# Patient Record
Sex: FEMALE | Race: BLACK OR AFRICAN AMERICAN | NOT HISPANIC OR LATINO | URBAN - METROPOLITAN AREA
[De-identification: names, ages, dates, MRNs, and addresses within clinical notes are randomized per-mention and may not be internally consistent; named-entity substitution may affect disease eponyms.]

---

## 2021-10-26 ENCOUNTER — INPATIENT (INPATIENT)
Facility: HOSPITAL | Age: 46
LOS: 44 days | Discharge: NURSING, REHAB OR LONG-TERM CARE | DRG: 3 | End: 2021-12-10
Attending: NEUROLOGICAL SURGERY | Admitting: NEUROLOGICAL SURGERY
Payer: MEDICARE

## 2021-10-26 VITALS
DIASTOLIC BLOOD PRESSURE: 91 MMHG | OXYGEN SATURATION: 100 % | SYSTOLIC BLOOD PRESSURE: 161 MMHG | RESPIRATION RATE: 16 BRPM | HEART RATE: 43 BPM

## 2021-10-26 DIAGNOSIS — Z98.890 OTHER SPECIFIED POSTPROCEDURAL STATES: Chronic | ICD-10-CM

## 2021-10-26 LAB
ALBUMIN SERPL ELPH-MCNC: 4 G/DL — SIGNIFICANT CHANGE UP (ref 3.3–5)
ALP SERPL-CCNC: 82 U/L — SIGNIFICANT CHANGE UP (ref 40–120)
ALT FLD-CCNC: 20 U/L — SIGNIFICANT CHANGE UP (ref 10–45)
AMPHET UR-MCNC: NEGATIVE — SIGNIFICANT CHANGE UP
ANION GAP SERPL CALC-SCNC: 12 MMOL/L — SIGNIFICANT CHANGE UP (ref 5–17)
ANION GAP SERPL CALC-SCNC: 12 MMOL/L — SIGNIFICANT CHANGE UP (ref 5–17)
ANION GAP SERPL CALC-SCNC: 13 MMOL/L — SIGNIFICANT CHANGE UP (ref 5–17)
ANISOCYTOSIS BLD QL: SLIGHT — SIGNIFICANT CHANGE UP
APTT BLD: 26.5 SEC — LOW (ref 27.5–35.5)
AST SERPL-CCNC: 25 U/L — SIGNIFICANT CHANGE UP (ref 10–40)
BARBITURATES UR SCN-MCNC: NEGATIVE — SIGNIFICANT CHANGE UP
BASE EXCESS BLDA CALC-SCNC: 2.3 MMOL/L — SIGNIFICANT CHANGE UP (ref -2–3)
BASOPHILS # BLD AUTO: 0 K/UL — SIGNIFICANT CHANGE UP (ref 0–0.2)
BASOPHILS NFR BLD AUTO: 0 % — SIGNIFICANT CHANGE UP (ref 0–2)
BENZODIAZ UR-MCNC: POSITIVE
BILIRUB SERPL-MCNC: 0.2 MG/DL — SIGNIFICANT CHANGE UP (ref 0.2–1.2)
BLD GP AB SCN SERPL QL: NEGATIVE — SIGNIFICANT CHANGE UP
BUN SERPL-MCNC: 10 MG/DL — SIGNIFICANT CHANGE UP (ref 7–23)
BUN SERPL-MCNC: 10 MG/DL — SIGNIFICANT CHANGE UP (ref 7–23)
BUN SERPL-MCNC: 8 MG/DL — SIGNIFICANT CHANGE UP (ref 7–23)
CALCIUM SERPL-MCNC: 8.7 MG/DL — SIGNIFICANT CHANGE UP (ref 8.4–10.5)
CALCIUM SERPL-MCNC: 9.4 MG/DL — SIGNIFICANT CHANGE UP (ref 8.4–10.5)
CALCIUM SERPL-MCNC: 9.6 MG/DL — SIGNIFICANT CHANGE UP (ref 8.4–10.5)
CHLORIDE SERPL-SCNC: 102 MMOL/L — SIGNIFICANT CHANGE UP (ref 96–108)
CHLORIDE SERPL-SCNC: 104 MMOL/L — SIGNIFICANT CHANGE UP (ref 96–108)
CHLORIDE SERPL-SCNC: 98 MMOL/L — SIGNIFICANT CHANGE UP (ref 96–108)
CO2 BLDA-SCNC: 28 MMOL/L — HIGH (ref 19–24)
CO2 SERPL-SCNC: 24 MMOL/L — SIGNIFICANT CHANGE UP (ref 22–31)
CO2 SERPL-SCNC: 24 MMOL/L — SIGNIFICANT CHANGE UP (ref 22–31)
CO2 SERPL-SCNC: 25 MMOL/L — SIGNIFICANT CHANGE UP (ref 22–31)
COCAINE METAB.OTHER UR-MCNC: NEGATIVE — SIGNIFICANT CHANGE UP
CREAT SERPL-MCNC: 0.59 MG/DL — SIGNIFICANT CHANGE UP (ref 0.5–1.3)
CREAT SERPL-MCNC: 0.79 MG/DL — SIGNIFICANT CHANGE UP (ref 0.5–1.3)
CREAT SERPL-MCNC: 0.84 MG/DL — SIGNIFICANT CHANGE UP (ref 0.5–1.3)
EOSINOPHIL # BLD AUTO: 0 K/UL — SIGNIFICANT CHANGE UP (ref 0–0.5)
EOSINOPHIL NFR BLD AUTO: 0 % — SIGNIFICANT CHANGE UP (ref 0–6)
GAS PNL BLDA: SIGNIFICANT CHANGE UP
GIANT PLATELETS BLD QL SMEAR: PRESENT — SIGNIFICANT CHANGE UP
GLUCOSE SERPL-MCNC: 126 MG/DL — HIGH (ref 70–99)
GLUCOSE SERPL-MCNC: 144 MG/DL — HIGH (ref 70–99)
GLUCOSE SERPL-MCNC: 241 MG/DL — HIGH (ref 70–99)
HCG SERPL-ACNC: 3 MIU/ML — SIGNIFICANT CHANGE UP
HCG UR QL: NEGATIVE — SIGNIFICANT CHANGE UP
HCO3 BLDA-SCNC: 26 MMOL/L — SIGNIFICANT CHANGE UP (ref 21–28)
HCT VFR BLD CALC: 24.6 % — LOW (ref 34.5–45)
HCT VFR BLD CALC: 25.2 % — LOW (ref 34.5–45)
HCT VFR BLD CALC: 27.2 % — LOW (ref 34.5–45)
HGB BLD-MCNC: 7.9 G/DL — LOW (ref 11.5–15.5)
HGB BLD-MCNC: 8.1 G/DL — LOW (ref 11.5–15.5)
HGB BLD-MCNC: 9 G/DL — LOW (ref 11.5–15.5)
HYPOCHROMIA BLD QL: SLIGHT — SIGNIFICANT CHANGE UP
INR BLD: 1.03 — SIGNIFICANT CHANGE UP (ref 0.88–1.16)
LYMPHOCYTES # BLD AUTO: 0.54 K/UL — LOW (ref 1–3.3)
LYMPHOCYTES # BLD AUTO: 3.5 % — LOW (ref 13–44)
MACROCYTES BLD QL: SLIGHT — SIGNIFICANT CHANGE UP
MAGNESIUM SERPL-MCNC: 1.8 MG/DL — SIGNIFICANT CHANGE UP (ref 1.6–2.6)
MANUAL SMEAR VERIFICATION: SIGNIFICANT CHANGE UP
MCHC RBC-ENTMCNC: 23 PG — LOW (ref 27–34)
MCHC RBC-ENTMCNC: 23.2 PG — LOW (ref 27–34)
MCHC RBC-ENTMCNC: 24.7 PG — LOW (ref 27–34)
MCHC RBC-ENTMCNC: 32.1 GM/DL — SIGNIFICANT CHANGE UP (ref 32–36)
MCHC RBC-ENTMCNC: 32.1 GM/DL — SIGNIFICANT CHANGE UP (ref 32–36)
MCHC RBC-ENTMCNC: 33.1 GM/DL — SIGNIFICANT CHANGE UP (ref 32–36)
MCV RBC AUTO: 71.6 FL — LOW (ref 80–100)
MCV RBC AUTO: 72.4 FL — LOW (ref 80–100)
MCV RBC AUTO: 74.7 FL — LOW (ref 80–100)
METHADONE UR-MCNC: NEGATIVE — SIGNIFICANT CHANGE UP
MONOCYTES # BLD AUTO: 0.42 K/UL — SIGNIFICANT CHANGE UP (ref 0–0.9)
MONOCYTES NFR BLD AUTO: 2.7 % — SIGNIFICANT CHANGE UP (ref 2–14)
NEUTROPHILS # BLD AUTO: 14.45 K/UL — HIGH (ref 1.8–7.4)
NEUTROPHILS NFR BLD AUTO: 93.8 % — HIGH (ref 43–77)
NRBC # BLD: 0 /100 WBCS — SIGNIFICANT CHANGE UP (ref 0–0)
NRBC # BLD: 0 /100 WBCS — SIGNIFICANT CHANGE UP (ref 0–0)
OPIATES UR-MCNC: NEGATIVE — SIGNIFICANT CHANGE UP
OSMOLALITY SERPL: 309 MOSM/KG — HIGH (ref 275–300)
OSMOLALITY SERPL: 309 MOSM/KG — HIGH (ref 275–300)
OVALOCYTES BLD QL SMEAR: SLIGHT — SIGNIFICANT CHANGE UP
PCO2 BLDA: 39 MMHG — HIGH (ref 32–35)
PCP SPEC-MCNC: SIGNIFICANT CHANGE UP
PCP SPEC-MCNC: SIGNIFICANT CHANGE UP
PCP UR-MCNC: NEGATIVE — SIGNIFICANT CHANGE UP
PH BLDA: 7.44 — SIGNIFICANT CHANGE UP (ref 7.35–7.45)
PHOSPHATE SERPL-MCNC: 5.5 MG/DL — HIGH (ref 2.5–4.5)
PLAT MORPH BLD: ABNORMAL
PLATELET # BLD AUTO: 251 K/UL — SIGNIFICANT CHANGE UP (ref 150–400)
PLATELET # BLD AUTO: 302 K/UL — SIGNIFICANT CHANGE UP (ref 150–400)
PLATELET # BLD AUTO: 318 K/UL — SIGNIFICANT CHANGE UP (ref 150–400)
PO2 BLDA: 170 MMHG — HIGH (ref 83–108)
POIKILOCYTOSIS BLD QL AUTO: SLIGHT — SIGNIFICANT CHANGE UP
POLYCHROMASIA BLD QL SMEAR: SIGNIFICANT CHANGE UP
POTASSIUM SERPL-MCNC: 3.7 MMOL/L — SIGNIFICANT CHANGE UP (ref 3.5–5.3)
POTASSIUM SERPL-MCNC: 3.7 MMOL/L — SIGNIFICANT CHANGE UP (ref 3.5–5.3)
POTASSIUM SERPL-MCNC: 3.8 MMOL/L — SIGNIFICANT CHANGE UP (ref 3.5–5.3)
POTASSIUM SERPL-SCNC: 3.7 MMOL/L — SIGNIFICANT CHANGE UP (ref 3.5–5.3)
POTASSIUM SERPL-SCNC: 3.7 MMOL/L — SIGNIFICANT CHANGE UP (ref 3.5–5.3)
POTASSIUM SERPL-SCNC: 3.8 MMOL/L — SIGNIFICANT CHANGE UP (ref 3.5–5.3)
PROT SERPL-MCNC: 7.2 G/DL — SIGNIFICANT CHANGE UP (ref 6–8.3)
PROTHROM AB SERPL-ACNC: 12.3 SEC — SIGNIFICANT CHANGE UP (ref 10.6–13.6)
RBC # BLD: 3.4 M/UL — LOW (ref 3.8–5.2)
RBC # BLD: 3.52 M/UL — LOW (ref 3.8–5.2)
RBC # BLD: 3.64 M/UL — LOW (ref 3.8–5.2)
RBC # FLD: 14.6 % — HIGH (ref 10.3–14.5)
RBC # FLD: 14.9 % — HIGH (ref 10.3–14.5)
RBC # FLD: 18.4 % — HIGH (ref 10.3–14.5)
RBC BLD AUTO: ABNORMAL
RH IG SCN BLD-IMP: POSITIVE — SIGNIFICANT CHANGE UP
SAO2 % BLDA: 99.1 % — HIGH (ref 94–98)
SARS-COV-2 RNA SPEC QL NAA+PROBE: SIGNIFICANT CHANGE UP
SODIUM SERPL-SCNC: 134 MMOL/L — LOW (ref 135–145)
SODIUM SERPL-SCNC: 139 MMOL/L — SIGNIFICANT CHANGE UP (ref 135–145)
SODIUM SERPL-SCNC: 141 MMOL/L — SIGNIFICANT CHANGE UP (ref 135–145)
TARGETS BLD QL SMEAR: SLIGHT — SIGNIFICANT CHANGE UP
THC UR QL: POSITIVE
WBC # BLD: 11.46 K/UL — HIGH (ref 3.8–10.5)
WBC # BLD: 15.41 K/UL — HIGH (ref 3.8–10.5)
WBC # BLD: 20.06 K/UL — HIGH (ref 3.8–10.5)
WBC # FLD AUTO: 11.46 K/UL — HIGH (ref 3.8–10.5)
WBC # FLD AUTO: 15.41 K/UL — HIGH (ref 3.8–10.5)
WBC # FLD AUTO: 20.06 K/UL — HIGH (ref 3.8–10.5)

## 2021-10-26 PROCEDURE — 36224 PLACE CATH CAROTD ART: CPT | Mod: LT

## 2021-10-26 PROCEDURE — 70450 CT HEAD/BRAIN W/O DYE: CPT | Mod: 26

## 2021-10-26 PROCEDURE — 99223 1ST HOSP IP/OBS HIGH 75: CPT | Mod: 25,57

## 2021-10-26 PROCEDURE — 71045 X-RAY EXAM CHEST 1 VIEW: CPT | Mod: 26

## 2021-10-26 PROCEDURE — 76377 3D RENDER W/INTRP POSTPROCES: CPT | Mod: 26

## 2021-10-26 PROCEDURE — 75894 X-RAYS TRANSCATH THERAPY: CPT | Mod: 26

## 2021-10-26 PROCEDURE — 61624 TCAT PERM OCCLS/EMBOLJ CNS: CPT

## 2021-10-26 PROCEDURE — 70450 CT HEAD/BRAIN W/O DYE: CPT | Mod: 26,77

## 2021-10-26 PROCEDURE — 99291 CRITICAL CARE FIRST HOUR: CPT

## 2021-10-26 PROCEDURE — 99292 CRITICAL CARE ADDL 30 MIN: CPT | Mod: 25

## 2021-10-26 PROCEDURE — 36556 INSERT NON-TUNNEL CV CATH: CPT

## 2021-10-26 PROCEDURE — 75898 FOLLOW-UP ANGIOGRAPHY: CPT | Mod: 26

## 2021-10-26 RX ORDER — CLEVIDIPINE BUTYRATE 50MG/100ML
1 VIAL (ML) INTRAVENOUS
Qty: 25 | Refills: 0 | Status: DISCONTINUED | OUTPATIENT
Start: 2021-10-26 | End: 2021-10-26

## 2021-10-26 RX ORDER — SODIUM CHLORIDE 9 MG/ML
1000 INJECTION, SOLUTION INTRAVENOUS
Refills: 0 | Status: DISCONTINUED | OUTPATIENT
Start: 2021-10-26 | End: 2021-10-26

## 2021-10-26 RX ORDER — ACETAMINOPHEN 500 MG
650 TABLET ORAL EVERY 6 HOURS
Refills: 0 | Status: DISCONTINUED | OUTPATIENT
Start: 2021-10-26 | End: 2021-11-09

## 2021-10-26 RX ORDER — LEVETIRACETAM 250 MG/1
1000 TABLET, FILM COATED ORAL EVERY 12 HOURS
Refills: 0 | Status: DISCONTINUED | OUTPATIENT
Start: 2021-10-26 | End: 2021-11-04

## 2021-10-26 RX ORDER — CEFAZOLIN SODIUM 1 G
2000 VIAL (EA) INJECTION ONCE
Refills: 0 | Status: COMPLETED | OUTPATIENT
Start: 2021-10-26 | End: 2021-10-26

## 2021-10-26 RX ORDER — SENNA PLUS 8.6 MG/1
2 TABLET ORAL AT BEDTIME
Refills: 0 | Status: DISCONTINUED | OUTPATIENT
Start: 2021-10-26 | End: 2021-11-03

## 2021-10-26 RX ORDER — SODIUM CHLORIDE 9 MG/ML
1000 INJECTION INTRAMUSCULAR; INTRAVENOUS; SUBCUTANEOUS
Refills: 0 | Status: DISCONTINUED | OUTPATIENT
Start: 2021-10-26 | End: 2021-10-26

## 2021-10-26 RX ORDER — PANTOPRAZOLE SODIUM 20 MG/1
40 TABLET, DELAYED RELEASE ORAL DAILY
Refills: 0 | Status: DISCONTINUED | OUTPATIENT
Start: 2021-10-26 | End: 2021-10-27

## 2021-10-26 RX ORDER — MANNITOL
85 POWDER (GRAM) MISCELLANEOUS ONCE
Refills: 0 | Status: COMPLETED | OUTPATIENT
Start: 2021-10-26 | End: 2021-10-26

## 2021-10-26 RX ORDER — PROPOFOL 10 MG/ML
10 INJECTION, EMULSION INTRAVENOUS
Qty: 1000 | Refills: 0 | Status: DISCONTINUED | OUTPATIENT
Start: 2021-10-26 | End: 2021-10-26

## 2021-10-26 RX ORDER — DEXTROSE 50 % IN WATER 50 %
25 SYRINGE (ML) INTRAVENOUS ONCE
Refills: 0 | Status: DISCONTINUED | OUTPATIENT
Start: 2021-10-26 | End: 2021-10-27

## 2021-10-26 RX ORDER — FENTANYL CITRATE 50 UG/ML
25 INJECTION INTRAVENOUS
Refills: 0 | Status: DISCONTINUED | OUTPATIENT
Start: 2021-10-26 | End: 2021-11-02

## 2021-10-26 RX ORDER — SODIUM CHLORIDE 9 MG/ML
30 INJECTION INTRAMUSCULAR; INTRAVENOUS; SUBCUTANEOUS ONCE
Refills: 0 | Status: COMPLETED | OUTPATIENT
Start: 2021-10-26 | End: 2021-10-26

## 2021-10-26 RX ORDER — FUROSEMIDE 40 MG
40 TABLET ORAL ONCE
Refills: 0 | Status: COMPLETED | OUTPATIENT
Start: 2021-10-26 | End: 2021-10-26

## 2021-10-26 RX ORDER — CLEVIDIPINE BUTYRATE 50MG/100ML
1 VIAL (ML) INTRAVENOUS
Qty: 25 | Refills: 0 | Status: DISCONTINUED | OUTPATIENT
Start: 2021-10-26 | End: 2021-10-27

## 2021-10-26 RX ORDER — INSULIN LISPRO 100/ML
VIAL (ML) SUBCUTANEOUS
Refills: 0 | Status: DISCONTINUED | OUTPATIENT
Start: 2021-10-26 | End: 2021-11-07

## 2021-10-26 RX ORDER — SODIUM CHLORIDE 9 MG/ML
1000 INJECTION, SOLUTION INTRAVENOUS
Refills: 0 | Status: DISCONTINUED | OUTPATIENT
Start: 2021-10-26 | End: 2021-10-27

## 2021-10-26 RX ORDER — PROPOFOL 10 MG/ML
10 INJECTION, EMULSION INTRAVENOUS
Qty: 1000 | Refills: 0 | Status: DISCONTINUED | OUTPATIENT
Start: 2021-10-26 | End: 2021-11-01

## 2021-10-26 RX ORDER — SODIUM CHLORIDE 5 G/100ML
500 INJECTION, SOLUTION INTRAVENOUS
Refills: 0 | Status: DISCONTINUED | OUTPATIENT
Start: 2021-10-26 | End: 2021-10-27

## 2021-10-26 RX ORDER — GLUCAGON INJECTION, SOLUTION 0.5 MG/.1ML
1 INJECTION, SOLUTION SUBCUTANEOUS ONCE
Refills: 0 | Status: DISCONTINUED | OUTPATIENT
Start: 2021-10-26 | End: 2021-10-27

## 2021-10-26 RX ORDER — SODIUM CHLORIDE 9 MG/ML
1000 INJECTION INTRAMUSCULAR; INTRAVENOUS; SUBCUTANEOUS
Refills: 0 | Status: DISCONTINUED | OUTPATIENT
Start: 2021-10-26 | End: 2021-10-27

## 2021-10-26 RX ORDER — CHLORHEXIDINE GLUCONATE 213 G/1000ML
15 SOLUTION TOPICAL EVERY 12 HOURS
Refills: 0 | Status: DISCONTINUED | OUTPATIENT
Start: 2021-10-26 | End: 2021-12-10

## 2021-10-26 RX ORDER — CEFAZOLIN SODIUM 1 G
1000 VIAL (EA) INJECTION ONCE
Refills: 0 | Status: COMPLETED | OUTPATIENT
Start: 2021-10-26 | End: 2021-10-26

## 2021-10-26 RX ORDER — SODIUM CHLORIDE 9 MG/ML
1000 INJECTION INTRAMUSCULAR; INTRAVENOUS; SUBCUTANEOUS ONCE
Refills: 0 | Status: COMPLETED | OUTPATIENT
Start: 2021-10-26 | End: 2021-10-26

## 2021-10-26 RX ORDER — CHLORHEXIDINE GLUCONATE 213 G/1000ML
1 SOLUTION TOPICAL
Refills: 0 | Status: DISCONTINUED | OUTPATIENT
Start: 2021-10-26 | End: 2021-11-29

## 2021-10-26 RX ORDER — DEXTROSE 50 % IN WATER 50 %
12.5 SYRINGE (ML) INTRAVENOUS ONCE
Refills: 0 | Status: DISCONTINUED | OUTPATIENT
Start: 2021-10-26 | End: 2021-10-27

## 2021-10-26 RX ADMIN — Medication 100 MILLIGRAM(S): at 13:41

## 2021-10-26 RX ADMIN — SODIUM CHLORIDE 1000 MILLILITER(S): 9 INJECTION INTRAMUSCULAR; INTRAVENOUS; SUBCUTANEOUS at 23:41

## 2021-10-26 RX ADMIN — Medication 40 MILLIGRAM(S): at 12:34

## 2021-10-26 RX ADMIN — SODIUM CHLORIDE 30 MILLILITER(S): 5 INJECTION, SOLUTION INTRAVENOUS at 22:24

## 2021-10-26 RX ADMIN — Medication 5100 GRAM(S): at 12:34

## 2021-10-26 RX ADMIN — Medication 100 MILLIGRAM(S): at 17:06

## 2021-10-26 RX ADMIN — SODIUM CHLORIDE 30 MILLILITER(S): 9 INJECTION INTRAMUSCULAR; INTRAVENOUS; SUBCUTANEOUS at 12:00

## 2021-10-26 RX ADMIN — LEVETIRACETAM 400 MILLIGRAM(S): 250 TABLET, FILM COATED ORAL at 22:21

## 2021-10-26 RX ADMIN — SODIUM CHLORIDE 35 MILLILITER(S): 9 INJECTION, SOLUTION INTRAVENOUS at 14:49

## 2021-10-26 NOTE — H&P ADULT - NSHPPHYSICALEXAM_GEN_ALL_CORE
Constitutional: 47 y/o female intubated and sedated on propofol  Eyes:  pupils 2mm b/l, sluggish. Sclera anicteric, conjunctiva noninjected.  ENMT: Oropharyngeal mucosa moist, pink. Tongue midline.    Neck: Neck supple, FROM.  No appreciable lymphadenopathy.  Respiratory: intubated  Cardiovascular: Regular rate and rhythm.  Gastrointestinal:  Soft, nontender, nondistended.   Genitourinary:  Deferred.  Rectal: Deferred.  Vascular: Extremities warm, no ulcers, no discoloration of skin.   Neurological:   limited neurological exam  intubated and sedated, unable to follow commands  pupils 2mm and sluggish b/l   neg corneals, no gaze preference  0/5 all extremities  unable to assess cough/gag given ICP  Skin: Warm, dry, no erythema. Constitutional: 45 y/o female intubated on full vent support and sedated on propofol  Eyes:  pupils 2mm b/l, sluggish. Sclera anicteric, conjunctiva noninjected.  ENMT: Oropharyngeal mucosa moist, pink. Tongue midline.    Neck: Neck supple, FROM.  No appreciable lymphadenopathy.  Respiratory: intubated  Cardiovascular: Regular rate and rhythm.  Gastrointestinal:  Soft, nontender, nondistended.   Genitourinary:  Deferred.  Rectal: Deferred.  Vascular: Extremities warm, no ulcers, no discoloration of skin.   Neurological:   limited neurological exam  intubated and sedated, unable to follow commands  pupils 2mm and sluggish b/l   neg corneals, no gaze preference  0/5 all extremities  unable to assess cough/gag given elevated ICP  Skin: Warm, dry, no erythema.

## 2021-10-26 NOTE — PROGRESS NOTE ADULT - ASSESSMENT
46y/Female with:  L MCA ruptured aneurysm, subarachnoid hemorrhage, s/p DHC (10/26/2021, Dr. D'Amico @ [facility]), brain compression, cerebral edema  anemia   recent spinal surgery    PLAN: Day 1 = 10-26 ; Day 4 = 10/29; Day 21 = 11/15  NEURO: neurochecks q1h, sedation with propofol  SAH:  TCD starting Day 4 for vasospasm surveillance, CTA done @ [facility], possible angio, start nimodipine 60 mg po q4h to be given for 21 days (last day 11/15); definitive aneurysm repair per Neurosurgery or Neurointerventional  Hydrocephalus:  EVD insertion after CT scan  ICP crisis: keep head position neutral, sedation with propofol, osmotherapy, s/p DHC  Seizure prophylaxis (cortical ICH, associated SDH, unclear etiology):  levetiracetam 500mg IV BID until aneurysm is secured  REHAB:  physical therapy evaluation and management    EARLY MOB:  HOB elevated    PULM:  full vent support for now  CARDIO:  SBP goal 100-140mm Hg, nicardipine 5 mg/hour, titrate by 2.5 mg/hour every 15 min to a max of 15 mg/hour; baseline Echo, EKG  ENDO:  Blood sugar goals 140-180 mg/dL, start insulin sliding scale; check A1C, lipid profile, TSH  GI:  PPI for GI prophylaxis  DIET: NPO  RENAL: maintain euvolemia, accurate Is and Os, Na goal 145-150; 23.4% bullet x1, mannitol furosemide x1, recheck BMP this afternoon  HEM/ONC: no coagulopathy (INR= 1.03), no ASA / Plavix use  VTE Prophylaxis: SCDs, no DVT chemoprophylaxis for now as patient is high risk for bleed (fresh bleed), baseline LE Doppler for DVT suspected on admission (transfer, found down)  ID: afebrile, leukocytosis  Social: son updated  MISC: f/u Utox    CORE MEASURES  1.  Bess and Griffin Score =  2.  VTE prophylaxis:  [ ] administered within 24 hours of admission OR [X] reason not done: fresh bleed, unsecured aneurysm.  3.  Dysphagia screening:   [X] performed before any oral meds / liquids / food  4.  Nimodipine treatment:  [X] administered within 24 hours of admission OR [ ] reason not done:    ATTENDING ATTESTATION:  I was physically present for the key portions of the evaluation and management (E/M) service provided.  I agree with the above history, physical and plan, which I have reviewed and edited where appropriate.    Patient at high risk for neurological deterioration or death due to:  ICU delirium, aspiration PNA, DVT / PE.  Critical care time:  I have personally provided 45 minutes of critical care time, excluding time spent on separate procedures.      Plan discussed with RN, house staff. 46y/Female with:  L MCA ruptured aneurysm, subarachnoid hemorrhage, s/p DHC (10/26/2021, Dr. D'Amico @ [facility]), brain compression, cerebral edema  anemia   recent spinal surgery    PLAN: Day 1 = 10-26 ; Day 4 = 10/29; Day 21 = 11/15  NEURO: neurochecks q1h, sedation with propofol  SAH:  TCD starting Day 4 for vasospasm surveillance, CTA done @ [facility], possible angio, start nimodipine 60 mg po q4h to be given for 21 days (last day 11/15); definitive aneurysm repair per Neurosurgery or Neurointerventional  Hydrocephalus:  EVD insertion after CT scan  ICP crisis: keep head position neutral, sedation with propofol, osmotherapy, s/p DHC  Seizure prophylaxis (cortical ICH, associated SDH, unclear etiology):  levetiracetam 500mg IV BID until aneurysm is secured  REHAB:  physical therapy evaluation and management    EARLY MOB:  HOB elevated    PULM:  full vent support for now  CARDIO:  SBP goal 100-140mm Hg, nicardipine 5 mg/hour, titrate by 2.5 mg/hour every 15 min to a max of 15 mg/hour; baseline Echo, EKG  ENDO:  Blood sugar goals 140-180 mg/dL, start insulin sliding scale; check A1C, lipid profile, TSH  GI:  PPI for GI prophylaxis  DIET: NPO  RENAL: maintain euvolemia, accurate Is and Os, Na goal 145-150; 23.4% bullet x1, mannitol furosemide x1, recheck BMP this afternoon  HEM/ONC: no coagulopathy (INR= 1.03), no ASA / Plavix use  VTE Prophylaxis: SCDs, no DVT chemoprophylaxis for now as patient is high risk for bleed (fresh bleed), baseline LE Doppler for DVT suspected on admission (transfer, found down)  ID: afebrile, leukocytosis  Social: son updated  MISC: f/u Utox    CORE MEASURES  1.  Bess and Griffin Score =  2.  VTE prophylaxis:  [ ] administered within 24 hours of admission OR [X] reason not done: fresh bleed, unsecured aneurysm.  3.  Dysphagia screening:   [X] performed before any oral meds / liquids / food  4.  Nimodipine treatment:  [X] administered within 24 hours of admission OR [ ] reason not done:    ATTENDING ATTESTATION:  I was physically present for the key portions of the evaluation and management (E/M) service provided.  I agree with the above history, physical and plan, which I have reviewed and edited where appropriate.    Patient at high risk for neurological deterioration or death due to:  ICU delirium, aspiration PNA, DVT / PE.  Critical care time:  I have personally provided 45 minutes of critical care time, excluding time spent on separate procedures.      Plan discussed with RN, house staff.    Additional critical care time:  I have personally provided 30 minutes of critical care time, excluding time spent on separate procedures.    Total critical care time: 75 minutes

## 2021-10-26 NOTE — H&P ADULT - ATTENDING COMMENTS
Patient s/p left hemicraniectomy at [facility] then transferred to [place]. Presents with left MCA ruptured aneurysm with SAH and ICH. Plan for EVD@15, central line, A-line, SBP<140 until aneurysm secure, catheter angiogram possible coiling, keppra, hypertonic saline for Na 145-155.    Terence Duncan M.D.

## 2021-10-26 NOTE — CHART NOTE - NSCHARTNOTEFT_GEN_A_CORE
Neurosurgical Indications for Screening Dopplers on Admission:       1) Known hypercoagulation disorder (h/o VTE, thrombophilia, HIT, etc.)   2) Admitted from prolonged stay from another institution (straight forward ER transfers not included)  3) Presenting with significant leg immobility   4) Presenting with signs and symptoms of VTE?    5) With significant critical illness, Including "found down" for unknown period of time in HPI  6) With significant neurotrauma (TBI, SCI / TLS spine fractures)   7) Who are comatose   8) With known malignancy (e.g. glioblastoma multiforme, meningioma, etc.). Excludes IA chemo 23hr observation stays  9) On hemodialysis   10) Who have received platelet transfusion or antithrombotic reversal agents recently   11) Who have had recent major orthopedic surgery          Screening dopplers indicated?   Y X_   N _    DVT Prophylaxis:  X_ SCD's   _ chemoprophylaxis

## 2021-10-26 NOTE — H&P ADULT - ASSESSMENT
ASSESSMENT:  47 y/o female with unknown PMHx presented to [place] ED BIBEMS after being found unconscious at home, unknown period of time. 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 HH5, MF1. Patient was intubated at [place] ED and sent straight to the OR for left hemicraniotomy. A-line placed intraop. Hemodynamically unstable upon arrival to [facility], bradycardic and hypertensive s/p Mannitol and Furosemide. Central line placed in NSICU. Currently sedated on Propofol. Pending CTH. Planned for possible aneurysm clipping and EVD placement.     PLAN:  NEURO:  - admit to neurosurgery ICU  - neuro checks/vital signs q1hr  - HOB at 30  - bedrest  - seizure precautions  - Keppra 1g IV BID    CARDIO:  - SBP<140  - Clevidipine 25mg IV at 2mL/hr  - pend echo   - EKG    PULM:  - intubated  - aspiration precautions  - CXR    GI:  - NPO    RENAL:  - Na 145-150  - LR @ 35mL/hr    HEME:  - SCDs  - Hgb: 8.1  - pend 1u PRBC  - pend repeat BMP/CBC    ID:  - afebrile    ENDO:  - pend A1c  - ISS    Case discussed with Dr. Duncan, Dr. D'Amico & Dr. Vyas      ASSESSMENT:  47 y/o female with unknown PMHx presented to [place] ED BIBEMS after being found unconscious at home, unknown period of time. 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 HH5, MF1. Patient was intubated at [place] ED and sent straight to the OR for left hemicraniotomy. A-line placed intraop. Hemodynamically unstable upon arrival to [facility], bradycardic and hypertensive s/p Mannitol and Furosemide. Central line placed in NSICU. Currently sedated on Propofol. Pending CTH. Planned for possible EVD placement, possibl angio coil/embo of aneurysm vs. aneurysm clipping.     PLAN:  NEURO:  - admit to neurosurgery ICU  - neuro checks/vital signs q1hr  - HOB > 30 (for elevated ICPs)   - strict bedrest  - seizure precautions  - Keppra 1g IV BID   - s/p mannitol 85g at [facility], s/p 23.4% on admission     CARDIO:  - SBP<140  - Clevidipine 25mg IV at 2mL/hr  - pend echo on admission   - EKG    PULM:  - intubated on full vent support, ET tube confirmed by CXR   - aspiration precautions    GI:  - NPO  - bowel regimen   - NGT (salem sump)     RENAL:  - Na 145-150, f/u BMP @4pm   - LR @ 35mL/hr, total fluids 75cc/hr (including clevidipine and propofol infusions)   - trend serum osm     HEME:  - SCDs  - Hgb: 8.1  - pend 1u PRBC  - pend repeat BMP/CBC    ID:  - afebrile, no abx at this time     ENDO:  - pend A1c  - ISS     GOC: full code  Level of care: ICU status   Dispo: pend CTH and further OR planning     Case discussed with Dr. Duncan, Dr. D'Amico & Dr. Vyas

## 2021-10-26 NOTE — PROGRESS NOTE ADULT - SUBJECTIVE AND OBJECTIVE BOX
NEUROSURGERY POST OP NOTE:    POD# 0 S/P cerebral angiogram with coil embolization of left MCA bifurcation aneurysm.     S: Patient seen in ICU, sedated on propofol, flap soft but full.       T(C): 36.5 (10-26-21 @ 20:30), Max: 38 (10-26-21 @ 17:45)  HR: 76 (10-26-21 @ 21:15) (43 - 100)  BP: 127/76 (10-26-21 @ 21:00) (104/78 - 179/80)  RR: 22 (10-26-21 @ 17:00) (14 - 22)  SpO2: 100% (10-26-21 @ 21:15) (100% - 100%)      10-26-21 @ 07:01  -  10-26-21 @ 22:23  --------------------------------------------------------  IN: 292.3 mL / OUT: 2915 mL / NET: -2622.7 mL        acetaminophen    Suspension .. 650 milliGRAM(s) Oral every 6 hours PRN  chlorhexidine 0.12% Liquid 15 milliLiter(s) Oral Mucosa every 12 hours  clevidipine Infusion 1 mG/Hr IV Continuous <Continuous>  dextrose 5%. 1000 milliLiter(s) IV Continuous <Continuous>  dextrose 50% Injectable 12.5 Gram(s) IV Push once  dextrose 50% Injectable 25 Gram(s) IV Push once  fentaNYL    Injectable 25 MICROGram(s) IV Push every 2 hours PRN  glucagon  Injectable 1 milliGRAM(s) IntraMuscular once  insulin lispro (ADMELOG) corrective regimen sliding scale   SubCutaneous Before meals and at bedtime  levETIRAcetam  IVPB 1000 milliGRAM(s) IV Intermittent every 12 hours  pantoprazole   Suspension 40 milliGRAM(s) Oral daily  propofol Infusion 10 MICROgram(s)/kG/Min IV Continuous <Continuous>  senna 2 Tablet(s) Oral at bedtime  sodium chloride 0.9%. 1000 milliLiter(s) IV Continuous <Continuous>  sodium chloride 3%. 500 milliLiter(s) IV Continuous <Continuous>      RADIOLOGY:     Exam: Sedated on propofol   General: Intubated, sedated, in no apparent distress.   HEENT: PERRL 2mm sluggish, +ETT/OGT  Cardiovascular: RRR, normal S1 and S2   Respiratory: lungs CTAB, no wheezing, rhonchi, or crackles   GI: normoactive BS to auscultation, abd soft, NTND   Neuro: No eye opening (spontaneously or to noxious), not following commands, not tracking. +Flap full but soft. UE trace WD vs. extensor posturing L>R and b/l LE brisk TF to noxious. +cough, gag, corneals   Extremities: warm and well perfused. Bilateral DP/PT pulses 2+  Wound/incision: +Left hemicraniectomy wound with staples in place, dressing saturated. +Right groin with dressing in place, no hematoma, C/D/I   Drain: +EVD open @5cm, +HMV and +ALEXIS       Assessment:   45 y/o female with PMHx of HTN and MS, hx of spinal surgery at [facility] 10/08 presented to [place] ED BIBEMS after being found unconscious at home, unknown period of time. 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 HH5, MF4; BD #1 = 10/26. Patient was intubated at [facility] ED and sent straight to the OR for left hemicraniotomy. A-line placed intraop. Hemodynamically unstable upon arrival to [facility], bradycardic and hypertensive s/p Mannitol and Furosemide. Central line placed in NSICU. Currently sedated on Propofol. Now s/p EVD placement, and coil embolization of left MCA bifurcation aneurysm 10/26/2021.       Plan:  PLAN:  NEURO:  - admit to neurosurgery ICU  - neuro checks/vital signs q1hr  - HOB > 30 (for elevated ICPs)   - strict bedrest  - seizure precautions  - Keppra 1g IV BID   - s/p mannitol 85g at [facility], s/p 23.4% on admission   - S/p coil embolization of L MCA aneurysm 10/26  - EVD open at 5cmH2O  - Monitor ICPs and drain output     CARDIO:  - SBP<160  - Clevidipine PRN  - echo pending   - EKG    PULM:  - intubated on full vent support, ET tube confirmed by CXR   - aspiration precautions    GI:  - NPO  - bowel regimen   - OGT (salem sump) to low intermittent suction     RENAL:  - Na 145-150,   - NS@50, 3%@30/hr  - trend serum osm     HEME:  - SCDs  - Hgb 9   - s/p 2 u PRBC    ID:  - afebrile, no abx at this time     ENDO:  - pend A1c  - ISS     GOC: full code  Level of care: ICU status   Dispo: pend CTH and further OR planning     Case discussed with Dr. Duncan, Dr. D'Amico & Dr. Vyas

## 2021-10-26 NOTE — H&P ADULT - NSHPLABSRESULTS_GEN_ALL_CORE
8.1    11.46 )-----------( 302      ( 26 Oct 2021 12:01 )             25.2     10-26    134<L>  |  98  |  8   ----------------------------<  144<H>  3.8   |  24  |  0.59    Ca    9.6      26 Oct 2021 12:01    TPro  7.2  /  Alb  4.0  /  TBili  0.2  /  DBili  x   /  AST  25  /  ALT  20  /  AlkPhos  82  10-26

## 2021-10-26 NOTE — PROCEDURAL SAFETY CHECKLIST WITH OR WITHOUT SEDATION - NSPOSTCOMMENTFT_GEN_ALL_CORE
Pt getting prepared to go to cath lab for angio now. Draining clear CSF. Waveform dampened, but pt has had hemicraniectomy today. MD Claudio aware.

## 2021-10-26 NOTE — H&P ADULT - HISTORY OF PRESENT ILLNESS
47 y/o female with unknown PMHx presented to [facility] after being found unconscious at home, unknown period of time. 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 HH5, MF1. Patient was intubated at [facility] and sent straight to the OR for left hemicraniotomy. A-line placed intraop. Hemodynamically unstable upon arrival to [facility], bradycardic and hypertensive s/p Mannitol, Clevidipine, and Furosemide. Central line placed in NSICU. Currently sedated on Propofol. 45 y/o female with unknown PMHx presented to [facility] after being found unconscious at home, unknown period of time. 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 HH5, MF1. Patient was intubated at [place] [facility] and sent straight to the OR for left hemicraniotomy. A-line placed intraop. Hemodynamically unstable upon arrival to [facility], bradycardic and hypertensive s/p Mannitol, Clevidipine, and Furosemide. Central line placed in NSICU. Currently sedated on Propofol, pending repeat CTH. History per patient's son, patient had recent spine surgery (in [place], unknown which hospital) and was found on outpatient imaging last week to have L M1 segment aneurysm (unruptured), pt asymptomatic at this time. Per son patient taking percocet PO at home. Ureña catheter, ET tube, and arterial line placed at [facility].     NIHSS on arrival: 32  47 y/o female with PMH HTN, Multiple sclerosis, recent spinal surgery 10/8 ([facility]) presented to [place] [facility] BIBEMS after being found unconscious at home, unknown period of time. 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 HH5, MF1. Patient was intubated at [place] ED, found to have blown L pupil, and sent straight to the OR for left hemicraniotomy. A-line placed intraop. Hemodynamically unstable upon arrival to [facility], bradycardic and hypertensive s/p Mannitol, Clevidipine, and Furosemide. Central line placed in NSICU. Currently sedated on Propofol, pending repeat CTH. History per patient's son, patient had recent spine surgery and was found on outpatient imaging last week to have L M1 segment aneurysm (unruptured), pt asymptomatic at this time. Per son patient taking percocet PO at home. Ureña catheter, ET tube, and arterial line placed at [facility].     NIHSS on arrival: 32   Bess Griffin 5, Mod Busby 4  Bleed Day 1 = 10/26

## 2021-10-26 NOTE — PROGRESS NOTE ADULT - SUBJECTIVE AND OBJECTIVE BOX
=================================  NEUROCRITICAL CARE ATTENDING NOTE  =================================    AILYN DÍAZ   MRN-[id number]  Summary:  46y/F with recent spinal surgery, found down and brought to ED.  In ED, witnessed shaking, ?seizures, intubated.  Imaging showed SAH.  Emergent decompressive hemicraniectomy for herniation syndrome, given mannitol, levetiracetam, propofol, transferred to [facility] for further management.     COURSE IN THE HOSPITAL:  10/26 admitted to [facility]    Past Medical History: No pertinent past medical history  Allergies:  Allergy Status Unknown  Home meds:     PHYSICAL EXAMINATION  T(C): -- HR: 64 (10-26 @ 12:15) (43 - 64) BP: 155/65 (10-26 @ 12:15) (123/58 - 179/80) RR: 16 (10-26 @ 12:15) (16 - 18) SpO2: 100% (10-26 @ 12:15) (100% - 100%)    NEUROLOGIC EXAMINATION:  Patient does not open eyes, does not  GENERAL: not intubated, not in cardiorespiratory distress  EENT:  anicteric  CARDIOVASCULAR: (+) S1 S2, normal rate and regular rhythm  PULMONARY: clear to auscultation bilaterally  ABDOMEN: soft, nontender with normoactive bowel sounds  EXTREMITIES: no edema  SKIN: no rash    LABS:                        8.1    11.46 )-----------( 302      ( 26 Oct 2021 12:01 )             25.2     134<L>  |  98  |  8   ----------------------------<  144<H>  3.8   |  24  |  0.59    Ca    9.6      26 Oct 2021 12:01    TPro  7.2  /  Alb  4.0  /  TBili  0.2  /  DBili  x   /  AST  25  /  ALT  20  /  AlkPhos  82  10-26    10-26 @ 07:01  -  10-26 @ 13:24  IN: 0 mL / OUT: 1010 mL / NET: -1010 mL    Bacteriology:  CSF studies:  EEG:  Neuroimaging:  Other imaging:    MEDICATIONS:  ·	ceFAZolin   IVPB 1000 milliGRAM(s) IV Intermittent once  ·	levETIRAcetam  IVPB 1000 milliGRAM(s) IV Intermittent every 12 hours  ·	insulin lispro (ADMELOG) corrective regimen sliding scale   SubCutaneous Before meals and at bedtime    IV FLUIDS:  DRIPS:  ·	clevidipine Infusion 1 mG/Hr IV Continuous <Continuous>  DIET: NPO  Lines:  Drains:      Wounds:    CODE STATUS:  Full Code                       GOALS OF CARE:  aggressive                      DISPOSITION:  ICU

## 2021-10-26 NOTE — H&P ADULT - NSHPSOCIALHISTORY_GEN_ALL_CORE
"CHIEF COMPLAINT:  Congestion and fever    HISTORY OF PRESENT ILLNESS:  Patient here with mother. History per nurse's note, reviewed and agreed with. Additional History:  Katharine Anguiano is a 11 month old female who presents today with a 2 week history of congestion. She recently developed fevers to 103. She is feeding well but not sleeping well. Her sister has similar symptoms. No vomiting, diarrhea, or rash. She is behind on immunizations. She has not received a flu vaccine. REVIEW OF SYSTEMS:  Constitutional:  . Normal intake of fluids and solids. Skin:  No rash or lesion. HEENT:  No eye drainage, ear pain,  or sore throat. Respiratory:  No cough, wheezing or breathing difficulty. Gastrointestinal:  No vomiting or diarrhea. Genitourinary:  No frequency, dysuria or hematuria. Musculoskeletal:  No decrease in ROM (range of motion), pain with movement, joint swelling or extremity guarding. EXAM:  Visit Vitals  Temp 98.3 Â°F (36.8 Â°C) (Axillary)   Ht 26"" (66 cm)   Wt 7.81 kg   BMI 17.91 kg/mÂ²      GENERAL:  Well nourished, alert, healthy-appearing child. Content and interactive  HYDRATION:  Well hydrated, moist mucous membranes, normal skin turgor, eyes not sunken. SKIN:  No significant lesions or rashes; normal texture. EYES:  Clear sclerae, EOMI (extraocular movements are intact), PERRLA (pupils equal, round, and reactive to light and accommodation), no discharge. EARS:  Normal ext (external) ears and canals. TMs (tympanic membranes) pearly and mobile, non-inflamed. NOSE:  Mucosa appears normal; nares patent. Clear rhinorrhea  THROAT:  No significant oral lesions; no significant pharyngeal or tonsillar pathology. NECK:  No significant cervical or supraclavicular lymphadenopathy. No masses. No meningismus, supple  HEART:  Regular rate and rhythm; normal S1 & S2, no murmur; perfusion normal.  LUNGS:  Clear, good breath sounds; no rales, rhonchi, wheezes, or stridor.   No tachypnea or " retracting; no respiratory distress. ABDOMEN:  Soft, normal bowel sounds. No rebound, guarding, or tenderness. No masses.   :  No rash    IMPRESSION:  Viral illness    PLAN:  Tylenol or ibuprofen 1/2 tsp every 6 hours as needed for fever or fussiness  Claritin 1/4 tsp daily as needed for congestion  Return for well visit and immunizations  Return for increased symptoms or lack of improvement known percocet use given recent spine surgery known percocet use given recent spine surgery, otherwise unkown drug/ETOH use history. known percocet use given recent spine surgery, otherwise unknown drug/ETOH use history.

## 2021-10-27 LAB
A1C WITH ESTIMATED AVERAGE GLUCOSE RESULT: 5.5 % — SIGNIFICANT CHANGE UP (ref 4–5.6)
ANION GAP SERPL CALC-SCNC: 11 MMOL/L — SIGNIFICANT CHANGE UP (ref 5–17)
ANION GAP SERPL CALC-SCNC: 11 MMOL/L — SIGNIFICANT CHANGE UP (ref 5–17)
ANION GAP SERPL CALC-SCNC: 8 MMOL/L — SIGNIFICANT CHANGE UP (ref 5–17)
BUN SERPL-MCNC: 10 MG/DL — SIGNIFICANT CHANGE UP (ref 7–23)
BUN SERPL-MCNC: 11 MG/DL — SIGNIFICANT CHANGE UP (ref 7–23)
BUN SERPL-MCNC: 9 MG/DL — SIGNIFICANT CHANGE UP (ref 7–23)
CALCIUM SERPL-MCNC: 8 MG/DL — LOW (ref 8.4–10.5)
CALCIUM SERPL-MCNC: 8.2 MG/DL — LOW (ref 8.4–10.5)
CALCIUM SERPL-MCNC: 8.3 MG/DL — LOW (ref 8.4–10.5)
CHLORIDE SERPL-SCNC: 108 MMOL/L — SIGNIFICANT CHANGE UP (ref 96–108)
CHLORIDE SERPL-SCNC: 110 MMOL/L — HIGH (ref 96–108)
CHLORIDE SERPL-SCNC: 126 MMOL/L — HIGH (ref 96–108)
CHOLEST SERPL-MCNC: 158 MG/DL — SIGNIFICANT CHANGE UP
CO2 SERPL-SCNC: 21 MMOL/L — LOW (ref 22–31)
CO2 SERPL-SCNC: 23 MMOL/L — SIGNIFICANT CHANGE UP (ref 22–31)
CO2 SERPL-SCNC: 24 MMOL/L — SIGNIFICANT CHANGE UP (ref 22–31)
CREAT SERPL-MCNC: 0.76 MG/DL — SIGNIFICANT CHANGE UP (ref 0.5–1.3)
CREAT SERPL-MCNC: 0.87 MG/DL — SIGNIFICANT CHANGE UP (ref 0.5–1.3)
CREAT SERPL-MCNC: 0.91 MG/DL — SIGNIFICANT CHANGE UP (ref 0.5–1.3)
ESTIMATED AVERAGE GLUCOSE: 111 MG/DL — SIGNIFICANT CHANGE UP (ref 68–114)
GLUCOSE SERPL-MCNC: 119 MG/DL — HIGH (ref 70–99)
GLUCOSE SERPL-MCNC: 120 MG/DL — HIGH (ref 70–99)
GLUCOSE SERPL-MCNC: 138 MG/DL — HIGH (ref 70–99)
HCT VFR BLD CALC: 23.9 % — LOW (ref 34.5–45)
HCT VFR BLD CALC: 24.1 % — LOW (ref 34.5–45)
HCT VFR BLD CALC: 27.2 % — LOW (ref 34.5–45)
HDLC SERPL-MCNC: 48 MG/DL — LOW
HGB BLD-MCNC: 7.5 G/DL — LOW (ref 11.5–15.5)
HGB BLD-MCNC: 8 G/DL — LOW (ref 11.5–15.5)
HGB BLD-MCNC: 8.7 G/DL — LOW (ref 11.5–15.5)
LIPID PNL WITH DIRECT LDL SERPL: 92 MG/DL — SIGNIFICANT CHANGE UP
MAGNESIUM SERPL-MCNC: 1.7 MG/DL — SIGNIFICANT CHANGE UP (ref 1.6–2.6)
MCHC RBC-ENTMCNC: 24.4 PG — LOW (ref 27–34)
MCHC RBC-ENTMCNC: 25.6 PG — LOW (ref 27–34)
MCHC RBC-ENTMCNC: 26 PG — LOW (ref 27–34)
MCHC RBC-ENTMCNC: 31.1 GM/DL — LOW (ref 32–36)
MCHC RBC-ENTMCNC: 32 GM/DL — SIGNIFICANT CHANGE UP (ref 32–36)
MCHC RBC-ENTMCNC: 33.5 GM/DL — SIGNIFICANT CHANGE UP (ref 32–36)
MCV RBC AUTO: 76.4 FL — LOW (ref 80–100)
MCV RBC AUTO: 78.5 FL — LOW (ref 80–100)
MCV RBC AUTO: 81.4 FL — SIGNIFICANT CHANGE UP (ref 80–100)
NON HDL CHOLESTEROL: 110 MG/DL — SIGNIFICANT CHANGE UP
NRBC # BLD: 0 /100 WBCS — SIGNIFICANT CHANGE UP (ref 0–0)
OSMOLALITY SERPL: 327 MOSM/KG — HIGH (ref 275–300)
PHOSPHATE SERPL-MCNC: 5.1 MG/DL — HIGH (ref 2.5–4.5)
PLATELET # BLD AUTO: 204 K/UL — SIGNIFICANT CHANGE UP (ref 150–400)
PLATELET # BLD AUTO: 216 K/UL — SIGNIFICANT CHANGE UP (ref 150–400)
PLATELET # BLD AUTO: 241 K/UL — SIGNIFICANT CHANGE UP (ref 150–400)
POTASSIUM SERPL-MCNC: 3.7 MMOL/L — SIGNIFICANT CHANGE UP (ref 3.5–5.3)
POTASSIUM SERPL-MCNC: 3.8 MMOL/L — SIGNIFICANT CHANGE UP (ref 3.5–5.3)
POTASSIUM SERPL-MCNC: 3.9 MMOL/L — SIGNIFICANT CHANGE UP (ref 3.5–5.3)
POTASSIUM SERPL-SCNC: 3.7 MMOL/L — SIGNIFICANT CHANGE UP (ref 3.5–5.3)
POTASSIUM SERPL-SCNC: 3.8 MMOL/L — SIGNIFICANT CHANGE UP (ref 3.5–5.3)
POTASSIUM SERPL-SCNC: 3.9 MMOL/L — SIGNIFICANT CHANGE UP (ref 3.5–5.3)
RBC # BLD: 3.07 M/UL — LOW (ref 3.8–5.2)
RBC # BLD: 3.13 M/UL — LOW (ref 3.8–5.2)
RBC # BLD: 3.34 M/UL — LOW (ref 3.8–5.2)
RBC # FLD: 17.1 % — HIGH (ref 10.3–14.5)
RBC # FLD: 17.9 % — HIGH (ref 10.3–14.5)
RBC # FLD: 20.6 % — HIGH (ref 10.3–14.5)
SODIUM SERPL-SCNC: 140 MMOL/L — SIGNIFICANT CHANGE UP (ref 135–145)
SODIUM SERPL-SCNC: 145 MMOL/L — SIGNIFICANT CHANGE UP (ref 135–145)
SODIUM SERPL-SCNC: 157 MMOL/L — HIGH (ref 135–145)
TRIGL SERPL-MCNC: 89 MG/DL — SIGNIFICANT CHANGE UP
TSH SERPL-MCNC: 0.23 UIU/ML — LOW (ref 0.27–4.2)
WBC # BLD: 16.11 K/UL — HIGH (ref 3.8–10.5)
WBC # BLD: 16.35 K/UL — HIGH (ref 3.8–10.5)
WBC # BLD: 25.35 K/UL — HIGH (ref 3.8–10.5)
WBC # FLD AUTO: 16.11 K/UL — HIGH (ref 3.8–10.5)
WBC # FLD AUTO: 16.35 K/UL — HIGH (ref 3.8–10.5)
WBC # FLD AUTO: 25.35 K/UL — HIGH (ref 3.8–10.5)

## 2021-10-27 PROCEDURE — 74018 RADEX ABDOMEN 1 VIEW: CPT | Mod: 26

## 2021-10-27 PROCEDURE — 70450 CT HEAD/BRAIN W/O DYE: CPT | Mod: 26,76

## 2021-10-27 PROCEDURE — 72131 CT LUMBAR SPINE W/O DYE: CPT | Mod: 26

## 2021-10-27 PROCEDURE — 99291 CRITICAL CARE FIRST HOUR: CPT

## 2021-10-27 PROCEDURE — 71045 X-RAY EXAM CHEST 1 VIEW: CPT | Mod: 26

## 2021-10-27 PROCEDURE — 61107 TDH PNXR IMPLT VENTR CATH: CPT

## 2021-10-27 RX ORDER — CEFAZOLIN SODIUM 1 G
1000 VIAL (EA) INJECTION ONCE
Refills: 0 | Status: COMPLETED | OUTPATIENT
Start: 2021-10-27 | End: 2021-10-27

## 2021-10-27 RX ORDER — LIDOCAINE HYDROCHLORIDE AND EPINEPHRINE 10; 10 MG/ML; UG/ML
10 INJECTION, SOLUTION INFILTRATION; PERINEURAL ONCE
Refills: 0 | Status: COMPLETED | OUTPATIENT
Start: 2021-10-27 | End: 2021-10-27

## 2021-10-27 RX ORDER — POTASSIUM CHLORIDE 20 MEQ
20 PACKET (EA) ORAL ONCE
Refills: 0 | Status: COMPLETED | OUTPATIENT
Start: 2021-10-27 | End: 2021-10-27

## 2021-10-27 RX ORDER — SODIUM CHLORIDE 5 G/100ML
500 INJECTION, SOLUTION INTRAVENOUS
Refills: 0 | Status: DISCONTINUED | OUTPATIENT
Start: 2021-10-27 | End: 2021-10-27

## 2021-10-27 RX ORDER — SODIUM CHLORIDE 9 MG/ML
3 INJECTION INTRAMUSCULAR; INTRAVENOUS; SUBCUTANEOUS EVERY 6 HOURS
Refills: 0 | Status: DISCONTINUED | OUTPATIENT
Start: 2021-10-27 | End: 2021-10-27

## 2021-10-27 RX ORDER — MAGNESIUM SULFATE 500 MG/ML
2 VIAL (ML) INJECTION ONCE
Refills: 0 | Status: COMPLETED | OUTPATIENT
Start: 2021-10-27 | End: 2021-10-27

## 2021-10-27 RX ORDER — PANTOPRAZOLE SODIUM 20 MG/1
40 TABLET, DELAYED RELEASE ORAL DAILY
Refills: 0 | Status: DISCONTINUED | OUTPATIENT
Start: 2021-10-27 | End: 2021-10-28

## 2021-10-27 RX ORDER — NIMODIPINE 60 MG/10ML
60 SOLUTION ORAL EVERY 4 HOURS
Refills: 0 | Status: DISCONTINUED | OUTPATIENT
Start: 2021-10-27 | End: 2021-11-01

## 2021-10-27 RX ORDER — MANNITOL
85 POWDER (GRAM) MISCELLANEOUS ONCE
Refills: 0 | Status: COMPLETED | OUTPATIENT
Start: 2021-10-27 | End: 2021-10-27

## 2021-10-27 RX ORDER — SODIUM CHLORIDE 9 MG/ML
1000 INJECTION INTRAMUSCULAR; INTRAVENOUS; SUBCUTANEOUS ONCE
Refills: 0 | Status: COMPLETED | OUTPATIENT
Start: 2021-10-27 | End: 2021-10-27

## 2021-10-27 RX ORDER — SODIUM CHLORIDE 9 MG/ML
30 INJECTION INTRAMUSCULAR; INTRAVENOUS; SUBCUTANEOUS ONCE
Refills: 0 | Status: COMPLETED | OUTPATIENT
Start: 2021-10-27 | End: 2021-10-27

## 2021-10-27 RX ORDER — SODIUM CHLORIDE 9 MG/ML
1000 INJECTION, SOLUTION INTRAVENOUS
Refills: 0 | Status: DISCONTINUED | OUTPATIENT
Start: 2021-10-27 | End: 2021-10-29

## 2021-10-27 RX ADMIN — Medication 5100 GRAM(S): at 03:07

## 2021-10-27 RX ADMIN — SODIUM CHLORIDE 1000 MILLILITER(S): 9 INJECTION INTRAMUSCULAR; INTRAVENOUS; SUBCUTANEOUS at 09:00

## 2021-10-27 RX ADMIN — SODIUM CHLORIDE 3 GRAM(S): 9 INJECTION INTRAMUSCULAR; INTRAVENOUS; SUBCUTANEOUS at 13:50

## 2021-10-27 RX ADMIN — Medication 100 MILLIGRAM(S): at 12:00

## 2021-10-27 RX ADMIN — CHLORHEXIDINE GLUCONATE 1 APPLICATION(S): 213 SOLUTION TOPICAL at 05:35

## 2021-10-27 RX ADMIN — NIMODIPINE 60 MILLIGRAM(S): 60 SOLUTION ORAL at 10:11

## 2021-10-27 RX ADMIN — SODIUM CHLORIDE 50 MILLILITER(S): 5 INJECTION, SOLUTION INTRAVENOUS at 13:53

## 2021-10-27 RX ADMIN — NIMODIPINE 60 MILLIGRAM(S): 60 SOLUTION ORAL at 21:46

## 2021-10-27 RX ADMIN — SODIUM CHLORIDE 30 MILLILITER(S): 9 INJECTION INTRAMUSCULAR; INTRAVENOUS; SUBCUTANEOUS at 08:46

## 2021-10-27 RX ADMIN — NIMODIPINE 60 MILLIGRAM(S): 60 SOLUTION ORAL at 14:17

## 2021-10-27 RX ADMIN — SODIUM CHLORIDE 1000 MILLILITER(S): 9 INJECTION INTRAMUSCULAR; INTRAVENOUS; SUBCUTANEOUS at 03:18

## 2021-10-27 RX ADMIN — SODIUM CHLORIDE 3 GRAM(S): 9 INJECTION INTRAMUSCULAR; INTRAVENOUS; SUBCUTANEOUS at 18:30

## 2021-10-27 RX ADMIN — Medication 50 GRAM(S): at 07:01

## 2021-10-27 RX ADMIN — LIDOCAINE HYDROCHLORIDE AND EPINEPHRINE 10 MILLILITER(S): 10; 10 INJECTION, SOLUTION INFILTRATION; PERINEURAL at 12:00

## 2021-10-27 RX ADMIN — SENNA PLUS 2 TABLET(S): 8.6 TABLET ORAL at 21:46

## 2021-10-27 RX ADMIN — NIMODIPINE 60 MILLIGRAM(S): 60 SOLUTION ORAL at 18:30

## 2021-10-27 RX ADMIN — PANTOPRAZOLE SODIUM 40 MILLIGRAM(S): 20 TABLET, DELAYED RELEASE ORAL at 13:50

## 2021-10-27 RX ADMIN — SODIUM CHLORIDE 75 MILLILITER(S): 9 INJECTION, SOLUTION INTRAVENOUS at 18:33

## 2021-10-27 RX ADMIN — CHLORHEXIDINE GLUCONATE 15 MILLILITER(S): 213 SOLUTION TOPICAL at 05:36

## 2021-10-27 RX ADMIN — Medication 100 MILLIEQUIVALENT(S): at 07:58

## 2021-10-27 RX ADMIN — CHLORHEXIDINE GLUCONATE 15 MILLILITER(S): 213 SOLUTION TOPICAL at 18:30

## 2021-10-27 RX ADMIN — LEVETIRACETAM 400 MILLIGRAM(S): 250 TABLET, FILM COATED ORAL at 10:11

## 2021-10-27 RX ADMIN — Medication 100 MILLIEQUIVALENT(S): at 23:54

## 2021-10-27 RX ADMIN — LEVETIRACETAM 400 MILLIGRAM(S): 250 TABLET, FILM COATED ORAL at 18:30

## 2021-10-27 NOTE — DIETITIAN INITIAL EVALUATION ADULT. - OTHER CALCULATIONS
Please obtain height when feasible, RD estimated height to be 65-67" which will be used for estimated needs until height is obtained. Needs adjusted for critical care requiring vent support. **Fluids per team

## 2021-10-27 NOTE — DISCHARGE NOTE NURSING/CASE MANAGEMENT/SOCIAL WORK - PATIENT PORTAL LINK FT
You can access the FollowMyHealth Patient Portal offered by [facility] by registering at the following website: [email or web address]. By joining [facility]’s FollowMyHealth portal, you will also be able to view your health information using other applications (apps) compatible with our system.

## 2021-10-27 NOTE — DIETITIAN INITIAL EVALUATION ADULT. - ENTERAL
If team wishes to initiate EN, recommend start trickle feeds of Jevity 1.2 @ 10 ml/hr x24hrs via NGT and consult RD team.

## 2021-10-27 NOTE — DIETITIAN INITIAL EVALUATION ADULT. - PHYSCIAL ASSESSMENT
No height obtained during this admission, please update when feasible. Estimated height to be around 65-67" overweight

## 2021-10-27 NOTE — DIETITIAN INITIAL EVALUATION ADULT. - OTHER INFO
46y/F with recent spinal surgery, found down and brought to ED.  In ED, witnessed shaking, ?seizures, intubated.  Imaging showed SAH.  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 for further management. Repeat CT HCP - EVD R frontal inserted, s/p angio coil embo, 2 units pRBC 10/26. Pt returned to NSICU intubated and sedated.     On assessment, pt resting in bed on full vent support. VENT; VC/ AC. Tmax 99.5F. MAP 97- ordered for propofol (15.3 ml/hr, 403kcal) and Fentanyl. Pt remains NPO, NGT to LIWS (150 ml/24hrs). No n/v/d/c. No abd distention or discomfort. Skin surgical incision, rosalind score 12. No pain noted. Unable to assess UBW/ height/ nor appetite PTA. NKFA per EMR. Edu deferred. Please see nutr recs below.

## 2021-10-27 NOTE — DIETITIAN INITIAL EVALUATION ADULT. - LAB (SPECIFY)
Self-Care for Sinusitis     Drinking plenty of water can help sinuses drain. Sinusitis can often be managed with self-care. Self-care can keep sinuses moist and make you feel more comfortable. Remember to follow your doctor's instructions closely.  This
Glu, POCT, Lytes, CO2, BUN, Cr

## 2021-10-27 NOTE — PROGRESS NOTE ADULT - SUBJECTIVE AND OBJECTIVE BOX
HPI:  45 y/o female with PMH HTN, Multiple sclerosis, recent spinal surgery 10/8 ([facility]) presented to [place] ED BIBEMS after being found unconscious at home, unknown period of time. 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 HH5, MF1. Patient was intubated at [place] ED, found to have blown L pupil, and sent straight to the OR for left hemicraniotomy. A-line placed intraop. Hemodynamically unstable upon arrival to [facility], bradycardic and hypertensive s/p Mannitol, Clevidipine, and Furosemide. Central line placed in NSICU. Currently sedated on Propofol, pending repeat CTH. History per patient's son, patient had recent spine surgery and was found on outpatient imaging last week to have L M1 segment aneurysm (unruptured), pt asymptomatic at this time. Per son patient taking percocet PO at home. Ureña catheter, ET tube, and arterial line placed at [facility].     NIHSS on arrival: 32   Bess Griffin 5, Mod Busby 4  Bleed Day 1 = 10/26 (26 Oct 2021 13:03)    OVERNIGHT EVENTS: JOAQUÍN overnight, remains sedated and intubated on propofol. On hypertonic therapy, received bolus for euvolemia goals.     HOSPITAL COURSE:  10/26: admitted to [facility] from [facility], POD#0 s/p L hemicraniectomy for decompression and evacuation of SDH. Transferred to [facility] noted to have tense flap, received mannitol, keppra, decadron. Was hypertensive to 200s and preston to 40s, recevied 85g mannitol and bullet 23.4%. CTH on arrival demonstrated increased size in vents and new IVH. EVD placed, open at 15, central line placed. Transfused 1 u PRBC. POD0 of coiling of left MCA bifurcation aneurysm,   10/27: CTH demonstrated EVD in correct position, EVD lowered to 5, remains intubated on proprofol, pending repeat CTH in AM. Started on 3% @ 30/hr. 1LNS given for euvolemia       Vital Signs Last 24 Hrs  T(C): 37.3 (27 Oct 2021 00:00), Max: 38 (26 Oct 2021 17:45)  T(F): 99.1 (27 Oct 2021 00:00), Max: 100.4 (26 Oct 2021 17:45)  HR: 61 (27 Oct 2021 00:06) (43 - 100)  BP: 146/67 (27 Oct 2021 00:00) (104/78 - 179/80)  BP(mean): 96 (27 Oct 2021 00:00) (82 - 120)  RR: 15 (27 Oct 2021 00:00) (14 - 22)  SpO2: 100% (27 Oct 2021 00:06) (100% - 100%)    I&O's Summary    26 Oct 2021 07:01  -  27 Oct 2021 00:25  --------------------------------------------------------  IN: 1620 mL / OUT: 3500 mL / NET: -1880 mL        PHYSICAL EXAM:  Exam: Sedated on propofol   General: Intubated, sedated, in no apparent distress.   HEENT: PERRL 2mm sluggish, +ETT/OGT  Cardiovascular: RRR, normal S1 and S2   Respiratory: lungs CTAB, no wheezing, rhonchi, or crackles   GI: normoactive BS to auscultation, abd soft, NTND   Neuro: No eye opening (spontaneously or to noxious), not following commands, not tracking. +Flap full but soft. UE trace WD vs. extensor posturing L>R and b/l LE brisk TF to noxious. +cough, gag, corneals   Extremities: warm and well perfused. Bilateral DP/PT pulses 2+  Wound/incision: +Left hemicraniectomy wound with staples in place, dressing saturated. +Right groin with dressing in place, no hematoma, C/D/I   Drain: +EVD open @5cm, +HMV and +ALEXIS     TUBES/LINES:  [X] Ureña  [] Lumbar Drain  [X] Wound Drains - HMV, ALEXIS   [X] Others - EVD open at 5cmH20, a-line, Right CVC      DIET:  [X] NPO  [] Mechanical  [] Tube feeds    LABS:                        9.0    15.41 )-----------( 251      ( 26 Oct 2021 20:57 )             27.2     10-26    141  |  104  |  10  ----------------------------<  126<H>  3.7   |  25  |  0.84    Ca    8.7      26 Oct 2021 21:00  Phos  5.5     10-26  Mg     1.8     10-26    TPro  7.2  /  Alb  4.0  /  TBili  0.2  /  DBili  x   /  AST  25  /  ALT  20  /  AlkPhos  82  10-26    PT/INR - ( 26 Oct 2021 12:01 )   PT: 12.3 sec;   INR: 1.03          PTT - ( 26 Oct 2021 12:01 )  PTT:26.5 sec    CARDIAC MARKERS ( 26 Oct 2021 12:01 )  x     / 0.01 ng/mL / 142 U/L / x     / 5.3 ng/mL      CAPILLARY BLOOD GLUCOSE          Drug Levels: [] N/A    CSF Analysis: [] N/A      Allergies    Allergy Status Unknown    Intolerances      MEDICATIONS:  Antibiotics:    Neuro:  acetaminophen    Suspension .. 650 milliGRAM(s) Oral every 6 hours PRN  fentaNYL    Injectable 25 MICROGram(s) IV Push every 2 hours PRN  levETIRAcetam  IVPB 1000 milliGRAM(s) IV Intermittent every 12 hours  propofol Infusion 10 MICROgram(s)/kG/Min IV Continuous <Continuous>    Anticoagulation:    OTHER:  chlorhexidine 0.12% Liquid 15 milliLiter(s) Oral Mucosa every 12 hours  chlorhexidine 2% Cloths 1 Application(s) Topical <User Schedule>  clevidipine Infusion 1 mG/Hr IV Continuous <Continuous>  dextrose 50% Injectable 12.5 Gram(s) IV Push once  dextrose 50% Injectable 25 Gram(s) IV Push once  glucagon  Injectable 1 milliGRAM(s) IntraMuscular once  insulin lispro (ADMELOG) corrective regimen sliding scale   SubCutaneous Before meals and at bedtime  pantoprazole   Suspension 40 milliGRAM(s) Oral daily  senna 2 Tablet(s) Oral at bedtime    IVF:  dextrose 5%. 1000 milliLiter(s) IV Continuous <Continuous>  sodium chloride 0.9%. 1000 milliLiter(s) IV Continuous <Continuous>  sodium chloride 3%. 500 milliLiter(s) IV Continuous <Continuous>    CULTURES:    RADIOLOGY & ADDITIONAL TESTS:  CTH 10/26/2021 21:48 : pending read      CT 10/26/2021 1600:   IMPRESSION:  Massive lobar hemorrhage in the left superior temporal lobe with intraventricular extension, and major midline shift and downward herniation despite decompressive hemicraniectomy.    ASSESSMENT:  45 y/o female with PMHx of HTN and MS, hx of spinal surgery at [facility] 10/08 presented to [place] ED BIBEMS after being found unconscious at home, unknown period of time. 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 HH5, MF4; BD #1 = 10/26. Patient was intubated at [place] ED and sent straight to the OR for left hemicraniotomy. A-line placed intraop. Hemodynamically unstable upon arrival to [facility], bradycardic and hypertensive s/p Mannitol and Furosemide. Central line placed in NSICU. Currently sedated on Propofol. Now s/p EVD placement, and coil embolization of left MCA bifurcation aneurysm 10/26/2021.     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, left    Intracranial hemorrhage, spontaneous subarachnoid, due to cerebral aneurysm, acute    Angiogram, cerebral, with coil embolization, in non-operating room setting    Personal history of spine surgery    SysAdmin_VstLnk        PLAN:  NEURO:  - admit to neurosurgery ICU  - neuro checks/vital signs q1hr  - HOB > 30 (for elevated ICPs)   - strict bedrest  - seizure precautions  - Keppra 1g IV BID   - s/p mannitol 85g at [facility], s/p 23.4% on admission   - S/p coil embolization of L MCA aneurysm 10/26  - EVD open at 5cmH2O  - Monitor ICPs and drain output   - euvolemia  - Nimodipine 60q4 once OGT off suction     CARDIO:  - SBP<160  - Clevidipine PRN  - echo pending   - EKG    PULM:  - intubated on full vent support, ET tube confirmed by CXR   - aspiration precautions    GI:  - NPO  - bowel regimen   - OGT (salem sump) to low intermittent suction     RENAL:  - Na 145-150,   - NS@50, 3%@30/hr  - trend serum osm     HEME:  - SCDs  - Hgb 9   - s/p 1 u PRBC    ID:  - afebrile, no abx at this time     ENDO:  - pend A1c  - ISS     GOC: full code  Level of care: ICU status   Dispo: pend CTH and further OR planning     Case discussed with Dr. Duncan, Dr. D'Amico & Dr. Vyas      []  GCS  E   V  M     Heart Failure: []Acute, [] acute on chronic , []chronic  Heart Failure:  [] Diastolic (HFpEF), [] Systolic (HFrEF), []Combined (HFpEF and HFrEF), [] RHF, [] Pulm HTN, [] Other    [] CHETAN, [] ATN, [] AIN, [] other  [] CKD1, [] CKD2, [] CKD 3, [] CKD 4, [] CKD 5, []ESRD    Encephalopathy: [] Metabolic, [] Hepatic, [] toxic, [] Neurological, [] Other    Abnormal Nurtitional Status: [] malnurtition (see nutrition note), [ ]underweight: BMI < 19, [] morbid obesity: BMI >40, [] Cachexia    [] Sepsis  [] hypovolemic shock,[] cardiogenic shock, [] hemorrhagic shock, [] neuogenic shock  [] Acute Respiratory Failure  []Cerebral edema, [] Brain compression/ herniation,   [] Functional quadriplegia  [] Acute blood loss anemia   HPI:  45 y/o female with PMH HTN, Multiple sclerosis, recent spinal surgery 10/8 ([facility]) presented to [place] ED BIBEMS after being found unconscious at home, unknown period of time. 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 HH5, MF1. Patient was intubated at [place] ED, found to have blown L pupil, and sent straight to the OR for left hemicraniotomy. A-line placed intraop. Hemodynamically unstable upon arrival to [facility], bradycardic and hypertensive s/p Mannitol, Clevidipine, and Furosemide. Central line placed in NSICU. Currently sedated on Propofol, pending repeat CTH. History per patient's son, patient had recent spine surgery and was found on outpatient imaging last week to have L M1 segment aneurysm (unruptured), pt asymptomatic at this time. Per son patient taking percocet PO at home. Ureña catheter, ET tube, and arterial line placed at [facility].     NIHSS on arrival: 32   Bess Griffin 5, Mod Busby 4  Bleed Day 1 = 10/26 (26 Oct 2021 13:03)    OVERNIGHT EVENTS: JOAQUÍN overnight, remains sedated and intubated on propofol. On hypertonic therapy, received bolus for euvolemia goals.     HOSPITAL COURSE:  10/26: admitted to [facility] from [facility], POD#0 s/p L hemicraniectomy for decompression and evacuation of SDH. Transferred to [facility] noted to have tense flap, received mannitol, keppra, decadron. Was hypertensive to 200s and preston to 40s, recevied 85g mannitol and bullet 23.4%. CTH on arrival demonstrated increased size in vents and new IVH. EVD placed, open at 15, central line placed. Transfused 1 u PRBC. POD0 of coiling of left MCA bifurcation aneurysm,   10/27: CTH demonstrated EVD in correct position, EVD lowered to 5, remains intubated on proprofol, pending repeat CTH in AM. Started on 3% @ 30/hr. 1LNS given for euvolemia       Vital Signs Last 24 Hrs  T(C): 37.3 (27 Oct 2021 00:00), Max: 38 (26 Oct 2021 17:45)  T(F): 99.1 (27 Oct 2021 00:00), Max: 100.4 (26 Oct 2021 17:45)  HR: 61 (27 Oct 2021 00:06) (43 - 100)  BP: 146/67 (27 Oct 2021 00:00) (104/78 - 179/80)  BP(mean): 96 (27 Oct 2021 00:00) (82 - 120)  RR: 15 (27 Oct 2021 00:00) (14 - 22)  SpO2: 100% (27 Oct 2021 00:06) (100% - 100%)    I&O's Summary    26 Oct 2021 07:01  -  27 Oct 2021 00:25  --------------------------------------------------------  IN: 1620 mL / OUT: 3500 mL / NET: -1880 mL        PHYSICAL EXAM:  Exam: Sedated on propofol   General: Intubated, sedated, in no apparent distress.   HEENT: PERRL 2mm sluggish, +ETT/OGT  Cardiovascular: RRR, normal S1 and S2   Respiratory: lungs CTAB, no wheezing, rhonchi, or crackles   GI: normoactive BS to auscultation, abd soft, NTND   Neuro: No eye opening (spontaneously or to noxious), not following commands, not tracking. +Flap full but soft. UE trace WD vs. extensor posturing L>R and b/l LE brisk TF to noxious. +cough, gag, corneals   Extremities: warm and well perfused. Bilateral DP/PT pulses 2+  Wound/incision: +Left hemicraniectomy wound with staples in place, dressing saturated. +Right groin with dressing in place, no hematoma, C/D/I   Drain: +EVD open @5cm, +HMV and +ALEXIS     TUBES/LINES:  [X] Ureña  [] Lumbar Drain  [X] Wound Drains - HMV, ALEXIS   [X] Others - EVD open at 5cmH20, a-line, Right CVC      DIET:  [X] NPO  [] Mechanical  [] Tube feeds    LABS:                        9.0    15.41 )-----------( 251      ( 26 Oct 2021 20:57 )             27.2     10-26    141  |  104  |  10  ----------------------------<  126<H>  3.7   |  25  |  0.84    Ca    8.7      26 Oct 2021 21:00  Phos  5.5     10-26  Mg     1.8     10-26    TPro  7.2  /  Alb  4.0  /  TBili  0.2  /  DBili  x   /  AST  25  /  ALT  20  /  AlkPhos  82  10-26    PT/INR - ( 26 Oct 2021 12:01 )   PT: 12.3 sec;   INR: 1.03          PTT - ( 26 Oct 2021 12:01 )  PTT:26.5 sec    CARDIAC MARKERS ( 26 Oct 2021 12:01 )  x     / 0.01 ng/mL / 142 U/L / x     / 5.3 ng/mL      CAPILLARY BLOOD GLUCOSE          Drug Levels: [] N/A    CSF Analysis: [] N/A      Allergies    Allergy Status Unknown    Intolerances      MEDICATIONS:  Antibiotics:    Neuro:  acetaminophen    Suspension .. 650 milliGRAM(s) Oral every 6 hours PRN  fentaNYL    Injectable 25 MICROGram(s) IV Push every 2 hours PRN  levETIRAcetam  IVPB 1000 milliGRAM(s) IV Intermittent every 12 hours  propofol Infusion 10 MICROgram(s)/kG/Min IV Continuous <Continuous>    Anticoagulation:    OTHER:  chlorhexidine 0.12% Liquid 15 milliLiter(s) Oral Mucosa every 12 hours  chlorhexidine 2% Cloths 1 Application(s) Topical <User Schedule>  clevidipine Infusion 1 mG/Hr IV Continuous <Continuous>  dextrose 50% Injectable 12.5 Gram(s) IV Push once  dextrose 50% Injectable 25 Gram(s) IV Push once  glucagon  Injectable 1 milliGRAM(s) IntraMuscular once  insulin lispro (ADMELOG) corrective regimen sliding scale   SubCutaneous Before meals and at bedtime  pantoprazole   Suspension 40 milliGRAM(s) Oral daily  senna 2 Tablet(s) Oral at bedtime    IVF:  dextrose 5%. 1000 milliLiter(s) IV Continuous <Continuous>  sodium chloride 0.9%. 1000 milliLiter(s) IV Continuous <Continuous>  sodium chloride 3%. 500 milliLiter(s) IV Continuous <Continuous>    CULTURES:    RADIOLOGY & ADDITIONAL TESTS:  CT 10/26/2021 21:48  IMPRESSION:  Interval left-sided cortical visualization and  shunt catheter placement.  No significant change in large left parenchymal hematoma, diffuse subarachnoid and intraventricular hemorrhage.  No significant change in large mass effect.    CT 10/26/2021 1600:   IMPRESSION:  Massive lobar hemorrhage in the left superior temporal lobe with intraventricular extension, and major midline shift and downward herniation despite decompressive hemicraniectomy.    ASSESSMENT:  45 y/o female with PMHx of HTN and MS, hx of spinal surgery at [facility] 10/08 presented to [place] ED BIBEMS after being found unconscious at home, unknown period of time. 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 HH5, MF4; BD #1 = 10/26. Patient was intubated at [place] ED and sent straight to the OR for left hemicraniotomy. A-line placed intraop. Hemodynamically unstable upon arrival to [facility], bradycardic and hypertensive s/p Mannitol and Furosemide. Central line placed in NSICU. Currently sedated on Propofol. Now s/p EVD placement, and coil embolization of left MCA bifurcation aneurysm 10/26/2021.     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, left    Intracranial hemorrhage, spontaneous subarachnoid, due to cerebral aneurysm, acute    Angiogram, cerebral, with coil embolization, in non-operating room setting    Personal history of spine surgery    SysAdmin_VstLnk        PLAN:  NEURO:  - admit to neurosurgery ICU  - neuro checks/vital signs q1hr  - HOB > 30 (for elevated ICPs)   - strict bedrest  - seizure precautions  - Keppra 1g IV BID   - s/p mannitol 85g at [facility], s/p 23.4% on admission   - S/p coil embolization of L MCA aneurysm 10/26  - EVD open at 5cmH2O  - Monitor ICPs and drain output   - euvolemia  - Nimodipine 60q4 once OGT off suction     CARDIO:  - SBP<160  - Clevidipine PRN  - echo pending   - EKG    PULM:  - intubated on full vent support, ET tube confirmed by CXR   - aspiration precautions    GI:  - NPO  - bowel regimen   - OGT (salem sump) to low intermittent suction     RENAL:  - Na 145-150,   - NS@50, 3%@30/hr  - trend serum osm     HEME:  - SCDs  - Hgb 9   - s/p 1 u PRBC    ID:  - afebrile, no abx at this time     ENDO:  - pend A1c  - ISS     GOC: full code  Level of care: ICU status   Dispo: pend CTH and further OR planning     Case discussed with Dr. Duncan, Dr. D'Amico & Dr. Vyas      []  GCS  E   V  M     Heart Failure: []Acute, [] acute on chronic , []chronic  Heart Failure:  [] Diastolic (HFpEF), [] Systolic (HFrEF), []Combined (HFpEF and HFrEF), [] RHF, [] Pulm HTN, [] Other    [] CHETAN, [] ATN, [] AIN, [] other  [] CKD1, [] CKD2, [] CKD 3, [] CKD 4, [] CKD 5, []ESRD    Encephalopathy: [] Metabolic, [] Hepatic, [] toxic, [] Neurological, [] Other    Abnormal Nurtitional Status: [] malnurtition (see nutrition note), [ ]underweight: BMI < 19, [] morbid obesity: BMI >40, [] Cachexia    [] Sepsis  [] hypovolemic shock,[] cardiogenic shock, [] hemorrhagic shock, [] neuogenic shock  [] Acute Respiratory Failure  []Cerebral edema, [] Brain compression/ herniation,   [] Functional quadriplegia  [] Acute blood loss anemia   HPI:  45 y/o female with PMH HTN, Multiple sclerosis, recent spinal surgery 10/8 ([facility]) presented to [place] [facility] BIBEMS after being found unconscious at home, unknown period of time. 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 HH5, MF1. Patient was intubated at [place] [facility], found to have blown L pupil, and sent straight to the OR for left hemicraniotomy. A-line placed intraop. Hemodynamically unstable upon arrival to [facility], bradycardic and hypertensive s/p Mannitol, Clevidipine, and Furosemide. Central line placed in NSICU. Currently sedated on Propofol, pending repeat CTH. History per patient's son, patient had recent spine surgery and was found on outpatient imaging last week to have L M1 segment aneurysm (unruptured), pt asymptomatic at this time. Per son patient taking percocet PO at home. Ureña catheter, ET tube, and arterial line placed at [facility].     NIHSS on arrival: 32   Bess Griffin 5, Mod Busby 4  Bleed Day 1 = 10/26 (26 Oct 2021 13:03)    OVERNIGHT EVENTS: JOAQUÍN overnight, remains sedated and intubated on propofol. On hypertonic therapy, received bolus for euvolemia goals.     HOSPITAL COURSE:  10/26: admitted to [facility] from [facility], POD#0 s/p L hemicraniectomy for decompression and evacuation of SDH. Transferred to [facility] noted to have tense flap, received mannitol, keppra, decadron. Was hypertensive to 200s and preston to 40s, recevied 85g mannitol and bullet 23.4%. CTH on arrival demonstrated increased size in vents and new IVH. EVD placed, open at 15, central line placed. Transfused 1 u PRBC. POD0 of coiling of left MCA bifurcation aneurysm,   10/27: CTH demonstrated EVD in correct position, EVD lowered to 5, remains intubated on proprofol, pending repeat CTH in AM. Started on 3% @ 30/hr. 1LNS given for euvolemia       Vital Signs Last 24 Hrs  T(C): 37.3 (27 Oct 2021 00:00), Max: 38 (26 Oct 2021 17:45)  T(F): 99.1 (27 Oct 2021 00:00), Max: 100.4 (26 Oct 2021 17:45)  HR: 61 (27 Oct 2021 00:06) (43 - 100)  BP: 146/67 (27 Oct 2021 00:00) (104/78 - 179/80)  BP(mean): 96 (27 Oct 2021 00:00) (82 - 120)  RR: 15 (27 Oct 2021 00:00) (14 - 22)  SpO2: 100% (27 Oct 2021 00:06) (100% - 100%)    I&O's Summary    26 Oct 2021 07:01  -  27 Oct 2021 00:25  --------------------------------------------------------  IN: 1620 mL / OUT: 3500 mL / NET: -1880 mL        PHYSICAL EXAM:  Exam: Sedated on propofol   General: Intubated, sedated, in no apparent distress.   HEENT: PERRL 2mm sluggish, +ETT/OGT  Cardiovascular: RRR, normal S1 and S2   Respiratory: lungs CTAB, no wheezing, rhonchi, or crackles   GI: normoactive BS to auscultation, abd soft, NTND   Neuro: No eye opening (spontaneously or to noxious), not following commands, not tracking. +Flap full but soft. UE trace WD vs. extensor posturing L>R and b/l LE brisk TF to noxious. +cough, gag, corneals   Extremities: warm and well perfused. Bilateral DP/PT pulses 2+  Wound/incision: +Left hemicraniectomy wound with staples in place, dressing saturated. +Right groin with dressing in place, no hematoma, C/D/I   Drain: +EVD open @5cm, +HMV and +ALEXIS     TUBES/LINES:  [X] Ureña  [] Lumbar Drain  [X] Wound Drains - HMV, ALEXIS   [X] Others - EVD open at 5cmH20, a-line, Right CVC      DIET:  [X] NPO  [] Mechanical  [] Tube feeds    LABS:                        9.0    15.41 )-----------( 251      ( 26 Oct 2021 20:57 )             27.2     10-26    141  |  104  |  10  ----------------------------<  126<H>  3.7   |  25  |  0.84    Ca    8.7      26 Oct 2021 21:00  Phos  5.5     10-26  Mg     1.8     10-26    TPro  7.2  /  Alb  4.0  /  TBili  0.2  /  DBili  x   /  AST  25  /  ALT  20  /  AlkPhos  82  10-26    PT/INR - ( 26 Oct 2021 12:01 )   PT: 12.3 sec;   INR: 1.03          PTT - ( 26 Oct 2021 12:01 )  PTT:26.5 sec    CARDIAC MARKERS ( 26 Oct 2021 12:01 )  x     / 0.01 ng/mL / 142 U/L / x     / 5.3 ng/mL      CAPILLARY BLOOD GLUCOSE          Drug Levels: [] N/A    CSF Analysis: [] N/A      Allergies    Allergy Status Unknown    Intolerances      MEDICATIONS:  Antibiotics:    Neuro:  acetaminophen    Suspension .. 650 milliGRAM(s) Oral every 6 hours PRN  fentaNYL    Injectable 25 MICROGram(s) IV Push every 2 hours PRN  levETIRAcetam  IVPB 1000 milliGRAM(s) IV Intermittent every 12 hours  propofol Infusion 10 MICROgram(s)/kG/Min IV Continuous <Continuous>    Anticoagulation:    OTHER:  chlorhexidine 0.12% Liquid 15 milliLiter(s) Oral Mucosa every 12 hours  chlorhexidine 2% Cloths 1 Application(s) Topical <User Schedule>  clevidipine Infusion 1 mG/Hr IV Continuous <Continuous>  dextrose 50% Injectable 12.5 Gram(s) IV Push once  dextrose 50% Injectable 25 Gram(s) IV Push once  glucagon  Injectable 1 milliGRAM(s) IntraMuscular once  insulin lispro (ADMELOG) corrective regimen sliding scale   SubCutaneous Before meals and at bedtime  pantoprazole   Suspension 40 milliGRAM(s) Oral daily  senna 2 Tablet(s) Oral at bedtime    IVF:  dextrose 5%. 1000 milliLiter(s) IV Continuous <Continuous>  sodium chloride 0.9%. 1000 milliLiter(s) IV Continuous <Continuous>  sodium chloride 3%. 500 milliLiter(s) IV Continuous <Continuous>    CULTURES:    RADIOLOGY & ADDITIONAL TESTS:  CTH 10/26/2021 21:48  IMPRESSION:  Interval left-sided cortical visualization and  shunt catheter placement.  No significant change in large left parenchymal hematoma, diffuse subarachnoid and intraventricular hemorrhage.  No significant change in large mass effect.    CTH 10/26/2021 1600:   IMPRESSION:  Massive lobar hemorrhage in the left superior temporal lobe with intraventricular extension, and major midline shift and downward herniation despite decompressive hemicraniectomy.    ASSESSMENT:  45 y/o female with PMHx of HTN and MS, hx of spinal surgery at [facility] 10/08 presented to [place] [facility] BIBEMS after being found unconscious at home, unknown period of time. 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 HH5, MF4; BD #1 = 10/26. Patient was intubated at [place] [facility] and sent straight to the OR for left hemicraniotomy. A-line placed intraop. Hemodynamically unstable upon arrival to [facility], bradycardic and hypertensive s/p Mannitol and Furosemide. Central line placed in NSICU. Currently sedated on Propofol. Now s/p EVD placement, and coil embolization of left MCA bifurcation aneurysm 10/26/2021.     

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
## 2021-10-27 NOTE — PROGRESS NOTE ADULT - SUBJECTIVE AND OBJECTIVE BOX
=================================  NEUROCRITICAL CARE ATTENDING NOTE  =================================    AILYN DÍAZ   MRN-[id number]  Summary:  46y/F with recent spinal surgery, found down and brought to ED.  In ED, witnessed shaking, ?seizures, intubated.  Imaging showed SAH.  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 for further management.     COURSE IN THE HOSPITAL:  10/26 admitted to [facility], [place] - mannitol, 23.4%, repeat CT HCP - EVD R frontal inserted, s/p angio coil embo, 2 units pRBC  10/27 remained intubated overnight, given mannitol overnight    Past Medical History: No pertinent past medical history  Allergies:  Allergy Status Unknown  Home meds:     PHYSICAL EXAMINATION  T(C): 37.5 (10-27 @ 05:10), Max: 38 (10-26 @ 17:45) HR: 96 (10-27 @ 06:00) (43 - 101) BP: 125/63 (10-27 @ 06:00) (104/78 - 179/80) RR: 15 (10-27 @ 06:00) (14 - 22) SpO2: 100% (10-27 @ 06:00) (100% - 100%)  NEUROLOGIC EXAMINATION:  Patient does not open eyes, does not follow commands, pupils 1-2mm reactive bilaterally and equal, (+) cough/gag, trace withdrawal R, brisk withdrawal L, TF B LE  GENERAL: not intubated, not in cardiorespiratory distress  EENT:  anicteric  CARDIOVASCULAR: (+) S1 S2, normal rate and regular rhythm  PULMONARY: clear to auscultation bilaterally  ABDOMEN: soft, nontender with normoactive bowel sounds  EXTREMITIES: no edema  SKIN: no rash    LABS: 10-27    (15, 20)  8.0  (9.0)  25.35 )-----------( 241      ( 27 Oct 2021 05:22 )             23.9     140  |  108  |  10  ----------------------------<  120<H>  3.8   |  21<L>  |  0.87    Ca    8.0<L>      27 Oct 2021 05:22  Phos  5.1     10-27  Mg     1.7     10-27    TPro  7.2  /  Alb  4.0  /  TBili  0.2  /  DBili  x   /  AST  25  /  ALT  20  /  AlkPhos  82  10-26    10-26 @ 07:01  -  10-27 @ 06:46  IN: 3077.5 mL / OUT: 4419 mL / NET: -1341.5 mL     Bacteriology:  CSF studies:  EEG:  Neuroimaging:  Other imaging:    MEDICATIONS: 10-27    ·	levETIRAcetam  IVPB 1000 IV Intermittent every 12 hours  ·	propofol Infusion 10 IV Continuous <Continuous>  ·	niMODipine 60 milliGRAM(s) Oral every 4 hours  ·	pantoprazole   Suspension 40 Oral daily  ·	senna 2 Oral at bedtime  ·	insulin lispro (ADMELOG) corrective regimen sliding scale  SubCutaneous Before meals and at bedtime  ·	acetaminophen    Suspension .. 650 Oral every 6 hours PRN  ·	fentaNYL    Injectable 25 IV Push every 2 hours PRN      IV FLUIDS:  DRIPS:  ·	clevidipine Infusion 1 mG/Hr (2 mL/Hr) IV Continuous <Continuous>  ·	propofol Infusion 10 MICROgram(s)/kG/Min (5.1 mL/Hr) IV Continuous <Continuous>  DIET: NPO  Lines:  Drains:    ·	EVD@5cm H20, ICPs up to 22  Wounds:    CODE STATUS:  Full Code                       GOALS OF CARE:  aggressive                      DISPOSITION:  ICU =================================  NEUROCRITICAL CARE ATTENDING NOTE  =================================    AILYN DÍAZ   MRN-[id number]  Summary:  46y/F with recent spinal surgery, found down and brought to ED.  In ED, witnessed shaking, ?seizures, intubated.  Imaging showed SAH.  Emergent decompressive hemicraniectomy for herniation syndrome, given mannitol, levetiracetam, propofol, transferred to [facility] for further management.     COURSE IN THE HOSPITAL:  10/26 admitted to [facility], [place] - mannitol, 23.4%, repeat CT HCP - EVD R frontal inserted, s/p angio coil embo, 2 units pRBC  10/27 remained intubated overnight, given mannitol overnight    Past Medical History: No pertinent past medical history  Allergies:  Allergy Status Unknown  Home meds:     PHYSICAL EXAMINATION  T(C): 37.5 (10-27 @ 05:10), Max: 38 (10-26 @ 17:45) HR: 96 (10-27 @ 06:00) (43 - 101) BP: 125/63 (10-27 @ 06:00) (104/78 - 179/80) RR: 15 (10-27 @ 06:00) (14 - 22) SpO2: 100% (10-27 @ 06:00) (100% - 100%)  NEUROLOGIC EXAMINATION:  Patient does not open eyes, does not follow commands, pupils 1-2mm reactive bilaterally and equal, (+) cough/gag, trace withdrawal R, brisk withdrawal L, TF B LE  GENERAL: not intubated, not in cardiorespiratory distress  EENT:  anicteric  CARDIOVASCULAR: (+) S1 S2, normal rate and regular rhythm  PULMONARY: clear to auscultation bilaterally  ABDOMEN: soft, nontender with normoactive bowel sounds  EXTREMITIES: no edema  SKIN: no rash    LABS: 10-27    (15, 20)  8.0  (9.0)  25.35 )-----------( 241      ( 27 Oct 2021 05:22 )             23.9     140  |  108  |  10  ----------------------------<  120<H>  3.8   |  21<L>  |  0.87    Ca    8.0<L>      27 Oct 2021 05:22  Phos  5.1     10-27  Mg     1.7     10-27    TPro  7.2  /  Alb  4.0  /  TBili  0.2  /  DBili  x   /  AST  25  /  ALT  20  /  AlkPhos  82  10-26    10-26 @ 07:01  -  10-27 @ 06:46  IN: 3077.5 mL / OUT: 4419 mL / NET: -1341.5 mL     Bacteriology:  CSF studies:  EEG:  Neuroimaging:  Other imaging:    MEDICATIONS: 10-27    ·	levETIRAcetam  IVPB 1000 IV Intermittent every 12 hours  ·	propofol Infusion 10 IV Continuous <Continuous>  ·	niMODipine 60 milliGRAM(s) Oral every 4 hours  ·	pantoprazole   Suspension 40 Oral daily  ·	senna 2 Oral at bedtime  ·	insulin lispro (ADMELOG) corrective regimen sliding scale  SubCutaneous Before meals and at bedtime  ·	acetaminophen    Suspension .. 650 Oral every 6 hours PRN  ·	fentaNYL    Injectable 25 IV Push every 2 hours PRN    IV FLUIDS: TFI 75cc/hr  3%@50  DRIPS:  ·	clevidipine Infusion 1 mG/Hr (2 mL/Hr) IV Continuous <Continuous> - OFF  ·	propofol Infusion 10 MICROgram(s)/kG/Min (5.1 mL/Hr) IV Continuous <Continuous>  DIET: NPO  Lines: R TLC IJ [place]  Drains:    ·	EVD@5cm H20, ICPs up to 22  ·	HMV  ·	ALEXIS  Wounds:    CODE STATUS:  Full Code                       GOALS OF CARE:  aggressive                      DISPOSITION:  ICU

## 2021-10-28 LAB
ANION GAP SERPL CALC-SCNC: 6 MMOL/L — SIGNIFICANT CHANGE UP (ref 5–17)
ANION GAP SERPL CALC-SCNC: 7 MMOL/L — SIGNIFICANT CHANGE UP (ref 5–17)
ANION GAP SERPL CALC-SCNC: 8 MMOL/L — SIGNIFICANT CHANGE UP (ref 5–17)
APPEARANCE CSF: SIGNIFICANT CHANGE UP
APPEARANCE SPUN FLD: COLORLESS — SIGNIFICANT CHANGE UP
APPEARANCE UR: CLEAR — SIGNIFICANT CHANGE UP
BASOPHILS # BLD AUTO: 0.02 K/UL — SIGNIFICANT CHANGE UP (ref 0–0.2)
BASOPHILS NFR BLD AUTO: 0.1 % — SIGNIFICANT CHANGE UP (ref 0–2)
BILIRUB UR-MCNC: NEGATIVE — SIGNIFICANT CHANGE UP
BUN SERPL-MCNC: 8 MG/DL — SIGNIFICANT CHANGE UP (ref 7–23)
BUN SERPL-MCNC: 9 MG/DL — SIGNIFICANT CHANGE UP (ref 7–23)
BUN SERPL-MCNC: 9 MG/DL — SIGNIFICANT CHANGE UP (ref 7–23)
CALCIUM SERPL-MCNC: 8.8 MG/DL — SIGNIFICANT CHANGE UP (ref 8.4–10.5)
CHLORIDE SERPL-SCNC: 120 MMOL/L — HIGH (ref 96–108)
CHLORIDE SERPL-SCNC: 122 MMOL/L — HIGH (ref 96–108)
CHLORIDE SERPL-SCNC: 124 MMOL/L — HIGH (ref 96–108)
CO2 SERPL-SCNC: 24 MMOL/L — SIGNIFICANT CHANGE UP (ref 22–31)
CO2 SERPL-SCNC: 25 MMOL/L — SIGNIFICANT CHANGE UP (ref 22–31)
CO2 SERPL-SCNC: 26 MMOL/L — SIGNIFICANT CHANGE UP (ref 22–31)
COLOR CSF: ABNORMAL
COLOR SPEC: YELLOW — SIGNIFICANT CHANGE UP
CREAT SERPL-MCNC: 0.66 MG/DL — SIGNIFICANT CHANGE UP (ref 0.5–1.3)
CREAT SERPL-MCNC: 0.69 MG/DL — SIGNIFICANT CHANGE UP (ref 0.5–1.3)
CREAT SERPL-MCNC: 0.77 MG/DL — SIGNIFICANT CHANGE UP (ref 0.5–1.3)
CSF COMMENTS: SIGNIFICANT CHANGE UP
DIFF PNL FLD: NEGATIVE — SIGNIFICANT CHANGE UP
EOSINOPHIL # BLD AUTO: 0 K/UL — SIGNIFICANT CHANGE UP (ref 0–0.5)
EOSINOPHIL NFR BLD AUTO: 0 % — SIGNIFICANT CHANGE UP (ref 0–6)
GLUCOSE CSF-MCNC: 83 MG/DL — HIGH (ref 40–70)
GLUCOSE SERPL-MCNC: 113 MG/DL — HIGH (ref 70–99)
GLUCOSE SERPL-MCNC: 124 MG/DL — HIGH (ref 70–99)
GLUCOSE SERPL-MCNC: 130 MG/DL — HIGH (ref 70–99)
GLUCOSE UR QL: NEGATIVE — SIGNIFICANT CHANGE UP
GRAM STN FLD: SIGNIFICANT CHANGE UP
HCT VFR BLD CALC: 26.9 % — LOW (ref 34.5–45)
HGB BLD-MCNC: 8.4 G/DL — LOW (ref 11.5–15.5)
IMM GRANULOCYTES NFR BLD AUTO: 0.6 % — SIGNIFICANT CHANGE UP (ref 0–1.5)
KETONES UR-MCNC: NEGATIVE — SIGNIFICANT CHANGE UP
LEUKOCYTE ESTERASE UR-ACNC: NEGATIVE — SIGNIFICANT CHANGE UP
LYMPHOCYTES # BLD AUTO: 0.76 K/UL — LOW (ref 1–3.3)
LYMPHOCYTES # BLD AUTO: 5 % — LOW (ref 13–44)
LYMPHOCYTES # CSF: 4 % — LOW (ref 40–80)
MAGNESIUM SERPL-MCNC: 2.6 MG/DL — SIGNIFICANT CHANGE UP (ref 1.6–2.6)
MCHC RBC-ENTMCNC: 25.5 PG — LOW (ref 27–34)
MCHC RBC-ENTMCNC: 31.2 GM/DL — LOW (ref 32–36)
MCV RBC AUTO: 81.5 FL — SIGNIFICANT CHANGE UP (ref 80–100)
MONOCYTES # BLD AUTO: 1.27 K/UL — HIGH (ref 0–0.9)
MONOCYTES NFR BLD AUTO: 8.3 % — SIGNIFICANT CHANGE UP (ref 2–14)
MONOS+MACROS NFR CSF: 5 % — LOW (ref 15–45)
NEUTROPHILS # BLD AUTO: 13.12 K/UL — HIGH (ref 1.8–7.4)
NEUTROPHILS # CSF: 0 % — SIGNIFICANT CHANGE UP (ref 0–6)
NEUTROPHILS NFR BLD AUTO: 86 % — HIGH (ref 43–77)
NITRITE UR-MCNC: NEGATIVE — SIGNIFICANT CHANGE UP
NRBC # BLD: 0 /100 WBCS — SIGNIFICANT CHANGE UP (ref 0–0)
NRBC NFR CSF: 0 /UL — SIGNIFICANT CHANGE UP (ref 0–5)
OSMOLALITY SERPL: 316 MOSM/KG — HIGH (ref 275–300)
OSMOLALITY SERPL: 317 MOSM/KG — HIGH (ref 275–300)
OSMOLALITY SERPL: 321 MOSM/KG — HIGH (ref 275–300)
PH UR: 6.5 — SIGNIFICANT CHANGE UP (ref 5–8)
PHOSPHATE SERPL-MCNC: 3.3 MG/DL — SIGNIFICANT CHANGE UP (ref 2.5–4.5)
PLATELET # BLD AUTO: 193 K/UL — SIGNIFICANT CHANGE UP (ref 150–400)
POTASSIUM SERPL-MCNC: 3.8 MMOL/L — SIGNIFICANT CHANGE UP (ref 3.5–5.3)
POTASSIUM SERPL-MCNC: 3.8 MMOL/L — SIGNIFICANT CHANGE UP (ref 3.5–5.3)
POTASSIUM SERPL-MCNC: 4 MMOL/L — SIGNIFICANT CHANGE UP (ref 3.5–5.3)
POTASSIUM SERPL-SCNC: 3.8 MMOL/L — SIGNIFICANT CHANGE UP (ref 3.5–5.3)
POTASSIUM SERPL-SCNC: 3.8 MMOL/L — SIGNIFICANT CHANGE UP (ref 3.5–5.3)
POTASSIUM SERPL-SCNC: 4 MMOL/L — SIGNIFICANT CHANGE UP (ref 3.5–5.3)
PROCALCITONIN SERPL-MCNC: 0.2 NG/ML — HIGH (ref 0.02–0.1)
PROT CSF-MCNC: 51 MG/DL — HIGH (ref 15–45)
PROT UR-MCNC: NEGATIVE MG/DL — SIGNIFICANT CHANGE UP
RBC # BLD: 3.3 M/UL — LOW (ref 3.8–5.2)
RBC # CSF: HIGH /UL (ref 0–0)
RBC # FLD: 20.1 % — HIGH (ref 10.3–14.5)
SODIUM SERPL-SCNC: 152 MMOL/L — HIGH (ref 135–145)
SODIUM SERPL-SCNC: 154 MMOL/L — HIGH (ref 135–145)
SODIUM SERPL-SCNC: 156 MMOL/L — HIGH (ref 135–145)
SP GR SPEC: 1.02 — SIGNIFICANT CHANGE UP (ref 1–1.03)
SPECIMEN SOURCE: SIGNIFICANT CHANGE UP
TUBE TYPE: SIGNIFICANT CHANGE UP
UROBILINOGEN FLD QL: 0.2 E.U./DL — SIGNIFICANT CHANGE UP
WBC # BLD: 15.26 K/UL — HIGH (ref 3.8–10.5)
WBC # FLD AUTO: 15.26 K/UL — HIGH (ref 3.8–10.5)

## 2021-10-28 PROCEDURE — 71045 X-RAY EXAM CHEST 1 VIEW: CPT | Mod: 26

## 2021-10-28 PROCEDURE — 93970 EXTREMITY STUDY: CPT | Mod: 26

## 2021-10-28 PROCEDURE — 93306 TTE W/DOPPLER COMPLETE: CPT | Mod: 26

## 2021-10-28 PROCEDURE — 93971 EXTREMITY STUDY: CPT | Mod: 26,LT

## 2021-10-28 PROCEDURE — 99291 CRITICAL CARE FIRST HOUR: CPT

## 2021-10-28 RX ORDER — PANTOPRAZOLE SODIUM 20 MG/1
40 TABLET, DELAYED RELEASE ORAL EVERY 12 HOURS
Refills: 0 | Status: DISCONTINUED | OUTPATIENT
Start: 2021-10-28 | End: 2021-11-03

## 2021-10-28 RX ORDER — FENTANYL CITRATE 50 UG/ML
25 INJECTION INTRAVENOUS ONCE
Refills: 0 | Status: DISCONTINUED | OUTPATIENT
Start: 2021-10-28 | End: 2021-10-28

## 2021-10-28 RX ORDER — SODIUM CHLORIDE 9 MG/ML
30 INJECTION INTRAMUSCULAR; INTRAVENOUS; SUBCUTANEOUS ONCE
Refills: 0 | Status: COMPLETED | OUTPATIENT
Start: 2021-10-28 | End: 2021-10-28

## 2021-10-28 RX ORDER — MANNITOL
42 POWDER (GRAM) MISCELLANEOUS ONCE
Refills: 0 | Status: COMPLETED | OUTPATIENT
Start: 2021-10-28 | End: 2021-10-28

## 2021-10-28 RX ORDER — POTASSIUM CHLORIDE 20 MEQ
20 PACKET (EA) ORAL ONCE
Refills: 0 | Status: COMPLETED | OUTPATIENT
Start: 2021-10-28 | End: 2021-10-28

## 2021-10-28 RX ADMIN — NIMODIPINE 60 MILLIGRAM(S): 60 SOLUTION ORAL at 14:54

## 2021-10-28 RX ADMIN — NIMODIPINE 60 MILLIGRAM(S): 60 SOLUTION ORAL at 10:35

## 2021-10-28 RX ADMIN — Medication 650 MILLIGRAM(S): at 22:06

## 2021-10-28 RX ADMIN — NIMODIPINE 60 MILLIGRAM(S): 60 SOLUTION ORAL at 17:55

## 2021-10-28 RX ADMIN — Medication 100 MILLIEQUIVALENT(S): at 20:28

## 2021-10-28 RX ADMIN — CHLORHEXIDINE GLUCONATE 15 MILLILITER(S): 213 SOLUTION TOPICAL at 05:37

## 2021-10-28 RX ADMIN — NIMODIPINE 60 MILLIGRAM(S): 60 SOLUTION ORAL at 22:12

## 2021-10-28 RX ADMIN — SENNA PLUS 2 TABLET(S): 8.6 TABLET ORAL at 22:12

## 2021-10-28 RX ADMIN — NIMODIPINE 60 MILLIGRAM(S): 60 SOLUTION ORAL at 01:47

## 2021-10-28 RX ADMIN — SODIUM CHLORIDE 75 MILLILITER(S): 9 INJECTION, SOLUTION INTRAVENOUS at 07:01

## 2021-10-28 RX ADMIN — Medication 100 MILLIEQUIVALENT(S): at 06:51

## 2021-10-28 RX ADMIN — Medication 650 MILLIGRAM(S): at 01:47

## 2021-10-28 RX ADMIN — FENTANYL CITRATE 25 MICROGRAM(S): 50 INJECTION INTRAVENOUS at 15:00

## 2021-10-28 RX ADMIN — Medication 650 MILLIGRAM(S): at 17:54

## 2021-10-28 RX ADMIN — FENTANYL CITRATE 25 MICROGRAM(S): 50 INJECTION INTRAVENOUS at 09:00

## 2021-10-28 RX ADMIN — SODIUM CHLORIDE 30 MILLILITER(S): 9 INJECTION INTRAMUSCULAR; INTRAVENOUS; SUBCUTANEOUS at 16:53

## 2021-10-28 RX ADMIN — NIMODIPINE 60 MILLIGRAM(S): 60 SOLUTION ORAL at 05:36

## 2021-10-28 RX ADMIN — FENTANYL CITRATE 25 MICROGRAM(S): 50 INJECTION INTRAVENOUS at 14:08

## 2021-10-28 RX ADMIN — LEVETIRACETAM 400 MILLIGRAM(S): 250 TABLET, FILM COATED ORAL at 05:37

## 2021-10-28 RX ADMIN — CHLORHEXIDINE GLUCONATE 15 MILLILITER(S): 213 SOLUTION TOPICAL at 19:08

## 2021-10-28 RX ADMIN — LEVETIRACETAM 400 MILLIGRAM(S): 250 TABLET, FILM COATED ORAL at 17:55

## 2021-10-28 RX ADMIN — FENTANYL CITRATE 25 MICROGRAM(S): 50 INJECTION INTRAVENOUS at 08:50

## 2021-10-28 RX ADMIN — PROPOFOL 5.1 MICROGRAM(S)/KG/MIN: 10 INJECTION, EMULSION INTRAVENOUS at 21:19

## 2021-10-28 RX ADMIN — Medication 650 MILLIGRAM(S): at 02:02

## 2021-10-28 RX ADMIN — Medication 420 GRAM(S): at 10:17

## 2021-10-28 RX ADMIN — PANTOPRAZOLE SODIUM 40 MILLIGRAM(S): 20 TABLET, DELAYED RELEASE ORAL at 17:54

## 2021-10-28 RX ADMIN — CHLORHEXIDINE GLUCONATE 1 APPLICATION(S): 213 SOLUTION TOPICAL at 06:01

## 2021-10-28 NOTE — PROGRESS NOTE ADULT - SUBJECTIVE AND OBJECTIVE BOX
=================================  NEUROCRITICAL CARE ATTENDING NOTE  =================================    AILYN DÍAZ   MRN-[id number]  Summary:  46y/F with recent spinal surgery, found down and brought to ED.  In ED, witnessed shaking, ?seizures, intubated.  Imaging showed SAH.  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     COURSE IN THE HOSPITAL:  10/26 admitted to [facility], [place] - mannitol, 23.4%, repeat CT HCP - EVD R frontal inserted, s/p angio coil embo, 2 units pRBC  10/27 remained intubated overnight, given mannitol overnight; EVD reinserted, 1 unit pRBC  10/28 Tmax 38.2    Past Medical History: No pertinent past medical history  Allergies:  Allergy Status Unknown  Home meds:     PHYSICAL EXAMINATION  T(C): 38.2 (10-28 @ 00:40), Max: 38.2 (10-28 @ 00:40) HR: 80 (10-28 @ 06:00) (65 - 93) BP: 126/59 (10-28 @ 06:00) (120/61 - 180/83) RR: 12 (10-28 @ 06:00) (12 - 17) SpO2: 100% (10-28 @ 06:00) (100% - 100%)   NEUROLOGIC EXAMINATION:  Patient does not open eyes, does not follow commands, pupils 3mm equal and brisk (-) Doll's, (+) cough/gag, L UE trace withdrawal,  B LE TF, R UE 0/5; flap full but soft  GENERAL: not intubated, not in cardiorespiratory distress  EENT:  anicteric  CARDIOVASCULAR: (+) S1 S2, normal rate and regular rhythm  PULMONARY: clear to auscultation bilaterally  ABDOMEN: soft, nontender with normoactive bowel sounds  EXTREMITIES: no edema  SKIN: no rash    LABS: 10-28  CAPILLARY BLOOD GLUCOSE 113 121      (16.35)   8.4   (8.7)  15.26 )-----------( 193      ( 28 Oct 2021 05:21 )             26.9   (157, 152)  156<H>  |  124<H>  |  9   ----------------------------<  130<H>  3.8   |  24  |  0.77    Ca    8.8      28 Oct 2021 05:21  Phos  3.3     10-28  Mg     2.6     10-28    TPro  7.2  /  Alb  4.0  /  TBili  0.2  /  DBili  x   /  AST  25  /  ALT  20  /  AlkPhos  82  10-26    10-26 @ 07:01  -  10-27 @ 07:00  IN: 3233 mL / OUT: 4548 mL / NET: -1315 mL      Bacteriology:  CSF studies:  EEG:  Neuroimaging:  Other imaging:    MEDICATIONS: 10-28    ·	levETIRAcetam  IVPB 1000 IV Intermittent every 12 hours  ·	niMODipine 60 milliGRAM(s) Oral every 4 hours  ·	pantoprazole  Injectable 40 IV Push daily  ·	senna 2 Oral at bedtime  ·	insulin lispro (ADMELOG) corrective regimen sliding scale  SubCutaneous every 6 hours  ·	acetaminophen    Suspension .. 650 Oral every 6 hours PRN  ·	fentaNYL    Injectable 25 IV Push every 2 hours PRN    IV FLUIDS: TFI 75cc/hr  3%@50  DRIPS:  ·	clevidipine Infusion 1 mG/Hr (2 mL/Hr) IV Continuous <Continuous> - OFF  ·	propofol Infusion 10 MICROgram(s)/kG/Min (5.1 mL/Hr) IV Continuous <Continuous>  DIET: NPO  Lines: R TLC IJ [place]  Drains:    ·	EVD@5cm H20, ICPs 12-17; 105/24h  ·	HMV  ·	ALEXIS  Wounds:    CODE STATUS:  Full Code                       GOALS OF CARE:  aggressive                      DISPOSITION:  ICU =================================  NEUROCRITICAL CARE ATTENDING NOTE  =================================    AILYN DÍAZ   MRN-[id number]  Summary:  46y/F with recent spinal surgery, found down and brought to ED.  In ED, witnessed shaking, ?seizures, intubated.  Imaging showed SAH.  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     COURSE IN THE HOSPITAL:  10/26 admitted to [facility], [place] - mannitol, 23.4%, repeat CT HCP - EVD R frontal inserted, s/p angio coil embo, 2 units pRBC  10/27 remained intubated overnight, given mannitol overnight; EVD reinserted, 1 unit pRBC  10/28 Tmax 38.2, ICPs to low 20s this morning    Past Medical History: No pertinent past medical history  Allergies:  Allergy Status Unknown  Home meds:     PHYSICAL EXAMINATION  T(C): 38.2 (10-28 @ 00:40), Max: 38.2 (10-28 @ 00:40) HR: 80 (10-28 @ 06:00) (65 - 93) BP: 126/59 (10-28 @ 06:00) (120/61 - 180/83) RR: 12 (10-28 @ 06:00) (12 - 17) SpO2: 100% (10-28 @ 06:00) (100% - 100%)   NEUROLOGIC EXAMINATION:  Patient does not open eyes, does not follow commands, pupils 3mm equal and brisk (-) Doll's, (+) cough and gag, B LE TF, R UE 0/5; flap full but soft; extensor posturing B UE R>L, B LE TF  GENERAL: intubated 450 12 +5 40%  EENT:  anicteric  CARDIOVASCULAR: (+) S1 S2, normal rate and regular rhythm  PULMONARY: clear to auscultation bilaterally  ABDOMEN: soft, nontender with normoactive bowel sounds  EXTREMITIES: no edema  SKIN: no rash    LABS: 10-28  CAPILLARY BLOOD GLUCOSE 113 121      (16.35)   8.4   (8.7)  15.26 )-----------( 193      ( 28 Oct 2021 05:21 )             26.9   (157, 152)  156<H>  |  124<H>  |  9   ----------------------------<  130<H>  3.8   |  24  |  0.77    Ca    8.8      28 Oct 2021 05:21  Phos  3.3     10-28  Mg     2.6     10-28    TPro  7.2  /  Alb  4.0  /  TBili  0.2  /  DBili  x   /  AST  25  /  ALT  20  /  AlkPhos  82  10-26    10-26 @ 07:01  -  10-27 @ 07:00  IN: 3233 mL / OUT: 4548 mL / NET: -1315 mL    PCT 0.2      Bacteriology:  CSF studies:  EEG:  Neuroimaging:  Other imaging:    MEDICATIONS: 10-28    ·	levETIRAcetam  IVPB 1000 IV Intermittent every 12 hours  ·	niMODipine 60 milliGRAM(s) Oral every 4 hours  ·	pantoprazole  Injectable 40 IV Push daily  ·	senna 2 Oral at bedtime  ·	insulin lispro (ADMELOG) corrective regimen sliding scale  SubCutaneous every 6 hours  ·	acetaminophen    Suspension .. 650 Oral every 6 hours PRN  ·	fentaNYL    Injectable 25 IV Push every 2 hours PRN    IV FLUIDS: TFI 75cc/hr  3%@50  DRIPS:  ·	propofol Infusion 10 MICROgram(s)/kG/Min (5.1 mL/Hr) IV Continuous <Continuous>  DIET: NPO  Lines: R TLC IJ [place]  Drains:    ·	EVD@5cm H20, ICPs 12-17; 105/24h  ·	HMV  ·	ALEXIS  Wounds:    CODE STATUS:  Full Code                       GOALS OF CARE:  aggressive                      DISPOSITION:  ICU

## 2021-10-28 NOTE — PROGRESS NOTE ADULT - SUBJECTIVE AND OBJECTIVE BOX
HPI:  45 y/o female with PMH HTN, Multiple sclerosis, recent spinal surgery 10/8 ([facility]) presented to [place] ED BIBEMS after being found unconscious at home, unknown period of time. 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 HH5, MF1. Patient was intubated at [place] ED, found to have blown L pupil, and sent straight to the OR for left hemicraniotomy. A-line placed intraop. Hemodynamically unstable upon arrival to [facility], bradycardic and hypertensive s/p Mannitol, Clevidipine, and Furosemide. Central line placed in NSICU. Currently sedated on Propofol, pending repeat CTH. History per patient's son, patient had recent spine surgery and was found on outpatient imaging last week to have L M1 segment aneurysm (unruptured), pt asymptomatic at this time. Per son patient taking percocet PO at home. Ureña catheter, ET tube, and arterial line placed at [facility].     NIHSS on arrival: 32   Bess Griffin 5, Mod Busby 4  Bleed Day 1 = 10/26 (26 Oct 2021 13:03)      Hospital Course:   10/26: admitted to [facility] from [facility], POD#0 s/p L hemicraniectomy for decompression and evacuation of SDH. Transferred to [facility] noted to have tense flap, received mannitol, keppra, decadron. Was hypertensive to 200s and preston to 40s, recevied 85g mannitol and bullet 23.4%. CTH on arrival demonstrated increased size in vents and new IVH. EVD placed, open at 15, central line placed. Transfused 2 u PRBC. POD0 of coiling of left MCA bifurcation aneurysm,   10/27: CTH demonstrated EVD in correct position, EVD lowered to 5, remains intubated on proprofol, pending repeat CTH in AM. Started on 3% @ 30/hr. 2LNS given for euvolemia, Mannitol given for uptrending ICPs. Bullet given 8:30 am. 3% increased to 50/hr. 1 L bolus. New EVD catheter placed using exisiting sreekanth hole. 1 u PRBC.  10/28: HH5, MF4, BD3; POD2 angio coil/embo of L MCA aneurysm. JOAQUÍN overnight, neuro stable. EVD@5cmH20 ICPs < 20 overnight, OP WNL. S/p 1 unit PRBC yesterday with appropriate response.      Vital Signs Last 24 Hrs  T(C): 38.2 (28 Oct 2021 00:40), Max: 38.2 (28 Oct 2021 00:40)  T(F): 100.8 (28 Oct 2021 00:40), Max: 100.8 (28 Oct 2021 00:40)  HR: 73 (28 Oct 2021 04:00) (65 - 96)  BP: 154/75 (28 Oct 2021 03:00) (120/61 - 180/83)  BP(mean): 108 (28 Oct 2021 03:00) (84 - 122)  RR: 12 (28 Oct 2021 04:00) (12 - 17)  SpO2: 100% (28 Oct 2021 04:00) (100% - 100%)    I&O's Detail    26 Oct 2021 07:01  -  27 Oct 2021 07:00  --------------------------------------------------------  IN:    Clevidipine: 62 mL    IV PiggyBack: 200 mL    Lactated Ringers: 140 mL    Propofol: 331 mL    sodium chloride 0.9%: 350 mL    Sodium Chloride 0.9% Bolus: 2000 mL    sodium chloride 3%: 120 mL    sodium chloride 3%: 30 mL  Total IN: 3233 mL    OUT:    Accordian (mL): 40 mL    Bulb (mL): 190 mL    External Ventricular Device (mL): 53 mL    Indwelling Catheter - Urethral (mL): 4265 mL  Total OUT: 4548 mL    Total NET: -1315 mL      27 Oct 2021 07:01  -  28 Oct 2021 05:05  --------------------------------------------------------  IN:    IV PiggyBack: 300 mL    Lactated Ringers: 750 mL    PRBCs (Packed Red Blood Cells): 350 mL    Propofol: 321.3 mL    sodium chloride 0.9%: 50 mL    Sodium Chloride 0.9% Bolus: 1000 mL    sodium chloride 3%: 500 mL  Total IN: 3271.3 mL    OUT:    Bulb (mL): 40 mL    External Ventricular Device (mL): 95 mL    Indwelling Catheter - Urethral (mL): 2260 mL    Nasogastric/Oral tube (mL): 300 mL  Total OUT: 2695 mL    Total NET: 576.3 mL        I&O's Summary    26 Oct 2021 07:01  -  27 Oct 2021 07:00  --------------------------------------------------------  IN: 3233 mL / OUT: 4548 mL / NET: -1315 mL    27 Oct 2021 07:01  -  28 Oct 2021 05:05  --------------------------------------------------------  IN: 3271.3 mL / OUT: 2695 mL / NET: 576.3 mL        PHYSICAL EXAM:  General: Intubated, sedated on propofol, in no apparent distress.   HEENT: PERRL 2mm sluggish, +ETT/OGT  Cardiovascular: RRR, normal S1 and S2   Respiratory: lungs CTAB, no wheezing, rhonchi, or crackles   GI: normoactive BS to auscultation, abd soft, NTND   Neuro: No eye opening (spontaneously or to noxious), not following commands, not tracking. +Flap full but soft. UE trace WD vs. extensor posturing L>R and b/l LE brisk TF to noxious. +cough, gag, corneals   Extremities: warm and well perfused. Bilateral DP/PT pulses 2+  Wound/incision: +Left hemicraniectomy wound with staples in place, dressing saturated. +Right groin with dressing in place, no hematoma, C/D/I   Drain: +EVD open @5cm, +HMV and +ALEXIS       TUBES/LINES:  [] CVC  [] A-line  [] Lumbar Drain  [] Ventriculostomy  [] ALEXIS  [] Ureña  [] NGT   [] Other    DIET:  [] NPO  [] Mechanical  [] Tube feeds    LABS:                        8.7    16.35 )-----------( 204      ( 27 Oct 2021 22:51 )             27.2     10-27    157<H>  |  126<H>  |  9   ----------------------------<  138<H>  3.7   |  23  |  0.76    Ca    8.2<L>      27 Oct 2021 22:50  Phos  5.1     10-27  Mg     1.7     10-27    TPro  7.2  /  Alb  4.0  /  TBili  0.2  /  DBili  x   /  AST  25  /  ALT  20  /  AlkPhos  82  10-26    PT/INR - ( 26 Oct 2021 12:01 )   PT: 12.3 sec;   INR: 1.03          PTT - ( 26 Oct 2021 12:01 )  PTT:26.5 sec    CARDIAC MARKERS ( 26 Oct 2021 12:01 )  x     / 0.01 ng/mL / 142 U/L / x     / 5.3 ng/mL      CAPILLARY BLOOD GLUCOSE      POCT Blood Glucose.: 113 mg/dL (27 Oct 2021 18:01)  POCT Blood Glucose.: 121 mg/dL (27 Oct 2021 11:19)      Drug Levels: [] N/A    CSF Analysis: [] N/A      Allergies    Allergy Status Unknown    Intolerances        Home Medications:  amlodipine-benazepril 10 mg-40 mg oral capsule: 1 cap(s) orally once a day (26 Oct 2021 18:40)  cyclobenzaprine 5 mg oral tablet: 1 tab(s) orally once a day (at bedtime), As Needed (26 Oct 2021 18:40)  dimethyl fumarate 240 mg oral delayed release capsule: 1 cap(s) orally 2 times a day (26 Oct 2021 18:40)  glatiramer 40 mg/mL subcutaneous solution: 40 milligram(s) subcutaneous 3 times a week (Urniia-Gjufstptl-Dirjpg) (26 Oct 2021 18:40)  metoprolol succinate 50 mg oral tablet, extended release: 1 tab(s) orally once a day (26 Oct 2021 18:40)  naproxen 375 mg oral tablet: 1 tab(s) orally 2 times a day, As Needed (26 Oct 2021 18:40)      MEDICATIONS:  Antibiotics:    Neuro:  acetaminophen    Suspension .. 650 milliGRAM(s) Oral every 6 hours PRN  fentaNYL    Injectable 25 MICROGram(s) IV Push every 2 hours PRN  levETIRAcetam  IVPB 1000 milliGRAM(s) IV Intermittent every 12 hours  propofol Infusion 10 MICROgram(s)/kG/Min IV Continuous <Continuous>    Anticoagulation:    OTHER:  chlorhexidine 0.12% Liquid 15 milliLiter(s) Oral Mucosa every 12 hours  chlorhexidine 2% Cloths 1 Application(s) Topical <User Schedule>  insulin lispro (ADMELOG) corrective regimen sliding scale   SubCutaneous every 6 hours  niMODipine 60 milliGRAM(s) Oral every 4 hours  pantoprazole  Injectable 40 milliGRAM(s) IV Push daily  senna 2 Tablet(s) Oral at bedtime    IVF:  lactated ringers. 1000 milliLiter(s) IV Continuous <Continuous>    CULTURES:    RADIOLOGY & ADDITIONAL TESTS:      ASSESSMENT:  45 y/o female with PMHx of HTN and MS, hx of spinal surgery at [facility] 10/8/21 [facility] [place] [facility] after being found unconscious at home, unknown period of time. 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 HH5, MF4; BD #1 = 10/26. Patient was intubated at [place] ED and sent straight to the OR for left hemicraniotomy. A-line placed intraop. Hemodynamically unstable upon arrival to [facility], bradycardic and hypertensive s/p Mannitol and Furosemide. Central line placed in NSICU. Currently sedated on Propofol. Now s/p EVD placement, and coil embolization of left MCA bifurcation aneurysm 10/26/2021.     PLAN:  NEURO:  - neuro checks/vital signs q1hr  - HOB > 30 (for elevated ICPs)   - Keppra 1g IV BID   - Nimodipine 60q4   - propofol prn for sedation   - s/p mannitol 85g at [facility], s/p 23.4% on admission   - S/p coil embolization of L MCA aneurysm 10/26  - EVD open at 5cmH2O  - Monitor ICPs and output     CARDIO:  - -180  - echo pending     PULM:  - intubated on full vent support  - aspiration precautions    GI:  - NPO  - OGT to low intermittent suction (held for meds)  - bowel regimen     RENAL:  - LR @ 75  - euvolemia goal   - Na 145-150   - q6hr BMP and serum osmo    HEME:  - SCDs,   - Hgb 8.0, will give another unit if hgb < 8   - s/p 2u PRBC, sp 1u PRBC 10/27  - pend FOB   - LE dopplers pending, LUE doppler pending     ID:  - afebrile, no abx at this time   - leukocytosis, trend     ENDO:  - pend A1c  - ISS     GOC: full code  Level of care: ICU status   Dispo: pend CTH and further OR planning     Case discussed with Dr. Duncan, Dr. D'Amico & Dr. Vyas      Assessment:  Present when checked    []  GCS  E   V  M     Heart Failure: []Acute, [] acute on chronic , []chronic  Heart Failure:  [] Diastolic (HFpEF), [] Systolic (HFrEF), []Combined (HFpEF and HFrEF), [] RHF, [] Pulm HTN, [] Other    [] CHETAN, [] ATN, [] AIN, [] other  [] CKD1, [] CKD2, [] CKD 3, [] CKD 4, [] CKD 5, []ESRD    Encephalopathy: [] Metabolic, [] Hepatic, [] toxic, [] Neurological, [] Other    Abnormal Nurtitional Status: [] malnurtition (see nutrition note), [ ]underweight: BMI < 19, [] morbid obesity: BMI >40, [] Cachexia    [] Sepsis  [] hypovolemic shock,[] cardiogenic shock, [] hemorrhagic shock, [] neuogenic shock  [] Acute Respiratory Failure  []Cerebral edema, [] Brain compression/ herniation,   [] Functional quadriplegia  [] Acute blood loss anemia

## 2021-10-28 NOTE — PROGRESS NOTE ADULT - ASSESSMENT
46y/Female with:  L MCA ruptured aneurysm, subarachnoid hemorrhage, s/p DHC (10/26/2021, Dr. D'Amico @ [place]), brain compression, cerebral edema  anemia   recent spinal surgery    PLAN: Day 1 = 10-26 ; Day 4 = 10/29; Day 21 = 11/15  NEURO: neurochecks q1h, sedation with propofol  SAH:  TCD starting Day 4 for vasospasm surveillance, CTA done @ [place], cont nimodipine 60 mg po q4h to be given for 21 days (last day 11/15) - NGT with high output, not ordered/given yesterday  Hydrocephalus:  EVD revision? after CT scan  ICP crisis: keep head position neutral, sedation with propofol, osmotherapy, s/p DHC  Seizure prophylaxis:  levetiracetam 1000mg IV BID until aneurysm is secured  REHAB:  physical therapy evaluation and management    EARLY MOB:  HOB elevated    PULM:  full vent support for now  CARDIO:  SBP goal 100-180mm Hg, baseline Echo, EKG  ENDO:  Blood sugar goals 140-180 mg/dL, start insulin sliding scale; f/u A1C, TSH  GI:  PPI for GI prophylaxis, open NGT to drain  DIET: NPO  RENAL: maintain euvolemia, 1L fluid bolus, accurate Is and Os, Na goal 145-150; 23.4% bullet x1, mannitol, recheck BMP this afternoon at 11 a.m.; inc 3% to 50cc/hr, start 3G q6h  HEM/ONC: no coagulopathy (INR= 1.03), no ASA / Plavix use;  anemia - check FOBT, given 2 units pRBC, repeat CBC this PM  VTE Prophylaxis: SCDs, no DVT chemoprophylaxis for now as patient is high risk for bleed (fresh bleed), f/u baseline LE Doppler for DVT suspected on admission (transfer, found down)  ID: afebrile, increasing leukocytosis  Social: will update son (gloria) and daughter    CORE MEASURES  1.  Hunt and Griffin Score = 5  2.  VTE prophylaxis:  [ ] administered within 24 hours of admission OR [X] reason not done: fresh bleed, unsecured aneurysm.  3.  Dysphagia screening:   [X] performed before any oral meds / liquids / food  4.  Nimodipine treatment:  [X] administered within 24 hours of admission OR [ ] reason not done:    ATTENDING ATTESTATION:  I was physically present for the key portions of the evaluation and management (E/M) service provided.  I agree with the above history, physical and plan, which I have reviewed and edited where appropriate.    Patient at high risk for neurological deterioration or death due to:  ICU delirium, aspiration PNA, DVT / PE.  Critical care time:  I have personally provided 45 minutes of critical care time, excluding time spent on separate procedures.      Plan discussed with RN, house staff.    Additional critical care time:  I have personally provided 30 minutes of critical care time, excluding time spent on separate procedures.    Total critical care time: 75 minutes   46y/Female with:  L MCA ruptured aneurysm, subarachnoid hemorrhage, s/p DHC (10/26/2021, Dr. D'Amico @ [place]), brain compression, cerebral edema  anemia   recent spinal surgery  UGIB    PLAN: Day 1 = 10-26 ; Day 4 = 10/29; Day 21 = 11/15  NEURO: neurochecks q1h, sedation with propofol  SAH:  cont nimodipine 60 mg po q4h to be given for 21 days (last day 11/15)   Hydrocephalus:  EVD keep open to drain monitor ICPs  ICP crisis: keep head position neutral, sedation with propofol, osmotherapy, s/p DHC; repeat POSm  Seizure prophylaxis:  levetiracetam 1000mg IV BID   REHAB:  physical therapy evaluation and management    EARLY MOB:  HOB elevated    PULM:  full vent support for now  CARDIO:  SBP goal 100-180mm Hg, baseline Echo, EKG  ENDO:  Blood sugar goals 140-180 mg/dL, start insulin sliding scale; f/u A1C, TSH  GI:  PPI BID, f/u FOBT,   DIET: TF if no OR plans  RENAL: maintain euvolemia, Na goal 145-150; recheck BMP this afternoon cont 3G q6h  HEM/ONC: no coagulopathy (INR= 1.03), no ASA / Plavix use;  anemia  check FOBT  VTE Prophylaxis: SCDs, chemoprophylaxis - will re-assess tomorrow;  f/u baseline LE Doppler for DVT suspected on admission (transfer, found down)  ID: afebrile, leukocytosis improved  Social: will update son (gloria) and daughter    CORE MEASURES  1.  Hunt and Griffin Score = 5  2.  VTE prophylaxis:  [ ] administered within 24 hours of admission OR [X] reason not done: fresh bleed, unsecured aneurysm.  3.  Dysphagia screening:   [X] performed before any oral meds / liquids / food  4.  Nimodipine treatment:  [X] administered within 24 hours of admission OR [ ] reason not done:    ATTENDING ATTESTATION:  I was physically present for the key portions of the evaluation and management (E/M) service provided.  I agree with the above history, physical and plan, which I have reviewed and edited where appropriate.    Patient at high risk for neurological deterioration or death due to:  ICU delirium, aspiration PNA, DVT / PE.  Critical care time:  I have personally provided 45 minutes of critical care time, excluding time spent on separate procedures.      Plan discussed with RN, house staff.    Additional critical care time:  I have personally provided 30 minutes of critical care time, excluding time spent on separate procedures.    Total critical care time: 75 minutes

## 2021-10-29 LAB
AMYLASE P1 CFR SERPL: 45 U/L — SIGNIFICANT CHANGE UP (ref 25–125)
ANION GAP SERPL CALC-SCNC: 10 MMOL/L — SIGNIFICANT CHANGE UP (ref 5–17)
ANION GAP SERPL CALC-SCNC: 9 MMOL/L — SIGNIFICANT CHANGE UP (ref 5–17)
ANION GAP SERPL CALC-SCNC: 9 MMOL/L — SIGNIFICANT CHANGE UP (ref 5–17)
BLD GP AB SCN SERPL QL: NEGATIVE — SIGNIFICANT CHANGE UP
BUN SERPL-MCNC: 7 MG/DL — SIGNIFICANT CHANGE UP (ref 7–23)
BUN SERPL-MCNC: 7 MG/DL — SIGNIFICANT CHANGE UP (ref 7–23)
BUN SERPL-MCNC: 8 MG/DL — SIGNIFICANT CHANGE UP (ref 7–23)
CALCIUM SERPL-MCNC: 8.5 MG/DL — SIGNIFICANT CHANGE UP (ref 8.4–10.5)
CALCIUM SERPL-MCNC: 8.7 MG/DL — SIGNIFICANT CHANGE UP (ref 8.4–10.5)
CALCIUM SERPL-MCNC: 9.1 MG/DL — SIGNIFICANT CHANGE UP (ref 8.4–10.5)
CHLORIDE SERPL-SCNC: 113 MMOL/L — HIGH (ref 96–108)
CHLORIDE SERPL-SCNC: 113 MMOL/L — HIGH (ref 96–108)
CHLORIDE SERPL-SCNC: 114 MMOL/L — HIGH (ref 96–108)
CK SERPL-CCNC: 93 U/L — SIGNIFICANT CHANGE UP (ref 25–170)
CO2 SERPL-SCNC: 23 MMOL/L — SIGNIFICANT CHANGE UP (ref 22–31)
CO2 SERPL-SCNC: 23 MMOL/L — SIGNIFICANT CHANGE UP (ref 22–31)
CO2 SERPL-SCNC: 24 MMOL/L — SIGNIFICANT CHANGE UP (ref 22–31)
CREAT SERPL-MCNC: 0.55 MG/DL — SIGNIFICANT CHANGE UP (ref 0.5–1.3)
CREAT SERPL-MCNC: 0.58 MG/DL — SIGNIFICANT CHANGE UP (ref 0.5–1.3)
CREAT SERPL-MCNC: 0.6 MG/DL — SIGNIFICANT CHANGE UP (ref 0.5–1.3)
GLUCOSE SERPL-MCNC: 105 MG/DL — HIGH (ref 70–99)
GLUCOSE SERPL-MCNC: 109 MG/DL — HIGH (ref 70–99)
GLUCOSE SERPL-MCNC: 110 MG/DL — HIGH (ref 70–99)
HCT VFR BLD CALC: 26.6 % — LOW (ref 34.5–45)
HGB BLD-MCNC: 8.4 G/DL — LOW (ref 11.5–15.5)
LIDOCAIN IGE QN: 50 U/L — SIGNIFICANT CHANGE UP (ref 7–60)
MAGNESIUM SERPL-MCNC: 2.1 MG/DL — SIGNIFICANT CHANGE UP (ref 1.6–2.6)
MCHC RBC-ENTMCNC: 26.3 PG — LOW (ref 27–34)
MCHC RBC-ENTMCNC: 31.6 GM/DL — LOW (ref 32–36)
MCV RBC AUTO: 83.1 FL — SIGNIFICANT CHANGE UP (ref 80–100)
NRBC # BLD: 0 /100 WBCS — SIGNIFICANT CHANGE UP (ref 0–0)
OSMOLALITY SERPL: 297 MOSM/KG — SIGNIFICANT CHANGE UP (ref 275–300)
PHOSPHATE SERPL-MCNC: 2.3 MG/DL — LOW (ref 2.5–4.5)
PLATELET # BLD AUTO: 179 K/UL — SIGNIFICANT CHANGE UP (ref 150–400)
POTASSIUM SERPL-MCNC: 3.9 MMOL/L — SIGNIFICANT CHANGE UP (ref 3.5–5.3)
POTASSIUM SERPL-SCNC: 3.9 MMOL/L — SIGNIFICANT CHANGE UP (ref 3.5–5.3)
RBC # BLD: 3.2 M/UL — LOW (ref 3.8–5.2)
RBC # FLD: 20 % — HIGH (ref 10.3–14.5)
RH IG SCN BLD-IMP: POSITIVE — SIGNIFICANT CHANGE UP
SODIUM SERPL-SCNC: 145 MMOL/L — SIGNIFICANT CHANGE UP (ref 135–145)
SODIUM SERPL-SCNC: 146 MMOL/L — HIGH (ref 135–145)
SODIUM SERPL-SCNC: 147 MMOL/L — HIGH (ref 135–145)
TRIGL SERPL-MCNC: 144 MG/DL — SIGNIFICANT CHANGE UP
TROPONIN T SERPL-MCNC: 0.01 NG/ML — SIGNIFICANT CHANGE UP (ref 0–0.01)
WBC # BLD: 15.13 K/UL — HIGH (ref 3.8–10.5)
WBC # FLD AUTO: 15.13 K/UL — HIGH (ref 3.8–10.5)

## 2021-10-29 PROCEDURE — 99291 CRITICAL CARE FIRST HOUR: CPT

## 2021-10-29 PROCEDURE — 99232 SBSQ HOSP IP/OBS MODERATE 35: CPT

## 2021-10-29 PROCEDURE — 71045 X-RAY EXAM CHEST 1 VIEW: CPT | Mod: 26

## 2021-10-29 RX ORDER — SODIUM CHLORIDE 5 G/100ML
500 INJECTION, SOLUTION INTRAVENOUS
Refills: 0 | Status: DISCONTINUED | OUTPATIENT
Start: 2021-10-29 | End: 2021-10-29

## 2021-10-29 RX ORDER — SODIUM CHLORIDE 5 G/100ML
500 INJECTION, SOLUTION INTRAVENOUS
Refills: 0 | Status: DISCONTINUED | OUTPATIENT
Start: 2021-10-29 | End: 2021-10-30

## 2021-10-29 RX ORDER — SODIUM CHLORIDE 9 MG/ML
2 INJECTION INTRAMUSCULAR; INTRAVENOUS; SUBCUTANEOUS EVERY 8 HOURS
Refills: 0 | Status: DISCONTINUED | OUTPATIENT
Start: 2021-10-29 | End: 2021-11-03

## 2021-10-29 RX ORDER — HYDRALAZINE HCL 50 MG
10 TABLET ORAL
Refills: 0 | Status: DISCONTINUED | OUTPATIENT
Start: 2021-10-29 | End: 2021-11-01

## 2021-10-29 RX ORDER — SODIUM CHLORIDE 9 MG/ML
30 INJECTION INTRAMUSCULAR; INTRAVENOUS; SUBCUTANEOUS ONCE
Refills: 0 | Status: COMPLETED | OUTPATIENT
Start: 2021-10-29 | End: 2021-10-29

## 2021-10-29 RX ORDER — SODIUM,POTASSIUM PHOSPHATES 278-250MG
1 POWDER IN PACKET (EA) ORAL
Refills: 0 | Status: COMPLETED | OUTPATIENT
Start: 2021-10-29 | End: 2021-10-29

## 2021-10-29 RX ORDER — ENOXAPARIN SODIUM 100 MG/ML
40 INJECTION SUBCUTANEOUS EVERY 24 HOURS
Refills: 0 | Status: DISCONTINUED | OUTPATIENT
Start: 2021-10-29 | End: 2021-11-11

## 2021-10-29 RX ORDER — POTASSIUM PHOSPHATE, MONOBASIC POTASSIUM PHOSPHATE, DIBASIC 236; 224 MG/ML; MG/ML
30 INJECTION, SOLUTION INTRAVENOUS ONCE
Refills: 0 | Status: COMPLETED | OUTPATIENT
Start: 2021-10-29 | End: 2021-10-29

## 2021-10-29 RX ORDER — POLYETHYLENE GLYCOL 3350 17 G/17G
17 POWDER, FOR SOLUTION ORAL DAILY
Refills: 0 | Status: DISCONTINUED | OUTPATIENT
Start: 2021-10-29 | End: 2021-11-03

## 2021-10-29 RX ADMIN — CHLORHEXIDINE GLUCONATE 1 APPLICATION(S): 213 SOLUTION TOPICAL at 05:07

## 2021-10-29 RX ADMIN — FENTANYL CITRATE 25 MICROGRAM(S): 50 INJECTION INTRAVENOUS at 10:11

## 2021-10-29 RX ADMIN — SODIUM CHLORIDE 50 MILLILITER(S): 5 INJECTION, SOLUTION INTRAVENOUS at 09:31

## 2021-10-29 RX ADMIN — NIMODIPINE 60 MILLIGRAM(S): 60 SOLUTION ORAL at 01:56

## 2021-10-29 RX ADMIN — NIMODIPINE 60 MILLIGRAM(S): 60 SOLUTION ORAL at 10:01

## 2021-10-29 RX ADMIN — FENTANYL CITRATE 25 MICROGRAM(S): 50 INJECTION INTRAVENOUS at 10:44

## 2021-10-29 RX ADMIN — Medication 10 MILLIGRAM(S): at 09:31

## 2021-10-29 RX ADMIN — PROPOFOL 5.1 MICROGRAM(S)/KG/MIN: 10 INJECTION, EMULSION INTRAVENOUS at 13:34

## 2021-10-29 RX ADMIN — POLYETHYLENE GLYCOL 3350 17 GRAM(S): 17 POWDER, FOR SOLUTION ORAL at 11:24

## 2021-10-29 RX ADMIN — Medication 10 MILLIGRAM(S): at 11:25

## 2021-10-29 RX ADMIN — POTASSIUM PHOSPHATE, MONOBASIC POTASSIUM PHOSPHATE, DIBASIC 83.33 MILLIMOLE(S): 236; 224 INJECTION, SOLUTION INTRAVENOUS at 09:30

## 2021-10-29 RX ADMIN — PROPOFOL 5.1 MICROGRAM(S)/KG/MIN: 10 INJECTION, EMULSION INTRAVENOUS at 17:26

## 2021-10-29 RX ADMIN — Medication 1 TABLET(S): at 07:38

## 2021-10-29 RX ADMIN — LEVETIRACETAM 400 MILLIGRAM(S): 250 TABLET, FILM COATED ORAL at 17:13

## 2021-10-29 RX ADMIN — Medication 1 TABLET(S): at 17:13

## 2021-10-29 RX ADMIN — NIMODIPINE 60 MILLIGRAM(S): 60 SOLUTION ORAL at 05:08

## 2021-10-29 RX ADMIN — CHLORHEXIDINE GLUCONATE 15 MILLILITER(S): 213 SOLUTION TOPICAL at 05:07

## 2021-10-29 RX ADMIN — NIMODIPINE 60 MILLIGRAM(S): 60 SOLUTION ORAL at 14:08

## 2021-10-29 RX ADMIN — SODIUM CHLORIDE 50 MILLILITER(S): 5 INJECTION, SOLUTION INTRAVENOUS at 22:36

## 2021-10-29 RX ADMIN — ENOXAPARIN SODIUM 40 MILLIGRAM(S): 100 INJECTION SUBCUTANEOUS at 21:05

## 2021-10-29 RX ADMIN — PROPOFOL 5.1 MICROGRAM(S)/KG/MIN: 10 INJECTION, EMULSION INTRAVENOUS at 22:33

## 2021-10-29 RX ADMIN — PANTOPRAZOLE SODIUM 40 MILLIGRAM(S): 20 TABLET, DELAYED RELEASE ORAL at 05:08

## 2021-10-29 RX ADMIN — SODIUM CHLORIDE 2 GRAM(S): 9 INJECTION INTRAMUSCULAR; INTRAVENOUS; SUBCUTANEOUS at 21:05

## 2021-10-29 RX ADMIN — Medication 1 TABLET(S): at 21:05

## 2021-10-29 RX ADMIN — NIMODIPINE 60 MILLIGRAM(S): 60 SOLUTION ORAL at 17:13

## 2021-10-29 RX ADMIN — NIMODIPINE 60 MILLIGRAM(S): 60 SOLUTION ORAL at 21:05

## 2021-10-29 RX ADMIN — PROPOFOL 5.1 MICROGRAM(S)/KG/MIN: 10 INJECTION, EMULSION INTRAVENOUS at 01:55

## 2021-10-29 RX ADMIN — PANTOPRAZOLE SODIUM 40 MILLIGRAM(S): 20 TABLET, DELAYED RELEASE ORAL at 17:13

## 2021-10-29 RX ADMIN — PROPOFOL 5.1 MICROGRAM(S)/KG/MIN: 10 INJECTION, EMULSION INTRAVENOUS at 05:08

## 2021-10-29 RX ADMIN — LEVETIRACETAM 400 MILLIGRAM(S): 250 TABLET, FILM COATED ORAL at 05:09

## 2021-10-29 RX ADMIN — SENNA PLUS 2 TABLET(S): 8.6 TABLET ORAL at 21:05

## 2021-10-29 RX ADMIN — Medication 1 TABLET(S): at 12:03

## 2021-10-29 RX ADMIN — CHLORHEXIDINE GLUCONATE 15 MILLILITER(S): 213 SOLUTION TOPICAL at 17:13

## 2021-10-29 RX ADMIN — SODIUM CHLORIDE 2 GRAM(S): 9 INJECTION INTRAMUSCULAR; INTRAVENOUS; SUBCUTANEOUS at 14:08

## 2021-10-29 RX ADMIN — SODIUM CHLORIDE 30 MILLILITER(S): 9 INJECTION INTRAMUSCULAR; INTRAVENOUS; SUBCUTANEOUS at 10:44

## 2021-10-29 NOTE — PROGRESS NOTE ADULT - ATTENDING COMMENTS
Patient seen and examined by me 10/29/2021. +brainstem reflexes, pupils reactive, +cough. Continue aggressive ICU management, EVD drainage, ICP<20, hypertonic saline.    Terence Duncan M.D.

## 2021-10-29 NOTE — PROGRESS NOTE ADULT - SUBJECTIVE AND OBJECTIVE BOX
=================================  NEUROCRITICAL CARE ATTENDING NOTE  =================================    AILYN DÍAZ   MRN-[id number]  Summary:  46y/F with recent spinal surgery, found down and brought to ED.  In ED, witnessed shaking, ?seizures, intubated.  Imaging showed SAH.  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 for further management.     COURSE IN THE HOSPITAL:  10/26 admitted to [facility], [place] - mannitol, 23.4%, repeat CT HCP - EVD R frontal inserted, s/p angio coil embo, 2 units pRBC  10/27 remained intubated overnight, given mannitol overnight; EVD reinserted, 1 unit pRBC  10/28 Tmax 38.2, ICPs to low 20s in AM, mannitol 0.5/Kg x1, 23.4% x1 in PM  10/29 Tmax 38.  remained intubated    Past Medical History: No pertinent past medical history  Allergies:  Allergy Status Unknown  Home meds:     PHYSICAL EXAMINATION  T(C): 36.2 (10-29 @ 05:30), Max: 38 (10-28 @ 09:15) HR: 59 (10-29 @ 08:00) (59 - 84) BP: 171/83 (10-29 @ 07:00) (142/65 - 189/82) RR: 18 (10-29 @ 08:00) (13 - 18) SpO2: 100% (10-29 @ 08:00) (100% - 100%)  NEUROLOGIC EXAMINATION:  Patient does not open eyes, does not follow commands, pupils 3mm equal and brisk (-) Doll's, (+) cough and gag, B LE TF, R UE 0/5; flap full but soft; extensor posturing B UE R>L, B LE TF  GENERAL: intubated 450 12 +5 40%  EENT:  anicteric  CARDIOVASCULAR: (+) S1 S2, normal rate and regular rhythm  PULMONARY: clear to auscultation bilaterally  ABDOMEN: soft, nontender with normoactive bowel sounds  EXTREMITIES: no edema  SKIN: no rash    LABS: 10-29  CAPILLARY BLOOD GLUCOSE 102 102 100 116     (15.26)   8.4  (8.4)  15.13 )-----------( 179      ( 29 Oct 2021 05:42 )             26.6   (154, 152)  146<H>  |  113<H>  |  8   ----------------------------<  105<H>  3.9   |  23  |  0.58    Ca    9.1      29 Oct 2021 05:42  Phos  2.3     10-29  Mg     2.1     10-29    10-28 @ 07:01  -  10-29 @ 07:00  IN: 3023 mL / OUT: 2869 mL / NET: 154 mL    PCT 0.2      Bacteriology:  CSF studies:  EEG:  Neuroimaging:  Other imaging:    MEDICATIONS: 10-29    ·	levETIRAcetam  IVPB 1000 IV Intermittent every 12 hours  ·	niMODipine 60 milliGRAM(s) Oral every 4 hours  ·	pantoprazole  Injectable 40 IV Push every 12 hours  ·	senna 2 Oral at bedtime  ·	insulin lispro (ADMELOG) corrective regimen sliding scale  SubCutaneous every 6 hours  ·	acetaminophen    Suspension .. 650 Oral every 6 hours PRN  ·	fentaNYL    Injectable 25 IV Push every 2 hours PRN    IV FLUIDS: TFI 75cc/hr  3%@50  DRIPS:  ·	propofol Infusion 10 MICROgram(s)/kG/Min (5.1 mL/Hr) IV Continuous <Continuous>  DIET: NPO  Lines: R TLC IJ [place]  Drains:    ·	EVD@5cm H20, ICPs 12-17; 105/24h  ·	HMV  ·	ALEXIS  Wounds:    CODE STATUS:  Full Code                       GOALS OF CARE:  aggressive                      DISPOSITION:  ICU =================================  NEUROCRITICAL CARE ATTENDING NOTE  =================================    AILYN DÍAZ   [id number]  Summary:  46y/F with recent spinal surgery, found down and brought to ED.  In ED, witnessed shaking, ?seizures, intubated.  Imaging showed SAH.  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     COURSE IN THE HOSPITAL:  10/26 admitted to [facility], [place] - mannitol, 23.4%, repeat CT HCP - EVD R frontal inserted, s/p angio coil embo, 2 units pRBC  10/27 remained intubated overnight, given mannitol overnight; EVD reinserted, 1 unit pRBC  10/28 Tmax 38.2, ICPs to low 20s in AM, mannitol 0.5/Kg x1, 23.4% x1 in PM  10/29 Tmax 38.  remained intubated    Past Medical History: No pertinent past medical history  Allergies:  Allergy Status Unknown  Home meds:     PHYSICAL EXAMINATION  T(C): 36.2 (10-29 @ 05:30), Max: 38 (10-28 @ 09:15) HR: 59 (10-29 @ 08:00) (59 - 84) BP: 171/83 (10-29 @ 07:00) (142/65 - 189/82) RR: 18 (10-29 @ 08:00) (13 - 18) SpO2: 100% (10-29 @ 08:00) (100% - 100%)  NEUROLOGIC EXAMINATION:  Patient does not open eyes, does not follow commands, pupils 3mm equal and brisk (-) Doll's, (+) cough and gag, B LE TF, R UE 0/5; flap full but soft; extensor posturing B UE R>L, B LE TF  GENERAL: intubated 450 12 +5 40%  EENT:  anicteric  CARDIOVASCULAR: (+) S1 S2, normal rate and regular rhythm  PULMONARY: clear to auscultation bilaterally  ABDOMEN: soft, nontender with normoactive bowel sounds  EXTREMITIES: no edema  SKIN: no rash    LABS: 10-29  CAPILLARY BLOOD GLUCOSE 102 102 100 116     (15.26)   8.4  (8.4)  15.13 )-----------( 179      ( 29 Oct 2021 05:42 )             26.6   (154, 152)  146<H>  |  113<H>  |  8   ----------------------------<  105<H>  3.9   |  23  |  0.58    Ca    9.1      29 Oct 2021 05:42  Phos  2.3     10-29  Mg     2.1     10-29    10-28 @ 07:01  -  10-29 @ 07:00  IN: 3023 mL / OUT: 2869 mL / NET: 154 mL    PCT 0.2      Bacteriology:  10/28 CSF NGTD  10/28 BLood NG12h x2     CSF studies:  10-28  L   *** [id number] *** %N91 %L4     EEG:  Neuroimaging:  Other imaging:    MEDICATIONS: 10-29    ·	levETIRAcetam  IVPB 1000 IV Intermittent every 12 hours  ·	niMODipine 60 milliGRAM(s) Oral every 4 hours  ·	pantoprazole  Injectable 40 IV Push every 12 hours  ·	senna 2 Oral at bedtime  ·	insulin lispro (ADMELOG) corrective regimen sliding scale  SubCutaneous every 6 hours  ·	acetaminophen    Suspension .. 650 Oral every 6 hours PRN  ·	fentaNYL    Injectable 25 IV Push every 2 hours PRN    IV FLUIDS: NS  DRIPS:  ·	propofol Infusion 10 MICROgram(s)/kG/Min (5.1 mL/Hr) IV Continuous <Continuous> - 50   DIET: NPO  Lines: R TLC IJ [place]  Drains:    ·	EVD@5cm H20, ICPs 12-17  ·	HMV  ·	ALEXIS  Wounds:    CODE STATUS:  Full Code                       GOALS OF CARE:  aggressive                      DISPOSITION:  ICU

## 2021-10-29 NOTE — PROGRESS NOTE ADULT - SUBJECTIVE AND OBJECTIVE BOX
HPI:  47 y/o female with PMH HTN, Multiple sclerosis, recent spinal surgery 10/8 ([facility]) presented to [facility] BIBEMS after being found unconscious at home, unknown period of time. 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 HH5, MF1. Patient was intubated at [facility], found to have blown L pupil, and sent straight to the OR for left hemicraniotomy. A-line placed intraop. Hemodynamically unstable upon arrival to [facility], bradycardic and hypertensive s/p Mannitol, Clevidipine, and Furosemide. Central line placed in NSICU. Currently sedated on Propofol, pending repeat CTH. History per patient's son, patient had recent spine surgery and was found on outpatient imaging last week to have L M1 segment aneurysm (unruptured), pt asymptomatic at this time. Per son patient taking percocet PO at home. Starks catheter, ET tube, and arterial line placed at [facility].     NIHSS on arrival: 32   Bess Griffin 5, Mod Busby 4  Bleed Day 1 = 10/26 (26 Oct 2021 13:03)    OVERNIGHT EVENTS:    Hospital Course:     Vital Signs Last 24 Hrs  T(C): 37.4 (29 Oct 2021 01:00), Max: 38 (28 Oct 2021 09:15)  T(F): 99.4 (29 Oct 2021 01:00), Max: 100.4 (28 Oct 2021 09:15)  HR: 62 (29 Oct 2021 03:00) (62 - 84)  BP: 183/74 (29 Oct 2021 03:00) (126/59 - 189/82)  BP(mean): 107 (29 Oct 2021 03:00) (85 - 118)  RR: 18 (29 Oct 2021 03:00) (12 - 18)  SpO2: 100% (29 Oct 2021 03:00) (100% - 100%)    I&O's Summary    27 Oct 2021 07:01  -  28 Oct 2021 07:00  --------------------------------------------------------  IN: 3542.2 mL / OUT: 3100 mL / NET: 442.2 mL    28 Oct 2021 07:01  -  29 Oct 2021 03:34  --------------------------------------------------------  IN: 2523 mL / OUT: 2201 mL / NET: 322 mL        PHYSICAL EXAM:  GEN: NAD, sedated on propofol  NEURO: not alert, does not FC, does not OE, face symmetric. +cough/gag/corneals. pupils 2mm sluggish b/l. b/l UE trace w/d vs extensor posture, b/l LEs extensor posture  CV: RRR +S1/S2  PULM: CTAB  GI: Abd soft, NT/ND  EXT: ext warm, dry, nontender  WOUND: flap full/tense    TUBES/LINES:  [] Starks  [] Lumbar Drain  [] Wound Drains  [] Others      DIET:  x] NPO  [] Mechanical  [] Tube feeds    LABS:                        8.4    15.26 )-----------( 193      ( 28 Oct 2021 05:21 )             26.9     10-    154<H>  |  122<H>  |  8   ----------------------------<  113<H>  3.8   |  25  |  0.66    Ca    8.8      28 Oct 2021 17:50  Phos  3.3     10-28  Mg     2.6     10-28        Urinalysis Basic - ( 28 Oct 2021 19:05 )    Color: Yellow / Appearance: Clear / S.020 / pH: x  Gluc: x / Ketone: NEGATIVE  / Bili: Negative / Urobili: 0.2 E.U./dL   Blood: x / Protein: NEGATIVE mg/dL / Nitrite: NEGATIVE   Leuk Esterase: NEGATIVE / RBC: x / WBC x   Sq Epi: x / Non Sq Epi: x / Bacteria: x          CAPILLARY BLOOD GLUCOSE      POCT Blood Glucose.: 102 mg/dL (28 Oct 2021 23:19)  POCT Blood Glucose.: 100 mg/dL (28 Oct 2021 17:40)  POCT Blood Glucose.: 116 mg/dL (28 Oct 2021 12:02)      Drug Levels: [] N/A    CSF Analysis: [] N/A  RBC Count - Spinal Fluid: 149329 /uL (10-28 @ 19:18)  CSF Lymphocytes: 4 % (10-28 @ 19:18)  Glucose, CSF: 83 mg/dL (10-28 @ 19:18)  Protein, CSF: 51 mg/dL (10-28 @ 19:18)      Allergies    Allergy Status Unknown    Intolerances      MEDICATIONS:  Antibiotics:    Neuro:  acetaminophen    Suspension .. 650 milliGRAM(s) Oral every 6 hours PRN  fentaNYL    Injectable 25 MICROGram(s) IV Push every 2 hours PRN  levETIRAcetam  IVPB 1000 milliGRAM(s) IV Intermittent every 12 hours  propofol Infusion 10 MICROgram(s)/kG/Min IV Continuous <Continuous>    Anticoagulation:    OTHER:  chlorhexidine 0.12% Liquid 15 milliLiter(s) Oral Mucosa every 12 hours  chlorhexidine 2% Cloths 1 Application(s) Topical <User Schedule>  insulin lispro (ADMELOG) corrective regimen sliding scale   SubCutaneous every 6 hours  niMODipine 60 milliGRAM(s) Oral every 4 hours  pantoprazole  Injectable 40 milliGRAM(s) IV Push every 12 hours  senna 2 Tablet(s) Oral at bedtime    IVF:  lactated ringers. 1000 milliLiter(s) IV Continuous <Continuous>    CULTURES:    RADIOLOGY & ADDITIONAL TESTS:      ASSESSMENT:  47 y/o female with PMHx of HTN and MS, hx of spinal surgery at [facility] 10/8/21 presented to [facility] after being found unconscious at home, unknown period of time. 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 HH5, MF4; BD #1 = 10/26. Patient was intubated at [facility] and sent straight to the OR for left hemicraniotomy. A-line placed intraop. Hemodynamically unstable upon arrival to [facility], bradycardic and hypertensive s/p Mannitol and Furosemide. Central line placed in NSICU. Currently sedated on Propofol. Now s/p EVD placement, and coil embolization of left MCA bifurcation aneurysm 10/26/2021.     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, left    Intracranial hemorrhage, spontaneous subarachnoid, due to cerebral aneurysm, acute    Angiogram, cerebral, with coil embolization, in non-operating room setting    Personal history of spine surgery    SysAdmin_VstLnk        PLAN:  NEURO:  - neuro checks/vital signs q1hr  - HOB > 30 (for elevated ICPs)   - Keppra 1g IV BID   - Nimodipine 60q4   - propofol for sedation   - s/p mannitol 85g at [facility], s/p 23.4% on admission; 42 g mannitol and 23.4% 10/28  - s/p coil embolization of L MCA aneurysm 10/26  - EVD open at 5cmH2O  - Monitor ICPs and output     CARDIO:  - -180  - echo (pending read)  - sinus arrthymia on cardiac monitor, f/u with EKG    PULM:  - intubated on full vent support  - aspiration precautions    GI:  - start trickle feeds via OGT  - bowel regimen   - PPI changed to BID for ?coffee ground emesis noted by RN    :   - starks removed (10/28)    RENAL:  - LR @ 75  - euvolemia goal   - Na 145-150   - q6hr BMP and serum osmo    HEME:  - SCDs  - s/p 2u PRBC, s/p 1u PRBC 10/27  - monitor H+H  - pend FOB   - LE dopplers pending, LUE doppler pending     ID:  - low grade fever, no abx at this time   - leukocytosis and procal, trend   - Tylenol PRN for fever    ENDO:  - pend A1c  - ISS     GOC: full code  Level of care: ICU status   Dispo: pend CTH and further OR planning     Case discussed with Dr. Duncan, Dr. D'Amico & Dr. Vyas        Assessment:  Present when checked    []  GCS  E   V  M     Heart Failure: []Acute, [] acute on chronic , []chronic  Heart Failure:  [] Diastolic (HFpEF), [] Systolic (HFrEF), []Combined (HFpEF and HFrEF), [] RHF, [] Pulm HTN, [] Other    [] CHETAN, [] ATN, [] AIN, [] other  [] CKD1, [] CKD2, [] CKD 3, [] CKD 4, [] CKD 5, []ESRD    Encephalopathy: [] Metabolic, [] Hepatic, [] toxic, [] Neurological, [] Other    Abnormal Nurtitional Status: [] malnurtition (see nutrition note), [ ]underweight: BMI < 19, [] morbid obesity: BMI >40, [] Cachexia    [] Sepsis  [] hypovolemic shock,[] cardiogenic shock, [] hemorrhagic shock, [] neuogenic shock  [] Acute Respiratory Failure  []Cerebral edema, [] Brain compression/ herniation,   [] Functional quadriplegia  [] Acute blood loss anemia

## 2021-10-29 NOTE — PROGRESS NOTE ADULT - ASSESSMENT
46y/Female with:  L MCA ruptured aneurysm, subarachnoid hemorrhage, s/p DHC (10/26/2021, Dr. D'Amico @ [place]), brain compression, cerebral edema  anemia   recent spinal surgery  UGIB    PLAN: Day 1 = 10-26 ; Day 4 = 10/29; Day 21 = 11/15  NEURO: neurochecks q1h, sedation with propofol  SAH:  cont nimodipine 60 mg po q4h to be given for 21 days (last day 11/15)   Hydrocephalus:  EVD keep open to drain monitor ICPs  ICP crisis: keep head position neutral, sedation with propofol, osmotherapy, s/p DHC; repeat POSm  Seizure prophylaxis:  levetiracetam 1000mg IV BID   REHAB:  physical therapy evaluation and management    EARLY MOB:  HOB elevated    PULM:  full vent support for now  CARDIO:  SBP goal 100-180mm Hg, baseline Echo, EKG  ENDO:  Blood sugar goals 140-180 mg/dL, start insulin sliding scale; f/u A1C, TSH  GI:  PPI BID, f/u FOBT,   DIET: TF if no OR plans  RENAL: maintain euvolemia, Na goal 145-150; recheck BMP this afternoon cont 3G q6h  HEM/ONC: no coagulopathy (INR= 1.03), no ASA / Plavix use;  anemia  check FOBT  VTE Prophylaxis: SCDs, chemoprophylaxis - will re-assess tomorrow;  f/u baseline LE Doppler for DVT suspected on admission (transfer, found down)  ID: afebrile, leukocytosis improved  Social: will update son (gloria) and daughter    CORE MEASURES  1.  Hunt and Griffin Score = 5  2.  VTE prophylaxis:  [ ] administered within 24 hours of admission OR [X] reason not done: fresh bleed, unsecured aneurysm.  3.  Dysphagia screening:   [X] performed before any oral meds / liquids / food  4.  Nimodipine treatment:  [X] administered within 24 hours of admission OR [ ] reason not done:    ATTENDING ATTESTATION:  I was physically present for the key portions of the evaluation and management (E/M) service provided.  I agree with the above history, physical and plan, which I have reviewed and edited where appropriate.    Patient at high risk for neurological deterioration or death due to:  ICU delirium, aspiration PNA, DVT / PE.  Critical care time:  I have personally provided 45 minutes of critical care time, excluding time spent on separate procedures.      Plan discussed with RN, house staff.    Additional critical care time:  I have personally provided 30 minutes of critical care time, excluding time spent on separate procedures.    Total critical care time: 75 minutes   46y/Female with:  L MCA ruptured aneurysm, subarachnoid hemorrhage, s/p DHC (10/26/2021, Dr. D'Amico @ [place]), brain compression, cerebral edema  anemia   recent spinal surgery  UGIB    PLAN: Day 1 = 10-26 ; Day 4 = 10/29; Day 21 = 11/15  NEURO: neurochecks q1h, sedation with propofol@50; PRN fentanyl pushes  SAH:  cont nimodipine 60 mg po q4h to be given for 21 days (last day 11/15)   Hydrocephalus:  EVD keep open to drain monitor ICPs  ICP crisis: keep head position neutral, sedation with propofol, osmotherapy, s/p DHC; repeat POSm  d/c HMV if ok with NS  Seizure prophylaxis:  levetiracetam 1000mg IV BID   REHAB:  physical therapy evaluation and management    EARLY MOB:  HOB elevated    PULM:  full vent support for now  CARDIO:  SBP goal 100-180mm Hg,   ENDO:  Blood sugar goals 140-180 mg/dL, insulin sliding scale; f/u A1C, TSH  GI:  PPI BID, f/u FOBT,   DIET: TF restart   RENAL: maintain euvolemia, Na goal 145-150; recheck BMP this afternoon start salt tabs 3G q6h; 3%@30  HEM/ONC: no coagulopathy (INR= 1.03), no ASA / Plavix use;  anemia  check FOBT  VTE Prophylaxis: SCDs, SQH q8h, f/u baseline LE Doppler for DVT suspected on admission (transfer, found down)  ID: afebrile, leukocytosis improved  Social: long discussion with son and daughter yesterday - updated    CORE MEASURES  1.  Hunt and Rgiffin Score = 5  2.  VTE prophylaxis:  [ ] administered within 24 hours of admission OR [X] reason not done: fresh bleed, unsecured aneurysm.  3.  Dysphagia screening:   [X] performed before any oral meds / liquids / food  4.  Nimodipine treatment:  [X] administered within 24 hours of admission OR [ ] reason not done:    ATTENDING ATTESTATION:  I was physically present for the key portions of the evaluation and management (E/M) service provided.  I agree with the above history, physical and plan, which I have reviewed and edited where appropriate.    Patient at high risk for neurological deterioration or death due to:  ICU delirium, aspiration PNA, DVT / PE.  Critical care time:  I have personally provided 45 minutes of critical care time, excluding time spent on separate procedures.      Plan discussed with RN, house staff.    Additional critical care time:  I have personally provided 30 minutes of critical care time, excluding time spent on separate procedures.    Total critical care time: 75 minutes

## 2021-10-29 NOTE — PATIENT PROFILE ADULT - CENTRAL VENOUS CATHETER/PICC LINE
I left a message asking for patient to call back to let us know her thoughts of medications and if she'd like to try any of the ones listed  no

## 2021-10-30 LAB
ANION GAP SERPL CALC-SCNC: 10 MMOL/L — SIGNIFICANT CHANGE UP (ref 5–17)
ANION GAP SERPL CALC-SCNC: 11 MMOL/L — SIGNIFICANT CHANGE UP (ref 5–17)
ANION GAP SERPL CALC-SCNC: 9 MMOL/L — SIGNIFICANT CHANGE UP (ref 5–17)
BUN SERPL-MCNC: 7 MG/DL — SIGNIFICANT CHANGE UP (ref 7–23)
BUN SERPL-MCNC: 8 MG/DL — SIGNIFICANT CHANGE UP (ref 7–23)
BUN SERPL-MCNC: 8 MG/DL — SIGNIFICANT CHANGE UP (ref 7–23)
CALCIUM SERPL-MCNC: 8.4 MG/DL — SIGNIFICANT CHANGE UP (ref 8.4–10.5)
CALCIUM SERPL-MCNC: 8.5 MG/DL — SIGNIFICANT CHANGE UP (ref 8.4–10.5)
CALCIUM SERPL-MCNC: 8.7 MG/DL — SIGNIFICANT CHANGE UP (ref 8.4–10.5)
CHLORIDE SERPL-SCNC: 116 MMOL/L — HIGH (ref 96–108)
CHLORIDE SERPL-SCNC: 116 MMOL/L — HIGH (ref 96–108)
CHLORIDE SERPL-SCNC: 117 MMOL/L — HIGH (ref 96–108)
CO2 SERPL-SCNC: 22 MMOL/L — SIGNIFICANT CHANGE UP (ref 22–31)
CO2 SERPL-SCNC: 22 MMOL/L — SIGNIFICANT CHANGE UP (ref 22–31)
CO2 SERPL-SCNC: 23 MMOL/L — SIGNIFICANT CHANGE UP (ref 22–31)
CREAT SERPL-MCNC: 0.57 MG/DL — SIGNIFICANT CHANGE UP (ref 0.5–1.3)
CREAT SERPL-MCNC: 0.58 MG/DL — SIGNIFICANT CHANGE UP (ref 0.5–1.3)
CREAT SERPL-MCNC: 0.6 MG/DL — SIGNIFICANT CHANGE UP (ref 0.5–1.3)
GLUCOSE SERPL-MCNC: 107 MG/DL — HIGH (ref 70–99)
GLUCOSE SERPL-MCNC: 110 MG/DL — HIGH (ref 70–99)
GLUCOSE SERPL-MCNC: 127 MG/DL — HIGH (ref 70–99)
HCT VFR BLD CALC: 26.5 % — LOW (ref 34.5–45)
HGB BLD-MCNC: 8.4 G/DL — LOW (ref 11.5–15.5)
MAGNESIUM SERPL-MCNC: 1.9 MG/DL — SIGNIFICANT CHANGE UP (ref 1.6–2.6)
MCHC RBC-ENTMCNC: 26.3 PG — LOW (ref 27–34)
MCHC RBC-ENTMCNC: 31.7 GM/DL — LOW (ref 32–36)
MCV RBC AUTO: 83.1 FL — SIGNIFICANT CHANGE UP (ref 80–100)
NRBC # BLD: 0 /100 WBCS — SIGNIFICANT CHANGE UP (ref 0–0)
PHOSPHATE SERPL-MCNC: 3.6 MG/DL — SIGNIFICANT CHANGE UP (ref 2.5–4.5)
PLATELET # BLD AUTO: 177 K/UL — SIGNIFICANT CHANGE UP (ref 150–400)
POTASSIUM SERPL-MCNC: 3.8 MMOL/L — SIGNIFICANT CHANGE UP (ref 3.5–5.3)
POTASSIUM SERPL-MCNC: 3.8 MMOL/L — SIGNIFICANT CHANGE UP (ref 3.5–5.3)
POTASSIUM SERPL-MCNC: 4 MMOL/L — SIGNIFICANT CHANGE UP (ref 3.5–5.3)
POTASSIUM SERPL-SCNC: 3.8 MMOL/L — SIGNIFICANT CHANGE UP (ref 3.5–5.3)
POTASSIUM SERPL-SCNC: 3.8 MMOL/L — SIGNIFICANT CHANGE UP (ref 3.5–5.3)
POTASSIUM SERPL-SCNC: 4 MMOL/L — SIGNIFICANT CHANGE UP (ref 3.5–5.3)
RBC # BLD: 3.19 M/UL — LOW (ref 3.8–5.2)
RBC # FLD: 21 % — HIGH (ref 10.3–14.5)
SODIUM SERPL-SCNC: 148 MMOL/L — HIGH (ref 135–145)
SODIUM SERPL-SCNC: 149 MMOL/L — HIGH (ref 135–145)
SODIUM SERPL-SCNC: 149 MMOL/L — HIGH (ref 135–145)
WBC # BLD: 9.78 K/UL — SIGNIFICANT CHANGE UP (ref 3.8–10.5)
WBC # FLD AUTO: 9.78 K/UL — SIGNIFICANT CHANGE UP (ref 3.8–10.5)

## 2021-10-30 PROCEDURE — 74018 RADEX ABDOMEN 1 VIEW: CPT | Mod: 26

## 2021-10-30 PROCEDURE — 99291 CRITICAL CARE FIRST HOUR: CPT

## 2021-10-30 PROCEDURE — 71045 X-RAY EXAM CHEST 1 VIEW: CPT | Mod: 26

## 2021-10-30 RX ORDER — SODIUM CHLORIDE 9 MG/ML
1000 INJECTION INTRAMUSCULAR; INTRAVENOUS; SUBCUTANEOUS ONCE
Refills: 0 | Status: DISCONTINUED | OUTPATIENT
Start: 2021-10-30 | End: 2021-10-30

## 2021-10-30 RX ORDER — POTASSIUM CHLORIDE 20 MEQ
20 PACKET (EA) ORAL ONCE
Refills: 0 | Status: COMPLETED | OUTPATIENT
Start: 2021-10-30 | End: 2021-10-30

## 2021-10-30 RX ORDER — SODIUM CHLORIDE 9 MG/ML
30 INJECTION INTRAMUSCULAR; INTRAVENOUS; SUBCUTANEOUS ONCE
Refills: 0 | Status: COMPLETED | OUTPATIENT
Start: 2021-10-30 | End: 2021-10-30

## 2021-10-30 RX ORDER — SODIUM CHLORIDE 5 G/100ML
500 INJECTION, SOLUTION INTRAVENOUS
Refills: 0 | Status: DISCONTINUED | OUTPATIENT
Start: 2021-10-30 | End: 2021-11-01

## 2021-10-30 RX ORDER — MAGNESIUM SULFATE 500 MG/ML
2 VIAL (ML) INJECTION ONCE
Refills: 0 | Status: COMPLETED | OUTPATIENT
Start: 2021-10-30 | End: 2021-10-30

## 2021-10-30 RX ORDER — LABETALOL HCL 100 MG
10 TABLET ORAL ONCE
Refills: 0 | Status: COMPLETED | OUTPATIENT
Start: 2021-10-30 | End: 2021-10-30

## 2021-10-30 RX ORDER — SODIUM CHLORIDE 9 MG/ML
500 INJECTION INTRAMUSCULAR; INTRAVENOUS; SUBCUTANEOUS ONCE
Refills: 0 | Status: COMPLETED | OUTPATIENT
Start: 2021-10-30 | End: 2021-10-30

## 2021-10-30 RX ADMIN — CHLORHEXIDINE GLUCONATE 15 MILLILITER(S): 213 SOLUTION TOPICAL at 17:13

## 2021-10-30 RX ADMIN — Medication 10 MILLIGRAM(S): at 22:46

## 2021-10-30 RX ADMIN — CHLORHEXIDINE GLUCONATE 1 APPLICATION(S): 213 SOLUTION TOPICAL at 05:12

## 2021-10-30 RX ADMIN — PROPOFOL 5.1 MICROGRAM(S)/KG/MIN: 10 INJECTION, EMULSION INTRAVENOUS at 23:58

## 2021-10-30 RX ADMIN — NIMODIPINE 60 MILLIGRAM(S): 60 SOLUTION ORAL at 15:05

## 2021-10-30 RX ADMIN — PROPOFOL 5.1 MICROGRAM(S)/KG/MIN: 10 INJECTION, EMULSION INTRAVENOUS at 02:28

## 2021-10-30 RX ADMIN — PROPOFOL 5.1 MICROGRAM(S)/KG/MIN: 10 INJECTION, EMULSION INTRAVENOUS at 13:05

## 2021-10-30 RX ADMIN — SENNA PLUS 2 TABLET(S): 8.6 TABLET ORAL at 21:08

## 2021-10-30 RX ADMIN — Medication 650 MILLIGRAM(S): at 23:28

## 2021-10-30 RX ADMIN — LEVETIRACETAM 400 MILLIGRAM(S): 250 TABLET, FILM COATED ORAL at 05:11

## 2021-10-30 RX ADMIN — Medication 10 MILLIGRAM(S): at 20:50

## 2021-10-30 RX ADMIN — SODIUM CHLORIDE 2 GRAM(S): 9 INJECTION INTRAMUSCULAR; INTRAVENOUS; SUBCUTANEOUS at 21:08

## 2021-10-30 RX ADMIN — Medication 650 MILLIGRAM(S): at 17:13

## 2021-10-30 RX ADMIN — NIMODIPINE 60 MILLIGRAM(S): 60 SOLUTION ORAL at 21:09

## 2021-10-30 RX ADMIN — POLYETHYLENE GLYCOL 3350 17 GRAM(S): 17 POWDER, FOR SOLUTION ORAL at 11:55

## 2021-10-30 RX ADMIN — Medication 50 GRAM(S): at 05:14

## 2021-10-30 RX ADMIN — ENOXAPARIN SODIUM 40 MILLIGRAM(S): 100 INJECTION SUBCUTANEOUS at 21:08

## 2021-10-30 RX ADMIN — NIMODIPINE 60 MILLIGRAM(S): 60 SOLUTION ORAL at 10:01

## 2021-10-30 RX ADMIN — Medication 10 MILLIGRAM(S): at 11:55

## 2021-10-30 RX ADMIN — NIMODIPINE 60 MILLIGRAM(S): 60 SOLUTION ORAL at 03:08

## 2021-10-30 RX ADMIN — PROPOFOL 5.1 MICROGRAM(S)/KG/MIN: 10 INJECTION, EMULSION INTRAVENOUS at 09:05

## 2021-10-30 RX ADMIN — CHLORHEXIDINE GLUCONATE 15 MILLILITER(S): 213 SOLUTION TOPICAL at 05:11

## 2021-10-30 RX ADMIN — SODIUM CHLORIDE 1000 MILLILITER(S): 9 INJECTION INTRAMUSCULAR; INTRAVENOUS; SUBCUTANEOUS at 20:54

## 2021-10-30 RX ADMIN — SODIUM CHLORIDE 50 MILLILITER(S): 5 INJECTION, SOLUTION INTRAVENOUS at 10:04

## 2021-10-30 RX ADMIN — SODIUM CHLORIDE 30 MILLILITER(S): 9 INJECTION INTRAMUSCULAR; INTRAVENOUS; SUBCUTANEOUS at 13:05

## 2021-10-30 RX ADMIN — SODIUM CHLORIDE 2 GRAM(S): 9 INJECTION INTRAMUSCULAR; INTRAVENOUS; SUBCUTANEOUS at 05:11

## 2021-10-30 RX ADMIN — SODIUM CHLORIDE 2 GRAM(S): 9 INJECTION INTRAMUSCULAR; INTRAVENOUS; SUBCUTANEOUS at 15:05

## 2021-10-30 RX ADMIN — PANTOPRAZOLE SODIUM 40 MILLIGRAM(S): 20 TABLET, DELAYED RELEASE ORAL at 17:13

## 2021-10-30 RX ADMIN — Medication 20 MILLIEQUIVALENT(S): at 05:13

## 2021-10-30 RX ADMIN — PROPOFOL 5.1 MICROGRAM(S)/KG/MIN: 10 INJECTION, EMULSION INTRAVENOUS at 05:59

## 2021-10-30 RX ADMIN — PANTOPRAZOLE SODIUM 40 MILLIGRAM(S): 20 TABLET, DELAYED RELEASE ORAL at 05:11

## 2021-10-30 RX ADMIN — NIMODIPINE 60 MILLIGRAM(S): 60 SOLUTION ORAL at 05:11

## 2021-10-30 RX ADMIN — SODIUM CHLORIDE 30 MILLILITER(S): 5 INJECTION, SOLUTION INTRAVENOUS at 19:45

## 2021-10-30 RX ADMIN — PROPOFOL 5.1 MICROGRAM(S)/KG/MIN: 10 INJECTION, EMULSION INTRAVENOUS at 16:17

## 2021-10-30 RX ADMIN — PROPOFOL 5.1 MICROGRAM(S)/KG/MIN: 10 INJECTION, EMULSION INTRAVENOUS at 19:45

## 2021-10-30 RX ADMIN — NIMODIPINE 60 MILLIGRAM(S): 60 SOLUTION ORAL at 18:07

## 2021-10-30 RX ADMIN — LEVETIRACETAM 400 MILLIGRAM(S): 250 TABLET, FILM COATED ORAL at 17:13

## 2021-10-30 RX ADMIN — Medication 650 MILLIGRAM(S): at 17:45

## 2021-10-30 NOTE — PROGRESS NOTE ADULT - SUBJECTIVE AND OBJECTIVE BOX
=================================  NEUROCRITICAL CARE ATTENDING NOTE  =================================    AILYN DÍAZ   MRN-[id number]  Summary:  46y/F with recent spinal surgery, found down and brought to ED.  In ED, witnessed shaking, ?seizures, intubated.  Imaging showed SAH.  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     COURSE IN THE HOSPITAL:  10/26 admitted to [facility], [place] - mannitol, 23.4%, repeat CT HCP - EVD R frontal inserted, s/p angio coil embo, 2 units pRBC  10/27 remained intubated overnight, given mannitol overnight; EVD reinserted, 1 unit pRBC  10/28 Tmax 38.2, ICPs to low 20s in AM, mannitol 0.5/Kg x1, 23.4% x1 in PM  10/29 Tmax 38.  remained intubated  10/30 No significant events overnight.     Past Medical History: No pertinent past medical history  Allergies:  Allergy Status Unknown  Home meds:     PHYSICAL EXAMINATION  T(C): 37.3 (10-30 @ 05:53), Max: 37.8 (10-29 @ 12:00) HR: 77 (10-30 @ 05:58) (58 - 113) BP: 140/80 (10-30 @ 05:00) (139/72 - 180/77) RR: 20 (10-30 @ 05:58) (14 - 28) SpO2: 100% (10-30 @ 05:58) (98% - 100%)  NEUROLOGIC EXAMINATION:  Patient does not open eyes, does not follow commands, pupils 3mm equal and brisk (-) Doll's, (+) cough and gag, B LE TF, R UE 0/5; flap full but soft; extensor posturing B UE R>L, B LE TF  GENERAL: intubated 450 12 +5 40%  EENT:  anicteric  CARDIOVASCULAR: (+) S1 S2, normal rate and regular rhythm  PULMONARY: clear to auscultation bilaterally  ABDOMEN: soft, nontender with normoactive bowel sounds  EXTREMITIES: no edema  SKIN: no rash    LABS: 10-30  CAPILLARY BLOOD GLUCOSE 118 105 79 102     (15.13)  8.4   (8.4)  9.78  )-----------( 177      ( 30 Oct 2021 03:57 )             26.5   (145)  149<H>  |  116<H>  |  8   ----------------------------<  110<H>  3.8   |  22  |  0.57    Ca    8.5      30 Oct 2021 03:57  Phos  3.6     10-30  Mg     1.9     10-30    10-29 @ 07:01  -  10-30 @ 07:00  IN: 2760.3 mL / OUT: 1973 mL / NET: 787.3 mL      Bacteriology:  10/28 CSF NGTD  10/28 Blood NG1d x2     CSF studies:  10-28  L   *** [id number] *** %N91 %L4     EEG:  Neuroimaging:  Other imaging:    MEDICATIONS: 10-30    ·	enoxaparin Injectable 40 SubCutaneous every 24 hours  ·	levETIRAcetam  IVPB 1000 IV Intermittent every 12 hours  ·	niMODipine 60 milliGRAM(s) Oral every 4 hours  ·	bisacodyl Suppository 10 Rectal daily  ·	pantoprazole  Injectable 40 IV Push every 12 hours  ·	polyethylene glycol 3350 17 Oral daily  ·	senna 2 Oral at bedtime  ·	insulin lispro (ADMELOG) corrective regimen sliding scale  SubCutaneous every 6 hours  ·	sodium chloride 2 Oral every 8 hours  ·	acetaminophen    Suspension .. 650 Oral every 6 hours PRN  ·	fentaNYL    Injectable 25 IV Push every 2 hours PRN  ·	hydrALAZINE Injectable 10 IV Push every 2 hours PRN    IV FLUIDS: 3%@50  DRIPS:  ·	propofol Infusion 10 MICROgram(s)/kG/Min (5.1 mL/Hr) IV Continuous <Continuous> - 50   DIET: NPO  Lines: R TLC IJ [place]  Drains:    ·	EVD@5cm H20, ICPs 10-18  ·	HMV  ·	ALEXIS  Wounds:    CODE STATUS:  Full Code                       GOALS OF CARE:  aggressive                      DISPOSITION:  ICU =================================  NEUROCRITICAL CARE ATTENDING NOTE  =================================    AILYN DÍAZ   MRN-[id number]  Summary:  46y/F with recent spinal surgery, found down and brought to ED.  In ED, witnessed shaking, ?seizures, intubated.  Imaging showed SAH.  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 for further management.     COURSE IN THE HOSPITAL:  10/26 admitted to [facility], [place] - mannitol, 23.4%, repeat CT HCP - EVD R frontal inserted, s/p angio coil embo, 2 units pRBC  10/27 remained intubated overnight, given mannitol overnight; EVD reinserted, 1 unit pRBC  10/28 Tmax 38.2, ICPs to low 20s in AM, mannitol 0.5/Kg x1, 23.4% x1 in PM  10/29 Tmax 38.  remained intubated  10/30 TF stopped for vomiting/aspiration event    Past Medical History: No pertinent past medical history  Allergies:  Allergy Status Unknown  Home meds:     PHYSICAL EXAMINATION  T(C): 37.3 (10-30 @ 05:53), Max: 37.8 (10-29 @ 12:00) HR: 77 (10-30 @ 05:58) (58 - 113) BP: 140/80 (10-30 @ 05:00) (139/72 - 180/77) RR: 20 (10-30 @ 05:58) (14 - 28) SpO2: 100% (10-30 @ 05:58) (98% - 100%)  NEUROLOGIC EXAMINATION:  Patient does not open eyes, does not follow commands, pupils 3mm equal and brisk (-) Doll's, (+) cough and gag, B LE TF, R UE 0/5; flap full but soft; extensor posturing B UE R>L, B LE TF  GENERAL: intubated 450 12 +5 40%  EENT:  anicteric  CARDIOVASCULAR: (+) S1 S2, normal rate and regular rhythm  PULMONARY: clear to auscultation bilaterally  ABDOMEN: soft, nontender with normoactive bowel sounds  EXTREMITIES: no edema  SKIN: no rash    LABS: 10-30  CAPILLARY BLOOD GLUCOSE 118 105 79 102     (15.13)  8.4   (8.4)  9.78  )-----------( 177      ( 30 Oct 2021 03:57 )             26.5   (145)  149<H>  |  116<H>  |  8   ----------------------------<  110<H>  3.8   |  22  |  0.57    Ca    8.5      30 Oct 2021 03:57  Phos  3.6     10-30  Mg     1.9     10-30    10-29 @ 07:01  -  10-30 @ 07:00  IN: 2760.3 mL / OUT: 1973 mL / NET: 787.3 mL      Bacteriology:  10/28 CSF NGTD  10/28 Blood NG1d x2     CSF studies:  10-28  L   *** [id number] *** %N91 %L4     EEG:  Neuroimaging:  Other imaging:    MEDICATIONS: 10-30    ·	enoxaparin Injectable 40 SubCutaneous every 24 hours  ·	levETIRAcetam  IVPB 1000 IV Intermittent every 12 hours  ·	niMODipine 60 milliGRAM(s) Oral every 4 hours  ·	bisacodyl Suppository 10 Rectal daily  ·	pantoprazole  Injectable 40 IV Push every 12 hours  ·	polyethylene glycol 3350 17 Oral daily  ·	senna 2 Oral at bedtime  ·	insulin lispro (ADMELOG) corrective regimen sliding scale  SubCutaneous every 6 hours  ·	sodium chloride 2 Oral every 8 hours  ·	acetaminophen    Suspension .. 650 Oral every 6 hours PRN  ·	fentaNYL    Injectable 25 IV Push every 2 hours PRN  ·	hydrALAZINE Injectable 10 IV Push every 2 hours PRN    IV FLUIDS: 3%@50  DRIPS:  ·	propofol Infusion 10 MICROgram(s)/kG/Min (5.1 mL/Hr) IV Continuous <Continuous> - 50   DIET: NPO - TF stopped at 4 am (was at goal)  Lines: R TLC IJ [place]  Drains:    ·	EVD@5cm H20, ICPs 10-18  ·	HMV - 10   ·	ALEXIS - 30  Wounds:    CODE STATUS:  Full Code                       GOALS OF CARE:  aggressive                      DISPOSITION:  ICU

## 2021-10-30 NOTE — PROGRESS NOTE ADULT - ASSESSMENT
46y/Female with:  L MCA ruptured aneurysm, subarachnoid hemorrhage, s/p DHC (10/26/2021, Dr. D'Amico @ [place]), brain compression, cerebral edema  anemia   recent spinal surgery  UGIB    PLAN: Day 1 = 10-26 ; Day 4 = 10/29; Day 21 = 11/15  NEURO: neurochecks q1h, sedation with propofol@50; PRN fentanyl pushes  SAH:  cont nimodipine 60 mg po q4h to be given for 21 days (last day 11/15)   Hydrocephalus:  EVD keep open to drain monitor ICPs  ICP crisis: keep head position neutral, sedation with propofol, osmotherapy, s/p DHC; repeat POSm  d/c HMV if ok with NS  Seizure prophylaxis:  levetiracetam 1000mg IV BID   REHAB:  physical therapy evaluation and management    EARLY MOB:  HOB elevated    PULM:  full vent support for now  CARDIO:  SBP goal 100-180mm Hg,   ENDO:  Blood sugar goals 140-180 mg/dL, insulin sliding scale; f/u A1C, TSH  GI:  PPI BID, f/u FOBT,   DIET: TF restart   RENAL: maintain euvolemia, Na goal 145-150; recheck BMP this afternoon start salt tabs 3G q6h; 3%@30  HEM/ONC: no coagulopathy (INR= 1.03), no ASA / Plavix use;  anemia  check FOBT  VTE Prophylaxis: SCDs, SQH q8h, f/u baseline LE Doppler for DVT suspected on admission (transfer, found down)  ID: afebrile, leukocytosis improved  Social: long discussion with son and daughter yesterday - updated    CORE MEASURES  1.  Hunt and Griffin Score = 5  2.  VTE prophylaxis:  [ ] administered within 24 hours of admission OR [X] reason not done: fresh bleed, unsecured aneurysm.  3.  Dysphagia screening:   [X] performed before any oral meds / liquids / food  4.  Nimodipine treatment:  [X] administered within 24 hours of admission OR [ ] reason not done:    ATTENDING ATTESTATION:  I was physically present for the key portions of the evaluation and management (E/M) service provided.  I agree with the above history, physical and plan, which I have reviewed and edited where appropriate.    Patient at high risk for neurological deterioration or death due to:  ICU delirium, aspiration PNA, DVT / PE.  Critical care time:  I have personally provided 45 minutes of critical care time, excluding time spent on separate procedures.      Plan discussed with RN, house staff.    Additional critical care time:  I have personally provided 30 minutes of critical care time, excluding time spent on separate procedures.    Total critical care time: 75 minutes   46y/Female with:  L MCA ruptured aneurysm, subarachnoid hemorrhage, s/p DHC (10/26/2021, Dr. D'Amico @ [place]), brain compression, cerebral edema  anemia   recent spinal surgery  UGIB    PLAN: Day 1 = 10-26 ; Day 4 = 10/29; Day 21 = 11/15  NEURO: neurochecks q1h, sedation with propofol@50; PRN fentanyl pushes  SAH:  cont nimodipine 60 mg po q4h to be given for 21 days (last day 11/15)   Hydrocephalus:  EVD keep open to drain monitor ICPs at 5cm H20   ICP crisis: keep head position neutral, sedation with propofol, osmotherapy, s/p DHC; repeat POSm  d/c HMV if ok with NS  Seizure prophylaxis:  levetiracetam 1000mg IV BID   REHAB:  physical therapy evaluation and management    EARLY MOB:  HOB elevated    PULM:  full vent support for now  CARDIO:  SBP goal 100-180mm Hg,   ENDO:  Blood sugar goals 140-180 mg/dL, insulin sliding scale  GI:  PPI BID, f/u FOBT,   DIET: TF restart; AXR,   RENAL: maintain euvolemia, Na goal 145-150; recheck BMP this afternoon cont salt tabs 2g q8h, 3%@30  HEM/ONC: no coagulopathy (INR= 1.03), no ASA / Plavix use;  anemia  check FOBT  VTE Prophylaxis: SCDs, SQH q8h, f/u baseline LE Doppler for DVT suspected on admission (transfer, found down)  ID: afebrile, leukocytosis improved  Social: will update son and daughter    CORE MEASURES  1.  Hunt and Griffin Score = 5  2.  VTE prophylaxis:  [ ] administered within 24 hours of admission OR [X] reason not done: fresh bleed, unsecured aneurysm.  3.  Dysphagia screening:   [X] performed before any oral meds / liquids / food  4.  Nimodipine treatment:  [X] administered within 24 hours of admission OR [ ] reason not done:    ATTENDING ATTESTATION:  I was physically present for the key portions of the evaluation and management (E/M) service provided.  I agree with the above history, physical and plan, which I have reviewed and edited where appropriate.    Patient at high risk for neurological deterioration or death due to:  ICU delirium, aspiration PNA, DVT / PE.  Critical care time:  I have personally provided 45 minutes of critical care time, excluding time spent on separate procedures.      Plan discussed with RN, house staff.    Additional critical care time:  I have personally provided 30 minutes of critical care time, excluding time spent on separate procedures.    Total critical care time: 75 minutes   46y/Female with:  L MCA ruptured aneurysm, subarachnoid hemorrhage, s/p DHC (10/26/2021, Dr. D'Amico @ [place]), brain compression, cerebral edema  anemia   recent spinal surgery  UGIB    PLAN: Day 1 = 10-26 ; Day 4 = 10/29; Day 21 = 11/15  NEURO: neurochecks q1h, sedation with propofol@50; PRN fentanyl pushes  SAH:  cont nimodipine 60 mg po q4h to be given for 21 days (last day 11/15)   Hydrocephalus:  EVD keep open to drain monitor ICPs at 5cm H20   ICP crisis: keep head position neutral, sedation with propofol, osmotherapy, s/p DHC; repeat POSm  d/c HMV if ok with NS  Seizure prophylaxis:  levetiracetam 1000mg IV BID   REHAB:  physical therapy evaluation and management    EARLY MOB:  HOB elevated    PULM:  full vent support for now  CARDIO:  SBP goal 100-180mm Hg,   ENDO:  Blood sugar goals 140-180 mg/dL, insulin sliding scale  GI:  PPI BID, f/u FOBT,   DIET: TF restart; AXR,   RENAL: maintain euvolemia, Na goal 145-150; recheck BMP this afternoon cont salt tabs 2g q8h, 3%@30  HEM/ONC: no coagulopathy (INR= 1.03), no ASA / Plavix use;  anemia  check FOBT  VTE Prophylaxis: SCDs, SQH q8h, f/u baseline LE Doppler for DVT suspected on admission (transfer, found down)  ID: afebrile, leukocytosis improved  Social: will update son and daughter    CORE MEASURES  1.  Hunt and Griffin Score = 5  2.  VTE prophylaxis:  [ ] administered within 24 hours of admission OR [X] reason not done: fresh bleed, unsecured aneurysm.  3.  Dysphagia screening:   [X] performed before any oral meds / liquids / food  4.  Nimodipine treatment:  [X] administered within 24 hours of admission OR [ ] reason not done:    ATTENDING ATTESTATION:  I was physically present for the key portions of the evaluation and management (E/M) service provided.  I agree with the above history, physical and plan, which I have reviewed and edited where appropriate.    Patient at high risk for neurological deterioration or death due to:  ICU delirium, aspiration PNA, DVT / PE.  Critical care time:  I have personally provided 45 minutes of critical care time, excluding time spent on separate procedures.      Plan discussed with RN, house staff.

## 2021-10-30 NOTE — PROGRESS NOTE ADULT - SUBJECTIVE AND OBJECTIVE BOX
HPI:  45 y/o female with PMH HTN, Multiple sclerosis, recent spinal surgery 10/8 ([facility]) presented to [place] ED BIBEMS after being found unconscious at home, unknown period of time. 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 HH5, MF1. Patient was intubated at [place] ED, found to have blown L pupil, and sent straight to the OR for left hemicraniotomy. A-line placed intraop. Hemodynamically unstable upon arrival to [facility], bradycardic and hypertensive s/p Mannitol, Clevidipine, and Furosemide. Central line placed in NSICU. Currently sedated on Propofol, pending repeat CTH. History per patient's son, patient had recent spine surgery and was found on outpatient imaging last week to have L M1 segment aneurysm (unruptured), pt asymptomatic at this time. Per son patient taking percocet PO at home. Ureña catheter, ET tube, and arterial line placed at [facility].     NIHSS on arrival: 32   Bess Griffin 5, Mod Busby 4  Bleed Day 1 = 10/26 (26 Oct 2021 13:03)    OVERNIGHT EVENTS: JOAQUÍN    Hospital Course:   10/26: admitted to [facility] from [facility], POD#0 s/p L hemicraniectomy for decompression and evacuation of SDH. Transferred to [facility] noted to have tense flap, received mannitol, keppra, decadron. Was hypertensive to 200s and preston to 40s, recevied 85g mannitol and bullet 23.4%. CTH on arrival demonstrated increased size in vents and new IVH. EVD placed, open at 15, central line placed. Transfused 2 u PRBC. POD0 of coiling of left MCA bifurcation aneurysm,   10/27: CTH demonstrated EVD in correct position, EVD lowered to 5, remains intubated on proprofol, pending repeat CTH in AM. Started on 3% @ 30/hr. 2LNS given for euvolemia, Mannitol given for uptrending ICPs. Bullet given 8:30 am. 3% increased to 50/hr. 1 L bolus. New EVD catheter placed using exisiting sreekanth hole. 1 u PRBC.  10/28: HH5, MF4, BD3; POD2 angio coil/embo of L MCA aneurysm. JOAQUÍN overnight, neuro stable. EVD@5cmH20 ICPs < 20 overnight, OP WNL. S/p 1 unit PRBC yesterday with appropriate response. Given 42g mannitol in AM for ICP 22 persistently, with improvement, then given 23% in PM for elevated ICP. febrile, pancultured. Standing tylenol and cooling blanket.   10/29: BD4, POD3 angio coil/embo. JOAQUÍN o/n, neuro stable. EVD@5, ICPs <20 o/n.   10/30: BD5, POD4 angio coil/embo. JOAQUÍN o/n neuro stable. EVD@5. 3% @50cc/hr.     Vital Signs Last 24 Hrs  T(C): 37.6 (30 Oct 2021 00:52), Max: 37.8 (29 Oct 2021 12:00)  T(F): 99.7 (30 Oct 2021 00:52), Max: 100 (29 Oct 2021 12:00)  HR: 64 (30 Oct 2021 01:24) (58 - 113)  BP: 161/90 (30 Oct 2021 01:00) (139/72 - 183/74)  BP(mean): 120 (30 Oct 2021 01:00) (98 - 129)  RR: 16 (30 Oct 2021 01:24) (14 - 28)  SpO2: 100% (30 Oct 2021 01:24) (100% - 100%)    I&O's Summary    28 Oct 2021 07:  -  29 Oct 2021 07:00  --------------------------------------------------------  IN: 3023 mL / OUT: 2869 mL / NET: 154 mL    29 Oct 2021 07:01  -  30 Oct 2021 02:15  --------------------------------------------------------  IN: 2167.8 mL / OUT: 1492 mL / NET: 675.8 mL        PHYSICAL EXAM:  GEN: NAD, sedated on propofol  NEURO: not alert, does not FC, does not OE, face symmetric. +cough/gag/corneals. pupils 2mm sluggish b/l. b/l UE trace w/d vs extensor posture, b/l LEs extensor posture  CV: RRR +S1/S2  PULM: CTAB  GI: Abd soft, NT/ND  EXT: ext warm, dry, nontender  WOUND: flap full/tense    TUBES/LINES:  [] Ureña  [] Lumbar Drain  [] Wound Drains  [] Others      DIET:  [] NPO  [] Mechanical  [x] Tube feeds    LABS:                        8.4    15.13 )-----------( 179      ( 29 Oct 2021 05:42 )             26.6     10-    145  |  113<H>  |  7   ----------------------------<  109<H>  3.9   |  23  |  0.60    Ca    8.5      29 Oct 2021 19:46  Phos  2.3     10-  Mg     2.1     10-        Urinalysis Basic - ( 28 Oct 2021 19:05 )    Color: Yellow / Appearance: Clear / S.020 / pH: x  Gluc: x / Ketone: NEGATIVE  / Bili: Negative / Urobili: 0.2 E.U./dL   Blood: x / Protein: NEGATIVE mg/dL / Nitrite: NEGATIVE   Leuk Esterase: NEGATIVE / RBC: x / WBC x   Sq Epi: x / Non Sq Epi: x / Bacteria: x      CARDIAC MARKERS ( 29 Oct 2021 05:42 )  x     / 0.01 ng/mL / 93 U/L / x     / x          CAPILLARY BLOOD GLUCOSE      POCT Blood Glucose.: 105 mg/dL (29 Oct 2021 22:10)  POCT Blood Glucose.: 79 mg/dL (29 Oct 2021 17:15)  POCT Blood Glucose.: 102 mg/dL (29 Oct 2021 11:21)  POCT Blood Glucose.: 102 mg/dL (29 Oct 2021 06:33)      Drug Levels: [] N/A    CSF Analysis: [] N/A      Allergies    Allergy Status Unknown    Intolerances      MEDICATIONS:  Antibiotics:    Neuro:  acetaminophen    Suspension .. 650 milliGRAM(s) Oral every 6 hours PRN  fentaNYL    Injectable 25 MICROGram(s) IV Push every 2 hours PRN  levETIRAcetam  IVPB 1000 milliGRAM(s) IV Intermittent every 12 hours  propofol Infusion 10 MICROgram(s)/kG/Min IV Continuous <Continuous>    Anticoagulation:  enoxaparin Injectable 40 milliGRAM(s) SubCutaneous every 24 hours    OTHER:  bisacodyl Suppository 10 milliGRAM(s) Rectal daily  chlorhexidine 0.12% Liquid 15 milliLiter(s) Oral Mucosa every 12 hours  chlorhexidine 2% Cloths 1 Application(s) Topical <User Schedule>  hydrALAZINE Injectable 10 milliGRAM(s) IV Push every 2 hours PRN  insulin lispro (ADMELOG) corrective regimen sliding scale   SubCutaneous every 6 hours  niMODipine 60 milliGRAM(s) Oral every 4 hours  pantoprazole  Injectable 40 milliGRAM(s) IV Push every 12 hours  polyethylene glycol 3350 17 Gram(s) Oral daily  senna 2 Tablet(s) Oral at bedtime    IVF:  sodium chloride 2 Gram(s) Oral every 8 hours  sodium chloride 3%. 500 milliLiter(s) IV Continuous <Continuous>    CULTURES:  Culture Results:   No growth to date (10-28 @ 19:23)  Culture Results:   No growth at 1 day. (10-28 @ 18:34)    RADIOLOGY & ADDITIONAL TESTS:      ASSESSMENT:  45 y/o female with PMHx of HTN and MS, hx of spinal surgery at [facility] 10/8/21 presented to [place] ED [facility] after being found unconscious at home, unknown period of time. 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 HH5, MF4; BD #1 = 10/26. Patient was intubated at [place] ED and sent straight to the OR for left hemicraniotomy. A-line placed intraop. Hemodynamically unstable upon arrival to [facility], bradycardic and hypertensive s/p Mannitol and Furosemide. Central line placed in NSICU. Currently sedated on Propofol. Now s/p EVD placement, and coil embolization of left MCA bifurcation aneurysm 10/26/2021.     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, left    Intracranial hemorrhage, spontaneous subarachnoid, due to cerebral aneurysm, acute    Angiogram, cerebral, with coil embolization, in non-operating room setting    Personal history of spine surgery    [email or web address]        PLAN:  NEURO:  - neuro checks/vital signs q1hr  - HOB > 30 (for elevated ICPs)   - Keppra 1g IV BID   - Nimodipine 60q4   - propofol for sedation   - s/p mannitol 85g at [facility], s/p 23.4% on admission; 42 g mannitol and 23.4% 10/28  - s/p coil embolization of L MCA aneurysm 10/26  - EVD open at 5cmH2O, monitor ICP/output    CARDIO:  - -180  - echo +mild pulm HTN  - sinus arrthymia on cardiac monitor, f/u with EKG    PULM:  - intubated on full vent support  - aspiration precautions    GI:  - TF via OGT  - bowel regimen   - PPI BID for ?coffee ground emesis noted by RN    RENAL:  - 3%@50cc/hr  - euvolemia goal   - Na 145-150   - q6hr BMP and serum osmo    HEME:  - SCDs, SQL  - s/p 2u PRBC, s/p 1u PRBC 10/27  - monitor H+H  - pend FOB     ID:  - low grade fever, no abx at this time   - leukocytosis and procal, trend   - Tylenol PRN for fever    ENDO:  - pend A1c  - ISS     GOC: full code  Level of care: ICU status   Dispo: pend CTH and further OR planning     Case discussed with Dr. Duncan, Dr. D'Amico & Dr. Vyas        Assessment:  Present when checked    []  GCS  E   V  M     Heart Failure: []Acute, [] acute on chronic , []chronic  Heart Failure:  [] Diastolic (HFpEF), [] Systolic (HFrEF), []Combined (HFpEF and HFrEF), [] RHF, [] Pulm HTN, [] Other    [] CHETAN, [] ATN, [] AIN, [] other  [] CKD1, [] CKD2, [] CKD 3, [] CKD 4, [] CKD 5, []ESRD    Encephalopathy: [] Metabolic, [] Hepatic, [] toxic, [] Neurological, [] Other    Abnormal Nurtitional Status: [] malnurtition (see nutrition note), [ ]underweight: BMI < 19, [] morbid obesity: BMI >40, [] Cachexia    [] Sepsis  [] hypovolemic shock,[] cardiogenic shock, [] hemorrhagic shock, [] neuogenic shock  [] Acute Respiratory Failure  []Cerebral edema, [] Brain compression/ herniation,   [] Functional quadriplegia  [] Acute blood loss anemia

## 2021-10-31 LAB
AMYLASE P1 CFR SERPL: 54 U/L — SIGNIFICANT CHANGE UP (ref 25–125)
ANION GAP SERPL CALC-SCNC: 10 MMOL/L — SIGNIFICANT CHANGE UP (ref 5–17)
ANION GAP SERPL CALC-SCNC: 10 MMOL/L — SIGNIFICANT CHANGE UP (ref 5–17)
BUN SERPL-MCNC: 8 MG/DL — SIGNIFICANT CHANGE UP (ref 7–23)
BUN SERPL-MCNC: 9 MG/DL — SIGNIFICANT CHANGE UP (ref 7–23)
CALCIUM SERPL-MCNC: 8.5 MG/DL — SIGNIFICANT CHANGE UP (ref 8.4–10.5)
CALCIUM SERPL-MCNC: 8.9 MG/DL — SIGNIFICANT CHANGE UP (ref 8.4–10.5)
CHLORIDE SERPL-SCNC: 115 MMOL/L — HIGH (ref 96–108)
CHLORIDE SERPL-SCNC: 116 MMOL/L — HIGH (ref 96–108)
CK SERPL-CCNC: 48 U/L — SIGNIFICANT CHANGE UP (ref 25–170)
CO2 SERPL-SCNC: 22 MMOL/L — SIGNIFICANT CHANGE UP (ref 22–31)
CO2 SERPL-SCNC: 23 MMOL/L — SIGNIFICANT CHANGE UP (ref 22–31)
CREAT SERPL-MCNC: 0.57 MG/DL — SIGNIFICANT CHANGE UP (ref 0.5–1.3)
CREAT SERPL-MCNC: 0.6 MG/DL — SIGNIFICANT CHANGE UP (ref 0.5–1.3)
GLUCOSE SERPL-MCNC: 125 MG/DL — HIGH (ref 70–99)
GLUCOSE SERPL-MCNC: 131 MG/DL — HIGH (ref 70–99)
HCT VFR BLD CALC: 27 % — LOW (ref 34.5–45)
HGB BLD-MCNC: 8.6 G/DL — LOW (ref 11.5–15.5)
LACTATE SERPL-SCNC: 1 MMOL/L — SIGNIFICANT CHANGE UP (ref 0.5–2)
LIDOCAIN IGE QN: 87 U/L — HIGH (ref 7–60)
MAGNESIUM SERPL-MCNC: 2.2 MG/DL — SIGNIFICANT CHANGE UP (ref 1.6–2.6)
MCHC RBC-ENTMCNC: 26.3 PG — LOW (ref 27–34)
MCHC RBC-ENTMCNC: 31.9 GM/DL — LOW (ref 32–36)
MCV RBC AUTO: 82.6 FL — SIGNIFICANT CHANGE UP (ref 80–100)
NRBC # BLD: 0 /100 WBCS — SIGNIFICANT CHANGE UP (ref 0–0)
PHOSPHATE SERPL-MCNC: 3.5 MG/DL — SIGNIFICANT CHANGE UP (ref 2.5–4.5)
PLATELET # BLD AUTO: 201 K/UL — SIGNIFICANT CHANGE UP (ref 150–400)
POTASSIUM SERPL-MCNC: 3.8 MMOL/L — SIGNIFICANT CHANGE UP (ref 3.5–5.3)
POTASSIUM SERPL-MCNC: 4.1 MMOL/L — SIGNIFICANT CHANGE UP (ref 3.5–5.3)
POTASSIUM SERPL-SCNC: 3.8 MMOL/L — SIGNIFICANT CHANGE UP (ref 3.5–5.3)
POTASSIUM SERPL-SCNC: 4.1 MMOL/L — SIGNIFICANT CHANGE UP (ref 3.5–5.3)
RBC # BLD: 3.27 M/UL — LOW (ref 3.8–5.2)
RBC # FLD: 21.5 % — HIGH (ref 10.3–14.5)
SODIUM SERPL-SCNC: 147 MMOL/L — HIGH (ref 135–145)
SODIUM SERPL-SCNC: 149 MMOL/L — HIGH (ref 135–145)
TRIGL SERPL-MCNC: 162 MG/DL — HIGH
WBC # BLD: 10.17 K/UL — SIGNIFICANT CHANGE UP (ref 3.8–10.5)
WBC # FLD AUTO: 10.17 K/UL — SIGNIFICANT CHANGE UP (ref 3.8–10.5)

## 2021-10-31 PROCEDURE — 71045 X-RAY EXAM CHEST 1 VIEW: CPT | Mod: 26

## 2021-10-31 PROCEDURE — 99232 SBSQ HOSP IP/OBS MODERATE 35: CPT

## 2021-10-31 PROCEDURE — 99291 CRITICAL CARE FIRST HOUR: CPT

## 2021-10-31 RX ORDER — POTASSIUM CHLORIDE 20 MEQ
20 PACKET (EA) ORAL ONCE
Refills: 0 | Status: COMPLETED | OUTPATIENT
Start: 2021-10-31 | End: 2021-10-31

## 2021-10-31 RX ADMIN — FENTANYL CITRATE 25 MICROGRAM(S): 50 INJECTION INTRAVENOUS at 17:09

## 2021-10-31 RX ADMIN — PANTOPRAZOLE SODIUM 40 MILLIGRAM(S): 20 TABLET, DELAYED RELEASE ORAL at 17:09

## 2021-10-31 RX ADMIN — CHLORHEXIDINE GLUCONATE 15 MILLILITER(S): 213 SOLUTION TOPICAL at 17:08

## 2021-10-31 RX ADMIN — CHLORHEXIDINE GLUCONATE 15 MILLILITER(S): 213 SOLUTION TOPICAL at 05:14

## 2021-10-31 RX ADMIN — PROPOFOL 5.1 MICROGRAM(S)/KG/MIN: 10 INJECTION, EMULSION INTRAVENOUS at 18:00

## 2021-10-31 RX ADMIN — SODIUM CHLORIDE 2 GRAM(S): 9 INJECTION INTRAMUSCULAR; INTRAVENOUS; SUBCUTANEOUS at 22:07

## 2021-10-31 RX ADMIN — CHLORHEXIDINE GLUCONATE 1 APPLICATION(S): 213 SOLUTION TOPICAL at 05:13

## 2021-10-31 RX ADMIN — SODIUM CHLORIDE 2 GRAM(S): 9 INJECTION INTRAMUSCULAR; INTRAVENOUS; SUBCUTANEOUS at 05:14

## 2021-10-31 RX ADMIN — FENTANYL CITRATE 25 MICROGRAM(S): 50 INJECTION INTRAVENOUS at 12:01

## 2021-10-31 RX ADMIN — PROPOFOL 5.1 MICROGRAM(S)/KG/MIN: 10 INJECTION, EMULSION INTRAVENOUS at 08:46

## 2021-10-31 RX ADMIN — NIMODIPINE 60 MILLIGRAM(S): 60 SOLUTION ORAL at 10:08

## 2021-10-31 RX ADMIN — Medication 650 MILLIGRAM(S): at 17:20

## 2021-10-31 RX ADMIN — Medication 650 MILLIGRAM(S): at 17:10

## 2021-10-31 RX ADMIN — Medication 10 MILLIGRAM(S): at 17:09

## 2021-10-31 RX ADMIN — NIMODIPINE 60 MILLIGRAM(S): 60 SOLUTION ORAL at 05:14

## 2021-10-31 RX ADMIN — NIMODIPINE 60 MILLIGRAM(S): 60 SOLUTION ORAL at 14:30

## 2021-10-31 RX ADMIN — LEVETIRACETAM 400 MILLIGRAM(S): 250 TABLET, FILM COATED ORAL at 05:13

## 2021-10-31 RX ADMIN — SODIUM CHLORIDE 30 MILLILITER(S): 5 INJECTION, SOLUTION INTRAVENOUS at 10:06

## 2021-10-31 RX ADMIN — FENTANYL CITRATE 25 MICROGRAM(S): 50 INJECTION INTRAVENOUS at 17:25

## 2021-10-31 RX ADMIN — LEVETIRACETAM 400 MILLIGRAM(S): 250 TABLET, FILM COATED ORAL at 17:08

## 2021-10-31 RX ADMIN — FENTANYL CITRATE 25 MICROGRAM(S): 50 INJECTION INTRAVENOUS at 23:32

## 2021-10-31 RX ADMIN — NIMODIPINE 60 MILLIGRAM(S): 60 SOLUTION ORAL at 01:17

## 2021-10-31 RX ADMIN — Medication 20 MILLIEQUIVALENT(S): at 10:06

## 2021-10-31 RX ADMIN — NIMODIPINE 60 MILLIGRAM(S): 60 SOLUTION ORAL at 18:00

## 2021-10-31 RX ADMIN — Medication 650 MILLIGRAM(S): at 00:00

## 2021-10-31 RX ADMIN — Medication 650 MILLIGRAM(S): at 23:16

## 2021-10-31 RX ADMIN — ENOXAPARIN SODIUM 40 MILLIGRAM(S): 100 INJECTION SUBCUTANEOUS at 22:08

## 2021-10-31 RX ADMIN — NIMODIPINE 60 MILLIGRAM(S): 60 SOLUTION ORAL at 22:08

## 2021-10-31 RX ADMIN — PANTOPRAZOLE SODIUM 40 MILLIGRAM(S): 20 TABLET, DELAYED RELEASE ORAL at 05:17

## 2021-10-31 RX ADMIN — SODIUM CHLORIDE 2 GRAM(S): 9 INJECTION INTRAMUSCULAR; INTRAVENOUS; SUBCUTANEOUS at 16:09

## 2021-10-31 RX ADMIN — FENTANYL CITRATE 25 MICROGRAM(S): 50 INJECTION INTRAVENOUS at 12:16

## 2021-10-31 RX ADMIN — PROPOFOL 5.1 MICROGRAM(S)/KG/MIN: 10 INJECTION, EMULSION INTRAVENOUS at 04:20

## 2021-10-31 NOTE — PROGRESS NOTE ADULT - ATTENDING COMMENTS
Patient seen and examined by me 10/31/2021. ICPs under control, exam stable. Likely trach/PEG candidate. CTA head tomorrow to watch for spasm.    Terence Duncan M.D.

## 2021-10-31 NOTE — PROGRESS NOTE ADULT - SUBJECTIVE AND OBJECTIVE BOX
=================================  NEUROCRITICAL CARE ATTENDING NOTE  =================================    AILYN DÍAZ   MRN-[id number]  Summary:  46y/F with recent spinal surgery, found down and brought to ED.  In ED, witnessed shaking, ?seizures, intubated.  Imaging showed SAH.  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     COURSE IN THE HOSPITAL:  10/26 admitted to [facility], [place] - mannitol, 23.4%, repeat CT HCP - EVD R frontal inserted, s/p angio coil embo, 2 units pRBC  10/27 remained intubated overnight, given mannitol overnight; EVD reinserted, 1 unit pRBC  10/28 Tmax 38.2, ICPs to low 20s in AM, mannitol 0.5/Kg x1, 23.4% x1 in PM  10/29 Tmax 38.  remained intubated  10/30 TF stopped for vomiting/aspiration event  10/31 Tmax 38.2 No significant events overnight., remained intubated     Past Medical History: No pertinent past medical history  Allergies:  Allergy Status Unknown  Home meds:     PHYSICAL EXAMINATION  T(C): 37.2 (10-31 @ 05:50), Max: 38.2 (10-30 @ 17:00) HR: 81 (10-31 @ 06:00) (71 - 118) BP: 148/78 (10-31 @ 06:00) (139/69 - 189/91) RR: 16 (10-31 @ 06:00) (14 - 25) SpO2: 100% (10-31 @ 06:00) (96% - 100%)   NEUROLOGIC EXAMINATION:  Patient does not open eyes, does not follow commands, pupils 3mm equal and brisk (-) Doll's, (+) cough and gag, B LE TF, R UE 0/5; flap full but soft; extensor posturing B UE R>L, B LE TF  GENERAL: intubated 450 12 +5 40%  EENT:  anicteric  CARDIOVASCULAR: (+) S1 S2, normal rate and regular rhythm  PULMONARY: clear to auscultation bilaterally  ABDOMEN: soft, nontender with normoactive bowel sounds  EXTREMITIES: no edema  SKIN: no rash    LABS: 10-31  CAPILLARY BLOOD GLUCOSE 142 114 87 91                         8.6    10.17 )-----------( 201      ( 31 Oct 2021 06:12 )             27.0     148<H>  |  116<H>  |  8   ----------------------------<  127<H>  3.8   |  22  |  0.60    Ca    8.7      30 Oct 2021 21:50  Phos  3.6     10-  Mg     1.9     10-30    10-29 @ 07:01  -  10-30 @ 07:00  IN: 2835.8 mL / OUT: 1978 mL / NET: 857.8 mL      Bacteriology:  10/28 CSF NGTD  10/28 Blood NG1d x2     CSF studies:  10-28  L   *** [id number] *** %N91 %L4     EEG:  Neuroimaging:  Other imaging:    MEDICATIONS: 10-31    ·	enoxaparin Injectable 40 SubCutaneous every 24 hours  ·	levETIRAcetam  IVPB 1000 IV Intermittent every 12 hours  ·	propofol Infusion 10 IV Continuous <Continuous>  ·	niMODipine 60 milliGRAM(s) Oral every 4 hours  ·	bisacodyl Suppository 10 Rectal daily  ·	pantoprazole  Injectable 40 IV Push every 12 hours  ·	polyethylene glycol 3350 17 Oral daily  ·	senna 2 Oral at bedtime  ·	insulin lispro (ADMELOG) corrective regimen sliding scale  SubCutaneous every 6 hours  ·	chlorhexidine 0.12% Liquid 15 Oral Mucosa every 12 hours  ·	chlorhexidine 2% Cloths 1 Topical <User Schedule>  ·	sodium chloride 2 Oral every 8 hours  ·	sodium chloride 3%. 500 IV Continuous <Continuous>  ·	acetaminophen    Suspension .. 650 Oral every 6 hours PRN  ·	fentaNYL    Injectable 25 IV Push every 2 hours PRN  ·	hydrALAZINE Injectable 10 IV Push every 2 hours PRN  ·	  IV FLUIDS: 3%@50  DRIPS:  ·	propofol Infusion 10 MICROgram(s)/kG/Min (5.1 mL/Hr) IV Continuous <Continuous> - 50   DIET: NPO - TF stopped at 4 am (was at goal)  Lines: R TLC IJ Cisco  Drains:    ·	EVD@5cm H20, ICPs 10-18  ·	HMV - 10   ·	ALEXIS - 30  Wounds:    CODE STATUS:  Full Code                       GOALS OF CARE:  aggressive                      DISPOSITION:  ICU =================================  NEUROCRITICAL CARE ATTENDING NOTE  =================================    AILYN DÍAZ   MRN-[id number]  Summary:  46y/F with recent spinal surgery, found down and brought to ED.  In ED, witnessed shaking, ?seizures, intubated.  Imaging showed SAH.  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     COURSE IN THE HOSPITAL:  10/26 admitted to [facility], [place] - mannitol, 23.4%, repeat CT HCP - EVD R frontal inserted, s/p angio coil embo, 2 units pRBC  10/27 remained intubated overnight, given mannitol overnight; EVD reinserted, 1 unit pRBC  10/28 Tmax 38.2, ICPs to low 20s in AM, mannitol 0.5/Kg x1, 23.4% x1 in PM  10/29 Tmax 38.  remained intubated  10/30 TF stopped for vomiting/aspiration event  10/31 Tmax 38.2 No significant events overnight., remained intubated     Past Medical History: No pertinent past medical history  Allergies:  Allergy Status Unknown  Home meds:     PHYSICAL EXAMINATION  T(C): 37.2 (10-31 @ 05:50), Max: 38.2 (10-30 @ 17:00) HR: 81 (10-31 @ 06:00) (71 - 118) BP: 148/78 (10-31 @ 06:00) (139/69 - 189/91) RR: 16 (10-31 @ 06:00) (14 - 25) SpO2: 100% (10-31 @ 06:00) (96% - 100%)   NEUROLOGIC EXAMINATION:  Patient does not open eyes, does not follow commands, pupils 3mm equal and brisk (-) Doll's, (+) cough and gag, B LE TF, R UE 0/5; flap full but soft; extensor posturing B UE R>L, B LE TF  GENERAL: intubated 450 12 +5 40%  EENT:  anicteric  CARDIOVASCULAR: (+) S1 S2, normal rate and regular rhythm  PULMONARY: clear to auscultation bilaterally  ABDOMEN: soft, nontender with normoactive bowel sounds  EXTREMITIES: no edema  SKIN: no rash    LABS: 10-31  CAPILLARY BLOOD GLUCOSE 142 114 87 91                         8.6  (8.4)  10.17 )-----------( 201      ( 31 Oct 2021 06:12 )             27.0     148<H>  |  116<H>  |  8   ----------------------------<  127<H>  3.8   |  22  |  0.60    Ca    8.7      30 Oct 2021 21:50  Phos  3.6     10-  Mg     1.9     10-30    10-29 @ 07:01  -  10-30 @ 07:00  IN: 2835.8 mL / OUT: 1978 mL / NET: 857.8 mL      Bacteriology:  10/28 CSF NGTD  10/28 Blood NG1d x2     CSF studies:  10-28  L   *** [id number] *** %N91 %L4     EEG:  Neuroimaging:  Other imaging:    MEDICATIONS: 10-31    ·	enoxaparin Injectable 40 SubCutaneous every 24 hours  ·	levETIRAcetam  IVPB 1000 IV Intermittent every 12 hours  ·	niMODipine 60 milliGRAM(s) Oral every 4 hours  ·	bisacodyl Suppository 10 Rectal daily  ·	pantoprazole  Injectable 40 IV Push every 12 hours  ·	polyethylene glycol 3350 17 Oral daily  ·	senna 2 Oral at bedtime  ·	insulin lispro (ADMELOG) corrective regimen sliding scale  SubCutaneous every 6 hours  ·	sodium chloride 2 Oral every 8 hours  ·	acetaminophen    Suspension .. 650 Oral every 6 hours PRN  ·	fentaNYL    Injectable 25 IV Push every 2 hours PRN  ·	hydrALAZINE Injectable 10 IV Push every 2 hours PRN    IV FLUIDS: 3%@30  DRIPS:  ·	propofol Infusion 10 MICROgram(s)/kG/Min (5.1 mL/Hr) IV Continuous <Continuous> - 50   DIET: NPO - TF stopped at 4 am (was at goal)  Lines: R TLC IJ [place]  Drains:    ·	EVD@5cm H20, ICPs 10-15  ·	HMV - 10   ·	ALEXIS - 10  Wounds:    CODE STATUS:  Full Code                       GOALS OF CARE:  aggressive                      DISPOSITION:  ICU

## 2021-10-31 NOTE — PROGRESS NOTE ADULT - SUBJECTIVE AND OBJECTIVE BOX
HPI:  47 y/o female with PMH HTN, Multiple sclerosis, recent spinal surgery 10/8 ([facility]) presented to [place] ED BIBEMS after being found unconscious at home, unknown period of time. 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 HH5, MF1. Patient was intubated at [place] ED, found to have blown L pupil, and sent straight to the OR for left hemicraniotomy. A-line placed intraop. Hemodynamically unstable upon arrival to [facility], bradycardic and hypertensive s/p Mannitol, Clevidipine, and Furosemide. Central line placed in NSICU. Currently sedated on Propofol, pending repeat CTH. History per patient's son, patient had recent spine surgery and was found on outpatient imaging last week to have L M1 segment aneurysm (unruptured), pt asymptomatic at this time. Per son patient taking percocet PO at home. Ureña catheter, ET tube, and arterial line placed at [facility].     NIHSS on arrival: 32   Bess Griffin 5, Mod Busby 4  Bleed Day 1 = 10/26 (26 Oct 2021 13:03)    OVERNIGHT EVENTS: 1 minute episode of upward gaze, resolved on its own. ICPs WNL    Hospital Course:   10/26: admitted to [facility] from [facility], POD#0 s/p L hemicraniectomy for decompression and evacuation of SDH. Transferred to [facility] noted to have tense flap, received mannitol, keppra, decadron. Was hypertensive to 200s and preston to 40s, recevied 85g mannitol and bullet 23.4%. CTH on arrival demonstrated increased size in vents and new IVH. EVD placed, open at 15, central line placed. Transfused 2 u PRBC. POD0 of coiling of left MCA bifurcation aneurysm,   10/27: CTH demonstrated EVD in correct position, EVD lowered to 5, remains intubated on proprofol, pending repeat CTH in AM. Started on 3% @ 30/hr. 2LNS given for euvolemia, Mannitol given for uptrending ICPs. Bullet given 8:30 am. 3% increased to 50/hr. 1 L bolus. New EVD catheter placed using exisiting sreekanth hole. 1 u PRBC.  10/28: HH5, MF4, BD3; POD2 angio coil/embo of L MCA aneurysm. JOAQUÍN overnight, neuro stable. EVD@5cmH20 ICPs < 20 overnight, OP WNL. S/p 1 unit PRBC yesterday with appropriate response. Given 42g mannitol in AM for ICP 22 persistently, with improvement, then given 23% in PM for elevated ICP. febrile, pancultured. Standing tylenol and cooling blanket.   10/29: BD4, POD3 angio coil/embo. JOAQUÍN o/n, neuro stable. EVD@5, ICPs <20 o/n.   10/30: BD5, POD4 angio coil/embo. JOAQUÍN o/n neuro stable. EVD@5. 3% @50cc/hr.  10/31: BD6, POD5 angio coil/embo. JOAQUÍN o/n, neuro stable. EVD@5cm h2o.      Vital Signs Last 24 Hrs  T(C): 37.4 (31 Oct 2021 00:00), Max: 38.2 (30 Oct 2021 17:00)  T(F): 99.4 (31 Oct 2021 00:00), Max: 100.8 (30 Oct 2021 17:00)  HR: 88 (31 Oct 2021 00:00) (64 - 118)  BP: 139/69 (31 Oct 2021 00:00) (139/69 - 189/91)  BP(mean): 97 (31 Oct 2021 00:00) (97 - 122)  RR: 16 (31 Oct 2021 00:00) (14 - 25)  SpO2: 100% (31 Oct 2021 00:00) (96% - 100%)    I&O's Summary    29 Oct 2021 07:01  -  30 Oct 2021 07:00  --------------------------------------------------------  IN: 2835.8 mL / OUT: 1978 mL / NET: 857.8 mL    30 Oct 2021 07:01  -  31 Oct 2021 00:51  --------------------------------------------------------  IN: 2354 mL / OUT: 1861 mL / NET: 493 mL        PHYSICAL EXAM:  GEN: NAD, sedated on propofol  NEURO: not alert, does not FC, does not OE, face symmetric. +cough/gag/corneals. pupils 2mm sluggish b/l. b/l UE trace w/d vs extensor posture, b/l LEs extensor posture  CV: RRR +S1/S2  PULM: CTAB  GI: Abd soft, NT/ND  EXT: ext warm, dry, nontender  WOUND: flap full/tense    TUBES/LINES:  [] Ureña  [] Lumbar Drain  [] Wound Drains  [] Others      DIET:  [] NPO  [] Mechanical  [x] Tube feeds    LABS:                        8.4    9.78  )-----------( 177      ( 30 Oct 2021 03:57 )             26.5     10-30    148<H>  |  116<H>  |  8   ----------------------------<  127<H>  3.8   |  22  |  0.60    Ca    8.7      30 Oct 2021 21:50  Phos  3.6     10-30  Mg     1.9     10-30          CARDIAC MARKERS ( 29 Oct 2021 05:42 )  x     / 0.01 ng/mL / 93 U/L / x     / x          CAPILLARY BLOOD GLUCOSE      POCT Blood Glucose.: 114 mg/dL (30 Oct 2021 22:43)  POCT Blood Glucose.: 87 mg/dL (30 Oct 2021 16:53)  POCT Blood Glucose.: 91 mg/dL (30 Oct 2021 11:45)  POCT Blood Glucose.: 118 mg/dL (30 Oct 2021 05:52)      Drug Levels: [] N/A    CSF Analysis: [] N/A      Allergies    Allergy Status Unknown    Intolerances      MEDICATIONS:  Antibiotics:    Neuro:  acetaminophen    Suspension .. 650 milliGRAM(s) Oral every 6 hours PRN  fentaNYL    Injectable 25 MICROGram(s) IV Push every 2 hours PRN  levETIRAcetam  IVPB 1000 milliGRAM(s) IV Intermittent every 12 hours  propofol Infusion 10 MICROgram(s)/kG/Min IV Continuous <Continuous>    Anticoagulation:  enoxaparin Injectable 40 milliGRAM(s) SubCutaneous every 24 hours    OTHER:  bisacodyl Suppository 10 milliGRAM(s) Rectal daily  chlorhexidine 0.12% Liquid 15 milliLiter(s) Oral Mucosa every 12 hours  chlorhexidine 2% Cloths 1 Application(s) Topical <User Schedule>  hydrALAZINE Injectable 10 milliGRAM(s) IV Push every 2 hours PRN  insulin lispro (ADMELOG) corrective regimen sliding scale   SubCutaneous every 6 hours  niMODipine 60 milliGRAM(s) Oral every 4 hours  pantoprazole  Injectable 40 milliGRAM(s) IV Push every 12 hours  polyethylene glycol 3350 17 Gram(s) Oral daily  senna 2 Tablet(s) Oral at bedtime    IVF:  sodium chloride 2 Gram(s) Oral every 8 hours  sodium chloride 3%. 500 milliLiter(s) IV Continuous <Continuous>    CULTURES:  Culture Results:   No growth to date (10-28 @ 19:23)  Culture Results:   No growth at 2 days. (10-28 @ 18:34)    RADIOLOGY & ADDITIONAL TESTS:      ASSESSMENT:  47 y/o female with PMHx of HTN and MS, hx of spinal surgery at [facility] 10/8/21 presented to [place] ED [facility] after being found unconscious at home, unknown period of time. 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 HH5, MF4; BD #1 = 10/26. Patient was intubated at [place] ED and sent straight to the OR for left hemicraniotomy. A-line placed intraop. Hemodynamically unstable upon arrival to [facility], bradycardic and hypertensive s/p Mannitol and Furosemide. Central line placed in NSICU. Currently sedated on Propofol. Now s/p EVD placement, and coil embolization of left MCA bifurcation aneurysm 10/26/2021.     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, left    Intracranial hemorrhage, spontaneous subarachnoid, due to cerebral aneurysm, acute    Angiogram, cerebral, with coil embolization, in non-operating room setting    Personal history of spine surgery    SysAdmin_VstLnk        PLAN:  NEURO:  - neuro checks/vital signs q1hr  - HOB > 30 (for elevated ICPs)   - Keppra 1g IV BID   - Nimodipine 60q4   - propofol for sedation   - s/p mannitol 85g at [facility], s/p 23.4% on admission; 42 g mannitol and 23.4% 10/28  - s/p coil embolization of L MCA aneurysm 10/26  - EVD open at 5cmH2O, monitor ICP/output    CARDIO:  - -180  - echo +mild pulm HTN  - sinus arrthymia on cardiac monitor, f/u with EKG    PULM:  - intubated on full vent support  - aspiration precautions    GI:  - TF via OGT  - bowel regimen   - PPI BID for ?coffee ground emesis noted by RN    RENAL:  - 3%@30cc/hr  - euvolemia goal   - Na 145-150   - q6hr BMP and serum osmo    HEME:  - SCDs, SQL  - s/p 2u PRBC, s/p 1u PRBC 10/27  - monitor H+H  - pend FOB     ID:  - low grade fever, no abx at this time   - leukocytosis and procal, trend   - Tylenol PRN for fever    ENDO:  - ISS     GOC: full code  Level of care: ICU status   Dispo: pend CTH and further OR planning     Case discussed with Dr. Duncan, Dr. D'Amico & Dr. Vyas        Assessment:  Present when checked    []  GCS  E   V  M     Heart Failure: []Acute, [] acute on chronic , []chronic  Heart Failure:  [] Diastolic (HFpEF), [] Systolic (HFrEF), []Combined (HFpEF and HFrEF), [] RHF, [] Pulm HTN, [] Other    [] CHETAN, [] ATN, [] AIN, [] other  [] CKD1, [] CKD2, [] CKD 3, [] CKD 4, [] CKD 5, []ESRD    Encephalopathy: [] Metabolic, [] Hepatic, [] toxic, [] Neurological, [] Other    Abnormal Nurtitional Status: [] malnurtition (see nutrition note), [ ]underweight: BMI < 19, [] morbid obesity: BMI >40, [] Cachexia    [] Sepsis  [] hypovolemic shock,[] cardiogenic shock, [] hemorrhagic shock, [] neuogenic shock  [] Acute Respiratory Failure  []Cerebral edema, [] Brain compression/ herniation,   [] Functional quadriplegia  [] Acute blood loss anemia

## 2021-10-31 NOTE — PROGRESS NOTE ADULT - ASSESSMENT
46y/Female with:  L MCA ruptured aneurysm, subarachnoid hemorrhage, s/p DHC (10/26/2021, Dr. D'Amico @ [place]), brain compression, cerebral edema  anemia   recent spinal surgery  UGIB    PLAN: Day 1 = 10-26 ; Day 4 = 10/29; Day 21 = 11/15  NEURO: neurochecks q1h, sedation with propofol@50; PRN fentanyl pushes  SAH:  cont nimodipine 60 mg po q4h to be given for 21 days (last day 11/15)   Hydrocephalus:  EVD keep open to drain monitor ICPs at 5cm H20   ICP crisis: keep head position neutral, sedation with propofol, osmotherapy, s/p DHC; repeat POSm  d/c HMV if ok with NS  Seizure prophylaxis:  levetiracetam 1000mg IV BID   REHAB:  physical therapy evaluation and management    EARLY MOB:  HOB elevated    PULM:  full vent support for now  CARDIO:  SBP goal 100-180mm Hg,   ENDO:  Blood sugar goals 140-180 mg/dL, insulin sliding scale  GI:  PPI BID, f/u FOBT,   DIET: TF restart; AXR,   RENAL: maintain euvolemia, Na goal 145-150; recheck BMP this afternoon cont salt tabs 2g q8h, 3%@30  HEM/ONC: no coagulopathy (INR= 1.03), no ASA / Plavix use;  anemia  check FOBT  VTE Prophylaxis: SCDs, SQH q8h, f/u baseline LE Doppler for DVT suspected on admission (transfer, found down)  ID: afebrile, leukocytosis improved  Social: will update son and daughter    CORE MEASURES  1.  Hunt and Griffin Score = 5  2.  VTE prophylaxis:  [ ] administered within 24 hours of admission OR [X] reason not done: fresh bleed, unsecured aneurysm.  3.  Dysphagia screening:   [X] performed before any oral meds / liquids / food  4.  Nimodipine treatment:  [X] administered within 24 hours of admission OR [ ] reason not done:    ATTENDING ATTESTATION:  I was physically present for the key portions of the evaluation and management (E/M) service provided.  I agree with the above history, physical and plan, which I have reviewed and edited where appropriate.    Patient at high risk for neurological deterioration or death due to:  ICU delirium, aspiration PNA, DVT / PE.  Critical care time:  I have personally provided 45 minutes of critical care time, excluding time spent on separate procedures.      Plan discussed with RN, house staff.     46y/Female with:  L MCA ruptured aneurysm, subarachnoid hemorrhage, s/p DHC (10/26/2021, Dr. D'Amico @ [place]), brain compression, cerebral edema  anemia   recent spinal surgery  UGIB    PLAN: Day 1 = 10-26 ; Day 4 = 10/29; Day 21 = 11/15  NEURO: neurochecks q1h, sedation with propofol@20; PRN fentanyl pushes  SAH:  cont nimodipine 60 mg po q4h to be given for 21 days (last day 11/15)   Hydrocephalus:  EVD keep open to drain monitor ICPs at 5cm H20   ICP crisis: keep head position neutral, sedation with propofol, osmotherapy, s/p DHC; repeat POSm  d/c HMV if ok with NS  Seizure prophylaxis:  levetiracetam 1000mg IV BID   REHAB:  physical therapy evaluation and management    EARLY MOB:  HOB elevated    PULM:  full vent support for now  CARDIO:  SBP goal 100-180mm Hg,   ENDO:  Blood sugar goals 140-180 mg/dL, insulin sliding scale  GI:  PPI BID, f/u FOBT   DIET: TF at goal  RENAL: maintain euvolemia, Na goal 145-150; recheck BMP this afternoon cont salt tabs 2g q8h, 3%@30; 23.4% if ICP elevated  HEM/ONC: no coagulopathy (INR= 1.03), no ASA / Plavix use;  anemia  check FOBT  VTE Prophylaxis: SCDs, SQH q8h  ID: febrile, panculture, leukocytosis improved  Social: will update son and daughter    CORE MEASURES  1.  Hunt and Griffin Score = 5  2.  VTE prophylaxis:  [ ] administered within 24 hours of admission OR [X] reason not done: fresh bleed, unsecured aneurysm.  3.  Dysphagia screening:   [X] performed before any oral meds / liquids / food  4.  Nimodipine treatment:  [X] administered within 24 hours of admission OR [ ] reason not done:    ATTENDING ATTESTATION:  I was physically present for the key portions of the evaluation and management (E/M) service provided.  I agree with the above history, physical and plan, which I have reviewed and edited where appropriate.    Patient at high risk for neurological deterioration or death due to:  ICU delirium, aspiration PNA, DVT / PE.  Critical care time:  I have personally provided 45 minutes of critical care time, excluding time spent on separate procedures.      Plan discussed with RN, house staff.

## 2021-11-01 LAB
ANION GAP SERPL CALC-SCNC: 10 MMOL/L — SIGNIFICANT CHANGE UP (ref 5–17)
ANION GAP SERPL CALC-SCNC: 8 MMOL/L — SIGNIFICANT CHANGE UP (ref 5–17)
ANION GAP SERPL CALC-SCNC: 8 MMOL/L — SIGNIFICANT CHANGE UP (ref 5–17)
ANION GAP SERPL CALC-SCNC: 9 MMOL/L — SIGNIFICANT CHANGE UP (ref 5–17)
BUN SERPL-MCNC: 10 MG/DL — SIGNIFICANT CHANGE UP (ref 7–23)
BUN SERPL-MCNC: 10 MG/DL — SIGNIFICANT CHANGE UP (ref 7–23)
BUN SERPL-MCNC: 11 MG/DL — SIGNIFICANT CHANGE UP (ref 7–23)
BUN SERPL-MCNC: 11 MG/DL — SIGNIFICANT CHANGE UP (ref 7–23)
CALCIUM SERPL-MCNC: 8.7 MG/DL — SIGNIFICANT CHANGE UP (ref 8.4–10.5)
CALCIUM SERPL-MCNC: 8.7 MG/DL — SIGNIFICANT CHANGE UP (ref 8.4–10.5)
CALCIUM SERPL-MCNC: 8.8 MG/DL — SIGNIFICANT CHANGE UP (ref 8.4–10.5)
CALCIUM SERPL-MCNC: 9.1 MG/DL — SIGNIFICANT CHANGE UP (ref 8.4–10.5)
CHLORIDE SERPL-SCNC: 116 MMOL/L — HIGH (ref 96–108)
CHLORIDE SERPL-SCNC: 117 MMOL/L — HIGH (ref 96–108)
CHLORIDE SERPL-SCNC: 118 MMOL/L — HIGH (ref 96–108)
CHLORIDE SERPL-SCNC: 120 MMOL/L — HIGH (ref 96–108)
CO2 SERPL-SCNC: 20 MMOL/L — LOW (ref 22–31)
CO2 SERPL-SCNC: 22 MMOL/L — SIGNIFICANT CHANGE UP (ref 22–31)
CO2 SERPL-SCNC: 22 MMOL/L — SIGNIFICANT CHANGE UP (ref 22–31)
CO2 SERPL-SCNC: 23 MMOL/L — SIGNIFICANT CHANGE UP (ref 22–31)
CREAT SERPL-MCNC: 0.55 MG/DL — SIGNIFICANT CHANGE UP (ref 0.5–1.3)
CREAT SERPL-MCNC: 0.57 MG/DL — SIGNIFICANT CHANGE UP (ref 0.5–1.3)
CREAT SERPL-MCNC: 0.57 MG/DL — SIGNIFICANT CHANGE UP (ref 0.5–1.3)
CREAT SERPL-MCNC: 0.58 MG/DL — SIGNIFICANT CHANGE UP (ref 0.5–1.3)
GLUCOSE SERPL-MCNC: 110 MG/DL — HIGH (ref 70–99)
GLUCOSE SERPL-MCNC: 116 MG/DL — HIGH (ref 70–99)
GLUCOSE SERPL-MCNC: 136 MG/DL — HIGH (ref 70–99)
GLUCOSE SERPL-MCNC: 160 MG/DL — HIGH (ref 70–99)
HCT VFR BLD CALC: 28.5 % — LOW (ref 34.5–45)
HGB BLD-MCNC: 8.9 G/DL — LOW (ref 11.5–15.5)
MAGNESIUM SERPL-MCNC: 2.1 MG/DL — SIGNIFICANT CHANGE UP (ref 1.6–2.6)
MCHC RBC-ENTMCNC: 25.6 PG — LOW (ref 27–34)
MCHC RBC-ENTMCNC: 31.2 GM/DL — LOW (ref 32–36)
MCV RBC AUTO: 82.1 FL — SIGNIFICANT CHANGE UP (ref 80–100)
NRBC # BLD: 0 /100 WBCS — SIGNIFICANT CHANGE UP (ref 0–0)
OB PNL STL: NEGATIVE — SIGNIFICANT CHANGE UP
PHOSPHATE SERPL-MCNC: 2.7 MG/DL — SIGNIFICANT CHANGE UP (ref 2.5–4.5)
PLATELET # BLD AUTO: 210 K/UL — SIGNIFICANT CHANGE UP (ref 150–400)
POTASSIUM SERPL-MCNC: 3.7 MMOL/L — SIGNIFICANT CHANGE UP (ref 3.5–5.3)
POTASSIUM SERPL-MCNC: 3.8 MMOL/L — SIGNIFICANT CHANGE UP (ref 3.5–5.3)
POTASSIUM SERPL-MCNC: 3.9 MMOL/L — SIGNIFICANT CHANGE UP (ref 3.5–5.3)
POTASSIUM SERPL-MCNC: 3.9 MMOL/L — SIGNIFICANT CHANGE UP (ref 3.5–5.3)
POTASSIUM SERPL-SCNC: 3.7 MMOL/L — SIGNIFICANT CHANGE UP (ref 3.5–5.3)
POTASSIUM SERPL-SCNC: 3.8 MMOL/L — SIGNIFICANT CHANGE UP (ref 3.5–5.3)
POTASSIUM SERPL-SCNC: 3.9 MMOL/L — SIGNIFICANT CHANGE UP (ref 3.5–5.3)
POTASSIUM SERPL-SCNC: 3.9 MMOL/L — SIGNIFICANT CHANGE UP (ref 3.5–5.3)
RBC # BLD: 3.47 M/UL — LOW (ref 3.8–5.2)
RBC # FLD: 22.6 % — HIGH (ref 10.3–14.5)
SODIUM SERPL-SCNC: 147 MMOL/L — HIGH (ref 135–145)
SODIUM SERPL-SCNC: 147 MMOL/L — HIGH (ref 135–145)
SODIUM SERPL-SCNC: 149 MMOL/L — HIGH (ref 135–145)
SODIUM SERPL-SCNC: 150 MMOL/L — HIGH (ref 135–145)
WBC # BLD: 12.27 K/UL — HIGH (ref 3.8–10.5)
WBC # FLD AUTO: 12.27 K/UL — HIGH (ref 3.8–10.5)

## 2021-11-01 PROCEDURE — 70496 CT ANGIOGRAPHY HEAD: CPT | Mod: 26

## 2021-11-01 PROCEDURE — 71045 X-RAY EXAM CHEST 1 VIEW: CPT | Mod: 26

## 2021-11-01 PROCEDURE — 99024 POSTOP FOLLOW-UP VISIT: CPT

## 2021-11-01 PROCEDURE — 93971 EXTREMITY STUDY: CPT | Mod: 26,RT

## 2021-11-01 PROCEDURE — 99291 CRITICAL CARE FIRST HOUR: CPT

## 2021-11-01 RX ORDER — BROMOCRIPTINE MESYLATE 5 MG/1
15 CAPSULE ORAL EVERY 8 HOURS
Refills: 0 | Status: DISCONTINUED | OUTPATIENT
Start: 2021-11-01 | End: 2021-11-07

## 2021-11-01 RX ORDER — GABAPENTIN 400 MG/1
600 CAPSULE ORAL EVERY 8 HOURS
Refills: 0 | Status: DISCONTINUED | OUTPATIENT
Start: 2021-11-01 | End: 2021-11-02

## 2021-11-01 RX ORDER — DEXMEDETOMIDINE HYDROCHLORIDE IN 0.9% SODIUM CHLORIDE 4 UG/ML
0.2 INJECTION INTRAVENOUS
Qty: 400 | Refills: 0 | Status: DISCONTINUED | OUTPATIENT
Start: 2021-11-01 | End: 2021-11-07

## 2021-11-01 RX ORDER — BACLOFEN 100 %
5 POWDER (GRAM) MISCELLANEOUS EVERY 12 HOURS
Refills: 0 | Status: DISCONTINUED | OUTPATIENT
Start: 2021-11-01 | End: 2021-11-03

## 2021-11-01 RX ORDER — SODIUM CHLORIDE 5 G/100ML
500 INJECTION, SOLUTION INTRAVENOUS
Refills: 0 | Status: DISCONTINUED | OUTPATIENT
Start: 2021-11-01 | End: 2021-11-02

## 2021-11-01 RX ORDER — DEXMEDETOMIDINE HYDROCHLORIDE IN 0.9% SODIUM CHLORIDE 4 UG/ML
0.2 INJECTION INTRAVENOUS
Qty: 200 | Refills: 0 | Status: DISCONTINUED | OUTPATIENT
Start: 2021-11-01 | End: 2021-11-01

## 2021-11-01 RX ORDER — SODIUM CHLORIDE 9 MG/ML
1000 INJECTION INTRAMUSCULAR; INTRAVENOUS; SUBCUTANEOUS ONCE
Refills: 0 | Status: COMPLETED | OUTPATIENT
Start: 2021-11-01 | End: 2021-11-01

## 2021-11-01 RX ORDER — NIMODIPINE 60 MG/10ML
30 SOLUTION ORAL
Refills: 0 | Status: DISCONTINUED | OUTPATIENT
Start: 2021-11-01 | End: 2021-11-03

## 2021-11-01 RX ORDER — HYDRALAZINE HCL 50 MG
5 TABLET ORAL EVERY 4 HOURS
Refills: 0 | Status: DISCONTINUED | OUTPATIENT
Start: 2021-11-01 | End: 2021-11-12

## 2021-11-01 RX ADMIN — FENTANYL CITRATE 25 MICROGRAM(S): 50 INJECTION INTRAVENOUS at 11:45

## 2021-11-01 RX ADMIN — BROMOCRIPTINE MESYLATE 15 MILLIGRAM(S): 5 CAPSULE ORAL at 15:12

## 2021-11-01 RX ADMIN — NIMODIPINE 30 MILLIGRAM(S): 60 SOLUTION ORAL at 21:13

## 2021-11-01 RX ADMIN — Medication 10 MILLIGRAM(S): at 04:45

## 2021-11-01 RX ADMIN — BROMOCRIPTINE MESYLATE 15 MILLIGRAM(S): 5 CAPSULE ORAL at 21:13

## 2021-11-01 RX ADMIN — NIMODIPINE 60 MILLIGRAM(S): 60 SOLUTION ORAL at 17:41

## 2021-11-01 RX ADMIN — SODIUM CHLORIDE 1000 MILLILITER(S): 9 INJECTION INTRAMUSCULAR; INTRAVENOUS; SUBCUTANEOUS at 15:29

## 2021-11-01 RX ADMIN — SODIUM CHLORIDE 30 MILLILITER(S): 5 INJECTION, SOLUTION INTRAVENOUS at 04:59

## 2021-11-01 RX ADMIN — SODIUM CHLORIDE 2 GRAM(S): 9 INJECTION INTRAMUSCULAR; INTRAVENOUS; SUBCUTANEOUS at 05:25

## 2021-11-01 RX ADMIN — SODIUM CHLORIDE 30 MILLILITER(S): 5 INJECTION, SOLUTION INTRAVENOUS at 21:09

## 2021-11-01 RX ADMIN — POLYETHYLENE GLYCOL 3350 17 GRAM(S): 17 POWDER, FOR SOLUTION ORAL at 11:08

## 2021-11-01 RX ADMIN — SODIUM CHLORIDE 20 MILLILITER(S): 5 INJECTION, SOLUTION INTRAVENOUS at 23:23

## 2021-11-01 RX ADMIN — SODIUM CHLORIDE 2 GRAM(S): 9 INJECTION INTRAMUSCULAR; INTRAVENOUS; SUBCUTANEOUS at 14:36

## 2021-11-01 RX ADMIN — CHLORHEXIDINE GLUCONATE 15 MILLILITER(S): 213 SOLUTION TOPICAL at 05:27

## 2021-11-01 RX ADMIN — FENTANYL CITRATE 25 MICROGRAM(S): 50 INJECTION INTRAVENOUS at 05:39

## 2021-11-01 RX ADMIN — GABAPENTIN 600 MILLIGRAM(S): 400 CAPSULE ORAL at 15:12

## 2021-11-01 RX ADMIN — GABAPENTIN 600 MILLIGRAM(S): 400 CAPSULE ORAL at 21:13

## 2021-11-01 RX ADMIN — Medication 10 MILLIGRAM(S): at 11:08

## 2021-11-01 RX ADMIN — CHLORHEXIDINE GLUCONATE 15 MILLILITER(S): 213 SOLUTION TOPICAL at 17:18

## 2021-11-01 RX ADMIN — NIMODIPINE 60 MILLIGRAM(S): 60 SOLUTION ORAL at 11:07

## 2021-11-01 RX ADMIN — NIMODIPINE 60 MILLIGRAM(S): 60 SOLUTION ORAL at 02:35

## 2021-11-01 RX ADMIN — FENTANYL CITRATE 25 MICROGRAM(S): 50 INJECTION INTRAVENOUS at 12:05

## 2021-11-01 RX ADMIN — Medication 650 MILLIGRAM(S): at 00:07

## 2021-11-01 RX ADMIN — FENTANYL CITRATE 25 MICROGRAM(S): 50 INJECTION INTRAVENOUS at 03:00

## 2021-11-01 RX ADMIN — SODIUM CHLORIDE 30 MILLILITER(S): 5 INJECTION, SOLUTION INTRAVENOUS at 17:10

## 2021-11-01 RX ADMIN — NIMODIPINE 60 MILLIGRAM(S): 60 SOLUTION ORAL at 14:36

## 2021-11-01 RX ADMIN — Medication 650 MILLIGRAM(S): at 05:25

## 2021-11-01 RX ADMIN — NIMODIPINE 30 MILLIGRAM(S): 60 SOLUTION ORAL at 19:57

## 2021-11-01 RX ADMIN — FENTANYL CITRATE 25 MICROGRAM(S): 50 INJECTION INTRAVENOUS at 00:00

## 2021-11-01 RX ADMIN — FENTANYL CITRATE 25 MICROGRAM(S): 50 INJECTION INTRAVENOUS at 02:40

## 2021-11-01 RX ADMIN — ENOXAPARIN SODIUM 40 MILLIGRAM(S): 100 INJECTION SUBCUTANEOUS at 21:12

## 2021-11-01 RX ADMIN — Medication 650 MILLIGRAM(S): at 06:00

## 2021-11-01 RX ADMIN — Medication 5 MILLIGRAM(S): at 17:17

## 2021-11-01 RX ADMIN — LEVETIRACETAM 400 MILLIGRAM(S): 250 TABLET, FILM COATED ORAL at 17:18

## 2021-11-01 RX ADMIN — PANTOPRAZOLE SODIUM 40 MILLIGRAM(S): 20 TABLET, DELAYED RELEASE ORAL at 05:25

## 2021-11-01 RX ADMIN — FENTANYL CITRATE 25 MICROGRAM(S): 50 INJECTION INTRAVENOUS at 06:00

## 2021-11-01 RX ADMIN — LEVETIRACETAM 400 MILLIGRAM(S): 250 TABLET, FILM COATED ORAL at 05:25

## 2021-11-01 RX ADMIN — NIMODIPINE 30 MILLIGRAM(S): 60 SOLUTION ORAL at 23:36

## 2021-11-01 RX ADMIN — Medication 10 MILLIGRAM(S): at 01:53

## 2021-11-01 RX ADMIN — PANTOPRAZOLE SODIUM 40 MILLIGRAM(S): 20 TABLET, DELAYED RELEASE ORAL at 17:17

## 2021-11-01 RX ADMIN — SODIUM CHLORIDE 2 GRAM(S): 9 INJECTION INTRAMUSCULAR; INTRAVENOUS; SUBCUTANEOUS at 21:13

## 2021-11-01 RX ADMIN — NIMODIPINE 60 MILLIGRAM(S): 60 SOLUTION ORAL at 06:00

## 2021-11-01 RX ADMIN — CHLORHEXIDINE GLUCONATE 1 APPLICATION(S): 213 SOLUTION TOPICAL at 05:25

## 2021-11-01 RX ADMIN — DEXMEDETOMIDINE HYDROCHLORIDE IN 0.9% SODIUM CHLORIDE 4.25 MICROGRAM(S)/KG/HR: 4 INJECTION INTRAVENOUS at 17:10

## 2021-11-01 NOTE — PROGRESS NOTE ADULT - SUBJECTIVE AND OBJECTIVE BOX
HPI:  45 y/o female with PMH HTN, Multiple sclerosis, recent spinal surgery 10/8 ([facility]) presented to [place] [facility] BIBEMS after being found unconscious at home, unknown period of time. 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 HH5, MF1. Patient was intubated at [place] ED, found to have blown L pupil, and sent straight to the OR for left hemicraniotomy. A-line placed intraop. Hemodynamically unstable upon arrival to [facility], bradycardic and hypertensive s/p Mannitol, Clevidipine, and Furosemide. Central line placed in NSICU. Currently sedated on Propofol, pending repeat CTH. History per patient's son, patient had recent spine surgery and was found on outpatient imaging last week to have L M1 segment aneurysm (unruptured), pt asymptomatic at this time. Per son patient taking percocet PO at home. Ureña catheter, ET tube, and arterial line placed at [facility].     NIHSS on arrival: 32   Bess Griffin 5, Mod Busby 4  Bleed Day 1 = 10/26 (26 Oct 2021 13:03)    Hospital course:  10/26: admitted to [facility] from [facility], POD#0 s/p L hemicraniectomy for decompression and evacuation of SDH. Transferred to [facility] noted to have tense flap, received mannitol, keppra, decadron. Was hypertensive to 200s and preston to 40s, recevied 85g mannitol and bullet 23.4%. CTH on arrival demonstrated increased size in vents and new IVH. EVD placed, open at 15, central line placed. Transfused 2 u PRBC. POD0 of coiling of left MCA bifurcation aneurysm,   10/27: CTH demonstrated EVD in correct position, EVD lowered to 5, remains intubated on proprofol, pending repeat CTH in AM. Started on 3% @ 30/hr. 2LNS given for euvolemia, Mannitol given for uptrending ICPs. Bullet given 8:30 am. 3% increased to 50/hr. 1 L bolus. New EVD catheter placed using exisiting sreekanth hole. 1 u PRBC.  10/28: HH5, MF4, BD3; POD2 angio coil/embo of L MCA aneurysm. JOAQUÍN overnight, neuro stable. EVD@5cmH20 ICPs < 20 overnight, OP WNL. S/p 1 unit PRBC yesterday with appropriate response. Given 42g mannitol in AM for ICP 22 persistently, with improvement, then given 23% in PM for elevated ICP. febrile, pancultured. Standing tylenol and cooling blanket.   10/29: BD4, POD3 angio coil/embo. JOAQUÍN o/n, neuro stable. EVD@5, ICPs <20 o/n.   10/30: BD5, POD4 angio coil/embo. JOAQUÍN o/n neuro stable. EVD@5. 3% @50cc/hr.  10/31: BD6, POD5 angio coil/embo. brief period maintained upward gaze, resolved on its own. EVD@5cm h2o.    11/2: BD7, POD6 L hemicrani, angio coil/embo. JOAQUÍN overnight. Neuro exam unchanged. ICPs WNL.     Vital Signs Last 24 Hrs  T(C): 37.7 (01 Nov 2021 00:54), Max: 38.3 (31 Oct 2021 17:00)  T(F): 99.9 (01 Nov 2021 00:54), Max: 100.9 (31 Oct 2021 17:00)  HR: 79 (01 Nov 2021 01:00) (73 - 94)  BP: 151/77 (01 Nov 2021 01:00) (140/63 - 191/92)  BP(mean): 107 (01 Nov 2021 01:00) (90 - 132)  RR: 16 (01 Nov 2021 01:00) (14 - 24)  SpO2: 100% (01 Nov 2021 01:00) (99% - 100%)    I&O's Summary    30 Oct 2021 07:01  -  31 Oct 2021 07:00  --------------------------------------------------------  IN: 3027 mL / OUT: 2532 mL / NET: 495 mL    31 Oct 2021 07:01 - 01 Nov 2021 01:40  --------------------------------------------------------  IN: 2017.5 mL / OUT: 1445 mL / NET: 572.5 mL      PHYSICAL EXAM:  GEN: NAD, sedated on propofol  NEURO: not alert, does not FC, does not OE  CNII-XII: +cough/gag/corneals. pupils 2mm brisk b/l. b/l UE trace w/d vs extensor posture, b/l LEs TF  CV: RRR +S1/S2  PULM: CTAB  GI: Abd soft, NT/ND  EXT: ext warm, dry, nontender  WOUND: flap full/tense    TUBES/LINES:  [] Ureña  [] Lumbar Drain  [] Wound Drains  [x] Others: R IJ    DIET:  [] NPO  [] Mechanical  [x] Tube feeds    LABS:                        8.6    10.17 )-----------( 201      ( 31 Oct 2021 06:12 )             27.0     10-31    147<H>  |  116<H>  |  10  ----------------------------<  160<H>  3.9   |  22  |  0.57    Ca    8.7      31 Oct 2021 23:53  Phos  3.5     10-31  Mg     2.2     10-31          CARDIAC MARKERS ( 31 Oct 2021 06:12 )  x     / x     / 48 U/L / x     / x          CAPILLARY BLOOD GLUCOSE      POCT Blood Glucose.: 129 mg/dL (31 Oct 2021 23:26)  POCT Blood Glucose.: 108 mg/dL (31 Oct 2021 17:04)  POCT Blood Glucose.: 125 mg/dL (31 Oct 2021 11:39)  POCT Blood Glucose.: 142 mg/dL (31 Oct 2021 06:00)      Drug Levels: [] N/A    CSF Analysis: [] N/A      Allergies    Allergy Status Unknown    Intolerances      MEDICATIONS:  Antibiotics:    Neuro:  acetaminophen    Suspension .. 650 milliGRAM(s) Oral every 6 hours PRN  fentaNYL    Injectable 25 MICROGram(s) IV Push every 2 hours PRN  levETIRAcetam  IVPB 1000 milliGRAM(s) IV Intermittent every 12 hours  propofol Infusion 10 MICROgram(s)/kG/Min IV Continuous <Continuous>    Anticoagulation:  enoxaparin Injectable 40 milliGRAM(s) SubCutaneous every 24 hours    OTHER:  bisacodyl Suppository 10 milliGRAM(s) Rectal daily  chlorhexidine 0.12% Liquid 15 milliLiter(s) Oral Mucosa every 12 hours  chlorhexidine 2% Cloths 1 Application(s) Topical <User Schedule>  hydrALAZINE Injectable 10 milliGRAM(s) IV Push every 2 hours PRN  insulin lispro (ADMELOG) corrective regimen sliding scale   SubCutaneous every 6 hours  niMODipine 60 milliGRAM(s) Oral every 4 hours  pantoprazole  Injectable 40 milliGRAM(s) IV Push every 12 hours  polyethylene glycol 3350 17 Gram(s) Oral daily  senna 2 Tablet(s) Oral at bedtime    IVF:  sodium chloride 2 Gram(s) Oral every 8 hours  sodium chloride 3%. 500 milliLiter(s) IV Continuous <Continuous>    CULTURES:  Culture Results:   No growth to date (10-28 @ 19:23)  Culture Results:   No growth at 3 days. (10-28 @ 18:34)    RADIOLOGY & ADDITIONAL TESTS:      ASSESSMENT:  45 y/o female with PMHx of HTN and MS, hx of spinal surgery at [facility] 10/8/21 presented to [facility] after being found unconscious at home, unknown period of time. 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 HH5, MF4; BD #1 = 10/26. Patient was intubated at [facility] and sent straight to the OR for left hemicraniotomy. A-line placed intraop. Hemodynamically unstable upon arrival to [facility], bradycardic and hypertensive s/p Mannitol and Furosemide. Central line placed in NSICU. Currently sedated on Propofol. Now s/p EVD placement, and coil embolization of left MCA bifurcation aneurysm 10/26/2021.     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, left    Intracranial hemorrhage, spontaneous subarachnoid, due to cerebral aneurysm, acute    Angiogram, cerebral, with coil embolization, in non-operating room setting    Personal history of spine surgery    SysAdmin_VstLnk      PLAN:  NEURO:  - neuro checks/vital signs q1hr  - HOB > 30   - Keppra 1g IV BID   - Nimodipine 60q4   - propofol for sedation   - EVD open at 5cmH2O, monitor ICP/output  - obtain CTA head 11/1 on BD7    CARDIO:  - -180  - echo +mild pulm HTN  - sinus arrthymia on cardiac monitor, f/u with EKG    PULM:  - intubated on full vent support  - aspiration precautions    GI:  - TF via OGT  - bowel regimen   - PPI BID for ?coffee ground emesis noted by RN, pending FOB test    RENAL:  - 3%@30cc/hr  - euvolemia goal   - Na 145-150   - q6hr BMP and serum osmo    HEME:  - SCDs, SQL  - s/p 2u PRBC, s/p 1u PRBC 10/27  - monitor H+H  - pend FOB   - f/u RUE doppler for swelling    ID:  - low grade fever, no abx at this time   - leukocytosis and procal, trend   - Tylenol PRN for fever  - last pancx 10/28    ENDO:  - ISS     GOC: full code  Level of care: ICU status   Dispo: pend EVD, trach, peg    Case discussed with Dr. Duncan

## 2021-11-01 NOTE — PROGRESS NOTE ADULT - ASSESSMENT
46y/Female with:  L MCA ruptured aneurysm, subarachnoid hemorrhage, s/p DHC (10/26/2021, Dr. D'Amico @ [place]), brain compression, cerebral edema  anemia   recent spinal surgery  UGIB    PLAN: Day 1 = 10-26 ; Day 4 = 10/29; Day 21 = 11/15  NEURO: neurochecks q1h, sedation with propofol@20; PRN fentanyl pushes  SAH:  cont nimodipine 60 mg po q4h to be given for 21 days (last day 11/15)   Hydrocephalus:  EVD keep open to drain monitor ICPs at 5cm H20   ICP crisis: keep head position neutral, sedation with propofol, osmotherapy, s/p DHC; repeat POSm  d/c HMV if ok with NS  Seizure prophylaxis:  levetiracetam 1000mg IV BID   REHAB:  physical therapy evaluation and management    EARLY MOB:  HOB elevated    PULM:  full vent support for now  CARDIO:  SBP goal 100-180mm Hg,   ENDO:  Blood sugar goals 140-180 mg/dL, insulin sliding scale  GI:  PPI BID, f/u FOBT   DIET: TF at goal  RENAL: maintain euvolemia, Na goal 145-150; recheck BMP this afternoon cont salt tabs 2g q8h, 3%@30; 23.4% if ICP elevated  HEM/ONC: no coagulopathy (INR= 1.03), no ASA / Plavix use;  anemia  check FOBT  VTE Prophylaxis: SCDs, SQH q8h  ID: febrile, panculture, leukocytosis improved  Social: will update son and daughter    CORE MEASURES  1.  Hunt and Griffin Score = 5  2.  VTE prophylaxis:  [ ] administered within 24 hours of admission OR [X] reason not done: fresh bleed, unsecured aneurysm.  3.  Dysphagia screening:   [X] performed before any oral meds / liquids / food  4.  Nimodipine treatment:  [X] administered within 24 hours of admission OR [ ] reason not done:    ATTENDING ATTESTATION:  I was physically present for the key portions of the evaluation and management (E/M) service provided.  I agree with the above history, physical and plan, which I have reviewed and edited where appropriate.    Patient at high risk for neurological deterioration or death due to:  ICU delirium, aspiration PNA, DVT / PE.  Critical care time:  I have personally provided 45 minutes of critical care time, excluding time spent on separate procedures.      Plan discussed with RN, house staff.     46y/Female with:  L MCA ruptured aneurysm, subarachnoid hemorrhage, s/p C (10/26/2021, Dr. D'Amico @ [place]), brain compression, cerebral edema  anemia   recent spinal surgery  UGIB    PLAN: Day 1 = 10-26 ; Day 4 = 10/29; Day 21 = 11/15  NEURO: neurochecks q1h, sedation with precedex; PRN fentanyl pushes  SAH:  cont nimodipine 60 mg po q4h to be given for 21 days (last day 11/15); CTA today to assess for vasospasm   Hydrocephalus:  EVD keep open to drain monitor ICPs at 5cm H20, ICP < 15   Neurostorming:  changed propofol to precedex; bromocriptine 15 mg q8h, gabapentin 600 mg q8h, baclofen 5 mg BID  d/c HMV if ok with NS  Seizure prophylaxis:  levetiracetam 1000mg IV BID   REHAB:  physical therapy evaluation and management    EARLY MOB:  HOB elevated    PULM:  full vent support for now (Mode: AC, RR (machine): 14, TV (machine): 450, FiO2: 40, PEEP: 5, ITime: 1, MAP: 8, PIP: 20), CXR clear today  CARDIO:  SBP goal 100-180mm Hg,   ENDO:  Blood sugar goals 140-180 mg/dL, insulin sliding scale  GI:  PPI BID, f/u FOBT   DIET: TF at goal  RENAL: maintain euvolemia, Na goal 145-150; Na 147, 3% NaCl at 30 mL/h, cont salt tabs  HEM/ONC: no coagulopathy (INR= 1.03), Hb 8.9, no ASA / Plavix use; check FOBT  VTE Prophylaxis: SCDs, SQL  ID: febrile, panculture, leukocytosis improved  Social: will update son and daughter    *****    CORE MEASURES  1.  Hunt and Griffin Score = 5  2.  VTE prophylaxis:  [ ] administered within 24 hours of admission OR [X] reason not done: fresh bleed, unsecured aneurysm.  3.  Dysphagia screening:   [X] performed before any oral meds / liquids / food  4.  Nimodipine treatment:  [X] administered within 24 hours of admission OR [ ] reason not done:    *****    ATTENDING ATTESTATION:    Patient at high risk for neurological deterioration or death due to:  ICU delirium, aspiration PNA, DVT / PE.  Critical care time:  I have personally provided 45 minutes of critical care time, excluding time spent on separate procedures.    Plan discussed with RN, house staff.     46y/Female with:  L MCA ruptured aneurysm, subarachnoid hemorrhage, s/p C (10/26/2021, Dr. D'Amico @ [place] [facility]), brain compression, cerebral edema  anemia   recent spinal surgery  UGIB    PLAN: Day 1 = 10-26 ; Day 4 = 10/29; Day 21 = 11/15  NEURO: neurochecks q1h, sedation with precedex; PRN fentanyl pushes  SAH:  cont nimodipine 60 mg po q4h to be given for 21 days (last day 11/15); CTA today to assess for vasospasm   Hydrocephalus:  EVD keep open to drain monitor ICPs at 5cm H20, ICP < 15   Neurostorming:  changed propofol to precedex; bromocriptine 15 mg q8h, gabapentin 600 mg q8h, baclofen 5 mg BID  Seizure prophylaxis:  levetiracetam 1000mg IV BID  Pending trach/PEG   REHAB:  physical therapy evaluation and management    EARLY MOB:  HOB elevated    PULM:  full vent support for now (Mode: AC, RR (machine): 14, TV (machine): 450, FiO2: 40, PEEP: 5, ITime: 1, MAP: 8, PIP: 20), CXR clear today  CARDIO:  SBP goal 100-180mm Hg,   ENDO:  Blood sugar goals 140-180 mg/dL, insulin sliding scale  GI:  PPI BID, f/u FOBT   DIET: TF at goal  RENAL: maintain euvolemia, Na goal 145-150; Na 147, 3% NaCl at 30 mL/h, cont salt tabs  HEM/ONC: no coagulopathy (INR= 1.03), Hb 8.9, no ASA / Plavix use; check FOBT  VTE Prophylaxis: SCDs, SQL  ID: febrile, panculture, leukocytosis improved  Social: will update son and daughter, re:  progress, prognosis, trach/PEG    *****    CORE MEASURES  1.  Hunt and Griffin Score = 5  2.  VTE prophylaxis:  [ ] administered within 24 hours of admission OR [X] reason not done: fresh bleed, unsecured aneurysm.  3.  Dysphagia screening:   [X] performed before any oral meds / liquids / food  4.  Nimodipine treatment:  [X] administered within 24 hours of admission OR [ ] reason not done:    *****    ATTENDING ATTESTATION:    Patient at high risk for neurological deterioration or death due to:  ICU delirium, aspiration PNA, DVT / PE.  Critical care time:  I have personally provided 45 minutes of critical care time, excluding time spent on separate procedures.    Plan discussed with RN, house staff.

## 2021-11-01 NOTE — PROGRESS NOTE ADULT - SUBJECTIVE AND OBJECTIVE BOX
=========================================   NEUROCRITICAL CARE ATTENDING NOTE (Monday.2021  =========================================    AILYN DÍAZ   MRN-[id number]    HPI:  47 y/o female with PMH HTN, Multiple sclerosis, recent spinal surgery 10/8 ([facility]) presented to [place] ED BIBEMS after being found unconscious at home, unknown period of time. 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 HH5, MF1. Patient was intubated at [place] ED, found to have blown L pupil, and sent straight to the OR for left hemicraniotomy. A-line placed intraop. Hemodynamically unstable upon arrival to [facility], bradycardic and hypertensive s/p Mannitol, Clevidipine, and Furosemide. Central line placed in NSICU. Currently sedated on Propofol, pending repeat CTH. History per patient's son, patient had recent spine surgery and was found on outpatient imaging last week to have L M1 segment aneurysm (unruptured), pt asymptomatic at this time. Per son patient taking percocet PO at home. Ureña catheter, ET tube, and arterial line placed at [facility].   NIHSS on arrival: 32. Bess Griffin 5, Mod Busby 4.  Bleed Day 1 = 10/26 (26 Oct 2021 13:03)    Hospital course:  10/26: admitted to [facility] from [facility], POD#0 s/p L hemicraniectomy for decompression and evacuation of SDH. Transferred to [facility] noted to have tense flap, received mannitol, keppra, decadron. Was hypertensive to 200s and preston to 40s, recevied 85g mannitol and bullet 23.4%. CTH on arrival demonstrated increased size in vents and new IVH. EVD placed, open at 15, central line placed. Transfused 2 u PRBC. POD0 of coiling of left MCA bifurcation aneurysm,   10/27: CTH demonstrated EVD in correct position, EVD lowered to 5, remains intubated on proprofol, pending repeat CTH in AM. Started on 3% @ 30/hr. 2LNS given for euvolemia, Mannitol given for uptrending ICPs. Bullet given 8:30 am. 3% increased to 50/hr. 1 L bolus. New EVD catheter placed using exisiting sreekanth hole. 1 u PRBC.  10/28: HH5, MF4, BD3; POD2 angio coil/embo of L MCA aneurysm. JOAQUÍN overnight, neuro stable. EVD@5cmH20 ICPs < 20 overnight, OP WNL. S/p 1 unit PRBC yesterday with appropriate response. Given 42g mannitol in AM for ICP 22 persistently, with improvement, then given 23% in PM for elevated ICP. febrile, pancultured. Standing tylenol and cooling blanket.   10/29: BD4, POD3 angio coil/embo. JOAQUÍN o/n, neuro stable. EVD@5, ICPs <20 o/n.   10/30: BD5, POD4 angio coil/embo. JOAQUÍN o/n neuro stable. EVD@5. 3% @50cc/hr.  10/31: BD6, POD5 angio coil/embo. brief period maintained upward gaze, resolved on its own. EVD@5cm h2o.    : BD7, POD6 L hemicrani, angio coil/embo. JOAQUÍN overnight. Neuro exam unchanged. ICPs WNL.  Hydralazine given, propofol increased.    Past Medical History: No pertinent past medical history  Allergies:  Allergy Status Unknown  Home meds:     Current Meds:  MEDICATIONS  (STANDING):  bisacodyl Suppository 10 milliGRAM(s) Rectal daily  enoxaparin Injectable 40 milliGRAM(s) SubCutaneous every 24 hours  insulin lispro (ADMELOG) corrective regimen sliding scale   SubCutaneous every 6 hours  levETIRAcetam  IVPB 1000 milliGRAM(s) IV Intermittent every 12 hours  niMODipine 60 milliGRAM(s) Oral every 4 hours  pantoprazole  Injectable 40 milliGRAM(s) IV Push every 12 hours  polyethylene glycol 3350 17 Gram(s) Oral daily  propofol Infusion 10 MICROgram(s)/kG/Min (5.1 mL/Hr) IV Continuous <Continuous>  senna 2 Tablet(s) Oral at bedtime  sodium chloride 2 Gram(s) Oral every 8 hours  sodium chloride 3%. 500 milliLiter(s) (30 mL/Hr) IV Continuous <Continuous>    MEDICATIONS  (PRN):  acetaminophen    Suspension .. 650 milliGRAM(s) Oral every 6 hours PRN Temp greater or equal to 38C (100.4F), Mild Pain (1 - 3)  fentaNYL    Injectable 25 MICROGram(s) IV Push every 2 hours PRN Severe Pain (7 - 10)  hydrALAZINE Injectable 10 milliGRAM(s) IV Push every 2 hours PRN SBP > 180    PHYSICAL EXAMINATION    ICU Vital Signs Last 24 Hrs  T(C): 37.8 (2021 03:00), Max: 38.3 (31 Oct 2021 17:00)  T(F): 100 (2021 03:00), Max: 100.9 (31 Oct 2021 17:00)  HR: 99 (2021 03:00) (73 - 99)  BP: 168/72 (2021 03:00) (140/63 - 191/100)  BP(mean): 104 (2021 03:00) (90 - 132)  ABP: 177/68 (2021 03:00) (123/57 - 198/87)  ABP(mean): 98 (2021 03:00) (79 - 130)  RR: 20 (2021 03:00) (14 - 24)  SpO2: 100% (2021 03:00) (99% - 100%)    NEUROLOGIC EXAMINATION:  Patient does not open eyes, does not follow commands, pupils 3mm equal and brisk (-) Doll's, (+) cough and gag, B LE TF, R UE 0/5; flap full but soft; extensor posturing B UE R>L, B LE TF  GENERAL: intubated 450 12 +5 40%  EENT:  anicteric  CARDIOVASCULAR: (+) S1 S2, normal rate and regular rhythm  PULMONARY: clear to auscultation bilaterally  ABDOMEN: soft, nontender with normoactive bowel sounds  EXTREMITIES: no edema  SKIN: no rash    LABS: 10-31  CAPILLARY BLOOD GLUCOSE 142 114 87 91                         8.6  (8.4)  10.17 )-----------( 201      ( 31 Oct 2021 06:12 )             27.0     148<H>  |  116<H>  |  8   ----------------------------<  127<H>  3.8   |  22  |  0.60    Ca    8.7      30 Oct 2021 21:50  Phos  3.6     10-30  Mg     1.9     10-30    10-29 @ 07:01  -  10-30 @ 07:00  IN: 2835.8 mL / OUT: 1978 mL / NET: 857.8 mL      Bacteriology:  10/28 CSF NGTD  10/28 Blood NG1d x2     CSF studies:  10-28  L   *** [id number] *** %N91 %L4     EEG:  Neuroimaging:  10/27 CT - There is herniation of the brain parenchyma and to the craniectomy defect which is similar prior examination. There is no significant change in the large left frontotemporal intraparenchymal hematoma with surrounding edema. Again demonstrated is mass effect with right to left midline shift measuring approximately 1 cm which is stable from prior exam. Again demonstrated is effacement of the cerebral sulci and basal cisterns.  Other imaging:  10/31 CXR - Discoid change and/or infiltrate right lung base.  10/28 Doppler - Negative  10/28 TTE -   1. Normal left and right ventricular size and systolic function.   2. No significant valvular disease.   3. Mild pulmonary hypertension present, pulmonary artery systolic pressure is 35 mmHg.   4. No pericardial effusion.   5. No prior echo is available for comparison.    IV FLUIDS: 3%@30  DRIPS:  ·	propofol Infusion 10 MICROgram(s)/kG/Min (5.1 mL/Hr) IV Continuous <Continuous> - 50   DIET: NPO - TF stopped at 4 am (was at goal)  Lines: R TLC IJ [place]  Drains:    ·	EVD (d6) @5cm H20, ICPs 6-14, CSF 7-15 mL/h  ·	HMV - 10   ·	ALEXIS - 10  Wounds:    CODE STATUS:  Full Code                       GOALS OF CARE:  aggressive                      DISPOSITION:  ICU =========================================   NEUROCRITICAL CARE ATTENDING NOTE (Monday.2021  =========================================    AILYN DÍAZ   MRN-[id number]    HPI:  45 y/o female with PMH HTN, Multiple sclerosis, recent spinal surgery 10/8 ([facility]) presented to [place] ED BIBEMS after being found unconscious at home, unknown period of time. 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 HH5, MF1. Patient was intubated at [place] ED, found to have blown L pupil, and sent straight to the OR for left hemicraniotomy. A-line placed intraop. Hemodynamically unstable upon arrival to [facility], bradycardic and hypertensive s/p Mannitol, Clevidipine, and Furosemide. Central line placed in NSICU. Currently sedated on Propofol, pending repeat CTH. History per patient's son, patient had recent spine surgery and was found on outpatient imaging last week to have L M1 segment aneurysm (unruptured), pt asymptomatic at this time. Per son patient taking percocet PO at home. Ureña catheter, ET tube, and arterial line placed at [facility].   NIHSS on arrival: 32. Bess Griffin 5, Mod Busby 4.  Bleed Day 1 = 10/26 (26 Oct 2021 13:03)    Hospital course:  10/26: admitted to [facility] from [facility], POD#0 s/p L hemicraniectomy for decompression and evacuation of SDH. Transferred to [facility] noted to have tense flap, received mannitol, keppra, decadron. Was hypertensive to 200s and preston to 40s, recevied 85g mannitol and bullet 23.4%. CTH on arrival demonstrated increased size in vents and new IVH. EVD placed, open at 15, central line placed. Transfused 2 u PRBC. POD0 of coiling of left MCA bifurcation aneurysm,   10/27: CTH demonstrated EVD in correct position, EVD lowered to 5, remains intubated on proprofol, pending repeat CTH in AM. Started on 3% @ 30/hr. 2LNS given for euvolemia, Mannitol given for uptrending ICPs. Bullet given 8:30 am. 3% increased to 50/hr. 1 L bolus. New EVD catheter placed using exisiting sreekanth hole. 1 u PRBC.  10/28: HH5, MF4, BD3; POD2 angio coil/embo of L MCA aneurysm. JOAQUÍN overnight, neuro stable. EVD@5cmH20 ICPs < 20 overnight, OP WNL. S/p 1 unit PRBC yesterday with appropriate response. Given 42g mannitol in AM for ICP 22 persistently, with improvement, then given 23% in PM for elevated ICP. febrile, pancultured. Standing tylenol and cooling blanket.   10/29: BD4, POD3 angio coil/embo. JOAQUÍN o/n, neuro stable. EVD@5, ICPs <20 o/n.   10/30: BD5, POD4 angio coil/embo. JOAQUÍN o/n neuro stable. EVD@5. 3% @50cc/hr.  10/31: BD6, POD5 angio coil/embo. brief period maintained upward gaze, resolved on its own. EVD@5cm h2o.    : BD7, POD6 L hemicrani, angio coil/embo. JOAQUÍN overnight. Neuro exam unchanged. ICPs WNL.  Hydralazine given, propofol increased.    Past Medical History: No pertinent past medical history  Allergies:  Allergy Status Unknown  Home meds:     Current Meds:  MEDICATIONS  (STANDING):  bisacodyl Suppository 10 milliGRAM(s) Rectal daily  enoxaparin Injectable 40 milliGRAM(s) SubCutaneous every 24 hours  insulin lispro (ADMELOG) corrective regimen sliding scale   SubCutaneous every 6 hours  levETIRAcetam  IVPB 1000 milliGRAM(s) IV Intermittent every 12 hours  niMODipine 60 milliGRAM(s) Oral every 4 hours  pantoprazole  Injectable 40 milliGRAM(s) IV Push every 12 hours  polyethylene glycol 3350 17 Gram(s) Oral daily  propofol Infusion 10 MICROgram(s)/kG/Min (5.1 mL/Hr) IV Continuous <Continuous>  senna 2 Tablet(s) Oral at bedtime  sodium chloride 2 Gram(s) Oral every 8 hours  sodium chloride 3%. 500 milliLiter(s) (30 mL/Hr) IV Continuous <Continuous>    MEDICATIONS  (PRN):  acetaminophen    Suspension .. 650 milliGRAM(s) Oral every 6 hours PRN Temp greater or equal to 38C (100.4F), Mild Pain (1 - 3)  fentaNYL    Injectable 25 MICROGram(s) IV Push every 2 hours PRN Severe Pain (7 - 10)  hydrALAZINE Injectable 10 milliGRAM(s) IV Push every 2 hours PRN SBP > 180    PHYSICAL EXAMINATION    ICU Vital Signs Last 24 Hrs  T(C): 37.9 (2021 06:00), Max: 38.3 (31 Oct 2021 17:00)  T(F): 100.2 (2021 06:00), Max: 100.9 (31 Oct 2021 17:00)  HR: 102 (2021 06:00) (73 - 113)  BP: 147/71 (2021 06:00) (140/63 - 191/100)  BP(mean): 102 (2021 06:00) (90 - 132)  ABP: 159/67 (2021 06:00) (123/57 - 198/87)  ABP(mean): 93 (2021 06:00) (79 - 130)  RR: 20 (2021 06:00) (14 - 24)  SpO2: 99% (2021 06:00) (99% - 100%)    NEUROLOGIC EXAMINATION:  Patient does not open eyes, does not follow commands, pupils 3mm equal and brisk (-) Doll's, (+) cough and gag, B LE TF, R UE 0/5; flap full but soft; extensor posturing B UE R>L, B LE TF  GENERAL: Mode: AC, RR (machine): 14, TV (machine): 450, FiO2: 40, PEEP: 5, ITime: 1, MAP: 8, PIP: 20  EENT:  anicteric  CARDIOVASCULAR: (+) S1 S2, normal rate and regular rhythm  PULMONARY: clear to auscultation bilaterally  ABDOMEN: soft, nontender with normoactive bowel sounds  EXTREMITIES: no edema  SKIN: no rash; back incisions checked (dehiscence noted)    I/O's    10-30-21 @ 07:01  -  10-31-21 @ 07:00  --------------------------------------------------------  IN: 3027 mL / OUT: 2532 mL / NET: 495 mL    10-31-21 @ 07:01  -  21 @ 06:57  --------------------------------------------------------  IN: 2408.5 mL / OUT: 1905 mL / NET: 503.5 mL    LABS:                 (8.6)   (12)      8.9    12.27 )-----------( 210      ( 2021 05:21 )             28.5     11    (147)  147<H>  |  117<H>  |  10  ----------------------------<  136<H>  3.9   |  20<L>  |  0.55    Ca    9.1      2021 05:21  Phos  2.7       Mg     2.1         CARDIAC MARKERS ( 31 Oct 2021 06:12 )  x     / x     / 48 U/L / x     / x        CAPILLARY BLOOD GLUCOSE    POCT Blood Glucose.: 120 mg/dL (2021 05:11)  POCT Blood Glucose.: 129 mg/dL (31 Oct 2021 23:26)  POCT Blood Glucose.: 108 mg/dL (31 Oct 2021 17:04)  POCT Blood Glucose.: 125 mg/dL (31 Oct 2021 11:39)      Bacteriology:  10/28 CSF NGTD  10/28 Blood NG1d x2     CSF studies:  10-28  L   *** [id number] *** %N91 %L4     EEG:  Neuroimaging:  10/27 CT - There is herniation of the brain parenchyma and to the craniectomy defect which is similar prior examination. There is no significant change in the large left frontotemporal intraparenchymal hematoma with surrounding edema. Again demonstrated is mass effect with right to left midline shift measuring approximately 1 cm which is stable from prior exam. Again demonstrated is effacement of the cerebral sulci and basal cisterns.  Other imaging:  10/31 CXR - Discoid change and/or infiltrate right lung base.  10/28 Doppler - Negative  10/28 TTE -   1. Normal left and right ventricular size and systolic function.   2. No significant valvular disease.   3. Mild pulmonary hypertension present, pulmonary artery systolic pressure is 35 mmHg.   4. No pericardial effusion.   5. No prior echo is available for comparison.    IV FLUIDS: 3%@30  DRIPS:  ·	propofol Infusion 10 MICROgram(s)/kG/Min (5.1 mL/Hr) IV Continuous <Continuous> - 50   DIET: NPO - TF stopped at 4 am (was at goal)  Lines: R TLC IJ [place]  Drains:    ·	EVD (d6) @5cm H20, ICPs 6-14, CSF 7-15 mL/h  ·	HMV - 10   ·	ALEXIS - 10  Wounds:    CODE STATUS:  Full Code                       GOALS OF CARE:  aggressive                      DISPOSITION:  ICU ==========================================   NEUROCRITICAL CARE ATTENDING NOTE (Monday.2021)  ==========================================    AILYN DÍAZ   MRN-[id number]    HPI:  47 y/o female with PMH HTN, Multiple sclerosis, recent spinal surgery 10/8 ([facility]) presented to [place] ED BIBEMS after being found unconscious at home, unknown period of time. 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 HH5, MF1. Patient was intubated at [place] ED, found to have blown L pupil, and sent straight to the OR for left hemicraniotomy. A-line placed intraop. Hemodynamically unstable upon arrival to [facility], bradycardic and hypertensive s/p Mannitol, Clevidipine, and Furosemide. Central line placed in NSICU. Currently sedated on Propofol, pending repeat CTH. History per patient's son, patient had recent spine surgery and was found on outpatient imaging last week to have L M1 segment aneurysm (unruptured), pt asymptomatic at this time. Per son patient taking percocet PO at home. Ureña catheter, ET tube, and arterial line placed at [facility].   NIHSS on arrival: 32. Bess Griffin 5, Mod Busby 4.  Bleed Day 1 = 10/26 (26 Oct 2021 13:03)    Hospital course:  10/26: admitted to [facility] from [facility], POD#0 s/p L hemicraniectomy for decompression and evacuation of SDH. Transferred to [facility] noted to have tense flap, received mannitol, keppra, decadron. Was hypertensive to 200s and preston to 40s, recevied 85g mannitol and bullet 23.4%. CTH on arrival demonstrated increased size in vents and new IVH. EVD placed, open at 15, central line placed. Transfused 2 u PRBC. POD0 of coiling of left MCA bifurcation aneurysm,   10/27: CTH demonstrated EVD in correct position, EVD lowered to 5, remains intubated on proprofol, pending repeat CTH in AM. Started on 3% @ 30/hr. 2LNS given for euvolemia, Mannitol given for uptrending ICPs. Bullet given 8:30 am. 3% increased to 50/hr. 1 L bolus. New EVD catheter placed using exisiting sreekanth hole. 1 u PRBC.  10/28: HH5, MF4, BD3; POD2 angio coil/embo of L MCA aneurysm. JOAQUÍN overnight, neuro stable. EVD@5cmH20 ICPs < 20 overnight, OP WNL. S/p 1 unit PRBC yesterday with appropriate response. Given 42g mannitol in AM for ICP 22 persistently, with improvement, then given 23% in PM for elevated ICP. febrile, pancultured. Standing tylenol and cooling blanket.   10/29: BD4, POD3 angio coil/embo. JOAQUÍN o/n, neuro stable. EVD@5, ICPs <20 o/n.   10/30: BD5, POD4 angio coil/embo. JOAQUÍN o/n neuro stable. EVD@5. 3% @50cc/hr.  10/31: BD6, POD5 angio coil/embo. brief period maintained upward gaze, resolved on its own. EVD@5cm h2o.    : BD7, POD6 L hemicrani, angio coil/embo. Neuro exam unchanged. ICPs WNL. Overnight given hydralazine, increased propofol for SBP > 180.  CTA pending AM.     Past Medical History: No pertinent past medical history  Allergies:  Allergy Status Unknown  Home meds:     Current Meds:  MEDICATIONS  (STANDING):  baclofen 5 milliGRAM(s) Oral every 12 hours  bisacodyl Suppository 10 milliGRAM(s) Rectal daily  bromocriptine Capsule 15 milliGRAM(s) Oral every 8 hours  dexMEDEtomidine Infusion 0.2 MICROgram(s)/kG/Hr (4.25 mL/Hr) IV Continuous <Continuous>  enoxaparin Injectable 40 milliGRAM(s) SubCutaneous every 24 hours  gabapentin 600 milliGRAM(s) Oral every 8 hours  insulin lispro (ADMELOG) corrective regimen sliding scale   SubCutaneous every 6 hours  levETIRAcetam  IVPB 1000 milliGRAM(s) IV Intermittent every 12 hours  niMODipine 60 milliGRAM(s) Oral every 4 hours  pantoprazole  Injectable 40 milliGRAM(s) IV Push every 12 hours  polyethylene glycol 3350 17 Gram(s) Oral daily  senna 2 Tablet(s) Oral at bedtime  sodium chloride 2 Gram(s) Oral every 8 hours  sodium chloride 3%. 500 milliLiter(s) (30 mL/Hr) IV Continuous <Continuous>    MEDICATIONS  (PRN):  acetaminophen    Suspension .. 650 milliGRAM(s) Oral every 6 hours PRN Temp greater or equal to 38C (100.4F), Mild Pain (1 - 3)  fentaNYL    Injectable 25 MICROGram(s) IV Push every 2 hours PRN Severe Pain (7 - 10)  hydrALAZINE Injectable 10 milliGRAM(s) IV Push every 2 hours PRN SBP > 180    PHYSICAL EXAMINATION    ICU Vital Signs Last 24 Hrs  T(C): 37.9 (2021 06:00), Max: 38.3 (31 Oct 2021 17:00)  T(F): 100.2 (2021 06:00), Max: 100.9 (31 Oct 2021 17:00)  HR: 102 (2021 06:00) (73 - 113)  BP: 147/71 (2021 06:00) (140/63 - 191/100)  BP(mean): 102 (2021 06:00) (90 - 132)  ABP: 159/67 (2021 06:00) (123/57 - 198/87)  ABP(mean): 93 (2021 06:00) (79 - 130)  RR: 20 (2021 06:00) (14 - 24)  SpO2: 99% (2021 06:00) (99% - 100%)    NEUROLOGIC EXAMINATION:  Opens eyes to stimuli, does not follow commands, pupils 3mm equal and brisk (-) Doll's, (+) cough and (+) gag, B LE TF, R UE 0/5; flap full but soft; extensor posturing B UE R>L, B LE TF  GENERAL: Mode: AC, RR (machine): 14, TV (machine): 450, FiO2: 40, PEEP: 5, ITime: 1, MAP: 8, PIP: 20  EENT:  anicteric  CARDIOVASCULAR: (+) S1 S2, normal rate and regular rhythm  PULMONARY: clear to auscultation bilaterally  ABDOMEN: soft, nontender with normoactive bowel sounds  EXTREMITIES: no edema  SKIN: no rash; back incisions checked (dehiscence noted)    I/O's    10-30-21 @ 07:01  -  10-31-21 @ 07:00  --------------------------------------------------------  IN: 3027 mL / OUT: 2532 mL / NET: 495 mL    10-31-21 @ 07:01  -  21 @ 06:57  --------------------------------------------------------  IN: 2408.5 mL / OUT: 1905 mL / NET: 503.5 mL    LABS:                 (8.6)   (12)      8.9    12.27 )-----------( 210      ( 2021 05:21 )             28.5         (147)  147<H>  |  117<H>  |  10  ----------------------------<  136<H>  3.9   |  20<L>  |  0.55    Ca    9.1      2021 05:21  Phos  2.7       Mg     2.1         CARDIAC MARKERS ( 31 Oct 2021 06:12 )  x     / x     / 48 U/L / x     / x        CAPILLARY BLOOD GLUCOSE    POCT Blood Glucose.: 120 mg/dL (2021 05:11)  POCT Blood Glucose.: 129 mg/dL (31 Oct 2021 23:26)  POCT Blood Glucose.: 108 mg/dL (31 Oct 2021 17:04)  POCT Blood Glucose.: 125 mg/dL (31 Oct 2021 11:39)      Bacteriology:  10/28 CSF NGTD  10/28 Blood NG1d x2     CSF studies:  10-28  L   *** [id number] *** %N91 %L4     EEG:  Neuroimaging:  10/27 CT - There is herniation of the brain parenchyma and to the craniectomy defect which is similar prior examination. There is no significant change in the large left frontotemporal intraparenchymal hematoma with surrounding edema. Again demonstrated is mass effect with right to left midline shift measuring approximately 1 cm which is stable from prior exam. Again demonstrated is effacement of the cerebral sulci and basal cisterns.  Other imaging:  10/31 CXR - Discoid change and/or infiltrate right lung base.  10/28 Doppler - Negative  10/28 TTE -   1. Normal left and right ventricular size and systolic function.   2. No significant valvular disease.   3. Mild pulmonary hypertension present, pulmonary artery systolic pressure is 35 mmHg.   4. No pericardial effusion.   5. No prior echo is available for comparison.    IV FLUIDS: 3%@30  DRIPS:  ·	propofol Infusion 10 MICROgram(s)/kG/Min (5.1 mL/Hr) IV Continuous <Continuous> - 50   DIET: NPO - TF stopped at 4 am (was at goal)  Lines: R TLC IJ [place]  Drains:    ·	EVD (d6) @5cm H20, ICPs 6-14, CSF 7-15 mL/h  ·	HMV - 10   ·	ALEXIS - 10  Wounds:    CODE STATUS:  Full Code                       GOALS OF CARE:  aggressive                      DISPOSITION:  ICU ==========================================   NEUROCRITICAL CARE ATTENDING NOTE (Monday.2021)  ==========================================    AILYN DÍAZ   MRN-[id number]    HPI:  47 y/o female with PMH HTN, Multiple sclerosis, recent spinal surgery 10/8 ([facility]) presented to [place] ED BIBEMS after being found unconscious at home, unknown period of time. 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 HH5, MF1. Patient was intubated at [place] ED, found to have blown L pupil, and sent straight to the OR for left hemicraniotomy. A-line placed intraop. Hemodynamically unstable upon arrival to [facility], bradycardic and hypertensive s/p Mannitol, Clevidipine, and Furosemide. Central line placed in NSICU. Currently sedated on Propofol, pending repeat CTH. History per patient's son, patient had recent spine surgery and was found on outpatient imaging last week to have L M1 segment aneurysm (unruptured), pt asymptomatic at this time. Per son patient taking percocet PO at home. Ureña catheter, ET tube, and arterial line placed at [facility].   NIHSS on arrival: 32. Bess Griffin 5, Mod Busby 4.  Bleed Day 1 = 10/26 (26 Oct 2021 13:03)    Hospital course:  10/26: admitted to [facility] from [facility], POD#0 s/p L hemicraniectomy for decompression and evacuation of SDH. Transferred to [facility] noted to have tense flap, received mannitol, keppra, decadron. Was hypertensive to 200s and preston to 40s, recevied 85g mannitol and bullet 23.4%. CTH on arrival demonstrated increased size in vents and new IVH. EVD placed, open at 15, central line placed. Transfused 2 u PRBC. POD0 of coiling of left MCA bifurcation aneurysm,   10/27: CTH demonstrated EVD in correct position, EVD lowered to 5, remains intubated on proprofol, pending repeat CTH in AM. Started on 3% @ 30/hr. 2LNS given for euvolemia, Mannitol given for uptrending ICPs. Bullet given 8:30 am. 3% increased to 50/hr. 1 L bolus. New EVD catheter placed using exisiting sreekanth hole. 1 u PRBC.  10/28: HH5, MF4, BD3; POD2 angio coil/embo of L MCA aneurysm. JOAQUÍN overnight, neuro stable. EVD@5cmH20 ICPs < 20 overnight, OP WNL. S/p 1 unit PRBC yesterday with appropriate response. Given 42g mannitol in AM for ICP 22 persistently, with improvement, then given 23% in PM for elevated ICP. febrile, pancultured. Standing tylenol and cooling blanket.   10/29: BD4, POD3 angio coil/embo. JOAQUÍN o/n, neuro stable. EVD@5, ICPs <20 o/n.   10/30: BD5, POD4 angio coil/embo. JOAQUÍN o/n neuro stable. EVD@5. 3% @50cc/hr.  10/31: BD6, POD5 angio coil/embo. brief period maintained upward gaze, resolved on its own. EVD@5cm h2o.    : BD7, POD6 L hemicrani, angio coil/embo. Neuro exam unchanged. ICPs WNL. Overnight given hydralazine, increased propofol for SBP > 180.  CTA pending AM.     Past Medical History: No pertinent past medical history  Allergies:  Allergy Status Unknown  Home meds:     Current Meds:  MEDICATIONS  (STANDING):  baclofen 5 milliGRAM(s) Oral every 12 hours  bisacodyl Suppository 10 milliGRAM(s) Rectal daily  bromocriptine Capsule 15 milliGRAM(s) Oral every 8 hours  dexMEDEtomidine Infusion 0.2 MICROgram(s)/kG/Hr (4.25 mL/Hr) IV Continuous <Continuous>  enoxaparin Injectable 40 milliGRAM(s) SubCutaneous every 24 hours  gabapentin 600 milliGRAM(s) Oral every 8 hours  insulin lispro (ADMELOG) corrective regimen sliding scale   SubCutaneous every 6 hours  levETIRAcetam  IVPB 1000 milliGRAM(s) IV Intermittent every 12 hours  niMODipine 60 milliGRAM(s) Oral every 4 hours  pantoprazole  Injectable 40 milliGRAM(s) IV Push every 12 hours  polyethylene glycol 3350 17 Gram(s) Oral daily  senna 2 Tablet(s) Oral at bedtime  sodium chloride 2 Gram(s) Oral every 8 hours  sodium chloride 3%. 500 milliLiter(s) (30 mL/Hr) IV Continuous <Continuous>    MEDICATIONS  (PRN):  acetaminophen    Suspension .. 650 milliGRAM(s) Oral every 6 hours PRN Temp greater or equal to 38C (100.4F), Mild Pain (1 - 3)  fentaNYL    Injectable 25 MICROGram(s) IV Push every 2 hours PRN Severe Pain (7 - 10)  hydrALAZINE Injectable 10 milliGRAM(s) IV Push every 2 hours PRN SBP > 180    PHYSICAL EXAMINATION    ICU Vital Signs Last 24 Hrs  T(C): 37.9 (2021 06:00), Max: 38.3 (31 Oct 2021 17:00)  T(F): 100.2 (2021 06:00), Max: 100.9 (31 Oct 2021 17:00)  HR: 102 (2021 06:00) (73 - 113)  BP: 147/71 (2021 06:00) (140/63 - 191/100)  BP(mean): 102 (2021 06:00) (90 - 132)  ABP: 159/67 (2021 06:00) (123/57 - 198/87)  ABP(mean): 93 (2021 06:00) (79 - 130)  RR: 20 (2021 06:00) (14 - 24)  SpO2: 99% (2021 06:00) (99% - 100%)    NEUROLOGIC EXAMINATION:  Opens eyes to stimuli, does not follow commands, pupils 3mm equal and brisk (-) Doll's, (+) cough and (+) gag, B LE TF, R UE 0/5; flap full but soft; extensor posturing B UE R>L, B LE TF  GENERAL: Mode: AC, RR (machine): 14, TV (machine): 450, FiO2: 40, PEEP: 5, ITime: 1, MAP: 8, PIP: 20  EENT:  anicteric  CARDIOVASCULAR: (+) S1 S2, normal rate and regular rhythm  PULMONARY: clear to auscultation bilaterally  ABDOMEN: soft, nontender with normoactive bowel sounds  EXTREMITIES: no edema  SKIN: no rash; back incisions checked (dehiscence noted)    I/O's    10-30-21 @ 07:01  -  10-31-21 @ 07:00  --------------------------------------------------------  IN: 3027 mL / OUT: 2532 mL / NET: 495 mL    10-31-21 @ 07:01  -  21 @ 06:57  --------------------------------------------------------  IN: 2408.5 mL / OUT: 1905 mL / NET: 503.5 mL    LABS:                 (8.6)   (12)      8.9    12.27 )-----------( 210      ( 2021 05:21 )             28.5         (147)  147<H>  |  117<H>  |  10  ----------------------------<  136<H>  3.9   |  20<L>  |  0.55    Ca    9.1      2021 05:21  Phos  2.7       Mg     2.1         CARDIAC MARKERS ( 31 Oct 2021 06:12 )  x     / x     / 48 U/L / x     / x        CAPILLARY BLOOD GLUCOSE    POCT Blood Glucose.: 120 mg/dL (2021 05:11)  POCT Blood Glucose.: 129 mg/dL (31 Oct 2021 23:26)  POCT Blood Glucose.: 108 mg/dL (31 Oct 2021 17:04)  POCT Blood Glucose.: 125 mg/dL (31 Oct 2021 11:39)      Bacteriology:  10/28 CSF NGTD  10/28 Blood NG1d x2     CSF studies:  10-28  L   *** [id number] *** %N91 %L4     EEG:  Neuroimaging:  10/27 CT - There is herniation of the brain parenchyma and to the craniectomy defect which is similar prior examination. There is no significant change in the large left frontotemporal intraparenchymal hematoma with surrounding edema. Again demonstrated is mass effect with right to left midline shift measuring approximately 1 cm which is stable from prior exam. Again demonstrated is effacement of the cerebral sulci and basal cisterns.  Other imaging:  10/31 CXR - Discoid change and/or infiltrate right lung base.  10/28 Doppler - Negative  10/28 TTE -   1. Normal left and right ventricular size and systolic function.   2. No significant valvular disease.   3. Mild pulmonary hypertension present, pulmonary artery systolic pressure is 35 mmHg.   4. No pericardial effusion.   5. No prior echo is available for comparison.    IV FLUIDS: 3%@30  DRIPS:  ·	precedex infusion  DIET: NPO - TF stopped at 4 am (was at goal)  Lines: R TLC IJ Smoot  Drains:    ·	EVD (d6) @5cm H20, ICPs 6-14, CSF 7-15 mL/h  ·	HMV - 10   ·	ALEXIS - 10  Wounds:    CODE STATUS:  Full Code                       GOALS OF CARE:  aggressive                      DISPOSITION:  ICU

## 2021-11-01 NOTE — CHART NOTE - NSCHARTNOTEFT_GEN_A_CORE
Admitting Diagnosis:   Patient is a 46y old  Female who presents with a chief complaint of ICH (01 Nov 2021 03:46)    PAST MEDICAL & SURGICAL HISTORY:  No pertinent past medical history  Personal history of spine surgery    Current Nutrition Order: NPO diet  EN regimen: Jevity 1.2 @ 40 ml/hr x24hrs via NGT with propofol (10.2 ml/hr (269kcal)) providing a total of 960 ml TV, 1421 kcal, 53g pro, 774 ml free water.      PO Intake: Good (%) [   ]  Fair (50-75%) [   ] Poor (<25%) [   ]- N/A     GI Issues:   WDL, last BM 10/31  +fecal incontinence  No n/v/c reported   No noted abd distention or discomfort    Pain:  No pain reported at this time    Skin Integrity:  Surgical incision, rosalind score 12  +3 pitting edema dependent  No pressure ulcers noted    Labs:   11-01    147<H>  |  117<H>  |  10  ----------------------------<  136<H>  3.9   |  20<L>  |  0.55    Ca    9.1      01 Nov 2021 05:21  Phos  2.7     11-01  Mg     2.1     11-01    CAPILLARY BLOOD GLUCOSE    POCT Blood Glucose.: 120 mg/dL (01 Nov 2021 05:11)  POCT Blood Glucose.: 129 mg/dL (31 Oct 2021 23:26)  POCT Blood Glucose.: 108 mg/dL (31 Oct 2021 17:04)  POCT Blood Glucose.: 125 mg/dL (31 Oct 2021 11:39)    Medications:  MEDICATIONS  (STANDING):  baclofen 5 milliGRAM(s) Oral every 12 hours  bisacodyl Suppository 10 milliGRAM(s) Rectal daily  bromocriptine Capsule 15 milliGRAM(s) Oral every 8 hours  chlorhexidine 0.12% Liquid 15 milliLiter(s) Oral Mucosa every 12 hours  chlorhexidine 2% Cloths 1 Application(s) Topical <User Schedule>  dexMEDEtomidine Infusion 0.2 MICROgram(s)/kG/Hr (4.25 mL/Hr) IV Continuous <Continuous>  enoxaparin Injectable 40 milliGRAM(s) SubCutaneous every 24 hours  gabapentin 600 milliGRAM(s) Oral every 8 hours  insulin lispro (ADMELOG) corrective regimen sliding scale   SubCutaneous every 6 hours  levETIRAcetam  IVPB 1000 milliGRAM(s) IV Intermittent every 12 hours  niMODipine 60 milliGRAM(s) Oral every 4 hours  pantoprazole  Injectable 40 milliGRAM(s) IV Push every 12 hours  polyethylene glycol 3350 17 Gram(s) Oral daily  senna 2 Tablet(s) Oral at bedtime  sodium chloride 2 Gram(s) Oral every 8 hours  sodium chloride 3%. 500 milliLiter(s) (30 mL/Hr) IV Continuous <Continuous>    MEDICATIONS  (PRN):  acetaminophen    Suspension .. 650 milliGRAM(s) Oral every 6 hours PRN Temp greater or equal to 38C (100.4F), Mild Pain (1 - 3)  fentaNYL    Injectable 25 MICROGram(s) IV Push every 2 hours PRN Severe Pain (7 - 10)  hydrALAZINE Injectable 10 milliGRAM(s) IV Push every 2 hours PRN SBP > 180    Admitting Anthropometrics:  Height: 61" Weight: 187lbs, IBW 105lbs+/-10%, %%, BMI 34.9 kg/m2     Weight:  10/26 187lbs     Weight Change: No new weights obtained during this admission. Please obtain new weight when feasible and cont to trend biweekly.     Nutrition Focused Physical Exam: Completed [   ]  Not Pertinent [ x  ]    Estimated energy needs:   IBW (48kg) used for calculations as pt >120% of IBW (178%). Needs adjusted for wound healing, critical care requiring intubation.   Kcal (25-30 kcal/kg): 7590-2924 kcal  Protein (1.4-1.6 g/kg pro): 67-76g pro  **Fluids per team    Subjective:   46y/F with recent spinal surgery, found down and brought to ED.  In ED, witnessed shaking, ?seizures, intubated.  Imaging showed SAH.  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 Repeat CT HCP - EVD R frontal inserted, s/p L hemicraniectomy for decompression and evacuation of SDH 10/26. EVD placed. Pt returned to NSICU intubated and sedated. EVD@5cm h2o. Neuro has been stable/ remains unchanged.     On assessment, pt resting in bed on full vent support. Vent: AC/ VC. Tmax 99.5F. MAP 92- currently ordered for fentanyl and propofol (10.2 ml/hr providing 269kcal). Remains NPO and EN was initiated via NGT- tolerating well without s/sx of aspiration. No n/v/d/c. Pertinent labs: Na 147H, Cl 117H, CO2 70L, Glu 136H. Edu deferred. Please see nutr recs below. RD to follow.     Previous Nutrition Diagnosis: Inadequate energy intake RT inability to meet est needs on current diet AEB NPO, meeting <25% of est needs.     Active [   ]  Resolved [   ]    If resolved, new PES: Inadequate oral intake RT Inability to meet est needs via PO AEB NPO, reliant on EN to meet 100% of est needs    Goal/Expected Outcome 1. Diet will be advanced within 24-48hrs 2. Consistently meet >75% est    Recommendations:  1.  To optimize current EN regimen while pt remains intubated, recommend Jevity 1.2 @ 38 ml/hr x24hrs with LPS once daily (100 kcal, 15g pro) and propofol (10.2 ml/hr- 296kcal) providing a total of 912 ml TV, 1463 kcal, 66g pro, 735 ml free water.   >> FWF per team  >> Cont to maintain aspiration precautions at all times  2. Pain and bowel regimen per team  3. Monitor lytes and replete prn  4. RD diet edu prn    Education: Deferred at this time    Risk Level: High [ x  ] Moderate [  ] Low [   ]

## 2021-11-02 LAB
ALBUMIN SERPL ELPH-MCNC: 3.3 G/DL — SIGNIFICANT CHANGE UP (ref 3.3–5)
ALP SERPL-CCNC: 95 U/L — SIGNIFICANT CHANGE UP (ref 40–120)
ALT FLD-CCNC: 17 U/L — SIGNIFICANT CHANGE UP (ref 10–45)
AMYLASE P1 CFR SERPL: 85 U/L — SIGNIFICANT CHANGE UP (ref 25–125)
ANION GAP SERPL CALC-SCNC: 10 MMOL/L — SIGNIFICANT CHANGE UP (ref 5–17)
ANION GAP SERPL CALC-SCNC: 10 MMOL/L — SIGNIFICANT CHANGE UP (ref 5–17)
ANION GAP SERPL CALC-SCNC: 11 MMOL/L — SIGNIFICANT CHANGE UP (ref 5–17)
ANION GAP SERPL CALC-SCNC: 8 MMOL/L — SIGNIFICANT CHANGE UP (ref 5–17)
AST SERPL-CCNC: 32 U/L — SIGNIFICANT CHANGE UP (ref 10–40)
BILIRUB SERPL-MCNC: 0.4 MG/DL — SIGNIFICANT CHANGE UP (ref 0.2–1.2)
BLD GP AB SCN SERPL QL: NEGATIVE — SIGNIFICANT CHANGE UP
BUN SERPL-MCNC: 10 MG/DL — SIGNIFICANT CHANGE UP (ref 7–23)
BUN SERPL-MCNC: 13 MG/DL — SIGNIFICANT CHANGE UP (ref 7–23)
BUN SERPL-MCNC: 9 MG/DL — SIGNIFICANT CHANGE UP (ref 7–23)
BUN SERPL-MCNC: 9 MG/DL — SIGNIFICANT CHANGE UP (ref 7–23)
CALCIUM SERPL-MCNC: 9.1 MG/DL — SIGNIFICANT CHANGE UP (ref 8.4–10.5)
CALCIUM SERPL-MCNC: 9.1 MG/DL — SIGNIFICANT CHANGE UP (ref 8.4–10.5)
CALCIUM SERPL-MCNC: 9.2 MG/DL — SIGNIFICANT CHANGE UP (ref 8.4–10.5)
CALCIUM SERPL-MCNC: 9.4 MG/DL — SIGNIFICANT CHANGE UP (ref 8.4–10.5)
CHLORIDE SERPL-SCNC: 110 MMOL/L — HIGH (ref 96–108)
CHLORIDE SERPL-SCNC: 115 MMOL/L — HIGH (ref 96–108)
CHLORIDE SERPL-SCNC: 115 MMOL/L — HIGH (ref 96–108)
CHLORIDE SERPL-SCNC: 120 MMOL/L — HIGH (ref 96–108)
CO2 SERPL-SCNC: 22 MMOL/L — SIGNIFICANT CHANGE UP (ref 22–31)
CO2 SERPL-SCNC: 24 MMOL/L — SIGNIFICANT CHANGE UP (ref 22–31)
CREAT SERPL-MCNC: 0.5 MG/DL — SIGNIFICANT CHANGE UP (ref 0.5–1.3)
CREAT SERPL-MCNC: 0.52 MG/DL — SIGNIFICANT CHANGE UP (ref 0.5–1.3)
CREAT SERPL-MCNC: 0.53 MG/DL — SIGNIFICANT CHANGE UP (ref 0.5–1.3)
CREAT SERPL-MCNC: 0.58 MG/DL — SIGNIFICANT CHANGE UP (ref 0.5–1.3)
CULTURE RESULTS: SIGNIFICANT CHANGE UP
CULTURE RESULTS: SIGNIFICANT CHANGE UP
GLUCOSE SERPL-MCNC: 104 MG/DL — HIGH (ref 70–99)
GLUCOSE SERPL-MCNC: 111 MG/DL — HIGH (ref 70–99)
GLUCOSE SERPL-MCNC: 113 MG/DL — HIGH (ref 70–99)
GLUCOSE SERPL-MCNC: 119 MG/DL — HIGH (ref 70–99)
HCT VFR BLD CALC: 25.1 % — LOW (ref 34.5–45)
HCT VFR BLD CALC: 31.6 % — LOW (ref 34.5–45)
HGB BLD-MCNC: 10 G/DL — LOW (ref 11.5–15.5)
HGB BLD-MCNC: 7.6 G/DL — LOW (ref 11.5–15.5)
LIDOCAIN IGE QN: 103 U/L — HIGH (ref 7–60)
MAGNESIUM SERPL-MCNC: 2.1 MG/DL — SIGNIFICANT CHANGE UP (ref 1.6–2.6)
MCHC RBC-ENTMCNC: 25.2 PG — LOW (ref 27–34)
MCHC RBC-ENTMCNC: 26.7 PG — LOW (ref 27–34)
MCHC RBC-ENTMCNC: 30.3 GM/DL — LOW (ref 32–36)
MCHC RBC-ENTMCNC: 31.6 GM/DL — LOW (ref 32–36)
MCV RBC AUTO: 83.4 FL — SIGNIFICANT CHANGE UP (ref 80–100)
MCV RBC AUTO: 84.5 FL — SIGNIFICANT CHANGE UP (ref 80–100)
NRBC # BLD: 0 /100 WBCS — SIGNIFICANT CHANGE UP (ref 0–0)
NRBC # BLD: 0 /100 WBCS — SIGNIFICANT CHANGE UP (ref 0–0)
PHOSPHATE SERPL-MCNC: 3.1 MG/DL — SIGNIFICANT CHANGE UP (ref 2.5–4.5)
PLATELET # BLD AUTO: 232 K/UL — SIGNIFICANT CHANGE UP (ref 150–400)
PLATELET # BLD AUTO: 269 K/UL — SIGNIFICANT CHANGE UP (ref 150–400)
POTASSIUM SERPL-MCNC: 3.2 MMOL/L — LOW (ref 3.5–5.3)
POTASSIUM SERPL-MCNC: 3.4 MMOL/L — LOW (ref 3.5–5.3)
POTASSIUM SERPL-MCNC: 3.6 MMOL/L — SIGNIFICANT CHANGE UP (ref 3.5–5.3)
POTASSIUM SERPL-MCNC: 3.7 MMOL/L — SIGNIFICANT CHANGE UP (ref 3.5–5.3)
POTASSIUM SERPL-SCNC: 3.2 MMOL/L — LOW (ref 3.5–5.3)
POTASSIUM SERPL-SCNC: 3.4 MMOL/L — LOW (ref 3.5–5.3)
POTASSIUM SERPL-SCNC: 3.6 MMOL/L — SIGNIFICANT CHANGE UP (ref 3.5–5.3)
POTASSIUM SERPL-SCNC: 3.7 MMOL/L — SIGNIFICANT CHANGE UP (ref 3.5–5.3)
PROT SERPL-MCNC: 7 G/DL — SIGNIFICANT CHANGE UP (ref 6–8.3)
RBC # BLD: 3.01 M/UL — LOW (ref 3.8–5.2)
RBC # BLD: 3.74 M/UL — LOW (ref 3.8–5.2)
RBC # FLD: 20.9 % — HIGH (ref 10.3–14.5)
RBC # FLD: 22.6 % — HIGH (ref 10.3–14.5)
RH IG SCN BLD-IMP: POSITIVE — SIGNIFICANT CHANGE UP
SARS-COV-2 RNA SPEC QL NAA+PROBE: SIGNIFICANT CHANGE UP
SODIUM SERPL-SCNC: 143 MMOL/L — SIGNIFICANT CHANGE UP (ref 135–145)
SODIUM SERPL-SCNC: 147 MMOL/L — HIGH (ref 135–145)
SODIUM SERPL-SCNC: 147 MMOL/L — HIGH (ref 135–145)
SODIUM SERPL-SCNC: 152 MMOL/L — HIGH (ref 135–145)
SPECIMEN SOURCE: SIGNIFICANT CHANGE UP
SPECIMEN SOURCE: SIGNIFICANT CHANGE UP
WBC # BLD: 11.2 K/UL — HIGH (ref 3.8–10.5)
WBC # BLD: 12.15 K/UL — HIGH (ref 3.8–10.5)
WBC # FLD AUTO: 11.2 K/UL — HIGH (ref 3.8–10.5)
WBC # FLD AUTO: 12.15 K/UL — HIGH (ref 3.8–10.5)

## 2021-11-02 PROCEDURE — 99291 CRITICAL CARE FIRST HOUR: CPT

## 2021-11-02 PROCEDURE — 76700 US EXAM ABDOM COMPLETE: CPT | Mod: 26

## 2021-11-02 PROCEDURE — 99024 POSTOP FOLLOW-UP VISIT: CPT

## 2021-11-02 PROCEDURE — 71045 X-RAY EXAM CHEST 1 VIEW: CPT | Mod: 26,59

## 2021-11-02 RX ORDER — METOCLOPRAMIDE HCL 10 MG
10 TABLET ORAL EVERY 8 HOURS
Refills: 0 | Status: DISCONTINUED | OUTPATIENT
Start: 2021-11-02 | End: 2021-11-05

## 2021-11-02 RX ORDER — SODIUM CHLORIDE 9 MG/ML
500 INJECTION, SOLUTION INTRAVENOUS
Refills: 0 | Status: DISCONTINUED | OUTPATIENT
Start: 2021-11-02 | End: 2021-11-03

## 2021-11-02 RX ORDER — POTASSIUM CHLORIDE 20 MEQ
40 PACKET (EA) ORAL EVERY 4 HOURS
Refills: 0 | Status: COMPLETED | OUTPATIENT
Start: 2021-11-02 | End: 2021-11-03

## 2021-11-02 RX ORDER — POTASSIUM CHLORIDE 20 MEQ
20 PACKET (EA) ORAL ONCE
Refills: 0 | Status: COMPLETED | OUTPATIENT
Start: 2021-11-02 | End: 2021-11-02

## 2021-11-02 RX ORDER — GABAPENTIN 400 MG/1
400 CAPSULE ORAL EVERY 8 HOURS
Refills: 0 | Status: DISCONTINUED | OUTPATIENT
Start: 2021-11-02 | End: 2021-11-03

## 2021-11-02 RX ADMIN — NIMODIPINE 30 MILLIGRAM(S): 60 SOLUTION ORAL at 19:46

## 2021-11-02 RX ADMIN — Medication 10 MILLIGRAM(S): at 11:15

## 2021-11-02 RX ADMIN — NIMODIPINE 30 MILLIGRAM(S): 60 SOLUTION ORAL at 08:02

## 2021-11-02 RX ADMIN — NIMODIPINE 30 MILLIGRAM(S): 60 SOLUTION ORAL at 02:36

## 2021-11-02 RX ADMIN — BROMOCRIPTINE MESYLATE 15 MILLIGRAM(S): 5 CAPSULE ORAL at 14:26

## 2021-11-02 RX ADMIN — Medication 100 MILLIEQUIVALENT(S): at 08:03

## 2021-11-02 RX ADMIN — Medication 10 MILLIGRAM(S): at 21:09

## 2021-11-02 RX ADMIN — SODIUM CHLORIDE 2 GRAM(S): 9 INJECTION INTRAMUSCULAR; INTRAVENOUS; SUBCUTANEOUS at 21:10

## 2021-11-02 RX ADMIN — LEVETIRACETAM 400 MILLIGRAM(S): 250 TABLET, FILM COATED ORAL at 18:18

## 2021-11-02 RX ADMIN — SODIUM CHLORIDE 2 GRAM(S): 9 INJECTION INTRAMUSCULAR; INTRAVENOUS; SUBCUTANEOUS at 14:24

## 2021-11-02 RX ADMIN — PANTOPRAZOLE SODIUM 40 MILLIGRAM(S): 20 TABLET, DELAYED RELEASE ORAL at 18:18

## 2021-11-02 RX ADMIN — Medication 40 MILLIEQUIVALENT(S): at 23:17

## 2021-11-02 RX ADMIN — SODIUM CHLORIDE 75 MILLILITER(S): 9 INJECTION, SOLUTION INTRAVENOUS at 08:20

## 2021-11-02 RX ADMIN — FENTANYL CITRATE 25 MICROGRAM(S): 50 INJECTION INTRAVENOUS at 22:10

## 2021-11-02 RX ADMIN — ENOXAPARIN SODIUM 40 MILLIGRAM(S): 100 INJECTION SUBCUTANEOUS at 21:10

## 2021-11-02 RX ADMIN — FENTANYL CITRATE 25 MICROGRAM(S): 50 INJECTION INTRAVENOUS at 13:30

## 2021-11-02 RX ADMIN — BROMOCRIPTINE MESYLATE 15 MILLIGRAM(S): 5 CAPSULE ORAL at 21:10

## 2021-11-02 RX ADMIN — Medication 5 MILLIGRAM(S): at 21:09

## 2021-11-02 RX ADMIN — Medication 10 MILLIGRAM(S): at 14:24

## 2021-11-02 RX ADMIN — NIMODIPINE 30 MILLIGRAM(S): 60 SOLUTION ORAL at 18:19

## 2021-11-02 RX ADMIN — DEXMEDETOMIDINE HYDROCHLORIDE IN 0.9% SODIUM CHLORIDE 4.25 MICROGRAM(S)/KG/HR: 4 INJECTION INTRAVENOUS at 22:48

## 2021-11-02 RX ADMIN — NIMODIPINE 30 MILLIGRAM(S): 60 SOLUTION ORAL at 21:09

## 2021-11-02 RX ADMIN — SODIUM CHLORIDE 2 GRAM(S): 9 INJECTION INTRAMUSCULAR; INTRAVENOUS; SUBCUTANEOUS at 05:55

## 2021-11-02 RX ADMIN — Medication 5 MILLIGRAM(S): at 05:56

## 2021-11-02 RX ADMIN — GABAPENTIN 400 MILLIGRAM(S): 400 CAPSULE ORAL at 14:24

## 2021-11-02 RX ADMIN — BROMOCRIPTINE MESYLATE 15 MILLIGRAM(S): 5 CAPSULE ORAL at 05:54

## 2021-11-02 RX ADMIN — Medication 650 MILLIGRAM(S): at 17:00

## 2021-11-02 RX ADMIN — GABAPENTIN 400 MILLIGRAM(S): 400 CAPSULE ORAL at 21:09

## 2021-11-02 RX ADMIN — FENTANYL CITRATE 25 MICROGRAM(S): 50 INJECTION INTRAVENOUS at 13:13

## 2021-11-02 RX ADMIN — NIMODIPINE 30 MILLIGRAM(S): 60 SOLUTION ORAL at 12:24

## 2021-11-02 RX ADMIN — CHLORHEXIDINE GLUCONATE 1 APPLICATION(S): 213 SOLUTION TOPICAL at 05:56

## 2021-11-02 RX ADMIN — NIMODIPINE 30 MILLIGRAM(S): 60 SOLUTION ORAL at 16:00

## 2021-11-02 RX ADMIN — GABAPENTIN 600 MILLIGRAM(S): 400 CAPSULE ORAL at 05:55

## 2021-11-02 RX ADMIN — Medication 5 MILLIGRAM(S): at 18:18

## 2021-11-02 RX ADMIN — FENTANYL CITRATE 25 MICROGRAM(S): 50 INJECTION INTRAVENOUS at 22:30

## 2021-11-02 RX ADMIN — POLYETHYLENE GLYCOL 3350 17 GRAM(S): 17 POWDER, FOR SOLUTION ORAL at 11:15

## 2021-11-02 RX ADMIN — SODIUM CHLORIDE 75 MILLILITER(S): 9 INJECTION, SOLUTION INTRAVENOUS at 21:56

## 2021-11-02 RX ADMIN — LEVETIRACETAM 400 MILLIGRAM(S): 250 TABLET, FILM COATED ORAL at 05:54

## 2021-11-02 RX ADMIN — NIMODIPINE 30 MILLIGRAM(S): 60 SOLUTION ORAL at 23:17

## 2021-11-02 RX ADMIN — CHLORHEXIDINE GLUCONATE 15 MILLILITER(S): 213 SOLUTION TOPICAL at 18:18

## 2021-11-02 RX ADMIN — NIMODIPINE 30 MILLIGRAM(S): 60 SOLUTION ORAL at 14:24

## 2021-11-02 RX ADMIN — Medication 650 MILLIGRAM(S): at 15:54

## 2021-11-02 RX ADMIN — CHLORHEXIDINE GLUCONATE 15 MILLILITER(S): 213 SOLUTION TOPICAL at 05:55

## 2021-11-02 RX ADMIN — NIMODIPINE 30 MILLIGRAM(S): 60 SOLUTION ORAL at 10:37

## 2021-11-02 RX ADMIN — NIMODIPINE 30 MILLIGRAM(S): 60 SOLUTION ORAL at 05:55

## 2021-11-02 RX ADMIN — PANTOPRAZOLE SODIUM 40 MILLIGRAM(S): 20 TABLET, DELAYED RELEASE ORAL at 05:55

## 2021-11-02 NOTE — PROGRESS NOTE ADULT - SUBJECTIVE AND OBJECTIVE BOX
==========================================   NEUROCRITICAL CARE ATTENDING NOTE ()  ==========================================    AILYN DÍAZ   MRN-[id number]    HPI:  45 y/o female with PMH HTN, Multiple sclerosis, recent spinal surgery 10/8 ([facility]) presented to [place] ED BIBEMS after being found unconscious at home, unknown period of time. 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 HH5, MF1. Patient was intubated at [place] ED, found to have blown L pupil, and sent straight to the OR for left hemicraniotomy. A-line placed intraop. Hemodynamically unstable upon arrival to [facility], bradycardic and hypertensive s/p Mannitol, Clevidipine, and Furosemide. Central line placed in NSICU. Currently sedated on Propofol, pending repeat CTH. History per patient's son, patient had recent spine surgery and was found on outpatient imaging last week to have L M1 segment aneurysm (unruptured), pt asymptomatic at this time. Per son patient taking percocet PO at home. Ureña catheter, ET tube, and arterial line placed at [facility].   NIHSS on arrival: 32. Bess Griffin 5, Mod Busby 4.  Bleed Day 1 = 10/26 (26 Oct 2021 13:03)    Hospital course:  10/26: admitted to [facility] from [facility], POD#0 s/p L hemicraniectomy for decompression and evacuation of SDH. Transferred to [facility] noted to have tense flap, received mannitol, keppra, decadron. Was hypertensive to 200s and preston to 40s, recevied 85g mannitol and bullet 23.4%. CTH on arrival demonstrated increased size in vents and new IVH. EVD placed, open at 15, central line placed. Transfused 2 u PRBC. POD0 of coiling of left MCA bifurcation aneurysm,   10/27: CTH demonstrated EVD in correct position, EVD lowered to 5, remains intubated on proprofol, pending repeat CTH in AM. Started on 3% @ 30/hr. 2LNS given for euvolemia, Mannitol given for uptrending ICPs. Bullet given 8:30 am. 3% increased to 50/hr. 1 L bolus. New EVD catheter placed using exisiting sreekanth hole. 1 u PRBC.  10/28: HH5, MF4, BD3; POD2 angio coil/embo of L MCA aneurysm. JOAQUÍN overnight, neuro stable. EVD@5cmH20 ICPs < 20 overnight, OP WNL. S/p 1 unit PRBC yesterday with appropriate response. Given 42g mannitol in AM for ICP 22 persistently, with improvement, then given 23% in PM for elevated ICP. febrile, pancultured. Standing tylenol and cooling blanket.   10/29: BD4, POD3 angio coil/embo. JOAQUÍN o/n, neuro stable. EVD@5, ICPs <20 o/n.   10/30: BD5, POD4 angio coil/embo. JOAQUÍN o/n neuro stable. EVD@5. 3% @50cc/hr.  10/31: BD6, POD5 angio coil/embo. brief period maintained upward gaze, resolved on its own. EVD@5cm h2o.    : BD7, POD6 L hemicrani, angio coil/embo. Neuro exam unchanged. ICPs WNL. Overnight given hydralazine, increased propofol for SBP > 180.  CTA showed mild-moderate anterior circulation vasospasm (permissive hypertension, euvolemia).  : BD8, POD7 L hemicrani, angio coil/embo. Neuro exam unchanged. ICPs WNL.  Pending trach/PEG.    Past Medical History: No pertinent past medical history  Allergies:  Allergy Status Unknown  Home meds:     Current Meds:  MEDICATIONS  (STANDING):  baclofen 5 milliGRAM(s) Oral every 12 hours  bisacodyl Suppository 10 milliGRAM(s) Rectal daily  bromocriptine Capsule 15 milliGRAM(s) Oral every 8 hours  dexMEDEtomidine Infusion 0.2 MICROgram(s)/kG/Hr (4.25 mL/Hr) IV Continuous <Continuous>  enoxaparin Injectable 40 milliGRAM(s) SubCutaneous every 24 hours  gabapentin 600 milliGRAM(s) Oral every 8 hours  insulin lispro (ADMELOG) corrective regimen sliding scale   SubCutaneous every 6 hours  levETIRAcetam  IVPB 1000 milliGRAM(s) IV Intermittent every 12 hours  niMODipine 30 milliGRAM(s) Oral every 2 hours  pantoprazole  Injectable 40 milliGRAM(s) IV Push every 12 hours  polyethylene glycol 3350 17 Gram(s) Oral daily  senna 2 Tablet(s) Oral at bedtime  sodium chloride 2 Gram(s) Oral every 8 hours  sodium chloride 3%. 500 milliLiter(s) (20 mL/Hr) IV Continuous <Continuous>    MEDICATIONS  (PRN):  acetaminophen    Suspension .. 650 milliGRAM(s) Oral every 6 hours PRN Temp greater or equal to 38C (100.4F), Mild Pain (1 - 3)  fentaNYL    Injectable 25 MICROGram(s) IV Push every 2 hours PRN Severe Pain (7 - 10)  hydrALAZINE Injectable 5 milliGRAM(s) IV Push every 4 hours PRN >180    PHYSICAL EXAMINATION    ICU Vital Signs Last 24 Hrs  T(C): 36.9 (2021 22:00), Max: 38.2 (2021 04:00)  T(F): 98.5 (2021 22:00), Max: 100.8 (2021 04:00)  HR: 77 (2021 03:00) (74 - 113)  BP: 126/63 (2021 02:00) (122/88 - 174/76)  BP(mean): 87 (2021 02:00) (83 - 115)  ABP: 130/66 (2021 03:00) (115/63 - 189/87)  ABP(mean): 90 (2021 03:00) (82 - 118)  RR: 20 (2021 03:00) (14 - 22)  SpO2: 100% (2021 03:00) (96% - 100%)    NEUROLOGIC EXAMINATION:  Opens eyes to stimuli, does not follow commands, pupils 3mm equal and brisk (-) Doll's, (+) cough and (+) gag, B LE TF, R UE 0/5; flap full but soft; extensor posturing B UE R>L, B LE TF  GENERAL: Mode: AC, RR (machine): 14, TV (machine): 450, FiO2: 40, PEEP: 5, ITime: 1, MAP: 8, PIP: 20  EENT:  anicteric  CARDIOVASCULAR: (+) S1 S2, normal rate and regular rhythm  PULMONARY: clear to auscultation bilaterally  ABDOMEN: soft, nontender with normoactive bowel sounds  EXTREMITIES: no edema  SKIN: no rash; back incisions checked (dehiscence noted)    I/O's    10-30-21 @ 07:01  -  10-31-21 @ 07:00  --------------------------------------------------------  IN: 3027 mL / OUT: 2532 mL / NET: 495 mL    10-31-21 @ 07:01  -  21 @ 06:57  --------------------------------------------------------  IN: 2408.5 mL / OUT: 1905 mL / NET: 503.5 mL    LABS:                 (8.6)   (12)      8.9    12.27 )-----------( 210      ( 2021 05:21 )             28.5         (147)  147<H>  |  117<H>  |  10  ----------------------------<  136<H>  3.9   |  20<L>  |  0.55    Ca    9.1      2021 05:21  Phos  2.7       Mg     2.1         CARDIAC MARKERS ( 31 Oct 2021 06:12 )  x     / x     / 48 U/L / x     / x        CAPILLARY BLOOD GLUCOSE    POCT Blood Glucose.: 120 mg/dL (2021 05:11)  POCT Blood Glucose.: 129 mg/dL (31 Oct 2021 23:26)  POCT Blood Glucose.: 108 mg/dL (31 Oct 2021 17:04)  POCT Blood Glucose.: 125 mg/dL (31 Oct 2021 11:39)      Bacteriology:  10/28 CSF NGTD  10/28 Blood NG1d x2     CSF studies:  10-28  L   *** [id number] *** %N91 %L4     EEG:  Neuroimagin/01 CTA - Diffuse, but overall MILD-MODERATE, sites of VASOSPASM are noted at theANTERIOR CIRCULATION, whilst the posterior circulation appears spared.   10/27 CT - There is herniation of the brain parenchyma and to the craniectomy defect which is similar prior examination. There is no significant change in the large left frontotemporal intraparenchymal hematoma with surrounding edema. Again demonstrated is mass effect with right to left midline shift measuring approximately 1 cm which is stable from prior exam. Again demonstrated is effacement of the cerebral sulci and basal cisterns.  Other imagin/01 UE Doppler - DVT within the right brachial vein.  Superficial thrombosis of the right cephalic vein.   CXR - stable.  Mild congestion.  10/28 Doppler - Negative  10/28 TTE -   1. Normal left and right ventricular size and systolic function.   2. No significant valvular disease.   3. Mild pulmonary hypertension present, pulmonary artery systolic pressure is 35 mmHg.   4. No pericardial effusion.   5. No prior echo is available for comparison.    IV FLUIDS: 3%@30  DRIPS:  ·	precedex infusion  DIET: NPO - TF stopped at 4 am (was at goal)  Lines: R TLC IJ Madison  Drains:    ·	EVD (d7) @5cm H20, ICPs 6-12, CSF 5-14 mL/h  Wounds:    CODE STATUS:  Full Code                       GOALS OF CARE:  aggressive                      DISPOSITION:  ICU ==========================================   NEUROCRITICAL CARE ATTENDING NOTE ()  ==========================================    AILYN DÍAZ   MRN-[id number]    HPI:  47 y/o female with PMH HTN, Multiple sclerosis, recent spinal surgery 10/8 ([facility]) presented to [place] ED BIBEMS after being found unconscious at home, unknown period of time. 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 HH5, MF1. Patient was intubated at [place] ED, found to have blown L pupil, and sent straight to the OR for left hemicraniotomy. A-line placed intraop. Hemodynamically unstable upon arrival to [facility], bradycardic and hypertensive s/p Mannitol, Clevidipine, and Furosemide. Central line placed in NSICU. Currently sedated on Propofol, pending repeat CTH. History per patient's son, patient had recent spine surgery and was found on outpatient imaging last week to have L M1 segment aneurysm (unruptured), pt asymptomatic at this time. Per son patient taking percocet PO at home. Ureña catheter, ET tube, and arterial line placed at [facility].   NIHSS on arrival: 32. Bess Griffin 5, Mod Busby 4.  Bleed Day 1 = 10/26 (26 Oct 2021 13:03)    Hospital course:  10/26: admitted to [facility] from [facility], POD#0 s/p L hemicraniectomy for decompression and evacuation of SDH. Transferred to [facility] noted to have tense flap, received mannitol, keppra, decadron. Was hypertensive to 200s and preston to 40s, recevied 85g mannitol and bullet 23.4%. CTH on arrival demonstrated increased size in vents and new IVH. EVD placed, open at 15, central line placed. Transfused 2 u PRBC. POD0 of coiling of left MCA bifurcation aneurysm,   10/27: CTH demonstrated EVD in correct position, EVD lowered to 5, remains intubated on proprofol, pending repeat CTH in AM. Started on 3% @ 30/hr. 2LNS given for euvolemia, Mannitol given for uptrending ICPs. Bullet given 8:30 am. 3% increased to 50/hr. 1 L bolus. New EVD catheter placed using exisiting sreekanth hole. 1 u PRBC.  10/28: HH5, MF4, BD3; POD2 angio coil/embo of L MCA aneurysm. JOAQUÍN overnight, neuro stable. EVD@5cmH20 ICPs < 20 overnight, OP WNL. S/p 1 unit PRBC yesterday with appropriate response. Given 42g mannitol in AM for ICP 22 persistently, with improvement, then given 23% in PM for elevated ICP. febrile, pancultured. Standing tylenol and cooling blanket.   10/29: BD4, POD3 angio coil/embo. JOAQUÍN o/n, neuro stable. EVD@5, ICPs <20 o/n.   10/30: BD5, POD4 angio coil/embo. JOAQUÍN o/n neuro stable. EVD@5. 3% @50cc/hr.  10/31: BD6, POD5 angio coil/embo. brief period maintained upward gaze, resolved on its own. EVD@5cm h2o.    : BD7, POD6 L hemicrani, angio coil/embo. Neuro exam unchanged. ICPs WNL. Overnight given hydralazine, increased propofol for SBP > 180.  CTA showed mild-moderate anterior circulation vasospasm (permissive hypertension, euvolemia).  : BD8, POD7 L hemicrani, angio coil/embo. Neuro exam unchanged. ICPs WNL.  Pending trach/PEG.    Past Medical History: No pertinent past medical history  Allergies:  Allergy Status Unknown  Home meds:     Current Meds:  MEDICATIONS  (STANDING):  baclofen 5 milliGRAM(s) Oral every 12 hours  bisacodyl Suppository 10 milliGRAM(s) Rectal daily  bromocriptine Capsule 15 milliGRAM(s) Oral every 8 hours  dexMEDEtomidine Infusion 0.2 MICROgram(s)/kG/Hr (4.25 mL/Hr) IV Continuous <Continuous>  enoxaparin Injectable 40 milliGRAM(s) SubCutaneous every 24 hours  gabapentin 600 milliGRAM(s) Oral every 8 hours  insulin lispro (ADMELOG) corrective regimen sliding scale   SubCutaneous every 6 hours  levETIRAcetam  IVPB 1000 milliGRAM(s) IV Intermittent every 12 hours  niMODipine 30 milliGRAM(s) Oral every 2 hours  pantoprazole  Injectable 40 milliGRAM(s) IV Push every 12 hours  polyethylene glycol 3350 17 Gram(s) Oral daily  senna 2 Tablet(s) Oral at bedtime  sodium chloride 2 Gram(s) Oral every 8 hours  sodium chloride 3%. 500 milliLiter(s) (20 mL/Hr) IV Continuous <Continuous>    MEDICATIONS  (PRN):  acetaminophen    Suspension .. 650 milliGRAM(s) Oral every 6 hours PRN Temp greater or equal to 38C (100.4F), Mild Pain (1 - 3)  fentaNYL    Injectable 25 MICROGram(s) IV Push every 2 hours PRN Severe Pain (7 - 10)  hydrALAZINE Injectable 5 milliGRAM(s) IV Push every 4 hours PRN >180    PHYSICAL EXAMINATION    ICU Vital Signs Last 24 Hrs  T(C): 36.9 (2021 22:00), Max: 37.7 (2021 18:17)  T(F): 98.5 (2021 22:00), Max: 99.9 (2021 18:17)  HR: 97 (2021 06:10) (74 - 98)  BP: 145/69 (2021 05:00) (122/88 - 168/77)  BP(mean): 99 (2021 05:00) (83 - 115)  ABP: 181/84 (2021 06:10) (115/63 - 189/87)  ABP(mean): 122 (2021 06:10) (82 - 122)  RR: 24 (2021 06:10) (14 - 24)  SpO2: 100% (2021 06:10) (96% - 100%)    NEUROLOGIC EXAMINATION:  Opens eyes to stimuli, does not follow commands, pupils 3mm equal and brisk (-) Doll's, (+) cough and (+) gag, B LE TF, R UE 0/5; flap full but soft; extensor posturing B UE R>L, B LE TF  Mode: AC, RR (machine): 14, TV (machine): 450, FiO2: 40, PEEP: 5, ITime: 1, MAP: 12, PIP: 24  EENT:  anicteric  CARDIOVASCULAR: (+) S1 S2, normal rate and regular rhythm  PULMONARY: clear to auscultation bilaterally  ABDOMEN: soft, nontender with normoactive bowel sounds  EXTREMITIES: no edema  SKIN: no rash; back incisions checked (dehiscence noted)    I/O's    10-31-21 @ 07:01  -  21 @ 07:00  --------------------------------------------------------  IN: 2488.7 mL / OUT: 1915 mL / NET: 573.7 mL    21 @ 07:01  -  21 @ 06:45  --------------------------------------------------------  IN: 2960.3 mL / OUT: 921 mL / NET: 2039.3 mL    LABS:              (8.9)   (12)       7.6    11.20 )-----------( 232      ( 2021 06:05 )             25.1     02     (150)  152  |  x   |  13  ----------------------------<  x   3.6   |  24  |  0.58    Ca    9.1      2021 06:05  Phos  3.1     1102  Mg     2.1         CAPILLARY BLOOD GLUCOSE    POCT Blood Glucose.: 119 mg/dL (2021 05:19)  POCT Blood Glucose.: 109 mg/dL (2021 23:30)  POCT Blood Glucose.: 108 mg/dL (2021 17:22)  POCT Blood Glucose.: 120 mg/dL (2021 11:10)    Bacteriology:  10/28 CSF NGTD  10/28 Blood NG1d x2     CSF studies:  10-28  L   *** [id number] *** %N91 %L4     EEG:  Neuroimagin/01 CTA - Diffuse, but overall MILD-MODERATE, sites of VASOSPASM are noted at theANTERIOR CIRCULATION, whilst the posterior circulation appears spared.   10/27 CT - There is herniation of the brain parenchyma and to the craniectomy defect which is similar prior examination. There is no significant change in the large left frontotemporal intraparenchymal hematoma with surrounding edema. Again demonstrated is mass effect with right to left midline shift measuring approximately 1 cm which is stable from prior exam. Again demonstrated is effacement of the cerebral sulci and basal cisterns.  Other imagin/01 UE Doppler - DVT within the right brachial vein.  Superficial thrombosis of the right cephalic vein.   CXR - stable.  Mild congestion.  10/28 Doppler - Negative  10/28 TTE -   1. Normal left and right ventricular size and systolic function.   2. No significant valvular disease.   3. Mild pulmonary hypertension present, pulmonary artery systolic pressure is 35 mmHg.   4. No pericardial effusion.   5. No prior echo is available for comparison.    IV FLUIDS: 3%@30  DRIPS:  ·	precedex infusion  DIET: NPO - TF stopped at 4 am (was at goal)  Lines: R TLC IJ Madison  Drains:    ·	EVD (d7) @5cm H20, ICPs 6-12, CSF 5-14 mL/h  Wounds:    CODE STATUS:  Full Code                       GOALS OF CARE:  aggressive                      DISPOSITION:  ICU ==========================================   NEUROCRITICAL CARE ATTENDING NOTE ()  ==========================================    AILYN DÍAZ   MRN-[id number]    HPI:  45 y/o female with PMH HTN, Multiple sclerosis, recent spinal surgery 10/8 ([facility]) presented to [place] ED BIBEMS after being found unconscious at home, unknown period of time. 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 HH5, MF1. Patient was intubated at [place] ED, found to have blown L pupil, and sent straight to the OR for left hemicraniotomy. A-line placed intraop. Hemodynamically unstable upon arrival to [facility], bradycardic and hypertensive s/p Mannitol, Clevidipine, and Furosemide. Central line placed in NSICU. Currently sedated on Propofol, pending repeat CTH. History per patient's son, patient had recent spine surgery and was found on outpatient imaging last week to have L M1 segment aneurysm (unruptured), pt asymptomatic at this time. Per son patient taking percocet PO at home. Ureña catheter, ET tube, and arterial line placed at [facility].   NIHSS on arrival: 32. Bess Griffin 5, Mod Busby 4.  Bleed Day 1 = 10/26 (26 Oct 2021 13:03)    Hospital course:  10/26: admitted to [facility] from [facility], POD#0 s/p L hemicraniectomy for decompression and evacuation of SDH. Transferred to [facility] noted to have tense flap, received mannitol, keppra, decadron. Was hypertensive to 200s and preston to 40s, recevied 85g mannitol and bullet 23.4%. CTH on arrival demonstrated increased size in vents and new IVH. EVD placed, open at 15, central line placed. Transfused 2 u PRBC. POD0 of coiling of left MCA bifurcation aneurysm,   10/27: CTH demonstrated EVD in correct position, EVD lowered to 5, remains intubated on proprofol, pending repeat CTH in AM. Started on 3% @ 30/hr. 2LNS given for euvolemia, Mannitol given for uptrending ICPs. Bullet given 8:30 am. 3% increased to 50/hr. 1 L bolus. New EVD catheter placed using exisiting sreekanth hole. 1 u PRBC.  10/28: HH5, MF4, BD3; POD2 angio coil/embo of L MCA aneurysm. JOAQUÍN overnight, neuro stable. EVD@5cmH20 ICPs < 20 overnight, OP WNL. S/p 1 unit PRBC yesterday with appropriate response. Given 42g mannitol in AM for ICP 22 persistently, with improvement, then given 23% in PM for elevated ICP. febrile, pancultured. Standing tylenol and cooling blanket.   10/29: BD4, POD3 angio coil/embo. JOAQUÍN o/n, neuro stable. EVD@5, ICPs <20 o/n.   10/30: BD5, POD4 angio coil/embo. JOAQUÍN o/n neuro stable. EVD@5. 3% @50cc/hr.  10/31: BD6, POD5 angio coil/embo. brief period maintained upward gaze, resolved on its own. EVD@5cm h2o.    : BD7, POD6 L hemicrani, angio coil/embo. Neuro exam unchanged. ICPs WNL. Overnight given hydralazine, increased propofol for SBP > 180.  CTA showed mild-moderate anterior circulation vasospasm (permissive hypertension, euvolemia).  : BD8, POD7 L hemicrani, angio coil/embo. Neuro exam unchanged. ICPs WNL.  Pending trach/PEG.    Past Medical History: No pertinent past medical history  Allergies:  Allergy Status Unknown  Home meds:     Current Meds:  MEDICATIONS  (STANDING):  baclofen 5 milliGRAM(s) Oral every 12 hours  bisacodyl Suppository 10 milliGRAM(s) Rectal daily  bromocriptine Capsule 15 milliGRAM(s) Oral every 8 hours  dexMEDEtomidine Infusion 0.2 MICROgram(s)/kG/Hr (4.25 mL/Hr) IV Continuous <Continuous>  enoxaparin Injectable 40 milliGRAM(s) SubCutaneous every 24 hours  gabapentin 400 milliGRAM(s) Oral every 8 hours  insulin lispro (ADMELOG) corrective regimen sliding scale   SubCutaneous every 6 hours  lactated ringers. 500 milliLiter(s) (75 mL/Hr) IV Continuous <Continuous>  levETIRAcetam  IVPB 1000 milliGRAM(s) IV Intermittent every 12 hours  metoclopramide Injectable 10 milliGRAM(s) IV Push every 8 hours  niMODipine 30 milliGRAM(s) Oral every 2 hours  pantoprazole  Injectable 40 milliGRAM(s) IV Push every 12 hours  polyethylene glycol 3350 17 Gram(s) Oral daily  senna 2 Tablet(s) Oral at bedtime  sodium chloride 2 Gram(s) Oral every 8 hours    MEDICATIONS  (PRN):  acetaminophen    Suspension .. 650 milliGRAM(s) Oral every 6 hours PRN Temp greater or equal to 38C (100.4F), Mild Pain (1 - 3)  fentaNYL    Injectable 25 MICROGram(s) IV Push every 2 hours PRN Severe Pain (7 - 10)  hydrALAZINE Injectable 5 milliGRAM(s) IV Push every 4 hours PRN >180    PHYSICAL EXAMINATION    ICU Vital Signs Last 24 Hrs  T(C): 36.9 (2021 22:00), Max: 37.7 (2021 18:17)  T(F): 98.5 (2021 22:00), Max: 99.9 (2021 18:17)  HR: 97 (2021 06:10) (74 - 98)  BP: 145/69 (2021 05:00) (122/88 - 168/77)  BP(mean): 99 (2021 05:00) (83 - 115)  ABP: 181/84 (2021 06:10) (115/63 - 189/87)  ABP(mean): 122 (2021 06:10) (82 - 122)  RR: 24 (2021 06:10) (14 - 24)  SpO2: 100% (2021 06:10) (96% - 100%)    NEUROLOGIC EXAMINATION:  Opens eyes to stimuli, does not follow commands, pupils 3mm equal and brisk (-) Doll's, (+) cough and (+) gag, B LE TF, R UE 0/5; flap full but soft; extensor posturing B UE R>L, B LE TF  Mode: AC, RR (machine): 14, TV (machine): 450, FiO2: 40, PEEP: 5, ITime: 1, MAP: 10, PIP: 22  EENT:  anicteric  CARDIOVASCULAR: (+) S1 S2, normal rate and regular rhythm  PULMONARY: clear to auscultation bilaterally  ABDOMEN: soft, nontender with normoactive bowel sounds  EXTREMITIES: no edema  SKIN: no rash; back incisions checked (dehiscence noted)    I/O's    10-31-21 @ 07:01  -  21 @ 07:00  --------------------------------------------------------  IN: 2488.7 mL / OUT: 1915 mL / NET: 573.7 mL    21 @ 07:01  -  21 @ 06:45  --------------------------------------------------------  IN: 2960.3 mL / OUT: 921 mL / NET: 2039.3 mL    LABS:              (8.9)   (12)       7.6    11.20 )-----------( 232      ( 2021 06:05 )             25.1     02     (150)  152  |  x   |  13  ----------------------------<  x   3.6   |  24  |  0.58    Ca    9.1      2021 06:05  Phos  3.1     1102  Mg     2.1         CAPILLARY BLOOD GLUCOSE    POCT Blood Glucose.: 119 mg/dL (2021 05:19)  POCT Blood Glucose.: 109 mg/dL (2021 23:30)  POCT Blood Glucose.: 108 mg/dL (2021 17:22)  POCT Blood Glucose.: 120 mg/dL (2021 11:10)    Bacteriology:  10/28 CSF NGTD  10/28 Blood NG1d x2     CSF studies:  10-28  L   *** [id number] *** %N91 %L4     EEG:  Neuroimagin/01 CTA - Diffuse, but overall MILD-MODERATE, sites of VASOSPASM are noted at theANTERIOR CIRCULATION, whilst the posterior circulation appears spared.   10/27 CT - There is herniation of the brain parenchyma and to the craniectomy defect which is similar prior examination. There is no significant change in the large left frontotemporal intraparenchymal hematoma with surrounding edema. Again demonstrated is mass effect with right to left midline shift measuring approximately 1 cm which is stable from prior exam. Again demonstrated is effacement of the cerebral sulci and basal cisterns.  Other imagin/01 UE Doppler - DVT within the right brachial vein.  Superficial thrombosis of the right cephalic vein.   CXR - stable.  Mild congestion.  10/28 Doppler - Negative  10/28 TTE -   1. Normal left and right ventricular size and systolic function.   2. No significant valvular disease.   3. Mild pulmonary hypertension present, pulmonary artery systolic pressure is 35 mmHg.   4. No pericardial effusion.   5. No prior echo is available for comparison.    IV FLUIDS:   DRIPS:  ·	precedex infusion  DIET: NPO - TF stopped at 4 am (was at goal)  Lines: R TLC IJ Madison  Drains:    ·	EVD (d7) @5cm H20, ICPs 6-12, CSF 5-14 mL/h  Wounds:    CODE STATUS:  Full Code                       GOALS OF CARE:  aggressive                      DISPOSITION:  ICU

## 2021-11-02 NOTE — PROGRESS NOTE ADULT - SUBJECTIVE AND OBJECTIVE BOX
HPI:  47 y/o female with PMH HTN, Multiple sclerosis, recent spinal surgery 10/8 ([facility]) presented to [facility] BIBEMS after being found unconscious at home, unknown period of time. 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 HH5, MF1. Patient was intubated at [facility], found to have blown L pupil, and sent straight to the OR for left hemicraniotomy. A-line placed intraop. Hemodynamically unstable upon arrival to [facility], bradycardic and hypertensive s/p Mannitol, Clevidipine, and Furosemide. Central line placed in NSICU. Currently sedated on Propofol, pending repeat CTH. History per patient's son, patient had recent spine surgery and was found on outpatient imaging last week to have L M1 segment aneurysm (unruptured), pt asymptomatic at this time. Per son patient taking percocet PO at home. Ureña catheter, ET tube, and arterial line placed at [facility].     NIHSS on arrival: 32   Bess Griffin 5, Mod Busby 4  Bleed Day 1 = 10/26 (26 Oct 2021 13:03)    Hospital Course:  10/26: admitted to [facility] from [facility], POD#0 s/p L hemicraniectomy for decompression and evacuation of SDH. Transferred to [facility] noted to have tense flap, received mannitol, keppra, decadron. Was hypertensive to 200s and preston to 40s, recevied 85g mannitol and bullet 23.4%. CTH on arrival demonstrated increased size in vents and new IVH. EVD placed, open at 15, central line placed. Transfused 2 u PRBC. POD0 of coiling of left MCA bifurcation aneurysm,   10/27: CTH demonstrated EVD in correct position, EVD lowered to 5, remains intubated on proprofol, pending repeat CTH in AM. Started on 3% @ 30/hr. 2LNS given for euvolemia, Mannitol given for uptrending ICPs. Bullet given 8:30 am. 3% increased to 50/hr. 1 L bolus. New EVD catheter placed using exisiting sreekanth hole. 1 u PRBC.  10/28: HH5, MF4, BD3; POD2 angio coil/embo of L MCA aneurysm. JOAQUÍN overnight, neuro stable. EVD@5cmH20 ICPs < 20 overnight, OP WNL. S/p 1 unit PRBC yesterday with appropriate response. Given 42g mannitol in AM for ICP 22 persistently, with improvement, then given 23% in PM for elevated ICP. febrile, pancultured. Standing tylenol and cooling blanket.   10/29: BD4, POD3 angio coil/embo. JOAQUÍN o/n, neuro stable. EVD@5, ICPs <20 o/n.   10/30: BD5, POD4 angio coil/embo. JOAQUÍN o/n neuro stable. EVD@5. 3% @50cc/hr.  10/31: BD6, POD5 angio coil/embo. brief period maintained upward gaze, resolved on its own. EVD@5cm h2o.    11/1: BD7, POD6 L hemicrani, angio coil/embo. JOAQUÍN overnight. Neuro exam unchanged. ICPs WNL. started bromocriptine/gabapentin/baclofen. dc'd propofol, started precedex  11/2: BD8 POD7 L hemicrani, angio coil/embo. JOAQUÍN overnight. Neuro exam stable. Remains on 3% hypertonic saline.     Vital Signs Last 24 Hrs  T(C): 36.9 (01 Nov 2021 22:00), Max: 38.2 (01 Nov 2021 05:00)  T(F): 98.5 (01 Nov 2021 22:00), Max: 100.8 (01 Nov 2021 05:00)  HR: 82 (02 Nov 2021 04:00) (74 - 113)  BP: 145/72 (02 Nov 2021 04:00) (122/88 - 174/76)  BP(mean): 102 (02 Nov 2021 04:00) (83 - 115)  RR: 20 (02 Nov 2021 04:00) (14 - 22)  SpO2: 100% (02 Nov 2021 04:00) (96% - 100%)    I&O's Summary    31 Oct 2021 07:01  -  01 Nov 2021 07:00  --------------------------------------------------------  IN: 2488.7 mL / OUT: 1915 mL / NET: 573.7 mL    01 Nov 2021 07:01  -  02 Nov 2021 04:40  --------------------------------------------------------  IN: 2960.3 mL / OUT: 909 mL / NET: 2051.3 mL      PHYSICAL EXAM:  GEN: NAD, sedated on precedex  NEURO: not alert, does not FC, does not OE  CNII-XII: +cough/gag/corneals. pupils 2mm brisk b/l. b/l UE trace w/d vs extensor posture, b/l LEs TF  CV: RRR +S1/S2  PULM: CTAB  GI: Abd soft, NT/ND  EXT: ext warm, dry, nontender  WOUND: flap full/tense    TUBES/LINES:  [] Ureña  [] Lumbar Drain  [] Wound Drains  [x] Others: TIFF CANSECO, jerri    DIET:  [] NPO  [] Mechanical  [x] Tube feeds    LABS:                        8.9    12.27 )-----------( 210      ( 01 Nov 2021 05:21 )             28.5     11-01    150<H>  |  120<H>  |  11  ----------------------------<  110<H>  3.7   |  22  |  0.57    Ca    8.7      01 Nov 2021 22:09  Phos  2.7     11-01  Mg     2.1     11-01          CARDIAC MARKERS ( 31 Oct 2021 06:12 )  x     / x     / 48 U/L / x     / x          CAPILLARY BLOOD GLUCOSE      POCT Blood Glucose.: 109 mg/dL (01 Nov 2021 23:30)  POCT Blood Glucose.: 108 mg/dL (01 Nov 2021 17:22)  POCT Blood Glucose.: 120 mg/dL (01 Nov 2021 11:10)  POCT Blood Glucose.: 120 mg/dL (01 Nov 2021 05:11)      Drug Levels: [] N/A    CSF Analysis: [] N/A      Allergies    Allergy Status Unknown    Intolerances      MEDICATIONS:  Antibiotics:    Neuro:  acetaminophen    Suspension .. 650 milliGRAM(s) Oral every 6 hours PRN  baclofen 5 milliGRAM(s) Oral every 12 hours  bromocriptine Capsule 15 milliGRAM(s) Oral every 8 hours  dexMEDEtomidine Infusion 0.2 MICROgram(s)/kG/Hr IV Continuous <Continuous>  fentaNYL    Injectable 25 MICROGram(s) IV Push every 2 hours PRN  gabapentin 600 milliGRAM(s) Oral every 8 hours  levETIRAcetam  IVPB 1000 milliGRAM(s) IV Intermittent every 12 hours    Anticoagulation:  enoxaparin Injectable 40 milliGRAM(s) SubCutaneous every 24 hours    OTHER:  bisacodyl Suppository 10 milliGRAM(s) Rectal daily  chlorhexidine 0.12% Liquid 15 milliLiter(s) Oral Mucosa every 12 hours  chlorhexidine 2% Cloths 1 Application(s) Topical <User Schedule>  hydrALAZINE Injectable 5 milliGRAM(s) IV Push every 4 hours PRN  insulin lispro (ADMELOG) corrective regimen sliding scale   SubCutaneous every 6 hours  niMODipine 30 milliGRAM(s) Oral every 2 hours  pantoprazole  Injectable 40 milliGRAM(s) IV Push every 12 hours  polyethylene glycol 3350 17 Gram(s) Oral daily  senna 2 Tablet(s) Oral at bedtime    IVF:  sodium chloride 2 Gram(s) Oral every 8 hours  sodium chloride 3%. 500 milliLiter(s) IV Continuous <Continuous>    CULTURES:  Culture Results:   No growth to date (10-28 @ 19:23)  Culture Results:   No growth at 4 days. (10-28 @ 18:34)    RADIOLOGY & ADDITIONAL TESTS:      ASSESSMENT:  47 y/o female with PMHx of HTN and MS, hx of spinal surgery at [facility] 10/8/21 presented to [facility] BIBEMS after being found unconscious at home, unknown period of time. 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 HH5, MF4; BD #1 = 10/26. Patient was intubated at [facility] and sent straight to the OR for left hemicraniotomy. A-line placed intraop. Hemodynamically unstable upon arrival to [facility], bradycardic and hypertensive s/p Mannitol and Furosemide. Central line placed in NSICU. Currently sedated on Propofol. Now s/p EVD placement, and coil embolization of left MCA bifurcation aneurysm 10/26/2021.     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, left    Intracranial hemorrhage, spontaneous subarachnoid, due to cerebral aneurysm, acute    Angiogram, cerebral, with coil embolization, in non-operating room setting    Personal history of spine surgery    [email or web address]      PLAN:  NEURO:  - neuro checks/vital signs q1hr  - HOB > 30   - Keppra 1g IV BID   - Nimodipine 60q4   - bromocriptine 15mg q8hr  - Gabapentin 600mg q8hr, Baclofen 5mg BID   - propofol dc'd, precedex for sedation   - EVD open at 5cmH2O, monitor ICP/output  - CTA 11/1 with findings of moderate anterior circulation spasm     CARDIO:  - -180  - echo +mild pulm HTN  - sinus arrhythmia on cardiac monitor, f/u with EKG    PULM:  - intubated on full vent support  - aspiration precautions    GI:  - TF via OGT  - bowel regimen   - PPI BID for ?coffee ground emesis noted by RN, pending FOB test    RENAL:  - 3%@30cc/hr  - euvolemia goal   - Na 145-150   - q6hr BMP and serum osmo    HEME:  - SCDs, SQL  - s/p 2u PRBC, s/p 1u PRBC 10/27  - monitor H+H  - pend FOB   - RUE doppler for swelling 11/1: DVT R brachial and superficial cephalic thrombus    ID:  - low grade fever, no abx at this time   - leukocytosis and procal, trend   - Tylenol PRN for fever  - last pancx 10/28    ENDO:  - ISS     GOC: full code  Level of care: ICU status   Dispo: pend EVD, trach, peg    Case discussed with Dr. Duncan and Dr. Velásquez

## 2021-11-02 NOTE — PROGRESS NOTE ADULT - ASSESSMENT
46y/Female with:  L MCA ruptured aneurysm, subarachnoid hemorrhage, s/p C (10/26/2021, Dr. D'Amico @ [place]), brain compression, cerebral edema  anemia   recent spinal surgery  UGIB    PLAN: Day 1 = 10-26 ; Day 4 = 10/29; Day 21 = 11/15  NEURO: neurochecks q1h, sedation with precedex; PRN fentanyl pushes  SAH:  cont nimodipine 30 mg po q2h (hold for sBP < 140) to be given for 21 days (last day 11/15); CTA mild-moderate anterior circulation vasospasm (euvolemia, permissive hypertension)   Hydrocephalus:  EVD keep open to drain monitor ICPs at 5cm H20, ICP < 15   Neurostorming:  precedex; bromocriptine 15 mg q8h, gabapentin 600 mg q8h, baclofen 5 mg BID  Seizure prophylaxis:  levetiracetam 1000mg IV BID  Pending trach/PEG   REHAB:  physical therapy evaluation and management    EARLY MOB:  HOB elevated    PULM:  full vent support for now (Mode: AC, RR (machine): 14, TV (machine): 450, FiO2: 40, PEEP: 5, ITime: 1, MAP: 8, PIP: 20), CXR mild congestion  CARDIO:  SBP goal 100-180mm Hg, TTE results as above  ENDO:  Blood sugar goals 140-180 mg/dL, insulin sliding scale  GI:  PPI BID, f/u FOBT   DIET: TF at goal  RENAL: maintain euvolemia, Na goal 145-150; Na 147, 3% NaCl at 30 mL/h, cont salt tabs  HEM/ONC: no coagulopathy (INR= 1.03), Hb 8.9, no ASA / Plavix use; check FOBT  VTE Prophylaxis: SCDs, SQL  ID: febrile, panculture, leukocytosis improved  Social: will update son and daughter, re:  progress, prognosis, trach/PEG    *****    CORE MEASURES  1.  Hunt and Griffin Score = 5  2.  VTE prophylaxis:  [ ] administered within 24 hours of admission OR [X] reason not done: fresh bleed, unsecured aneurysm.  3.  Dysphagia screening:   [X] performed before any oral meds / liquids / food  4.  Nimodipine treatment:  [X] administered within 24 hours of admission OR [ ] reason not done:    *****    ATTENDING ATTESTATION:    Patient at high risk for neurological deterioration or death due to:  ICU delirium, aspiration PNA, DVT / PE.  Critical care time:  I have personally provided 45 minutes of critical care time, excluding time spent on separate procedures.    Plan discussed with RN, house staff.     46y/Female with:  L MCA ruptured aneurysm, subarachnoid hemorrhage, s/p [facility] (10/26/2021, Dr. D'Amico @ [place]), brain compression, cerebral edema  anemia   recent spinal surgery  UGIB    PLAN: Day 1 = 10-26 ; Day 4 = 10/29; Day 21 = 11/15  NEURO: neurochecks q1h, sedation with precedex; PRN fentanyl pushes  SAH:  cont nimodipine 30 mg po q2h (hold for sBP < 140) to be given for 21 days (last day 11/15); CTA mild-moderate anterior circulation vasospasm (euvolemia, permissive hypertension)   Hydrocephalus:  EVD keep open to drain monitor ICPs at 5cm H20, ICP < 15   Neurostorming:  precedex; bromocriptine 15 mg q8h, gabapentin 600 mg q8h, baclofen 5 mg BID  Seizure prophylaxis:  levetiracetam 1000mg IV BID  Pending trach/PEG   REHAB:  physical therapy evaluation and management    EARLY MOB:  HOB elevated    PULM:  full vent support for now (Mode: AC, RR (machine): 14, TV (machine): 450, FiO2: 40, PEEP: 5, ITime: 1, MAP: 10, PIP: 22), CXR mild congestion  CARDIO:  SBP goal 100-180mm Hg, TTE results as above  ENDO:  Blood sugar goals 140-180 mg/dL, insulin sliding scale  GI:  PPI BID, FOBT negative  DIET: TF at goal  RENAL: maintain euvolemia, Na goal 145-150; Na 147, 3% NaCl at 30 mL/h, cont salt tabs  HEM/ONC: no coagulopathy (INR= 1.03), Hb 8.9, no ASA / Plavix use; FOBT negative  VTE Prophylaxis: SCDs, SQL  ID: febrile, panculture, leukocytosis improved  Social: will update son and daughter, re:  progress, prognosis, trach/PEG    *****    CORE MEASURES  1.  Hunt and Griffin Score = 5  2.  VTE prophylaxis:  [ ] administered within 24 hours of admission OR [X] reason not done: fresh bleed, unsecured aneurysm.  3.  Dysphagia screening:   [X] performed before any oral meds / liquids / food  4.  Nimodipine treatment:  [X] administered within 24 hours of admission OR [ ] reason not done:    *****    ATTENDING ATTESTATION:    Patient at high risk for neurological deterioration or death due to:  ICU delirium, aspiration PNA, DVT / PE.  Critical care time:  I have personally provided 45 minutes of critical care time, excluding time spent on separate procedures.    Plan discussed with RN, house staff.

## 2021-11-02 NOTE — CONSULT NOTE ADULT - SUBJECTIVE AND OBJECTIVE BOX
HPI:  47 y/o female with PMH HTN, Multiple sclerosis, recent spinal surgery 10/8 ([facility]) presented to [place] [facility] BIBEMS after being found unconscious at home, unknown period of time. 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 HH5, MF1. Patient was intubated at [place] ED, found to have blown L pupil, and sent straight to the OR for left hemicraniotomy. A-line placed intraop. Hemodynamically unstable upon arrival to [facility], bradycardic and hypertensive s/p Mannitol, Clevidipine, and Furosemide. Central line placed in NSICU. Currently sedated on Propofol, pending repeat CTH. History per patient's son, patient had recent spine surgery and was found on outpatient imaging last week to have L M1 segment aneurysm (unruptured), pt asymptomatic at this time. Per son patient taking percocet PO at home. Ureña catheter, ET tube, and arterial line placed at [facility].     NIHSS on arrival: 32   Bess Griffin 5, Mod Busby 4  Bleed Day 1 = 10/26 (26 Oct 2021 13:03)    SURGERY ADDENDUM: 46yoF with PMH HTN, MS and PSH spine surgery 10/8/21 admitted to [facility] with subdural hematoma s/p hemicraniectomy for decompression and evacuation of SDH 10/26/21 transferred to [facility] noted to have tense flap s/p EVD placement and coiling of MCA bifurcation aneurysm 10/26/21. Remains intubated/sedated with poor prognosis. General surgery consulted for trach/peg placement.     Upon seeing patient, intubated, sedated on precedex. Last BM yesterday afternoon, nonbloody. Per RN report patient off TF today 2/2 emesis, held since this AM.      PAST MEDICAL & SURGICAL HISTORY:  No pertinent past medical history    Personal history of spine surgery        REVIEW OF SYSTEMS  Negative except as per HPI.      MEDICATIONS  (STANDING):  baclofen 5 milliGRAM(s) Oral every 12 hours  bisacodyl Suppository 10 milliGRAM(s) Rectal daily  bromocriptine Capsule 15 milliGRAM(s) Oral every 8 hours  chlorhexidine 0.12% Liquid 15 milliLiter(s) Oral Mucosa every 12 hours  chlorhexidine 2% Cloths 1 Application(s) Topical <User Schedule>  dexMEDEtomidine Infusion 0.2 MICROgram(s)/kG/Hr (4.25 mL/Hr) IV Continuous <Continuous>  enoxaparin Injectable 40 milliGRAM(s) SubCutaneous every 24 hours  gabapentin 400 milliGRAM(s) Oral every 8 hours  insulin lispro (ADMELOG) corrective regimen sliding scale   SubCutaneous every 6 hours  lactated ringers. 500 milliLiter(s) (75 mL/Hr) IV Continuous <Continuous>  levETIRAcetam  IVPB 1000 milliGRAM(s) IV Intermittent every 12 hours  metoclopramide Injectable 10 milliGRAM(s) IV Push every 8 hours  niMODipine 30 milliGRAM(s) Oral every 2 hours  pantoprazole  Injectable 40 milliGRAM(s) IV Push every 12 hours  polyethylene glycol 3350 17 Gram(s) Oral daily  senna 2 Tablet(s) Oral at bedtime  sodium chloride 2 Gram(s) Oral every 8 hours    MEDICATIONS  (PRN):  acetaminophen    Suspension .. 650 milliGRAM(s) Oral every 6 hours PRN Temp greater or equal to 38C (100.4F), Mild Pain (1 - 3)  fentaNYL    Injectable 25 MICROGram(s) IV Push every 2 hours PRN Severe Pain (7 - 10)  hydrALAZINE Injectable 5 milliGRAM(s) IV Push every 4 hours PRN >180      Allergies    Allergy Status Unknown    Intolerances        SOCIAL HISTORY:    FAMILY HISTORY:      Vital Signs Last 24 Hrs  T(C): 37.5 (02 Nov 2021 14:19), Max: 37.7 (01 Nov 2021 18:17)  T(F): 99.5 (02 Nov 2021 14:19), Max: 99.9 (01 Nov 2021 18:17)  HR: 87 (02 Nov 2021 15:00) (74 - 102)  BP: 160/79 (02 Nov 2021 14:00) (122/88 - 174/81)  BP(mean): 112 (02 Nov 2021 14:00) (83 - 117)  RR: 15 (02 Nov 2021 15:00) (14 - 24)  SpO2: 100% (02 Nov 2021 15:00) (96% - 100%)    PHYSICAL EXAM:   GEN: NAD, intubated and sedated on precedex  HEENT: OG tube in place, clamped  CV: NSR cardiac monitor   Pulm: no respiratory distress, intubated on MV   Abd: soft, ND, infraumbilical incision well healed, CDI, difficult to assess TTP 2/2 sedated status   Ext: WWP, mild 4ext edema, nonpitting        LABS:                        7.6    11.20 )-----------( 232      ( 02 Nov 2021 06:05 )             25.1     11-02    152<H>  |  120<H>  |  13  ----------------------------<  119<H>  3.6   |  24  |  0.58    Ca    9.1      02 Nov 2021 06:05  Phos  3.1     11-02  Mg     2.1     11-02              CAPILLARY BLOOD GLUCOSE      POCT Blood Glucose.: 104 mg/dL (02 Nov 2021 11:37)  POCT Blood Glucose.: 119 mg/dL (02 Nov 2021 05:19)  POCT Blood Glucose.: 109 mg/dL (01 Nov 2021 23:30)  POCT Blood Glucose.: 108 mg/dL (01 Nov 2021 17:22)        Culture - CSF with Gram Stain (collected 28 Oct 2021 19:23)  Source: .CSF CSF  Gram Stain (28 Oct 2021 19:50):    No organisms seen    Few WBC's  Preliminary Report (29 Oct 2021 08:26):    No growth to date    Urinalysis with Rflx Culture (collected 28 Oct 2021 19:05)    Culture - Blood (collected 28 Oct 2021 18:34)  Source: .Blood Blood-Peripheral  Preliminary Report (01 Nov 2021 19:00):    No growth at 4 days.    Culture - Blood (collected 28 Oct 2021 18:34)  Source: .Blood Blood-Peripheral  Preliminary Report (01 Nov 2021 19:00):    No growth at 4 days.

## 2021-11-02 NOTE — CONSULT NOTE ADULT - ASSESSMENT
46yoF with PMH HTN, MS and PSH anterior/posterior spine surgery 10/8/21 admitted to [facility] with subdural hematoma s/p hemicraniectomy for decompression and evacuation of SDH 10/26/21 transferred to [facility] noted to have tense flap s/p EVD placement and coiling of MCA bifurcation aneurysm 10/26/21. Remains intubated/sedated with poor prognosis. General surgery consulted for trach/peg placement. Notable emesis today, not tolerating TFs.     No acute general surgical intervention at this time. Trach/peg on hold given recent vomiting.   Will re-evaluate once tolerating tube feeds via dobhoff.  Team 4c will sign off at this time, please reconsult once patient tolerating TF without emesis and adequate bowel function.       Plan discussed with attending and chief resident.   ____________________________________________________   Magdi - Resident   Surgery

## 2021-11-02 NOTE — PROGRESS NOTE ADULT - ASSESSMENT
46y/Female with:  L MCA ruptured aneurysm, subarachnoid hemorrhage, s/p [facility] (10/26/2021, Dr. D'Amico @ [place]), brain compression, cerebral edema  anemia   recent spinal surgery  UGIB    PLAN: Day 1 = 10-26 ; Day 4 = 10/29; Day 21 = 11/15  NEURO: neurochecks q1h, sedation with precedex; PRN fentanyl pushes  SAH:  cont nimodipine 60 mg po q4h to be given for 21 days (last day 11/15)   Hydrocephalus:  EVD at 5 cm H20 open to drain  ICP crisis: keep head position neutral, sedation with propofol, osmotherapy, s/p DHC; repeat POSm  d/c HMV if ok with NS  Seizure prophylaxis:  levetiracetam 1000mg IV BID   REHAB:  physical therapy evaluation and management    EARLY MOB:  HOB elevated    PULM:  full vent support for now  CARDIO:  SBP goal 100-180mm Hg,   ENDO:  Blood sugar goals 140-180 mg/dL, insulin sliding scale  GI:  PPI BID, f/u FOBT   DIET: TF at goal  RENAL: maintain euvolemia, Na goal 145-150; recheck BMP this afternoon cont salt tabs 2g q8h, 3%@30; 23.4% if ICP elevated  HEM/ONC: no coagulopathy (INR= 1.03), no ASA / Plavix use;  anemia  check FOBT  VTE Prophylaxis: SCDs, SQH q8h  ID: febrile, panculture, leukocytosis improved  Social: will update son and daughter  WOUND: dehiscence spinal surgery wound    CORE MEASURES  1.  Hunt and Griffin Score = 5  2.  VTE prophylaxis:  [ ] administered within 24 hours of admission OR [X] reason not done: fresh bleed, unsecured aneurysm.  3.  Dysphagia screening:   [X] performed before any oral meds / liquids / food  4.  Nimodipine treatment:  [X] administered within 24 hours of admission OR [ ] reason not done:    ATTENDING ATTESTATION:  I was physically present for the key portions of the evaluation and management (E/M) service provided.  I agree with the above history, physical and plan, which I have reviewed and edited where appropriate.    Patient at high risk for neurological deterioration or death due to:  ICU delirium, aspiration PNA, DVT / PE.  Critical care time:  I have personally provided 30 minutes of critical care time, excluding time spent on separate procedures.      Plan discussed with RN, house staff.     46y/Female with:  L MCA ruptured aneurysm, subarachnoid hemorrhage, s/p [facility] (10/26/2021, Dr. D'Amico @ [place]), brain compression, cerebral edema  anemia   recent spinal surgery  UGIB    PLAN: Day 1 = 10-26 ; Day 4 = 10/29; Day 21 = 11/15  NEURO: neurochecks q1h, sedation with precedex; PRN fentanyl pushes  SAH:  cont nimodipine 30 mg po q2h to be given for 21 days (last day 11/15)   Hydrocephalus:  EVD at 5 cm H20 open to drain  Seizure prophylaxis:  levetiracetam 1000mg IV BID   REHAB:  physical therapy evaluation and management    EARLY MOB:  HOB elevated    PULM:  full vent support for now, trach/PEG schedule - f/u with Gen Surg  CARDIO:  SBP goal 100-180mm Hg,   ENDO:  Blood sugar goals 140-180 mg/dL, insulin sliding scale  GI:  PPI BID; abdominal ultrasound; repeat lipase  DIET: NPO for now  RENAL: maintain euvolemia, Na goal 145-150; cont salt tabs 2g q8h, off 3%; recheck BMp in AM  HEM/ONC: no coagulopathy (INR= 1.03), no ASA / Plavix use;  anemia  ; given 1 unit  VTE Prophylaxis: SCDs, SQL   ID: afebrile, leukocytosis   Social: will update son and daughter  WOUND: dehiscence spinal surgery wound; wound care consult    CORE MEASURES  1.  Hunt and Griffin Score = 5  2.  VTE prophylaxis:  [ ] administered within 24 hours of admission OR [X] reason not done: fresh bleed, unsecured aneurysm.  3.  Dysphagia screening:   [X] performed before any oral meds / liquids / food  4.  Nimodipine treatment:  [X] administered within 24 hours of admission OR [ ] reason not done:    ATTENDING ATTESTATION:  I was physically present for the key portions of the evaluation and management (E/M) service provided.  I agree with the above history, physical and plan, which I have reviewed and edited where appropriate.    Patient at high risk for neurological deterioration or death due to:  ICU delirium, aspiration PNA, DVT / PE.  Critical care time:  I have personally provided 30 minutes of critical care time, excluding time spent on separate procedures.      Plan discussed with RN, house staff.

## 2021-11-02 NOTE — PROGRESS NOTE ADULT - SUBJECTIVE AND OBJECTIVE BOX
=================================  NEUROCRITICAL CARE ATTENDING NOTE  =================================    AILYN DÍAZ   [id number]  Summary:  46y/F with recent spinal surgery, found down and brought to ED.  In ED, witnessed shaking, ?seizures, intubated.  Imaging showed SAH.  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     COURSE IN THE HOSPITAL:  10/26 admitted to [facility], [place] - mannitol, 23.4%, repeat CT HCP - EVD R frontal inserted, s/p angio coil embo, 2 units pRBC  10/27 remained intubated overnight, given mannitol overnight; EVD reinserted, 1 unit pRBC  10/28 Tmax 38.2, ICPs to low 20s in AM, mannitol 0.5/Kg x1, 23.4% x1 in PM  10/29 Tmax 38.  remained intubated  10/30 TF stopped for vomiting/aspiration event  10/31 Tmax 38.2 No significant events overnight., remained intubated    Tmax 37.8 given 1 unit pRBC    Past Medical History: No pertinent past medical history  Allergies:  Allergy Status Unknown  Home meds:     PHYSICAL EXAMINATION  T(C): 37.1 ( @ 19:00), Max: 37.8 ( @ 17:00) HR: 78 ( @ 19:00) (73 - 102) BP: 158/79 ( @ 19:00) (122/88 - 177/85) RR: 14 ( @ 19:00) (14 - 24) SpO2: 98% ( @ 19:00) (96% - 100%)  NEUROLOGIC EXAMINATION:  Patient does not open eyes, does not follow commands, pupils 3mm equal and brisk (-) Doll's, (+) cough and gag, B LE TF, R UE 0/5; flap soft; extensor posturing B UE R>L, B LE TF  GENERAL: intubated 450 12 +5 40%  EENT:  anicteric  CARDIOVASCULAR: (+) S1 S2, normal rate and regular rhythm  PULMONARY: clear to auscultation bilaterally  ABDOMEN: soft, nontender with normoactive bowel sounds  EXTREMITIES: no edema  SKIN: no rash    LABS:   CAPILLARY BLOOD GLUCOSE 99 104 119 109     (11.2)  10.0 (7.6)  12.15 )-----------( 269      ( 2021 15:49 )             31.6   (147)  147<H>  |  115<H>  |  9   ----------------------------<  113<H>  3.4<L>   |  22  |  0.53    Ca    9.4      2021 15:49  Phos  3.1       Mg     2.1         TPro  7.0  /  Alb  3.3  /  TBili  0.4  /  DBili  x   /  AST  32  /  ALT  17  /  AlkPhos  95   @ 07:01  -   @ 07:00  IN: 3168.9 mL / OUT: 987 mL / NET: 2181.9 mL       Bacteriology:  10/28 CSF NGTD  10/28 Blood NG5D x2  (final)    CSF studies:  10-28  L   *** [id number] *** %N91 %L4     EEG:  Neuroimaging:  Other imaging:    MEDICATIONS:     ·	enoxaparin Injectable 40 SubCutaneous every 24 hours  ·	baclofen 5 Oral every 12 hours  ·	bromocriptine Capsule 15 Oral every 8 hours  ·	gabapentin 400 Oral every 8 hours  ·	levETIRAcetam  IVPB 1000 IV Intermittent every 12 hours  ·	metoclopramide Injectable 10 IV Push every 8 hours  ·	niMODipine 30 milliGRAM(s) Oral every 2 hours  ·	pantoprazole  Injectable 40 IV Push every 12 hours  ·	polyethylene glycol 3350 17 Oral daily  ·	senna 2 Oral at bedtime  ·	insulin lispro (ADMELOG) corrective regimen sliding scale  SubCutaneous every 6 hours  ·	sodium chloride 2 Oral every 8 hours  ·	acetaminophen    Suspension .. 650 Oral every 6 hours PRN  ·	fentaNYL    Injectable 25 IV Push every 2 hours PRN  ·	hydrALAZINE Injectable 5 IV Push every 4 hours PRN    IV FLUIDS: LR@75  DRIPS:  ·	dexMEDEtomidine Infusion 0.2 IV Continuous <Continuous>  DIET: NPO - TF stopped at 4 am (was at goal)  Lines: R TLC IJ [place]  Drains:      External Ventricular Device (mL): 5cm H20 open to drain, output 237 mL  Wounds:    CODE STATUS:  Full Code                       GOALS OF CARE:  aggressive                      DISPOSITION:  ICU =================================  NEUROCRITICAL CARE ATTENDING NOTE  =================================    AILYN DÍAZ   [id number]  Summary:  46y/F with recent spinal surgery, found down and brought to ED.  In ED, witnessed shaking, ?seizures, intubated.  Imaging showed SAH.  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     COURSE IN THE HOSPITAL:  10/26 admitted to [facility], [place] - mannitol, 23.4%, repeat CT HCP - EVD R frontal inserted, s/p angio coil embo, 2 units pRBC  10/27 remained intubated overnight, given mannitol overnight; EVD reinserted, 1 unit pRBC  10/28 Tmax 38.2, ICPs to low 20s in AM, mannitol 0.5/Kg x1, 23.4% x1 in PM  10/29 Tmax 38.  remained intubated  10/30 TF stopped for vomiting/aspiration event  10/31 Tmax 38.2 No significant events overnight., remained intubated    Tmax 37.8 given 1 unit pRBC    Past Medical History: No pertinent past medical history  Allergies:  Allergy Status Unknown  Home meds:     PHYSICAL EXAMINATION  T(C): 37.1 ( @ 19:00), Max: 37.8 ( @ 17:00) HR: 78 ( @ 19:00) (73 - 102) BP: 158/79 ( @ 19:00) (122/88 - 177/85) RR: 14 ( @ 19:00) (14 - 24) SpO2: 98% ( @ 19:00) (96% - 100%)  NEUROLOGIC EXAMINATION:  Patient does not open eyes, does not follow commands, pupils 3mm equal and brisk (-) Doll's, (+) cough and gag, B LE TF, R UE 0/5; flap soft; extensor posturing B UE R>L, B LE TF  GENERAL: intubated 450 12 +5 40%  EENT:  anicteric  CARDIOVASCULAR: (+) S1 S2, normal rate and regular rhythm  PULMONARY: clear to auscultation bilaterally  ABDOMEN: soft, nontender with normoactive bowel sounds  EXTREMITIES: no edema  SKIN: no rash    LABS:   CAPILLARY BLOOD GLUCOSE 99 104 119 109     (11.2)  10.0 (7.6)  12.15 )-----------( 269      ( 2021 15:49 )             31.6   (147)  147<H>  |  115<H>  |  9   ----------------------------<  113<H>  3.4<L>   |  22  |  0.53    Ca    9.4      2021 15:49  Phos  3.1       Mg     2.1         TPro  7.0  /  Alb  3.3  /  TBili  0.4  /  DBili  x   /  AST  32  /  ALT  17  /  AlkPhos  95   @ 07:01  -   @ 07:00  IN: 3168.9 mL / OUT: 987 mL / NET: 2181.9 mL       Bacteriology:  10/28 CSF NGTD  10/28 Blood NG5D x2  (final)    CSF studies:  10-28  L   *** [id number] *** %N91 %L4     EEG:  Neuroimaging:  Other imaging:    MEDICATIONS:     ·	enoxaparin Injectable 40 SubCutaneous every 24 hours  ·	baclofen 5 Oral every 12 hours  ·	bromocriptine Capsule 15 Oral every 8 hours  ·	gabapentin 400 Oral every 8 hours  ·	levETIRAcetam  IVPB 1000 IV Intermittent every 12 hours  ·	metoclopramide Injectable 10 IV Push every 8 hours  ·	niMODipine 30 milliGRAM(s) Oral every 2 hours  ·	pantoprazole  Injectable 40 IV Push every 12 hours  ·	polyethylene glycol 3350 17 Oral daily  ·	senna 2 Oral at bedtime  ·	insulin lispro (ADMELOG) corrective regimen sliding scale  SubCutaneous every 6 hours  ·	sodium chloride 2 Oral every 8 hours  ·	acetaminophen    Suspension .. 650 Oral every 6 hours PRN  ·	fentaNYL    Injectable 25 IV Push every 2 hours PRN  ·	hydrALAZINE Injectable 5 IV Push every 4 hours PRN    IV FLUIDS: LR@75  DRIPS:  ·	dexMEDEtomidine Infusion 0.2 IV Continuous <Continuous>  DIET: NPO   Lines: R TLC IJ [place]  Drains:      External Ventricular Device (mL): 5cm H20   Wounds:    CODE STATUS:  Full Code                       GOALS OF CARE:  aggressive                      DISPOSITION:  ICU

## 2021-11-03 LAB
ANION GAP SERPL CALC-SCNC: 9 MMOL/L — SIGNIFICANT CHANGE UP (ref 5–17)
ANION GAP SERPL CALC-SCNC: 9 MMOL/L — SIGNIFICANT CHANGE UP (ref 5–17)
BUN SERPL-MCNC: 11 MG/DL — SIGNIFICANT CHANGE UP (ref 7–23)
BUN SERPL-MCNC: 13 MG/DL — SIGNIFICANT CHANGE UP (ref 7–23)
CALCIUM SERPL-MCNC: 8.9 MG/DL — SIGNIFICANT CHANGE UP (ref 8.4–10.5)
CALCIUM SERPL-MCNC: 9.1 MG/DL — SIGNIFICANT CHANGE UP (ref 8.4–10.5)
CHLORIDE SERPL-SCNC: 112 MMOL/L — HIGH (ref 96–108)
CHLORIDE SERPL-SCNC: 115 MMOL/L — HIGH (ref 96–108)
CO2 SERPL-SCNC: 22 MMOL/L — SIGNIFICANT CHANGE UP (ref 22–31)
CO2 SERPL-SCNC: 22 MMOL/L — SIGNIFICANT CHANGE UP (ref 22–31)
CREAT SERPL-MCNC: 0.48 MG/DL — LOW (ref 0.5–1.3)
CREAT SERPL-MCNC: 0.54 MG/DL — SIGNIFICANT CHANGE UP (ref 0.5–1.3)
GLUCOSE SERPL-MCNC: 111 MG/DL — HIGH (ref 70–99)
GLUCOSE SERPL-MCNC: 97 MG/DL — SIGNIFICANT CHANGE UP (ref 70–99)
HCT VFR BLD CALC: 28.2 % — LOW (ref 34.5–45)
HGB BLD-MCNC: 8.9 G/DL — LOW (ref 11.5–15.5)
LIDOCAIN IGE QN: 105 U/L — HIGH (ref 7–60)
MAGNESIUM SERPL-MCNC: 1.8 MG/DL — SIGNIFICANT CHANGE UP (ref 1.6–2.6)
MCHC RBC-ENTMCNC: 26.1 PG — LOW (ref 27–34)
MCHC RBC-ENTMCNC: 31.6 GM/DL — LOW (ref 32–36)
MCV RBC AUTO: 82.7 FL — SIGNIFICANT CHANGE UP (ref 80–100)
NRBC # BLD: 0 /100 WBCS — SIGNIFICANT CHANGE UP (ref 0–0)
PHOSPHATE SERPL-MCNC: 2.7 MG/DL — SIGNIFICANT CHANGE UP (ref 2.5–4.5)
PLATELET # BLD AUTO: 263 K/UL — SIGNIFICANT CHANGE UP (ref 150–400)
POTASSIUM SERPL-MCNC: 3.6 MMOL/L — SIGNIFICANT CHANGE UP (ref 3.5–5.3)
POTASSIUM SERPL-MCNC: 3.8 MMOL/L — SIGNIFICANT CHANGE UP (ref 3.5–5.3)
POTASSIUM SERPL-SCNC: 3.6 MMOL/L — SIGNIFICANT CHANGE UP (ref 3.5–5.3)
POTASSIUM SERPL-SCNC: 3.8 MMOL/L — SIGNIFICANT CHANGE UP (ref 3.5–5.3)
RBC # BLD: 3.41 M/UL — LOW (ref 3.8–5.2)
RBC # FLD: 20.1 % — HIGH (ref 10.3–14.5)
SODIUM SERPL-SCNC: 143 MMOL/L — SIGNIFICANT CHANGE UP (ref 135–145)
SODIUM SERPL-SCNC: 146 MMOL/L — HIGH (ref 135–145)
WBC # BLD: 9.75 K/UL — SIGNIFICANT CHANGE UP (ref 3.8–10.5)
WBC # FLD AUTO: 9.75 K/UL — SIGNIFICANT CHANGE UP (ref 3.8–10.5)

## 2021-11-03 PROCEDURE — 71045 X-RAY EXAM CHEST 1 VIEW: CPT | Mod: 26,76

## 2021-11-03 PROCEDURE — 99291 CRITICAL CARE FIRST HOUR: CPT

## 2021-11-03 PROCEDURE — 43752 NASAL/OROGASTRIC W/TUBE PLMT: CPT

## 2021-11-03 RX ORDER — SODIUM,POTASSIUM PHOSPHATES 278-250MG
1 POWDER IN PACKET (EA) ORAL ONCE
Refills: 0 | Status: COMPLETED | OUTPATIENT
Start: 2021-11-03 | End: 2021-11-03

## 2021-11-03 RX ORDER — BACLOFEN 100 %
10 POWDER (GRAM) MISCELLANEOUS EVERY 12 HOURS
Refills: 0 | Status: DISCONTINUED | OUTPATIENT
Start: 2021-11-03 | End: 2021-11-04

## 2021-11-03 RX ORDER — SODIUM CHLORIDE 9 MG/ML
1000 INJECTION, SOLUTION INTRAVENOUS
Refills: 0 | Status: DISCONTINUED | OUTPATIENT
Start: 2021-11-03 | End: 2021-11-04

## 2021-11-03 RX ORDER — POTASSIUM CHLORIDE 20 MEQ
20 PACKET (EA) ORAL ONCE
Refills: 0 | Status: COMPLETED | OUTPATIENT
Start: 2021-11-03 | End: 2021-11-03

## 2021-11-03 RX ORDER — SODIUM CHLORIDE 5 G/100ML
500 INJECTION, SOLUTION INTRAVENOUS
Refills: 0 | Status: DISCONTINUED | OUTPATIENT
Start: 2021-11-03 | End: 2021-11-04

## 2021-11-03 RX ORDER — GABAPENTIN 400 MG/1
600 CAPSULE ORAL EVERY 8 HOURS
Refills: 0 | Status: DISCONTINUED | OUTPATIENT
Start: 2021-11-03 | End: 2021-11-03

## 2021-11-03 RX ORDER — SODIUM CHLORIDE 9 MG/ML
500 INJECTION INTRAMUSCULAR; INTRAVENOUS; SUBCUTANEOUS ONCE
Refills: 0 | Status: COMPLETED | OUTPATIENT
Start: 2021-11-03 | End: 2021-11-03

## 2021-11-03 RX ORDER — MAGNESIUM SULFATE 500 MG/ML
1 VIAL (ML) INJECTION ONCE
Refills: 0 | Status: COMPLETED | OUTPATIENT
Start: 2021-11-03 | End: 2021-11-03

## 2021-11-03 RX ORDER — SODIUM CHLORIDE 9 MG/ML
30 INJECTION INTRAMUSCULAR; INTRAVENOUS; SUBCUTANEOUS ONCE
Refills: 0 | Status: COMPLETED | OUTPATIENT
Start: 2021-11-03 | End: 2021-11-03

## 2021-11-03 RX ORDER — GABAPENTIN 400 MG/1
600 CAPSULE ORAL EVERY 8 HOURS
Refills: 0 | Status: DISCONTINUED | OUTPATIENT
Start: 2021-11-03 | End: 2021-11-04

## 2021-11-03 RX ORDER — FENTANYL CITRATE 50 UG/ML
25 INJECTION INTRAVENOUS
Refills: 0 | Status: DISCONTINUED | OUTPATIENT
Start: 2021-11-03 | End: 2021-11-09

## 2021-11-03 RX ORDER — PANTOPRAZOLE SODIUM 20 MG/1
40 TABLET, DELAYED RELEASE ORAL DAILY
Refills: 0 | Status: DISCONTINUED | OUTPATIENT
Start: 2021-11-04 | End: 2021-11-07

## 2021-11-03 RX ORDER — NIMODIPINE 60 MG/10ML
30 SOLUTION ORAL
Refills: 0 | Status: DISCONTINUED | OUTPATIENT
Start: 2021-11-03 | End: 2021-11-07

## 2021-11-03 RX ORDER — NIMODIPINE 60 MG/10ML
60 SOLUTION ORAL EVERY 4 HOURS
Refills: 0 | Status: DISCONTINUED | OUTPATIENT
Start: 2021-11-03 | End: 2021-11-03

## 2021-11-03 RX ORDER — SODIUM CHLORIDE 9 MG/ML
3 INJECTION INTRAMUSCULAR; INTRAVENOUS; SUBCUTANEOUS EVERY 6 HOURS
Refills: 0 | Status: DISCONTINUED | OUTPATIENT
Start: 2021-11-03 | End: 2021-11-06

## 2021-11-03 RX ADMIN — GABAPENTIN 600 MILLIGRAM(S): 400 CAPSULE ORAL at 17:32

## 2021-11-03 RX ADMIN — BROMOCRIPTINE MESYLATE 15 MILLIGRAM(S): 5 CAPSULE ORAL at 16:33

## 2021-11-03 RX ADMIN — NIMODIPINE 60 MILLIGRAM(S): 60 SOLUTION ORAL at 16:33

## 2021-11-03 RX ADMIN — NIMODIPINE 30 MILLIGRAM(S): 60 SOLUTION ORAL at 05:27

## 2021-11-03 RX ADMIN — Medication 10 MILLIGRAM(S): at 18:34

## 2021-11-03 RX ADMIN — CHLORHEXIDINE GLUCONATE 15 MILLILITER(S): 213 SOLUTION TOPICAL at 18:28

## 2021-11-03 RX ADMIN — Medication 10 MILLIGRAM(S): at 05:27

## 2021-11-03 RX ADMIN — SODIUM CHLORIDE 30 MILLILITER(S): 9 INJECTION INTRAMUSCULAR; INTRAVENOUS; SUBCUTANEOUS at 14:38

## 2021-11-03 RX ADMIN — BROMOCRIPTINE MESYLATE 15 MILLIGRAM(S): 5 CAPSULE ORAL at 05:28

## 2021-11-03 RX ADMIN — SODIUM CHLORIDE 2 GRAM(S): 9 INJECTION INTRAMUSCULAR; INTRAVENOUS; SUBCUTANEOUS at 05:28

## 2021-11-03 RX ADMIN — DEXMEDETOMIDINE HYDROCHLORIDE IN 0.9% SODIUM CHLORIDE 4.25 MICROGRAM(S)/KG/HR: 4 INJECTION INTRAVENOUS at 10:21

## 2021-11-03 RX ADMIN — SODIUM CHLORIDE 1000 MILLILITER(S): 9 INJECTION INTRAMUSCULAR; INTRAVENOUS; SUBCUTANEOUS at 23:00

## 2021-11-03 RX ADMIN — CHLORHEXIDINE GLUCONATE 1 APPLICATION(S): 213 SOLUTION TOPICAL at 05:27

## 2021-11-03 RX ADMIN — Medication 5 MILLIGRAM(S): at 05:28

## 2021-11-03 RX ADMIN — Medication 1 TABLET(S): at 07:55

## 2021-11-03 RX ADMIN — Medication 100 GRAM(S): at 07:55

## 2021-11-03 RX ADMIN — Medication 40 MILLIEQUIVALENT(S): at 02:53

## 2021-11-03 RX ADMIN — SODIUM CHLORIDE 50 MILLILITER(S): 5 INJECTION, SOLUTION INTRAVENOUS at 18:26

## 2021-11-03 RX ADMIN — GABAPENTIN 600 MILLIGRAM(S): 400 CAPSULE ORAL at 22:14

## 2021-11-03 RX ADMIN — PANTOPRAZOLE SODIUM 40 MILLIGRAM(S): 20 TABLET, DELAYED RELEASE ORAL at 05:27

## 2021-11-03 RX ADMIN — LEVETIRACETAM 400 MILLIGRAM(S): 250 TABLET, FILM COATED ORAL at 18:26

## 2021-11-03 RX ADMIN — NIMODIPINE 30 MILLIGRAM(S): 60 SOLUTION ORAL at 07:33

## 2021-11-03 RX ADMIN — GABAPENTIN 400 MILLIGRAM(S): 400 CAPSULE ORAL at 05:28

## 2021-11-03 RX ADMIN — ENOXAPARIN SODIUM 40 MILLIGRAM(S): 100 INJECTION SUBCUTANEOUS at 21:07

## 2021-11-03 RX ADMIN — NIMODIPINE 30 MILLIGRAM(S): 60 SOLUTION ORAL at 21:07

## 2021-11-03 RX ADMIN — Medication 10 MILLIGRAM(S): at 21:07

## 2021-11-03 RX ADMIN — FENTANYL CITRATE 25 MICROGRAM(S): 50 INJECTION INTRAVENOUS at 12:30

## 2021-11-03 RX ADMIN — NIMODIPINE 30 MILLIGRAM(S): 60 SOLUTION ORAL at 22:13

## 2021-11-03 RX ADMIN — CHLORHEXIDINE GLUCONATE 15 MILLILITER(S): 213 SOLUTION TOPICAL at 05:27

## 2021-11-03 RX ADMIN — NIMODIPINE 30 MILLIGRAM(S): 60 SOLUTION ORAL at 04:09

## 2021-11-03 RX ADMIN — Medication 20 MILLIEQUIVALENT(S): at 07:55

## 2021-11-03 RX ADMIN — NIMODIPINE 30 MILLIGRAM(S): 60 SOLUTION ORAL at 02:52

## 2021-11-03 RX ADMIN — SODIUM CHLORIDE 35 MILLILITER(S): 9 INJECTION, SOLUTION INTRAVENOUS at 19:51

## 2021-11-03 RX ADMIN — NIMODIPINE 30 MILLIGRAM(S): 60 SOLUTION ORAL at 19:51

## 2021-11-03 RX ADMIN — LEVETIRACETAM 400 MILLIGRAM(S): 250 TABLET, FILM COATED ORAL at 05:29

## 2021-11-03 RX ADMIN — BROMOCRIPTINE MESYLATE 15 MILLIGRAM(S): 5 CAPSULE ORAL at 22:12

## 2021-11-03 RX ADMIN — FENTANYL CITRATE 25 MICROGRAM(S): 50 INJECTION INTRAVENOUS at 12:48

## 2021-11-03 RX ADMIN — Medication 5 MILLIGRAM(S): at 02:32

## 2021-11-03 RX ADMIN — Medication 10 MILLIGRAM(S): at 16:34

## 2021-11-03 RX ADMIN — SODIUM CHLORIDE 3 GRAM(S): 9 INJECTION INTRAMUSCULAR; INTRAVENOUS; SUBCUTANEOUS at 18:32
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
## 2021-11-03 NOTE — PROGRESS NOTE ADULT - SUBJECTIVE AND OBJECTIVE BOX
HPI: 47 y/o female with PMH HTN, Multiple sclerosis, recent spinal surgery 10/8 ([facility]) presented to [place] ED BIBEMS after being found unconscious at home, unknown period of time. 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 HH5, MF1. Patient was intubated at [place] ED, found to have blown L pupil, and sent straight to the OR for left hemicraniotomy. A-line placed intraop. Hemodynamically unstable upon arrival to [facility], bradycardic and hypertensive s/p Mannitol, Clevidipine, and Furosemide. Central line placed in NSICU. Currently sedated on Propofol, pending repeat CTH. History per patient's son, patient had recent spine surgery and was found on outpatient imaging last week to have L M1 segment aneurysm (unruptured), pt asymptomatic at this time. Per son patient taking percocet PO at home. Ureña catheter, ET tube, and arterial line placed at [facility].     NIHSS on arrival: 32   Bess Griffin 5, Mod Busby 4  Bleed Day 1 = 10/26 (26 Oct 2021 13:03)    OVERNIGHT EVENTS:  Vital Signs Last 24 Hrs  T(C): 36.1 (03 Nov 2021 09:14), Max: 37.8 (02 Nov 2021 17:00)  T(F): 97 (03 Nov 2021 09:14), Max: 100 (02 Nov 2021 17:00)  HR: 62 (03 Nov 2021 15:00) (62 - 104)  BP: 169/89 (03 Nov 2021 15:00) (116/67 - 199/95)  BP(mean): 122 (03 Nov 2021 15:00) (86 - 136)  RR: 16 (03 Nov 2021 15:00) (14 - 39)  SpO2: 100% (03 Nov 2021 15:00) (96% - 100%)    I&O's Summary    02 Nov 2021 07:01  -  03 Nov 2021 07:00  --------------------------------------------------------  IN: 3009.2 mL / OUT: 1601 mL / NET: 1408.2 mL    03 Nov 2021 07:01  -  03 Nov 2021 16:31  --------------------------------------------------------  IN: 401.6 mL / OUT: 57 mL / NET: 344.6 mL        PHYSICAL EXAM:  General Appearance: Patient is intubated, on full vent support.  Cardio: RRR. Normal S1 and S2.   Lungs: CTA b/l. No rales, wheezes, or rhonchi.   Abdomen: Soft. Nondistended, nontender. +BS.   Neuro: Not alert. Does not follow commands. Opens eyes spontaneously. Pupils 3 mm and reactive b/l. (+) cough, (+) gag. B/l UEs with extensor posturing. B/l LEs triple flex. Sensation unable to assess due to patient's mental status.  Wound: Flap full/tense. Anterior and posterior incisions from prior back surgery checked (dehiscence noted).    TUBES/LINES:  [] Ureña  [] Lumbar Drain  [] Wound Drains  [X] Others = R IJ, a line      DIET:  [] NPO  [] Mechanical  [] Tube feeds    LABS:                        8.9    9.75  )-----------( 263      ( 03 Nov 2021 04:43 )             28.2     11-03    143  |  112<H>  |  11  ----------------------------<  111<H>  3.8   |  22  |  0.54    Ca    9.1      03 Nov 2021 04:43  Phos  2.7     11-03  Mg     1.8     11-03    TPro  7.0  /  Alb  3.3  /  TBili  0.4  /  DBili  x   /  AST  32  /  ALT  17  /  AlkPhos  95  11-02            CAPILLARY BLOOD GLUCOSE      POCT Blood Glucose.: 94 mg/dL (03 Nov 2021 12:02)  POCT Blood Glucose.: 106 mg/dL (03 Nov 2021 05:14)  POCT Blood Glucose.: 94 mg/dL (02 Nov 2021 23:10)  POCT Blood Glucose.: 99 mg/dL (02 Nov 2021 16:55)      Drug Levels: [] N/A    CSF Analysis: [] N/A      Allergies    Allergy Status Unknown    Intolerances      MEDICATIONS:  Antibiotics:    Neuro:  acetaminophen    Suspension .. 650 milliGRAM(s) Oral every 6 hours PRN  baclofen 10 milliGRAM(s) Oral every 12 hours  bromocriptine Capsule 15 milliGRAM(s) Oral every 8 hours  dexMEDEtomidine Infusion 0.2 MICROgram(s)/kG/Hr IV Continuous <Continuous>  fentaNYL    Injectable 25 MICROGram(s) IV Push every 2 hours PRN  gabapentin Solution 600 milliGRAM(s) Oral every 8 hours  levETIRAcetam  IVPB 1000 milliGRAM(s) IV Intermittent every 12 hours  metoclopramide Injectable 10 milliGRAM(s) IV Push every 8 hours    Anticoagulation:  enoxaparin Injectable 40 milliGRAM(s) SubCutaneous every 24 hours    OTHER:  chlorhexidine 0.12% Liquid 15 milliLiter(s) Oral Mucosa every 12 hours  chlorhexidine 2% Cloths 1 Application(s) Topical <User Schedule>  hydrALAZINE Injectable 5 milliGRAM(s) IV Push every 4 hours PRN  insulin lispro (ADMELOG) corrective regimen sliding scale   SubCutaneous every 6 hours  niMODipine 60 milliGRAM(s) Oral every 4 hours    IVF:  lactated ringers. 1000 milliLiter(s) IV Continuous <Continuous>  sodium chloride 3 Gram(s) Oral every 6 hours  sodium chloride 23.4% Injectable 30 milliLiter(s) IV Push once  sodium chloride 3%. 500 milliLiter(s) IV Continuous <Continuous>    CULTURES:  Culture Results:   No growth to date (10-28 @ 19:23)  Culture Results:   No growth at 5 days. (10-28 @ 18:34)    RADIOLOGY & ADDITIONAL TESTS:      ASSESSMENT:  47 y/o female with PMHx of HTN and MS, hx of spinal surgery at [facility] 10/8/21 presented to [facility] after being found unconscious at home, unknown period of time. 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 HH5, MF4; BD #1 = 10/26. Patient was intubated at [place] ED and sent straight to the OR for left hemicraniotomy. A-line placed intraop. Hemodynamically unstable upon arrival to [facility], bradycardic and hypertensive s/p Mannitol and Furosemide. Central line placed in NSICU. Currently sedated on Propofol. Now s/p EVD placement, and coil embolization of left MCA bifurcation aneurysm 10/26/2021.     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, left    Intracranial hemorrhage, spontaneous subarachnoid, due to cerebral aneurysm, acute    Angiogram, cerebral, with coil embolization, in non-operating room setting    Personal history of spine surgery    SysAdmin_VstLnk        PLAN:  NEURO:    CARDIOVASCULAR:    PULMONARY:    RENAL:    GI:    HEME:    ID:    ENDO:    DVT PROPHYLAXIS:  [] Venodynes                                [] Heparin/Lovenox    DISPOSITION: HPI: 47 y/o female with PMH HTN, Multiple sclerosis, recent spinal surgery 10/8 ([facility]) presented to [place] ED BIBEMS after being found unconscious at home, unknown period of time. 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 HH5, MF1. Patient was intubated at [place] ED, found to have blown L pupil, and sent straight to the OR for left hemicraniotomy. A-line placed intraop. Hemodynamically unstable upon arrival to [facility], bradycardic and hypertensive s/p Mannitol, Clevidipine, and Furosemide. Central line placed in NSICU. Currently sedated on Propofol, pending repeat CTH. History per patient's son, patient had recent spine surgery and was found on outpatient imaging last week to have L M1 segment aneurysm (unruptured), pt asymptomatic at this time. Per son patient taking percocet PO at home. Ureña catheter, ET tube, and arterial line placed at [facility].     NIHSS on arrival: 32   Bess Griffin 5, Mod Busby 4  Bleed Day 1 = 10/26 (26 Oct 2021 13:03)    OVERNIGHT EVENTS:  10/26: admitted to [facility] from [facility], POD#0 s/p L hemicraniectomy for decompression and evacuation of SDH. Transferred to [facility] noted to have tense flap, received mannitol, keppra, decadron. Was hypertensive to 200s and preston to 40s, recevied 85g mannitol and bullet 23.4%. CTH on arrival demonstrated increased size in vents and new IVH. EVD placed, open at 15, central line placed. Transfused 2 u PRBC. POD0 of coiling of left MCA bifurcation aneurysm,   10/27: CTH demonstrated EVD in correct position, EVD lowered to 5, remains intubated on proprofol, pending repeat CTH in AM. Started on 3% @ 30/hr. 2LNS given for euvolemia, Mannitol given for uptrending ICPs. Bullet given 8:30 am. 3% increased to 50/hr. 1 L bolus. New EVD catheter placed using exisiting sreekanth hole. 1 u PRBC.  10/28: HH5, MF4, BD3; POD2 angio coil/embo of L MCA aneurysm. JOAQUÍN overnight, neuro stable. EVD@5cmH20 ICPs < 20 overnight, OP WNL. S/p 1 unit PRBC yesterday with appropriate response. Given 42g mannitol in AM for ICP 22 persistently, with improvement, then given 23% in PM for elevated ICP. febrile, pancultured. Standing tylenol and cooling blanket.   10/29: BD4, POD3 angio coil/embo. JOAQUÍN o/n, neuro stable. EVD@5, ICPs <20 o/n.   10/30: BD5, POD4 angio coil/embo. JOAQUÍN o/n neuro stable. EVD@5. 3% @50cc/hr.  10/31: BD6, POD5 angio coil/embo. brief period maintained upward gaze, resolved on its own. EVD@5cm h2o.    11/1: BD7, POD6 L hemicrani, angio coil/embo. JOAQUÍN overnight. Neuro exam unchanged. ICPs WNL. started bromocriptine/gabapentin/baclofen. dc'd propofol, started precedex  11/2: BD8 POD7 L hemicrani, angio coil/embo. JOAQUÍN overnight. Neuro exam stable. Remains on 3% hypertonic saline. Received 1 unit of PRBCs for a Hgb of 7.6.  11/3: BD 9 POD 8 of L hemicrani. Elevated lipase, normal amylase, OG tube clogged and replaced. Abdominal US normal. Pending gen surg reccs regarding trach and peg. Gabapentin and Baclofen increased to help with neurostorming. Restarted back on 3%, LR@35.    Vital Signs Last 24 Hrs  T(C): 36.1 (03 Nov 2021 09:14), Max: 37.8 (02 Nov 2021 17:00)  T(F): 97 (03 Nov 2021 09:14), Max: 100 (02 Nov 2021 17:00)  HR: 62 (03 Nov 2021 15:00) (62 - 104)  BP: 169/89 (03 Nov 2021 15:00) (116/67 - 199/95)  BP(mean): 122 (03 Nov 2021 15:00) (86 - 136)  RR: 16 (03 Nov 2021 15:00) (14 - 39)  SpO2: 100% (03 Nov 2021 15:00) (96% - 100%)    I&O's Summary    02 Nov 2021 07:01  -  03 Nov 2021 07:00  --------------------------------------------------------  IN: 3009.2 mL / OUT: 1601 mL / NET: 1408.2 mL    03 Nov 2021 07:01  -  03 Nov 2021 16:31  --------------------------------------------------------  IN: 401.6 mL / OUT: 57 mL / NET: 344.6 mL        PHYSICAL EXAM:  General Appearance: Patient is intubated, on full vent support.  Cardio: RRR. Normal S1 and S2.   Lungs: CTA b/l. No rales, wheezes, or rhonchi.   Abdomen: Soft. Nondistended, nontender. +BS.   Neuro: Not alert. Does not follow commands. Opens eyes spontaneously. Pupils 3 mm and reactive b/l. (+) cough, (+) gag. B/l UEs with extensor posturing. B/l LEs triple flex. Sensation unable to assess due to patient's mental status.  Wound: Flap full/tense. Anterior and posterior incisions from prior spinal surgery checked (dehiscence noted).    TUBES/LINES:  [] Ureña  [] Lumbar Drain  [] Wound Drains  [X] Others = R IJ, a line      DIET:  [] NPO  [] Mechanical  [X] Tube feeds    LABS:                        8.9    9.75  )-----------( 263      ( 03 Nov 2021 04:43 )             28.2     11-03    143  |  112<H>  |  11  ----------------------------<  111<H>  3.8   |  22  |  0.54    Ca    9.1      03 Nov 2021 04:43  Phos  2.7     11-03  Mg     1.8     11-03    TPro  7.0  /  Alb  3.3  /  TBili  0.4  /  DBili  x   /  AST  32  /  ALT  17  /  AlkPhos  95  11-02            CAPILLARY BLOOD GLUCOSE      POCT Blood Glucose.: 94 mg/dL (03 Nov 2021 12:02)  POCT Blood Glucose.: 106 mg/dL (03 Nov 2021 05:14)  POCT Blood Glucose.: 94 mg/dL (02 Nov 2021 23:10)  POCT Blood Glucose.: 99 mg/dL (02 Nov 2021 16:55)      Drug Levels: [] N/A    CSF Analysis: [] N/A      Allergies    Allergy Status Unknown    Intolerances      MEDICATIONS:  Antibiotics:    Neuro:  acetaminophen    Suspension .. 650 milliGRAM(s) Oral every 6 hours PRN  baclofen 10 milliGRAM(s) Oral every 12 hours  bromocriptine Capsule 15 milliGRAM(s) Oral every 8 hours  dexMEDEtomidine Infusion 0.2 MICROgram(s)/kG/Hr IV Continuous <Continuous>  fentaNYL    Injectable 25 MICROGram(s) IV Push every 2 hours PRN  gabapentin Solution 600 milliGRAM(s) Oral every 8 hours  levETIRAcetam  IVPB 1000 milliGRAM(s) IV Intermittent every 12 hours  metoclopramide Injectable 10 milliGRAM(s) IV Push every 8 hours    Anticoagulation:  enoxaparin Injectable 40 milliGRAM(s) SubCutaneous every 24 hours    OTHER:  chlorhexidine 0.12% Liquid 15 milliLiter(s) Oral Mucosa every 12 hours  chlorhexidine 2% Cloths 1 Application(s) Topical <User Schedule>  hydrALAZINE Injectable 5 milliGRAM(s) IV Push every 4 hours PRN  insulin lispro (ADMELOG) corrective regimen sliding scale   SubCutaneous every 6 hours  niMODipine 60 milliGRAM(s) Oral every 4 hours    IVF:  lactated ringers. 1000 milliLiter(s) IV Continuous <Continuous>  sodium chloride 3 Gram(s) Oral every 6 hours  sodium chloride 23.4% Injectable 30 milliLiter(s) IV Push once  sodium chloride 3%. 500 milliLiter(s) IV Continuous <Continuous>    CULTURES:  Culture Results:   No growth to date (10-28 @ 19:23)  Culture Results:   No growth at 5 days. (10-28 @ 18:34)    RADIOLOGY & ADDITIONAL TESTS:      ASSESSMENT:  47 y/o female with PMHx of HTN and MS, hx of spinal surgery at [facility] 10/8/21 presented to [place] ED BIBEMS after being found unconscious at home, unknown period of time. 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 HH5, MF4; BD #1 = 10/26. Patient was intubated at [place] ED and sent straight to the OR for left hemicraniotomy. A-line placed intraop. Hemodynamically unstable upon arrival to [facility], bradycardic and hypertensive s/p Mannitol and Furosemide. Central line placed in NSICU. Currently sedated on Propofol. Now s/p EVD placement, and coil embolization of left MCA bifurcation aneurysm 10/26/2021.     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, left    Intracranial hemorrhage, spontaneous subarachnoid, due to cerebral aneurysm, acute    Angiogram, cerebral, with coil embolization, in non-operating room setting    Personal history of spine surgery    SysAdmin_VstLnk        PLAN:  NEURO:  - neuro checks/vital signs q1hr  - HOB > 30   - Keppra 1g IV BID   - Nimodipine increased to 60Q4  - bromocriptine 15mg q8hr  - increased Gabapentin 600mg q8hr and Baclofen 10mg BID   - propofol dc'd, precedex for sedation   - fentanyl pushes PRN   - EVD open at 5cmH2O, monitor ICP/output  - CTA 11/1 with findings of moderate anterior circulation spasm     CARDIO:  - -180  - hydralazine PRN to maintain SBP goal  - echo +mild pulm HTN  - f/u EKG for sinus arrythmia    PULM:  - intubated on full vent support  - aspiration precautions    GI:  - NPO   - bowel regimen   - PPI qd GI ppx  - FOB negative 11/1  - Abd US: neg   - rectal tube  - pending trach and peg    RENAL:  - 3%@50cc/hr  - LR @30  - salt tabs 3Q6  - euvolemia goal   - Na 145-150   - q6hr BMP and serum osmo    HEME:  - SCDs, SQL  - s/p 2u PRBC, s/p 1u PRBC 10/27, s/p 1u PRBC 11/02  - monitor H+H  - FOB negative  - RUE doppler for swelling 11/1: DVT R brachial and superficial cephalic thrombus    ID:  - low grade fever, no abx at this time   - leukocytosis and procal, trend   - Tylenol PRN for fever  - last pancx 10/28     ENDO:  - ISS   - monitor FS    DISPO:  - GOC: full code  - Level of care: ICU status   - Dispo: pend EVD, trach, peg  - Case discussed with Dr. Duncan   HPI: 47 y/o female with PMH HTN, Multiple sclerosis, recent spinal surgery 10/8 ([facility]) presented to [place] ED BIBEMS after being found unconscious at home, unknown period of time. 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 HH5, MF1. Patient was intubated at [place] ED, found to have blown L pupil, and sent straight to the OR for left hemicraniotomy. A-line placed intraop. Hemodynamically unstable upon arrival to [facility], bradycardic and hypertensive s/p Mannitol, Clevidipine, and Furosemide. Central line placed in NSICU. Currently sedated on Propofol, pending repeat CTH. History per patient's son, patient had recent spine surgery and was found on outpatient imaging last week to have L M1 segment aneurysm (unruptured), pt asymptomatic at this time. Per son patient taking percocet PO at home. Ureña catheter, ET tube, and arterial line placed at [facility].     NIHSS on arrival: 32   Bess Griffin 5, Mod Busby 4  Bleed Day 1 = 10/26 (26 Oct 2021 13:03)    OVERNIGHT EVENTS:  10/26: admitted to [facility] from [facility], POD#0 s/p L hemicraniectomy for decompression and evacuation of SDH. Transferred to [facility] noted to have tense flap, received mannitol, keppra, decadron. Was hypertensive to 200s and preston to 40s, recevied 85g mannitol and bullet 23.4%. CTH on arrival demonstrated increased size in vents and new IVH. EVD placed, open at 15, central line placed. Transfused 2 u PRBC. POD0 of coiling of left MCA bifurcation aneurysm,   10/27: CTH demonstrated EVD in correct position, EVD lowered to 5, remains intubated on proprofol, pending repeat CTH in AM. Started on 3% @ 30/hr. 2LNS given for euvolemia, Mannitol given for uptrending ICPs. Bullet given 8:30 am. 3% increased to 50/hr. 1 L bolus. New EVD catheter placed using exisiting sreekanth hole. 1 u PRBC.  10/28: HH5, MF4, BD3; POD2 angio coil/embo of L MCA aneurysm. JOAQUÍN overnight, neuro stable. EVD@5cmH20 ICPs < 20 overnight, OP WNL. S/p 1 unit PRBC yesterday with appropriate response. Given 42g mannitol in AM for ICP 22 persistently, with improvement, then given 23% in PM for elevated ICP. febrile, pancultured. Standing tylenol and cooling blanket.   10/29: BD4, POD3 angio coil/embo. JOAQUÍN o/n, neuro stable. EVD@5, ICPs <20 o/n.   10/30: BD5, POD4 angio coil/embo. JOAQUÍN o/n neuro stable. EVD@5. 3% @50cc/hr.  10/31: BD6, POD5 angio coil/embo. brief period maintained upward gaze, resolved on its own. EVD@5cm h2o.    11/1: BD7, POD6 L hemicrani, angio coil/embo. JOAQUÍN overnight. Neuro exam unchanged. ICPs WNL. started bromocriptine/gabapentin/baclofen. dc'd propofol, started precedex  11/2: BD8 POD7 L hemicrani, angio coil/embo. JOAQUÍN overnight. Neuro exam stable. Remains on 3% hypertonic saline. Received 1 unit of PRBCs for a Hgb of 7.6.  11/3: BD 9 POD 8 of L hemicrani. Elevated lipase, normal amylase, OG tube clogged and replaced. Abdominal US normal. Pending gen surg reccs regarding trach and peg. Gabapentin and Baclofen increased to help with neurostorming. Restarted back on 3%, LR@35.    Vital Signs Last 24 Hrs  T(C): 36.1 (03 Nov 2021 09:14), Max: 37.8 (02 Nov 2021 17:00)  T(F): 97 (03 Nov 2021 09:14), Max: 100 (02 Nov 2021 17:00)  HR: 62 (03 Nov 2021 15:00) (62 - 104)  BP: 169/89 (03 Nov 2021 15:00) (116/67 - 199/95)  BP(mean): 122 (03 Nov 2021 15:00) (86 - 136)  RR: 16 (03 Nov 2021 15:00) (14 - 39)  SpO2: 100% (03 Nov 2021 15:00) (96% - 100%)    I&O's Summary    02 Nov 2021 07:01  -  03 Nov 2021 07:00  --------------------------------------------------------  IN: 3009.2 mL / OUT: 1601 mL / NET: 1408.2 mL    03 Nov 2021 07:01  -  03 Nov 2021 16:31  --------------------------------------------------------  IN: 401.6 mL / OUT: 57 mL / NET: 344.6 mL        PHYSICAL EXAM:  General Appearance: Patient is intubated, on full vent support. Not alert/oriented.  HEENT: Pupils 3 mm and reactive b/l. (+) cough, (+) gag.   Cardio: RRR. Normal S1 and S2.   Lungs: CTA b/l. No rales, wheezes, or rhonchi.   Abdomen: Soft. Nondistended, nontender. +BS.   Neuro: CNs limited secondary to intubation. Does not follow commands. Opens eyes to stimulation. B/l UEs with extensor posturing. B/l LEs triple flexion. Sensation unable to assess due to patient's mental status.  Wound: Left Flap full/tense. Anterior and posterior lumbar incisions from prior spinal surgery checked (dehiscence noted).    TUBES/LINES:  [] Ureña  [] Lumbar Drain  [] Wound Drains  [X] Others = R IJ, a line      DIET:  [] NPO  [] Mechanical  [X] Tube feeds    LABS:                        8.9    9.75  )-----------( 263      ( 03 Nov 2021 04:43 )             28.2     11-03    143  |  112<H>  |  11  ----------------------------<  111<H>  3.8   |  22  |  0.54    Ca    9.1      03 Nov 2021 04:43  Phos  2.7     11-03  Mg     1.8     11-03    TPro  7.0  /  Alb  3.3  /  TBili  0.4  /  DBili  x   /  AST  32  /  ALT  17  /  AlkPhos  95  11-02            CAPILLARY BLOOD GLUCOSE      POCT Blood Glucose.: 94 mg/dL (03 Nov 2021 12:02)  POCT Blood Glucose.: 106 mg/dL (03 Nov 2021 05:14)  POCT Blood Glucose.: 94 mg/dL (02 Nov 2021 23:10)  POCT Blood Glucose.: 99 mg/dL (02 Nov 2021 16:55)      Drug Levels: [] N/A    CSF Analysis: [] N/A      Allergies    Allergy Status Unknown    Intolerances      MEDICATIONS:  Antibiotics:    Neuro:  acetaminophen    Suspension .. 650 milliGRAM(s) Oral every 6 hours PRN  baclofen 10 milliGRAM(s) Oral every 12 hours  bromocriptine Capsule 15 milliGRAM(s) Oral every 8 hours  dexMEDEtomidine Infusion 0.2 MICROgram(s)/kG/Hr IV Continuous <Continuous>  fentaNYL    Injectable 25 MICROGram(s) IV Push every 2 hours PRN  gabapentin Solution 600 milliGRAM(s) Oral every 8 hours  levETIRAcetam  IVPB 1000 milliGRAM(s) IV Intermittent every 12 hours  metoclopramide Injectable 10 milliGRAM(s) IV Push every 8 hours    Anticoagulation:  enoxaparin Injectable 40 milliGRAM(s) SubCutaneous every 24 hours    OTHER:  chlorhexidine 0.12% Liquid 15 milliLiter(s) Oral Mucosa every 12 hours  chlorhexidine 2% Cloths 1 Application(s) Topical <User Schedule>  hydrALAZINE Injectable 5 milliGRAM(s) IV Push every 4 hours PRN  insulin lispro (ADMELOG) corrective regimen sliding scale   SubCutaneous every 6 hours  niMODipine 60 milliGRAM(s) Oral every 4 hours    IVF:  lactated ringers. 1000 milliLiter(s) IV Continuous <Continuous>  sodium chloride 3 Gram(s) Oral every 6 hours  sodium chloride 23.4% Injectable 30 milliLiter(s) IV Push once  sodium chloride 3%. 500 milliLiter(s) IV Continuous <Continuous>    CULTURES:  Culture Results:   No growth to date (10-28 @ 19:23)  Culture Results:   No growth at 5 days. (10-28 @ 18:34)    RADIOLOGY & ADDITIONAL TESTS:      ASSESSMENT:  47 y/o female with PMHx of HTN and MS, hx of spinal surgery at [facility] 10/8/21 presented to [place] ED BIBEMS after being found unconscious at home, unknown period of time. 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 HH5, MF4; BD #1 = 10/26. Patient was intubated at [place] ED and sent straight to the OR for left hemicraniotomy. A-line placed intraop. Hemodynamically unstable upon arrival to [facility], bradycardic and hypertensive s/p Mannitol and Furosemide. Central line placed in NSICU. Currently sedated on Propofol. Now s/p Rt EVD placement, and coil embolization of left MCA bifurcation aneurysm 10/26/2021.     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, left    Intracranial hemorrhage, spontaneous subarachnoid, due to cerebral aneurysm, acute    Angiogram, cerebral, with coil embolization, in non-operating room setting    Personal history of spine surgery    SysAdmin_VstLnk        PLAN:  NEURO:  - neuro checks/vital signs q1hr  - HOB > 30   - Keppra 1g IV BID   - Nimodipine increased to 60Q4  - bromocriptine 15mg q8hr  - increased Gabapentin 600mg q8hr and Baclofen 10mg BID   - propofol dc'd, precedex for sedation   - fentanyl pushes PRN   - EVD open at 5cmH2O, monitor ICP/output  - CTA 11/1 with findings of moderate anterior circulation spasm     CARDIO:  - -180  - hydralazine PRN to maintain SBP goal  - echo +mild pulm HTN  - f/u EKG for sinus arrythmia    PULM:  - intubated on full vent support  - aspiration precautions    GI:  - NPO   - bowel regimen   - PPI qd GI ppx  - FOB negative 11/1  - Abd US: neg   - rectal tube  - pending trach and peg    RENAL:  - 3%@50cc/hr  - LR @30  - salt tabs 3Q6  - euvolemia goal   - Na 145-150   - q6hr BMP and serum osmo    HEME:  - SCDs, SQL  - s/p 2u PRBC, s/p 1u PRBC 10/27, s/p 1u PRBC 11/02  - monitor H+H  - FOB negative  - RUE doppler for swelling 11/1: DVT R brachial and superficial cephalic thrombus    ID:  - low grade fever, no abx at this time   - leukocytosis and procal, trend   - Tylenol PRN for fever  - last pancx 10/28     ENDO:  - ISS   - monitor FS    DISPO:  - GOC: full code  - Level of care: ICU status   - Dispo: pend EVD, trach, peg  - Case discussed with Dr. Duncan

## 2021-11-03 NOTE — PROGRESS NOTE ADULT - ASSESSMENT
46y/Female with:  L MCA ruptured aneurysm, subarachnoid hemorrhage, s/p [facility] (10/26/2021, Dr. D'Amico @ [place]), brain compression, cerebral edema  anemia   recent spinal surgery  UGIB    PLAN: Day 1 = 10-26 ; Day 4 = 10/29; Day 21 = 11/15  NEURO: neurochecks q1h, sedation with precedex; PRN fentanyl pushes  SAH:  cont nimodipine 30 mg po q2h to be given for 21 days (last day 11/15)   Hydrocephalus:  EVD at 5 cm H20 open to drain  Seizure prophylaxis:  levetiracetam 1000mg IV BID - switch to PO  REHAB:  physical therapy evaluation and management    EARLY MOB:  HOB elevated    PULM:  full vent support for now, trach/PEG schedule - f/u with Gen Surg  CARDIO:  SBP goal 100-180mm Hg,   ENDO:  Blood sugar goals 140-180 mg/dL, insulin sliding scale  GI:  PPI OD; abdominal ultrasound; repeat lipase in 2 days  DIET: NPO for now  RENAL: maintain euvolemia, Na goal 145-150; cont salt tabs, cont 3%@50cc/hr, recheck BPm tonight  HEM/ONC: no coagulopathy (INR= 1.03), no ASA / Plavix use;  anemia  ; given 1 unit  VTE Prophylaxis: SCDs, SQL   ID: afebrile, no leukocytosis   Social: will update son and daughter  WOUND: dehiscence spinal surgery wound; wound care consult    CORE MEASURES  1.  Hunt and Griffin Score = 5  2.  VTE prophylaxis:  [ ] administered within 24 hours of admission OR [X] reason not done: fresh bleed, unsecured aneurysm.  3.  Dysphagia screening:   [X] performed before any oral meds / liquids / food  4.  Nimodipine treatment:  [X] administered within 24 hours of admission OR [ ] reason not done:    ATTENDING ATTESTATION:  I was physically present for the key portions of the evaluation and management (E/M) service provided.  I agree with the above history, physical and plan, which I have reviewed and edited where appropriate.    Patient at high risk for neurological deterioration or death due to:  ICU delirium, aspiration PNA, DVT / PE.  Critical care time:  I have personally provided 30 minutes of critical care time, excluding time spent on separate procedures.      Plan discussed with RN, house staff.

## 2021-11-03 NOTE — PROGRESS NOTE ADULT - ASSESSMENT
46y/Female with:  L MCA ruptured aneurysm, subarachnoid hemorrhage, s/p [facility] (10/26/2021, Dr. D'Amico @ [place]), brain compression, cerebral edema  anemia   recent spinal surgery  UGIB    PLAN: Day 1 = 10-26 ; Day 4 = 10/29; Day 21 = 11/15  NEURO: neurochecks q1h, sedation with precedex; PRN fentanyl pushes  SAH:  cont nimodipine 30 mg po q2h (hold for sBP < 140) to be given for 21 days (last day 11/15); CTA mild-moderate anterior circulation vasospasm (euvolemia, permissive hypertension)   Hydrocephalus:  EVD keep open to drain monitor ICPs at 5cm H20, ICP < 15   Neurostorming:  precedex; bromocriptine 15 mg q8h, gabapentin 600 mg q8h, baclofen 5 mg BID  Seizure prophylaxis:  levetiracetam 1000mg IV BID  Pending trach/PEG   REHAB:  physical therapy evaluation and management    EARLY MOB:  HOB elevated    PULM:  full vent support for now (Mode: AC, RR (machine): 14, TV (machine): 450, FiO2: 40, PEEP: 5, ITime: 1, MAP: 10, PIP: 22), CXR mild congestion  CARDIO:  SBP goal 100-180mm Hg, TTE results as above  ENDO:  Blood sugar goals 140-180 mg/dL, insulin sliding scale  GI:  PPI BID, FOBT negative  DIET: TF at goal  RENAL: maintain euvolemia, Na goal 145-150; Na 147, 3% NaCl at 30 mL/h, cont salt tabs  HEM/ONC: no coagulopathy (INR= 1.03), Hb 8.9, no ASA / Plavix use; FOBT negative  VTE Prophylaxis: SCDs, SQL  ID: febrile, panculture, leukocytosis improved  Social: will update son and daughter, re:  progress, prognosis, trach/PEG    *****    CORE MEASURES  1.  Hunt and Griffin Score = 5  2.  VTE prophylaxis:  [ ] administered within 24 hours of admission OR [X] reason not done: fresh bleed, unsecured aneurysm.  3.  Dysphagia screening:   [X] performed before any oral meds / liquids / food  4.  Nimodipine treatment:  [X] administered within 24 hours of admission OR [ ] reason not done:    *****    ATTENDING ATTESTATION:    Patient at high risk for neurological deterioration or death due to:  ICU delirium, aspiration PNA, DVT / PE.  Critical care time:  I have personally provided 45 minutes of critical care time, excluding time spent on separate procedures.    Plan discussed with RN, house staff.     46y/Female with:  L MCA ruptured aneurysm, subarachnoid hemorrhage, s/p [facility] (10/26/2021, Dr. D'Amico @ [place]), brain compression, cerebral edema  anemia   recent spinal surgery  UGIB    PLAN: Day 1 = 10-26 ; Day 4 = 10/29; Day 21 = 11/15  NEURO: neurochecks q1h, sedation with precedex; PRN fentanyl pushes  SAH:  cont nimodipine 30 mg po q2h (hold for sBP < 140) to be given for 21 days (last day 11/15); CTA mild-moderate anterior circulation vasospasm (euvolemia, permissive hypertension)   Hydrocephalus:  EVD 5cm H20, ICP < 15   Neurostorming:  precedex; bromocriptine 15 mg q8h, gabapentin 600 mg q8h, baclofen 5 mg BID  Seizure prophylaxis:  levetiracetam 1000mg IV BID  Pending trach/PEG   REHAB:  physical therapy evaluation and management    EARLY MOB:  HOB elevated    PULM:  full vent support for now (Mode: AC, RR (machine): 14, TV (machine): 450, FiO2: 40, PEEP: 5, ITime: 1, MAP: 10, PIP: 22), CXR mild congestion  CARDIO:  SBP goal 100-180mm Hg, TTE results as above  ENDO:  Blood sugar goals 140-180 mg/dL, insulin sliding scale  GI:  PPI BID, FOBT negative  DIET: TF at goal  RENAL: maintain euvolemia, Na goal 145-150; Na 147, 3% NaCl at 30 mL/h, cont salt tabs  HEM/ONC: no coagulopathy (INR= 1.03), Hb 8.9, no ASA / Plavix use; FOBT negative  VTE Prophylaxis: SCDs, SQL  ID: febrile, panculture, leukocytosis improved  Social: will update son and daughter, re:  progress, prognosis, trach/PEG    *****    CORE MEASURES  1.  Hunt and Griffin Score = 5  2.  VTE prophylaxis:  [ ] administered within 24 hours of admission OR [X] reason not done: fresh bleed, unsecured aneurysm.  3.  Dysphagia screening:   [X] performed before any oral meds / liquids / food  4.  Nimodipine treatment:  [X] administered within 24 hours of admission OR [ ] reason not done:    *****    ATTENDING ATTESTATION:    Patient at high risk for neurological deterioration or death due to:  ICU delirium, aspiration PNA, DVT / PE.  Critical care time:  I have personally provided 45 minutes of critical care time, excluding time spent on separate procedures.    Plan discussed with RN, house staff.     46y/Female with:  L MCA ruptured aneurysm, subarachnoid hemorrhage, s/p [facility] (10/26/2021, Dr. D'Amico @ [place]), brain compression, cerebral edema  anemia   recent spinal surgery  UGIB    PLAN: Day 1 = 10-26 ; Day 4 = 10/29; Day 21 = 11/15  NEURO: neurochecks q1h, sedation with precedex; PRN fentanyl pushes  SAH:  cont nimodipine 30 mg po q2h (hold for sBP < 140) to be given for 21 days (last day 11/15); CTA mild-moderate anterior circulation vasospasm (euvolemia, permissive hypertension); consideration of IA verapamil (will D/W CURT Duncan)  Hydrocephalus:  EVD 5cm H20, ICP < 15   Neurostorming:  precedex; bromocriptine 15 mg q8h, gabapentin 600 mg q8h, baclofen 10 mg BID, fentanyl prn; 23.4% NaCl 30 mL   Seizure prophylaxis:  levetiracetam 1000mg IV BID  Pending trach/PEG   REHAB:  physical therapy evaluation and management    EARLY MOB:  HOB elevated    PULM:  full vent support for now (Mode: AC, RR (machine): 14, TV (machine): 450, FiO2: 40, PEEP: 5, ITime: 1, MAP: 8, PIP: 16), CXR mild congestion  CARDIO:  SBP goal 100-180mm Hg, TTE results as above  ENDO:  Blood sugar goals 140-180 mg/dL, insulin sliding scale  GI:  PPI OD, FOBT negative; lipase 105 will trend (re:  ?pancreatitis); abdo U/S results as above  DIET: TF at goal  RENAL: maintain euvolemia, Na goal 145-150; Na 147, 3% NaCl at 50 mL/h, cont salt tabs; 23.4% NaCl bolus given  HEM/ONC: no coagulopathy (INR= 1.03), Hb 8.9, no ASA / Plavix use; FOBT negative  VTE Prophylaxis: SCDs, SQL  ID: febrile, panculture, no leukocytosis  Social: updated son and daughter, re:  progress, prognosis, trach/PEG    *****    CORE MEASURES  1.  Hunt and Griffin Score = 5  2.  VTE prophylaxis:  [ ] administered within 24 hours of admission OR [X] reason not done: fresh bleed, unsecured aneurysm.  3.  Dysphagia screening:   [X] performed before any oral meds / liquids / food  4.  Nimodipine treatment:  [X] administered within 24 hours of admission OR [ ] reason not done:    *****    ATTENDING ATTESTATION:    Patient at high risk for neurological deterioration or death due to:  ICU delirium, aspiration PNA, DVT / PE.  Critical care time:  I have personally provided 45 minutes of critical care time, excluding time spent on separate procedures.    Plan discussed with RN, house staff.

## 2021-11-03 NOTE — PROGRESS NOTE ADULT - SUBJECTIVE AND OBJECTIVE BOX
========================================   NEUROCRITICAL CARE ATTENDING NOTE ()  ========================================    AILYN DÍAZ   MRN-[id number]    HPI:  47 y/o female with PMH HTN, Multiple sclerosis, recent spinal surgery 10/8 ([facility]) presented to [place] ED BIBEMS after being found unconscious at home, unknown period of time. 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 HH5, MF1. Patient was intubated at [place] ED, found to have blown L pupil, and sent straight to the OR for left hemicraniotomy. A-line placed intraop. Hemodynamically unstable upon arrival to [facility], bradycardic and hypertensive s/p Mannitol, Clevidipine, and Furosemide. Central line placed in NSICU. Currently sedated on Propofol, pending repeat CTH. History per patient's son, patient had recent spine surgery and was found on outpatient imaging last week to have L M1 segment aneurysm (unruptured), pt asymptomatic at this time. Per son patient taking percocet PO at home. Ureña catheter, ET tube, and arterial line placed at [facility].   NIHSS on arrival: 32. Bess Griffin 5, Mod Busby 4.  Bleed Day 1 = 10/26 (26 Oct 2021 13:03)    Hospital course:  10/26: admitted to [facility] from [facility], POD#0 s/p L hemicraniectomy for decompression and evacuation of SDH. Transferred to [facility] noted to have tense flap, received mannitol, keppra, decadron. Was hypertensive to 200s and preston to 40s, recevied 85g mannitol and bullet 23.4%. CTH on arrival demonstrated increased size in vents and new IVH. EVD placed, open at 15, central line placed. Transfused 2 u PRBC. POD0 of coiling of left MCA bifurcation aneurysm,   10/27: CTH demonstrated EVD in correct position, EVD lowered to 5, remains intubated on proprofol, pending repeat CTH in AM. Started on 3% @ 30/hr. 2LNS given for euvolemia, Mannitol given for uptrending ICPs. Bullet given 8:30 am. 3% increased to 50/hr. 1 L bolus. New EVD catheter placed using exisiting sreekanth hole. 1 u PRBC.  10/28: HH5, MF4, BD3; POD2 angio coil/embo of L MCA aneurysm. JOAQUÍN overnight, neuro stable. EVD@5cmH20 ICPs < 20 overnight, OP WNL. S/p 1 unit PRBC yesterday with appropriate response. Given 42g mannitol in AM for ICP 22 persistently, with improvement, then given 23% in PM for elevated ICP. febrile, pancultured. Standing tylenol and cooling blanket.   10/29: BD4, POD3 angio coil/embo. JOAQUÍN o/n, neuro stable. EVD@5, ICPs <20 o/n.   10/30: BD5, POD4 angio coil/embo. JOAQUÍN o/n neuro stable. EVD@5. 3% @50cc/hr.  10/31: BD6, POD5 angio coil/embo. brief period maintained upward gaze, resolved on its own. EVD@5cm h2o.    : BD7, POD6 L hemicrani, angio coil/embo. Neuro exam unchanged. ICPs WNL. Overnight given hydralazine, increased propofol for SBP > 180.  CTA showed mild-moderate anterior circulation vasospasm (permissive hypertension, euvolemia).  : BD8, POD7 L hemicrani, angio coil/embo. Neuro exam unchanged. ICPs WNL.  Pending trach/PEG.  11/3: BD9, POD8 L hemicrani, angio coil/embo.  _____    Past Medical History: No pertinent past medical history  Allergies:  Allergy Status Unknown  Home meds:     Current Meds:  MEDICATIONS  (STANDING):  baclofen 5 milliGRAM(s) Oral every 12 hours  bisacodyl Suppository 10 milliGRAM(s) Rectal daily  bromocriptine Capsule 15 milliGRAM(s) Oral every 8 hours  dexMEDEtomidine Infusion 0.2 MICROgram(s)/kG/Hr (4.25 mL/Hr) IV Continuous <Continuous>  enoxaparin Injectable 40 milliGRAM(s) SubCutaneous every 24 hours  gabapentin 400 milliGRAM(s) Oral every 8 hours  insulin lispro (ADMELOG) corrective regimen sliding scale   SubCutaneous every 6 hours  lactated ringers. 500 milliLiter(s) (75 mL/Hr) IV Continuous <Continuous>  levETIRAcetam  IVPB 1000 milliGRAM(s) IV Intermittent every 12 hours  metoclopramide Injectable 10 milliGRAM(s) IV Push every 8 hours  niMODipine 30 milliGRAM(s) Oral every 2 hours  pantoprazole  Injectable 40 milliGRAM(s) IV Push every 12 hours  polyethylene glycol 3350 17 Gram(s) Oral daily  senna 2 Tablet(s) Oral at bedtime  sodium chloride 2 Gram(s) Oral every 8 hours    MEDICATIONS  (PRN):  acetaminophen    Suspension .. 650 milliGRAM(s) Oral every 6 hours PRN Temp greater or equal to 38C (100.4F), Mild Pain (1 - 3)  fentaNYL    Injectable 25 MICROGram(s) IV Push every 2 hours PRN Severe Pain (7 - 10)  hydrALAZINE Injectable 5 milliGRAM(s) IV Push every 4 hours PRN >180    PHYSICAL EXAMINATION    ICU Vital Signs Last 24 Hrs  T(C): 36.9 (2021 22:00), Max: 37.7 (2021 18:17)  T(F): 98.5 (2021 22:00), Max: 99.9 (2021 18:17)  HR: 97 (2021 06:10) (74 - 98)  BP: 145/69 (2021 05:00) (122/88 - 168/77)  BP(mean): 99 (2021 05:00) (83 - 115)  ABP: 181/84 (2021 06:10) (115/63 - 189/87)  ABP(mean): 122 (2021 06:10) (82 - 122)  RR: 24 (2021 06:10) (14 - 24)  SpO2: 100% (2021 06:10) (96% - 100%)    NEUROLOGIC EXAMINATION:  Opens eyes to stimuli, does not follow commands, pupils 3mm equal and brisk (-) Doll's, (+) cough and (+) gag, B LE TF, R UE 0/5; flap full but soft; extensor posturing B UE R>L, B LE TF  Mode: AC, RR (machine): 14, TV (machine): 450, FiO2: 40, PEEP: 5, ITime: 1, MAP: 10, PIP: 22  EENT:  anicteric  CARDIOVASCULAR: (+) S1 S2, normal rate and regular rhythm  PULMONARY: clear to auscultation bilaterally  ABDOMEN: soft, nontender with normoactive bowel sounds  EXTREMITIES: no edema  SKIN: no rash; back incisions checked (dehiscence noted)    I/O's    10-31-21 @ 07:  -  21 @ 07:00  --------------------------------------------------------  IN: 2488.7 mL / OUT: 1915 mL / NET: 573.7 mL    21 @ 07:01  -  21 @ 06:45  --------------------------------------------------------  IN: 2960.3 mL / OUT: 921 mL / NET: 2039.3 mL    LABS:              (8.9)   (12)       7.6    11.20 )-----------( 232      ( 2021 06:05 )             25.1     11-02     (150)  152  |  x   |  13  ----------------------------<  x   3.6   |  24  |  0.58    Ca    9.1      2021 06:05  Phos  3.1     11-02  Mg     2.1     11-02    CAPILLARY BLOOD GLUCOSE    POCT Blood Glucose.: 119 mg/dL (2021 05:19)  POCT Blood Glucose.: 109 mg/dL (2021 23:30)  POCT Blood Glucose.: 108 mg/dL (2021 17:22)  POCT Blood Glucose.: 120 mg/dL (2021 11:10)    Bacteriology:  10/28 CSF NGTD  10/28 Blood NG1d x2     CSF studies:  10-28  L   *** [id number] *** %N91 %L4     EEG:  Neuroimagin/01 CTA - Diffuse, but overall MILD-MODERATE, sites of VASOSPASM are noted at theANTERIOR CIRCULATION, whilst the posterior circulation appears spared.   10/27 CT - There is herniation of the brain parenchyma and to the craniectomy defect which is similar prior examination. There is no significant change in the large left frontotemporal intraparenchymal hematoma with surrounding edema. Again demonstrated is mass effect with right to left midline shift measuring approximately 1 cm which is stable from prior exam. Again demonstrated is effacement of the cerebral sulci and basal cisterns.  Other imagin/01 UE Doppler - DVT within the right brachial vein.  Superficial thrombosis of the right cephalic vein.   CXR - stable.  Mild congestion.  10/28 Doppler - Negative  10/28 TTE -   1. Normal left and right ventricular size and systolic function.   2. No significant valvular disease.   3. Mild pulmonary hypertension present, pulmonary artery systolic pressure is 35 mmHg.   4. No pericardial effusion.   5. No prior echo is available for comparison.    IV FLUIDS:   DRIPS:  ·	precedex infusion  DIET: NPO - TF stopped at 4 am (was at goal)  Lines: R TLC IJ Madison  Drains:    ·	EVD (d7) @5cm H20, ICPs 5-14, CSF 5-11 mL/h  Wounds:    CODE STATUS:  Full Code                       GOALS OF CARE:  aggressive                      DISPOSITION:  ICU ========================================   NEUROCRITICAL CARE ATTENDING NOTE ()  ========================================    AILYN DÍAZ   MRN-[id number]    HPI:  45 y/o female with PMH HTN, Multiple sclerosis, recent spinal surgery 10/8 ([facility]) presented to [place] ED BIBEMS after being found unconscious at home, unknown period of time. 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 HH5, MF1. Patient was intubated at [place] ED, found to have blown L pupil, and sent straight to the OR for left hemicraniotomy. A-line placed intraop. Hemodynamically unstable upon arrival to [facility], bradycardic and hypertensive s/p Mannitol, Clevidipine, and Furosemide. Central line placed in NSICU. Currently sedated on Propofol, pending repeat CTH. History per patient's son, patient had recent spine surgery and was found on outpatient imaging last week to have L M1 segment aneurysm (unruptured), pt asymptomatic at this time. Per son patient taking percocet PO at home. Ureña catheter, ET tube, and arterial line placed at [facility].   NIHSS on arrival: 32. Bess Griffin 5, Mod Busby 4.  Bleed Day 1 = 10/26 (26 Oct 2021 13:03)    Hospital course:  10/26: admitted to [facility] from [facility], POD#0 s/p L hemicraniectomy for decompression and evacuation of SDH. Transferred to [facility] noted to have tense flap, received mannitol, keppra, decadron. Was hypertensive to 200s and preston to 40s, recevied 85g mannitol and bullet 23.4%. CTH on arrival demonstrated increased size in vents and new IVH. EVD placed, open at 15, central line placed. Transfused 2 u PRBC. POD0 of coiling of left MCA bifurcation aneurysm,   10/27: CTH demonstrated EVD in correct position, EVD lowered to 5, remains intubated on proprofol, pending repeat CTH in AM. Started on 3% @ 30/hr. 2LNS given for euvolemia, Mannitol given for uptrending ICPs. Bullet given 8:30 am. 3% increased to 50/hr. 1 L bolus. New EVD catheter placed using exisiting sreekanth hole. 1 u PRBC.  10/28: HH5, MF4, BD3; POD2 angio coil/embo of L MCA aneurysm. JOAQUÍN overnight, neuro stable. EVD@5cmH20 ICPs < 20 overnight, OP WNL. S/p 1 unit PRBC yesterday with appropriate response. Given 42g mannitol in AM for ICP 22 persistently, with improvement, then given 23% in PM for elevated ICP. febrile, pancultured. Standing tylenol and cooling blanket.   10/29: BD4, POD3 angio coil/embo. JOAQUÍN o/n, neuro stable. EVD@5, ICPs <20 o/n.   10/30: BD5, POD4 angio coil/embo. JOAQUÍN o/n neuro stable. EVD@5. 3% @50cc/hr.  10/31: BD6, POD5 angio coil/embo. brief period maintained upward gaze, resolved on its own. EVD@5cm h2o.    : BD7, POD6 L hemicrani, angio coil/embo. Neuro exam unchanged. ICPs WNL. Overnight given hydralazine, increased propofol for SBP > 180.  CTA showed mild-moderate anterior circulation vasospasm (permissive hypertension, euvolemia).  : BD8, POD7 L hemicrani, angio coil/embo. Neuro exam unchanged. ICPs WNL.  Pending trach/PEG.  11/3: BD9, POD8 L hemicrani, angio coil/embo.  Neuro exam unchanged. ICPs WNL.  Pending trach/PEG.    Past Medical History: No pertinent past medical history  Allergies:  Allergy Status Unknown  Home meds:     Current Meds:  MEDICATIONS  (STANDING):  baclofen 5 milliGRAM(s) Oral every 12 hours  bisacodyl Suppository 10 milliGRAM(s) Rectal daily  bromocriptine Capsule 15 milliGRAM(s) Oral every 8 hours  dexMEDEtomidine Infusion 0.2 MICROgram(s)/kG/Hr (4.25 mL/Hr) IV Continuous <Continuous>  enoxaparin Injectable 40 milliGRAM(s) SubCutaneous every 24 hours  gabapentin 400 milliGRAM(s) Oral every 8 hours  insulin lispro (ADMELOG) corrective regimen sliding scale   SubCutaneous every 6 hours  lactated ringers. 500 milliLiter(s) (75 mL/Hr) IV Continuous <Continuous>  levETIRAcetam  IVPB 1000 milliGRAM(s) IV Intermittent every 12 hours  metoclopramide Injectable 10 milliGRAM(s) IV Push every 8 hours  niMODipine 30 milliGRAM(s) Oral every 2 hours  pantoprazole  Injectable 40 milliGRAM(s) IV Push every 12 hours  polyethylene glycol 3350 17 Gram(s) Oral daily  senna 2 Tablet(s) Oral at bedtime  sodium chloride 2 Gram(s) Oral every 8 hours    MEDICATIONS  (PRN):  acetaminophen    Suspension .. 650 milliGRAM(s) Oral every 6 hours PRN Temp greater or equal to 38C (100.4F), Mild Pain (1 - 3)  fentaNYL    Injectable 25 MICROGram(s) IV Push every 2 hours PRN Severe Pain (7 - 10)  hydrALAZINE Injectable 5 milliGRAM(s) IV Push every 4 hours PRN >180    PHYSICAL EXAMINATION    ICU Vital Signs Last 24 Hrs  T(C): 36.9 (2021 22:00), Max: 37.7 (2021 18:17)  T(F): 98.5 (2021 22:00), Max: 99.9 (2021 18:17)  HR: 97 (2021 06:10) (74 - 98)  BP: 145/69 (2021 05:00) (122/88 - 168/77)  BP(mean): 99 (2021 05:00) (83 - 115)  ABP: 181/84 (2021 06:10) (115/63 - 189/87)  ABP(mean): 122 (2021 06:10) (82 - 122)  RR: 24 (2021 06:10) (14 - 24)  SpO2: 100% (2021 06:10) (96% - 100%)    NEUROLOGIC EXAMINATION:  Opens eyes to stimuli, does not follow commands, pupils 3mm equal and brisk (-) Doll's, (+) cough and (+) gag, B LE TF, R UE 0/5; flap full but soft; extensor posturing B UE R>L, B LE TF  Mode: AC, RR (machine): 14, TV (machine): 450, FiO2: 40, PEEP: 5, ITime: 1, MAP: 10, PIP: 22  EENT:  anicteric  CARDIOVASCULAR: (+) S1 S2, normal rate and regular rhythm  PULMONARY: clear to auscultation bilaterally  ABDOMEN: soft, nontender with normoactive bowel sounds  EXTREMITIES: no edema  SKIN: no rash; back incisions checked (dehiscence noted)    I/O's    10-31-21 @ 07:01  -  21 @ 07:00  --------------------------------------------------------  IN: 2488.7 mL / OUT: 1915 mL / NET: 573.7 mL    21 @ 07:01  -  21 @ 06:45  --------------------------------------------------------  IN: 2960.3 mL / OUT: 921 mL / NET: 2039.3 mL    LABS:              (8.9)   (12)       7.6    11.20 )-----------( 232      ( 2021 06:05 )             25.1     11-02     (150)  152  |  x   |  13  ----------------------------<  x   3.6   |  24  |  0.58    Ca    9.1      2021 06:05  Phos  3.1     11-02  Mg     2.1     11-02    CAPILLARY BLOOD GLUCOSE    POCT Blood Glucose.: 119 mg/dL (2021 05:19)  POCT Blood Glucose.: 109 mg/dL (2021 23:30)  POCT Blood Glucose.: 108 mg/dL (2021 17:22)  POCT Blood Glucose.: 120 mg/dL (2021 11:10)    Bacteriology:  10/28 CSF NGTD  10/28 Blood NG1d x2     CSF studies:  10-28  L   *** [id number] *** %N91 %L4     EEG:  Neuroimagin/01 CTA - Diffuse, but overall MILD-MODERATE, sites of VASOSPASM are noted at theANTERIOR CIRCULATION, whilst the posterior circulation appears spared.   10/27 CT - There is herniation of the brain parenchyma and to the craniectomy defect which is similar prior examination. There is no significant change in the large left frontotemporal intraparenchymal hematoma with surrounding edema. Again demonstrated is mass effect with right to left midline shift measuring approximately 1 cm which is stable from prior exam. Again demonstrated is effacement of the cerebral sulci and basal cisterns.  Other imagin/01 UE Doppler - DVT within the right brachial vein.  Superficial thrombosis of the right cephalic vein.   CXR - stable.  Mild congestion.  10/28 Doppler - Negative  10/28 TTE -   1. Normal left and right ventricular size and systolic function.   2. No significant valvular disease.   3. Mild pulmonary hypertension present, pulmonary artery systolic pressure is 35 mmHg.   4. No pericardial effusion.   5. No prior echo is available for comparison.    IV FLUIDS:   DRIPS:  ·	precedex infusion  DIET: NPO - TF stopped at 4 am (was at goal)  Lines: R TLC IJ Madison  Drains:    ·	EVD (d8) @5cm H20, ICPs 4-14, CSF 5-11 mL/h  Wounds:    CODE STATUS:  Full Code                       GOALS OF CARE:  aggressive                      DISPOSITION:  ICU ========================================   NEUROCRITICAL CARE ATTENDING NOTE ()  ========================================    AILYN DÍAZ   MRN-[id number]    HPI:  47 y/o female with PMH HTN, Multiple sclerosis, recent spinal surgery 10/8 ([facility]) presented to [place] ED BIBEMS after being found unconscious at home, unknown period of time. 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 HH5, MF1. Patient was intubated at [place] ED, found to have blown L pupil, and sent straight to the OR for left hemicraniotomy. A-line placed intraop. Hemodynamically unstable upon arrival to [facility], bradycardic and hypertensive s/p Mannitol, Clevidipine, and Furosemide. Central line placed in NSICU. Currently sedated on Propofol, pending repeat CTH. History per patient's son, patient had recent spine surgery and was found on outpatient imaging last week to have L M1 segment aneurysm (unruptured), pt asymptomatic at this time. Per son patient taking percocet PO at home. Ureña catheter, ET tube, and arterial line placed at [facility].   NIHSS on arrival: 32. Bess Griffin 5, Mod Busby 4.  Bleed Day 1 = 10/26 (26 Oct 2021 13:03)    Hospital course:  10/26: admitted to [facility] from [facility], POD#0 s/p L hemicraniectomy for decompression and evacuation of SDH. Transferred to [facility] noted to have tense flap, received mannitol, keppra, decadron. Was hypertensive to 200s and preston to 40s, recevied 85g mannitol and bullet 23.4%. CTH on arrival demonstrated increased size in vents and new IVH. EVD placed, open at 15, central line placed. Transfused 2 u PRBC. POD0 of coiling of left MCA bifurcation aneurysm,   10/27: CTH demonstrated EVD in correct position, EVD lowered to 5, remains intubated on proprofol, pending repeat CTH in AM. Started on 3% @ 30/hr. 2LNS given for euvolemia, Mannitol given for uptrending ICPs. Bullet given 8:30 am. 3% increased to 50/hr. 1 L bolus. New EVD catheter placed using exisiting sreekanth hole. 1 u PRBC.  10/28: HH5, MF4, BD3; POD2 angio coil/embo of L MCA aneurysm. JOAQUÍN overnight, neuro stable. EVD@5cmH20 ICPs < 20 overnight, OP WNL. S/p 1 unit PRBC yesterday with appropriate response. Given 42g mannitol in AM for ICP 22 persistently, with improvement, then given 23% in PM for elevated ICP. febrile, pancultured. Standing tylenol and cooling blanket.   10/29: BD4, POD3 angio coil/embo. JOAQUÍN o/n, neuro stable. EVD@5, ICPs <20 o/n.   10/30: BD5, POD4 angio coil/embo. JOAQUÍN o/n neuro stable. EVD@5. 3% @50cc/hr.  10/31: BD6, POD5 angio coil/embo. brief period maintained upward gaze, resolved on its own. EVD@5cm h2o.    : BD7, POD6 L hemicrani, angio coil/embo. Neuro exam unchanged. ICPs WNL. Overnight given hydralazine, increased propofol for SBP > 180.  CTA showed mild-moderate anterior circulation vasospasm (permissive hypertension, euvolemia).  : BD8, POD7 L hemicrani, angio coil/embo. Neuro exam unchanged. ICPs WNL.  Pending trach/PEG.  11/3: BD9, POD8 L hemicrani, angio coil/embo.  Neuro exam unchanged. ICPs WNL.  Pending trach/PEG.    Past Medical History: No pertinent past medical history  Allergies:  Allergy Status Unknown  Home meds:     Current Meds:  MEDICATIONS  (STANDING):  baclofen 5 milliGRAM(s) Oral every 12 hours  bisacodyl Suppository 10 milliGRAM(s) Rectal daily  bromocriptine Capsule 15 milliGRAM(s) Oral every 8 hours  dexMEDEtomidine Infusion 0.2 MICROgram(s)/kG/Hr (4.25 mL/Hr) IV Continuous <Continuous>  enoxaparin Injectable 40 milliGRAM(s) SubCutaneous every 24 hours  gabapentin 400 milliGRAM(s) Oral every 8 hours  insulin lispro (ADMELOG) corrective regimen sliding scale   SubCutaneous every 6 hours  lactated ringers. 500 milliLiter(s) (75 mL/Hr) IV Continuous <Continuous>  levETIRAcetam  IVPB 1000 milliGRAM(s) IV Intermittent every 12 hours  metoclopramide Injectable 10 milliGRAM(s) IV Push every 8 hours  niMODipine 30 milliGRAM(s) Oral every 2 hours  pantoprazole  Injectable 40 milliGRAM(s) IV Push every 12 hours  polyethylene glycol 3350 17 Gram(s) Oral daily  senna 2 Tablet(s) Oral at bedtime  sodium chloride 2 Gram(s) Oral every 8 hours    MEDICATIONS  (PRN):  acetaminophen    Suspension .. 650 milliGRAM(s) Oral every 6 hours PRN Temp greater or equal to 38C (100.4F), Mild Pain (1 - 3)  fentaNYL    Injectable 25 MICROGram(s) IV Push every 2 hours PRN Severe Pain (7 - 10)  hydrALAZINE Injectable 5 milliGRAM(s) IV Push every 4 hours PRN >180    PHYSICAL EXAMINATION    ICU Vital Signs Last 24 Hrs  T(C): 36.9 (2021 22:00), Max: 37.7 (2021 18:17)  T(F): 98.5 (2021 22:00), Max: 99.9 (2021 18:17)  HR: 97 (2021 06:10) (74 - 98)  BP: 145/69 (2021 05:00) (122/88 - 168/77)  BP(mean): 99 (2021 05:00) (83 - 115)  ABP: 181/84 (2021 06:10) (115/63 - 189/87)  ABP(mean): 122 (2021 06:10) (82 - 122)  RR: 24 (2021 06:10) (14 - 24)  SpO2: 100% (2021 06:10) (96% - 100%)    NEUROLOGIC EXAMINATION:  Opens eyes to stimuli, does not follow commands, pupils 3mm equal and brisk (-) Doll's, (+) cough and (+) gag, B LE TF, R UE 0/5; flap full but soft; extensor posturing B UE R>L, B LE TF  Mode: AC, RR (machine): 14, TV (machine): 450, FiO2: 40, PEEP: 5, ITime: 1, MAP: 9, PIP: 23  EENT:  anicteric  CARDIOVASCULAR: (+) S1 S2, normal rate and regular rhythm  PULMONARY: clear to auscultation bilaterally  ABDOMEN: soft, nontender with normoactive bowel sounds  EXTREMITIES: no edema  SKIN: no rash; back incisions checked (dehiscence noted)    I/O's    21 @ 07:01  -  21 @ 07:00  --------------------------------------------------------  IN: 2690.6 mL / OUT: 1581 mL / NET: 1109.6 mL    LABS:              (7.6)             8.9    9.75  )-----------( 263      ( 2021 04:43 )             28.2         143  |  112<H>  |  11  ----------------------------<  111<H>  3.8   |  22  |  0.54    Ca    9.1      2021 04:43  Phos  2.7       Mg     1.8         TPro  7.0  /  Alb  3.3  /  TBili  0.4  /  DBili  x   /  AST  32  /  ALT  17  /  AlkPhos  95      CAPILLARY BLOOD GLUCOSE    POCT Blood Glucose.: 106 mg/dL (2021 05:14)  POCT Blood Glucose.: 94 mg/dL (2021 23:10)  POCT Blood Glucose.: 99 mg/dL (2021 16:55)  POCT Blood Glucose.: 104 mg/dL (2021 11:37)    lipase 106    Bacteriology:  10/28 CSF NGTD  10/28 Blood NG1d x2     CSF studies:  10-28  L   *** [id number] *** %N91 %L4     EEG:  Neuroimagin/01 CTA - Diffuse, but overall MILD-MODERATE, sites of VASOSPASM are noted at theANTERIOR CIRCULATION, whilst the posterior circulation appears spared.   10/27 CT - There is herniation of the brain parenchyma and to the craniectomy defect which is similar prior examination. There is no significant change in the large left frontotemporal intraparenchymal hematoma with surrounding edema. Again demonstrated is mass effect with right to left midline shift measuring approximately 1 cm which is stable from prior exam. Again demonstrated is effacement of the cerebral sulci and basal cisterns.  Other imagin/01 UE Doppler - DVT within the right brachial vein.  Superficial thrombosis of the right cephalic vein.   CXR - stable.  Mild congestion.  10/28 Doppler - Negative  10/28 TTE -   1. Normal left and right ventricular size and systolic function.   2. No significant valvular disease.   3. Mild pulmonary hypertension present, pulmonary artery systolic pressure is 35 mmHg.   4. No pericardial effusion.   5. No prior echo is available for comparison.    IV FLUIDS:   DRIPS:  ·	precedex infusion  DIET: NPO - TF stopped at 4 am (was at goal)  Lines: R TLC IJ Madison  Drains:    ·	EVD (d8) @5cm H20, ICPs 4-14, CSF 5-11 mL/h  Wounds:    CODE STATUS:  Full Code                       GOALS OF CARE:  aggressive                      DISPOSITION:  ICU ========================================   NEUROCRITICAL CARE ATTENDING NOTE ()  ========================================    AILYN DÍAZ   MRN-[id number]    HPI:  45 y/o female with PMH HTN, Multiple sclerosis, recent spinal surgery 10/8 ([facility]) presented to [place] ED BIBEMS after being found unconscious at home, unknown period of time. 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 HH5, MF1. Patient was intubated at [place] ED, found to have blown L pupil, and sent straight to the OR for left hemicraniotomy. A-line placed intraop. Hemodynamically unstable upon arrival to [facility], bradycardic and hypertensive s/p Mannitol, Clevidipine, and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

## 2021-11-04 LAB
ANION GAP SERPL CALC-SCNC: 10 MMOL/L — SIGNIFICANT CHANGE UP (ref 5–17)
ANION GAP SERPL CALC-SCNC: 8 MMOL/L — SIGNIFICANT CHANGE UP (ref 5–17)
ANION GAP SERPL CALC-SCNC: 8 MMOL/L — SIGNIFICANT CHANGE UP (ref 5–17)
ANION GAP SERPL CALC-SCNC: 9 MMOL/L — SIGNIFICANT CHANGE UP (ref 5–17)
BUN SERPL-MCNC: 12 MG/DL — SIGNIFICANT CHANGE UP (ref 7–23)
BUN SERPL-MCNC: 6 MG/DL — LOW (ref 7–23)
BUN SERPL-MCNC: 7 MG/DL — SIGNIFICANT CHANGE UP (ref 7–23)
BUN SERPL-MCNC: 9 MG/DL — SIGNIFICANT CHANGE UP (ref 7–23)
CALCIUM SERPL-MCNC: 8.6 MG/DL — SIGNIFICANT CHANGE UP (ref 8.4–10.5)
CALCIUM SERPL-MCNC: 8.6 MG/DL — SIGNIFICANT CHANGE UP (ref 8.4–10.5)
CALCIUM SERPL-MCNC: 8.7 MG/DL — SIGNIFICANT CHANGE UP (ref 8.4–10.5)
CALCIUM SERPL-MCNC: 8.9 MG/DL — SIGNIFICANT CHANGE UP (ref 8.4–10.5)
CHLORIDE SERPL-SCNC: 113 MMOL/L — HIGH (ref 96–108)
CHLORIDE SERPL-SCNC: 115 MMOL/L — HIGH (ref 96–108)
CHLORIDE SERPL-SCNC: 115 MMOL/L — HIGH (ref 96–108)
CHLORIDE SERPL-SCNC: 118 MMOL/L — HIGH (ref 96–108)
CO2 SERPL-SCNC: 20 MMOL/L — LOW (ref 22–31)
CO2 SERPL-SCNC: 21 MMOL/L — LOW (ref 22–31)
CO2 SERPL-SCNC: 22 MMOL/L — SIGNIFICANT CHANGE UP (ref 22–31)
CO2 SERPL-SCNC: 23 MMOL/L — SIGNIFICANT CHANGE UP (ref 22–31)
CREAT SERPL-MCNC: 0.47 MG/DL — LOW (ref 0.5–1.3)
CREAT SERPL-MCNC: 0.52 MG/DL — SIGNIFICANT CHANGE UP (ref 0.5–1.3)
CREAT SERPL-MCNC: 0.53 MG/DL — SIGNIFICANT CHANGE UP (ref 0.5–1.3)
CREAT SERPL-MCNC: 0.54 MG/DL — SIGNIFICANT CHANGE UP (ref 0.5–1.3)
GLUCOSE SERPL-MCNC: 101 MG/DL — HIGH (ref 70–99)
GLUCOSE SERPL-MCNC: 101 MG/DL — HIGH (ref 70–99)
GLUCOSE SERPL-MCNC: 108 MG/DL — HIGH (ref 70–99)
GLUCOSE SERPL-MCNC: 96 MG/DL — SIGNIFICANT CHANGE UP (ref 70–99)
GRAM STN FLD: SIGNIFICANT CHANGE UP
HCT VFR BLD CALC: 27 % — LOW (ref 34.5–45)
HGB BLD-MCNC: 8.5 G/DL — LOW (ref 11.5–15.5)
MAGNESIUM SERPL-MCNC: 1.7 MG/DL — SIGNIFICANT CHANGE UP (ref 1.6–2.6)
MCHC RBC-ENTMCNC: 25.8 PG — LOW (ref 27–34)
MCHC RBC-ENTMCNC: 31.5 GM/DL — LOW (ref 32–36)
MCV RBC AUTO: 81.8 FL — SIGNIFICANT CHANGE UP (ref 80–100)
NRBC # BLD: 0 /100 WBCS — SIGNIFICANT CHANGE UP (ref 0–0)
PHOSPHATE SERPL-MCNC: 2.6 MG/DL — SIGNIFICANT CHANGE UP (ref 2.5–4.5)
PLATELET # BLD AUTO: 301 K/UL — SIGNIFICANT CHANGE UP (ref 150–400)
POTASSIUM SERPL-MCNC: 3.3 MMOL/L — LOW (ref 3.5–5.3)
POTASSIUM SERPL-MCNC: 3.5 MMOL/L — SIGNIFICANT CHANGE UP (ref 3.5–5.3)
POTASSIUM SERPL-MCNC: 3.6 MMOL/L — SIGNIFICANT CHANGE UP (ref 3.5–5.3)
POTASSIUM SERPL-MCNC: 3.9 MMOL/L — SIGNIFICANT CHANGE UP (ref 3.5–5.3)
POTASSIUM SERPL-SCNC: 3.3 MMOL/L — LOW (ref 3.5–5.3)
POTASSIUM SERPL-SCNC: 3.5 MMOL/L — SIGNIFICANT CHANGE UP (ref 3.5–5.3)
POTASSIUM SERPL-SCNC: 3.6 MMOL/L — SIGNIFICANT CHANGE UP (ref 3.5–5.3)
POTASSIUM SERPL-SCNC: 3.9 MMOL/L — SIGNIFICANT CHANGE UP (ref 3.5–5.3)
RBC # BLD: 3.3 M/UL — LOW (ref 3.8–5.2)
RBC # FLD: 21.2 % — HIGH (ref 10.3–14.5)
SODIUM SERPL-SCNC: 143 MMOL/L — SIGNIFICANT CHANGE UP (ref 135–145)
SODIUM SERPL-SCNC: 145 MMOL/L — SIGNIFICANT CHANGE UP (ref 135–145)
SODIUM SERPL-SCNC: 146 MMOL/L — HIGH (ref 135–145)
SODIUM SERPL-SCNC: 148 MMOL/L — HIGH (ref 135–145)
SPECIMEN SOURCE: SIGNIFICANT CHANGE UP
WBC # BLD: 8.9 K/UL — SIGNIFICANT CHANGE UP (ref 3.8–10.5)
WBC # FLD AUTO: 8.9 K/UL — SIGNIFICANT CHANGE UP (ref 3.8–10.5)

## 2021-11-04 PROCEDURE — 99291 CRITICAL CARE FIRST HOUR: CPT

## 2021-11-04 PROCEDURE — 99233 SBSQ HOSP IP/OBS HIGH 50: CPT

## 2021-11-04 RX ORDER — LEVETIRACETAM 250 MG/1
1000 TABLET, FILM COATED ORAL EVERY 12 HOURS
Refills: 0 | Status: DISCONTINUED | OUTPATIENT
Start: 2021-11-04 | End: 2021-11-09

## 2021-11-04 RX ORDER — GABAPENTIN 400 MG/1
800 CAPSULE ORAL EVERY 8 HOURS
Refills: 0 | Status: DISCONTINUED | OUTPATIENT
Start: 2021-11-04 | End: 2021-11-07

## 2021-11-04 RX ORDER — POTASSIUM CHLORIDE 20 MEQ
40 PACKET (EA) ORAL
Refills: 0 | Status: COMPLETED | OUTPATIENT
Start: 2021-11-04 | End: 2021-11-04

## 2021-11-04 RX ORDER — BACLOFEN 100 %
15 POWDER (GRAM) MISCELLANEOUS EVERY 12 HOURS
Refills: 0 | Status: DISCONTINUED | OUTPATIENT
Start: 2021-11-04 | End: 2021-11-06

## 2021-11-04 RX ORDER — SODIUM CHLORIDE 5 G/100ML
500 INJECTION, SOLUTION INTRAVENOUS
Refills: 0 | Status: DISCONTINUED | OUTPATIENT
Start: 2021-11-04 | End: 2021-11-05

## 2021-11-04 RX ORDER — MAGNESIUM SULFATE 500 MG/ML
2 VIAL (ML) INJECTION ONCE
Refills: 0 | Status: COMPLETED | OUTPATIENT
Start: 2021-11-04 | End: 2021-11-04

## 2021-11-04 RX ORDER — LEVETIRACETAM 250 MG/1
1000 TABLET, FILM COATED ORAL EVERY 12 HOURS
Refills: 0 | Status: DISCONTINUED | OUTPATIENT
Start: 2021-11-04 | End: 2021-11-04

## 2021-11-04 RX ORDER — SODIUM,POTASSIUM PHOSPHATES 278-250MG
1 POWDER IN PACKET (EA) ORAL ONCE
Refills: 0 | Status: COMPLETED | OUTPATIENT
Start: 2021-11-04 | End: 2021-11-04

## 2021-11-04 RX ADMIN — SODIUM CHLORIDE 35 MILLILITER(S): 9 INJECTION, SOLUTION INTRAVENOUS at 04:08

## 2021-11-04 RX ADMIN — GABAPENTIN 600 MILLIGRAM(S): 400 CAPSULE ORAL at 06:04

## 2021-11-04 RX ADMIN — CHLORHEXIDINE GLUCONATE 15 MILLILITER(S): 213 SOLUTION TOPICAL at 17:54

## 2021-11-04 RX ADMIN — NIMODIPINE 30 MILLIGRAM(S): 60 SOLUTION ORAL at 17:15

## 2021-11-04 RX ADMIN — ENOXAPARIN SODIUM 40 MILLIGRAM(S): 100 INJECTION SUBCUTANEOUS at 22:07

## 2021-11-04 RX ADMIN — NIMODIPINE 30 MILLIGRAM(S): 60 SOLUTION ORAL at 11:41

## 2021-11-04 RX ADMIN — Medication 10 MILLIGRAM(S): at 13:18

## 2021-11-04 RX ADMIN — Medication 1 DROP(S): at 19:33

## 2021-11-04 RX ADMIN — NIMODIPINE 30 MILLIGRAM(S): 60 SOLUTION ORAL at 00:10

## 2021-11-04 RX ADMIN — DEXMEDETOMIDINE HYDROCHLORIDE IN 0.9% SODIUM CHLORIDE 4.25 MICROGRAM(S)/KG/HR: 4 INJECTION INTRAVENOUS at 04:10

## 2021-11-04 RX ADMIN — GABAPENTIN 800 MILLIGRAM(S): 400 CAPSULE ORAL at 13:18

## 2021-11-04 RX ADMIN — BROMOCRIPTINE MESYLATE 15 MILLIGRAM(S): 5 CAPSULE ORAL at 21:40

## 2021-11-04 RX ADMIN — NIMODIPINE 30 MILLIGRAM(S): 60 SOLUTION ORAL at 02:34

## 2021-11-04 RX ADMIN — PANTOPRAZOLE SODIUM 40 MILLIGRAM(S): 20 TABLET, DELAYED RELEASE ORAL at 13:21

## 2021-11-04 RX ADMIN — BROMOCRIPTINE MESYLATE 15 MILLIGRAM(S): 5 CAPSULE ORAL at 13:17

## 2021-11-04 RX ADMIN — CHLORHEXIDINE GLUCONATE 1 APPLICATION(S): 213 SOLUTION TOPICAL at 06:01

## 2021-11-04 RX ADMIN — NIMODIPINE 30 MILLIGRAM(S): 60 SOLUTION ORAL at 19:33

## 2021-11-04 RX ADMIN — DEXMEDETOMIDINE HYDROCHLORIDE IN 0.9% SODIUM CHLORIDE 4.25 MICROGRAM(S)/KG/HR: 4 INJECTION INTRAVENOUS at 15:24

## 2021-11-04 RX ADMIN — NIMODIPINE 30 MILLIGRAM(S): 60 SOLUTION ORAL at 23:16

## 2021-11-04 RX ADMIN — BROMOCRIPTINE MESYLATE 15 MILLIGRAM(S): 5 CAPSULE ORAL at 06:04

## 2021-11-04 RX ADMIN — CHLORHEXIDINE GLUCONATE 15 MILLILITER(S): 213 SOLUTION TOPICAL at 06:03

## 2021-11-04 RX ADMIN — Medication 50 GRAM(S): at 07:49

## 2021-11-04 RX ADMIN — Medication 15 MILLIGRAM(S): at 17:55

## 2021-11-04 RX ADMIN — NIMODIPINE 30 MILLIGRAM(S): 60 SOLUTION ORAL at 15:20

## 2021-11-04 RX ADMIN — Medication 40 MILLIEQUIVALENT(S): at 13:22

## 2021-11-04 RX ADMIN — GABAPENTIN 800 MILLIGRAM(S): 400 CAPSULE ORAL at 21:39

## 2021-11-04 RX ADMIN — LEVETIRACETAM 1000 MILLIGRAM(S): 250 TABLET, FILM COATED ORAL at 06:04

## 2021-11-04 RX ADMIN — LEVETIRACETAM 1000 MILLIGRAM(S): 250 TABLET, FILM COATED ORAL at 17:55

## 2021-11-04 RX ADMIN — SODIUM CHLORIDE 3 GRAM(S): 9 INJECTION INTRAMUSCULAR; INTRAVENOUS; SUBCUTANEOUS at 17:55

## 2021-11-04 RX ADMIN — SODIUM CHLORIDE 75 MILLILITER(S): 5 INJECTION, SOLUTION INTRAVENOUS at 11:42

## 2021-11-04 RX ADMIN — NIMODIPINE 30 MILLIGRAM(S): 60 SOLUTION ORAL at 04:09

## 2021-11-04 RX ADMIN — Medication 10 MILLIGRAM(S): at 21:38

## 2021-11-04 RX ADMIN — Medication 10 MILLIGRAM(S): at 06:04

## 2021-11-04 RX ADMIN — SODIUM CHLORIDE 3 GRAM(S): 9 INJECTION INTRAMUSCULAR; INTRAVENOUS; SUBCUTANEOUS at 06:04

## 2021-11-04 RX ADMIN — NIMODIPINE 30 MILLIGRAM(S): 60 SOLUTION ORAL at 06:05

## 2021-11-04 RX ADMIN — NIMODIPINE 30 MILLIGRAM(S): 60 SOLUTION ORAL at 21:39

## 2021-11-04 RX ADMIN — SODIUM CHLORIDE 3 GRAM(S): 9 INJECTION INTRAMUSCULAR; INTRAVENOUS; SUBCUTANEOUS at 23:15

## 2021-11-04 RX ADMIN — NIMODIPINE 30 MILLIGRAM(S): 60 SOLUTION ORAL at 09:54

## 2021-11-04 RX ADMIN — SODIUM CHLORIDE 3 GRAM(S): 9 INJECTION INTRAMUSCULAR; INTRAVENOUS; SUBCUTANEOUS at 00:10

## 2021-11-04 RX ADMIN — Medication 40 MILLIEQUIVALENT(S): at 07:49

## 2021-11-04 RX ADMIN — NIMODIPINE 30 MILLIGRAM(S): 60 SOLUTION ORAL at 13:17

## 2021-11-04 RX ADMIN — Medication 1 PACKET(S): at 09:54

## 2021-11-04 RX ADMIN — SODIUM CHLORIDE 3 GRAM(S): 9 INJECTION INTRAMUSCULAR; INTRAVENOUS; SUBCUTANEOUS at 11:41

## 2021-11-04 RX ADMIN — Medication 10 MILLIGRAM(S): at 06:03
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
## 2021-11-04 NOTE — PROGRESS NOTE ADULT - ASSESSMENT
46y/Female with:  L MCA ruptured aneurysm, subarachnoid hemorrhage, s/p C (10/26/2021, Dr. D'Amico @ [facility]), brain compression, cerebral edema  anemia   recent spinal surgery  UGIB    PLAN: Day 1 = 10-26 ; Day 4 = 10/29; Day 21 = 11/15  NEURO: neurochecks q1h, sedation with precedex; PRN fentanyl pushes  SAH:  cont nimodipine 30 mg po q2h (hold for sBP < 140) to be given for 21 days (last day 11/15); CTA mild-moderate anterior circulation vasospasm (euvolemia, permissive hypertension); consideration of IA verapamil (will D/W CURT Duncan)  Hydrocephalus:  EVD 5cm H20, ICP < 15   Neurostorming:  precedex; bromocriptine 15 mg q8h, gabapentin 600 mg q8h, baclofen 10 mg BID, fentanyl prn; 23.4% NaCl 30 mL   Seizure prophylaxis:  levetiracetam 1000mg IV BID  Pending trach/PEG   REHAB:  physical therapy evaluation and management    EARLY MOB:  HOB elevated    PULM:  full vent support for now (Mode: AC, RR (machine): 14, TV (machine): 450, FiO2: 40, PEEP: 5, ITime: 1, MAP: 8, PIP: 16), CXR mild congestion  CARDIO:  SBP goal 100-180mm Hg, TTE results as above  ENDO:  Blood sugar goals 140-180 mg/dL, insulin sliding scale  GI:  PPI OD, FOBT negative; lipase 105 will trend (re:  ?pancreatitis); abdo U/S results as above  DIET: TF at goal  RENAL: maintain euvolemia, Na goal 145-150; Na 147, 3% NaCl at 50 mL/h, cont salt tabs; 23.4% NaCl bolus given  HEM/ONC: no coagulopathy (INR= 1.03), Hb 8.9, no ASA / Plavix use; FOBT negative  VTE Prophylaxis: SCDs, SQL  ID: [id number], panculture, no leukocytosis  Social: updated son and daughter, re:  progress, prognosis, trach/PEG    *****    CORE MEASURES  1.  Hunt and Griffin Score = 5  2.  VTE prophylaxis:  [ ] administered within 24 hours of admission OR [X] reason not done: fresh bleed, unsecured aneurysm.  3.  Dysphagia screening:   [X] performed before any oral meds / liquids / food  4.  Nimodipine treatment:  [X] administered within 24 hours of admission OR [ ] reason not done:    *****    ATTENDING ATTESTATION:    Patient at high risk for neurological deterioration or death due to:  ICU delirium, aspiration PNA, DVT / PE.  Critical care time:  I have personally provided 45 minutes of critical care time, excluding time spent on separate procedures.    Plan discussed with RN, house staff.     46y/Female with:  L MCA ruptured aneurysm, subarachnoid hemorrhage, s/p DHC (10/26/2021, Dr. D'Amico @ [place] [facility]), brain compression, cerebral edema  anemia   recent spinal surgery  UGIB    PLAN: Day 1 = 10-26 ; Day 4 = 10/29; Day 21 = 11/15  NEURO: neurochecks q1h, sedation with precedex; PRN fentanyl pushes  SAH:  cont nimodipine 30 mg po q2h (hold for sBP < 140) to be given for 21 days (last day 11/15); CTA mild-moderate anterior circulation vasospasm (euvolemia, permissive hypertension); consideration of IA verapamil (will D/W CURT Duncan)  Hydrocephalus:  EVD 5cm H20, ICP < 12  Neurostorming:  precedex; bromocriptine 15 mg q8h, gabapentin 600 mg q8h, baclofen 15 mg BID, fentanyl prn  Seizure prophylaxis:  levetiracetam 1000mg IV BID  Pending trach/PEG   REHAB:  physical therapy evaluation and management    EARLY MOB:  HOB elevated    PULM:  full vent support for now (Mode: AC, RR (machine): 14, TV (machine): 450, FiO2: 40, PEEP: 5, ITime: 1, MAP: 8, PIP: 16), CXR mild congestion  CARDIO:  SBP goal 100-180mm Hg, TTE results as above  ENDO:  Blood sugar goals 140-180 mg/dL, insulin sliding scale  GI:  PPI OD, FOBT negative; lipase 105 will trend; abdo U/S results as above;   DIET: TF to start, if tolerates, follow-up with general surgery, re:  PEG  RENAL: maintain euvolemia, Na goal 145-150; Na 143, 3% NaCl at 75 mL/h, cont salt tabs  HEM/ONC: no coagulopathy (INR= 1.03), Hb 8.9, no ASA / Plavix use; FOBT negative  VTE Prophylaxis: SCDs, SQL  ID: Tmax 37.8, no leukocytosis  Social: updated son and daughter, re:  progress, prognosis, trach/PEG    *****    CORE MEASURES  1.  Hunt and Griffin Score = 5  2.  VTE prophylaxis:  [ ] administered within 24 hours of admission OR [X] reason not done: fresh bleed, unsecured aneurysm.  3.  Dysphagia screening:   [X] performed before any oral meds / liquids / food  4.  Nimodipine treatment:  [X] administered within 24 hours of admission OR [ ] reason not done:    *****    ATTENDING ATTESTATION:    Patient at high risk for neurological deterioration or death due to:  ICU delirium, aspiration PNA, DVT / PE.  Critical care time:  I have personally provided 45 minutes of critical care time, excluding time spent on separate procedures.    Plan discussed with RN, house staff.

## 2021-11-04 NOTE — PROGRESS NOTE ADULT - SUBJECTIVE AND OBJECTIVE BOX
========================================   NEUROCRITICAL CARE ATTENDING NOTE ()  ========================================    AILYN DÍAZ   MRN-[id number]    HPI:  47 y/o female with PMH HTN, Multiple sclerosis, recent spinal surgery 10/8 ([facility]) presented to [place] [facility] BIBEMS after being found unconscious at home, unknown period of time. 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 HH5, MF1. Patient was intubated at [place] ED, found to have blown L pupil, and sent straight to the OR for left hemicraniotomy. A-line placed intraop. Hemodynamically unstable upon arrival to [facility], bradycardic and hypertensive s/p Mannitol, Clevidipine, and Furosemide. Central line placed in NSICU. Currently sedated on Propofol, pending repeat CTH. History per patient's son, patient had recent spine surgery and was found on outpatient imaging last week to have L M1 segment aneurysm (unruptured), pt asymptomatic at this time. Per son patient taking percocet PO at home. Ureña catheter, ET tube, and arterial line placed at [facility].   NIHSS on arrival: 32. Bess Griffin 5, Mod Busby 4.  Bleed Day 1 = 10/26 (26 Oct 2021 13:03)    Hospital course:  10/26: admitted to [facility] from [facility], POD#0 s/p L hemicraniectomy for decompression and evacuation of SDH. Transferred to [facility] noted to have tense flap, received mannitol, keppra, decadron. Was hypertensive to 200s and preston to 40s, recevied 85g mannitol and bullet 23.4%. CTH on arrival demonstrated increased size in vents and new IVH. EVD placed, open at 15, central line placed. Transfused 2 u PRBC. POD0 of coiling of left MCA bifurcation aneurysm,   10/27: CTH demonstrated EVD in correct position, EVD lowered to 5, remains intubated on proprofol, pending repeat CTH in AM. Started on 3% @ 30/hr. 2LNS given for euvolemia, Mannitol given for uptrending ICPs. Bullet given 8:30 am. 3% increased to 50/hr. 1 L bolus. New EVD catheter placed using exisiting srekeanth hole. 1 u PRBC.  10/28: HH5, MF4, BD3; POD2 angio coil/embo of L MCA aneurysm. JOAQUÍN overnight, neuro stable. EVD@5cmH20 ICPs < 20 overnight, OP WNL. S/p 1 unit PRBC yesterday with appropriate response. Given 42g mannitol in AM for ICP 22 persistently, with improvement, then given 23% in PM for elevated ICP. febrile, pancultured. Standing tylenol and cooling blanket.   10/29: BD4, POD3 angio coil/embo. JOAQUÍN o/n, neuro stable. EVD@5, ICPs <20 o/n.   10/30: BD5, POD4 angio coil/embo. JOAQUÍN o/n neuro stable. EVD@5. 3% @50cc/hr.  10/31: BD6, POD5 angio coil/embo. brief period maintained upward gaze, resolved on its own. EVD@5cm h2o.    : BD7, POD6 L hemicrani, angio coil/embo. Neuro exam unchanged. ICPs WNL. Overnight given hydralazine, increased propofol for SBP > 180.  CTA showed mild-moderate anterior circulation vasospasm (permissive hypertension, euvolemia).  : BD8, POD7 L hemicrani, angio coil/embo. Neuro exam unchanged. ICPs WNL.  Pending trach/PEG.  11/3: BD9, POD8 L hemicrani, angio coil/embo.  Neuro exam unchanged. ICPs WNL.  Pending trach/PEG.  Dayshift:  noted episodes of sympathetic storming during vasospasm period.  : BD10, POD9 L hemicrani, angio coil/embo.  _____    Past Medical History: No pertinent past medical history  Allergies:  Allergy Status Unknown  Home meds:     Current Meds:  MEDICATIONS  (STANDING):  baclofen 10 milliGRAM(s) Oral every 12 hours  bromocriptine Capsule 15 milliGRAM(s) Oral every 8 hours  dexMEDEtomidine Infusion 0.2 MICROgram(s)/kG/Hr (4.25 mL/Hr) IV Continuous <Continuous>  enoxaparin Injectable 40 milliGRAM(s) SubCutaneous every 24 hours  gabapentin Solution 600 milliGRAM(s) Oral every 8 hours  insulin lispro (ADMELOG) corrective regimen sliding scale   SubCutaneous every 6 hours  lactated ringers. 1000 milliLiter(s) (35 mL/Hr) IV Continuous <Continuous>  levETIRAcetam  IVPB 1000 milliGRAM(s) IV Intermittent every 12 hours  metoclopramide Injectable 10 milliGRAM(s) IV Push every 8 hours  niMODipine 60 milliGRAM(s) Oral every 4 hours  sodium chloride 3 Gram(s) Oral every 6 hours  sodium chloride 23.4% Injectable 30 milliLiter(s) IV Push once  sodium chloride 3%. 500 milliLiter(s) (50 mL/Hr) IV Continuous <Continuous>    MEDICATIONS  (PRN):  acetaminophen    Suspension .. 650 milliGRAM(s) Oral every 6 hours PRN Temp greater or equal to 38C (100.4F), Mild Pain (1 - 3)  fentaNYL    Injectable 25 MICROGram(s) IV Push every 2 hours PRN agitation  hydrALAZINE Injectable 5 milliGRAM(s) IV Push every 4 hours PRN >180    PHYSICAL EXAMINATION    ICU Vital Signs Last 24 Hrs  T(C): 36.1 (2021 09:14), Max: 37.8 (2021 17:00)  T(F): 97 (2021 09:14), Max: 100 (2021 17:00)  HR: 62 (2021 15:00) (62 - 104)  BP: 169/89 (2021 15:00) (116/67 - 199/95)  BP(mean): 122 (2021 15:00) (86 - 136)  ABP: 154/79 (2021 15:00) (108/55 - 215/109)  ABP(mean): 109 (2021 15:00) (76 - 155)  RR: 16 (2021 15:00) (14 - 39)  SpO2: 100% (2021 15:00) (96% - 100%)    NEUROLOGIC EXAMINATION:  Opens eyes to stimuli, does not follow commands, pupils 3mm equal and brisk (-) Doll's, (+) cough and (+) gag, B LE TF, R UE 0/5; flap full but soft; extensor posturing B UE R>L, B LE TF  Mode: AC, RR (machine): 14, TV (machine): 450, FiO2: 40, PEEP: 5, ITime: 1, MAP: 9, PIP: 23  EENT:  anicteric  CARDIOVASCULAR: (+) S1 S2, normal rate and regular rhythm  PULMONARY: clear to auscultation bilaterally  ABDOMEN: soft, nontender with normoactive bowel sounds  EXTREMITIES: no edema  SKIN: no rash; back incisions checked (dehiscence noted)    I/O's    21 @ 07:01  -  21 @ 07:00  --------------------------------------------------------  IN: 2690.6 mL / OUT: 1581 mL / NET: 1109.6 mL    LABS:              (7.6)             8.9    9.75  )-----------( 263      ( 2021 04:43 )             28.2         143  |  112<H>  |  11  ----------------------------<  111<H>  3.8   |  22  |  0.54    Ca    9.1      2021 04:43  Phos  2.7       Mg     1.8         TPro  7.0  /  Alb  3.3  /  TBili  0.4  /  DBili  x   /  AST  32  /  ALT  17  /  AlkPhos  95      CAPILLARY BLOOD GLUCOSE    POCT Blood Glucose.: 106 mg/dL (2021 05:14)  POCT Blood Glucose.: 94 mg/dL (2021 23:10)  POCT Blood Glucose.: 99 mg/dL (2021 16:55)  POCT Blood Glucose.: 104 mg/dL (2021 11:37)    lipase 106    Bacteriology:    10/28 CSF NGTD  10/28 Blood NG1d x2     CSF studies:  10-28  L   *** [id number] *** %N91 %L4     EEG:  Neuroimagin/01 CTA - Diffuse, but overall MILD-MODERATE, sites of VASOSPASM are noted at theANTERIOR CIRCULATION, whilst the posterior circulation appears spared.   10/27 CT - There is herniation of the brain parenchyma and to the craniectomy defect which is similar prior examination. There is no significant change in the large left frontotemporal intraparenchymal hematoma with surrounding edema. Again demonstrated is mass effect with right to left midline shift measuring approximately 1 cm which is stable from prior exam. Again demonstrated is effacement of the cerebral sulci and basal cisterns.  Other imagin/01 UE Doppler - DVT within the right brachial vein.  Superficial thrombosis of the right cephalic vein.   CXR - stable.  Mild congestion.  10/28 Doppler - Negative  10/28 TTE -   1. Normal left and right ventricular size and systolic function.   2. No significant valvular disease.   3. Mild pulmonary hypertension present, pulmonary artery systolic pressure is 35 mmHg.   4. No pericardial effusion.   5. No prior echo is available for comparison.   Abdo U/S  Liver: Within normal limits. The liver measures 16.5 cm.  Bile ducts: Normal caliber.  Common bile duct measures 2 mm.  Gallbladder: Within normal limits. The gallbladder wall measures 2 mm. No stones.  Pancreas: Visualized portions are within normal limits.  Right kidney: 10.6 cm. No hydronephrosis. Normal parenchymal thickness and echogenicity.  Left kidney: 9.4 cm.  No hydronephrosis. Limited evaluation secondary to patient positioning.  Ascites: None.  Aorta and IVC: Visualized portions are within normal limits.    IV FLUIDS:   DRIPS:  ·	precedex infusion  DIET: NPO - TF stopped at 4 am (was at goal)  Lines: R TLC IJ [place]  Drains:    ·	EVD (d8) @5cm H20, ICPs 4-14, CSF 5-11 mL/h  Wounds:    CODE STATUS:  Full Code                       GOALS OF CARE:  aggressive                      DISPOSITION:  ICU ========================================   NEUROCRITICAL CARE ATTENDING NOTE ()  ========================================    AILYN DÍAZ   MRN-[id number]    HPI:  45 y/o female with PMH HTN, Multiple sclerosis, recent spinal surgery 10/8 ([facility]) presented to [place] ED BIBEMS after being found unconscious at home, unknown period of time. 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 HH5, MF1. Patient was intubated at [place] ED, found to have blown L pupil, and sent straight to the OR for left hemicraniotomy. A-line placed intraop. Hemodynamically unstable upon arrival to [facility], bradycardic and hypertensive s/p Mannitol, Clevidipine, and Furosemide. Central line placed in NSICU. Currently sedated on Propofol, pending repeat CTH. History per patient's son, patient had recent spine surgery and was found on outpatient imaging last week to have L M1 segment aneurysm (unruptured), pt asymptomatic at this time. Per son patient taking percocet PO at home. Ureña catheter, ET tube, and arterial line placed at [facility].   NIHSS on arrival: 32. Bess Griffin 5, Mod Busby 4.  Bleed Day 1 = 10/26 (26 Oct 2021 13:03)    Hospital course:  10/26: admitted to [facility] from [facility], POD#0 s/p L hemicraniectomy for decompression and evacuation of SDH. Transferred to [facility] noted to have tense flap, received mannitol, keppra, decadron. Was hypertensive to 200s and preston to 40s, recevied 85g mannitol and bullet 23.4%. CTH on arrival demonstrated increased size in vents and new IVH. EVD placed, open at 15, central line placed. Transfused 2 u PRBC. POD0 of coiling of left MCA bifurcation aneurysm,   10/27: CTH demonstrated EVD in correct position, EVD lowered to 5, remains intubated on proprofol, pending repeat CTH in AM. Started on 3% @ 30/hr. 2LNS given for euvolemia, Mannitol given for uptrending ICPs. Bullet given 8:30 am. 3% increased to 50/hr. 1 L bolus. New EVD catheter placed using exisiting sreekanth hole. 1 u PRBC.  10/28: HH5, MF4, BD3; POD2 angio coil/embo of L MCA aneurysm. JOAQUÍN overnight, neuro stable. EVD@5cmH20 ICPs < 20 overnight, OP WNL. S/p 1 unit PRBC yesterday with appropriate response. Given 42g mannitol in AM for ICP 22 persistently, with improvement, then given 23% in PM for elevated ICP. febrile, pancultured. Standing tylenol and cooling blanket.   10/29: BD4, POD3 angio coil/embo. JOAQUÍN o/n, neuro stable. EVD@5, ICPs <20 o/n.   10/30: BD5, POD4 angio coil/embo. JOAQUÍN o/n neuro stable. EVD@5. 3% @50cc/hr.  10/31: BD6, POD5 angio coil/embo. brief period maintained upward gaze, resolved on its own. EVD@5cm h2o.    : BD7, POD6 L hemicrani, angio coil/embo. Neuro exam unchanged. ICPs WNL. Overnight given hydralazine, increased propofol for SBP > 180.  CTA showed mild-moderate anterior circulation vasospasm (permissive hypertension, euvolemia).  : BD8, POD7 L hemicrani, angio coil/embo. Neuro exam unchanged. ICPs WNL.  Pending trach/PEG.  11/3: BD9, POD8 L hemicrani, angio coil/embo.  Neuro exam unchanged. ICPs WNL.  Pending trach/PEG.  Dayshift:  noted episodes of sympathetic storming during vasospasm period.  : BD10 POD9, JOAQUÍN o/n, 500cc NS for euvolemia,  neuro stable, EVD at 5    Past Medical History: No pertinent past medical history  Allergies:  Allergy Status Unknown  Home meds:     Current Meds:  MEDICATIONS  (STANDING):  baclofen 10 milliGRAM(s) Oral every 12 hours  bromocriptine Capsule 15 milliGRAM(s) Oral every 8 hours  dexMEDEtomidine Infusion 0.2 MICROgram(s)/kG/Hr (4.25 mL/Hr) IV Continuous <Continuous>  enoxaparin Injectable 40 milliGRAM(s) SubCutaneous every 24 hours  gabapentin Solution 600 milliGRAM(s) Oral every 8 hours  insulin lispro (ADMELOG) corrective regimen sliding scale   SubCutaneous every 6 hours  lactated ringers. 1000 milliLiter(s) (35 mL/Hr) IV Continuous <Continuous>  levETIRAcetam  IVPB 1000 milliGRAM(s) IV Intermittent every 12 hours  metoclopramide Injectable 10 milliGRAM(s) IV Push every 8 hours  niMODipine 60 milliGRAM(s) Oral every 4 hours  sodium chloride 3 Gram(s) Oral every 6 hours  sodium chloride 23.4% Injectable 30 milliLiter(s) IV Push once  sodium chloride 3%. 500 milliLiter(s) (50 mL/Hr) IV Continuous <Continuous>    MEDICATIONS  (PRN):  acetaminophen    Suspension .. 650 milliGRAM(s) Oral every 6 hours PRN Temp greater or equal to 38C (100.4F), Mild Pain (1 - 3)  fentaNYL    Injectable 25 MICROGram(s) IV Push every 2 hours PRN agitation  hydrALAZINE Injectable 5 milliGRAM(s) IV Push every 4 hours PRN >180    PHYSICAL EXAMINATION    ICU Vital Signs Last 24 Hrs  T(C): 36.1 (2021 09:14), Max: 37.8 (2021 17:00)  T(F): 97 (2021 09:14), Max: 100 (2021 17:00)  HR: 62 (2021 15:00) (62 - 104)  BP: 169/89 (2021 15:00) (116/67 - 199/95)  BP(mean): 122 (2021 15:00) (86 - 136)  ABP: 154/79 (2021 15:00) (108/55 - 215/109)  ABP(mean): 109 (2021 15:00) (76 - 155)  RR: 16 (2021 15:00) (14 - 39)  SpO2: 100% (2021 15:00) (96% - 100%)    NEUROLOGIC EXAMINATION:  Opens eyes to stimuli, does not follow commands, pupils 3mm equal and brisk (-) Doll's, (+) cough and (+) gag, B LE TF, R UE 0/5; flap full but soft; extensor posturing B UE R>L, B LE TF  Mode: AC, RR (machine): 14, TV (machine): 450, FiO2: 40, PEEP: 5, ITime: 1, MAP: 14, PIP: 26  EENT:  anicteric  CARDIOVASCULAR: (+) S1 S2, normal rate and regular rhythm  PULMONARY: clear to auscultation bilaterally  ABDOMEN: soft, nontender with normoactive bowel sounds  EXTREMITIES: no edema  SKIN: no rash; back incisions checked (dehiscence noted)    I/O's    21 @ 07:01  -  21 @ 07:00  --------------------------------------------------------  IN: 3009.2 mL / OUT: 1601 mL / NET: 1408.2 mL    21 @ 07:01  -  21 @ 06:45  --------------------------------------------------------  IN: 1648.9 mL / OUT: 1387 mL / NET: 261.9 mL    LABS:              (8.9)             8.5    8.90  )-----------( 301      ( 2021 05:37 )             27.0     11-    143  |  113<H>  |  9   ----------------------------<  101<H>  3.3<L>   |  20<L>  |  0.47<L>    Ca    8.7      2021 05:37  Phos  2.6     11-04  Mg     1.7     11-04    TPro  7.0  /  Alb  3.3  /  TBili  0.4  /  DBili  x   /  AST  32  /  ALT  17  /  AlkPhos  95      CAPILLARY BLOOD GLUCOSE    POCT Blood Glucose.: 93 mg/dL (2021 05:29)  POCT Blood Glucose.: 96 mg/dL (2021 23:35)  POCT Blood Glucose.: 113 mg/dL (2021 18:06)  POCT Blood Glucose.: 94 mg/dL (2021 12:02)    lipase 106    Bacteriology:    10/28 CSF NGTD  10/28 Blood NG1d x2     CSF studies:  10-28  L   *** [id number] *** %N91 %L4     EEG:  Neuroimagin/01 CTA - Diffuse, but overall MILD-MODERATE, sites of VASOSPASM are noted at theANTERIOR CIRCULATION, whilst the posterior circulation appears spared.   10/27 CT - There is herniation of the brain parenchyma and to the craniectomy defect which is similar prior examination. There is no significant change in the large left frontotemporal intraparenchymal hematoma with surrounding edema. Again demonstrated is mass effect with right to left midline shift measuring approximately 1 cm which is stable from prior exam. Again demonstrated is effacement of the cerebral sulci and basal cisterns.  Other imagin/01 UE Doppler - DVT within the right brachial vein.  Superficial thrombosis of the right cephalic vein.   CXR - stable.  Mild congestion.  10/28 Doppler - Negative  10/28 TTE -   1. Normal left and right ventricular size and systolic function.   2. No significant valvular disease.   3. Mild pulmonary hypertension present, pulmonary artery systolic pressure is 35 mmHg.   4. No pericardial effusion.   5. No prior echo is available for comparison.   Abdo U/S  Liver: Within normal limits. The liver measures 16.5 cm.  Bile ducts: Normal caliber.  Common bile duct measures 2 mm.  Gallbladder: Within normal limits. The gallbladder wall measures 2 mm. No stones.  Pancreas: Visualized portions are within normal limits.  Right kidney: 10.6 cm. No hydronephrosis. Normal parenchymal thickness and echogenicity.  Left kidney: 9.4 cm.  No hydronephrosis. Limited evaluation secondary to patient positioning.  Ascites: None.  Aorta and IVC: Visualized portions are within normal limits.    IV FLUIDS:   DRIPS:  ·	precedex infusion  DIET: NPO - TF stopped at 4 am (was at goal)  Lines: R TLC IJ [place]  Drains:    ·	EVD (d8) @5cm H20, ICPs 4-14, CSF 5-11 mL/h  Wounds:    CODE STATUS:  Full Code                       GOALS OF CARE:  aggressive                      DISPOSITION:  ICU ========================================   NEUROCRITICAL CARE ATTENDING NOTE ()  ========================================    AILYN DÍAZ   MRN-[id number]    HPI:  47 y/o female with PMH HTN, Multiple sclerosis, recent spinal surgery 10/8 ([facility]) presented to [place] ED BIBEMS after being found unconscious at home, unknown period of time. 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 HH5, MF1. Patient was intubated at [place] ED, found to have blown L pupil, and sent straight to the OR for left hemicraniotomy. A-line placed intraop. Hemodynamically unstable upon arrival to [facility], bradycardic and hypertensive s/p Mannitol, Clevidipine, and Furosemide. Central line placed in NSICU. Currently sedated on Propofol, pending repeat CTH. History per patient's son, patient had recent spine surgery and was found on outpatient imaging last week to have L M1 segment aneurysm (unruptured), pt asymptomatic at this time. Per son patient taking percocet PO at home. Ureña catheter, ET tube, and arterial line placed at [facility].   NIHSS on arrival: 32. Bess Griffin 5, Mod Busby 4.  Bleed Day 1 = 10/26 (26 Oct 2021 13:03)    Hospital course:  10/26: admitted to [facility] from [facility], POD#0 s/p L hemicraniectomy for decompression and evacuation of SDH. Transferred to [facility] noted to have tense flap, received mannitol, keppra, decadron. Was hypertensive to 200s and preston to 40s, recevied 85g mannitol and bullet 23.4%. CTH on arrival demonstrated increased size in vents and new IVH. EVD placed, open at 15, central line placed. Transfused 2 u PRBC. POD0 of coiling of left MCA bifurcation aneurysm,   10/27: CTH demonstrated EVD in correct position, EVD lowered to 5, remains intubated on proprofol, pending repeat CTH in AM. Started on 3% @ 30/hr. 2LNS given for euvolemia, Mannitol given for uptrending ICPs. Bullet given 8:30 am. 3% increased to 50/hr. 1 L bolus. New EVD catheter placed using exisiting sreekanth hole. 1 u PRBC.  10/28: HH5, MF4, BD3; POD2 angio coil/embo of L MCA aneurysm. JOAQUÍN overnight, neuro stable. EVD@5cmH20 ICPs < 20 overnight, OP WNL. S/p 1 unit PRBC yesterday with appropriate response. Given 42g mannitol in AM for ICP 22 persistently, with improvement, then given 23% in PM for elevated ICP. febrile, pancultured. Standing tylenol and cooling blanket.   10/29: BD4, POD3 angio coil/embo. JOAQUÍN o/n, neuro stable. EVD@5, ICPs <20 o/n.   10/30: BD5, POD4 angio coil/embo. JOAQUÍN o/n neuro stable. EVD@5. 3% @50cc/hr.  10/31: BD6, POD5 angio coil/embo. brief period maintained upward gaze, resolved on its own. EVD@5cm h2o.    : BD7, POD6 L hemicrani, angio coil/embo. Neuro exam unchanged. ICPs WNL. Overnight given hydralazine, increased propofol for SBP > 180.  CTA showed mild-moderate anterior circulation vasospasm (permissive hypertension, euvolemia).  : BD8, POD7 L hemicrani, angio coil/embo. Neuro exam unchanged. ICPs WNL.  Pending trach/PEG.  11/3: BD9, POD8 L hemicrani, angio coil/embo.  Neuro exam unchanged. ICPs WNL.  Pending trach/PEG.  Dayshift:  noted episodes of sympathetic storming during vasospasm period.  : BD10 POD9, JOAQUÍN o/n, 500cc NS for euvolemia,  neuro stable, EVD at 5    Past Medical History: No pertinent past medical history  Allergies:  Allergy Status Unknown  Home meds:     Current Meds:  MEDICATIONS  (STANDING):  artificial  tears Solution 1 Drop(s) Both EYES two times a day  baclofen 15 milliGRAM(s) Oral every 12 hours  bromocriptine Capsule 15 milliGRAM(s) Oral every 8 hours  dexMEDEtomidine Infusion 0.2 MICROgram(s)/kG/Hr (4.25 mL/Hr) IV Continuous <Continuous>  enoxaparin Injectable 40 milliGRAM(s) SubCutaneous every 24 hours  gabapentin Solution 800 milliGRAM(s) Oral every 8 hours  insulin lispro (ADMELOG) corrective regimen sliding scale   SubCutaneous every 6 hours  levETIRAcetam 1000 milliGRAM(s) Oral every 12 hours  metoclopramide Injectable 10 milliGRAM(s) IV Push every 8 hours  niMODipine 30 milliGRAM(s) Oral every 2 hours  pantoprazole  Injectable 40 milliGRAM(s) IV Push daily  sodium chloride 3 Gram(s) Oral every 6 hours  sodium chloride 3%. 500 milliLiter(s) (75 mL/Hr) IV Continuous <Continuous>    MEDICATIONS  (PRN):  acetaminophen    Suspension .. 650 milliGRAM(s) Oral every 6 hours PRN Temp greater or equal to 38C (100.4F), Mild Pain (1 - 3)  fentaNYL    Injectable 25 MICROGram(s) IV Push every 2 hours PRN agitation  hydrALAZINE Injectable 5 milliGRAM(s) IV Push every 4 hours PRN >180    PHYSICAL EXAMINATION    ICU Vital Signs Last 24 Hrs  T(C): 36.1 (2021 09:14), Max: 37.8 (2021 17:00)  T(F): 97 (2021 09:14), Max: 100 (2021 17:00)  HR: 62 (2021 15:00) (62 - 104)  BP: 169/89 (2021 15:00) (116/67 - 199/95)  BP(mean): 122 (2021 15:00) (86 - 136)  ABP: 154/79 (2021 15:00) (108/55 - 215/109)  ABP(mean): 109 (2021 15:00) (76 - 155)  RR: 16 (2021 15:00) (14 - 39)  SpO2: 100% (2021 15:00) (96% - 100%)    NEUROLOGIC EXAMINATION:  Opens eyes to stimuli, does not follow commands, pupils 3mm equal and brisk (-) Doll's, (+) cough and (+) gag, B LE TF, R UE 0/5; flap full but soft; extensor posturing B UE R>L, B LE TF  Mode: AC, RR (machine): 14, TV (machine): 450, FiO2: 40, PEEP: 5, ITime: 1, MAP: 14, PIP: 26  EENT:  anicteric  CARDIOVASCULAR: (+) S1 S2, normal rate and regular rhythm  PULMONARY: clear to auscultation bilaterally  ABDOMEN: soft, nontender with normoactive bowel sounds  EXTREMITIES: no edema  SKIN: no rash; back incisions checked (dehiscence noted)    I/O's    21 @ 07:01  -  21 @ 07:00  --------------------------------------------------------  IN: 3009.2 mL / OUT: 1601 mL / NET: 1408.2 mL    21 @ 07:01  -  21 @ 06:45  --------------------------------------------------------  IN: 1648.9 mL / OUT: 1387 mL / NET: 261.9 mL    LABS:              (8.9)             8.5    8.90  )-----------( 301      ( 2021 05:37 )             27.0     11-04    143  |  113<H>  |  9   ----------------------------<  101<H>  3.3<L>   |  20<L>  |  0.47<L>    Ca    8.7      2021 05:37  Phos  2.6     11  Mg     1.7         TPro  7.0  /  Alb  3.3  /  TBili  0.4  /  DBili  x   /  AST  32  /  ALT  17  /  AlkPhos  95      CAPILLARY BLOOD GLUCOSE    POCT Blood Glucose.: 93 mg/dL (2021 05:29)  POCT Blood Glucose.: 96 mg/dL (2021 23:35)  POCT Blood Glucose.: 113 mg/dL (2021 18:06)  POCT Blood Glucose.: 94 mg/dL (2021 12:02)    lipase 106    Bacteriology:    10/28 CSF NGTD  10/28 Blood NG1d x2     CSF studies:  10-28  L   *** [id number] *** %N91 %L4     EEG:  Neuroimagin/01 CTA - Diffuse, but overall MILD-MODERATE, sites of VASOSPASM are noted at theANTERIOR CIRCULATION, whilst the posterior circulation appears spared.   10/27 CT - There is herniation of the brain parenchyma and to the craniectomy defect which is similar prior examination. There is no significant change in the large left frontotemporal intraparenchymal hematoma with surrounding edema. Again demonstrated is mass effect with right to left midline shift measuring approximately 1 cm which is stable from prior exam. Again demonstrated is effacement of the cerebral sulci and basal cisterns.  Other imagin/01 UE Doppler - DVT within the right brachial vein.  Superficial thrombosis of the right cephalic vein.   CXR - stable.  Mild congestion.  10/28 Doppler - Negative  10/28 TTE -   1. Normal left and right ventricular size and systolic function.   2. No significant valvular disease.   3. Mild pulmonary hypertension present, pulmonary artery systolic pressure is 35 mmHg.   4. No pericardial effusion.   5. No prior echo is available for comparison.   Abdo U/S  Liver: Within normal limits. The liver measures 16.5 cm.  Bile ducts: Normal caliber.  Common bile duct measures 2 mm.  Gallbladder: Within normal limits. The gallbladder wall measures 2 mm. No stones.  Pancreas: Visualized portions are within normal limits.  Right kidney: 10.6 cm. No hydronephrosis. Normal parenchymal thickness and echogenicity.  Left kidney: 9.4 cm.  No hydronephrosis. Limited evaluation secondary to patient positioning.  Ascites: None.  Aorta and IVC: Visualized portions are within normal limits.    IV FLUIDS:   DRIPS:  ·	precedex infusion  DIET: NPO - TF stopped at 4 am (was at goal)  Lines: R TLC IJ [place]  Drains:    ·	EVD (d8) @5cm H20, ICPs 4-12, CSF 5-15 mL/h  Wounds:    CODE STATUS:  Full Code                       GOALS OF CARE:  aggressive                      DISPOSITION:  ICU

## 2021-11-04 NOTE — PROGRESS NOTE ADULT - SUBJECTIVE AND OBJECTIVE BOX
HPI  47 y/o female with unknown PMHx presented to [facility] BIBEMS after being found unconscious at home, unknown period of time. 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 HH5, MF4. Patient was intubated at [facility] and sent straight to the OR for left hemicraniotomy. A-line placed intraop. Hemodynamically unstable upon arrival to [facility], bradycardic and hypertensive s/p Mannitol, Clevidipine, and Furosemide. Central line placed in NSICU. Currently sedated on Propofol, pending repeat CTH. History per patient's son, patient had recent spine surgery (in [place], unknown which hospital) and was found on outpatient imaging last week to have L M1 segment aneurysm (unruptured), pt asymptomatic at this time. Per son patient taking percocet PO at home. Ureña catheter, ET tube, and arterial line placed at [facility].     NIHSS on arrival: 32     Hospital Course:   10/26: admitted to [facility] from [facility], POD#0 s/p L hemicraniectomy for decompression and evacuation of SDH. Transferred to [facility] noted to have tense flap, received mannitol, keppra, decadron. Was hypertensive to 200s and preston to 40s, recevied 85g mannitol and bullet 23.4%. CTH on arrival demonstrated increased size in vents and new IVH. EVD placed, open at 15, central line placed. Transfused 2 u PRBC. POD0 of coiling of left MCA bifurcation aneurysm,   10/27: CTH demonstrated EVD in correct position, EVD lowered to 5, remains intubated on proprofol, pending repeat CTH in AM. Started on 3% @ 30/hr. 2LNS given for euvolemia, Mannitol given for uptrending ICPs. Bullet given 8:30 am. 3% increased to 50/hr. 1 L bolus. New EVD catheter placed using exisiting sreekanth hole. 1 u PRBC.  10/28: HH5, MF4, BD3; POD2 angio coil/embo of L MCA aneurysm. JOAQUÍN overnight, neuro stable. EVD@5cmH20 ICPs < 20 overnight, OP WNL. S/p 1 unit PRBC yesterday with appropriate response. Given 42g mannitol in AM for ICP 22 persistently, with improvement, then given 23% in PM for elevated ICP. febrile, pancultured. Standing tylenol and cooling blanket.   10/29: BD4, POD3 angio coil/embo. JOAQUÍN o/n, neuro stable. EVD@5, ICPs <20 o/n.   10/30: BD5, POD4 angio coil/embo. JOAQUÍN o/n neuro stable. EVD@5. 3% @50cc/hr.  10/31: BD6, POD5 angio coil/embo. brief period maintained upward gaze, resolved on its own. EVD@5cm h2o.    11/1: BD7, POD6 L hemicrani, angio coil/embo. JOAQUÍN overnight. Neuro exam unchanged. ICPs WNL. started bromocriptine/gabapentin/baclofen. dc'd propofol, started precedex  11/2: BD8 POD7 L hemicrani, angio coil/embo. JOAQUÍN overnight. Neuro exam stable. Remains on 3% hypertonic saline. Receieved 1 unit of PRBCs for a Hgb of 7.6.  11/3: BD 9 POD8 of L hemicrani. Elevated lipase, normal amylase, OG tube clogged and replaced. Abdominal US normal. Pending gen surg reccs regarding trach and peg. Gabapentin and Baclofen increased to help with neurostorming. restarted back on 3%, LR@35. became preston+hypotensive with nimodipine 60q4, decreased back to 30q2, NaCl bullet given.   11/4: BD10 POD9, JOAQUÍN o/n, 500cc NS for euvolemia,  neuro stable, EVD at 5      Vital Signs Last 24 Hrs  T(C): 36.9 (04 Nov 2021 01:11), Max: 37 (03 Nov 2021 21:01)  T(F): 98.4 (04 Nov 2021 01:11), Max: 98.6 (03 Nov 2021 21:01)  HR: 62 (04 Nov 2021 02:00) (55 - 104)  BP: 140/75 (04 Nov 2021 02:00) (96/53 - 172/91)  BP(mean): 100 (04 Nov 2021 02:00) (68 - 122)  RR: 14 (04 Nov 2021 02:00) (14 - 39)  SpO2: 99% (04 Nov 2021 02:00) (96% - 100%)    I&O's Detail    02 Nov 2021 07:01  -  03 Nov 2021 07:00  --------------------------------------------------------  IN:    Dexmedetomidine: 64.2 mL    Enteral Tube Flush: 1000 mL    IV PiggyBack: 200 mL    Lactated Ringers: 1725 mL    sodium chloride 3%: 20 mL  Total IN: 3009.2 mL    OUT:    External Ventricular Device (mL): 251 mL    Jevity 1.2: 0 mL    Voided (mL): 1350 mL  Total OUT: 1601 mL    Total NET: 1408.2 mL      03 Nov 2021 07:01  -  04 Nov 2021 05:12  --------------------------------------------------------  IN:    Dexmedetomidine: 123.9 mL    Enteral Tube Flush: 180 mL    IV PiggyBack: 100 mL    Lactated Ringers: 375 mL    Lactated Ringers: 420 mL    sodium chloride 3%: 450 mL  Total IN: 1648.9 mL    OUT:    External Ventricular Device (mL): 187 mL    Voided (mL): 1200 mL  Total OUT: 1387 mL    Total NET: 261.9 mL        I&O's Summary    02 Nov 2021 07:01  -  03 Nov 2021 07:00  --------------------------------------------------------  IN: 3009.2 mL / OUT: 1601 mL / NET: 1408.2 mL    03 Nov 2021 07:01  -  04 Nov 2021 05:12  --------------------------------------------------------  IN: 1648.9 mL / OUT: 1387 mL / NET: 261.9 mL        PHYSICAL EXAM:  General: patient seen laying supine in bed in NAD, precedex held for exam  Neuro: does not OEs, does not FC, extensor posturing to noxious b/l UEs, TF to noxious b/l LEs, +corneals, +cough/gag  HEENT: PERRL  Neck: supple  Cardiac: RRR, S1S2  Pulmonary: chest rise symmetric  Abdomen: soft, nontender, nondistended  Ext: warm, perfusing well  Wound: abdominal incision c/d/i, back 2 incisions with wound dehiscence      DEVICE/DRAIN DRESSING:    TUBES/LINES:  [x] CVC  [x] A-line  [] Lumbar Drain  [x] Ventriculostomy - EVD at 5  [] Other    DIET:  [x] NPO  [] Mechanical  [] Tube feeds    LABS:                        8.9    9.75  )-----------( 263      ( 03 Nov 2021 04:43 )             28.2     11-04    145  |  115<H>  |  12  ----------------------------<  96  3.5   |  21<L>  |  0.52    Ca    8.9      04 Nov 2021 00:32  Phos  2.7     11-03  Mg     1.8     11-03    TPro  7.0  /  Alb  3.3  /  TBili  0.4  /  DBili  x   /  AST  32  /  ALT  17  /  AlkPhos  95  11-02            CAPILLARY BLOOD GLUCOSE      POCT Blood Glucose.: 96 mg/dL (03 Nov 2021 23:35)  POCT Blood Glucose.: 113 mg/dL (03 Nov 2021 18:06)  POCT Blood Glucose.: 94 mg/dL (03 Nov 2021 12:02)  POCT Blood Glucose.: 106 mg/dL (03 Nov 2021 05:14)      Drug Levels: [] N/A    CSF Analysis: [] N/A      Allergies    No Known Allergies    Intolerances      MEDICATIONS:  Antibiotics:    Neuro:  acetaminophen    Suspension .. 650 milliGRAM(s) Oral every 6 hours PRN  baclofen 10 milliGRAM(s) Oral every 12 hours  bromocriptine Capsule 15 milliGRAM(s) Oral every 8 hours  dexMEDEtomidine Infusion 0.2 MICROgram(s)/kG/Hr IV Continuous <Continuous>  fentaNYL    Injectable 25 MICROGram(s) IV Push every 2 hours PRN  gabapentin Solution 600 milliGRAM(s) Oral every 8 hours  levETIRAcetam 1000 milliGRAM(s) Oral every 12 hours  metoclopramide Injectable 10 milliGRAM(s) IV Push every 8 hours    Anticoagulation:  enoxaparin Injectable 40 milliGRAM(s) SubCutaneous every 24 hours    OTHER:  chlorhexidine 0.12% Liquid 15 milliLiter(s) Oral Mucosa every 12 hours  chlorhexidine 2% Cloths 1 Application(s) Topical <User Schedule>  hydrALAZINE Injectable 5 milliGRAM(s) IV Push every 4 hours PRN  insulin lispro (ADMELOG) corrective regimen sliding scale   SubCutaneous every 6 hours  niMODipine 30 milliGRAM(s) Oral every 2 hours  pantoprazole  Injectable 40 milliGRAM(s) IV Push daily    IVF:  lactated ringers. 1000 milliLiter(s) IV Continuous <Continuous>  sodium chloride 3 Gram(s) Oral every 6 hours  sodium chloride 3%. 500 milliLiter(s) IV Continuous <Continuous>    CULTURES:  Culture Results:   No growth to date (10-28 @ 19:23)  Culture Results:   No growth at 5 days. (10-28 @ 18:34)    RADIOLOGY & ADDITIONAL TESTS:          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, left    Intracranial hemorrhage, spontaneous subarachnoid, due to cerebral aneurysm, acute    Angiogram, cerebral, with coil embolization, in non-operating room setting    Personal history of spine surgery    [email or web address]        Assessment:   47 y/o female with PMHx of HTN and MS, hx of spinal surgery at [facility] 10/8/21 presented to [facility] BIBEMS after being found unconscious at home, unknown period of time. 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 HH5, MF4; BD #1 = 10/26. Patient was intubated at [facility] and sent straight to the OR for left hemicraniotomy. A-line placed intraop. Hemodynamically unstable upon arrival to [facility], bradycardic and hypertensive s/p Mannitol and Furosemide. Central line placed in NSICU. Currently sedated on Propofol. Now s/p EVD placement, and coil embolization of left MCA bifurcation aneurysm 10/26/2021.     PLAN:  NEURO:  - neuro checks/vital signs q1hr  - HOB > 30   - Keppra 1g IV BID   - Nimodipine 30q2  - bromocriptine 15mg q8hr  - increased Gabapentin 600mg q8hr and Baclofen 10mg BID   - propofol dc'd, precedex for sedation   - fentanyl pushes PRN   - EVD open at 5cmH2O, monitor ICP/output  - CTA 11/1 with findings of moderate anterior circulation spasm     CARDIO:  - -180  - hydralazine PRN to maintain SBP goal  - echo +mild pulm HTN  - f/u EKG for sinus arrythmia  - trend qtc on standing reglan    PULM:  - intubated on full vent support  - aspiration precautions    GI:  - NPO   - bowel regimen   - PPI qd GI ppx  - FOB negative 11/1  - Abd US: neg   - rectal tube  - OG tube replaced   - pending trach and peg, reconsult gen surg when GI w/u complete  - standing reglan    RENAL:  - 3%@50cc/hr  - LR @35  - salt tabs 3Q6  - euvolemia goal   - Na 145-150   - q6hr BMP and serum osmo    HEME:  - SCDs, SQL  - s/p 2u PRBC, s/p 1u PRBC 10/27, s/p 1u PRBC 11/02  - monitor H+H  - FOB negative  - RUE doppler for swelling 11/1: DVT R brachial and superficial cephalic thrombus    ID:  - low grade fever, no abx at this time   - leukocytosis and procal, trend   - Tylenol PRN for fever  - last pancx 10/28     ENDO:  - ISS   - monitor FS    OTHER  - f/u wound care consult for back incisions dehiscence     GOC: full code  Level of care: ICU status   Dispo: pend EVD, trach, peg  family updated    Case discussed with Dr. Duncan and Dr. Vyas   Present when checked    []  GCS  E   V  M     Heart Failure: []Acute, [] acute on chronic , []chronic  Heart Failure:  [] Diastolic (HFpEF), [] Systolic (HFrEF), []Combined (HFpEF and HFrEF), [] RHF, [] Pulm HTN, [] Other    [] CHETAN, [] ATN, [] AIN, [] other  [] CKD1, [] CKD2, [] CKD 3, [] CKD 4, [] CKD 5, []ESRD    Encephalopathy: [] Metabolic, [] Hepatic, [] toxic, [] Neurological, [] Other    Abnormal Nurtitional Status: [] malnurtition (see nutrition note), [ ]underweight: BMI < 19, [] morbid obesity: BMI >40, [] Cachexia    [] Sepsis  [] hypovolemic shock,[] cardiogenic shock, [] hemorrhagic shock, [] neuogenic shock  [] Acute Respiratory Failure  []Cerebral edema, [] Brain compression/ herniation,   [] Functional quadriplegia  [] Acute blood loss anemia   H Plasty Text: Given the location of the defect, shape of the defect and the proximity to free margins a H-plasty was deemed most appropriate for repair.  Using a sterile surgical marker, the appropriate advancement arms of the H-plasty were drawn incorporating the defect and placing the expected incisions within the relaxed skin tension lines where possible. The area thus outlined was incised deep to adipose tissue with a #15 scalpel blade. The skin margins were undermined to an appropriate distance in all directions utilizing iris scissors.  The opposing advancement arms were then advanced into place in opposite direction and anchored with interrupted buried subcutaneous sutures.

## 2021-11-04 NOTE — PROGRESS NOTE ADULT - ASSESSMENT
46y/Female with:  L MCA ruptured aneurysm, subarachnoid hemorrhage, s/p [facility] (10/26/2021, Dr. D'Amico @ [place]), brain compression, cerebral edema  anemia   recent spinal surgery  UGIB    PLAN: Day 1 = 10-26 ; Day 4 = 10/29; Day 21 = 11/15  NEURO: neurochecks q1h, sedation with precedex; PRN fentanyl pushes  SAH:  cont nimodipine 30 mg po q2h to be given for 21 days (last day 11/15)   Hydrocephalus:  EVD at 5 cm H20 open to drain  Seizure prophylaxis:  levetiracetam 1000mg IV BID - switch to PO  REHAB:  physical therapy evaluation and management    EARLY MOB:  HOB elevated    PULM:  full vent support for now, trach/PEG schedule - f/u with Gen Surg  CARDIO:  SBP goal 100-180mm Hg,   ENDO:  Blood sugar goals 140-180 mg/dL, insulin sliding scale  GI:  PPI OD; abdominal ultrasound; repeat lipase in 2 days  DIET: NPO for now  RENAL: maintain euvolemia, Na goal 145-150; cont salt tabs, cont 3%@50cc/hr, recheck BPm tonight  HEM/ONC: no coagulopathy (INR= 1.03), no ASA / Plavix use;  anemia  ; given 1 unit  VTE Prophylaxis: SCDs, SQL   ID: afebrile, no leukocytosis   Social: will update son and daughter  WOUND: dehiscence spinal surgery wound; wound care consult    CORE MEASURES  1.  Hunt and Griffin Score = 5  2.  VTE prophylaxis:  [ ] administered within 24 hours of admission OR [X] reason not done: fresh bleed, unsecured aneurysm.  3.  Dysphagia screening:   [X] performed before any oral meds / liquids / food  4.  Nimodipine treatment:  [X] administered within 24 hours of admission OR [ ] reason not done:    ATTENDING ATTESTATION:  I was physically present for the key portions of the evaluation and management (E/M) service provided.  I agree with the above history, physical and plan, which I have reviewed and edited where appropriate.    Patient at high risk for neurological deterioration or death due to:  ICU delirium, aspiration PNA, DVT / PE.  Critical care time:  I have personally provided 30 minutes of critical care time, excluding time spent on separate procedures.      Plan discussed with RN, house staff.     46y/Female with:  L MCA ruptured aneurysm, subarachnoid hemorrhage, s/p [facility] (10/26/2021, Dr. D'Amico @ [place]), brain compression, cerebral edema  anemia   recent spinal surgery  UGIB    PLAN: Day 1 = 10-26 ; Day 4 = 10/29; Day 21 = 11/15  NEURO: neurochecks q1h, sedation with precedex; PRN fentanyl pushes  SAH:  cont nimodipine 30 mg po q2h to be given for 21 days (last day 11/15)   Hydrocephalus:  EVD at 5 cm H20 open to drain  Seizure prophylaxis:  levetiracetam 1000mg BID - switch to PO  REHAB:  physical therapy evaluation and management    EARLY MOB:  HOB elevated    PULM:  full vent support for now, trach/PEG schedule - f/u with Gen Surg  CARDIO:  SBP goal 100-180mm Hg,   ENDO:  Blood sugar goals 140-180 mg/dL, insulin sliding scale  GI:  PPI OD; abdominal ultrasound; repeat lipase on Sat  DIET: advance Jevity to goal  RENAL: maintain euvolemia, Na goal 145-150; cont salt tabs, cont 3%@75cc/hr, recheck BPm in AM  HEM/ONC: no coagulopathy (INR= 1.03), no ASA / Plavix use;  anemia  ; given 1 unit  VTE Prophylaxis: SCDs, SQL   ID: afebrile, no leukocytosis   Social: will update son and daughter  WOUND: dehiscence spinal surgery wound; wound care consult    CORE MEASURES  1.  Hunt and Griffin Score = 5  2.  VTE prophylaxis:  [ ] administered within 24 hours of admission OR [X] reason not done: fresh bleed, unsecured aneurysm.  3.  Dysphagia screening:   [X] performed before any oral meds / liquids / food  4.  Nimodipine treatment:  [X] administered within 24 hours of admission OR [ ] reason not done:    ATTENDING ATTESTATION:  I was physically present for the key portions of the evaluation and management (E/M) service provided.  I agree with the above history, physical and plan, which I have reviewed and edited where appropriate.    Patient at high risk for neurological deterioration or death due to:  ICU delirium, aspiration PNA, DVT / PE.  Critical care time:  I have personally provided 30 minutes of critical care time, excluding time spent on separate procedures.      Plan discussed with RN, house staff.

## 2021-11-05 LAB
ANION GAP SERPL CALC-SCNC: 10 MMOL/L — SIGNIFICANT CHANGE UP (ref 5–17)
ANION GAP SERPL CALC-SCNC: 8 MMOL/L — SIGNIFICANT CHANGE UP (ref 5–17)
BASE EXCESS BLDA CALC-SCNC: -0.3 MMOL/L — SIGNIFICANT CHANGE UP (ref -2–3)
BASE EXCESS BLDA CALC-SCNC: 0.1 MMOL/L — SIGNIFICANT CHANGE UP (ref -2–3)
BUN SERPL-MCNC: 10 MG/DL — SIGNIFICANT CHANGE UP (ref 7–23)
BUN SERPL-MCNC: 7 MG/DL — SIGNIFICANT CHANGE UP (ref 7–23)
CALCIUM SERPL-MCNC: 8.6 MG/DL — SIGNIFICANT CHANGE UP (ref 8.4–10.5)
CALCIUM SERPL-MCNC: 8.7 MG/DL — SIGNIFICANT CHANGE UP (ref 8.4–10.5)
CHLORIDE SERPL-SCNC: 118 MMOL/L — HIGH (ref 96–108)
CHLORIDE SERPL-SCNC: 122 MMOL/L — HIGH (ref 96–108)
CO2 BLDA-SCNC: 25 MMOL/L — HIGH (ref 19–24)
CO2 BLDA-SCNC: 25 MMOL/L — HIGH (ref 19–24)
CO2 SERPL-SCNC: 21 MMOL/L — LOW (ref 22–31)
CO2 SERPL-SCNC: 22 MMOL/L — SIGNIFICANT CHANGE UP (ref 22–31)
CREAT SERPL-MCNC: 0.51 MG/DL — SIGNIFICANT CHANGE UP (ref 0.5–1.3)
CREAT SERPL-MCNC: 0.7 MG/DL — SIGNIFICANT CHANGE UP (ref 0.5–1.3)
GLUCOSE SERPL-MCNC: 134 MG/DL — HIGH (ref 70–99)
GLUCOSE SERPL-MCNC: 141 MG/DL — HIGH (ref 70–99)
HCO3 BLDA-SCNC: 24 MMOL/L — SIGNIFICANT CHANGE UP (ref 21–28)
HCO3 BLDA-SCNC: 24 MMOL/L — SIGNIFICANT CHANGE UP (ref 21–28)
HCT VFR BLD CALC: 28 % — LOW (ref 34.5–45)
HGB BLD-MCNC: 8.8 G/DL — LOW (ref 11.5–15.5)
MAGNESIUM SERPL-MCNC: 1.9 MG/DL — SIGNIFICANT CHANGE UP (ref 1.6–2.6)
MCHC RBC-ENTMCNC: 26 PG — LOW (ref 27–34)
MCHC RBC-ENTMCNC: 31.4 GM/DL — LOW (ref 32–36)
MCV RBC AUTO: 82.6 FL — SIGNIFICANT CHANGE UP (ref 80–100)
NRBC # BLD: 0 /100 WBCS — SIGNIFICANT CHANGE UP (ref 0–0)
PCO2 BLDA: 34 MMHG — SIGNIFICANT CHANGE UP (ref 32–35)
PCO2 BLDA: 38 MMHG — HIGH (ref 32–35)
PH BLDA: 7.41 — SIGNIFICANT CHANGE UP (ref 7.35–7.45)
PH BLDA: 7.45 — SIGNIFICANT CHANGE UP (ref 7.35–7.45)
PHOSPHATE SERPL-MCNC: 2.8 MG/DL — SIGNIFICANT CHANGE UP (ref 2.5–4.5)
PLATELET # BLD AUTO: 350 K/UL — SIGNIFICANT CHANGE UP (ref 150–400)
PO2 BLDA: 165 MMHG — HIGH (ref 83–108)
PO2 BLDA: 68 MMHG — LOW (ref 83–108)
POTASSIUM SERPL-MCNC: 3.6 MMOL/L — SIGNIFICANT CHANGE UP (ref 3.5–5.3)
POTASSIUM SERPL-MCNC: 3.7 MMOL/L — SIGNIFICANT CHANGE UP (ref 3.5–5.3)
POTASSIUM SERPL-SCNC: 3.6 MMOL/L — SIGNIFICANT CHANGE UP (ref 3.5–5.3)
POTASSIUM SERPL-SCNC: 3.7 MMOL/L — SIGNIFICANT CHANGE UP (ref 3.5–5.3)
RBC # BLD: 3.39 M/UL — LOW (ref 3.8–5.2)
RBC # FLD: 21.6 % — HIGH (ref 10.3–14.5)
SAO2 % BLDA: 95.5 % — SIGNIFICANT CHANGE UP (ref 94–98)
SAO2 % BLDA: 99.6 % — HIGH (ref 94–98)
SODIUM SERPL-SCNC: 148 MMOL/L — HIGH (ref 135–145)
SODIUM SERPL-SCNC: 153 MMOL/L — HIGH (ref 135–145)
WBC # BLD: 8.73 K/UL — SIGNIFICANT CHANGE UP (ref 3.8–10.5)
WBC # FLD AUTO: 8.73 K/UL — SIGNIFICANT CHANGE UP (ref 3.8–10.5)

## 2021-11-05 PROCEDURE — 99291 CRITICAL CARE FIRST HOUR: CPT

## 2021-11-05 PROCEDURE — 71045 X-RAY EXAM CHEST 1 VIEW: CPT | Mod: 26

## 2021-11-05 PROCEDURE — 71045 X-RAY EXAM CHEST 1 VIEW: CPT | Mod: 26,77

## 2021-11-05 PROCEDURE — 99024 POSTOP FOLLOW-UP VISIT: CPT

## 2021-11-05 RX ORDER — POTASSIUM CHLORIDE 20 MEQ
40 PACKET (EA) ORAL ONCE
Refills: 0 | Status: COMPLETED | OUTPATIENT
Start: 2021-11-05 | End: 2021-11-05

## 2021-11-05 RX ORDER — VANCOMYCIN HCL 1 G
1250 VIAL (EA) INTRAVENOUS EVERY 12 HOURS
Refills: 0 | Status: DISCONTINUED | OUTPATIENT
Start: 2021-11-05 | End: 2021-11-07

## 2021-11-05 RX ORDER — PIPERACILLIN AND TAZOBACTAM 4; .5 G/20ML; G/20ML
4.5 INJECTION, POWDER, LYOPHILIZED, FOR SOLUTION INTRAVENOUS EVERY 6 HOURS
Refills: 0 | Status: DISCONTINUED | OUTPATIENT
Start: 2021-11-05 | End: 2021-11-07

## 2021-11-05 RX ORDER — SODIUM CHLORIDE 5 G/100ML
500 INJECTION, SOLUTION INTRAVENOUS
Refills: 0 | Status: DISCONTINUED | OUTPATIENT
Start: 2021-11-05 | End: 2021-11-05

## 2021-11-05 RX ORDER — METOCLOPRAMIDE HCL 10 MG
10 TABLET ORAL EVERY 6 HOURS
Refills: 0 | Status: DISCONTINUED | OUTPATIENT
Start: 2021-11-05 | End: 2021-11-07

## 2021-11-05 RX ORDER — SODIUM CHLORIDE 5 G/100ML
500 INJECTION, SOLUTION INTRAVENOUS
Refills: 0 | Status: DISCONTINUED | OUTPATIENT
Start: 2021-11-05 | End: 2021-11-06

## 2021-11-05 RX ORDER — PIPERACILLIN AND TAZOBACTAM 4; .5 G/20ML; G/20ML
4.5 INJECTION, POWDER, LYOPHILIZED, FOR SOLUTION INTRAVENOUS ONCE
Refills: 0 | Status: COMPLETED | OUTPATIENT
Start: 2021-11-05 | End: 2021-11-05

## 2021-11-05 RX ADMIN — PANTOPRAZOLE SODIUM 40 MILLIGRAM(S): 20 TABLET, DELAYED RELEASE ORAL at 11:42

## 2021-11-05 RX ADMIN — CHLORHEXIDINE GLUCONATE 15 MILLILITER(S): 213 SOLUTION TOPICAL at 05:16

## 2021-11-05 RX ADMIN — BROMOCRIPTINE MESYLATE 15 MILLIGRAM(S): 5 CAPSULE ORAL at 22:15

## 2021-11-05 RX ADMIN — ENOXAPARIN SODIUM 40 MILLIGRAM(S): 100 INJECTION SUBCUTANEOUS at 22:15

## 2021-11-05 RX ADMIN — NIMODIPINE 30 MILLIGRAM(S): 60 SOLUTION ORAL at 00:28

## 2021-11-05 RX ADMIN — GABAPENTIN 800 MILLIGRAM(S): 400 CAPSULE ORAL at 13:56

## 2021-11-05 RX ADMIN — NIMODIPINE 30 MILLIGRAM(S): 60 SOLUTION ORAL at 10:05

## 2021-11-05 RX ADMIN — NIMODIPINE 30 MILLIGRAM(S): 60 SOLUTION ORAL at 12:00

## 2021-11-05 RX ADMIN — SODIUM CHLORIDE 3 GRAM(S): 9 INJECTION INTRAMUSCULAR; INTRAVENOUS; SUBCUTANEOUS at 11:43

## 2021-11-05 RX ADMIN — Medication 15 MILLIGRAM(S): at 17:46

## 2021-11-05 RX ADMIN — NIMODIPINE 30 MILLIGRAM(S): 60 SOLUTION ORAL at 16:25

## 2021-11-05 RX ADMIN — NIMODIPINE 30 MILLIGRAM(S): 60 SOLUTION ORAL at 21:15

## 2021-11-05 RX ADMIN — NIMODIPINE 30 MILLIGRAM(S): 60 SOLUTION ORAL at 17:43

## 2021-11-05 RX ADMIN — SODIUM CHLORIDE 50 MILLILITER(S): 5 INJECTION, SOLUTION INTRAVENOUS at 09:06

## 2021-11-05 RX ADMIN — NIMODIPINE 30 MILLIGRAM(S): 60 SOLUTION ORAL at 07:01

## 2021-11-05 RX ADMIN — BROMOCRIPTINE MESYLATE 15 MILLIGRAM(S): 5 CAPSULE ORAL at 13:58

## 2021-11-05 RX ADMIN — LEVETIRACETAM 1000 MILLIGRAM(S): 250 TABLET, FILM COATED ORAL at 17:45

## 2021-11-05 RX ADMIN — NIMODIPINE 30 MILLIGRAM(S): 60 SOLUTION ORAL at 23:53

## 2021-11-05 RX ADMIN — LEVETIRACETAM 1000 MILLIGRAM(S): 250 TABLET, FILM COATED ORAL at 05:17

## 2021-11-05 RX ADMIN — NIMODIPINE 30 MILLIGRAM(S): 60 SOLUTION ORAL at 05:16

## 2021-11-05 RX ADMIN — PIPERACILLIN AND TAZOBACTAM 200 GRAM(S): 4; .5 INJECTION, POWDER, LYOPHILIZED, FOR SOLUTION INTRAVENOUS at 22:20

## 2021-11-05 RX ADMIN — BROMOCRIPTINE MESYLATE 15 MILLIGRAM(S): 5 CAPSULE ORAL at 05:17

## 2021-11-05 RX ADMIN — Medication 15 MILLIGRAM(S): at 05:17

## 2021-11-05 RX ADMIN — DEXMEDETOMIDINE HYDROCHLORIDE IN 0.9% SODIUM CHLORIDE 4.25 MICROGRAM(S)/KG/HR: 4 INJECTION INTRAVENOUS at 23:23

## 2021-11-05 RX ADMIN — Medication 10 MILLIGRAM(S): at 05:16

## 2021-11-05 RX ADMIN — Medication 166.67 MILLIGRAM(S): at 17:42

## 2021-11-05 RX ADMIN — CHLORHEXIDINE GLUCONATE 1 APPLICATION(S): 213 SOLUTION TOPICAL at 05:21

## 2021-11-05 RX ADMIN — SODIUM CHLORIDE 3 GRAM(S): 9 INJECTION INTRAMUSCULAR; INTRAVENOUS; SUBCUTANEOUS at 05:18

## 2021-11-05 RX ADMIN — Medication 40 MILLIEQUIVALENT(S): at 06:15

## 2021-11-05 RX ADMIN — GABAPENTIN 800 MILLIGRAM(S): 400 CAPSULE ORAL at 22:16

## 2021-11-05 RX ADMIN — GABAPENTIN 800 MILLIGRAM(S): 400 CAPSULE ORAL at 05:24

## 2021-11-05 RX ADMIN — PIPERACILLIN AND TAZOBACTAM 200 GRAM(S): 4; .5 INJECTION, POWDER, LYOPHILIZED, FOR SOLUTION INTRAVENOUS at 16:24

## 2021-11-05 RX ADMIN — Medication 5 MILLIGRAM(S): at 07:31

## 2021-11-05 RX ADMIN — Medication 1 DROP(S): at 05:18

## 2021-11-05 RX ADMIN — NIMODIPINE 30 MILLIGRAM(S): 60 SOLUTION ORAL at 14:00

## 2021-11-05 RX ADMIN — CHLORHEXIDINE GLUCONATE 15 MILLILITER(S): 213 SOLUTION TOPICAL at 18:07

## 2021-11-05 RX ADMIN — SODIUM CHLORIDE 3 GRAM(S): 9 INJECTION INTRAMUSCULAR; INTRAVENOUS; SUBCUTANEOUS at 17:42

## 2021-11-05 RX ADMIN — NIMODIPINE 30 MILLIGRAM(S): 60 SOLUTION ORAL at 03:00

## 2021-11-05 RX ADMIN — Medication 1 DROP(S): at 18:08

## 2021-11-05 NOTE — CONSULT NOTE ADULT - SUBJECTIVE AND OBJECTIVE BOX
HPI:  47 y/o female with PMH HTN, Multiple sclerosis, recent spinal surgery 10/8 ([facility]) presented to [facility] BIBEMS after being found unconscious at home, unknown period of time. 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 HH5, MF1. Patient was intubated at [facility], found to have blown L pupil, and sent straight to the OR for left hemicraniotomy. A-line placed intraop. Hemodynamically unstable upon arrival to [facility], bradycardic and hypertensive s/p Mannitol, Clevidipine, and Furosemide. Central line placed in NSICU. Currently sedated on Propofol, pending repeat CTH. History per patient's son, patient had recent spine surgery and was found on outpatient imaging last week to have L M1 segment aneurysm (unruptured), pt asymptomatic at this time. Per son patient taking percocet PO at home. Ureña catheter, ET tube, and arterial line placed at [facility].     NIHSS on arrival: 32   Bess Griffin 5, Mod Busby 4  Bleed Day 1 = 10/26 (26 Oct 2021 13:03)    SURGERY ADDENDUM:   46yoF with PMH HTN, MS and PSH spine surgery 10/8/21 admitted to [facility] with subdural hematoma s/p hemicraniectomy for decompression and evacuation of SDH 10/26/21 transferred to [facility] noted to have tense flap s/p EVD placement and coiling of MCA bifurcation aneurysm 10/26/21. Remains intubated/sedated with poor prognosis. General surgery consulted for trach/peg placement.     Upon seeing patient, intubated, sedated on precedex. Seens last week for same consult. However, patient found to be actively having emesis, not tolerating TFs. Underwent work-up for emesis, OGT found to be clogged. Now with new OGT in place, tolerating TF @goal >24 hours with rectal tube in place, active brown stools.     Personal history of spine surgery        REVIEW OF SYSTEMS  Negative except as per HPI, per RN reports.       MEDICATIONS  (STANDING):  artificial  tears Solution 1 Drop(s) Both EYES two times a day  baclofen 15 milliGRAM(s) Oral every 12 hours  bromocriptine Capsule 15 milliGRAM(s) Oral every 8 hours  chlorhexidine 0.12% Liquid 15 milliLiter(s) Oral Mucosa every 12 hours  chlorhexidine 2% Cloths 1 Application(s) Topical <User Schedule>  dexMEDEtomidine Infusion 0.2 MICROgram(s)/kG/Hr (4.25 mL/Hr) IV Continuous <Continuous>  enoxaparin Injectable 40 milliGRAM(s) SubCutaneous every 24 hours  gabapentin Solution 800 milliGRAM(s) Oral every 8 hours  insulin lispro (ADMELOG) corrective regimen sliding scale   SubCutaneous every 6 hours  levETIRAcetam 1000 milliGRAM(s) Oral every 12 hours  niMODipine 30 milliGRAM(s) Oral every 2 hours  pantoprazole  Injectable 40 milliGRAM(s) IV Push daily  sodium chloride 3 Gram(s) Oral every 6 hours  sodium chloride 3%. 500 milliLiter(s) (35 mL/Hr) IV Continuous <Continuous>    MEDICATIONS  (PRN):  acetaminophen    Suspension .. 650 milliGRAM(s) Oral every 6 hours PRN Temp greater or equal to 38C (100.4F), Mild Pain (1 - 3)  fentaNYL    Injectable 25 MICROGram(s) IV Push every 2 hours PRN agitation  hydrALAZINE Injectable 5 milliGRAM(s) IV Push every 4 hours PRN >180  metoclopramide Injectable 10 milliGRAM(s) IV Push every 6 hours PRN nausea/vomiting      Allergies    No Known Allergies    Intolerances        Vital Signs Last 24 Hrs  T(C): 36.9 (05 Nov 2021 09:14), Max: 37.1 (04 Nov 2021 21:00)  T(F): 98.4 (05 Nov 2021 09:14), Max: 98.8 (04 Nov 2021 21:00)  HR: 75 (05 Nov 2021 12:00) (60 - 106)  BP: 126/62 (05 Nov 2021 12:00) (112/61 - 169/82)  BP(mean): 88 (05 Nov 2021 12:00) (81 - 115)  RR: 16 (05 Nov 2021 12:00) (14 - 36)  SpO2: 99% (05 Nov 2021 12:00) (94% - 100%)    PHYSICAL EXAM:   GEN: NAD, intubated   HEENT: OG tube in place with TF running   CV: NSR cardiac monitor   Pulm: no respiratory distress, intubated on MV   Abd: soft, ND, infraumbilical incision well healed, CDI, difficult to assess TTP 2/2 sedated status, rectal tube in place brown stool   Ext: WWP, mild 4ext edema, nonpitting      LABS:                        8.8    8.73  )-----------( 350      ( 05 Nov 2021 04:46 )             28.0     11-05    148<H>  |  118<H>  |  7   ----------------------------<  141<H>  3.6   |  22  |  0.51    Ca    8.7      05 Nov 2021 04:46  Phos  2.8     11-05  Mg     1.9     11-05    TPro  6.5  /  Alb  3.1<L>  /  TBili  0.3  /  DBili  0.2  /  AST  22  /  ALT  13  /  AlkPhos  85  11-04    LIVER FUNCTIONS - ( 04 Nov 2021 13:47 )  Alb: 3.1 g/dL / Pro: 6.5 g/dL / ALK PHOS: 85 U/L / ALT: 13 U/L / AST: 22 U/L / GGT: x                 CAPILLARY BLOOD GLUCOSE      POCT Blood Glucose.: 124 mg/dL (05 Nov 2021 11:43)  POCT Blood Glucose.: 129 mg/dL (05 Nov 2021 04:10)  POCT Blood Glucose.: 117 mg/dL (04 Nov 2021 23:24)  POCT Blood Glucose.: 106 mg/dL (04 Nov 2021 16:08)        Culture - Sputum (collected 04 Nov 2021 13:59)  Source: .Sputum Sputum  Gram Stain (04 Nov 2021 18:09):    No epithelial cells seen    Few-moderate White blood cells    Few Gram positive cocci in pairs and clusters    Rare Gram Positive Rods  Preliminary Report (05 Nov 2021 12:54):    Numerous Staphylococcus aureus Presumptive Methicillin susceptible    Confirmation to follow within 24 hrs.    Numerous Enterobacter cloacae complex    Moderate Proteus mirabilis    Accompanied by normal respiratory melinda    Susceptibility to follow.         RADIOLOGY & ADDITIONAL STUDIES:  < from: US Abdomen Complete (US Abdomen Complete .) (11.02.21 @ 22:49) >    EXAM:  US ABDOMEN COMPLETE                          PROCEDURE DATE:  11/02/2021          INTERPRETATION:  CLINICAL INFORMATION: Coffee-ground emesis    COMPARISON: None available.    TECHNIQUE: Sonography of the abdomen.    FINDINGS:    Liver: Within normal limits. The liver measures 16.5 cm.  Bile ducts: Normal caliber.  Common bile duct measures 2 mm.  Gallbladder: Within normal limits. The gallbladder wall measures 2 mm. No stones.  Pancreas: Visualized portions are within normal limits.  Right kidney: 10.6 cm. No hydronephrosis. Normal parenchymal thickness and echogenicity.  Left kidney: 9.4 cm.  No hydronephrosis. Limited evaluation secondary to patient positioning.  Ascites: None.  Aorta and IVC: Visualized portions are within normallimits.    Spleen not visualized, likely secondary to patient positioning.      IMPRESSION:  No cholecystitis or cholelithiasis.    --- End of Report ---          Thank you for the opportunity to participate in the care of this patient.    COOKIE ESTRELLA MD; Resident Radiologist  This document has been electronically signed.  ALYSE GARCIA MD; Attending Radiologist  This document has been electronically signed. Nov  3 2021  3:38AM    < end of copied text >  < from: Xray Chest 1 View- PORTABLE-Urgent (Xray Chest 1 View- PORTABLE-Urgent .) (11.03.21 @ 15:40) >    EXAM:  XR CHEST PORTABLE URGENT 1V                          PROCEDURE DATE:  11/03/2021          INTERPRETATION:  TECHNIQUE: Single portable view of the chest.    COMPARISON:  11/3/2021    CLINICAL HISTORY: OG tube placement    FINDINGS:    Single frontal view of the chest demonstrates the lungs to be clear. The cardiomediastinal silhouette is enlarged. OG tube tip below the hemidiaphragm. Endotracheal tube tip at the thoracic inlet. Right-sided central venous catheter is unchanged. No acute osseous abnormalities. Overlying EKG leads and wires are noted    IMPRESSION: OG tube tip below the hemidiaphragm within the stomach.    --- End of Report ---          Thank you for the opportunity to participate in the care of this patient.      AMERICO GUADALUPE; Attending Radiologist  This document has been electronically signed. Nov  3 2021  9:41PM    < end of copied text >

## 2021-11-05 NOTE — PROGRESS NOTE ADULT - ASSESSMENT
46y/Female with:  L MCA ruptured aneurysm, subarachnoid hemorrhage, s/p DHC (10/26/2021, Dr. D'Amico @ [place]), brain compression, cerebral edema  anemia   recent spinal surgery  UGIB    PLAN: Day 1 = 10-26 ; Day 4 = 10/29; Day 21 = 11/15  NEURO: neurochecks q1h, sedation with precedex; PRN fentanyl pushes  SAH:  cont nimodipine 30 mg po q2h (hold for sBP < 140) to be given for 21 days (last day 11/15); CTA mild-moderate anterior circulation vasospasm (euvolemia, permissive hypertension); consideration of IA verapamil (will D/W CURT Duncan)  Hydrocephalus:  EVD 5cm H20, ICP < 12  Neurostorming:  precedex; bromocriptine 15 mg q8h, gabapentin 600 mg q8h, baclofen 15 mg BID, fentanyl prn  Seizure prophylaxis:  levetiracetam 1000mg IV BID  Pending trach/PEG   REHAB:  physical therapy evaluation and management    EARLY MOB:  HOB elevated    PULM:  full vent support for now (Mode: AC, RR (machine): 14, TV (machine): 450, FiO2: 40, PEEP: 5, ITime: 1, MAP: 8, PIP: 16), CXR mild congestion  CARDIO:  SBP goal 100-180mm Hg, TTE results as above  ENDO:  Blood sugar goals 140-180 mg/dL, insulin sliding scale  GI:  PPI OD, FOBT negative; lipase 105 will trend; abdo U/S results as above;   DIET: TF to start, if tolerates, follow-up with general surgery, re:  PEG  RENAL: maintain euvolemia, Na goal 145-150; Na 143, 3% NaCl at 75 mL/h, cont salt tabs  HEM/ONC: no coagulopathy (INR= 1.03), Hb 8.9, no ASA / Plavix use; FOBT negative  VTE Prophylaxis: SCDs, SQL  ID: Tmax 37.8, no leukocytosis  Social: updated son and daughter, re:  progress, prognosis, trach/PEG    *****    CORE MEASURES  1.  Hunt and Griffin Score = 5  2.  VTE prophylaxis:  [ ] administered within 24 hours of admission OR [X] reason not done: fresh bleed, unsecured aneurysm.  3.  Dysphagia screening:   [X] performed before any oral meds / liquids / food  4.  Nimodipine treatment:  [X] administered within 24 hours of admission OR [ ] reason not done:    *****    ATTENDING ATTESTATION:    Patient at high risk for neurological deterioration or death due to:  ICU delirium, aspiration PNA, DVT / PE.  Critical care time:  I have personally provided 45 minutes of critical care time, excluding time spent on separate procedures.    Plan discussed with RN, house staff.     46y/Female with:  L MCA ruptured aneurysm, subarachnoid hemorrhage, s/p DHC (10/26/2021, Dr. D'Amico @ [place]), brain compression, cerebral edema  anemia   recent spinal surgery  UGIB    PLAN: Day 1 = 10-26 ; Day 4 = 10/29; Day 21 = 11/15  NEURO: neurochecks q1h, sedation with precedex; PRN fentanyl pushes  SAH:  cont nimodipine 30 mg po q2h (hold for sBP < 140) to be given for 21 days (last day 11/15); CTA mild-moderate anterior circulation vasospasm (euvolemia, permissive hypertension); consideration of IA verapamil (will D/W CURT Duncan)  Hydrocephalus:  EVD 5cm H20, ICP < 12  Neurostorming:  precedex; bromocriptine 15 mg q8h, gabapentin 600 mg q8h, baclofen 15 mg BID, fentanyl prn  Seizure prophylaxis:  levetiracetam 1000mg IV BID  Pending trach/PEG   REHAB:  physical therapy evaluation and management    EARLY MOB:  HOB elevated    PULM:  full vent support for now (Mode: AC, RR (machine): 14, TV (machine): 450, FiO2: 40, PEEP: 5, ITime: 1, MAP: 8, PIP: 16), CXR mild congestion; CT chest (given hypoxemia on ABG)  CARDIO:  SBP goal 100-180mm Hg, TTE results as above  ENDO:  Blood sugar goals 140-180 mg/dL, insulin sliding scale  GI:  PPI OD, FOBT negative; lipase 105 will trend; abdo U/S results as above;   DIET: TF to start, if tolerates, follow-up with general surgery, re:  PEG  RENAL: maintain euvolemia, Na goal 145-150; Na 148, 3% NaCl at 35 mL/h, cont salt tabs  HEM/ONC: no coagulopathy (INR= 1.03), Hb 8.9, no ASA / Plavix use; FOBT negative  VTE Prophylaxis: SCDs, SQL  ID: Tmax 37.8, no leukocytosis; start vancomycin zosyn given sputum MSSA enterobacter proteus (pending sensitivities)  Social: updated son and daughter, re:  progress, prognosis, trach/PEG    *****    CORE MEASURES  1.  Hunt and Griffin Score = 5  2.  VTE prophylaxis:  [ ] administered within 24 hours of admission OR [X] reason not done: fresh bleed, unsecured aneurysm.  3.  Dysphagia screening:   [X] performed before any oral meds / liquids / food  4.  Nimodipine treatment:  [X] administered within 24 hours of admission OR [ ] reason not done:    *****    ATTENDING ATTESTATION:    Patient at high risk for neurological deterioration or death due to:  ICU delirium, aspiration PNA, DVT / PE.  Critical care time:  I have personally provided 45 minutes of critical care time, excluding time spent on separate procedures.    Plan discussed with RN, house staff.

## 2021-11-05 NOTE — CHART NOTE - NSCHARTNOTEFT_GEN_A_CORE
Admitting Diagnosis:   Patient is a 46y old  Female who presents with a chief complaint of ICH (05 Nov 2021 13:35)    PAST MEDICAL & SURGICAL HISTORY:  No pertinent past medical history  Personal history of spine surgery    Current Nutrition Order: NPO diet  EN regimen: Jevity 1.2 @ 40 ml/hr x24hrs via NGT with propofol (10.2 ml/hr (269kcal)) providing a total of 960 ml TV, 1421 kcal, 53g pro, 774 ml free water.      PO Intake: Good (%) [   ]  Fair (50-75%) [   ] Poor (<25%) [   ]- N/A     GI Issues:   WDL, last BM 10/31  +fecal incontinence  No n/v/c reported   No noted abd distention or discomfort    Pain:  No pain reported at this time    Skin Integrity:  Surgical incision, rosalind score 12  +3 pitting edema dependent  No pressure ulcers noted    Labs:   11-05    148<H>  |  118<H>  |  7   ----------------------------<  141<H>  3.6   |  22  |  0.51    Ca    8.7      05 Nov 2021 04:46  Phos  2.8     11-05  Mg     1.9     11-05    TPro  6.5  /  Alb  3.1<L>  /  TBili  0.3  /  DBili  0.2  /  AST  22  /  ALT  13  /  AlkPhos  85  11-04    CAPILLARY BLOOD GLUCOSE    POCT Blood Glucose.: 124 mg/dL (05 Nov 2021 11:43)  POCT Blood Glucose.: 129 mg/dL (05 Nov 2021 04:10)  POCT Blood Glucose.: 117 mg/dL (04 Nov 2021 23:24)  POCT Blood Glucose.: 106 mg/dL (04 Nov 2021 16:08)    Medications:  MEDICATIONS  (STANDING):  artificial  tears Solution 1 Drop(s) Both EYES two times a day  baclofen 15 milliGRAM(s) Oral every 12 hours  bromocriptine Capsule 15 milliGRAM(s) Oral every 8 hours  chlorhexidine 0.12% Liquid 15 milliLiter(s) Oral Mucosa every 12 hours  chlorhexidine 2% Cloths 1 Application(s) Topical <User Schedule>  dexMEDEtomidine Infusion 0.2 MICROgram(s)/kG/Hr (4.25 mL/Hr) IV Continuous <Continuous>  enoxaparin Injectable 40 milliGRAM(s) SubCutaneous every 24 hours  gabapentin Solution 800 milliGRAM(s) Oral every 8 hours  insulin lispro (ADMELOG) corrective regimen sliding scale   SubCutaneous every 6 hours  levETIRAcetam 1000 milliGRAM(s) Oral every 12 hours  niMODipine 30 milliGRAM(s) Oral every 2 hours  pantoprazole  Injectable 40 milliGRAM(s) IV Push daily  sodium chloride 3 Gram(s) Oral every 6 hours  sodium chloride 3%. 500 milliLiter(s) (35 mL/Hr) IV Continuous <Continuous>    MEDICATIONS  (PRN):  acetaminophen    Suspension .. 650 milliGRAM(s) Oral every 6 hours PRN Temp greater or equal to 38C (100.4F), Mild Pain (1 - 3)  fentaNYL    Injectable 25 MICROGram(s) IV Push every 2 hours PRN agitation  hydrALAZINE Injectable 5 milliGRAM(s) IV Push every 4 hours PRN >180  metoclopramide Injectable 10 milliGRAM(s) IV Push every 6 hours PRN nausea/vomiting    Weight:  Daily     Daily     Weight Change:     Nutrition Focused Physical Exam: Completed [   ]  Not Pertinent [   ]  Muscle Wasting- Temporal [   ]  Clavicle/Pectoral [   ]  Shoulder/Deltoid [   ]  Scapula [   ]  Interosseous [   ]  Quadriceps [   ]  Gastrocnemius [   ]  Fat Wasting- Orbital [   ]  Buccal [   ]  Triceps [   ]  Rib [   ]  Suspect [PCM] 2/2 to physical assessment, [poor intake], and [wt loss]; please see malnutrition chart note.    Estimated energy needs:     Subjective:     Previous Nutrition Diagnosis:    Active [   ]  Resolved [   ]    If resolved, new PES:     Goal:    Recommendations:    Education:     Risk Level: High [   ] Moderate [   ] Low [   ] Admitting Diagnosis:   Patient is a 46y old  Female who presents with a chief complaint of ICH (05 Nov 2021 13:35)    PAST MEDICAL & SURGICAL HISTORY:  No pertinent past medical history  Personal history of spine surgery    Current Nutrition Order: NPO diet  EN regimen: Jevity 1.2 @ 40 ml/hr x24hrs via NGT with propofol (10.2 ml/hr (269kcal)) providing a total of 960 ml TV, 1421 kcal, 53g pro, 774 ml free water.      PO Intake: Good (%) [   ]  Fair (50-75%) [   ] Poor (<25%) [   ]- N/A     GI Issues:   WDL, last BM 11/4, +fecal incontinence  OG in place for EN feeds  No n/v/c reported- Rectal tube for loose stools (200 ml/24hrs).   No noted abd distention or discomfort    Pain:  No pain reported at this time    Skin Integrity:  Surgical incision, rosalind score 12  +2 pitting edema generalized and periorbital  No pressure ulcers noted    Labs:   11-05    148<H>  |  118<H>  |  7   ----------------------------<  141<H>  3.6   |  22  |  0.51    Ca    8.7      05 Nov 2021 04:46  Phos  2.8     11-05  Mg     1.9     11-05    TPro  6.5  /  Alb  3.1<L>  /  TBili  0.3  /  DBili  0.2  /  AST  22  /  ALT  13  /  AlkPhos  85  11-04    CAPILLARY BLOOD GLUCOSE    POCT Blood Glucose.: 124 mg/dL (05 Nov 2021 11:43)  POCT Blood Glucose.: 129 mg/dL (05 Nov 2021 04:10)  POCT Blood Glucose.: 117 mg/dL (04 Nov 2021 23:24)  POCT Blood Glucose.: 106 mg/dL (04 Nov 2021 16:08)    Medications:  MEDICATIONS  (STANDING):  artificial  tears Solution 1 Drop(s) Both EYES two times a day  baclofen 15 milliGRAM(s) Oral every 12 hours  bromocriptine Capsule 15 milliGRAM(s) Oral every 8 hours  chlorhexidine 0.12% Liquid 15 milliLiter(s) Oral Mucosa every 12 hours  chlorhexidine 2% Cloths 1 Application(s) Topical <User Schedule>  dexMEDEtomidine Infusion 0.2 MICROgram(s)/kG/Hr (4.25 mL/Hr) IV Continuous <Continuous>  enoxaparin Injectable 40 milliGRAM(s) SubCutaneous every 24 hours  gabapentin Solution 800 milliGRAM(s) Oral every 8 hours  insulin lispro (ADMELOG) corrective regimen sliding scale   SubCutaneous every 6 hours  levETIRAcetam 1000 milliGRAM(s) Oral every 12 hours  niMODipine 30 milliGRAM(s) Oral every 2 hours  pantoprazole  Injectable 40 milliGRAM(s) IV Push daily  sodium chloride 3 Gram(s) Oral every 6 hours  sodium chloride 3%. 500 milliLiter(s) (35 mL/Hr) IV Continuous <Continuous>    MEDICATIONS  (PRN):  acetaminophen    Suspension .. 650 milliGRAM(s) Oral every 6 hours PRN Temp greater or equal to 38C (100.4F), Mild Pain (1 - 3)  fentaNYL    Injectable 25 MICROGram(s) IV Push every 2 hours PRN agitation  hydrALAZINE Injectable 5 milliGRAM(s) IV Push every 4 hours PRN >180  metoclopramide Injectable 10 milliGRAM(s) IV Push every 6 hours PRN nausea/vomiting    Admitting Anthropometrics:  Height: 61" Weight: 187lbs, IBW 105lbs+/-10%, %%, BMI 34.9 kg/m2     Weight:  10/26 187lbs     Weight Change: No new weights obtained during this admission. Please obtain new weight when feasible and cont to trend biweekly.     Nutrition Focused Physical Exam: Completed [   ]  Not Pertinent [ x  ]    Estimated energy needs:   IBW (48kg) used for calculations as pt >120% of IBW (178%). Needs adjusted for wound healing, critical care requiring intubation.   Kcal (25-30 kcal/kg): 0592-3801 kcal  Protein (1.4-1.6 g/kg pro): 67-76g pro  **Fluids per team    Subjective:   46y/F with recent spinal surgery, found down and brought to ED.  In ED, witnessed shaking, ?seizures, intubated.  Imaging showed SAH.  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 Repeat CT HCP - EVD R frontal inserted, s/p L hemicraniectomy for decompression and evacuation of SDH 10/26. EVD placed. Pt returned to NSICU intubated and sedated. EVD@5cm h2o. Underwent work-up for emesis, OGT found to be clogged. Now with new OGT in place, tolerating TF @goal >24 hours with rectal tube in place. Plan for possible placement of PEG and trach.     On assessment, pt resting in bed on full vent support. Vent: AC/ VC. Tmax 97.9F. MAP 107H. Currently NPO with EN running at goal rate- pt now weaned off propofol and has been underfed, please see recs below to optimize nutrition at this time. No n/v/d/c. No abd distention or discomfort. No s/sx of aspiration. Pertinent labs: 124H, Na 148H, Cl 118H, Glu 141H- cont with tight BS control. Cont to monitor lytes and replete prn. Education deferred. Please see nutr recs below. RD to follow.     Previous Nutrition Diagnosis: Inadequate oral intake RT Inability to meet est needs via PO AEB NPO, reliant on EN to meet 100% of est needs    Active [ x  ]  Resolved [   ]    If resolved, new PES:     Goal/Expected Outcome 1. Diet will be advanced within 24-48hrs 2. Consistently meet >75% est    Recommendations:  1.  To optimize current EN regimen now pt is off propofol, recommend increasing EN to Jevity 1.2 @ 50 ml/hr x24hrs via OGT providing 1200 ml TV, 1440 kcal, 67g pro, 968 ml.   >> FWF per team  >> Cont to maintain aspiration precautions at all times  2. Pain and bowel regimen per team  3. Monitor lytes and replete prn  4. RD diet edu prn    Education: Deferred at this time    Risk Level: High [ x  ] Moderate [  ] Low [   ].

## 2021-11-05 NOTE — PROGRESS NOTE ADULT - SUBJECTIVE AND OBJECTIVE BOX
=======================================   NEUROCRITICAL CARE ATTENDING NOTE ()  =======================================    AILYN DÍAZ   MRN-[id number]    HPI:  47 y/o female with PMH HTN, Multiple sclerosis, recent spinal surgery 10/8 ([facility]) presented to [facility] BIBEMS after being found unconscious at home, unknown period of time. 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 HH5, MF1. Patient was intubated at [facility] ED, found to have blown L pupil, and sent straight to the OR for left hemicraniotomy. A-line placed intraop. Hemodynamically unstable upon arrival to [facility], bradycardic and hypertensive s/p Mannitol, Clevidipine, and Furosemide. Central line placed in NSICU. Currently sedated on Propofol, pending repeat CTH. History per patient's son, patient had recent spine surgery and was found on outpatient imaging last week to have L M1 segment aneurysm (unruptured), pt asymptomatic at this time. Per son patient taking percocet PO at home. Ureña catheter, ET tube, and arterial line placed at [facility].   NIHSS on arrival: 32. Bess Griffin 5, Mod Busby 4.  Bleed Day 1 = 10/26 (26 Oct 2021 13:03)    Hospital course:  10/26: admitted to [facility] from [facility], POD#0 s/p L hemicraniectomy for decompression and evacuation of SDH. Transferred to [facility] noted to have tense flap, received mannitol, keppra, decadron. Was hypertensive to 200s and preston to 40s, recevied 85g mannitol and bullet 23.4%. CTH on arrival demonstrated increased size in vents and new IVH. EVD placed, open at 15, central line placed. Transfused 2 u PRBC. POD0 of coiling of left MCA bifurcation aneurysm,   10/27: CTH demonstrated EVD in correct position, EVD lowered to 5, remains intubated on proprofol, pending repeat CTH in AM. Started on 3% @ 30/hr. 2LNS given for euvolemia, Mannitol given for uptrending ICPs. Bullet given 8:30 am. 3% increased to 50/hr. 1 L bolus. New EVD catheter placed using exisiting sreekanth hole. 1 u PRBC.  10/28: HH5, MF4, BD3; POD2 angio coil/embo of L MCA aneurysm. JOAQUÍN overnight, neuro stable. EVD@5cmH20 ICPs < 20 overnight, OP WNL. S/p 1 unit PRBC yesterday with appropriate response. Given 42g mannitol in AM for ICP 22 persistently, with improvement, then given 23% in PM for elevated ICP. febrile, pancultured. Standing tylenol and cooling blanket.   10/29: BD4, POD3 angio coil/embo. JOAQUÍN o/n, neuro stable. EVD@5, ICPs <20 o/n.   10/30: BD5, POD4 angio coil/embo. JOAQUÍN o/n neuro stable. EVD@5. 3% @50cc/hr.  10/31: BD6, POD5 angio coil/embo. brief period maintained upward gaze, resolved on its own. EVD@5cm h2o.    : BD7, POD6 L hemicrani, angio coil/embo. Neuro exam unchanged. ICPs WNL. Overnight given hydralazine, increased propofol for SBP > 180.  CTA showed mild-moderate anterior circulation vasospasm (permissive hypertension, euvolemia).  : BD8, POD7 L hemicrani, angio coil/embo. Neuro exam unchanged. ICPs WNL.  Pending trach/PEG.  11/3: BD9, POD8 L hemicrani, angio coil/embo.  Neuro exam unchanged. ICPs WNL.  Pending trach/PEG.  Dayshift:  noted episodes of sympathetic storming during vasospasm period.  : BD10 POD9, JOAQUÍN o/n, 500cc NS for euvolemia,  neuro stable, EVD at 5  : BD11 POD10, JOAQUÍN o/n, neuro stable, EVD at 5    Past Medical History: No pertinent past medical history  Allergies:  Allergy Status Unknown  Home meds:     Current Meds:  MEDICATIONS  (STANDING):  artificial  tears Solution 1 Drop(s) Both EYES two times a day  baclofen 15 milliGRAM(s) Oral every 12 hours  bromocriptine Capsule 15 milliGRAM(s) Oral every 8 hours  dexMEDEtomidine Infusion 0.2 MICROgram(s)/kG/Hr (4.25 mL/Hr) IV Continuous <Continuous>  enoxaparin Injectable 40 milliGRAM(s) SubCutaneous every 24 hours  gabapentin Solution 800 milliGRAM(s) Oral every 8 hours  insulin lispro (ADMELOG) corrective regimen sliding scale   SubCutaneous every 6 hours  levETIRAcetam 1000 milliGRAM(s) Oral every 12 hours  metoclopramide Injectable 10 milliGRAM(s) IV Push every 8 hours  niMODipine 30 milliGRAM(s) Oral every 2 hours  pantoprazole  Injectable 40 milliGRAM(s) IV Push daily  sodium chloride 3 Gram(s) Oral every 6 hours  sodium chloride 3%. 500 milliLiter(s) (75 mL/Hr) IV Continuous <Continuous>    MEDICATIONS  (PRN):  acetaminophen    Suspension .. 650 milliGRAM(s) Oral every 6 hours PRN Temp greater or equal to 38C (100.4F), Mild Pain (1 - 3)  fentaNYL    Injectable 25 MICROGram(s) IV Push every 2 hours PRN agitation  hydrALAZINE Injectable 5 milliGRAM(s) IV Push every 4 hours PRN >180    PHYSICAL EXAMINATION    ICU Vital Signs Last 24 Hrs  T(C): 36.6 (2021 06:49), Max: 37.1 (2021 21:00)  T(F): 97.9 (2021 06:49), Max: 98.8 (2021 21:00)  HR: 62 (2021 06:00) (60 - 91)  BP: 139/71 (2021 06:00) (112/61 - 169/82)  BP(mean): 100 (2021 06:00) (81 - 115)  ABP: 138/84 (2021 06:00) (107/60 - 175/96)  ABP(mean): 108 (2021 06:00) (80 - 128)  RR: 14 (2021 06:00) (14 - 25)  SpO2: 98% (2021 06:00) (95% - 100%)    NEUROLOGIC EXAMINATION:  Opens eyes to stimuli, does not follow commands, pupils 3mm equal and brisk (-) Doll's, (+) cough and (+) gag, B LE TF, R UE 0/5; flap full but soft; extensor posturing B UE R>L, B LE TF  Mode: AC, RR (machine): 14, TV (machine): 450, FiO2: 40, PEEP: 5, ITime: 1, MAP: 8, PIP: 22  EENT:  anicteric  CARDIOVASCULAR: (+) S1 S2, normal rate and regular rhythm  PULMONARY: clear to auscultation bilaterally  ABDOMEN: soft, nontender with normoactive bowel sounds  EXTREMITIES: no edema  SKIN: no rash; back incisions checked (dehiscence noted)    I/O's    21 @ 07:01  -  21 @ 07:00  --------------------------------------------------------  IN: 2140.9 mL / OUT: 1540 mL / NET: 600.9 mL    21 @ 07:01  -  21 @ 06:55  --------------------------------------------------------  IN: 2788 mL / OUT: 2225 mL / NET: 563 mL    LABS:                        8.8    8.73  )-----------( 350      ( 2021 04:46 )             28.0         148<H>  |  118<H>  |  7   ----------------------------<  141<H>  3.6   |  22  |  0.51    Ca    8.7      2021 04:46  Phos  2.8     11-05  Mg     1.9         TPro  6.5  /  Alb  3.1<L>  /  TBili  0.3  /  DBili  0.2  /  AST  22  /  ALT  13  /  AlkPhos  85      CAPILLARY BLOOD GLUCOSE    POCT Blood Glucose.: 129 mg/dL (2021 04:10)  POCT Blood Glucose.: 117 mg/dL (2021 23:24)  POCT Blood Glucose.: 106 mg/dL (2021 16:08)  POCT Blood Glucose.: 92 mg/dL (2021 11:25)    Culture - Sputum (collected 21 @ 13:59)  Source: .Sputum Sputum  Gram Stain (21 @ 18:09):    No epithelial cells seen    Few-moderate White blood cells    Few Gram positive cocci in pairs and clusters    Rare Gram Positive Rods    lipase 106    Bacteriology:    10/28 CSF NGTD  10/28 Blood NG1d x2     CSF studies:  10-28  L   *** [id number] *** %N91 %L4     EEG:  Neuroimagin/01 CTA - Diffuse, but overall MILD-MODERATE, sites of VASOSPASM are noted at theANTERIOR CIRCULATION, whilst the posterior circulation appears spared.   10/27 CT - There is herniation of the brain parenchyma and to the craniectomy defect which is similar prior examination. There is no significant change in the large left frontotemporal intraparenchymal hematoma with surrounding edema. Again demonstrated is mass effect with right to left midline shift measuring approximately 1 cm which is stable from prior exam. Again demonstrated is effacement of the cerebral sulci and basal cisterns.  Other imagin/01 UE Doppler - DVT within the right brachial vein.  Superficial thrombosis of the right cephalic vein.   CXR - stable.  Mild congestion.  10/28 Doppler - Negative  10/28 TTE -   1. Normal left and right ventricular size and systolic function.   2. No significant valvular disease.   3. Mild pulmonary hypertension present, pulmonary artery systolic pressure is 35 mmHg.   4. No pericardial effusion.   5. No prior echo is available for comparison.   Abdo U/S  Liver: Within normal limits. The liver measures 16.5 cm.  Bile ducts: Normal caliber.  Common bile duct measures 2 mm.  Gallbladder: Within normal limits. The gallbladder wall measures 2 mm. No stones.  Pancreas: Visualized portions are within normal limits.  Right kidney: 10.6 cm. No hydronephrosis. Normal parenchymal thickness and echogenicity.  Left kidney: 9.4 cm.  No hydronephrosis. Limited evaluation secondary to patient positioning.  Ascites: None.  Aorta and IVC: Visualized portions are within normal limits.    IV FLUIDS:   DRIPS:  ·	precedex infusion  DIET: NPO - TF stopped at 4 am (was at goal)  Lines: R TLC IJ [place]  Drains:    ·	EVD (d8) @5cm H20, ICPs 4-12, CSF 5-15 mL/h  Wounds:    CODE STATUS:  Full Code                       GOALS OF CARE:  aggressive                      DISPOSITION:  ICU =======================================   NEUROCRITICAL CARE ATTENDING NOTE ()  =======================================    AILYN DÍAZ   MRN-[id number]    HPI:  47 y/o female with PMH HTN, Multiple sclerosis, recent spinal surgery 10/8 ([facility]) presented to [place] ED BIBEMS after being found unconscious at home, unknown period of time. 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 HH5, MF1. Patient was intubated at [place] ED, found to have blown L pupil, and sent straight to the OR for left hemicraniotomy. A-line placed intraop. Hemodynamically unstable upon arrival to [facility], bradycardic and hypertensive s/p Mannitol, Clevidipine, and Furosemide. Central line placed in NSICU. Currently sedated on Propofol, pending repeat CTH. History per patient's son, patient had recent spine surgery and was found on outpatient imaging last week to have L M1 segment aneurysm (unruptured), pt asymptomatic at this time. Per son patient taking percocet PO at home. Ureña catheter, ET tube, and arterial line placed at [facility].   NIHSS on arrival: 32. Bess Griffin 5, Mod Busby 4.  Bleed Day 1 = 10/26 (26 Oct 2021 13:03)    Hospital course:  10/26: admitted to [facility] from [facility], POD#0 s/p L hemicraniectomy for decompression and evacuation of SDH. Transferred to [facility] noted to have tense flap, received mannitol, keppra, decadron. Was hypertensive to 200s and preston to 40s, recevied 85g mannitol and bullet 23.4%. CTH on arrival demonstrated increased size in vents and new IVH. EVD placed, open at 15, central line placed. Transfused 2 u PRBC. POD0 of coiling of left MCA bifurcation aneurysm,   10/27: CTH demonstrated EVD in correct position, EVD lowered to 5, remains intubated on proprofol, pending repeat CTH in AM. Started on 3% @ 30/hr. 2LNS given for euvolemia, Mannitol given for uptrending ICPs. Bullet given 8:30 am. 3% increased to 50/hr. 1 L bolus. New EVD catheter placed using exisiting sreekanth hole. 1 u PRBC.  10/28: HH5, MF4, BD3; POD2 angio coil/embo of L MCA aneurysm. JOAQUÍN overnight, neuro stable. EVD@5cmH20 ICPs < 20 overnight, OP WNL. S/p 1 unit PRBC yesterday with appropriate response. Given 42g mannitol in AM for ICP 22 persistently, with improvement, then given 23% in PM for elevated ICP. febrile, pancultured. Standing tylenol and cooling blanket.   10/29: BD4, POD3 angio coil/embo. JOAQUÍN o/n, neuro stable. EVD@5, ICPs <20 o/n.   10/30: BD5, POD4 angio coil/embo. JOAQUÍN o/n neuro stable. EVD@5. 3% @50cc/hr.  10/31: BD6, POD5 angio coil/embo. brief period maintained upward gaze, resolved on its own. EVD@5cm h2o.    : BD7, POD6 L hemicrani, angio coil/embo. Neuro exam unchanged. ICPs WNL. Overnight given hydralazine, increased propofol for SBP > 180.  CTA showed mild-moderate anterior circulation vasospasm (permissive hypertension, euvolemia).  : BD8, POD7 L hemicrani, angio coil/embo. Neuro exam unchanged. ICPs WNL.  Pending trach/PEG.  11/3: BD9, POD8 L hemicrani, angio coil/embo.  Neuro exam unchanged. ICPs WNL.  Pending trach/PEG.  Dayshift:  noted episodes of sympathetic storming during vasospasm period.  : BD10 POD9, JOAQUÍN o/n, 500cc NS for euvolemia,  neuro stable, EVD at 5  : BD11 POD10, JOAQUÍN o/n, neuro stable, EVD at 5; O2 desaturations with thick secretions (MSSA, Enterobacter, Proteus) started vancomycin zosyn    Past Medical History: No pertinent past medical history  Allergies:  Allergy Status Unknown  Home meds:     Current Meds:  MEDICATIONS  (STANDING):  artificial  tears Solution 1 Drop(s) Both EYES two times a day  baclofen 15 milliGRAM(s) Oral every 12 hours  bromocriptine Capsule 15 milliGRAM(s) Oral every 8 hours  dexMEDEtomidine Infusion 0.2 MICROgram(s)/kG/Hr (4.25 mL/Hr) IV Continuous <Continuous>  enoxaparin Injectable 40 milliGRAM(s) SubCutaneous every 24 hours  gabapentin Solution 800 milliGRAM(s) Oral every 8 hours  insulin lispro (ADMELOG) corrective regimen sliding scale   SubCutaneous every 6 hours  levETIRAcetam 1000 milliGRAM(s) Oral every 12 hours  niMODipine 30 milliGRAM(s) Oral every 2 hours  pantoprazole  Injectable 40 milliGRAM(s) IV Push daily  piperacillin/tazobactam IVPB.. 4.5 Gram(s) IV Intermittent every 6 hours  sodium chloride 3 Gram(s) Oral every 6 hours  sodium chloride 3%. 500 milliLiter(s) (35 mL/Hr) IV Continuous <Continuous>  vancomycin  IVPB 1250 milliGRAM(s) IV Intermittent every 12 hours    MEDICATIONS  (PRN):  acetaminophen    Suspension .. 650 milliGRAM(s) Oral every 6 hours PRN Temp greater or equal to 38C (100.4F), Mild Pain (1 - 3)  fentaNYL    Injectable 25 MICROGram(s) IV Push every 2 hours PRN agitation  hydrALAZINE Injectable 5 milliGRAM(s) IV Push every 4 hours PRN >180  metoclopramide Injectable 10 milliGRAM(s) IV Push every 6 hours PRN nausea/vomiting    PHYSICAL EXAMINATION    ICU Vital Signs Last 24 Hrs  T(C): 36.9 (2021 09:14), Max: 37.1 (2021 21:00)  T(F): 98.4 (2021 09:14), Max: 98.8 (2021 21:00)  HR: 70 (2021 16:00) (60 - 106)  BP: 127/71 (2021 16:00) (112/61 - 177/83)  BP(mean): 93 (2021 16:00) (81 - 119)  ABP: 124/84 (2021 16:00) (107/60 - 180/97)  ABP(mean): 103 (2021 16:00) (80 - 131)  RR: 15 (2021 16:00) (14 - 36)  SpO2: 100% (2021 16:00) (94% - 100%)    NEUROLOGIC EXAMINATION:  Opens eyes to stimuli, does not follow commands, pupils 3mm equal and brisk (-) Doll's, (+) cough and (+) gag, B LE TF, R UE 0/5; flap full but soft; extensor posturing B UE R>L, B LE TF  Mode: AC, RR (machine): 14, TV (machine): 450, FiO2: 40, PEEP: 5, ITime: 1, MAP: 8, PIP: 22  EENT:  anicteric  CARDIOVASCULAR: (+) S1 S2, normal rate and regular rhythm  PULMONARY: clear to auscultation bilaterally  ABDOMEN: soft, nontender with normoactive bowel sounds  EXTREMITIES: no edema  SKIN: no rash; back incisions checked (dehiscence noted)    I/O's    21 @ 07:01  -  21 @ 07:00  --------------------------------------------------------  IN: 2140.9 mL / OUT: 1540 mL / NET: 600.9 mL    21 @ 07:01  -  21 @ 06:55  --------------------------------------------------------  IN: 2788 mL / OUT: 2225 mL / NET: 563 mL    LABS:                        8.8    8.73  )-----------( 350      ( 2021 04:46 )             28.0         148<H>  |  118<H>  |  7   ----------------------------<  141<H>  3.6   |  22  |  0.51    Ca    8.7      2021 04:46  Phos  2.8       Mg     1.9         TPro  6.5  /  Alb  3.1<L>  /  TBili  0.3  /  DBili  0.2  /  AST  22  /  ALT  13  /  AlkPhos  85    ABG - ( 2021 14:28 )  pH, Arterial: 7.45  pH, Blood: x     /  pCO2: 34    /  pO2: 68    / HCO3: 24    / Base Excess: 0.1   /  SaO2: 95.5      CAPILLARY BLOOD GLUCOSE    POCT Blood Glucose.: 129 mg/dL (2021 04:10)  POCT Blood Glucose.: 117 mg/dL (2021 23:24)  POCT Blood Glucose.: 106 mg/dL (2021 16:08)  POCT Blood Glucose.: 92 mg/dL (2021 11:25)    Culture - Sputum . (21 @ 13:59)    Gram Stain:   No epithelial cells seen  Few-moderate White blood cells  Few Gram positive cocci in pairs and clusters  Rare Gram Positive Rods    Specimen Source: .Sputum Sputum    Culture Results:   Numerous Staphylococcus aureus Presumptive Methicillin susceptible  Confirmation to follow within 24 hrs.  Numerous Enterobacter cloacae complex  Moderate Proteus mirabilis  Accompanied by normal respiratory melinda  Susceptibility to follow.    lipase 106    Bacteriology:    10/28 CSF NGTD  10/28 Blood NG1d x2     CSF studies:  10-28  L   *** [id number] *** %N91 %L4     EEG:  Neuroimagin/01 CTA - Diffuse, but overall MILD-MODERATE, sites of VASOSPASM are noted at theANTERIOR CIRCULATION, whilst the posterior circulation appears spared.   10/27 CT - There is herniation of the brain parenchyma and to the craniectomy defect which is similar prior examination. There is no significant change in the large left frontotemporal intraparenchymal hematoma with surrounding edema. Again demonstrated is mass effect with right to left midline shift measuring approximately 1 cm which is stable from prior exam. Again demonstrated is effacement of the cerebral sulci and basal cisterns.  Other imagin/01 UE Doppler - DVT within the right brachial vein.  Superficial thrombosis of the right cephalic vein.   CXR - stable.  Mild congestion.  10/28 Doppler - Negative  10/28 TTE -   1. Normal left and right ventricular size and systolic function.   2. No significant valvular disease.   3. Mild pulmonary hypertension present, pulmonary artery systolic pressure is 35 mmHg.   4. No pericardial effusion.   5. No prior echo is available for comparison.   Abdo U/S  Liver: Within normal limits. The liver measures 16.5 cm.  Bile ducts: Normal caliber.  Common bile duct measures 2 mm.  Gallbladder: Within normal limits. The gallbladder wall measures 2 mm. No stones.  Pancreas: Visualized portions are within normal limits.  Right kidney: 10.6 cm. No hydronephrosis. Normal parenchymal thickness and echogenicity.  Left kidney: 9.4 cm.  No hydronephrosis. Limited evaluation secondary to patient positioning.  Ascites: None.  Aorta and IVC: Visualized portions are within normal limits.    IV FLUIDS:   DRIPS:  ·	precedex infusion  DIET: NPO - TF stopped at 4 am (was at goal)  Lines: R TLC IJ [place]  Drains:    ·	EVD (d9) @5cm H20, ICPs 2-12, CSF 5-12 mL/h  Wounds:    CODE STATUS:  Full Code                       GOALS OF CARE:  aggressive                      DISPOSITION:  ICU

## 2021-11-05 NOTE — CONSULT NOTE ADULT - ASSESSMENT
46yoF with PMH HTN, MS and PSH anterior/posterior spine surgery 10/8/21 admitted to [facility] with subdural hematoma s/p hemicraniectomy for decompression and evacuation of SDH 10/26/21 transferred to [facility] noted to have tense flap s/p EVD placement and coiling of MCA bifurcation aneurysm 10/26/21. Remains intubated/sedated with poor prognosis. General surgery consulted for trach/peg placement. Toleratin TFs at goal.     Plan for trach/peg next week once timing discussed with Dr. Arellano, likely Tuesday    Pre-op prior to procedure: NPO@MN, BMP, CBC, Mag, Phos, Coags, active T+S  Team 4c will continue to follow  PAST MEDICAL & SURGICAL HISTORY:  No pertinent past medical history        Plan discussed with attending and chief resident.   ____________________________________________________   Magdi - Resident   Surgery

## 2021-11-05 NOTE — PROGRESS NOTE ADULT - SUBJECTIVE AND OBJECTIVE BOX
HPI:  45 y/o female with PMH HTN, Multiple sclerosis, recent spinal surgery 10/8 ([facility]) presented to [facility] BIBEMS after being found unconscious at home, unknown period of time. 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 HH5, MF1. Patient was intubated at [facility], found to have blown L pupil, and sent straight to the OR for left hemicraniotomy. A-line placed intraop. Hemodynamically unstable upon arrival to [facility], bradycardic and hypertensive s/p Mannitol, Clevidipine, and Furosemide. Central line placed in NSICU. Currently sedated on Propofol, pending repeat CTH. History per patient's son, patient had recent spine surgery and was found on outpatient imaging last week to have L M1 segment aneurysm (unruptured), pt asymptomatic at this time. Per son patient taking percocet PO at home. Ureña catheter, ET tube, and arterial line placed at [facility].     NIHSS on arrival: 32   Bess Griffin 5, Mod Busby 4  Bleed Day 1 = 10/26 (26 Oct 2021 13:03)      Hospital Course:   10/26: admitted to [facility] from [facility], POD#0 s/p L hemicraniectomy for decompression and evacuation of SDH. Transferred to [facility] noted to have tense flap, received mannitol, keppra, decadron. Was hypertensive to 200s and preston to 40s, recevied 85g mannitol and bullet 23.4%. CTH on arrival demonstrated increased size in vents and new IVH. EVD placed, open at 15, central line placed. Transfused 2 u PRBC. POD0 of coiling of left MCA bifurcation aneurysm,   10/27: CTH demonstrated EVD in correct position, EVD lowered to 5, remains intubated on proprofol, pending repeat CTH in AM. Started on 3% @ 30/hr. 2LNS given for euvolemia, Mannitol given for uptrending ICPs. Bullet given 8:30 am. 3% increased to 50/hr. 1 L bolus. New EVD catheter placed using exisiting sreekanth hole. 1 u PRBC.  10/28: HH5, MF4, BD3; POD2 angio coil/embo of L MCA aneurysm. JOAQUÍN overnight, neuro stable. EVD@5cmH20 ICPs < 20 overnight, OP WNL. S/p 1 unit PRBC yesterday with appropriate response. Given 42g mannitol in AM for ICP 22 persistently, with improvement, then given 23% in PM for elevated ICP. febrile, pancultured. Standing tylenol and cooling blanket.   10/29: BD4, POD3 angio coil/embo. JOAQUÍN o/n, neuro stable. EVD@5, ICPs <20 o/n.   10/30: BD5, POD4 angio coil/embo. JOAQUÍN o/n neuro stable. EVD@5. 3% @50cc/hr.  10/31: BD6, POD5 angio coil/embo. brief period maintained upward gaze, resolved on its own. EVD@5cm h2o.    11/1: BD7, POD6 L hemicrani, angio coil/embo. JOAQUÍN overnight. Neuro exam unchanged. ICPs WNL. started bromocriptine/gabapentin/baclofen. dc'd propofol, started precedex  11/2: BD8 POD7 L hemicrani, angio coil/embo. JOAQUÍN overnight. Neuro exam stable. Remains on 3% hypertonic saline. Receieved 1 unit of PRBCs for a Hgb of 7.6.  11/3: BD 9 POD8 of L hemicrani. Elevated lipase, normal amylase, OG tube clogged and replaced. Abdominal US normal. Pending gen surg reccs regarding trach and peg. Gabapentin and Baclofen increased to help with neurostorming. restarted back on 3%, LR@35. became preston+hypotensive with nimodipine 60q4, decreased back to 30q2, NaCl bullet given.   11/4: BD10 POD9, JOAQUÍN o/n, 500cc NS for euvolemia,  neuro stable, EVD at 5. 3% increased to 75  11/5: BD11 POD10, JOAQUÍN o/n, neuro stable, EVD at 5      Vital Signs Last 24 Hrs  T(C): 36.9 (05 Nov 2021 00:56), Max: 37.1 (04 Nov 2021 21:00)  T(F): 98.4 (05 Nov 2021 00:56), Max: 98.8 (04 Nov 2021 21:00)  HR: 63 (05 Nov 2021 03:00) (60 - 91)  BP: 142/73 (05 Nov 2021 03:00) (112/61 - 169/82)  BP(mean): 103 (05 Nov 2021 03:00) (81 - 115)  RR: 14 (05 Nov 2021 03:00) (14 - 25)  SpO2: 99% (05 Nov 2021 03:00) (95% - 100%)    I&O's Detail    03 Nov 2021 07:01  -  04 Nov 2021 07:00  --------------------------------------------------------  IN:    Dexmedetomidine: 155.9 mL    Enteral Tube Flush: 300 mL    IV PiggyBack: 100 mL    Lactated Ringers: 375 mL    Lactated Ringers: 560 mL    sodium chloride 3%: 650 mL  Total IN: 2140.9 mL    OUT:    External Ventricular Device (mL): 240 mL    Rectal Tube (mL): 50 mL    Voided (mL): 1250 mL  Total OUT: 1540 mL    Total NET: 600.9 mL      04 Nov 2021 07:01  -  05 Nov 2021 03:35  --------------------------------------------------------  IN:    Dexmedetomidine: 134.7 mL    Enteral Tube Flush: 360 mL    Jevity 1.2: 382 mL    Lactated Ringers: 105 mL    sodium chloride 3%: 150 mL    sodium chloride 3%: 1350 mL  Total IN: 2481.7 mL    OUT:    External Ventricular Device (mL): 196 mL    Rectal Tube (mL): 200 mL    Voided (mL): 1700 mL  Total OUT: 2096 mL    Total NET: 385.7 mL        I&O's Summary    03 Nov 2021 07:01  -  04 Nov 2021 07:00  --------------------------------------------------------  IN: 2140.9 mL / OUT: 1540 mL / NET: 600.9 mL    04 Nov 2021 07:01  -  05 Nov 2021 03:35  --------------------------------------------------------  IN: 2481.7 mL / OUT: 2096 mL / NET: 385.7 mL    PHYSICAL EXAM:  General: patient seen laying supine in bed in NAD, precedex held for exam  Neuro: does not OEs, does not FC, extensor posturing to noxious b/l UEs, TF to noxious b/l LEs, +corneals, +cough/gag, +dolls eyes  HEENT: PERRL  Neck: supple  Cardiac: RRR, S1S2  Pulmonary: chest rise symmetric  Abdomen: soft, nontender, nondistended  Ext: warm, perfusing well  Wound: abdominal incision c/d/i, 3 posterior incisions covered with sponge dressing      DEVICE/DRAIN DRESSING:    TUBES/LINES:  [x] CVC  [x] A-line  [] Lumbar Drain  [x] Ventriculostomy - EVD at 5  [] Other    DIET:  [x] NPO  [] Mechanical  [] Tube feeds        LABS:                        8.5    8.90  )-----------( 301      ( 04 Nov 2021 05:37 )             27.0     11-04    148<H>  |  118<H>  |  6<L>  ----------------------------<  108<H>  3.9   |  22  |  0.54    Ca    8.6      04 Nov 2021 21:54  Phos  2.6     11-04  Mg     1.7     11-04    TPro  6.5  /  Alb  3.1<L>  /  TBili  0.3  /  DBili  0.2  /  AST  22  /  ALT  13  /  AlkPhos  85  11-04            CAPILLARY BLOOD GLUCOSE      POCT Blood Glucose.: 117 mg/dL (04 Nov 2021 23:24)  POCT Blood Glucose.: 106 mg/dL (04 Nov 2021 16:08)  POCT Blood Glucose.: 92 mg/dL (04 Nov 2021 11:25)  POCT Blood Glucose.: 93 mg/dL (04 Nov 2021 05:29)      Drug Levels: [] N/A    CSF Analysis: [] N/A      Allergies    No Known Allergies    Intolerances      MEDICATIONS:  Antibiotics:    Neuro:  acetaminophen    Suspension .. 650 milliGRAM(s) Oral every 6 hours PRN  baclofen 15 milliGRAM(s) Oral every 12 hours  bromocriptine Capsule 15 milliGRAM(s) Oral every 8 hours  dexMEDEtomidine Infusion 0.2 MICROgram(s)/kG/Hr IV Continuous <Continuous>  fentaNYL    Injectable 25 MICROGram(s) IV Push every 2 hours PRN  gabapentin Solution 800 milliGRAM(s) Oral every 8 hours  levETIRAcetam 1000 milliGRAM(s) Oral every 12 hours  metoclopramide Injectable 10 milliGRAM(s) IV Push every 8 hours    Anticoagulation:  enoxaparin Injectable 40 milliGRAM(s) SubCutaneous every 24 hours    OTHER:  artificial  tears Solution 1 Drop(s) Both EYES two times a day  chlorhexidine 0.12% Liquid 15 milliLiter(s) Oral Mucosa every 12 hours  chlorhexidine 2% Cloths 1 Application(s) Topical <User Schedule>  hydrALAZINE Injectable 5 milliGRAM(s) IV Push every 4 hours PRN  insulin lispro (ADMELOG) corrective regimen sliding scale   SubCutaneous every 6 hours  niMODipine 30 milliGRAM(s) Oral every 2 hours  pantoprazole  Injectable 40 milliGRAM(s) IV Push daily    IVF:  sodium chloride 3 Gram(s) Oral every 6 hours  sodium chloride 3%. 500 milliLiter(s) IV Continuous <Continuous>    CULTURES:  Culture Results:   No growth to date (10-28 @ 19:23)  Culture Results:   No growth at 5 days. (10-28 @ 18:34)    RADIOLOGY & ADDITIONAL TESTS:    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, left    Intracranial hemorrhage, spontaneous subarachnoid, due to cerebral aneurysm, acute    Angiogram, cerebral, with coil embolization, in non-operating room setting    Personal history of spine surgery    SysAdmin_VstLnk        ASSESSMENT:  45 y/o female with PMHx of HTN and MS, hx of spinal surgery at [facility] 10/8/21 presented to [facility] BIBEMS after being found unconscious at home, unknown period of time. 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 HH5, MF4; BD #1 = 10/26. Patient was intubated at [facility] and sent straight to the OR for left hemicraniotomy. A-line placed intraop. Hemodynamically unstable upon arrival to [facility], bradycardic and hypertensive s/p Mannitol and Furosemide. Central line placed in NSICU. Currently sedated on Propofol. Now s/p EVD placement, and coil embolization of left MCA bifurcation aneurysm 10/26/2021.     PLAN:  NEURO:  - neuro checks/vital signs q1hr  - HOB > 30   - Keppra 1g IV BID   - Nimodipine 30q2  - bromocriptine 15mg q8hr  - increased Gabapentin 800mg q8hr and Baclofen 15mg BID   - propofol dc'd, precedex for sedation   - fentanyl pushes PRN   - EVD open at 5cmH2O, monitor ICP/output  - CTA 11/1 with findings of moderate anterior circulation spasm     CARDIO:  - -180  - hydralazine PRN to maintain SBP goal  - echo +mild pulm HTN  - f/u EKG for sinus arrythmia  - trend qtc on standing reglan    PULM:  - intubated on full vent support  - aspiration precautions    GI:  - OGT TFs  - bowel regimen   - PPI qd GI ppx  - FOB negative 11/1  - Abd US: neg   - rectal tube  - pending trach and peg, reconsult gen surg when GI w/u complete  - standing reglan    RENAL:  - 3%@75cc/hr  - salt tabs 3Q6  - euvolemia goal   - Na 145-150   - q6hr BMP and serum osmo    HEME:  - SCDs, SQL  - s/p 2u PRBC, s/p 1u PRBC 10/27, s/p 1u PRBC 11/02  - monitor H+H  - FOB negative  - RUE doppler for swelling 11/1: DVT R brachial and superficial cephalic thrombus    ID:  - low grade fever, no abx at this time   - leukocytosis and procal, trend   - Tylenol PRN for fever  - pancx 10/28 NGTD  - sputum cx 11/4:  gm + cocci in pairs/clusters, rare gm + rods    ENDO:  - ISS   - monitor FS    OTHER  - f/u wound care consult for back incisions dehiscence     GOC: full code  Level of care: ICU status   Dispo: pend EVD, trach, peg  family updated    Case discussed with Dr. Duncan and Dr. Vyas        Assessment:  Present when checked    []  GCS  E   V  M     Heart Failure: []Acute, [] acute on chronic , []chronic  Heart Failure:  [] Diastolic (HFpEF), [] Systolic (HFrEF), []Combined (HFpEF and HFrEF), [] RHF, [] Pulm HTN, [] Other    [] CHETAN, [] ATN, [] AIN, [] other  [] CKD1, [] CKD2, [] CKD 3, [] CKD 4, [] CKD 5, []ESRD    Encephalopathy: [] Metabolic, [] Hepatic, [] toxic, [] Neurological, [] Other    Abnormal Nurtitional Status: [] malnurtition (see nutrition note), [ ]underweight: BMI < 19, [] morbid obesity: BMI >40, [] Cachexia    [] Sepsis  [] hypovolemic shock,[] cardiogenic shock, [] hemorrhagic shock, [] neuogenic shock  [] Acute Respiratory Failure  []Cerebral edema, [] Brain compression/ herniation,   [] Functional quadriplegia  [] Acute blood loss anemia

## 2021-11-06 LAB
-  AMPICILLIN/SULBACTAM: SIGNIFICANT CHANGE UP
-  AMPICILLIN/SULBACTAM: SIGNIFICANT CHANGE UP
-  AMPICILLIN: SIGNIFICANT CHANGE UP
-  AMPICILLIN: SIGNIFICANT CHANGE UP
-  CEFAZOLIN: SIGNIFICANT CHANGE UP
-  CEFEPIME: SIGNIFICANT CHANGE UP
-  CEFEPIME: SIGNIFICANT CHANGE UP
-  CEFTRIAXONE: SIGNIFICANT CHANGE UP
-  CEFTRIAXONE: SIGNIFICANT CHANGE UP
-  CIPROFLOXACIN: SIGNIFICANT CHANGE UP
-  CIPROFLOXACIN: SIGNIFICANT CHANGE UP
-  CLINDAMYCIN: SIGNIFICANT CHANGE UP
-  ERTAPENEM: SIGNIFICANT CHANGE UP
-  ERTAPENEM: SIGNIFICANT CHANGE UP
-  ERYTHROMYCIN: SIGNIFICANT CHANGE UP
-  GENTAMICIN: SIGNIFICANT CHANGE UP
-  GENTAMICIN: SIGNIFICANT CHANGE UP
-  LINEZOLID: SIGNIFICANT CHANGE UP
-  OXACILLIN: SIGNIFICANT CHANGE UP
-  PIPERACILLIN/TAZOBACTAM: SIGNIFICANT CHANGE UP
-  PIPERACILLIN/TAZOBACTAM: SIGNIFICANT CHANGE UP
-  RIFAMPIN: SIGNIFICANT CHANGE UP
-  TOBRAMYCIN: SIGNIFICANT CHANGE UP
-  TOBRAMYCIN: SIGNIFICANT CHANGE UP
-  TRIMETHOPRIM/SULFAMETHOXAZOLE: SIGNIFICANT CHANGE UP
-  VANCOMYCIN: SIGNIFICANT CHANGE UP
ANION GAP SERPL CALC-SCNC: 8 MMOL/L — SIGNIFICANT CHANGE UP (ref 5–17)
ANION GAP SERPL CALC-SCNC: 9 MMOL/L — SIGNIFICANT CHANGE UP (ref 5–17)
ANISOCYTOSIS BLD QL: SIGNIFICANT CHANGE UP
BASOPHILS # BLD AUTO: 0 K/UL — SIGNIFICANT CHANGE UP (ref 0–0.2)
BASOPHILS NFR BLD AUTO: 0 % — SIGNIFICANT CHANGE UP (ref 0–2)
BUN SERPL-MCNC: 12 MG/DL — SIGNIFICANT CHANGE UP (ref 7–23)
BUN SERPL-MCNC: 13 MG/DL — SIGNIFICANT CHANGE UP (ref 7–23)
CALCIUM SERPL-MCNC: 8.7 MG/DL — SIGNIFICANT CHANGE UP (ref 8.4–10.5)
CALCIUM SERPL-MCNC: 8.8 MG/DL — SIGNIFICANT CHANGE UP (ref 8.4–10.5)
CHLORIDE SERPL-SCNC: 120 MMOL/L — HIGH (ref 96–108)
CHLORIDE SERPL-SCNC: 122 MMOL/L — HIGH (ref 96–108)
CO2 SERPL-SCNC: 21 MMOL/L — LOW (ref 22–31)
CO2 SERPL-SCNC: 21 MMOL/L — LOW (ref 22–31)
CREAT SERPL-MCNC: 0.7 MG/DL — SIGNIFICANT CHANGE UP (ref 0.5–1.3)
CREAT SERPL-MCNC: 0.77 MG/DL — SIGNIFICANT CHANGE UP (ref 0.5–1.3)
CULTURE RESULTS: SIGNIFICANT CHANGE UP
DACRYOCYTES BLD QL SMEAR: SLIGHT — SIGNIFICANT CHANGE UP
EOSINOPHIL # BLD AUTO: 0.16 K/UL — SIGNIFICANT CHANGE UP (ref 0–0.5)
EOSINOPHIL NFR BLD AUTO: 1.8 % — SIGNIFICANT CHANGE UP (ref 0–6)
GIANT PLATELETS BLD QL SMEAR: PRESENT — SIGNIFICANT CHANGE UP
GLUCOSE SERPL-MCNC: 134 MG/DL — HIGH (ref 70–99)
GLUCOSE SERPL-MCNC: 140 MG/DL — HIGH (ref 70–99)
HCT VFR BLD CALC: 27.5 % — LOW (ref 34.5–45)
HGB BLD-MCNC: 8.6 G/DL — LOW (ref 11.5–15.5)
HYPOCHROMIA BLD QL: SIGNIFICANT CHANGE UP
LIDOCAIN IGE QN: 92 U/L — HIGH (ref 7–60)
LYMPHOCYTES # BLD AUTO: 1.24 K/UL — SIGNIFICANT CHANGE UP (ref 1–3.3)
LYMPHOCYTES # BLD AUTO: 14.1 % — SIGNIFICANT CHANGE UP (ref 13–44)
MACROCYTES BLD QL: SIGNIFICANT CHANGE UP
MAGNESIUM SERPL-MCNC: 2 MG/DL — SIGNIFICANT CHANGE UP (ref 1.6–2.6)
MANUAL SMEAR VERIFICATION: SIGNIFICANT CHANGE UP
MCHC RBC-ENTMCNC: 26.1 PG — LOW (ref 27–34)
MCHC RBC-ENTMCNC: 31.3 GM/DL — LOW (ref 32–36)
MCV RBC AUTO: 83.6 FL — SIGNIFICANT CHANGE UP (ref 80–100)
METAMYELOCYTES # FLD: 0.9 % — HIGH (ref 0–0)
METHOD TYPE: SIGNIFICANT CHANGE UP
MONOCYTES # BLD AUTO: 0.55 K/UL — SIGNIFICANT CHANGE UP (ref 0–0.9)
MONOCYTES NFR BLD AUTO: 6.2 % — SIGNIFICANT CHANGE UP (ref 2–14)
NEUTROPHILS # BLD AUTO: 6.78 K/UL — SIGNIFICANT CHANGE UP (ref 1.8–7.4)
NEUTROPHILS NFR BLD AUTO: 72.6 % — SIGNIFICANT CHANGE UP (ref 43–77)
NEUTS BAND # BLD: 4.4 % — SIGNIFICANT CHANGE UP (ref 0–8)
NRBC # BLD: 1 /100 — HIGH (ref 0–0)
NRBC # BLD: SIGNIFICANT CHANGE UP /100 WBCS (ref 0–0)
ORGANISM # SPEC MICROSCOPIC CNT: SIGNIFICANT CHANGE UP
OVALOCYTES BLD QL SMEAR: SLIGHT — SIGNIFICANT CHANGE UP
PHOSPHATE SERPL-MCNC: 3.3 MG/DL — SIGNIFICANT CHANGE UP (ref 2.5–4.5)
PLAT MORPH BLD: ABNORMAL
PLATELET # BLD AUTO: 377 K/UL — SIGNIFICANT CHANGE UP (ref 150–400)
POIKILOCYTOSIS BLD QL AUTO: SLIGHT — SIGNIFICANT CHANGE UP
POLYCHROMASIA BLD QL SMEAR: SLIGHT — SIGNIFICANT CHANGE UP
POTASSIUM SERPL-MCNC: 3.8 MMOL/L — SIGNIFICANT CHANGE UP (ref 3.5–5.3)
POTASSIUM SERPL-MCNC: 3.9 MMOL/L — SIGNIFICANT CHANGE UP (ref 3.5–5.3)
POTASSIUM SERPL-SCNC: 3.8 MMOL/L — SIGNIFICANT CHANGE UP (ref 3.5–5.3)
POTASSIUM SERPL-SCNC: 3.9 MMOL/L — SIGNIFICANT CHANGE UP (ref 3.5–5.3)
PROCALCITONIN SERPL-MCNC: 0.11 NG/ML — HIGH (ref 0.02–0.1)
RBC # BLD: 3.29 M/UL — LOW (ref 3.8–5.2)
RBC # FLD: 22.3 % — HIGH (ref 10.3–14.5)
RBC BLD AUTO: ABNORMAL
SODIUM SERPL-SCNC: 149 MMOL/L — HIGH (ref 135–145)
SODIUM SERPL-SCNC: 152 MMOL/L — HIGH (ref 135–145)
SPECIMEN SOURCE: SIGNIFICANT CHANGE UP
TARGETS BLD QL SMEAR: SLIGHT — SIGNIFICANT CHANGE UP
WBC # BLD: 8.81 K/UL — SIGNIFICANT CHANGE UP (ref 3.8–10.5)
WBC # FLD AUTO: 8.81 K/UL — SIGNIFICANT CHANGE UP (ref 3.8–10.5)

## 2021-11-06 PROCEDURE — 99024 POSTOP FOLLOW-UP VISIT: CPT

## 2021-11-06 PROCEDURE — 71045 X-RAY EXAM CHEST 1 VIEW: CPT | Mod: 26

## 2021-11-06 PROCEDURE — 99291 CRITICAL CARE FIRST HOUR: CPT

## 2021-11-06 RX ORDER — SODIUM CHLORIDE 9 MG/ML
4 INJECTION INTRAMUSCULAR; INTRAVENOUS; SUBCUTANEOUS EVERY 6 HOURS
Refills: 0 | Status: DISCONTINUED | OUTPATIENT
Start: 2021-11-06 | End: 2021-11-09

## 2021-11-06 RX ORDER — SODIUM CHLORIDE 9 MG/ML
2 INJECTION INTRAMUSCULAR; INTRAVENOUS; SUBCUTANEOUS EVERY 6 HOURS
Refills: 0 | Status: DISCONTINUED | OUTPATIENT
Start: 2021-11-06 | End: 2021-11-07

## 2021-11-06 RX ORDER — BACLOFEN 100 %
10 POWDER (GRAM) MISCELLANEOUS EVERY 12 HOURS
Refills: 0 | Status: DISCONTINUED | OUTPATIENT
Start: 2021-11-06 | End: 2021-11-07

## 2021-11-06 RX ORDER — POTASSIUM CHLORIDE 20 MEQ
20 PACKET (EA) ORAL ONCE
Refills: 0 | Status: COMPLETED | OUTPATIENT
Start: 2021-11-06 | End: 2021-11-06

## 2021-11-06 RX ADMIN — GABAPENTIN 800 MILLIGRAM(S): 400 CAPSULE ORAL at 16:24

## 2021-11-06 RX ADMIN — DEXMEDETOMIDINE HYDROCHLORIDE IN 0.9% SODIUM CHLORIDE 4.25 MICROGRAM(S)/KG/HR: 4 INJECTION INTRAVENOUS at 13:15

## 2021-11-06 RX ADMIN — PIPERACILLIN AND TAZOBACTAM 200 GRAM(S): 4; .5 INJECTION, POWDER, LYOPHILIZED, FOR SOLUTION INTRAVENOUS at 22:27

## 2021-11-06 RX ADMIN — NIMODIPINE 30 MILLIGRAM(S): 60 SOLUTION ORAL at 18:09

## 2021-11-06 RX ADMIN — Medication 10 MILLIGRAM(S): at 18:13

## 2021-11-06 RX ADMIN — SODIUM CHLORIDE 2 GRAM(S): 9 INJECTION INTRAMUSCULAR; INTRAVENOUS; SUBCUTANEOUS at 18:09

## 2021-11-06 RX ADMIN — NIMODIPINE 30 MILLIGRAM(S): 60 SOLUTION ORAL at 02:16

## 2021-11-06 RX ADMIN — NIMODIPINE 30 MILLIGRAM(S): 60 SOLUTION ORAL at 03:39

## 2021-11-06 RX ADMIN — LEVETIRACETAM 1000 MILLIGRAM(S): 250 TABLET, FILM COATED ORAL at 18:09

## 2021-11-06 RX ADMIN — SODIUM CHLORIDE 2 GRAM(S): 9 INJECTION INTRAMUSCULAR; INTRAVENOUS; SUBCUTANEOUS at 12:45

## 2021-11-06 RX ADMIN — PIPERACILLIN AND TAZOBACTAM 200 GRAM(S): 4; .5 INJECTION, POWDER, LYOPHILIZED, FOR SOLUTION INTRAVENOUS at 03:39

## 2021-11-06 RX ADMIN — NIMODIPINE 30 MILLIGRAM(S): 60 SOLUTION ORAL at 06:03

## 2021-11-06 RX ADMIN — LEVETIRACETAM 1000 MILLIGRAM(S): 250 TABLET, FILM COATED ORAL at 06:05

## 2021-11-06 RX ADMIN — NIMODIPINE 30 MILLIGRAM(S): 60 SOLUTION ORAL at 16:23

## 2021-11-06 RX ADMIN — Medication 1 DROP(S): at 06:05

## 2021-11-06 RX ADMIN — CHLORHEXIDINE GLUCONATE 15 MILLILITER(S): 213 SOLUTION TOPICAL at 06:02

## 2021-11-06 RX ADMIN — DEXMEDETOMIDINE HYDROCHLORIDE IN 0.9% SODIUM CHLORIDE 4.25 MICROGRAM(S)/KG/HR: 4 INJECTION INTRAVENOUS at 19:36

## 2021-11-06 RX ADMIN — NIMODIPINE 30 MILLIGRAM(S): 60 SOLUTION ORAL at 12:40

## 2021-11-06 RX ADMIN — SODIUM CHLORIDE 3 GRAM(S): 9 INJECTION INTRAMUSCULAR; INTRAVENOUS; SUBCUTANEOUS at 06:03

## 2021-11-06 RX ADMIN — SODIUM CHLORIDE 3 GRAM(S): 9 INJECTION INTRAMUSCULAR; INTRAVENOUS; SUBCUTANEOUS at 00:55

## 2021-11-06 RX ADMIN — BROMOCRIPTINE MESYLATE 15 MILLIGRAM(S): 5 CAPSULE ORAL at 14:57

## 2021-11-06 RX ADMIN — NIMODIPINE 30 MILLIGRAM(S): 60 SOLUTION ORAL at 08:28

## 2021-11-06 RX ADMIN — PANTOPRAZOLE SODIUM 40 MILLIGRAM(S): 20 TABLET, DELAYED RELEASE ORAL at 12:45

## 2021-11-06 RX ADMIN — BROMOCRIPTINE MESYLATE 15 MILLIGRAM(S): 5 CAPSULE ORAL at 06:04

## 2021-11-06 RX ADMIN — Medication 166.67 MILLIGRAM(S): at 18:09

## 2021-11-06 RX ADMIN — NIMODIPINE 30 MILLIGRAM(S): 60 SOLUTION ORAL at 10:33

## 2021-11-06 RX ADMIN — SODIUM CHLORIDE 4 MILLILITER(S): 9 INJECTION INTRAMUSCULAR; INTRAVENOUS; SUBCUTANEOUS at 22:33

## 2021-11-06 RX ADMIN — GABAPENTIN 800 MILLIGRAM(S): 400 CAPSULE ORAL at 06:03

## 2021-11-06 RX ADMIN — CHLORHEXIDINE GLUCONATE 1 APPLICATION(S): 213 SOLUTION TOPICAL at 06:04

## 2021-11-06 RX ADMIN — Medication 15 MILLIGRAM(S): at 06:04

## 2021-11-06 RX ADMIN — Medication 20 MILLIEQUIVALENT(S): at 10:33

## 2021-11-06 RX ADMIN — PIPERACILLIN AND TAZOBACTAM 200 GRAM(S): 4; .5 INJECTION, POWDER, LYOPHILIZED, FOR SOLUTION INTRAVENOUS at 16:23

## 2021-11-06 RX ADMIN — SODIUM CHLORIDE 4 MILLILITER(S): 9 INJECTION INTRAMUSCULAR; INTRAVENOUS; SUBCUTANEOUS at 16:23

## 2021-11-06 RX ADMIN — ENOXAPARIN SODIUM 40 MILLIGRAM(S): 100 INJECTION SUBCUTANEOUS at 22:27

## 2021-11-06 RX ADMIN — PIPERACILLIN AND TAZOBACTAM 200 GRAM(S): 4; .5 INJECTION, POWDER, LYOPHILIZED, FOR SOLUTION INTRAVENOUS at 10:34

## 2021-11-06 RX ADMIN — NIMODIPINE 30 MILLIGRAM(S): 60 SOLUTION ORAL at 14:57

## 2021-11-06 RX ADMIN — CHLORHEXIDINE GLUCONATE 15 MILLILITER(S): 213 SOLUTION TOPICAL at 18:11

## 2021-11-06 RX ADMIN — Medication 1 DROP(S): at 18:11

## 2021-11-06 RX ADMIN — Medication 166.67 MILLIGRAM(S): at 06:05

## 2021-11-06 NOTE — PROGRESS NOTE ADULT - ASSESSMENT
46y/Female with:  L MCA ruptured aneurysm, subarachnoid hemorrhage, s/p DHC (10/26/2021, Dr. D'Amico @ [place]), brain compression, cerebral edema  anemia   recent spinal surgery  UGIB    PLAN: Day 1 = 10-26 ; Day 4 = 10/29; Day 21 = 11/15  NEURO: neurochecks q1h, sedation with precedex; PRN fentanyl pushes  SAH:  cont nimodipine 30 mg po q2h (hold for sBP < 140) to be given for 21 days (last day 11/15); CTA mild-moderate anterior circulation vasospasm (euvolemia, permissive hypertension); consideration of IA verapamil (will D/W CURT Duncan)  Hydrocephalus:  EVD 5cm H20, ICP < 12  Neurostorming:  precedex; bromocriptine 15 mg q8h, gabapentin 600 mg q8h, baclofen 15 mg BID, fentanyl prn  Seizure prophylaxis:  levetiracetam 1000mg IV BID  Pending trach/PEG   REHAB:  physical therapy evaluation and management    EARLY MOB:  HOB elevated    PULM:  full vent support for now (Mode: AC, RR (machine): 14, TV (machine): 450, FiO2: 40, PEEP: 5, ITime: 1, MAP: 8, PIP: 16), CXR mild congestion; CT chest (given hypoxemia on ABG)  CARDIO:  SBP goal 100-180mm Hg, TTE results as above  ENDO:  Blood sugar goals 140-180 mg/dL, insulin sliding scale  GI:  PPI OD, FOBT negative; lipase 105 will trend; abdo U/S results as above;   DIET: TF to start, if tolerates, follow-up with general surgery, re:  PEG  RENAL: maintain euvolemia, Na goal 145-150; Na 148, 3% NaCl at 35 mL/h, cont salt tabs  HEM/ONC: no coagulopathy (INR= 1.03), Hb 8.9, no ASA / Plavix use; FOBT negative  VTE Prophylaxis: SCDs, SQL  ID: Tmax 37.8, no leukocytosis; start vancomycin zosyn given sputum MSSA enterobacter proteus (pending sensitivities)  Social: updated son and daughter, re:  progress, prognosis, trach/PEG    *****    CORE MEASURES  1.  Hunt and Griffin Score = 5  2.  VTE prophylaxis:  [ ] administered within 24 hours of admission OR [X] reason not done: fresh bleed, unsecured aneurysm.  3.  Dysphagia screening:   [X] performed before any oral meds / liquids / food  4.  Nimodipine treatment:  [X] administered within 24 hours of admission OR [ ] reason not done:    *****    ATTENDING ATTESTATION:    Patient at high risk for neurological deterioration or death due to:  ICU delirium, aspiration PNA, DVT / PE.  Critical care time:  I have personally provided 45 minutes of critical care time, excluding time spent on separate procedures.    Plan discussed with RN, house staff.     46y/Female with:  L MCA ruptured aneurysm, subarachnoid hemorrhage, s/p C (10/26/2021, Dr. D'Amico @ [place]), brain compression, cerebral edema  anemia   recent spinal surgery  UGIB    PLAN: Day 1 = 10-26 ; Day 4 = 10/29; Day 21 = 11/15  NEURO: neurochecks q1h, sedation with precedex; PRN fentanyl pushes  SAH:  cont nimodipine 30 mg po q2h (hold for sBP < 140) to be given for 21 days (last day 11/15); CTA mild-moderate anterior circulation vasospasm (euvolemia, permissive hypertension)  Hydrocephalus:  EVD 5cm H20, ICP < 12  Neurostorming:  precedex; bromocriptine 15 mg q8h, gabapentin 600 mg q8h, baclofen 10 mg BID, fentanyl prn  Seizure prophylaxis:  levetiracetam 1000mg IV BID  Pending trach/PEG   REHAB:  physical therapy evaluation and management    EARLY MOB:  HOB elevated    PULM:  full vent support for now (Mode: AC, RR (machine): 14, TV (machine): 450, FiO2: 40, PEEP: 5, ITime: 1, MAP: 8, PIP: 16), CXR mild congestion; no further mucus plugging today  CARDIO:  SBP goal 100-180mm Hg, TTE results as above  ENDO:  Blood sugar goals 140-180 mg/dL, insulin sliding scale  GI:  PPI OD, FOBT negative; lipase 92 downtrending; abdo U/S results as above;   DIET: TF to start, if tolerates, follow-up with general surgery, re:  PEG  RENAL: maintain euvolemia, Na goal 145-150; Na 152, 3% NaCl D/Wilfredo  HEM/ONC: Hb 8.6 stable, no ASA / Plavix use; FOBT negative  VTE Prophylaxis: SCDs, SQL; Doppler pending  ID: Tmax 37.8, no leukocytosis; start vancomycin zosyn given sputum MSSA enterobacter proteus (pending sensitivities)  Social: updated son and daughter, re:  progress, prognosis, trach/PEG    *****    CORE MEASURES  1.  Hunt and Griffin Score = 5  2.  VTE prophylaxis:  [ ] administered within 24 hours of admission OR [X] reason not done: fresh bleed, unsecured aneurysm.  3.  Dysphagia screening:   [X] performed before any oral meds / liquids / food  4.  Nimodipine treatment:  [X] administered within 24 hours of admission OR [ ] reason not done:    *****    ATTENDING ATTESTATION:    Patient at high risk for neurological deterioration or death due to:  ICU delirium, aspiration PNA, DVT / PE.  Critical care time:  I have personally provided 45 minutes of critical care time, excluding time spent on separate procedures.    Plan discussed with RN, house staff.

## 2021-11-06 NOTE — PROGRESS NOTE ADULT - SUBJECTIVE AND OBJECTIVE BOX
=======================================   NEUROCRITICAL CARE ATTENDING NOTE (Sat.2021)  =======================================    AILYN DÍAZ   MRN-[id number]    HPI:  47 y/o female with PMH HTN, Multiple sclerosis, recent spinal surgery 10/8 ([facility]) presented to [place] [facility] BIBEMS after being found unconscious at home, unknown period of time. 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 HH5, MF1. Patient was intubated at [place] ED, found to have blown L pupil, and sent straight to the OR for left hemicraniotomy. A-line placed intraop. Hemodynamically unstable upon arrival to [facility], bradycardic and hypertensive s/p Mannitol, Clevidipine, and Furosemide. Central line placed in NSICU. Currently sedated on Propofol, pending repeat CTH. History per patient's son, patient had recent spine surgery and was found on outpatient imaging last week to have L M1 segment aneurysm (unruptured), pt asymptomatic at this time. Per son patient taking percocet PO at home. Ureña catheter, ET tube, and arterial line placed at [facility].   NIHSS on arrival: 32. Bess Griffin 5, Mod Busby 4.  Bleed Day 1 = 10/26 (26 Oct 2021 13:03)    Hospital course:  10/26: admitted to [facility] from [facility], POD#0 s/p L hemicraniectomy for decompression and evacuation of SDH. Transferred to [facility] noted to have tense flap, received mannitol, keppra, decadron. Was hypertensive to 200s and preston to 40s, recevied 85g mannitol and bullet 23.4%. CTH on arrival demonstrated increased size in vents and new IVH. EVD placed, open at 15, central line placed. Transfused 2 u PRBC. POD0 of coiling of left MCA bifurcation aneurysm,   10/27: CTH demonstrated EVD in correct position, EVD lowered to 5, remains intubated on proprofol, pending repeat CTH in AM. Started on 3% @ 30/hr. 2LNS given for euvolemia, Mannitol given for uptrending ICPs. Bullet given 8:30 am. 3% increased to 50/hr. 1 L bolus. New EVD catheter placed using exisiting sreekanth hole. 1 u PRBC.  10/28: HH5, MF4, BD3; POD2 angio coil/embo of L MCA aneurysm. JOAQUÍN overnight, neuro stable. EVD@5cmH20 ICPs < 20 overnight, OP WNL. S/p 1 unit PRBC yesterday with appropriate response. Given 42g mannitol in AM for ICP 22 persistently, with improvement, then given 23% in PM for elevated ICP. febrile, pancultured. Standing tylenol and cooling blanket.   10/29: BD4, POD3 angio coil/embo. JOAQUÍN o/n, neuro stable. EVD@5, ICPs <20 o/n.   10/30: BD5, POD4 angio coil/embo. JOAQUÍN o/n neuro stable. EVD@5. 3% @50cc/hr.  10/31: BD6, POD5 angio coil/embo. brief period maintained upward gaze, resolved on its own. EVD@5cm h2o.    : BD7, POD6 L hemicrani, angio coil/embo. Neuro exam unchanged. ICPs WNL. Overnight given hydralazine, increased propofol for SBP > 180.  CTA showed mild-moderate anterior circulation vasospasm (permissive hypertension, euvolemia).  : BD8, POD7 L hemicrani, angio coil/embo. Neuro exam unchanged. ICPs WNL.  Pending trach/PEG.  11/3: BD9, POD8 L hemicrani, angio coil/embo.  Neuro exam unchanged. ICPs WNL.  Pending trach/PEG.  Dayshift:  noted episodes of sympathetic storming during vasospasm period.  : BD10 POD9, JOAQUÍN o/n, 500cc NS for euvolemia,  neuro stable, EVD at 5  : BD11 POD10, JOAQUÍN o/n, neuro stable, EVD at 5; O2 desaturations with thick secretions (MSSA, Enterobacter, Proteus) started vancomycin zosyn  : BD12 POD11 JOAQUÍN overnight. Neuro exam stable. Remains intubated on precedex. 3% hypertonic saline dc'd    Past Medical History: No pertinent past medical history  Allergies:  Allergy Status Unknown  Home meds:     Current Meds:  MEDICATIONS  (STANDING):  artificial  tears Solution 1 Drop(s) Both EYES two times a day  baclofen 15 milliGRAM(s) Oral every 12 hours  bromocriptine Capsule 15 milliGRAM(s) Oral every 8 hours  dexMEDEtomidine Infusion 0.2 MICROgram(s)/kG/Hr (4.25 mL/Hr) IV Continuous <Continuous>  enoxaparin Injectable 40 milliGRAM(s) SubCutaneous every 24 hours  gabapentin Solution 800 milliGRAM(s) Oral every 8 hours  insulin lispro (ADMELOG) corrective regimen sliding scale   SubCutaneous every 6 hours  levETIRAcetam 1000 milliGRAM(s) Oral every 12 hours  niMODipine 30 milliGRAM(s) Oral every 2 hours  pantoprazole  Injectable 40 milliGRAM(s) IV Push daily  piperacillin/tazobactam IVPB.. 4.5 Gram(s) IV Intermittent every 6 hours  sodium chloride 3 Gram(s) Oral every 6 hours  sodium chloride 3%. 500 milliLiter(s) (35 mL/Hr) IV Continuous <Continuous>  vancomycin  IVPB 1250 milliGRAM(s) IV Intermittent every 12 hours    MEDICATIONS  (PRN):  acetaminophen    Suspension .. 650 milliGRAM(s) Oral every 6 hours PRN Temp greater or equal to 38C (100.4F), Mild Pain (1 - 3)  fentaNYL    Injectable 25 MICROGram(s) IV Push every 2 hours PRN agitation  hydrALAZINE Injectable 5 milliGRAM(s) IV Push every 4 hours PRN >180  metoclopramide Injectable 10 milliGRAM(s) IV Push every 6 hours PRN nausea/vomiting    PHYSICAL EXAMINATION    ICU Vital Signs Last 24 Hrs  T(C): 36.9 (2021 09:14), Max: 37.1 (2021 21:00)  T(F): 98.4 (2021 09:14), Max: 98.8 (2021 21:00)  HR: 70 (2021 16:00) (60 - 106)  BP: 127/71 (2021 16:00) (112/61 - 177/83)  BP(mean): 93 (2021 16:00) (81 - 119)  ABP: 124/84 (2021 16:00) (107/60 - 180/97)  ABP(mean): 103 (2021 16:00) (80 - 131)  RR: 15 (2021 16:00) (14 - 36)  SpO2: 100% (2021 16:00) (94% - 100%)    NEUROLOGIC EXAMINATION:  Opens eyes to stimuli, does not follow commands, pupils 3mm equal and brisk (-) Doll's, (+) cough and (+) gag, B LE TF, R UE 0/5; flap full but soft; extensor posturing B UE R>L, B LE TF  Mode: AC, RR (machine): 14, TV (machine): 450, FiO2: 40, PEEP: 5, ITime: 1, MAP: 8, PIP: 22  EENT:  anicteric  CARDIOVASCULAR: (+) S1 S2, normal rate and regular rhythm  PULMONARY: clear to auscultation bilaterally  ABDOMEN: soft, nontender with normoactive bowel sounds  EXTREMITIES: no edema  SKIN: no rash; back incisions checked (dehiscence noted)    I/O's    21 @ 07:  -  21 @ 07:00  --------------------------------------------------------  IN: 2140.9 mL / OUT: 1540 mL / NET: 600.9 mL    21 @ 07:01  -  21 @ 06:55  --------------------------------------------------------  IN: 2788 mL / OUT: 2225 mL / NET: 563 mL    LABS:                        8.8    8.73  )-----------( 350      ( 2021 04:46 )             28.0     11-    148<H>  |  118<H>  |  7   ----------------------------<  141<H>  3.6   |  22  |  0.51    Ca    8.7      2021 04:46  Phos  2.8       Mg     1.9         TPro  6.5  /  Alb  3.1<L>  /  TBili  0.3  /  DBili  0.2  /  AST  22  /  ALT  13  /  AlkPhos  85  04  ABG - ( 2021 14:28 )  pH, Arterial: 7.45  pH, Blood: x     /  pCO2: 34    /  pO2: 68    / HCO3: 24    / Base Excess: 0.1   /  SaO2: 95.5      CAPILLARY BLOOD GLUCOSE    POCT Blood Glucose.: 129 mg/dL (2021 04:10)  POCT Blood Glucose.: 117 mg/dL (2021 23:24)  POCT Blood Glucose.: 106 mg/dL (2021 16:08)  POCT Blood Glucose.: 92 mg/dL (2021 11:25)    Culture - Sputum . (21 @ 13:59)    Gram Stain:   No epithelial cells seen  Few-moderate White blood cells  Few Gram positive cocci in pairs and clusters  Rare Gram Positive Rods    Specimen Source: .Sputum Sputum    Culture Results:   Numerous Staphylococcus aureus Presumptive Methicillin susceptible  Confirmation to follow within 24 hrs.  Numerous Enterobacter cloacae complex  Moderate Proteus mirabilis  Accompanied by normal respiratory melinda  Susceptibility to follow.    lipase 106    Bacteriology:    10/28 CSF NGTD  10/28 Blood NG1d x2     CSF studies:  10-28  L   *** [id number] *** %N91 %L4     EEG:  Neuroimagin/01 CTA - Diffuse, but overall MILD-MODERATE, sites of VASOSPASM are noted at theANTERIOR CIRCULATION, whilst the posterior circulation appears spared.   10/27 CT - There is herniation of the brain parenchyma and to the craniectomy defect which is similar prior examination. There is no significant change in the large left frontotemporal intraparenchymal hematoma with surrounding edema. Again demonstrated is mass effect with right to left midline shift measuring approximately 1 cm which is stable from prior exam. Again demonstrated is effacement of the cerebral sulci and basal cisterns.  Other imagin/01 UE Doppler - DVT within the right brachial vein.  Superficial thrombosis of the right cephalic vein.   CXR - stable.  Mild congestion.  10/28 Doppler - Negative  10/28 TTE -   1. Normal left and right ventricular size and systolic function.   2. No significant valvular disease.   3. Mild pulmonary hypertension present, pulmonary artery systolic pressure is 35 mmHg.   4. No pericardial effusion.   5. No prior echo is available for comparison.   Abdo U/S  Liver: Within normal limits. The liver measures 16.5 cm.  Bile ducts: Normal caliber.  Common bile duct measures 2 mm.  Gallbladder: Within normal limits. The gallbladder wall measures 2 mm. No stones.  Pancreas: Visualized portions are within normal limits.  Right kidney: 10.6 cm. No hydronephrosis. Normal parenchymal thickness and echogenicity.  Left kidney: 9.4 cm.  No hydronephrosis. Limited evaluation secondary to patient positioning.  Ascites: None.  Aorta and IVC: Visualized portions are within normal limits.    IV FLUIDS:   DRIPS:  ·	precedex infusion  DIET: NPO - TF stopped at 4 am (was at goal)  Lines: R TLC IJ [place]  Drains:    ·	EVD (d9) @5cm H20, ICPs 2-12, CSF 5-12 mL/h  Wounds:    CODE STATUS:  Full Code                       GOALS OF CARE:  aggressive                      DISPOSITION:  ICU =======================================   NEUROCRITICAL CARE ATTENDING NOTE (Sat.2021)  =======================================    AILYN DÍAZ   MRN-[id number]    HPI:  45 y/o female with PMH HTN, Multiple sclerosis, recent spinal surgery 10/8 ([facility]) presented to [place] ED BIBEMS after being found unconscious at home, unknown period of time. 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 HH5, MF1. Patient was intubated at [place] ED, found to have blown L pupil, and sent straight to the OR for left hemicraniotomy. A-line placed intraop. Hemodynamically unstable upon arrival to [facility], bradycardic and hypertensive s/p Mannitol, Clevidipine, and Furosemide. Central line placed in NSICU. Currently sedated on Propofol, pending repeat CTH. History per patient's son, patient had recent spine surgery and was found on outpatient imaging last week to have L M1 segment aneurysm (unruptured), pt asymptomatic at this time. Per son patient taking percocet PO at home. Ureña catheter, ET tube, and arterial line placed at [facility].   NIHSS on arrival: 32. Bess Griffin 5, Mod Busby 4.  Bleed Day 1 = 10/26 (26 Oct 2021 13:03)    Hospital course:  10/26: admitted to [facility] from [facility], POD#0 s/p L hemicraniectomy for decompression and evacuation of SDH. Transferred to [facility] noted to have tense flap, received mannitol, keppra, decadron. Was hypertensive to 200s and preston to 40s, recevied 85g mannitol and bullet 23.4%. CTH on arrival demonstrated increased size in vents and new IVH. EVD placed, open at 15, central line placed. Transfused 2 u PRBC. POD0 of coiling of left MCA bifurcation aneurysm,   10/27: CTH demonstrated EVD in correct position, EVD lowered to 5, remains intubated on proprofol, pending repeat CTH in AM. Started on 3% @ 30/hr. 2LNS given for euvolemia, Mannitol given for uptrending ICPs. Bullet given 8:30 am. 3% increased to 50/hr. 1 L bolus. New EVD catheter placed using exisiting sreekanth hole. 1 u PRBC.  10/28: HH5, MF4, BD3; POD2 angio coil/embo of L MCA aneurysm. JOAQUÍN overnight, neuro stable. EVD@5cmH20 ICPs < 20 overnight, OP WNL. S/p 1 unit PRBC yesterday with appropriate response. Given 42g mannitol in AM for ICP 22 persistently, with improvement, then given 23% in PM for elevated ICP. febrile, pancultured. Standing tylenol and cooling blanket.   10/29: BD4, POD3 angio coil/embo. JOAQUÍN o/n, neuro stable. EVD@5, ICPs <20 o/n.   10/30: BD5, POD4 angio coil/embo. JOAQUÍN o/n neuro stable. EVD@5. 3% @50cc/hr.  10/31: BD6, POD5 angio coil/embo. brief period maintained upward gaze, resolved on its own. EVD@5cm h2o.    : BD7, POD6 L hemicrani, angio coil/embo. Neuro exam unchanged. ICPs WNL. Overnight given hydralazine, increased propofol for SBP > 180.  CTA showed mild-moderate anterior circulation vasospasm (permissive hypertension, euvolemia).  : BD8, POD7 L hemicrani, angio coil/embo. Neuro exam unchanged. ICPs WNL.  Pending trach/PEG.  11/3: BD9, POD8 L hemicrani, angio coil/embo.  Neuro exam unchanged. ICPs WNL.  Pending trach/PEG.  Dayshift:  noted episodes of sympathetic storming during vasospasm period.  : BD10 POD9, JOAQUÍN o/n, 500cc NS for euvolemia,  neuro stable, EVD at 5  : BD11 POD10, JOAQUÍN o/n, neuro stable, EVD at 5; O2 desaturations with thick secretions (MSSA, Enterobacter, Proteus) started vancomycin zosyn  : BD12 POD11 JOAQUÍN overnight. Neuro exam stable. Remains intubated on precedex. 3% hypertonic saline dc'd    Past Medical History: No pertinent past medical history  Allergies:  Allergy Status Unknown  Home meds:     Current Meds:  MEDICATIONS  (STANDING):  artificial  tears Solution 1 Drop(s) Both EYES two times a day  baclofen 15 milliGRAM(s) Oral every 12 hours  bromocriptine Capsule 15 milliGRAM(s) Oral every 8 hours  dexMEDEtomidine Infusion 0.2 MICROgram(s)/kG/Hr (4.25 mL/Hr) IV Continuous <Continuous>  enoxaparin Injectable 40 milliGRAM(s) SubCutaneous every 24 hours  gabapentin Solution 800 milliGRAM(s) Oral every 8 hours  insulin lispro (ADMELOG) corrective regimen sliding scale   SubCutaneous every 6 hours  levETIRAcetam 1000 milliGRAM(s) Oral every 12 hours  niMODipine 30 milliGRAM(s) Oral every 2 hours  pantoprazole  Injectable 40 milliGRAM(s) IV Push daily  piperacillin/tazobactam IVPB.. 4.5 Gram(s) IV Intermittent every 6 hours  sodium chloride 3 Gram(s) Oral every 6 hours  sodium chloride 3%. 500 milliLiter(s) (35 mL/Hr) IV Continuous <Continuous>  vancomycin  IVPB 1250 milliGRAM(s) IV Intermittent every 12 hours    MEDICATIONS  (PRN):  acetaminophen    Suspension .. 650 milliGRAM(s) Oral every 6 hours PRN Temp greater or equal to 38C (100.4F), Mild Pain (1 - 3)  fentaNYL    Injectable 25 MICROGram(s) IV Push every 2 hours PRN agitation  hydrALAZINE Injectable 5 milliGRAM(s) IV Push every 4 hours PRN >180  metoclopramide Injectable 10 milliGRAM(s) IV Push every 6 hours PRN nausea/vomiting    PHYSICAL EXAMINATION    ICU Vital Signs Last 24 Hrs  T(C): 37.1 (2021 05:59), Max: 37.4 (2021 18:58)  T(F): 98.8 (2021 05:59), Max: 99.4 (2021 18:58)  HR: 66 (2021 06:00) (58 - 106)  BP: 136/79 (2021 06:00) (106/57 - 177/83)  BP(mean): 102 (2021 06:00) (76 - 119)  ABP: 143/80 (2021 06:00) (108/62 - 180/97)  ABP(mean): 105 (2021 06:00) (80 - 131)  RR: 17 (2021 06:00) (14 - 36)  SpO2: 94% (2021 06:00) (92% - 100%)    NEUROLOGIC EXAMINATION:  Opens eyes to stimuli, does not follow commands, pupils 3mm equal and brisk (-) Doll's, (+) cough and (+) gag, B LE TF, R UE 0/5; flap full but soft; extensor posturing B UE R>L, B LE TF  Mode: AC, RR (machine): 14, TV (machine): 450, FiO2: 40, PEEP: 5, ITime: 1, MAP: 9.5, PIP: 26  EENT:  anicteric  CARDIOVASCULAR: (+) S1 S2, normal rate and regular rhythm  PULMONARY: clear to auscultation bilaterally  ABDOMEN: soft, nontender with normoactive bowel sounds  EXTREMITIES: no edema  SKIN: no rash; back incisions checked (dehiscence noted)    I/O's    21 @ 07:  -  21 @ 07:00  --------------------------------------------------------  IN: 2848 mL / OUT: 2240 mL / NET: 608 mL    21 @ 07:01  -  21 @ 06:48  --------------------------------------------------------  IN: 1994.5 mL / OUT: 976 mL / NET: 1018.5 mL    LABS:                        8.6    8.81  )-----------( 377      ( 2021 05:52 )             27.5         152<H>  |  122<H>  |  13  ----------------------------<  134<H>  3.8   |  21<L>  |  0.77    Ca    8.8      2021 05:52  Phos  3.3       Mg     2.0         TPro  6.5  /  Alb  3.1<L>  /  TBili  0.3  /  DBili  0.2  /  AST  22  /  ALT  13  /  AlkPhos  85      CAPILLARY BLOOD GLUCOSE    POCT Blood Glucose.: 124 mg/dL (2021 05:48)  POCT Blood Glucose.: 131 mg/dL (2021 21:41)  POCT Blood Glucose.: 130 mg/dL (2021 17:29)  POCT Blood Glucose.: 124 mg/dL (2021 11:43)    Culture - Sputum . (21 @ 13:59)    Gram Stain:   No epithelial cells seen  Few-moderate White blood cells  Few Gram positive cocci in pairs and clusters  Rare Gram Positive Rods    Specimen Source: .Sputum Sputum    Culture Results:   Numerous Staphylococcus aureus Presumptive Methicillin susceptible  Confirmation to follow within 24 hrs.  Numerous Enterobacter cloacae complex  Moderate Proteus mirabilis  Accompanied by normal respiratory melinda  Susceptibility to follow.    lipase 106    Bacteriology:    10/28 CSF NGTD  10/28 Blood NG1d x2     CSF studies:  10-28  L   *** [id number] *** %N91 %L4     EEG:  Neuroimagin/01 CTA - Diffuse, but overall MILD-MODERATE, sites of VASOSPASM are noted at theANTERIOR CIRCULATION, whilst the posterior circulation appears spared.   10/27 CT - There is herniation of the brain parenchyma and to the craniectomy defect which is similar prior examination. There is no significant change in the large left frontotemporal intraparenchymal hematoma with surrounding edema. Again demonstrated is mass effect with right to left midline shift measuring approximately 1 cm which is stable from prior exam. Again demonstrated is effacement of the cerebral sulci and basal cisterns.  Other imagin/01 UE Doppler - DVT within the right brachial vein.  Superficial thrombosis of the right cephalic vein.   CXR - stable.  Mild congestion.  10/28 Doppler - Negative  10/28 TTE -   1. Normal left and right ventricular size and systolic function.   2. No significant valvular disease.   3. Mild pulmonary hypertension present, pulmonary artery systolic pressure is 35 mmHg.   4. No pericardial effusion.   5. No prior echo is available for comparison.   Abdo U/S  Liver: Within normal limits. The liver measures 16.5 cm.  Bile ducts: Normal caliber.  Common bile duct measures 2 mm.  Gallbladder: Within normal limits. The gallbladder wall measures 2 mm. No stones.  Pancreas: Visualized portions are within normal limits.  Right kidney: 10.6 cm. No hydronephrosis. Normal parenchymal thickness and echogenicity.  Left kidney: 9.4 cm.  No hydronephrosis. Limited evaluation secondary to patient positioning.  Ascites: None.  Aorta and IVC: Visualized portions are within normal limits.    IV FLUIDS:   DRIPS:  ·	precedex infusion  DIET: NPO - TF stopped at 4 am (was at goal)  Lines: R TLC IJ [place]  Drains:    ·	EVD (d9) @5cm H20, ICPs 2-12, CSF 5-12 mL/h  Wounds:    CODE STATUS:  Full Code                       GOALS OF CARE:  aggressive                      DISPOSITION:  ICU

## 2021-11-06 NOTE — PROGRESS NOTE ADULT - SUBJECTIVE AND OBJECTIVE BOX
HPI:  45 y/o female with PMH HTN, Multiple sclerosis, recent spinal surgery 10/8 ([facility]) presented to [place] [facility] BIBEMS after being found unconscious at home, unknown period of time. 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 HH5, MF1. Patient was intubated at [place] ED, found to have blown L pupil, and sent straight to the OR for left hemicraniotomy. A-line placed intraop. Hemodynamically unstable upon arrival to [facility], bradycardic and hypertensive s/p Mannitol, Clevidipine, and Furosemide. Central line placed in NSICU. Currently sedated on Propofol, pending repeat CTH. History per patient's son, patient had recent spine surgery and was found on outpatient imaging last week to have L M1 segment aneurysm (unruptured), pt asymptomatic at this time. Per son patient taking percocet PO at home. Ureña catheter, ET tube, and arterial line placed at [facility].     NIHSS on arrival: 32   Bess Griffin 5, Mod Busby 4  Bleed Day 1 = 10/26 (26 Oct 2021 13:03)    Hospital Course:  10/26: admitted to [facility] from [facility], POD#0 s/p L hemicraniectomy for decompression and evacuation of SDH. Transferred to [facility] noted to have tense flap, received mannitol, keppra, decadron. Was hypertensive to 200s and preston to 40s, recevied 85g mannitol and bullet 23.4%. CTH on arrival demonstrated increased size in vents and new IVH. EVD placed, open at 15, central line placed. Transfused 2 u PRBC. POD0 of coiling of left MCA bifurcation aneurysm,   10/27: CTH demonstrated EVD in correct position, EVD lowered to 5, remains intubated on proprofol, pending repeat CTH in AM. Started on 3% @ 30/hr. 2LNS given for euvolemia, Mannitol given for uptrending ICPs. Bullet given 8:30 am. 3% increased to 50/hr. 1 L bolus. New EVD catheter placed using exisiting sreekanth hole. 1 u PRBC.  10/28: HH5, MF4, BD3; POD2 angio coil/embo of L MCA aneurysm. JOAQUÍN overnight, neuro stable. EVD@5cmH20 ICPs < 20 overnight, OP WNL. S/p 1 unit PRBC yesterday with appropriate response. Given 42g mannitol in AM for ICP 22 persistently, with improvement, then given 23% in PM for elevated ICP. febrile, pancultured. Standing tylenol and cooling blanket.   10/29: BD4, POD3 angio coil/embo. JOAQUÍN o/n, neuro stable. EVD@5, ICPs <20 o/n.   10/30: BD5, POD4 angio coil/embo. JOAQUÍN o/n neuro stable. EVD@5. 3% @50cc/hr.  10/31: BD6, POD5 angio coil/embo. brief period maintained upward gaze, resolved on its own. EVD@5cm h2o.    11/1: BD7, POD6 L hemicrani, angio coil/embo. JOAQUÍN overnight. Neuro exam unchanged. ICPs WNL. started bromocriptine/gabapentin/baclofen. dc'd propofol, started precedex  11/2: BD8 POD7 L hemicrani, angio coil/embo. JOAQUÍN overnight. Neuro exam stable. Remains on 3% hypertonic saline. Receieved 1 unit of PRBCs for a Hgb of 7.6.  11/3: BD 9 POD8 of L hemicrani. Elevated lipase, normal amylase, OG tube clogged and replaced. Abdominal US normal. Pending gen surg reccs regarding trach and peg. Gabapentin and Baclofen increased to help with neurostorming. restarted back on 3%, LR@35. became preston+hypotensive with nimodipine 60q4, decreased back to 30q2, NaCl bullet given.   11/4: BD10 POD9, JOAQUÍN o/n, 500cc NS for euvolemia,  neuro stable, EVD at 5. 3% increased to 75  11/5: BD11 POD10, JOAQUÍN o/n, neuro stable, EVD at 5  11/6: BD12 POD11 JOAQUÍN overnight. Neuro exam stable. Remains intubated on precedex. 3% hypertonic saline dc'd    Vital Signs Last 24 Hrs  T(C): 37.3 (06 Nov 2021 01:04), Max: 37.4 (05 Nov 2021 18:58)  T(F): 99.2 (06 Nov 2021 01:04), Max: 99.4 (05 Nov 2021 18:58)  HR: 58 (06 Nov 2021 03:00) (58 - 106)  BP: 108/56 (06 Nov 2021 03:00) (106/58 - 177/83)  BP(mean): 76 (06 Nov 2021 03:00) (76 - 119)  RR: 14 (06 Nov 2021 03:00) (14 - 36)  SpO2: 96% (06 Nov 2021 03:00) (94% - 100%)    I&O's Summary    04 Nov 2021 07:01  -  05 Nov 2021 07:00  --------------------------------------------------------  IN: 2848 mL / OUT: 2240 mL / NET: 608 mL    05 Nov 2021 07:01  -  06 Nov 2021 04:15  --------------------------------------------------------  IN: 1649.5 mL / OUT: 964 mL / NET: 685.5 mL      PHYSICAL EXAM:  General: patient seen laying supine in bed in NAD, precedex held for exam  Neuro: does not OEs, does not FC, extensor posturing to noxious b/l UEs, TF to noxious b/l LEs, +corneals, +cough/gag, +dolls eyes  HEENT: PERRL  Neck: supple  Cardiac: RRR, S1S2  Pulmonary: chest rise symmetric  Abdomen: soft, nontender, nondistended  Ext: warm, perfusing well  Wound: abdominal incision c/d/i, 3 posterior incisions covered with sponge dressing, R EVD incision site C/D/I, left crani site C/D/I staples in place  Left bone flap full and soft    TUBES/LINES:  [x] CVC  [x] A-line  [] Lumbar Drain  [x] Ventriculostomy - EVD at 5  [] Other    DIET:  [x] NPO  [] Mechanical  [] Tube feeds    LABS:                        8.8    8.73  )-----------( 350      ( 05 Nov 2021 04:46 )             28.0     11-05    153<H>  |  122<H>  |  10  ----------------------------<  134<H>  3.7   |  21<L>  |  0.70    Ca    8.6      05 Nov 2021 22:10  Phos  2.8     11-05  Mg     1.9     11-05    TPro  6.5  /  Alb  3.1<L>  /  TBili  0.3  /  DBili  0.2  /  AST  22  /  ALT  13  /  AlkPhos  85  11-04            CAPILLARY BLOOD GLUCOSE      POCT Blood Glucose.: 131 mg/dL (05 Nov 2021 21:41)  POCT Blood Glucose.: 130 mg/dL (05 Nov 2021 17:29)  POCT Blood Glucose.: 124 mg/dL (05 Nov 2021 11:43)      Drug Levels: [] N/A    CSF Analysis: [] N/A      Allergies    No Known Allergies    Intolerances      MEDICATIONS:  Antibiotics:  piperacillin/tazobactam IVPB.. 4.5 Gram(s) IV Intermittent every 6 hours  vancomycin  IVPB 1250 milliGRAM(s) IV Intermittent every 12 hours    Neuro:  acetaminophen    Suspension .. 650 milliGRAM(s) Oral every 6 hours PRN  baclofen 15 milliGRAM(s) Oral every 12 hours  bromocriptine Capsule 15 milliGRAM(s) Oral every 8 hours  dexMEDEtomidine Infusion 0.2 MICROgram(s)/kG/Hr IV Continuous <Continuous>  fentaNYL    Injectable 25 MICROGram(s) IV Push every 2 hours PRN  gabapentin Solution 800 milliGRAM(s) Oral every 8 hours  levETIRAcetam 1000 milliGRAM(s) Oral every 12 hours  metoclopramide Injectable 10 milliGRAM(s) IV Push every 6 hours PRN    Anticoagulation:  enoxaparin Injectable 40 milliGRAM(s) SubCutaneous every 24 hours    OTHER:  artificial  tears Solution 1 Drop(s) Both EYES two times a day  chlorhexidine 0.12% Liquid 15 milliLiter(s) Oral Mucosa every 12 hours  chlorhexidine 2% Cloths 1 Application(s) Topical <User Schedule>  hydrALAZINE Injectable 5 milliGRAM(s) IV Push every 4 hours PRN  insulin lispro (ADMELOG) corrective regimen sliding scale   SubCutaneous every 6 hours  niMODipine 30 milliGRAM(s) Oral every 2 hours  pantoprazole  Injectable 40 milliGRAM(s) IV Push daily    IVF:  sodium chloride 3 Gram(s) Oral every 6 hours    CULTURES:  Culture Results:   Numerous Staphylococcus aureus Presumptive Methicillin susceptible  Confirmation to follow within 24 hrs.  Numerous Enterobacter cloacae complex  Moderate Proteus mirabilis  Accompanied by normal respiratory melinda  Susceptibility to follow. (11-04 @ 13:59)  Culture Results:   No growth to date (10-28 @ 19:23)    RADIOLOGY & ADDITIONAL TESTS:      ASSESSMENT:  45 y/o female with PMHx of HTN and MS, hx of spinal surgery at [facility] 10/8/21 presented to [place] ED BIBEMS after being found unconscious at home, unknown period of time. 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 HH5, MF4; BD #1 = 10/26. Patient was intubated at [place] ED and sent straight to the OR for left hemicraniotomy. A-line placed intraop. Hemodynamically unstable upon arrival to [facility], bradycardic and hypertensive s/p Mannitol and Furosemide. Central line placed in NSICU. Currently sedated on Propofol. Now s/p EVD placement, and coil embolization of left MCA bifurcation aneurysm 10/26/2021.       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, left    Intracranial hemorrhage, spontaneous subarachnoid, due to cerebral aneurysm, acute    Angiogram, cerebral, with coil embolization, in non-operating room setting    Personal history of spine surgery    SysAdmin_VstLnk      PLAN:  NEURO:  - neuro checks/vital signs q1hr  - HOB > 30   - Keppra 1g IV BID   - Nimodipine 30q2  - bromocriptine 15mg q8hr  - increased Gabapentin 800mg q8hr and Baclofen 15mg BID   - propofol dc'd, precedex for sedation   - fentanyl pushes PRN   - EVD open at 5cmH2O, monitor ICP/output  - CTA 11/1 with findings of moderate anterior circulation spasm     CARDIO:  - -180  - hydralazine PRN to maintain SBP goal  - echo +mild pulm HTN  - f/u EKG for sinus arrythmia  - trend qtc on standing reglan    PULM:  - intubated on full vent support  - aspiration precautions    GI:  - OGT TFs  - bowel regimen   - PPI qd GI ppx  - FOB negative 11/1  - Abd US: neg   - rectal tube  - pending trach and peg Tues?, reconsult gen surg when GI w/u complete  - standing reglan    RENAL:  - salt tabs 3Q6  - euvolemia goal   - Na 145-150     HEME:  - SCDs, SQL  - s/p 2u PRBC, s/p 1u PRBC 10/27, s/p 1u PRBC 11/02  - monitor H+H  - FOB negative  - RUE doppler for swelling 11/1: DVT R brachial and superficial cephalic thrombus, surveillance 11/8    ID:  - low grade fever  - leukocytosis and procal, trend   - Tylenol PRN for fever  - pancx 10/28 NGTD  - sputum cx 11/4:  gm + cocci in pairs/clusters, rare gm + rods  - continue vanc and zosyn    ENDO:  - ISS   - monitor FS    OTHER  - f/u wound care consult for back incisions dehiscence     GOC: full code  Level of care: ICU status   Dispo: pend EVD, trach, peg  family updated    Case discussed with Dr. Duncan and Dr. Velásquez

## 2021-11-07 ENCOUNTER — FORM ENCOUNTER (OUTPATIENT)
Age: 46
End: 2021-11-07

## 2021-11-07 LAB
ANION GAP SERPL CALC-SCNC: 10 MMOL/L — SIGNIFICANT CHANGE UP (ref 5–17)
ANION GAP SERPL CALC-SCNC: 10 MMOL/L — SIGNIFICANT CHANGE UP (ref 5–17)
ANION GAP SERPL CALC-SCNC: 9 MMOL/L — SIGNIFICANT CHANGE UP (ref 5–17)
ANISOCYTOSIS BLD QL: SLIGHT — SIGNIFICANT CHANGE UP
BASOPHILS # BLD AUTO: 0.09 K/UL — SIGNIFICANT CHANGE UP (ref 0–0.2)
BASOPHILS NFR BLD AUTO: 0.9 % — SIGNIFICANT CHANGE UP (ref 0–2)
BUN SERPL-MCNC: 11 MG/DL — SIGNIFICANT CHANGE UP (ref 7–23)
BUN SERPL-MCNC: 13 MG/DL — SIGNIFICANT CHANGE UP (ref 7–23)
BUN SERPL-MCNC: 14 MG/DL — SIGNIFICANT CHANGE UP (ref 7–23)
CALCIUM SERPL-MCNC: 8.9 MG/DL — SIGNIFICANT CHANGE UP (ref 8.4–10.5)
CALCIUM SERPL-MCNC: 9 MG/DL — SIGNIFICANT CHANGE UP (ref 8.4–10.5)
CALCIUM SERPL-MCNC: 9 MG/DL — SIGNIFICANT CHANGE UP (ref 8.4–10.5)
CHLORIDE SERPL-SCNC: 111 MMOL/L — HIGH (ref 96–108)
CHLORIDE SERPL-SCNC: 112 MMOL/L — HIGH (ref 96–108)
CHLORIDE SERPL-SCNC: 121 MMOL/L — HIGH (ref 96–108)
CO2 SERPL-SCNC: 23 MMOL/L — SIGNIFICANT CHANGE UP (ref 22–31)
CO2 SERPL-SCNC: 23 MMOL/L — SIGNIFICANT CHANGE UP (ref 22–31)
CO2 SERPL-SCNC: 24 MMOL/L — SIGNIFICANT CHANGE UP (ref 22–31)
CREAT SERPL-MCNC: 0.64 MG/DL — SIGNIFICANT CHANGE UP (ref 0.5–1.3)
CREAT SERPL-MCNC: 0.64 MG/DL — SIGNIFICANT CHANGE UP (ref 0.5–1.3)
CREAT SERPL-MCNC: 0.73 MG/DL — SIGNIFICANT CHANGE UP (ref 0.5–1.3)
EOSINOPHIL # BLD AUTO: 0.18 K/UL — SIGNIFICANT CHANGE UP (ref 0–0.5)
EOSINOPHIL NFR BLD AUTO: 1.7 % — SIGNIFICANT CHANGE UP (ref 0–6)
GIANT PLATELETS BLD QL SMEAR: PRESENT — SIGNIFICANT CHANGE UP
GLUCOSE SERPL-MCNC: 117 MG/DL — HIGH (ref 70–99)
GLUCOSE SERPL-MCNC: 119 MG/DL — HIGH (ref 70–99)
GLUCOSE SERPL-MCNC: 137 MG/DL — HIGH (ref 70–99)
HCT VFR BLD CALC: 27.5 % — LOW (ref 34.5–45)
HGB BLD-MCNC: 8.6 G/DL — LOW (ref 11.5–15.5)
HYPOCHROMIA BLD QL: SIGNIFICANT CHANGE UP
LYMPHOCYTES # BLD AUTO: 0.72 K/UL — LOW (ref 1–3.3)
LYMPHOCYTES # BLD AUTO: 6.9 % — LOW (ref 13–44)
MACROCYTES BLD QL: SLIGHT — SIGNIFICANT CHANGE UP
MAGNESIUM SERPL-MCNC: 1.7 MG/DL — SIGNIFICANT CHANGE UP (ref 1.6–2.6)
MANUAL SMEAR VERIFICATION: SIGNIFICANT CHANGE UP
MCHC RBC-ENTMCNC: 25.8 PG — LOW (ref 27–34)
MCHC RBC-ENTMCNC: 31.3 GM/DL — LOW (ref 32–36)
MCV RBC AUTO: 82.6 FL — SIGNIFICANT CHANGE UP (ref 80–100)
METAMYELOCYTES # FLD: 0.9 % — HIGH (ref 0–0)
MONOCYTES # BLD AUTO: 0.64 K/UL — SIGNIFICANT CHANGE UP (ref 0–0.9)
MONOCYTES NFR BLD AUTO: 6.1 % — SIGNIFICANT CHANGE UP (ref 2–14)
NEUTROPHILS # BLD AUTO: 8.72 K/UL — HIGH (ref 1.8–7.4)
NEUTROPHILS NFR BLD AUTO: 76.5 % — SIGNIFICANT CHANGE UP (ref 43–77)
NEUTS BAND # BLD: 7 % — SIGNIFICANT CHANGE UP (ref 0–8)
OVALOCYTES BLD QL SMEAR: SLIGHT — SIGNIFICANT CHANGE UP
PHOSPHATE SERPL-MCNC: 3.5 MG/DL — SIGNIFICANT CHANGE UP (ref 2.5–4.5)
PLAT MORPH BLD: ABNORMAL
PLATELET # BLD AUTO: 417 K/UL — HIGH (ref 150–400)
POIKILOCYTOSIS BLD QL AUTO: SLIGHT — SIGNIFICANT CHANGE UP
POLYCHROMASIA BLD QL SMEAR: SLIGHT — SIGNIFICANT CHANGE UP
POTASSIUM SERPL-MCNC: 3.5 MMOL/L — SIGNIFICANT CHANGE UP (ref 3.5–5.3)
POTASSIUM SERPL-MCNC: 3.7 MMOL/L — SIGNIFICANT CHANGE UP (ref 3.5–5.3)
POTASSIUM SERPL-MCNC: 3.8 MMOL/L — SIGNIFICANT CHANGE UP (ref 3.5–5.3)
POTASSIUM SERPL-SCNC: 3.5 MMOL/L — SIGNIFICANT CHANGE UP (ref 3.5–5.3)
POTASSIUM SERPL-SCNC: 3.7 MMOL/L — SIGNIFICANT CHANGE UP (ref 3.5–5.3)
POTASSIUM SERPL-SCNC: 3.8 MMOL/L — SIGNIFICANT CHANGE UP (ref 3.5–5.3)
PROCALCITONIN SERPL-MCNC: 0.05 NG/ML — SIGNIFICANT CHANGE UP (ref 0.02–0.1)
RBC # BLD: 3.33 M/UL — LOW (ref 3.8–5.2)
RBC # FLD: 22.3 % — HIGH (ref 10.3–14.5)
RBC BLD AUTO: ABNORMAL
SODIUM SERPL-SCNC: 144 MMOL/L — SIGNIFICANT CHANGE UP (ref 135–145)
SODIUM SERPL-SCNC: 145 MMOL/L — SIGNIFICANT CHANGE UP (ref 135–145)
SODIUM SERPL-SCNC: 154 MMOL/L — HIGH (ref 135–145)
TARGETS BLD QL SMEAR: SLIGHT — SIGNIFICANT CHANGE UP
VANCOMYCIN TROUGH SERPL-MCNC: 11.7 UG/ML — SIGNIFICANT CHANGE UP (ref 10–20)
WBC # BLD: 10.44 K/UL — SIGNIFICANT CHANGE UP (ref 3.8–10.5)
WBC # FLD AUTO: 10.44 K/UL — SIGNIFICANT CHANGE UP (ref 3.8–10.5)

## 2021-11-07 PROCEDURE — 93970 EXTREMITY STUDY: CPT | Mod: 26

## 2021-11-07 PROCEDURE — 71045 X-RAY EXAM CHEST 1 VIEW: CPT | Mod: 26

## 2021-11-07 PROCEDURE — 99291 CRITICAL CARE FIRST HOUR: CPT | Mod: 25

## 2021-11-07 RX ORDER — ACETYLCYSTEINE 200 MG/ML
4 VIAL (ML) MISCELLANEOUS THREE TIMES A DAY
Refills: 0 | Status: DISCONTINUED | OUTPATIENT
Start: 2021-11-07 | End: 2021-11-25

## 2021-11-07 RX ORDER — DEXMEDETOMIDINE HYDROCHLORIDE IN 0.9% SODIUM CHLORIDE 4 UG/ML
0.2 INJECTION INTRAVENOUS
Qty: 400 | Refills: 0 | Status: DISCONTINUED | OUTPATIENT
Start: 2021-11-07 | End: 2021-11-08

## 2021-11-07 RX ORDER — NIMODIPINE 60 MG/10ML
30 SOLUTION ORAL
Refills: 0 | Status: DISCONTINUED | OUTPATIENT
Start: 2021-11-07 | End: 2021-11-09

## 2021-11-07 RX ORDER — VANCOMYCIN HCL 1 G
1500 VIAL (EA) INTRAVENOUS EVERY 12 HOURS
Refills: 0 | Status: DISCONTINUED | OUTPATIENT
Start: 2021-11-07 | End: 2021-11-07

## 2021-11-07 RX ORDER — CEFTRIAXONE 500 MG/1
1000 INJECTION, POWDER, FOR SOLUTION INTRAMUSCULAR; INTRAVENOUS EVERY 24 HOURS
Refills: 0 | Status: DISCONTINUED | OUTPATIENT
Start: 2021-11-07 | End: 2021-11-08

## 2021-11-07 RX ORDER — POTASSIUM CHLORIDE 20 MEQ
40 PACKET (EA) ORAL ONCE
Refills: 0 | Status: COMPLETED | OUTPATIENT
Start: 2021-11-07 | End: 2021-11-07

## 2021-11-07 RX ORDER — ONDANSETRON 8 MG/1
4 TABLET, FILM COATED ORAL EVERY 6 HOURS
Refills: 0 | Status: DISCONTINUED | OUTPATIENT
Start: 2021-11-07 | End: 2021-12-10

## 2021-11-07 RX ORDER — PANTOPRAZOLE SODIUM 20 MG/1
40 TABLET, DELAYED RELEASE ORAL DAILY
Refills: 0 | Status: DISCONTINUED | OUTPATIENT
Start: 2021-11-07 | End: 2021-11-09

## 2021-11-07 RX ORDER — BROMOCRIPTINE MESYLATE 5 MG/1
15 CAPSULE ORAL EVERY 8 HOURS
Refills: 0 | Status: DISCONTINUED | OUTPATIENT
Start: 2021-11-07 | End: 2021-11-08

## 2021-11-07 RX ORDER — AMANTADINE HCL 100 MG
200 CAPSULE ORAL
Refills: 0 | Status: DISCONTINUED | OUTPATIENT
Start: 2021-11-07 | End: 2021-11-09

## 2021-11-07 RX ORDER — GABAPENTIN 400 MG/1
400 CAPSULE ORAL EVERY 8 HOURS
Refills: 0 | Status: DISCONTINUED | OUTPATIENT
Start: 2021-11-07 | End: 2021-11-08

## 2021-11-07 RX ORDER — MODAFINIL 200 MG/1
200 TABLET ORAL DAILY
Refills: 0 | Status: DISCONTINUED | OUTPATIENT
Start: 2021-11-07 | End: 2021-11-09

## 2021-11-07 RX ORDER — GABAPENTIN 400 MG/1
400 CAPSULE ORAL EVERY 8 HOURS
Refills: 0 | Status: DISCONTINUED | OUTPATIENT
Start: 2021-11-07 | End: 2021-11-07

## 2021-11-07 RX ORDER — MAGNESIUM SULFATE 500 MG/ML
2 VIAL (ML) INJECTION ONCE
Refills: 0 | Status: COMPLETED | OUTPATIENT
Start: 2021-11-07 | End: 2021-11-07

## 2021-11-07 RX ORDER — SODIUM CHLORIDE 9 MG/ML
3 INJECTION INTRAMUSCULAR; INTRAVENOUS; SUBCUTANEOUS EVERY 6 HOURS
Refills: 0 | Status: DISCONTINUED | OUTPATIENT
Start: 2021-11-07 | End: 2021-11-09

## 2021-11-07 RX ADMIN — Medication 40 MILLIEQUIVALENT(S): at 09:22

## 2021-11-07 RX ADMIN — Medication 1 DROP(S): at 18:04

## 2021-11-07 RX ADMIN — SODIUM CHLORIDE 2 GRAM(S): 9 INJECTION INTRAMUSCULAR; INTRAVENOUS; SUBCUTANEOUS at 07:19

## 2021-11-07 RX ADMIN — DEXMEDETOMIDINE HYDROCHLORIDE IN 0.9% SODIUM CHLORIDE 4.25 MICROGRAM(S)/KG/HR: 4 INJECTION INTRAVENOUS at 15:52

## 2021-11-07 RX ADMIN — GABAPENTIN 800 MILLIGRAM(S): 400 CAPSULE ORAL at 09:25

## 2021-11-07 RX ADMIN — NIMODIPINE 30 MILLIGRAM(S): 60 SOLUTION ORAL at 09:23

## 2021-11-07 RX ADMIN — LEVETIRACETAM 1000 MILLIGRAM(S): 250 TABLET, FILM COATED ORAL at 07:19

## 2021-11-07 RX ADMIN — Medication 4 MILLILITER(S): at 13:56

## 2021-11-07 RX ADMIN — CEFTRIAXONE 100 MILLIGRAM(S): 500 INJECTION, POWDER, FOR SOLUTION INTRAMUSCULAR; INTRAVENOUS at 14:04

## 2021-11-07 RX ADMIN — CHLORHEXIDINE GLUCONATE 1 APPLICATION(S): 213 SOLUTION TOPICAL at 07:07

## 2021-11-07 RX ADMIN — Medication 5 MILLIGRAM(S): at 01:35

## 2021-11-07 RX ADMIN — BROMOCRIPTINE MESYLATE 15 MILLIGRAM(S): 5 CAPSULE ORAL at 21:33

## 2021-11-07 RX ADMIN — DEXMEDETOMIDINE HYDROCHLORIDE IN 0.9% SODIUM CHLORIDE 4.25 MICROGRAM(S)/KG/HR: 4 INJECTION INTRAVENOUS at 04:01

## 2021-11-07 RX ADMIN — NIMODIPINE 30 MILLIGRAM(S): 60 SOLUTION ORAL at 07:19

## 2021-11-07 RX ADMIN — CHLORHEXIDINE GLUCONATE 15 MILLILITER(S): 213 SOLUTION TOPICAL at 07:07

## 2021-11-07 RX ADMIN — SODIUM CHLORIDE 4 MILLILITER(S): 9 INJECTION INTRAMUSCULAR; INTRAVENOUS; SUBCUTANEOUS at 09:16

## 2021-11-07 RX ADMIN — Medication 40 MILLIEQUIVALENT(S): at 19:57

## 2021-11-07 RX ADMIN — SODIUM CHLORIDE 3 GRAM(S): 9 INJECTION INTRAMUSCULAR; INTRAVENOUS; SUBCUTANEOUS at 18:02

## 2021-11-07 RX ADMIN — NIMODIPINE 30 MILLIGRAM(S): 60 SOLUTION ORAL at 02:11

## 2021-11-07 RX ADMIN — NIMODIPINE 30 MILLIGRAM(S): 60 SOLUTION ORAL at 11:29

## 2021-11-07 RX ADMIN — Medication 50 GRAM(S): at 09:23

## 2021-11-07 RX ADMIN — NIMODIPINE 30 MILLIGRAM(S): 60 SOLUTION ORAL at 17:30

## 2021-11-07 RX ADMIN — GABAPENTIN 400 MILLIGRAM(S): 400 CAPSULE ORAL at 21:38

## 2021-11-07 RX ADMIN — SODIUM CHLORIDE 4 MILLILITER(S): 9 INJECTION INTRAMUSCULAR; INTRAVENOUS; SUBCUTANEOUS at 05:52

## 2021-11-07 RX ADMIN — GABAPENTIN 800 MILLIGRAM(S): 400 CAPSULE ORAL at 02:15

## 2021-11-07 RX ADMIN — ENOXAPARIN SODIUM 40 MILLIGRAM(S): 100 INJECTION SUBCUTANEOUS at 21:33

## 2021-11-07 RX ADMIN — MODAFINIL 200 MILLIGRAM(S): 200 TABLET ORAL at 13:56

## 2021-11-07 RX ADMIN — PANTOPRAZOLE SODIUM 40 MILLIGRAM(S): 20 TABLET, DELAYED RELEASE ORAL at 11:28

## 2021-11-07 RX ADMIN — NIMODIPINE 30 MILLIGRAM(S): 60 SOLUTION ORAL at 13:56

## 2021-11-07 RX ADMIN — BROMOCRIPTINE MESYLATE 15 MILLIGRAM(S): 5 CAPSULE ORAL at 09:24

## 2021-11-07 RX ADMIN — SODIUM CHLORIDE 4 MILLILITER(S): 9 INJECTION INTRAMUSCULAR; INTRAVENOUS; SUBCUTANEOUS at 15:51

## 2021-11-07 RX ADMIN — NIMODIPINE 30 MILLIGRAM(S): 60 SOLUTION ORAL at 15:58

## 2021-11-07 RX ADMIN — SODIUM CHLORIDE 3 GRAM(S): 9 INJECTION INTRAMUSCULAR; INTRAVENOUS; SUBCUTANEOUS at 13:59

## 2021-11-07 RX ADMIN — BROMOCRIPTINE MESYLATE 15 MILLIGRAM(S): 5 CAPSULE ORAL at 02:14

## 2021-11-07 RX ADMIN — FENTANYL CITRATE 25 MICROGRAM(S): 50 INJECTION INTRAVENOUS at 09:23

## 2021-11-07 RX ADMIN — NIMODIPINE 30 MILLIGRAM(S): 60 SOLUTION ORAL at 19:57

## 2021-11-07 RX ADMIN — Medication 10 MILLIGRAM(S): at 06:18

## 2021-11-07 RX ADMIN — Medication 200 MILLIGRAM(S): at 15:56

## 2021-11-07 RX ADMIN — LEVETIRACETAM 1000 MILLIGRAM(S): 250 TABLET, FILM COATED ORAL at 18:02

## 2021-11-07 RX ADMIN — Medication 4 MILLILITER(S): at 21:31

## 2021-11-07 RX ADMIN — NIMODIPINE 30 MILLIGRAM(S): 60 SOLUTION ORAL at 22:00

## 2021-11-07 RX ADMIN — FENTANYL CITRATE 25 MICROGRAM(S): 50 INJECTION INTRAVENOUS at 09:43

## 2021-11-07 RX ADMIN — PIPERACILLIN AND TAZOBACTAM 200 GRAM(S): 4; .5 INJECTION, POWDER, LYOPHILIZED, FOR SOLUTION INTRAVENOUS at 04:02

## 2021-11-07 RX ADMIN — PIPERACILLIN AND TAZOBACTAM 200 GRAM(S): 4; .5 INJECTION, POWDER, LYOPHILIZED, FOR SOLUTION INTRAVENOUS at 09:25

## 2021-11-07 RX ADMIN — Medication 1 DROP(S): at 07:07

## 2021-11-07 RX ADMIN — SODIUM CHLORIDE 4 MILLILITER(S): 9 INJECTION INTRAMUSCULAR; INTRAVENOUS; SUBCUTANEOUS at 21:32

## 2021-11-07 RX ADMIN — NIMODIPINE 30 MILLIGRAM(S): 60 SOLUTION ORAL at 03:54

## 2021-11-07 RX ADMIN — CHLORHEXIDINE GLUCONATE 15 MILLILITER(S): 213 SOLUTION TOPICAL at 18:01

## 2021-11-07 NOTE — PROGRESS NOTE ADULT - SUBJECTIVE AND OBJECTIVE BOX
HPI:  47 y/o female with PMH HTN, Multiple sclerosis, recent spinal surgery 10/8 ([facility]) presented to [place] ED BIBEMS after being found unconscious at home, unknown period of time. 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 HH5, MF1. Patient was intubated at [place] ED, found to have blown L pupil, and sent straight to the OR for left hemicraniotomy. A-line placed intraop. Hemodynamically unstable upon arrival to [facility], bradycardic and hypertensive s/p Mannitol, Clevidipine, and Furosemide. Central line placed in NSICU. Currently sedated on Propofol, pending repeat CTH. History per patient's son, patient had recent spine surgery and was found on outpatient imaging last week to have L M1 segment aneurysm (unruptured), pt asymptomatic at this time. Per son patient taking percocet PO at home. Starks catheter, ET tube, and arterial line placed at [facility].     NIHSS on arrival: 32   Bess Griffin 5, Mod Busby 4  Bleed Day 1 = 10/26 (26 Oct 2021 13:03)    Hospital Course:   10/26: admitted to [facility] from [facility], POD#0 s/p L hemicraniectomy for decompression and evacuation of SDH. Transferred to [facility] noted to have tense flap, received mannitol, keppra, decadron. Was hypertensive to 200s and preston to 40s, recevied 85g mannitol and bullet 23.4%. CTH on arrival demonstrated increased size in vents and new IVH. EVD placed, open at 15, central line placed. Transfused 2 u PRBC. POD0 of coiling of left MCA bifurcation aneurysm,   10/27: CTH demonstrated EVD in correct position, EVD lowered to 5, remains intubated on proprofol, pending repeat CTH in AM. Started on 3% @ 30/hr. 2LNS given for euvolemia, Mannitol given for uptrending ICPs. Bullet given 8:30 am. 3% increased to 50/hr. 1 L bolus. New EVD catheter placed using exisiting sreekanth hole. 1 u PRBC.  10/28: HH5, MF4, BD3; POD2 angio coil/embo of L MCA aneurysm. JOAQUÍN overnight, neuro stable. EVD@5cmH20 ICPs < 20 overnight, OP WNL. S/p 1 unit PRBC yesterday with appropriate response. Given 42g mannitol in AM for ICP 22 persistently, with improvement, then given 23% in PM for elevated ICP. febrile, pancultured. Standing tylenol and cooling blanket.   10/29: BD4, POD3 angio coil/embo. JOAQUÍN o/n, neuro stable. EVD@5, ICPs <20 o/n.   10/30: BD5, POD4 angio coil/embo. JOAQUÍN o/n neuro stable. EVD@5. 3% @50cc/hr.  10/31: BD6, POD5 angio coil/embo. brief period maintained upward gaze, resolved on its own. EVD@5cm h2o.    11/1: BD7, POD6 L hemicrani, angio coil/embo. JOAQUÍN overnight. Neuro exam unchanged. ICPs WNL. started bromocriptine/gabapentin/baclofen. dc'd propofol, started precedex  11/2: BD8 POD7 L hemicrani, angio coil/embo. JOAQUÍN overnight. Neuro exam stable. Remains on 3% hypertonic saline. Receieved 1 unit of PRBCs for a Hgb of 7.6.  11/3: BD 9 POD8 of L hemicrani. Elevated lipase, normal amylase, OG tube clogged and replaced. Abdominal US normal. Pending gen surg reccs regarding trach and peg. Gabapentin and Baclofen increased to help with neurostorming. restarted back on 3%, LR@35. became preston+hypotensive with nimodipine 60q4, decreased back to 30q2, NaCl bullet given.   11/4: BD10 POD9, JOAQUÍN o/n, 500cc NS for euvolemia,  neuro stable, EVD at 5. 3% increased to 75  11/5: BD11 POD10, JOAQUÍN o/n, neuro stable, EVD at 5  11/6: BD12 POD11 JOAQUÍN overnight. Neuro exam stable. Remains intubated on precedex. 3% hypertonic saline dc'd  11/7: BD13 POD 12 JOAQUÍN o/n, NGT replaced. on precex with no icp  crisis         Vital Signs Last 24 Hrs  T(C): 36.7 (07 Nov 2021 00:37), Max: 37.1 (06 Nov 2021 05:59)  T(F): 98.1 (07 Nov 2021 00:37), Max: 98.8 (06 Nov 2021 05:59)  HR: 63 (07 Nov 2021 03:00) (56 - 84)  BP: 158/88 (07 Nov 2021 03:00) (104/61 - 167/94)  BP(mean): 116 (07 Nov 2021 03:00) (76 - 125)  RR: 14 (07 Nov 2021 03:00) (13 - 28)  SpO2: 99% (07 Nov 2021 03:00) (92% - 100%)    I&O's Detail    05 Nov 2021 07:01  -  06 Nov 2021 07:00  --------------------------------------------------------  IN:    Dexmedetomidine: 306.5 mL    Enteral Tube Flush: 60 mL    IV PiggyBack: 350 mL    Jevity 1.2: 912 mL    sodium chloride 3%: 420 mL    sodium chloride 3%: 200 mL  Total IN: 2248.5 mL    OUT:    Blood Loss (mL): 18 mL    External Ventricular Device (mL): 177 mL    Rectal Tube (mL): 50 mL    Voided (mL): 850 mL  Total OUT: 1095 mL    Total NET: 1153.5 mL      06 Nov 2021 08:01  -  07 Nov 2021 03:41  --------------------------------------------------------  IN:    Dexmedetomidine: 204.4 mL    Enteral Tube Flush: 420 mL    IV PiggyBack: 450 mL    Jevity 1.2: 448 mL  Total IN: 1522.4 mL    OUT:    External Ventricular Device (mL): 166 mL    Rectal Tube (mL): 100 mL    Voided (mL): 1300 mL  Total OUT: 1566 mL    Total NET: -43.6 mL        I&O's Summary    05 Nov 2021 07:01  -  06 Nov 2021 07:00  --------------------------------------------------------  IN: 2248.5 mL / OUT: 1095 mL / NET: 1153.5 mL    06 Nov 2021 08:01  -  07 Nov 2021 03:41  --------------------------------------------------------  IN: 1522.4 mL / OUT: 1566 mL / NET: -43.6 mL        PHYSICAL EXAM:  Exam on admission: pupils 2mm sluggish, RUE trace WD, LUE WD vs. extensor, b/l LE triple flexion, +cough/gag/corneals, +flap full but soft.  L radial jerri placed 10/26 @[facility]   starks 10/26 @[place]   CVC R IJ 10/16 @[facility]  EVD  - 10/26, new EVD catheter placed 10/27     DEVICE/DRAIN DRESSING:    TUBES/LINES:  [x] CVC  [x] A-line  [] Lumbar Drain  [x] Ventriculostomy  [x] Other: tereza     DIET:  [] NPO  [] Mechanical  [x] Tube feeds    LABS:                        8.6    8.81  )-----------( 377      ( 06 Nov 2021 05:52 )             27.5     11-07    154<H>  |  121<H>  |  14  ----------------------------<  117<H>  3.8   |  23  |  0.73    Ca    8.9      07 Nov 2021 00:34  Phos  3.3     11-06  Mg     2.0     11-06              CAPILLARY BLOOD GLUCOSE      POCT Blood Glucose.: 98 mg/dL (06 Nov 2021 23:47)  POCT Blood Glucose.: 101 mg/dL (06 Nov 2021 16:38)  POCT Blood Glucose.: 135 mg/dL (06 Nov 2021 11:26)  POCT Blood Glucose.: 124 mg/dL (06 Nov 2021 05:48)      Drug Levels: [] N/A    CSF Analysis: [] N/A      Allergies    No Known Allergies    Intolerances      MEDICATIONS:  Antibiotics:  piperacillin/tazobactam IVPB.. 4.5 Gram(s) IV Intermittent every 6 hours  vancomycin  IVPB 1250 milliGRAM(s) IV Intermittent every 12 hours    Neuro:  acetaminophen    Suspension .. 650 milliGRAM(s) Oral every 6 hours PRN  baclofen 10 milliGRAM(s) Oral every 12 hours  bromocriptine Capsule 15 milliGRAM(s) Oral every 8 hours  dexMEDEtomidine Infusion 0.2 MICROgram(s)/kG/Hr IV Continuous <Continuous>  fentaNYL    Injectable 25 MICROGram(s) IV Push every 2 hours PRN  gabapentin Solution 800 milliGRAM(s) Oral every 8 hours  levETIRAcetam 1000 milliGRAM(s) Oral every 12 hours  metoclopramide Injectable 10 milliGRAM(s) IV Push every 6 hours PRN    Anticoagulation:  enoxaparin Injectable 40 milliGRAM(s) SubCutaneous every 24 hours    OTHER:  artificial  tears Solution 1 Drop(s) Both EYES two times a day  chlorhexidine 0.12% Liquid 15 milliLiter(s) Oral Mucosa every 12 hours  chlorhexidine 2% Cloths 1 Application(s) Topical <User Schedule>  hydrALAZINE Injectable 5 milliGRAM(s) IV Push every 4 hours PRN  insulin lispro (ADMELOG) corrective regimen sliding scale   SubCutaneous every 6 hours  niMODipine 30 milliGRAM(s) Oral every 2 hours  pantoprazole  Injectable 40 milliGRAM(s) IV Push daily  sodium chloride 3%  Inhalation 4 milliLiter(s) Inhalation every 6 hours    IVF:  sodium chloride 2 Gram(s) Oral every 6 hours    CULTURES:  Culture Results:   Numerous Staphylococcus aureus  Numerous Enterobacter cloacae complex  Moderate Proteus mirabilis  Accompanied by normal respiratory melinda (11-04 @ 13:59)  Culture Results:   No growth to date (10-28 @ 19:23)    RADIOLOGY & ADDITIONAL TESTS:      ASSESSMENT:  47 y/o female with PMHx of HTN and MS, hx of spinal surgery at [facility] 10/8/21 presented to [facility] after being found unconscious at home, unknown period of time. 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 HH5, MF4; BD #1 = 10/26. Patient was intubated at [place] ED and sent straight to the OR for left hemicraniotomy. A-line placed intraop. Hemodynamically unstable upon arrival to [facility], bradycardic and hypertensive s/p Mannitol and Furosemide. Central line placed in NSICU. Currently sedated on Propofol. Now s/p EVD placement, and coil embolization of left MCA bifurcation aneurysm 10/26/2021.     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, left    Intracranial hemorrhage, spontaneous subarachnoid, due to cerebral aneurysm, acute    Angiogram, cerebral, with coil embolization, in non-operating room setting    Personal history of spine surgery    SysAdmin_VstLnk        Plan:     PLAN:  NEURO:  - neuro checks/vital signs q1hr  - HOB > 30   - Keppra 1g IV BID   - Nimodipine 30q2  - bromocriptine 15mg q8hr  - increased Gabapentin 800mg q8hr and Baclofen 15mg BID   - propofol dc'd, precedex for sedation   - fentanyl pushes PRN   - EVD open at 5cmH2O, monitor ICP/output  - CTA 11/1 with findings of moderate anterior circulation spasm     CARDIO:  - -180  - hydralazine PRN to maintain SBP goal  - echo +mild pulm HTN  - f/u EKG for sinus arrythmia  - trend qtc on standing reglan    PULM:  - intubated on full vent support  - aspiration precautions    GI:  - OGT TFs  - bowel regimen   - PPI qd GI ppx  - FOB negative 11/1  - Abd US: neg   - rectal tube  - pending trach and peg Tues?, reconsult gen surg when GI w/u complete  - standing reglan    RENAL:  - salt tabs 3Q6  - euvolemia goal   - Na 145-150     HEME:  - SCDs, SQL  - s/p 2u PRBC, s/p 1u PRBC 10/27, s/p 1u PRBC 11/02  - monitor H+H  - FOB negative  - RUE doppler for swelling 11/1: DVT R brachial and superficial cephalic thrombus, surveillance 11/8    ID:  - low grade fever  - leukocytosis and procal, trend   - Tylenol PRN for fever  - pancx 10/28 NGTD  - sputum cx 11/4:  gm + cocci in pairs/clusters, rare gm + rods  - continue vanc and zosyn    ENDO:  - ISS   - monitor FS    OTHER  - f/u wound care consult for back incisions dehiscence     GOC: full code  Level of care: ICU status   Dispo: pend EVD wean, trach, peg  family updated    Case discussed with Dr. Duncan         Assessment:  Present when checked    []  GCS  E   V  M     Heart Failure: []Acute, [] acute on chronic , []chronic  Heart Failure:  [] Diastolic (HFpEF), [] Systolic (HFrEF), []Combined (HFpEF and HFrEF), [] RHF, [] Pulm HTN, [] Other    [] CHETAN, [] ATN, [] AIN, [] other  [] CKD1, [] CKD2, [] CKD 3, [] CKD 4, [] CKD 5, []ESRD    Encephalopathy: [] Metabolic, [] Hepatic, [] toxic, [] Neurological, [] Other    Abnormal Nurtitional Status: [] malnurtition (see nutrition note), [ ]underweight: BMI < 19, [] morbid obesity: BMI >40, [] Cachexia    [] Sepsis  [] hypovolemic shock,[] cardiogenic shock, [] hemorrhagic shock, [] neuogenic shock  [] Acute Respiratory Failure  []Cerebral edema, [] Brain compression/ herniation,   [] Functional quadriplegia  [] Acute blood loss anemia

## 2021-11-07 NOTE — PROGRESS NOTE ADULT - SUBJECTIVE AND OBJECTIVE BOX
=======================================   NEUROCRITICAL CARE ATTENDING NOTE (Sun)  =======================================    AILYN DÍAZ   MRN-[id number]    HPI:  47 y/o female with PMH HTN, Multiple sclerosis, recent spinal surgery 10/8 ([facility]) presented to [place] ED BIBEMS after being found unconscious at home, unknown period of time. 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 HH5, MF1. Patient was intubated at [place] ED, found to have blown L pupil, and sent straight to the OR for left hemicraniotomy. A-line placed intraop. Hemodynamically unstable upon arrival to [facility], bradycardic and hypertensive s/p Mannitol, Clevidipine, and Furosemide. Central line placed in NSICU. Currently sedated on Propofol, pending repeat CTH. History per patient's son, patient had recent spine surgery and was found on outpatient imaging last week to have L M1 segment aneurysm (unruptured), pt asymptomatic at this time. Per son patient taking percocet PO at home. Ureña catheter, ET tube, and arterial line placed at [facility].   NIHSS on arrival: 32. Bess Griffin 5, Mod Busby 4.  Bleed Day 1 = 10/26 (26 Oct 2021 13:03)    Hospital course:  10/26: admitted to [facility] from [facility], POD#0 s/p L hemicraniectomy for decompression and evacuation of SDH. Transferred to [facility] noted to have tense flap, received mannitol, keppra, decadron. Was hypertensive to 200s and preston to 40s, recevied 85g mannitol and bullet 23.4%. CTH on arrival demonstrated increased size in vents and new IVH. EVD placed, open at 15, central line placed. Transfused 2 u PRBC. POD0 of coiling of left MCA bifurcation aneurysm,   10/27: CTH demonstrated EVD in correct position, EVD lowered to 5, remains intubated on proprofol, pending repeat CTH in AM. Started on 3% @ 30/hr. 2LNS given for euvolemia, Mannitol given for uptrending ICPs. Bullet given 8:30 am. 3% increased to 50/hr. 1 L bolus. New EVD catheter placed using exisiting sreekanth hole. 1 u PRBC.  10/28: HH5, MF4, BD3; POD2 angio coil/embo of L MCA aneurysm. JOAQUÍN overnight, neuro stable. EVD@5cmH20 ICPs < 20 overnight, OP WNL. S/p 1 unit PRBC yesterday with appropriate response. Given 42g mannitol in AM for ICP 22 persistently, with improvement, then given 23% in PM for elevated ICP. febrile, pancultured. Standing tylenol and cooling blanket.   10/29: BD4, POD3 angio coil/embo. JOAQUÍN o/n, neuro stable. EVD@5, ICPs <20 o/n.   10/30: BD5, POD4 angio coil/embo. JOAQUÍN o/n neuro stable. EVD@5. 3% @50cc/hr.  10/31: BD6, POD5 angio coil/embo. brief period maintained upward gaze, resolved on its own. EVD@5cm h2o.    : BD7, POD6 L hemicrani, angio coil/embo. Neuro exam unchanged. ICPs WNL. Overnight given hydralazine, increased propofol for SBP > 180.  CTA showed mild-moderate anterior circulation vasospasm (permissive hypertension, euvolemia).  : BD8, POD7 L hemicrani, angio coil/embo. Neuro exam unchanged. ICPs WNL.  Pending trach/PEG.  11/3: BD9, POD8 L hemicrani, angio coil/embo.  Neuro exam unchanged. ICPs WNL.  Pending trach/PEG.  Dayshift:  noted episodes of sympathetic storming during vasospasm period.  : BD10 POD9, JOAQUÍN o/n, 500cc NS for euvolemia,  neuro stable, EVD at 5  : BD11 POD10, JOAQUÍN o/n, neuro stable, EVD at 5; O2 desaturations with thick secretions (MSSA, Enterobacter, Proteus) started vancomycin zosyn  : BD12 POD11 JOAQUÍN overnight. Neuro exam stable. Remains intubated on precedex. 3% hypertonic saline dc'd  : BD13 POD 12 JOAQUÍN o/n, NGT replaced. on precedex with no icp crisis     Past Medical History: No pertinent past medical history  Allergies:  Allergy Status Unknown  Home meds:     Current Meds:  MEDICATIONS  (STANDING):  artificial  tears Solution 1 Drop(s) Both EYES two times a day  baclofen 15 milliGRAM(s) Oral every 12 hours  bromocriptine Capsule 15 milliGRAM(s) Oral every 8 hours  dexMEDEtomidine Infusion 0.2 MICROgram(s)/kG/Hr (4.25 mL/Hr) IV Continuous <Continuous>  enoxaparin Injectable 40 milliGRAM(s) SubCutaneous every 24 hours  gabapentin Solution 800 milliGRAM(s) Oral every 8 hours  insulin lispro (ADMELOG) corrective regimen sliding scale   SubCutaneous every 6 hours  levETIRAcetam 1000 milliGRAM(s) Oral every 12 hours  niMODipine 30 milliGRAM(s) Oral every 2 hours  pantoprazole  Injectable 40 milliGRAM(s) IV Push daily  piperacillin/tazobactam IVPB.. 4.5 Gram(s) IV Intermittent every 6 hours  sodium chloride 3 Gram(s) Oral every 6 hours  sodium chloride 3%. 500 milliLiter(s) (35 mL/Hr) IV Continuous <Continuous>  vancomycin  IVPB 1250 milliGRAM(s) IV Intermittent every 12 hours    MEDICATIONS  (PRN):  acetaminophen    Suspension .. 650 milliGRAM(s) Oral every 6 hours PRN Temp greater or equal to 38C (100.4F), Mild Pain (1 - 3)  fentaNYL    Injectable 25 MICROGram(s) IV Push every 2 hours PRN agitation  hydrALAZINE Injectable 5 milliGRAM(s) IV Push every 4 hours PRN >180  metoclopramide Injectable 10 milliGRAM(s) IV Push every 6 hours PRN nausea/vomiting    PHYSICAL EXAMINATION    ICU Vital Signs Last 24 Hrs  T(C): 37.1 (2021 05:59), Max: 37.4 (2021 18:58)  T(F): 98.8 (2021 05:59), Max: 99.4 (2021 18:58)  HR: 66 (2021 06:00) (58 - 106)  BP: 136/79 (2021 06:00) (106/57 - 177/83)  BP(mean): 102 (2021 06:00) (76 - 119)  ABP: 143/80 (2021 06:00) (108/62 - 180/97)  ABP(mean): 105 (2021 06:00) (80 - 131)  RR: 17 (2021 06:00) (14 - 36)  SpO2: 94% (2021 06:00) (92% - 100%)    NEUROLOGIC EXAMINATION:  Opens eyes to stimuli, does not follow commands, pupils 3mm equal and brisk (-) Doll's, (+) cough and (+) gag, B LE TF, R UE 0/5; flap full but soft; extensor posturing B UE R>L, B LE TF  Mode: AC, RR (machine): 14, TV (machine): 450, FiO2: 40, PEEP: 5, ITime: 1, MAP: 9.5, PIP: 26  EENT:  anicteric  CARDIOVASCULAR: (+) S1 S2, normal rate and regular rhythm  PULMONARY: clear to auscultation bilaterally  ABDOMEN: soft, nontender with normoactive bowel sounds  EXTREMITIES: no edema  SKIN: no rash; back incisions checked (dehiscence noted)    I/O's    21 @ 07:  -  21 @ 07:00  --------------------------------------------------------  IN: 2848 mL / OUT: 2240 mL / NET: 608 mL    21 @ 07:01  -  21 @ 06:48  --------------------------------------------------------  IN: 1994.5 mL / OUT: 976 mL / NET: 1018.5 mL    LABS:                        8.6    8.81  )-----------( 377      ( 2021 05:52 )             27.5         152<H>  |  122<H>  |  13  ----------------------------<  134<H>  3.8   |  21<L>  |  0.77    Ca    8.8      2021 05:52  Phos  3.3       Mg     2.0         TPro  6.5  /  Alb  3.1<L>  /  TBili  0.3  /  DBili  0.2  /  AST  22  /  ALT  13  /  AlkPhos  85      CAPILLARY BLOOD GLUCOSE    POCT Blood Glucose.: 124 mg/dL (2021 05:48)  POCT Blood Glucose.: 131 mg/dL (2021 21:41)  POCT Blood Glucose.: 130 mg/dL (2021 17:29)  POCT Blood Glucose.: 124 mg/dL (2021 11:43)    Culture - Sputum . (21 @ 13:59)    Gram Stain:   No epithelial cells seen  Few-moderate White blood cells  Few Gram positive cocci in pairs and clusters  Rare Gram Positive Rods    Specimen Source: .Sputum Sputum    Culture Results:   Numerous Staphylococcus aureus Presumptive Methicillin susceptible  Confirmation to follow within 24 hrs.  Numerous Enterobacter cloacae complex  Moderate Proteus mirabilis  Accompanied by normal respiratory melinda  Susceptibility to follow.    lipase 106    Bacteriology:    10/28 CSF NGTD  10/28 Blood NG1d x2     CSF studies:  10-28  L   *** [id number] *** %N91 %L4     EEG:  Neuroimagin/01 CTA - Diffuse, but overall MILD-MODERATE, sites of VASOSPASM are noted at theANTERIOR CIRCULATION, whilst the posterior circulation appears spared.   10/27 CT - There is herniation of the brain parenchyma and to the craniectomy defect which is similar prior examination. There is no significant change in the large left frontotemporal intraparenchymal hematoma with surrounding edema. Again demonstrated is mass effect with right to left midline shift measuring approximately 1 cm which is stable from prior exam. Again demonstrated is effacement of the cerebral sulci and basal cisterns.  Other imagin/01 UE Doppler - DVT within the right brachial vein.  Superficial thrombosis of the right cephalic vein.   CXR - stable.  Mild congestion.  10/28 Doppler - Negative  10/28 TTE -   1. Normal left and right ventricular size and systolic function.   2. No significant valvular disease.   3. Mild pulmonary hypertension present, pulmonary artery systolic pressure is 35 mmHg.   4. No pericardial effusion.   5. No prior echo is available for comparison.   Abdo U/S  Liver: Within normal limits. The liver measures 16.5 cm.  Bile ducts: Normal caliber.  Common bile duct measures 2 mm.  Gallbladder: Within normal limits. The gallbladder wall measures 2 mm. No stones.  Pancreas: Visualized portions are within normal limits.  Right kidney: 10.6 cm. No hydronephrosis. Normal parenchymal thickness and echogenicity.  Left kidney: 9.4 cm.  No hydronephrosis. Limited evaluation secondary to patient positioning.  Ascites: None.  Aorta and IVC: Visualized portions are within normal limits.    IV FLUIDS:   DRIPS:  ·	precedex infusion  DIET: NPO - TF stopped at 4 am (was at goal)  Lines: R TLC IJ [place]  Drains:    ·	EVD (d9) @5cm H20, ICPs 2-12, CSF 5-12 mL/h  Wounds:    CODE STATUS:  Full Code                       GOALS OF CARE:  aggressive                      DISPOSITION:  ICU =======================================   NEUROCRITICAL CARE ATTENDING NOTE (Sun)  =======================================    AILYN DÍAZ   MRN-[id number]    HPI:  45 y/o female with PMH HTN, Multiple sclerosis, recent spinal surgery 10/8 ([facility]) presented to [place] ED BIBEMS after being found unconscious at home, unknown period of time. 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 HH5, MF1. Patient was intubated at [place] ED, found to have blown L pupil, and sent straight to the OR for left hemicraniotomy. A-line placed intraop. Hemodynamically unstable upon arrival to [facility], bradycardic and hypertensive s/p Mannitol, Clevidipine, and Furosemide. Central line placed in NSICU. Currently sedated on Propofol, pending repeat CTH. History per patient's son, patient had recent spine surgery and was found on outpatient imaging last week to have L M1 segment aneurysm (unruptured), pt asymptomatic at this time. Per son patient taking percocet PO at home. Ureña catheter, ET tube, and arterial line placed at [facility].   NIHSS on arrival: 32. Bess Griffin 5, Mod Busby 4.  Bleed Day 1 = 10/26 (26 Oct 2021 13:03)    Hospital course:  10/26: admitted to [facility] from [facility], POD#0 s/p L hemicraniectomy for decompression and evacuation of SDH. Transferred to [facility] noted to have tense flap, received mannitol, keppra, decadron. Was hypertensive to 200s and preston to 40s, recevied 85g mannitol and bullet 23.4%. CTH on arrival demonstrated increased size in vents and new IVH. EVD placed, open at 15, central line placed. Transfused 2 u PRBC. POD0 of coiling of left MCA bifurcation aneurysm,   10/27: CTH demonstrated EVD in correct position, EVD lowered to 5, remains intubated on proprofol, pending repeat CTH in AM. Started on 3% @ 30/hr. 2LNS given for euvolemia, Mannitol given for uptrending ICPs. Bullet given 8:30 am. 3% increased to 50/hr. 1 L bolus. New EVD catheter placed using exisiting sreekanth hole. 1 u PRBC.  10/28: HH5, MF4, BD3; POD2 angio coil/embo of L MCA aneurysm. JOAQUÍN overnight, neuro stable. EVD@5cmH20 ICPs < 20 overnight, OP WNL. S/p 1 unit PRBC yesterday with appropriate response. Given 42g mannitol in AM for ICP 22 persistently, with improvement, then given 23% in PM for elevated ICP. febrile, pancultured. Standing tylenol and cooling blanket.   10/29: BD4, POD3 angio coil/embo. JOAQUÍN o/n, neuro stable. EVD@5, ICPs <20 o/n.   10/30: BD5, POD4 angio coil/embo. JOAQUÍN o/n neuro stable. EVD@5. 3% @50cc/hr.  10/31: BD6, POD5 angio coil/embo. brief period maintained upward gaze, resolved on its own. EVD@5cm h2o.    : BD7, POD6 L hemicrani, angio coil/embo. Neuro exam unchanged. ICPs WNL. Overnight given hydralazine, increased propofol for SBP > 180.  CTA showed mild-moderate anterior circulation vasospasm (permissive hypertension, euvolemia).  : BD8, POD7 L hemicrani, angio coil/embo. Neuro exam unchanged. ICPs WNL.  Pending trach/PEG.  11/3: BD9, POD8 L hemicrani, angio coil/embo.  Neuro exam unchanged. ICPs WNL.  Pending trach/PEG.  Dayshift:  noted episodes of sympathetic storming during vasospasm period.  : BD10 POD9, JOAQUÍN o/n, 500cc NS for euvolemia,  neuro stable, EVD at 5  : BD11 POD10, JOAQUÍN o/n, neuro stable, EVD at 5; O2 desaturations with thick secretions (MSSA, Enterobacter, Proteus) started vancomycin zosyn  : BD12 POD11 JOAQUÍN overnight. Neuro exam stable. Remains intubated on precedex. 3% hypertonic saline dc'd  : BD13 POD 12 JOAQUÍN o/n, NGT replaced. on precedex with no icp crisis     Past Medical History: No pertinent past medical history  Allergies:  Allergy Status Unknown  Home meds:     Current Meds:  MEDICATIONS  (STANDING):  artificial  tears Solution 1 Drop(s) Both EYES two times a day  baclofen 15 milliGRAM(s) Oral every 12 hours  bromocriptine Capsule 15 milliGRAM(s) Oral every 8 hours  dexMEDEtomidine Infusion 0.2 MICROgram(s)/kG/Hr (4.25 mL/Hr) IV Continuous <Continuous>  enoxaparin Injectable 40 milliGRAM(s) SubCutaneous every 24 hours  gabapentin Solution 800 milliGRAM(s) Oral every 8 hours  insulin lispro (ADMELOG) corrective regimen sliding scale   SubCutaneous every 6 hours  levETIRAcetam 1000 milliGRAM(s) Oral every 12 hours  niMODipine 30 milliGRAM(s) Oral every 2 hours  pantoprazole  Injectable 40 milliGRAM(s) IV Push daily  piperacillin/tazobactam IVPB.. 4.5 Gram(s) IV Intermittent every 6 hours  sodium chloride 3 Gram(s) Oral every 6 hours  sodium chloride 3%. 500 milliLiter(s) (35 mL/Hr) IV Continuous <Continuous>  vancomycin  IVPB 1250 milliGRAM(s) IV Intermittent every 12 hours    MEDICATIONS  (PRN):  acetaminophen    Suspension .. 650 milliGRAM(s) Oral every 6 hours PRN Temp greater or equal to 38C (100.4F), Mild Pain (1 - 3)  fentaNYL    Injectable 25 MICROGram(s) IV Push every 2 hours PRN agitation  hydrALAZINE Injectable 5 milliGRAM(s) IV Push every 4 hours PRN >180  metoclopramide Injectable 10 milliGRAM(s) IV Push every 6 hours PRN nausea/vomiting    PHYSICAL EXAMINATION    ICU Vital Signs Last 24 Hrs  T(C): 36.2 (2021 06:30), Max: 37 (2021 08:46)  T(F): 97.2 (2021 06:30), Max: 98.6 (2021 08:46)  HR: 56 (2021 07:00) (56 - 84)  BP: 159/87 (2021 07:00) (104/61 - 175/94)  BP(mean): 115 (2021 07:00) (78 - 126)  ABP: 177/90 (2021 07:00) (108/60 - 179/95)  ABP(mean): 123 (2021 07:00) (80 - 126)  RR: 22 (2021 07:00) (13 - 28)  SpO2: 98% (2021 07:00) (93% - 100%)    NEUROLOGIC EXAMINATION:  Opens eyes to stimuli, does not follow commands, pupils 3mm equal and brisk (-) Doll's, (+) cough and (+) gag, B LE TF, R UE 0/5; flap full but soft; extensor posturing B UE R>L, B LE TF  Mode: AC, RR (machine): 14, TV (machine): 450, FiO2: 40, PEEP: 5, ITime: 1, MAP: 9.5, PIP: 26  EENT:  anicteric  CARDIOVASCULAR: (+) S1 S2, normal rate and regular rhythm  PULMONARY: clear to auscultation bilaterally  ABDOMEN: soft, nontender with normoactive bowel sounds  EXTREMITIES: no edema  SKIN: no rash; back incisions checked (dehiscence noted)    Allergies: No Known Allergies      EXAM:  VITALS:  T(C): 36.2 (21 @ 06:30), Max: 37 (21 @ 08:46)  HR: 56 (21 @ 07:00) (56 - 84)  BP: 159/87 (21 @ 07:00) (104/61 - 175/94)  RR: 22 (21 @ 07:00) (13 - 28)  SpO2: 98% (21 @ 07:00) (93% - 100%)    MEDICATIONS:  acetaminophen    Suspension .. 650 milliGRAM(s) Oral every 6 hours PRN  artificial  tears Solution 1 Drop(s) Both EYES two times a day  baclofen 10 milliGRAM(s) Oral every 12 hours  bromocriptine Capsule 15 milliGRAM(s) Oral every 8 hours  chlorhexidine 0.12% Liquid 15 milliLiter(s) Oral Mucosa every 12 hours  chlorhexidine 2% Cloths 1 Application(s) Topical <User Schedule>  dexMEDEtomidine Infusion 0.2 MICROgram(s)/kG/Hr IV Continuous <Continuous>  enoxaparin Injectable 40 milliGRAM(s) SubCutaneous every 24 hours  fentaNYL    Injectable 25 MICROGram(s) IV Push every 2 hours PRN  gabapentin Solution 800 milliGRAM(s) Oral every 8 hours  hydrALAZINE Injectable 5 milliGRAM(s) IV Push every 4 hours PRN  insulin lispro (ADMELOG) corrective regimen sliding scale   SubCutaneous every 6 hours  levETIRAcetam 1000 milliGRAM(s) Oral every 12 hours  metoclopramide Injectable 10 milliGRAM(s) IV Push every 6 hours PRN  niMODipine 30 milliGRAM(s) Oral every 2 hours  pantoprazole  Injectable 40 milliGRAM(s) IV Push daily  piperacillin/tazobactam IVPB.. 4.5 Gram(s) IV Intermittent every 6 hours  sodium chloride 2 Gram(s) Oral every 6 hours  sodium chloride 3%  Inhalation 4 milliLiter(s) Inhalation every 6 hours  vancomycin  IVPB 1500 milliGRAM(s) IV Intermittent every 12 hours      I/O's    21 @ 08:  -  21 @ 07:00  --------------------------------------------------------  IN: 1764.2 mL / OUT: 1879 mL / NET: -114.8 mL    21 @ 07:01  -  21 @ 07:52  --------------------------------------------------------  IN: 30.6 mL / OUT: 0 mL / NET: 30.6 mL        Ventilator data:  Mode: AC/ CMV (Assist Control/ Continuous Mandatory Ventilation), RR (machine): 14, TV (machine): 450, FiO2: 40, PEEP: 5, ITime: 1, MAP: 11, PIP: 27    LABS:                          8.6    10.44 )-----------( 417      ( 2021 06:22 )             27.5     11-07    145  |  112<H>  |  11  ----------------------------<  119<H>  3.5   |  23  |  0.64    Ca    9.0      2021 06:22  Phos  3.5     11  Mg     1.7         CAPILLARY BLOOD GLUCOSE    POCT Blood Glucose.: 98 mg/dL (2021 23:47)  POCT Blood Glucose.: 101 mg/dL (2021 16:38)  POCT Blood Glucose.: 135 mg/dL (2021 11:26)    Culture - Sputum . (21 @ 13:59)    -  Cefepime: S <=2    -  Cefepime: S <=2    -  Ceftriaxone: S <=1 Enterobacter, Klebsiella aerogenes, Citrobacter, and Serratia may develop resistance during prolonged therapy    -  Ceftriaxone: S <=1 Enterobacter, Klebsiella aerogenes, Citrobacter, and Serratia may develop resistance during prolonged therapy    -  Ciprofloxacin: S <=0.25    -  Ciprofloxacin: S <=0.25    -  Clindamycin: S <=0.25    -  Ertapenem: S <=0.5    -  Ertapenem: S <=0.5    -  Erythromycin: S <=0.25    -  Gentamicin: S <=2    -  Gentamicin: S <=2    -  Linezolid: S 2    -  Oxacillin: S <=0.25    -  Piperacillin/Tazobactam: S <=8    -  Piperacillin/Tazobactam: S <=8    -  RIF- Rifampin: S <=1 Should not be used as monotherapy    -  Tobramycin: S <=2    -  Tobramycin: S <=2    -  Trimethoprim/Sulfamethoxazole: S <=0.5/9.5    -  Trimethoprim/Sulfamethoxazole: S <=0.5/9.5    -  Trimethoprim/Sulfamethoxazole: S <=0.5/9.5    -  Vancomycin: S 2    Organism: Staphylococcus aureus    Organism: Enterobacter cloacae complex    Organism: Proteus mirabilis    lipase 92    Bacteriology:    10/28 CSF NGTD  10/28 Blood NG1d x2     CSF studies:  10-28  L   *** [id number] *** %N91 %L4     EEG:  Neuroimagin/01 CTA - Diffuse, but overall MILD-MODERATE, sites of VASOSPASM are noted at theANTERIOR CIRCULATION, whilst the posterior circulation appears spared.   10/27 CT - There is herniation of the brain parenchyma and to the craniectomy defect which is similar prior examination. There is no significant change in the large left frontotemporal intraparenchymal hematoma with surrounding edema. Again demonstrated is mass effect with right to left midline shift measuring approximately 1 cm which is stable from prior exam. Again demonstrated is effacement of the cerebral sulci and basal cisterns.  Other imagin/01 UE Doppler - DVT within the right brachial vein.  Superficial thrombosis of the right cephalic vein.   CXR - stable.  Mild congestion.  10/28 Doppler - Negative  10/28 TTE -   1. Normal left and right ventricular size and systolic function.   2. No significant valvular disease.   3. Mild pulmonary hypertension present, pulmonary artery systolic pressure is 35 mmHg.   4. No pericardial effusion.   5. No prior echo is available for comparison.   Abdo U/S  Liver: Within normal limits. The liver measures 16.5 cm.  Bile ducts: Normal caliber.  Common bile duct measures 2 mm.  Gallbladder: Within normal limits. The gallbladder wall measures 2 mm. No stones.  Pancreas: Visualized portions are within normal limits.  Right kidney: 10.6 cm. No hydronephrosis. Normal parenchymal thickness and echogenicity.  Left kidney: 9.4 cm.  No hydronephrosis. Limited evaluation secondary to patient positioning.  Ascites: None.  Aorta and IVC: Visualized portions are within normal limits.    IV FLUIDS:   DRIPS:  ·	precedex infusion  DIET: NPO - TF stopped at 4 am (was at goal)  Lines: R TLC IJ [place]  Drains:    ·	EVD (d9) @5cm H20, ICPs 2-12, CSF 5-12 mL/h  Wounds:    CODE STATUS:  Full Code                       GOALS OF CARE:  aggressive                      DISPOSITION:  ICU =======================================   NEUROCRITICAL CARE ATTENDING NOTE (Sun)  =======================================    AILYN DÍAZ   MRN-[id number]    HPI:  47 y/o female with PMH HTN, Multiple sclerosis, recent spinal surgery 10/8 ([facility]) presented to [place] ED BIBEMS after being found unconscious at home, unknown period of time. 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 HH5, MF1. Patient was intubated at [place] ED, found to have blown L pupil, and sent straight to the OR for left hemicraniotomy. A-line placed intraop. Hemodynamically unstable upon arrival to [facility], bradycardic and hypertensive s/p Mannitol, Clevidipine, and Furosemide. Central line placed in NSICU. Currently sedated on Propofol, pending repeat CTH. History per patient's son, patient had recent spine surgery and was found on outpatient imaging last week to have L M1 segment aneurysm (unruptured), pt asymptomatic at this time. Per son patient taking percocet PO at home. Ureña catheter, ET tube, and arterial line placed at [facility].   NIHSS on arrival: 32. Bess Griffin 5, Mod Busby 4.  Bleed Day 1 = 10/26 (26 Oct 2021 13:03)    Hospital course:  10/26: admitted to [facility] from [facility], POD#0 s/p L hemicraniectomy for decompression and evacuation of SDH. Transferred to [facility] noted to have tense flap, received mannitol, keppra, decadron. Was hypertensive to 200s and preston to 40s, recevied 85g mannitol and bullet 23.4%. CTH on arrival demonstrated increased size in vents and new IVH. EVD placed, open at 15, central line placed. Transfused 2 u PRBC. POD0 of coiling of left MCA bifurcation aneurysm,   10/27: CTH demonstrated EVD in correct position, EVD lowered to 5, remains intubated on proprofol, pending repeat CTH in AM. Started on 3% @ 30/hr. 2LNS given for euvolemia, Mannitol given for uptrending ICPs. Bullet given 8:30 am. 3% increased to 50/hr. 1 L bolus. New EVD catheter placed using exisiting sreekanth hole. 1 u PRBC.  10/28: HH5, MF4, BD3; POD2 angio coil/embo of L MCA aneurysm. JOAQUÍN overnight, neuro stable. EVD@5cmH20 ICPs < 20 overnight, OP WNL. S/p 1 unit PRBC yesterday with appropriate response. Given 42g mannitol in AM for ICP 22 persistently, with improvement, then given 23% in PM for elevated ICP. febrile, pancultured. Standing tylenol and cooling blanket.   10/29: BD4, POD3 angio coil/embo. JOAQUÍN o/n, neuro stable. EVD@5, ICPs <20 o/n.   10/30: BD5, POD4 angio coil/embo. JOAQUÍN o/n neuro stable. EVD@5. 3% @50cc/hr.  10/31: BD6, POD5 angio coil/embo. brief period maintained upward gaze, resolved on its own. EVD@5cm h2o.    : BD7, POD6 L hemicrani, angio coil/embo. Neuro exam unchanged. ICPs WNL. Overnight given hydralazine, increased propofol for SBP > 180.  CTA showed mild-moderate anterior circulation vasospasm (permissive hypertension, euvolemia).  : BD8, POD7 L hemicrani, angio coil/embo. Neuro exam unchanged. ICPs WNL.  Pending trach/PEG.  11/3: BD9, POD8 L hemicrani, angio coil/embo.  Neuro exam unchanged. ICPs WNL.  Pending trach/PEG.  Dayshift:  noted episodes of sympathetic storming during vasospasm period.  : BD10 POD9, JOAQUÍN o/n, 500cc NS for euvolemia,  neuro stable, EVD at 5  : BD11 POD10, JOAQUÍN o/n, neuro stable, EVD at 5; O2 desaturations with thick secretions (MSSA, Enterobacter, Proteus) started vancomycin zosyn  : BD12 POD11 JOAQUÍN overnight. Neuro exam stable. Remains intubated on precedex. 3% hypertonic saline dc'd  : BD13 POD 12 JOAQUÍN o/n, NGT replaced. weaning off precedex, no ICP crisis, decreasing neurostorming meds, pending CT tomorrow.    Past Medical History: No pertinent past medical history  Allergies:  Allergy Status Unknown  Home meds:     Current Meds:  MEDICATIONS  (STANDING):  acetylcysteine 20%  Inhalation 4 milliLiter(s) Inhalation three times a day  amantadine Syrup 200 milliGRAM(s) Oral two times a day  artificial  tears Solution 1 Drop(s) Both EYES two times a day  bromocriptine Capsule 15 milliGRAM(s) Oral every 8 hours  cefTRIAXone   IVPB 1000 milliGRAM(s) IV Intermittent every 24 hours  dexMEDEtomidine Infusion 0.2 MICROgram(s)/kG/Hr (4.25 mL/Hr) IV Continuous <Continuous>  enoxaparin Injectable 40 milliGRAM(s) SubCutaneous every 24 hours  gabapentin Solution 400 milliGRAM(s) Oral every 8 hours  levETIRAcetam 1000 milliGRAM(s) Oral every 12 hours  modafinil 200 milliGRAM(s) Oral daily  niMODipine 30 milliGRAM(s) Oral every 2 hours  pantoprazole   Suspension 40 milliGRAM(s) Oral daily  sodium chloride 3 Gram(s) Oral every 6 hours  sodium chloride 3%  Inhalation 4 milliLiter(s) Inhalation every 6 hours    MEDICATIONS  (PRN):  acetaminophen    Suspension .. 650 milliGRAM(s) Oral every 6 hours PRN Temp greater or equal to 38C (100.4F), Mild Pain (1 - 3)  fentaNYL    Injectable 25 MICROGram(s) IV Push every 2 hours PRN agitation  hydrALAZINE Injectable 5 milliGRAM(s) IV Push every 4 hours PRN >180  ondansetron Injectable 4 milliGRAM(s) IV Push every 6 hours PRN Nausea and/or Vomiting    PHYSICAL EXAMINATION    ICU Vital Signs Last 24 Hrs  T(C): 36.3 (2021 14:31), Max: 37 (2021 21:00)  T(F): 97.3 (2021 14:31), Max: 98.6 (2021 21:00)  HR: 94 (2021 13:00) (54 - 94)  BP: 146/69 (2021 13:00) (120/72 - 175/94)  BP(mean): 99 (2021 13:00) (92 - 127)  ABP: 138/65 (2021 13:00) (116/82 - 183/94)  ABP(mean): 89 (2021 13:00) (89 - 126)  RR: 16 (2021 13:00) (13 - 22)  SpO2: 97% (2021 13:00) (93% - 100%)    NEUROLOGIC EXAMINATION:  Opens eyes to stimuli, does not follow commands, pupils 3mm equal and brisk (-) Doll's, (+) cough and (+) gag, B LE TF, R UE 0/5; flap full but soft; extensor posturing B UE R>L, B LE TF  Mode: AC, RR (machine): 14, TV (machine): 450, FiO2: 40, PEEP: 5, ITime: 1, MAP: 9.5, PIP: 26  EENT:  anicteric  CARDIOVASCULAR: (+) S1 S2, normal rate and regular rhythm  PULMONARY: clear to auscultation bilaterally  ABDOMEN: soft, nontender with normoactive bowel sounds  EXTREMITIES: no edema  SKIN: no rash; back incisions checked (dehiscence noted)    I/O's    21 @ 08:01  -  21 @ 07:00  --------------------------------------------------------  IN: 1764.2 mL / OUT: 1879 mL / NET: -114.8 mL    21 @ 07:01  -  21 @ 07:52  --------------------------------------------------------  IN: 30.6 mL / OUT: 0 mL / NET: 30.6 mL    LABS:                        8.6    10.44 )-----------( 417      ( 2021 06:22 )             27.5     11-07    145  |  112<H>  |  11  ----------------------------<  119<H>  3.5   |  23  |  0.64    Ca    9.0      2021 06:22  Phos  3.5     11-  Mg     1.7     11-    CAPILLARY BLOOD GLUCOSE    POCT Blood Glucose.: 98 mg/dL (2021 23:47)  POCT Blood Glucose.: 101 mg/dL (2021 16:38)  POCT Blood Glucose.: 135 mg/dL (2021 11:26)    Culture - Sputum . (21 @ 13:59)    -  Cefepime: S <=2    -  Cefepime: S <=2    -  Ceftriaxone: S <=1 Enterobacter, Klebsiella aerogenes, Citrobacter, and Serratia may develop resistance during prolonged therapy    -  Ceftriaxone: S <=1 Enterobacter, Klebsiella aerogenes, Citrobacter, and Serratia may develop resistance during prolonged therapy    -  Ciprofloxacin: S <=0.25    -  Ciprofloxacin: S <=0.25    -  Clindamycin: S <=0.25    -  Ertapenem: S <=0.5    -  Ertapenem: S <=0.5    -  Erythromycin: S <=0.25    -  Gentamicin: S <=2    -  Gentamicin: S <=2    -  Linezolid: S 2    -  Oxacillin: S <=0.25    -  Piperacillin/Tazobactam: S <=8    -  Piperacillin/Tazobactam: S <=8    -  RIF- Rifampin: S <=1 Should not be used as monotherapy    -  Tobramycin: S <=2    -  Tobramycin: S <=2    -  Trimethoprim/Sulfamethoxazole: S <=0.5/9.5    -  Trimethoprim/Sulfamethoxazole: S <=0.5/9.5    -  Trimethoprim/Sulfamethoxazole: S <=0.5/9.5    -  Vancomycin: S 2    Organism: Staphylococcus aureus    Organism: Enterobacter cloacae complex    Organism: Proteus mirabilis    lipase 92    Bacteriology:    10/28 CSF NGTD  10/28 Blood NG1d x2     CSF studies:  10-28  L   *** [id number] *** %N91 %L4     EEG:  Neuroimagin/01 CTA - Diffuse, but overall MILD-MODERATE, sites of VASOSPASM are noted at theANTERIOR CIRCULATION, whilst the posterior circulation appears spared.   10/27 CT - There is herniation of the brain parenchyma and to the craniectomy defect which is similar prior examination. There is no significant change in the large left frontotemporal intraparenchymal hematoma with surrounding edema. Again demonstrated is mass effect with right to left midline shift measuring approximately 1 cm which is stable from prior exam. Again demonstrated is effacement of the cerebral sulci and basal cisterns.  Other imagin/01 UE Doppler - DVT within the right brachial vein.  Superficial thrombosis of the right cephalic vein.   CXR - stable.  Mild congestion.  10/28 Doppler - Negative  10/28 TTE -   1. Normal left and right ventricular size and systolic function.   2. No significant valvular disease.   3. Mild pulmonary hypertension present, pulmonary artery systolic pressure is 35 mmHg.   4. No pericardial effusion.   5. No prior echo is available for comparison.   Abdo U/S  Liver: Within normal limits. The liver measures 16.5 cm.  Bile ducts: Normal caliber.  Common bile duct measures 2 mm.  Gallbladder: Within normal limits. The gallbladder wall measures 2 mm. No stones.  Pancreas: Visualized portions are within normal limits.  Right kidney: 10.6 cm. No hydronephrosis. Normal parenchymal thickness and echogenicity.  Left kidney: 9.4 cm.  No hydronephrosis. Limited evaluation secondary to patient positioning.  Ascites: None.  Aorta and IVC: Visualized portions are within normal limits.    IV FLUIDS:   DRIPS:  ·	precedex infusion  DIET: NPO - TF stopped at 4 am (was at goal)  Lines: R TLC IJ [place]  Drains:    ·	EVD (d9) @5cm H20, ICPs 2-12, CSF 5-12 mL/h  Wounds:    CODE STATUS:  Full Code                       GOALS OF CARE:  aggressive                      DISPOSITION:  ICU

## 2021-11-07 NOTE — PROGRESS NOTE ADULT - ASSESSMENT
46y/Female with:  L MCA ruptured aneurysm, subarachnoid hemorrhage, s/p C (10/26/2021, Dr. D'Amico @ [place]), brain compression, cerebral edema  anemia   recent spinal surgery  UGIB    PLAN: Day 1 = 10-26 ; Day 4 = 10/29; Day 21 = 11/15  NEURO: neurochecks q1h, sedation with precedex; PRN fentanyl pushes  SAH:  cont nimodipine 30 mg po q2h (hold for sBP < 140) to be given for 21 days (last day 11/15); CTA mild-moderate anterior circulation vasospasm (euvolemia, permissive hypertension)  Hydrocephalus:  EVD 5cm H20, ICP < 12  Neurostorming:  precedex; bromocriptine 15 mg q8h, gabapentin 600 mg q8h, baclofen 10 mg BID, fentanyl prn  Seizure prophylaxis:  levetiracetam 1000mg IV BID  Pending trach/PEG   REHAB:  physical therapy evaluation and management    EARLY MOB:  HOB elevated    PULM:  full vent support for now (Mode: AC, RR (machine): 14, TV (machine): 450, FiO2: 40, PEEP: 5, ITime: 1, MAP: 8, PIP: 16), CXR mild congestion; no further mucus plugging today  CARDIO:  SBP goal 100-180mm Hg, TTE results as above  ENDO:  Blood sugar goals 140-180 mg/dL, insulin sliding scale  GI:  PPI OD, FOBT negative; lipase 92 downtrending; abdo U/S results as above;   DIET: TF to start, if tolerates, follow-up with general surgery, re:  PEG  RENAL: maintain euvolemia, Na goal 145-150; Na 152, 3% NaCl D/Wilfredo  HEM/ONC: Hb 8.6 stable, no ASA / Plavix use; FOBT negative  VTE Prophylaxis: SCDs, SQL; Doppler pending  ID: Tmax 37.8, no leukocytosis; start vancomycin zosyn given sputum MSSA enterobacter proteus (pending sensitivities)  Social: updated son and daughter, re:  progress, prognosis, trach/PEG    *****    CORE MEASURES  1.  Hunt and Griffin Score = 5  2.  VTE prophylaxis:  [ ] administered within 24 hours of admission OR [X] reason not done: fresh bleed, unsecured aneurysm.  3.  Dysphagia screening:   [X] performed before any oral meds / liquids / food  4.  Nimodipine treatment:  [X] administered within 24 hours of admission OR [ ] reason not done:    *****    ATTENDING ATTESTATION:    Patient at high risk for neurological deterioration or death due to:  ICU delirium, aspiration PNA, DVT / PE.  Critical care time:  I have personally provided 45 minutes of critical care time, excluding time spent on separate procedures.    Plan discussed with RN, house staff.     46y/Female with:  L MCA ruptured aneurysm, subarachnoid hemorrhage, s/p C (10/26/2021, Dr. D'Amico @ [place]), brain compression, cerebral edema  anemia   recent spinal surgery  UGIB    PLAN: Day 1 = 10-26 ; Day 4 = 10/29; Day 21 = 11/15  NEURO: neurochecks q1h, sedation with precedex; PRN fentanyl pushes  SAH:  cont nimodipine 30 mg po q2h (hold for sBP < 140) to be given for 21 days (last day 11/15); CTA mild-moderate anterior circulation vasospasm (euvolemia, permissive hypertension) - CT head tomorrow  Hydrocephalus:  EVD 5cm H20, ICP < 12  Neurostorming:  precedex; bromocriptine 15 mg q8h, gabapentin 400 mg q8h, fentanyl prn  Seizure prophylaxis:  levetiracetam 1000mg IV BID  Pending trach/PEG   REHAB:  physical therapy evaluation and management    EARLY MOB:  HOB elevated    PULM:  full vent support for now (Mode: AC, RR (machine): 14, TV (machine): 450, FiO2: 40, PEEP: 5, ITime: 1, MAP: 8, PIP: 16), CXR mild congestion; no further mucus plugging today  CARDIO:  SBP goal 100-180mm Hg, TTE results as above  ENDO:  Blood sugar goals 140-180 mg/dL, insulin sliding scale  GI:  PPI OD, FOBT negative; lipase 92 downtrending; abdo U/S results as above;   DIET: TF to start, if tolerates, follow-up with general surgery, re:  PEG  RENAL: maintain euvolemia, Na goal 145-150; Na 152, 3% NaCl D/Wilfredo  HEM/ONC: Hb 8.6 stable, no ASA / Plavix use; FOBT negative  VTE Prophylaxis: SCDs, SQL; Doppler pending  ID: Tmax 37.8, no leukocytosis; start vancomycin zosyn given sputum MSSA enterobacter proteus --> deescalated to ceftriaxone today based on sensitivities  Social: updated son and daughter, re:  progress, prognosis, trach/PEG    *****    CORE MEASURES  1.  Hunt and Griffin Score = 5  2.  VTE prophylaxis:  [ ] administered within 24 hours of admission OR [X] reason not done: fresh bleed, unsecured aneurysm.  3.  Dysphagia screening:   [X] performed before any oral meds / liquids / food  4.  Nimodipine treatment:  [X] administered within 24 hours of admission OR [ ] reason not done:    *****    ATTENDING ATTESTATION:    Patient at high risk for neurological deterioration or death due to:  ICU delirium, aspiration PNA, DVT / PE.  Critical care time:  I have personally provided 45 minutes of critical care time, excluding time spent on separate procedures.    Plan discussed with RN, house staff.

## 2021-11-08 LAB
ALBUMIN SERPL ELPH-MCNC: 2.5 G/DL — LOW (ref 3.3–5)
ALP SERPL-CCNC: 87 U/L — SIGNIFICANT CHANGE UP (ref 40–120)
ALT FLD-CCNC: 17 U/L — SIGNIFICANT CHANGE UP (ref 10–45)
ANION GAP SERPL CALC-SCNC: 8 MMOL/L — SIGNIFICANT CHANGE UP (ref 5–17)
ANION GAP SERPL CALC-SCNC: 8 MMOL/L — SIGNIFICANT CHANGE UP (ref 5–17)
AST SERPL-CCNC: 26 U/L — SIGNIFICANT CHANGE UP (ref 10–40)
BASOPHILS # BLD AUTO: 0.02 K/UL — SIGNIFICANT CHANGE UP (ref 0–0.2)
BASOPHILS NFR BLD AUTO: 0.2 % — SIGNIFICANT CHANGE UP (ref 0–2)
BILIRUB SERPL-MCNC: 0.2 MG/DL — SIGNIFICANT CHANGE UP (ref 0.2–1.2)
BUN SERPL-MCNC: 7 MG/DL — SIGNIFICANT CHANGE UP (ref 7–23)
BUN SERPL-MCNC: 9 MG/DL — SIGNIFICANT CHANGE UP (ref 7–23)
CALCIUM SERPL-MCNC: 8.9 MG/DL — SIGNIFICANT CHANGE UP (ref 8.4–10.5)
CALCIUM SERPL-MCNC: 8.9 MG/DL — SIGNIFICANT CHANGE UP (ref 8.4–10.5)
CHLORIDE SERPL-SCNC: 104 MMOL/L — SIGNIFICANT CHANGE UP (ref 96–108)
CHLORIDE SERPL-SCNC: 108 MMOL/L — SIGNIFICANT CHANGE UP (ref 96–108)
CO2 SERPL-SCNC: 24 MMOL/L — SIGNIFICANT CHANGE UP (ref 22–31)
CO2 SERPL-SCNC: 27 MMOL/L — SIGNIFICANT CHANGE UP (ref 22–31)
CREAT SERPL-MCNC: 0.59 MG/DL — SIGNIFICANT CHANGE UP (ref 0.5–1.3)
CREAT SERPL-MCNC: 0.62 MG/DL — SIGNIFICANT CHANGE UP (ref 0.5–1.3)
EOSINOPHIL # BLD AUTO: 0.19 K/UL — SIGNIFICANT CHANGE UP (ref 0–0.5)
EOSINOPHIL NFR BLD AUTO: 1.8 % — SIGNIFICANT CHANGE UP (ref 0–6)
GLUCOSE SERPL-MCNC: 110 MG/DL — HIGH (ref 70–99)
GLUCOSE SERPL-MCNC: 145 MG/DL — HIGH (ref 70–99)
HCT VFR BLD CALC: 25.6 % — LOW (ref 34.5–45)
HGB BLD-MCNC: 8.1 G/DL — LOW (ref 11.5–15.5)
IMM GRANULOCYTES NFR BLD AUTO: 2.5 % — HIGH (ref 0–1.5)
LYMPHOCYTES # BLD AUTO: 1.05 K/UL — SIGNIFICANT CHANGE UP (ref 1–3.3)
LYMPHOCYTES # BLD AUTO: 9.9 % — LOW (ref 13–44)
MAGNESIUM SERPL-MCNC: 1.8 MG/DL — SIGNIFICANT CHANGE UP (ref 1.6–2.6)
MCHC RBC-ENTMCNC: 25.9 PG — LOW (ref 27–34)
MCHC RBC-ENTMCNC: 31.6 GM/DL — LOW (ref 32–36)
MCV RBC AUTO: 81.8 FL — SIGNIFICANT CHANGE UP (ref 80–100)
MONOCYTES # BLD AUTO: 0.86 K/UL — SIGNIFICANT CHANGE UP (ref 0–0.9)
MONOCYTES NFR BLD AUTO: 8.1 % — SIGNIFICANT CHANGE UP (ref 2–14)
NEUTROPHILS # BLD AUTO: 8.25 K/UL — HIGH (ref 1.8–7.4)
NEUTROPHILS NFR BLD AUTO: 77.5 % — HIGH (ref 43–77)
NRBC # BLD: 0 /100 WBCS — SIGNIFICANT CHANGE UP (ref 0–0)
PHOSPHATE SERPL-MCNC: 3.7 MG/DL — SIGNIFICANT CHANGE UP (ref 2.5–4.5)
PLATELET # BLD AUTO: 417 K/UL — HIGH (ref 150–400)
POTASSIUM SERPL-MCNC: 3.7 MMOL/L — SIGNIFICANT CHANGE UP (ref 3.5–5.3)
POTASSIUM SERPL-MCNC: 4.1 MMOL/L — SIGNIFICANT CHANGE UP (ref 3.5–5.3)
POTASSIUM SERPL-SCNC: 3.7 MMOL/L — SIGNIFICANT CHANGE UP (ref 3.5–5.3)
POTASSIUM SERPL-SCNC: 4.1 MMOL/L — SIGNIFICANT CHANGE UP (ref 3.5–5.3)
PROCALCITONIN SERPL-MCNC: 0.05 NG/ML — SIGNIFICANT CHANGE UP (ref 0.02–0.1)
PROT SERPL-MCNC: 6.6 G/DL — SIGNIFICANT CHANGE UP (ref 6–8.3)
RBC # BLD: 3.13 M/UL — LOW (ref 3.8–5.2)
RBC # FLD: 22.5 % — HIGH (ref 10.3–14.5)
SARS-COV-2 RNA SPEC QL NAA+PROBE: SIGNIFICANT CHANGE UP
SARS-COV-2 RNA SPEC QL NAA+PROBE: SIGNIFICANT CHANGE UP
SODIUM SERPL-SCNC: 139 MMOL/L — SIGNIFICANT CHANGE UP (ref 135–145)
SODIUM SERPL-SCNC: 140 MMOL/L — SIGNIFICANT CHANGE UP (ref 135–145)
WBC # BLD: 10.64 K/UL — HIGH (ref 3.8–10.5)
WBC # FLD AUTO: 10.64 K/UL — HIGH (ref 3.8–10.5)

## 2021-11-08 PROCEDURE — 99291 CRITICAL CARE FIRST HOUR: CPT

## 2021-11-08 PROCEDURE — 71045 X-RAY EXAM CHEST 1 VIEW: CPT | Mod: 26

## 2021-11-08 PROCEDURE — 95718 EEG PHYS/QHP 2-12 HR W/VEEG: CPT

## 2021-11-08 PROCEDURE — 70450 CT HEAD/BRAIN W/O DYE: CPT | Mod: 26

## 2021-11-08 RX ORDER — MAGNESIUM SULFATE 500 MG/ML
1 VIAL (ML) INJECTION ONCE
Refills: 0 | Status: COMPLETED | OUTPATIENT
Start: 2021-11-08 | End: 2021-11-08

## 2021-11-08 RX ORDER — CEFEPIME 1 G/1
2000 INJECTION, POWDER, FOR SOLUTION INTRAMUSCULAR; INTRAVENOUS ONCE
Refills: 0 | Status: COMPLETED | OUTPATIENT
Start: 2021-11-08 | End: 2021-11-08

## 2021-11-08 RX ORDER — GABAPENTIN 400 MG/1
200 CAPSULE ORAL EVERY 8 HOURS
Refills: 0 | Status: DISCONTINUED | OUTPATIENT
Start: 2021-11-08 | End: 2021-11-09

## 2021-11-08 RX ORDER — POTASSIUM CHLORIDE 20 MEQ
40 PACKET (EA) ORAL ONCE
Refills: 0 | Status: DISCONTINUED | OUTPATIENT
Start: 2021-11-08 | End: 2021-11-09

## 2021-11-08 RX ORDER — BROMOCRIPTINE MESYLATE 5 MG/1
10 CAPSULE ORAL EVERY 8 HOURS
Refills: 0 | Status: DISCONTINUED | OUTPATIENT
Start: 2021-11-08 | End: 2021-11-09

## 2021-11-08 RX ORDER — CEFEPIME 1 G/1
2000 INJECTION, POWDER, FOR SOLUTION INTRAMUSCULAR; INTRAVENOUS EVERY 8 HOURS
Refills: 0 | Status: DISCONTINUED | OUTPATIENT
Start: 2021-11-08 | End: 2021-11-13

## 2021-11-08 RX ORDER — SODIUM CHLORIDE 5 G/100ML
500 INJECTION, SOLUTION INTRAVENOUS
Refills: 0 | Status: DISCONTINUED | OUTPATIENT
Start: 2021-11-08 | End: 2021-11-09

## 2021-11-08 RX ORDER — CEFEPIME 1 G/1
2000 INJECTION, POWDER, FOR SOLUTION INTRAMUSCULAR; INTRAVENOUS EVERY 8 HOURS
Refills: 0 | Status: DISCONTINUED | OUTPATIENT
Start: 2021-11-08 | End: 2021-11-08

## 2021-11-08 RX ORDER — POTASSIUM CHLORIDE 20 MEQ
40 PACKET (EA) ORAL ONCE
Refills: 0 | Status: COMPLETED | OUTPATIENT
Start: 2021-11-08 | End: 2021-11-08

## 2021-11-08 RX ORDER — SODIUM CHLORIDE 9 MG/ML
1000 INJECTION INTRAMUSCULAR; INTRAVENOUS; SUBCUTANEOUS
Refills: 0 | Status: DISCONTINUED | OUTPATIENT
Start: 2021-11-09 | End: 2021-11-10

## 2021-11-08 RX ADMIN — NIMODIPINE 30 MILLIGRAM(S): 60 SOLUTION ORAL at 02:12

## 2021-11-08 RX ADMIN — NIMODIPINE 30 MILLIGRAM(S): 60 SOLUTION ORAL at 22:00

## 2021-11-08 RX ADMIN — Medication 4 MILLILITER(S): at 05:36

## 2021-11-08 RX ADMIN — BROMOCRIPTINE MESYLATE 15 MILLIGRAM(S): 5 CAPSULE ORAL at 05:35

## 2021-11-08 RX ADMIN — LEVETIRACETAM 1000 MILLIGRAM(S): 250 TABLET, FILM COATED ORAL at 18:04

## 2021-11-08 RX ADMIN — SODIUM CHLORIDE 4 MILLILITER(S): 9 INJECTION INTRAMUSCULAR; INTRAVENOUS; SUBCUTANEOUS at 16:33

## 2021-11-08 RX ADMIN — NIMODIPINE 30 MILLIGRAM(S): 60 SOLUTION ORAL at 20:28

## 2021-11-08 RX ADMIN — NIMODIPINE 30 MILLIGRAM(S): 60 SOLUTION ORAL at 04:02

## 2021-11-08 RX ADMIN — SODIUM CHLORIDE 30 MILLILITER(S): 5 INJECTION, SOLUTION INTRAVENOUS at 20:30

## 2021-11-08 RX ADMIN — SODIUM CHLORIDE 3 GRAM(S): 9 INJECTION INTRAMUSCULAR; INTRAVENOUS; SUBCUTANEOUS at 05:36

## 2021-11-08 RX ADMIN — GABAPENTIN 400 MILLIGRAM(S): 400 CAPSULE ORAL at 05:37

## 2021-11-08 RX ADMIN — NIMODIPINE 30 MILLIGRAM(S): 60 SOLUTION ORAL at 06:00

## 2021-11-08 RX ADMIN — Medication 1 DROP(S): at 18:18

## 2021-11-08 RX ADMIN — ENOXAPARIN SODIUM 40 MILLIGRAM(S): 100 INJECTION SUBCUTANEOUS at 21:29

## 2021-11-08 RX ADMIN — SODIUM CHLORIDE 3 GRAM(S): 9 INJECTION INTRAMUSCULAR; INTRAVENOUS; SUBCUTANEOUS at 20:16

## 2021-11-08 RX ADMIN — GABAPENTIN 200 MILLIGRAM(S): 400 CAPSULE ORAL at 14:40

## 2021-11-08 RX ADMIN — Medication 4 MILLILITER(S): at 21:50

## 2021-11-08 RX ADMIN — CHLORHEXIDINE GLUCONATE 15 MILLILITER(S): 213 SOLUTION TOPICAL at 18:04

## 2021-11-08 RX ADMIN — CEFEPIME 100 MILLIGRAM(S): 1 INJECTION, POWDER, FOR SOLUTION INTRAMUSCULAR; INTRAVENOUS at 14:35

## 2021-11-08 RX ADMIN — PANTOPRAZOLE SODIUM 40 MILLIGRAM(S): 20 TABLET, DELAYED RELEASE ORAL at 14:32

## 2021-11-08 RX ADMIN — FENTANYL CITRATE 25 MICROGRAM(S): 50 INJECTION INTRAVENOUS at 21:05

## 2021-11-08 RX ADMIN — GABAPENTIN 200 MILLIGRAM(S): 400 CAPSULE ORAL at 21:29

## 2021-11-08 RX ADMIN — NIMODIPINE 30 MILLIGRAM(S): 60 SOLUTION ORAL at 14:32

## 2021-11-08 RX ADMIN — BROMOCRIPTINE MESYLATE 10 MILLIGRAM(S): 5 CAPSULE ORAL at 14:53

## 2021-11-08 RX ADMIN — Medication 100 GRAM(S): at 07:46

## 2021-11-08 RX ADMIN — CHLORHEXIDINE GLUCONATE 1 APPLICATION(S): 213 SOLUTION TOPICAL at 05:37

## 2021-11-08 RX ADMIN — Medication 40 MILLIEQUIVALENT(S): at 20:29

## 2021-11-08 RX ADMIN — SODIUM CHLORIDE 4 MILLILITER(S): 9 INJECTION INTRAMUSCULAR; INTRAVENOUS; SUBCUTANEOUS at 05:35

## 2021-11-08 RX ADMIN — Medication 200 MILLIGRAM(S): at 18:03

## 2021-11-08 RX ADMIN — NIMODIPINE 30 MILLIGRAM(S): 60 SOLUTION ORAL at 00:25

## 2021-11-08 RX ADMIN — NIMODIPINE 30 MILLIGRAM(S): 60 SOLUTION ORAL at 10:04

## 2021-11-08 RX ADMIN — MODAFINIL 200 MILLIGRAM(S): 200 TABLET ORAL at 14:40

## 2021-11-08 RX ADMIN — CEFEPIME 100 MILLIGRAM(S): 1 INJECTION, POWDER, FOR SOLUTION INTRAMUSCULAR; INTRAVENOUS at 21:30

## 2021-11-08 RX ADMIN — SODIUM CHLORIDE 3 GRAM(S): 9 INJECTION INTRAMUSCULAR; INTRAVENOUS; SUBCUTANEOUS at 14:32

## 2021-11-08 RX ADMIN — FENTANYL CITRATE 25 MICROGRAM(S): 50 INJECTION INTRAVENOUS at 20:50

## 2021-11-08 RX ADMIN — Medication 4 MILLILITER(S): at 14:40

## 2021-11-08 RX ADMIN — SODIUM CHLORIDE 3 GRAM(S): 9 INJECTION INTRAMUSCULAR; INTRAVENOUS; SUBCUTANEOUS at 00:25

## 2021-11-08 RX ADMIN — SODIUM CHLORIDE 4 MILLILITER(S): 9 INJECTION INTRAMUSCULAR; INTRAVENOUS; SUBCUTANEOUS at 22:57

## 2021-11-08 RX ADMIN — Medication 200 MILLIGRAM(S): at 05:36

## 2021-11-08 RX ADMIN — NIMODIPINE 30 MILLIGRAM(S): 60 SOLUTION ORAL at 08:00

## 2021-11-08 RX ADMIN — Medication 1 DROP(S): at 05:38

## 2021-11-08 RX ADMIN — CHLORHEXIDINE GLUCONATE 15 MILLILITER(S): 213 SOLUTION TOPICAL at 05:36

## 2021-11-08 RX ADMIN — LEVETIRACETAM 1000 MILLIGRAM(S): 250 TABLET, FILM COATED ORAL at 05:36

## 2021-11-08 RX ADMIN — SODIUM CHLORIDE 4 MILLILITER(S): 9 INJECTION INTRAMUSCULAR; INTRAVENOUS; SUBCUTANEOUS at 11:36

## 2021-11-08 RX ADMIN — BROMOCRIPTINE MESYLATE 10 MILLIGRAM(S): 5 CAPSULE ORAL at 21:29

## 2021-11-08 RX ADMIN — NIMODIPINE 30 MILLIGRAM(S): 60 SOLUTION ORAL at 18:04

## 2021-11-08 NOTE — AIRWAY PLACEMENT NOTE ADULT - AIRWAY COMMENTS:
ETT was advanced from 21 cm t0 24 cm as per doctor's order.
airway was 19 cm at the lip and was moved to 21 cm at the lip.

## 2021-11-08 NOTE — CHART NOTE - NSCHARTNOTEFT_GEN_A_CORE
Infectious Diseases Anti-infective Approval Note    Medication: cefepime  Dose: 2g  Route: IV  Frequency: q8h  Duration**: 7 days     *THIS IS NOT AN INFECTIOUS DISEASES CONSULTATION*    **Indicates duration of approval, not necessarily duration of treatment**

## 2021-11-08 NOTE — AIRWAY PLACEMENT NOTE ADULT - REASON PLACED:
Airway was moved from 19 cm to 21 cm at the lip.
Aiway was advanced to 24 cm from 21 cm as per doctor's order.

## 2021-11-08 NOTE — PROGRESS NOTE ADULT - SUBJECTIVE AND OBJECTIVE BOX
=======================================   NEUROCRITICAL CARE ATTENDING NOTE (Sun)  =======================================    AILYN DÍAZ   MRN-[id number]    HPI:  45 y/o female with PMH HTN, Multiple sclerosis, recent spinal surgery 10/8 ([facility]) presented to [place] ED BIBEMS after being found unconscious at home, unknown period of time. 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 HH5, MF1. Patient was intubated at [place] ED, found to have blown L pupil, and sent straight to the OR for left hemicraniotomy. A-line placed intraop. Hemodynamically unstable upon arrival to [facility], bradycardic and hypertensive s/p Mannitol, Clevidipine, and Furosemide. Central line placed in NSICU. Currently sedated on Propofol, pending repeat CTH. History per patient's son, patient had recent spine surgery and was found on outpatient imaging last week to have L M1 segment aneurysm (unruptured), pt asymptomatic at this time. Per son patient taking percocet PO at home. Ureña catheter, ET tube, and arterial line placed at [facility].   NIHSS on arrival: 32. Bess Griffin 5, Mod Busby 4.  Bleed Day 1 = 10/26 (26 Oct 2021 13:03)    Hospital course:  10/26: admitted to [facility] from [facility], POD#0 s/p L hemicraniectomy for decompression and evacuation of SDH. Transferred to [facility] noted to have tense flap, received mannitol, keppra, decadron. Was hypertensive to 200s and preston to 40s, recevied 85g mannitol and bullet 23.4%. CTH on arrival demonstrated increased size in vents and new IVH. EVD placed, open at 15, central line placed. Transfused 2 u PRBC. POD0 of coiling of left MCA bifurcation aneurysm,   10/27: CTH demonstrated EVD in correct position, EVD lowered to 5, remains intubated on proprofol, pending repeat CTH in AM. Started on 3% @ 30/hr. 2LNS given for euvolemia, Mannitol given for uptrending ICPs. Bullet given 8:30 am. 3% increased to 50/hr. 1 L bolus. New EVD catheter placed using exisiting sreekanth hole. 1 u PRBC.  10/28: HH5, MF4, BD3; POD2 angio coil/embo of L MCA aneurysm. JOAQUÍN overnight, neuro stable. EVD@5cmH20 ICPs < 20 overnight, OP WNL. S/p 1 unit PRBC yesterday with appropriate response. Given 42g mannitol in AM for ICP 22 persistently, with improvement, then given 23% in PM for elevated ICP. febrile, pancultured. Standing tylenol and cooling blanket.   10/29: BD4, POD3 angio coil/embo. JOAQUÍN o/n, neuro stable. EVD@5, ICPs <20 o/n.   10/30: BD5, POD4 angio coil/embo. JOAQUÍN o/n neuro stable. EVD@5. 3% @50cc/hr.  10/31: BD6, POD5 angio coil/embo. brief period maintained upward gaze, resolved on its own. EVD@5cm h2o.    : BD7, POD6 L hemicrani, angio coil/embo. Neuro exam unchanged. ICPs WNL. Overnight given hydralazine, increased propofol for SBP > 180.  CTA showed mild-moderate anterior circulation vasospasm (permissive hypertension, euvolemia).  : BD8, POD7 L hemicrani, angio coil/embo. Neuro exam unchanged. ICPs WNL.  Pending trach/PEG.  11/3: BD9, POD8 L hemicrani, angio coil/embo.  Neuro exam unchanged. ICPs WNL.  Pending trach/PEG.  Dayshift:  noted episodes of sympathetic storming during vasospasm period.  : BD10 POD9, JOAQUÍN o/n, 500cc NS for euvolemia,  neuro stable, EVD at 5  : BD11 POD10, JOAQUÍN o/n, neuro stable, EVD at 5; O2 desaturations with thick secretions (MSSA, Enterobacter, Proteus) started vancomycin zosyn  : BD12 POD11 JOAQUÍN overnight. Neuro exam stable. Remains intubated on precedex. 3% hypertonic saline dc'd  : BD13 POD 12 JOAQUÍN o/n, NGT replaced. weaning off precedex, no ICP crisis, decreasing neurostorming meds, pending CT tomorrow.    Past Medical History: No pertinent past medical history  Allergies:  Allergy Status Unknown  Home meds:       ICU Vital Signs Last 24 Hrs  T(C): 36.8 (2021 06:00), Max: 37 (2021 21:00)  T(F): 98.2 (2021 06:00), Max: 98.6 (2021 21:00)  HR: 87 (2021 06:22) (54 - 94)  BP: 166/91 (2021 06:00) (116/65 - 173/89)  BP(mean): 122 (2021 06:00) (87 - 127)  ABP: 172/89 (2021 06:00) (120/67 - 183/94)  ABP(mean): 124 (2021 06:00) (89 - 128)  RR: 14 (2021 06:22) (14 - 28)  SpO2: 100% (2021 06:22) (94% - 100%)      21 @ 07:01  -  21 @ 07:00  --------------------------------------------------------  IN: 1918.9 mL / OUT: 984 mL / NET: 934.9 mL      NEUROLOGIC EXAMINATION:  Opens eyes to stimuli, does not follow commands, pupils 3mm equal and brisk (-) Doll's, (+) cough and (+) gag, B LE TF, R UE 0/5; flap full but soft; extensor posturing B UE R>L, B LE TF  EENT:  anicteric  CARDIOVASCULAR: (+) S1 S2, normal rate and regular rhythm  PULMONARY: clear to auscultation bilaterally  ABDOMEN: soft, nontender with normoactive bowel sounds  EXTREMITIES: no edema  SKIN: no rash; back incisions checked (dehiscence noted)               Mode: AC/ CMV (Assist Control/ Continuous Mandatory Ventilation), RR (machine): 14, TV (machine): 450, FiO2: 40, PEEP: 5, ITime: 1, MAP: 12, PIP: 23    MEDICATIONS:  acetaminophen    Suspension .. 650 milliGRAM(s) Oral every 6 hours PRN  acetylcysteine 20%  Inhalation 4 milliLiter(s) Inhalation three times a day  amantadine Syrup 200 milliGRAM(s) Oral two times a day  artificial  tears Solution 1 Drop(s) Both EYES two times a day  bromocriptine Capsule 15 milliGRAM(s) Oral every 8 hours  cefTRIAXone   IVPB 1000 milliGRAM(s) IV Intermittent every 24 hours  chlorhexidine 0.12% Liquid 15 milliLiter(s) Oral Mucosa every 12 hours  chlorhexidine 2% Cloths 1 Application(s) Topical <User Schedule>  dexMEDEtomidine Infusion 0.2 MICROgram(s)/kG/Hr (4.25 mL/Hr) IV Continuous <Continuous>  enoxaparin Injectable 40 milliGRAM(s) SubCutaneous every 24 hours  fentaNYL    Injectable 25 MICROGram(s) IV Push every 2 hours PRN  gabapentin Solution 400 milliGRAM(s) Oral every 8 hours  hydrALAZINE Injectable 5 milliGRAM(s) IV Push every 4 hours PRN  levETIRAcetam 1000 milliGRAM(s) Oral every 12 hours  magnesium sulfate  IVPB 1 Gram(s) IV Intermittent once  modafinil 200 milliGRAM(s) Oral daily  niMODipine 30 milliGRAM(s) Oral every 2 hours  ondansetron Injectable 4 milliGRAM(s) IV Push every 6 hours PRN  pantoprazole   Suspension 40 milliGRAM(s) Oral daily  potassium chloride   Powder 40 milliEquivalent(s) Oral once  sodium chloride 3 Gram(s) Oral every 6 hours  sodium chloride 3%  Inhalation 4 milliLiter(s) Inhalation every 6 hours                          8.1    10.64 )-----------( 417      ( 2021 05:21 )             25.6     11-08    140  |  108  |  9   ----------------------------<  110<H>  4.1   |  24  |  0.62    Ca    8.9      2021 05:21  Phos  3.7       Mg     1.8         TPro  6.6  /  Alb  2.5<L>  /  TBili  0.2  /  DBili  x   /  AST  26  /  ALT  17  /  AlkPhos  87      LIVER FUNCTIONS - ( 2021 05:21 )  Alb: 2.5 g/dL / Pro: 6.6 g/dL / ALK PHOS: 87 U/L / ALT: 17 U/L / AST: 26 U/L / GGT: x             10/28 CSF NGTD  10/28 Blood NG1d x2     EEG:  Neuroimagin/01 CTA - Diffuse, but overall MILD-MODERATE, sites of VASOSPASM are noted at the ANTERIOR CIRCULATION, whilst the posterior circulation appears spared.   10/27 CT - There is herniation of the brain parenchyma and to the craniectomy defect which is similar prior examination. There is no significant change in the large left frontotemporal intraparenchymal hematoma with surrounding edema. Again demonstrated is mass effect with right to left midline shift measuring approximately 1 cm which is stable from prior exam. Again demonstrated is effacement of the cerebral sulci and basal cisterns.  Other imagin/01 UE Doppler - DVT within the right brachial vein.  Superficial thrombosis of the right cephalic vein.   CXR - stable.  Mild congestion.  10/28 Doppler - Negative    10/28 TTE -   1. Normal left and right ventricular size and systolic function.   2. No significant valvular disease.   3. Mild pulmonary hypertension present, pulmonary artery systolic pressure is 35 mmHg.   4. No pericardial effusion.   5. No prior echo is available for comparison.     Abdo U/S  Liver: Within normal limits. The liver measures 16.5 cm.  Bile ducts: Normal caliber.  Common bile duct measures 2 mm.  Gallbladder: Within normal limits. The gallbladder wall measures 2 mm. No stones.  Pancreas: Visualized portions are within normal limits.  Right kidney: 10.6 cm. No hydronephrosis. Normal parenchymal thickness and echogenicity.  Left kidney: 9.4 cm.  No hydronephrosis. Limited evaluation secondary to patient positioning.  Ascites: None.  Aorta and IVC: Visualized portions are within normal limits.    IV FLUIDS:   DRIPS:  ·	precedex infusion  DIET: NPO - TF stopped at 4 am (was at goal)  Lines: R TLC IJ Madison  Drains:    ·	EVD (d9) @5cm H20, ICPs 2-12, CSF 5-12 mL/h  Wounds:    CODE STATUS:  Full Code                       GOALS OF CARE:  aggressive                      DISPOSITION:  ICU =======================================   NEUROCRITICAL CARE ATTENDING NOTE (Sun)  =======================================    AILYN DÍAZ   MRN-[id number]    HPI:  45 y/o female with PMH HTN, Multiple sclerosis, recent spinal surgery 10/8 ([facility]) presented to [place] ED BIBEMS after being found unconscious at home, unknown period of time. 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 HH5, MF1. Patient was intubated at [place] ED, found to have blown L pupil, and sent straight to the OR for left hemicraniotomy. A-line placed intraop. Hemodynamically unstable upon arrival to [facility], bradycardic and hypertensive s/p Mannitol, Clevidipine, and Furosemide. Central line placed in NSICU. Currently sedated on Propofol, pending repeat CTH. History per patient's son, patient had recent spine surgery and was found on outpatient imaging last week to have L M1 segment aneurysm (unruptured), pt asymptomatic at this time. Per son patient taking percocet PO at home. Ureña catheter, ET tube, and arterial line placed at [facility].   NIHSS on arrival: 32. Bess Griffin 5, Mod Busby 4.  Bleed Day 1 = 10/26 (26 Oct 2021 13:03)    Hospital course:  10/26: admitted to [facility] from [facility], POD#0 s/p L hemicraniectomy for decompression and evacuation of SDH. Transferred to [facility] noted to have tense flap, received mannitol, keppra, decadron. Was hypertensive to 200s and preston to 40s, recevied 85g mannitol and bullet 23.4%. CTH on arrival demonstrated increased size in vents and new IVH. EVD placed, open at 15, central line placed. Transfused 2 u PRBC. POD0 of coiling of left MCA bifurcation aneurysm,   10/27: CTH demonstrated EVD in correct position, EVD lowered to 5, remains intubated on proprofol, pending repeat CTH in AM. Started on 3% @ 30/hr. 2LNS given for euvolemia, Mannitol given for uptrending ICPs. Bullet given 8:30 am. 3% increased to 50/hr. 1 L bolus. New EVD catheter placed using exisiting sreekanth hole. 1 u PRBC.  10/28: HH5, MF4, BD3; POD2 angio coil/embo of L MCA aneurysm. JOAQUÍN overnight, neuro stable. EVD@5cmH20 ICPs < 20 overnight, OP WNL. S/p 1 unit PRBC yesterday with appropriate response. Given 42g mannitol in AM for ICP 22 persistently, with improvement, then given 23% in PM for elevated ICP. febrile, pancultured. Standing tylenol and cooling blanket.   10/29: BD4, POD3 angio coil/embo. JOAQUÍN o/n, neuro stable. EVD@5, ICPs <20 o/n.   10/30: BD5, POD4 angio coil/embo. JOAQUÍN o/n neuro stable. EVD@5. 3% @50cc/hr.  10/31: BD6, POD5 angio coil/embo. brief period maintained upward gaze, resolved on its own. EVD@5cm h2o.    : BD7, POD6 L hemicrani, angio coil/embo. Neuro exam unchanged. ICPs WNL. Overnight given hydralazine, increased propofol for SBP > 180.  CTA showed mild-moderate anterior circulation vasospasm (permissive hypertension, euvolemia).  : BD8, POD7 L hemicrani, angio coil/embo. Neuro exam unchanged. ICPs WNL.  Pending trach/PEG.  11/3: BD9, POD8 L hemicrani, angio coil/embo.  Neuro exam unchanged. ICPs WNL.  Pending trach/PEG.  Dayshift:  noted episodes of sympathetic storming during vasospasm period.  : BD10 POD9, JOAQUÍN o/n, 500cc NS for euvolemia,  neuro stable, EVD at 5  : BD11 POD10, JOAQUÍN o/n, neuro stable, EVD at 5; O2 desaturations with thick secretions (MSSA, Enterobacter, Proteus) started vancomycin zosyn  : BD12 POD11 JOAQUÍN overnight. Neuro exam stable. Remains intubated on precedex. 3% hypertonic saline dc'd  : BD13 POD 12 JOAQUÍN o/n, NGT replaced. Weaning off precedex, no ICP crisis, decreasing neurostorming meds, pending CT head.   : BD 14. CT head pending. Some spontaneous eye opening    Past Medical History: No pertinent past medical history  Allergies:  Allergy Status Unknown  Home meds:       ICU Vital Signs Last 24 Hrs  T(C): 36.8 (2021 06:00), Max: 37 (2021 21:00)  T(F): 98.2 (2021 06:00), Max: 98.6 (2021 21:00)  HR: 87 (2021 06:22) (54 - 94)  BP: 166/91 (2021 06:00) (116/65 - 173/89)  BP(mean): 122 (2021 06:00) (87 - 127)  ABP: 172/89 (2021 06:00) (120/67 - 183/94)  ABP(mean): 124 (2021 06:00) (89 - 128)  RR: 14 (2021 06:22) (14 - 28)  SpO2: 100% (2021 06:22) (94% - 100%)      21 @ 07:01  -  21 @ 07:00  --------------------------------------------------------  IN: 1918.9 mL / OUT: 984 mL / NET: 934.9 mL      Mode: AC/ CMV (Assist Control/ Continuous Mandatory Ventilation), RR (machine): 14, TV (machine): 450, FiO2: 40, PEEP: 5, ITime: 1, MAP: 12, PIP: 23    NEUROLOGIC EXAMINATION: Opens eyes to stimuli and spontaneously intermittently, does not follow commands, pupils 3mm equal and brisk (-) Doll's, (+) cough and (+) gag, B/ L LE TF, R UE 0/5; extensor posturing B/L UE  EENT:  anicteric  CARDIOVASCULAR: (+) S1 S2, normal rate and regular rhythm  PULMONARY: clear to auscultation bilaterally  ABDOMEN: soft, nontender with normoactive bowel sounds  EXTREMITIES: no edema  SKIN: no rash; back incisions checked (dehiscence noted)             MEDICATIONS:  acetaminophen    Suspension .. 650 milliGRAM(s) Oral every 6 hours PRN  acetylcysteine 20%  Inhalation 4 milliLiter(s) Inhalation three times a day  amantadine Syrup 200 milliGRAM(s) Oral two times a day  artificial  tears Solution 1 Drop(s) Both EYES two times a day  bromocriptine Capsule 15 milliGRAM(s) Oral every 8 hours  cefTRIAXone   IVPB 1000 milliGRAM(s) IV Intermittent every 24 hours  chlorhexidine 0.12% Liquid 15 milliLiter(s) Oral Mucosa every 12 hours  chlorhexidine 2% Cloths 1 Application(s) Topical <User Schedule>  dexMEDEtomidine Infusion 0.2 MICROgram(s)/kG/Hr (4.25 mL/Hr) IV Continuous <Continuous>  enoxaparin Injectable 40 milliGRAM(s) SubCutaneous every 24 hours  fentaNYL    Injectable 25 MICROGram(s) IV Push every 2 hours PRN  gabapentin Solution 400 milliGRAM(s) Oral every 8 hours  hydrALAZINE Injectable 5 milliGRAM(s) IV Push every 4 hours PRN  levETIRAcetam 1000 milliGRAM(s) Oral every 12 hours  magnesium sulfate  IVPB 1 Gram(s) IV Intermittent once  modafinil 200 milliGRAM(s) Oral daily  niMODipine 30 milliGRAM(s) Oral every 2 hours  ondansetron Injectable 4 milliGRAM(s) IV Push every 6 hours PRN  pantoprazole   Suspension 40 milliGRAM(s) Oral daily  potassium chloride   Powder 40 milliEquivalent(s) Oral once  sodium chloride 3 Gram(s) Oral every 6 hours  sodium chloride 3%  Inhalation 4 milliLiter(s) Inhalation every 6 hours      LABS:                       8.1    10.64 )-----------( 417      ( 2021 05:21 )             25.6     11-08    140  |  108  |  9   ----------------------------<  110<H>  4.1   |  24  |  0.62    Ca    8.9      2021 05:21  Phos  3.7       Mg     1.8         TPro  6.6  /  Alb  2.5<L>  /  TBili  0.2  /  DBili  x   /  AST  26  /  ALT  17  /  AlkPhos  87      LIVER FUNCTIONS - ( 2021 05:21 )  Alb: 2.5 g/dL / Pro: 6.6 g/dL / ALK PHOS: 87 U/L / ALT: 17 U/L / AST: 26 U/L / GGT: x             10/28 CSF NGTD  10/28 Blood NGTD    EEG:  Neuroimagin/01 CTA - Diffuse, but overall mild- moderate, sites of vasospasm  are noted at the anterior circulation, whilst the posterior circulation appears spared.   10/27 CT - There is herniation of the brain parenchyma and to the craniectomy defect which is similar prior examination. There is no significant change in the large left frontotemporal intraparenchymal hematoma with surrounding edema. Again demonstrated is mass effect with right to left midline shift measuring approximately 1 cm which is stable from prior exam. Again demonstrated is effacement of the cerebral sulci and basal cisterns.  Other imagin/01 UE Doppler - DVT within the right brachial vein.  Superficial thrombosis of the right cephalic vein.   CXR - stable.  Mild congestion.  10/28 Doppler - Negative    10/28 TTE -   1. Normal left and right ventricular size and systolic function.   2. No significant valvular disease.   3. Mild pulmonary hypertension present, pulmonary artery systolic pressure is 35 mmHg.   4. No pericardial effusion.   5. No prior echo is available for comparison.     Abdo U/S  Liver: Within normal limits. The liver measures 16.5 cm.  Bile ducts: Normal caliber.  Common bile duct measures 2 mm.  Gallbladder: Within normal limits. The gallbladder wall measures 2 mm. No stones.  Pancreas: Visualized portions are within normal limits.  Right kidney: 10.6 cm. No hydronephrosis. Normal parenchymal thickness and echogenicity.  Left kidney: 9.4 cm.  No hydronephrosis. Limited evaluation secondary to patient positioning.  Ascites: None.  Aorta and IVC: Visualized portions are within normal limits.      Lines: R TLC IJ Madison  Drains/ devices:   R IJ  EVD  Primafit    CODE STATUS:  Full Code                         GOALS OF CARE:  aggressive                      DISPOSITION:  ICU =======================================   NEUROCRITICAL CARE ATTENDING NOTE   =======================================    AILYN DÍAZ   MRN-[id number]    HPI:  47 y/o female with PMH HTN, Multiple sclerosis, recent spinal surgery 10/8 ([facility]) presented to [place] ED BIBEMS after being found unconscious at home, unknown period of time. 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 HH5, MF1. Patient was intubated at [place] ED, found to have blown L pupil, and sent straight to the OR for left hemicraniotomy. A-line placed intraop. Hemodynamically unstable upon arrival to [facility], bradycardic and hypertensive s/p Mannitol, Clevidipine, and Furosemide. Central line placed in NSICU. Currently sedated on Propofol, pending repeat CTH. History per patient's son, patient had recent spine surgery and was found on outpatient imaging last week to have L M1 segment aneurysm (unruptured), pt asymptomatic at this time. Per son patient taking percocet PO at home. Ureña catheter, ET tube, and arterial line placed at [facility].   NIHSS on arrival: 32. Bess Griffin 5, Mod Busby 4.  Bleed Day 1 = 10/26 (26 Oct 2021 13:03)    Hospital course:  10/26: admitted to [facility] from [facility], POD#0 s/p L hemicraniectomy for decompression and evacuation of SDH. Transferred to [facility] noted to have tense flap, received mannitol, keppra, decadron. Was hypertensive to 200s and preston to 40s, recevied 85g mannitol and bullet 23.4%. CTH on arrival demonstrated increased size in vents and new IVH. EVD placed, open at 15, central line placed. Transfused 2 u PRBC. POD0 of coiling of left MCA bifurcation aneurysm,   10/27: CTH demonstrated EVD in correct position, EVD lowered to 5, remains intubated on proprofol, pending repeat CTH in AM. Started on 3% @ 30/hr. 2LNS given for euvolemia, Mannitol given for uptrending ICPs. Bullet given 8:30 am. 3% increased to 50/hr. 1 L bolus. New EVD catheter placed using exisiting sreekanth hole. 1 u PRBC.  10/28: HH5, MF4, BD3; POD2 angio coil/embo of L MCA aneurysm. JOAQUÍN overnight, neuro stable. EVD@5cmH20 ICPs < 20 overnight, OP WNL. S/p 1 unit PRBC yesterday with appropriate response. Given 42g mannitol in AM for ICP 22 persistently, with improvement, then given 23% in PM for elevated ICP. febrile, pancultured. Standing tylenol and cooling blanket.   10/29: BD4, POD3 angio coil/embo. JOAQUÍN o/n, neuro stable. EVD@5, ICPs <20 o/n.   10/30: BD5, POD4 angio coil/embo. JOAQUÍN o/n neuro stable. EVD@5. 3% @50cc/hr.  10/31: BD6, POD5 angio coil/embo. brief period maintained upward gaze, resolved on its own. EVD@5cm h2o.    : BD7, POD6 L hemicrani, angio coil/embo. Neuro exam unchanged. ICPs WNL. Overnight given hydralazine, increased propofol for SBP > 180.  CTA showed mild-moderate anterior circulation vasospasm (permissive hypertension, euvolemia).  : BD8, POD7 L hemicrani, angio coil/embo. Neuro exam unchanged. ICPs WNL.  Pending trach/PEG.  11/3: BD9, POD8 L hemicrani, angio coil/embo.  Neuro exam unchanged. ICPs WNL.  Pending trach/PEG.  Dayshift:  noted episodes of sympathetic storming during vasospasm period.  : BD10 POD9, JOAQUÍN o/n, 500cc NS for euvolemia,  neuro stable, EVD at 5  : BD11 POD10, JOAQUÍN o/n, neuro stable, EVD at 5; O2 desaturations with thick secretions (MSSA, Enterobacter, Proteus) started vancomycin zosyn  : BD12 POD11 JOAQUÍN overnight. Neuro exam stable. Remains intubated on precedex. 3% hypertonic saline dc'd  : BD13 POD 12 JOAQUÍN o/n, NGT replaced. Weaning off precedex, no ICP crisis, decreasing neurostorming meds, pending CT head.   : BD 14. CT head pending. Some spontaneous eye opening    Past Medical History: No pertinent past medical history  Allergies:  Allergy Status Unknown  Home meds:       ICU Vital Signs Last 24 Hrs  T(C): 36.8 (2021 06:00), Max: 37 (2021 21:00)  T(F): 98.2 (2021 06:00), Max: 98.6 (2021 21:00)  HR: 87 (2021 06:22) (54 - 94)  BP: 166/91 (2021 06:00) (116/65 - 173/89)  BP(mean): 122 (2021 06:00) (87 - 127)  ABP: 172/89 (2021 06:00) (120/67 - 183/94)  ABP(mean): 124 (2021 06:00) (89 - 128)  RR: 14 (2021 06:22) (14 - 28)  SpO2: 100% (2021 06:22) (94% - 100%)      21 @ 07:01  -  21 @ 07:00  --------------------------------------------------------  IN: 1918.9 mL / OUT: 984 mL / NET: 934.9 mL      Mode: AC/ CMV (Assist Control/ Continuous Mandatory Ventilation), RR (machine): 14, TV (machine): 450, FiO2: 40, PEEP: 5, ITime: 1, MAP: 12, PIP: 23    NEUROLOGIC EXAMINATION: Opens eyes to stimuli and spontaneously intermittently, does not follow commands, pupils 3mm equal and brisk (-) Doll's, (+) cough and (+) gag, B/ L LE TF, extensor posturing B/L UE  EENT: Anicteric  CARDIOVASCULAR: (+) S1 S2, normal rate and regular rhythm  PULMONARY: Clear to auscultation bilaterally  ABDOMEN: Soft, nontender with normoactive bowel sounds  EXTREMITIES: No edema  SKIN: No rash; back incisions (dehiscence noted)             MEDICATIONS:  acetaminophen    Suspension .. 650 milliGRAM(s) Oral every 6 hours PRN  acetylcysteine 20%  Inhalation 4 milliLiter(s) Inhalation three times a day  amantadine Syrup 200 milliGRAM(s) Oral two times a day  artificial  tears Solution 1 Drop(s) Both EYES two times a day  bromocriptine Capsule 15 milliGRAM(s) Oral every 8 hours  cefTRIAXone   IVPB 1000 milliGRAM(s) IV Intermittent every 24 hours  chlorhexidine 0.12% Liquid 15 milliLiter(s) Oral Mucosa every 12 hours  chlorhexidine 2% Cloths 1 Application(s) Topical <User Schedule>  dexMEDEtomidine Infusion 0.2 MICROgram(s)/kG/Hr (4.25 mL/Hr) IV Continuous <Continuous>  enoxaparin Injectable 40 milliGRAM(s) SubCutaneous every 24 hours  fentaNYL    Injectable 25 MICROGram(s) IV Push every 2 hours PRN  gabapentin Solution 400 milliGRAM(s) Oral every 8 hours  hydrALAZINE Injectable 5 milliGRAM(s) IV Push every 4 hours PRN  levETIRAcetam 1000 milliGRAM(s) Oral every 12 hours  magnesium sulfate  IVPB 1 Gram(s) IV Intermittent once  modafinil 200 milliGRAM(s) Oral daily  niMODipine 30 milliGRAM(s) Oral every 2 hours  ondansetron Injectable 4 milliGRAM(s) IV Push every 6 hours PRN  pantoprazole   Suspension 40 milliGRAM(s) Oral daily  potassium chloride   Powder 40 milliEquivalent(s) Oral once  sodium chloride 3 Gram(s) Oral every 6 hours  sodium chloride 3%  Inhalation 4 milliLiter(s) Inhalation every 6 hours      LABS:                       8.1    10.64 )-----------( 417      ( 2021 05:21 )             25.6     11-08    140  |  108  |  9   ----------------------------<  110<H>  4.1   |  24  |  0.62    Ca    8.9      2021 05:21  Phos  3.7       Mg     1.8         TPro  6.6  /  Alb  2.5<L>  /  TBili  0.2  /  DBili  x   /  AST  26  /  ALT  17  /  AlkPhos  87      LIVER FUNCTIONS - ( 2021 05:21 )  Alb: 2.5 g/dL / Pro: 6.6 g/dL / ALK PHOS: 87 U/L / ALT: 17 U/L / AST: 26 U/L / GGT: x             10/28 CSF NGTD  10/28 Blood NGTD    EEG:  Neuroimagin/01 CTA - Diffuse, but overall mild- moderate, sites of vasospasm  are noted at the anterior circulation, whilst the posterior circulation appears spared.   10/27 CT - There is herniation of the brain parenchyma and to the craniectomy defect which is similar prior examination. There is no significant change in the large left frontotemporal intraparenchymal hematoma with surrounding edema. Again demonstrated is mass effect with right to left midline shift measuring approximately 1 cm which is stable from prior exam. Again demonstrated is effacement of the cerebral sulci and basal cisterns.  Other imagin/01 UE Doppler - DVT within the right brachial vein.  Superficial thrombosis of the right cephalic vein.   CXR - stable.  Mild congestion.  10/28 Doppler - Negative    10/28 TTE -   1. Normal left and right ventricular size and systolic function.   2. No significant valvular disease.   3. Mild pulmonary hypertension present, pulmonary artery systolic pressure is 35 mmHg.   4. No pericardial effusion.   5. No prior echo is available for comparison.     Abdo U/S  Liver: Within normal limits. The liver measures 16.5 cm.  Bile ducts: Normal caliber.  Common bile duct measures 2 mm.  Gallbladder: Within normal limits. The gallbladder wall measures 2 mm. No stones.  Pancreas: Visualized portions are within normal limits.  Right kidney: 10.6 cm. No hydronephrosis. Normal parenchymal thickness and echogenicity.  Left kidney: 9.4 cm.  No hydronephrosis. Limited evaluation secondary to patient positioning.  Ascites: None.  Aorta and IVC: Visualized portions are within normal limits.      Lines: R TLC IJ [place]  Drains/ devices:   R IJ  EVD  Primafit    CODE STATUS:  Full Code                         GOALS OF CARE:  aggressive                      DISPOSITION:  ICU

## 2021-11-08 NOTE — PROGRESS NOTE ADULT - ASSESSMENT
46y/Female BD    L MCA ruptured aneurysm, subarachnoid hemorrhage, s/p [facility] (10/26/2021, Dr. D'Amico @ [place]), brain compression, cerebral edema  anemia   recent spinal surgery  UGIB    PLAN: Day 1 = 10-26 ; Day 4 = 10/29; Day 21 = 11/15  NEURO: neurochecks q1h, sedation with precedex; PRN fentanyl pushes  SAH: Cont nimodipine 30 mg po q2h (hold for sBP < 140) to be given for 21 days (last day 11/15); CTA mild-moderate anterior circulation vasospasm (euvolemia, permissive hypertension) - CT head tomorrow  Hydrocephalus:  EVD 5cm H20, ICP < 12  Neurostorming:  precedex; bromocriptine 15 mg q8h, gabapentin 400 mg q8h, fentanyl prn  Seizure prophylaxis:  levetiracetam 1000mg IV BID  Pending trach/PEG   REHAB:  physical therapy evaluation and management    EARLY MOB:  HOB elevated    PULM: Full vent support for now (Mode: AC, RR (machine): 14, TV (machine): 450, FiO2: 40, PEEP: 5, ITime: 1, MAP: 8, PIP: 16), CXR mild congestion; no further mucus plugging  CARDIO:  SBP goal 100-180mm Hg, TTE results as above  ENDO:  Blood sugar goals 140-180 mg/dL, insulin sliding scale  GI:  PPI OD, FOBT negative; lipase 92 downtrending; abdo U/S results as above;   DIET: TF to start, if tolerates, follow-up with general surgery, re:  PEG  RENAL: maintain euvolemia, Na goal 145-150; Na 152, 3% NaCl D/Wilfredo  HEM/ONC: Hb 8.6 stable, no ASA / Plavix use; FOBT negative  VTE Prophylaxis: SCDs, SQL; Doppler pending  ID: Tmax 37.8, no leukocytosis; start vancomycin zosyn given sputum MSSA enterobacter proteus --> deescalated to ceftriaxone today based on sensitivities  Social: updated son and daughter, re:  progress, prognosis, trach/PEG    *****    CORE MEASURES  1.  Hunt and Griffin Score = 5  2.  VTE prophylaxis:  [ ] administered within 24 hours of admission OR [X] reason not done: fresh bleed, unsecured aneurysm.  3.  Dysphagia screening:   [X] performed before any oral meds / liquids / food  4.  Nimodipine treatment:  [X] administered within 24 hours of admission OR [ ] reason not done:    *****    ATTENDING ATTESTATION:    Patient at high risk for neurological deterioration or death due to:  ICU delirium, aspiration PNA, DVT / PE.  Critical care time:  I have personally provided 45 minutes of critical care time, excluding time spent on separate procedures.    Plan discussed with RN, house staff.     47 y/o Female BD 15 with   L MCA ruptured aneurysm, subarachnoid hemorrhage, s/p [facility] (10/26/2021, Dr. D'Amico @ [place]), brain compression, cerebral edema  Anemia   Recent spinal surgery  UGIB    PLAN: Day 1 = 10-26 ; Day 4 = 10/29; Day 21 = 11/15  NEURO: Neurochecks q1h  No longer on precedex; PRN fentanyl pushes  SAH: Cont nimodipine 30 mg PO Q2h to be given for 21 days (last day 11/15); CTA mild-moderate anterior circulation vasospasm (euvolemia, permissive hypertension)  CT head 11/8.   Hydrocephalus:  EVD 5cm H20, ICP < 15  Neurostorming:  Precedex; bromocriptine 15 mg q8h, gabapentin 400 mg q8h (wean 200 Q8)  Seizure prophylaxis: Levetiracetam 1000mg IV BID. Has never been on VEEG. Will place.  Pending trach/PEG   REHAB: Physical therapy evaluation and management    EARLY MOB:  HOB elevated    PULM: Full vent support for now  ABG, CXR.   Intubation day ~12  Scheduled for trach/peg  Advance ETT  ENT evaluation re severe macroglossia, necrotic tongue lesions    CARDIO:   SBP goal 100-180mm Hg,  TTE results as above      ENDO:    Blood sugar goals 140-180 mg/dL  Insulin sliding scale    GI:    PPI OD  FOBT negative  Lipase 92 downtrending  Bowel regimen  Pending PEG    RENAL:   Maintain euvolemia  Na goal 145-150;   Off IV hypertonics    HEM/ONC: Hb 8.6 stable, no ASA / Plavix use; FOBT negative    VTE Prophylaxis: SCDs, SQL; Doppler pending [facility] 11/8    ID: Tmax 37.8, no leukocytosis  S/P vancomycin zosyn given sputum MSSA and enterobacter proteus --> Changed to CTX--> will change to cefepime for enterobacter/MSSA coverage    Social: Updated son and daughter, re:  progress, prognosis, trach/PEG    *****    CORE MEASURES  1.  Hunt and Griffin Score = 5  2.  VTE prophylaxis:  [ ] administered within 24 hours of admission OR [X] reason not done: fresh bleed, unsecured aneurysm.  3.  Dysphagia screening:   [X] performed before any oral meds / liquids / food  4.  Nimodipine treatment:  [X] administered within 24 hours of admission OR [ ] reason not done:    *****    ATTENDING ATTESTATION:    Patient at high risk for neurological deterioration or death due to:  ICU delirium, aspiration PNA, DVT / PE.  Critical care time:  I have personally provided 45 minutes of critical care time, excluding time spent on separate procedures.    Plan discussed with RN, house staff.     45 y/o female HH5 MF 4 BD 15 with:   L MCA ruptured aneurysm, subarachnoid hemorrhage, s/p [facility] (10/26/2021, Dr. D'Amico @ [place]), brain compression, cerebral edema  Anemia   Recent spinal surgery  UGIB    PLAN: Day 1 = 10-26 ; Day 4 = 10/29; Day 21 = 11/15  NEURO: Neurochecks q1h  No longer on precedex; PRN fentanyl pushes  DCI/ vasospasm: Cont nimodipine 30 mg PO Q2h to be given for 21 days (last day 11/15); CTA mild-moderate anterior circulation vasospasm (euvolemia, permissive hypertension)  CT head 11/8.   Hydrocephalus:  EVD 5cm H20, ICP < 15  Neurostorming:  Precedex; bromocriptine 15 mg q8h, gabapentin 400 mg q8h (wean to 200 Q8)  Seizure prophylaxis: Levetiracetam 1000mg IV BID. Has never been on VEEG. Will place due to high grade SAH to rule out NCSE.  Pending trach/PEG   REHAB: Physical therapy evaluation and management      EARLY MOB:  HOB elevated    PULM: Full vent support for now  ABG, CXR.   Intubation day ~12  Scheduled for trach/peg  Advance ETT, CXR.  ENT evaluation re: severe macroglossia, necrotic tongue lesions due to biting    CARDIO:   SBP goal 100-180mm Hg,  TTE results as above      ENDO:    Blood sugar goals 140-180 mg/dL  Insulin sliding scale    GI:    FOBT negative  Bowel regimen  Pending PEG  PPI  LBM: Rectal tube    RENAL:   Maintain euvolemia  Na goal 145-150  Continue salt tabs    HEM/ONC: Anemia- no overt blood loss. Not on antiplt or anticoagulation  FOBT neg  Hb stable (had been transfused)  Anti Xa level 11/9  VTE Prophylaxis: SCDs, SQL; Doppler pending uppers 11/8    ID:   Tmax 37.8, no leukocytosis  S/P vancomycin zosyn given sputum MSSA and enterobacter proteus --> will change to cefepime for enterobacter/MSSA coverage  (will be resistant to CTX in the setting of ampC with enterobacter)    Social: Family has been updated    *****    CORE MEASURES  1.  Hunt and Griffin Score = 5  2.  VTE prophylaxis:  [ ] administered within 24 hours of admission OR [X] reason not done: fresh bleed, unsecured aneurysm.  3.  Dysphagia screening:   [X] performed before any oral meds / liquids / food  4.  Nimodipine treatment:  [X] administered within 24 hours of admission OR [ ] reason not done:    *****    ATTENDING ATTESTATION:    Patient at high risk for neurological deterioration or death due to:  ICU delirium, aspiration PNA, DVT / PE.  Critical care time:  I have personally provided 45 minutes of critical care time, excluding time spent on separate procedures.    Plan discussed with RN, house staff.

## 2021-11-08 NOTE — PROGRESS NOTE ADULT - SUBJECTIVE AND OBJECTIVE BOX
HPI: 47 y/o female with PMH HTN, Multiple sclerosis, recent spinal surgery 10/8 ([facility]) presented to [place] [facility] BIBEMS after being found unconscious at home, unknown period of time. 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 HH5, MF1. Patient was intubated at [place] ED, found to have blown L pupil, and sent straight to the OR for left hemicraniotomy. A-line placed intraop. Hemodynamically unstable upon arrival to [facility], bradycardic and hypertensive s/p Mannitol, Clevidipine, and Furosemide. Central line placed in NSICU. Currently sedated on Propofol, pending repeat CTH. History per patient's son, patient had recent spine surgery and was found on outpatient imaging last week to have L M1 segment aneurysm (unruptured), pt asymptomatic at this time. Per son patient taking percocet PO at home. Ureña catheter, ET tube, and arterial line placed at [facility].     NIHSS on arrival: 32   Bess Griffin 5, Mod Busby 4  Bleed Day 1 = 10/26 (26 Oct 2021 13:03)    OVERNIGHT EVENTS:    Vital Signs Last 24 Hrs  T(C): 36.8 (08 Nov 2021 06:00), Max: 37 (07 Nov 2021 21:00)  T(F): 98.2 (08 Nov 2021 06:00), Max: 98.6 (07 Nov 2021 21:00)  HR: 65 (08 Nov 2021 10:00) (60 - 94)  BP: 147/89 (08 Nov 2021 10:00) (116/65 - 173/89)  BP(mean): 113 (08 Nov 2021 10:00) (87 - 124)  RR: 14 (08 Nov 2021 10:00) (14 - 28)  SpO2: 100% (08 Nov 2021 10:00) (94% - 100%)    I&O's Summary    07 Nov 2021 07:01  -  08 Nov 2021 07:00  --------------------------------------------------------  IN: 2055.5 mL / OUT: 1528 mL / NET: 527.5 mL    08 Nov 2021 07:01  -  08 Nov 2021 12:31  --------------------------------------------------------  IN: 169.2 mL / OUT: 32 mL / NET: 137.2 mL        PHYSICAL EXAM:  Neurological:    Motor exam:         [] Upper extremity              Bi(c5)  WE(c6)  EE(c7)   FF(c8)                                                R         5/5        5/5        5/5       5/5                                               L          5/5        5/5        5/5       5/5         [] Lower extremeity          HF(l2)   KE(l3)    TA(l4)   EHL(l5)  GS(s1)                                                 R        5/5        5/5        5/5       5/5         5/5                                               L         5/5        5/5       5/5       5/5          5/5                                                        [] warm well perfused; capillary refill <3 seconds     Sensation: [] intact to light touch  [] decreased:       Cardiovascular:  Respiratory:  Gastrointestinal:  Genitourinary:  Extremities:  Incision/Wound:    TUBES/LINES:  [] Ureña  [] Lumbar Drain  [] Wound Drains  [] Others      DIET:  [] NPO  [] Mechanical  [] Tube feeds    LABS:                        8.1    10.64 )-----------( 417      ( 08 Nov 2021 05:21 )             25.6     11-08    140  |  108  |  9   ----------------------------<  110<H>  4.1   |  24  |  0.62    Ca    8.9      08 Nov 2021 05:21  Phos  3.7     11-08  Mg     1.8     11-08    TPro  6.6  /  Alb  2.5<L>  /  TBili  0.2  /  DBili  x   /  AST  26  /  ALT  17  /  AlkPhos  87  11-08            CAPILLARY BLOOD GLUCOSE      POCT Blood Glucose.: 121 mg/dL (07 Nov 2021 16:23)      Drug Levels: [] N/A  Vancomycin Level, Trough: 11.7 ug/mL (11-07 @ 06:22)    CSF Analysis: [] N/A      Allergies    No Known Allergies    Intolerances      MEDICATIONS:  Antibiotics:  cefepime   IVPB 2000 milliGRAM(s) IV Intermittent once  cefepime   IVPB 2000 milliGRAM(s) IV Intermittent every 8 hours    Neuro:  acetaminophen    Suspension .. 650 milliGRAM(s) Oral every 6 hours PRN  amantadine Syrup 200 milliGRAM(s) Oral two times a day  bromocriptine Capsule 10 milliGRAM(s) Oral every 8 hours  fentaNYL    Injectable 25 MICROGram(s) IV Push every 2 hours PRN  gabapentin Solution 200 milliGRAM(s) Oral every 8 hours  levETIRAcetam 1000 milliGRAM(s) Oral every 12 hours  modafinil 200 milliGRAM(s) Oral daily  ondansetron Injectable 4 milliGRAM(s) IV Push every 6 hours PRN    Anticoagulation:  enoxaparin Injectable 40 milliGRAM(s) SubCutaneous every 24 hours    OTHER:  acetylcysteine 20%  Inhalation 4 milliLiter(s) Inhalation three times a day  artificial  tears Solution 1 Drop(s) Both EYES two times a day  chlorhexidine 0.12% Liquid 15 milliLiter(s) Oral Mucosa every 12 hours  chlorhexidine 2% Cloths 1 Application(s) Topical <User Schedule>  hydrALAZINE Injectable 5 milliGRAM(s) IV Push every 4 hours PRN  niMODipine 30 milliGRAM(s) Oral every 2 hours  pantoprazole   Suspension 40 milliGRAM(s) Oral daily  sodium chloride 3%  Inhalation 4 milliLiter(s) Inhalation every 6 hours    IVF:  potassium chloride   Powder 40 milliEquivalent(s) Oral once  sodium chloride 3 Gram(s) Oral every 6 hours    CULTURES:  Culture Results:   Numerous Staphylococcus aureus  Numerous Enterobacter cloacae complex  Moderate Proteus mirabilis  Accompanied by normal respiratory melinda (11-04 @ 13:59)  Culture Results:   No growth to date (10-28 @ 19:23)    RADIOLOGY & ADDITIONAL TESTS:      ASSESSMENT:  46y Female s/p    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, left    Intracranial hemorrhage, spontaneous subarachnoid, due to cerebral aneurysm, acute    Angiogram, cerebral, with coil embolization, in non-operating room setting    Personal history of spine surgery    SysAdmin_VstLnk        PLAN:  NEURO:    CARDIOVASCULAR:    PULMONARY:    RENAL:    GI:    HEME:    ID:    ENDO:    DVT PROPHYLAXIS:  [] Venodynes                                [] Heparin/Lovenox    DISPOSITION: HPI: 47 y/o female with PMH HTN, Multiple sclerosis, recent spinal surgery 10/8 ([facility]) presented to [place] ED BIBEMS after being found unconscious at home, unknown period of time. 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 HH5, MF1. Patient was intubated at [place] ED, found to have blown L pupil, and sent straight to the OR for left hemicraniotomy. A-line placed intraop. Hemodynamically unstable upon arrival to [facility], bradycardic and hypertensive s/p Mannitol, Clevidipine, and Furosemide. Central line placed in NSICU. Currently sedated on Propofol, pending repeat CTH. History per patient's son, patient had recent spine surgery and was found on outpatient imaging last week to have L M1 segment aneurysm (unruptured), pt asymptomatic at this time. Per son patient taking percocet PO at home. Ureña catheter, ET tube, and arterial line placed at [facility].     NIHSS on arrival: 32   Bess Griffin 5, Mod Busby 4  Bleed Day 1 = 10/26 (26 Oct 2021 13:03)    OVERNIGHT EVENTS:  10/26: admitted to [facility] from [facility], POD#0 s/p L hemicraniectomy for decompression and evacuation of SDH. Transferred to [facility] noted to have tense flap, received mannitol, keppra, decadron. Was hypertensive to 200s and preston to 40s, recevied 85g mannitol and bullet 23.4%. CTH on arrival demonstrated increased size in vents and new IVH. EVD placed, open at 15, central line placed. Transfused 2 u PRBC. POD0 of coiling of left MCA bifurcation aneurysm,   10/27: CTH demonstrated EVD in correct position, EVD lowered to 5, remains intubated on proprofol, pending repeat CTH in AM. Started on 3% @ 30/hr. 2LNS given for euvolemia, Mannitol given for uptrending ICPs. Bullet given 8:30 am. 3% increased to 50/hr. 1 L bolus. New EVD catheter placed using exisiting sreekanth hole. 1 u PRBC.  10/28: HH5, MF4, BD3; POD2 angio coil/embo of L MCA aneurysm. JOAQUÍN overnight, neuro stable. EVD@5cmH20 ICPs < 20 overnight, OP WNL. S/p 1 unit PRBC yesterday with appropriate response. Given 42g mannitol in AM for ICP 22 persistently, with improvement, then given 23% in PM for elevated ICP. febrile, pancultured. Standing tylenol and cooling blanket.   10/29: BD4, POD3 angio coil/embo. JOAQUÍN o/n, neuro stable. EVD@5, ICPs <20 o/n.   10/30: BD5, POD4 angio coil/embo. JOAQUÍN o/n neuro stable. EVD@5. 3% @50cc/hr.  10/31: BD6, POD5 angio coil/embo. brief period maintained upward gaze, resolved on its own. EVD@5cm h2o.    11/1: BD7, POD6 L hemicrani, angio coil/embo. JOAQUÍN overnight. Neuro exam unchanged. ICPs WNL. started bromocriptine/gabapentin/baclofen. dc'd propofol, started precedex  11/2: BD8 POD7 L hemicrani, angio coil/embo. JOAQUÍN overnight. Neuro exam stable. Remains on 3% hypertonic saline. Receieved 1 unit of PRBCs for a Hgb of 7.6.  11/3: BD 9 POD8 of L hemicrani. Elevated lipase, normal amylase, OG tube clogged and replaced. Abdominal US normal. Pending gen surg reccs regarding trach and peg. Gabapentin and Baclofen increased to help with neurostorming. restarted back on 3%, LR@35. became preston+hypotensive with nimodipine 60q4, decreased back to 30q2, NaCl bullet given.   11/4: BD10 POD9, JOAQUÍN o/n, 500cc NS for euvolemia,  neuro stable, EVD at 5. 3% increased to 75  11/5: BD11 POD10, JOAQUÍN o/n, neuro stable, EVD at 5  11/6: BD12 POD11 JOAQUÍN overnight. Neuro exam stable. Remains intubated on precedex. 3% hypertonic saline dc'd  11/7: BD13 POD 12 JOAQUÍN o/n, NGT replaced. on precex with no icp crisis   11/8: BD14 POD13 JOAQUÍN o/n, noted to have tongue maceration/macroglossia. Gauze with tongue depressor placed. Pending further reccs from ENT. Pending trach and PEG placement tomorrow with gen surg. Off Precedex, ICPs stable. EVD remains @ 5.     Vital Signs Last 24 Hrs  T(C): 36.8 (08 Nov 2021 06:00), Max: 37 (07 Nov 2021 21:00)  T(F): 98.2 (08 Nov 2021 06:00), Max: 98.6 (07 Nov 2021 21:00)  HR: 65 (08 Nov 2021 10:00) (60 - 94)  BP: 147/89 (08 Nov 2021 10:00) (116/65 - 173/89)  BP(mean): 113 (08 Nov 2021 10:00) (87 - 124)  RR: 14 (08 Nov 2021 10:00) (14 - 28)  SpO2: 100% (08 Nov 2021 10:00) (94% - 100%)    I&O's Summary    07 Nov 2021 07:01  -  08 Nov 2021 07:00  --------------------------------------------------------  IN: 2055.5 mL / OUT: 1528 mL / NET: 527.5 mL    08 Nov 2021 07:01  -  08 Nov 2021 12:31  --------------------------------------------------------  IN: 169.2 mL / OUT: 32 mL / NET: 137.2 mL        PHYSICAL EXAM:  General Appearance: Patient is on full vent support, not awake.  HEENT: B/l pupils 3 mm and reactive. Does not track.   Cardio:   Incision/Wound:    TUBES/LINES:  [] Ureña  [] Lumbar Drain  [] Wound Drains  [] Others      DIET:  [] NPO  [] Mechanical  [] Tube feeds    LABS:                        8.1    10.64 )-----------( 417      ( 08 Nov 2021 05:21 )             25.6     11-08    140  |  108  |  9   ----------------------------<  110<H>  4.1   |  24  |  0.62    Ca    8.9      08 Nov 2021 05:21  Phos  3.7     11-08  Mg     1.8     11-08    TPro  6.6  /  Alb  2.5<L>  /  TBili  0.2  /  DBili  x   /  AST  26  /  ALT  17  /  AlkPhos  87  11-08            CAPILLARY BLOOD GLUCOSE      POCT Blood Glucose.: 121 mg/dL (07 Nov 2021 16:23)      Drug Levels: [] N/A  Vancomycin Level, Trough: 11.7 ug/mL (11-07 @ 06:22)    CSF Analysis: [] N/A      Allergies    No Known Allergies    Intolerances      MEDICATIONS:  Antibiotics:  cefepime   IVPB 2000 milliGRAM(s) IV Intermittent once  cefepime   IVPB 2000 milliGRAM(s) IV Intermittent every 8 hours    Neuro:  acetaminophen    Suspension .. 650 milliGRAM(s) Oral every 6 hours PRN  amantadine Syrup 200 milliGRAM(s) Oral two times a day  bromocriptine Capsule 10 milliGRAM(s) Oral every 8 hours  fentaNYL    Injectable 25 MICROGram(s) IV Push every 2 hours PRN  gabapentin Solution 200 milliGRAM(s) Oral every 8 hours  levETIRAcetam 1000 milliGRAM(s) Oral every 12 hours  modafinil 200 milliGRAM(s) Oral daily  ondansetron Injectable 4 milliGRAM(s) IV Push every 6 hours PRN    Anticoagulation:  enoxaparin Injectable 40 milliGRAM(s) SubCutaneous every 24 hours    OTHER:  acetylcysteine 20%  Inhalation 4 milliLiter(s) Inhalation three times a day  artificial  tears Solution 1 Drop(s) Both EYES two times a day  chlorhexidine 0.12% Liquid 15 milliLiter(s) Oral Mucosa every 12 hours  chlorhexidine 2% Cloths 1 Application(s) Topical <User Schedule>  hydrALAZINE Injectable 5 milliGRAM(s) IV Push every 4 hours PRN  niMODipine 30 milliGRAM(s) Oral every 2 hours  pantoprazole   Suspension 40 milliGRAM(s) Oral daily  sodium chloride 3%  Inhalation 4 milliLiter(s) Inhalation every 6 hours    IVF:  potassium chloride   Powder 40 milliEquivalent(s) Oral once  sodium chloride 3 Gram(s) Oral every 6 hours    CULTURES:  Culture Results:   Numerous Staphylococcus aureus  Numerous Enterobacter cloacae complex  Moderate Proteus mirabilis  Accompanied by normal respiratory melinda (11-04 @ 13:59)  Culture Results:   No growth to date (10-28 @ 19:23)    RADIOLOGY & ADDITIONAL TESTS:      ASSESSMENT:  46y Female s/p    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, left    Intracranial hemorrhage, spontaneous subarachnoid, due to cerebral aneurysm, acute    Angiogram, cerebral, with coil embolization, in non-operating room setting    Personal history of spine surgery    SysAdmin_VstLnk        PLAN:  NEURO:    CARDIOVASCULAR:    PULMONARY:    RENAL:    GI:    HEME:    ID:    ENDO:    DVT PROPHYLAXIS:  [] Venodynes                                [] Heparin/Lovenox    DISPOSITION: HPI: 47 y/o female with PMH HTN, Multiple sclerosis, recent spinal surgery 10/8 ([facility]) presented to [place] [facility] BIBEMS after being found unconscious at home, unknown period of time. 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 HH5, MF1. Patient was intubated at [place] ED, found to have blown L pupil, and sent straight to the OR for left hemicraniotomy. A-line placed intraop. Hemodynamically unstable upon arrival to [facility], bradycardic and hypertensive s/p Mannitol, Clevidipine, and Furosemide. Central line placed in NSICU. Currently sedated on Propofol, pending repeat CTH. History per patient's son, patient had recent spine surgery and was found on outpatient imaging last week to have L M1 segment aneurysm (unruptured), pt asymptomatic at this time. Per son patient taking percocet PO at home. Ureña catheter, ET tube, and arterial line placed at [facility].     NIHSS on arrival: 32   Bess Griffin 5, Mod Busby 4  Bleed Day 1 = 10/26 (26 Oct 2021 13:03)    OVERNIGHT EVENTS:  10/26: admitted to [facility] from [facility], POD#0 s/p L hemicraniectomy for decompression and evacuation of SDH. Transferred to [facility] noted to have tense flap, received mannitol, keppra, decadron. Was hypertensive to 200s and preston to 40s, recevied 85g mannitol and bullet 23.4%. CTH on arrival demonstrated increased size in vents and new IVH. EVD placed, open at 15, central line placed. Transfused 2 u PRBC. POD0 of coiling of left MCA bifurcation aneurysm,   10/27: CTH demonstrated EVD in correct position, EVD lowered to 5, remains intubated on proprofol, pending repeat CTH in AM. Started on 3% @ 30/hr. 2LNS given for euvolemia, Mannitol given for uptrending ICPs. Bullet given 8:30 am. 3% increased to 50/hr. 1 L bolus. New EVD catheter placed using exisiting sreekanth hole. 1 u PRBC.  10/28: HH5, MF4, BD3; POD2 angio coil/embo of L MCA aneurysm. JOAQUÍN overnight, neuro stable. EVD@5cmH20 ICPs < 20 overnight, OP WNL. S/p 1 unit PRBC yesterday with appropriate response. Given 42g mannitol in AM for ICP 22 persistently, with improvement, then given 23% in PM for elevated ICP. febrile, pancultured. Standing tylenol and cooling blanket.   10/29: BD4, POD3 angio coil/embo. JOAQUÍN o/n, neuro stable. EVD@5, ICPs <20 o/n.   10/30: BD5, POD4 angio coil/embo. JOAQUÍN o/n neuro stable. EVD@5. 3% @50cc/hr.  10/31: BD6, POD5 angio coil/embo. brief period maintained upward gaze, resolved on its own. EVD@5cm h2o.    11/1: BD7, POD6 L hemicrani, angio coil/embo. JOAQUÍN overnight. Neuro exam unchanged. ICPs WNL. started bromocriptine/gabapentin/baclofen. dc'd propofol, started precedex  11/2: BD8 POD7 L hemicrani, angio coil/embo. JOAQUÍN overnight. Neuro exam stable. Remains on 3% hypertonic saline. Receieved 1 unit of PRBCs for a Hgb of 7.6.  11/3: BD 9 POD8 of L hemicrani. Elevated lipase, normal amylase, OG tube clogged and replaced. Abdominal US normal. Pending gen surg reccs regarding trach and peg. Gabapentin and Baclofen increased to help with neurostorming. restarted back on 3%, LR@35. became preston+hypotensive with nimodipine 60q4, decreased back to 30q2, NaCl bullet given.   11/4: BD10 POD9, JOAQUÍN o/n, 500cc NS for euvolemia,  neuro stable, EVD at 5. 3% increased to 75  11/5: BD11 POD10, JOAQUÍN o/n, neuro stable, EVD at 5  11/6: BD12 POD11 JOAQUÍN overnight. Neuro exam stable. Remains intubated on precedex. 3% hypertonic saline dc'd  11/7: BD13 POD 12 JOAQUÍN o/n, NGT replaced. on precex with no icp crisis   11/8: BD14 POD13 JOAQUÍN o/n, noted to have tongue maceration/macroglossia. Gauze with tongue depressor placed. Pending further reccs from ENT. Pending trach and PEG placement tomorrow with gen surg. Off Precedex, ICPs stable. EVD remains @ 5.     Vital Signs Last 24 Hrs  T(C): 36.8 (08 Nov 2021 06:00), Max: 37 (07 Nov 2021 21:00)  T(F): 98.2 (08 Nov 2021 06:00), Max: 98.6 (07 Nov 2021 21:00)  HR: 65 (08 Nov 2021 10:00) (60 - 94)  BP: 147/89 (08 Nov 2021 10:00) (116/65 - 173/89)  BP(mean): 113 (08 Nov 2021 10:00) (87 - 124)  RR: 14 (08 Nov 2021 10:00) (14 - 28)  SpO2: 100% (08 Nov 2021 10:00) (94% - 100%)    I&O's Summary    07 Nov 2021 07:01  -  08 Nov 2021 07:00  --------------------------------------------------------  IN: 2055.5 mL / OUT: 1528 mL / NET: 527.5 mL    08 Nov 2021 07:01  -  08 Nov 2021 12:31  --------------------------------------------------------  IN: 169.2 mL / OUT: 32 mL / NET: 137.2 mL        PHYSICAL EXAM:  General Appearance: Patient is on full vent support, not awake.   Cardio: RRR. Normal S1 and S2.   Pulm: CTA b/l. No rales, wheezes, or rhonchi.  Neuro: Opens eyes spontaneously and intermittently to noxious stimuli. B/l pupils 3 mm and reactive. No tracking. (+) Corneal reflexes. (+) Cough, (+) gag. Does not follow commands. B/l UEs 0/5 to noxious stimuli. B/l LEs TF. Unable to assess sensation due to patient's mental status.   Incision/Wound: Anterior and posterior spinal surgical incision sites with dehiscence noted.     TUBES/LINES:  [] Ureña  [] Lumbar Drain  [] Wound Drains  [X] Others - CVC, A-line, ventriculostomy - EVD @ 5      DIET:  [] NPO  [] Mechanical  [X] Tube feeds    LABS:                        8.1    10.64 )-----------( 417      ( 08 Nov 2021 05:21 )             25.6     11-08    140  |  108  |  9   ----------------------------<  110<H>  4.1   |  24  |  0.62    Ca    8.9      08 Nov 2021 05:21  Phos  3.7     11-08  Mg     1.8     11-08    TPro  6.6  /  Alb  2.5<L>  /  TBili  0.2  /  DBili  x   /  AST  26  /  ALT  17  /  AlkPhos  87  11-08            CAPILLARY BLOOD GLUCOSE      POCT Blood Glucose.: 121 mg/dL (07 Nov 2021 16:23)      Drug Levels: [] N/A  Vancomycin Level, Trough: 11.7 ug/mL (11-07 @ 06:22)    CSF Analysis: [] N/A      Allergies    No Known Allergies    Intolerances      MEDICATIONS:  Antibiotics:  cefepime   IVPB 2000 milliGRAM(s) IV Intermittent once  cefepime   IVPB 2000 milliGRAM(s) IV Intermittent every 8 hours    Neuro:  acetaminophen    Suspension .. 650 milliGRAM(s) Oral every 6 hours PRN  amantadine Syrup 200 milliGRAM(s) Oral two times a day  bromocriptine Capsule 10 milliGRAM(s) Oral every 8 hours  fentaNYL    Injectable 25 MICROGram(s) IV Push every 2 hours PRN  gabapentin Solution 200 milliGRAM(s) Oral every 8 hours  levETIRAcetam 1000 milliGRAM(s) Oral every 12 hours  modafinil 200 milliGRAM(s) Oral daily  ondansetron Injectable 4 milliGRAM(s) IV Push every 6 hours PRN    Anticoagulation:  enoxaparin Injectable 40 milliGRAM(s) SubCutaneous every 24 hours    OTHER:  acetylcysteine 20%  Inhalation 4 milliLiter(s) Inhalation three times a day  artificial  tears Solution 1 Drop(s) Both EYES two times a day  chlorhexidine 0.12% Liquid 15 milliLiter(s) Oral Mucosa every 12 hours  chlorhexidine 2% Cloths 1 Application(s) Topical <User Schedule>  hydrALAZINE Injectable 5 milliGRAM(s) IV Push every 4 hours PRN  niMODipine 30 milliGRAM(s) Oral every 2 hours  pantoprazole   Suspension 40 milliGRAM(s) Oral daily  sodium chloride 3%  Inhalation 4 milliLiter(s) Inhalation every 6 hours    IVF:  potassium chloride   Powder 40 milliEquivalent(s) Oral once  sodium chloride 3 Gram(s) Oral every 6 hours    CULTURES:  Culture Results:   Numerous Staphylococcus aureus  Numerous Enterobacter cloacae complex  Moderate Proteus mirabilis  Accompanied by normal respiratory melinda (11-04 @ 13:59)  Culture Results:   No growth to date (10-28 @ 19:23)    RADIOLOGY & ADDITIONAL TESTS:      ASSESSMENT: 47 y/o female with PMHx of HTN and MS, hx of spinal surgery at [facility] [place] [facility], unknown period of time. 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 HH5, MF4; BD #1 = 10/26. Patient was intubated at [place] ED and sent straight to the OR for left hemicraniotomy. A-line placed intraop. Hemodynamically unstable upon arrival to [facility], bradycardic and hypertensive s/p Mannitol and Furosemide. Central line placed in NSICU. Currently sedated on Propofol. Now s/p EVD placement, and coil embolization of left MCA bifurcation aneurysm 10/26/2021.     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, left    Intracranial hemorrhage, spontaneous subarachnoid, due to cerebral aneurysm, acute    Angiogram, cerebral, with coil embolization, in non-operating room setting    Personal history of spine surgery    SysAdmin_VstLnk        PLAN:  NEURO:    CARDIOVASCULAR:    PULMONARY:    RENAL:    GI:    HEME:    ID:    ENDO:    DVT PROPHYLAXIS:  [] Venodynes                                [] Heparin/Lovenox    DISPOSITION: HPI: 45 y/o female with PMH HTN, Multiple sclerosis, recent spinal surgery 10/8 ([facility]) presented to [place] ED BIBEMS after being found unconscious at home, unknown period of time. 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 HH5, MF1. Patient was intubated at [place] ED, found to have blown L pupil, and sent straight to the OR for left hemicraniotomy. A-line placed intraop. Hemodynamically unstable upon arrival to [facility], bradycardic and hypertensive s/p Mannitol, Clevidipine, and Furosemide. Central line placed in NSICU. Currently sedated on Propofol, pending repeat CTH. History per patient's son, patient had recent spine surgery and was found on outpatient imaging last week to have L M1 segment aneurysm (unruptured), pt asymptomatic at this time. Per son patient taking percocet PO at home. Ureña catheter, ET tube, and arterial line placed at [facility].     NIHSS on arrival: 32   Bess Griffin 5, Mod Busby 4  Bleed Day 1 = 10/26 (26 Oct 2021 13:03)    OVERNIGHT EVENTS:  10/26: admitted to [facility] from [facility], POD#0 s/p L hemicraniectomy for decompression and evacuation of SDH. Transferred to [facility] noted to have tense flap, received mannitol, keppra, decadron. Was hypertensive to 200s and preston to 40s, recevied 85g mannitol and bullet 23.4%. CTH on arrival demonstrated increased size in vents and new IVH. EVD placed, open at 15, central line placed. Transfused 2 u PRBC. POD0 of coiling of left MCA bifurcation aneurysm,   10/27: CTH demonstrated EVD in correct position, EVD lowered to 5, remains intubated on proprofol, pending repeat CTH in AM. Started on 3% @ 30/hr. 2LNS given for euvolemia, Mannitol given for uptrending ICPs. Bullet given 8:30 am. 3% increased to 50/hr. 1 L bolus. New EVD catheter placed using exisiting sreekanth hole. 1 u PRBC.  10/28: HH5, MF4, BD3; POD2 angio coil/embo of L MCA aneurysm. JOAQUÍN overnight, neuro stable. EVD@5cmH20 ICPs < 20 overnight, OP WNL. S/p 1 unit PRBC yesterday with appropriate response. Given 42g mannitol in AM for ICP 22 persistently, with improvement, then given 23% in PM for elevated ICP. febrile, pancultured. Standing tylenol and cooling blanket.   10/29: BD4, POD3 angio coil/embo. JOAQUÍN o/n, neuro stable. EVD@5, ICPs <20 o/n.   10/30: BD5, POD4 angio coil/embo. JOAQÍUN o/n neuro stable. EVD@5. 3% @50cc/hr.  10/31: BD6, POD5 angio coil/embo. brief period maintained upward gaze, resolved on its own. EVD@5cm h2o.    11/1: BD7, POD6 L hemicrani, angio coil/embo. JOAQUÍN overnight. Neuro exam unchanged. ICPs WNL. started bromocriptine/gabapentin/baclofen. dc'd propofol, started precedex  11/2: BD8 POD7 L hemicrani, angio coil/embo. JOAQUÍN overnight. Neuro exam stable. Remains on 3% hypertonic saline. Receieved 1 unit of PRBCs for a Hgb of 7.6.  11/3: BD 9 POD8 of L hemicrani. Elevated lipase, normal amylase, OG tube clogged and replaced. Abdominal US normal. Pending gen surg reccs regarding trach and peg. Gabapentin and Baclofen increased to help with neurostorming. restarted back on 3%, LR@35. became preston+hypotensive with nimodipine 60q4, decreased back to 30q2, NaCl bullet given.   11/4: BD10 POD9, JOAQUÍN o/n, 500cc NS for euvolemia,  neuro stable, EVD at 5. 3% increased to 75  11/5: BD11 POD10, JOAQUÍN o/n, neuro stable, EVD at 5  11/6: BD12 POD11 JOAQUÍN overnight. Neuro exam stable. Remains intubated on precedex. 3% hypertonic saline dc'd  11/7: BD13 POD 12 JOAQUÍN o/n, NGT replaced. on precex with no icp crisis   11/8: BD14 POD13 JOAQUÍN o/n, noted to have tongue maceration/macroglossia. Gauze with tongue depressor placed. Pending further reccs from ENT. Pending trach and PEG placement tomorrow with gen surg. Off Precedex, ICPs stable. EVD remains @ 5.     Vital Signs Last 24 Hrs  T(C): 36.8 (08 Nov 2021 06:00), Max: 37 (07 Nov 2021 21:00)  T(F): 98.2 (08 Nov 2021 06:00), Max: 98.6 (07 Nov 2021 21:00)  HR: 65 (08 Nov 2021 10:00) (60 - 94)  BP: 147/89 (08 Nov 2021 10:00) (116/65 - 173/89)  BP(mean): 113 (08 Nov 2021 10:00) (87 - 124)  RR: 14 (08 Nov 2021 10:00) (14 - 28)  SpO2: 100% (08 Nov 2021 10:00) (94% - 100%)    I&O's Summary    07 Nov 2021 07:01  -  08 Nov 2021 07:00  --------------------------------------------------------  IN: 2055.5 mL / OUT: 1528 mL / NET: 527.5 mL    08 Nov 2021 07:01  -  08 Nov 2021 12:31  --------------------------------------------------------  IN: 169.2 mL / OUT: 32 mL / NET: 137.2 mL        PHYSICAL EXAM:  General Appearance: Patient is on full vent support, not awake.   Cardio: RRR. Normal S1 and S2.   Pulm: CTA b/l. No rales, wheezes, or rhonchi.  Neuro: Opens eyes spontaneously and intermittently to noxious stimuli. B/l pupils 3 mm and reactive. No tracking. (+) Corneal reflexes. (+) Cough, (+) gag. Does not follow commands. B/l UEs 0/5 to noxious stimuli. B/l LEs TF. Unable to assess sensation due to patient's mental status.   Incision/Wound: Anterior and posterior spinal surgical incision sites with dehiscence noted.     TUBES/LINES:  [] Ureña  [] Lumbar Drain  [] Wound Drains  [X] Others - CVC, A-line, ventriculostomy - EVD @ 5      DIET:  [] NPO  [] Mechanical  [X] Tube feeds    LABS:                        8.1    10.64 )-----------( 417      ( 08 Nov 2021 05:21 )             25.6     11-08    140  |  108  |  9   ----------------------------<  110<H>  4.1   |  24  |  0.62    Ca    8.9      08 Nov 2021 05:21  Phos  3.7     11-08  Mg     1.8     11-08    TPro  6.6  /  Alb  2.5<L>  /  TBili  0.2  /  DBili  x   /  AST  26  /  ALT  17  /  AlkPhos  87  11-08            CAPILLARY BLOOD GLUCOSE      POCT Blood Glucose.: 121 mg/dL (07 Nov 2021 16:23)      Drug Levels: [] N/A  Vancomycin Level, Trough: 11.7 ug/mL (11-07 @ 06:22)    CSF Analysis: [] N/A      Allergies    No Known Allergies    Intolerances      MEDICATIONS:  Antibiotics:  cefepime   IVPB 2000 milliGRAM(s) IV Intermittent once  cefepime   IVPB 2000 milliGRAM(s) IV Intermittent every 8 hours    Neuro:  acetaminophen    Suspension .. 650 milliGRAM(s) Oral every 6 hours PRN  amantadine Syrup 200 milliGRAM(s) Oral two times a day  bromocriptine Capsule 10 milliGRAM(s) Oral every 8 hours  fentaNYL    Injectable 25 MICROGram(s) IV Push every 2 hours PRN  gabapentin Solution 200 milliGRAM(s) Oral every 8 hours  levETIRAcetam 1000 milliGRAM(s) Oral every 12 hours  modafinil 200 milliGRAM(s) Oral daily  ondansetron Injectable 4 milliGRAM(s) IV Push every 6 hours PRN    Anticoagulation:  enoxaparin Injectable 40 milliGRAM(s) SubCutaneous every 24 hours    OTHER:  acetylcysteine 20%  Inhalation 4 milliLiter(s) Inhalation three times a day  artificial  tears Solution 1 Drop(s) Both EYES two times a day  chlorhexidine 0.12% Liquid 15 milliLiter(s) Oral Mucosa every 12 hours  chlorhexidine 2% Cloths 1 Application(s) Topical <User Schedule>  hydrALAZINE Injectable 5 milliGRAM(s) IV Push every 4 hours PRN  niMODipine 30 milliGRAM(s) Oral every 2 hours  pantoprazole   Suspension 40 milliGRAM(s) Oral daily  sodium chloride 3%  Inhalation 4 milliLiter(s) Inhalation every 6 hours    IVF:  potassium chloride   Powder 40 milliEquivalent(s) Oral once  sodium chloride 3 Gram(s) Oral every 6 hours    CULTURES:  Culture Results:   Numerous Staphylococcus aureus  Numerous Enterobacter cloacae complex  Moderate Proteus mirabilis  Accompanied by normal respiratory melinda (11-04 @ 13:59)  Culture Results:   No growth to date (10-28 @ 19:23)    RADIOLOGY & ADDITIONAL TESTS:      ASSESSMENT: 45 y/o female with PMHx of HTN and MS, hx of spinal surgery at [facility] 10/8/21 [facility] [place] [facility] being found unconscious at home, unknown period of time. 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 HH5, MF4; BD #1 = 10/26. Patient was intubated at [place] [facility] and sent straight to the OR for left hemicraniotomy. A-line placed intraop. Hemodynamically unstable upon arrival to [facility], bradycardic and hypertensive s/p Mannitol and Furosemide. Central line placed in NSICU. Currently sedated on Propofol. Now s/p EVD placement, and coil embolization of left MCA bifurcation aneurysm 10/26/2021.     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, left    Intracranial hemorrhage, spontaneous subarachnoid, due to cerebral aneurysm, acute    Angiogram, cerebral, with coil embolization, in non-operating room setting    Personal history of spine surgery    SysAdmin_VstLnk        PLAN:  NEURO:  - neuro checks/vital signs q1hr  - Keppra 1g IV BID   - Nimodipine 30q2  - decreased bromocriptine 10mg q8hr and Gabapentin 200mg q8hr   - propofol dc'd, precedex for sedation   - fentanyl pushes PRN   - EVD open at 5cmH2O, monitor ICP/output  - CTA 11/1 with findings of moderate anterior circulation spasm   - pending VEEG and CTH 11/8    CARDIO:  - -180  - hydralazine PRN to maintain SBP goal  - echo +mild pulm HTN    PULM:  - intubated on full vent support  - aspiration precautions    GI:  - OGT TFs  - bowel regimen   - PPI QD GI ppx  - FOB negative 11/1  - Abd US: neg   - rectal tube  - pending trach and peg placement 11/9  - standing reglan    RENAL:  - salt tabs 3Q6  - euvolemia goal   - Na 145-150     HEME:  - SCDs, SQL  - s/p 2u PRBC, s/p 1u PRBC 10/27, s/p 1u PRBC 11/02  - monitor H+H  - FOB negative  - RUE doppler for swelling 11/1: DVT R brachial and superficial cephalic thrombus, surveillance 11/8    ID:  - leukocytosis and procal, trend   - Tylenol PRN for fever  - sputum cx 11/4:  gm + cocci in pairs/clusters, rare gm + rods  - abx switched to ceftriaxone end date 11/12  - Cefepime x1 dose to cover for enterobacter  - pending further recs from ID    ENDO:  - ISS   - monitor FS    OTHER:  - f/u wound care consult for back incisions dehiscence   - f/u ENT for further reccs regarding macroglossia/tongue maceration     DISPO:  - GOC: full code  - Level of care: ICU status   - pend EVD, trach, peg  - family updated  - case discussed with Dr. Duncan and Dr. Menard   HPI: 47 y/o female with PMH HTN, Multiple sclerosis, recent spinal surgery 10/8 ([facility]) presented to [place] [facility] after being found unconscious at home, unknown period of time. 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 HH5, MF1. Patient was intubated at [place] [facility], found to have blown L pupil, and sent straight to the OR for left hemicraniotomy. A-line placed intraop. Hemodynamically unstable upon arrival to [facility], bradycardic and hypertensive s/p Mannitol, Clevidipine, and Furosemide. Central line placed in NSICU. Currently sedated on Propofol, pending repeat CTH. History per patient's son, patient had recent spine surgery and was found on outpatient imaging last week to have L M1 segment aneurysm (unruptured), pt asymptomatic at this time. Per son patient taking percocet PO at home. Ureña catheter, ET tube, and arterial line placed at [facility].     NIHSS on arrival: 32   Bess Griffin 5, Mod Busby 4  Bleed Day 1 = 10/26 (26 Oct 2021 13:03)    OVERNIGHT EVENTS:  10/26: admitted to [facility] from [facility], POD#0 s/p L hemicraniectomy for decompression and evacuation of SDH. Transferred to [facility] noted to have tense flap, received mannitol, keppra, decadron. Was hypertensive to 200s and preston to 40s, recevied 85g mannitol and bullet 23.4%. CTH on arrival demonstrated increased size in vents and new IVH. EVD placed, open at 15, central line placed. Transfused 2 u PRBC. POD0 of coiling of left MCA bifurcation aneurysm,   10/27: CTH demonstrated EVD in correct position, EVD lowered to 5, remains intubated on proprofol, pending repeat CTH in AM. Started on 3% @ 30/hr. 2LNS given for euvolemia, Mannitol given for uptrending ICPs. Bullet given 8:30 am. 3% increased to 50/hr. 1 L bolus. New EVD catheter placed using exisiting sreekanth hole. 1 u PRBC.  10/28: HH5, MF4, BD3; POD2 angio coil/embo of L MCA aneurysm. JOAQUÍN overnight, neuro stable. EVD@5cmH20 ICPs < 20 overnight, OP WNL. S/p 1 unit PRBC yesterday with appropriate response. Given 42g mannitol in AM for ICP 22 persistently, with improvement, then given 23% in PM for elevated ICP. febrile, pancultured. Standing tylenol and cooling blanket.   10/29: BD4, POD3 angio coil/embo. JOAQUÍN o/n, neuro stable. EVD@5, ICPs <20 o/n.   10/30: BD5, POD4 angio coil/embo. JOAQUÍN o/n neuro stable. EVD@5. 3% @50cc/hr.  10/31: BD6, POD5 angio coil/embo. brief period maintained upward gaze, resolved on its own. EVD@5cm h2o.    11/1: BD7, POD6 L hemicrani, angio coil/embo. JOAQUÍN overnight. Neuro exam unchanged. ICPs WNL. started bromocriptine/gabapentin/baclofen. dc'd propofol, started precedex  11/2: BD8 POD7 L hemicrani, angio coil/embo. JOAQUÍN overnight. Neuro exam stable. Remains on 3% hypertonic saline. Receieved 1 unit of PRBCs for a Hgb of 7.6.  11/3: BD 9 POD8 of L hemicrani. Elevated lipase, normal amylase, OG tube clogged and replaced. Abdominal US normal. Pending gen surg reccs regarding trach and peg. Gabapentin and Baclofen increased to help with neurostorming. restarted back on 3%, LR@35. became preston+hypotensive with nimodipine 60q4, decreased back to 30q2, NaCl bullet given.   11/4: BD10 POD9, JOAQUÍN o/n, 500cc NS for euvolemia,  neuro stable, EVD at 5. 3% increased to 75  11/5: BD11 POD10, JOAQUÍN o/n, neuro stable, EVD at 5  11/6: BD12 POD11 JOAQUÍN overnight. Neuro exam stable. Remains intubated on precedex. 3% hypertonic saline dc'd  11/7: BD13 POD 12 JOAQUÍN o/n, NGT replaced. on precex with no icp crisis   11/8: BD14 POD13 JOAQUÍN o/n, noted to have tongue maceration/macroglossia. Gauze with tongue depressor placed. Pending further reccs from ENT. Pending trach and PEG placement tomorrow with gen surg. Off Precedex, ICPs stable. EVD remains @ 5.     Vital Signs Last 24 Hrs  T(C): 36.8 (08 Nov 2021 06:00), Max: 37 (07 Nov 2021 21:00)  T(F): 98.2 (08 Nov 2021 06:00), Max: 98.6 (07 Nov 2021 21:00)  HR: 65 (08 Nov 2021 10:00) (60 - 94)  BP: 147/89 (08 Nov 2021 10:00) (116/65 - 173/89)  BP(mean): 113 (08 Nov 2021 10:00) (87 - 124)  RR: 14 (08 Nov 2021 10:00) (14 - 28)  SpO2: 100% (08 Nov 2021 10:00) (94% - 100%)    I&O's Summary    07 Nov 2021 07:01  -  08 Nov 2021 07:00  --------------------------------------------------------  IN: 2055.5 mL / OUT: 1528 mL / NET: 527.5 mL    08 Nov 2021 07:01  -  08 Nov 2021 12:31  --------------------------------------------------------  IN: 169.2 mL / OUT: 32 mL / NET: 137.2 mL        PHYSICAL EXAM:  General Appearance: Patient is on full vent support, not awake.   Cardio: RRR. Normal S1 and S2.   Pulm: CTA b/l. No rales, wheezes, or rhonchi.  Neuro: Opens eyes spontaneously and intermittently to noxious stimuli. B/l pupils 3 mm and reactive. No tracking. (+) Corneal reflexes. (+) Cough, (+) gag. Does not follow commands. B/l UEs 0/5 to noxious stimuli. B/l LEs TF. Unable to assess sensation due to patient's mental status.   Incision/Wound: Anterior and posterior spinal surgical incision sites with dehiscence noted.     TUBES/LINES:  [] Ureña  [] Lumbar Drain  [] Wound Drains  [X] Others - CVC, A-line, ventriculostomy - EVD @ 5      DIET:  [] NPO  [] Mechanical  [X] Tube feeds    LABS:                        8.1    10.64 )-----------( 417      ( 08 Nov 2021 05:21 )             25.6     11-08    140  |  108  |  9   ----------------------------<  110<H>  4.1   |  24  |  0.62    Ca    8.9      08 Nov 2021 05:21  Phos  3.7     11-08  Mg     1.8     11-08    TPro  6.6  /  Alb  2.5<L>  /  TBili  0.2  /  DBili  x   /  AST  26  /  ALT  17  /  AlkPhos  87  11-08            CAPILLARY BLOOD GLUCOSE      POCT Blood Glucose.: 121 mg/dL (07 Nov 2021 16:23)      Drug Levels: [] N/A  Vancomycin Level, Trough: 11.7 ug/mL (11-07 @ 06:22)    CSF Analysis: [] N/A      Allergies    No Known Allergies    Intolerances      MEDICATIONS:  Antibiotics:  cefepime   IVPB 2000 milliGRAM(s) IV Intermittent once  cefepime   IVPB 2000 milliGRAM(s) IV Intermittent every 8 hours    Neuro:  acetaminophen    Suspension .. 650 milliGRAM(s) Oral every 6 hours PRN  amantadine Syrup 200 milliGRAM(s) Oral two times a day  bromocriptine Capsule 10 milliGRAM(s) Oral every 8 hours  fentaNYL    Injectable 25 MICROGram(s) IV Push every 2 hours PRN  gabapentin Solution 200 milliGRAM(s) Oral every 8 hours  levETIRAcetam 1000 milliGRAM(s) Oral every 12 hours  modafinil 200 milliGRAM(s) Oral daily  ondansetron Injectable 4 milliGRAM(s) IV Push every 6 hours PRN    Anticoagulation:  enoxaparin Injectable 40 milliGRAM(s) SubCutaneous every 24 hours    OTHER:  acetylcysteine 20%  Inhalation 4 milliLiter(s) Inhalation three times a day  artificial  tears Solution 1 Drop(s) Both EYES two times a day  chlorhexidine 0.12% Liquid 15 milliLiter(s) Oral Mucosa every 12 hours  chlorhexidine 2% Cloths 1 Application(s) Topical <User Schedule>  hydrALAZINE Injectable 5 milliGRAM(s) IV Push every 4 hours PRN  niMODipine 30 milliGRAM(s) Oral every 2 hours  pantoprazole   Suspension 40 milliGRAM(s) Oral daily  sodium chloride 3%  Inhalation 4 milliLiter(s) Inhalation every 6 hours    IVF:  potassium chloride   Powder 40 milliEquivalent(s) Oral once  sodium chloride 3 Gram(s) Oral every 6 hours    CULTURES:  Culture Results:   Numerous Staphylococcus aureus  Numerous Enterobacter cloacae complex  Moderate Proteus mirabilis  Accompanied by normal respiratory melinda (11-04 @ 13:59)  Culture Results:   No growth to date (10-28 @ 19:23)    RADIOLOGY & ADDITIONAL TESTS:      ASSESSMENT: 47 y/o female with PMHx of HTN and MS, hx of spinal surgery at [facility] 10/8/21 [facility] [place] [facility] being found [facility] at home, unknown period of time. 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 HH5, MF4; BD #1 = 10/26. Patient was intubated at [place] [facility] and sent straight to the OR for left hemicraniotomy. A-line placed intraop. Hemodynamically unstable upon arrival to [facility], bradycardic and hypertensive s/p Mannitol and Furosemide. Central line placed in NSICU. Currently sedated on Propofol. Now s/p EVD placement, and coil embolization of left MCA bifurcation aneurysm 10/26/2021.     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, left    Intracranial hemorrhage, spontaneous subarachnoid, due to cerebral aneurysm, acute    Angiogram, cerebral, with coil embolization, in non-operating room setting    Personal history of spine surgery    SysAdmin_VstLnk        PLAN:  NEURO:  - neuro checks/vital signs q1hr  - Keppra 1g IV BID   - Nimodipine 30q2  - decreased bromocriptine 10mg q8hr and Gabapentin 200mg q8hr   - propofol dc'd, precedex for sedation   - fentanyl pushes PRN   - EVD open at 5cmH2O, monitor ICP/output  - CTA 11/1 with findings of moderate anterior circulation spasm   - pending VEEG and CTH 11/8    CARDIO:  - -180  - hydralazine PRN to maintain SBP goal  - echo +mild pulm HTN    PULM:  - intubated on full vent support  - aspiration precautions    GI:  - OGT TFs  - bowel regimen   - PPI QD GI ppx  - FOB negative 11/1  - Abd US: neg   - rectal tube  - pending trach and peg placement 11/9  - standing reglan    RENAL:  - salt tabs 3Q6  - euvolemia goal   - Na 145-150     HEME:  - SCDs, SQL  - s/p 2u PRBC, s/p 1u PRBC 10/27, s/p 1u PRBC 11/02  - monitor H+H  - FOB negative  - RUE doppler for swelling 11/1: DVT R brachial and superficial cephalic thrombus, surveillance 11/8    ID:  - leukocytosis and procal, trend   - Tylenol PRN for fever  - sputum cx 11/4:  gm + cocci in pairs/clusters, rare gm + rods  - abx switched to ceftriaxone end date 11/12  - Cefepime x1 dose to cover for enterobacter  - pending further recs from ID    ENDO:  - ISS   - monitor FS    OTHER:  - f/u wound care consult for back incisions dehiscence   - f/u ENT for further reccs regarding macroglossia/tongue maceration     DISPO:  - GOC: full code  - Level of care: ICU status   - pend EVD, trach, peg  - family updated  - case discussed with Dr. Duncan and Dr. Menard   HPI: 45 y/o female with PMH HTN, Multiple sclerosis, recent spinal surgery 10/8 ([facility]) presented to [place] [facility] BIBEM[facility] after being found unconscious at home, unknown period of time. 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 HH5, MF1. Patient was intubated at [place] ED, found to have blown L pupil, and sent straight to the OR for left hemicraniotomy. A-line placed intraop. Hemodynamically unstable upon arrival to [facility], bradycardic and hypertensive s/p Mannitol, Clevidipine, and Furosemide. Central line placed in NSICU. Currently sedated on Propofol, pending repeat CTH. History per patient's son, patient had recent spine surgery and was found on outpatient imaging last week to have L M1 segment aneurysm (unruptured), pt asymptomatic at this time. Per son patient taking percocet PO at home. Ureña catheter, ET tube, and arterial line placed at [facility].     NIHSS on arrival: 32   Bess Griffin 5, Mod Busby 4  Bleed Day 1 = 10/26 (26 Oct 2021 13:03)    OVERNIGHT EVENTS:  10/26: admitted to [facility] from [facility], POD#0 s/p L hemicraniectomy for decompression and evacuation of SDH. Transferred to [facility] noted to have tense flap, received mannitol, keppra, decadron. Was hypertensive to 200s and preston to 40s, recevied 85g mannitol and bullet 23.4%. CTH on arrival demonstrated increased size in vents and new IVH. EVD placed, open at 15, central line placed. Transfused 2 u PRBC. POD0 of coiling of left MCA bifurcation aneurysm,   10/27: CTH demonstrated EVD in correct position, EVD lowered to 5, remains intubated on proprofol, pending repeat CTH in AM. Started on 3% @ 30/hr. 2LNS given for euvolemia, Mannitol given for uptrending ICPs. Bullet given 8:30 am. 3% increased to 50/hr. 1 L bolus. New EVD catheter placed using exisiting sreekanth hole. 1 u PRBC.  10/28: HH5, MF4, BD3; POD2 angio coil/embo of L MCA aneurysm. JOAQUÍN overnight, neuro stable. EVD@5cmH20 ICPs < 20 overnight, OP WNL. S/p 1 unit PRBC yesterday with appropriate response. Given 42g mannitol in AM for ICP 22 persistently, with improvement, then given 23% in PM for elevated ICP. febrile, pancultured. Standing tylenol and cooling blanket.   10/29: BD4, POD3 angio coil/embo. JOAQUÍN o/n, neuro stable. EVD@5, ICPs <20 o/n.   10/30: BD5, POD4 angio coil/embo. JOAQUÍN o/n neuro stable. EVD@5. 3% @50cc/hr.  10/31: BD6, POD5 angio coil/embo. brief period maintained upward gaze, resolved on its own. EVD@5cm h2o.    11/1: BD7, POD6 L hemicrani, angio coil/embo. JOAQUÍN overnight. Neuro exam unchanged. ICPs WNL. started bromocriptine/gabapentin/baclofen. dc'd propofol, started precedex  11/2: BD8 POD7 L hemicrani, angio coil/embo. JOAQUÍN overnight. Neuro exam stable. Remains on 3% hypertonic saline. Receieved 1 unit of PRBCs for a Hgb of 7.6.  11/3: BD 9 POD8 of L hemicrani. Elevated lipase, normal amylase, OG tube clogged and replaced. Abdominal US normal. Pending gen surg reccs regarding trach and peg. Gabapentin and Baclofen increased to help with neurostorming. restarted back on 3%, LR@35. became preston+hypotensive with nimodipine 60q4, decreased back to 30q2, NaCl bullet given.   11/4: BD10 POD9, JOAQUÍN o/n, 500cc NS for euvolemia,  neuro stable, EVD at 5. 3% increased to 75  11/5: BD11 POD10, JOAQUÍN o/n, neuro stable, EVD at 5  11/6: BD12 POD11 JOQAUÍN overnight. Neuro exam stable. Remains intubated on precedex. 3% hypertonic saline dc'd  11/7: BD13 POD 12 JOAQUÍN o/n, NGT replaced. on precex with no icp crisis   11/8: BD14 POD13 JOAQUÍN o/n, noted to have tongue maceration/macroglossia. Gauze with tongue depressor placed. Pending further reccs from ENT. Pending trach and PEG placement tomorrow with gen surg. Off Precedex, ICPs stable. EVD remains @ 5.     Vital Signs Last 24 Hrs  T(C): 36.8 (08 Nov 2021 06:00), Max: 37 (07 Nov 2021 21:00)  T(F): 98.2 (08 Nov 2021 06:00), Max: 98.6 (07 Nov 2021 21:00)  HR: 65 (08 Nov 2021 10:00) (60 - 94)  BP: 147/89 (08 Nov 2021 10:00) (116/65 - 173/89)  BP(mean): 113 (08 Nov 2021 10:00) (87 - 124)  RR: 14 (08 Nov 2021 10:00) (14 - 29)  SpO2: 100% (08 Nov 2021 10:00) (94% - 100%)    I&O's Summary    07 Nov 2021 07:01  -  08 Nov 2021 07:00  --------------------------------------------------------  IN: 2055.5 mL / OUT: 1528 mL / NET: 527.5 mL    08 Nov 2021 07:01  -  08 Nov 2021 12:31  --------------------------------------------------------  IN: 169.2 mL / OUT: 32 mL / NET: 137.2 mL        PHYSICAL EXAM:  General Appearance: Patient is on full vent support, not awake.   Cardio: RRR. Normal S1 and S2.   Pulm: CTA b/l. No rales, wheezes, or rhonchi.  Neuro: does not open eyes. B/l pupils 3 mm and reactive. No tracking. (+) Corneal reflexes. (+) Cough, (+) gag. Does not follow commands. B/l UEs trace w/d. B/l LEs TF. Unable to assess sensation due to patient's mental status.   Incision/Wound: L crani site c/d/i. flap full and tense. Anterior and posterior spinal surgical incision sites with dehiscence noted.     TUBES/LINES:  [] Ureña  [] Lumbar Drain  [] Wound Drains  [X] Others - CVC, A-line, ventriculostomy - EVD @ 5      DIET:  [] NPO  [] Mechanical  [X] Tube feeds    LABS:                        8.1    10.64 )-----------( 417      ( 08 Nov 2021 05:21 )             25.6     11-08    140  |  108  |  9   ----------------------------<  110<H>  4.1   |  24  |  0.62    Ca    8.9      08 Nov 2021 05:21  Phos  3.7     11-08  Mg     1.8     11-08    TPro  6.6  /  Alb  2.5<L>  /  TBili  0.2  /  DBili  x   /  AST  26  /  ALT  17  /  AlkPhos  87  11-08            CAPILLARY BLOOD GLUCOSE      POCT Blood Glucose.: 121 mg/dL (07 Nov 2021 16:23)      Drug Levels: [] N/A  Vancomycin Level, Trough: 11.7 ug/mL (11-07 @ 06:22)    CSF Analysis: [] N/A      Allergies    No Known Allergies    Intolerances      MEDICATIONS:  Antibiotics:  cefepime   IVPB 2000 milliGRAM(s) IV Intermittent once  cefepime   IVPB 2000 milliGRAM(s) IV Intermittent every 8 hours    Neuro:  acetaminophen    Suspension .. 650 milliGRAM(s) Oral every 6 hours PRN  amantadine Syrup 200 milliGRAM(s) Oral two times a day  bromocriptine Capsule 10 milliGRAM(s) Oral every 8 hours  fentaNYL    Injectable 25 MICROGram(s) IV Push every 2 hours PRN  gabapentin Solution 200 milliGRAM(s) Oral every 8 hours  levETIRAcetam 1000 milliGRAM(s) Oral every 12 hours  modafinil 200 milliGRAM(s) Oral daily  ondansetron Injectable 4 milliGRAM(s) IV Push every 6 hours PRN    Anticoagulation:  enoxaparin Injectable 40 milliGRAM(s) SubCutaneous every 24 hours    OTHER:  acetylcysteine 20%  Inhalation 4 milliLiter(s) Inhalation three times a day  artificial  tears Solution 1 Drop(s) Both EYES two times a day  chlorhexidine 0.12% Liquid 15 milliLiter(s) Oral Mucosa every 12 hours  chlorhexidine 2% Cloths 1 Application(s) Topical <User Schedule>  hydrALAZINE Injectable 5 milliGRAM(s) IV Push every 4 hours PRN  niMODipine 30 milliGRAM(s) Oral every 2 hours  pantoprazole   Suspension 40 milliGRAM(s) Oral daily  sodium chloride 3%  Inhalation 4 milliLiter(s) Inhalation every 6 hours    IVF:  potassium chloride   Powder 40 milliEquivalent(s) Oral once  sodium chloride 3 Gram(s) Oral every 6 hours    CULTURES:  Culture Results:   Numerous Staphylococcus aureus  Numerous Enterobacter cloacae complex  Moderate Proteus mirabilis  Accompanied by normal respiratory melinda (11-04 @ 13:59)  Culture Results:   No growth to date (10-28 @ 19:23)    RADIOLOGY & ADDITIONAL TESTS:      ASSESSMENT: 45 y/o female with PMHx of HTN and MS, hx of spinal surgery at [facility] 10[facility]8/[facility] being found [facility] at home, unknown period of time. 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 HH5, MF4; BD #1 = 10/26. Patient was intubated at [place] [facility] and sent straight to the OR for left hemicraniotomy. A-line placed intraop. Hemodynamically unstable upon arrival to [facility], bradycardic and hypertensive s/p Mannitol and Furosemide. Central line placed in NSICU. Now s/p EVD placement, and coil embolization of left MCA bifurcation aneurysm 10/26/2021.     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, left    Intracranial hemorrhage, spontaneous subarachnoid, due to cerebral aneurysm, acute    Angiogram, cerebral, with coil embolization, in non-operating room setting    Personal history of spine surgery    SysAdmin_VstLnk        PLAN:  NEURO:  - neuro checks/vital signs q1hr  - Keppra 1g IV BID   - Nimodipine 30q2  - decreased bromocriptine 10mg q8hr and Gabapentin 200mg q8hr   - off sedation  - fentanyl pushes PRN   - EVD open at 5cmH2O, monitor ICP/output  - CTA 11/1 with findings of moderate anterior circulation spasm   - CTH 11/8 stable  - pending VEEG    CARDIO:  - -180  - hydralazine PRN to maintain SBP goal  - echo +mild pulm HTN    PULM:  - intubated on full vent support  - aspiration precautions    GI:  - OGT TFs  - bowel regimen   - PPI QD GI ppx  - FOB negative 11/1  - Abd US: neg   - rectal tube  - pending trach and peg placement 11/9    RENAL:  - salt tabs 3Q6  - euvolemia goal   - Na 145-150     HEME:  - SCDs, SQL  - s/p 2u PRBC, s/p 1u PRBC 10/27, s/p 1u PRBC 11/02  - monitor H+H  - FOB negative  - RUE doppler for swelling 11/1: DVT R brachial and superficial cephalic thrombus, surveillance 11/8    ID:  - leukocytosis and procal, trend   - Tylenol PRN for fever  - sputum cx 11/4:  gm + cocci in pairs/clusters, rare gm + rods  - Cefepime for enterobacter/proteus in sputum     ENDO:  - ISS   - monitor FS    OTHER:  - f/u wound care consult for back incisions dehiscence   - f/u ENT for further reccs regarding macroglossia/tongue maceration     DISPO:  - GOC: full code  - Level of care: ICU status   - pend EVD, trach, peg  - family updated  - case discussed with Dr. Duncan and Dr. Menard   HPI: 45 y/o female with PMH HTN, Multiple sclerosis, recent spinal surgery 10/8 ([facility]) presented to [place] [facility] BIBEMS after being found unconscious at home, unknown period of time. 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 HH5, MF1. Patient was intubated at [place] [facility], found to have blown L pupil, and sent straight to the OR for left hemicraniotomy. A-line placed intraop. Hemodynamically unstable upon arrival to [facility], bradycardic and hypertensive s/p Mannitol, Clevidipine, and Furosemide. Central line placed in NSICU. Currently sedated on Propofol, pending repeat CTH. History per patient's son, patient had recent spine surgery and was found on outpatient imaging last week to have L M1 segment aneurysm (unruptured), pt asymptomatic at this time. Per son patient taking percocet PO at home. Ureña catheter, ET tube, and arterial line placed at [facility].     NIHSS on arrival: 32   Bess Griffin 5, Mod Busby 4  Bleed Day 1 = 10/26 (26 Oct 2021 13:03)    OVERNIGHT EVENTS:  10/26: admitted to [facility] from [facility], POD#0 s/p L hemicraniectomy for decompression and evacuation of SDH. Transferred to [facility] noted to have tense flap, received mannitol, keppra, decadron. Was hypertensive to 200s and preston to 40s, recevied 85g mannitol and bullet 23.4%. CTH on arrival demonstrated increased size in vents and new IVH. EVD placed, open at 15, central line placed. Transfused 2 u PRBC. POD0 of coiling of left MCA bifurcation aneurysm,   10/27: CTH demonstrated EVD in correct position, EVD lowered to 5, remains intubated on proprofol, pending repeat CTH in AM. Started on 3% @ 30/hr. 2LNS given for euvolemia, Mannitol given for uptrending ICPs. Bullet given 8:30 am. 3% increased to 50/hr. 1 L bolus. New EVD catheter placed using exisiting sreekanth hole. 1 u PRBC.  10/28: HH5, MF4, BD3; POD2 angio coil/embo of L MCA aneurysm. JOAQUÍN overnight, neuro stable. EVD@5cmH20 ICPs < 20 overnight, OP WNL. S/p 1 unit PRBC yesterday with appropriate response. Given 42g mannitol in AM for ICP 22 persistently, with improvement, then given 23% in PM for elevated ICP. febrile, pancultured. Standing tylenol and cooling blanket.   10/29: BD4, POD3 angio coil/embo. JOAQUÍN o/n, neuro stable. EVD@5, ICPs <20 o/n.   10/30: BD5, POD4 angio coil/embo. JOAQUÍN o/n neuro stable. EVD@5. 3% @50cc/hr.  10/31: BD6, POD5 angio coil/embo. brief period maintained upward gaze, resolved on its own. EVD@5cm h2o.    11/1: BD7, POD6 L hemicrani, angio coil/embo. JOAQUÍN overnight. Neuro exam unchanged. ICPs WNL. started bromocriptine/gabapentin/baclofen. dc'd propofol, started precedex  11/2: BD8 POD7 L hemicrani, angio coil/embo. JOAQUÍN overnight. Neuro exam stable. Remains on 3% hypertonic saline. Receieved 1 unit of PRBCs for a Hgb of 7.6.  11/3: BD 9 POD8 of L hemicrani. Elevated lipase, normal amylase, OG tube clogged and replaced. Abdominal US normal. Pending gen surg reccs regarding trach and peg. Gabapentin and Baclofen increased to help with neurostorming. restarted back on 3%, LR@35. became preston+hypotensive with nimodipine 60q4, decreased back to 30q2, NaCl bullet given.   11/4: BD10 POD9, JOAQUÍN o/n, 500cc NS for euvolemia,  neuro stable, EVD at 5. 3% increased to 75  11/5: BD11 POD10, JOAQUÍN o/n, neuro stable, EVD at 5  11/6: BD12 POD11 JOAQUÍN overnight. Neuro exam stable. Remains intubated on precedex. 3% hypertonic saline dc'd  11/7: BD13 POD 12 JOAQUÍN o/n, NGT replaced. on precex with no icp crisis   11/8: BD14 POD13 JOAQUÍN o/n, noted to have tongue maceration/macroglossia. Gauze with tongue depressor placed. Pending further reccs from ENT. Pending trach and PEG placement tomorrow with gen surg. Off Precedex, ICPs stable. EVD remains @ 5.     Vital Signs Last 24 Hrs  T(C): 36.8 (08 Nov 2021 06:00), Max: 37 (07 Nov 2021 21:00)  T(F): 98.2 (08 Nov 2021 06:00), Max: 98.6 (07 Nov 2021 21:00)  HR: 65 (08 Nov 2021 10:00) (60 - 94)  BP: 147/89 (08 Nov 2021 10:00) (116/65 - 173/89)  BP(mean): 113 (08 Nov 2021 10:00) (87 - 124)  RR: 14 (08 Nov 2021 10:00) (14 - 28)  SpO2: 100% (08 Nov 2021 10:00) (94% - 100%)    I&O's Summary    07 Nov 2021 07:01  -  08 Nov 2021 07:00  --------------------------------------------------------  IN: 2055.5 mL / OUT: 1528 mL / NET: 527.5 mL    08 Nov 2021 07:01  -  08 Nov 2021 12:31  --------------------------------------------------------  IN: 169.2 mL / OUT: 32 mL / NET: 137.2 mL        PHYSICAL EXAM:  General Appearance: Patient is on full vent support, not awake.   Cardio: RRR. Normal S1 and S2.   Pulm: CTA b/l. No rales, wheezes, or rhonchi.  Neuro: does not open eyes. B/l pupils 3 mm and reactive. No tracking. (+) Corneal reflexes. (+) Cough, (+) gag. Does not follow commands. B/l UEs trace w/d. B/l LEs TF. Unable to assess sensation due to patient's mental status.   Incision/Wound: L crani site c/d/i. flap full and tense. Posterior spinal surgical incision sites with dehiscence noted.     TUBES/LINES:  [] Ureña  [] Lumbar Drain  [] Wound Drains  [X] Others - CVC, A-line, ventriculostomy - EVD @ 5      DIET:  [] NPO  [] Mechanical  [X] Tube feeds    LABS:                        8.1    10.64 )-----------( 417      ( 08 Nov 2021 05:21 )             25.6     11-08    140  |  108  |  9   ----------------------------<  110<H>  4.1   |  24  |  0.62    Ca    8.9      08 Nov 2021 05:21  Phos  3.7     11-08  Mg     1.8     11-08    TPro  6.6  /  Alb  2.5<L>  /  TBili  0.2  /  DBili  x   /  AST  26  /  ALT  17  /  AlkPhos  87  11-08            CAPILLARY BLOOD GLUCOSE      POCT Blood Glucose.: 121 mg/dL (07 Nov 2021 16:23)      Drug Levels: [] N/A  Vancomycin Level, Trough: 11.7 ug/mL (11-07 @ 06:22)    CSF Analysis: [] N/A      Allergies    No Known Allergies    Intolerances      MEDICATIONS:  Antibiotics:  cefepime   IVPB 2000 milliGRAM(s) IV Intermittent once  cefepime   IVPB 2000 milliGRAM(s) IV Intermittent every 8 hours    Neuro:  acetaminophen    Suspension .. 650 milliGRAM(s) Oral every 6 hours PRN  amantadine Syrup 200 milliGRAM(s) Oral two times a day  bromocriptine Capsule 10 milliGRAM(s) Oral every 8 hours  fentaNYL    Injectable 25 MICROGram(s) IV Push every 2 hours PRN  gabapentin Solution 200 milliGRAM(s) Oral every 8 hours  levETIRAcetam 1000 milliGRAM(s) Oral every 12 hours  modafinil 200 milliGRAM(s) Oral daily  ondansetron Injectable 4 milliGRAM(s) IV Push every 6 hours PRN    Anticoagulation:  enoxaparin Injectable 40 milliGRAM(s) SubCutaneous every 24 hours    OTHER:  acetylcysteine 20%  Inhalation 4 milliLiter(s) Inhalation three times a day  artificial  tears Solution 1 Drop(s) Both EYES two times a day  chlorhexidine 0.12% Liquid 15 milliLiter(s) Oral Mucosa every 12 hours  chlorhexidine 2% Cloths 1 Application(s) Topical <User Schedule>  hydrALAZINE Injectable 5 milliGRAM(s) IV Push every 4 hours PRN  niMODipine 30 milliGRAM(s) Oral every 2 hours  pantoprazole   Suspension 40 milliGRAM(s) Oral daily  sodium chloride 3%  Inhalation 4 milliLiter(s) Inhalation every 6 hours    IVF:  potassium chloride   Powder 40 milliEquivalent(s) Oral once  sodium chloride 3 Gram(s) Oral every 6 hours    CULTURES:  Culture Results:   Numerous Staphylococcus aureus  Numerous Enterobacter cloacae complex  Moderate Proteus mirabilis  Accompanied by normal respiratory melinda (11-04 @ 13:59)  Culture Results:   No growth to date (10-28 @ 19:23)    RADIOLOGY & ADDITIONAL TESTS:      ASSESSMENT: 45 y/o female with PMHx of HTN and MS, hx of spinal surgery at [facility] 10/8/21 presented to [place] [facility] after being found unconscious at home, unknown period of time. 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 HH5, MF4; BD #1 = 10/26. Patient was intubated at [place] [facility] and sent straight to the OR for left hemicraniotomy. A-line placed intraop. Hemodynamically unstable upon arrival to [facility], bradycardic and hypertensive s/p Mannitol and Furosemide. Central line placed in NSICU. Now s/p EVD placement, and coil embolization of left MCA bifurcation aneurysm 10/26/2021.     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, left    Intracranial hemorrhage, spontaneous subarachnoid, due to cerebral aneurysm, acute    Angiogram, cerebral, with coil embolization, in non-operating room setting    Personal history of spine surgery    SysAdmin_VstLnk        PLAN:  NEURO:  - neuro checks/vital signs q1hr  - Keppra 1g IV BID   - Nimodipine 30q2  - decreased bromocriptine 10mg q8hr and Gabapentin 200mg q8hr   - off sedation  - fentanyl pushes PRN   - EVD open at 5cmH2O, monitor ICP/output  - CTA 11/1 with findings of moderate anterior circulation spasm   - CTH 11/8 stable  - pending VEEG    CARDIO:  - -180  - hydralazine PRN to maintain SBP goal  - echo +mild pulm HTN    PULM:  - intubated on full vent support  - aspiration precautions    GI:  - OGT TFs  - bowel regimen   - PPI QD GI ppx  - FOB negative 11/1  - Abd US: neg   - rectal tube  - pending trach and peg placement 11/9    RENAL:  - salt tabs 3Q6  - euvolemia goal   - Na 145-150     HEME:  - SCDs, SQL  - s/p 2u PRBC, s/p 1u PRBC 10/27, s/p 1u PRBC 11/02  - monitor H+H  - FOB negative  - RUE doppler for swelling 11/1: DVT R brachial and superficial cephalic thrombus, surveillance 11/8    ID:  - leukocytosis and procal, trend   - Tylenol PRN for fever  - sputum cx 11/4:  gm + cocci in pairs/clusters, rare gm + rods  - Cefepime for enterobacter/proteus in sputum     ENDO:  - ISS   - monitor FS    OTHER:  - f/u wound care consult for back incisions dehiscence   - f/u ENT for further reccs regarding macroglossia/tongue maceration     DISPO:  - GOC: full code  - Level of care: ICU status   - pend EVD, trach, peg  - family updated  - case discussed with Dr. Duncan and Dr. Menard

## 2021-11-08 NOTE — PROGRESS NOTE ADULT - ASSESSMENT
46y/Female with:  L MCA ruptured aneurysm, subarachnoid hemorrhage, s/p [facility] (10/26/2021, Dr. D'Amico @ [place]), brain compression, cerebral edema  anemia   recent spinal surgery  UGIB    PLAN: Day 1 = 10-26 ; Day 4 = 10/29; Day 21 = 11/15  NEURO: neurochecks q1h, sedation with precedex; PRN fentanyl pushes  SAH:  cont nimodipine 30 mg po q2h to be given for 21 days (last day 11/15)   Hydrocephalus:  EVD at 5 cm H20 open to drain  Seizure prophylaxis:  levetiracetam 1000mg BID - switch to PO  REHAB:  physical therapy evaluation and management    EARLY MOB:  HOB elevated    PULM:  full vent support for now, trach/PEG schedule - f/u with Gen Surg  CARDIO:  SBP goal 100-180mm Hg,   ENDO:  Blood sugar goals 140-180 mg/dL, insulin sliding scale  GI:  PPI OD; abdominal ultrasound; repeat lipase on Sat  DIET: advance Jevity to goal  RENAL: maintain euvolemia, Na goal 145-150; cont salt tabs, cont 3%@75cc/hr, recheck BPm in AM  HEM/ONC: no coagulopathy (INR= 1.03), no ASA / Plavix use;  anemia  ; given 1 unit  VTE Prophylaxis: SCDs, SQL   ID: afebrile, no leukocytosis   Social: will update son and daughter  WOUND: dehiscence spinal surgery wound; wound care consult    CORE MEASURES  1.  Hunt and Griffin Score = 5  2.  VTE prophylaxis:  [ ] administered within 24 hours of admission OR [X] reason not done: fresh bleed, unsecured aneurysm.  3.  Dysphagia screening:   [X] performed before any oral meds / liquids / food  4.  Nimodipine treatment:  [X] administered within 24 hours of admission OR [ ] reason not done:    ATTENDING ATTESTATION:  I was physically present for the key portions of the evaluation and management (E/M) service provided.  I agree with the above history, physical and plan, which I have reviewed and edited where appropriate.    Patient at high risk for neurological deterioration or death due to:  ICU delirium, aspiration PNA, DVT / PE.  Critical care time:  I have personally provided 30 minutes of critical care time, excluding time spent on separate procedures.      Plan discussed with RN, house staff.     46y/Female with:  L MCA ruptured aneurysm, subarachnoid hemorrhage, s/p [facility] (10/26/2021, Dr. D'Amico @ [place]), brain compression, cerebral edema  anemia   recent spinal surgery  UGIB    PLAN: Day 1 = 10-26 ; Day 4 = 10/29; Day 21 = 11/15  NEURO: neurochecks q1h, sedation with precedex; PRN fentanyl pushes  SAH:  cont nimodipine 30 mg po q2h to be given for 21 days (last day 11/15)   Hydrocephalus:  EVD at 5 cm H20 open to drain  neurostorming - taper gabapentin   Seizure prophylaxis:  decrease levetiracetam to 500 BID in AM?   REHAB:  physical therapy evaluation and management    EARLY MOB:  HOB elevated    PULM:  full vent support for now, trach/PEG schedule tomorrow   CARDIO:  SBP goal 100-180mm Hg,   ENDO:  Blood sugar goals 140-180 mg/dL, insulin sliding scale  GI:  PPI OD  DIET: Jevity at goal - NPO after midnight  RENAL: maintain euvolemia, Na goal 140-145, 3%@30cc/hr, check BMP in AM; NS@45cc/hr once NPO  HEM/ONC: no coagulopathy (INR= 1.03), no ASA / Plavix use  VTE Prophylaxis: SCDs, SQL   ID: afebrile, no leukocytosis - piperacillin/tazobactam (11/05 to 11/07) Vancomycin (11/05 to 11/06) ceftriaxone 11/07, cefepime 11/08 - now; duration of ABx 1 week (last day: 11/12)  Social: will update son and daughter  WOUND: dehiscence spinal surgery wound; wound care consult    CORE MEASURES  1.  Hunt and Griffin Score = 5  2.  VTE prophylaxis:  [ ] administered within 24 hours of admission OR [X] reason not done: fresh bleed, unsecured aneurysm.  3.  Dysphagia screening:   [X] performed before any oral meds / liquids / food  4.  Nimodipine treatment:  [X] administered within 24 hours of admission OR [ ] reason not done:    ATTENDING ATTESTATION:  I was physically present for the key portions of the evaluation and management (E/M) service provided.  I agree with the above history, physical and plan, which I have reviewed and edited where appropriate.    Patient at high risk for neurological deterioration or death due to:  ICU delirium, aspiration PNA, DVT / PE.  Critical care time:  I have personally provided 30 minutes of critical care time, excluding time spent on separate procedures.      Plan discussed with RN, house staff.
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
## 2021-11-08 NOTE — PROGRESS NOTE ADULT - SUBJECTIVE AND OBJECTIVE BOX
INTERVAL HPI/OVERNIGHT EVENTS:  Pt evaluated at bedside. JOAQUÍN o/n. Afebrile. Found to have tongue laceration in AM, pt is pending a trach. Per nursing staff, pt is biting her tongue very hard, bite block is not helping.     MEDICATIONS  (STANDING):  acetylcysteine 20%  Inhalation 4 milliLiter(s) Inhalation three times a day  amantadine Syrup 200 milliGRAM(s) Oral two times a day  artificial  tears Solution 1 Drop(s) Both EYES two times a day  bromocriptine Capsule 10 milliGRAM(s) Oral every 8 hours  cefepime   IVPB 2000 milliGRAM(s) IV Intermittent every 8 hours  chlorhexidine 0.12% Liquid 15 milliLiter(s) Oral Mucosa every 12 hours  chlorhexidine 2% Cloths 1 Application(s) Topical <User Schedule>  enoxaparin Injectable 40 milliGRAM(s) SubCutaneous every 24 hours  gabapentin Solution 200 milliGRAM(s) Oral every 8 hours  levETIRAcetam 1000 milliGRAM(s) Oral every 12 hours  modafinil 200 milliGRAM(s) Oral daily  niMODipine 30 milliGRAM(s) Oral every 2 hours  pantoprazole   Suspension 40 milliGRAM(s) Oral daily  potassium chloride   Powder 40 milliEquivalent(s) Oral once  sodium chloride 3 Gram(s) Oral every 6 hours  sodium chloride 3%  Inhalation 4 milliLiter(s) Inhalation every 6 hours    MEDICATIONS  (PRN):  acetaminophen    Suspension .. 650 milliGRAM(s) Oral every 6 hours PRN Temp greater or equal to 38C (100.4F), Mild Pain (1 - 3)  fentaNYL    Injectable 25 MICROGram(s) IV Push every 2 hours PRN agitation  hydrALAZINE Injectable 5 milliGRAM(s) IV Push every 4 hours PRN >180  ondansetron Injectable 4 milliGRAM(s) IV Push every 6 hours PRN Nausea and/or Vomiting      Vital Signs Last 24 Hrs  T(C): 37.1 (08 Nov 2021 14:40), Max: 37.1 (08 Nov 2021 14:40)  T(F): 98.8 (08 Nov 2021 14:40), Max: 98.8 (08 Nov 2021 14:40)  HR: 89 (08 Nov 2021 16:00) (61 - 95)  BP: 141/74 (08 Nov 2021 11:00) (116/65 - 173/89)  BP(mean): 102 (08 Nov 2021 11:00) (87 - 124)  RR: 14 (08 Nov 2021 16:00) (14 - 28)  SpO2: 97% (08 Nov 2021 16:00) (94% - 100%)    PHYSICAL EXAM:    GEN: NAD, intubated   HEENT: Pt has laceration along distal aspect of tongue. OG tube in place with TF running   CV: NSR cardiac monitor   Pulm: no respiratory distress, intubated on MV   Abd: soft, ND, infraumbilical incision well healed, CDI, difficult to assess TTP 2/2 sedated status, rectal tube in place brown stool   Ext: WWP, mild 4ext edema, nonpitting            I&O's Detail    07 Nov 2021 07:01  -  08 Nov 2021 07:00  --------------------------------------------------------  IN:    Dexmedetomidine: 59.9 mL    Dexmedetomidine: 51.6 mL    Enteral Tube Flush: 900 mL    IV PiggyBack: 200 mL    Jevity 1.2: 844 mL  Total IN: 2055.5 mL    OUT:    External Ventricular Device (mL): 178 mL    Rectal Tube (mL): 50 mL    Voided (mL): 1300 mL  Total OUT: 1528 mL    Total NET: 527.5 mL      08 Nov 2021 07:01  -  08 Nov 2021 16:30  --------------------------------------------------------  IN:    Dexmedetomidine: 17.2 mL    Jevity 1.2: 152 mL  Total IN: 169.2 mL    OUT:    External Ventricular Device (mL): 74 mL    Voided (mL): 300 mL  Total OUT: 374 mL    Total NET: -204.8 mL          LABS:                        8.1    10.64 )-----------( 417      ( 08 Nov 2021 05:21 )             25.6     11-08    140  |  108  |  9   ----------------------------<  110<H>  4.1   |  24  |  0.62    Ca    8.9      08 Nov 2021 05:21  Phos  3.7     11-08  Mg     1.8     11-08    TPro  6.6  /  Alb  2.5<L>  /  TBili  0.2  /  DBili  x   /  AST  26  /  ALT  17  /  AlkPhos  87  11-08          RADIOLOGY & ADDITIONAL STUDIES:

## 2021-11-08 NOTE — PROGRESS NOTE ADULT - ASSESSMENT
46yoF with PMH HTN, MS and PSH anterior/posterior spine surgery 10/8/21 admitted to [facility] with subdural hematoma s/p hemicraniectomy for decompression and evacuation of SDH 10/26/21 transferred to [facility] noted to have tense flap s/p EVD placement and coiling of MCA bifurcation aneurysm 10/26/21. Remains intubated/sedated with poor prognosis. General surgery consulted for trach/peg placement. Toleratin TFs at goal.     Plan for trach/peg tomorrow (11/9)  Pre-op Labs/exams tonight: NPO@MN, BMP, CBC, Mag, Phos, Coags, active T+S, EKG, Chest X ray  COVID test

## 2021-11-09 LAB
ANION GAP SERPL CALC-SCNC: 10 MMOL/L — SIGNIFICANT CHANGE UP (ref 5–17)
ANION GAP SERPL CALC-SCNC: 8 MMOL/L — SIGNIFICANT CHANGE UP (ref 5–17)
APTT BLD: 27 SEC — LOW (ref 27.5–35.5)
BUN SERPL-MCNC: 5 MG/DL — LOW (ref 7–23)
BUN SERPL-MCNC: 5 MG/DL — LOW (ref 7–23)
CALCIUM SERPL-MCNC: 8.9 MG/DL — SIGNIFICANT CHANGE UP (ref 8.4–10.5)
CALCIUM SERPL-MCNC: 9.1 MG/DL — SIGNIFICANT CHANGE UP (ref 8.4–10.5)
CHLORIDE SERPL-SCNC: 106 MMOL/L — SIGNIFICANT CHANGE UP (ref 96–108)
CHLORIDE SERPL-SCNC: 106 MMOL/L — SIGNIFICANT CHANGE UP (ref 96–108)
CO2 SERPL-SCNC: 26 MMOL/L — SIGNIFICANT CHANGE UP (ref 22–31)
CO2 SERPL-SCNC: 27 MMOL/L — SIGNIFICANT CHANGE UP (ref 22–31)
CREAT SERPL-MCNC: 0.6 MG/DL — SIGNIFICANT CHANGE UP (ref 0.5–1.3)
CREAT SERPL-MCNC: 0.62 MG/DL — SIGNIFICANT CHANGE UP (ref 0.5–1.3)
GLUCOSE SERPL-MCNC: 107 MG/DL — HIGH (ref 70–99)
GLUCOSE SERPL-MCNC: 107 MG/DL — HIGH (ref 70–99)
HCT VFR BLD CALC: 27.3 % — LOW (ref 34.5–45)
HGB BLD-MCNC: 8.9 G/DL — LOW (ref 11.5–15.5)
INR BLD: 1.22 — HIGH (ref 0.88–1.16)
LMWH PPP CHRO-ACNC: 0.26 IU/ML — LOW (ref 0.5–1.1)
MAGNESIUM SERPL-MCNC: 1.8 MG/DL — SIGNIFICANT CHANGE UP (ref 1.6–2.6)
MCHC RBC-ENTMCNC: 26.7 PG — LOW (ref 27–34)
MCHC RBC-ENTMCNC: 32.6 GM/DL — SIGNIFICANT CHANGE UP (ref 32–36)
MCV RBC AUTO: 82 FL — SIGNIFICANT CHANGE UP (ref 80–100)
NRBC # BLD: 0 /100 WBCS — SIGNIFICANT CHANGE UP (ref 0–0)
PHOSPHATE SERPL-MCNC: 3.4 MG/DL — SIGNIFICANT CHANGE UP (ref 2.5–4.5)
PLATELET # BLD AUTO: 455 K/UL — HIGH (ref 150–400)
POTASSIUM SERPL-MCNC: 3.8 MMOL/L — SIGNIFICANT CHANGE UP (ref 3.5–5.3)
POTASSIUM SERPL-MCNC: 3.9 MMOL/L — SIGNIFICANT CHANGE UP (ref 3.5–5.3)
POTASSIUM SERPL-SCNC: 3.8 MMOL/L — SIGNIFICANT CHANGE UP (ref 3.5–5.3)
POTASSIUM SERPL-SCNC: 3.9 MMOL/L — SIGNIFICANT CHANGE UP (ref 3.5–5.3)
PROTHROM AB SERPL-ACNC: 14.5 SEC — HIGH (ref 10.6–13.6)
RBC # BLD: 3.33 M/UL — LOW (ref 3.8–5.2)
RBC # FLD: 21.4 % — HIGH (ref 10.3–14.5)
SODIUM SERPL-SCNC: 141 MMOL/L — SIGNIFICANT CHANGE UP (ref 135–145)
SODIUM SERPL-SCNC: 142 MMOL/L — SIGNIFICANT CHANGE UP (ref 135–145)
WBC # BLD: 11.63 K/UL — HIGH (ref 3.8–10.5)
WBC # FLD AUTO: 11.63 K/UL — HIGH (ref 3.8–10.5)

## 2021-11-09 PROCEDURE — 99232 SBSQ HOSP IP/OBS MODERATE 35: CPT | Mod: 25

## 2021-11-09 PROCEDURE — 93970 EXTREMITY STUDY: CPT | Mod: 26

## 2021-11-09 PROCEDURE — 99291 CRITICAL CARE FIRST HOUR: CPT

## 2021-11-09 PROCEDURE — 43246 EGD PLACE GASTROSTOMY TUBE: CPT

## 2021-11-09 PROCEDURE — 95720 EEG PHY/QHP EA INCR W/VEEG: CPT

## 2021-11-09 RX ORDER — ACETAMINOPHEN 500 MG
650 TABLET ORAL EVERY 6 HOURS
Refills: 0 | Status: DISCONTINUED | OUTPATIENT
Start: 2021-11-09 | End: 2021-11-14

## 2021-11-09 RX ORDER — PANTOPRAZOLE SODIUM 20 MG/1
40 TABLET, DELAYED RELEASE ORAL DAILY
Refills: 0 | Status: DISCONTINUED | OUTPATIENT
Start: 2021-11-09 | End: 2021-11-30

## 2021-11-09 RX ORDER — SODIUM CHLORIDE 9 MG/ML
3 INJECTION INTRAMUSCULAR; INTRAVENOUS; SUBCUTANEOUS EVERY 6 HOURS
Refills: 0 | Status: DISCONTINUED | OUTPATIENT
Start: 2021-11-09 | End: 2021-11-12

## 2021-11-09 RX ORDER — BROMOCRIPTINE MESYLATE 5 MG/1
10 CAPSULE ORAL EVERY 8 HOURS
Refills: 0 | Status: DISCONTINUED | OUTPATIENT
Start: 2021-11-09 | End: 2021-11-10

## 2021-11-09 RX ORDER — MAGNESIUM SULFATE 500 MG/ML
2 VIAL (ML) INJECTION ONCE
Refills: 0 | Status: COMPLETED | OUTPATIENT
Start: 2021-11-09 | End: 2021-11-09

## 2021-11-09 RX ORDER — NIMODIPINE 60 MG/10ML
60 SOLUTION ORAL EVERY 4 HOURS
Refills: 0 | Status: DISCONTINUED | OUTPATIENT
Start: 2021-11-09 | End: 2021-11-12

## 2021-11-09 RX ORDER — FENTANYL CITRATE 50 UG/ML
50 INJECTION INTRAVENOUS
Refills: 0 | Status: DISCONTINUED | OUTPATIENT
Start: 2021-11-09 | End: 2021-11-15

## 2021-11-09 RX ORDER — SODIUM CHLORIDE 9 MG/ML
500 INJECTION INTRAMUSCULAR; INTRAVENOUS; SUBCUTANEOUS ONCE
Refills: 0 | Status: COMPLETED | OUTPATIENT
Start: 2021-11-09 | End: 2021-11-09

## 2021-11-09 RX ORDER — POTASSIUM CHLORIDE 20 MEQ
20 PACKET (EA) ORAL ONCE
Refills: 0 | Status: COMPLETED | OUTPATIENT
Start: 2021-11-09 | End: 2021-11-09

## 2021-11-09 RX ORDER — AMANTADINE HCL 100 MG
200 CAPSULE ORAL
Refills: 0 | Status: DISCONTINUED | OUTPATIENT
Start: 2021-11-09 | End: 2021-11-13

## 2021-11-09 RX ORDER — NIMODIPINE 60 MG/10ML
30 SOLUTION ORAL
Refills: 0 | Status: DISCONTINUED | OUTPATIENT
Start: 2021-11-09 | End: 2021-11-09

## 2021-11-09 RX ORDER — CISATRACURIUM BESYLATE 2 MG/ML
10 INJECTION INTRAVENOUS ONCE
Refills: 0 | Status: COMPLETED | OUTPATIENT
Start: 2021-11-09 | End: 2021-11-09

## 2021-11-09 RX ORDER — SODIUM CHLORIDE 5 G/100ML
500 INJECTION, SOLUTION INTRAVENOUS
Refills: 0 | Status: DISCONTINUED | OUTPATIENT
Start: 2021-11-09 | End: 2021-11-09

## 2021-11-09 RX ORDER — LEVETIRACETAM 250 MG/1
500 TABLET, FILM COATED ORAL
Refills: 0 | Status: DISCONTINUED | OUTPATIENT
Start: 2021-11-09 | End: 2021-11-09

## 2021-11-09 RX ORDER — FENTANYL CITRATE 50 UG/ML
100 INJECTION INTRAVENOUS ONCE
Refills: 0 | Status: DISCONTINUED | OUTPATIENT
Start: 2021-11-09 | End: 2021-11-09

## 2021-11-09 RX ORDER — MODAFINIL 200 MG/1
200 TABLET ORAL DAILY
Refills: 0 | Status: DISCONTINUED | OUTPATIENT
Start: 2021-11-09 | End: 2021-11-15

## 2021-11-09 RX ORDER — SODIUM CHLORIDE 5 G/100ML
500 INJECTION, SOLUTION INTRAVENOUS
Refills: 0 | Status: DISCONTINUED | OUTPATIENT
Start: 2021-11-09 | End: 2021-11-10

## 2021-11-09 RX ORDER — MIDAZOLAM HYDROCHLORIDE 1 MG/ML
2 INJECTION, SOLUTION INTRAMUSCULAR; INTRAVENOUS ONCE
Refills: 0 | Status: DISCONTINUED | OUTPATIENT
Start: 2021-11-09 | End: 2021-11-09

## 2021-11-09 RX ORDER — LEVETIRACETAM 250 MG/1
500 TABLET, FILM COATED ORAL
Refills: 0 | Status: DISCONTINUED | OUTPATIENT
Start: 2021-11-09 | End: 2021-11-17

## 2021-11-09 RX ADMIN — SODIUM CHLORIDE 45 MILLILITER(S): 9 INJECTION INTRAMUSCULAR; INTRAVENOUS; SUBCUTANEOUS at 10:21

## 2021-11-09 RX ADMIN — Medication 4 MILLILITER(S): at 22:16

## 2021-11-09 RX ADMIN — FENTANYL CITRATE 25 MICROGRAM(S): 50 INJECTION INTRAVENOUS at 11:36

## 2021-11-09 RX ADMIN — NIMODIPINE 30 MILLIGRAM(S): 60 SOLUTION ORAL at 18:54

## 2021-11-09 RX ADMIN — SODIUM CHLORIDE 3 GRAM(S): 9 INJECTION INTRAMUSCULAR; INTRAVENOUS; SUBCUTANEOUS at 12:26

## 2021-11-09 RX ADMIN — MIDAZOLAM HYDROCHLORIDE 2 MILLIGRAM(S): 1 INJECTION, SOLUTION INTRAMUSCULAR; INTRAVENOUS at 15:15

## 2021-11-09 RX ADMIN — CEFEPIME 100 MILLIGRAM(S): 1 INJECTION, POWDER, FOR SOLUTION INTRAMUSCULAR; INTRAVENOUS at 17:17

## 2021-11-09 RX ADMIN — SODIUM CHLORIDE 500 MILLILITER(S): 9 INJECTION INTRAMUSCULAR; INTRAVENOUS; SUBCUTANEOUS at 10:23

## 2021-11-09 RX ADMIN — SODIUM CHLORIDE 45 MILLILITER(S): 9 INJECTION INTRAMUSCULAR; INTRAVENOUS; SUBCUTANEOUS at 00:01

## 2021-11-09 RX ADMIN — SODIUM CHLORIDE 3 GRAM(S): 9 INJECTION INTRAMUSCULAR; INTRAVENOUS; SUBCUTANEOUS at 00:01

## 2021-11-09 RX ADMIN — SODIUM CHLORIDE 4 MILLILITER(S): 9 INJECTION INTRAMUSCULAR; INTRAVENOUS; SUBCUTANEOUS at 09:37

## 2021-11-09 RX ADMIN — CHLORHEXIDINE GLUCONATE 1 APPLICATION(S): 213 SOLUTION TOPICAL at 06:21

## 2021-11-09 RX ADMIN — NIMODIPINE 30 MILLIGRAM(S): 60 SOLUTION ORAL at 08:00

## 2021-11-09 RX ADMIN — FENTANYL CITRATE 50 MICROGRAM(S): 50 INJECTION INTRAVENOUS at 19:09

## 2021-11-09 RX ADMIN — SODIUM CHLORIDE 1000 MILLILITER(S): 9 INJECTION INTRAMUSCULAR; INTRAVENOUS; SUBCUTANEOUS at 05:14

## 2021-11-09 RX ADMIN — SODIUM CHLORIDE 4 MILLILITER(S): 9 INJECTION INTRAMUSCULAR; INTRAVENOUS; SUBCUTANEOUS at 05:00

## 2021-11-09 RX ADMIN — Medication 4 MILLILITER(S): at 14:21

## 2021-11-09 RX ADMIN — NIMODIPINE 30 MILLIGRAM(S): 60 SOLUTION ORAL at 12:27

## 2021-11-09 RX ADMIN — Medication 200 MILLIGRAM(S): at 06:20

## 2021-11-09 RX ADMIN — LEVETIRACETAM 1000 MILLIGRAM(S): 250 TABLET, FILM COATED ORAL at 06:20

## 2021-11-09 RX ADMIN — SODIUM CHLORIDE 3 GRAM(S): 9 INJECTION INTRAMUSCULAR; INTRAVENOUS; SUBCUTANEOUS at 06:19

## 2021-11-09 RX ADMIN — FENTANYL CITRATE 25 MICROGRAM(S): 50 INJECTION INTRAVENOUS at 06:24

## 2021-11-09 RX ADMIN — CEFEPIME 100 MILLIGRAM(S): 1 INJECTION, POWDER, FOR SOLUTION INTRAMUSCULAR; INTRAVENOUS at 06:20

## 2021-11-09 RX ADMIN — NIMODIPINE 30 MILLIGRAM(S): 60 SOLUTION ORAL at 02:01

## 2021-11-09 RX ADMIN — Medication 1 DROP(S): at 06:21

## 2021-11-09 RX ADMIN — SODIUM CHLORIDE 4 MILLILITER(S): 9 INJECTION INTRAMUSCULAR; INTRAVENOUS; SUBCUTANEOUS at 15:48

## 2021-11-09 RX ADMIN — CHLORHEXIDINE GLUCONATE 15 MILLILITER(S): 213 SOLUTION TOPICAL at 06:19

## 2021-11-09 RX ADMIN — MODAFINIL 200 MILLIGRAM(S): 200 TABLET ORAL at 17:17

## 2021-11-09 RX ADMIN — NIMODIPINE 30 MILLIGRAM(S): 60 SOLUTION ORAL at 06:19

## 2021-11-09 RX ADMIN — FENTANYL CITRATE 25 MICROGRAM(S): 50 INJECTION INTRAVENOUS at 11:00

## 2021-11-09 RX ADMIN — FENTANYL CITRATE 50 MICROGRAM(S): 50 INJECTION INTRAVENOUS at 23:50

## 2021-11-09 RX ADMIN — CISATRACURIUM BESYLATE 10 MILLIGRAM(S): 2 INJECTION INTRAVENOUS at 15:15

## 2021-11-09 RX ADMIN — Medication 1 DROP(S): at 17:19

## 2021-11-09 RX ADMIN — FENTANYL CITRATE 100 MICROGRAM(S): 50 INJECTION INTRAVENOUS at 15:36

## 2021-11-09 RX ADMIN — PANTOPRAZOLE SODIUM 40 MILLIGRAM(S): 20 TABLET, DELAYED RELEASE ORAL at 12:25

## 2021-11-09 RX ADMIN — Medication 50 GRAM(S): at 07:30

## 2021-11-09 RX ADMIN — FENTANYL CITRATE 100 MICROGRAM(S): 50 INJECTION INTRAVENOUS at 15:20

## 2021-11-09 RX ADMIN — NIMODIPINE 30 MILLIGRAM(S): 60 SOLUTION ORAL at 00:00

## 2021-11-09 RX ADMIN — ENOXAPARIN SODIUM 40 MILLIGRAM(S): 100 INJECTION SUBCUTANEOUS at 21:47

## 2021-11-09 RX ADMIN — SODIUM CHLORIDE 3 GRAM(S): 9 INJECTION INTRAMUSCULAR; INTRAVENOUS; SUBCUTANEOUS at 17:17

## 2021-11-09 RX ADMIN — FENTANYL CITRATE 50 MICROGRAM(S): 50 INJECTION INTRAVENOUS at 19:37

## 2021-11-09 RX ADMIN — NIMODIPINE 30 MILLIGRAM(S): 60 SOLUTION ORAL at 04:29

## 2021-11-09 RX ADMIN — LEVETIRACETAM 500 MILLIGRAM(S): 250 TABLET, FILM COATED ORAL at 18:55

## 2021-11-09 RX ADMIN — MODAFINIL 200 MILLIGRAM(S): 200 TABLET ORAL at 12:26

## 2021-11-09 RX ADMIN — BROMOCRIPTINE MESYLATE 10 MILLIGRAM(S): 5 CAPSULE ORAL at 06:20

## 2021-11-09 RX ADMIN — NIMODIPINE 60 MILLIGRAM(S): 60 SOLUTION ORAL at 22:00

## 2021-11-09 RX ADMIN — FENTANYL CITRATE 25 MICROGRAM(S): 50 INJECTION INTRAVENOUS at 07:00

## 2021-11-09 RX ADMIN — Medication 20 MILLIEQUIVALENT(S): at 07:30

## 2021-11-09 RX ADMIN — BROMOCRIPTINE MESYLATE 10 MILLIGRAM(S): 5 CAPSULE ORAL at 21:46

## 2021-11-09 RX ADMIN — CHLORHEXIDINE GLUCONATE 15 MILLILITER(S): 213 SOLUTION TOPICAL at 17:17

## 2021-11-09 RX ADMIN — SODIUM CHLORIDE 3 GRAM(S): 9 INJECTION INTRAMUSCULAR; INTRAVENOUS; SUBCUTANEOUS at 23:33

## 2021-11-09 RX ADMIN — NIMODIPINE 30 MILLIGRAM(S): 60 SOLUTION ORAL at 20:31

## 2021-11-09 RX ADMIN — NIMODIPINE 30 MILLIGRAM(S): 60 SOLUTION ORAL at 10:20

## 2021-11-09 RX ADMIN — Medication 200 MILLIGRAM(S): at 17:18

## 2021-11-09 RX ADMIN — CEFEPIME 100 MILLIGRAM(S): 1 INJECTION, POWDER, FOR SOLUTION INTRAMUSCULAR; INTRAVENOUS at 21:47

## 2021-11-09 RX ADMIN — GABAPENTIN 200 MILLIGRAM(S): 400 CAPSULE ORAL at 06:21

## 2021-11-09 RX ADMIN — Medication 4 MILLILITER(S): at 06:19

## 2021-11-09 NOTE — PROGRESS NOTE ADULT - SUBJECTIVE AND OBJECTIVE BOX
INTERVAL HPI/OVERNIGHT EVENTS:  Pt evaluated at bedside. Continues to have bleeding under tongue, per Nursing staff was given fentanyl for the biting.     MEDICATIONS  (STANDING):  acetylcysteine 20%  Inhalation 4 milliLiter(s) Inhalation three times a day  amantadine Syrup 200 milliGRAM(s) Oral two times a day  artificial  tears Solution 1 Drop(s) Both EYES two times a day  bromocriptine Capsule 10 milliGRAM(s) Oral every 8 hours  cefepime   IVPB 2000 milliGRAM(s) IV Intermittent every 8 hours  chlorhexidine 0.12% Liquid 15 milliLiter(s) Oral Mucosa every 12 hours  chlorhexidine 2% Cloths 1 Application(s) Topical <User Schedule>  enoxaparin Injectable 40 milliGRAM(s) SubCutaneous every 24 hours  gabapentin Solution 200 milliGRAM(s) Oral every 8 hours  levETIRAcetam 1000 milliGRAM(s) Oral every 12 hours  modafinil 200 milliGRAM(s) Oral daily  niMODipine 30 milliGRAM(s) Oral every 2 hours  pantoprazole   Suspension 40 milliGRAM(s) Oral daily  potassium chloride   Powder 40 milliEquivalent(s) Oral once  sodium chloride 3 Gram(s) Oral every 6 hours  sodium chloride 0.9%. 1000 milliLiter(s) (45 mL/Hr) IV Continuous <Continuous>  sodium chloride 3%  Inhalation 4 milliLiter(s) Inhalation every 6 hours  sodium chloride 3%. 500 milliLiter(s) (30 mL/Hr) IV Continuous <Continuous>    MEDICATIONS  (PRN):  acetaminophen    Suspension .. 650 milliGRAM(s) Oral every 6 hours PRN Temp greater or equal to 38C (100.4F), Mild Pain (1 - 3)  fentaNYL    Injectable 25 MICROGram(s) IV Push every 2 hours PRN agitation  hydrALAZINE Injectable 5 milliGRAM(s) IV Push every 4 hours PRN >180  ondansetron Injectable 4 milliGRAM(s) IV Push every 6 hours PRN Nausea and/or Vomiting      Vital Signs Last 24 Hrs  T(C): 36.7 (09 Nov 2021 07:00), Max: 37.7 (08 Nov 2021 21:00)  T(F): 98.1 (09 Nov 2021 07:00), Max: 99.9 (08 Nov 2021 21:00)  HR: 85 (09 Nov 2021 07:00) (61 - 108)  BP: 172/93 (09 Nov 2021 07:00) (139/76 - 179/94)  BP(mean): 126 (09 Nov 2021 07:00) (102 - 133)  RR: 14 (09 Nov 2021 07:00) (14 - 25)  SpO2: 100% (09 Nov 2021 07:00) (97% - 100%)    PHYSICAL EXAM:  GEN: NAD, intubated   HEENT: Pt has laceration along distal aspect of tongue. OG tube in place with TF running   CV: NSR cardiac monitor   Pulm: no respiratory distress, intubated on MV   Abd: soft, ND, infraumbilical incision well healed, CDI, difficult to assess TTP 2/2 sedated status, rectal tube in place brown stool   Ext: WWP, mild 4ext edema, nonpitting              I&O's Detail    08 Nov 2021 07:01  -  09 Nov 2021 07:00  --------------------------------------------------------  IN:    Dexmedetomidine: 17.2 mL    Enteral Tube Flush: 360 mL    IV PiggyBack: 200 mL    Jevity 1.2: 494 mL    sodium chloride 0.9%: 360 mL    Sodium Chloride 0.9% Bolus: 500 mL    sodium chloride 3%: 360 mL  Total IN: 2291.2 mL    OUT:    External Ventricular Device (mL): 223 mL    Rectal Tube (mL): 100 mL    Voided (mL): 3700 mL  Total OUT: 4023 mL    Total NET: -1731.8 mL      09 Nov 2021 07:01  -  09 Nov 2021 07:43  --------------------------------------------------------  IN:    sodium chloride 0.9%: 45 mL    sodium chloride 3%: 30 mL  Total IN: 75 mL    OUT:    Jevity 1.2: 0 mL  Total OUT: 0 mL    Total NET: 75 mL          LABS:                        8.9    11.63 )-----------( 455      ( 09 Nov 2021 04:21 )             27.3     11-09    141  |  106  |  5<L>  ----------------------------<  107<H>  3.9   |  27  |  0.60    Ca    9.1      09 Nov 2021 04:21  Phos  3.4     11-09  Mg     1.8     11-09    TPro  6.6  /  Alb  2.5<L>  /  TBili  0.2  /  DBili  x   /  AST  26  /  ALT  17  /  AlkPhos  87  11-08    PT/INR - ( 09 Nov 2021 04:21 )   PT: 14.5 sec;   INR: 1.22          PTT - ( 09 Nov 2021 04:21 )  PTT:27.0 sec      RADIOLOGY & ADDITIONAL STUDIES:

## 2021-11-09 NOTE — EEG REPORT - NS EEG TEXT BOX
[facility] Department of Neurology  Inpatient Continuous video-Electroencephalogram    Patient Name:	AILYN DÍAZ    :	1975  MRN:	[id number]    Study Start Date/Time:  2021, 2:35:29 PM  Study End Date/Time:    Referred by: Terence Duncan MD    Brief Clinical History:  AILYN DÍAZ is a 46 year old Female; study performed to investigate for seizures or markers of epilepsy.    Diagnosis Code:   R56.9 convulsions/seizure  CPT: 33923 EEG with video 12-26h  Technical CPT: 69471 set-up +  45961 vEEG unmonitored 12-26h    Pertinent Medications:  Precedex gtt    Acquisition Details:  Electroencephalography was acquired using a minimum of 21 channels on an [employer] system v 8.5.1 with electrode placement according to the standard International 10-20 system following ACNS (American Clinical Neurophysiology Society) guidelines for Long-Term Video EEG monitoring.  Anterior temporal T1 and T2 electrodes were utilized whenever possible.   The [employer] automated spike & seizure detections were all reviewed in detail, in addition to extensive portions of raw EEG.    Day 1: 2021 @ 2:35:29 PM to next morning @ 07:00 am  Background:  continuous, with predominantly theta frequencies.  Symmetry:  Continuous (90+%) attenuation over left hemisphere  Posterior Dominant Rhythm: reaches up to 9 Hz rudimentary over right    Organization: Rudimentary.  Voltage:  Low (most <20uV LBP Peak-Trough)  Variability: Yes. 		Reactivity: Yes.  N2 sleep: Rudimentary but likely N2 transients present.  Spontaneous Activity:  No epileptiform discharges.  Periodic/rhythmic activity:  None.  Events:  No electrographic seizures or significant clinical events.  Provocations:  Hyperventilation and Photic stimulation: was not performed.    Daily Summary:    1)	Moderate generalized slowing suggestive of a similar degree of diffuse or multifocal  dysfunction.  2)	Focal slowing over left hemisphere    Radha Washburn MD	  Attending Neurologist, [facility]

## 2021-11-09 NOTE — PROGRESS NOTE ADULT - ASSESSMENT
46yoF with PMH HTN, MS and PSH anterior/posterior spine surgery 10/8/21 admitted to [facility] with subdural hematoma s/p hemicraniectomy for decompression and evacuation of SDH 10/26/21 transferred to [facility] noted to have tense flap s/p EVD placement and coiling of MCA bifurcation aneurysm 10/26/21. Remains intubated/sedated with poor prognosis. General surgery consulted for trach/peg placement. Toleratin TFs at goal.     Plan for trach/peg today (11/9)  Pre-op Labs/exams: NPO@MN, BMP, CBC, Mag, Phos, Coags, active T+S, EKG, Chest X ray  COVID test

## 2021-11-09 NOTE — PROGRESS NOTE ADULT - NUTRITIONAL ASSESSMENT
L M1  sabine afib eliquis/warfarin  el alonso SBP 190s R facial R ue le  L m1 occlusion  75  L CS  [phone] vesna

## 2021-11-09 NOTE — PROGRESS NOTE ADULT - SUBJECTIVE AND OBJECTIVE BOX
Post op check @2000  Procedure: Feeding tube placement  Surgeon: Dr. Ovalle    S: Pt is intubated and does not respond to questions at baseline.     O:  T(C): 37.1 (11-09-21 @ 22:29), Max: 37.1 (11-09-21 @ 20:00)  T(F): 98.8 (11-09-21 @ 22:29), Max: 98.8 (11-09-21 @ 20:00)  HR: 95 (11-09-21 @ 23:00) (87 - 95)  BP: 174/95 (11-09-21 @ 23:00) (159/84 - 174/95)  RR: 14 (11-09-21 @ 23:00) (14 - 14)  SpO2: 97% (11-09-21 @ 23:00) (96% - 98%)  Wt(kg): --                        8.9    11.63 )-----------( 455      ( 09 Nov 2021 04:21 )             27.3     11-09    142  |  106  |  5<L>  ----------------------------<  107<H>  3.8   |  26  |  0.62    Ca    8.9      09 Nov 2021 16:38  Phos  3.4     11-09  Mg     1.8     11-09    TPro  6.6  /  Alb  2.5<L>  /  TBili  0.2  /  DBili  x   /  AST  26  /  ALT  17  /  AlkPhos  87  11-08      Gen: NAD, resting comfortably in bed  C/V: NSR  Pulm: Intubated, Nonlabored breathing, no respiratory distress  Abd: soft, NT/ND, PEG tube in place to gravity, no swelling or erythema around site of PEG tube placement        A/P: 46yFemale s/p above procedure  Rest of care per primary team

## 2021-11-09 NOTE — PROGRESS NOTE ADULT - SUBJECTIVE AND OBJECTIVE BOX
HPI:  45 y/o female with PMH HTN, Multiple sclerosis, recent spinal surgery 10/8 ([facility]) presented to [place] ED BIBEMS after being found unconscious at home, unknown period of time. 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 HH5, MF1. Patient was intubated at [place] ED, found to have blown L pupil, and sent straight to the OR for left hemicraniotomy. A-line placed intraop. Hemodynamically unstable upon arrival to [facility], bradycardic and hypertensive s/p Mannitol, Clevidipine, and Furosemide. Central line placed in NSICU. Currently sedated on Propofol, pending repeat CTH. History per patient's son, patient had recent spine surgery and was found on outpatient imaging last week to have L M1 segment aneurysm (unruptured), pt asymptomatic at this time. Per son patient taking percocet PO at home. Ureña catheter, ET tube, and arterial line placed at [facility].     NIHSS on arrival: 32   Bess Griffin 5, Mod Busby 4  Bleed Day 1 = 10/26 (26 Oct 2021 13:03)    Hospital course:  10/26: admitted to [facility] from [facility], POD#0 s/p L hemicraniectomy for decompression and evacuation of SDH. Transferred to [facility] noted to have tense flap, received mannitol, keppra, decadron. Was hypertensive to 200s and preston to 40s, recevied 85g mannitol and bullet 23.4%. CTH on arrival demonstrated increased size in vents and new IVH. EVD placed, open at 15, central line placed. Transfused 2 u PRBC. POD0 of coiling of left MCA bifurcation aneurysm,   10/27: CTH demonstrated EVD in correct position, EVD lowered to 5, remains intubated on proprofol, pending repeat CTH in AM. Started on 3% @ 30/hr. 2LNS given for euvolemia, Mannitol given for uptrending ICPs. Bullet given 8:30 am. 3% increased to 50/hr. 1 L bolus. New EVD catheter placed using exisiting sreekanth hole. 1 u PRBC.  10/28: HH5, MF4, BD3; POD2 angio coil/embo of L MCA aneurysm. JOAQUÍN overnight, neuro stable. EVD@5cmH20 ICPs < 20 overnight, OP WNL. S/p 1 unit PRBC yesterday with appropriate response. Given 42g mannitol in AM for ICP 22 persistently, with improvement, then given 23% in PM for elevated ICP. febrile, pancultured. Standing tylenol and cooling blanket.   10/29: BD4, POD3 angio coil/embo. JOAQUÍN o/n, neuro stable. EVD@5, ICPs <20 o/n.   10/30: BD5, POD4 angio coil/embo. JOAQUÍN o/n neuro stable. EVD@5. 3% @50cc/hr.  10/31: BD6, POD5 angio coil/embo. brief period maintained upward gaze, resolved on its own. EVD@5cm h2o.    11/1: BD7, POD6 L hemicrani, angio coil/embo. JOAQUÍN overnight. Neuro exam unchanged. ICPs WNL. started bromocriptine/gabapentin/baclofen. dc'd propofol, started precedex  11/2: BD8 POD7 L hemicrani, angio coil/embo. JOAQUÍN overnight. Neuro exam stable. Remains on 3% hypertonic saline. Receieved 1 unit of PRBCs for a Hgb of 7.6.  11/3: BD 9 POD8 of L hemicrani. Elevated lipase, normal amylase, OG tube clogged and replaced. Abdominal US normal. Pending gen surg reccs regarding trach and peg. Gabapentin and Baclofen increased to help with neurostorming. restarted back on 3%, LR@35. became preston+hypotensive with nimodipine 60q4, decreased back to 30q2, NaCl bullet given.   11/4: BD10 POD9, JOAQUÍN o/n, 500cc NS for euvolemia,  neuro stable, EVD at 5. 3% increased to 75  11/5: BD11 POD10, JOAQUÍN o/n, neuro stable, EVD at 5  11/6: BD12 POD11 JOAQUÍN overnight. Neuro exam stable. Remains intubated on precedex. 3% hypertonic saline dc'd  11/7: BD13 POD 12 JOAQUÍN o/n, NGT replaced. on precex with no icp crisis   11/8: BD14 POD13 JOAQUÍN o/n, noted to have tongue maceration/macroglossia. Gauze with tongue depressor placed. Pending further recs from ENT. Pending trach and PEG placement tomorrow with gen surg. Off precedex, ICPs stable. EVD remains @ 5.   11/9: BD15, POD 13, bleeding under tongue at start of shift, fentanyl pushed with no further episodes       OVERNIGHT EVENTS: bleeding under tongue, fentanyl push given for biting   Vital Signs Last 24 Hrs  T(C): 37.6 (09 Nov 2021 01:00), Max: 37.7 (08 Nov 2021 21:00)  T(F): 99.7 (09 Nov 2021 01:00), Max: 99.9 (08 Nov 2021 21:00)  HR: 88 (09 Nov 2021 02:05) (61 - 108)  BP: 168/95 (09 Nov 2021 01:00) (133/71 - 173/94)  BP(mean): 124 (09 Nov 2021 01:00) (96 - 124)  RR: 14 (09 Nov 2021 01:00) (14 - 25)  SpO2: 100% (09 Nov 2021 02:05) (97% - 100%)    I&O's Summary    07 Nov 2021 07:01  -  08 Nov 2021 07:00  --------------------------------------------------------  IN: 2055.5 mL / OUT: 1528 mL / NET: 527.5 mL    08 Nov 2021 07:01  -  09 Nov 2021 02:25  --------------------------------------------------------  IN: 1061.2 mL / OUT: 1909 mL / NET: -847.8 mL        PHYSICAL EXAM:  General Appearance: Patient is on full vent support, not awake.   Cardio: RRR. Normal S1 and S2.   Pulm: CTA b/l. No rales, wheezes, or rhonchi.  Neuro: does not open eyes. B/l pupils 3 mm and reactive. No tracking. (+) Corneal reflexes. (+) Cough, (+) gag. Does not follow commands. B/l UEs trace w/d. B/l LEs TF. Unable to assess sensation due to patient's mental status.   Incision/Wound: L crani site c/d/i. flap full and tense. Posterior spinal surgical incision sites with dehiscence noted.      DIET:  [] NPO  [] Mechanical  [x] Tube feeds    LABS:                        8.1    10.64 )-----------( 417      ( 08 Nov 2021 05:21 )             25.6     11-08    139  |  104  |  7   ----------------------------<  145<H>  3.7   |  27  |  0.59    Ca    8.9      08 Nov 2021 18:19  Phos  3.7     11-08  Mg     1.8     11-08    TPro  6.6  /  Alb  2.5<L>  /  TBili  0.2  /  DBili  x   /  AST  26  /  ALT  17  /  AlkPhos  87  11-08            CAPILLARY BLOOD GLUCOSE          Drug Levels: [] N/A  Vancomycin Level, Trough: 11.7 ug/mL (11-07 @ 06:22)    CSF Analysis: [] N/A      Allergies    No Known Allergies    Intolerances      MEDICATIONS:  Antibiotics:  cefepime   IVPB 2000 milliGRAM(s) IV Intermittent every 8 hours    Neuro:  acetaminophen    Suspension .. 650 milliGRAM(s) Oral every 6 hours PRN  amantadine Syrup 200 milliGRAM(s) Oral two times a day  bromocriptine Capsule 10 milliGRAM(s) Oral every 8 hours  fentaNYL    Injectable 25 MICROGram(s) IV Push every 2 hours PRN  gabapentin Solution 200 milliGRAM(s) Oral every 8 hours  levETIRAcetam 1000 milliGRAM(s) Oral every 12 hours  modafinil 200 milliGRAM(s) Oral daily  ondansetron Injectable 4 milliGRAM(s) IV Push every 6 hours PRN    Anticoagulation:  enoxaparin Injectable 40 milliGRAM(s) SubCutaneous every 24 hours    OTHER:  acetylcysteine 20%  Inhalation 4 milliLiter(s) Inhalation three times a day  artificial  tears Solution 1 Drop(s) Both EYES two times a day  chlorhexidine 0.12% Liquid 15 milliLiter(s) Oral Mucosa every 12 hours  chlorhexidine 2% Cloths 1 Application(s) Topical <User Schedule>  hydrALAZINE Injectable 5 milliGRAM(s) IV Push every 4 hours PRN  niMODipine 30 milliGRAM(s) Oral every 2 hours  pantoprazole   Suspension 40 milliGRAM(s) Oral daily  sodium chloride 3%  Inhalation 4 milliLiter(s) Inhalation every 6 hours    IVF:  potassium chloride   Powder 40 milliEquivalent(s) Oral once  sodium chloride 3 Gram(s) Oral every 6 hours  sodium chloride 0.9%. 1000 milliLiter(s) IV Continuous <Continuous>  sodium chloride 3%. 500 milliLiter(s) IV Continuous <Continuous>    CULTURES:  Culture Results:   Numerous Staphylococcus aureus  Numerous Enterobacter cloacae complex  Moderate Proteus mirabilis  Accompanied by normal respiratory melinda (11-04 @ 13:59)  Culture Results:   No growth to date (10-28 @ 19:23)    RADIOLOGY & ADDITIONAL TESTS:        45 y/o female with PMHx of HTN and MS, hx of spinal surgery at [facility] 10/8/21 [facility] [place] [facility] being found unconscious at home, unknown period of time. 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 HH5, MF4; BD #1 = 10/26. Patient was intubated at [place] ED and sent straight to the OR for left hemicraniotomy. A-line placed intraop. Hemodynamically unstable upon arrival to [facility], bradycardic and hypertensive s/p Mannitol and Furosemide. Central line placed in NSICU. Now s/p EVD placement, and coil embolization of left MCA bifurcation aneurysm 10/26/2021.     PLAN:  NEURO:  - neuro checks/vital signs q1hr  - Keppra 1g IV BID   - Nimodipine 30q2  - decreased bromocriptine 10mg q8hr and Gabapentin 200mg q8hr   - off sedation(most recently on precedex) fentanyl pushes PRN   - EVD open at 5cmH2O, monitor ICP/output  - CTA 11/1 with findings of moderate anterior circulation spasm   - CTH 11/8 stable   - pending VEEG     CARDIO:  - -180  - hydralazine PRN to maintain SBP goal  - echo +mild pulm HTN    PULM:  - intubated on full vent support  - aspiration precautions    GI:  - OGT TFs  - bowel regimen   - PPI QD GI ppx  - FOB negative 11/1  - Abd US: neg   - rectal tube  - pending trach and peg placement 11/9    RENAL:  - salt tabs 3Q6  - euvolemia goal   - Na 145-150     HEME:  - SCDs, SQL  - s/p 2u PRBC, s/p 1u PRBC 10/27, s/p 1u PRBC 11/02  - monitor H+H  - FOB negative  - RUE doppler for swelling 11/1: DVT R brachial and superficial cephalic thrombus, surveillance 11/8    ID:  - leukocytosis and procal, trend   - Tylenol PRN for fever  - sputum cx 11/4:  gm + cocci in pairs/clusters, rare gm + rods  - Cefepime started to cover for enterobacter/proteus in sputum     ENDO:  - ISS   - monitor FS    OTHER  - wound care recs- q2 dressing changes   - ENT consulted, reccomends trach placement and oral hygeine    GOC: full code  Level of care: ICU status   Dispo: pend EVD, trach, peg  family updated    Case discussed with Dr. Duncan , Dr Menard

## 2021-11-09 NOTE — PROCEDURAL SAFETY CHECKLIST WITH OR WITHOUT SEDATION - NSPREPROCEDSEDAT_GEN_ALL_CORE
Versed 2 mg IVP given/with sedation
propofol already running/with sedation
without sedation
Patient on propofol drip/with sedation

## 2021-11-09 NOTE — PROGRESS NOTE ADULT - ASSESSMENT
47 y/o female HH5 MF 4 BD 14 with:   L MCA ruptured aneurysm, subarachnoid hemorrhage, s/p DHC (10/26/2021, Dr. D'Amico @ [place]), brain compression, cerebral edema  Anemia   Recent spinal surgery  UGIB    PLAN: Day 1 = 10-26 ; Day 4 = 10/29; Day 21 = 11/15  NEURO: Neurochecks q1h  DCI/ vasospasm: Cont nimodipine 30 mg PO Q2h to be given for 21 days (last day 11/15); CTA mild-moderate anterior circulation vasospasm (euvolemia, permissive hypertension)  CT head 11/8: Improving edema (though still significant), slight decrease in IPH  Hydrocephalus:  EVD 5cm H20, ICP < 15  Neurostorming:  Precedex; bromocriptine 15 mg q8h, gabapentin 400 mg q8h (wean to 200 Q8)  Seizure prophylaxis: Levetiracetam 1000mg IV BID. VEEG. Taper AEDs. Follow up VEEG.   Pending trach/PEG 11/9   Sedation: No longer on precedex; PRN fentanyl pushes  REHAB: Physical therapy evaluation and management      EARLY MOB:  HOB elevated    PULM: Full vent support for now  ABG, CXR.   Intubation day ~13  Scheduled for trach/peg  ENT evaluation re: severe macroglossia, necrotic tongue lesions due to biting    CARDIO:   SBP goal 100-180mm Hg,  TTE     ENDO:    Blood sugar goals 140-180 mg/dL  Insulin sliding scale    GI:    FOBT negative  Bowel regimen  Pending PEG 11/9  PPI- DC once tolerating TFs  LBM: Rectal tube    RENAL:   Maintain euvolemia  Na goal 145-150  Continue salt tabs    HEM/ONC: Anemia- no overt blood loss. Not on antiplt or anticoagulation  FOBT neg  Hb stable (had been transfused)  Anti Xa level 11/9  VTE Prophylaxis: SCDs, SQL; Doppler pending uppers 11/8    ID:   Tmax 37.8, no leukocytosis  S/P vancomycin zosyn given sputum MSSA and enterobacter proteus --> will change to cefepime for enterobacter/MSSA coverage  (will be resistant to CTX in the setting of ampC with enterobacter)    Social: Family has been updated    *****  CORE MEASURES  1.  Hunt and Griffin Score = 5  2.  VTE prophylaxis:  [ ] administered within 24 hours of admission OR [X] reason not done: fresh bleed, unsecured aneurysm.  3.  Dysphagia screening:   [X] performed before any oral meds / liquids / food  4.  Nimodipine treatment:  [X] administered within 24 hours of admission OR [ ] reason not done:    *****    ATTENDING ATTESTATION:    Patient at high risk for neurological deterioration or death due to:  ICU delirium, aspiration PNA, DVT / PE.  Critical care time:  I have personally provided 45 minutes of critical care time, excluding time spent on separate procedures.    Plan discussed with RN, house staff.     45 y/o female HH5 MF 4 BD 15 with:   L MCA ruptured aneurysm, subarachnoid hemorrhage, s/p DHC (10/26/2021, Dr. D'Amico @ [place]), brain compression, cerebral edema  Anemia   Recent spinal surgery  UGIB    PLAN: Day 1 = 10-26 ; Day 4 = 10/29; Day 21 = 11/15  NEURO: Neurochecks Q1h  DCI/ vasospasm: Cont nimodipine 30 mg PO Q2h to be given for 21 days (last day 11/15); CTA mild-moderate anterior circulation vasospasm (euvolemia, permissive hypertension)  CT head 11/8: Improving edema (though still significant), slight decrease in IPH  Hydrocephalus:  EVD 5cm H20, ICP < 15  Neurostorming: Improving. DC gabapentin. Continue bromocriptine (wean as tolerated)  Seizure prophylaxis: Levetiracetam 1000mg IV BID. VEEG. Taper AEDs vs DC if not seizing.   Pending trach/PEG 11/9   Sedation: No longer on precedex; continue PRN fentanyl pushes  REHAB: Physical therapy evaluation and management      EARLY MOB:  HOB elevated    PULM: Full vent support   14/450/40/5  ABG, CXR.   Intubation day ~13  Scheduled for trach/peg 11/9  ENT evaluation re: severe macroglossia, necrotic tongue lesions due to biting    CARDIO:   SBP goal 100-180mm Hg,  TTE EF 75%  EKG    ENDO:    Blood sugar goals 140-180 mg/dL  Insulin sliding scale    GI:    FOBT negative  Pending PEG 11/9  PPI- DC once tolerating TFs post PEG.   LBM: Rectal tube. Hold bowel regimen    RENAL:   Maintain euvolemia  Na goal 145-150  3% at 50cc, continue salt tabs  Monitor BMPs    HEM/ONC: Anemia- no overt blood loss. Not on antiplt or anticoagulation  FOBT neg (there was concern for possible  UGIB in setting of coffee ground emesis)  Hb stable (had been transfused)  Anti Xa level 0.26. Continue dose of lovenox  VTE Prophylaxis: SCDs, SQL; Doppler of upper extremities pending 11/9    ID:   Tmax 37.8, no leukocytosis  S/P vancomycin zosyn given sputum MSSA and enterobacter proteus --> Change to Cefepime for enterobacter/MSSA coverage    Social: Family has been updated    *****  CORE MEASURES  1.  Hunt and Griffin Score = 5  2.  VTE prophylaxis:  [ ] administered within 24 hours of admission OR [X] reason not done: fresh bleed, unsecured aneurysm.  3.  Dysphagia screening:   [X] performed before any oral meds / liquids / food  4.  Nimodipine treatment:  [X] administered within 24 hours of admission OR [ ] reason not done:    *****    ATTENDING ATTESTATION:    Patient at high risk for neurological deterioration or death due to:  ICU delirium, aspiration PNA, DVT / PE.  Critical care time:  I have personally provided 45 minutes of critical care time, excluding time spent on separate procedures.    Plan discussed with RN, house staff.     47 y/o female HH5 MF 4 BD 15 with:   L MCA ruptured aneurysm, subarachnoid hemorrhage, s/p DHC (10/26/2021, Dr. D'Amico @ [place]), brain compression, cerebral edema  Anemia   Recent spinal surgery  UGIB      NEURO: Neurochecks Q1h  DCI/ vasospasm: Cont nimodipine 30 mg PO Q2h to be given for 21 days (last day 11/15); CTA mild-moderate anterior circulation vasospasm (euvolemia, permissive hypertension)  CT head 11/8: Improving edema (though still significant), slight decrease in IPH  Hydrocephalus:  EVD 5cm H20, ICP < 15  Neurostorming: Improving. DC gabapentin. Continue bromocriptine (wean as tolerated)  Seizure prophylaxis: Levetiracetam 1000mg IV BID. VEEG. Taper AEDs vs DC if not seizing.    Sedation: No longer on precedex; continue PRN fentanyl pushes  REHAB: Physical therapy evaluation and management      EARLY MOB:  HOB elevated    PULM: Full vent support   14/450/40/5  ABG, CXR.   Intubation day ~13  Scheduled for trach 11/9  ENT evaluation re: severe macroglossia, necrotic tongue lesions due to biting    CARDIO:   SBP goal 100-180mm Hg,  TTE EF 75%  EKG    ENDO:    Blood sugar goals 140-180 mg/dL  Insulin sliding scale    GI:    FOBT negative  Pending PEG 11/9  PPI- DC once tolerating TFs post PEG.   LBM: Rectal tube. Hold bowel regimen    RENAL:   Maintain euvolemia  Na goal 145-150  3% at 50cc, continue salt tabs  Monitor BMPs    HEM/ONC: Anemia- no overt blood loss. Not on antiplt or anticoagulation  FOBT neg (there was concern for possible  UGIB in setting of coffee ground emesis)  Hb stable (had been transfused)  Anti Xa level 0.26. Continue dose of lovenox  VTE Prophylaxis: SCDs, SQL; Doppler of upper extremities pending 11/9    ID:   Tmax 37.8, no leukocytosis  S/P vancomycin zosyn given sputum MSSA and enterobacter proteus --> Changed to Cefepime for enterobacter/MSSA coverage    Social: Family has been updated    *****  CORE MEASURES  1.  Hunt and Griffin Score = 5  2.  VTE prophylaxis:  [ ] administered within 24 hours of admission OR [X] reason not done: fresh bleed, unsecured aneurysm.  3.  Dysphagia screening:   [X] performed before any oral meds / liquids / food  4.  Nimodipine treatment:  [X] administered within 24 hours of admission OR [ ] reason not done:    *****    ATTENDING ATTESTATION:    Patient at high risk for neurological deterioration or death due to:  ICU delirium, aspiration PNA, DVT / PE.  Critical care time:  I have personally provided 45 minutes of critical care time, excluding time spent on separate procedures.    Plan discussed with RN, house staff.     45 y/o female HH5 MF 4 BD 15 with:   L MCA ruptured aneurysm, subarachnoid hemorrhage, s/p DHC (10/26/2021, Dr. D'Amico @ [place]), brain compression, cerebral edema  Anemia   Recent spinal surgery  UGIB      NEURO: Neurochecks Q1h  DCI/ vasospasm: Cont nimodipine 30 mg PO Q2h to be given for 21 days (last day 11/15); CTA mild-moderate anterior circulation vasospasm (euvolemia, permissive hypertension)  CT head 11/8: Improving edema (though still significant), slight decrease in IPH  Hydrocephalus:  EVD 5cm H20, ICP < 15  Neurostorming: Improving. DC gabapentin. Continue bromocriptine (wean as tolerated)  Seizure prophylaxis: Levetiracetam 1000mg IV BID. VEEG. Taper AEDs vs DC if not seizing.    Sedation: No longer on precedex; continue PRN fentanyl pushes  REHAB: Physical therapy evaluation and management      EARLY MOB:  HOB elevated    PULM: Full vent support   14/450/40/5  ABG, CXR.   Intubation day ~13  Scheduled for trach 11/9  ENT evaluation re: severe macroglossia, necrotic tongue lesions due to biting    CARDIO:   SBP goal 100-180mm Hg,  TTE EF 75%  EKG    ENDO:    Blood sugar goals 140-180 mg/dL  Insulin sliding scale    GI:    FOBT negative. Doubt UGIB  Pending PEG 11/9  PPI- DC once tolerating TFs post PEG.   LBM: Rectal tube. Hold bowel regimen    RENAL:   Maintain euvolemia  Na goal 145-150  3% at 50cc, continue salt tabs  Monitor BMPs    HEM/ONC: Anemia- no overt blood loss. Not on antiplt or anticoagulation  FOBT neg (there was concern for possible  UGIB in setting of coffee ground emesis)  Hb stable (had been transfused)  Anti Xa level 0.26. Continue dose of lovenox  VTE Prophylaxis: SCDs, SQL; Doppler of upper extremities pending 11/9    ID:   Tmax 37.8, no leukocytosis  S/P vancomycin zosyn given sputum MSSA and enterobacter proteus --> Changed to Cefepime for enterobacter/MSSA coverage    Social: Family has been updated    *****  CORE MEASURES  1.  Hunt and Griffin Score = 5  2.  VTE prophylaxis:  [ ] administered within 24 hours of admission OR [X] reason not done: fresh bleed, unsecured aneurysm.  3.  Dysphagia screening:   [X] performed before any oral meds / liquids / food  4.  Nimodipine treatment:  [X] administered within 24 hours of admission OR [ ] reason not done:    *****    ATTENDING ATTESTATION:    Patient at high risk for neurological deterioration or death due to:  ICU delirium, aspiration PNA, DVT / PE.  Critical care time:  I have personally provided 45 minutes of critical care time, excluding time spent on separate procedures.    Plan discussed with RN, house staff.

## 2021-11-09 NOTE — PROGRESS NOTE ADULT - SUBJECTIVE AND OBJECTIVE BOX
=======================================   NEUROCRITICAL CARE ATTENDING NOTE   =======================================  AILYN DÍAZ   MRN-[id number]    HPI:  45 y/o female with PMH HTN, Multiple sclerosis, recent spinal surgery 10/8 ([facility]) presented to [place] [facility] BIBEMS after being found unconscious at home, unknown period of time. 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 HH5, MF1. Patient was intubated at [place] ED, found to have blown L pupil, and sent straight to the OR for left hemicraniotomy. A-line placed intraop. Hemodynamically unstable upon arrival to [facility], bradycardic and hypertensive s/p Mannitol, Clevidipine, and Furosemide. Central line placed in NSICU. Currently sedated on Propofol, pending repeat CTH. History per patient's son, patient had recent spine surgery and was found on outpatient imaging last week to have L M1 segment aneurysm (unruptured), pt asymptomatic at this time. Per son patient taking percocet PO at home. Ureña catheter, ET tube, and arterial line placed at [facility].  NIHSS on arrival: 32. Bess Griffin 5, Mod Busby 4.  Bleed Day 1 = 10/26 (26 Oct 2021 13:03)    Hospital course:  10/26: admitted to [facility] from [facility], POD#0 s/p L hemicraniectomy for decompression and evacuation of SDH. Transferred to [facility] noted to have tense flap, received mannitol, keppra, decadron. Was hypertensive to 200s and preston to 40s, recevied 85g mannitol and bullet 23.4%. CTH on arrival demonstrated increased size in vents and new IVH. EVD placed, open at 15, central line placed. Transfused 2 u PRBC. POD0 of coiling of left MCA bifurcation aneurysm,   10/27: CTH demonstrated EVD in correct position, EVD lowered to 5, remains intubated on proprofol, pending repeat CTH in AM. Started on 3% @ 30/hr. 2LNS given for euvolemia, Mannitol given for uptrending ICPs. Bullet given 8:30 am. 3% increased to 50/hr. 1 L bolus. New EVD catheter placed using exisiting sreekanth hole. 1 u PRBC.  10/28: HH5, MF4, BD3; POD2 angio coil/embo of L MCA aneurysm. JOAQUÍN overnight, neuro stable. EVD@5cmH20 ICPs < 20 overnight, OP WNL. S/p 1 unit PRBC yesterday with appropriate response. Given 42g mannitol in AM for ICP 22 persistently, with improvement, then given 23% in PM for elevated ICP. febrile, pancultured. Standing tylenol and cooling blanket.   10/29: BD4, POD3 angio coil/embo. JOAQUÍN o/n, neuro stable. EVD@5, ICPs <20 o/n.   10/30: BD5, POD4 angio coil/embo. JOAQUÍN o/n neuro stable. EVD@5. 3% @50cc/hr.  10/31: BD6, POD5 angio coil/embo. brief period maintained upward gaze, resolved on its own. EVD@5cm h2o.    : BD7, POD6 L hemicrani, angio coil/embo. Neuro exam unchanged. ICPs WNL. Overnight given hydralazine, increased propofol for SBP > 180.  CTA showed mild-moderate anterior circulation vasospasm (permissive hypertension, euvolemia).  : BD8, POD7 L hemicrani, angio coil/embo. Neuro exam unchanged. ICPs WNL.  Pending trach/PEG.  11/3: BD9, POD8 L hemicrani, angio coil/embo.  Neuro exam unchanged. ICPs WNL.  Pending trach/PEG.  Dayshift:  noted episodes of sympathetic storming during vasospasm period.  : BD10 POD9, JOAQUÍN o/n, 500cc NS for euvolemia,  neuro stable, EVD at 5  : BD11 POD10, JOAQUÍN o/n, neuro stable, EVD at 5; O2 desaturations with thick secretions (MSSA, Enterobacter, Proteus) started vancomycin zosyn  : BD12 POD11 JOAQUÍN overnight. Neuro exam stable. Remains intubated on precedex. 3% hypertonic saline dc'd  : BD13 POD 12 JOAQUÍN o/n, NGT replaced. Weaning off precedex, no ICP crisis, decreasing neurostorming meds, pending CT head.   : BD 14. CT head. Some spontaneous eye opening. Overnight, tongue biting with oral trauma and mild bleeding.   : BD 15.     Past Medical History: No pertinent past medical history  Allergies:  Allergy Status Unknown  Home meds:       ICU Vital Signs Last 24 Hrs  T(C): 37.3 (2021 06:00), Max: 37.7 (2021 21:00)  T(F): 99.1 (2021 06:00), Max: 99.9 (2021 21:00)  HR: 82 (2021 06:00) (61 - 108)  BP: 179/94 (2021 06:00) (139/76 - 179/94)  BP(mean): 129 (2021 06:00) (102 - 133)  ABP: 150/121 (2021 06:00) (100/87 - 183/100)  ABP(mean): 135 (2021 06:00) (96 - 135)  RR: 14 (2021 06:00) (14 - 25)  SpO2: 100% (2021 06:00) (97% - 100%)      21 @ 07:01  -  21 @ 07:00  --------------------------------------------------------  IN: 2121.2 mL / OUT: 4023 mL / NET: -1901.8 mL      Mode: AC/ CMV (Assist Control/ Continuous Mandatory Ventilation), RR (machine): 14, TV (machine): 450, FiO2: 40, PEEP: 5, ITime: 1, MAP: 8.5, PIP: 19      NEUROLOGIC EXAMINATION: Opens eyes to stimuli and spontaneously intermittently, does not follow commands, pupils 3mm equal and brisk (-) Doll's, (+) cough and (+) gag, B/ L LE TF, extensor posturing B/L UE  EENT: Anicteric, severely edematous tongue with multiple lacerations.   CARDIOVASCULAR: (+) S1 S2, normal rate and regular rhythm  PULMONARY: Clear to auscultation bilaterally  ABDOMEN: Soft, nontender with normoactive bowel sounds  EXTREMITIES: No edema  SKIN: No rash; back incisions (dehiscence noted)    MEDICATIONS:   acetaminophen    Suspension .. 650 milliGRAM(s) Oral every 6 hours PRN  acetylcysteine 20%  Inhalation 4 milliLiter(s) Inhalation three times a day  amantadine Syrup 200 milliGRAM(s) Oral two times a day  artificial  tears Solution 1 Drop(s) Both EYES two times a day  bromocriptine Capsule 10 milliGRAM(s) Oral every 8 hours  cefepime   IVPB 2000 milliGRAM(s) IV Intermittent every 8 hours  chlorhexidine 0.12% Liquid 15 milliLiter(s) Oral Mucosa every 12 hours  chlorhexidine 2% Cloths 1 Application(s) Topical <User Schedule>  enoxaparin Injectable 40 milliGRAM(s) SubCutaneous every 24 hours  fentaNYL    Injectable 25 MICROGram(s) IV Push every 2 hours PRN  gabapentin Solution 200 milliGRAM(s) Oral every 8 hours  hydrALAZINE Injectable 5 milliGRAM(s) IV Push every 4 hours PRN  levETIRAcetam 1000 milliGRAM(s) Oral every 12 hours  magnesium sulfate  IVPB 2 Gram(s) IV Intermittent once  modafinil 200 milliGRAM(s) Oral daily  niMODipine 30 milliGRAM(s) Oral every 2 hours  ondansetron Injectable 4 milliGRAM(s) IV Push every 6 hours PRN  pantoprazole   Suspension 40 milliGRAM(s) Oral daily  potassium chloride   Powder 40 milliEquivalent(s) Oral once  potassium chloride   Powder 20 milliEquivalent(s) Oral once  sodium chloride 3 Gram(s) Oral every 6 hours  sodium chloride 0.9%. 1000 milliLiter(s) (45 mL/Hr) IV Continuous <Continuous>  sodium chloride 3%  Inhalation 4 milliLiter(s) Inhalation every 6 hours  sodium chloride 3%. 500 milliLiter(s) (30 mL/Hr) IV Continuous <Continuous>      LABS:                    8.9    11.63 )-----------( 455      ( 2021 04:21 )             27.3         141  |  106  |  5<L>  ----------------------------<  107<H>  3.9   |  27  |  0.60    Ca    9.1      2021 04:21  Phos  3.4       Mg     1.8         TPro  6.6  /  Alb  2.5<L>  /  TBili  0.2  /  DBili  x   /  AST  26  /  ALT  17  /  AlkPhos  87      LIVER FUNCTIONS - ( 2021 05:21 )  Alb: 2.5 g/dL / Pro: 6.6 g/dL / ALK PHOS: 87 U/L / ALT: 17 U/L / AST: 26 U/L / GGT: x             10/28 CSF NGTD  10/28 Blood NGTD    CT head :   Since prior CT head (10/27/2021), slight decrease in size of left frontotemporal intraparenchymal hematoma. Interval decrease in left-to-right midline shift. More pronounced hypodensity in the left frontal and temporal lobes surrounding hemorrhage which may be artifactual from the hemicraniectomy although evolving ischemic changes cannot be excluded and continued follow-up is recommended.      EEG:  Neuroimagin/01 CTA - Diffuse, but overall mild- moderate, sites of vasospasm  are noted at the anterior circulation, whilst the posterior circulation appears spared.   10/27 CT - There is herniation of the brain parenchyma and to the craniectomy defect which is similar prior examination. There is no significant change in the large left frontotemporal intraparenchymal hematoma with surrounding edema. Again demonstrated is mass effect with right to left midline shift measuring approximately 1 cm which is stable from prior exam. Again demonstrated is effacement of the cerebral sulci and basal cisterns.  Other imagin/01 UE Doppler - DVT within the right brachial vein.  Superficial thrombosis of the right cephalic vein.   CXR - stable.  Mild congestion.  10/28 Doppler - Negative    10/28 TTE -   1. Normal left and right ventricular size and systolic function.   2. No significant valvular disease.   3. Mild pulmonary hypertension present, pulmonary artery systolic pressure is 35 mmHg.   4. No pericardial effusion.   5. No prior echo is available for comparison.      Lines: R TLC IJ [place]  Drains/ devices:   R IJ  EVD  Primafit    CODE STATUS:  Full Code                         GOALS OF CARE:  aggressive                      DISPOSITION:  ICU =======================================   NEUROCRITICAL CARE ATTENDING NOTE   =======================================  AILYN DÍAZ   MRN-[id number]    HPI:  47 y/o female with PMH HTN, Multiple sclerosis, recent spinal surgery 10/8 ([facility]) presented to [place] ED BIBEMS after being found unconscious at home, unknown period of time. 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 HH5, MF1. Patient was intubated at [place] ED, found to have blown L pupil, and sent straight to the OR for left hemicraniotomy. A-line placed intraop. Hemodynamically unstable upon arrival to [facility], bradycardic and hypertensive s/p Mannitol, Clevidipine, and Furosemide. Central line placed in NSICU. Currently sedated on Propofol, pending repeat CTH. History per patient's son, patient had recent spine surgery and was found on outpatient imaging last week to have L M1 segment aneurysm (unruptured), pt asymptomatic at this time. Per son patient taking percocet PO at home. Ureña catheter, ET tube, and arterial line placed at [facility].  NIHSS on arrival: 32. Bess Griffin 5, Mod Busby 4.  Bleed Day 1 = 10/26 (26 Oct 2021 13:03)    Hospital course:  10/26: admitted to [facility] from [facility], POD#0 s/p L hemicraniectomy for decompression and evacuation of SDH. Transferred to [facility] noted to have tense flap, received mannitol, keppra, decadron. Was hypertensive to 200s and preston to 40s, recevied 85g mannitol and bullet 23.4%. CTH on arrival demonstrated increased size in vents and new IVH. EVD placed, open at 15, central line placed. Transfused 2 u PRBC. POD0 of coiling of left MCA bifurcation aneurysm,   10/27: CTH demonstrated EVD in correct position, EVD lowered to 5, remains intubated on proprofol, pending repeat CTH in AM. Started on 3% @ 30/hr. 2LNS given for euvolemia, Mannitol given for uptrending ICPs. Bullet given 8:30 am. 3% increased to 50/hr. 1 L bolus. New EVD catheter placed using exisiting sreekanth hole. 1 u PRBC.  10/28: HH5, MF4, BD3; POD2 angio coil/embo of L MCA aneurysm. JOAQUÍN overnight, neuro stable. EVD@5cmH20 ICPs < 20 overnight, OP WNL. S/p 1 unit PRBC yesterday with appropriate response. Given 42g mannitol in AM for ICP 22 persistently, with improvement, then given 23% in PM for elevated ICP. febrile, pancultured. Standing tylenol and cooling blanket.   10/30: BD5, POD4 angio coil/embo. JOAQUÍN o/n neuro stable. EVD@5. 3% @50cc/hr.  10/31: BD6, POD5 angio coil/embo. brief period maintained upward gaze, resolved on its own. EVD@5cm H2O.    : BD7, POD6 L hemicrani, angio coil/embo. Neuro exam unchanged. ICPs WNL. Overnight given hydralazine, increased propofol for SBP > 180.  CTA showed mild-moderate anterior circulation vasospasm (permissive hypertension, euvolemia).  11/3: BD9, POD8 L hemicrani, angio coil/embo.  Neuro exam unchanged. ICPs WNL.  Pending trach/PEG.  Dayshift:  noted episodes of sympathetic storming during vasospasm period.  : BD11 POD10, JOAQUÍN o/n, neuro stable, EVD at 5; O2 desaturations with thick secretions (MSSA, Enterobacter, Proteus) started vancomycin zosyn  : BD13 POD 12 JOAQUÍN o/n, NGT replaced. Weaning off precedex, no ICP crisis, decreasing neurostorming meds, pending CT head.   : BD 14. CT head. Some spontaneous eye opening. Overnight, tongue biting with oral trauma and mild bleeding.   : BD 15.  Awaiting trach/PEG. On VEEG. No seizures.     Past Medical History: No pertinent past medical history  Allergies:  Allergy Status Unknown  Home meds:       ICU Vital Signs Last 24 Hrs  T(C): 37.3 (2021 06:00), Max: 37.7 (2021 21:00)  T(F): 99.1 (2021 06:00), Max: 99.9 (2021 21:00)  HR: 82 (2021 06:00) (61 - 108)  BP: 179/94 (2021 06:00) (139/76 - 179/94)  BP(mean): 129 (2021 06:00) (102 - 133)  ABP: 150/121 (2021 06:00) (100/87 - 183/100)  ABP(mean): 135 (2021 06:00) (96 - 135)  RR: 14 (2021 06:00) (14 - 25)  SpO2: 100% (2021 06:00) (97% - 100%)      21 @ 07:01  -  21 @ 07:00  --------------------------------------------------------  IN: 2121.2 mL / OUT: 4023 mL / NET: -1901.8 mL      Mode: AC/ CMV (Assist Control/ Continuous Mandatory Ventilation), RR (machine): 14, TV (machine): 450, FiO2: 40, PEEP: 5, ITime: 1, MAP: 8.5, PIP: 19      NEUROLOGIC EXAMINATION: Opens eyes to stimuli and spontaneously intermittently, does not follow commands, pupils 3mm equal and brisk (+) Doll's, (+) cough and (+) gag, B/ L LE TF, RUE extends. LUE withdraws trace  EENT: Anicteric, severely edematous tongue with multiple lacerations.   CARDIOVASCULAR: (+) S1 S2, normal rate and regular rhythm  PULMONARY: Clear to auscultation bilaterally  ABDOMEN: Soft, nontender with normoactive bowel sounds  EXTREMITIES: No edema  SKIN: No rash; back incisions (dehiscence noted)    MEDICATIONS:   acetaminophen    Suspension .. 650 milliGRAM(s) Oral every 6 hours PRN  acetylcysteine 20%  Inhalation 4 milliLiter(s) Inhalation three times a day  amantadine Syrup 200 milliGRAM(s) Oral two times a day  artificial  tears Solution 1 Drop(s) Both EYES two times a day  bromocriptine Capsule 10 milliGRAM(s) Oral every 8 hours  cefepime   IVPB 2000 milliGRAM(s) IV Intermittent every 8 hours  chlorhexidine 0.12% Liquid 15 milliLiter(s) Oral Mucosa every 12 hours  chlorhexidine 2% Cloths 1 Application(s) Topical <User Schedule>  enoxaparin Injectable 40 milliGRAM(s) SubCutaneous every 24 hours  fentaNYL    Injectable 25 MICROGram(s) IV Push every 2 hours PRN  gabapentin Solution 200 milliGRAM(s) Oral every 8 hours  hydrALAZINE Injectable 5 milliGRAM(s) IV Push every 4 hours PRN  levETIRAcetam 1000 milliGRAM(s) Oral every 12 hours  magnesium sulfate  IVPB 2 Gram(s) IV Intermittent once  modafinil 200 milliGRAM(s) Oral daily  niMODipine 30 milliGRAM(s) Oral every 2 hours  ondansetron Injectable 4 milliGRAM(s) IV Push every 6 hours PRN  pantoprazole   Suspension 40 milliGRAM(s) Oral daily  potassium chloride   Powder 40 milliEquivalent(s) Oral once  potassium chloride   Powder 20 milliEquivalent(s) Oral once  sodium chloride 3 Gram(s) Oral every 6 hours  sodium chloride 0.9%. 1000 milliLiter(s) (45 mL/Hr) IV Continuous <Continuous>  sodium chloride 3%  Inhalation 4 milliLiter(s) Inhalation every 6 hours  sodium chloride 3%. 500 milliLiter(s) (30 mL/Hr) IV Continuous <Continuous>      LABS:                    8.9    11.63 )-----------( 455      ( 2021 04:21 )             27.3         141  |  106  |  5<L>  ----------------------------<  107<H>  3.9   |  27  |  0.60    Ca    9.1      2021 04:21  Phos  3.4       Mg     1.8         TPro  6.6  /  Alb  2.5<L>  /  TBili  0.2  /  DBili  x   /  AST  26  /  ALT  17  /  AlkPhos  87      LIVER FUNCTIONS - ( 2021 05:21 )  Alb: 2.5 g/dL / Pro: 6.6 g/dL / ALK PHOS: 87 U/L / ALT: 17 U/L / AST: 26 U/L / GGT: x             10/28 CSF NGTD  10/28 Blood NGTD    CT head :   Since prior CT head (10/27/2021), slight decrease in size of left frontotemporal intraparenchymal hematoma. Interval decrease in left-to-right midline shift. More pronounced hypodensity in the left frontal and temporal lobes surrounding hemorrhage which may be artifactual from the hemicraniectomy although evolving ischemic changes cannot be excluded and continued follow-up is recommended.      EEG:  Neuroimagin/01 CTA - Diffuse, but overall mild- moderate, sites of vasospasm  are noted at the anterior circulation, whilst the posterior circulation appears spared.   10/27 CT - There is herniation of the brain parenchyma and to the craniectomy defect which is similar prior examination. There is no significant change in the large left frontotemporal intraparenchymal hematoma with surrounding edema. Again demonstrated is mass effect with right to left midline shift measuring approximately 1 cm which is stable from prior exam. Again demonstrated is effacement of the cerebral sulci and basal cisterns.  Other imagin/01 UE Doppler - DVT within the right brachial vein.  Superficial thrombosis of the right cephalic vein.   CXR - stable.  Mild congestion.  10/28 Doppler - Negative    10/28 TTE -   1. Normal left and right ventricular size and systolic function.   2. No significant valvular disease.   3. Mild pulmonary hypertension present, pulmonary artery systolic pressure is 35 mmHg.   4. No pericardial effusion.   5. No prior echo is available for comparison.      Lines: R TLC IJ [place]  Drains/ devices:   R IJ  EVD  Primafit    CODE STATUS:  Full Code                         GOALS OF CARE:  aggressive                      DISPOSITION:  ICU

## 2021-11-09 NOTE — PROGRESS NOTE ADULT - ASSESSMENT
46y/Female with:  L MCA ruptured aneurysm, subarachnoid hemorrhage, s/p C (10/26/2021, Dr. D'Amico @ [place]), brain compression, cerebral edema  anemia   recent spinal surgery  UGIB    PLAN: Day 1 = 10-26 ; Day 4 = 10/29; Day 21 = 11/15  NEURO: neurochecks q1h, sedation with precedex; PRN fentanyl pushes  SAH:  cont nimodipine 30 mg po q2h to be given for 21 days (last day 11/15)   Hydrocephalus:  EVD at 5 cm H20 open to drain  neurostorming - taper gabapentin   Seizure prophylaxis:  decrease levetiracetam to 500 BID in AM?   REHAB:  physical therapy evaluation and management    EARLY MOB:  HOB elevated    PULM:  full vent support for now, trach/PEG schedule tomorrow   CARDIO:  SBP goal 100-180mm Hg,   ENDO:  Blood sugar goals 140-180 mg/dL, insulin sliding scale  GI:  PPI OD  DIET: Jevity at goal - NPO after midnight  RENAL: maintain euvolemia, Na goal 140-145, 3%@30cc/hr, check BMP in AM; NS@45cc/hr once NPO  HEM/ONC: no coagulopathy (INR= 1.03), no ASA / Plavix use  VTE Prophylaxis: SCDs, SQL   ID: afebrile, no leukocytosis - piperacillin/tazobactam (11/05 to 11/07) Vancomycin (11/05 to 11/06) ceftriaxone 11/07, cefepime 11/08 - now; duration of ABx 1 week (last day: 11/12)  Social: will update son and daughter  WOUND: dehiscence spinal surgery wound; wound care consult    CORE MEASURES  1.  Hunt and Griffin Score = 5  2.  VTE prophylaxis:  [ ] administered within 24 hours of admission OR [X] reason not done: fresh bleed, unsecured aneurysm.  3.  Dysphagia screening:   [X] performed before any oral meds / liquids / food  4.  Nimodipine treatment:  [X] administered within 24 hours of admission OR [ ] reason not done:    ATTENDING ATTESTATION:  I was physically present for the key portions of the evaluation and management (E/M) service provided.  I agree with the above history, physical and plan, which I have reviewed and edited where appropriate.    Patient at high risk for neurological deterioration or death due to:  ICU delirium, aspiration PNA, DVT / PE.  Critical care time:  I have personally provided 30 minutes of critical care time, excluding time spent on separate procedures.      Plan discussed with RN, house staff.     46y/Female with:  L MCA ruptured aneurysm, subarachnoid hemorrhage, s/p C (10/26/2021, Dr. D'Amico @ [place]), brain compression, cerebral edema  anemia   recent spinal surgery  UGIB    PLAN: Day 1 = 10-26 ; Day 4 = 10/29; Day 21 = 11/15  NEURO: neurochecks q1h, sedation with PRN fentanyl pushes  SAH:  cont nimodipine 60 mg po q4h to be given for 21 days (last day 11/15)   Hydrocephalus:  EVD at 5 cm H20 open to drain  neurostorming - taper gabapentin   Seizure prophylaxis:  decrease levetiracetam to 500 BID   REHAB:  physical therapy evaluation and management    EARLY MOB:  HOB elevated    PULM:  full vent support for now, d/c 3% inhalation, continue mucormyst and ipratropium / albuterol; pulmo toilette; for trach tomorrow in OR  CARDIO:  SBP goal 100-180mm Hg,   ENDO:  Blood sugar goals 140-180 mg/dL, insulin sliding scale  GI:  PPI OD  DIET: NPO   RENAL: maintain euvolemia, Na goal 140-145, 3%@30cc/hr, NS@45cc/hr   HEM/ONC: no coagulopathy (INR= 1.03), no ASA / Plavix use  VTE Prophylaxis: SCDs, SQL   ID: afebrile, no leukocytosis - piperacillin/tazobactam (11/05 to 11/07) Vancomycin (11/05 to 11/06) ceftriaxone 11/07, cefepime 11/08 - now; duration of ABx 1 week (last day: 11/12)  Social: will update son and daughter  WOUND: dehiscence spinal surgery wound; wound care consult    CORE MEASURES  1.  Hunt and Griffin Score = 5  2.  VTE prophylaxis:  [ ] administered within 24 hours of admission OR [X] reason not done: fresh bleed, unsecured aneurysm.  3.  Dysphagia screening:   [X] performed before any oral meds / liquids / food  4.  Nimodipine treatment:  [X] administered within 24 hours of admission OR [ ] reason not done:    ATTENDING ATTESTATION:  I was physically present for the key portions of the evaluation and management (E/M) service provided.  I agree with the above history, physical and plan, which I have reviewed and edited where appropriate.    Patient at high risk for neurological deterioration or death due to:  ICU delirium, aspiration PNA, DVT / PE.  Critical care time:  I have personally provided 30 minutes of critical care time, excluding time spent on separate procedures.      Plan discussed with RN, house staff.
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
## 2021-11-10 LAB
ANION GAP SERPL CALC-SCNC: 11 MMOL/L — SIGNIFICANT CHANGE UP (ref 5–17)
ANION GAP SERPL CALC-SCNC: 11 MMOL/L — SIGNIFICANT CHANGE UP (ref 5–17)
ANION GAP SERPL CALC-SCNC: 13 MMOL/L — SIGNIFICANT CHANGE UP (ref 5–17)
ANION GAP SERPL CALC-SCNC: 15 MMOL/L — SIGNIFICANT CHANGE UP (ref 5–17)
BLD GP AB SCN SERPL QL: NEGATIVE — SIGNIFICANT CHANGE UP
BUN SERPL-MCNC: 5 MG/DL — LOW (ref 7–23)
CALCIUM SERPL-MCNC: 9.2 MG/DL — SIGNIFICANT CHANGE UP (ref 8.4–10.5)
CALCIUM SERPL-MCNC: 9.3 MG/DL — SIGNIFICANT CHANGE UP (ref 8.4–10.5)
CALCIUM SERPL-MCNC: 9.4 MG/DL — SIGNIFICANT CHANGE UP (ref 8.4–10.5)
CALCIUM SERPL-MCNC: 9.4 MG/DL — SIGNIFICANT CHANGE UP (ref 8.4–10.5)
CHLORIDE SERPL-SCNC: 103 MMOL/L — SIGNIFICANT CHANGE UP (ref 96–108)
CHLORIDE SERPL-SCNC: 105 MMOL/L — SIGNIFICANT CHANGE UP (ref 96–108)
CHLORIDE SERPL-SCNC: 105 MMOL/L — SIGNIFICANT CHANGE UP (ref 96–108)
CHLORIDE SERPL-SCNC: 107 MMOL/L — SIGNIFICANT CHANGE UP (ref 96–108)
CO2 SERPL-SCNC: 21 MMOL/L — LOW (ref 22–31)
CO2 SERPL-SCNC: 23 MMOL/L — SIGNIFICANT CHANGE UP (ref 22–31)
CO2 SERPL-SCNC: 24 MMOL/L — SIGNIFICANT CHANGE UP (ref 22–31)
CO2 SERPL-SCNC: 25 MMOL/L — SIGNIFICANT CHANGE UP (ref 22–31)
CREAT SERPL-MCNC: 0.57 MG/DL — SIGNIFICANT CHANGE UP (ref 0.5–1.3)
CREAT SERPL-MCNC: 0.6 MG/DL — SIGNIFICANT CHANGE UP (ref 0.5–1.3)
CREAT SERPL-MCNC: 0.63 MG/DL — SIGNIFICANT CHANGE UP (ref 0.5–1.3)
CREAT SERPL-MCNC: 0.65 MG/DL — SIGNIFICANT CHANGE UP (ref 0.5–1.3)
GLUCOSE SERPL-MCNC: 103 MG/DL — HIGH (ref 70–99)
GLUCOSE SERPL-MCNC: 108 MG/DL — HIGH (ref 70–99)
GLUCOSE SERPL-MCNC: 94 MG/DL — SIGNIFICANT CHANGE UP (ref 70–99)
GLUCOSE SERPL-MCNC: 99 MG/DL — SIGNIFICANT CHANGE UP (ref 70–99)
HCT VFR BLD CALC: 28.9 % — LOW (ref 34.5–45)
HGB BLD-MCNC: 9.2 G/DL — LOW (ref 11.5–15.5)
MAGNESIUM SERPL-MCNC: 1.8 MG/DL — SIGNIFICANT CHANGE UP (ref 1.6–2.6)
MCHC RBC-ENTMCNC: 26.1 PG — LOW (ref 27–34)
MCHC RBC-ENTMCNC: 31.8 GM/DL — LOW (ref 32–36)
MCV RBC AUTO: 81.9 FL — SIGNIFICANT CHANGE UP (ref 80–100)
NRBC # BLD: 0 /100 WBCS — SIGNIFICANT CHANGE UP (ref 0–0)
PHOSPHATE SERPL-MCNC: 3.5 MG/DL — SIGNIFICANT CHANGE UP (ref 2.5–4.5)
PLATELET # BLD AUTO: 475 K/UL — HIGH (ref 150–400)
POTASSIUM SERPL-MCNC: 3.8 MMOL/L — SIGNIFICANT CHANGE UP (ref 3.5–5.3)
POTASSIUM SERPL-MCNC: 3.8 MMOL/L — SIGNIFICANT CHANGE UP (ref 3.5–5.3)
POTASSIUM SERPL-MCNC: 3.9 MMOL/L — SIGNIFICANT CHANGE UP (ref 3.5–5.3)
POTASSIUM SERPL-MCNC: 3.9 MMOL/L — SIGNIFICANT CHANGE UP (ref 3.5–5.3)
POTASSIUM SERPL-SCNC: 3.8 MMOL/L — SIGNIFICANT CHANGE UP (ref 3.5–5.3)
POTASSIUM SERPL-SCNC: 3.8 MMOL/L — SIGNIFICANT CHANGE UP (ref 3.5–5.3)
POTASSIUM SERPL-SCNC: 3.9 MMOL/L — SIGNIFICANT CHANGE UP (ref 3.5–5.3)
POTASSIUM SERPL-SCNC: 3.9 MMOL/L — SIGNIFICANT CHANGE UP (ref 3.5–5.3)
RBC # BLD: 3.53 M/UL — LOW (ref 3.8–5.2)
RBC # FLD: 21.2 % — HIGH (ref 10.3–14.5)
RH IG SCN BLD-IMP: POSITIVE — SIGNIFICANT CHANGE UP
SODIUM SERPL-SCNC: 139 MMOL/L — SIGNIFICANT CHANGE UP (ref 135–145)
SODIUM SERPL-SCNC: 140 MMOL/L — SIGNIFICANT CHANGE UP (ref 135–145)
SODIUM SERPL-SCNC: 141 MMOL/L — SIGNIFICANT CHANGE UP (ref 135–145)
SODIUM SERPL-SCNC: 143 MMOL/L — SIGNIFICANT CHANGE UP (ref 135–145)
WBC # BLD: 14.25 K/UL — HIGH (ref 3.8–10.5)
WBC # FLD AUTO: 14.25 K/UL — HIGH (ref 3.8–10.5)

## 2021-11-10 PROCEDURE — 71045 X-RAY EXAM CHEST 1 VIEW: CPT | Mod: 26

## 2021-11-10 PROCEDURE — 95720 EEG PHY/QHP EA INCR W/VEEG: CPT

## 2021-11-10 PROCEDURE — 36223 PLACE CATH CAROTID/INOM ART: CPT | Mod: 50

## 2021-11-10 PROCEDURE — 99291 CRITICAL CARE FIRST HOUR: CPT

## 2021-11-10 PROCEDURE — 36226 PLACE CATH VERTEBRAL ART: CPT | Mod: LT

## 2021-11-10 PROCEDURE — 99292 CRITICAL CARE ADDL 30 MIN: CPT

## 2021-11-10 RX ORDER — SODIUM CHLORIDE 9 MG/ML
1000 INJECTION INTRAMUSCULAR; INTRAVENOUS; SUBCUTANEOUS
Refills: 0 | Status: DISCONTINUED | OUTPATIENT
Start: 2021-11-11 | End: 2021-11-11

## 2021-11-10 RX ORDER — BROMOCRIPTINE MESYLATE 5 MG/1
5 CAPSULE ORAL EVERY 8 HOURS
Refills: 0 | Status: DISCONTINUED | OUTPATIENT
Start: 2021-11-10 | End: 2021-11-11

## 2021-11-10 RX ORDER — SODIUM CHLORIDE 5 G/100ML
500 INJECTION, SOLUTION INTRAVENOUS
Refills: 0 | Status: DISCONTINUED | OUTPATIENT
Start: 2021-11-10 | End: 2021-11-11

## 2021-11-10 RX ORDER — POTASSIUM CHLORIDE 20 MEQ
20 PACKET (EA) ORAL ONCE
Refills: 0 | Status: COMPLETED | OUTPATIENT
Start: 2021-11-10 | End: 2021-11-10

## 2021-11-10 RX ORDER — FLUDROCORTISONE ACETATE 0.1 MG/1
0.1 TABLET ORAL EVERY 12 HOURS
Refills: 0 | Status: DISCONTINUED | OUTPATIENT
Start: 2021-11-10 | End: 2021-11-13

## 2021-11-10 RX ORDER — MAGNESIUM SULFATE 500 MG/ML
2 VIAL (ML) INJECTION ONCE
Refills: 0 | Status: COMPLETED | OUTPATIENT
Start: 2021-11-10 | End: 2021-11-10

## 2021-11-10 RX ORDER — SODIUM CHLORIDE 9 MG/ML
1000 INJECTION INTRAMUSCULAR; INTRAVENOUS; SUBCUTANEOUS ONCE
Refills: 0 | Status: COMPLETED | OUTPATIENT
Start: 2021-11-10 | End: 2021-11-10

## 2021-11-10 RX ORDER — SODIUM CHLORIDE 9 MG/ML
500 INJECTION INTRAMUSCULAR; INTRAVENOUS; SUBCUTANEOUS ONCE
Refills: 0 | Status: COMPLETED | OUTPATIENT
Start: 2021-11-10 | End: 2021-11-10

## 2021-11-10 RX ADMIN — Medication 50 GRAM(S): at 06:41

## 2021-11-10 RX ADMIN — SODIUM CHLORIDE 1000 MILLILITER(S): 9 INJECTION INTRAMUSCULAR; INTRAVENOUS; SUBCUTANEOUS at 06:20

## 2021-11-10 RX ADMIN — CHLORHEXIDINE GLUCONATE 15 MILLILITER(S): 213 SOLUTION TOPICAL at 06:18

## 2021-11-10 RX ADMIN — FENTANYL CITRATE 50 MICROGRAM(S): 50 INJECTION INTRAVENOUS at 06:30

## 2021-11-10 RX ADMIN — FENTANYL CITRATE 50 MICROGRAM(S): 50 INJECTION INTRAVENOUS at 00:00

## 2021-11-10 RX ADMIN — Medication 200 MILLIGRAM(S): at 18:26

## 2021-11-10 RX ADMIN — FENTANYL CITRATE 50 MICROGRAM(S): 50 INJECTION INTRAVENOUS at 17:59

## 2021-11-10 RX ADMIN — SODIUM CHLORIDE 3 GRAM(S): 9 INJECTION INTRAMUSCULAR; INTRAVENOUS; SUBCUTANEOUS at 18:02

## 2021-11-10 RX ADMIN — Medication 20 MILLIEQUIVALENT(S): at 06:41

## 2021-11-10 RX ADMIN — BROMOCRIPTINE MESYLATE 10 MILLIGRAM(S): 5 CAPSULE ORAL at 06:19

## 2021-11-10 RX ADMIN — Medication 200 MILLIGRAM(S): at 06:19

## 2021-11-10 RX ADMIN — NIMODIPINE 60 MILLIGRAM(S): 60 SOLUTION ORAL at 06:18

## 2021-11-10 RX ADMIN — SODIUM CHLORIDE 1000 MILLILITER(S): 9 INJECTION INTRAMUSCULAR; INTRAVENOUS; SUBCUTANEOUS at 11:19

## 2021-11-10 RX ADMIN — FENTANYL CITRATE 50 MICROGRAM(S): 50 INJECTION INTRAVENOUS at 02:00

## 2021-11-10 RX ADMIN — LEVETIRACETAM 500 MILLIGRAM(S): 250 TABLET, FILM COATED ORAL at 06:41

## 2021-11-10 RX ADMIN — NIMODIPINE 60 MILLIGRAM(S): 60 SOLUTION ORAL at 18:46

## 2021-11-10 RX ADMIN — NIMODIPINE 60 MILLIGRAM(S): 60 SOLUTION ORAL at 02:00

## 2021-11-10 RX ADMIN — SODIUM CHLORIDE 3 GRAM(S): 9 INJECTION INTRAMUSCULAR; INTRAVENOUS; SUBCUTANEOUS at 06:18

## 2021-11-10 RX ADMIN — Medication 1 DROP(S): at 18:47

## 2021-11-10 RX ADMIN — FENTANYL CITRATE 50 MICROGRAM(S): 50 INJECTION INTRAVENOUS at 01:48

## 2021-11-10 RX ADMIN — NIMODIPINE 60 MILLIGRAM(S): 60 SOLUTION ORAL at 10:42

## 2021-11-10 RX ADMIN — MODAFINIL 200 MILLIGRAM(S): 200 TABLET ORAL at 11:18

## 2021-11-10 RX ADMIN — SODIUM CHLORIDE 3 GRAM(S): 9 INJECTION INTRAMUSCULAR; INTRAVENOUS; SUBCUTANEOUS at 11:18

## 2021-11-10 RX ADMIN — CEFEPIME 100 MILLIGRAM(S): 1 INJECTION, POWDER, FOR SOLUTION INTRAMUSCULAR; INTRAVENOUS at 06:20

## 2021-11-10 RX ADMIN — SODIUM CHLORIDE 3 GRAM(S): 9 INJECTION INTRAMUSCULAR; INTRAVENOUS; SUBCUTANEOUS at 23:21

## 2021-11-10 RX ADMIN — CHLORHEXIDINE GLUCONATE 15 MILLILITER(S): 213 SOLUTION TOPICAL at 18:02

## 2021-11-10 RX ADMIN — NIMODIPINE 60 MILLIGRAM(S): 60 SOLUTION ORAL at 23:22

## 2021-11-10 RX ADMIN — SODIUM CHLORIDE 50 MILLILITER(S): 5 INJECTION, SOLUTION INTRAVENOUS at 01:49

## 2021-11-10 RX ADMIN — CEFEPIME 100 MILLIGRAM(S): 1 INJECTION, POWDER, FOR SOLUTION INTRAMUSCULAR; INTRAVENOUS at 18:26

## 2021-11-10 RX ADMIN — FLUDROCORTISONE ACETATE 0.1 MILLIGRAM(S): 0.1 TABLET ORAL at 18:03

## 2021-11-10 RX ADMIN — SODIUM CHLORIDE 50 MILLILITER(S): 5 INJECTION, SOLUTION INTRAVENOUS at 11:19

## 2021-11-10 RX ADMIN — Medication 1 DROP(S): at 06:21

## 2021-11-10 RX ADMIN — FENTANYL CITRATE 50 MICROGRAM(S): 50 INJECTION INTRAVENOUS at 06:19

## 2021-11-10 RX ADMIN — BROMOCRIPTINE MESYLATE 5 MILLIGRAM(S): 5 CAPSULE ORAL at 23:21

## 2021-11-10 RX ADMIN — ENOXAPARIN SODIUM 40 MILLIGRAM(S): 100 INJECTION SUBCUTANEOUS at 23:21

## 2021-11-10 RX ADMIN — Medication 4 MILLILITER(S): at 05:34

## 2021-11-10 RX ADMIN — LEVETIRACETAM 500 MILLIGRAM(S): 250 TABLET, FILM COATED ORAL at 18:30

## 2021-11-10 RX ADMIN — FENTANYL CITRATE 50 MICROGRAM(S): 50 INJECTION INTRAVENOUS at 16:54

## 2021-11-10 RX ADMIN — Medication 4 MILLILITER(S): at 22:17

## 2021-11-10 RX ADMIN — CHLORHEXIDINE GLUCONATE 1 APPLICATION(S): 213 SOLUTION TOPICAL at 06:20

## 2021-11-10 RX ADMIN — PANTOPRAZOLE SODIUM 40 MILLIGRAM(S): 20 TABLET, DELAYED RELEASE ORAL at 11:18

## 2021-11-10 NOTE — PROGRESS NOTE ADULT - ASSESSMENT
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  - Keep abdominal binder in place to prevent PEG tube from being pulled out.  - Rest of care per primary team

## 2021-11-10 NOTE — CHART NOTE - NSCHARTNOTEFT_GEN_A_CORE
Admitting Diagnosis:   Patient is a 46y old  Female who presents with a chief complaint of ICH (10 Nov 2021 12:22)    PAST MEDICAL & SURGICAL HISTORY:  No pertinent past medical history  Personal history of spine surgery    Current Nutrition Order:   Diet, NPO:   Except Medications (11-09-21 @ 16:36)    PO Intake: Good (%) [   ]  Fair (50-75%) [   ] Poor (<25%) [   ]- N/A    GI Issues:   WDL, last BM 11/9  No n/v/d/c  No abd distention or discomfort    Pain:  No pain reported- No nonverbal indicators    Skin Integrity:  Surgical incision, rosalind score 11.   +1 generalized pitting edema  No pressure ulcers present    Labs:   11-10    141  |  105  |  5<L>  ----------------------------<  103<H>  3.9   |  23  |  0.65    Ca    9.3      10 Nov 2021 12:08  Phos  3.5     11-10  Mg     1.8     11-10    CAPILLARY BLOOD GLUCOSE    POCT Blood Glucose.: 95 mg/dL (09 Nov 2021 16:38)    Medications:  MEDICATIONS  (STANDING):  acetylcysteine 20%  Inhalation 4 milliLiter(s) Inhalation three times a day  amantadine Syrup 200 milliGRAM(s) Oral two times a day  artificial  tears Solution 1 Drop(s) Both EYES two times a day  bromocriptine Capsule 5 milliGRAM(s) Oral every 8 hours  cefepime   IVPB 2000 milliGRAM(s) IV Intermittent every 8 hours  chlorhexidine 0.12% Liquid 15 milliLiter(s) Oral Mucosa every 12 hours  chlorhexidine 2% Cloths 1 Application(s) Topical <User Schedule>  enoxaparin Injectable 40 milliGRAM(s) SubCutaneous every 24 hours  fludroCORTISONE 0.1 milliGRAM(s) Oral every 12 hours  levETIRAcetam  Solution 500 milliGRAM(s) Oral two times a day  modafinil 200 milliGRAM(s) Oral daily  niMODipine 60 milliGRAM(s) Oral every 4 hours  pantoprazole   Suspension 40 milliGRAM(s) Oral daily  sodium chloride 3 Gram(s) Oral every 6 hours  sodium chloride 0.9%. 1000 milliLiter(s) (45 mL/Hr) IV Continuous <Continuous>  sodium chloride 3%. 500 milliLiter(s) (50 mL/Hr) IV Continuous <Continuous>    MEDICATIONS  (PRN):  acetaminophen    Suspension .. 650 milliGRAM(s) Oral every 6 hours PRN Temp greater or equal to 38C (100.4F), Mild Pain (1 - 3)  fentaNYL    Injectable 50 MICROGram(s) IV Push every 2 hours PRN agitation/pain  hydrALAZINE Injectable 5 milliGRAM(s) IV Push every 4 hours PRN >180  ondansetron Injectable 4 milliGRAM(s) IV Push every 6 hours PRN Nausea and/or Vomiting    Admitting Anthropometrics:    Weight:  Daily     Daily     Weight Change:     Nutrition Focused Physical Exam: Completed [   ]  Not Pertinent [   ]  Muscle Wasting- Temporal [   ]  Clavicle/Pectoral [   ]  Shoulder/Deltoid [   ]  Scapula [   ]  Interosseous [   ]  Quadriceps [   ]  Gastrocnemius [   ]  Fat Wasting- Orbital [   ]  Buccal [   ]  Triceps [   ]  Rib [   ]  Suspect [PCM] 2/2 to physical assessment, [poor intake], and [wt loss]; please see malnutrition chart note.    Estimated energy needs:     Subjective:     Previous Nutrition Diagnosis:    Active [   ]  Resolved [   ]    If resolved, new PES:     Goal:    Recommendations:    Education:     Risk Level: High [   ] Moderate [   ] Low [   ] Admitting Diagnosis:   Patient is a 46y old  Female who presents with a chief complaint of ICH (10 Nov 2021 12:22)    PAST MEDICAL & SURGICAL HISTORY:  No pertinent past medical history  Personal history of spine surgery    Current Nutrition Order:   Diet, NPO:   Except Medications (11-09-21 @ 16:36)    PO Intake: Good (%) [   ]  Fair (50-75%) [   ] Poor (<25%) [   ]- N/A    GI Issues:   WDL, last BM 11/9  No n/v/d/c  No abd distention or discomfort  PEG in place since 11/9  Rectal tube (50 ml/24hrs output)    Pain:  No pain reported- No nonverbal indicators    Skin Integrity:  Surgical incision, rosalind score 11.   +1 generalized pitting edema  No pressure ulcers present  EVD (156 ml/24hrs output)    Labs:   11-10    141  |  105  |  5<L>  ----------------------------<  103<H>  3.9   |  23  |  0.65    Ca    9.3      10 Nov 2021 12:08  Phos  3.5     11-10  Mg     1.8     11-10    CAPILLARY BLOOD GLUCOSE    POCT Blood Glucose.: 95 mg/dL (09 Nov 2021 16:38)    Medications:  MEDICATIONS  (STANDING):  acetylcysteine 20%  Inhalation 4 milliLiter(s) Inhalation three times a day  amantadine Syrup 200 milliGRAM(s) Oral two times a day  artificial  tears Solution 1 Drop(s) Both EYES two times a day  bromocriptine Capsule 5 milliGRAM(s) Oral every 8 hours  cefepime   IVPB 2000 milliGRAM(s) IV Intermittent every 8 hours  chlorhexidine 0.12% Liquid 15 milliLiter(s) Oral Mucosa every 12 hours  chlorhexidine 2% Cloths 1 Application(s) Topical <User Schedule>  enoxaparin Injectable 40 milliGRAM(s) SubCutaneous every 24 hours  fludroCORTISONE 0.1 milliGRAM(s) Oral every 12 hours  levETIRAcetam  Solution 500 milliGRAM(s) Oral two times a day  modafinil 200 milliGRAM(s) Oral daily  niMODipine 60 milliGRAM(s) Oral every 4 hours  pantoprazole   Suspension 40 milliGRAM(s) Oral daily  sodium chloride 3 Gram(s) Oral every 6 hours  sodium chloride 0.9%. 1000 milliLiter(s) (45 mL/Hr) IV Continuous <Continuous>  sodium chloride 3%. 500 milliLiter(s) (50 mL/Hr) IV Continuous <Continuous>    MEDICATIONS  (PRN):  acetaminophen    Suspension .. 650 milliGRAM(s) Oral every 6 hours PRN Temp greater or equal to 38C (100.4F), Mild Pain (1 - 3)  fentaNYL    Injectable 50 MICROGram(s) IV Push every 2 hours PRN agitation/pain  hydrALAZINE Injectable 5 milliGRAM(s) IV Push every 4 hours PRN >180  ondansetron Injectable 4 milliGRAM(s) IV Push every 6 hours PRN Nausea and/or Vomiting    Admitting Anthropometrics:  Height: 61" Weight: 187lbs, IBW 105lbs+/-10%, %%, BMI 34.9 kg/m2     Weight:  10/26 187lbs     Weight Change: No new weights obtained during this admission. Please obtain new weight when feasible and cont to trend biweekly.     Nutrition Focused Physical Exam: Completed [   ]  Not Pertinent [ x  ]    Estimated energy needs:   IBW (48kg) used for calculations as pt >120% of IBW (178%). Needs adjusted for wound healing, critical care requiring intubation.   Kcal (25-30 kcal/kg): 0784-6157 kcal  Protein (1.4-1.6 g/kg pro): 67-76g pro  **Fluids per team    Subjective:   46y/F with recent spinal surgery, found down and brought to ED.  In ED, witnessed shaking, ?seizures, intubated.  Imaging showed SAH.  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 Repeat CT HCP - EVD R frontal inserted, s/p L hemicraniectomy for decompression and evacuation of SDH 10/26. EVD placed. Pt returned to NSICU intubated and sedated. EVD@5cm h2o. Underwent work-up for emesis, OGT found to be clogged. Now with new OGT in place, tolerating TF @goal >24 hours. Noted tongue biting with oral trauma and mild bleeding 11/8. S/p PEG tube (11/9/2021) with plan to restart EN feeds 10/10. Pending possible trach.     On assessment, pt resting in bed on full vent support. Vent: AC/ VC. Tmax 98.5F. MAP 12H- ordered for fentanyl. Currently NPO s/p PEG placement with plan to restart feeds today (10/10)- please see EN recs below, disc with team. No reported n/v/d/c. Rectal tube in place. No s/sx of aspiration noted. Pertinent Labs: BUN 5L, lytes remains WDL. Edu deferred. Please see nutr recs below.     Previous Nutrition Diagnosis: Inadequate oral intake RT Inability to meet est needs via PO AEB NPO, reliant on EN to meet 100% of est needs    Active [ x  ]  Resolved [   ]    If resolved, new PES:     Goal/Expected Outcome 1. Diet will be advanced within 24-48hrs 2. Consistently meet >75% est    Recommendations:  1.  When medically feasible, recommend restarting EN of Jevity 1.2 @ 50 ml/hr x24hrs via NGT providing 1200 ml TV, 1440 kcal, 67g pro, 968 ml.   >> FWF per team  >> Cont to maintain aspiration precautions at all times  2. Pain and bowel regimen per team  3. Monitor lytes and replete prn  4. RD diet edu prn    Education: Deferred at this time    Risk Level: High [ x  ] Moderate [  ] Low [   ].

## 2021-11-10 NOTE — EEG REPORT - NS EEG TEXT BOX
[facility] Department of Neurology  Inpatient Continuous video-Electroencephalogram    Patient Name:	AILYN DÍAZ    :	1975  MRN:	[id number]    Study Start Date/Time:  2021, 2:35:29 PM  Study End Date/Time:    Referred by: Terence Duncan MD    Brief Clinical History:  AILYN DÍAZ is a 46 year old Female; study performed to investigate for seizures or markers of epilepsy.    Diagnosis Code:   R56.9 convulsions/seizure  CPT: 30356 EEG with video 12-26h  Technical CPT: 05948 set-up +  85774 vEEG unmonitored 12-26h    Pertinent Medications:  n/a    Acquisition Details:  Electroencephalography was acquired using a minimum of 21 channels on an [employer] system v 8.5.1 with electrode placement according to the standard International 10-20 system following ACNS (American Clinical Neurophysiology Society) guidelines for Long-Term Video EEG monitoring.  Anterior temporal T1 and T2 electrodes were utilized whenever possible.   The [employer] automated spike & seizure detections were all reviewed in detail, in addition to extensive portions of raw EEG.    Day 1: 2021 @ 2:35:29 PM to next morning @ 07:00 am  Background:  continuous, with predominantly alpha/theta frequencies.  Symmetry:  Continuous (90+%) attenuation over left hemisphere  Posterior Dominant Rhythm: reaches 9 Hz asymmetric over right  Organization: Rudimentary.  Voltage:  Low (most <20uV LBP Peak-Trough)  Variability: Yes. 		Reactivity: Yes.  N2 sleep: Rudimentary but likely N2 transients present.  Spontaneous Activity:  No epileptiform discharges.  Periodic/rhythmic activity:  None.  Events:  No electrographic seizures or significant clinical events.  Provocations:  Hyperventilation and Photic stimulation: was not performed.    Daily Summary:    1)	Moderate generalized  slowing suggestive of a similar degree of diffuse or multifocal  dysfunction.  2)	Focal slowing over left hemisphere    Radha Washburn MD  Attending Neurologist, [facility]      Daily Updates (from 07:00 am until 07:00 am):  Day 2 -11/10/2021:   Background:  continuous, with predominantly alpha/theta frequencies.  Symmetry:  Continuous (90+%) attenuation over left hemisphere  Posterior Dominant Rhythm: reaches 9 Hz asymmetric over right  Organization: Rudimentary.  Voltage:  Low (most <20uV LBP Peak-Trough)  Variability: Yes. 		Reactivity: Yes.  N2 sleep: Rudimentary but likely N2 transients present.  Spontaneous Activity:  No epileptiform discharges.  Periodic/rhythmic activity:  None.  Events:  No electrographic seizures or significant clinical events.  Provocations:  Hyperventilation and Photic stimulation: was not performed.  Daily Summary:    No change from prior day  No events occurred.    Radha Washburn MD  Attending Neurologist, [facility]

## 2021-11-10 NOTE — PROGRESS NOTE ADULT - ASSESSMENT
46y/Female with:  L MCA ruptured aneurysm, subarachnoid hemorrhage, s/p C (10/26/2021, Dr. D'Amico @ [facility]), brain compression, cerebral edema  anemia   recent spinal surgery  UGIB    PLAN: Day 1 = 10-26 ; Day 4 = 10/29; Day 21 = 11/15  NEURO: neurochecks q1h, sedation with PRN fentanyl pushes  SAH:  cont nimodipine 60 mg po q4h to be given for 21 days (last day 11/15)   Hydrocephalus:  EVD at 5 cm H20 open to drain  neurostorming - off gabapentin   Seizure prophylaxis:  cont levetiracetam 500 BID   REHAB:  physical therapy evaluation and management    EARLY MOB:  HOB elevated    PULM:  full vent support for now, continue mucormyst and ipratropium / albuterol; pulmo toilette; for trach tomorrow in OR  CARDIO:  SBP goal 100-180mm Hg,   ENDO:  Blood sugar goals 140-180 mg/dL, insulin sliding scale  GI:  PPI OD  DIET: NPO after midnight  RENAL: maintain euvolemia, Na goal 140-145, 3%@30cc/hr, NS@45cc/hr   HEM/ONC: no coagulopathy (INR= 1.03), no ASA / Plavix use  VTE Prophylaxis: SCDs, SQL   ID: afebrile, no leukocytosis - piperacillin/tazobactam (11/05 to 11/07) Vancomycin (11/05 to 11/06) ceftriaxone 11/07, cefepime 11/08 - now; duration of ABx 1 week (last day: 11/12)  Social: will update son and daughter  WOUND: dehiscence spinal surgery wound; wound care consulted    CORE MEASURES  1.  Hunt and Griffin Score = 5  2.  VTE prophylaxis:  [ ] administered within 24 hours of admission OR [X] reason not done: fresh bleed, unsecured aneurysm.  3.  Dysphagia screening:   [X] performed before any oral meds / liquids / food  4.  Nimodipine treatment:  [X] administered within 24 hours of admission OR [ ] reason not done:    ATTENDING ATTESTATION:  I was physically present for the key portions of the evaluation and management (E/M) service provided.  I agree with the above history, physical and plan, which I have reviewed and edited where appropriate.    Patient at high risk for neurological deterioration or death due to:  ICU delirium, aspiration PNA, DVT / PE.  Critical care time:  I have personally provided 30 minutes of critical care time, excluding time spent on separate procedures.      Plan discussed with RN, house staff.

## 2021-11-10 NOTE — PROGRESS NOTE ADULT - SUBJECTIVE AND OBJECTIVE BOX
HPI:  45 y/o female with PMH HTN, Multiple sclerosis, recent spinal surgery 10/8 ([facility]) presented to [place] [facility] BIBEMS after being found unconscious at home, unknown period of time. 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 HH5, MF1. Patient was intubated at [place] ED, found to have blown L pupil, and sent straight to the OR for left hemicraniotomy. A-line placed intraop. Hemodynamically unstable upon arrival to [facility], bradycardic and hypertensive s/p Mannitol, Clevidipine, and Furosemide. Central line placed in NSICU. Currently sedated on Propofol, pending repeat CTH. History per patient's son, patient had recent spine surgery and was found on outpatient imaging last week to have L M1 segment aneurysm (unruptured), pt asymptomatic at this time. Per son patient taking percocet PO at home. Ureña catheter, ET tube, and arterial line placed at [facility].     NIHSS on arrival: 32   Bess Griffin 5, Mod Busby 4  Bleed Day 1 = 10/26 (26 Oct 2021 13:03)    10/26: admitted to [facility] from [facility], POD#0 s/p L hemicraniectomy for decompression and evacuation of SDH. Transferred to [facility] noted to have tense flap, received mannitol, keppra, decadron. Was hypertensive to 200s and preston to 40s, recevied 85g mannitol and bullet 23.4%. CTH on arrival demonstrated increased size in vents and new IVH. EVD placed, open at 15, central line placed. Transfused 2 u PRBC. POD0 of coiling of left MCA bifurcation aneurysm,   10/27: CTH demonstrated EVD in correct position, EVD lowered to 5, remains intubated on proprofol, pending repeat CTH in AM. Started on 3% @ 30/hr. 2LNS given for euvolemia, Mannitol given for uptrending ICPs. Bullet given 8:30 am. 3% increased to 50/hr. 1 L bolus. New EVD catheter placed using exisiting sreekanth hole. 1 u PRBC.  10/28: HH5, MF4, BD3; POD2 angio coil/embo of L MCA aneurysm. JOAQUÍN overnight, neuro stable. EVD@5cmH20 ICPs < 20 overnight, OP WNL. S/p 1 unit PRBC yesterday with appropriate response. Given 42g mannitol in AM for ICP 22 persistently, with improvement, then given 23% in PM for elevated ICP. febrile, pancultured. Standing tylenol and cooling blanket.   10/29: BD4, POD3 angio coil/embo. JOAQUÍN o/n, neuro stable. EVD@5, ICPs <20 o/n.   10/30: BD5, POD4 angio coil/embo. JOAQUÍN o/n neuro stable. EVD@5. 3% @50cc/hr.  10/31: BD6, POD5 angio coil/embo. brief period maintained upward gaze, resolved on its own. EVD@5cm h2o.    11/1: BD7, POD6 L hemicrani, angio coil/embo. JOAQUÍN overnight. Neuro exam unchanged. ICPs WNL. started bromocriptine/gabapentin/baclofen. dc'd propofol, started precedex  11/2: BD8 POD7 L hemicrani, angio coil/embo. JOAQUÍN overnight. Neuro exam stable. Remains on 3% hypertonic saline. Receieved 1 unit of PRBCs for a Hgb of 7.6.  11/3: BD 9 POD8 of L hemicrani. Elevated lipase, normal amylase, OG tube clogged and replaced. Abdominal US normal. Pending gen surg reccs regarding trach and peg. Gabapentin and Baclofen increased to help with neurostorming. restarted back on 3%, LR@35. became preston+hypotensive with nimodipine 60q4, decreased back to 30q2, NaCl bullet given.   11/4: BD10 POD9, JOAQUÍN o/n, 500cc NS for euvolemia,  neuro stable, EVD at 5. 3% increased to 75  11/5: BD11 POD10, JOAQUÍN o/n, neuro stable, EVD at 5  11/6: BD12 POD11 JOAQUÍN overnight. Neuro exam stable. Remains intubated on precedex. 3% hypertonic saline dc'd  11/7: BD13 POD 12 JOAQUÍN o/n, NGT replaced. on precex with no icp crisis   11/8: BD14 POD13 JOAQUÍN o/n, noted to have tongue maceration/macroglossia. Gauze with tongue depressor placed. Pending further recs from ENT. Pending trach and PEG placement tomorrow with gen surg. Off precedex, ICPs stable. EVD remains @ 5.   11/9: BD15, POD 13, bleeding under tongue at start of shift, fentanyl pushed with no further episodes. gabapentin stopped. PEG placed  11/10: BD 16, POD 14, neuro stable, ENT to be consulted in AM       OVERNIGHT EVENTS: clots suctioned from ventral side of tongue   Vital Signs Last 24 Hrs  T(C): 37.3 (10 Nov 2021 00:00), Max: 37.6 (09 Nov 2021 01:00)  T(F): 99.1 (10 Nov 2021 00:00), Max: 99.7 (09 Nov 2021 01:00)  HR: 92 (10 Nov 2021 00:00) (82 - 99)  BP: 162/93 (10 Nov 2021 00:00) (152/70 - 188/89)  BP(mean): 120 (10 Nov 2021 00:00) (109 - 154)  RR: 14 (10 Nov 2021 00:00) (14 - 16)  SpO2: 99% (10 Nov 2021 00:00) (93% - 100%)    I&O's Summary    08 Nov 2021 07:01  -  09 Nov 2021 07:00  --------------------------------------------------------  IN: 2291.2 mL / OUT: 4023 mL / NET: -1731.8 mL    09 Nov 2021 07:01  -  10 Nov 2021 00:37  --------------------------------------------------------  IN: 1715 mL / OUT: 3089 mL / NET: -1374 mL         PHYSICAL EXAM:  General Appearance: Patient is on full vent support, not awake.   Cardio: RRR. Normal S1 and S2.   Pulm: CTA b/l. No rales, wheezes, or rhonchi.  Neuro: does not open eyes. B/l pupils 3 mm and reactive. No tracking. (+) Corneal reflexes. (+) Cough, (+) gag. Does not follow commands. B/l UEs trace w/d. B/l LEs TF. Unable to assess sensation due to patient's mental status.   Incision/Wound: L crani site c/d/i. flap full and tense. Posterior spinal surgical incision sites with dehiscence noted.        DIET:  [] NPO  [] Mechanical  [] Tube feeds    LABS:                        8.9    11.63 )-----------( 455      ( 09 Nov 2021 04:21 )             27.3     11-09    139  |  103  |  5<L>  ----------------------------<  108<H>  3.8   |  25  |  0.57    Ca    9.4      09 Nov 2021 23:49  Phos  3.4     11-09  Mg     1.8     11-09    TPro  6.6  /  Alb  2.5<L>  /  TBili  0.2  /  DBili  x   /  AST  26  /  ALT  17  /  AlkPhos  87  11-08    PT/INR - ( 09 Nov 2021 04:21 )   PT: 14.5 sec;   INR: 1.22          PTT - ( 09 Nov 2021 04:21 )  PTT:27.0 sec        CAPILLARY BLOOD GLUCOSE      POCT Blood Glucose.: 95 mg/dL (09 Nov 2021 16:38)      Drug Levels: [] N/A  Vancomycin Level, Trough: 11.7 ug/mL (11-07 @ 06:22)    CSF Analysis: [] N/A      Allergies    No Known Allergies    Intolerances      MEDICATIONS:  Antibiotics:  cefepime   IVPB 2000 milliGRAM(s) IV Intermittent every 8 hours    Neuro:  acetaminophen    Suspension .. 650 milliGRAM(s) Oral every 6 hours PRN  amantadine Syrup 200 milliGRAM(s) Oral two times a day  bromocriptine Capsule 10 milliGRAM(s) Oral every 8 hours  fentaNYL    Injectable 50 MICROGram(s) IV Push every 2 hours PRN  levETIRAcetam  Solution 500 milliGRAM(s) Oral two times a day  modafinil 200 milliGRAM(s) Oral daily  ondansetron Injectable 4 milliGRAM(s) IV Push every 6 hours PRN    Anticoagulation:  enoxaparin Injectable 40 milliGRAM(s) SubCutaneous every 24 hours    OTHER:  acetylcysteine 20%  Inhalation 4 milliLiter(s) Inhalation three times a day  artificial  tears Solution 1 Drop(s) Both EYES two times a day  chlorhexidine 0.12% Liquid 15 milliLiter(s) Oral Mucosa every 12 hours  chlorhexidine 2% Cloths 1 Application(s) Topical <User Schedule>  hydrALAZINE Injectable 5 milliGRAM(s) IV Push every 4 hours PRN  niMODipine 60 milliGRAM(s) Oral every 4 hours  pantoprazole   Suspension 40 milliGRAM(s) Oral daily    IVF:  sodium chloride 3 Gram(s) Oral every 6 hours  sodium chloride 0.9%. 1000 milliLiter(s) IV Continuous <Continuous>  sodium chloride 3%. 500 milliLiter(s) IV Continuous <Continuous>    CULTURES:  Culture Results:   Numerous Staphylococcus aureus  Numerous Enterobacter cloacae complex  Moderate Proteus mirabilis  Accompanied by normal respiratory melinda (11-04 @ 13:59)  Culture Results:   No growth to date (10-28 @ 19:23)    RADIOLOGY & ADDITIONAL TESTS:      45 y/o female with PMHx of HTN and MS, hx of spinal surgery at [facility] 10/8/21 presented to [place] ED [facility] after being found unconscious at home, unknown period of time. 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 HH5, MF4; BD #1 = 10/26. Patient was intubated at [place] ED and sent straight to the OR for left hemicraniotomy. A-line placed intraop. Hemodynamically unstable upon arrival to [facility], bradycardic and hypertensive s/p Mannitol and Furosemide. Central line placed in NSICU. Now s/p EVD placement, and coil embolization of left MCA bifurcation aneurysm 10/26/2021.     PLAN:  NEURO:  - neuro checks/vital signs q1hr  - Keppra 500g IV BID   - on EEG  - Nimodipine 60q4 until 11/15   - bromocriptine 10mg q8hr for neurostorming  - fentanyl pushes PRN   - EVD open at 5cmH2O, monitor ICP/output  - CTA 11/1 with findings of moderate anterior circulation spasm   - CTH 11/8 stable     CARDIO:  - -180  - hydralazine PRN to maintain SBP goal  - echo +mild pulm HTN    PULM:  - intubated on full vent support  - ENT for trach in OR possible tomorrow     GI:  - NPO  - PPI QD GI ppx  - FOB negative 11/1  - Abd US: neg   - rectal tube  - PEG tube placed by gen surg 11/9, tube feeds to begin in 24 hrs   - gen surg unable to place trach d/t neck stiffness, pending ENT consult    RENAL:  - salt tabs 3Q6  - 3%@50cc/hr  - euvolemia goal   - Na 145-150     HEME:  - SCDs, SQL  - s/p 2u PRBC, s/p 1u PRBC 10/27, s/p 1u PRBC 11/02  - monitor H+H  - FOB negative  - RUE doppler for swelling 11/1: DVT R brachial and superficial cephalic thrombus, surveillance 11/8    ID:  - leukocytosis and procal, trend   - Tylenol PRN for fever  - sputum cx 11/4:  gm + cocci in pairs/clusters, rare gm + rods  - Cefepime started to cover for enterobacter/proteus in sputum     ENDO:  - ISS   - monitor FS    OTHER  - wound care recs- q2 dressing changes   - ENT consulted, reccomends trach placement and oral hygeine    GOC: full code  Level of care: ICU status   Dispo: pend EVD, trach  family updated    Case discussed with Dr. Duncan , Dr Menard

## 2021-11-10 NOTE — PROGRESS NOTE ADULT - SUBJECTIVE AND OBJECTIVE BOX
=======================================   NEUROCRITICAL CARE ATTENDING NOTE   =======================================  ARJUN, AILYN     HPI:  Patient is a 47 y/o female with PMH HTN, multiple sclerosis, recent spinal surgery 10/8 ([facility]) presented to [place] ED BIBEMS after being found unconscious at home, unknown period of time. 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 HH5, MF1. Patient was intubated at [place] ED, found to have blown L pupil, and sent straight to the OR for left hemicraniotomy. A-line placed intraop. Hemodynamically unstable upon arrival to [facility], bradycardic and hypertensive s/p Mannitol, Clevidipine, and Furosemide. Central line placed in NSICU. Currently sedated on Propofol, pending repeat CTH. History per patient's son, patient had recent spine surgery and was found on outpatient imaging last week to have L M1 segment aneurysm (unruptured), pt asymptomatic at this time. Per son patient taking percocet PO at home. Ureña catheter, ET tube, and arterial line placed at [facility].  NIHSS on arrival: 32. Bess Griffin 5, Mod Busby 4.  Bleed Day 1 = 10/26 (26 Oct 2021 13:03)    Hospital course:  10/26: admitted to [facility] from [facility], POD#0 s/p L hemicraniectomy for decompression and evacuation of SDH. Transferred to [facility] noted to have tense flap, received mannitol, keppra, decadron. Was hypertensive to 200s and preston to 40s, recevied 85g mannitol and bullet 23.4%. CTH on arrival demonstrated increased size in vents and new IVH. EVD placed, open at 15, central line placed. Transfused 2 u PRBC. POD0 of coiling of left MCA bifurcation aneurysm,   10/27: CTH demonstrated EVD in correct position, EVD lowered to 5, remains intubated on proprofol, pending repeat CTH in AM. Started on 3% @ 30/hr. 2LNS given for euvolemia, Mannitol given for uptrending ICPs. Bullet given 8:30 am. 3% increased to 50/hr. 1 L bolus. New EVD catheter placed using exisiting sreekanth hole. 1 u PRBC.  10/28: HH5, MF4, BD3; POD2 angio coil/embo of L MCA aneurysm. JOAQUÍN overnight, neuro stable. EVD@5cmH20 ICPs < 20 overnight, OP WNL. S/p 1 unit PRBC yesterday with appropriate response. Given 42g mannitol in AM for ICP 22 persistently, with improvement, then given 23% in PM for elevated ICP. febrile, pancultured. Standing tylenol and cooling blanket.   10/30: BD5, POD4 angio coil/embo. JOAQUÍN o/n neuro stable. EVD@5. 3% @50cc/hr.  10/31: BD6, POD5 angio coil/embo. brief period maintained upward gaze, resolved on its own. EVD@5cm H2O.    : BD7, POD6 L hemicrani, angio coil/embo. Neuro exam unchanged. ICPs WNL. Overnight given hydralazine, increased propofol for SBP > 180.  CTA showed mild-moderate anterior circulation vasospasm (permissive hypertension, euvolemia).  11/3: BD9, POD8 L hemicrani, angio coil/embo.  Neuro exam unchanged. ICPs WNL.  Pending trach/PEG.  Dayshift:  noted episodes of sympathetic storming during vasospasm period.  : BD11 POD10, JOAQUÍN o/n, neuro stable, EVD at 5; O2 desaturations with thick secretions (MSSA, Enterobacter, Proteus) started vancomycin zosyn  : BD13 POD 12 JOAQUÍN o/n, NGT replaced. Weaning off precedex, no ICP crisis, decreasing neurostorming meds, pending CT head.   : BD 14. CT head. Some spontaneous eye opening. Overnight, tongue biting with oral trauma and mild bleeding.   : BD 15. PEG placed. On VEEG. No seizures. Exam stably poor  11/10:      Past Medical History: No pertinent past medical history  Allergies:  Allergy Status Unknown  Home meds:       ICU Vital Signs Last 24 Hrs  T(C): 36.9 (10 Nov 2021 05:45), Max: 37.5 (2021 14:44)  T(F): 98.5 (10 Nov 2021 05:45), Max: 99.5 (2021 14:44)  HR: 90 (10 Nov 2021 07:00) (82 - 99)  BP: 155/90 (10 Nov 2021 07:00) (143/88 - 188/89)  BP(mean): 114 (10 Nov 2021 07:00) (108 - 154)  ABP: 175/102 (10 Nov 2021 07:00) (113/106 - 186/101)  ABP(mean): 130 (10 Nov 2021 07:00) (111 - 140)  RR: 25 (10 Nov 2021 07:00) (14 - 25)  SpO2: 96% (10 Nov 2021 07:00) (93% - 100%)      21 @ 07:01  -  11-10-21 @ 07:00  --------------------------------------------------------  IN: 2390 mL / OUT: 3134 mL / NET: -744 mL      Mode: AC/ CMV (Assist Control/ Continuous Mandatory Ventilation), RR (machine): 14, TV (machine): 450, FiO2: 40, PEEP: 5, ITime: 1, MAP: 8.1, PIP: 20    NEUROLOGIC EXAMINATION: Opens eyes to stimuli and spontaneously intermittently, does not follow commands, pupils 3mm equal and brisk (+) Doll's, (+) cough and (+) gag, B/ L LE TF, RUE extends. LUE withdraws trace  EENT: Anicteric, severely edematous tongue with multiple lacerations.   CARDIOVASCULAR: (+) S1 S2, normal rate and regular rhythm  PULMONARY: Clear to auscultation bilaterally  ABDOMEN: Soft, nontender with normoactive bowel sounds  EXTREMITIES: No edema  SKIN: No rash; back incisions (dehiscence noted)      MEDICATIONS:   acetaminophen    Suspension .. 650 milliGRAM(s) Oral every 6 hours PRN  acetylcysteine 20%  Inhalation 4 milliLiter(s) Inhalation three times a day  amantadine Syrup 200 milliGRAM(s) Oral two times a day  artificial  tears Solution 1 Drop(s) Both EYES two times a day  bromocriptine Capsule 10 milliGRAM(s) Oral every 8 hours  cefepime   IVPB 2000 milliGRAM(s) IV Intermittent every 8 hours  chlorhexidine 0.12% Liquid 15 milliLiter(s) Oral Mucosa every 12 hours  chlorhexidine 2% Cloths 1 Application(s) Topical <User Schedule>  enoxaparin Injectable 40 milliGRAM(s) SubCutaneous every 24 hours  fentaNYL    Injectable 50 MICROGram(s) IV Push every 2 hours PRN  hydrALAZINE Injectable 5 milliGRAM(s) IV Push every 4 hours PRN  levETIRAcetam  Solution 500 milliGRAM(s) Oral two times a day  modafinil 200 milliGRAM(s) Oral daily  niMODipine 60 milliGRAM(s) Oral every 4 hours  ondansetron Injectable 4 milliGRAM(s) IV Push every 6 hours PRN  pantoprazole   Suspension 40 milliGRAM(s) Oral daily  sodium chloride 3 Gram(s) Oral every 6 hours  sodium chloride 0.9%. 1000 milliLiter(s) (45 mL/Hr) IV Continuous <Continuous>  sodium chloride 3%. 500 milliLiter(s) (50 mL/Hr) IV Continuous <Continuous>    LABS:                9.2    14.25 )-----------( 475      ( 10 Nov 2021 05:41 )             28.9     11-10    140  |  105  |  5<L>  ----------------------------<  94  3.8   |  24  |  0.60    Ca    9.4      10 Nov 2021 05:41  Phos  3.5     11-10  Mg     1.8     11-10        10/28 CSF NGTD  10/28 Blood NGTD    CT head :   Since prior CT head (10/27/2021), slight decrease in size of left frontotemporal intraparenchymal hematoma. Interval decrease in left-to-right midline shift. More pronounced hypodensity in the left frontal and temporal lobes surrounding hemorrhage which may be artifactual from the hemicraniectomy although evolving ischemic changes cannot be excluded and continued follow-up is recommended.      EEG:  Neuroimagin/01 CTA - Diffuse, but overall mild- moderate, sites of vasospasm  are noted at the anterior circulation, whilst the posterior circulation appears spared.   10/27 CT - There is herniation of the brain parenchyma and to the craniectomy defect which is similar prior examination. There is no significant change in the large left frontotemporal intraparenchymal hematoma with surrounding edema. Again demonstrated is mass effect with right to left midline shift measuring approximately 1 cm which is stable from prior exam. Again demonstrated is effacement of the cerebral sulci and basal cisterns.  Other imagin/01 UE Doppler - DVT within the right brachial vein.  Superficial thrombosis of the right cephalic vein.   CXR - stable.  Mild congestion.  10/28 Doppler - Negative    10/28 TTE -   1. Normal left and right ventricular size and systolic function.   2. No significant valvular disease.   3. Mild pulmonary hypertension present, pulmonary artery systolic pressure is 35 mmHg.   4. No pericardial effusion.   5. No prior echo is available for comparison.      Lines: R TLC IJ [place]  Drains/ devices:   R IJ  EVD  Primafit    CODE STATUS:  Full Code                         GOALS OF CARE:  aggressive                      DISPOSITION:  ICU =======================================   NEUROCRITICAL CARE ATTENDING NOTE   =======================================  ARJUN, AILYN     HPI:  Patient is a 45 y/o female with PMH HTN, multiple sclerosis, recent spinal surgery 10/8 ([facility]) presented to [place] ED BIBEMS after being found unconscious at home, unknown period of time. 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 HH5, MF1. Patient was intubated at [place] ED, found to have blown L pupil, and sent straight to the OR for left hemicraniotomy. A-line placed intraop. Hemodynamically unstable upon arrival to [facility], bradycardic and hypertensive s/p Mannitol, Clevidipine, and Furosemide. Central line placed in NSICU. Currently sedated on Propofol, pending repeat CTH. History per patient's son, patient had recent spine surgery and was found on outpatient imaging last week to have L M1 segment aneurysm (unruptured), pt asymptomatic at this time. Per son patient taking percocet PO at home. Ureña catheter, ET tube, and arterial line placed at [facility].  NIHSS on arrival: 32. Bess Griffin 5, Mod Busby 4.  Bleed Day 1 = 10/26 (26 Oct 2021 13:03)    Hospital course:  10/26: admitted to [facility] from [facility], POD#0 s/p L hemicraniectomy for decompression and evacuation of SDH. Transferred to [facility] noted to have tense flap, received mannitol, keppra, decadron. Was hypertensive to 200s and preston to 40s, recevied 85g mannitol and bullet 23.4%. CTH on arrival demonstrated increased size in vents and new IVH. EVD placed, open at 15, central line placed. Transfused 2 u PRBC. POD0 of coiling of left MCA bifurcation aneurysm,   10/27: CTH demonstrated EVD in correct position, EVD lowered to 5, remains intubated on proprofol, pending repeat CTH in AM. Started on 3% @ 30/hr. 2LNS given for euvolemia, Mannitol given for uptrending ICPs. Bullet given 8:30 am. 3% increased to 50/hr. 1 L bolus. New EVD catheter placed using exisiting sreekanth hole. 1 u PRBC.  10/28: HH5, MF4, BD3; POD2 angio coil/embo of L MCA aneurysm. JOAQUÍN overnight, neuro stable. EVD@5cmH20 ICPs < 20 overnight, OP WNL. S/p 1 unit PRBC yesterday with appropriate response. Given 42g mannitol in AM for ICP 22 persistently, with improvement, then given 23% in PM for elevated ICP. febrile, pancultured. Standing tylenol and cooling blanket.   10/30: BD5, POD4 angio coil/embo. JOAQUÍN o/n neuro stable. EVD@5. 3% @50cc/hr.  10/31: BD6, POD5 angio coil/embo. brief period maintained upward gaze, resolved on its own. EVD@5cm H2O.    : BD7, POD6 L hemicrani, angio coil/embo. Neuro exam unchanged. ICPs WNL. Overnight given hydralazine, increased propofol for SBP > 180.  CTA showed mild-moderate anterior circulation vasospasm (permissive hypertension, euvolemia).  11/3: BD9, POD8 L hemicrani, angio coil/embo.  Neuro exam unchanged. ICPs WNL.  Pending trach/PEG.  Dayshift:  noted episodes of sympathetic storming during vasospasm period.  : BD11 POD10, JOAQUÍN o/n, neuro stable, EVD at 5; O2 desaturations with thick secretions (MSSA, Enterobacter, Proteus) started vancomycin zosyn  : BD13 POD 12 JOAQUÍN o/n, NGT replaced. Weaning off precedex, no ICP crisis, decreasing neurostorming meds, pending CT head.   : BD 14. CT head. Some spontaneous eye opening. Overnight, tongue biting with oral trauma and mild bleeding.   : BD 15. PEG placed. On VEEG. No seizures. Exam stably poor  11/10:      Past Medical History: No pertinent past medical history  Allergies:  Allergy Status Unknown  Home meds:       ICU Vital Signs Last 24 Hrs  T(C): 36.9 (10 Nov 2021 05:45), Max: 37.5 (2021 14:44)  T(F): 98.5 (10 Nov 2021 05:45), Max: 99.5 (2021 14:44)  HR: 90 (10 Nov 2021 07:00) (82 - 99)  BP: 155/90 (10 Nov 2021 07:00) (143/88 - 188/89)  BP(mean): 114 (10 Nov 2021 07:00) (108 - 154)  ABP: 175/102 (10 Nov 2021 07:00) (113/106 - 186/101)  ABP(mean): 130 (10 Nov 2021 07:00) (111 - 140)  RR: 25 (10 Nov 2021 07:00) (14 - 25)  SpO2: 96% (10 Nov 2021 07:00) (93% - 100%)      21 @ 07:01  -  11-10-21 @ 07:00  --------------------------------------------------------  IN: 2390 mL / OUT: 3134 mL / NET: -744 mL      Mode: AC/ CMV (Assist Control/ Continuous Mandatory Ventilation), RR (machine): 14, TV (machine): 450, FiO2: 40, PEEP: 5, ITime: 1, MAP: 8.1, PIP: 20    NEUROLOGIC EXAMINATION: Opens eyes to stimuli and spontaneously intermittently, does not follow commands, pupils 3mm equal and brisk (+) Doll's, (+) cough and (+) gag, B/ L LE Triple flexes, uppers trace WD,  L>R  EENT: Anicteric, severely edematous tongue with multiple lacerations.   CARDIOVASCULAR: (+) S1 S2, normal rate and regular rhythm  PULMONARY: Clear to auscultation bilaterally  ABDOMEN: Soft, nontender with normoactive bowel sounds  EXTREMITIES: No edema  SKIN: No rash; back incisions (dehiscence noted)      MEDICATIONS:   acetaminophen    Suspension .. 650 milliGRAM(s) Oral every 6 hours PRN  acetylcysteine 20%  Inhalation 4 milliLiter(s) Inhalation three times a day  amantadine Syrup 200 milliGRAM(s) Oral two times a day  artificial  tears Solution 1 Drop(s) Both EYES two times a day  bromocriptine Capsule 10 milliGRAM(s) Oral every 8 hours  cefepime   IVPB 2000 milliGRAM(s) IV Intermittent every 8 hours  chlorhexidine 0.12% Liquid 15 milliLiter(s) Oral Mucosa every 12 hours  chlorhexidine 2% Cloths 1 Application(s) Topical <User Schedule>  enoxaparin Injectable 40 milliGRAM(s) SubCutaneous every 24 hours  fentaNYL    Injectable 50 MICROGram(s) IV Push every 2 hours PRN  hydrALAZINE Injectable 5 milliGRAM(s) IV Push every 4 hours PRN  levETIRAcetam  Solution 500 milliGRAM(s) Oral two times a day  modafinil 200 milliGRAM(s) Oral daily  niMODipine 60 milliGRAM(s) Oral every 4 hours  ondansetron Injectable 4 milliGRAM(s) IV Push every 6 hours PRN  pantoprazole   Suspension 40 milliGRAM(s) Oral daily  sodium chloride 3 Gram(s) Oral every 6 hours  sodium chloride 0.9%. 1000 milliLiter(s) (45 mL/Hr) IV Continuous <Continuous>  sodium chloride 3%. 500 milliLiter(s) (50 mL/Hr) IV Continuous <Continuous>    LABS:                9.2    14.25 )-----------( 475      ( 10 Nov 2021 05:41 )             28.9     11-10    140  |  105  |  5<L>  ----------------------------<  94  3.8   |  24  |  0.60    Ca    9.4      10 Nov 2021 05:41  Phos  3.5     11-10  Mg     1.8     11-10        10/28 CSF NGTD  10/28 Blood NGTD    CT head :   Since prior CT head (10/27/2021), slight decrease in size of left frontotemporal intraparenchymal hematoma. Interval decrease in left-to-right midline shift. More pronounced hypodensity in the left frontal and temporal lobes surrounding hemorrhage which may be artifactual from the hemicraniectomy although evolving ischemic changes cannot be excluded and continued follow-up is recommended.      EEG:  Neuroimagin/01 CTA - Diffuse, but overall mild- moderate, sites of vasospasm  are noted at the anterior circulation, whilst the posterior circulation appears spared.   10/27 CT - There is herniation of the brain parenchyma and to the craniectomy defect which is similar prior examination. There is no significant change in the large left frontotemporal intraparenchymal hematoma with surrounding edema. Again demonstrated is mass effect with right to left midline shift measuring approximately 1 cm which is stable from prior exam. Again demonstrated is effacement of the cerebral sulci and basal cisterns.  Other imagin/01 UE Doppler - DVT within the right brachial vein.  Superficial thrombosis of the right cephalic vein.   CXR - stable.  Mild congestion.  10/28 Doppler - Negative    10/28 TTE -   1. Normal left and right ventricular size and systolic function.   2. No significant valvular disease.   3. Mild pulmonary hypertension present, pulmonary artery systolic pressure is 35 mmHg.   4. No pericardial effusion.   5. No prior echo is available for comparison.      Lines: R TLC IJ [place]  Drains/ devices:   R IJ  EVD  Primafit    CODE STATUS:  Full Code                         GOALS OF CARE:  aggressive                      DISPOSITION:  ICU =======================================   NEUROCRITICAL CARE ATTENDING NOTE   =======================================  ARJUN, AILYN     HPI:  Patient is a 45 y/o female with PMH HTN, multiple sclerosis, recent spinal surgery 10/8 ([facility]) presented to [place] ED BIBEMS after being found unconscious at home, unknown period of time. 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 HH5, MF1. Patient was intubated at [place] ED, found to have blown L pupil, and sent straight to the OR for left hemicraniotomy. A-line placed intraop. Hemodynamically unstable upon arrival to [facility], bradycardic and hypertensive s/p Mannitol, Clevidipine, and Furosemide. Central line placed in NSICU. Currently sedated on Propofol, pending repeat CTH. History per patient's son, patient had recent spine surgery and was found on outpatient imaging last week to have L M1 segment aneurysm (unruptured), pt asymptomatic at this time. Per son patient taking percocet PO at home. Ureña catheter, ET tube, and arterial line placed at [facility].  NIHSS on arrival: 32. Bess Griffin 5, Mod Busby 4.  Bleed Day 1 = 10/26 (26 Oct 2021 13:03)    Hospital course:  10/26: admitted to [facility] from [facility], POD#0 s/p L hemicraniectomy for decompression and evacuation of SDH. Transferred to [facility] noted to have tense flap, received mannitol, keppra, decadron. Was hypertensive to 200s and preston to 40s, recevied 85g mannitol and bullet 23.4%. CTH on arrival demonstrated increased size in vents and new IVH. EVD placed, open at 15, central line placed. Transfused 2 u PRBC. POD0 of coiling of left MCA bifurcation aneurysm,   10/27: CTH demonstrated EVD in correct position, EVD lowered to 5, remains intubated on proprofol, pending repeat CTH in AM. Started on 3% @ 30/hr. 2LNS given for euvolemia, Mannitol given for uptrending ICPs. Bullet given 8:30 am. 3% increased to 50/hr. 1 L bolus. New EVD catheter placed using exisiting sreekanth hole. 1 u PRBC.  10/28: HH5, MF4, BD3; POD2 angio coil/embo of L MCA aneurysm. JOAQUÍN overnight, neuro stable. EVD@5cmH20 ICPs < 20 overnight, OP WNL. S/p 1 unit PRBC yesterday with appropriate response. Given 42g mannitol in AM for ICP 22 persistently, with improvement, then given 23% in PM for elevated ICP. febrile, pancultured. Standing tylenol and cooling blanket.   10/30: BD5, POD4 angio coil/embo. JOAQUÍN o/n neuro stable. EVD@5. 3% @50cc/hr.  10/31: BD6, POD5 angio coil/embo. brief period maintained upward gaze, resolved on its own. EVD@5cm H2O.    : BD7, POD6 L hemicrani, angio coil/embo. Neuro exam unchanged. ICPs WNL. Overnight given hydralazine, increased propofol for SBP > 180.  CTA showed mild-moderate anterior circulation vasospasm (permissive hypertension, euvolemia).  11/3: BD9, POD8 L hemicrani, angio coil/embo.  Neuro exam unchanged. ICPs WNL.  Pending trach/PEG.  Dayshift:  noted episodes of sympathetic storming during vasospasm period.  : BD11 POD10, JOAQUÍN o/n, neuro stable, EVD at 5; O2 desaturations with thick secretions (MSSA, Enterobacter, Proteus) started vancomycin zosyn  : BD13 POD 12 JOAQUÍN o/n, NGT replaced. Weaning off precedex, no ICP crisis, decreasing neurostorming meds, pending CT head.   : BD 14. CT head. Some spontaneous eye opening. Overnight, tongue biting with oral trauma and mild bleeding.   : BD 15. PEG placed. On VEEG. No seizures. Exam stably poor  11/10: BD 16. Scheduled for trach. Angio. Exam remains stably poor      Past Medical History: No pertinent past medical history  Allergies:  Allergy Status Unknown  Home meds:       ICU Vital Signs Last 24 Hrs  T(C): 36.9 (10 Nov 2021 05:45), Max: 37.5 (2021 14:44)  T(F): 98.5 (10 Nov 2021 05:45), Max: 99.5 (2021 14:44)  HR: 90 (10 Nov 2021 07:00) (82 - 99)  BP: 155/90 (10 Nov 2021 07:00) (143/88 - 188/89)  BP(mean): 114 (10 Nov 2021 07:00) (108 - 154)  ABP: 175/102 (10 Nov 2021 07:00) (113/106 - 186/101)  ABP(mean): 130 (10 Nov 2021 07:00) (111 - 140)  RR: 25 (10 Nov 2021 07:00) (14 - 25)  SpO2: 96% (10 Nov 2021 07:00) (93% - 100%)      21 @ 07:01  -  11-10-21 @ 07:00  --------------------------------------------------------  IN: 2390 mL / OUT: 3134 mL / NET: -744 mL      Mode: AC/ CMV (Assist Control/ Continuous Mandatory Ventilation), RR (machine): 14, TV (machine): 450, FiO2: 40, PEEP: 5, ITime: 1, MAP: 8.1, PIP: 20    NEUROLOGIC EXAMINATION: Opens eyes to stimuli and spontaneously intermittently, does not follow commands, pupils 3mm equal and reactive (+) Doll's, (+) cough and (+) gag, B/ L lower extremities Triple flexes, uppers trace WD,  L>R  EENT: Anicteric, severely edematous tongue with multiple lacerations.   CARDIOVASCULAR: (+) S1 S2, normal rate and regular rhythm  PULMONARY: Clear to auscultation bilaterally  ABDOMEN: Soft, nontender with normoactive bowel sounds  EXTREMITIES: No edema  SKIN: No rash; back incisions healing.       MEDICATIONS:   acetaminophen    Suspension .. 650 milliGRAM(s) Oral every 6 hours PRN  acetylcysteine 20%  Inhalation 4 milliLiter(s) Inhalation three times a day  amantadine Syrup 200 milliGRAM(s) Oral two times a day  artificial  tears Solution 1 Drop(s) Both EYES two times a day  bromocriptine Capsule 10 milliGRAM(s) Oral every 8 hours  cefepime   IVPB 2000 milliGRAM(s) IV Intermittent every 8 hours  chlorhexidine 0.12% Liquid 15 milliLiter(s) Oral Mucosa every 12 hours  chlorhexidine 2% Cloths 1 Application(s) Topical <User Schedule>  enoxaparin Injectable 40 milliGRAM(s) SubCutaneous every 24 hours  fentaNYL    Injectable 50 MICROGram(s) IV Push every 2 hours PRN  hydrALAZINE Injectable 5 milliGRAM(s) IV Push every 4 hours PRN  levETIRAcetam  Solution 500 milliGRAM(s) Oral two times a day  modafinil 200 milliGRAM(s) Oral daily  niMODipine 60 milliGRAM(s) Oral every 4 hours  ondansetron Injectable 4 milliGRAM(s) IV Push every 6 hours PRN  pantoprazole   Suspension 40 milliGRAM(s) Oral daily  sodium chloride 3 Gram(s) Oral every 6 hours  sodium chloride 0.9%. 1000 milliLiter(s) (45 mL/Hr) IV Continuous <Continuous>  sodium chloride 3%. 500 milliLiter(s) (50 mL/Hr) IV Continuous <Continuous>      LABS:                9.2    14.25 )-----------( 475      ( 10 Nov 2021 05:41 )             28.9     11-10    140  |  105  |  5<L>  ----------------------------<  94  3.8   |  24  |  0.60    Ca    9.4      10 Nov 2021 05:41  Phos  3.5     11-10  Mg     1.8     11-10        10/28 CSF NGTD  10/28 Blood NGTD    CT head :   Since prior CT head (10/27/2021), slight decrease in size of left frontotemporal intraparenchymal hematoma. Interval decrease in left-to-right midline shift. More pronounced hypodensity in the left frontal and temporal lobes surrounding hemorrhage which may be artifactual from the hemicraniectomy although evolving ischemic changes cannot be excluded and continued follow-up is recommended.      EEG:  Neuroimagin/01 CTA - Diffuse, but overall mild- moderate, sites of vasospasm  are noted at the anterior circulation, whilst the posterior circulation appears spared.   10/27 CT - There is herniation of the brain parenchyma and to the craniectomy defect which is similar prior examination. There is no significant change in the large left frontotemporal intraparenchymal hematoma with surrounding edema. Again demonstrated is mass effect with right to left midline shift measuring approximately 1 cm which is stable from prior exam. Again demonstrated is effacement of the cerebral sulci and basal cisterns.  Other imagin/01 UE Doppler - DVT within the right brachial vein.  Superficial thrombosis of the right cephalic vein.   CXR - stable.  Mild congestion.  10/28 Doppler - Negative    10/28 TTE -   1. Normal left and right ventricular size and systolic function.   2. No significant valvular disease.   3. Mild pulmonary hypertension present, pulmonary artery systolic pressure is 35 mmHg.   4. No pericardial effusion.   5. No prior echo is available for comparison.      Lines:  [place]  R IJ  EVD  Primafit    CODE STATUS:  Full Code                         GOALS OF CARE:  aggressive                      DISPOSITION:  ICU

## 2021-11-10 NOTE — PROGRESS NOTE ADULT - SUBJECTIVE AND OBJECTIVE BOX
NEUROSURGERY POST OP NOTE:    POD# 0 S/P cerebral angiogram without findings of vasospasm    S: Pt seen and examined at bedside in NSICU. Remains intubated.       T(C): 36.8 (11-10-21 @ 15:30), Max: 37.4 (11-10-21 @ 00:51)  HR: 77 (11-10-21 @ 16:00) (62 - 107)  BP: 168/86 (11-10-21 @ 16:00) (143/71 - 184/84)  RR: 14 (11-10-21 @ 16:00) (14 - 31)  SpO2: 99% (11-10-21 @ 16:00) (93% - 100%)      11-09-21 @ 07:01  -  11-10-21 @ 07:00  --------------------------------------------------------  IN: 3205 mL / OUT: 4306 mL / NET: -1101 mL    11-10-21 @ 07:01  -  11-10-21 @ 17:43  --------------------------------------------------------  IN: 1807 mL / OUT: 1486 mL / NET: 321 mL        acetaminophen    Suspension .. 650 milliGRAM(s) Oral every 6 hours PRN  acetylcysteine 20%  Inhalation 4 milliLiter(s) Inhalation three times a day  amantadine Syrup 200 milliGRAM(s) Oral two times a day  artificial  tears Solution 1 Drop(s) Both EYES two times a day  bromocriptine Capsule 5 milliGRAM(s) Oral every 8 hours  cefepime   IVPB 2000 milliGRAM(s) IV Intermittent every 8 hours  chlorhexidine 0.12% Liquid 15 milliLiter(s) Oral Mucosa every 12 hours  chlorhexidine 2% Cloths 1 Application(s) Topical <User Schedule>  enoxaparin Injectable 40 milliGRAM(s) SubCutaneous every 24 hours  fentaNYL    Injectable 50 MICROGram(s) IV Push every 2 hours PRN  fludroCORTISONE 0.1 milliGRAM(s) Oral every 12 hours  hydrALAZINE Injectable 5 milliGRAM(s) IV Push every 4 hours PRN  levETIRAcetam  Solution 500 milliGRAM(s) Oral two times a day  modafinil 200 milliGRAM(s) Oral daily  niMODipine 60 milliGRAM(s) Oral every 4 hours  ondansetron Injectable 4 milliGRAM(s) IV Push every 6 hours PRN  pantoprazole   Suspension 40 milliGRAM(s) Oral daily  sodium chloride 3 Gram(s) Oral every 6 hours  sodium chloride 3%. 500 milliLiter(s) IV Continuous <Continuous>    PHYSICAL EXAM:  General Appearance: Patient is on full vent support, not awake.   Cardio: RRR. Normal S1 and S2.   Pulm: CTA b/l. No rales, wheezes, or rhonchi.  Neuro: does not open eyes. B/l pupils 3 mm and reactive. No tracking. (+) Corneal reflexes. (+) Cough, (+) gag. Does not follow commands. B/l UEs trace w/d. B/l LEs TF. Unable to assess sensation due to patient's mental status.   Incision/Wound: L crani site c/d/i. flap full and tense. Posterior spinal surgical incision sites with dehiscence noted.  R groin incision site C/D/I, dressing in place, no hematoma. DP/PT pulses 2+ and symmetric    DIET:  [] NPO  [] Mechanical  [x] Tube feeds    Assessment:   45 y/o female with PMHx of HTN and MS, hx of spinal surgery at [facility] 10/8/21 presented to [place] [facility] after being found unconscious at home, unknown period of time. 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 HH5, MF4; BD #1 = 10/26. Patient was intubated at [place] ED and sent straight to the OR for left hemicraniotomy. A-line placed intraop. Hemodynamically unstable upon arrival to [facility], bradycardic and hypertensive s/p Mannitol and Furosemide. Central line placed in NSICU. S/p EVD placement, and coil embolization of left MCA bifurcation aneurysm 10/26/2021. S/p cerebral angio without findings of vasospasm 11/10    PLAN:  NEURO:  - neuro checks/groin/vascular vital signs q1hr  - HOB flat until 8:30pm  - Keppra 500g IV BID   - VEEG negative for seizures  - Nimodipine 60q4 until 11/15   - decreased bromocriptine 5mg q8hr   - fentanyl pushes PRN   - EVD open at 5cmH2O, monitor ICP/output  - CTA 11/1 with findings of moderate anterior circulation spasm   - CTH 11/8 stable     CARDIO:  - -180  - hydralazine PRN to maintain SBP goal  - echo +mild pulm HTN    PULM:  - intubated on full vent support  - Plan for trach with ENT in OR 11/11    GI:  - NPO after midnight  - PPI QD GI ppx  - FOB negative 11/1  - Abd US: neg   - rectal tube  - PEG tube placed by gen surg 11/9, tube feeds until midnight  - gen surg unable to place trach d/t neck stiffness    RENAL:  - salt tabs 3Q6  - 3%@50cc/hr, NS@50cc/hr  - euvolemia goal   - Na 145-150     HEME:  - SCDs, SQL  - s/p 2u PRBC, s/p 1u PRBC 10/27, s/p 1u PRBC 11/02  - monitor H+H  - FOB negative  - 11/9 unchanged DVT L brachial and unchanged R superficial cephalic thrombus, f/u repeat doppler 11/17    ID:  - leukocytosis and procal, trend   - Tylenol PRN for fever  - sputum cx 11/4:  enterobacter, proteus  - Cefepime started to cover for enterobacter/proteus in sputum, end 11/17    ENDO:  - ISS   - monitor FS    OTHER  - wound care recs- q2 dressing changes   - ENT consulted, reccomends trach placement and oral hygeine    GOC: full code  Level of care: ICU status   Dispo: pend EVD, trach  family updated    Case discussed with Dr. Duncan , Dr Menard

## 2021-11-10 NOTE — PROGRESS NOTE ADULT - ASSESSMENT
47 y/o female HH5 MF 4 BD 15 with:   L MCA ruptured aneurysm, subarachnoid hemorrhage, s/p DHC (10/26/2021, Dr. D'Amico @ [place]), brain compression, cerebral edema  Anemia   Recent spinal surgery  UGIB      NEURO: Neurochecks Q1h  DCI/ vasospasm: Cont nimodipine 30 mg PO Q2h to be given for 21 days (last day 11/15); CTA mild-moderate anterior circulation vasospasm (euvolemia, permissive hypertension)  CT head 11/8: Improving edema (though still significant), slight decrease in IPH  Hydrocephalus:  EVD 5cm H20, ICP < 15  Neurostorming: Improving. DC gabapentin. Continue bromocriptine (wean as tolerated)  Seizure prophylaxis: Levetiracetam 500mg. No longer indicated. Recommend DC. VEEG moderate slowing. No seizures  Sedation: No longer on precedex; continue PRN fentanyl pushes  REHAB: Physical therapy evaluation and management      EARLY MOB:  HOB elevated    PULM: Full vent support   14/450/40/5  ABG, CXR.   Intubation day ~14  Scheduled for trach 11/10  ENT evaluation re: severe macroglossia, necrotic tongue lesions due to biting    CARDIO:   SBP goal 100-180mm Hg,  TTE EF 75%  EKG wnl    ENDO:    Blood sugar goals 140-180 mg/dL  Insulin sliding scale    GI:    FOBT negative. Doubt UGIB  Pending PEG 11/9  PPI- DC once tolerating TFs post PEG.   LBM: Rectal tube. Hold bowel regimen    RENAL:   Maintain euvolemia  Na goal 140-150  3% at 50cc, continue salt tabs  Monitor BMPs    HEM/ONC: Anemia- no overt blood loss. Not on antiplt or anticoagulation  FOBT neg (there was concern for possible  UGIB in setting of coffee ground emesis)  Hb stable (had been transfused)  Anti Xa level 0.26. Continue dose of lovenox  VTE Prophylaxis: SCDs, SQL; Doppler of upper extremities    ID:   Tmax 37.8, no leukocytosis  S/P vancomycin zosyn given sputum MSSA and enterobacter proteus --> Changed to Cefepime for enterobacter/MSSA coverage    Social: Family has been updated    *****  CORE MEASURES  1.  Hunt and Griffin Score = 5  2.  VTE prophylaxis:  [ ] administered within 24 hours of admission OR [X] reason not done: fresh bleed, unsecured aneurysm.  3.  Dysphagia screening:   [X] performed before any oral meds / liquids / food  4.  Nimodipine treatment:  [X] administered within 24 hours of admission OR [ ] reason not done:    *****    ATTENDING ATTESTATION:    Patient at high risk for neurological deterioration or death due to:  ICU delirium, aspiration PNA, DVT / PE.  Critical care time:  I have personally provided 45 minutes of critical care time, excluding time spent on separate procedures.    Plan discussed with RN, house staff.     47 y/o female HH5 MF 4 BD 16 with:   L MCA ruptured aneurysm, subarachnoid hemorrhage, s/p DHC (10/26/2021, Dr. D'Amico @ [place]), brain compression, cerebral edema  Anemia   Recent spinal surgery  UGIB      NEURO: Neurochecks Q1h  DCI/ vasospasm: Cont nimodipine 30 mg PO Q2h to be given for 21 days (last day 11/15); CTA mild-moderate anterior circulation vasospasm (euvolemia, permissive hypertension). Repeat angio pending 11/10 or 11/11 to look for residual aneurysm  CT head 11/8: Improving edema (though still significant), slight decrease in IPH  Hydrocephalus:  EVD 5cm H20, ICP < 15. Will need VPS  Neurostorming: Improving. DC gabapentin. Continue bromocriptine to 5mg Q8 (wean as tolerated)  Seizure prophylaxis: Levetiracetam 500mg bid. VEEG moderate slowing. No seizures seen for 48 hours. DC VEEG  Sedation: PRN fentanyl pushes  REHAB: Physical therapy evaluation and management      EARLY MOB:  HOB elevated    PULM: Full vent support   14/450/40/5  ABG, CXR.   Intubation day ~16  Scheduled for trach 11/10  ENT evaluation re: severe macroglossia, necrotic tongue lesions due to biting  VAP precautions    CARDIO:   SBP goal 100-180mm Hg,  TTE EF 75%  EKG wnl  Mildine--> then DC TLC      ENDO:    Blood sugar goals 140-180 mg/dL  Insulin sliding scale    GI:    FOBT negative. Doubt UGIB  S/P PEG 11/9  PPI- DC once tolerating TFs post PEG.   LBM: Rectal tube. Hold bowel regimen    RENAL:   Maintain euvolemia  Na goal 140-150  3% at 50cc, continue salt tabs  Monitor BMPs    HEM/ONC: Anemia- no overt blood loss. Not on antiplt or anticoagulation  FOBT neg (there was concern for possible  UGIB in setting of coffee ground emesis)  Hb stable (had been transfused)  Anti Xa level 0.26. Continue dose of lovenox  VTE Prophylaxis: SCDs, SQL; Doppler of upper extremities stable DVT R brachial and superficial thrombus.   Monitor surveillance dopplers    ID:   Tmax 37.8, no leukocytosis  S/P vancomycin zosyn given sputum MSSA and enterobacter proteus --> Changed to cefepime for enterobacter/MSSA (coverage until 11/17)    MISC:   Wound site C/D/I.    Social: Family has been updated    *****  CORE MEASURES  1.  Hunt and Griffin Score = 5  2.  VTE prophylaxis:  [ ] administered within 24 hours of admission OR [X] reason not done: fresh bleed, unsecured aneurysm.  3.  Dysphagia screening:   [X] performed before any oral meds / liquids / food  4.  Nimodipine treatment:  [X] administered within 24 hours of admission OR [ ] reason not done:    *****    ATTENDING ATTESTATION:    Patient at high risk for neurological deterioration or death due to:  ICU delirium, aspiration PNA, DVT / PE.  Critical care time:  I have personally provided 45 minutes of critical care time, excluding time spent on separate procedures.    Plan discussed with RN, house staff.     45 y/o female HH5 MF 4 BD 16 with:   L MCA ruptured aneurysm, subarachnoid hemorrhage, s/p DHC (10/26/2021, Dr. D'Amico @ [place]), brain compression, cerebral edema  Anemia   Recent spinal surgery  UGIB      NEURO: Neurochecks Q1h  DCI/ vasospasm: Cont nimodipine 30 mg PO Q2h to be given for 21 days (last day 11/15); CTA mild-moderate anterior circulation vasospasm (euvolemia, permissive hypertension). Repeat angio 11/10  CT head 11/8: Improving edema (though still significant), slight decrease in IPH  Hydrocephalus:  EVD 5cm H20, ICP < 15. Will likely need VPS  Neurostorming: Improving. DC gabapentin. Continue bromocriptine to 5mg Q8 (wean as tolerated)  Seizure prophylaxis: Levetiracetam 500mg bid. VEEG moderate slowing. No seizures seen for 48 hours. DC VEEG  Sedation: PRN fentanyl pushes  REHAB: Physical therapy evaluation and management      EARLY MOB:  HOB elevated    PULM: Full vent support   14/450/40/5  ABG, CXR.   Intubation day ~16  Scheduled for trach 11/10  ENT evaluation re: severe macroglossia, necrotic tongue lesions due to biting  VAP precautions    CARDIO:   SBP goal 100-180mm Hg,  TTE EF 75%  EKG wnl  Mildine--> then DC TLC    ENDO:    Blood sugar goals 140-180 mg/dL  Insulin sliding scale    GI:    FOBT negative. Doubt UGIB  S/P PEG 11/9  PPI- DC once tolerating TFs post PEG.   LBM: Rectal tube. Hold bowel regimen    RENAL:   Maintain euvolemia  Na goal 140-150  3% at 50cc, continue salt tabs  Monitor BMPs    HEM/ONC: Anemia- no overt blood loss. Not on antiplt or anticoagulation  FOBT neg (there was concern for possible  UGIB in setting of coffee ground emesis)  Hb stable (had been transfused)  Anti Xa level 0.26. Continue dose of lovenox  VTE Prophylaxis: SCDs, SQL; Doppler of upper extremities stable DVT R brachial and superficial thrombus.   Monitor surveillance dopplers    ID:   WBC 14  S/P vancomycin zosyn re: sputum MSSA and enterobacter proteus --> Changed to cefepime for enterobacter/MSSA (coverage until 11/17)  Will need ID clearance for tentative  shunt     MISC:   Wound site C/D/I.    Social: Family has been updated    *****  CORE MEASURES  1.  Hunt and Griffin Score = 5  2.  VTE prophylaxis:  [ ] administered within 24 hours of admission OR [X] reason not done: fresh bleed, unsecured aneurysm.  3.  Dysphagia screening:   [X] performed before any oral meds / liquids / food  4.  Nimodipine treatment:  [X] administered within 24 hours of admission OR [ ] reason not done:    *****    ATTENDING ATTESTATION:    Patient at high risk for neurological deterioration or death due to:  ICU delirium, aspiration PNA, DVT / PE.  Critical care time:  I have personally provided 45 minutes of critical care time, excluding time spent on separate procedures.    Plan discussed with RN, house staff.

## 2021-11-10 NOTE — BRIEF OPERATIVE NOTE - NSICDXBRIEFPREOP_GEN_ALL_CORE_FT
PRE-OP DIAGNOSIS:  Subarachnoid hemorrhage from middle cerebral artery aneurysm, left 26-Oct-2021 19:47:41  Mario Merrill  
PRE-OP DIAGNOSIS:  Intracranial hemorrhage, spontaneous subarachnoid, due to cerebral aneurysm, acute 26-Oct-2021 19:49:43  Mario Merrill

## 2021-11-10 NOTE — PROGRESS NOTE ADULT - SUBJECTIVE AND OBJECTIVE BOX
STATUS POST:  Angiogram, cerebral, with coil embolization, in non-operating room setting    Endoscopic percutaneous gastrostomy        POST OPERATIVE DAY #: 1    SUBJECTIVE:   Patient examined at bedside this morning. Patient is s/p left craniotomy and is minimally responsive to verbal cues at baseline.    ---------------------------------------------------------------    Vital Signs Last 24 Hrs  T(C): 36.9 (10 Nov 2021 05:45), Max: 37.5 (09 Nov 2021 14:44)  T(F): 98.5 (10 Nov 2021 05:45), Max: 99.5 (09 Nov 2021 14:44)  HR: 80 (10 Nov 2021 12:00) (62 - 107)  BP: 143/71 (10 Nov 2021 11:00) (143/71 - 181/100)  BP(mean): 98 (10 Nov 2021 11:00) (98 - 154)  RR: 14 (10 Nov 2021 12:00) (14 - 31)  SpO2: 99% (10 Nov 2021 12:00) (93% - 100%)    I&O's Detail    09 Nov 2021 07:01  -  10 Nov 2021 07:00  --------------------------------------------------------  IN:    Enteral Tube Flush: 360 mL    IV PiggyBack: 200 mL    sodium chloride 0.9%: 1080 mL    Sodium Chloride 0.9% Bolus: 500 mL    sodium chloride 3%: 300 mL    sodium chloride 3%: 60 mL    sodium chloride 3%: 550 mL    sodium chloride 3%: 155 mL  Total IN: 3205 mL    OUT:    External Ventricular Device (mL): 156 mL    Jevity 1.2: 0 mL    Rectal Tube (mL): 50 mL    Voided (mL): 4100 mL  Total OUT: 4306 mL    Total NET: -1101 mL      10 Nov 2021 07:01  -  10 Nov 2021 12:22  --------------------------------------------------------  IN:    sodium chloride 0.9%: 135 mL    Sodium Chloride 0.9% Bolus: 1000 mL    sodium chloride 3%: 250 mL  Total IN: 1385 mL    OUT:    External Ventricular Device (mL): 57 mL    Voided (mL): 800 mL  Total OUT: 857 mL    Total NET: 528 mL          ----------------------------------------------------------------    Physical Exam:  Gen: NAD, intubated and sedated  C/V: NSR  Pulm: Intubated, Nonlabored breathing, no respiratory distress  Abd: soft, NT/ND, PEG tube in place to gravity, no swelling or erythema around site of PEG tube placement    -------------------------------------------------------------------    LABS:                        9.2    14.25 )-----------( 475      ( 10 Nov 2021 05:41 )             28.9     11-10    140  |  105  |  5<L>  ----------------------------<  94  3.8   |  24  |  0.60    Ca    9.4      10 Nov 2021 05:41  Phos  3.5     11-10  Mg     1.8     11-10      PT/INR - ( 09 Nov 2021 04:21 )   PT: 14.5 sec;   INR: 1.22          PTT - ( 09 Nov 2021 04:21 )  PTT:27.0 sec        RADIOLOGY & ADDITIONAL STUDIES:

## 2021-11-10 NOTE — BRIEF OPERATIVE NOTE - NSICDXBRIEFPOSTOP_GEN_ALL_CORE_FT
POST-OP DIAGNOSIS:  Intracranial hemorrhage, spontaneous intraparenchymal, due to cerebral aneurysm, acute 26-Oct-2021 19:48:52  Mario Merrill  
POST-OP DIAGNOSIS:  Intracranial hemorrhage, spontaneous intraparenchymal, due to cerebral aneurysm, acute 26-Oct-2021 19:48:52  Mario Merrill
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
## 2021-11-10 NOTE — CHART NOTE - NSCHARTNOTEFT_GEN_A_CORE
Called patient's son Jose Stubbs ([phone]), who has previously consented for patient to discuss cerebral angiogram. He voiced that he does not feel comfortable consenting for the patient, and stated that he would like to defer all future consents to his sister and patient's daughter Jayshree Stubbs: [phone]. Discussed with Jayshree, who agreed to be the primary point of contact and to consent for the patient going forward. Conversation regarding consent with son and daughter via phone was witnessed by STANISLAV Solis.

## 2021-11-10 NOTE — PROGRESS NOTE ADULT - SUBJECTIVE AND OBJECTIVE BOX
ENT CONSULT NOTE / PRE-OP NOTE (Surgical Tracheostomy)    HPI: 45 y/o female with PMH HTN, Multiple sclerosis, recent spinal surgery 10/8 ([facility]) presented to [facility] after being found unconscious at home, unknown period of time. 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 ENT consulted due to tongue swelling. pt has been off sedated due to neuro checks. per nursing, pt has been biting down on tongue. nursing has not been able to place bite block. Denies any hypotensive episodes.     INTERVAL:    11/10: ENT reconsulted for trach placement - per primary team, patient was spastic with stiff neck while trying to perform bedside tracheostomy yesterday. Pt has been intubated since 10/26. Otherwise, NAEON - on EEG.    Pt is A&Ox0 and unable to respond to questions.  Per primary team, there are no contraindications to extension of the neck.  Pt currently on ventilator with: FiO2 40%, PEEP 5. ET tube size: 7.  No history of intrinsic lung disease.  Pt is on lovenox for DVT prophylaxis - no other anticoagulants or antiplatelet agents.      ICU Vital Signs Last 24 Hrs  T(C): 36.9 (10 Nov 2021 05:45), Max: 37.5 (09 Nov 2021 14:44)  T(F): 98.5 (10 Nov 2021 05:45), Max: 99.5 (09 Nov 2021 14:44)  HR: 84 (10 Nov 2021 13:00) (62 - 107)  BP: 175/94 (10 Nov 2021 13:00) (143/71 - 181/100)  BP(mean): 128 (10 Nov 2021 13:00) (98 - 154)  ABP: 165/94 (10 Nov 2021 13:00) (111/76 - 186/101)  ABP(mean): 125 (10 Nov 2021 13:00) (94 - 140)  RR: 14 (10 Nov 2021 13:00) (14 - 31)  SpO2: 100% (10 Nov 2021 13:00) (93% - 100%)      PHYSICAL EXAM:    CONSTITUTIONAL: A&Ox0  HEAD: EVD in place  ORAL CAVITY/OROPHARYNX: Significant tongue swelling, most significant on ventral surface. mouth unable to be opened due to tonicity of jaw. tongue indurated, necrosis of ventral surface. OP limited due to ETT  NECK: Limited exam as patient in prone position.  Wide neck, landmarks palpable, unable to visualize any anterior neck scars, unable to palpate high riding innominate  RESPIRATORY: Respirations unlabored, no increased work of breathing with use of accessory muscles and retractions. No stridor.  CARDIAC: Warm extremities, no cyanosis.       A/P: 46 F w/ w/ significant tongue swelling, likely secondary to biting down on tongue. bite block unable to be placed due to tone of jaw muscles. ENT reconsulted for trach placement.    #TONGUE BITING  - Pt will need trach for definitive management  - Consider sedation for bite block placement  - Recommend aggressive oral care    #TRACHEOSTOMY  - Plan for surgical trach in OR 11/11 with Dr. Gibbons - adding on  - Needs active Coags, T&S and COVID PCR on sputum prior to procedure; last COVID 10/8 6pm  - NPO/IVF at midnight  - Hold lovenox night before procedure, can resume 24h after procedure  - Will obtain consent from family

## 2021-11-11 LAB
ANION GAP SERPL CALC-SCNC: 11 MMOL/L — SIGNIFICANT CHANGE UP (ref 5–17)
ANION GAP SERPL CALC-SCNC: 11 MMOL/L — SIGNIFICANT CHANGE UP (ref 5–17)
ANION GAP SERPL CALC-SCNC: 13 MMOL/L — SIGNIFICANT CHANGE UP (ref 5–17)
APPEARANCE CSF: CLEAR — SIGNIFICANT CHANGE UP
APPEARANCE SPUN FLD: ABNORMAL
APTT BLD: 28.9 SEC — SIGNIFICANT CHANGE UP (ref 27.5–35.5)
BUN SERPL-MCNC: 6 MG/DL — LOW (ref 7–23)
BUN SERPL-MCNC: 7 MG/DL — SIGNIFICANT CHANGE UP (ref 7–23)
BUN SERPL-MCNC: 8 MG/DL — SIGNIFICANT CHANGE UP (ref 7–23)
CALCIUM SERPL-MCNC: 9 MG/DL — SIGNIFICANT CHANGE UP (ref 8.4–10.5)
CALCIUM SERPL-MCNC: 9 MG/DL — SIGNIFICANT CHANGE UP (ref 8.4–10.5)
CALCIUM SERPL-MCNC: 9.3 MG/DL — SIGNIFICANT CHANGE UP (ref 8.4–10.5)
CHLORIDE SERPL-SCNC: 110 MMOL/L — HIGH (ref 96–108)
CHLORIDE SERPL-SCNC: 110 MMOL/L — HIGH (ref 96–108)
CHLORIDE SERPL-SCNC: 114 MMOL/L — HIGH (ref 96–108)
CO2 SERPL-SCNC: 20 MMOL/L — LOW (ref 22–31)
CO2 SERPL-SCNC: 21 MMOL/L — LOW (ref 22–31)
CO2 SERPL-SCNC: 21 MMOL/L — LOW (ref 22–31)
COLOR CSF: YELLOW
CREAT SERPL-MCNC: 0.58 MG/DL — SIGNIFICANT CHANGE UP (ref 0.5–1.3)
CREAT SERPL-MCNC: 0.62 MG/DL — SIGNIFICANT CHANGE UP (ref 0.5–1.3)
CREAT SERPL-MCNC: 0.62 MG/DL — SIGNIFICANT CHANGE UP (ref 0.5–1.3)
CSF COMMENTS: SIGNIFICANT CHANGE UP
CULTURE RESULTS: NO GROWTH — SIGNIFICANT CHANGE UP
GLUCOSE CSF-MCNC: 71 MG/DL — HIGH (ref 40–70)
GLUCOSE SERPL-MCNC: 103 MG/DL — HIGH (ref 70–99)
GLUCOSE SERPL-MCNC: 98 MG/DL — SIGNIFICANT CHANGE UP (ref 70–99)
GLUCOSE SERPL-MCNC: 98 MG/DL — SIGNIFICANT CHANGE UP (ref 70–99)
GRAM STN FLD: SIGNIFICANT CHANGE UP
HCT VFR BLD CALC: 27.5 % — LOW (ref 34.5–45)
HCT VFR BLD CALC: 27.7 % — LOW (ref 34.5–45)
HGB BLD-MCNC: 8.6 G/DL — LOW (ref 11.5–15.5)
HGB BLD-MCNC: 8.9 G/DL — LOW (ref 11.5–15.5)
INR BLD: 1.17 — HIGH (ref 0.88–1.16)
LYMPHOCYTES # CSF: 1 % — LOW (ref 40–80)
MCHC RBC-ENTMCNC: 25.7 PG — LOW (ref 27–34)
MCHC RBC-ENTMCNC: 26.8 PG — LOW (ref 27–34)
MCHC RBC-ENTMCNC: 31 GM/DL — LOW (ref 32–36)
MCHC RBC-ENTMCNC: 32.4 GM/DL — SIGNIFICANT CHANGE UP (ref 32–36)
MCV RBC AUTO: 82.8 FL — SIGNIFICANT CHANGE UP (ref 80–100)
MCV RBC AUTO: 82.9 FL — SIGNIFICANT CHANGE UP (ref 80–100)
MONOS+MACROS NFR CSF: 1 % — LOW (ref 15–45)
NEUTROPHILS # CSF: 0 % — SIGNIFICANT CHANGE UP (ref 0–6)
NRBC # BLD: 0 /100 WBCS — SIGNIFICANT CHANGE UP (ref 0–0)
NRBC # BLD: 0 /100 WBCS — SIGNIFICANT CHANGE UP (ref 0–0)
NRBC NFR CSF: 2 /UL — SIGNIFICANT CHANGE UP (ref 0–5)
PLATELET # BLD AUTO: 415 K/UL — HIGH (ref 150–400)
PLATELET # BLD AUTO: 434 K/UL — HIGH (ref 150–400)
POTASSIUM SERPL-MCNC: 3.6 MMOL/L — SIGNIFICANT CHANGE UP (ref 3.5–5.3)
POTASSIUM SERPL-MCNC: 3.7 MMOL/L — SIGNIFICANT CHANGE UP (ref 3.5–5.3)
POTASSIUM SERPL-MCNC: 3.8 MMOL/L — SIGNIFICANT CHANGE UP (ref 3.5–5.3)
POTASSIUM SERPL-SCNC: 3.6 MMOL/L — SIGNIFICANT CHANGE UP (ref 3.5–5.3)
POTASSIUM SERPL-SCNC: 3.7 MMOL/L — SIGNIFICANT CHANGE UP (ref 3.5–5.3)
POTASSIUM SERPL-SCNC: 3.8 MMOL/L — SIGNIFICANT CHANGE UP (ref 3.5–5.3)
PROT CSF-MCNC: 20 MG/DL — SIGNIFICANT CHANGE UP (ref 15–45)
PROTHROM AB SERPL-ACNC: 13.9 SEC — HIGH (ref 10.6–13.6)
RBC # BLD: 3.32 M/UL — LOW (ref 3.8–5.2)
RBC # BLD: 3.34 M/UL — LOW (ref 3.8–5.2)
RBC # CSF: 134 /UL — HIGH (ref 0–0)
RBC # FLD: 21.4 % — HIGH (ref 10.3–14.5)
RBC # FLD: 22.4 % — HIGH (ref 10.3–14.5)
SARS-COV-2 RNA SPEC QL NAA+PROBE: SIGNIFICANT CHANGE UP
SODIUM SERPL-SCNC: 142 MMOL/L — SIGNIFICANT CHANGE UP (ref 135–145)
SODIUM SERPL-SCNC: 144 MMOL/L — SIGNIFICANT CHANGE UP (ref 135–145)
SODIUM SERPL-SCNC: 145 MMOL/L — SIGNIFICANT CHANGE UP (ref 135–145)
SPECIMEN SOURCE: SIGNIFICANT CHANGE UP
SPECIMEN SOURCE: SIGNIFICANT CHANGE UP
TUBE TYPE: SIGNIFICANT CHANGE UP
WBC # BLD: 11.02 K/UL — HIGH (ref 3.8–10.5)
WBC # BLD: 12.11 K/UL — HIGH (ref 3.8–10.5)
WBC # FLD AUTO: 11.02 K/UL — HIGH (ref 3.8–10.5)
WBC # FLD AUTO: 12.11 K/UL — HIGH (ref 3.8–10.5)

## 2021-11-11 PROCEDURE — 99024 POSTOP FOLLOW-UP VISIT: CPT

## 2021-11-11 PROCEDURE — 99222 1ST HOSP IP/OBS MODERATE 55: CPT

## 2021-11-11 PROCEDURE — 99291 CRITICAL CARE FIRST HOUR: CPT

## 2021-11-11 PROCEDURE — 71045 X-RAY EXAM CHEST 1 VIEW: CPT | Mod: 26,59

## 2021-11-11 PROCEDURE — 70450 CT HEAD/BRAIN W/O DYE: CPT | Mod: 26

## 2021-11-11 RX ORDER — SODIUM CHLORIDE 5 G/100ML
500 INJECTION, SOLUTION INTRAVENOUS
Refills: 0 | Status: DISCONTINUED | OUTPATIENT
Start: 2021-11-11 | End: 2021-11-11

## 2021-11-11 RX ORDER — ENOXAPARIN SODIUM 100 MG/ML
40 INJECTION SUBCUTANEOUS AT BEDTIME
Refills: 0 | Status: DISCONTINUED | OUTPATIENT
Start: 2021-11-11 | End: 2021-11-12

## 2021-11-11 RX ORDER — POTASSIUM CHLORIDE 20 MEQ
40 PACKET (EA) ORAL ONCE
Refills: 0 | Status: COMPLETED | OUTPATIENT
Start: 2021-11-11 | End: 2021-11-11

## 2021-11-11 RX ORDER — SODIUM CHLORIDE 9 MG/ML
1000 INJECTION INTRAMUSCULAR; INTRAVENOUS; SUBCUTANEOUS
Refills: 0 | Status: DISCONTINUED | OUTPATIENT
Start: 2021-11-11 | End: 2021-11-12

## 2021-11-11 RX ADMIN — FENTANYL CITRATE 50 MICROGRAM(S): 50 INJECTION INTRAVENOUS at 19:12

## 2021-11-11 RX ADMIN — Medication 4 MILLILITER(S): at 21:10

## 2021-11-11 RX ADMIN — FENTANYL CITRATE 50 MICROGRAM(S): 50 INJECTION INTRAVENOUS at 08:08

## 2021-11-11 RX ADMIN — NIMODIPINE 60 MILLIGRAM(S): 60 SOLUTION ORAL at 03:00

## 2021-11-11 RX ADMIN — NIMODIPINE 60 MILLIGRAM(S): 60 SOLUTION ORAL at 06:40

## 2021-11-11 RX ADMIN — PANTOPRAZOLE SODIUM 40 MILLIGRAM(S): 20 TABLET, DELAYED RELEASE ORAL at 11:02

## 2021-11-11 RX ADMIN — ENOXAPARIN SODIUM 40 MILLIGRAM(S): 100 INJECTION SUBCUTANEOUS at 21:54

## 2021-11-11 RX ADMIN — FENTANYL CITRATE 50 MICROGRAM(S): 50 INJECTION INTRAVENOUS at 23:49

## 2021-11-11 RX ADMIN — FENTANYL CITRATE 50 MICROGRAM(S): 50 INJECTION INTRAVENOUS at 01:18

## 2021-11-11 RX ADMIN — FENTANYL CITRATE 50 MICROGRAM(S): 50 INJECTION INTRAVENOUS at 08:20

## 2021-11-11 RX ADMIN — MODAFINIL 200 MILLIGRAM(S): 200 TABLET ORAL at 11:02

## 2021-11-11 RX ADMIN — Medication 1 DROP(S): at 17:27

## 2021-11-11 RX ADMIN — LEVETIRACETAM 500 MILLIGRAM(S): 250 TABLET, FILM COATED ORAL at 06:48

## 2021-11-11 RX ADMIN — FENTANYL CITRATE 50 MICROGRAM(S): 50 INJECTION INTRAVENOUS at 06:41

## 2021-11-11 RX ADMIN — CEFEPIME 100 MILLIGRAM(S): 1 INJECTION, POWDER, FOR SOLUTION INTRAMUSCULAR; INTRAVENOUS at 00:00

## 2021-11-11 RX ADMIN — FLUDROCORTISONE ACETATE 0.1 MILLIGRAM(S): 0.1 TABLET ORAL at 17:11

## 2021-11-11 RX ADMIN — Medication 40 MILLIEQUIVALENT(S): at 06:40

## 2021-11-11 RX ADMIN — FENTANYL CITRATE 50 MICROGRAM(S): 50 INJECTION INTRAVENOUS at 02:22

## 2021-11-11 RX ADMIN — Medication 4 MILLILITER(S): at 05:34

## 2021-11-11 RX ADMIN — CHLORHEXIDINE GLUCONATE 15 MILLILITER(S): 213 SOLUTION TOPICAL at 06:40

## 2021-11-11 RX ADMIN — Medication 200 MILLIGRAM(S): at 17:11

## 2021-11-11 RX ADMIN — Medication 1 DROP(S): at 07:00

## 2021-11-11 RX ADMIN — Medication 200 MILLIGRAM(S): at 06:42

## 2021-11-11 RX ADMIN — CHLORHEXIDINE GLUCONATE 1 APPLICATION(S): 213 SOLUTION TOPICAL at 06:43

## 2021-11-11 RX ADMIN — Medication 5 MILLIGRAM(S): at 02:21

## 2021-11-11 RX ADMIN — LEVETIRACETAM 500 MILLIGRAM(S): 250 TABLET, FILM COATED ORAL at 17:12

## 2021-11-11 RX ADMIN — FLUDROCORTISONE ACETATE 0.1 MILLIGRAM(S): 0.1 TABLET ORAL at 06:48

## 2021-11-11 RX ADMIN — SODIUM CHLORIDE 3 GRAM(S): 9 INJECTION INTRAMUSCULAR; INTRAVENOUS; SUBCUTANEOUS at 06:41

## 2021-11-11 RX ADMIN — FENTANYL CITRATE 50 MICROGRAM(S): 50 INJECTION INTRAVENOUS at 23:13

## 2021-11-11 RX ADMIN — Medication 4 MILLILITER(S): at 14:04

## 2021-11-11 RX ADMIN — SODIUM CHLORIDE 3 GRAM(S): 9 INJECTION INTRAMUSCULAR; INTRAVENOUS; SUBCUTANEOUS at 17:12

## 2021-11-11 RX ADMIN — FENTANYL CITRATE 50 MICROGRAM(S): 50 INJECTION INTRAVENOUS at 13:57

## 2021-11-11 RX ADMIN — FENTANYL CITRATE 50 MICROGRAM(S): 50 INJECTION INTRAVENOUS at 11:42

## 2021-11-11 RX ADMIN — CEFEPIME 100 MILLIGRAM(S): 1 INJECTION, POWDER, FOR SOLUTION INTRAMUSCULAR; INTRAVENOUS at 17:12

## 2021-11-11 RX ADMIN — NIMODIPINE 60 MILLIGRAM(S): 60 SOLUTION ORAL at 19:23

## 2021-11-11 RX ADMIN — SODIUM CHLORIDE 3 GRAM(S): 9 INJECTION INTRAMUSCULAR; INTRAVENOUS; SUBCUTANEOUS at 11:01

## 2021-11-11 RX ADMIN — CEFEPIME 100 MILLIGRAM(S): 1 INJECTION, POWDER, FOR SOLUTION INTRAMUSCULAR; INTRAVENOUS at 08:05

## 2021-11-11 RX ADMIN — SODIUM CHLORIDE 3 GRAM(S): 9 INJECTION INTRAMUSCULAR; INTRAVENOUS; SUBCUTANEOUS at 23:12

## 2021-11-11 RX ADMIN — Medication 40 MILLIEQUIVALENT(S): at 07:56

## 2021-11-11 RX ADMIN — FENTANYL CITRATE 50 MICROGRAM(S): 50 INJECTION INTRAVENOUS at 17:45

## 2021-11-11 RX ADMIN — Medication 5 MILLIGRAM(S): at 18:28

## 2021-11-11 RX ADMIN — FENTANYL CITRATE 50 MICROGRAM(S): 50 INJECTION INTRAVENOUS at 12:35

## 2021-11-11 RX ADMIN — FENTANYL CITRATE 50 MICROGRAM(S): 50 INJECTION INTRAVENOUS at 13:59

## 2021-11-11 RX ADMIN — NIMODIPINE 60 MILLIGRAM(S): 60 SOLUTION ORAL at 11:01

## 2021-11-11 RX ADMIN — FENTANYL CITRATE 50 MICROGRAM(S): 50 INJECTION INTRAVENOUS at 19:57

## 2021-11-11 RX ADMIN — Medication 5 MILLIGRAM(S): at 23:50

## 2021-11-11 RX ADMIN — Medication 5 MILLIGRAM(S): at 06:41

## 2021-11-11 RX ADMIN — FENTANYL CITRATE 50 MICROGRAM(S): 50 INJECTION INTRAVENOUS at 21:54

## 2021-11-11 RX ADMIN — CHLORHEXIDINE GLUCONATE 15 MILLILITER(S): 213 SOLUTION TOPICAL at 17:12

## 2021-11-11 RX ADMIN — FENTANYL CITRATE 50 MICROGRAM(S): 50 INJECTION INTRAVENOUS at 19:59

## 2021-11-11 RX ADMIN — NIMODIPINE 60 MILLIGRAM(S): 60 SOLUTION ORAL at 23:12

## 2021-11-11 RX ADMIN — NIMODIPINE 60 MILLIGRAM(S): 60 SOLUTION ORAL at 16:33

## 2021-11-11 RX ADMIN — BROMOCRIPTINE MESYLATE 5 MILLIGRAM(S): 5 CAPSULE ORAL at 06:43

## 2021-11-11 RX ADMIN — FENTANYL CITRATE 50 MICROGRAM(S): 50 INJECTION INTRAVENOUS at 08:50

## 2021-11-11 NOTE — CONSULT NOTE ADULT - SUBJECTIVE AND OBJECTIVE BOX
Consultation Requested by:    Patient is a 46y old  Female who presents with a chief complaint of ICH (11 Nov 2021 09:18)    HPI:  45 y/o female with PMH HTN, Multiple sclerosis, recent spinal surgery 10/8 ([facility]) presented to [facility] after being found unconscious at home, unknown period of time. 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 HH5, MF1. Patient was intubated at [facility], found to have blown L pupil, and sent straight to the OR for left hemicraniotomy. A-line placed intraop. Hemodynamically unstable upon arrival to [facility], bradycardic and hypertensive s/p Mannitol, Clevidipine, and Furosemide. Central line placed in NSICU. Currently sedated on Propofol, pending repeat CTH. History per patient's son, patient had recent spine surgery and was found on outpatient imaging last week to have L M1 segment aneurysm (unruptured), pt asymptomatic at this time. Per son patient taking percocet PO at home. Ureña catheter, ET tube, and arterial line placed at [facility].     NIHSS on arrival: 32   Bess Griffin 5, Mod Busby 4  Bleed Day 1 = 10/26 (26 Oct 2021 13:03)      OVERNIGHT EVENTS/INTERVAL HPI: Intubated and sedated. No apparent distress.    ROS:  negative except as above    Allergies    No Known Allergies    Intolerances        ANTIBIOTICS/RELEVANT:  antimicrobials  cefepime   IVPB 2000 milliGRAM(s) IV Intermittent every 8 hours    immunologic:    OTHER:  acetaminophen    Suspension .. 650 milliGRAM(s) Oral every 6 hours PRN  acetylcysteine 20%  Inhalation 4 milliLiter(s) Inhalation three times a day  amantadine Syrup 200 milliGRAM(s) Oral two times a day  artificial  tears Solution 1 Drop(s) Both EYES two times a day  chlorhexidine 0.12% Liquid 15 milliLiter(s) Oral Mucosa every 12 hours  chlorhexidine 2% Cloths 1 Application(s) Topical <User Schedule>  fentaNYL    Injectable 50 MICROGram(s) IV Push every 2 hours PRN  fludroCORTISONE 0.1 milliGRAM(s) Oral every 12 hours  hydrALAZINE Injectable 5 milliGRAM(s) IV Push every 4 hours PRN  levETIRAcetam  Solution 500 milliGRAM(s) Oral two times a day  modafinil 200 milliGRAM(s) Oral daily  niMODipine 60 milliGRAM(s) Oral every 4 hours  ondansetron Injectable 4 milliGRAM(s) IV Push every 6 hours PRN  pantoprazole   Suspension 40 milliGRAM(s) Oral daily  sodium chloride 3 Gram(s) Oral every 6 hours  sodium chloride 0.9%. 1000 milliLiter(s) IV Continuous <Continuous>  sodium chloride 3%. 500 milliLiter(s) IV Continuous <Continuous>      Objective:  Vital Signs Last 24 Hrs  T(C): 37.2 (11 Nov 2021 13:00), Max: 37.2 (10 Nov 2021 21:59)  T(F): 99 (11 Nov 2021 13:00), Max: 99 (10 Nov 2021 21:59)  HR: 90 (11 Nov 2021 16:15) (65 - 106)  BP: 170/81 (11 Nov 2021 11:00) (134/67 - 196/101)  BP(mean): 117 (11 Nov 2021 11:00) (98 - 141)  RR: 16 (11 Nov 2021 16:15) (14 - 19)  SpO2: 100% (11 Nov 2021 16:15) (97% - 100%)    PHYSICAL EXAM:  General: NAD, pt is comfortably  laying in hospital bed, +intubated on full vent support   HEENT: PERRL 3mm, OE spont, b/l corneals, blinks to threat b/l   Cardiovascular: RRR, normal S1 and S2   Respiratory: lungs CTAB, no wheezing, rhonchi, or crackles   GI: normoactive BS to auscultation, abd soft, NTND, +PEG   Neuro: nonverbal, not answering questions, OE spont but not to command  b/l UE extensor posture, b/l LE triple flex to noxious    Extremities: distal pulses 2+ x4   Wound/incision: L hemicrani incision site w/ staples C/D/I, flap soft and full. B/l posterior spinal surgical incision sites with improving areas of wound dehiscence   Drains: EVD    LABS:                        8.6    12.11 )-----------( 415      ( 11 Nov 2021 15:09 )             27.7     11-11    145  |  114<H>  |  8   ----------------------------<  98  3.8   |  20<L>  |  0.62    Ca    9.0      11 Nov 2021 15:09  Phos  3.5     11-11  Mg     1.9     11-11      PT/INR - ( 11 Nov 2021 14:03 )   PT: 13.9 sec;   INR: 1.17          PTT - ( 11 Nov 2021 14:03 )  PTT:28.9 sec      MICROBIOLOGY:            RECENT CULTURES:      RADIOLOGY & ADDITIONAL STUDIES:

## 2021-11-11 NOTE — PROGRESS NOTE ADULT - ASSESSMENT
45 y/o female HH5 MF 4 BD 17 with:   L MCA ruptured aneurysm, subarachnoid hemorrhage, s/p DHC (10/26/2021, Dr. D'Amico @ [place]), brain compression, cerebral edema  Anemia   Recent spinal surgery  UGIB      NEURO: Neurochecks Q1h  DCI/ vasospasm: Cont nimodipine 30 mg PO Q2h to be given for 21 days (last day 11/15); CTA mild-moderate anterior circulation vasospasm (euvolemia, permissive hypertension). Repeat angio 11/10: remnant MCA neck. No spasm  CT head 11/8: Improving edema (though still significant), slight decrease in IPH  Hydrocephalus:  EVD 5cm H20, ICP < 15. Will discuss raising Will likely need VPS  Neurostorming: Improving. DC gabapentin. Continue bromocriptine to 5mg Q8 (wean as tolerated)  Seizure prophylaxis: Levetiracetam 500mg bid. VEEG moderate slowing. No seizures seen for 48 hours. DC VEEG  Sedation: PRN fentanyl pushes  REHAB: Physical therapy evaluation and management      EARLY MOB:  HOB elevated    PULM: Full vent support   14/450/40/5  ABG, CXR.   Intubation day ~17  Scheduled for trach 11/11  ENT evaluation re: severe macroglossia, necrotic tongue lesions due to biting  VAP precautions    CARDIO:   SBP goal 100-180mm Hg,  TTE EF 75%  EKG wnl  DC TLC 11/11 after OR    ENDO:    Blood sugar goals 140-180 mg/dL  Insulin sliding scale    GI:    FOBT negative. Doubt UGIB  S/P PEG 11/9  PPI- DC once tolerating TFs post PEG.   LBM: Rectal tube. Hold bowel regimen    RENAL:   Maintain euvolemia  Na goal 140-150  3% at 50cc, continue salt tabs  Monitor BMPs    HEM/ONC: Anemia- no overt blood loss. Not on antiplt or anticoagulation  FOBT neg (there was concern for possible  UGIB in setting of coffee ground emesis)  Hb stable (had been transfused)  Anti Xa level 0.26. Continue dose of lovenox  VTE Prophylaxis: SCDs, SQL; Doppler of upper extremities stable DVT R brachial and superficial thrombus.   Monitor surveillance dopplers    ID:   WBC 14  S/P vancomycin zosyn re: sputum MSSA and enterobacter proteus --> Changed to cefepime for enterobacter/MSSA (coverage until 11/17)  Will need ID clearance for tentative  shunt     MISC:   Wound site C/D/I.  Wound care    Social: Family has been updated    *****  CORE MEASURES  1.  Hunt and Griffin Score = 5  2.  VTE prophylaxis:  [ ] administered within 24 hours of admission OR [X] reason not done: fresh bleed, unsecured aneurysm.  3.  Dysphagia screening:   [X] performed before any oral meds / liquids / food  4.  Nimodipine treatment:  [X] administered within 24 hours of admission OR [ ] reason not done:    *****    ATTENDING ATTESTATION:    Patient at high risk for neurological deterioration or death due to:  ICU delirium, aspiration PNA, DVT / PE.  Critical care time:  I have personally provided 45 minutes of critical care time, excluding time spent on separate procedures.    Plan discussed with RN, house staff.     47 y/o female HH5 MF 4 BD 17 with:   L MCA ruptured aneurysm, subarachnoid hemorrhage, s/p DHC (10/26/2021, Dr. D'Amico @ [place]), brain compression, cerebral edema  Anemia   Recent spinal surgery  UGIB      NEURO: Neurochecks Q1h  DCI/ vasospasm: Cont nimodipine 30 mg PO Q2h to be given for 21 days (last day 11/15); CTA mild-moderate anterior circulation vasospasm (euvolemia, permissive hypertension). Repeat angio 11/10: remnant MCA neck. No spasm  CT head 11/8: Improving edema (though still significant), slight decrease in IPH  Hydrocephalus:  EVD 5cm H20, ICP < 15. Will discuss raising Will likely need VPS  Neurostorming: Improving. DC gabapentin. Continue bromocriptine to 5mg Q8 (wean as tolerated)  Seizure prophylaxis: Levetiracetam 500mg bid. VEEG moderate slowing. No seizures seen for 48 hours. DC VEEG  Sedation: PRN fentanyl pushes  REHAB: Physical therapy evaluation and management      EARLY MOB:  HOB elevated    PULM: Full vent support   14/450/40/5  ABG, CXR.   Intubation day ~17  Scheduled for trach 11/11  ENT evaluation re: severe macroglossia, necrotic tongue lesions due to biting  VAP precautions    CARDIO:   SBP goal 100-180mm Hg,  TTE EF 75%  EKG wnl  DC TLC 11/11 after OR    ENDO:    Blood sugar goals 140-180 mg/dL  Insulin sliding scale    GI:    FOBT negative.  S/P PEG 11/9  PPI- DC once tolerating TFs post PEG.   LBM: Rectal tube. Hold bowel regimen    RENAL:   Maintain euvolemia  Na goal 140-150  3% at 50cc, continue salt tabs  Monitor BMPs    HEM/ONC: Anemia- no overt blood loss. Not on antiplt or anticoagulation  FOBT neg (there was concern for possible  UGIB in setting of coffee ground emesis)  Hb stable (had been transfused)  Anti Xa level 0.26. Continue dose of lovenox  VTE Prophylaxis: SCDs, SQL; Doppler of upper extremities stable DVT R brachial and superficial thrombus.   Monitor surveillance dopplers    ID:   WBC 14  S/P vancomycin zosyn re: sputum MSSA and enterobacter proteus --> Changed to cefepime for enterobacter/MSSA (coverage until 11/17)  Will need ID clearance for  shunt     MISC:   Wound site C/D/I.  Wound care    Social: Family has been updated    *****  CORE MEASURES  1.  Hunt and Griffin Score = 5  2.  VTE prophylaxis:  [ ] administered within 24 hours of admission OR [X] reason not done: fresh bleed, unsecured aneurysm.  3.  Dysphagia screening:   [X] performed before any oral meds / liquids / food  4.  Nimodipine treatment:  [X] administered within 24 hours of admission OR [ ] reason not done:    *****    ATTENDING ATTESTATION:    Patient at high risk for neurological deterioration or death due to:  ICU delirium, aspiration PNA, DVT / PE.  Critical care time:  I have personally provided 45 minutes of critical care time, excluding time spent on separate procedures.    Plan discussed with RN, house staff.

## 2021-11-11 NOTE — CONSULT NOTE ADULT - ASSESSMENT
46F PMHx HTN and MS, presented to [facility] after being found unconscious at home, found to have SAH and SDH with midline shift, now s/p left hemicraniotomy and s/p EVD placement and coil embolization of left MCA bifurcation aneurysm. ID has been consulted for recommendations for treatment of sputum cultures from 11/4/21 growing MSSA, Enterobacter, and Proteus, as well as clearance for cranioplasty tomorrow. Sputum was sent due to increased secretions. BCx and CSF cultures have NGTD. Patient has had no fevers, no change in leukocytosis, and no changes on CXR. Most recent CXR is clear. Planned surgery does not involve instrumentation into the lung. Patient has been treated with a 7-day course of cefepime. Not concerned for active VAP.  - No need for additional antibiotics  - No contraindications for neurosurgery tomorrow from ID perspective  - ID Team 1 will sign off

## 2021-11-11 NOTE — PROGRESS NOTE ADULT - ASSESSMENT
45 y/o female HH5 MF 4 BD 17 with:   L MCA ruptured aneurysm, subarachnoid hemorrhage, s/p DHC (10/26/2021, Dr. D'Amico @ [place]), brain compression, cerebral edema  Anemia   Recent spinal surgery  UGIB      NEURO: Neurochecks Q1h  DCI/ vasospasm: Cont nimodipine 30 mg PO Q2h to be given for 21 days (last day 11/15); CTA mild-moderate anterior circulation vasospasm (euvolemia, permissive hypertension). Repeat angio 11/10: remnant MCA neck. No spasm  CT head 11/8: Improving edema (though still significant), slight decrease in IPH  Hydrocephalus:  EVD 5cm H20, ICP < 15. Will discuss raising Will likely need VPS  Neurostorming: Improving. DC gabapentin. Continue bromocriptine to 5mg Q8 (wean as tolerated)  Seizure prophylaxis: Levetiracetam 500mg bid. VEEG moderate slowing. No seizures seen for 48 hours. DC VEEG  Sedation: PRN fentanyl pushes  REHAB: Physical therapy evaluation and management      EARLY MOB:  HOB elevated    PULM: Full vent support   14/450/40/5  ABG, CXR.   Intubation day ~17  Scheduled for trach 11/11  ENT evaluation re: severe macroglossia, necrotic tongue lesions due to biting  VAP precautions    CARDIO:   SBP goal 100-180mm Hg,  TTE EF 75%  EKG wnl  DC TLC 11/11 after OR    ENDO:    Blood sugar goals 140-180 mg/dL  Insulin sliding scale    GI:    FOBT negative.  S/P PEG 11/9  PPI- DC once tolerating TFs post PEG.   LBM: Rectal tube. Hold bowel regimen    RENAL:   Maintain euvolemia  Na goal 140-150  3% at 50cc, continue salt tabs  Monitor BMPs    HEM/ONC: Anemia- no overt blood loss. Not on antiplt or anticoagulation  FOBT neg (there was concern for possible  UGIB in setting of coffee ground emesis)  Hb stable (had been transfused)  Anti Xa level 0.26. Continue dose of lovenox  VTE Prophylaxis: SCDs, SQL; Doppler of upper extremities stable DVT R brachial and superficial thrombus.   Monitor surveillance dopplers    ID:   WBC 14  S/P vancomycin zosyn re: sputum MSSA and enterobacter proteus --> Changed to cefepime for enterobacter/MSSA (coverage until 11/17)  Will need ID clearance for  shunt     MISC:   Wound site C/D/I.  Wound care    Social: Family has been updated    *****  CORE MEASURES  1.  Hunt and Griffin Score = 5  2.  VTE prophylaxis:  [ ] administered within 24 hours of admission OR [X] reason not done: fresh bleed, unsecured aneurysm.  3.  Dysphagia screening:   [X] performed before any oral meds / liquids / food  4.  Nimodipine treatment:  [X] administered within 24 hours of admission OR [ ] reason not done:    *****    ATTENDING ATTESTATION:    Patient at high risk for neurological deterioration or death due to:  ICU delirium, aspiration PNA, DVT / PE.  Critical care time:  I have personally provided 30 minutes of critical care time, excluding time spent on separate procedures.    Plan discussed with RN, house staff.

## 2021-11-11 NOTE — PROGRESS NOTE ADULT - SUBJECTIVE AND OBJECTIVE BOX
===========================================   NEUROCRITICAL CARE ATTENDING NOTE ( PM)  ===========================================    AILYN DÍAZ [id number]    HPI:  Patient is a 47 y/o female with PMH HTN, multiple sclerosis, recent spinal surgery 10/8 ([facility]) presented to [place] [facility] BIBEMS after being found unconscious at home, unknown period of time. 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 HH5, MF1. Patient was intubated at [place] [facility], found to have blown L pupil, and sent straight to the OR for left hemicraniotomy. A-line placed intraop. Hemodynamically unstable upon arrival to [facility], bradycardic and hypertensive s/p Mannitol, Clevidipine, and Furosemide. Central line placed in NSICU. Currently sedated on Propofol, pending repeat CTH. History per patient's son, patient had recent spine surgery and was found on outpatient imaging last week to have L M1 segment aneurysm (unruptured), pt asymptomatic at this time. Per son patient taking percocet PO at home. Ureña catheter, ET tube, and arterial line placed at [facility].  NIHSS on arrival: 32. Bess Griffin 5, Mod Busby 4.  Bleed Day 1 = 10/26 (26 Oct 2021 13:03)    Hospital Course:   10/26: admitted to [facility] from [facility], POD#0 s/p L hemicraniectomy for decompression and evacuation of SDH. Transferred to [facility] noted to have tense flap, received mannitol, keppra, decadron. Was hypertensive to 200s and preston to 40s, recevied 85g mannitol and bullet 23.4%. CTH on arrival demonstrated increased size in vents and new IVH. EVD placed, open at 15, central line placed. Transfused 2 u PRBC. POD0 of coiling of left MCA bifurcation aneurysm,   10/27: CTH demonstrated EVD in correct position, EVD lowered to 5, remains intubated on proprofol, pending repeat CTH in AM. Started on 3% @ 30/hr. 2LNS given for euvolemia, Mannitol given for uptrending ICPs. Bullet given 8:30 am. 3% increased to 50/hr. 1 L bolus. New EVD catheter placed using exisiting sreekanth hole. 1 u PRBC.  10/28: HH5, MF4, BD3; POD2 angio coil/embo of L MCA aneurysm. JOAQUÍN overnight, neuro stable. EVD@5cmH20 ICPs < 20 overnight, OP WNL. S/p 1 unit PRBC yesterday with appropriate response. Given 42g mannitol in AM for ICP 22 persistently, with improvement, then given 23% in PM for elevated ICP. febrile, pancultured. Standing tylenol and cooling blanket.   10/29: BD4, POD3 angio coil/embo. JOAQUÍN o/n, neuro stable. EVD@5, ICPs <20 o/n.   10/30: BD5, POD4 angio coil/embo. JOAQUÍN o/n neuro stable. EVD@5. 3% @50cc/hr.  10/31: BD6, POD5 angio coil/embo. brief period maintained upward gaze, resolved on its own. EVD@5cm h2o.    : BD7, POD6 L hemicrani, angio coil/embo. JOAQUÍN overnight. Neuro exam unchanged. ICPs WNL. started bromocriptine/gabapentin/baclofen. dc'd propofol, started precedex  : BD8 POD7 L hemicrani, angio coil/embo. JOAQUÍN overnight. Neuro exam stable. Remains on 3% hypertonic saline. Receieved 1 unit of PRBCs for a Hgb of 7.6.  11/3: BD 9 POD8 of L hemicrani. Elevated lipase, normal amylase, OG tube clogged and replaced. Abdominal US normal. Pending gen surg reccs regarding trach and peg. Gabapentin and Baclofen increased to help with neurostorming. restarted back on 3%, LR@35. became preston+hypotensive with nimodipine 60q4, decreased back to 30q2, NaCl bullet given.   : BD10 POD9, JOAQUÍN o/n, 500cc NS for euvolemia,  neuro stable, EVD at 5. 3% increased to 75  11: BD11 POD10, JOAQUÍN o/n, neuro stable, EVD at 5  11: BD12 POD11 JOAQUÍN overnight. Neuro exam stable. Remains intubated on precedex. 3% hypertonic saline dc'd  : BD13 POD 12 JOAQUÍN o/n, NGT replaced. on precex with no icp crisis   : BD14 POD13 JOAQUÍN o/n, noted to have tongue maceration/macroglossia. Gauze with tongue depressor placed. Pending further recs from ENT. Pending trach and PEG placement tomorrow with gen surg. Off precedex, ICPs stable. EVD remains @ 5.   : BD15, POD 13, bleeding under tongue at start of shift, fentanyl pushed with no further episodes. gabapentin stopped. PEG placed  11/10: BD 16, POD 14, neuro stable, ENT to be consulted in AM, 3% increased to 50/hr.  : BD 17, POD 15, neuro exam stable. Pend CT saba in am for cranioplasty planning. Pend trach in OR with ENT. F.u BMP in am, 3%@50cc/hr for Na goal 140-145.     Past Medical History: No pertinent past medical history  Allergies:  Allergy Status Unknown  Home meds:     Current Meds:  MEDICATIONS  (STANDING):  acetylcysteine 20%  Inhalation 4 milliLiter(s) Inhalation three times a day  amantadine Syrup 200 milliGRAM(s) Oral two times a day  artificial  tears Solution 1 Drop(s) Both EYES two times a day  cefepime   IVPB 2000 milliGRAM(s) IV Intermittent every 8 hours  enoxaparin Injectable 40 milliGRAM(s) SubCutaneous at bedtime  fludroCORTISONE 0.1 milliGRAM(s) Oral every 12 hours  levETIRAcetam  Solution 500 milliGRAM(s) Oral two times a day  modafinil 200 milliGRAM(s) Oral daily  niMODipine 60 milliGRAM(s) Oral every 4 hours  pantoprazole   Suspension 40 milliGRAM(s) Oral daily  sodium chloride 3 Gram(s) Oral every 6 hours  sodium chloride 0.9%. 1000 milliLiter(s) (45 mL/Hr) IV Continuous <Continuous>  sodium chloride 3%. 500 milliLiter(s) (25 mL/Hr) IV Continuous <Continuous>    MEDICATIONS  (PRN):  acetaminophen    Suspension .. 650 milliGRAM(s) Oral every 6 hours PRN Temp greater or equal to 38C (100.4F), Mild Pain (1 - 3)  fentaNYL    Injectable 50 MICROGram(s) IV Push every 2 hours PRN agitation/pain  hydrALAZINE Injectable 5 milliGRAM(s) IV Push every 4 hours PRN >180  ondansetron Injectable 4 milliGRAM(s) IV Push every 6 hours PRN Nausea and/or Vomiting    PHYSICAL EXAMINATION    ICU Vital Signs Last 24 Hrs  T(C): 37.2 (2021 17:18), Max: 37.2 (10 Nov 2021 21:59)  T(F): 99 (:18), Max: 99 (10 Nov 2021 21:59)  HR: 85 (2021 17:00) (67 - 106)  BP: 170/81 (2021 11:00) (134/67 - 196/101)  BP(mean): 117 (2021 11:00) (98 - 141)  ABP: 149/89 (2021 17:00) (136/70 - 201/92)  ABP(mean): 115 (2021 17:00) (88 - 133)  RR: 14 (2021 17:00) (14 - 19)  SpO2: 100% (2021 17:00) (97% - 100%)    NEUROLOGIC EXAMINATION: Opens eyes to stimuli and spontaneously intermittently, does not follow commands, pupils 3mm equal and reactive (+) Doll's, (+) cough and (+) gag, B/ L lower extremities Triple flexes, uppers extending. At times trace withdrawing upper  Mode: AC/ CMV (Assist Control/ Continuous Mandatory Ventilation) FiO2 40  PEEP 5 MAP 8.3 PIP 20 RR 14  EENT: Anicteric, severely edematous tongue with multiple lacerations.   CARDIOVASCULAR: (+) S1 S2, normal rate and regular rhythm  PULMONARY: Clear to auscultation bilaterally  ABDOMEN: Soft, nontender with normoactive bowel sounds  EXTREMITIES: No edema  SKIN: No rash; back incisions healing.     I/O's    11-10-21 @ 07:01  -  21 @ 07:00  --------------------------------------------------------  IN: 2967 mL / OUT: 3037 mL / NET: -70 mL    21 @ 07:01  -  21 @ 17:56  --------------------------------------------------------  IN: 1050 mL / OUT: 510 mL / NET: 540 mL    LABS:                        8.6    12.11 )-----------( 415      ( 2021 15:09 )             27.7         145  |  114<H>  |  8   ----------------------------<  98  3.8   |  20<L>  |  0.62    Ca    9.0      2021 15:09  Phos  3.5       Mg     1.9         PT/INR - ( 2021 14:03 )   PT: 13.9 sec;   INR: 1.17     PTT - ( 2021 14:03 )  PTT:28.9 sec    CAPILLARY BLOOD GLUCOSE    EEG:  Neuroimagin/11 CT - Status post left hemicraniectomy with continued evolution of left frontotemporal intraparenchymal hemorrhage and mass effect. Right frontal EVD in stable position with stable ventricular caliber.   CT head :   Since prior CT head (10/27/2021), slight decrease in size of left frontotemporal intraparenchymal hematoma. Interval decrease in left-to-right midline shift. More pronounced hypodensity in the left frontal and temporal lobes surrounding hemorrhage which may be artifactual from the hemicraniectomy although evolving ischemic changes cannot be excluded and continued follow-up is recommended.   CTA - Diffuse, but overall mild- moderate, sites of vasospasm  are noted at the anterior circulation, whilst the posterior circulation appears spared.   10/27 CT - There is herniation of the brain parenchyma and to the craniectomy defect which is similar prior examination. There is no significant change in the large left frontotemporal intraparenchymal hematoma with surrounding edema. Again demonstrated is mass effect with right to left midline shift measuring approximately 1 cm which is stable from prior exam. Again demonstrated is effacement of the cerebral sulci and basal cisterns.  Other imagin/07 LE Doppler - negative   UE Doppler - DVT within the right brachial vein.  Superficial thrombosis of the right cephalic vein.   CXR - stable.  Mild congestion.  10/28 Doppler - Negative    10/28 TTE -   1. Normal left and right ventricular size and systolic function.   2. No significant valvular disease.   3. Mild pulmonary hypertension present, pulmonary artery systolic pressure is 35 mmHg.   4. No pericardial effusion.   5. No prior echo is available for comparison.    Lines:  [place]  R IJ  EVD  Primafit    CODE STATUS:  Full Code                         GOALS OF CARE:  aggressive                      DISPOSITION:  ICU

## 2021-11-11 NOTE — PROGRESS NOTE ADULT - SUBJECTIVE AND OBJECTIVE BOX
=======================================   NEUROCRITICAL CARE ATTENDING NOTE   =======================================  ARJUN, AILYN     HPI:  Patient is a 45 y/o female with PMH HTN, multiple sclerosis, recent spinal surgery 10/8 ([facility]) presented to [place] [facility] BIBEMS after being found unconscious at home, unknown period of time. 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 HH5, MF1. Patient was intubated at [place] ED, found to have blown L pupil, and sent straight to the OR for left hemicraniotomy. A-line placed intraop. Hemodynamically unstable upon arrival to [facility], bradycardic and hypertensive s/p Mannitol, Clevidipine, and Furosemide. Central line placed in NSICU. Currently sedated on Propofol, pending repeat CTH. History per patient's son, patient had recent spine surgery and was found on outpatient imaging last week to have L M1 segment aneurysm (unruptured), pt asymptomatic at this time. Per son patient taking percocet PO at home. Ureña catheter, ET tube, and arterial line placed at [facility].  NIHSS on arrival: 32. Bess Griffin 5, Mod Busby 4.  Bleed Day 1 = 10/26 (26 Oct 2021 13:03)    Hospital course:  10/26: admitted to [facility] from [facility], POD#0 s/p L hemicraniectomy for decompression and evacuation of SDH. Transferred to [facility] noted to have tense flap, received mannitol, keppra, decadron. Was hypertensive to 200s and preston to 40s, recevied 85g mannitol and bullet 23.4%. CTH on arrival demonstrated increased size in vents and new IVH. EVD placed, open at 15, central line placed. Transfused 2 u PRBC. POD0 of coiling of left MCA bifurcation aneurysm,   10/27: CTH demonstrated EVD in correct position, EVD lowered to 5, remains intubated on proprofol, pending repeat CTH in AM. Started on 3% @ 30/hr. 2LNS given for euvolemia, Mannitol given for uptrending ICPs. Bullet given 8:30 am. 3% increased to 50/hr. 1 L bolus. New EVD catheter placed using exisiting sreekanth hole. 1 u PRBC.  10/28: HH5, MF4, BD3; POD2 angio coil/embo of L MCA aneurysm. JOAQUÍN overnight, neuro stable. EVD@5cmH20 ICPs < 20 overnight, OP WNL. S/p 1 unit PRBC yesterday with appropriate response. Given 42g mannitol in AM for ICP 22 persistently, with improvement, then given 23% in PM for elevated ICP. febrile, pancultured. Standing tylenol and cooling blanket.   10/30: BD5, POD4 angio coil/embo. JOAQUÍN o/n neuro stable. EVD@5. 3% @50cc/hr.  10/31: BD6, POD5 angio coil/embo. brief period maintained upward gaze, resolved on its own. EVD@5cm H2O.    : BD7, POD6 L hemicrani, angio coil/embo. Neuro exam unchanged. ICPs WNL. Overnight given hydralazine, increased propofol for SBP > 180.  CTA showed mild-moderate anterior circulation vasospasm (permissive hypertension, euvolemia).  11/3: BD9, POD8 L hemicrani, angio coil/embo.  Neuro exam unchanged. ICPs WNL.  Pending trach/PEG.  Dayshift:  noted episodes of sympathetic storming during vasospasm period.  : BD11 POD10, JOAQUÍN o/n, neuro stable, EVD at 5; O2 desaturations with thick secretions (MSSA, Enterobacter, Proteus) started vancomycin zosyn  : BD13 POD 12 JOAQUÍN o/n, NGT replaced. Weaning off precedex, no ICP crisis, decreasing neurostorming meds, pending CT head.   : BD 14. CT head. Some spontaneous eye opening. Overnight, tongue biting with oral trauma and mild bleeding.   : BD 15. PEG placed. On VEEG. No seizures. Exam stably poor  11/10: BD 16. Scheduled for trach. Angio. Exam remains stably poor  : BD 17. Scheduled for trach. POD 1 s/p angio.       Past Medical History: No pertinent past medical history  Allergies:  Allergy Status Unknown  Home meds:       ICU Vital Signs Last 24 Hrs  T(C): 37.1 (2021 05:16), Max: 37.2 (10 Nov 2021 21:59)  T(F): 98.7 (2021 05:16), Max: 99 (10 Nov 2021 21:59)  HR: 83 (2021 05:29) (62 - 107)  BP: 178/92 (2021 05:00) (143/71 - 196/101)  BP(mean): 128 (2021 05:00) (98 - 141)  ABP: 179/92 (2021 05:00) (111/76 - 202/100)  ABP(mean): 127 (2021 05:00) (94 - 143)  RR: 16 (2021 05:00) (14 - 31)  SpO2: 99% (2021 05:29) (97% - 100%)      11-10-21 @ 07:01  -  21 @ 07:00  --------------------------------------------------------  IN: 2777 mL / OUT: 1617 mL / NET: 1160 mL      Mode: AC/ CMV (Assist Control/ Continuous Mandatory Ventilation), RR (machine): 14, TV (machine): 450, FiO2: 40, PEEP: 5, ITime: 1, MAP: 8.7, PIP: 21      NEUROLOGIC EXAMINATION: Opens eyes to stimuli and spontaneously intermittently, does not follow commands, pupils 3mm equal and reactive (+) Doll's, (+) cough and (+) gag, B/ L lower extremities Triple flexes, uppers trace WD,  L>R  EENT: Anicteric, severely edematous tongue with multiple lacerations.   CARDIOVASCULAR: (+) S1 S2, normal rate and regular rhythm  PULMONARY: Clear to auscultation bilaterally  ABDOMEN: Soft, nontender with normoactive bowel sounds  EXTREMITIES: No edema  SKIN: No rash; back incisions healing.       MEDICATIONS:   acetaminophen    Suspension .. 650 milliGRAM(s) Oral every 6 hours PRN  acetylcysteine 20%  Inhalation 4 milliLiter(s) Inhalation three times a day  amantadine Syrup 200 milliGRAM(s) Oral two times a day  artificial  tears Solution 1 Drop(s) Both EYES two times a day  bromocriptine Capsule 5 milliGRAM(s) Oral every 8 hours  cefepime   IVPB 2000 milliGRAM(s) IV Intermittent every 8 hours  chlorhexidine 0.12% Liquid 15 milliLiter(s) Oral Mucosa every 12 hours  chlorhexidine 2% Cloths 1 Application(s) Topical <User Schedule>  fentaNYL    Injectable 50 MICROGram(s) IV Push every 2 hours PRN  fludroCORTISONE 0.1 milliGRAM(s) Oral every 12 hours  hydrALAZINE Injectable 5 milliGRAM(s) IV Push every 4 hours PRN  levETIRAcetam  Solution 500 milliGRAM(s) Oral two times a day  modafinil 200 milliGRAM(s) Oral daily  niMODipine 60 milliGRAM(s) Oral every 4 hours  ondansetron Injectable 4 milliGRAM(s) IV Push every 6 hours PRN  pantoprazole   Suspension 40 milliGRAM(s) Oral daily  sodium chloride 3 Gram(s) Oral every 6 hours  sodium chloride 0.9%. 1000 milliLiter(s) (45 mL/Hr) IV Continuous <Continuous>  sodium chloride 3%. 500 milliLiter(s) (50 mL/Hr) IV Continuous <Continuous>      LABS:                   8.9    11. )-----------( 434      ( 2021 02:09 )             27.5         142  |  110<H>  |  6<L>  ----------------------------<  98  3.7   |  21<L>  |  0.62    Ca    9.0      2021 02:09  Phos  3.3     11  Mg     2.0     11-11      10/28 CSF NGTD  10/28 Blood NGTD    CT head :   Since prior CT head (10/27/2021), slight decrease in size of left frontotemporal intraparenchymal hematoma. Interval decrease in left-to-right midline shift. More pronounced hypodensity in the left frontal and temporal lobes surrounding hemorrhage which may be artifactual from the hemicraniectomy although evolving ischemic changes cannot be excluded and continued follow-up is recommended.      EEG:  Neuroimagin/01 CTA - Diffuse, but overall mild- moderate, sites of vasospasm  are noted at the anterior circulation, whilst the posterior circulation appears spared.   10/27 CT - There is herniation of the brain parenchyma and to the craniectomy defect which is similar prior examination. There is no significant change in the large left frontotemporal intraparenchymal hematoma with surrounding edema. Again demonstrated is mass effect with right to left midline shift measuring approximately 1 cm which is stable from prior exam. Again demonstrated is effacement of the cerebral sulci and basal cisterns.  Other imagin/01 UE Doppler - DVT within the right brachial vein.  Superficial thrombosis of the right cephalic vein.   CXR - stable.  Mild congestion.  10/28 Doppler - Negative    10/28 TTE -   1. Normal left and right ventricular size and systolic function.   2. No significant valvular disease.   3. Mild pulmonary hypertension present, pulmonary artery systolic pressure is 35 mmHg.   4. No pericardial effusion.   5. No prior echo is available for comparison.      Lines:  Madison CANSECO  EVD  Primafit    CODE STATUS:  Full Code                         GOALS OF CARE:  aggressive                      DISPOSITION:  ICU =======================================   NEUROCRITICAL CARE ATTENDING NOTE   =======================================  ARJUN, AILYN     HPI:  Patient is a 47 y/o female with PMH HTN, multiple sclerosis, recent spinal surgery 10/8 ([facility]) presented to [place] ED BIBEMS after being found unconscious at home, unknown period of time. 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 HH5, MF1. Patient was intubated at [place] ED, found to have blown L pupil, and sent straight to the OR for left hemicraniotomy. A-line placed intraop. Hemodynamically unstable upon arrival to [facility], bradycardic and hypertensive s/p Mannitol, Clevidipine, and Furosemide. Central line placed in NSICU. Currently sedated on Propofol, pending repeat CTH. History per patient's son, patient had recent spine surgery and was found on outpatient imaging last week to have L M1 segment aneurysm (unruptured), pt asymptomatic at this time. Per son patient taking percocet PO at home. Ureña catheter, ET tube, and arterial line placed at [facility].  NIHSS on arrival: 32. Bess Griffin 5, Mod Busby 4.  Bleed Day 1 = 10/26 (26 Oct 2021 13:03)    Hospital course:  10/26: admitted to [facility] from [facility], POD#0 s/p L hemicraniectomy for decompression and evacuation of SDH. Transferred to [facility] noted to have tense flap, received mannitol, keppra, decadron. Was hypertensive to 200s and preston to 40s, recevied 85g mannitol and bullet 23.4%. CTH on arrival demonstrated increased size in vents and new IVH. EVD placed, open at 15, central line placed. Transfused 2 u PRBC. POD0 of coiling of left MCA bifurcation aneurysm,   10/27: CTH demonstrated EVD in correct position, EVD lowered to 5, remains intubated on proprofol, pending repeat CTH in AM. Started on 3% @ 30/hr. 2LNS given for euvolemia, Mannitol given for uptrending ICPs. Bullet given 8:30 am. 3% increased to 50/hr. 1 L bolus. New EVD catheter placed using exisiting sreekanth hole. 1 u PRBC.  10/28: HH5, MF4, BD3; POD2 angio coil/embo of L MCA aneurysm. OJAQUÍN overnight, neuro stable. EVD@5cmH20 ICPs < 20 overnight, OP WNL. S/p 1 unit PRBC yesterday with appropriate response. Given 42g mannitol in AM for ICP 22 persistently, with improvement, then given 23% in PM for elevated ICP. febrile, pancultured. Standing tylenol and cooling blanket.   10/30: BD5, POD4 angio coil/embo. JOAQUÍN o/n neuro stable. EVD@5. 3% @50cc/hr.  10/31: BD6, POD5 angio coil/embo. brief period maintained upward gaze, resolved on its own. EVD@5cm H2O.    : BD7, POD6 L hemicrani, angio coil/embo. Neuro exam unchanged. ICPs WNL. Overnight given hydralazine, increased propofol for SBP > 180.  CTA showed mild-moderate anterior circulation vasospasm (permissive hypertension, euvolemia).  11/3: BD9, POD8 L hemicrani, angio coil/embo.  Neuro exam unchanged. ICPs WNL.  Pending trach/PEG.  Dayshift:  noted episodes of sympathetic storming during vasospasm period.  : BD11 POD10, JOAQUÍN o/n, neuro stable, EVD at 5; O2 desaturations with thick secretions (MSSA, Enterobacter, Proteus) started vancomycin zosyn  : BD13 POD 12 JOAQUÍN o/n, NGT replaced. Weaning off precedex, no ICP crisis, decreasing neurostorming meds, pending CT head.   : BD 14. CT head. Some spontaneous eye opening. Overnight, tongue biting with oral trauma and mild bleeding.   : BD 15. PEG placed. On VEEG. No seizures. Exam stably poor  11/10: BD 16. Scheduled for trach. Angio. Exam remains stably poor  : BD 17. Scheduled for trach. POD 1 s/p angio.       Past Medical History: No pertinent past medical history  Allergies:  Allergy Status Unknown  Home meds:       ICU Vital Signs Last 24 Hrs  T(C): 37.1 (2021 05:16), Max: 37.2 (10 Nov 2021 21:59)  T(F): 98.7 (2021 05:16), Max: 99 (10 Nov 2021 21:59)  HR: 83 (2021 05:29) (62 - 107)  BP: 178/92 (2021 05:00) (143/71 - 196/101)  BP(mean): 128 (2021 05:00) (98 - 141)  ABP: 179/92 (2021 05:00) (111/76 - 202/100)  ABP(mean): 127 (2021 05:00) (94 - 143)  RR: 16 (2021 05:00) (14 - 31)  SpO2: 99% (2021 05:29) (97% - 100%)      11-10-21 @ 07:01  -  21 @ 07:00  --------------------------------------------------------  IN: 2777 mL / OUT: 1617 mL / NET: 1160 mL      Mode: AC/ CMV (Assist Control/ Continuous Mandatory Ventilation), RR (machine): 14, TV (machine): 450, FiO2: 40, PEEP: 5, ITime: 1, MAP: 8.7, PIP: 21      NEUROLOGIC EXAMINATION: Opens eyes to stimuli and spontaneously intermittently, does not follow commands, pupils 3mm equal and reactive (+) Doll's, (+) cough and (+) gag, B/ L lower extremities Triple flexes, uppers extending. At times trace withdrawing upper  EENT: Anicteric, severely edematous tongue with multiple lacerations.   CARDIOVASCULAR: (+) S1 S2, normal rate and regular rhythm  PULMONARY: Clear to auscultation bilaterally  ABDOMEN: Soft, nontender with normoactive bowel sounds  EXTREMITIES: No edema  SKIN: No rash; back incisions healing.       MEDICATIONS:   acetaminophen    Suspension .. 650 milliGRAM(s) Oral every 6 hours PRN  acetylcysteine 20%  Inhalation 4 milliLiter(s) Inhalation three times a day  amantadine Syrup 200 milliGRAM(s) Oral two times a day  artificial  tears Solution 1 Drop(s) Both EYES two times a day  bromocriptine Capsule 5 milliGRAM(s) Oral every 8 hours  cefepime   IVPB 2000 milliGRAM(s) IV Intermittent every 8 hours  chlorhexidine 0.12% Liquid 15 milliLiter(s) Oral Mucosa every 12 hours  chlorhexidine 2% Cloths 1 Application(s) Topical <User Schedule>  fentaNYL    Injectable 50 MICROGram(s) IV Push every 2 hours PRN  fludroCORTISONE 0.1 milliGRAM(s) Oral every 12 hours  hydrALAZINE Injectable 5 milliGRAM(s) IV Push every 4 hours PRN  levETIRAcetam  Solution 500 milliGRAM(s) Oral two times a day  modafinil 200 milliGRAM(s) Oral daily  niMODipine 60 milliGRAM(s) Oral every 4 hours  ondansetron Injectable 4 milliGRAM(s) IV Push every 6 hours PRN  pantoprazole   Suspension 40 milliGRAM(s) Oral daily  sodium chloride 3 Gram(s) Oral every 6 hours  sodium chloride 0.9%. 1000 milliLiter(s) (45 mL/Hr) IV Continuous <Continuous>  sodium chloride 3%. 500 milliLiter(s) (50 mL/Hr) IV Continuous <Continuous>      LABS:                   8.9    11. )-----------( 434      ( 2021 02:09 )             27.5     11-    142  |  110<H>  |  6<L>  ----------------------------<  98  3.7   |  21<L>  |  0.62    Ca    9.0      2021 02:09  Phos  3.3     11-11  Mg     2.0     11-11      10/28 CSF NGTD  10/28 Blood NGTD    CT head :   Since prior CT head (10/27/2021), slight decrease in size of left frontotemporal intraparenchymal hematoma. Interval decrease in left-to-right midline shift. More pronounced hypodensity in the left frontal and temporal lobes surrounding hemorrhage which may be artifactual from the hemicraniectomy although evolving ischemic changes cannot be excluded and continued follow-up is recommended.      EEG:  Neuroimagin/01 CTA - Diffuse, but overall mild- moderate, sites of vasospasm  are noted at the anterior circulation, whilst the posterior circulation appears spared.   10/27 CT - There is herniation of the brain parenchyma and to the craniectomy defect which is similar prior examination. There is no significant change in the large left frontotemporal intraparenchymal hematoma with surrounding edema. Again demonstrated is mass effect with right to left midline shift measuring approximately 1 cm which is stable from prior exam. Again demonstrated is effacement of the cerebral sulci and basal cisterns.  Other imagin/01 UE Doppler - DVT within the right brachial vein.  Superficial thrombosis of the right cephalic vein.   CXR - stable.  Mild congestion.  10/28 Doppler - Negative    10/28 TTE -   1. Normal left and right ventricular size and systolic function.   2. No significant valvular disease.   3. Mild pulmonary hypertension present, pulmonary artery systolic pressure is 35 mmHg.   4. No pericardial effusion.   5. No prior echo is available for comparison.      Lines:  Madison CANSECO  EVD  Primafit    CODE STATUS:  Full Code                         GOALS OF CARE:  aggressive                      DISPOSITION:  ICU

## 2021-11-11 NOTE — PROGRESS NOTE ADULT - ASSESSMENT
47 y/o female with PMHx of HTN and MS, hx of spinal surgery, found down, and revealed to have ruptured left middle cerebral artery aneurysm with intraparenchymal hemorrhage and left subdural hematoma with midline shift and herniation, s/p left hemicraniotomy, EVD placement, and coil embolization of left MCA bifurcation aneurysm, now s/p PEG tube (11/10/2021)    Continue advancing tube feeds through PEG until at goal.  Monitor PEG site for erythema, swelling, drainage.  Keep binder in place to protect tube from being pulled out.  Surgery team will sign out, contact us with any questions. 45 y/o female with PMHx of HTN and MS, hx of spinal surgery, found down, and revealed to have ruptured left middle cerebral artery aneurysm with intraparenchymal hemorrhage and left subdural hematoma with midline shift and herniation, s/p left hemicraniotomy, EVD placement, and coil embolization of left MCA bifurcation aneurysm, now s/p PEG tube (11/10/2021)    Advance tube feeds through PEG when permissible, start at 20cc/hr and advance increments of 10cc until at goal.  Monitor PEG site for erythema, swelling, drainage.  Keep binder in place to protect tube from being pulled out.

## 2021-11-11 NOTE — PROGRESS NOTE ADULT - SUBJECTIVE AND OBJECTIVE BOX
HPI:  45 y/o female with unknown PMHx presented to [place] ED BIBEMS after being found unconscious at home, unknown period of time. 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 HH5, MF4. Patient was intubated at [place] ED and sent straight to the OR for left hemicraniotomy. A-line placed intraop. Hemodynamically unstable upon arrival to [facility], bradycardic and hypertensive s/p Mannitol, Clevidipine, and Furosemide. Central line placed in NSICU. Currently sedated on Propofol, pending repeat CTH. History per patient's son, patient had recent spine surgery (in [place], unknown which hospital) and was found on outpatient imaging last week to have L M1 segment aneurysm (unruptured), pt asymptomatic at this time. Per son patient taking percocet PO at home. Ureña catheter, ET tube, and arterial line placed at [facility].     NIHSS on arrival: 32       S/Overnight events:  JOAQUÍN overnight, BMP rechecked for Na goal       Hospital Course:   POD#       Vital Signs Last 24 Hrs  T(C): 36.9 (11 Nov 2021 01:19), Max: 37.2 (10 Nov 2021 21:59)  T(F): 98.4 (11 Nov 2021 01:19), Max: 99 (10 Nov 2021 21:59)  HR: 91 (11 Nov 2021 03:00) (62 - 107)  BP: 175/98 (11 Nov 2021 02:00) (143/71 - 196/101)  BP(mean): 130 (11 Nov 2021 02:00) (98 - 141)  RR: 14 (11 Nov 2021 03:00) (14 - 31)  SpO2: 100% (11 Nov 2021 02:00) (96% - 100%)    I&O's Detail    09 Nov 2021 07:01  -  10 Nov 2021 07:00  --------------------------------------------------------  IN:    Enteral Tube Flush: 360 mL    IV PiggyBack: 200 mL    sodium chloride 0.9%: 1080 mL    Sodium Chloride 0.9% Bolus: 500 mL    sodium chloride 3%: 300 mL    sodium chloride 3%: 60 mL    sodium chloride 3%: 550 mL    sodium chloride 3%: 155 mL  Total IN: 3205 mL    OUT:    External Ventricular Device (mL): 156 mL    Jevity 1.2: 0 mL    Rectal Tube (mL): 50 mL    Voided (mL): 4100 mL  Total OUT: 4306 mL    Total NET: -1101 mL      10 Nov 2021 07:01  -  11 Nov 2021 02:56  --------------------------------------------------------  IN:    IV PiggyBack: 100 mL    sodium chloride 0.9%: 382 mL    sodium chloride 0.9%: 180 mL    Sodium Chloride 0.9% Bolus: 1000 mL    sodium chloride 3%: 925 mL  Total IN: 2587 mL    OUT:    External Ventricular Device (mL): 191 mL    Voided (mL): 1400 mL  Total OUT: 1591 mL    Total NET: 996 mL        I&O's Summary    09 Nov 2021 07:01  -  10 Nov 2021 07:00  --------------------------------------------------------  IN: 3205 mL / OUT: 4306 mL / NET: -1101 mL    10 Nov 2021 07:01  -  11 Nov 2021 02:56  --------------------------------------------------------  IN: 2587 mL / OUT: 1591 mL / NET: 996 mL        PHYSICAL EXAM:  Neurological:    Motor exam:         [] Upper extremity              Bi(c5)  WE(c6)  EE(c7)   FF(c8)                                                R         5/5        5/5        5/5       5/5                                               L          5/5        5/5        5/5       5/5         [] Lower extremeity          HF(l2)   KE(l3)    TA(l4)   EHL(l5)  GS(s1)                                                 R        5/5        5/5        5/5       5/5         5/5                                               L         5/5        5/5       5/5       5/5          5/5                                                        [] warm well perfused; capillary refill <3 seconds     Sensation: [] intact to light touch  [] decreased:       Cardiovascular:  Respiratory:  Gastrointestinal:  Genitourinary:  Extremities:    Incision/Wound:    DEVICE/DRAIN DRESSING:    TUBES/LINES:  [] CVC  [] A-line  [] Lumbar Drain  [] Ventriculostomy  [] Other    DIET:  [] NPO  [] Mechanical  [] Tube feeds    LABS:                        8.9    11.02 )-----------( 434      ( 11 Nov 2021 02:09 )             27.5     11-11    142  |  110<H>  |  6<L>  ----------------------------<  98  3.7   |  21<L>  |  0.62    Ca    9.0      11 Nov 2021 02:09  Phos  3.3     11-11  Mg     2.0     11-11      PT/INR - ( 09 Nov 2021 04:21 )   PT: 14.5 sec;   INR: 1.22          PTT - ( 09 Nov 2021 04:21 )  PTT:27.0 sec        CAPILLARY BLOOD GLUCOSE          Drug Levels: [] N/A  Vancomycin Level, Trough: 11.7 ug/mL (11-07 @ 06:22)    CSF Analysis: [] N/A      Allergies    No Known Allergies    Intolerances      MEDICATIONS:  Antibiotics:  cefepime   IVPB 2000 milliGRAM(s) IV Intermittent every 8 hours    Neuro:  acetaminophen    Suspension .. 650 milliGRAM(s) Oral every 6 hours PRN  amantadine Syrup 200 milliGRAM(s) Oral two times a day  bromocriptine Capsule 5 milliGRAM(s) Oral every 8 hours  fentaNYL    Injectable 50 MICROGram(s) IV Push every 2 hours PRN  levETIRAcetam  Solution 500 milliGRAM(s) Oral two times a day  modafinil 200 milliGRAM(s) Oral daily  ondansetron Injectable 4 milliGRAM(s) IV Push every 6 hours PRN    Anticoagulation:  enoxaparin Injectable 40 milliGRAM(s) SubCutaneous every 24 hours    OTHER:  acetylcysteine 20%  Inhalation 4 milliLiter(s) Inhalation three times a day  artificial  tears Solution 1 Drop(s) Both EYES two times a day  chlorhexidine 0.12% Liquid 15 milliLiter(s) Oral Mucosa every 12 hours  chlorhexidine 2% Cloths 1 Application(s) Topical <User Schedule>  fludroCORTISONE 0.1 milliGRAM(s) Oral every 12 hours  hydrALAZINE Injectable 5 milliGRAM(s) IV Push every 4 hours PRN  niMODipine 60 milliGRAM(s) Oral every 4 hours  pantoprazole   Suspension 40 milliGRAM(s) Oral daily    IVF:  potassium chloride   Powder 40 milliEquivalent(s) Enteral Tube once  sodium chloride 3 Gram(s) Oral every 6 hours  sodium chloride 0.9%. 1000 milliLiter(s) IV Continuous <Continuous>  sodium chloride 3%. 500 milliLiter(s) IV Continuous <Continuous>    CULTURES:  Culture Results:   Numerous Staphylococcus aureus  Numerous Enterobacter cloacae complex  Moderate Proteus mirabilis  Accompanied by normal respiratory melinda (11-04 @ 13:59)  Culture Results:   No growth to date (10-28 @ 19:23)    RADIOLOGY & ADDITIONAL TESTS:      ASSESSMENT:  46y Female s/p    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, left    Intracranial hemorrhage, spontaneous subarachnoid, due to cerebral aneurysm, acute    Angiogram, cerebral, with coil embolization, in non-operating room setting    Endoscopic percutaneous gastrostomy    Angiogram, carotid and cerebral, bilateral    Personal history of spine surgery    SysAdmin_VstLnk        PLAN:  NEURO:    CARDIOVASCULAR:    PULMONARY:    RENAL:    GI:    HEME:    ID:    ENDO:    DVT PROPHYLAXIS:  [] Venodynes                                [] Heparin/Lovenox    FALL RISK:  [] Low Risk                                    [] Impulsive    DISPOSITION:  HPI:  45 y/o female with unknown PMHx presented to [place] ED BIBEMS after being found unconscious at home, unknown period of time. 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 HH5, MF4. Patient was intubated at [place] ED and sent straight to the OR for left hemicraniotomy. A-line placed intraop. Hemodynamically unstable upon arrival to [facility], bradycardic and hypertensive s/p Mannitol, Clevidipine, and Furosemide. Central line placed in NSICU. Currently sedated on Propofol, pending repeat CTH. History per patient's son, patient had recent spine surgery (in [place], unknown which hospital) and was found on outpatient imaging last week to have L M1 segment aneurysm (unruptured), pt asymptomatic at this time. Per son patient taking percocet PO at home. Ureña catheter, ET tube, and arterial line placed at [facility].     NIHSS on arrival: 32       S/Overnight events:  JOAQUÍN overnight, BMP rechecked for Na goal of 140-145, 3% @50cc/hr and NS @45cc/hr.       Hospital Course:   10/26: admitted to [facility] from [facility], POD#0 s/p L hemicraniectomy for decompression and evacuation of SDH. Transferred to [facility] noted to have tense flap, received mannitol, keppra, decadron. Was hypertensive to 200s and preston to 40s, recevied 85g mannitol and bullet 23.4%. CTH on arrival demonstrated increased size in vents and new IVH. EVD placed, open at 15, central line placed. Transfused 2 u PRBC. POD0 of coiling of left MCA bifurcation aneurysm,   10/27: CTH demonstrated EVD in correct position, EVD lowered to 5, remains intubated on proprofol, pending repeat CTH in AM. Started on 3% @ 30/hr. 2LNS given for euvolemia, Mannitol given for uptrending ICPs. Bullet given 8:30 am. 3% increased to 50/hr. 1 L bolus. New EVD catheter placed using exisiting sreekanth hole. 1 u PRBC.  10/28: HH5, MF4, BD3; POD2 angio coil/embo of L MCA aneurysm. JOAQUÍN overnight, neuro stable. EVD@5cmH20 ICPs < 20 overnight, OP WNL. S/p 1 unit PRBC yesterday with appropriate response. Given 42g mannitol in AM for ICP 22 persistently, with improvement, then given 23% in PM for elevated ICP. febrile, pancultured. Standing tylenol and cooling blanket.   10/29: BD4, POD3 angio coil/embo. JOAQUÍN o/n, neuro stable. EVD@5, ICPs <20 o/n.   10/30: BD5, POD4 angio coil/embo. JOAQUÍN o/n neuro stable. EVD@5. 3% @50cc/hr.  10/31: BD6, POD5 angio coil/embo. brief period maintained upward gaze, resolved on its own. EVD@5cm h2o.    11/1: BD7, POD6 L hemicrani, angio coil/embo. JOAQUÍN overnight. Neuro exam unchanged. ICPs WNL. started bromocriptine/gabapentin/baclofen. dc'd propofol, started precedex  11/2: BD8 POD7 L hemicrani, angio coil/embo. JOAQUÍN overnight. Neuro exam stable. Remains on 3% hypertonic saline. Receieved 1 unit of PRBCs for a Hgb of 7.6.  11/3: BD 9 POD8 of L hemicrani. Elevated lipase, normal amylase, OG tube clogged and replaced. Abdominal US normal. Pending gen surg reccs regarding trach and peg. Gabapentin and Baclofen increased to help with neurostorming. restarted back on 3%, LR@35. became preston+hypotensive with nimodipine 60q4, decreased back to 30q2, NaCl bullet given.   11/4: BD10 POD9, JOAQUÍN o/n, 500cc NS for euvolemia,  neuro stable, EVD at 5. 3% increased to 75  11/5: BD11 POD10, JOAQUÍN o/n, neuro stable, EVD at 5  11/6: BD12 POD11 JOAQUÍN overnight. Neuro exam stable. Remains intubated on precedex. 3% hypertonic saline dc'd  11/7: BD13 POD 12 JOAQUÍN o/n, NGT replaced. on precex with no icp crisis   11/8: BD14 POD13 JOAQUÍN o/n, noted to have tongue maceration/macroglossia. Gauze with tongue depressor placed. Pending further recs from ENT. Pending trach and PEG placement tomorrow with gen surg. Off precedex, ICPs stable. EVD remains @ 5.   11/9: BD15, POD 13, bleeding under tongue at start of shift, fentanyl pushed with no further episodes. gabapentin stopped. PEG placed  11/10: BD 16, POD 14, neuro stable, ENT to be consulted in AM, 3% increased to 50/hr.  11/11: BD 17, POD 15, neuro exam stable. Pend [facility] saba in am for cranioplasty planning. Pend trach in OR with ENT. F.u BMP in am, 3%@50cc/hr for Na goal 140-145.         Vital Signs Last 24 Hrs  T(C): 36.9 (11 Nov 2021 01:19), Max: 37.2 (10 Nov 2021 21:59)  T(F): 98.4 (11 Nov 2021 01:19), Max: 99 (10 Nov 2021 21:59)  HR: 91 (11 Nov 2021 03:00) (62 - 107)  BP: 175/98 (11 Nov 2021 02:00) (143/71 - 196/101)  BP(mean): 130 (11 Nov 2021 02:00) (98 - 141)  RR: 14 (11 Nov 2021 03:00) (14 - 31)  SpO2: 100% (11 Nov 2021 02:00) (96% - 100%)    I&O's Detail    09 Nov 2021 07:01  -  10 Nov 2021 07:00  --------------------------------------------------------  IN:    Enteral Tube Flush: 360 mL    IV PiggyBack: 200 mL    sodium chloride 0.9%: 1080 mL    Sodium Chloride 0.9% Bolus: 500 mL    sodium chloride 3%: 300 mL    sodium chloride 3%: 60 mL    sodium chloride 3%: 550 mL    sodium chloride 3%: 155 mL  Total IN: 3205 mL    OUT:    External Ventricular Device (mL): 156 mL    Jevity 1.2: 0 mL    Rectal Tube (mL): 50 mL    Voided (mL): 4100 mL  Total OUT: 4306 mL    Total NET: -1101 mL      10 Nov 2021 07:01  -  11 Nov 2021 02:56  --------------------------------------------------------  IN:    IV PiggyBack: 100 mL    sodium chloride 0.9%: 382 mL    sodium chloride 0.9%: 180 mL    Sodium Chloride 0.9% Bolus: 1000 mL    sodium chloride 3%: 925 mL  Total IN: 2587 mL    OUT:    External Ventricular Device (mL): 191 mL    Voided (mL): 1400 mL  Total OUT: 1591 mL    Total NET: 996 mL        I&O's Summary    09 Nov 2021 07:01  -  10 Nov 2021 07:00  --------------------------------------------------------  IN: 3205 mL / OUT: 4306 mL / NET: -1101 mL    10 Nov 2021 07:01  -  11 Nov 2021 02:56  --------------------------------------------------------  IN: 2587 mL / OUT: 1591 mL / NET: 996 mL        PHYSICAL EXAM:  General: NAD, pt is comfortably  laying in hospital bed, +intubated on full vent support   HEENT: PERRL 3mm, OE spont, b/l corneals, blinks to threat b/l   Cardiovascular: RRR, normal S1 and S2   Respiratory: lungs CTAB, no wheezing, rhonchi, or crackles   GI: normoactive BS to auscultation, abd soft, NTND, +PEG   Neuro: nonverbal, not answering questions, OE spont but not to command  b/l UE extensor posture, b/l LE triple flex to noxious    Extremities: distal pulses 2+ x4   Wound/incision: L hemicrani incision site w/ staples C/D/I, flap soft and full. B/l posterior spinal surgical incision sites with improving areas of wound dehiscence   Drains: EVD @5cmH2O     TUBES/LINES:  [] CVC  [X] A-line  [] Lumbar Drain  [] Ventriculostomy  [] Other    DIET:  [] NPO  [] Mechanical  [X] Tube feeds    LABS:                        8.9    11.02 )-----------( 434      ( 11 Nov 2021 02:09 )             27.5     11-11    142  |  110<H>  |  6<L>  ----------------------------<  98  3.7   |  21<L>  |  0.62    Ca    9.0      11 Nov 2021 02:09  Phos  3.3     11-11  Mg     2.0     11-11      PT/INR - ( 09 Nov 2021 04:21 )   PT: 14.5 sec;   INR: 1.22          PTT - ( 09 Nov 2021 04:21 )  PTT:27.0 sec        CAPILLARY BLOOD GLUCOSE          Drug Levels: [] N/A  Vancomycin Level, Trough: 11.7 ug/mL (11-07 @ 06:22)    CSF Analysis: [] N/A      Allergies    No Known Allergies    Intolerances      MEDICATIONS:  Antibiotics:  cefepime   IVPB 2000 milliGRAM(s) IV Intermittent every 8 hours    Neuro:  acetaminophen    Suspension .. 650 milliGRAM(s) Oral every 6 hours PRN  amantadine Syrup 200 milliGRAM(s) Oral two times a day  bromocriptine Capsule 5 milliGRAM(s) Oral every 8 hours  fentaNYL    Injectable 50 MICROGram(s) IV Push every 2 hours PRN  levETIRAcetam  Solution 500 milliGRAM(s) Oral two times a day  modafinil 200 milliGRAM(s) Oral daily  ondansetron Injectable 4 milliGRAM(s) IV Push every 6 hours PRN    Anticoagulation:  enoxaparin Injectable 40 milliGRAM(s) SubCutaneous every 24 hours    OTHER:  acetylcysteine 20%  Inhalation 4 milliLiter(s) Inhalation three times a day  artificial  tears Solution 1 Drop(s) Both EYES two times a day  chlorhexidine 0.12% Liquid 15 milliLiter(s) Oral Mucosa every 12 hours  chlorhexidine 2% Cloths 1 Application(s) Topical <User Schedule>  fludroCORTISONE 0.1 milliGRAM(s) Oral every 12 hours  hydrALAZINE Injectable 5 milliGRAM(s) IV Push every 4 hours PRN  niMODipine 60 milliGRAM(s) Oral every 4 hours  pantoprazole   Suspension 40 milliGRAM(s) Oral daily    IVF:  potassium chloride   Powder 40 milliEquivalent(s) Enteral Tube once  sodium chloride 3 Gram(s) Oral every 6 hours  sodium chloride 0.9%. 1000 milliLiter(s) IV Continuous <Continuous>  sodium chloride 3%. 500 milliLiter(s) IV Continuous <Continuous>    CULTURES:  Culture Results:   Numerous Staphylococcus aureus  Numerous Enterobacter cloacae complex  Moderate Proteus mirabilis  Accompanied by normal respiratory melinda (11-04 @ 13:59)  Culture Results:   No growth to date (10-28 @ 19:23)    RADIOLOGY & ADDITIONAL TESTS:      ASSESSMENT:  45 y/o female with PMHx of HTN and MS, hx of spinal surgery at [facility] 10/8/21 [facility] [place] [facility] being found [facility] at home, unknown period of time. 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 HH5, MF4; BD #1 = 10/26. Patient was intubated at [place] ED and sent straight to the OR for left hemicraniotomy. A-line placed intraop. Hemodynamically unstable upon arrival to [facility], bradycardic and hypertensive s/p Mannitol and Furosemide. Central line placed in NSICU. S/p EVD placement, and coil embolization of left MCA bifurcation aneurysm 10/26/2021. S/p cerebral angio without findings of vasospasm 11/10      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, left    Intracranial hemorrhage, spontaneous subarachnoid, due to cerebral aneurysm, acute    Angiogram, cerebral, with coil embolization, in non-operating room setting    Endoscopic percutaneous gastrostomy    Angiogram, carotid and cerebral, bilateral    Personal history of spine surgery    SysAdmin_VstLnk        PLAN:  NEURO:  - neuro checks/vital signs q1hr  - Keppra 500g IV BID   - VEEG negative for seizures  - Nimodipine 60q4 until 11/15   - decreased bromocriptine 5mg q8hr   - fentanyl pushes PRN   - EVD open at 5cmH2O, monitor ICP/output  - CTA 11/1 with findings of moderate anterior circulation spasm   - CTH 11/8 stable     CARDIO:  - -180  - hydralazine PRN to maintain SBP goal  - echo +mild pulm HTN    PULM:  - intubated on full vent support  - Plan for trach with ENT in OR 11/11    GI:  - NPO after midnight  - PPI QD GI ppx  - FOB negative 11/1  - Abd US: neg   - rectal tube  - PEG tube placed by gen surg 11/9, tube feeds until midnight  - gen surg unable to place trach d/t neck stiffness    RENAL:  - salt tabs 3Q6  - 3%@50cc/hr, NS@50cc/hr  - euvolemia goal   - Na 145-150     HEME:  - SCDs, SQL  - s/p 2u PRBC, s/p 1u PRBC 10/27, s/p 1u PRBC 11/02  - monitor H+H  - FOB negative  - 11/9 unchanged DVT L brachial and unchanged R superficial cephalic thrombus, f/u repeat doppler 11/17    ID:  - leukocytosis and procal, trend   - Tylenol PRN for fever  - sputum cx 11/4:  enterobacter, proteus  - Cefepime started to cover for enterobacter/proteus in sputum, end 11/17    ENDO:  - ISS   - monitor FS    OTHER  - wound care recs- q2 dressing changes   - ENT consulted, reccomends trach placement and oral hygeine    GOC: full code  Level of care: ICU status   Dispo: pend EVD, trach  family updated    Case discussed with Dr. Duncan , Dr Menard

## 2021-11-11 NOTE — PROGRESS NOTE ADULT - SUBJECTIVE AND OBJECTIVE BOX
SUBJECTIVE:   Tube feeds advanced through PEG per notes but not clear if at goal. Patient at baseline neuro status. .    ---------------------------------------------------------------    Vital Signs Last 24 Hrs  T(C): 37.1 (11 Nov 2021 05:16), Max: 37.2 (10 Nov 2021 21:59)  T(F): 98.7 (11 Nov 2021 05:16), Max: 99 (10 Nov 2021 21:59)  HR: 77 (11 Nov 2021 07:00) (62 - 103)  BP: 149/71 (11 Nov 2021 07:00) (134/67 - 196/101)  BP(mean): 102 (11 Nov 2021 07:00) (98 - 141)  RR: 14 (11 Nov 2021 07:00) (14 - 24)  SpO2: 98% (11 Nov 2021 07:00) (97% - 100%)    I&O's Detail    10 Nov 2021 07:01  -  11 Nov 2021 07:00  --------------------------------------------------------  IN:    IV PiggyBack: 100 mL    sodium chloride 0.9%: 382 mL    sodium chloride 0.9%: 360 mL    Sodium Chloride 0.9% Bolus: 1000 mL    sodium chloride 3%: 1125 mL  Total IN: 2967 mL    OUT:    External Ventricular Device (mL): 237 mL    Voided (mL): 2800 mL  Total OUT: 3037 mL    Total NET: -70 mL          ----------------------------------------------------------------    Physical Exam:  Gen: NAD, intubated and sedated  C/V: NSR  Pulm: Intubated, Nonlabored breathing, no respiratory distress  Abd: soft, NT/ND, PEG tube in place to gravity, no swelling or erythema around site of PEG tube placement    -------------------------------------------------------------------    LABS:                        8.9    11.02 )-----------( 434      ( 11 Nov 2021 02:09 )             27.5     11-11    144  |  110<H>  |  7   ----------------------------<  103<H>  3.6   |  21<L>  |  0.58    Ca    9.3      11 Nov 2021 08:06  Phos  3.3     11-11  Mg     2.0     11-11              RADIOLOGY & ADDITIONAL STUDIES:     SUBJECTIVE:   Tube feeds being held for possible trach by ENT. Patient at baseline neuro status.    ---------------------------------------------------------------    Vital Signs Last 24 Hrs  T(C): 37.1 (11 Nov 2021 05:16), Max: 37.2 (10 Nov 2021 21:59)  T(F): 98.7 (11 Nov 2021 05:16), Max: 99 (10 Nov 2021 21:59)  HR: 77 (11 Nov 2021 07:00) (62 - 103)  BP: 149/71 (11 Nov 2021 07:00) (134/67 - 196/101)  BP(mean): 102 (11 Nov 2021 07:00) (98 - 141)  RR: 14 (11 Nov 2021 07:00) (14 - 24)  SpO2: 98% (11 Nov 2021 07:00) (97% - 100%)    I&O's Detail    10 Nov 2021 07:01  -  11 Nov 2021 07:00  --------------------------------------------------------  IN:    IV PiggyBack: 100 mL    sodium chloride 0.9%: 382 mL    sodium chloride 0.9%: 360 mL    Sodium Chloride 0.9% Bolus: 1000 mL    sodium chloride 3%: 1125 mL  Total IN: 2967 mL    OUT:    External Ventricular Device (mL): 237 mL    Voided (mL): 2800 mL  Total OUT: 3037 mL    Total NET: -70 mL          ----------------------------------------------------------------    Physical Exam:  Gen: NAD, intubated and sedated  C/V: NSR  Pulm: Intubated, Nonlabored breathing, no respiratory distress  Abd: soft, NT/ND, PEG tube in place to gravity, no swelling or erythema around site of PEG tube placement    -------------------------------------------------------------------    LABS:                        8.9    11.02 )-----------( 434      ( 11 Nov 2021 02:09 )             27.5     11-11    144  |  110<H>  |  7   ----------------------------<  103<H>  3.6   |  21<L>  |  0.58    Ca    9.3      11 Nov 2021 08:06  Phos  3.3     11-11  Mg     2.0     11-11              RADIOLOGY & ADDITIONAL STUDIES:

## 2021-11-12 LAB
ALBUMIN SERPL ELPH-MCNC: 3.4 G/DL — SIGNIFICANT CHANGE UP (ref 3.3–5)
ALP SERPL-CCNC: 104 U/L — SIGNIFICANT CHANGE UP (ref 40–120)
ALT FLD-CCNC: 67 U/L — HIGH (ref 10–45)
ANION GAP SERPL CALC-SCNC: 11 MMOL/L — SIGNIFICANT CHANGE UP (ref 5–17)
ANION GAP SERPL CALC-SCNC: 13 MMOL/L — SIGNIFICANT CHANGE UP (ref 5–17)
ANION GAP SERPL CALC-SCNC: 13 MMOL/L — SIGNIFICANT CHANGE UP (ref 5–17)
ANION GAP SERPL CALC-SCNC: 22 MMOL/L — HIGH (ref 5–17)
ANION GAP SERPL CALC-SCNC: 23 MMOL/L — HIGH (ref 5–17)
AST SERPL-CCNC: 123 U/L — HIGH (ref 10–40)
BASE EXCESS BLDA CALC-SCNC: -1.9 MMOL/L — SIGNIFICANT CHANGE UP (ref -2–3)
BASE EXCESS BLDA CALC-SCNC: -9.2 MMOL/L — LOW (ref -2–3)
BASE EXCESS BLDA CALC-SCNC: 3.5 MMOL/L — HIGH (ref -2–3)
BASE EXCESS BLDA CALC-SCNC: 4.7 MMOL/L — HIGH (ref -2–3)
BASE EXCESS BLDV CALC-SCNC: 2.8 MMOL/L — SIGNIFICANT CHANGE UP (ref -2–3)
BILIRUB SERPL-MCNC: 0.3 MG/DL — SIGNIFICANT CHANGE UP (ref 0.2–1.2)
BUN SERPL-MCNC: 12 MG/DL — SIGNIFICANT CHANGE UP (ref 7–23)
BUN SERPL-MCNC: 12 MG/DL — SIGNIFICANT CHANGE UP (ref 7–23)
BUN SERPL-MCNC: 19 MG/DL — SIGNIFICANT CHANGE UP (ref 7–23)
BUN SERPL-MCNC: 23 MG/DL — SIGNIFICANT CHANGE UP (ref 7–23)
BUN SERPL-MCNC: 8 MG/DL — SIGNIFICANT CHANGE UP (ref 7–23)
CALCIUM SERPL-MCNC: 8.5 MG/DL — SIGNIFICANT CHANGE UP (ref 8.4–10.5)
CALCIUM SERPL-MCNC: 8.8 MG/DL — SIGNIFICANT CHANGE UP (ref 8.4–10.5)
CALCIUM SERPL-MCNC: 9.2 MG/DL — SIGNIFICANT CHANGE UP (ref 8.4–10.5)
CALCIUM SERPL-MCNC: 9.2 MG/DL — SIGNIFICANT CHANGE UP (ref 8.4–10.5)
CALCIUM SERPL-MCNC: 9.3 MG/DL — SIGNIFICANT CHANGE UP (ref 8.4–10.5)
CHLORIDE SERPL-SCNC: 106 MMOL/L — SIGNIFICANT CHANGE UP (ref 96–108)
CHLORIDE SERPL-SCNC: 107 MMOL/L — SIGNIFICANT CHANGE UP (ref 96–108)
CHLORIDE SERPL-SCNC: 112 MMOL/L — HIGH (ref 96–108)
CHLORIDE SERPL-SCNC: 112 MMOL/L — HIGH (ref 96–108)
CHLORIDE SERPL-SCNC: 115 MMOL/L — HIGH (ref 96–108)
CK MB CFR SERPL CALC: 7.5 NG/ML — HIGH (ref 0–6.7)
CK MB CFR SERPL CALC: 8.4 NG/ML — HIGH (ref 0–6.7)
CK SERPL-CCNC: 159 U/L — SIGNIFICANT CHANGE UP (ref 25–170)
CK SERPL-CCNC: 165 U/L — SIGNIFICANT CHANGE UP (ref 25–170)
CO2 BLDA-SCNC: 26 MMOL/L — HIGH (ref 19–24)
CO2 BLDA-SCNC: 27 MMOL/L — HIGH (ref 19–24)
CO2 BLDA-SCNC: 29 MMOL/L — HIGH (ref 19–24)
CO2 BLDA-SCNC: 30 MMOL/L — HIGH (ref 19–24)
CO2 BLDV-SCNC: 29.9 MMOL/L — HIGH (ref 22–26)
CO2 SERPL-SCNC: 21 MMOL/L — LOW (ref 22–31)
CO2 SERPL-SCNC: 25 MMOL/L — SIGNIFICANT CHANGE UP (ref 22–31)
CO2 SERPL-SCNC: 25 MMOL/L — SIGNIFICANT CHANGE UP (ref 22–31)
CO2 SERPL-SCNC: 26 MMOL/L — SIGNIFICANT CHANGE UP (ref 22–31)
CO2 SERPL-SCNC: 27 MMOL/L — SIGNIFICANT CHANGE UP (ref 22–31)
CREAT SERPL-MCNC: 0.63 MG/DL — SIGNIFICANT CHANGE UP (ref 0.5–1.3)
CREAT SERPL-MCNC: 0.91 MG/DL — SIGNIFICANT CHANGE UP (ref 0.5–1.3)
CREAT SERPL-MCNC: 0.92 MG/DL — SIGNIFICANT CHANGE UP (ref 0.5–1.3)
CREAT SERPL-MCNC: 1.51 MG/DL — HIGH (ref 0.5–1.3)
CREAT SERPL-MCNC: 2.09 MG/DL — HIGH (ref 0.5–1.3)
GLUCOSE SERPL-MCNC: 102 MG/DL — HIGH (ref 70–99)
GLUCOSE SERPL-MCNC: 194 MG/DL — HIGH (ref 70–99)
GLUCOSE SERPL-MCNC: 97 MG/DL — SIGNIFICANT CHANGE UP (ref 70–99)
HCO3 BLDA-SCNC: 24 MMOL/L — SIGNIFICANT CHANGE UP (ref 21–28)
HCO3 BLDA-SCNC: 24 MMOL/L — SIGNIFICANT CHANGE UP (ref 21–28)
HCO3 BLDA-SCNC: 28 MMOL/L — SIGNIFICANT CHANGE UP (ref 21–28)
HCO3 BLDA-SCNC: 29 MMOL/L — HIGH (ref 21–28)
HCO3 BLDV-SCNC: 28 MMOL/L — SIGNIFICANT CHANGE UP (ref 22–29)
HCT VFR BLD CALC: 25.9 % — LOW (ref 34.5–45)
HCT VFR BLD CALC: 28.9 % — LOW (ref 34.5–45)
HCT VFR BLD CALC: 29 % — LOW (ref 34.5–45)
HCT VFR BLD CALC: 29.3 % — LOW (ref 34.5–45)
HGB BLD-MCNC: 8.1 G/DL — LOW (ref 11.5–15.5)
HGB BLD-MCNC: 8.7 G/DL — LOW (ref 11.5–15.5)
HGB BLD-MCNC: 8.8 G/DL — LOW (ref 11.5–15.5)
HGB BLD-MCNC: 9.1 G/DL — LOW (ref 11.5–15.5)
LACTATE SERPL-SCNC: 0.8 MMOL/L — SIGNIFICANT CHANGE UP (ref 0.5–2)
LACTATE SERPL-SCNC: 1.8 MMOL/L — SIGNIFICANT CHANGE UP (ref 0.5–2)
LACTATE SERPL-SCNC: 13.5 MMOL/L — CRITICAL HIGH (ref 0.5–2)
MAGNESIUM SERPL-MCNC: 1.8 MG/DL — SIGNIFICANT CHANGE UP (ref 1.6–2.6)
MAGNESIUM SERPL-MCNC: 1.9 MG/DL — SIGNIFICANT CHANGE UP (ref 1.6–2.6)
MAGNESIUM SERPL-MCNC: 1.9 MG/DL — SIGNIFICANT CHANGE UP (ref 1.6–2.6)
MAGNESIUM SERPL-MCNC: 2.4 MG/DL — SIGNIFICANT CHANGE UP (ref 1.6–2.6)
MAGNESIUM SERPL-MCNC: 2.4 MG/DL — SIGNIFICANT CHANGE UP (ref 1.6–2.6)
MCHC RBC-ENTMCNC: 25.3 PG — LOW (ref 27–34)
MCHC RBC-ENTMCNC: 25.5 PG — LOW (ref 27–34)
MCHC RBC-ENTMCNC: 25.9 PG — LOW (ref 27–34)
MCHC RBC-ENTMCNC: 26.6 PG — LOW (ref 27–34)
MCHC RBC-ENTMCNC: 30 GM/DL — LOW (ref 32–36)
MCHC RBC-ENTMCNC: 30.4 GM/DL — LOW (ref 32–36)
MCHC RBC-ENTMCNC: 31.1 GM/DL — LOW (ref 32–36)
MCHC RBC-ENTMCNC: 31.3 GM/DL — LOW (ref 32–36)
MCV RBC AUTO: 82.1 FL — SIGNIFICANT CHANGE UP (ref 80–100)
MCV RBC AUTO: 82.7 FL — SIGNIFICANT CHANGE UP (ref 80–100)
MCV RBC AUTO: 83 FL — SIGNIFICANT CHANGE UP (ref 80–100)
MCV RBC AUTO: 88.7 FL — SIGNIFICANT CHANGE UP (ref 80–100)
NRBC # BLD: 0 /100 WBCS — SIGNIFICANT CHANGE UP (ref 0–0)
PCO2 BLDA: 40 MMHG — HIGH (ref 32–35)
PCO2 BLDA: 42 MMHG — HIGH (ref 32–35)
PCO2 BLDA: 46 MMHG — HIGH (ref 32–35)
PCO2 BLDA: 95 MMHG — CRITICAL HIGH (ref 32–35)
PCO2 BLDV: 47 MMHG — HIGH (ref 39–42)
PH BLDA: 7.01 — CRITICAL LOW (ref 7.35–7.45)
PH BLDA: 7.33 — LOW (ref 7.35–7.45)
PH BLDA: 7.45 — SIGNIFICANT CHANGE UP (ref 7.35–7.45)
PH BLDA: 7.45 — SIGNIFICANT CHANGE UP (ref 7.35–7.45)
PH BLDV: 7.39 — SIGNIFICANT CHANGE UP (ref 7.32–7.43)
PHOSPHATE SERPL-MCNC: 10.7 MG/DL — HIGH (ref 2.5–4.5)
PHOSPHATE SERPL-MCNC: 10.9 MG/DL — HIGH (ref 2.5–4.5)
PHOSPHATE SERPL-MCNC: 2.7 MG/DL — SIGNIFICANT CHANGE UP (ref 2.5–4.5)
PHOSPHATE SERPL-MCNC: 4.3 MG/DL — SIGNIFICANT CHANGE UP (ref 2.5–4.5)
PLATELET # BLD AUTO: 324 K/UL — SIGNIFICANT CHANGE UP (ref 150–400)
PLATELET # BLD AUTO: 388 K/UL — SIGNIFICANT CHANGE UP (ref 150–400)
PLATELET # BLD AUTO: 405 K/UL — HIGH (ref 150–400)
PLATELET # BLD AUTO: 420 K/UL — HIGH (ref 150–400)
PO2 BLDA: 128 MMHG — HIGH (ref 83–108)
PO2 BLDA: 128 MMHG — HIGH (ref 83–108)
PO2 BLDA: 133 MMHG — HIGH (ref 83–108)
PO2 BLDA: 348 MMHG — HIGH (ref 83–108)
PO2 BLDV: 52 MMHG — HIGH (ref 25–45)
POTASSIUM SERPL-MCNC: 3 MMOL/L — LOW (ref 3.5–5.3)
POTASSIUM SERPL-MCNC: 3 MMOL/L — LOW (ref 3.5–5.3)
POTASSIUM SERPL-MCNC: 3.8 MMOL/L — SIGNIFICANT CHANGE UP (ref 3.5–5.3)
POTASSIUM SERPL-MCNC: 4.2 MMOL/L — SIGNIFICANT CHANGE UP (ref 3.5–5.3)
POTASSIUM SERPL-MCNC: 4.2 MMOL/L — SIGNIFICANT CHANGE UP (ref 3.5–5.3)
POTASSIUM SERPL-SCNC: 3 MMOL/L — LOW (ref 3.5–5.3)
POTASSIUM SERPL-SCNC: 3 MMOL/L — LOW (ref 3.5–5.3)
POTASSIUM SERPL-SCNC: 3.8 MMOL/L — SIGNIFICANT CHANGE UP (ref 3.5–5.3)
POTASSIUM SERPL-SCNC: 4.2 MMOL/L — SIGNIFICANT CHANGE UP (ref 3.5–5.3)
POTASSIUM SERPL-SCNC: 4.2 MMOL/L — SIGNIFICANT CHANGE UP (ref 3.5–5.3)
PROCALCITONIN SERPL-MCNC: 39.14 NG/ML — HIGH (ref 0.02–0.1)
PROT SERPL-MCNC: 7.6 G/DL — SIGNIFICANT CHANGE UP (ref 6–8.3)
RBC # BLD: 3.13 M/UL — LOW (ref 3.8–5.2)
RBC # BLD: 3.27 M/UL — LOW (ref 3.8–5.2)
RBC # BLD: 3.48 M/UL — LOW (ref 3.8–5.2)
RBC # BLD: 3.57 M/UL — LOW (ref 3.8–5.2)
RBC # FLD: 22.3 % — HIGH (ref 10.3–14.5)
RBC # FLD: 22.5 % — HIGH (ref 10.3–14.5)
RBC # FLD: 22.5 % — HIGH (ref 10.3–14.5)
RBC # FLD: 22.9 % — HIGH (ref 10.3–14.5)
SAO2 % BLDA: 97.5 % — SIGNIFICANT CHANGE UP (ref 94–98)
SAO2 % BLDA: 98.9 % — HIGH (ref 94–98)
SAO2 % BLDA: 99 % — HIGH (ref 94–98)
SAO2 % BLDA: 99.5 % — HIGH (ref 94–98)
SAO2 % BLDV: 86.2 % — SIGNIFICANT CHANGE UP (ref 67–88)
SODIUM SERPL-SCNC: 146 MMOL/L — HIGH (ref 135–145)
SODIUM SERPL-SCNC: 152 MMOL/L — HIGH (ref 135–145)
SODIUM SERPL-SCNC: 152 MMOL/L — HIGH (ref 135–145)
SODIUM SERPL-SCNC: 154 MMOL/L — HIGH (ref 135–145)
SODIUM SERPL-SCNC: 154 MMOL/L — HIGH (ref 135–145)
TROPONIN T SERPL-MCNC: 0.02 NG/ML — HIGH (ref 0–0.01)
TROPONIN T SERPL-MCNC: 0.36 NG/ML — CRITICAL HIGH (ref 0–0.01)
WBC # BLD: 10.79 K/UL — HIGH (ref 3.8–10.5)
WBC # BLD: 14.59 K/UL — HIGH (ref 3.8–10.5)
WBC # BLD: 17.75 K/UL — HIGH (ref 3.8–10.5)
WBC # BLD: 25.62 K/UL — HIGH (ref 3.8–10.5)
WBC # FLD AUTO: 10.79 K/UL — HIGH (ref 3.8–10.5)
WBC # FLD AUTO: 14.59 K/UL — HIGH (ref 3.8–10.5)
WBC # FLD AUTO: 17.75 K/UL — HIGH (ref 3.8–10.5)
WBC # FLD AUTO: 25.62 K/UL — HIGH (ref 3.8–10.5)

## 2021-11-12 PROCEDURE — 99291 CRITICAL CARE FIRST HOUR: CPT

## 2021-11-12 PROCEDURE — 31600 PLANNED TRACHEOSTOMY: CPT

## 2021-11-12 PROCEDURE — 71045 X-RAY EXAM CHEST 1 VIEW: CPT | Mod: 26,77,76

## 2021-11-12 PROCEDURE — 93306 TTE W/DOPPLER COMPLETE: CPT | Mod: 26

## 2021-11-12 PROCEDURE — 71045 X-RAY EXAM CHEST 1 VIEW: CPT | Mod: 26,76

## 2021-11-12 PROCEDURE — 99024 POSTOP FOLLOW-UP VISIT: CPT

## 2021-11-12 PROCEDURE — 32551 INSERTION OF CHEST TUBE: CPT | Mod: GC

## 2021-11-12 RX ORDER — POTASSIUM CHLORIDE 20 MEQ
20 PACKET (EA) ORAL
Refills: 0 | Status: DISCONTINUED | OUTPATIENT
Start: 2021-11-12 | End: 2021-11-12

## 2021-11-12 RX ORDER — EPINEPHRINE 0.3 MG/.3ML
0.5 INJECTION INTRAMUSCULAR; SUBCUTANEOUS ONCE
Refills: 0 | Status: DISCONTINUED | OUTPATIENT
Start: 2021-11-12 | End: 2021-11-12

## 2021-11-12 RX ORDER — SODIUM CHLORIDE 9 MG/ML
1000 INJECTION INTRAMUSCULAR; INTRAVENOUS; SUBCUTANEOUS
Refills: 0 | Status: DISCONTINUED | OUTPATIENT
Start: 2021-11-12 | End: 2021-11-13

## 2021-11-12 RX ORDER — FENTANYL CITRATE 50 UG/ML
25 INJECTION INTRAVENOUS ONCE
Refills: 0 | Status: DISCONTINUED | OUTPATIENT
Start: 2021-11-12 | End: 2021-11-12

## 2021-11-12 RX ORDER — NOREPINEPHRINE BITARTRATE/D5W 8 MG/250ML
0.05 PLASTIC BAG, INJECTION (ML) INTRAVENOUS
Qty: 8 | Refills: 0 | Status: DISCONTINUED | OUTPATIENT
Start: 2021-11-12 | End: 2021-11-13

## 2021-11-12 RX ORDER — SODIUM CHLORIDE 9 MG/ML
1000 INJECTION, SOLUTION INTRAVENOUS ONCE
Refills: 0 | Status: COMPLETED | OUTPATIENT
Start: 2021-11-12 | End: 2021-11-12

## 2021-11-12 RX ORDER — PROPOFOL 10 MG/ML
40 INJECTION, EMULSION INTRAVENOUS
Qty: 1000 | Refills: 0 | Status: DISCONTINUED | OUTPATIENT
Start: 2021-11-12 | End: 2021-11-12

## 2021-11-12 RX ORDER — POTASSIUM CHLORIDE 20 MEQ
20 PACKET (EA) ORAL ONCE
Refills: 0 | Status: COMPLETED | OUTPATIENT
Start: 2021-11-12 | End: 2021-11-12

## 2021-11-12 RX ADMIN — CEFEPIME 100 MILLIGRAM(S): 1 INJECTION, POWDER, FOR SOLUTION INTRAMUSCULAR; INTRAVENOUS at 12:39

## 2021-11-12 RX ADMIN — Medication 4 MILLILITER(S): at 21:41

## 2021-11-12 RX ADMIN — FLUDROCORTISONE ACETATE 0.1 MILLIGRAM(S): 0.1 TABLET ORAL at 17:39

## 2021-11-12 RX ADMIN — LEVETIRACETAM 500 MILLIGRAM(S): 250 TABLET, FILM COATED ORAL at 17:39

## 2021-11-12 RX ADMIN — PROPOFOL 20.4 MICROGRAM(S)/KG/MIN: 10 INJECTION, EMULSION INTRAVENOUS at 07:48

## 2021-11-12 RX ADMIN — SODIUM CHLORIDE 3 GRAM(S): 9 INJECTION INTRAMUSCULAR; INTRAVENOUS; SUBCUTANEOUS at 12:40

## 2021-11-12 RX ADMIN — SODIUM CHLORIDE 3 GRAM(S): 9 INJECTION INTRAMUSCULAR; INTRAVENOUS; SUBCUTANEOUS at 06:09

## 2021-11-12 RX ADMIN — PANTOPRAZOLE SODIUM 40 MILLIGRAM(S): 20 TABLET, DELAYED RELEASE ORAL at 12:40

## 2021-11-12 RX ADMIN — FENTANYL CITRATE 25 MICROGRAM(S): 50 INJECTION INTRAVENOUS at 07:17

## 2021-11-12 RX ADMIN — FENTANYL CITRATE 25 MICROGRAM(S): 50 INJECTION INTRAVENOUS at 06:47

## 2021-11-12 RX ADMIN — Medication 4 MILLILITER(S): at 15:00

## 2021-11-12 RX ADMIN — MODAFINIL 200 MILLIGRAM(S): 200 TABLET ORAL at 12:56

## 2021-11-12 RX ADMIN — CHLORHEXIDINE GLUCONATE 15 MILLILITER(S): 213 SOLUTION TOPICAL at 17:38

## 2021-11-12 RX ADMIN — LEVETIRACETAM 500 MILLIGRAM(S): 250 TABLET, FILM COATED ORAL at 06:09

## 2021-11-12 RX ADMIN — FENTANYL CITRATE 50 MICROGRAM(S): 50 INJECTION INTRAVENOUS at 04:01

## 2021-11-12 RX ADMIN — Medication 1 DROP(S): at 06:35

## 2021-11-12 RX ADMIN — FENTANYL CITRATE 50 MICROGRAM(S): 50 INJECTION INTRAVENOUS at 06:46

## 2021-11-12 RX ADMIN — CEFEPIME 100 MILLIGRAM(S): 1 INJECTION, POWDER, FOR SOLUTION INTRAMUSCULAR; INTRAVENOUS at 19:25

## 2021-11-12 RX ADMIN — Medication 200 MILLIGRAM(S): at 17:38

## 2021-11-12 RX ADMIN — CHLORHEXIDINE GLUCONATE 15 MILLILITER(S): 213 SOLUTION TOPICAL at 06:35

## 2021-11-12 RX ADMIN — Medication 1 DROP(S): at 18:02

## 2021-11-12 RX ADMIN — CHLORHEXIDINE GLUCONATE 1 APPLICATION(S): 213 SOLUTION TOPICAL at 06:35

## 2021-11-12 RX ADMIN — FENTANYL CITRATE 50 MICROGRAM(S): 50 INJECTION INTRAVENOUS at 01:25

## 2021-11-12 RX ADMIN — NIMODIPINE 60 MILLIGRAM(S): 60 SOLUTION ORAL at 03:16

## 2021-11-12 RX ADMIN — CEFEPIME 100 MILLIGRAM(S): 1 INJECTION, POWDER, FOR SOLUTION INTRAMUSCULAR; INTRAVENOUS at 01:00

## 2021-11-12 RX ADMIN — FENTANYL CITRATE 50 MICROGRAM(S): 50 INJECTION INTRAVENOUS at 22:30

## 2021-11-12 RX ADMIN — Medication 5 MILLIGRAM(S): at 05:15

## 2021-11-12 RX ADMIN — NIMODIPINE 60 MILLIGRAM(S): 60 SOLUTION ORAL at 06:09

## 2021-11-12 RX ADMIN — FENTANYL CITRATE 50 MICROGRAM(S): 50 INJECTION INTRAVENOUS at 21:24

## 2021-11-12 RX ADMIN — SODIUM CHLORIDE 1000 MILLILITER(S): 9 INJECTION, SOLUTION INTRAVENOUS at 09:00

## 2021-11-12 RX ADMIN — Medication 100 MILLIEQUIVALENT(S): at 11:45

## 2021-11-12 RX ADMIN — FENTANYL CITRATE 50 MICROGRAM(S): 50 INJECTION INTRAVENOUS at 04:05

## 2021-11-12 RX ADMIN — FLUDROCORTISONE ACETATE 0.1 MILLIGRAM(S): 0.1 TABLET ORAL at 06:09

## 2021-11-12 RX ADMIN — FENTANYL CITRATE 50 MICROGRAM(S): 50 INJECTION INTRAVENOUS at 06:08

## 2021-11-12 RX ADMIN — Medication 4 MILLILITER(S): at 06:24

## 2021-11-12 RX ADMIN — Medication 200 MILLIGRAM(S): at 06:09

## 2021-11-12 NOTE — PROGRESS NOTE ADULT - SUBJECTIVE AND OBJECTIVE BOX
=======================================   NEUROCRITICAL CARE ATTENDING NOTE   =======================================  ARJUN, AILYN     HPI:  Patient is a 45 y/o female with PMH HTN, multiple sclerosis, recent spinal surgery 10/8 ([facility]) presented to [place] [facility] BIBEMS after being found unconscious at home, unknown period of time. 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 HH5, MF1. Patient was intubated at [place] ED, found to have blown L pupil, and sent straight to the OR for left hemicraniotomy. A-line placed intraop. Hemodynamically unstable upon arrival to [facility], bradycardic and hypertensive s/p Mannitol, Clevidipine, and Furosemide. Central line placed in NSICU. Currently sedated on Propofol, pending repeat CTH. History per patient's son, patient had recent spine surgery and was found on outpatient imaging last week to have L M1 segment aneurysm (unruptured), pt asymptomatic at this time. Per son patient taking percocet PO at home. Ureña catheter, ET tube, and arterial line placed at [facility].  NIHSS on arrival: 32. Bess Griffin 5, Mod Busby 4.  Bleed Day 1 = 10/26 (26 Oct 2021 13:03)    Hospital course:  10/26: admitted to [facility] from [facility], POD#0 s/p L hemicraniectomy for decompression and evacuation of SDH. Transferred to [facility] noted to have tense flap, received mannitol, keppra, decadron. Was hypertensive to 200s and preston to 40s, recevied 85g mannitol and bullet 23.4%. CTH on arrival demonstrated increased size in vents and new IVH. EVD placed, open at 15, central line placed. Transfused 2 u PRBC. POD0 of coiling of left MCA bifurcation aneurysm,   10/27: CTH demonstrated EVD in correct position, EVD lowered to 5, remains intubated on proprofol, pending repeat CTH in AM. Started on 3% @ 30/hr. 2LNS given for euvolemia, Mannitol given for uptrending ICPs. Bullet given 8:30 am. 3% increased to 50/hr. 1 L bolus. New EVD catheter placed using exisiting sreekanth hole. 1 u PRBC.  10/28: HH5, MF4, BD3; POD2 angio coil/embo of L MCA aneurysm. JOAQUÍN overnight, neuro stable. EVD@5cmH20 ICPs < 20 overnight, OP WNL. S/p 1 unit PRBC yesterday with appropriate response. Given 42g mannitol in AM for ICP 22 persistently, with improvement, then given 23% in PM for elevated ICP. febrile, pancultured. Standing tylenol and cooling blanket.   10/30: BD5, POD4 angio coil/embo. JOAQUÍN o/n neuro stable. EVD@5. 3% @50cc/hr.  10/31: BD6, POD5 angio coil/embo. brief period maintained upward gaze, resolved on its own. EVD@5cm H2O.    : BD7, POD6 L hemicrani, angio coil/embo. Neuro exam unchanged. ICPs WNL. Overnight given hydralazine, increased propofol for SBP > 180.  CTA showed mild-moderate anterior circulation vasospasm (permissive hypertension, euvolemia).  11/3: BD9, POD8 L hemicrani, angio coil/embo.  Neuro exam unchanged. ICPs WNL.  Pending trach/PEG.  Dayshift:  noted episodes of sympathetic storming during vasospasm period.  : BD11 POD10, JOAQUÍN o/n, neuro stable, EVD at 5; O2 desaturations with thick secretions (MSSA, Enterobacter, Proteus) started vancomycin zosyn  : BD13 POD 12 JOAQUÍN o/n, NGT replaced. Weaning off precedex, no ICP crisis, decreasing neurostorming meds, pending CT head.   : BD 14. CT head. Some spontaneous eye opening. Overnight, tongue biting with oral trauma and mild bleeding.   : BD 15. PEG placed. On VEEG. No seizures. Exam stably poor  11/10: BD 16. Scheduled for trach. Angio. Exam remains stably poor  : BD 18. Awaiting trach    Past Medical History: No pertinent past medical history  Allergies:  Allergy Status Unknown  Home meds:       ICU Vital Signs Last 24 Hrs  T(C): 37.9 (2021 06:50), Max: 37.9 (2021 06:50)  T(F): 100.2 (2021 06:50), Max: 100.2 (2021 06:50)  HR: 102 (2021 06:10) (67 - 106)  BP: 170/81 (2021 11:00) (163/82 - 174/94)  BP(mean): 117 (2021 11:00) (115 - 125)  ABP: 176/90 (2021 05:00) (137/107 - 201/92)  ABP(mean): 127 (2021 05:00) (110 - 136)  RR: 19 (2021 06:10) (14 - 19)  SpO2: 100% (2021 06:10) (99% - 100%)      21 @ 07:01  -  21 @ 07:00  --------------------------------------------------------  IN: 2109 mL / OUT: 1305 mL / NET: 804 mL      Mode: AC/ CMV (Assist Control/ Continuous Mandatory Ventilation), RR (machine): 14, TV (machine): 450, FiO2: 40, PEEP: 5, ITime: 1, MAP: 10, PIP: 20    MEDICATIONS:   acetaminophen    Suspension .. 650 milliGRAM(s) Oral every 6 hours PRN  acetylcysteine 20%  Inhalation 4 milliLiter(s) Inhalation three times a day  amantadine Syrup 200 milliGRAM(s) Oral two times a day  artificial  tears Solution 1 Drop(s) Both EYES two times a day  cefepime   IVPB 2000 milliGRAM(s) IV Intermittent every 8 hours  chlorhexidine 0.12% Liquid 15 milliLiter(s) Oral Mucosa every 12 hours  chlorhexidine 2% Cloths 1 Application(s) Topical <User Schedule>  enoxaparin Injectable 40 milliGRAM(s) SubCutaneous at bedtime  fentaNYL    Injectable 50 MICROGram(s) IV Push every 2 hours PRN  fludroCORTISONE 0.1 milliGRAM(s) Oral every 12 hours  hydrALAZINE Injectable 5 milliGRAM(s) IV Push every 4 hours PRN  levETIRAcetam  Solution 500 milliGRAM(s) Oral two times a day  modafinil 200 milliGRAM(s) Oral daily  niMODipine 60 milliGRAM(s) Oral every 4 hours  ondansetron Injectable 4 milliGRAM(s) IV Push every 6 hours PRN  pantoprazole   Suspension 40 milliGRAM(s) Oral daily  sodium chloride 3 Gram(s) Oral every 6 hours  sodium chloride 0.9%. 1000 milliLiter(s) (60 mL/Hr) IV Continuous <Continuous>        NEUROLOGIC EXAMINATION: Opens eyes to stimuli and spontaneously intermittently, does not follow commands, pupils 3mm equal and reactive (+) Doll's, (+) cough and (+) gag, B/ L lower extremities Triple flexes, uppers extending. At times trace withdrawing upper  EENT: Anicteric, severely edematous tongue with multiple lacerations.   CARDIOVASCULAR: (+) S1 S2, normal rate and regular rhythm  PULMONARY: Clear to auscultation bilaterally  ABDOMEN: Soft, nontender with normoactive bowel sounds  EXTREMITIES: No edema  SKIN: No rash; back incisions healing.       LABS:                  8.8    10.79 )-----------( 420      ( 2021 02:52 )             28.9     -12    146<H>  |  112<H>  |  8   ----------------------------<  102<H>  3.8   |  21<L>  |  0.63    Ca    9.3      2021 02:52  Phos  2.7     -  Mg     1.9           Culture - CSF with Gram Stain (collected 21 @ 17:53)  Source: .CSF CSF  Gram Stain (21 @ 18:24):    No organisms seen    No WBC's seen.      10/28 CSF NGTD  10/28 Blood NGTD    CT head :   Since prior CT head (10/27/2021), slight decrease in size of left frontotemporal intraparenchymal hematoma. Interval decrease in left-to-right midline shift. More pronounced hypodensity in the left frontal and temporal lobes surrounding hemorrhage which may be artifactual from the hemicraniectomy although evolving ischemic changes cannot be excluded and continued follow-up is recommended.      EEG:  Neuroimagin/01 CTA - Diffuse, but overall mild- moderate, sites of vasospasm  are noted at the anterior circulation, whilst the posterior circulation appears spared.   10/27 CT - There is herniation of the brain parenchyma and to the craniectomy defect which is similar prior examination. There is no significant change in the large left frontotemporal intraparenchymal hematoma with surrounding edema. Again demonstrated is mass effect with right to left midline shift measuring approximately 1 cm which is stable from prior exam. Again demonstrated is effacement of the cerebral sulci and basal cisterns.  Other imagin/01 UE Doppler - DVT within the right brachial vein.  Superficial thrombosis of the right cephalic vein.   CXR - stable.  Mild congestion.  10/28 Doppler - Negative    10/28 TTE -   1. Normal left and right ventricular size and systolic function.   2. No significant valvular disease.   3. Mild pulmonary hypertension present, pulmonary artery systolic pressure is 35 mmHg.   4. No pericardial effusion.   5. No prior echo is available for comparison.      Lines:  Madison MATTHEW IJ  EVD  Primafit    CODE STATUS:  Full Code                         GOALS OF CARE:  aggressive                      DISPOSITION:  ICU =======================================   NEUROCRITICAL CARE ATTENDING NOTE   =======================================  ARJUN, AILYN     HPI:  Patient is a 45 y/o female with PMH HTN, multiple sclerosis, recent spinal surgery 10/8 ([facility]) presented to [place] ED BIBEMS after being found unconscious at home, unknown period of time. 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 HH5, MF1. Patient was intubated at [place] ED, found to have blown L pupil, and sent straight to the OR for left hemicraniotomy. A-line placed intraop. Hemodynamically unstable upon arrival to [facility], bradycardic and hypertensive s/p Mannitol, Clevidipine, and Furosemide. Central line placed in NSICU. Currently sedated on Propofol, pending repeat CTH. History per patient's son, patient had recent spine surgery and was found on outpatient imaging last week to have L M1 segment aneurysm (unruptured), pt asymptomatic at this time. Per son patient taking percocet PO at home. Ureña catheter, ET tube, and arterial line placed at [facility].  NIHSS on arrival: 32. Bess Griffin 5, Mod Busby 4.  Bleed Day 1 = 10/26 (26 Oct 2021 13:03)    Hospital course:  10/26: admitted to [facility] from [facility], POD#0 s/p L hemicraniectomy for decompression and evacuation of SDH. Transferred to [facility] noted to have tense flap, received mannitol, keppra, decadron. Was hypertensive to 200s and preston to 40s, recevied 85g mannitol and bullet 23.4%. CTH on arrival demonstrated increased size in vents and new IVH. EVD placed, open at 15, central line placed. Transfused 2 u PRBC. POD0 of coiling of left MCA bifurcation aneurysm,   10/27: CTH demonstrated EVD in correct position, EVD lowered to 5, remains intubated on proprofol, pending repeat CTH in AM. Started on 3% @ 30/hr. 2LNS given for euvolemia, Mannitol given for uptrending ICPs. Bullet given 8:30 am. 3% increased to 50/hr. 1 L bolus. New EVD catheter placed using exisiting sreekanth hole. 1 u PRBC.  10/28: HH5, MF4, BD3; POD2 angio coil/embo of L MCA aneurysm. JOAQUÍN overnight, neuro stable. EVD@5cmH20 ICPs < 20 overnight, OP WNL. S/p 1 unit PRBC yesterday with appropriate response. Given 42g mannitol in AM for ICP 22 persistently, with improvement, then given 23% in PM for elevated ICP. febrile, pancultured. Standing tylenol and cooling blanket.   10/30: BD5, POD4 angio coil/embo. JOAQUÍN o/n neuro stable. EVD@5. 3% @50cc/hr.  10/31: BD6, POD5 angio coil/embo. brief period maintained upward gaze, resolved on its own. EVD@5cm H2O.    11/1: BD7, POD6 L hemicrani, angio coil/embo. Neuro exam unchanged. ICPs WNL. Overnight given hydralazine, increased propofol for SBP > 180.  CTA showed mild-moderate anterior circulation vasospasm (permissive hypertension, euvolemia).  11/3: BD9, POD8 L hemicrani, angio coil/embo.  Neuro exam unchanged. ICPs WNL.  Pending trach/PEG.  Dayshift:  noted episodes of sympathetic storming during vasospasm period.  11/5: BD11 POD10, JOAQUÍN o/n, neuro stable, EVD at 5; O2 desaturations with thick secretions (MSSA, Enterobacter, Proteus) started vancomycin zosyn  11/7: BD13 POD 12 JOAQUÍN o/n, NGT replaced. Weaning off precedex, no ICP crisis, decreasing neurostorming meds, pending CT head.   11/8: BD 14. CT head. Some spontaneous eye opening. Overnight, tongue biting with oral trauma and mild bleeding.   11/9: BD 15. PEG placed. On VEEG. No seizures. Exam stably poor  11/10: BD 16. Scheduled for trach. Angio. Exam remains stably poor  11/12: BD 18.  TRACHEOSTOMY ATTEMPT. HYPOXIA- PEA ARREST. SEE CODE BLUE NOTE PER MD.       ICU Vital Signs Last 24 Hrs  T(C): 37.9 (12 Nov 2021 06:50), Max: 37.9 (12 Nov 2021 06:50)  T(F): 100.2 (12 Nov 2021 06:50), Max: 100.2 (12 Nov 2021 06:50)  HR: 123 (12 Nov 2021 13:00) (67 - 170)  BP: 172/81 (12 Nov 2021 13:00) (128/70 - 172/81)  BP(mean): 117 (12 Nov 2021 13:00) (91 - 139)  ABP: 120/100 (12 Nov 2021 13:00) (101/90 - 189/108)  ABP(mean): 111 (12 Nov 2021 13:00) (88 - 159)  RR: 18 (12 Nov 2021 13:00) (14 - 22)  SpO2: 100% (12 Nov 2021 13:00) (76% - 100%)      11-11-21 @ 07:01  -  11-12-21 @ 07:00  --------------------------------------------------------  IN: 2229 mL / OUT: 1838 mL / NET: 391 mL      MEDICATIONS:   Mode: AC/ CMV (Assist Control/ Continuous Mandatory Ventilation), RR (machine): 14, TV (machine): 400, FiO2: 40, PEEP: 5, ITime: 1, MAP: 16, PIP: 28      NEUROLOGIC EXAMINATION:   Pupils 3mm and reactive B/L, + cough/gag, corneal reflexes present.  Flaccid in all 4 extremities.   EENT: Orally intubated. Severely edematous tongue with multiple lacerations. Neck incision packed with clean gauze.   CARDIOVASCULAR: (+) S1 S2, tachycardic  PULMONARY: Diminished breath sounds B/L R > L. R chest tube to suction. L chest wall SQ emphysema.   ABDOMEN. Distended  EXTREMITIES: No edema  SKIN: Intact.       MEDICATIONS;   acetaminophen    Suspension .. 650 milliGRAM(s) Oral every 6 hours PRN  acetylcysteine 20%  Inhalation 4 milliLiter(s) Inhalation three times a day  amantadine Syrup 200 milliGRAM(s) Oral two times a day  artificial  tears Solution 1 Drop(s) Both EYES two times a day  cefepime   IVPB 2000 milliGRAM(s) IV Intermittent every 8 hours  chlorhexidine 0.12% Liquid 15 milliLiter(s) Oral Mucosa every 12 hours  chlorhexidine 2% Cloths 1 Application(s) Topical <User Schedule>  enoxaparin Injectable 40 milliGRAM(s) SubCutaneous at bedtime  fentaNYL    Injectable 50 MICROGram(s) IV Push every 2 hours PRN  fludroCORTISONE 0.1 milliGRAM(s) Oral every 12 hours  levETIRAcetam  Solution 500 milliGRAM(s) Oral two times a day  modafinil 200 milliGRAM(s) Oral daily  norepinephrine Infusion 0.05 MICROgram(s)/kG/Min (7.97 mL/Hr) IV Continuous <Continuous>  ondansetron Injectable 4 milliGRAM(s) IV Push every 6 hours PRN  pantoprazole   Suspension 40 milliGRAM(s) Oral daily  sodium chloride 3 Gram(s) Oral every 6 hours  sodium chloride 0.9%. 1000 milliLiter(s) (75 mL/Hr) IV Continuous <Continuous>      LABS:                      8.7    14.59 )-----------( 388      ( 12 Nov 2021 09:58 )             29.0     11-12    154<H>  |  107  |  12  ----------------------------<  194<H>  3.0<L>   |  25  |  0.92    Ca    9.2      12 Nov 2021 09:59  Phos  10.9     11-12  Mg     2.4     11-12        ABG - ( 12 Nov 2021 10:33 )  pH, Arterial: 7.33  pH, Blood: x     /  pCO2: 46    /  pO2: 128   / HCO3: 24    / Base Excess: -1.9  /  SaO2: 98.9        CODE STATUS:  Full Code                         GOALS OF CARE: Aggressive                      DISPOSITION:  ICU =======================================   NEUROCRITICAL CARE ATTENDING NOTE   =======================================  ARJUN, AILYN     HPI:  Patient is a 45 y/o female with PMH HTN, multiple sclerosis, recent spinal surgery 10/8 ([facility]) presented to [place] ED BIBEMS after being found unconscious at home, unknown period of time. 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 HH5, MF1. Patient was intubated at [place] ED, found to have blown L pupil, and sent straight to the OR for left hemicraniotomy. A-line placed intraop. Hemodynamically unstable upon arrival to [facility], bradycardic and hypertensive s/p Mannitol, Clevidipine, and Furosemide. Central line placed in NSICU. Currently sedated on Propofol, pending repeat CTH. History per patient's son, patient had recent spine surgery and was found on outpatient imaging last week to have L M1 segment aneurysm (unruptured), pt asymptomatic at this time. Per son patient taking percocet PO at home. Ureña catheter, ET tube, and arterial line placed at [facility].  NIHSS on arrival: 32. Bess Griffin 5, Mod Busby 4.  Bleed Day 1 = 10/26 (26 Oct 2021 13:03)    Hospital course:  10/26: admitted to [facility] from [facility], POD#0 s/p L hemicraniectomy for decompression and evacuation of SDH. Transferred to [facility] noted to have tense flap, received mannitol, keppra, decadron. Was hypertensive to 200s and preston to 40s, recevied 85g mannitol and bullet 23.4%. CTH on arrival demonstrated increased size in vents and new IVH. EVD placed, open at 15, central line placed. Transfused 2 u PRBC. POD0 of coiling of left MCA bifurcation aneurysm,   10/27: CTH demonstrated EVD in correct position, EVD lowered to 5, remains intubated on proprofol, pending repeat CTH in AM. Started on 3% @ 30/hr. 2LNS given for euvolemia, Mannitol given for uptrending ICPs. Bullet given 8:30 am. 3% increased to 50/hr. 1 L bolus. New EVD catheter placed using exisiting sreekanth hole. 1 u PRBC.  10/28: HH5, MF4, BD3; POD2 angio coil/embo of L MCA aneurysm. JOAQUÍN overnight, neuro stable. EVD@5cmH20 ICPs < 20 overnight, OP WNL. S/p 1 unit PRBC yesterday with appropriate response. Given 42g mannitol in AM for ICP 22 persistently, with improvement, then given 23% in PM for elevated ICP. febrile, pancultured. Standing tylenol and cooling blanket.   10/30: BD5, POD4 angio coil/embo. JOAQUÍN o/n neuro stable. EVD@5. 3% @50cc/hr.  10/31: BD6, POD5 angio coil/embo. brief period maintained upward gaze, resolved on its own. EVD@5cm H2O.    11/1: BD7, POD6 L hemicrani, angio coil/embo. Neuro exam unchanged. ICPs WNL. Overnight given hydralazine, increased propofol for SBP > 180.  CTA showed mild-moderate anterior circulation vasospasm (permissive hypertension, euvolemia).  11/3: BD9, POD8 L hemicrani, angio coil/embo.  Neuro exam unchanged. ICPs WNL.  Pending trach/PEG.  Dayshift:  noted episodes of sympathetic storming during vasospasm period.  11/5: BD11 POD10, JOAQUÍN o/n, neuro stable, EVD at 5; O2 desaturations with thick secretions (MSSA, Enterobacter, Proteus) started vancomycin zosyn  11/7: BD13 POD 12 JOAQUÍN o/n, NGT replaced. Weaning off precedex, no ICP crisis, decreasing neurostorming meds, pending CT head.   11/8: BD 14. CT head. Some spontaneous eye opening. Overnight, tongue biting with oral trauma and mild bleeding.   11/9: BD 15. PEG placed. On VEEG. No seizures. Exam stably poor  11/10: BD 16. Scheduled for trach. Angio. Exam remains stably poor  11/12: BD 18.  TRACHEOSTOMY ATTEMPT. HYPOXIA- PEA ARREST. SEE CODE BLUE NOTE PER MD.       ICU Vital Signs Last 24 Hrs  T(C): 37.9 (12 Nov 2021 06:50), Max: 37.9 (12 Nov 2021 06:50)  T(F): 100.2 (12 Nov 2021 06:50), Max: 100.2 (12 Nov 2021 06:50)  HR: 123 (12 Nov 2021 13:00) (67 - 170)  BP: 172/81 (12 Nov 2021 13:00) (128/70 - 172/81)  BP(mean): 117 (12 Nov 2021 13:00) (91 - 139)  ABP: 120/100 (12 Nov 2021 13:00) (101/90 - 189/108)  ABP(mean): 111 (12 Nov 2021 13:00) (88 - 159)  RR: 18 (12 Nov 2021 13:00) (14 - 22)  SpO2: 100% (12 Nov 2021 13:00) (76% - 100%)      11-11-21 @ 07:01  -  11-12-21 @ 07:00  --------------------------------------------------------  IN: 2229 mL / OUT: 1838 mL / NET: 391 mL      MEDICATIONS:   Mode: AC/ CMV (Assist Control/ Continuous Mandatory Ventilation), RR (machine): 14, TV (machine): 400, FiO2: 40, PEEP: 5, ITime: 1, MAP: 16, PIP: 28      NEUROLOGIC EXAMINATION:   Pupils 3mm and reactive B/L, + cough/gag, corneal reflexes present.  Flaccid in all 4 extremities.   EENT: Orally intubated. Severely edematous tongue with multiple lacerations. Neck incision packed with clean gauze.   CARDIOVASCULAR: (+) S1 S2, tachycardic  PULMONARY: Diminished breath sounds B/L R > L. R chest tube to suction. L chest wall SQ emphysema.   ABDOMEN. Distended  EXTREMITIES: No edema  SKIN: Intact. Diffuse subcutaneous emphysema. Prominent over L chest wall      MEDICATIONS;   acetaminophen    Suspension .. 650 milliGRAM(s) Oral every 6 hours PRN  acetylcysteine 20%  Inhalation 4 milliLiter(s) Inhalation three times a day  amantadine Syrup 200 milliGRAM(s) Oral two times a day  artificial  tears Solution 1 Drop(s) Both EYES two times a day  cefepime   IVPB 2000 milliGRAM(s) IV Intermittent every 8 hours  chlorhexidine 0.12% Liquid 15 milliLiter(s) Oral Mucosa every 12 hours  chlorhexidine 2% Cloths 1 Application(s) Topical <User Schedule>  enoxaparin Injectable 40 milliGRAM(s) SubCutaneous at bedtime  fentaNYL    Injectable 50 MICROGram(s) IV Push every 2 hours PRN  fludroCORTISONE 0.1 milliGRAM(s) Oral every 12 hours  levETIRAcetam  Solution 500 milliGRAM(s) Oral two times a day  modafinil 200 milliGRAM(s) Oral daily  norepinephrine Infusion 0.05 MICROgram(s)/kG/Min (7.97 mL/Hr) IV Continuous <Continuous>  ondansetron Injectable 4 milliGRAM(s) IV Push every 6 hours PRN  pantoprazole   Suspension 40 milliGRAM(s) Oral daily  sodium chloride 3 Gram(s) Oral every 6 hours  sodium chloride 0.9%. 1000 milliLiter(s) (75 mL/Hr) IV Continuous <Continuous>      LABS:                      8.7    14.59 )-----------( 388      ( 12 Nov 2021 09:58 )             29.0     11-12    154<H>  |  107  |  12  ----------------------------<  194<H>  3.0<L>   |  25  |  0.92    Ca    9.2      12 Nov 2021 09:59  Phos  10.9     11-12  Mg     2.4     11-12        ABG - ( 12 Nov 2021 10:33 )  pH, Arterial: 7.33  pH, Blood: x     /  pCO2: 46    /  pO2: 128   / HCO3: 24    / Base Excess: -1.9  /  SaO2: 98.9        CODE STATUS:  Full Code                         GOALS OF CARE: Aggressive                      DISPOSITION:  ICU =======================================   NEUROCRITICAL CARE ATTENDING NOTE   =======================================  ARJUN, AILYN     HPI:  Patient is a 45 y/o female with PMH HTN, multiple sclerosis, recent spinal surgery 10/8 ([facility]) presented to [place] ED BIBEMS after being found unconscious at home, unknown period of time. 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 HH5, MF1. Patient was intubated at [place] ED, found to have blown L pupil, and sent straight to the OR for left hemicraniotomy. A-line placed intraop. Hemodynamically unstable upon arrival to [facility], bradycardic and hypertensive s/p Mannitol, Clevidipine, and Furosemide. Central line placed in NSICU. Currently sedated on Propofol, pending repeat CTH. History per patient's son, patient had recent spine surgery and was found on outpatient imaging last week to have L M1 segment aneurysm (unruptured), pt asymptomatic at this time. Per son patient taking percocet PO at home. Ureña catheter, ET tube, and arterial line placed at [facility].  NIHSS on arrival: 32. Bess Griffin 5, Mod Busby 4.  Bleed Day 1 = 10/26 (26 Oct 2021 13:03)    Hospital course:  10/26: admitted to [facility] from [facility], POD#0 s/p L hemicraniectomy for decompression and evacuation of SDH. Transferred to [facility] noted to have tense flap, received mannitol, keppra, decadron. Was hypertensive to 200s and preston to 40s, recevied 85g mannitol and bullet 23.4%. CTH on arrival demonstrated increased size in vents and new IVH. EVD placed, open at 15, central line placed. Transfused 2 u PRBC. POD0 of coiling of left MCA bifurcation aneurysm,   10/27: CTH demonstrated EVD in correct position, EVD lowered to 5, remains intubated on proprofol, pending repeat CTH in AM. Started on 3% @ 30/hr. 2LNS given for euvolemia, Mannitol given for uptrending ICPs. Bullet given 8:30 am. 3% increased to 50/hr. 1 L bolus. New EVD catheter placed using exisiting sreekanth hole. 1 u PRBC.  10/28: HH5, MF4, BD3; POD2 angio coil/embo of L MCA aneurysm. JOAQUÍN overnight, neuro stable. EVD@5cmH20 ICPs < 20 overnight, OP WNL. S/p 1 unit PRBC yesterday with appropriate response. Given 42g mannitol in AM for ICP 22 persistently, with improvement, then given 23% in PM for elevated ICP. febrile, pancultured. Standing tylenol and cooling blanket.   10/30: BD5, POD4 angio coil/embo. JOAQUÍN o/n neuro stable. EVD@5. 3% @50cc/hr.  10/31: BD6, POD5 angio coil/embo. brief period maintained upward gaze, resolved on its own. EVD@5cm H2O.    11/1: BD7, POD6 L hemicrani, angio coil/embo. Neuro exam unchanged. ICPs WNL. Overnight given hydralazine, increased propofol for SBP > 180.  CTA showed mild-moderate anterior circulation vasospasm (permissive hypertension, euvolemia).  11/3: BD9, POD8 L hemicrani, angio coil/embo.  Neuro exam unchanged. ICPs WNL.  Pending trach/PEG.  Dayshift:  noted episodes of sympathetic storming during vasospasm period.  11/5: BD11 POD10, JOAQUÍN o/n, neuro stable, EVD at 5; O2 desaturations with thick secretions (MSSA, Enterobacter, Proteus) started vancomycin zosyn  11/7: BD13 POD 12 JOAQUÍN o/n, NGT replaced. Weaning off precedex, no ICP crisis, decreasing neurostorming meds, pending CT head.   11/8: BD 14. CT head. Some spontaneous eye opening. Overnight, tongue biting with oral trauma and mild bleeding.   11/9: BD 15. PEG placed. On VEEG. No seizures. Exam stably poor  11/10: BD 16. Scheduled for trach. Angio. Exam remains stably poor  11/12: BD 18.  TRACHEOSTOMY ATTEMPT. HYPOXIC-> PEA ARREST x 3. SEE CODE BLUE NOTE.       ICU Vital Signs Last 24 Hrs  T(C): 37.9 (12 Nov 2021 06:50), Max: 37.9 (12 Nov 2021 06:50)  T(F): 100.2 (12 Nov 2021 06:50), Max: 100.2 (12 Nov 2021 06:50)  HR: 123 (12 Nov 2021 13:00) (67 - 170)  BP: 172/81 (12 Nov 2021 13:00) (128/70 - 172/81)  BP(mean): 117 (12 Nov 2021 13:00) (91 - 139)  ABP: 120/100 (12 Nov 2021 13:00) (101/90 - 189/108)  ABP(mean): 111 (12 Nov 2021 13:00) (88 - 159)  RR: 18 (12 Nov 2021 13:00) (14 - 22)  SpO2: 100% (12 Nov 2021 13:00) (76% - 100%)      11-11-21 @ 07:01  -  11-12-21 @ 07:00  --------------------------------------------------------  IN: 2229 mL / OUT: 1838 mL / NET: 391 mL      MEDICATIONS:   Mode: AC/ CMV (Assist Control/ Continuous Mandatory Ventilation), RR (machine): 14, TV (machine): 400, FiO2: 40, PEEP: 5, ITime: 1, MAP: 16, PIP: 28      NEUROLOGIC EXAMINATION: Post cardiac arrest  Pupils 3mm and reactive B/L, + cough/gag, corneal reflexes present.  Flaccid in all 4 extremities.   EENT: Orally intubated. Severely edematous tongue with multiple lacerations. Neck incision packed with clean sterile gauze.   CARDIOVASCULAR: (+) S1 S2, tachycardic  PULMONARY: Diminished breath sounds B/L R > L. R chest tube to suction. L chest wall SQ emphysema.   ABDOMEN. Distended  EXTREMITIES: No edema  SKIN: Intact. Diffuse subcutaneous emphysema. Prominent over L chest wall      MEDICATIONS;   acetaminophen    Suspension .. 650 milliGRAM(s) Oral every 6 hours PRN  acetylcysteine 20%  Inhalation 4 milliLiter(s) Inhalation three times a day  amantadine Syrup 200 milliGRAM(s) Oral two times a day  artificial  tears Solution 1 Drop(s) Both EYES two times a day  cefepime   IVPB 2000 milliGRAM(s) IV Intermittent every 8 hours  chlorhexidine 0.12% Liquid 15 milliLiter(s) Oral Mucosa every 12 hours  chlorhexidine 2% Cloths 1 Application(s) Topical <User Schedule>  enoxaparin Injectable 40 milliGRAM(s) SubCutaneous at bedtime  fentaNYL    Injectable 50 MICROGram(s) IV Push every 2 hours PRN  fludroCORTISONE 0.1 milliGRAM(s) Oral every 12 hours  levETIRAcetam  Solution 500 milliGRAM(s) Oral two times a day  modafinil 200 milliGRAM(s) Oral daily  norepinephrine Infusion 0.05 MICROgram(s)/kG/Min (7.97 mL/Hr) IV Continuous <Continuous>  ondansetron Injectable 4 milliGRAM(s) IV Push every 6 hours PRN  pantoprazole   Suspension 40 milliGRAM(s) Oral daily  sodium chloride 3 Gram(s) Oral every 6 hours  sodium chloride 0.9%. 1000 milliLiter(s) (75 mL/Hr) IV Continuous <Continuous>      LABS:                      8.7    14.59 )-----------( 388      ( 12 Nov 2021 09:58 )             29.0     11-12    154<H>  |  107  |  12  ----------------------------<  194<H>  3.0<L>   |  25  |  0.92    Ca    9.2      12 Nov 2021 09:59  Phos  10.9     11-12  Mg     2.4     11-12        ABG - ( 12 Nov 2021 10:33 )  pH, Arterial: 7.33  pH, Blood: x     /  pCO2: 46    /  pO2: 128   / HCO3: 24    / Base Excess: -1.9  /  SaO2: 98.9        CODE STATUS:  Full Code                         GOALS OF CARE: Aggressive                      DISPOSITION:  ICU

## 2021-11-12 NOTE — PROVIDER CONTACT NOTE (EICU) - SITUATION
Pt in cardiac arrest and surgical team at bedside. Eicu available for chart review and order placement. Recording provided. No orders placed.

## 2021-11-12 NOTE — PROGRESS NOTE ADULT - SUBJECTIVE AND OBJECTIVE BOX
=========================================   NEUROCRITICAL CARE ATTENDING NOTE ( PM)  =========================================    AILYN DÍAZ MRN-[id number]    HPI:  Patient is a 47 y/o female with PMH HTN, multiple sclerosis, recent spinal surgery 10/8 ([facility]) presented to [facility] BIBEMS after being found unconscious at home, unknown period of time. 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 HH5, MF1. Patient was intubated at [facility], found to have blown L pupil, and sent straight to the OR for left hemicraniotomy. A-line placed intraop. Hemodynamically unstable upon arrival to [facility], bradycardic and hypertensive s/p Mannitol, Clevidipine, and Furosemide. Central line placed in NSICU. Currently sedated on Propofol, pending repeat CTH. History per patient's son, patient had recent spine surgery and was found on outpatient imaging last week to have L M1 segment aneurysm (unruptured), pt asymptomatic at this time. Per son patient taking percocet PO at home. Ureña catheter, ET tube, and arterial line placed at [facility].  NIHSS on arrival: 32. Bess Griffin 5, Mod Busby 4.  Bleed Day 1 = 10/26 (26 Oct 2021 13:03)    Hospital Course:   10/26: admitted to [facility] from [facility], POD#0 s/p L hemicraniectomy for decompression and evacuation of SDH. Transferred to [facility] noted to have tense flap, received mannitol, keppra, decadron. Was hypertensive to 200s and preston to 40s, recevied 85g mannitol and bullet 23.4%. CTH on arrival demonstrated increased size in vents and new IVH. EVD placed, open at 15, central line placed. Transfused 2 u PRBC. POD0 of coiling of left MCA bifurcation aneurysm,   10/27: CTH demonstrated EVD in correct position, EVD lowered to 5, remains intubated on proprofol, pending repeat CTH in AM. Started on 3% @ 30/hr. 2LNS given for euvolemia, Mannitol given for uptrending ICPs. Bullet given 8:30 am. 3% increased to 50/hr. 1 L bolus. New EVD catheter placed using exisiting sreekanth hole. 1 u PRBC.  10/28: HH5, MF4, BD3; POD2 angio coil/embo of L MCA aneurysm. JOAQUÍN overnight, neuro stable. EVD@5cmH20 ICPs < 20 overnight, OP WNL. S/p 1 unit PRBC yesterday with appropriate response. Given 42g mannitol in AM for ICP 22 persistently, with improvement, then given 23% in PM for elevated ICP. febrile, pancultured. Standing tylenol and cooling blanket.   10/29: BD4, POD3 angio coil/embo. JOAQUÍN o/n, neuro stable. EVD@5, ICPs <20 o/n.   10/30: BD5, POD4 angio coil/embo. JOAQUÍN o/n neuro stable. EVD@5. 3% @50cc/hr.  10/31: BD6, POD5 angio coil/embo. brief period maintained upward gaze, resolved on its own. EVD@5cm h2o.    : BD7, POD6 L hemicrani, angio coil/embo. JOAQUÍN overnight. Neuro exam unchanged. ICPs WNL. started bromocriptine/gabapentin/baclofen. dc'd propofol, started precedex  : BD8 POD7 L hemicrani, angio coil/embo. JOAQUÍN overnight. Neuro exam stable. Remains on 3% hypertonic saline. Receieved 1 unit of PRBCs for a Hgb of 7.6.  11/3: BD 9 POD8 of L hemicrani. Elevated lipase, normal amylase, OG tube clogged and replaced. Abdominal US normal. Pending gen surg reccs regarding trach and peg. Gabapentin and Baclofen increased to help with neurostorming. restarted back on 3%, LR@35. became preston+hypotensive with nimodipine 60q4, decreased back to 30q2, NaCl bullet given.   : BD10 POD9, JOAQUÍN o/n, 500cc NS for euvolemia,  neuro stable, EVD at 5. 3% increased to 75  11: BD11 POD10, JOAQUÍN o/n, neuro stable, EVD at 5  11: BD12 POD11 JOAQUÍN overnight. Neuro exam stable. Remains intubated on precedex. 3% hypertonic saline dc'd  : BD13 POD 12 JOAQUÍN o/n, NGT replaced. on precex with no icp crisis   : BD14 POD13 JOAQUÍN o/n, noted to have tongue maceration/macroglossia. Gauze with tongue depressor placed. Pending further recs from ENT. Pending trach and PEG placement tomorrow with gen surg. Off precedex, ICPs stable. EVD remains @ 5.   : BD15, POD 13, bleeding under tongue at start of shift, fentanyl pushed with no further episodes. gabapentin stopped. PEG placed  11/10: BD 16, POD 14, neuro stable, ENT to be consulted in AM, 3% increased to 50/hr.  : BD 17, POD 15, neuro exam stable. Pend CTH saba in am for cranioplasty planning. Pend trach in OR with ENT. F.u BMP in am, 3%@50cc/hr for Na goal 140-145.   : BD 18.  TRACHEOSTOMY ATTEMPT. HYPOXIC-> PEA ARREST x 3. SEE CODE BLUE NOTES per day team.  ENT & Dr. Adan franklin.    Past Medical History: No pertinent past medical history  Allergies:  Allergy Status Unknown  Home meds:     Current Meds:  MEDICATIONS  (STANDING):  acetylcysteine 20%  Inhalation 4 milliLiter(s) Inhalation three times a day  amantadine Syrup 200 milliGRAM(s) Oral two times a day  artificial  tears Solution 1 Drop(s) Both EYES two times a day  cefepime   IVPB 2000 milliGRAM(s) IV Intermittent every 8 hours  fludroCORTISONE 0.1 milliGRAM(s) Oral every 12 hours  levETIRAcetam  Solution 500 milliGRAM(s) Oral two times a day  modafinil 200 milliGRAM(s) Oral daily  norepinephrine Infusion 0.05 MICROgram(s)/kG/Min (7.97 mL/Hr) IV Continuous <Continuous>  pantoprazole   Suspension 40 milliGRAM(s) Oral daily  sodium chloride 0.9%. 1000 milliLiter(s) (75 mL/Hr) IV Continuous <Continuous>    MEDICATIONS  (PRN):  acetaminophen    Suspension .. 650 milliGRAM(s) Oral every 6 hours PRN Temp greater or equal to 38C (100.4F), Mild Pain (1 - 3)  fentaNYL    Injectable 50 MICROGram(s) IV Push every 2 hours PRN agitation/pain  ondansetron Injectable 4 milliGRAM(s) IV Push every 6 hours PRN Nausea and/or Vomiting    PHYSICAL EXAMINATION    ICU Vital Signs Last 24 Hrs  T(C): 36.9 (2021 17:00), Max: 37.9 (2021 06:50)  T(F): 98.4 (2021 17:00), Max: 100.2 (2021 06:50)  HR: 106 (2021 18:00) (75 - 170)  BP: 158/81 (2021 18:00) (128/70 - 197/112)  BP(mean): 115 (2021 18:00) (91 - 147)  ABP: 164/87 (2021 18:00) (101/90 - 189/108)  ABP(mean): 115 (2021 18:00) (88 - 159)  RR: 14 (2021 18:00) (14 - 22)  SpO2: 99% (2021 18:00) (76% - 100%)    NEUROLOGIC EXAMINATION: Opens eyes to stimuli and spontaneously intermittently, does not follow commands, pupils 3mm equal and reactive (+) Doll's, (+) cough and (+) gag, noted vertical skew, B/ L lower extremities Triple flexes, uppers extending. At times trace withdrawing upper  Mode: AC/ CMV (Assist Control/ Continuous Mandatory Ventilation) FiO2 50  RR 18 PEEP 5 MAP 10 PIP 26  EENT: Anicteric, severely edematous tongue with multiple lacerations.   CARDIOVASCULAR: (+) S1 S2, normal rate and regular rhythm  PULMONARY: Clear to auscultation bilaterally  ABDOMEN: Soft, nontender with normoactive bowel sounds  EXTREMITIES: No edema  SKIN: No rash; back incisions healing.     I/O's    21 @ 07:01  -  21 @ 07:00  --------------------------------------------------------  IN: 2229 mL / OUT: 1838 mL / NET: 391 mL    21 @ 07:01  -  21 @ 19:16  --------------------------------------------------------  IN: 1838 mL / OUT: 257 mL / NET: 1581 mL    LABS:                        9.1    25.62 )-----------( 405      ( 2021 15:17 )             29.3         152<H>  |  112<H>  |  19  ----------------------------<  194<H>  4.2   |  27  |  1.51<H>    Ca    8.8      2021 15:17  Phos  4.3       Mg     1.8         TPro  7.6  /  Alb  3.4  /  TBili  0.3  /  DBili  x   /  AST  123<H>  /  ALT  67<H>  /  AlkPhos  104  -12    PT/INR - ( 2021 14:03 )   PT: 13.9 sec;   INR: 1.17     PTT - ( 2021 14:03 )  PTT:28.9 sec    CARDIAC MARKERS ( 2021 15:17 )  x     / 0.36 ng/mL / 159 U/L / x     / 8.4 ng/mL  CARDIAC MARKERS ( 2021 09:59 )  x     / 0.02 ng/mL / x     / x     / x        CAPILLARY BLOOD GLUCOSE    POCT Blood Glucose.: 104 mg/dL (2021 17:19)    EEG:  Neuroimagin/11 CT - Status post left hemicraniectomy with continued evolution of left frontotemporal intraparenchymal hemorrhage and mass effect. Right frontal EVD in stable position with stable ventricular caliber.   CT head :   Since prior CT head (10/27/2021), slight decrease in size of left frontotemporal intraparenchymal hematoma. Interval decrease in left-to-right midline shift. More pronounced hypodensity in the left frontal and temporal lobes surrounding hemorrhage which may be artifactual from the hemicraniectomy although evolving ischemic changes cannot be excluded and continued follow-up is recommended.   CTA - Diffuse, but overall mild- moderate, sites of vasospasm  are noted at the anterior circulation, whilst the posterior circulation appears spared.   10/27 CT - There is herniation of the brain parenchyma and to the craniectomy defect which is similar prior examination. There is no significant change in the large left frontotemporal intraparenchymal hematoma with surrounding edema. Again demonstrated is mass effect with right to left midline shift measuring approximately 1 cm which is stable from prior exam. Again demonstrated is effacement of the cerebral sulci and basal cisterns.  Other imagin/12 CXR 15:27:  Single frontal view of the chest demonstrates right-sided central venous catheter tip right atrium. Endotracheal tube and right-sided chest tube are redemonstrated. Bilateral pneumoperitoneum. Bilateral thoracic wall subcutaneous emphysema. Pneumomediastinum. Bilateral infiltrates. New left lower lobe pigtail catheter. Tiny left apical pneumothorax. The cardiomediastinal silhouette is enlarged. No acute osseous abnormalities. Overlying EKG leads and wires are noted.  New left-sided pigtail catheter.   LE Doppler - negative   UE Doppler - DVT within the right brachial vein.  Superficial thrombosis of the right cephalic vein.   CXR - stable.  Mild congestion.  10/28 Doppler - Negative    10/28 TTE -   1. Normal left and right ventricular size and systolic function.   2. No significant valvular disease.   3. Mild pulmonary hypertension present, pulmonary artery systolic pressure is 35 mmHg.   4. No pericardial effusion.   5. No prior echo is available for comparison.    Lines:  [place]  R IJ  EVD  Primafit    CODE STATUS:  Full Code                         GOALS OF CARE:  aggressive                      DISPOSITION:  ICU

## 2021-11-12 NOTE — PROCEDURE NOTE - NSPOSTCAREGUIDE_GEN_A_CORE
Verbal/written post procedure instructions were given to patient/caregiver/Instructed patient/caregiver to follow-up with primary care physician/Instructed patient/caregiver regarding signs and symptoms of infection/Keep the cast/splint/dressing clean and dry/Care for catheter as per unit/ICU protocols Verbal/written post procedure instructions were given to patient/caregiver/Instructed patient/caregiver to follow-up with primary care physician/Instructed patient/caregiver regarding signs and symptoms of infection/Keep the cast/splint/dressing clean and dry

## 2021-11-12 NOTE — CHART NOTE - NSCHARTNOTEFT_GEN_A_CORE
Clinical Impact Nurse Practitioner Code Blue Note    Patient is a 47 y/o female with PMH HTN, multiple sclerosis, recent spinal surgery 10/8 ([facility]) presented to [place] ED BIBEMS after being found unconscious at home, unknown period of time. 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 HH5, MF1. Patient was intubated at [place] ED, found to have blown L pupil, and sent straight to the OR for left hemicraniotomy. Now status post PEG and was getting tracheostomy today at bedside.     On arrival ENT performing tracheostomy. Per primary team at bedside patient in PEA arrest. See ENT airway note. CPR was started. Code blue called at 0832 AM. Resuscitation guided by left radial arterial line. See code sheets for details. Achieved ROSC at 0851. POCUS done for LS and absent LS noted on left side. At 0858 patient began to arrest again, so code called and concern for pneumothorax in differential and patient noted to have subcutaneous emphysema on left side so left side was decompressed with angiocath with hiss of air. Patient achieved ROSC at 0907 AM. An immediate Xray was performed and patient found to have a right PTx. While setting up for a Chest tube patient arrested again at 0910, so a right sided needle decompression performed with + air during decompression. A right chest tube was placed during the resuscitation and patient achieved ROSC at 0918.     INCOMPLETE NOTE. Clinical Impact Nurse Practitioner Code Blue Note    Patient is a 45 y/o female with PMH HTN, multiple sclerosis, recent spinal surgery 10/8 ([facility]) presented to [place] ED BIBEMS after being found unconscious at home, unknown period of time. 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 HH5, MF1. Patient was intubated at [place] ED, found to have blown L pupil, and sent straight to the OR for left hemicraniotomy. Now status post PEG and was getting tracheostomy today at bedside.     On arrival ENT performing tracheostomy. Per primary team at bedside patient in PEA arrest. See ENT airway note. CPR was started. Code blue called at 0832 AM. Resuscitation guided by left radial arterial line. See code sheets for details. Achieved ROSC at 0851. Patient with labial blood pressure so started on Norepinephrine and titrated up to keep SBP>130 per Neurosurgery. POCUS done for LS and absent LS noted on left side with worsening hypotension. . At 0858 patient began to arrest again, so code called and concern for pneumothorax in differential and patient noted to have subcutaneous emphysema on left side so left side was decompressed with angiocath with hiss of air. Patient achieved ROSC at 0907 AM. An immediate Xray was performed and patient found to have a right PTx. While setting up for a Chest tube patient arrested again at 0910, so a right sided needle decompression performed with + air during decompression. A right chest tube was placed during the resuscitation and patient achieved ROSC at 0918.     Review of systems: Unable to obtain due to extremis.     Physical Exam:   General: Adult female lying supine in bed.   HEENT: Cranial staples present. EVD in place. Large tongue protruding from airway with dry secretions on tongue. ETT in place. Secretions suctions. Trachea midline with open tracheostomy wound with gauze in place. Right IJ TLC in place.   Cardiac: S1, S2, Sinus Tachycardia on telemetry.  Pulm: Good bilateral chest rise. Subcutaneous emphysema throughout thoracic cavity. Right chest tube in place.   Abdomen: Deferred, abdominal binder on.  MSK: Extremities swollen, +Pulses. Left Radial A-Line.  Skin: Cool and dry.     Assessment: Patient is a 45 y/o female with PMH HTN, multiple sclerosis, recent spinal surgery 10/8 ([facility]) presented to [facility] after being found unconscious at home, Found to have a CTA revealed ruptured left middle cerebral artery aneurysm with intraparenchymal hemorrhage, SAH, and left subdural hematoma with midline shift and herniation. Status post left hemicraniotomy. Now status post PEG and was getting tracheostomy today at bedside with PEA arrest in setting of hypoxia and tension pneumothorax now with ROSC.    Plan:  -Status post bilateral needle chest decompression and right sided chest tube placement.  -Mechanical ventilation: AC/VC  -Follow up STAT CBC, CMP, Lactate, Troponin, ABG.  -Follow up CXR.   -Norepinephrine gtt to keep SBP>130.   -Strict I and O's.     Rest of plan per primary NSICU Team.  Above discussed with primary NSICU Team, Nursing, PCCM Fellow. Clinical Impact Nurse Practitioner Code Blue Note    Patient is a 47 y/o female with PMH HTN, multiple sclerosis, recent spinal surgery 10/8 ([facility]) presented to [place] ED BIBEMS after being found unconscious at home, unknown period of time. 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 HH5, MF1. Patient was intubated at [place] ED, found to have blown L pupil, and sent straight to the OR for left hemicraniotomy. Now status post PEG and was getting tracheostomy today at bedside.     On arrival ENT performing tracheostomy. Per primary team at bedside patient in PEA arrest. See ENT airway note. CPR was started. Code blue called at 0832 AM. Resuscitation guided by left radial arterial line. See code sheets for details. Achieved ROSC at 0851. Patient with labial blood pressure so started on Norepinephrine and titrated up to keep SBP>130 per Neurosurgery. POCUS done for LS and absent LS noted on left side with worsening hypotension. . At 0858 patient began to arrest again, so code called and concern for pneumothorax in differential and patient noted to have subcutaneous emphysema on left side so left side was decompressed with angiocath with hiss of air. Patient achieved ROSC at 0907 AM. An immediate Xray was performed and patient found to have a right PTx. While setting up for a Chest tube patient arrested again at 0910, so a right sided needle decompression performed with + air during decompression. A right chest tube was placed during the resuscitation and patient achieved ROSC at 0918.     Review of systems: Unable to obtain due to extremis.     Physical Exam:   General: Adult female lying supine in bed.   HEENT: Cranial staples present. EVD in place. Large tongue protruding from airway with dry secretions on tongue. ETT in place. Secretions suctions. Trachea midline with open tracheostomy wound with gauze in place. Right IJ TLC in place.   Cardiac: S1, S2, Sinus Tachycardia on telemetry.  Pulm: Good bilateral chest rise. Subcutaneous emphysema throughout thoracic cavity. Right chest tube in place.   Abdomen: Deferred, abdominal binder on.  MSK: Extremities swollen, +Pulses. Left Radial A-Line.  Skin: Cool and dry.     Assessment: Patient is a 47 y/o female with PMH HTN, multiple sclerosis, recent spinal surgery 10/8 ([facility]) presented to [facility] after being found unconscious at home, Found to have a CTA revealed ruptured left middle cerebral artery aneurysm with intraparenchymal hemorrhage, SAH, and left subdural hematoma with midline shift and herniation. Status post left hemicraniotomy. Now status post PEG and was getting tracheostomy today at bedside with PEA arrest in setting of hypoxia and tension pneumothorax now with ROSC.    Plan:  -Status post bilateral needle chest decompression and right sided chest tube placement.  -Mechanical ventilation: AC/VC  -Follow up STAT CBC, CMP, Lactate, Troponin, ABG.\  -Chest tube on atrium.  -Follow up CXR.   -Norepinephrine gtt to keep SBP>130.   -Strict I and O's.     Rest of plan per primary NSICU Team.  Above discussed with primary NSICU Team, Nursing, PCCM Fellow. Clinical Impact Nurse Practitioner Code Blue Note    Patient is a 45 y/o female with PMH HTN, multiple sclerosis, recent spinal surgery 10/8 ([facility]) presented to [place] ED BIBEMS after being found unconscious at home, unknown period of time. 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 HH5, MF1. Patient was intubated at [place] ED, found to have blown L pupil, and sent straight to the OR for left hemicraniotomy. Now status post PEG and was getting tracheostomy today at bedside.     On arrival ENT performing tracheostomy. Per primary team at bedside patient in PEA arrest. See ENT airway note. CPR was started. Code blue called at 0832 AM. Resuscitation guided by left radial arterial line. See code sheets for details. Achieved ROSC at 0851. Patient with labial blood pressure so started on Norepinephrine and titrated up to keep SBP>130 per Neurosurgery. POCUS done for LS and absent LS noted on left side with worsening hypotension. . At 0858 patient began to arrest again, so code called and concern for pneumothorax in differential and patient noted to have subcutaneous emphysema on left side so left side was decompressed with angiocath with hiss of air. Patient achieved ROSC at 0907 AM. An immediate Xray was performed and patient found to have a right PTx. While setting up for a Chest tube patient arrested again at 0910, so a right sided needle decompression performed with + air during decompression. A right chest tube was placed during the resuscitation and patient achieved ROSC at 0918.     Review of systems: Unable to obtain due to extremis.     Physical Exam:   General: Adult female lying supine in bed.   HEENT: Cranial staples present. EVD in place. Large tongue protruding from airway with dry secretions on tongue. ETT in place. Secretions suctions. Trachea midline with open tracheostomy wound with gauze in place. Right IJ TLC in place.   Cardiac: S1, S2, Sinus Tachycardia on telemetry.  Pulm: Good bilateral chest rise. Subcutaneous emphysema throughout thoracic cavity. Right chest tube in place.   Abdomen: Deferred, abdominal binder on.  MSK: Extremities swollen, +Pulses. Left Radial A-Line.  Skin: Cool and dry.     Assessment: Patient is a 45 y/o female with PMH HTN, multiple sclerosis, recent spinal surgery 10/8 ([facility]) presented to [facility] after being found unconscious at home, Found to have a CTA revealed ruptured left middle cerebral artery aneurysm with intraparenchymal hemorrhage, SAH, and left subdural hematoma with midline shift and herniation. Status post left hemicraniotomy. Now status post PEG and was getting tracheostomy today at bedside with PEA arrest in setting of hypoxia and tension pneumothorax now with ROSC.    Plan:  -Status post bilateral needle chest decompression and right sided chest tube placement.  -Mechanical ventilation: AC/VC  -Follow up STAT CBC, CMP, Lactate, Troponin, ABG.  -Chest tube on atrium.  -Follow up CXR.   -Norepinephrine gtt to keep SBP>130.   -Strict I and O's.     Rest of plan per primary NSICU Team.  Above discussed with primary NSICU Attending and Team, ENT Attending, Nursing, PCCM Fellow. Clinical Impact Nurse Practitioner Code Blue Note    Patient is a 47 y/o female with PMH HTN, multiple sclerosis, recent spinal surgery 10/8 ([facility]) presented to [place] ED BIBEMS after being found unconscious at home, unknown period of time. 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 HH5, MF1. Patient was intubated at [place] ED, found to have blown L pupil, and sent straight to the OR for left hemicraniotomy. Now status post PEG and was getting tracheostomy today at bedside.     On arrival ENT performing tracheostomy. Per primary team at bedside patient in PEA arrest. See ENT airway note. CPR was started. Code blue called at 0832 AM. Resuscitation guided by left radial arterial line. See code sheets for details. Achieved ROSC at 0851. Patient with labile blood pressure so started on Norepinephrine and titrated up to keep SBP>130 per Neurosurgery. POCUS done for LS and absent LS noted on left side with worsening hypotension. . At 0858 patient began to arrest again, so code called and concern for pneumothorax in differential and patient noted to have subcutaneous emphysema on left side so left side was decompressed with angiocath with hiss of air. Patient achieved ROSC at 0907 AM. An immediate Xray was performed and patient found to have a right PTx. While setting up for a Chest tube patient arrested again at 0910, so a right sided needle decompression performed with + air during decompression. A right chest tube was placed during the resuscitation and patient achieved ROSC at 0918.     Review of systems: Unable to obtain due to extremis.     Physical Exam:   General: Adult female lying supine in bed.   HEENT: Cranial staples present. EVD in place. Large tongue protruding from airway with dry secretions on tongue. ETT in place. Secretions suctions. Trachea midline with open tracheostomy wound with gauze in place. Right IJ TLC in place.   Cardiac: S1, S2, Sinus Tachycardia on telemetry.  Pulm: Good bilateral chest rise. Subcutaneous emphysema throughout thoracic cavity. Right chest tube in place.   Abdomen: Deferred, abdominal binder on.  MSK: Extremities swollen, +Pulses. Left Radial A-Line.  Skin: Cool and dry.     Assessment: Patient is a 47 y/o female with PMH HTN, multiple sclerosis, recent spinal surgery 10/8 ([facility]) presented to [facility] after being found unconscious at home, Found to have a CTA revealed ruptured left middle cerebral artery aneurysm with intraparenchymal hemorrhage, SAH, and left subdural hematoma with midline shift and herniation. Status post left hemicraniotomy. Now status post PEG and was getting tracheostomy today at bedside with PEA arrest in setting of hypoxia and tension pneumothorax now with ROSC.    Plan:  -Status post bilateral needle chest decompression and right sided chest tube placement.  -Mechanical ventilation: AC/VC  -Follow up STAT CBC, CMP, Lactate, Troponin, ABG.  -Chest tube on atrium.  -Follow up CXR.   -Norepinephrine gtt to keep SBP>130.   -Strict I and O's.     Rest of plan per primary NSICU Team.  Above discussed with primary NSICU Attending and Team, ENT Attending, Nursing, PCCM Fellow.

## 2021-11-12 NOTE — PROGRESS NOTE ADULT - ASSESSMENT
47 y/o female HH5 MF 4 BD 18 with:   L MCA ruptured aneurysm, subarachnoid hemorrhage, s/p [facility] (10/26/2021, Dr. D'Amico @ [place]), brain compression, cerebral edema  Anemia   Recent spinal surgery  UGIB      NEURO: Neurochecks Q1h  DCI/ vasospasm: Cont nimodipine 30 mg PO Q2h to be given for 21 days (last day 11/15); CTA mild-moderate anterior circulation vasospasm (euvolemia, permissive hypertension). Repeat angio 11/10: remnant MCA neck. No spasm  CT head 11/8: Improving edema (though still significant), slight decrease in IPH  Hydrocephalus:  EVD 5cm H20, ICP < 15. Will discuss raising Will likely need VPS. CT saba 11/11 done.   Neurostorming: Improving. DC gabapentin. Continue bromocriptine to 5mg Q8 (wean as tolerated)  Seizure prophylaxis: Levetiracetam 500mg bid. VEEG moderate slowing. No seizures seen for 48 hours. DC VEEG  Sedation: PRN fentanyl pushes  REHAB: Physical therapy evaluation and management      EARLY MOB:  HOB elevated    PULM: Full vent support   14/450/40/5  ABG, CXR.   Intubation day ~17  Scheduled for trach 11/12  ENT evaluation re: severe macroglossia, necrotic tongue lesions due to biting  VAP precautions    CARDIO:   SBP goal 100-180mm Hg,  TTE EF 75%  EKG wnl  DC TLC 11/11 after OR    ENDO:    Blood sugar goals 140-180 mg/dL  Insulin sliding scale    GI:    FOBT negative.  S/P PEG 11/9  PPI- DC once tolerating TFs post PEG.   LBM: Rectal tube. Hold bowel regimen    RENAL:   Maintain euvolemia  Na goal 140-150  3% at 50cc, continue salt tabs  Monitor BMPs    HEM/ONC: Anemia- no overt blood loss. Not on antiplt or anticoagulation  FOBT neg (there was concern for possible  UGIB in setting of coffee ground emesis)  Hb stable (had been transfused)  Anti Xa level 0.26. Continue dose of lovenox  VTE Prophylaxis: SCDs, SQL; Doppler of upper extremities stable DVT R brachial and superficial thrombus.   Monitor surveillance dopplers    ID:   WBC 14  S/P vancomycin zosyn re: sputum MSSA and enterobacter proteus --> Changed to cefepime for enterobacter/MSSA (coverage until 11/17)  ID cleared for  shunt     MISC:   Wound site C/D/I.  Wound care    Social: Family has been updated    *****  CORE MEASURES  1.  Hunt and Griffin Score = 5  2.  VTE prophylaxis:  [ ] administered within 24 hours of admission OR [X] reason not done: fresh bleed, unsecured aneurysm.  3.  Dysphagia screening:   [X] performed before any oral meds / liquids / food  4.  Nimodipine treatment:  [X] administered within 24 hours of admission OR [ ] reason not done:    *****    ATTENDING ATTESTATION:    Patient at high risk for neurological deterioration or death due to:  ICU delirium, aspiration PNA, DVT / PE.  Critical care time:  I have personally provided 45 minutes of critical care time, excluding time spent on separate procedures.    Plan discussed with RN, house staff.     47 y/o female HH5 MF 4 BD 18 with:   L MCA ruptured aneurysm, subarachnoid hemorrhage, s/p [facility] (10/26/2021, Dr. D'Amico @ [place]), brain compression, cerebral edema  Cardiac arrest secondary to acute hypoxic arrest  Anemia   Recent spinal surgery  Upper GI bleed    NEURO: Neurochecks Q1h  DCI/ vasospasm: DC nimodipine for now as pt on pressors. Reassess. Angio 11/10 negative for spasm.   Hydrocephalus:  EVD 5cm H20, ICP < 15.   Seizure prophylaxis: Levetiracetam 500mg bid. VEEG moderate slowing. No seizures seen for 48 hours.  Sedation: PRN fentanyl pushes  REHAB: Physical therapy evaluation and management. EARLY MOB:  HOB elevated  Neurologic outcome dismal in the setting of HH5 SAH with IPH and superimposed prolonged anoxic injury. CT head when stable ~11/13    PULM: Acute hypoxic respiratory failure. B/L pneumothoraces R>L. Tension on R.   In setting of difficult tracheostomy (subsequently aborted). New R pneumothorax (tension)- possibly in setting of positive pressure ventilation during code/compressions  Repeat CXR, concerning for L PTX as well. D/W pulm.   Bilateral needle decompressions followed by R pigtail catheter. Keep to suction per pulm [id number]).  Daily CXR for PTX.   ABG reviewed. Vent settings adjusted.   Pulmonary consulted. Appreciate recommendations  ENT following.    CARDIO: PEA cardiac arrest in the setting of acute hypoxic arrest  See CODE BLUE NOTE   SBP goal 120-180mm Hg  On norepinephrine. Wean for above goals.   TTE EF 75%  EKG sinus tachycardia  Repeat ECHO    ENDO:    Blood sugar goals 140-180 mg/dL  Insulin sliding scale    GI:    NPO 11/12  Continue PPI  Check LFTs  Has rectal tube    RENAL: Hyperlactatemia, hyperphophatemia,  In setting of hypoperfusion. Hypokalemia 2/2 bicarb doses in setting of cardiac arrest  Avoid overcorrection of potassium  Monitor lactate Q6, CPK, phos, Mag, BMP.   Ureña. Strict Is/Os    HEM/ONC: Anemia  Monitor CBC. Check coags 11/12  Anti Xa level 0.26. Continue dose of lovenox  VTE Prophylaxis: SCDs, SQL; Doppler of upper extremities stable DVT R brachial and superficial thrombus.  Lowers neg  Monitor surveillance dopplers 11/17    ID: Leukocytosis  On cefepime for enterobacter/MSSA (coverage until 11/17)  Will need abx until neck dressing removed per ENT  ID following  Monitor for fevers    MISC:   Wound site C/D/I.  Wound care    Social: Family has been updated (son Jame and daughter Ariane)    *****  CORE MEASURES  1.  Hunt and Griffin Score = 5  2.  VTE prophylaxis:  [ ] administered within 24 hours of admission OR [X] reason not done: fresh bleed, unsecured aneurysm.  3.  Dysphagia screening:   [X] performed before any oral meds / liquids / food  4.  Nimodipine treatment:  [X] administered within 24 hours of admission OR [ ] reason not done:    *****    ATTENDING ATTESTATION:    Patient at high risk for neurological deterioration or death due to:  ICU delirium, aspiration PNA, DVT / PE.  Critical care time:  I have personally provided 65 minutes of critical care time, excluding time spent on separate procedures.    Plan discussed with RN, house staff.     47 y/o female HH5 MF 4 BD 18 with:   L MCA ruptured aneurysm, subarachnoid hemorrhage, s/p [facility] (10/26/2021, Dr. D'Amico @ [place]), brain compression, cerebral edema  Cardiac arrest secondary to acute hypoxic arrest  Anemia   Recent spinal surgery  Upper GI bleed    NEURO: Neurochecks Q1h  DCI/ vasospasm: DC nimodipine for now as pt on pressors. Reassess. Angio 11/10 negative for spasm.   Hydrocephalus:  EVD 5cm H20, ICP < 15.   Seizure prophylaxis: Levetiracetam 500mg bid. VEEG moderate slowing. No seizures seen for 48 hours.  Sedation: PRN fentanyl pushes  REHAB: Physical therapy evaluation and management. EARLY MOB:  HOB elevated  Neurologic outcome dismal in the setting of HH5 SAH with IPH and superimposed prolonged anoxic injury. CT head when stable ~11/13    PULM: Acute hypoxic respiratory failure. B/L pneumothoraces R>L. Tension on R.   In setting of difficult tracheostomy (subsequently aborted). New R pneumothorax (tension)- possibly in setting of positive pressure ventilation during code/compressions  Repeat CXR, concerning for L PTX as well. D/W pulm.   Bilateral needle decompressions followed by R pigtail catheter. Keep to suction per pulm (-[id number]).  Daily CXR for PTX.   ABG reviewed. Vent settings adjusted.   Pulmonary consulted. Appreciate recommendations  ENT following.    CARDIO: PEA cardiac arrest in the setting of acute hypoxic arrest  See CODE BLUE NOTE   SBP goal 120-180mm Hg  On norepinephrine. Wean for above goals.   TTE EF 75%  EKG sinus tachycardia. Monitor EKG. Stat trops. Trend  Repeat ECHO    ENDO:    Blood sugar goals 140-180 mg/dL  Insulin sliding scale    GI:    NPO 11/12  Continue PPI  Check LFTs  Has rectal tube    RENAL: Hyperlactatemia, hyperphosphatemia  In setting of hypoperfusion. Hypokalemia 2/2 bicarb doses in setting of cardiac arrest  Avoid overcorrection of potassium  Monitor lactate Q6, CPK, phos, Mag, BMP.   Ureña. Strict Is/Os    HEM/ONC: Anemia  Monitor CBC. Check coags 11/12  Anti Xa level 0.26. Continue dose of lovenox  VTE Prophylaxis: SCDs, SQL; Doppler of upper extremities stable DVT R brachial and superficial thrombus.  Lowers neg  Monitor surveillance dopplers 11/17    ID: Leukocytosis  On cefepime for enterobacter/MSSA (coverage until 11/17)  Will need abx until neck dressing removed per ENT  ID following  Monitor for fevers    MISC:   Wound site C/D/I.  Wound care    Social: Family has been updated (son Jame and daughter Ariane)    *****  CORE MEASURES  1.  Hunt and Griffin Score = 5  2.  VTE prophylaxis:  [ ] administered within 24 hours of admission OR [X] reason not done: fresh bleed, unsecured aneurysm.  3.  Dysphagia screening:   [X] performed before any oral meds / liquids / food  4.  Nimodipine treatment:  [X] administered within 24 hours of admission OR [ ] reason not done:    *****    ATTENDING ATTESTATION:    Patient at high risk for neurological deterioration or death due to:  ICU delirium, aspiration PNA, DVT / PE.  Critical care time:  I have personally provided 65 minutes of critical care time, excluding time spent on separate procedures.    Plan discussed with RN, house staff.     45 y/o female HH5 MF 4 BD 18 with:   L MCA ruptured aneurysm, subarachnoid hemorrhage, s/p [facility] (10/26/2021, Dr. D'Amico @ [place]), brain compression, cerebral edema  Cardiac arrest secondary to acute hypoxic arrest  Anemia   Recent spinal surgery  Upper GI bleed    NEURO: Neurochecks Q1h  DCI/ vasospasm: DC nimodipine for now as pt on pressors. Reassess. Angio 11/10 negative for spasm.   Hydrocephalus:  EVD 5cm H20, ICP < 15.   Seizure prophylaxis: Levetiracetam 500mg bid. VEEG moderate slowing. No seizures seen for 48 hours.  Sedation: PRN fentanyl pushes  REHAB: Physical therapy evaluation and management. EARLY MOB:  HOB elevated  Neurologic outcome dismal in the setting of HH5 SAH with IPH and superimposed prolonged anoxic injury. CT head when stable ~11/13    PULM: Acute hypoxic respiratory failure. B/L pneumothoraces R>L. Tension on R.   In setting of difficult tracheostomy (subsequently aborted). New R pneumothorax (tension)- possibly in setting of positive pressure ventilation during code/compressions  Repeat CXR, concerning for L PTX as well. D/W pulm.   Bilateral needle decompressions followed by R pigtail catheter. Keep to suction per pulm (-[id number]). Plan for L pigtail  Daily CXR for PTX.   ABG reviewed. Vent settings adjusted.   Pulmonary consulted. Appreciate recommendations  ENT following.    CARDIO: PEA cardiac arrest in the setting of acute hypoxic arrest  See CODE BLUE NOTE   SBP goal 120-180mm Hg  On norepinephrine. Wean for above goals.   TTE EF 75%  EKG sinus tachycardia. Monitor EKG. Stat trops. Trend  Repeat ECHO  RIJ TLC, left femoral TLC    ENDO:    Blood sugar goals 140-180 mg/dL  Insulin sliding scale    GI:    NPO 11/12  Continue PPI  Check LFTs  Has rectal tube    RENAL: Hyperlactatemia, hyperphosphatemia  In setting of hypoperfusion. Hypokalemia 2/2 bicarb doses in setting of cardiac arrest  Avoid overcorrection of potassium  Monitor lactate Q6, CPK, phos, Mag, BMP.   Ureña. Strict Is/Os    HEM/ONC: Anemia  Monitor CBC. Check coags 11/12  Anti Xa level 0.26. Continue dose of lovenox  VTE Prophylaxis: SCDs, SQL; Doppler of upper extremities stable DVT R brachial and superficial thrombus.  Lowers neg  Monitor surveillance dopplers 11/17    ID: Leukocytosis  On cefepime for enterobacter/MSSA (coverage until 11/17)  Will need abx until neck dressing removed per ENT  ID following  Monitor for fevers    MISC:   Wound site C/D/I.  Wound care    Social: Family has been updated (son Jame and daughter Ariane)    *****  CORE MEASURES  1.  Hunt and Griffin Score = 5  2.  VTE prophylaxis:  [ ] administered within 24 hours of admission OR [X] reason not done: fresh bleed, unsecured aneurysm.  3.  Dysphagia screening:   [X] performed before any oral meds / liquids / food  4.  Nimodipine treatment:  [X] administered within 24 hours of admission OR [ ] reason not done:    *****    ATTENDING ATTESTATION:    Patient at high risk for neurological deterioration or death due to:  ICU delirium, aspiration PNA, DVT / PE.  Critical care time:  I have personally provided 65 minutes of critical care time, excluding time spent on separate procedures.    Plan discussed with RN, house staff.

## 2021-11-12 NOTE — CHART NOTE - NSCHARTNOTEFT_GEN_A_CORE
Please refer to code blue note per Alfonso Trujillo for events of cardiac arrest.     Following ROSC and stabilization, discussed with patient's daughter (Chasity) and Son (Jose) regarding events that occurred.   They are aware of critical state that patient is in.   Continue aggressive care.

## 2021-11-12 NOTE — PROGRESS NOTE ADULT - SUBJECTIVE AND OBJECTIVE BOX
HPI:  47 y/o female with unknown PMHx presented to [place] [facility] BIBEMS after being found unconscious at home, unknown period of time. 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 HH5, MF4. Patient was intubated at [place] ED and sent straight to the OR for left hemicraniotomy. A-line placed intraop. Hemodynamically unstable upon arrival to [facility], bradycardic and hypertensive s/p Mannitol, Clevidipine, and Furosemide. Central line placed in NSICU. Currently sedated on Propofol, pending repeat CTH. History per patient's son, patient had recent spine surgery (in [place], unknown which hospital) and was found on outpatient imaging last week to have L M1 segment aneurysm (unruptured), pt asymptomatic at this time. Per son patient taking percocet PO at home. Ureña catheter, ET tube, and arterial line placed at [facility].     NIHSS on arrival: 32     S/Overnight events: JOAQUÍN overnight, NPO in preparation for trach with ENT today.     Hospital Course:   10/26: admitted to [facility] from [facility], POD#0 s/p L hemicraniectomy for decompression and evacuation of SDH. Transferred to [facility] noted to have tense flap, received mannitol, keppra, decadron. Was hypertensive to 200s and preston to 40s, recevied 85g mannitol and bullet 23.4%. CTH on arrival demonstrated increased size in vents and new IVH. EVD placed, open at 15, central line placed. Transfused 2 u PRBC. POD0 of coiling of left MCA bifurcation aneurysm,   10/27: CTH demonstrated EVD in correct position, EVD lowered to 5, remains intubated on proprofol, pending repeat CTH in AM. Started on 3% @ 30/hr. 2LNS given for euvolemia, Mannitol given for uptrending ICPs. Bullet given 8:30 am. 3% increased to 50/hr. 1 L bolus. New EVD catheter placed using exisiting sreekanth hole. 1 u PRBC.  10/28: HH5, MF4, BD3; POD2 angio coil/embo of L MCA aneurysm. JOAQUÍN overnight, neuro stable. EVD@5cmH20 ICPs < 20 overnight, OP WNL. S/p 1 unit PRBC yesterday with appropriate response. Given 42g mannitol in AM for ICP 22 persistently, with improvement, then given 23% in PM for elevated ICP. febrile, pancultured. Standing tylenol and cooling blanket.   10/29: BD4, POD3 angio coil/embo. JOAQUÍN o/n, neuro stable. EVD@5, ICPs <20 o/n.   10/30: BD5, POD4 angio coil/embo. JOAQUÍN o/n neuro stable. EVD@5. 3% @50cc/hr.  10/31: BD6, POD5 angio coil/embo. brief period maintained upward gaze, resolved on its own. EVD@5cm h2o.    11/1: BD7, POD6 L hemicrani, angio coil/embo. JOAQUÍN overnight. Neuro exam unchanged. ICPs WNL. started bromocriptine/gabapentin/baclofen. dc'd propofol, started precedex  11/2: BD8 POD7 L hemicrani, angio coil/embo. JOAQUÍN overnight. Neuro exam stable. Remains on 3% hypertonic saline. Receieved 1 unit of PRBCs for a Hgb of 7.6.  11/3: BD 9 POD8 of L hemicrani. Elevated lipase, normal amylase, OG tube clogged and replaced. Abdominal US normal. Pending gen surg reccs regarding trach and peg. Gabapentin and Baclofen increased to help with neurostorming. restarted back on 3%, LR@35. became preston+hypotensive with nimodipine 60q4, decreased back to 30q2, NaCl bullet given.   11/4: BD10 POD9, JOAQUÍN o/n, 500cc NS for euvolemia,  neuro stable, EVD at 5. 3% increased to 75  11/5: BD11 POD10, JOAQUÍN o/n, neuro stable, EVD at 5  11/6: BD12 POD11 JOAQUÍN overnight. Neuro exam stable. Remains intubated on precedex. 3% hypertonic saline dc'd  11/7: BD13 POD 12 JOAQUÍN o/n, NGT replaced. on precex with no icp crisis   11/8: BD14 POD13 JOAQUÍN o/n, noted to have tongue maceration/macroglossia. Gauze with tongue depressor placed. Pending further recs from ENT. Pending trach and PEG placement tomorrow with gen surg. Off precedex, ICPs stable. EVD remains @ 5.   11/9: BD15, POD 13, bleeding under tongue at start of shift, fentanyl pushed with no further episodes. gabapentin stopped. PEG placed  11/10: BD 16, POD 14, neuro stable, ENT to be consulted in AM, 3% increased to 50/hr.  11/11: BD 17, POD 15, neuro exam stable. Pend CT saba in am for cranioplasty planning. Pend trach in OR with ENT. F.u BMP in am, 3%@50cc/hr for Na goal 140-145.   11/12: BD 18, POD 16. JOAQUÍN overnight, neuro stable. Pend trach placement today. CTH saba completed 11/11. Off of 3%, f/u am BMP for Na goal 140-150. CSF neg. D/c central after trach.       Vital Signs Last 24 Hrs  T(C): 37.4 (12 Nov 2021 00:38), Max: 37.4 (11 Nov 2021 21:30)  T(F): 99.3 (12 Nov 2021 00:38), Max: 99.3 (11 Nov 2021 21:30)  HR: 92 (12 Nov 2021 02:00) (67 - 106)  BP: 170/81 (11 Nov 2021 11:00) (149/71 - 183/99)  BP(mean): 117 (11 Nov 2021 11:00) (102 - 134)  RR: 14 (12 Nov 2021 02:00) (14 - 19)  SpO2: 100% (12 Nov 2021 02:00) (97% - 100%)    I&O's Detail    10 Nov 2021 07:01  -  11 Nov 2021 07:00  --------------------------------------------------------  IN:    IV PiggyBack: 100 mL    sodium chloride 0.9%: 382 mL    sodium chloride 0.9%: 360 mL    Sodium Chloride 0.9% Bolus: 1000 mL    sodium chloride 3%: 1125 mL  Total IN: 2967 mL    OUT:    External Ventricular Device (mL): 237 mL    Voided (mL): 2800 mL  Total OUT: 3037 mL    Total NET: -70 mL      11 Nov 2021 07:01  -  12 Nov 2021 02:43  --------------------------------------------------------  IN:    Enteral Tube Flush: 240 mL    IV PiggyBack: 100 mL    Jevity 1.2: 114 mL    sodium chloride 0.9%: 300 mL    sodium chloride 0.9%: 585 mL    sodium chloride 3%: 500 mL    sodium chloride 3%: 150 mL  Total IN: 1989 mL    OUT:    External Ventricular Device (mL): 191 mL    Rectal Tube (mL): 0 mL    Voided (mL): 700 mL  Total OUT: 891 mL    Total NET: 1098 mL        I&O's Summary    10 Nov 2021 07:01  -  11 Nov 2021 07:00  --------------------------------------------------------  IN: 2967 mL / OUT: 3037 mL / NET: -70 mL    11 Nov 2021 07:01  -  12 Nov 2021 02:43  --------------------------------------------------------  IN: 1989 mL / OUT: 891 mL / NET: 1098 mL        PHYSICAL EXAM:  General: NAD, pt is comfortably  laying in hospital bed, +intubated on full vent support   HEENT: PERRL 3mm, OE spont, b/l corneals, blinks to threat b/l   Cardiovascular: RRR, normal S1 and S2   Respiratory: lungs CTAB, no wheezing, rhonchi, or crackles   GI: normoactive BS to auscultation, abd soft, NTND, +PEG   Neuro: nonverbal, not answering questions, OE spont but not to command  b/l UE extensor posture, b/l LE triple flex to noxious    Extremities: distal pulses 2+ x4   Wound/incision: L hemicrani incision site w/ staples C/D/I, flap soft and full. B/l posterior spinal surgical incision sites with improving areas of wound dehiscence   Drains: EVD @5cmH2O     TUBES/LINES:  [] CVC  [X] A-line  [] Lumbar Drain  [] Ventriculostomy  [] Other    DIET:  [] NPO  [] Mechanical  [X] Tube feeds      LABS:                        8.6    12.11 )-----------( 415      ( 11 Nov 2021 15:09 )             27.7     11-11    145  |  114<H>  |  8   ----------------------------<  98  3.8   |  20<L>  |  0.62    Ca    9.0      11 Nov 2021 15:09  Phos  3.5     11-11  Mg     1.9     11-11      PT/INR - ( 11 Nov 2021 14:03 )   PT: 13.9 sec;   INR: 1.17          PTT - ( 11 Nov 2021 14:03 )  PTT:28.9 sec        CAPILLARY BLOOD GLUCOSE          Drug Levels: [] N/A  Vancomycin Level, Trough: 11.7 ug/mL (11-07 @ 06:22)    CSF Analysis: [] N/A  RBC Count - Spinal Fluid: 134 /uL (11-11 @ 17:49)  CSF Lymphocytes: 1 % (11-11 @ 17:49)  Glucose, CSF: 71 mg/dL (11-11 @ 17:49)  Protein, CSF: 20 mg/dL (11-11 @ 17:49)      Allergies    No Known Allergies    Intolerances      MEDICATIONS:  Antibiotics:  cefepime   IVPB 2000 milliGRAM(s) IV Intermittent every 8 hours    Neuro:  acetaminophen    Suspension .. 650 milliGRAM(s) Oral every 6 hours PRN  amantadine Syrup 200 milliGRAM(s) Oral two times a day  fentaNYL    Injectable 50 MICROGram(s) IV Push every 2 hours PRN  levETIRAcetam  Solution 500 milliGRAM(s) Oral two times a day  modafinil 200 milliGRAM(s) Oral daily  ondansetron Injectable 4 milliGRAM(s) IV Push every 6 hours PRN    Anticoagulation:  enoxaparin Injectable 40 milliGRAM(s) SubCutaneous at bedtime    OTHER:  acetylcysteine 20%  Inhalation 4 milliLiter(s) Inhalation three times a day  artificial  tears Solution 1 Drop(s) Both EYES two times a day  chlorhexidine 0.12% Liquid 15 milliLiter(s) Oral Mucosa every 12 hours  chlorhexidine 2% Cloths 1 Application(s) Topical <User Schedule>  fludroCORTISONE 0.1 milliGRAM(s) Oral every 12 hours  hydrALAZINE Injectable 5 milliGRAM(s) IV Push every 4 hours PRN  niMODipine 60 milliGRAM(s) Oral every 4 hours  pantoprazole   Suspension 40 milliGRAM(s) Oral daily    IVF:  sodium chloride 3 Gram(s) Oral every 6 hours  sodium chloride 0.9%. 1000 milliLiter(s) IV Continuous <Continuous>    CULTURES:  Culture Results:   Numerous Staphylococcus aureus  Numerous Enterobacter cloacae complex  Moderate Proteus mirabilis  Accompanied by normal respiratory melinda (11-04 @ 13:59)    RADIOLOGY & ADDITIONAL TESTS:      ASSESSMENT:  47 y/o female with PMHx of HTN and MS, hx of spinal surgery at [facility] 10/8/21 presented to [place] ED BIBEMS after being found unconscious at home, unknown period of time. 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 HH5, MF4; BD #1 = 10/26. Patient was intubated at [place] ED and sent straight to the OR for left hemicraniotomy. A-line placed intraop. Hemodynamically unstable upon arrival to [facility], bradycardic and hypertensive s/p Mannitol and Furosemide. Central line placed in NSICU. S/p EVD placement, and coil embolization of left MCA bifurcation aneurysm 10/26/2021. S/p cerebral angio without findings of vasospasm 11/10.     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, left    Intracranial hemorrhage, spontaneous subarachnoid, due to cerebral aneurysm, acute    Angiogram, cerebral, with coil embolization, in non-operating room setting    Endoscopic percutaneous gastrostomy    Angiogram, carotid and cerebral, bilateral    Personal history of spine surgery    [email or web address]        PLAN:  NEURO:  - neuro checks/vital signs q1hr  - Keppra 500mg IV BID   - VEEG negative for seizures, now dc  - Nimodipine 60q4 until 11/15   - fentanyl pushes PRN   - EVD open at 5cmH2O, monitor ICP/output  - CTA 11/1 with findings of moderate anterior circulation spasm   - CTH 11/8 stable     CARDIO:  - -180  - echo +mild pulm HTN    PULM:  - intubated on full vent support  - Plan for trach with ENT in OR 11/11    GI:  - NPO after midnight  - PPI QD GI ppx  - Abd US: neg   - rectal tube    RENAL:  - salt tabs 3Q6  - florinef .1 q12  - 3%@50cc/hr, NS@50cc/hr  - euvolemia goal   - Na 140-150    HEME:  - SCDs, SQL  - s/p 2u PRBC, s/p 1u PRBC 10/27, s/p 1u PRBC 11/02  - monitor H+H  - 11/9 unchanged DVT L brachial and unchanged R superficial cephalic thrombus, f/u repeat doppler 11/17    ID:  - leukocytosis and procal, trend   - Tylenol PRN for fever  - sputum cx 11/4:  enterobacter, proteus  - Cefepime started to cover for enterobacter/proteus in sputum, end 11/17    ENDO:  - ISS   - monitor FS    OTHER  - wound care recs- q2 dressing changes   - ENT consulted, reccomends trach placement and oral hygeine    GOC: full code  Level of care: ICU status   Dispo: pend EVD, trach  family updated    Case discussed with Dr. Duncan , Dr Menard

## 2021-11-12 NOTE — PROGRESS NOTE ADULT - THIS PATIENT HAS THE FOLLOWING CONDITION(S)/DIAGNOSES ON THIS ADMISSION:
Cerebral Edema/Brain Compression / Herniation Cerebral Edema/Brain Compression / Herniation/Acute Respiratory Failure

## 2021-11-12 NOTE — PROGRESS NOTE ADULT - SUBJECTIVE AND OBJECTIVE BOX
ENT Event Note-    ENT called for bedside tracheotomy.  Pt prepped and draped in usual sterile fashion, anesthesia monitoring sedation, size 7.0 ETT in place. Timeout performed  Incision made down to expose anterior tracheal wall, cric hook placed, good exposure. Tracheotomy made between first and second tracheal C cartilages without issue.  Size 6 cuffed trach tube used to intubate trachea; after two attempts at passage, ET CO2 was not confirmed. Size 6 ETT used to intubate through tracheotomy, but again no end tidal CO2.  Anesthesia attempted to pass ETT beyond tracheotomy, but this was also unsuccessful - perhaps posterior displacement of trachea from prior intubation attempts? large clot?  At this point, pt went into PEA arrest and CPR commenced by ICU team. Preexisting oral ETT was able to be advanced into trachea beyond tracheotomy under direct palpation whereupon airway secured as CPR commenced. Ultimately, ROSC obtained. Airway temporized and packed w gauze.  I was present throughout the entirety of this.

## 2021-11-12 NOTE — PROGRESS NOTE ADULT - ASSESSMENT
47 y/o female HH5 MF 4 BD 17 with:   L MCA ruptured aneurysm, subarachnoid hemorrhage, s/p [facility] (10/26/2021, Dr. D'Amico @ [place]), brain compression, cerebral edema  Anemia   Recent spinal surgery  UGIB    NEURO: Neurochecks Q1h  DCI/ vasospasm: DC nimodipine for now as pt on pressors. Reassess. Angio 11/10 negative for spasm.   Hydrocephalus:  EVD 5cm H20, ICP < 15.   Seizure prophylaxis: Levetiracetam 500mg bid. VEEG moderate slowing. No seizures seen for 48 hours.  Sedation: PRN fentanyl pushes  REHAB: Physical therapy evaluation and management. EARLY MOB:  HOB elevated  Neurologic outcome dismal in the setting of HH5 SAH with IPH and superimposed prolonged anoxic injury. CT head when stable ~11/13    PULM: Acute hypoxic respiratory failure. B/L pneumothoraces R>L. Tension on R.   In setting of difficult tracheostomy (subsequently aborted). New R pneumothorax (tension)- possibly in setting of positive pressure ventilation during code/compressions  Repeat CXR, concerning for L PTX as well. D/W pulm.   Bilateral needle decompressions followed by R pigtail catheter. Keep to suction per pulm [id number]). Plan for L pigtail  Daily CXR for PTX.   ABG reviewed. Vent settings adjusted.   Pulmonary consulted. Appreciate recommendations  ENT following.    CARDIO: PEA cardiac arrest in the setting of acute hypoxic arrest  SBP goal 120-180mm Hg  On norepinephrine. Wean for above goals.   TTE EF 75%  EKG sinus tachycardia. Monitor EKG. Stat trops. Trend  Repeat ECHO  RIJ TLC, left femoral TLC    ENDO:    Blood sugar goals 140-180 mg/dL  Insulin sliding scale    GI:    NPO 11/12  Continue PPI  Check LFTs  Has rectal tube    RENAL: Hyperlactatemia, hyperphosphatemia  In setting of hypoperfusion. Hypokalemia 2/2 bicarb doses in setting of cardiac arrest  Avoid overcorrection of potassium  Monitor lactate Q6, CPK, phos, Mag, BMP.   Ureña. Strict Is/Os    HEM/ONC: Anemia  Monitor CBC. Check coags 11/12  Anti Xa level 0.26. Continue dose of lovenox  VTE Prophylaxis: SCDs, SQL; Doppler of upper extremities stable DVT R brachial and superficial thrombus.  Lowers neg  Monitor surveillance dopplers 11/17    ID: Leukocytosis  On cefepime for enterobacter/MSSA (coverage until 11/17)  Will need abx until neck dressing removed per ENT  ID following  Monitor for fevers    MISC:   Wound site C/D/I.  Wound care    Social: Family has been updated (son Jame and daughter Ariane)    *****    CORE MEASURES  1.  Hunt and Griffin Score = 5  2.  VTE prophylaxis:  [ ] administered within 24 hours of admission OR [X] reason not done: fresh bleed, unsecured aneurysm.  3.  Dysphagia screening:   [X] performed before any oral meds / liquids / food  4.  Nimodipine treatment:  [X] administered within 24 hours of admission OR [ ] reason not done:    *****    ATTENDING ATTESTATION:    Patient at high risk for neurological deterioration or death due to:  ICU delirium, aspiration PNA, DVT / PE.  Critical care time:  I have personally provided 30 minutes of critical care time, excluding time spent on separate procedures.    Plan discussed with RN, house staff.

## 2021-11-13 LAB
ALBUMIN SERPL ELPH-MCNC: 2.7 G/DL — LOW (ref 3.3–5)
ALP SERPL-CCNC: 80 U/L — SIGNIFICANT CHANGE UP (ref 40–120)
ALT FLD-CCNC: 127 U/L — HIGH (ref 10–45)
ANION GAP SERPL CALC-SCNC: 11 MMOL/L — SIGNIFICANT CHANGE UP (ref 5–17)
ANION GAP SERPL CALC-SCNC: 12 MMOL/L — SIGNIFICANT CHANGE UP (ref 5–17)
ANION GAP SERPL CALC-SCNC: 12 MMOL/L — SIGNIFICANT CHANGE UP (ref 5–17)
APTT BLD: 30 SEC — SIGNIFICANT CHANGE UP (ref 27.5–35.5)
AST SERPL-CCNC: 199 U/L — HIGH (ref 10–40)
BASE EXCESS BLDA CALC-SCNC: -0.5 MMOL/L — SIGNIFICANT CHANGE UP (ref -2–3)
BASE EXCESS BLDA CALC-SCNC: 0.8 MMOL/L — SIGNIFICANT CHANGE UP (ref -2–3)
BILIRUB SERPL-MCNC: 0.2 MG/DL — SIGNIFICANT CHANGE UP (ref 0.2–1.2)
BUN SERPL-MCNC: 25 MG/DL — HIGH (ref 7–23)
BUN SERPL-MCNC: 28 MG/DL — HIGH (ref 7–23)
BUN SERPL-MCNC: 33 MG/DL — HIGH (ref 7–23)
CALCIUM SERPL-MCNC: 8.2 MG/DL — LOW (ref 8.4–10.5)
CALCIUM SERPL-MCNC: 8.6 MG/DL — SIGNIFICANT CHANGE UP (ref 8.4–10.5)
CALCIUM SERPL-MCNC: 9 MG/DL — SIGNIFICANT CHANGE UP (ref 8.4–10.5)
CHLORIDE SERPL-SCNC: 114 MMOL/L — HIGH (ref 96–108)
CHLORIDE SERPL-SCNC: 116 MMOL/L — HIGH (ref 96–108)
CHLORIDE SERPL-SCNC: 116 MMOL/L — HIGH (ref 96–108)
CK MB CFR SERPL CALC: 6.3 NG/ML — SIGNIFICANT CHANGE UP (ref 0–6.7)
CK SERPL-CCNC: 170 U/L — SIGNIFICANT CHANGE UP (ref 25–170)
CK SERPL-CCNC: 207 U/L — HIGH (ref 25–170)
CO2 BLDA-SCNC: 25 MMOL/L — HIGH (ref 19–24)
CO2 BLDA-SCNC: 26 MMOL/L — HIGH (ref 19–24)
CO2 SERPL-SCNC: 22 MMOL/L — SIGNIFICANT CHANGE UP (ref 22–31)
CO2 SERPL-SCNC: 23 MMOL/L — SIGNIFICANT CHANGE UP (ref 22–31)
CO2 SERPL-SCNC: 25 MMOL/L — SIGNIFICANT CHANGE UP (ref 22–31)
CREAT ?TM UR-MCNC: 31 MG/DL — SIGNIFICANT CHANGE UP
CREAT SERPL-MCNC: 2.49 MG/DL — HIGH (ref 0.5–1.3)
CREAT SERPL-MCNC: 3.08 MG/DL — HIGH (ref 0.5–1.3)
CREAT SERPL-MCNC: 3.83 MG/DL — HIGH (ref 0.5–1.3)
FERRITIN SERPL-MCNC: 778 NG/ML — HIGH (ref 15–150)
GLUCOSE SERPL-MCNC: 100 MG/DL — HIGH (ref 70–99)
GLUCOSE SERPL-MCNC: 114 MG/DL — HIGH (ref 70–99)
GLUCOSE SERPL-MCNC: 98 MG/DL — SIGNIFICANT CHANGE UP (ref 70–99)
HCO3 BLDA-SCNC: 24 MMOL/L — SIGNIFICANT CHANGE UP (ref 21–28)
HCO3 BLDA-SCNC: 25 MMOL/L — SIGNIFICANT CHANGE UP (ref 21–28)
HCT VFR BLD CALC: 25.3 % — LOW (ref 34.5–45)
HCT VFR BLD CALC: 27.3 % — LOW (ref 34.5–45)
HCT VFR BLD CALC: 28.3 % — LOW (ref 34.5–45)
HGB BLD-MCNC: 7.9 G/DL — LOW (ref 11.5–15.5)
HGB BLD-MCNC: 8.4 G/DL — LOW (ref 11.5–15.5)
HGB BLD-MCNC: 8.8 G/DL — LOW (ref 11.5–15.5)
INR BLD: 1.31 — HIGH (ref 0.88–1.16)
IRON SATN MFR SERPL: 29 % — SIGNIFICANT CHANGE UP (ref 14–50)
IRON SATN MFR SERPL: 46 UG/DL — SIGNIFICANT CHANGE UP (ref 30–160)
LACTATE SERPL-SCNC: 0.7 MMOL/L — SIGNIFICANT CHANGE UP (ref 0.5–2)
MAGNESIUM SERPL-MCNC: 1.8 MG/DL — SIGNIFICANT CHANGE UP (ref 1.6–2.6)
MAGNESIUM SERPL-MCNC: 2.3 MG/DL — SIGNIFICANT CHANGE UP (ref 1.6–2.6)
MAGNESIUM SERPL-MCNC: 2.4 MG/DL — SIGNIFICANT CHANGE UP (ref 1.6–2.6)
MCHC RBC-ENTMCNC: 26 PG — LOW (ref 27–34)
MCHC RBC-ENTMCNC: 26.3 PG — LOW (ref 27–34)
MCHC RBC-ENTMCNC: 26.4 PG — LOW (ref 27–34)
MCHC RBC-ENTMCNC: 30.8 GM/DL — LOW (ref 32–36)
MCHC RBC-ENTMCNC: 31.1 GM/DL — LOW (ref 32–36)
MCHC RBC-ENTMCNC: 31.2 GM/DL — LOW (ref 32–36)
MCV RBC AUTO: 84.3 FL — SIGNIFICANT CHANGE UP (ref 80–100)
MCV RBC AUTO: 84.5 FL — SIGNIFICANT CHANGE UP (ref 80–100)
MCV RBC AUTO: 85 FL — SIGNIFICANT CHANGE UP (ref 80–100)
NRBC # BLD: 0 /100 WBCS — SIGNIFICANT CHANGE UP (ref 0–0)
PCO2 BLDA: 39 MMHG — HIGH (ref 32–35)
PCO2 BLDA: 39 MMHG — HIGH (ref 32–35)
PH BLDA: 7.4 — SIGNIFICANT CHANGE UP (ref 7.35–7.45)
PH BLDA: 7.42 — SIGNIFICANT CHANGE UP (ref 7.35–7.45)
PHOSPHATE SERPL-MCNC: 4.7 MG/DL — HIGH (ref 2.5–4.5)
PHOSPHATE SERPL-MCNC: 4.8 MG/DL — HIGH (ref 2.5–4.5)
PHOSPHATE SERPL-MCNC: 5.5 MG/DL — HIGH (ref 2.5–4.5)
PLATELET # BLD AUTO: 259 K/UL — SIGNIFICANT CHANGE UP (ref 150–400)
PLATELET # BLD AUTO: 270 K/UL — SIGNIFICANT CHANGE UP (ref 150–400)
PLATELET # BLD AUTO: 295 K/UL — SIGNIFICANT CHANGE UP (ref 150–400)
PO2 BLDA: 198 MMHG — HIGH (ref 83–108)
PO2 BLDA: 88 MMHG — SIGNIFICANT CHANGE UP (ref 83–108)
POTASSIUM SERPL-MCNC: 3.8 MMOL/L — SIGNIFICANT CHANGE UP (ref 3.5–5.3)
POTASSIUM SERPL-MCNC: 3.9 MMOL/L — SIGNIFICANT CHANGE UP (ref 3.5–5.3)
POTASSIUM SERPL-MCNC: 4 MMOL/L — SIGNIFICANT CHANGE UP (ref 3.5–5.3)
POTASSIUM SERPL-SCNC: 3.8 MMOL/L — SIGNIFICANT CHANGE UP (ref 3.5–5.3)
POTASSIUM SERPL-SCNC: 3.9 MMOL/L — SIGNIFICANT CHANGE UP (ref 3.5–5.3)
POTASSIUM SERPL-SCNC: 4 MMOL/L — SIGNIFICANT CHANGE UP (ref 3.5–5.3)
PROCALCITONIN SERPL-MCNC: 38.36 NG/ML — HIGH (ref 0.02–0.1)
PROCALCITONIN SERPL-MCNC: 40.68 NG/ML — HIGH (ref 0.02–0.1)
PROCALCITONIN SERPL-MCNC: 43.48 NG/ML — HIGH (ref 0.02–0.1)
PROT SERPL-MCNC: 6.7 G/DL — SIGNIFICANT CHANGE UP (ref 6–8.3)
PROTHROM AB SERPL-ACNC: 15.5 SEC — HIGH (ref 10.6–13.6)
RBC # BLD: 3 M/UL — LOW (ref 3.8–5.2)
RBC # BLD: 3.23 M/UL — LOW (ref 3.8–5.2)
RBC # BLD: 3.33 M/UL — LOW (ref 3.8–5.2)
RBC # BLD: 3.33 M/UL — LOW (ref 3.8–5.2)
RBC # FLD: 21.7 % — HIGH (ref 10.3–14.5)
RBC # FLD: 22 % — HIGH (ref 10.3–14.5)
RBC # FLD: 22 % — HIGH (ref 10.3–14.5)
RETICS #: 40.8 K/UL — SIGNIFICANT CHANGE UP (ref 25–125)
RETICS/RBC NFR: 1.2 % — SIGNIFICANT CHANGE UP (ref 0.5–2.5)
SAO2 % BLDA: 98.4 % — HIGH (ref 94–98)
SAO2 % BLDA: 99.2 % — HIGH (ref 94–98)
SODIUM SERPL-SCNC: 150 MMOL/L — HIGH (ref 135–145)
SODIUM SERPL-SCNC: 150 MMOL/L — HIGH (ref 135–145)
SODIUM SERPL-SCNC: 151 MMOL/L — HIGH (ref 135–145)
SODIUM UR-SCNC: 115 MMOL/L — SIGNIFICANT CHANGE UP
TIBC SERPL-MCNC: 159 UG/DL — LOW (ref 220–430)
TROPONIN T SERPL-MCNC: 0.1 NG/ML — CRITICAL HIGH (ref 0–0.01)
TROPONIN T SERPL-MCNC: 0.19 NG/ML — CRITICAL HIGH (ref 0–0.01)
UIBC SERPL-MCNC: 113 UG/DL — SIGNIFICANT CHANGE UP (ref 110–370)
UUN UR-MCNC: 65 MG/DL — SIGNIFICANT CHANGE UP
WBC # BLD: 12.06 K/UL — HIGH (ref 3.8–10.5)
WBC # BLD: 13.7 K/UL — HIGH (ref 3.8–10.5)
WBC # BLD: 14.04 K/UL — HIGH (ref 3.8–10.5)
WBC # FLD AUTO: 12.06 K/UL — HIGH (ref 3.8–10.5)
WBC # FLD AUTO: 13.7 K/UL — HIGH (ref 3.8–10.5)
WBC # FLD AUTO: 14.04 K/UL — HIGH (ref 3.8–10.5)

## 2021-11-13 PROCEDURE — 99024 POSTOP FOLLOW-UP VISIT: CPT

## 2021-11-13 PROCEDURE — 99222 1ST HOSP IP/OBS MODERATE 55: CPT

## 2021-11-13 PROCEDURE — 71045 X-RAY EXAM CHEST 1 VIEW: CPT | Mod: 26

## 2021-11-13 PROCEDURE — 99291 CRITICAL CARE FIRST HOUR: CPT

## 2021-11-13 RX ORDER — NIMODIPINE 60 MG/10ML
60 SOLUTION ORAL EVERY 4 HOURS
Refills: 0 | Status: DISCONTINUED | OUTPATIENT
Start: 2021-11-13 | End: 2021-11-15

## 2021-11-13 RX ORDER — SODIUM CHLORIDE 9 MG/ML
1000 INJECTION INTRAMUSCULAR; INTRAVENOUS; SUBCUTANEOUS
Refills: 0 | Status: DISCONTINUED | OUTPATIENT
Start: 2021-11-13 | End: 2021-11-13

## 2021-11-13 RX ORDER — CEFEPIME 1 G/1
2000 INJECTION, POWDER, FOR SOLUTION INTRAMUSCULAR; INTRAVENOUS EVERY 12 HOURS
Refills: 0 | Status: DISCONTINUED | OUTPATIENT
Start: 2021-11-13 | End: 2021-11-13

## 2021-11-13 RX ORDER — AMLODIPINE BESYLATE 2.5 MG/1
5 TABLET ORAL DAILY
Refills: 0 | Status: DISCONTINUED | OUTPATIENT
Start: 2021-11-13 | End: 2021-11-18

## 2021-11-13 RX ORDER — POTASSIUM CHLORIDE 20 MEQ
20 PACKET (EA) ORAL ONCE
Refills: 0 | Status: COMPLETED | OUTPATIENT
Start: 2021-11-13 | End: 2021-11-13

## 2021-11-13 RX ORDER — SODIUM CHLORIDE 9 MG/ML
500 INJECTION, SOLUTION INTRAVENOUS ONCE
Refills: 0 | Status: COMPLETED | OUTPATIENT
Start: 2021-11-13 | End: 2021-11-13

## 2021-11-13 RX ORDER — HYDRALAZINE HCL 50 MG
25 TABLET ORAL EVERY 12 HOURS
Refills: 0 | Status: DISCONTINUED | OUTPATIENT
Start: 2021-11-13 | End: 2021-11-20

## 2021-11-13 RX ORDER — ALBUMIN HUMAN 25 %
250 VIAL (ML) INTRAVENOUS ONCE
Refills: 0 | Status: COMPLETED | OUTPATIENT
Start: 2021-11-13 | End: 2021-11-13

## 2021-11-13 RX ORDER — SODIUM CHLORIDE 9 MG/ML
500 INJECTION INTRAMUSCULAR; INTRAVENOUS; SUBCUTANEOUS ONCE
Refills: 0 | Status: COMPLETED | OUTPATIENT
Start: 2021-11-13 | End: 2021-11-13

## 2021-11-13 RX ORDER — CEFEPIME 1 G/1
2000 INJECTION, POWDER, FOR SOLUTION INTRAMUSCULAR; INTRAVENOUS EVERY 24 HOURS
Refills: 0 | Status: DISCONTINUED | OUTPATIENT
Start: 2021-11-14 | End: 2021-11-15

## 2021-11-13 RX ORDER — HYDRALAZINE HCL 50 MG
10 TABLET ORAL ONCE
Refills: 0 | Status: COMPLETED | OUTPATIENT
Start: 2021-11-13 | End: 2021-11-13

## 2021-11-13 RX ORDER — MAGNESIUM SULFATE 500 MG/ML
2 VIAL (ML) INJECTION ONCE
Refills: 0 | Status: COMPLETED | OUTPATIENT
Start: 2021-11-13 | End: 2021-11-13

## 2021-11-13 RX ORDER — LABETALOL HCL 100 MG
10 TABLET ORAL ONCE
Refills: 0 | Status: COMPLETED | OUTPATIENT
Start: 2021-11-13 | End: 2021-11-13

## 2021-11-13 RX ORDER — SODIUM CHLORIDE 9 MG/ML
1000 INJECTION, SOLUTION INTRAVENOUS
Refills: 0 | Status: DISCONTINUED | OUTPATIENT
Start: 2021-11-13 | End: 2021-11-14

## 2021-11-13 RX ORDER — AMANTADINE HCL 100 MG
100 CAPSULE ORAL DAILY
Refills: 0 | Status: DISCONTINUED | OUTPATIENT
Start: 2021-11-14 | End: 2021-11-14

## 2021-11-13 RX ADMIN — Medication 10 MILLIGRAM(S): at 19:26

## 2021-11-13 RX ADMIN — FENTANYL CITRATE 50 MICROGRAM(S): 50 INJECTION INTRAVENOUS at 21:26

## 2021-11-13 RX ADMIN — PANTOPRAZOLE SODIUM 40 MILLIGRAM(S): 20 TABLET, DELAYED RELEASE ORAL at 12:53

## 2021-11-13 RX ADMIN — CEFEPIME 100 MILLIGRAM(S): 1 INJECTION, POWDER, FOR SOLUTION INTRAMUSCULAR; INTRAVENOUS at 04:51

## 2021-11-13 RX ADMIN — NIMODIPINE 60 MILLIGRAM(S): 60 SOLUTION ORAL at 14:42

## 2021-11-13 RX ADMIN — FENTANYL CITRATE 50 MICROGRAM(S): 50 INJECTION INTRAVENOUS at 19:15

## 2021-11-13 RX ADMIN — LEVETIRACETAM 500 MILLIGRAM(S): 250 TABLET, FILM COATED ORAL at 18:33

## 2021-11-13 RX ADMIN — FENTANYL CITRATE 50 MICROGRAM(S): 50 INJECTION INTRAVENOUS at 19:00

## 2021-11-13 RX ADMIN — SODIUM CHLORIDE 1000 MILLILITER(S): 9 INJECTION INTRAMUSCULAR; INTRAVENOUS; SUBCUTANEOUS at 03:45

## 2021-11-13 RX ADMIN — Medication 1 DROP(S): at 18:34

## 2021-11-13 RX ADMIN — CHLORHEXIDINE GLUCONATE 15 MILLILITER(S): 213 SOLUTION TOPICAL at 18:33

## 2021-11-13 RX ADMIN — Medication 4 MILLILITER(S): at 06:41

## 2021-11-13 RX ADMIN — FENTANYL CITRATE 50 MICROGRAM(S): 50 INJECTION INTRAVENOUS at 08:47

## 2021-11-13 RX ADMIN — FENTANYL CITRATE 50 MICROGRAM(S): 50 INJECTION INTRAVENOUS at 22:15

## 2021-11-13 RX ADMIN — FLUDROCORTISONE ACETATE 0.1 MILLIGRAM(S): 0.1 TABLET ORAL at 05:07

## 2021-11-13 RX ADMIN — Medication 50 GRAM(S): at 04:03

## 2021-11-13 RX ADMIN — NIMODIPINE 60 MILLIGRAM(S): 60 SOLUTION ORAL at 22:00

## 2021-11-13 RX ADMIN — Medication 25 MILLIGRAM(S): at 23:38

## 2021-11-13 RX ADMIN — AMLODIPINE BESYLATE 5 MILLIGRAM(S): 2.5 TABLET ORAL at 20:45

## 2021-11-13 RX ADMIN — Medication 10 MILLIGRAM(S): at 19:22

## 2021-11-13 RX ADMIN — Medication 125 MILLILITER(S): at 23:26

## 2021-11-13 RX ADMIN — CEFEPIME 100 MILLIGRAM(S): 1 INJECTION, POWDER, FOR SOLUTION INTRAMUSCULAR; INTRAVENOUS at 12:53

## 2021-11-13 RX ADMIN — FENTANYL CITRATE 50 MICROGRAM(S): 50 INJECTION INTRAVENOUS at 16:18

## 2021-11-13 RX ADMIN — Medication 200 MILLIGRAM(S): at 18:33

## 2021-11-13 RX ADMIN — MODAFINIL 200 MILLIGRAM(S): 200 TABLET ORAL at 12:53

## 2021-11-13 RX ADMIN — Medication 1 DROP(S): at 06:47

## 2021-11-13 RX ADMIN — FENTANYL CITRATE 50 MICROGRAM(S): 50 INJECTION INTRAVENOUS at 06:56

## 2021-11-13 RX ADMIN — Medication 200 MILLIGRAM(S): at 05:07

## 2021-11-13 RX ADMIN — CHLORHEXIDINE GLUCONATE 1 APPLICATION(S): 213 SOLUTION TOPICAL at 05:29

## 2021-11-13 RX ADMIN — CHLORHEXIDINE GLUCONATE 15 MILLILITER(S): 213 SOLUTION TOPICAL at 05:07

## 2021-11-13 RX ADMIN — NIMODIPINE 60 MILLIGRAM(S): 60 SOLUTION ORAL at 18:33

## 2021-11-13 RX ADMIN — LEVETIRACETAM 500 MILLIGRAM(S): 250 TABLET, FILM COATED ORAL at 05:07

## 2021-11-13 RX ADMIN — FENTANYL CITRATE 50 MICROGRAM(S): 50 INJECTION INTRAVENOUS at 06:45

## 2021-11-13 RX ADMIN — FENTANYL CITRATE 50 MICROGRAM(S): 50 INJECTION INTRAVENOUS at 09:00

## 2021-11-13 RX ADMIN — Medication 4 MILLILITER(S): at 15:15

## 2021-11-13 RX ADMIN — SODIUM CHLORIDE 1000 MILLILITER(S): 9 INJECTION INTRAMUSCULAR; INTRAVENOUS; SUBCUTANEOUS at 01:15

## 2021-11-13 RX ADMIN — FENTANYL CITRATE 50 MICROGRAM(S): 50 INJECTION INTRAVENOUS at 16:35

## 2021-11-13 RX ADMIN — Medication 20 MILLIEQUIVALENT(S): at 03:55

## 2021-11-13 RX ADMIN — Medication 4 MILLILITER(S): at 22:10

## 2021-11-13 NOTE — PROGRESS NOTE ADULT - SUBJECTIVE AND OBJECTIVE BOX
=======================================   NEUROCRITICAL CARE ATTENDING NOTE   =======================================  ARJUN, AILYN     HPI:  Patient is a 45 y/o female with PMH HTN, multiple sclerosis, recent spinal surgery 10/8 ([facility]) presented to [facility] BIBEMS after being found unconscious at home, unknown period of time. 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 HH5, MF1. Patient was intubated at [facility] ED, found to have blown L pupil, and sent straight to the OR for left hemicraniotomy. A-line placed intraop. Hemodynamically unstable upon arrival to [facility], bradycardic and hypertensive s/p Mannitol, Clevidipine, and Furosemide. Central line placed in NSICU. Currently sedated on Propofol, pending repeat CTH. History per patient's son, patient had recent spine surgery and was found on outpatient imaging last week to have L M1 segment aneurysm (unruptured), pt asymptomatic at this time. Per son patient taking percocet PO at home. Ureña catheter, ET tube, and arterial line placed at [facility].  NIHSS on arrival: 32. Bess Griffin 5, Mod Busby 4.  Bleed Day 1 = 10/26 (26 Oct 2021 13:03)    Hospital course:  10/26: Admitted to [facility] from [facility], POD#0 s/p L hemicraniectomy for decompression and evacuation of SDH. Transferred to [facility] noted to have tense flap, received mannitol, keppra, decadron. Was hypertensive to 200s and preston to 40s, recevied 85g mannitol and bullet 23.4%. CTH on arrival demonstrated increased size in vents and new IVH. EVD placed, open at 15, central line placed. Transfused 2 u PRBC. POD0 of coiling of left MCA bifurcation aneurysm,   10/27: CTH demonstrated EVD in correct position, EVD lowered to 5, remains intubated on propofol. Mannitol given for uptrending ICPs. 23.4% given 8:30 am. 3% increased to 50/hr. 1L bolus. New EVD catheter placed using existing sreekanth hole. 1 u PRBC.  10/28: HH5, MF4, BD3; POD2 angio coil/embo of L MCA aneurysm. JOAQUÍN overnight, neuro stable. EVD@5cmH20 ICPs < 20 overnight, OP WNL. S/p 1 unit PRBCs yesterday with appropriate response. Given 42g mannitol in AM for ICP 22 persistently, with improvement, then given 23% in PM for elevated ICP. febrile, pancultured. Standing tylenol and cooling blanket.   11/1: BD7, POD6 L hemicrani, angio coil/embo. Neuro exam unchanged. ICPs WNL. Overnight given hydralazine, increased propofol for SBP > 180.  CTA showed mild-moderate anterior circulation vasospasm (permissive hypertension, euvolemia).  11/3: BD9, POD8 L hemicrani, angio coil/embo.  Neuro exam unchanged. ICPs WNL.  Pending trach/PEG.  Dayshift:  noted episodes of sympathetic storming during vasospasm period.  11/5: BD11 POD10, JOAQUÍN o/n, neuro stable, EVD at 5; O2 desaturations with thick secretions (MSSA, Enterobacter, Proteus) started vancomycin zosyn  11/7: BD13 POD 12 JOAQUÍN o/n, NGT replaced. Weaning off precedex, no ICP crisis, decreasing neurostorming meds, pending CT head.   11/8: BD 14. CT head. Some spontaneous eye opening. Overnight, tongue biting with oral trauma and mild bleeding.   11/9: BD 15. PEG placed. On VEEG. No seizures. Exam stably poor  11/12: BD 18.  TRACHEOSTOMY ATTEMPT. HYPOXIC-> PEA ARREST x 3. SEE CODE BLUE NOTE.   11/13: BD 19      ICU Vital Signs Last 24 Hrs  T(C): 37.2 (13 Nov 2021 06:00), Max: 37.6 (12 Nov 2021 22:00)  T(F): 99 (13 Nov 2021 06:00), Max: 99.7 (12 Nov 2021 22:00)  HR: 87 (13 Nov 2021 06:01) (82 - 170)  BP: 162/86 (13 Nov 2021 06:01) (128/70 - 197/112)  BP(mean): 118 (13 Nov 2021 06:01) (91 - 147)  ABP: 180/95 (13 Nov 2021 06:01) (101/90 - 191/95)  ABP(mean): 131 (13 Nov 2021 06:01) (88 - 159)  RR: 14 (13 Nov 2021 06:01) (14 - 27)  SpO2: 100% (13 Nov 2021 06:01) (76% - 100%)      11-12-21 @ 07:01  -  11-13-21 @ 07:00  --------------------------------------------------------  IN: 4238 mL / OUT: 910 mL / NET: 3328 mL      Mode: AC/ CMV (Assist Control/ Continuous Mandatory Ventilation), RR (machine): 14, TV (machine): 400, FiO2: 40, PEEP: 5, ITime: 1, MAP: 8.6, PIP: 19      NEUROLOGIC EXAMINATION: Post cardiac arrest  Pupils 3mm and reactive B/L, + cough/gag, corneal reflexes present.  Flaccid in all 4 extremities.   EENT: Orally intubated. Severely edematous tongue with multiple lacerations. Neck incision packed with clean sterile gauze.   CARDIOVASCULAR: (+) S1 S2  PULMONARY: Diminished breath sounds B/L R > L. R chest tube to suction. L chest wall SQ emphysema.   ABDOMEN. Distended  EXTREMITIES: No edema  SKIN: Intact. Diffuse subcutaneous emphysema. Prominent over L chest wall.  EVD site C/D/I. Crani site soft      MEDICATIONS:  acetaminophen    Suspension .. 650 milliGRAM(s) Oral every 6 hours PRN  acetylcysteine 20%  Inhalation 4 milliLiter(s) Inhalation three times a day  amantadine Syrup 200 milliGRAM(s) Oral two times a day  artificial  tears Solution 1 Drop(s) Both EYES two times a day  cefepime   IVPB 2000 milliGRAM(s) IV Intermittent every 8 hours  chlorhexidine 0.12% Liquid 15 milliLiter(s) Oral Mucosa every 12 hours  chlorhexidine 2% Cloths 1 Application(s) Topical <User Schedule>  fentaNYL    Injectable 50 MICROGram(s) IV Push every 2 hours PRN  fludroCORTISONE 0.1 milliGRAM(s) Oral every 12 hours  levETIRAcetam  Solution 500 milliGRAM(s) Oral two times a day  modafinil 200 milliGRAM(s) Oral daily  norepinephrine Infusion 0.05 MICROgram(s)/kG/Min (7.97 mL/Hr) IV Continuous <Continuous>  ondansetron Injectable 4 milliGRAM(s) IV Push every 6 hours PRN  pantoprazole   Suspension 40 milliGRAM(s) Oral daily  sodium chloride 0.9%. 1000 milliLiter(s) (100 mL/Hr) IV Continuous <Continuous>      LABS:                         7.9    13.70 )-----------( 295      ( 13 Nov 2021 02:24 )             25.3     11-13    151<H>  |  114<H>  |  25<H>  ----------------------------<  98  3.8   |  25  |  2.49<H>    Ca    8.2<L>      13 Nov 2021 02:24  Phos  4.7     11-13  Mg     1.8     11-13    TPro  7.6  /  Alb  3.4  /  TBili  0.3  /  DBili  x   /  AST  123<H>  /  ALT  67<H>  /  AlkPhos  104  11-12    LIVER FUNCTIONS - ( 12 Nov 2021 15:17 )  Alb: 3.4 g/dL / Pro: 7.6 g/dL / ALK PHOS: 104 U/L / ALT: 67 U/L / AST: 123 U/L / GGT: x           ABG - ( 12 Nov 2021 21:19 )  pH, Arterial: 7.45  pH, Blood: x     /  pCO2: 40    /  pO2: 133   / HCO3: 28    / Base Excess: 3.5   /  SaO2: 99.5        CODE STATUS:  Full Code                         GOALS OF CARE: Aggressive                      DISPOSITION:  ICU =======================================   NEUROCRITICAL CARE ATTENDING NOTE   =======================================  ARJUN, AILYN     HPI:  Patient is a 45 y/o female with PMH HTN, multiple sclerosis, recent spinal surgery 10/8 ([facility]) presented to [facility] BIBEMS after being found unconscious at home, unknown period of time. 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 HH5, MF1. Patient was intubated at [facility] ED, found to have blown L pupil, and sent straight to the OR for left hemicraniotomy. A-line placed intraop. Hemodynamically unstable upon arrival to [facility], bradycardic and hypertensive s/p Mannitol, Clevidipine, and Furosemide. Central line placed in NSICU. Currently sedated on Propofol, pending repeat CTH. History per patient's son, patient had recent spine surgery and was found on outpatient imaging last week to have L M1 segment aneurysm (unruptured), pt asymptomatic at this time. Per son patient taking percocet PO at home. Ureña catheter, ET tube, and arterial line placed at [facility].  NIHSS on arrival: 32. Bess Griffin 5, Mod Busby 4.  Bleed Day 1 = 10/26 (26 Oct 2021 13:03)    Hospital course:  10/26: Admitted to [facility] from [facility], POD#0 s/p L hemicraniectomy for decompression and evacuation of SDH. Transferred to [facility] noted to have tense flap, received mannitol, keppra, decadron. Was hypertensive to 200s and preston to 40s, recevied 85g mannitol and bullet 23.4%. CTH on arrival demonstrated increased size in vents and new IVH. EVD placed, open at 15, central line placed. Transfused 2 u PRBC. POD0 of coiling of left MCA bifurcation aneurysm,   10/27: CTH demonstrated EVD in correct position, EVD lowered to 5, remains intubated on propofol. Mannitol given for uptrending ICPs. 23.4% given 8:30 am. 3% increased to 50/hr. 1L bolus. New EVD catheter placed using existing sreekanth hole. 1 u PRBC.  10/28: HH5, MF4, BD3; POD2 angio coil/embo of L MCA aneurysm. JOAQUÍN overnight, neuro stable. EVD@5cmH20 ICPs < 20 overnight, OP WNL. S/p 1 unit PRBCs yesterday with appropriate response. Given 42g mannitol in AM for ICP 22 persistently, with improvement, then given 23% in PM for elevated ICP. febrile, pancultured. Standing tylenol and cooling blanket.   11/1: BD7, POD6 L hemicrani, angio coil/embo. Neuro exam unchanged. ICPs WNL. Overnight given hydralazine, increased propofol for SBP > 180.  CTA showed mild-moderate anterior circulation vasospasm (permissive hypertension, euvolemia).  11/3: BD9, POD8 L hemicrani, angio coil/embo.  Neuro exam unchanged. ICPs WNL.  Pending trach/PEG.  Dayshift:  noted episodes of sympathetic storming during vasospasm period.  11/5: BD11 POD10, JOAQUÍN o/n, neuro stable, EVD at 5; O2 desaturations with thick secretions (MSSA, Enterobacter, Proteus) started vancomycin zosyn  11/7: BD13 POD 12 JOAQUÍN o/n, NGT replaced. Weaning off precedex, no ICP crisis, decreasing neurostorming meds, pending CT head.   11/8: BD 14. CT head. Some spontaneous eye opening. Overnight, tongue biting with oral trauma and mild bleeding.   11/9: BD 15. PEG placed. On VEEG. No seizures. Exam stably poor  11/12: BD 18.  TRACHEOSTOMY ATTEMPT. HYPOXIC-> PEA ARREST x 3. SEE CODE BLUE NOTE.   11/13: BD 19. ATN. Decreased urine output.  Neurologic exam poor.  Brainstem reflexes present. Extending uppers, TF lowers       ICU Vital Signs Last 24 Hrs  T(C): 37.2 (13 Nov 2021 06:00), Max: 37.6 (12 Nov 2021 22:00)  T(F): 99 (13 Nov 2021 06:00), Max: 99.7 (12 Nov 2021 22:00)  HR: 87 (13 Nov 2021 06:01) (82 - 170)  BP: 162/86 (13 Nov 2021 06:01) (128/70 - 197/112)  BP(mean): 118 (13 Nov 2021 06:01) (91 - 147)  ABP: 180/95 (13 Nov 2021 06:01) (101/90 - 191/95)  ABP(mean): 131 (13 Nov 2021 06:01) (88 - 159)  RR: 14 (13 Nov 2021 06:01) (14 - 27)  SpO2: 100% (13 Nov 2021 06:01) (76% - 100%)      11-12-21 @ 07:01  -  11-13-21 @ 07:00  --------------------------------------------------------  IN: 4238 mL / OUT: 910 mL / NET: 3328 mL      Mode: AC/ CMV (Assist Control/ Continuous Mandatory Ventilation), RR (machine): 14, TV (machine): 400, FiO2: 40, PEEP: 5, ITime: 1, MAP: 8.6, PIP: 19      NEUROLOGIC EXAMINATION:   Pupils 3mm and reactive B/L,+ cough/gag, corneal reflexes, overbreathes vent set rate..  Trace extension in uppers. TFs in lower  EENT: Orally intubated. Severely edematous tongue with multiple lacerations. Neck incision packed with clean sterile gauze.   CARDIOVASCULAR: (+) S1 S2  PULMONARY: Diminished breath sounds B/L R > L. R and L chest tubes to suction. L chest wall SQ emphysema.   ABDOMEN. Distended.  EXTREMITIES: No edema  SKIN: Intact. Diffuse subcutaneous emphysema though improving.   EVD site C/D/I. Crani site soft      MEDICATIONS:  acetaminophen    Suspension .. 650 milliGRAM(s) Oral every 6 hours PRN  acetylcysteine 20%  Inhalation 4 milliLiter(s) Inhalation three times a day  amantadine Syrup 200 milliGRAM(s) Oral two times a day  artificial  tears Solution 1 Drop(s) Both EYES two times a day  cefepime   IVPB 2000 milliGRAM(s) IV Intermittent every 8 hours  chlorhexidine 0.12% Liquid 15 milliLiter(s) Oral Mucosa every 12 hours  chlorhexidine 2% Cloths 1 Application(s) Topical <User Schedule>  fentaNYL    Injectable 50 MICROGram(s) IV Push every 2 hours PRN  fludroCORTISONE 0.1 milliGRAM(s) Oral every 12 hours  levETIRAcetam  Solution 500 milliGRAM(s) Oral two times a day  modafinil 200 milliGRAM(s) Oral daily  norepinephrine Infusion 0.05 MICROgram(s)/kG/Min (7.97 mL/Hr) IV Continuous <Continuous>  ondansetron Injectable 4 milliGRAM(s) IV Push every 6 hours PRN  pantoprazole   Suspension 40 milliGRAM(s) Oral daily  sodium chloride 0.9%. 1000 milliLiter(s) (100 mL/Hr) IV Continuous <Continuous>      LABS:                         7.9    13.70 )-----------( 295      ( 13 Nov 2021 02:24 )             25.3     11-13    151<H>  |  114<H>  |  25<H>  ----------------------------<  98  3.8   |  25  |  2.49<H>    Ca    8.2<L>      13 Nov 2021 02:24  Phos  4.7     11-13  Mg     1.8     11-13    TPro  7.6  /  Alb  3.4  /  TBili  0.3  /  DBili  x   /  AST  123<H>  /  ALT  67<H>  /  AlkPhos  104  11-12    LIVER FUNCTIONS - ( 12 Nov 2021 15:17 )  Alb: 3.4 g/dL / Pro: 7.6 g/dL / ALK PHOS: 104 U/L / ALT: 67 U/L / AST: 123 U/L / GGT: x           ABG - ( 12 Nov 2021 21:19 )  pH, Arterial: 7.45  pH, Blood: x     /  pCO2: 40    /  pO2: 133   / HCO3: 28    / Base Excess: 3.5   /  SaO2: 99.5        CODE STATUS:  Full Code                         GOALS OF CARE: Aggressive                      DISPOSITION:  ICU

## 2021-11-13 NOTE — PROGRESS NOTE ADULT - SUBJECTIVE AND OBJECTIVE BOX
HPI:  47 y/o female with unknown PMHx presented to [place] [facility] BIBEMS after being found unconscious at home, unknown period of time. 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 HH5, MF4. Patient was intubated at [place] ED and sent straight to the OR for left hemicraniotomy. A-line placed intraop. Hemodynamically unstable upon arrival to [facility], bradycardic and hypertensive s/p Mannitol, Clevidipine, and Furosemide. Central line placed in NSICU. Currently sedated on Propofol, pending repeat CTH. History per patient's son, patient had recent spine surgery (in [place], unknown which hospital) and was found on outpatient imaging last week to have L M1 segment aneurysm (unruptured), pt asymptomatic at this time. Per son patient taking percocet PO at home. Ureña catheter, ET tube, and arterial line placed at [facility].     NIHSS on arrival: 32     S/Overnight events:  JOAQUÍN overnight, patient tachycardic, SBP stable off of levophed. B/l chest tube. EVD @5cmH2O, no elevated ICPs.       Hospital Course:         Vital Signs Last 24 Hrs  T(C): 36.5 (13 Nov 2021 01:24), Max: 37.9 (12 Nov 2021 06:50)  T(F): 97.7 (13 Nov 2021 01:24), Max: 100.2 (12 Nov 2021 06:50)  HR: 93 (13 Nov 2021 01:27) (75 - 170)  BP: 168/86 (13 Nov 2021 01:00) (128/70 - 197/112)  BP(mean): 120 (13 Nov 2021 01:00) (91 - 147)  RR: 16 (13 Nov 2021 01:27) (14 - 27)  SpO2: 100% (13 Nov 2021 01:27) (76% - 100%)    I&O's Detail    11 Nov 2021 07:01  -  12 Nov 2021 07:00  --------------------------------------------------------  IN:    Enteral Tube Flush: 240 mL    IV PiggyBack: 100 mL    Jevity 1.2: 114 mL    sodium chloride 0.9%: 585 mL    sodium chloride 0.9%: 540 mL    sodium chloride 3%: 500 mL    sodium chloride 3%: 150 mL  Total IN: 2229 mL    OUT:    External Ventricular Device (mL): 238 mL    Rectal Tube (mL): 0 mL    Voided (mL): 1600 mL  Total OUT: 1838 mL    Total NET: 391 mL      12 Nov 2021 07:01  -  13 Nov 2021 02:11  --------------------------------------------------------  IN:    Norepinephrine: 238 mL    sodium chloride 0.9%: 1000 mL    sodium chloride 0.9%: 1050 mL    Sodium Chloride 0.9% Bolus: 500 mL  Total IN: 2788 mL    OUT:    Chest Tube (mL): 10 mL    Chest Tube (mL): 10 mL    External Ventricular Device (mL): 167 mL    Indwelling Catheter - Urethral (mL): 330 mL    Jevity 1.2: 0 mL    Rectal Tube (mL): 200 mL  Total OUT: 717 mL    Total NET: 2071 mL        I&O's Summary    11 Nov 2021 07:01  -  12 Nov 2021 07:00  --------------------------------------------------------  IN: 2229 mL / OUT: 1838 mL / NET: 391 mL    12 Nov 2021 07:01  -  13 Nov 2021 02:11  --------------------------------------------------------  IN: 2788 mL / OUT: 717 mL / NET: 2071 mL        PHYSICAL EXAM:  Neurological:    Motor exam:         [] Upper extremity              Bi(c5)  WE(c6)  EE(c7)   FF(c8)                                                R         5/5        5/5        5/5       5/5                                               L          5/5        5/5        5/5       5/5         [] Lower extremeity          HF(l2)   KE(l3)    TA(l4)   EHL(l5)  GS(s1)                                                 R        5/5        5/5        5/5       5/5         5/5                                               L         5/5        5/5       5/5       5/5          5/5                                                        [] warm well perfused; capillary refill <3 seconds     Sensation: [] intact to light touch  [] decreased:       Cardiovascular:  Respiratory:  Gastrointestinal:  Genitourinary:  Extremities:    Incision/Wound:    DEVICE/DRAIN DRESSING:    TUBES/LINES:  [] CVC  [] A-line  [] Lumbar Drain  [] Ventriculostomy  [] Other    DIET:  [] NPO  [] Mechanical  [] Tube feeds    LABS:                        8.1    17.75 )-----------( 324      ( 12 Nov 2021 21:08 )             25.9     11-12    152<H>  |  115<H>  |  23  ----------------------------<  97  4.2   |  26  |  2.09<H>    Ca    8.5      12 Nov 2021 21:08  Phos  4.3     11-12  Mg     1.9     11-12    TPro  7.6  /  Alb  3.4  /  TBili  0.3  /  DBili  x   /  AST  123<H>  /  ALT  67<H>  /  AlkPhos  104  11-12    PT/INR - ( 11 Nov 2021 14:03 )   PT: 13.9 sec;   INR: 1.17          PTT - ( 11 Nov 2021 14:03 )  PTT:28.9 sec    CARDIAC MARKERS ( 12 Nov 2021 21:08 )  x     / 0.26 ng/mL / 165 U/L / x     / 7.5 ng/mL  CARDIAC MARKERS ( 12 Nov 2021 15:17 )  x     / 0.36 ng/mL / 159 U/L / x     / 8.4 ng/mL  CARDIAC MARKERS ( 12 Nov 2021 09:59 )  x     / 0.02 ng/mL / x     / x     / x          CAPILLARY BLOOD GLUCOSE      POCT Blood Glucose.: 104 mg/dL (12 Nov 2021 17:19)      Drug Levels: [] N/A  Vancomycin Level, Trough: 11.7 ug/mL (11-07 @ 06:22)    CSF Analysis: [] N/A      Allergies    No Known Allergies    Intolerances      MEDICATIONS:  Antibiotics:  cefepime   IVPB 2000 milliGRAM(s) IV Intermittent every 8 hours    Neuro:  acetaminophen    Suspension .. 650 milliGRAM(s) Oral every 6 hours PRN  amantadine Syrup 200 milliGRAM(s) Oral two times a day  fentaNYL    Injectable 50 MICROGram(s) IV Push every 2 hours PRN  levETIRAcetam  Solution 500 milliGRAM(s) Oral two times a day  modafinil 200 milliGRAM(s) Oral daily  ondansetron Injectable 4 milliGRAM(s) IV Push every 6 hours PRN    Anticoagulation:    OTHER:  acetylcysteine 20%  Inhalation 4 milliLiter(s) Inhalation three times a day  artificial  tears Solution 1 Drop(s) Both EYES two times a day  chlorhexidine 0.12% Liquid 15 milliLiter(s) Oral Mucosa every 12 hours  chlorhexidine 2% Cloths 1 Application(s) Topical <User Schedule>  fludroCORTISONE 0.1 milliGRAM(s) Oral every 12 hours  norepinephrine Infusion 0.05 MICROgram(s)/kG/Min IV Continuous <Continuous>  pantoprazole   Suspension 40 milliGRAM(s) Oral daily    IVF:  sodium chloride 0.9%. 1000 milliLiter(s) IV Continuous <Continuous>    CULTURES:  Culture Results:   No growth to date (11-11 @ 17:53)  Culture Results:   Numerous Staphylococcus aureus  Numerous Enterobacter cloacae complex  Moderate Proteus mirabilis  Accompanied by normal respiratory melinda (11-04 @ 13:59)    RADIOLOGY & ADDITIONAL TESTS:      ASSESSMENT:  46y Female s/p    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, left    Intracranial hemorrhage, spontaneous subarachnoid, due to cerebral aneurysm, acute    Angiogram, cerebral, with coil embolization, in non-operating room setting    Endoscopic percutaneous gastrostomy    Angiogram, carotid and cerebral, bilateral    Chest tube placement    Insertion of central venous catheter with ultrasound guidance    Personal history of spine surgery    SysAdmin_VstLnk        PLAN:  NEURO:    CARDIOVASCULAR:    PULMONARY:    RENAL:    GI:    HEME:    ID:    ENDO:    DVT PROPHYLAXIS:  [] Venodynes                                [] Heparin/Lovenox    FALL RISK:  [] Low Risk                                    [] Impulsive    DISPOSITION:  HPI:  45 y/o female with unknown PMHx presented to [place] ED BIBEMS after being found unconscious at home, unknown period of time. 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 HH5, MF4. Patient was intubated at [place] ED and sent straight to the OR for left hemicraniotomy. A-line placed intraop. Hemodynamically unstable upon arrival to [facility], bradycardic and hypertensive s/p Mannitol, Clevidipine, and Furosemide. Central line placed in NSICU. Currently sedated on Propofol, pending repeat CTH. History per patient's son, patient had recent spine surgery (in [place], unknown which hospital) and was found on outpatient imaging last week to have L M1 segment aneurysm (unruptured), pt asymptomatic at this time. Per son patient taking percocet PO at home. Ureña catheter, ET tube, and arterial line placed at [facility].     NIHSS on arrival: 32     S/Overnight events:  JOAQUÍN overnight, patient tachycardic, SBP stable off of levophed. B/l chest tube. EVD @5cmH2O, no elevated ICPs. UOP downtrending, trend BUN/Cr, given 500cc bolus NS x1 pend repeat labs.     Hospital Course:   10/26: admitted to [facility] from [facility], POD#0 s/p L hemicraniectomy for decompression and evacuation of SDH. Transferred to [facility] noted to have tense flap, received mannitol, keppra, decadron. Was hypertensive to 200s and preston to 40s, recevied 85g mannitol and bullet 23.4%. CTH on arrival demonstrated increased size in vents and new IVH. EVD placed, open at 15, central line placed. Transfused 2 u PRBC. POD0 of coiling of left MCA bifurcation aneurysm,   10/27: CTH demonstrated EVD in correct position, EVD lowered to 5, remains intubated on proprofol, pending repeat CTH in AM. Started on 3% @ 30/hr. 2LNS given for euvolemia, Mannitol given for uptrending ICPs. Bullet given 8:30 am. 3% increased to 50/hr. 1 L bolus. New EVD catheter placed using exisiting sreekanth hole. 1 u PRBC.  10/28: HH5, MF4, BD3; POD2 angio coil/embo of L MCA aneurysm. JOAQUÍN overnight, neuro stable. EVD@5cmH20 ICPs < 20 overnight, OP WNL. S/p 1 unit PRBC yesterday with appropriate response. Given 42g mannitol in AM for ICP 22 persistently, with improvement, then given 23% in PM for elevated ICP. febrile, pancultured. Standing tylenol and cooling blanket.   10/29: BD4, POD3 angio coil/embo. JOAQUÍN o/n, neuro stable. EVD@5, ICPs <20 o/n.   10/30: BD5, POD4 angio coil/embo. JOAQUÍN o/n neuro stable. EVD@5. 3% @50cc/hr.  10/31: BD6, POD5 angio coil/embo. brief period maintained upward gaze, resolved on its own. EVD@5cm h2o.    11/1: BD7, POD6 L hemicrani, angio coil/embo. JOAQUÍN overnight. Neuro exam unchanged. ICPs WNL. started bromocriptine/gabapentin/baclofen. dc'd propofol, started precedex  11/2: BD8 POD7 L hemicrani, angio coil/embo. JOAQUÍN overnight. Neuro exam stable. Remains on 3% hypertonic saline. Receieved 1 unit of PRBCs for a Hgb of 7.6.  11/3: BD 9 POD8 of L hemicrani. Elevated lipase, normal amylase, OG tube clogged and replaced. Abdominal US normal. Pending gen surg reccs regarding trach and peg. Gabapentin and Baclofen increased to help with neurostorming. restarted back on 3%, LR@35. became preston+hypotensive with nimodipine 60q4, decreased back to 30q2, NaCl bullet given.   11/4: BD10 POD9, JOAQUÍN o/n, 500cc NS for euvolemia,  neuro stable, EVD at 5. 3% increased to 75  11/5: BD11 POD10, JOAQUÍN o/n, neuro stable, EVD at 5  11/6: BD12 POD11 JOAQUÍN overnight. Neuro exam stable. Remains intubated on precedex. 3% hypertonic saline dc'd  11/7: BD13 POD 12 JOAQUÍN o/n, NGT replaced. on precex with no icp crisis   11/8: BD14 POD13 JOAQUÍN o/n, noted to have tongue maceration/macroglossia. Gauze with tongue depressor placed. Pending further recs from ENT. Pending trach and PEG placement tomorrow with gen surg. Off precedex, ICPs stable. EVD remains @ 5.   11/9: BD15, POD 13, bleeding under tongue at start of shift, fentanyl pushed with no further episodes. gabapentin stopped. PEG placed  11/10: BD 16, POD 14, neuro stable, ENT to be consulted in AM, 3% increased to 50/hr.  11/11: BD 17, POD 15, neuro exam stable. Pend CTH saba in am for cranioplasty planning. Pend trach in OR with ENT. F.u BMP in am, 3%@50cc/hr for Na goal 140-145.   11/12: BD 18, POD 16. JOAQUÍN overnight, neuro stable. Pend trach placement today. CTH saba completed 11/11. Off of 3%, f/u am BMP for Na goal 140-150. CSF neg. Hypoxic cardiac arrest with ROSC x 3 during tracheostomy placement. B/l chest tubes placed for resulting pneumothorax s/p CPR.   11/13: BD 19, POD 17. JOAQUÍN overnight, patient tachycardic, SBP stable off of levophed. B/l chest tube. EVD @5cmH2O, no elevated ICPs. UOP downtrending, trend BUN/Cr, given 500cc bolus NS x1. F/u am CXR. Cont Cefepime until today, then change to Ancef while trach packing in place per ENT. Pend CTH when stable. Trops downtrending s/p cardiac arrest.       Vital Signs Last 24 Hrs  T(C): 36.5 (13 Nov 2021 01:24), Max: 37.9 (12 Nov 2021 06:50)  T(F): 97.7 (13 Nov 2021 01:24), Max: 100.2 (12 Nov 2021 06:50)  HR: 93 (13 Nov 2021 01:27) (75 - 170)  BP: 168/86 (13 Nov 2021 01:00) (128/70 - 197/112)  BP(mean): 120 (13 Nov 2021 01:00) (91 - 147)  RR: 16 (13 Nov 2021 01:27) (14 - 27)  SpO2: 100% (13 Nov 2021 01:27) (76% - 100%)    I&O's Detail    11 Nov 2021 07:01  -  12 Nov 2021 07:00  --------------------------------------------------------  IN:    Enteral Tube Flush: 240 mL    IV PiggyBack: 100 mL    Jevity 1.2: 114 mL    sodium chloride 0.9%: 585 mL    sodium chloride 0.9%: 540 mL    sodium chloride 3%: 500 mL    sodium chloride 3%: 150 mL  Total IN: 2229 mL    OUT:    External Ventricular Device (mL): 238 mL    Rectal Tube (mL): 0 mL    Voided (mL): 1600 mL  Total OUT: 1838 mL    Total NET: 391 mL      12 Nov 2021 07:01  -  13 Nov 2021 02:11  --------------------------------------------------------  IN:    Norepinephrine: 238 mL    sodium chloride 0.9%: 1000 mL    sodium chloride 0.9%: 1050 mL    Sodium Chloride 0.9% Bolus: 500 mL  Total IN: 2788 mL    OUT:    Chest Tube (mL): 10 mL    Chest Tube (mL): 10 mL    External Ventricular Device (mL): 167 mL    Indwelling Catheter - Urethral (mL): 330 mL    Jevity 1.2: 0 mL    Rectal Tube (mL): 200 mL  Total OUT: 717 mL    Total NET: 2071 mL        I&O's Summary    11 Nov 2021 07:01  -  12 Nov 2021 07:00  --------------------------------------------------------  IN: 2229 mL / OUT: 1838 mL / NET: 391 mL    12 Nov 2021 07:01  -  13 Nov 2021 02:11  --------------------------------------------------------  IN: 2788 mL / OUT: 717 mL / NET: 2071 mL        PHYSICAL EXAM:  General: NAD, pt is comfortably  laying in hospital bed, +intubated on full vent support   HEENT: PERRL 3mm, OE spont, b/l corneals, blinks to threat b/l   Cardiovascular: RRR, normal S1 and S2   Respiratory: lungs CTAB, no wheezing, rhonchi, or crackles   GI: normoactive BS to auscultation, abd soft, NTND, +PEG   Neuro: nonverbal, not answering questions, OE spont but not to command  b/l UE extensor posture, b/l LE triple flex to noxious    Extremities: distal pulses 2+ x4   Wound/incision: L hemicrani incision site w/ staples C/D/I, flap soft and full. B/l posterior spinal surgical incision sites with improving areas of wound dehiscence   Drains: EVD @5cmH2O     TUBES/LINES:  [X] CVC - R IJ, L femoral vein central line placed for emergency access on 11/12   [X] A-line  [] Lumbar Drain  [] Ventriculostomy  [] Other    DIET:  [] NPO  [] Mechanical  [X] Tube feeds      LABS:                        8.1    17.75 )-----------( 324      ( 12 Nov 2021 21:08 )             25.9     11-12    152<H>  |  115<H>  |  23  ----------------------------<  97  4.2   |  26  |  2.09<H>    Ca    8.5      12 Nov 2021 21:08  Phos  4.3     11-12  Mg     1.9     11-12    TPro  7.6  /  Alb  3.4  /  TBili  0.3  /  DBili  x   /  AST  123<H>  /  ALT  67<H>  /  AlkPhos  104  11-12    PT/INR - ( 11 Nov 2021 14:03 )   PT: 13.9 sec;   INR: 1.17          PTT - ( 11 Nov 2021 14:03 )  PTT:28.9 sec    CARDIAC MARKERS ( 12 Nov 2021 21:08 )  x     / 0.26 ng/mL / 165 U/L / x     / 7.5 ng/mL  CARDIAC MARKERS ( 12 Nov 2021 15:17 )  x     / 0.36 ng/mL / 159 U/L / x     / 8.4 ng/mL  CARDIAC MARKERS ( 12 Nov 2021 09:59 )  x     / 0.02 ng/mL / x     / x     / x          CAPILLARY BLOOD GLUCOSE      POCT Blood Glucose.: 104 mg/dL (12 Nov 2021 17:19)      Drug Levels: [] N/A  Vancomycin Level, Trough: 11.7 ug/mL (11-07 @ 06:22)    CSF Analysis: [] N/A      Allergies    No Known Allergies    Intolerances      MEDICATIONS:  Antibiotics:  cefepime   IVPB 2000 milliGRAM(s) IV Intermittent every 8 hours    Neuro:  acetaminophen    Suspension .. 650 milliGRAM(s) Oral every 6 hours PRN  amantadine Syrup 200 milliGRAM(s) Oral two times a day  fentaNYL    Injectable 50 MICROGram(s) IV Push every 2 hours PRN  levETIRAcetam  Solution 500 milliGRAM(s) Oral two times a day  modafinil 200 milliGRAM(s) Oral daily  ondansetron Injectable 4 milliGRAM(s) IV Push every 6 hours PRN    Anticoagulation:    OTHER:  acetylcysteine 20%  Inhalation 4 milliLiter(s) Inhalation three times a day  artificial  tears Solution 1 Drop(s) Both EYES two times a day  chlorhexidine 0.12% Liquid 15 milliLiter(s) Oral Mucosa every 12 hours  chlorhexidine 2% Cloths 1 Application(s) Topical <User Schedule>  fludroCORTISONE 0.1 milliGRAM(s) Oral every 12 hours  norepinephrine Infusion 0.05 MICROgram(s)/kG/Min IV Continuous <Continuous>  pantoprazole   Suspension 40 milliGRAM(s) Oral daily    IVF:  sodium chloride 0.9%. 1000 milliLiter(s) IV Continuous <Continuous>    CULTURES:  Culture Results:   No growth to date (11-11 @ 17:53)  Culture Results:   Numerous Staphylococcus aureus  Numerous Enterobacter cloacae complex  Moderate Proteus mirabilis  Accompanied by normal respiratory melinda (11-04 @ 13:59)    RADIOLOGY & ADDITIONAL TESTS:      ASSESSMENT:  45 y/o female with PMHx of HTN and MS, hx of spinal surgery at [facility] 10/8/21 presented to [place] ED BIBEMS after being found unconscious at home, unknown period of time. 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 HH5, MF4; BD #1 = 10/26. Patient was intubated at [place] ED and sent straight to the OR for left hemicraniotomy. A-line placed intraop. Hemodynamically unstable upon arrival to [facility], bradycardic and hypertensive s/p Mannitol and Furosemide. Central line placed in NSICU. S/p EVD placement, and coil embolization of left MCA bifurcation aneurysm 10/26/2021. S/p cerebral angio without findings of vasospasm 11/10. S/p cardiac arrest with ROSC x3 on 11/12 during tracheostomy at bedside.        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, left    Intracranial hemorrhage, spontaneous subarachnoid, due to cerebral aneurysm, acute    Angiogram, cerebral, with coil embolization, in non-operating room setting    Endoscopic percutaneous gastrostomy    Angiogram, carotid and cerebral, bilateral    Chest tube placement    Insertion of central venous catheter with ultrasound guidance    Personal history of spine surgery    SysAdmin_VstLnk        PLAN:  NEURO:  - neuro checks/vital signs q1hr  - Keppra 500mg IV BID   - VEEG negative for seizures, now dc  - Nimodipine dc'd 11/12  - fentanyl pushes PRN   - EVD open at 5cmH2O, monitor ICP/output  - CTA 11/1 with findings of moderate anterior circulation spasm   - CTH 11/8 stable   - continue amantadine and provigil  - CTH when stable    CARDIO:  - -180  - levophed gtt  - s/p cardiac arrest, pending TTE  - Repeat echo 11/13  - troponin elevated, trend    PULM:  - intubated on full vent support  - b/l chest tubes for pneumothorax to -[id number]   - s/p acute hypoxic cardiac arrest 11/12 during tracheostomy placment with ENT c/b b/l pneumothorax  - daily CXR      GI:  - NPO at this time, IVF while NPO   - PPI QD GI ppx  - Abd US: neg   - rectal tube    RENAL:  - salt tabs 3Q6 d/c'd  - florinef .1 q12  - NS@75cc/hr  - euvolemia goal   - Na 140-150  - Ureña in place  - BUN/Cr uptrending, UOP decreasing, given fluid bolus overnight     HEME:  - SCDs, SQL  - s/p 2u PRBC, s/p 1u PRBC 10/27, s/p 1u PRBC 11/02  - monitor H+H  - 11/9 unchanged DVT L brachial and unchanged R superficial cephalic thrombus,  repeat doppler 11/14  - 11/7 LE dopplers neg, repeat 11/14    ID:  - leukocytosis and procal, trend   - Tylenol PRN for fever  - sputum cx 11/4:  enterobacter, proteus  - Cefepime started to cover for enterobacter/proteus in sputum, end 11/17    ENDO:  - ISS   - monitor FS    OTHER  - wound care recs- q2 dressing changes   - ENT consulted, reccomends trach placement and oral hygeine    GOC: full code  Level of care: ICU status   Dispo: pend EVD, trach  family updated    Case discussed with Dr. Duncan , Dr Menard

## 2021-11-13 NOTE — CONSULT NOTE ADULT - SUBJECTIVE AND OBJECTIVE BOX
Patient is a 46y old  Female who presents with a chief complaint of ICH (13 Nov 2021 08:08)      HPI:  46F with pmhx of HTN who presented to the hospital after being found down at home for unknown period of time. 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 Has had prolonged hospital stay and now is now s/p cardiac arrest on 11/13. Pt noted to have creatinine that has been rising; team concerned for possible need for hemodialysis. Patient seen and examined does not appear to be grossly volume overloaded on examination nor she does have electrolyte abnormalities. Spoke to daughter over phone and discussed that Mom's overall prognosis remains poor and that her kidney function is declining; she is still urinating but my concern is with time that she could potentially have reduced urinary output and may require dialysis. Daughter Jayshree who is the one making medical decisions stated that if needed she would like mom to have a trial of HD if she were to require it and understands that although we remain hopeful her kidney function could recover there is also a possibility that it may not.     NIHSS on arrival: 32   Bess Griffin 5, Mod Busby 4  Bleed Day 1 = 10/26 (26 Oct 2021 13:03)      PAST MEDICAL & SURGICAL HISTORY:  No pertinent past medical history    Personal history of spine surgery    Allergies:  No Known Allergies      Home Medications:   acetaminophen    Suspension .. 650 milliGRAM(s) Oral every 6 hours PRN  acetylcysteine 20%  Inhalation 4 milliLiter(s) Inhalation three times a day  amantadine Syrup 200 milliGRAM(s) Oral two times a day  artificial  tears Solution 1 Drop(s) Both EYES two times a day  cefepime   IVPB 2000 milliGRAM(s) IV Intermittent every 8 hours  chlorhexidine 0.12% Liquid 15 milliLiter(s) Oral Mucosa every 12 hours  chlorhexidine 2% Cloths 1 Application(s) Topical <User Schedule>  fentaNYL    Injectable 50 MICROGram(s) IV Push every 2 hours PRN  lactated ringers. 1000 milliLiter(s) IV Continuous <Continuous>  levETIRAcetam  Solution 500 milliGRAM(s) Oral two times a day  modafinil 200 milliGRAM(s) Oral daily  niMODipine 60 milliGRAM(s) Oral every 4 hours  ondansetron Injectable 4 milliGRAM(s) IV Push every 6 hours PRN  pantoprazole   Suspension 40 milliGRAM(s) Oral daily      Hospital Medications:   MEDICATIONS  (STANDING):  acetylcysteine 20%  Inhalation 4 milliLiter(s) Inhalation three times a day  amantadine Syrup 200 milliGRAM(s) Oral two times a day  artificial  tears Solution 1 Drop(s) Both EYES two times a day  cefepime   IVPB 2000 milliGRAM(s) IV Intermittent every 8 hours  chlorhexidine 0.12% Liquid 15 milliLiter(s) Oral Mucosa every 12 hours  chlorhexidine 2% Cloths 1 Application(s) Topical <User Schedule>  lactated ringers. 1000 milliLiter(s) (100 mL/Hr) IV Continuous <Continuous>  levETIRAcetam  Solution 500 milliGRAM(s) Oral two times a day  modafinil 200 milliGRAM(s) Oral daily  niMODipine 60 milliGRAM(s) Oral every 4 hours  pantoprazole   Suspension 40 milliGRAM(s) Oral daily      SOCIAL HISTORY:  Denies ETOh, Smoking    Family History:  FAMILY HISTORY:    VITALS:  T(F): 97.9 (11-13-21 @ 12:00), Max: 99.7 (11-12-21 @ 22:00)  HR: 77 (11-13-21 @ 13:00)  BP: 179/101 (11-13-21 @ 11:00)  RR: 14 (11-13-21 @ 13:00)  SpO2: 100% (11-13-21 @ 13:00)  Wt(kg): --    11-12 @ 07:01  -  11-13 @ 07:00  --------------------------------------------------------  IN: 4238 mL / OUT: 930 mL / NET: 3308 mL    11-13 @ 07:01  -  11-13 @ 12:57  --------------------------------------------------------  IN: 465 mL / OUT: 515 mL / NET: -50 mL    CAPILLARY BLOOD GLUCOSE      POCT Blood Glucose.: 104 mg/dL (12 Nov 2021 17:19)    Review of Systems:  ROS negative except as per HPI    PHYSICAL EXAM:  GENERAL: Alert, awake, NAD  HEENT: NCAT, MMM  CHEST/LUNG: decreased breath sounds b/l   HEART: Regular rate and rhythm, no murmur, no gallops, no rub   ABDOMEN: Soft, nontender, non distended  EXTREMITIES: no pedal edema, WWP  Neurology: CN 2-12 grossly intact    LABS:  11-13    150<H>  |  116<H>  |  28<H>  ----------------------------<  100<H>  4.0   |  23  |  3.08<H>    Ca    8.6      13 Nov 2021 10:25  Phos  4.8     11-13  Mg     2.3     11-13    TPro  6.7  /  Alb  2.7<L>  /  TBili  0.2  /  DBili      /  AST  199<H>  /  ALT  127<H>  /  AlkPhos  80  11-13    Creatinine Trend: 3.08 <--, 2.49 <--, 2.09 <--, 1.51 <--, 0.92 <--, 0.63 <--, 0.62 <--, 0.58 <--, 0.62 <--, 0.63 <--, 0.65 <--, 0.60 <--, 0.57 <--, 0.62 <--, 0.60 <--, 0.59 <--, 0.62 <--, 0.64 <--, 0.64 <--, 0.73 <--, 0.70 <--                        8.4    12.06 )-----------( 270      ( 13 Nov 2021 10:25 )             27.3     Urine Studies:    Creatinine, Random Urine: 31 mg/dL (11-13 @ 10:24)  Sodium, Random Urine: 115 mmol/L (11-13 @ 10:24)       Patient is a 46y old  Female who presents with a chief complaint of ICH (13 Nov 2021 08:08)      HPI:  46F with pmhx of HTN who presented to the hospital after being found down at home for unknown period of time. 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 Has had prolonged hospital stay and now is now s/p cardiac arrest on 11/13. Pt noted to have creatinine that has been rising; team concerned for possible need for hemodialysis. Patient seen and examined does not appear to be grossly volume overloaded on examination nor she does have electrolyte abnormalities. Spoke to daughter over phone and discussed that Mom's overall prognosis remains poor and that her kidney function is declining; she is still urinating but my concern is with time that she could potentially have reduced urinary output and may require dialysis. Daughter Jayshree who is the one making medical decisions stated that if needed she would like mom to have a trial of HD if she were to require it and understands that although we remain hopeful her kidney function could recover there is also a possibility that it may not.  Nephrology consulted for HD concern and CHETAN management    NIHSS on arrival: 32   Bess Griffin 5, Mod Busby 4  Bleed Day 1 = 10/26 (26 Oct 2021 13:03)      PAST MEDICAL & SURGICAL HISTORY:  No pertinent past medical history    Personal history of spine surgery    Allergies:  No Known Allergies      Home Medications:   acetaminophen    Suspension .. 650 milliGRAM(s) Oral every 6 hours PRN  acetylcysteine 20%  Inhalation 4 milliLiter(s) Inhalation three times a day  amantadine Syrup 200 milliGRAM(s) Oral two times a day  artificial  tears Solution 1 Drop(s) Both EYES two times a day  cefepime   IVPB 2000 milliGRAM(s) IV Intermittent every 8 hours  chlorhexidine 0.12% Liquid 15 milliLiter(s) Oral Mucosa every 12 hours  chlorhexidine 2% Cloths 1 Application(s) Topical <User Schedule>  fentaNYL    Injectable 50 MICROGram(s) IV Push every 2 hours PRN  lactated ringers. 1000 milliLiter(s) IV Continuous <Continuous>  levETIRAcetam  Solution 500 milliGRAM(s) Oral two times a day  modafinil 200 milliGRAM(s) Oral daily  niMODipine 60 milliGRAM(s) Oral every 4 hours  ondansetron Injectable 4 milliGRAM(s) IV Push every 6 hours PRN  pantoprazole   Suspension 40 milliGRAM(s) Oral daily      Hospital Medications:   MEDICATIONS  (STANDING):  acetylcysteine 20%  Inhalation 4 milliLiter(s) Inhalation three times a day  amantadine Syrup 200 milliGRAM(s) Oral two times a day  artificial  tears Solution 1 Drop(s) Both EYES two times a day  cefepime   IVPB 2000 milliGRAM(s) IV Intermittent every 8 hours  chlorhexidine 0.12% Liquid 15 milliLiter(s) Oral Mucosa every 12 hours  chlorhexidine 2% Cloths 1 Application(s) Topical <User Schedule>  lactated ringers. 1000 milliLiter(s) (100 mL/Hr) IV Continuous <Continuous>  levETIRAcetam  Solution 500 milliGRAM(s) Oral two times a day  modafinil 200 milliGRAM(s) Oral daily  niMODipine 60 milliGRAM(s) Oral every 4 hours  pantoprazole   Suspension 40 milliGRAM(s) Oral daily      SOCIAL HISTORY:  Denies ETOh, Smoking    Family History:  FAMILY HISTORY:    VITALS:  T(F): 97.9 (11-13-21 @ 12:00), Max: 99.7 (11-12-21 @ 22:00)  HR: 77 (11-13-21 @ 13:00)  BP: 179/101 (11-13-21 @ 11:00)  RR: 14 (11-13-21 @ 13:00)  SpO2: 100% (11-13-21 @ 13:00)  Wt(kg): --    11-12 @ 07:01 - 11-13 @ 07:00  --------------------------------------------------------  IN: 4238 mL / OUT: 930 mL / NET: 3308 mL    11-13 @ 07:01  -  11-13 @ 12:57  --------------------------------------------------------  IN: 465 mL / OUT: 515 mL / NET: -50 mL    CAPILLARY BLOOD GLUCOSE      POCT Blood Glucose.: 104 mg/dL (12 Nov 2021 17:19)    Review of Systems:  ROS negative except as per HPI    PHYSICAL EXAM:  GENERAL: Alert, awake, NAD  HEENT: NCAT, MMM  CHEST/LUNG: decreased breath sounds b/l   HEART: Regular rate and rhythm, no murmur, no gallops, no rub   ABDOMEN: Soft, nontender, non distended  EXTREMITIES: no pedal edema, WWP  Neurology: intubated and sedated    LABS:  11-13    150<H>  |  116<H>  |  28<H>  ----------------------------<  100<H>  4.0   |  23  |  3.08<H>    Ca    8.6      13 Nov 2021 10:25  Phos  4.8     11-13  Mg     2.3     11-13    TPro  6.7  /  Alb  2.7<L>  /  TBili  0.2  /  DBili      /  AST  199<H>  /  ALT  127<H>  /  AlkPhos  80  11-13    Creatinine Trend: 3.08 <--, 2.49 <--, 2.09 <--, 1.51 <--, 0.92 <--, 0.63 <--, 0.62 <--, 0.58 <--, 0.62 <--, 0.63 <--, 0.65 <--, 0.60 <--, 0.57 <--, 0.62 <--, 0.60 <--, 0.59 <--, 0.62 <--, 0.64 <--, 0.64 <--, 0.73 <--, 0.70 <--                        8.4    12.06 )-----------( 270      ( 13 Nov 2021 10:25 )             27.3     Urine Studies:    Creatinine, Random Urine: 31 mg/dL (11-13 @ 10:24)  Sodium, Random Urine: 115 mmol/L (11-13 @ 10:24)

## 2021-11-13 NOTE — PROVIDER CONTACT NOTE (CHANGE IN STATUS NOTIFICATION) - ASSESSMENT
725Pm b/p 175/89. HR 90's. JULIA Blackburn made aware & orders 10mg Labetalol IVP for a new SBP goal 100-160. b/p dropped to 142/70's after Labetalol.

## 2021-11-13 NOTE — CONSULT NOTE ADULT - ASSESSMENT
Assessment/Plan:     #CHETAN   normal baseline creatinine ~1, likely peaking ~2.3   likely etiology toxic ATN vs AIN secondary to combination vanco/zosyn or contrast, less likely ischemic ATN given no documented hypotensive episodes, less likely pre-renal given FeNa suggestive of intrinsic injury     Recommend:   repeat CXR   commence 40cc/h NS IVF   BID BMP + urine lytes   renal sono   strict I/Os   renal diet   avoid nephrotoxic meds     Thank you for the opportunity to participate in the care of your patient. The nephrology service remains available to assist with any questions or concerns. Please feel free to reach us by paging the on-call nephrology fellow for urgent issues or as below.     Aj Cardoso D.O.  PGY 4 - Nephrology Fellow  [phone]   46F with pmhx of HTN who presented to the hospital after being found down at home for unknown period of time. 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 Nephrology consulted for CHETAN management and possible HD needs    Assessment/Plan:   #iATN  Baseline creatinine 0.6  Pt has prolonged hospital stay for a L MCA aneurysm rupture; she suffered from cardiac arrest x 3 on 11/12 and now is intubated in the ICU. Her kidney function has continued to rise over the course of the last 24 hours and her urine studies are suggestive of possible ATN given Sodium of 115 which correlates to her having hypoperfusion from the cardiac arrest. She is currently receiving therapy with antibiotics; specifically Cefepime. Her blood pressure has remained stable and thus makes me think less of a pre-renal etiology. She is still making urine at this time; but her urine output has been dwindling and therefore I had a conversation with the daughter who is the medical decision maker if she would want to pursue dialysis if her mother requires and she consented. Informed daughter we  are not there just yet; but I wanted to start conversations now instead of having to make an immediate decision which she appreciated.   -Trend BMP BID  -Please reduce the cefepime dosing from 2g q8h to 2g q24h (Verify with pharmacy); give her reduced kidney function. High doses of cefepime will pre-dispose her to seizures.   -Consider palliative care consult  -Active GOC discussion  -Strict I/O's  -Renal diet  -Avoid nephrotoxic meds    #Hyponatremia  Pt with sodium of 150; has been steadily improving  FWD calculcated to be 1.5-3L   -Can consider starting FWF 300cc Q4h to help correct hypernatremia    #Anemia  -Check iron profile and ferritin      Thank you for the opportunity to participate in the care of your patient. The nephrology service remains available to assist with any questions or concerns. Please feel free to reach us by paging the on-call nephrology fellow for urgent issues or as below.     Aj Cardoso D.O.  PGY 4 - Nephrology Fellow  [phone]

## 2021-11-13 NOTE — PROGRESS NOTE ADULT - SUBJECTIVE AND OBJECTIVE BOX
ENT PROGRESS NOTE    HPI: 45 y/o female with PMH HTN, Multiple sclerosis, recent spinal surgery 10/8 ([facility]) presented to [facility] after being found unconscious at home, unknown period of time. 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 ENT consulted due to tongue swelling. pt has been off sedated due to neuro checks. per nursing, pt has been biting down on tongue. nursing has not been able to place bite block. Denies any hypotensive episodes.     INTERVAL:    11/10: ENT reconsulted for trach placement - per primary team, patient was spastic with stiff neck while trying to perform bedside tracheostomy yesterday. Pt has been intubated since 10/26. Otherwise, NAEON - on EEG.    11/11: S/p tracheostomy attempt 11/12 am. Pt was seen and examined bedside - ETT in place, good ventilation. Stoma packing in place. Trops downtrending. Given 1x PRBC overnight, b/l chest tubes in place. Plan to change from cefepime to ancef today.      ICU Vital Signs Last 24 Hrs  T(C): 37.2 (13 Nov 2021 06:00), Max: 37.6 (12 Nov 2021 22:00)  T(F): 99 (13 Nov 2021 06:00), Max: 99.7 (12 Nov 2021 22:00)  HR: 94 (13 Nov 2021 07:00) (82 - 170)  BP: 156/86 (13 Nov 2021 07:00) (128/70 - 197/112)  BP(mean): 116 (13 Nov 2021 07:00) (91 - 147)  ABP: 169/89 (13 Nov 2021 07:00) (101/90 - 191/95)  ABP(mean): 122 (13 Nov 2021 07:00) (88 - 159)  RR: 14 (13 Nov 2021 07:00) (14 - 27)  SpO2: 100% (13 Nov 2021 07:00) (76% - 100%)      PHYSICAL EXAM:    CONSTITUTIONAL: A&Ox0  HEAD: EVD in place  ORAL CAVITY/OROPHARYNX: Significant tongue swelling, most significant on ventral surface. mouth unable to be opened due to tonicity of jaw. tongue indurated, necrosis of ventral surface. OP limited due to ETT  NECK: Tracheal packing in place, no bleeding  RESPIRATORY: Respirations unlabored, no increased work of breathing with use of accessory muscles and retractions. No stridor.  CARDIAC: Warm extremities, no cyanosis.       A/P: 46 F w/ w/ significant tongue swelling, likely secondary to biting down on tongue. bite block unable to be placed due to tone of jaw muscles. ENT reconsulted for trach placement - attempted 11/12 am, but complicated by respiratory failure, coded w/ chest compressions 3x with ROSC. ETT was reinserted into tracheal orally.    - Antibiotics while tracheal stoma packing in place  - ENT will formalize trach in OR later this week depending on patient stability  - Rest of management per NSGY

## 2021-11-13 NOTE — PROVIDER CONTACT NOTE (CHANGE IN STATUS NOTIFICATION) - ASSESSMENT
Previous trop values had been higher, value downtrending; urine output episodically low through day.

## 2021-11-13 NOTE — PROGRESS NOTE ADULT - ASSESSMENT
47 y/o female HH5 MF 4 BD 19 with:   L MCA ruptured aneurysm, subarachnoid hemorrhage, s/p [facility] (10/26/2021, Dr. D'Amico @ [place]), brain compression, cerebral edema  Cardiac arrest secondary to acute hypoxic arrest  Bilateral pneumothoraces  Acute kidney injury  Anemia   Recent spinal surgery  Upper GI bleed    NEURO: Neurochecks Q1h  DCI/ vasospasm: DC nimodipine for now as pt on pressors. Reassess. Angio 11/10 negative for spasm.   Hydrocephalus:  EVD 5cm H20, ICP < 15.   Seizure prophylaxis: Levetiracetam 500mg bid. VEEG moderate slowing. No seizures seen for 48 hours.  Sedation: PRN fentanyl pushes  REHAB: Physical therapy evaluation and management. EARLY MOB:  HOB elevated  Neurologic outcome dismal in the setting of HH5 SAH with IPH and superimposed prolonged anoxic injury. CT head when stable ~11/13    PULM: Acute hypoxic respiratory failure. B/L pneumothoraces R>L. Tension on R.   In setting of difficult tracheostomy (subsequently aborted). New R pneumothorax (tension)- possibly in setting of positive pressure ventilation during code/compressions  Repeat CXR, concerning for L PTX as well. D/W pulm.   Bilateral needle decompressions followed by R pigtail catheter. Keep to suction per pulm [id number]). Plan for L pigtail  Daily CXR for PTX.   ABG reviewed. Vent settings adjusted.   Pulmonary consulted. Appreciate recommendations  ENT following.    CARDIO: PEA cardiac arrest in the setting of acute hypoxic arrest  See CODE BLUE NOTE   SBP goal 120-180mm Hg  On norepinephrine. Wean for above goals.   TTE EF 75%  EKG sinus tachycardia. Monitor EKG. Stat trops. Trend  Repeat ECHO  RIJ TLC, left femoral TLC    ENDO:    Blood sugar goals 140-180 mg/dL  Insulin sliding scale    GI:    NPO 11/12  Continue PPI  Check LFTs  Has rectal tube    RENAL: Hyperlactatemia, hyperphosphatemia  In setting of hypoperfusion. Hypokalemia 2/2 bicarb doses in setting of cardiac arrest  Avoid overcorrection of potassium  Monitor lactate Q6, CPK, phos, Mag, BMP.   Ureña. Strict Is/Os    HEM/ONC: Anemia  Monitor CBC. Check coags 11/12  Anti Xa level 0.26. Continue dose of lovenox  VTE Prophylaxis: SCDs, SQL; Doppler of upper extremities stable DVT R brachial and superficial thrombus.  Lowers neg  Monitor surveillance dopplers 11/17    ID: Leukocytosis  On cefepime for enterobacter/MSSA (coverage until 11/17)  Will need abx until neck dressing removed per ENT  ID following  Monitor for fevers    MISC:   Wound site C/D/I.  Wound care    Social: Family has been updated (son Jame and daughter Ariane)  Consider palliative care consult    *****  CORE MEASURES  1.  Hunt and Griffin Score = 5  2.  VTE prophylaxis:  [ ] administered within 24 hours of admission OR [X] reason not done: fresh bleed, unsecured aneurysm.  3.  Dysphagia screening:   [X] performed before any oral meds / liquids / food  4.  Nimodipine treatment:  [X] administered within 24 hours of admission OR [ ] reason not done:    *****    ATTENDING ATTESTATION:    Patient at high risk for neurological deterioration or death due to:  ICU delirium, aspiration PNA, DVT / PE.  Critical care time:  I have personally provided 55 minutes of critical care time, excluding time spent on separate procedures.    Plan discussed with RN, house staff.     45 y/o female HH5 MF 4 BD 19 with:   L MCA ruptured aneurysm, subarachnoid hemorrhage, s/p [facility] (10/26/2021, Dr. D'Amico @ [place]), brain compression, cerebral edema  Cardiac arrest secondary to acute hypoxic arrest  Bilateral pneumothoraces  Acute kidney injury  Anemia   Recent spinal surgery  Upper GI bleed    NEURO: Neurochecks Q1h  CT head once stable 11/14  DCI/ vasospasm: Resume nimodipine. Angio 11/10 negative for spasm.   Hydrocephalus:  EVD 5cm H20, ICP < 15. CT head 11/14.  Seizure prophylaxis: Levetiracetam 500mg bid. VEEG moderate slowing. No seizures seen for 48 hours.  Sedation: PRN fentanyl pushes  REHAB: Physical therapy evaluation and management. EARLY MOB:  HOB elevated  Neurologic outcome dismal in the setting of HH5 SAH with IPH and superimposed prolonged anoxic injury. CT head pending.     PULM: Acute hypoxic respiratory failure. B/L pneumothoraces R>L.  Bilateral needle decompressions followed by B/L pigtail catheter. Keep to suction per pulm [id number]).   Daily CXR for PTX- improving  ABG reviewed. Vent settings adjusted.   Pulmonary consulted. Appreciate recommendations  ENT following. OR planning when stable by ENT.     CARDIO: PEA cardiac arrest in the setting of acute hypoxic arrest  SBP goal 120-180mm Hg  Off norepinephrine  TTE EF 75% prior to arrest. Repeat ECHO pending  EKG sinus tachycardia. Monitor EKG.  Trend trops  RIJ TLC, left femoral TLC.     ENDO:    Blood sugar goals 140-180 mg/dL  Insulin sliding scale    GI:    TFs- Nepro at 10cc/hr  Continue PPI  Monitor LFTs  Has rectal tube    RENAL: Hyperlactatemia, hyperphosphatemia. Improving.   Monitor lactate, CPK, phos, Mag, BMP, LFTs.   Na 151. Monitor. Goal 140-150. DC florinef  Keep starks due to ATN.  Check urine Na, urine Cr, urine urea.   Renal consult for ATN, oliguria.   LR @100cc/hr.      HEM/ONC: Anemia  Hb 7.9. Transfused 1unit PRBC.  Check coags 11/13  Anti Xa level 0.26. Continue dose of lovenox  VTE Prophylaxis: SCDs, SQL; Doppler of upper extremities stable DVT R brachial and superficial thrombus.  Lowers neg  Monitor surveillance dopplers 11/17    ID: Leukocytosis  On cefepime for enterobacter/MSSA (coverage until 11/14- which will be a total of 7 days of cefepime)  Will need abx (ancef) until neck dressing removed per ENT  ID following  Monitor for fevers  Monitor procal    MISC:   Wound site C/D/I.  Wound care    Social: Family has been updated (son Jame and daughter Ariane)  Consider palliative care consult for support if no improvement ~24-48hrs    *****  CORE MEASURES  1.  Hunt and Griffin Score = 5  2.  VTE prophylaxis:  [ ] administered within 24 hours of admission OR [X] reason not done: fresh bleed, unsecured aneurysm.  3.  Dysphagia screening:   [X] performed before any oral meds / liquids / food  4.  Nimodipine treatment:  [X] administered within 24 hours of admission OR [ ] reason not done:    *****    ATTENDING ATTESTATION:    Patient at high risk for neurological deterioration or death due to:  ICU delirium, aspiration PNA, DVT / PE.  Critical care time:  I have personally provided 55 minutes of critical care time, excluding time spent on separate procedures.    Plan discussed with RN, house staff.     47 y/o female HH5 MF 4 BD 19 with:   L MCA ruptured aneurysm, subarachnoid hemorrhage, s/p [facility] (10/26/2021, Dr. D'Amico @ [place]), brain compression, cerebral edema  Cardiac arrest secondary to acute hypoxic arrest  Bilateral pneumothoraces  Acute kidney injury  Anemia   Recent spinal surgery  Upper GI bleed    NEURO: Neurochecks Q1h  CT head once stable 11/14  DCI/ vasospasm: Resume nimodipine. Angio 11/10 negative for spasm.   Hydrocephalus:  EVD 5cm H20, ICP < 15. CT head 11/14.  Seizure prophylaxis: Levetiracetam 500mg bid. VEEG moderate slowing. No seizures seen for 48 hours.  Sedation: PRN fentanyl pushes  REHAB: Physical therapy evaluation and management. EARLY MOB:  HOB elevated  Neurologic outcome dismal in the setting of HH5 SAH with IPH and superimposed prolonged anoxic injury. CT head pending.     PULM: Acute hypoxic respiratory failure. B/L pneumothoraces R>L.  Bilateral needle decompressions followed by B/L pigtail catheter. Keep to suction per pulm (-[id number]).   Daily CXR for PTX- improving  ABG reviewed. Vent settings adjusted.   Pulmonary consulted. Appreciate recommendations  ENT following. OR planning when stable by ENT.     CARDIO: PEA cardiac arrest in the setting of acute hypoxic arrest  SBP goal 120-160mm Hg  Off norepinephrine  TTE EF 75% prior to arrest. Repeat ECHO pending  EKG sinus tachycardia. Monitor EKG.  Trend trops  RIJ TLC, left femoral TLC.     ENDO:    Blood sugar goals 140-180 mg/dL  Insulin sliding scale    GI:    TFs- Nepro at 10cc/hr  Continue PPI  Monitor LFTs  Has rectal tube    RENAL: Hyperlactatemia, hyperphosphatemia. Improving.   Monitor lactate, CPK, phos, Mag, BMP, LFTs.   Na 151. Monitor. Goal 140-150. DC florinef  Keep starks due to ATN.  Check urine Na, urine Cr, urine urea.   Renal consult for ATN, oliguria.   LR @100cc/hr.      HEM/ONC: Anemia  Hb 7.9. Transfused 1unit PRBC.  Check coags 11/13  Anti Xa level 0.26. Continue dose of lovenox  VTE Prophylaxis: SCDs, SQL; Doppler of upper extremities stable DVT R brachial and superficial thrombus.  Lowers neg  Monitor surveillance dopplers 11/17    ID: Leukocytosis  On cefepime for enterobacter/MSSA (coverage until 11/14- which will be a total of 7 days of cefepime)  Will need abx (ancef) until neck dressing removed per ENT  ID following  Monitor for fevers  Monitor procal    MISC:   Wound site C/D/I.  Wound care    Social: Family has been updated (son Jame and daughter Ariane)  Consider palliative care consult for support if no improvement ~24-48hrs    *****  CORE MEASURES  1.  Hunt and Griffin Score = 5  2.  VTE prophylaxis:  [ ] administered within 24 hours of admission OR [X] reason not done: fresh bleed, unsecured aneurysm.  3.  Dysphagia screening:   [X] performed before any oral meds / liquids / food  4.  Nimodipine treatment:  [X] administered within 24 hours of admission OR [ ] reason not done:    *****    ATTENDING ATTESTATION:    Patient at high risk for neurological deterioration or death due to:  ICU delirium, aspiration PNA, DVT / PE.  Critical care time:  I have personally provided 55 minutes of critical care time, excluding time spent on separate procedures.    Plan discussed with RN, house staff.     45 y/o female HH5 MF 4 BD 19 with:   L MCA ruptured aneurysm, subarachnoid hemorrhage, s/p [facility] (10/26/2021, Dr. D'Amico @ [place]), brain compression, cerebral edema  Cardiac arrest secondary to acute hypoxic arrest  Bilateral pneumothoraces  Acute kidney injury  Anemia   Recent spinal surgery  Upper GI bleed    NEURO: Neurochecks Q1h  CT head once stable 11/14  DCI/ vasospasm: Resume nimodipine. Angio 11/10 negative for spasm.   Hydrocephalus:  EVD 5cm H20, ICP < 15. CT head 11/14.  Seizure prophylaxis: Levetiracetam 500mg bid. VEEG moderate slowing. No seizures seen for 48 hours.  Sedation: PRN fentanyl pushes  REHAB: Physical therapy evaluation and management. EARLY MOB:  HOB elevated  Neurologic outcome dismal in the setting of HH5 SAH with IPH and superimposed prolonged anoxic injury. CT head pending.     PULM: Acute hypoxic respiratory failure. B/L pneumothoraces R>L.  Bilateral needle decompressions followed by B/L pigtail catheter. Keep to suction per pulm [id number]).   Daily CXR for PTX- improving  ABG reviewed. Vent settings adjusted.   Pulmonary consulted. Appreciate recommendations  ENT following. OR planning when stable by ENT.     CARDIO: PEA cardiac arrest in the setting of acute hypoxic arrest  SBP goal 120-160mm Hg  Off norepinephrine  TTE EF 75% prior to arrest. Repeat ECHO pending  EKG sinus tachycardia. Monitor EKG.  Trend trops  RIJ TLC, left femoral TLC.     ENDO:    Blood sugar goals 140-180 mg/dL  Insulin sliding scale    GI:    TFs- Nepro at 10cc/hr  Continue PPI  Monitor LFTs. Shock liver  Has rectal tube    RENAL: Hyperlactatemia, hyperphosphatemia. Improving.   Monitor lactate, CPK, phos, Mag, BMP, LFTs.   Na 151. Monitor. Goal 140-150. DC florinef  Keep starks due to ATN.  Check urine Na, urine Cr, urine urea.   Renal consult for ATN, oliguria.   LR @100cc/hr.      HEM/ONC: Anemia  Hb 7.9. Transfused 1unit PRBC.  Check coags 11/13  Anti Xa level 0.26. Continue dose of lovenox  VTE Prophylaxis: SCDs, SQL; Doppler of upper extremities stable DVT R brachial and superficial thrombus.  Lowers neg  Monitor surveillance dopplers 11/17    ID: Leukocytosis  On cefepime for enterobacter/MSSA (coverage until 11/14- which will be a total of 7 days of cefepime)  Will need abx (ancef) until neck dressing removed per ENT  ID following  Monitor for fevers  Monitor procal    MISC:   Wound site C/D/I.  Wound care    Social: Family has been updated (son Jame and daughter Ariane)  Consider palliative care consult for support if no improvement ~24-48hrs    *****  CORE MEASURES  1.  Hunt and Griffin Score = 5  2.  VTE prophylaxis:  [ ] administered within 24 hours of admission OR [X] reason not done: fresh bleed, unsecured aneurysm.  3.  Dysphagia screening:   [X] performed before any oral meds / liquids / food  4.  Nimodipine treatment:  [X] administered within 24 hours of admission OR [ ] reason not done:    *****    ATTENDING ATTESTATION:    Patient at high risk for neurological deterioration or death due to:  ICU delirium, aspiration PNA, DVT / PE.  Critical care time:  I have personally provided 55 minutes of critical care time, excluding time spent on separate procedures.    Plan discussed with RN, house staff.

## 2021-11-14 DIAGNOSIS — J93.9 PNEUMOTHORAX, UNSPECIFIED: ICD-10-CM

## 2021-11-14 LAB
ALBUMIN SERPL ELPH-MCNC: 2.9 G/DL — LOW (ref 3.3–5)
ALBUMIN SERPL ELPH-MCNC: 3 G/DL — LOW (ref 3.3–5)
ALP SERPL-CCNC: 90 U/L — SIGNIFICANT CHANGE UP (ref 40–120)
ALP SERPL-CCNC: 96 U/L — SIGNIFICANT CHANGE UP (ref 40–120)
ALT FLD-CCNC: 318 U/L — HIGH (ref 10–45)
ALT FLD-CCNC: 341 U/L — HIGH (ref 10–45)
ANION GAP SERPL CALC-SCNC: 11 MMOL/L — SIGNIFICANT CHANGE UP (ref 5–17)
ANION GAP SERPL CALC-SCNC: 15 MMOL/L — SIGNIFICANT CHANGE UP (ref 5–17)
APTT BLD: 29.2 SEC — SIGNIFICANT CHANGE UP (ref 27.5–35.5)
AST SERPL-CCNC: 268 U/L — HIGH (ref 10–40)
AST SERPL-CCNC: 433 U/L — HIGH (ref 10–40)
BASE EXCESS BLDA CALC-SCNC: 0.1 MMOL/L — SIGNIFICANT CHANGE UP (ref -2–3)
BILIRUB SERPL-MCNC: 0.2 MG/DL — SIGNIFICANT CHANGE UP (ref 0.2–1.2)
BILIRUB SERPL-MCNC: 0.2 MG/DL — SIGNIFICANT CHANGE UP (ref 0.2–1.2)
BLD GP AB SCN SERPL QL: NEGATIVE — SIGNIFICANT CHANGE UP
BUN SERPL-MCNC: 30 MG/DL — HIGH (ref 7–23)
BUN SERPL-MCNC: 38 MG/DL — HIGH (ref 7–23)
CALCIUM SERPL-MCNC: 8.8 MG/DL — SIGNIFICANT CHANGE UP (ref 8.4–10.5)
CALCIUM SERPL-MCNC: 8.9 MG/DL — SIGNIFICANT CHANGE UP (ref 8.4–10.5)
CHLORIDE SERPL-SCNC: 111 MMOL/L — HIGH (ref 96–108)
CHLORIDE SERPL-SCNC: 113 MMOL/L — HIGH (ref 96–108)
CK SERPL-CCNC: 170 U/L — SIGNIFICANT CHANGE UP (ref 25–170)
CO2 BLDA-SCNC: 26 MMOL/L — HIGH (ref 19–24)
CO2 SERPL-SCNC: 21 MMOL/L — LOW (ref 22–31)
CO2 SERPL-SCNC: 24 MMOL/L — SIGNIFICANT CHANGE UP (ref 22–31)
CREAT SERPL-MCNC: 3.99 MG/DL — HIGH (ref 0.5–1.3)
CREAT SERPL-MCNC: 4.71 MG/DL — HIGH (ref 0.5–1.3)
GLUCOSE SERPL-MCNC: 111 MG/DL — HIGH (ref 70–99)
GLUCOSE SERPL-MCNC: 125 MG/DL — HIGH (ref 70–99)
HCO3 BLDA-SCNC: 25 MMOL/L — SIGNIFICANT CHANGE UP (ref 21–28)
HCT VFR BLD CALC: 26.8 % — LOW (ref 34.5–45)
HCT VFR BLD CALC: 27.2 % — LOW (ref 34.5–45)
HGB BLD-MCNC: 8.2 G/DL — LOW (ref 11.5–15.5)
HGB BLD-MCNC: 8.5 G/DL — LOW (ref 11.5–15.5)
INR BLD: 1.24 — HIGH (ref 0.88–1.16)
MAGNESIUM SERPL-MCNC: 2.3 MG/DL — SIGNIFICANT CHANGE UP (ref 1.6–2.6)
MCHC RBC-ENTMCNC: 25.4 PG — LOW (ref 27–34)
MCHC RBC-ENTMCNC: 26.4 PG — LOW (ref 27–34)
MCHC RBC-ENTMCNC: 30.6 GM/DL — LOW (ref 32–36)
MCHC RBC-ENTMCNC: 31.3 GM/DL — LOW (ref 32–36)
MCV RBC AUTO: 83 FL — SIGNIFICANT CHANGE UP (ref 80–100)
MCV RBC AUTO: 84.5 FL — SIGNIFICANT CHANGE UP (ref 80–100)
NRBC # BLD: 0 /100 WBCS — SIGNIFICANT CHANGE UP (ref 0–0)
NRBC # BLD: 0 /100 WBCS — SIGNIFICANT CHANGE UP (ref 0–0)
PCO2 BLDA: 39 MMHG — HIGH (ref 32–35)
PH BLDA: 7.41 — SIGNIFICANT CHANGE UP (ref 7.35–7.45)
PHOSPHATE SERPL-MCNC: 4.9 MG/DL — HIGH (ref 2.5–4.5)
PLATELET # BLD AUTO: 245 K/UL — SIGNIFICANT CHANGE UP (ref 150–400)
PLATELET # BLD AUTO: 253 K/UL — SIGNIFICANT CHANGE UP (ref 150–400)
PO2 BLDA: 121 MMHG — HIGH (ref 83–108)
POTASSIUM SERPL-MCNC: 3.9 MMOL/L — SIGNIFICANT CHANGE UP (ref 3.5–5.3)
POTASSIUM SERPL-MCNC: 4 MMOL/L — SIGNIFICANT CHANGE UP (ref 3.5–5.3)
POTASSIUM SERPL-SCNC: 3.9 MMOL/L — SIGNIFICANT CHANGE UP (ref 3.5–5.3)
POTASSIUM SERPL-SCNC: 4 MMOL/L — SIGNIFICANT CHANGE UP (ref 3.5–5.3)
PROCALCITONIN SERPL-MCNC: 34.84 NG/ML — HIGH (ref 0.02–0.1)
PROT SERPL-MCNC: 6.5 G/DL — SIGNIFICANT CHANGE UP (ref 6–8.3)
PROT SERPL-MCNC: 6.8 G/DL — SIGNIFICANT CHANGE UP (ref 6–8.3)
PROTHROM AB SERPL-ACNC: 14.7 SEC — HIGH (ref 10.6–13.6)
RBC # BLD: 3.22 M/UL — LOW (ref 3.8–5.2)
RBC # BLD: 3.23 M/UL — LOW (ref 3.8–5.2)
RBC # FLD: 21.8 % — HIGH (ref 10.3–14.5)
RBC # FLD: 22 % — HIGH (ref 10.3–14.5)
RH IG SCN BLD-IMP: POSITIVE — SIGNIFICANT CHANGE UP
SAO2 % BLDA: 99.1 % — HIGH (ref 94–98)
SODIUM SERPL-SCNC: 147 MMOL/L — HIGH (ref 135–145)
SODIUM SERPL-SCNC: 148 MMOL/L — HIGH (ref 135–145)
TROPONIN T SERPL-MCNC: 0.1 NG/ML — CRITICAL HIGH (ref 0–0.01)
WBC # BLD: 14.2 K/UL — HIGH (ref 3.8–10.5)
WBC # BLD: 14.26 K/UL — HIGH (ref 3.8–10.5)
WBC # FLD AUTO: 14.2 K/UL — HIGH (ref 3.8–10.5)
WBC # FLD AUTO: 14.26 K/UL — HIGH (ref 3.8–10.5)

## 2021-11-14 PROCEDURE — 99233 SBSQ HOSP IP/OBS HIGH 50: CPT

## 2021-11-14 PROCEDURE — 71045 X-RAY EXAM CHEST 1 VIEW: CPT | Mod: 26,77

## 2021-11-14 PROCEDURE — 93970 EXTREMITY STUDY: CPT | Mod: 26

## 2021-11-14 PROCEDURE — 99291 CRITICAL CARE FIRST HOUR: CPT

## 2021-11-14 PROCEDURE — 71045 X-RAY EXAM CHEST 1 VIEW: CPT | Mod: 26

## 2021-11-14 RX ORDER — AMANTADINE HCL 100 MG
68.5 CAPSULE ORAL DAILY
Refills: 0 | Status: DISCONTINUED | OUTPATIENT
Start: 2021-11-14 | End: 2021-11-15

## 2021-11-14 RX ADMIN — CHLORHEXIDINE GLUCONATE 15 MILLILITER(S): 213 SOLUTION TOPICAL at 05:05

## 2021-11-14 RX ADMIN — AMLODIPINE BESYLATE 5 MILLIGRAM(S): 2.5 TABLET ORAL at 05:05

## 2021-11-14 RX ADMIN — CHLORHEXIDINE GLUCONATE 1 APPLICATION(S): 213 SOLUTION TOPICAL at 06:00

## 2021-11-14 RX ADMIN — Medication 1 DROP(S): at 05:06

## 2021-11-14 RX ADMIN — Medication 25 MILLIGRAM(S): at 05:05

## 2021-11-14 RX ADMIN — FENTANYL CITRATE 50 MICROGRAM(S): 50 INJECTION INTRAVENOUS at 22:19

## 2021-11-14 RX ADMIN — MODAFINIL 200 MILLIGRAM(S): 200 TABLET ORAL at 11:08

## 2021-11-14 RX ADMIN — SODIUM CHLORIDE 500 MILLILITER(S): 9 INJECTION, SOLUTION INTRAVENOUS at 01:34

## 2021-11-14 RX ADMIN — NIMODIPINE 60 MILLIGRAM(S): 60 SOLUTION ORAL at 11:08

## 2021-11-14 RX ADMIN — Medication 1 DROP(S): at 17:16

## 2021-11-14 RX ADMIN — NIMODIPINE 60 MILLIGRAM(S): 60 SOLUTION ORAL at 17:16

## 2021-11-14 RX ADMIN — Medication 4 MILLILITER(S): at 05:24

## 2021-11-14 RX ADMIN — PANTOPRAZOLE SODIUM 40 MILLIGRAM(S): 20 TABLET, DELAYED RELEASE ORAL at 11:08

## 2021-11-14 RX ADMIN — Medication 4 MILLILITER(S): at 22:09

## 2021-11-14 RX ADMIN — Medication 68.5 MILLIGRAM(S): at 11:08

## 2021-11-14 RX ADMIN — Medication 25 MILLIGRAM(S): at 17:16

## 2021-11-14 RX ADMIN — LEVETIRACETAM 500 MILLIGRAM(S): 250 TABLET, FILM COATED ORAL at 05:05

## 2021-11-14 RX ADMIN — FENTANYL CITRATE 50 MICROGRAM(S): 50 INJECTION INTRAVENOUS at 19:19

## 2021-11-14 RX ADMIN — NIMODIPINE 60 MILLIGRAM(S): 60 SOLUTION ORAL at 01:51

## 2021-11-14 RX ADMIN — NIMODIPINE 60 MILLIGRAM(S): 60 SOLUTION ORAL at 14:27

## 2021-11-14 RX ADMIN — LEVETIRACETAM 500 MILLIGRAM(S): 250 TABLET, FILM COATED ORAL at 17:16

## 2021-11-14 RX ADMIN — NIMODIPINE 60 MILLIGRAM(S): 60 SOLUTION ORAL at 22:09

## 2021-11-14 RX ADMIN — FENTANYL CITRATE 50 MICROGRAM(S): 50 INJECTION INTRAVENOUS at 11:18

## 2021-11-14 RX ADMIN — Medication 4 MILLILITER(S): at 16:41

## 2021-11-14 RX ADMIN — CEFEPIME 100 MILLIGRAM(S): 1 INJECTION, POWDER, FOR SOLUTION INTRAMUSCULAR; INTRAVENOUS at 11:08

## 2021-11-14 RX ADMIN — CHLORHEXIDINE GLUCONATE 15 MILLILITER(S): 213 SOLUTION TOPICAL at 17:16

## 2021-11-14 RX ADMIN — FENTANYL CITRATE 50 MICROGRAM(S): 50 INJECTION INTRAVENOUS at 11:40

## 2021-11-14 RX ADMIN — FENTANYL CITRATE 50 MICROGRAM(S): 50 INJECTION INTRAVENOUS at 23:00

## 2021-11-14 RX ADMIN — NIMODIPINE 60 MILLIGRAM(S): 60 SOLUTION ORAL at 05:59

## 2021-11-14 NOTE — PROGRESS NOTE ADULT - SUBJECTIVE AND OBJECTIVE BOX
HPI:  47 y/o female with PMH HTN, Multiple sclerosis, recent spinal surgery 10/8 ([facility]) presented to [facility] BIBEMS after being found unconscious at home, unknown period of time. 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 HH5, MF1. Patient was intubated at [facility], found to have blown L pupil, and sent straight to the OR for left hemicraniotomy. A-line placed intraop. Hemodynamically unstable upon arrival to [facility], bradycardic and hypertensive s/p Mannitol, Clevidipine, and Furosemide. Central line placed in NSICU. Currently sedated on Propofol, pending repeat CTH. History per patient's son, patient had recent spine surgery and was found on outpatient imaging last week to have L M1 segment aneurysm (unruptured), pt asymptomatic at this time. Per son patient taking percocet PO at home. Ureña catheter, ET tube, and arterial line placed at [facility].     NIHSS on arrival: 32   Bess Griffin 5, Mod Busby 4  Bleed Day 1 = 10/26 (26 Oct 2021 13:03)    OVERNIGHT EVENTS: Neurologically and hemodynamically stable, worsening kidney function with possible need for CVVHD today.    HOSPITAL COURSE:   10/26: admitted to [facility] from [facility], POD#0 s/p L hemicraniectomy for decompression and evacuation of SDH. Transferred to [facility] noted to have tense flap, received mannitol, keppra, decadron. Was hypertensive to 200s and preston to 40s, recevied 85g mannitol and bullet 23.4%. CTH on arrival demonstrated increased size in vents and new IVH. EVD placed, open at 15, central line placed. Transfused 2 u PRBC. POD0 of coiling of left MCA bifurcation aneurysm,   10/27: CTH demonstrated EVD in correct position, EVD lowered to 5, remains intubated on proprofol, pending repeat CTH in AM. Started on 3% @ 30/hr. 2LNS given for euvolemia, Mannitol given for uptrending ICPs. Bullet given 8:30 am. 3% increased to 50/hr. 1 L bolus. New EVD catheter placed using exisiting sreekanth hole. 1 u PRBC.  10/28: HH5, MF4, BD3; POD2 angio coil/embo of L MCA aneurysm. JOAQUÍN overnight, neuro stable. EVD@5cmH20 ICPs < 20 overnight, OP WNL. S/p 1 unit PRBC yesterday with appropriate response. Given 42g mannitol in AM for ICP 22 persistently, with improvement, then given 23% in PM for elevated ICP. febrile, pancultured. Standing tylenol and cooling blanket.   10/29: BD4, POD3 angio coil/embo. JOAQUÍN o/n, neuro stable. EVD@5, ICPs <20 o/n.   10/30: BD5, POD4 angio coil/embo. JOAQUÍN o/n neuro stable. EVD@5. 3% @50cc/hr.  10/31: BD6, POD5 angio coil/embo. brief period maintained upward gaze, resolved on its own. EVD@5cm h2o.    11/1: BD7, POD6 L hemicrani, angio coil/embo. JOAQUÍN overnight. Neuro exam unchanged. ICPs WNL. started bromocriptine/gabapentin/baclofen. dc'd propofol, started precedex  11/2: BD8 POD7 L hemicrani, angio coil/embo. JOAQUÍN overnight. Neuro exam stable. Remains on 3% hypertonic saline. Receieved 1 unit of PRBCs for a Hgb of 7.6.  11/3: BD 9 POD8 of L hemicrani. Elevated lipase, normal amylase, OG tube clogged and replaced. Abdominal US normal. Pending gen surg reccs regarding trach and peg. Gabapentin and Baclofen increased to help with neurostorming. restarted back on 3%, LR@35. became preston+hypotensive with nimodipine 60q4, decreased back to 30q2, NaCl bullet given.   11/4: BD10 POD9, JOAQUÍN o/n, 500cc NS for euvolemia,  neuro stable, EVD at 5. 3% increased to 75  11/5: BD11 POD10, JOAQUÍN o/n, neuro stable, EVD at 5  11/6: BD12 POD11 JOAQUÍN overnight. Neuro exam stable. Remains intubated on precedex. 3% hypertonic saline dc'd  11/7: BD13 POD 12 JOAQUÍN o/n, NGT replaced. on precex with no icp crisis   11/8: BD14 POD13 JOAQUÍN o/n, noted to have tongue maceration/macroglossia. Gauze with tongue depressor placed. Pending further recs from ENT. Pending trach and PEG placement tomorrow with gen surg. Off precedex, ICPs stable. EVD remains @ 5.   11/9: BD15, POD 13, bleeding under tongue at start of shift, fentanyl pushed with no further episodes. gabapentin stopped. PEG placed  11/10: BD 16, POD 14, neuro stable, ENT to be consulted in AM, 3% increased to 50/hr.  11/11: BD 17, POD 15, neuro exam stable. Pend CT saba in am for cranioplasty planning. Pend trach in OR with ENT. F.u BMP in am, 3%@50cc/hr for Na goal 140-145.   11/12: BD 18, POD 16. JOAQUÍN overnight, neuro stable. Pend trach placement today. CT saba completed 11/11. Off of 3%, f/u am BMP for Na goal 140-150. CSF neg. Hypoxic cardiac arrest with ROSC x 3 during tracheostomy placement. B/l chest tubes placed for resulting pneumothorax s/p CPR. salt tabs dc'd.  11/13: BD 19, POD 17. Patient tachycardic with some improvement, SBP stable off of levophed, hgb downtrending o/n, transfused 1 unit PRBCs. B/l chest tube. EVD @5cmH2O, no elevated ICPs. UOP downtrending, trend BUN/Cr, given 1LNS x1 for low urine output. F/u am CXR. Cont Cefepime until today, then change to Ancef while trach packing in place per ENT. Pend CTH when stable. Trops downtrending s/p cardiac arrest. 1L. florinef dc'd. BP goal changed to 100-160, started on amlodipine   11/14: BD20, POD18, worsening creatinine with decreased urine output. Given 250 of albumin and 500cc LR. started on hydralazine PO, decreased amantadine 100 daily given CrCl of 20.        Vital Signs Last 24 Hrs  T(C): 36.9 (14 Nov 2021 00:33), Max: 37.2 (13 Nov 2021 06:00)  T(F): 98.4 (14 Nov 2021 00:33), Max: 99 (13 Nov 2021 06:00)  HR: 90 (14 Nov 2021 01:00) (76 - 98)  BP: 143/77 (14 Nov 2021 01:00) (143/77 - 192/100)  BP(mean): 104 (14 Nov 2021 01:00) (103 - 138)  RR: 15 (14 Nov 2021 01:00) (14 - 22)  SpO2: 100% (14 Nov 2021 01:00) (95% - 100%)    I&O's Summary    12 Nov 2021 07:01  -  13 Nov 2021 07:00  --------------------------------------------------------  IN: 4238 mL / OUT: 930 mL / NET: 3308 mL    13 Nov 2021 07:01  -  14 Nov 2021 01:07  --------------------------------------------------------  IN: 960 mL / OUT: 859 mL / NET: 101 mL        PHYSICAL EXAM:  General: NAD, pt is comfortably  laying in hospital bed, +intubated on full vent support   HEENT: PERRL 2-3mm, OE spont, b/l corneals, blinks to threat b/l, +dysconjugate gaze    Cardiovascular: RRR, normal S1 and S2   Respiratory: lungs CTAB, no wheezing, rhonchi, or crackles, b/l chest tubes to suction   GI: normoactive BS to auscultation, abd soft, NTND, +PEG   Neuro: nonverbal, not answering questions, OE spont but not to command  b/l UE extensor posture, b/l LE triple flex to noxious  R>L   Extremities: distal pulses 2+ x4   Wound/incision: L hemicrani incision site w/ staples C/D/I, flap soft and full. B/l posterior spinal surgical incision sites with improving areas of wound dehiscence   Drains: EVD @5cmH2O     TUBES/LINES:  [] Ureña  [] Lumbar Drain  [] Wound Drains  [X] Others - R IJ central line, L femoral central line, A-line, EVD, b/l chest tubes.      DIET:  [] NPO  [] Mechanical  [X] Tube feeds    LABS:                        8.8    14.04 )-----------( 259      ( 13 Nov 2021 22:16 )             28.3     11-13    150<H>  |  116<H>  |  33<H>  ----------------------------<  114<H>  3.9   |  22  |  3.83<H>    Ca    9.0      13 Nov 2021 22:16  Phos  5.5     11-13  Mg     2.4     11-13    TPro  6.7  /  Alb  2.7<L>  /  TBili  0.2  /  DBili  x   /  AST  199<H>  /  ALT  127<H>  /  AlkPhos  80  11-13    PT/INR - ( 13 Nov 2021 10:25 )   PT: 15.5 sec;   INR: 1.31          PTT - ( 13 Nov 2021 10:25 )  PTT:30.0 sec    CARDIAC MARKERS ( 13 Nov 2021 22:16 )  x     / 0.10 ng/mL / 207 U/L / x     / x      CARDIAC MARKERS ( 13 Nov 2021 10:25 )  x     / 0.13 ng/mL / 200 U/L / x     / x      CARDIAC MARKERS ( 13 Nov 2021 02:24 )  x     / 0.19 ng/mL / 170 U/L / x     / 6.3 ng/mL  CARDIAC MARKERS ( 12 Nov 2021 21:08 )  x     / 0.26 ng/mL / 165 U/L / x     / 7.5 ng/mL  CARDIAC MARKERS ( 12 Nov 2021 15:17 )  x     / 0.36 ng/mL / 159 U/L / x     / 8.4 ng/mL  CARDIAC MARKERS ( 12 Nov 2021 09:59 )  x     / 0.02 ng/mL / x     / x     / x          CAPILLARY BLOOD GLUCOSE          Drug Levels: [] N/A  Vancomycin Level, Trough: 11.7 ug/mL (11-07 @ 06:22)    CSF Analysis: [] N/A      Allergies    No Known Allergies    Intolerances      MEDICATIONS:  Antibiotics:  cefepime   IVPB 2000 milliGRAM(s) IV Intermittent every 24 hours    Neuro:  acetaminophen    Suspension .. 650 milliGRAM(s) Oral every 6 hours PRN  amantadine Syrup 100 milliGRAM(s) Oral daily  fentaNYL    Injectable 50 MICROGram(s) IV Push every 2 hours PRN  levETIRAcetam  Solution 500 milliGRAM(s) Oral two times a day  modafinil 200 milliGRAM(s) Oral daily  ondansetron Injectable 4 milliGRAM(s) IV Push every 6 hours PRN    Anticoagulation:    OTHER:  acetylcysteine 20%  Inhalation 4 milliLiter(s) Inhalation three times a day  amLODIPine   Tablet 5 milliGRAM(s) Oral daily  artificial  tears Solution 1 Drop(s) Both EYES two times a day  chlorhexidine 0.12% Liquid 15 milliLiter(s) Oral Mucosa every 12 hours  chlorhexidine 2% Cloths 1 Application(s) Topical <User Schedule>  hydrALAZINE 25 milliGRAM(s) Oral every 12 hours  niMODipine 60 milliGRAM(s) Oral every 4 hours  pantoprazole   Suspension 40 milliGRAM(s) Oral daily    IVF:  lactated ringers Bolus 500 milliLiter(s) IV Bolus once  lactated ringers. 1000 milliLiter(s) IV Continuous <Continuous>    CULTURES:  Culture Results:   No growth to date (11-11 @ 17:53)  Culture Results:   Numerous Staphylococcus aureus  Numerous Enterobacter cloacae complex  Moderate Proteus mirabilis  Accompanied by normal respiratory melinda (11-04 @ 13:59)    RADIOLOGY & ADDITIONAL TESTS:  CXR 11/13/2021: IMPRESSION: Improvement subcutaneous emphysema. Improvement right lung infiltrates. Pneumoperitoneum noted unchanged. Status post placement left chest tube. No evidence of pneumothorax      ASSESSMENT:  47 y/o female with PMHx of HTN and MS, hx of spinal surgery at [facility] 10/8/21 presented to [facility] after being found unconscious at home, unknown period of time. 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 HH5, MF4; BD #1 = 10/26. Patient was intubated at [facility] and sent straight to the OR for left hemicraniotomy. A-line placed intraop. Hemodynamically unstable upon arrival to [facility], bradycardic and hypertensive s/p Mannitol and Furosemide. Central line placed in NSICU. S/p EVD placement, and coil embolization of left MCA bifurcation aneurysm 10/26/2021. S/p cerebral angio without findings of vasospasm 11/10. S/p cardiac arrest with ROSC x3 on 11/12 during tracheostomy at bedside now with b/l pneumothoracies and worsening kidney function.      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, left    Intracranial hemorrhage, spontaneous subarachnoid, due to cerebral aneurysm, acute    Angiogram, cerebral, with coil embolization, in non-operating room setting    Endoscopic percutaneous gastrostomy    Angiogram, carotid and cerebral, bilateral    Chest tube placement    Insertion of central venous catheter with ultrasound guidance    Personal history of spine surgery    [email or web address]        PLAN:  NEURO:  - neuro checks/vital signs q1hr  - Keppra 500mg IV BID   - VEEG negative for seizures, now dc  - Nimodipine 60q4  - fentanyl pushes PRN   - EVD open at 5cmH2O, monitor ICP/output  - CTA 11/1 with findings of moderate anterior circulation spasm   - CTH 11/8 stable   - Modafinil and amantadine decreased to 100 daily.  - CTH  11/14 if stable    CARDIO:  - -160  - amlodipine 5 daily and hydral 25 PO BID   - s/p cardiac arrest, pending TTE  - Repeat echo 11/13  - troponins downtrending     PULM:  - intubated on full vent support  - b/l chest tubes for pneumothorax to -[id number]   - s/p acute hypoxic cardiac arrest 11/12 during tracheostomy placment with ENT c/b b/l pneumothorax now with b/l chest tubes   - daily CXR  - will need eventual trach revision with ENT      GI:  - PEG trickle feeds, Nepro  - PPI QD GI ppx  - Abd US: neg   - rectal tube  - trend elevated LFTs    RENAL:  - florinef and salt tabs dc'd  - LR@100cc/hr while NPO  - euvolemia goal   - Na 140-150  - Ureña in place  - Likely ATN, nephro consulted   - Likely CVVHD tomorrow     HEME:  - SCDs   - s/p 2u PRBC, s/p 1u PRBC 10/27, s/p 1u PRBC 11/02, s/p 1u PRBC 11/14   - monitor H+H  - 11/9 unchanged DVT L brachial and unchanged R superficial cephalic thrombus,  repeat doppler 11/14  - 11/7 LE dopplers neg, repeat 11/14  - TIBC, ferritin, retic count, iron saturation ordered 11/13, f/u labs     ID:  - leukocytosis and procal, trend   - Tylenol PRN for fever  - sputum cx 11/4:  enterobacter, proteus  - Cefepime started to cover for enterobacter/proteus in sputum, end 11/14 (total 7 days), then transition to Ancef until trach packing removed  - trend procal     ENDO:  - ISS   - monitor FS    OTHER  - wound care recs- q2 dressing changes   - ENT consulted, reccomends trach placement and oral hygeine    GOC: full code  Level of care: ICU status   Dispo: pend EVD, trach  family updated    Case discussed with Dr. Duncan , Dr Menard    Assessment:  Present when checked    []  GCS  E   V  M     Heart Failure: []Acute, [] acute on chronic , []chronic  Heart Failure:  [] Diastolic (HFpEF), [] Systolic (HFrEF), []Combined (HFpEF and HFrEF), [] RHF, [] Pulm HTN, [] Other    [] CHETAN, [] ATN, [] AIN, [] other  [] CKD1, [] CKD2, [] CKD 3, [] CKD 4, [] CKD 5, []ESRD    Encephalopathy: [] Metabolic, [] Hepatic, [] toxic, [] Neurological, [] Other    Abnormal Nurtitional Status: [] malnurtition (see nutrition note), [ ]underweight: BMI < 19, [] morbid obesity: BMI >40, [] Cachexia    [] Sepsis  [] hypovolemic shock,[] cardiogenic shock, [] hemorrhagic shock, [] neuogenic shock  [] Acute Respiratory Failure  []Cerebral edema, [] Brain compression/ herniation,   [] Functional quadriplegia  [] Acute blood loss anemia

## 2021-11-14 NOTE — PROGRESS NOTE ADULT - SUBJECTIVE AND OBJECTIVE BOX
=======================================   NEUROCRITICAL CARE ATTENDING NOTE   =======================================  ARJUN, AILYN     HPI:  Patient is a 45 y/o female with PMH HTN, multiple sclerosis, recent spinal surgery 10/8 ([facility]) presented to [place] ED BIBEMS after being found unconscious at home, unknown period of time. 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 HH5, MF1. Patient was intubated at [place] ED, found to have blown L pupil, and sent straight to the OR for left hemicraniotomy. A-line placed intraop. Hemodynamically unstable upon arrival to [facility], bradycardic and hypertensive s/p Mannitol, Clevidipine, and Furosemide. Central line placed in NSICU. Currently sedated on Propofol, pending repeat CTH. History per patient's son, patient had recent spine surgery and was found on outpatient imaging last week to have L M1 segment aneurysm (unruptured), pt asymptomatic at this time. Per son patient taking percocet PO at home. Ureña catheter, ET tube, and arterial line placed at [facility].  NIHSS on arrival: 32. Bess Griffin 5, Mod Busby 4.  Bleed Day 1 = 10/26 (26 Oct 2021 13:03)    Hospital course:  10/26: Admitted to [facility] from [facility], POD#0 s/p L hemicraniectomy for decompression and evacuation of SDH. Transferred to [facility] noted to have tense flap, received mannitol, keppra, decadron. Was hypertensive to 200s and preston to 40s, recevied 85g mannitol and bullet 23.4%. CTH on arrival demonstrated increased size in vents and new IVH. EVD placed, open at 15, central line placed. Transfused 2 u PRBC. POD0 of coiling of left MCA bifurcation aneurysm,   10/27: CTH demonstrated EVD in correct position, EVD lowered to 5, remains intubated on propofol. Mannitol given for uptrending ICPs. 23.4% given 8:30 am. 3% increased to 50/hr. 1L bolus. New EVD catheter placed using existing sreekanth hole. 1 u PRBC.  10/28: HH5, MF4, BD3; POD2 angio coil/embo of L MCA aneurysm. JOAQUÍN overnight, neuro stable. EVD@5cmH20 ICPs < 20 overnight, OP WNL. S/p 1 unit PRBCs yesterday with appropriate response. Given 42g mannitol in AM for ICP 22 persistently, with improvement, then given 23% in PM for elevated ICP. febrile, pancultured. Standing tylenol and cooling blanket.   11/1: BD7, POD6 L hemicrani, angio coil/embo. Neuro exam unchanged. ICPs WNL. Overnight given hydralazine, increased propofol for SBP > 180.  CTA showed mild-moderate anterior circulation vasospasm (permissive hypertension, euvolemia).  11/3: BD9, POD8 L hemicrani, angio coil/embo.  Neuro exam unchanged. ICPs WNL.  Pending trach/PEG.  Dayshift:  noted episodes of sympathetic storming during vasospasm period.  11/5: BD11 POD10, JOAQUÍN o/n, neuro stable, EVD at 5; O2 desaturations with thick secretions (MSSA, Enterobacter, Proteus) started vancomycin zosyn  11/7: BD13 POD 12 JOAQUÍN o/n, NGT replaced. Weaning off precedex, no ICP crisis, decreasing neurostorming meds, pending CT head.   11/8: BD 14. CT head. Some spontaneous eye opening. Overnight, tongue biting with oral trauma and mild bleeding.   11/9: BD 15. PEG placed. On VEEG. No seizures. Exam stably poor  11/12: BD 18.  TRACHEOSTOMY ATTEMPT. HYPOXIC-> PEA ARREST x 3. SEE CODE BLUE NOTE.   11/13: BD 19. ATN. Decreased urine output.  Neurologic exam poor.  Brainstem reflexes present. Extending uppers, TF lowers       ICU Vital Signs Last 24 Hrs  T(C): 37.2 (13 Nov 2021 06:00), Max: 37.6 (12 Nov 2021 22:00)  T(F): 99 (13 Nov 2021 06:00), Max: 99.7 (12 Nov 2021 22:00)  HR: 87 (13 Nov 2021 06:01) (82 - 170)  BP: 162/86 (13 Nov 2021 06:01) (128/70 - 197/112)  BP(mean): 118 (13 Nov 2021 06:01) (91 - 147)  ABP: 180/95 (13 Nov 2021 06:01) (101/90 - 191/95)  ABP(mean): 131 (13 Nov 2021 06:01) (88 - 159)  RR: 14 (13 Nov 2021 06:01) (14 - 27)  SpO2: 100% (13 Nov 2021 06:01) (76% - 100%)      11-12-21 @ 07:01  -  11-13-21 @ 07:00  --------------------------------------------------------  IN: 4238 mL / OUT: 910 mL / NET: 3328 mL      Mode: AC/ CMV (Assist Control/ Continuous Mandatory Ventilation), RR (machine): 14, TV (machine): 400, FiO2: 40, PEEP: 5, ITime: 1, MAP: 8.6, PIP: 19      NEUROLOGIC EXAMINATION:   Pupils 3mm and reactive B/L,+ cough/gag, corneal reflexes, overbreathes vent set rate..  Trace extension in uppers. TFs in lower  EENT: Orally intubated. Severely edematous tongue with multiple lacerations. Neck incision packed with clean sterile gauze.   CARDIOVASCULAR: (+) S1 S2  PULMONARY: Diminished breath sounds B/L R > L. R and L chest tubes to suction. L chest wall SQ emphysema.   ABDOMEN. Distended.  EXTREMITIES: No edema  SKIN: Intact. Diffuse subcutaneous emphysema though improving.   EVD site C/D/I. Crani site soft      MEDICATIONS:  acetaminophen    Suspension .. 650 milliGRAM(s) Oral every 6 hours PRN  acetylcysteine 20%  Inhalation 4 milliLiter(s) Inhalation three times a day  amantadine Syrup 200 milliGRAM(s) Oral two times a day  artificial  tears Solution 1 Drop(s) Both EYES two times a day  cefepime   IVPB 2000 milliGRAM(s) IV Intermittent every 8 hours  chlorhexidine 0.12% Liquid 15 milliLiter(s) Oral Mucosa every 12 hours  chlorhexidine 2% Cloths 1 Application(s) Topical <User Schedule>  fentaNYL    Injectable 50 MICROGram(s) IV Push every 2 hours PRN  fludroCORTISONE 0.1 milliGRAM(s) Oral every 12 hours  levETIRAcetam  Solution 500 milliGRAM(s) Oral two times a day  modafinil 200 milliGRAM(s) Oral daily  norepinephrine Infusion 0.05 MICROgram(s)/kG/Min (7.97 mL/Hr) IV Continuous <Continuous>  ondansetron Injectable 4 milliGRAM(s) IV Push every 6 hours PRN  pantoprazole   Suspension 40 milliGRAM(s) Oral daily  sodium chloride 0.9%. 1000 milliLiter(s) (100 mL/Hr) IV Continuous <Continuous>      LABS:                         7.9    13.70 )-----------( 295      ( 13 Nov 2021 02:24 )             25.3     11-13    151<H>  |  114<H>  |  25<H>  ----------------------------<  98  3.8   |  25  |  2.49<H>    Ca    8.2<L>      13 Nov 2021 02:24  Phos  4.7     11-13  Mg     1.8     11-13    TPro  7.6  /  Alb  3.4  /  TBili  0.3  /  DBili  x   /  AST  123<H>  /  ALT  67<H>  /  AlkPhos  104  11-12    LIVER FUNCTIONS - ( 12 Nov 2021 15:17 )  Alb: 3.4 g/dL / Pro: 7.6 g/dL / ALK PHOS: 104 U/L / ALT: 67 U/L / AST: 123 U/L / GGT: x           ABG - ( 12 Nov 2021 21:19 )  pH, Arterial: 7.45  pH, Blood: x     /  pCO2: 40    /  pO2: 133   / HCO3: 28    / Base Excess: 3.5   /  SaO2: 99.5        CODE STATUS:  Full Code                         GOALS OF CARE: Aggressive                      DISPOSITION:  ICU =======================================   NEUROCRITICAL CARE ATTENDING NOTE   =======================================  ARJUN, AILYN     HPI:  Patient is a 47 y/o female with PMH HTN, multiple sclerosis, recent spinal surgery 10/8 ([facility]) presented to [place] ED BIBEMS after being found unconscious at home, unknown period of time. 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 HH5, MF1. Patient was intubated at [place] ED, found to have blown L pupil, and sent straight to the OR for left hemicraniotomy. A-line placed intraop. Hemodynamically unstable upon arrival to [facility], bradycardic and hypertensive s/p Mannitol, Clevidipine, and Furosemide. Central line placed in NSICU. Currently sedated on Propofol, pending repeat CTH. History per patient's son, patient had recent spine surgery and was found on outpatient imaging last week to have L M1 segment aneurysm (unruptured), pt asymptomatic at this time. Per son patient taking percocet PO at home. Ureña catheter, ET tube, and arterial line placed at [facility].  NIHSS on arrival: 32. Bess Griffin 5, Mod Busby 4.  Bleed Day 1 = 10/26 (26 Oct 2021 13:03)    Hospital course:  10/26: Admitted to [facility] from [facility], POD#0 s/p L hemicraniectomy for decompression and evacuation of SDH. Transferred to [facility] noted to have tense flap, received mannitol, keppra, decadron. Was hypertensive to 200s and preston to 40s, recevied 85g mannitol and bullet 23.4%. CTH on arrival demonstrated increased size in vents and new IVH. EVD placed, open at 15, central line placed. Transfused 2 u PRBC. POD0 of coiling of left MCA bifurcation aneurysm,   10/27: CTH demonstrated EVD in correct position, EVD lowered to 5, remains intubated on propofol. Mannitol given for uptrending ICPs. 23.4% given 8:30 am. 3% increased to 50/hr. 1L bolus. New EVD catheter placed using existing sreekanth hole. 1 u PRBC.  10/28: HH5, MF4, BD3; POD2 angio coil/embo of L MCA aneurysm. JOAQUÍN overnight, neuro stable. EVD@5cmH20 ICPs < 20 overnight, OP WNL. S/p 1 unit PRBCs yesterday with appropriate response. Given 42g mannitol in AM for ICP 22 persistently, with improvement, then given 23% in PM for elevated ICP. febrile, pancultured. Standing tylenol and cooling blanket.   11/1: BD7, POD6 L hemicrani, angio coil/embo. Neuro exam unchanged. ICPs WNL. Overnight given hydralazine, increased propofol for SBP > 180.  CTA showed mild-moderate anterior circulation vasospasm (permissive hypertension, euvolemia).  11/3: BD9, POD8 L hemicrani, angio coil/embo.  Neuro exam unchanged. ICPs WNL.  Pending trach/PEG.  Dayshift:  noted episodes of sympathetic storming during vasospasm period.  11/5: BD11 POD10, JOAQUÍN o/n, neuro stable, EVD at 5; O2 desaturations with thick secretions (MSSA, Enterobacter, Proteus) started vancomycin zosyn  11/7: BD13 POD 12 JOAQUÍN o/n, NGT replaced. Weaning off precedex, no ICP crisis, decreasing neurostorming meds, pending CT head.   11/8: BD 14. CT head. Some spontaneous eye opening. Overnight, tongue biting with oral trauma and mild bleeding.   11/9: BD 15. PEG placed. On VEEG. No seizures. Exam stably poor  11/12: BD 18.  TRACHEOSTOMY ATTEMPT. HYPOXIC-> PEA ARREST x 3. SEE CODE BLUE NOTE.   11/13: BD 19. ATN. Decreased urine output.  Neurologic exam poor.  Brainstem reflexes present. Extending uppers, TF lowers   11/114: BD 20. Multiorgan failure. Remains with stably poor exam. B/L chest tubes in place      ICU Vital Signs Last 24 Hrs  T(C): 36.3 (14 Nov 2021 09:00), Max: 36.9 (14 Nov 2021 00:33)  T(F): 97.4 (14 Nov 2021 09:00), Max: 98.4 (14 Nov 2021 00:33)  HR: 78 (14 Nov 2021 09:00) (73 - 98)  BP: 146/76 (14 Nov 2021 09:00) (136/75 - 192/100)  BP(mean): 105 (14 Nov 2021 09:00) (101 - 138)  ABP: 152/75 (14 Nov 2021 09:00) (138/123 - 199/105)  ABP(mean): 103 (14 Nov 2021 09:00) (102 - 144)  RR: 14 (14 Nov 2021 09:00) (14 - 22)  SpO2: 100% (14 Nov 2021 09:00) (95% - 100%)      11-13-21 @ 07:01  -  11-14-21 @ 07:00  --------------------------------------------------------  IN: 1550 mL / OUT: 1086 mL / NET: 464 mL    11-14-21 @ 07:01  -  11-14-21 @ 10:10  --------------------------------------------------------  IN: 20 mL / OUT: 64 mL / NET: -44 mL      Mode: AC/ CMV (Assist Control/ Continuous Mandatory Ventilation), RR (machine): 14, TV (machine): 400, FiO2: 40, PEEP: 5, ITime: 1, MAP: 8.7, PIP: 20      NEUROLOGIC EXAMINATION:   Comatose, Pupils 3mm and reactive B/L, dysconjugate gaze,+ cough/gag, corneal reflexes, overbreathes vent set rate..  Trace extension in RUE, not moving in TFs in lower  EENT: Orally intubated. Severely edematous tongue with multiple lacerations. Neck incision packed with clean sterile gauze.   CARDIOVASCULAR: (+) S1 S2  PULMONARY: Diminished breath sounds B/L chest tubes to suction. L chest wall SQ emphysema.   ABDOMEN. Soft, mildly distended.   EXTREMITIES: No edema  SKIN: Intact. Diffuse subcutaneous emphysema though improving.   EVD site C/D/I. Crani site soft       MEDICATIONS:   acetaminophen    Suspension .. 650 milliGRAM(s) Oral every 6 hours PRN  acetylcysteine 20%  Inhalation 4 milliLiter(s) Inhalation three times a day  amantadine Syrup 100 milliGRAM(s) Oral daily  amLODIPine   Tablet 5 milliGRAM(s) Oral daily  artificial  tears Solution 1 Drop(s) Both EYES two times a day  cefepime   IVPB 2000 milliGRAM(s) IV Intermittent every 24 hours  chlorhexidine 0.12% Liquid 15 milliLiter(s) Oral Mucosa every 12 hours  chlorhexidine 2% Cloths 1 Application(s) Topical <User Schedule>  fentaNYL    Injectable 50 MICROGram(s) IV Push every 2 hours PRN  hydrALAZINE 25 milliGRAM(s) Oral every 12 hours  lactated ringers. 1000 milliLiter(s) (100 mL/Hr) IV Continuous <Continuous>  levETIRAcetam  Solution 500 milliGRAM(s) Oral two times a day  modafinil 200 milliGRAM(s) Oral daily  niMODipine 60 milliGRAM(s) Oral every 4 hours  ondansetron Injectable 4 milliGRAM(s) IV Push every 6 hours PRN  pantoprazole   Suspension 40 milliGRAM(s) Oral daily                          8.2    14.26 )-----------( 253      ( 14 Nov 2021 04:24 )             26.8     11-14    147<H>  |  111<H>  |  30<H>  ----------------------------<  111<H>  4.0   |  21<L>  |  3.99<H>    Ca    8.8      14 Nov 2021 04:24  Phos  4.9     11-14  Mg     2.3     11-14    TPro  6.5  /  Alb  2.9<L>  /  TBili  0.2  /  DBili  x   /  AST  433<H>  /  ALT  341<H>  /  AlkPhos  90  11-14    LIVER FUNCTIONS - ( 14 Nov 2021 04:24 )  Alb: 2.9 g/dL / Pro: 6.5 g/dL / ALK PHOS: 90 U/L / ALT: 341 U/L / AST: 433 U/L / GGT: x           ABG - ( 14 Nov 2021 04:09 )  pH, Arterial: 7.41  pH, Blood: x     /  pCO2: 39    /  pO2: 121   / HCO3: 25    / Base Excess: 0.1   /  SaO2: 99.1          CODE STATUS:  Full Code                         GOALS OF CARE: Aggressive                      DISPOSITION:  ICU =======================================   NEUROCRITICAL CARE ATTENDING NOTE   =======================================  ARJUN, AILYN     HPI:  Patient is a 45 y/o female with PMH HTN, multiple sclerosis, recent spinal surgery 10/8 ([facility]) presented to [place] ED BIBEMS after being found unconscious at home, unknown period of time. 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 HH5, MF1. Patient was intubated at [place] ED, found to have blown L pupil, and sent straight to the OR for left hemicraniotomy. A-line placed intraop. Hemodynamically unstable upon arrival to [facility], bradycardic and hypertensive s/p Mannitol, Clevidipine, and Furosemide. Central line placed in NSICU. Currently sedated on Propofol, pending repeat CTH. History per patient's son, patient had recent spine surgery and was found on outpatient imaging last week to have L M1 segment aneurysm (unruptured), pt asymptomatic at this time. Per son patient taking percocet PO at home. Ureña catheter, ET tube, and arterial line placed at [facility].  NIHSS on arrival: 32. Bess Griffin 5, Mod Busby 4.  Bleed Day 1 = 10/26 (26 Oct 2021 13:03)    Hospital course:  10/26: Admitted to [facility] from [facility], POD#0 s/p L hemicraniectomy for decompression and evacuation of SDH. Transferred to [facility] noted to have tense flap, received mannitol, keppra, decadron. Was hypertensive to 200s and preston to 40s, recevied 85g mannitol and bullet 23.4%. CTH on arrival demonstrated increased size in vents and new IVH. EVD placed, open at 15, central line placed. Transfused 2 u PRBC. POD0 of coiling of left MCA bifurcation aneurysm,   10/27: CTH demonstrated EVD in correct position, EVD lowered to 5, remains intubated on propofol. Mannitol given for uptrending ICPs. 23.4% given 8:30 am. 3% increased to 50/hr. 1L bolus. New EVD catheter placed using existing sreekanth hole. 1 u PRBC.  10/28: HH5, MF4, BD3; POD2 angio coil/embo of L MCA aneurysm. JOAQUÍN overnight, neuro stable. EVD@5cmH20 ICPs < 20 overnight, OP WNL. S/p 1 unit PRBCs yesterday with appropriate response. Given 42g mannitol in AM for ICP 22 persistently, with improvement, then given 23% in PM for elevated ICP. febrile, pancultured. Standing tylenol and cooling blanket.   11/1: BD7, POD6 L hemicrani, angio coil/embo. Neuro exam unchanged. ICPs WNL. Overnight given hydralazine, increased propofol for SBP > 180.  CTA showed mild-moderate anterior circulation vasospasm (permissive hypertension, euvolemia).  11/3: BD9, POD8 L hemicrani, angio coil/embo.  Neuro exam unchanged. ICPs WNL.  Pending trach/PEG.  Dayshift:  noted episodes of sympathetic storming during vasospasm period.  11/5: BD11 POD10, JOAQUÍN o/n, neuro stable, EVD at 5; O2 desaturations with thick secretions (MSSA, Enterobacter, Proteus) started vancomycin zosyn  11/7: BD13 POD 12 JOAQUÍN o/n, NGT replaced. Weaning off precedex, no ICP crisis, decreasing neurostorming meds, pending CT head.   11/8: BD 14. CT head. Some spontaneous eye opening. Overnight, tongue biting with oral trauma and mild bleeding.   11/9: BD 15. PEG placed. On VEEG. No seizures. Exam stably poor  11/12: BD 18.  TRACHEOSTOMY ATTEMPT. HYPOXIC-> PEA ARREST x 3. SEE CODE BLUE NOTE.   11/13: BD 19. ATN. Decreased urine output.  Neurologic exam poor.  Brainstem reflexes present. Extending uppers, TF lowers   11/114: BD 20. Multiorgan failure. Remains with stably poor exam. B/L chest tubes in place. Multiorgan failure.       ICU Vital Signs Last 24 Hrs  T(C): 36.3 (14 Nov 2021 09:00), Max: 36.9 (14 Nov 2021 00:33)  T(F): 97.4 (14 Nov 2021 09:00), Max: 98.4 (14 Nov 2021 00:33)  HR: 78 (14 Nov 2021 09:00) (73 - 98)  BP: 146/76 (14 Nov 2021 09:00) (136/75 - 192/100)  BP(mean): 105 (14 Nov 2021 09:00) (101 - 138)  ABP: 152/75 (14 Nov 2021 09:00) (138/123 - 199/105)  ABP(mean): 103 (14 Nov 2021 09:00) (102 - 144)  RR: 14 (14 Nov 2021 09:00) (14 - 22)  SpO2: 100% (14 Nov 2021 09:00) (95% - 100%)      11-13-21 @ 07:01  -  11-14-21 @ 07:00  --------------------------------------------------------  IN: 1550 mL / OUT: 1086 mL / NET: 464 mL    11-14-21 @ 07:01  -  11-14-21 @ 10:10  --------------------------------------------------------  IN: 20 mL / OUT: 64 mL / NET: -44 mL      Mode: AC/ CMV (Assist Control/ Continuous Mandatory Ventilation), RR (machine): 14, TV (machine): 400, FiO2: 40, PEEP: 5, ITime: 1, MAP: 8.7, PIP: 20      NEUROLOGIC EXAMINATION:   Comatose, Pupils 3mm and reactive B/L, dysconjugate gaze,+ cough/gag, corneal reflexes, overbreathes vent set rate..  Trace extension in RUE, not moving in TFs in lower  EENT: Orally intubated. Severely edematous tongue with multiple lacerations. Neck incision packed with clean sterile gauze.   CARDIOVASCULAR: (+) S1 S2  PULMONARY: Diminished breath sounds B/L chest tubes to suction. L chest wall SQ emphysema.   ABDOMEN. Soft, mildly distended.   EXTREMITIES: No edema  SKIN: Intact. Diffuse subcutaneous emphysema -improving.   EVD site C/D/I. Crani site soft       MEDICATIONS:   acetaminophen    Suspension .. 650 milliGRAM(s) Oral every 6 hours PRN  acetylcysteine 20%  Inhalation 4 milliLiter(s) Inhalation three times a day  amantadine Syrup 100 milliGRAM(s) Oral daily  amLODIPine   Tablet 5 milliGRAM(s) Oral daily  artificial  tears Solution 1 Drop(s) Both EYES two times a day  cefepime   IVPB 2000 milliGRAM(s) IV Intermittent every 24 hours  chlorhexidine 0.12% Liquid 15 milliLiter(s) Oral Mucosa every 12 hours  chlorhexidine 2% Cloths 1 Application(s) Topical <User Schedule>  fentaNYL    Injectable 50 MICROGram(s) IV Push every 2 hours PRN  hydrALAZINE 25 milliGRAM(s) Oral every 12 hours  lactated ringers. 1000 milliLiter(s) (100 mL/Hr) IV Continuous <Continuous>  levETIRAcetam  Solution 500 milliGRAM(s) Oral two times a day  modafinil 200 milliGRAM(s) Oral daily  niMODipine 60 milliGRAM(s) Oral every 4 hours  ondansetron Injectable 4 milliGRAM(s) IV Push every 6 hours PRN  pantoprazole   Suspension 40 milliGRAM(s) Oral daily                          8.2    14.26 )-----------( 253      ( 14 Nov 2021 04:24 )             26.8     11-14    147<H>  |  111<H>  |  30<H>  ----------------------------<  111<H>  4.0   |  21<L>  |  3.99<H>    Ca    8.8      14 Nov 2021 04:24  Phos  4.9     11-14  Mg     2.3     11-14    TPro  6.5  /  Alb  2.9<L>  /  TBili  0.2  /  DBili  x   /  AST  433<H>  /  ALT  341<H>  /  AlkPhos  90  11-14    LIVER FUNCTIONS - ( 14 Nov 2021 04:24 )  Alb: 2.9 g/dL / Pro: 6.5 g/dL / ALK PHOS: 90 U/L / ALT: 341 U/L / AST: 433 U/L / GGT: x           ABG - ( 14 Nov 2021 04:09 )  pH, Arterial: 7.41  pH, Blood: x     /  pCO2: 39    /  pO2: 121   / HCO3: 25    / Base Excess: 0.1   /  SaO2: 99.1          CODE STATUS:  Full Code                         GOALS OF CARE: Aggressive                      DISPOSITION:  ICU

## 2021-11-14 NOTE — PROGRESS NOTE ADULT - SUBJECTIVE AND OBJECTIVE BOX
ENT PROGRESS NOTE    HPI: 47 y/o female with PMH HTN, Multiple sclerosis, recent spinal surgery 10/8 ([facility]) presented to [facility] after being found unconscious at home, unknown period of time. 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 ENT consulted due to tongue swelling. pt has been off sedated due to neuro checks. per nursing, pt has been biting down on tongue. nursing has not been able to place bite block. Denies any hypotensive episodes.     INTERVAL:    11/10: ENT reconsulted for trach placement - per primary team, patient was spastic with stiff neck while trying to perform bedside tracheostomy yesterday. Pt has been intubated since 10/26. Otherwise, NAEON - on EEG.    11/11: S/p tracheostomy attempt 11/12 am. Pt was seen and examined bedside - ETT in place, good ventilation. Stoma packing in place. Trops downtrending. Given 1x PRBC overnight, b/l chest tubes in place. Plan to change from cefepime to ancef today.    11/12: Pt in multisystem organ failure per primary team. Poor neuro exam      ICU Vital Signs Last 24 Hrs  T(C): 36.8 (14 Nov 2021 05:17), Max: 36.9 (14 Nov 2021 00:33)  T(F): 98.2 (14 Nov 2021 05:17), Max: 98.4 (14 Nov 2021 00:33)  HR: 80 (14 Nov 2021 08:00) (73 - 98)  BP: 136/75 (14 Nov 2021 08:00) (136/75 - 192/100)  BP(mean): 101 (14 Nov 2021 08:00) (101 - 138)  ABP: 154/73 (14 Nov 2021 08:00) (138/123 - 199/105)  ABP(mean): 102 (14 Nov 2021 08:00) (102 - 144)  RR: 14 (14 Nov 2021 08:00) (14 - 22)  SpO2: 100% (14 Nov 2021 08:00) (95% - 100%)        PHYSICAL EXAM:    CONSTITUTIONAL: A&Ox0  HEAD: EVD in place  ORAL CAVITY/OROPHARYNX: Significant tongue swelling, most significant on ventral surface. mouth unable to be opened due to tonicity of jaw. tongue indurated, necrosis of ventral surface. OP limited due to ETT  NECK: Tracheal packing in place, no bleeding  RESPIRATORY: Respirations unlabored, no increased work of breathing with use of accessory muscles and retractions. No stridor.  CARDIAC: Warm extremities, no cyanosis.       A/P: 46 F w/ w/ significant tongue swelling, likely secondary to biting down on tongue. bite block unable to be placed due to tone of jaw muscles. ENT reconsulted for trach placement - attempted 11/12 am, but complicated by respiratory failure, coded w/ chest compressions 3x with ROSC. ETT was reinserted into tracheal orally.    - Antibiotics while tracheal stoma packing in place  - ENT will formalize trach in OR later this week depending on patient stability  - ENT can aesthetically close neck stoma as well  - Rest of management per NSGY

## 2021-11-14 NOTE — CHART NOTE - NSCHARTNOTEFT_GEN_A_CORE
Admitting Diagnosis:   Patient is a 46y old  Female who presents with a chief complaint of ICH (14 Nov 2021 11:59)    PAST MEDICAL & SURGICAL HISTORY:  No pertinent past medical history  Personal history of spine surgery    Current Nutrition Order:   Diet, NPO with Tube Feed:   EN regimen: Nepro 1.8 with carb steady @ 38 ml/hr x24hrs via PEG providing     PO Intake: Good (%) [   ]  Fair (50-75%) [   ] Poor (<25%) [   ]- N/A    GI Issues:   WDL, last BM 11/13  No n/v/d/c reported  No abd distention or discomfort  Rectal tube in place (300 ml/24hrs)    Pain:  No pain noted at this time- intubated and nonverbal    Skin Integrity:  Surgical incision, rosalind score 13  No edema present  No pressure ulcers noted  BL Chest tubes in place     Labs:   11-14    147<H>  |  111<H>  |  30<H>  ----------------------------<  111<H>  4.0   |  21<L>  |  3.99<H>    Ca    8.8      14 Nov 2021 04:24  Phos  4.9     11-14  Mg     2.3     11-14    TPro  6.5  /  Alb  2.9<L>  /  TBili  0.2  /  DBili  x   /  AST  433<H>  /  ALT  341<H>  /  AlkPhos  90  11-14    Medications:  MEDICATIONS  (STANDING):  acetylcysteine 20%  Inhalation 4 milliLiter(s) Inhalation three times a day  amantadine Syrup 68.5 milliGRAM(s) Oral daily  amLODIPine   Tablet 5 milliGRAM(s) Oral daily  artificial  tears Solution 1 Drop(s) Both EYES two times a day  cefepime   IVPB 2000 milliGRAM(s) IV Intermittent every 24 hours  chlorhexidine 0.12% Liquid 15 milliLiter(s) Oral Mucosa every 12 hours  chlorhexidine 2% Cloths 1 Application(s) Topical <User Schedule>  hydrALAZINE 25 milliGRAM(s) Oral every 12 hours  levETIRAcetam  Solution 500 milliGRAM(s) Oral two times a day  modafinil 200 milliGRAM(s) Oral daily  niMODipine 60 milliGRAM(s) Oral every 4 hours  pantoprazole   Suspension 40 milliGRAM(s) Oral daily    MEDICATIONS  (PRN):  fentaNYL    Injectable 50 MICROGram(s) IV Push every 2 hours PRN agitation/pain  ondansetron Injectable 4 milliGRAM(s) IV Push every 6 hours PRN Nausea and/or Vomiting    Admitting Anthropometrics:  Height: 61" Weight: 187lbs, IBW 105lbs+/-10%, %%, BMI 34.9 kg/m2     Weight:  10/26 187lbs   11/3 183.6lbs  11/4 187.1lbs    Weight Change: Weights flucuating this admission, please cont to trend weights weekly to assess for further weight changes.     Nutrition Focused Physical Exam: Completed [   ]  Not Pertinent [ x  ]    Estimated energy needs:   IBW (48kg) used for calculations as pt >120% of IBW (178%). Needs adjusted for wound healing, critical care requiring intubation. Worsening CHETAN with no plans for HD.    Kcal (25-30 kcal/kg): 9466-7636 kcal  Protein (1.0-1.2 g/kg pro): 48-58g pro  **Fluids per team    Subjective:   46y/F with recent spinal surgery, found down and brought to ED.  In ED, witnessed shaking, ?seizures, intubated.  Imaging showed SAH.  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 Repeat CT HCP - EVD R frontal inserted, s/p L hemicraniectomy for decompression and evacuation of SDH 10/26. EVD placed. Pt returned to NSICU intubated and sedated. EVD@5cm h2o. Underwent work-up for emesis, OGT found to be clogged. Now with new OGT in place, tolerating TF @goal >24 hours. Noted tongue biting with oral trauma and mild bleeding 11/8. S/p PEG tube (11/9/2021) with plan to restart EN feeds 10/10. Hypoxic cardiac arrest with ROSC x 3 during tracheostomy placement 11/12. S/p placement of B/l chest tubes placed for resulting pneumothorax s/p CPR. Pt now with Multiorgan failure. Remains with stably poor exam. Nephrology consulted for worsening CHETAN management and possible HD needs- no plan for HD at this time given overall prognosis. Plan for GOC discussion with family.     On assessment, pt resting in be on full vent support. Vent: AC/ VC. Tmax 98.2FF. MAP 106H- ordered for fentanyl. Currently NPO with EN feeds via PEG. Due to multiorgan failure and worsening CHETAN, team transitioned feeds to Nepro 11/13 now slowly advancing to goal. Please see EN recs below to optimize nutrition. No reported n/v/d/c. Rectal tube in place (300 ml/24hrs). No s/sx of aspiration noted. Pertinent Labs: Phos 4.9H, Na 147H, Cl 111H, GFR 15L, Glu 111H, BUN 30H, Cre 3.99H, AST 433H, ALT 341H. Edu deferred. Please see nutr recs below.     Previous Nutrition Diagnosis: Inadequate oral intake RT Inability to meet est needs via PO AEB NPO, reliant on EN to meet 100% of est needs    Active [ x  ]  Resolved [   ]    If resolved, new PES:     Goal/Expected Outcome 1. Diet will be advanced within 24-48hrs 2. Consistently meet >75% est    Recommendations:  1.  To optimize nutrition at this time, recommend   >> FWF per team  >> Cont to maintain aspiration precautions at all times  2. Pain and bowel regimen per team  3. Monitor lytes and replete prn  4. RD diet edu prn    Education: Deferred at this time    Risk Level: High [ x  ] Moderate [  ] Low [   ]. Admitting Diagnosis:   Patient is a 46y old  Female who presents with a chief complaint of ICH (14 Nov 2021 11:59)    PAST MEDICAL & SURGICAL HISTORY:  No pertinent past medical history  Personal history of spine surgery    Current Nutrition Order:   Diet, NPO with Tube Feed:   EN regimen: Nepro 1.8 with carb steady @ 38 ml/hr x24hrs via PEG providing     PO Intake: Good (%) [   ]  Fair (50-75%) [   ] Poor (<25%) [   ]- N/A    GI Issues:   WDL, last BM 11/13  No n/v/d/c reported  No abd distention or discomfort  Rectal tube in place (300 ml/24hrs)    Pain:  No pain noted at this time- intubated and nonverbal    Skin Integrity:  Surgical incision, rosalind score 13  No edema present  No pressure ulcers noted  BL Chest tubes in place     Labs:   11-14    147<H>  |  111<H>  |  30<H>  ----------------------------<  111<H>  4.0   |  21<L>  |  3.99<H>    Ca    8.8      14 Nov 2021 04:24  Phos  4.9     11-14  Mg     2.3     11-14    TPro  6.5  /  Alb  2.9<L>  /  TBili  0.2  /  DBili  x   /  AST  433<H>  /  ALT  341<H>  /  AlkPhos  90  11-14    Medications:  MEDICATIONS  (STANDING):  acetylcysteine 20%  Inhalation 4 milliLiter(s) Inhalation three times a day  amantadine Syrup 68.5 milliGRAM(s) Oral daily  amLODIPine   Tablet 5 milliGRAM(s) Oral daily  artificial  tears Solution 1 Drop(s) Both EYES two times a day  cefepime   IVPB 2000 milliGRAM(s) IV Intermittent every 24 hours  chlorhexidine 0.12% Liquid 15 milliLiter(s) Oral Mucosa every 12 hours  chlorhexidine 2% Cloths 1 Application(s) Topical <User Schedule>  hydrALAZINE 25 milliGRAM(s) Oral every 12 hours  levETIRAcetam  Solution 500 milliGRAM(s) Oral two times a day  modafinil 200 milliGRAM(s) Oral daily  niMODipine 60 milliGRAM(s) Oral every 4 hours  pantoprazole   Suspension 40 milliGRAM(s) Oral daily    MEDICATIONS  (PRN):  fentaNYL    Injectable 50 MICROGram(s) IV Push every 2 hours PRN agitation/pain  ondansetron Injectable 4 milliGRAM(s) IV Push every 6 hours PRN Nausea and/or Vomiting    Admitting Anthropometrics:  Height: 61" Weight: 187lbs, IBW 105lbs+/-10%, %%, BMI 34.9 kg/m2     Weight:  10/26 187lbs   11/3 183.6lbs  11/4 187.1lbs    Weight Change: Weights fluctuating this admission, please cont to trend weights weekly to assess for further weight changes.     Nutrition Focused Physical Exam: Completed [   ]  Not Pertinent [ x  ]    Estimated energy needs:   IBW (48kg) used for calculations as pt >120% of IBW (178%). Needs adjusted for wound healing, critical care requiring intubation. Worsening CHETAN with no plans for HD.    Kcal (25-30 kcal/kg): 4129-7323 kcal  Protein (1.1-1.3 g/kg pro): 52-62g pro  **Fluids per team    Subjective:   46y/F with recent spinal surgery, found down and brought to ED.  In ED, witnessed shaking, ?seizures, intubated.  Imaging showed SAH.  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 Repeat CT HCP - EVD R frontal inserted, s/p L hemicraniectomy for decompression and evacuation of SDH 10/26. EVD placed. Pt returned to NSICU intubated and sedated. EVD@5cm h2o. Underwent work-up for emesis, OGT found to be clogged. Now with new OGT in place, tolerating TF @goal >24 hours. Noted tongue biting with oral trauma and mild bleeding 11/8. S/p PEG tube (11/9/2021) with plan to restart EN feeds 10/10. Hypoxic cardiac arrest with ROSC x 3 during tracheostomy placement 11/12. S/p placement of B/l chest tubes placed for resulting pneumothorax s/p CPR. Pt now with Multiorgan failure. Remains with stably poor exam. Nephrology consulted for worsening CHETAN management and possible HD needs- no plan for HD at this time given overall prognosis. Plan for GOC discussion with family.     On assessment, pt resting in be on full vent support. Vent: AC/ VC. Tmax 98.2FF. MAP 106H- ordered for fentanyl. Currently NPO with EN feeds via PEG. Due to multiorgan failure and worsening CHETAN, team transitioned feeds to Nepro 11/13 now slowly advancing to goal. Please see EN recs below to optimize nutrition. No reported n/v/d/c. Rectal tube in place (300 ml/24hrs). No s/sx of aspiration noted. Pertinent Labs: Phos 4.9H, Na 147H, Cl 111H, GFR 15L, Glu 111H, BUN 30H, Cre 3.99H, AST 433H, ALT 341H. Edu deferred. Please see nutr recs below.     Previous Nutrition Diagnosis: Inadequate oral intake RT Inability to meet est needs via PO AEB NPO, reliant on EN to meet 100% of est needs    Active [ x  ]  Resolved [   ]    If resolved, new PES:     Goal/Expected Outcome 1. Diet will be advanced within 24-48hrs 2. Consistently meet >75% est    Recommendations:  1.  To optimize nutrition at this time, recommend Suplena @ 28 ml/hr x24hrs via PEG w/ x2 LPS daily (200 kcal, 30g pro) providing a total of 672 ml TV, 1410 kcal, 60g pro, 496 ml free water.   >> FWF per team  >> Cont to maintain aspiration precautions at all times  2. Pain and bowel regimen per team  3. Monitor lytes and replete prn  4. RD diet edu prn  **disc with team    Education: Deferred at this time    Risk Level: High [ x  ] Moderate [  ] Low [   ]. Admitting Diagnosis:   Patient is a 46y old  Female who presents with a chief complaint of ICH (14 Nov 2021 11:59)    PAST MEDICAL & SURGICAL HISTORY:  No pertinent past medical history  Personal history of spine surgery    Current Nutrition Order:   Diet, NPO with Tube Feed:   EN regimen: Nepro 1.8 with carb steady @ 38 ml/hr x24hrs via PEG providing 912 ml TV, 1642 kcal, 74g pro, 663 ml free water.     PO Intake: Good (%) [   ]  Fair (50-75%) [   ] Poor (<25%) [   ]- N/A    GI Issues:   WDL, last BM 11/13  No n/v/d/c reported  No abd distention or discomfort  Rectal tube in place (300 ml/24hrs)    Pain:  No pain noted at this time- intubated and nonverbal    Skin Integrity:  Surgical incision, rosalind score 13  No edema present  No pressure ulcers noted  BL Chest tubes in place     Labs:   11-14    147<H>  |  111<H>  |  30<H>  ----------------------------<  111<H>  4.0   |  21<L>  |  3.99<H>    Ca    8.8      14 Nov 2021 04:24  Phos  4.9     11-14  Mg     2.3     11-14    TPro  6.5  /  Alb  2.9<L>  /  TBili  0.2  /  DBili  x   /  AST  433<H>  /  ALT  341<H>  /  AlkPhos  90  11-14    Medications:  MEDICATIONS  (STANDING):  acetylcysteine 20%  Inhalation 4 milliLiter(s) Inhalation three times a day  amantadine Syrup 68.5 milliGRAM(s) Oral daily  amLODIPine   Tablet 5 milliGRAM(s) Oral daily  artificial  tears Solution 1 Drop(s) Both EYES two times a day  cefepime   IVPB 2000 milliGRAM(s) IV Intermittent every 24 hours  chlorhexidine 0.12% Liquid 15 milliLiter(s) Oral Mucosa every 12 hours  chlorhexidine 2% Cloths 1 Application(s) Topical <User Schedule>  hydrALAZINE 25 milliGRAM(s) Oral every 12 hours  levETIRAcetam  Solution 500 milliGRAM(s) Oral two times a day  modafinil 200 milliGRAM(s) Oral daily  niMODipine 60 milliGRAM(s) Oral every 4 hours  pantoprazole   Suspension 40 milliGRAM(s) Oral daily    MEDICATIONS  (PRN):  fentaNYL    Injectable 50 MICROGram(s) IV Push every 2 hours PRN agitation/pain  ondansetron Injectable 4 milliGRAM(s) IV Push every 6 hours PRN Nausea and/or Vomiting    Admitting Anthropometrics:  Height: 61" Weight: 187lbs, IBW 105lbs+/-10%, %%, BMI 34.9 kg/m2     Weight:  10/26 187lbs   11/3 183.6lbs  11/4 187.1lbs    Weight Change: Weights fluctuating this admission, please cont to trend weights weekly to assess for further weight changes.     Nutrition Focused Physical Exam: Completed [   ]  Not Pertinent [ x  ]    Estimated energy needs:   IBW (48kg) used for calculations as pt >120% of IBW (178%). Needs adjusted for wound healing, critical care requiring intubation. Worsening CHETAN with no plans for HD.    Kcal (25-30 kcal/kg): 9776-5968 kcal  Protein (1.1-1.3 g/kg pro): 52-62g pro  **Fluids per team    Subjective:   46y/F with recent spinal surgery, found down and brought to ED.  In ED, witnessed shaking, ?seizures, intubated.  Imaging showed SAH.  Emergent decompressive hemicraniectomy for herniation syndrome, given mannitol, levetiracetam, propofol, transferred to [facility] for further management. Repeat CT HCP - EVD R frontal inserted, s/p L hemicraniectomy for decompression and evacuation of SDH 10/26. EVD placed. Pt returned to NSICU intubated and sedated. EVD@5cm h2o. Underwent work-up for emesis, OGT found to be clogged. Now with new OGT in place, tolerating TF @goal >24 hours. Noted tongue biting with oral trauma and mild bleeding 11/8. S/p PEG tube (11/9/2021) with plan to restart EN feeds 10/10. Hypoxic cardiac arrest with ROSC x 3 during tracheostomy placement 11/12. S/p placement of B/l chest tubes placed for resulting pneumothorax s/p CPR. Pt now with Multiorgan failure. Remains with stably poor exam. Nephrology consulted for worsening CHETAN management and possible HD needs- no plan for HD at this time given overall prognosis. Plan for GOC discussion with family.     On assessment, pt resting in be on full vent support. Vent: AC/ VC. Tmax 98.2FF. MAP 106H- ordered for fentanyl. Currently NPO with EN feeds via PEG. Due to multiorgan failure and worsening CHETAN, team transitioned feeds to Nepro 11/13 now slowly advancing to goal. Please see EN recs below to optimize nutrition. No reported n/v/d/c. Rectal tube in place (300 ml/24hrs). No s/sx of aspiration noted. Pertinent Labs: Phos 4.9H, Na 147H, Cl 111H, GFR 15L, Glu 111H, BUN 30H, Cre 3.99H, AST 433H, ALT 341H. Edu deferred. Please see nutr recs below.     Previous Nutrition Diagnosis: Inadequate oral intake RT Inability to meet est needs via PO AEB NPO, reliant on EN to meet 100% of est needs    Active [ x  ]  Resolved [   ]    If resolved, new PES:     Goal/Expected Outcome 1. Diet will be advanced within 24-48hrs 2. Consistently meet >75% est    Recommendations:  1.  To optimize nutrition at this time, recommend Suplena @ 28 ml/hr x24hrs via PEG w/ x2 LPS daily (200 kcal, 30g pro) providing a total of 672 ml TV, 1410 kcal, 60g pro, 496 ml free water.   >> FWF per team  >> Cont to maintain aspiration precautions at all times  2. Pain and bowel regimen per team  3. Monitor lytes and replete prn  4. RD diet edu prn  **attempted to call team.     Education: Deferred at this time    Risk Level: High [ x  ] Moderate [  ] Low [   ].

## 2021-11-14 NOTE — PROGRESS NOTE ADULT - SUBJECTIVE AND OBJECTIVE BOX
Patient is a 46y Female seen and evaluated at bedside seen this morning; remains on ventilator; comfortable appearing; does not seem more volume overloaded. Still remains oliguric at this time; Creatinine continues to worsen. Discussion had with NSx team; patient in  multiorgan failure with severe brain edema and unlikely to have meaningful recovery and will have a GOC discussion later today.       Meds:    acetylcysteine 20%  Inhalation 4 three times a day  amantadine Syrup 68.5 daily  amLODIPine   Tablet 5 daily  artificial  tears Solution 1 two times a day  cefepime   IVPB 2000 every 24 hours  chlorhexidine 0.12% Liquid 15 every 12 hours  chlorhexidine 2% Cloths 1 <User Schedule>  fentaNYL    Injectable 50 every 2 hours PRN  hydrALAZINE 25 every 12 hours  levETIRAcetam  Solution 500 two times a day  modafinil 200 daily  niMODipine 60 every 4 hours  ondansetron Injectable 4 every 6 hours PRN  pantoprazole   Suspension 40 daily      T(C): , Max: 36.9 (11-14-21 @ 00:33)  T(F): , Max: 98.4 (11-14-21 @ 00:33)  HR: 81 (11-14-21 @ 11:00)  BP: 139/73 (11-14-21 @ 10:00)  BP(mean): 101 (11-14-21 @ 10:00)  RR: 24 (11-14-21 @ 11:00)  SpO2: 100% (11-14-21 @ 11:00)  Wt(kg): --    11-13 @ 07:01  -  11-14 @ 07:00  --------------------------------------------------------  IN: 1550 mL / OUT: 1086 mL / NET: 464 mL    11-14 @ 07:01  -  11-14 @ 11:59  --------------------------------------------------------  IN: 20 mL / OUT: 129 mL / NET: -109 mL          Review of Systems:  ROS negative except as per HPI      PHYSICAL EXAM:  GENERAL: Alert, awake, NAD  HEENT: NCAT, MMM  CHEST/LUNG: decreased breath sounds b/l   HEART: Regular rate and rhythm, no murmur, no gallops, no rub   ABDOMEN: Soft, nontender, non distended  EXTREMITIES: no pedal edema, WWP  Neurology: intubated and sedated    LABS:                        8.2    14.26 )-----------( 253      ( 14 Nov 2021 04:24 )             26.8     11-14    147<H>  |  111<H>  |  30<H>  ----------------------------<  111<H>  4.0   |  21<L>  |  3.99<H>    Ca    8.8      14 Nov 2021 04:24  Phos  4.9     11-14  Mg     2.3     11-14    TPro  6.5  /  Alb  2.9<L>  /  TBili  0.2  /  DBili  x   /  AST  433<H>  /  ALT  341<H>  /  AlkPhos  90  11-14      PT/INR - ( 13 Nov 2021 10:25 )   PT: 15.5 sec;   INR: 1.31          PTT - ( 13 Nov 2021 10:25 )  PTT:30.0 sec    Creatinine, Random Urine: 31 mg/dL (11-13 @ 10:24)  Sodium, Random Urine: 115 mmol/L (11-13 @ 10:24)        RADIOLOGY & ADDITIONAL STUDIES:

## 2021-11-14 NOTE — PROGRESS NOTE ADULT - ATTENDING COMMENTS
worsening oliguric ATN, given multiorgan failure, hemorrhagic stroke, little chance for meaningful recovery, not a candidate for hemodialysis initiation after speaking with neurosurgery team. Cont supportive management of ATN

## 2021-11-14 NOTE — PROGRESS NOTE ADULT - ASSESSMENT
45 y/o female HH5 MF 4 BD 20 with:   L MCA ruptured aneurysm, subarachnoid hemorrhage, s/p C (10/26/2021, Dr. D'Amico @ [place]), brain compression, cerebral edema  Cardiac arrest secondary to acute hypoxic arrest  Bilateral pneumothoraces  Acute renal failure  Shock liver  Multiorgan failure  Anemia.    NEURO: Neurochecks Q1h  CT head once stable 11/14  DCI/ vasospasm: Nimodipine. Angio 11/10 negative for spasm.   Hydrocephalus:  EVD 5cm H20, ICP < 15.   Seizure prophylaxis: Levetiracetam 500mg bid. VEEG moderate slowing. No seizures seen for 48 hours.  Sedation: PRN fentanyl pushes  REHAB: Physical therapy evaluation and management. EARLY MOB:  HOB elevated  Neurologic outcome dismal in the setting of HH5 SAH with IPH and superimposed prolonged anoxic injury. CT head pending.     PULM: Acute hypoxic respiratory failure. B/L pneumothoraces R>L.  Bilateral needle decompressions followed by B/L pigtail catheter. Keep to suction per pulm [id number]).   Daily CXR for PTX  ABG reviewed. Vent settings adjusted.   Pulmonary consulted. Appreciate recommendations  ENT following. OR planning when stable by ENT.     CARDIO: PEA cardiac arrest in the setting of acute hypoxic arrest  SBP goal 120-160mm Hg  Off norepinephrine  TTE EF 75% prior to arrest. Repeat ECHO.   EKG sinus tachycardia. Monitor EKG.  Trend trops  RIJ TLC, left femoral TLC.     ENDO:    Blood sugar goals 140-180 mg/dL  Insulin sliding scale    GI:    TFs- Nepro at 10cc/hr  Continue PPI  Monitor LFTs. Shock liver  Has rectal tube    RENAL: Hyperlactatemia, hyperphosphatemia. Improving.   Monitor lactate, CPK, phos, Mag, BMP, LFTs.   Noal 140-150.   Keep starks due to ATN.  Check urine Na, urine Cr, urine urea.   Renal consult for ATN, oliguria.   LR @100cc/hr.      HEM/ONC: Anemia  Hb 7.9. Transfused 1unit PRBC.  Check coags 11/13  Anti Xa level 0.26. Continue dose of lovenox  VTE Prophylaxis: SCDs, SQL; Doppler of upper extremities stable DVT R brachial and superficial thrombus.  Lowers neg  Monitor surveillance dopplers 11/17    ID: Leukocytosis  On cefepime for enterobacter/MSSA (coverage until 11/14- which will be a total of 7 days of cefepime)  Will need abx (ancef) until neck dressing removed per ENT  ID following  Monitor for fevers  Monitor procal    MISC:   Wound site C/D/I.  Wound care    Social: Family has been updated (son aJme and daughter Ariane)  Consider palliative care consult for support if no improvement ~24-48hrs    *****  CORE MEASURES  1.  Hunt and Griffin Score = 5  2.  VTE prophylaxis:  [ ] administered within 24 hours of admission OR [X] reason not done: fresh bleed, unsecured aneurysm.  3.  Dysphagia screening:   [X] performed before any oral meds / liquids / food  4.  Nimodipine treatment:  [X] administered within 24 hours of admission OR [ ] reason not done:    *****    ATTENDING ATTESTATION:    Patient at high risk for neurological deterioration or death due to:  ICU delirium, aspiration PNA, DVT / PE.  Critical care time:  I have personally provided 55 minutes of critical care time, excluding time spent on separate procedures.    Plan discussed with RN, house staff.     45 y/o female HH5 MF 4 BD 20 with:   L MCA ruptured aneurysm, subarachnoid hemorrhage, s/p [facility] (10/26/2021, Dr. D'Amico @ [place]), brain compression, cerebral edema  Cardiac arrest secondary to acute hypoxic arrest  Bilateral pneumothoraces  Acute renal failure  Shock liver  Multiorgan failure  Anemia.    NEURO: Neurochecks Q4  DCI/ vasospasm: Nimodipine. Angio 11/10 negative for spasm.   Hydrocephalus:  EVD 5cm H20, ICP < 15.   Seizure prophylaxis: Levetiracetam 500mg bid. VEEG moderate slowing. No seizures. (Renally dose Keppra)  Sedation: PRN fentanyl pushes  REHAB: Physical therapy evaluation and management. EARLY MOB:  HOB elevated    PULM: Acute hypoxic respiratory failure. B/L pneumothoraces R>L.  Bilateral needle decompressions followed by B/L pigtail catheters. Keep to suction per pulm (-[id number]).   Daily CXR for PTX  ABG.  Pulmonary consulted. Appreciate recommendations  ENT following. Tentative cosmetic revision of stoma per ENT.     CARDIO: PEA cardiac arrest in the setting of acute hypoxic arrest  Off all pressors.   SBP goal 110-160mm Hg. On amlodipine and hydralazine  TTE EF 75% prior to arrest. Repeat ECHO pending.   EKG sinus tachycardia. Monitor EKG.  Trops trending down  RIJ TLC, left femoral TLC.     ENDO:    Blood sugar goals 140-180 mg/dL  Insulin sliding scale    GI:  Shock liver secondary to cardiac arrest  Avoid hepatotoxins  TFs- Nepro at 10cc/hr. Advance to goal  Continue PPI  Monitor LFTs  Has rectal tube    RENAL: CHETAN secondary to ATN (due to hypotension) with oliguria  Renal following. Monitor urine output, Is/Os electrolytes  Will discuss overall poor prognosis. Dialysis will not change overall dismal prognosis in setting of multiorgan failure.   Goal Na 140-150.   Urine Na, urine Cr, urine urea reviewed.   LR @100cc/hr.    HEM/ONC: Anemia.   Hb 7.9. Transfused 1unit PRBC with suboptimal response to PRBCs.  Monitor CBC, coags  Anti Xa level 0.26.  Chemoppx (heparin) on hold for ?bleeding- unclear source  Doppler of upper extremities stable DVT R brachial and superficial thrombus.  Lowers neg  Monitor surveillance dopplers 11/17      ID: Leukocytosis  On cefepime for enterobacter/MSSA (coverage until 11/14- which will be a total of 7 days of cefepime)  Will need abx (ancef) until neck dressing removed per ENT  ID following  Monitor for fevers, procal    MISC:   Wound site C/D/I.  Wound care    Social: Family has been updated (son Jame and daughter Ariane)  Goals of care discussion on 11/14 given multiorgan failure and grave prognosis.   Palliative care consult appreciated    *****  CORE MEASURES  1.  Hunt and Griffin Score = 5  2.  VTE prophylaxis:  [ ] administered within 24 hours of admission OR [X] reason not done: fresh bleed, unsecured aneurysm.  3.  Dysphagia screening:   [X] performed before any oral meds / liquids / food  4.  Nimodipine treatment:  [X] administered within 24 hours of admission OR [ ] reason not done:    *****    ATTENDING ATTESTATION:    Patient at high risk for neurological deterioration or death due to:  ICU delirium, aspiration PNA, DVT / PE.  Critical care time:  I have personally provided 55 minutes of critical care time, excluding time spent on separate procedures.    Plan discussed with RN, house staff.     47 y/o female HH5 MF 4 BD 20 with:   L MCA ruptured aneurysm, subarachnoid hemorrhage, s/p [facility] (10/26/2021, Dr. D'Amico @ [place]), brain compression, cerebral edema  Cardiac arrest secondary to acute hypoxic arrest  Bilateral pneumothoraces  Acute renal failure  Shock liver  Multiorgan failure  Anemia.    NEURO: Neurochecks Q4  DCI/ vasospasm: Nimodipine. Angio 11/10 negative for spasm.   Hydrocephalus:  EVD 5cm H20, ICP < 15.   Seizure prophylaxis: Levetiracetam 500mg bid. VEEG moderate slowing. No seizures. (Renally dose Keppra)  Sedation: PRN fentanyl pushes  REHAB: Physical therapy evaluation and management. EARLY MOB:  HOB elevated    PULM: Acute hypoxic respiratory failure. B/L pneumothoraces R>L.  Bilateral needle decompressions followed by B/L pigtail catheters. Keep to suction per pulm (-[id number]).   Daily CXR for PTX  Monitor ABG  Pulmonary consulted. Appreciate recommendations  ENT following. Tentative cosmetic revision of stoma per ENT.     CARDIO: PEA cardiac arrest in the setting of acute hypoxic arrest  Off all pressors.   SBP goal 110-160mm Hg. On amlodipine and hydralazine  TTE EF 75% prior to arrest. Repeat ECHO pending.   EKG sinus tachycardia. Monitor EKG.  Trops trending down    ENDO:    Blood sugar goals 140-180 mg/dL  Insulin sliding scale    GI:  Shock liver secondary to cardiac arrest  Avoid hepatotoxins  TFs- Nepro at 10cc/hr. Advance to goal  Continue PPI  Monitor LFTs  Has rectal tube    RENAL: CHETAN secondary to ATN (due to hypotension) with oliguria  Renal following. Monitor urine output, Is/Os electrolytes  Will discuss overall poor prognosis. Dialysis will not change overall dismal prognosis in setting of multiorgan failure.   Goal Na 140-150.   Urine Na, urine Cr, urine urea reviewed.   LR @100cc/hr.    HEM/ONC: Anemia.   Hb 7.9. Transfused 1unit PRBC with suboptimal response to PRBCs.  Monitor CBC, coags  Anti Xa level 0.26.  Chemoppx (heparin) on hold for possible bleeding. Necessitating transfusion. Start on 11/15 once stable.   Doppler of upper extremities stable DVT R brachial and superficial thrombus.  Lowers neg  Monitor surveillance dopplers 11/17    ID: Leukocytosis  On cefepime for enterobacter/MSSA (coverage until 11/14- which will be a total of 7 days of cefepime).  Will need abx (ancef- starting 11/15) until neck dressing removed per ENT  ID following      MISC:   Wound site C/D/I.  Wound care    Social: Family has been updated (son Jame and daughter Ariane)  Goals of care discussion on 11/14 given multiorgan failure and grave prognosis.   Palliative care consult appreciated    *****  CORE MEASURES  1.  Hunt and Griffin Score = 5  2.  VTE prophylaxis:  [ ] administered within 24 hours of admission OR [X] reason not done: fresh bleed, unsecured aneurysm.  3.  Dysphagia screening:   [X] performed before any oral meds / liquids / food  4.  Nimodipine treatment:  [X] administered within 24 hours of admission OR [ ] reason not done:    *****    ATTENDING ATTESTATION:    Patient at high risk for neurological deterioration or death due to:  ICU delirium, aspiration PNA, DVT / PE.  Critical care time:  I have personally provided 55 minutes of critical care time, excluding time spent on separate procedures.    Plan discussed with RN, house staff.

## 2021-11-14 NOTE — PROGRESS NOTE ADULT - ASSESSMENT
46F with pmhx of HTN who presented to the hospital after being found down at home for unknown period of time. 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 Nephrology consulted for CHETAN management and possible HD needs    Assessment/Plan:   #oliguric iATN  Baseline creatinine 0.6  Pt has prolonged hospital stay for a L MCA aneurysm rupture; she suffered from cardiac arrest x 3 on 11/12 and now is intubated in the ICU. Her kidney function has continued to rise over the course of the last 24 hours and her urine studies are suggestive of possible ATN given Sodium of 115 which correlates to her having hypoperfusion from the cardiac arrest. She is currently receiving therapy with antibiotics; specifically Cefepime. Her urine output remains oliguric at this time. After discussion with Nsx team it is unlikely patient to make a meaningful recovery; therefore would not offer HD at this time and continue with supportive care and have GOC.   -Trend BMP daily  -Agree with Cefepime 2mg Q24h  -Would not offer HD at this time given overall prognosis.  -Active GOC discussion  -Strict I/O's  -Renal diet  -Avoid nephrotoxic meds    #Hyponatremia  Pt with sodium of 150; has been steadily improving  FWD calculcated to be 1.5L   -Can consider starting FWF 300cc Q4h to help correct hypernatremia    #Anemia of Chronic disease  -Ferritin and iron profile noted.      Thank you for the opportunity to participate in the care of your patient. The nephrology service remains available to assist with any questions or concerns. Please feel free to reach us by paging the on-call nephrology fellow for urgent issues or as below.     Aj Cardoso D.O.  PGY 4 - Nephrology Fellow  [phone]

## 2021-11-14 NOTE — CHART NOTE - NSCHARTNOTEFT_GEN_A_CORE
Discussed with patient's daughter (Juan Cparish- via phone) and patient's brother (at bedside) regarding her current condition.   Given initial poor prognosis and new multiorgan failure in the setting of cardiac arrest/shock, discussed code status and overall goals of care.   They are aware that hemodialysis is no longer being offered and that she will likely not survive hospitalization.     At this time, daughter is unable to make a decision on DNR/resuscitation status since the severity of her current condition/prognosis had not been previously discussed.   They are amenable to palliative care consult for further discussions and support.     Palliative care consulted.

## 2021-11-15 LAB
ANION GAP SERPL CALC-SCNC: 12 MMOL/L — SIGNIFICANT CHANGE UP (ref 5–17)
BASE EXCESS BLDA CALC-SCNC: -1.1 MMOL/L — SIGNIFICANT CHANGE UP (ref -2–3)
BUN SERPL-MCNC: 43 MG/DL — HIGH (ref 7–23)
CALCIUM SERPL-MCNC: 9.2 MG/DL — SIGNIFICANT CHANGE UP (ref 8.4–10.5)
CHLORIDE SERPL-SCNC: 111 MMOL/L — HIGH (ref 96–108)
CO2 BLDA-SCNC: 26 MMOL/L — HIGH (ref 19–24)
CO2 SERPL-SCNC: 22 MMOL/L — SIGNIFICANT CHANGE UP (ref 22–31)
CREAT SERPL-MCNC: 5.23 MG/DL — HIGH (ref 0.5–1.3)
GLUCOSE SERPL-MCNC: 129 MG/DL — HIGH (ref 70–99)
HCO3 BLDA-SCNC: 24 MMOL/L — SIGNIFICANT CHANGE UP (ref 21–28)
HCT VFR BLD CALC: 27.7 % — LOW (ref 34.5–45)
HGB BLD-MCNC: 8.5 G/DL — LOW (ref 11.5–15.5)
MAGNESIUM SERPL-MCNC: 2.4 MG/DL — SIGNIFICANT CHANGE UP (ref 1.6–2.6)
MCHC RBC-ENTMCNC: 25.6 PG — LOW (ref 27–34)
MCHC RBC-ENTMCNC: 30.7 GM/DL — LOW (ref 32–36)
MCV RBC AUTO: 83.4 FL — SIGNIFICANT CHANGE UP (ref 80–100)
NRBC # BLD: 0 /100 WBCS — SIGNIFICANT CHANGE UP (ref 0–0)
PCO2 BLDA: 42 MMHG — HIGH (ref 32–35)
PH BLDA: 7.37 — SIGNIFICANT CHANGE UP (ref 7.35–7.45)
PHOSPHATE SERPL-MCNC: 4.8 MG/DL — HIGH (ref 2.5–4.5)
PLATELET # BLD AUTO: 239 K/UL — SIGNIFICANT CHANGE UP (ref 150–400)
PO2 BLDA: 166 MMHG — HIGH (ref 83–108)
POTASSIUM SERPL-MCNC: 3.9 MMOL/L — SIGNIFICANT CHANGE UP (ref 3.5–5.3)
POTASSIUM SERPL-SCNC: 3.9 MMOL/L — SIGNIFICANT CHANGE UP (ref 3.5–5.3)
RBC # BLD: 3.32 M/UL — LOW (ref 3.8–5.2)
RBC # FLD: 22.4 % — HIGH (ref 10.3–14.5)
SAO2 % BLDA: 99.8 % — HIGH (ref 94–98)
SODIUM SERPL-SCNC: 145 MMOL/L — SIGNIFICANT CHANGE UP (ref 135–145)
WBC # BLD: 12.91 K/UL — HIGH (ref 3.8–10.5)
WBC # FLD AUTO: 12.91 K/UL — HIGH (ref 3.8–10.5)

## 2021-11-15 PROCEDURE — 99223 1ST HOSP IP/OBS HIGH 75: CPT

## 2021-11-15 PROCEDURE — 99232 SBSQ HOSP IP/OBS MODERATE 35: CPT | Mod: GC

## 2021-11-15 PROCEDURE — 93306 TTE W/DOPPLER COMPLETE: CPT | Mod: 26,76

## 2021-11-15 PROCEDURE — 99358 PROLONG SERVICE W/O CONTACT: CPT

## 2021-11-15 PROCEDURE — 99291 CRITICAL CARE FIRST HOUR: CPT

## 2021-11-15 PROCEDURE — 71045 X-RAY EXAM CHEST 1 VIEW: CPT | Mod: 26,59

## 2021-11-15 RX ORDER — HYDRALAZINE HCL 50 MG
5 TABLET ORAL ONCE
Refills: 0 | Status: COMPLETED | OUTPATIENT
Start: 2021-11-15 | End: 2021-11-15

## 2021-11-15 RX ORDER — CEFAZOLIN SODIUM 1 G
500 VIAL (EA) INJECTION EVERY 12 HOURS
Refills: 0 | Status: DISCONTINUED | OUTPATIENT
Start: 2021-11-15 | End: 2021-11-15

## 2021-11-15 RX ORDER — POTASSIUM CHLORIDE 20 MEQ
10 PACKET (EA) ORAL ONCE
Refills: 0 | Status: COMPLETED | OUTPATIENT
Start: 2021-11-15 | End: 2021-11-15

## 2021-11-15 RX ORDER — SODIUM CHLORIDE 9 MG/ML
1000 INJECTION, SOLUTION INTRAVENOUS
Refills: 0 | Status: DISCONTINUED | OUTPATIENT
Start: 2021-11-15 | End: 2021-11-15

## 2021-11-15 RX ORDER — SODIUM CHLORIDE 9 MG/ML
1000 INJECTION INTRAMUSCULAR; INTRAVENOUS; SUBCUTANEOUS
Refills: 0 | Status: DISCONTINUED | OUTPATIENT
Start: 2021-11-15 | End: 2021-11-16

## 2021-11-15 RX ORDER — FENTANYL CITRATE 50 UG/ML
0.5 INJECTION INTRAVENOUS
Qty: 2500 | Refills: 0 | Status: DISCONTINUED | OUTPATIENT
Start: 2021-11-15 | End: 2021-11-16

## 2021-11-15 RX ORDER — FENTANYL CITRATE 50 UG/ML
50 INJECTION INTRAVENOUS EVERY 4 HOURS
Refills: 0 | Status: DISCONTINUED | OUTPATIENT
Start: 2021-11-15 | End: 2021-11-15

## 2021-11-15 RX ORDER — PROPOFOL 10 MG/ML
10 INJECTION, EMULSION INTRAVENOUS
Qty: 1000 | Refills: 0 | Status: DISCONTINUED | OUTPATIENT
Start: 2021-11-15 | End: 2021-11-17

## 2021-11-15 RX ORDER — CEFAZOLIN SODIUM 1 G
500 VIAL (EA) INJECTION EVERY 12 HOURS
Refills: 0 | Status: DISCONTINUED | OUTPATIENT
Start: 2021-11-15 | End: 2021-11-17

## 2021-11-15 RX ORDER — CISATRACURIUM BESYLATE 2 MG/ML
3 INJECTION INTRAVENOUS
Qty: 200 | Refills: 0 | Status: DISCONTINUED | OUTPATIENT
Start: 2021-11-15 | End: 2021-11-16

## 2021-11-15 RX ORDER — HEPARIN SODIUM 5000 [USP'U]/ML
5000 INJECTION INTRAVENOUS; SUBCUTANEOUS EVERY 8 HOURS
Refills: 0 | Status: DISCONTINUED | OUTPATIENT
Start: 2021-11-15 | End: 2021-11-19

## 2021-11-15 RX ADMIN — Medication 1 DROP(S): at 18:19

## 2021-11-15 RX ADMIN — FENTANYL CITRATE 50 MICROGRAM(S): 50 INJECTION INTRAVENOUS at 10:48

## 2021-11-15 RX ADMIN — Medication 25 MILLIGRAM(S): at 06:38

## 2021-11-15 RX ADMIN — LEVETIRACETAM 500 MILLIGRAM(S): 250 TABLET, FILM COATED ORAL at 06:39

## 2021-11-15 RX ADMIN — Medication 1 DROP(S): at 06:43

## 2021-11-15 RX ADMIN — FENTANYL CITRATE 50 MICROGRAM(S): 50 INJECTION INTRAVENOUS at 01:04

## 2021-11-15 RX ADMIN — Medication 4 MILLILITER(S): at 22:02

## 2021-11-15 RX ADMIN — CHLORHEXIDINE GLUCONATE 15 MILLILITER(S): 213 SOLUTION TOPICAL at 06:39

## 2021-11-15 RX ADMIN — Medication 4 MILLILITER(S): at 06:38

## 2021-11-15 RX ADMIN — HEPARIN SODIUM 5000 UNIT(S): 5000 INJECTION INTRAVENOUS; SUBCUTANEOUS at 21:06

## 2021-11-15 RX ADMIN — Medication 4 MILLILITER(S): at 15:57

## 2021-11-15 RX ADMIN — Medication 100 MILLIEQUIVALENT(S): at 07:10

## 2021-11-15 RX ADMIN — AMLODIPINE BESYLATE 5 MILLIGRAM(S): 2.5 TABLET ORAL at 06:38

## 2021-11-15 RX ADMIN — CHLORHEXIDINE GLUCONATE 15 MILLILITER(S): 213 SOLUTION TOPICAL at 17:47

## 2021-11-15 RX ADMIN — FENTANYL CITRATE 50 MICROGRAM(S): 50 INJECTION INTRAVENOUS at 17:00

## 2021-11-15 RX ADMIN — LEVETIRACETAM 500 MILLIGRAM(S): 250 TABLET, FILM COATED ORAL at 17:47

## 2021-11-15 RX ADMIN — CHLORHEXIDINE GLUCONATE 1 APPLICATION(S): 213 SOLUTION TOPICAL at 06:43

## 2021-11-15 RX ADMIN — FENTANYL CITRATE 4.25 MICROGRAM(S)/KG/HR: 50 INJECTION INTRAVENOUS at 21:07

## 2021-11-15 RX ADMIN — NIMODIPINE 60 MILLIGRAM(S): 60 SOLUTION ORAL at 06:39

## 2021-11-15 RX ADMIN — FENTANYL CITRATE 50 MICROGRAM(S): 50 INJECTION INTRAVENOUS at 07:26

## 2021-11-15 RX ADMIN — FENTANYL CITRATE 50 MICROGRAM(S): 50 INJECTION INTRAVENOUS at 01:25

## 2021-11-15 RX ADMIN — ONDANSETRON 4 MILLIGRAM(S): 8 TABLET, FILM COATED ORAL at 10:48

## 2021-11-15 RX ADMIN — PROPOFOL 5.1 MICROGRAM(S)/KG/MIN: 10 INJECTION, EMULSION INTRAVENOUS at 21:07

## 2021-11-15 RX ADMIN — Medication 5 MILLIGRAM(S): at 02:45

## 2021-11-15 RX ADMIN — FENTANYL CITRATE 4.25 MICROGRAM(S)/KG/HR: 50 INJECTION INTRAVENOUS at 17:38

## 2021-11-15 RX ADMIN — SODIUM CHLORIDE 75 MILLILITER(S): 9 INJECTION INTRAMUSCULAR; INTRAVENOUS; SUBCUTANEOUS at 21:08

## 2021-11-15 RX ADMIN — NIMODIPINE 60 MILLIGRAM(S): 60 SOLUTION ORAL at 09:33

## 2021-11-15 RX ADMIN — Medication 100 MILLIGRAM(S): at 12:33

## 2021-11-15 RX ADMIN — PROPOFOL 5.1 MICROGRAM(S)/KG/MIN: 10 INJECTION, EMULSION INTRAVENOUS at 19:36

## 2021-11-15 RX ADMIN — FENTANYL CITRATE 50 MICROGRAM(S): 50 INJECTION INTRAVENOUS at 16:38

## 2021-11-15 RX ADMIN — SODIUM CHLORIDE 75 MILLILITER(S): 9 INJECTION INTRAMUSCULAR; INTRAVENOUS; SUBCUTANEOUS at 17:38

## 2021-11-15 RX ADMIN — HEPARIN SODIUM 5000 UNIT(S): 5000 INJECTION INTRAVENOUS; SUBCUTANEOUS at 14:57

## 2021-11-15 RX ADMIN — NIMODIPINE 60 MILLIGRAM(S): 60 SOLUTION ORAL at 02:45

## 2021-11-15 RX ADMIN — CISATRACURIUM BESYLATE 15.3 MICROGRAM(S)/KG/MIN: 2 INJECTION INTRAVENOUS at 17:38

## 2021-11-15 RX ADMIN — CISATRACURIUM BESYLATE 15.3 MICROGRAM(S)/KG/MIN: 2 INJECTION INTRAVENOUS at 21:07

## 2021-11-15 RX ADMIN — FENTANYL CITRATE 50 MICROGRAM(S): 50 INJECTION INTRAVENOUS at 11:15

## 2021-11-15 RX ADMIN — FENTANYL CITRATE 50 MICROGRAM(S): 50 INJECTION INTRAVENOUS at 08:00
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
## 2021-11-15 NOTE — PROGRESS NOTE ADULT - SUBJECTIVE AND OBJECTIVE BOX
Patient is a 46y Female seen and evaluated at bedside remains intubated. Creatinine continues to worsen. Sodium improved, K stable.       Meds:    acetylcysteine 20%  Inhalation 4 three times a day  amLODIPine   Tablet 5 daily  artificial  tears Solution 1 two times a day  ceFAZolin   IVPB 500 every 12 hours  chlorhexidine 0.12% Liquid 15 every 12 hours  chlorhexidine 2% Cloths 1 <User Schedule>  fentaNYL    Injectable 50 every 2 hours PRN  heparin   Injectable 5000 every 8 hours  hydrALAZINE 25 every 12 hours  lactated ringers. 1000 <Continuous>  levETIRAcetam  Solution 500 two times a day  ondansetron Injectable 4 every 6 hours PRN  pantoprazole   Suspension 40 daily  propofol Infusion 10 <Continuous>      T(C): , Max: 36.6 (11-14-21 @ 14:15)  T(F): , Max: 97.9 (11-14-21 @ 14:15)  HR: 83 (11-15-21 @ 13:00)  BP: 136/66 (11-15-21 @ 12:00)  BP(mean): 94 (11-15-21 @ 12:00)  RR: 15 (11-15-21 @ 13:00)  SpO2: 100% (11-15-21 @ 13:00)  Wt(kg): --    11-14 @ 07:01  -  11-15 @ 07:00  --------------------------------------------------------  IN: 1072 mL / OUT: 379 mL / NET: 693 mL    11-15 @ 07:01  -  11-15 @ 13:14  --------------------------------------------------------  IN: 440.4 mL / OUT: 265 mL / NET: 175.4 mL          Review of Systems:  ROS negative except as per HPI      PHYSICAL EXAM:  GENERAL: Alert, awake, NAD  HEENT: NCAT, MMM  CHEST/LUNG: decreased breath sounds b/l   HEART: Regular rate and rhythm, no murmur, no gallops, no rub   ABDOMEN: Soft, nontender, non distended  EXTREMITIES: no pedal edema, WWP  Neurology: intubated and sedated      LABS:                        8.5    12.91 )-----------( 239      ( 15 Nov 2021 03:49 )             27.7     11-15    145  |  111<H>  |  43<H>  ----------------------------<  129<H>  3.9   |  22  |  5.23<H>    Ca    9.2      15 Nov 2021 03:49  Phos  4.8     11-15  Mg     2.4     11-15    TPro  6.8  /  Alb  3.0<L>  /  TBili  0.2  /  DBili  x   /  AST  268<H>  /  ALT  318<H>  /  AlkPhos  96  11-14      PT/INR - ( 14 Nov 2021 17:52 )   PT: 14.7 sec;   INR: 1.24          PTT - ( 14 Nov 2021 17:52 )  PTT:29.2 sec          RADIOLOGY & ADDITIONAL STUDIES:

## 2021-11-15 NOTE — CONSULT NOTE ADULT - ASSESSMENT
·	no decisions made; introduced the role of Palliative Care for symptom management and advanced care planning  ·	family is surprised/blindsided by the "sudden change" in expected prognosis, will continue to bridge communication  ·	explained that continued renal failure will lead to death/cardiac arrest 45yo F with PMH of HTN, Multiple Sclerosis, and recent spinal surgery p/w AMS and found to have ruptured cerebral aneurysm c/b cardiac arrest. Palliative consulted for complex medical decision making in the setting of multi-organ failure.    ·	no decisions made; introduced the role of Palliative Care for symptom management and advanced care planning  ·	family is surprised/blindsided by the "sudden change" in expected prognosis, will continue to bridge communication  ·	explained that continued renal failure will lead to death/cardiac arrest

## 2021-11-15 NOTE — CONSULT NOTE ADULT - PROBLEM SELECTOR RECOMMENDATION 6
.  Patient is Full Code  -patient's children would be acting collectively as surrogate decision makers in the absence of any formal HCP documentation  -the family is still processing and they are unable to make a decision re: code status at this time

## 2021-11-15 NOTE — PROGRESS NOTE ADULT - ASSESSMENT
46F with pmhx of HTN who presented to the hospital after being found down at home for unknown period of time. 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 Nephrology consulted for CHETAN management and possible HD needs    Assessment/Plan:   #oliguric iATN  Baseline creatinine 0.6  Pt has prolonged hospital stay for a L MCA aneurysm rupture; she suffered from cardiac arrest x 3 on 11/12 and now is intubated in the ICU. Her kidney function has continued to rise over the course of the last 24 hours and her urine studies are suggestive of possible ATN given Sodium of 115 which correlates to her having hypoperfusion from the cardiac arrest. She is currently receiving therapy with antibiotics; specifically Cefepime. Her urine output remains oliguric at this time. After discussion with Nsx team it is unlikely patient to make a meaningful recovery; therefore would not offer HD at this time and continue with supportive care and have GOC.   -Trend BMP daily  -c/w Cefepime 2mg Q24h  -Would not offer HD at this time given overall prognosis.  -Active GOC discussion  -Strict I/O's  -Renal diet  -Avoid nephrotoxic meds    #Hyponatremia (resolving)  -Continue to monitor sodium levels    #Anemia of Chronic disease  -Ferritin and iron profile noted.      Thank you for the opportunity to participate in the care of your patient. The nephrology service remains available to assist with any questions or concerns. Please feel free to reach us by paging the on-call nephrology fellow for urgent issues or as below.     Aj Cardoso D.O.  PGY 4 - Nephrology Fellow  [phone]

## 2021-11-15 NOTE — CONSULT NOTE ADULT - PROBLEM SELECTOR RECOMMENDATION 3
.  Prolonged mechanical ventilation  -failed trach attempts, would hold off for now while clinical course unfolds  -if patient will ultimately die from renal failure, is there utility of additional procedure  -will continue to clarify GOC

## 2021-11-15 NOTE — CONSULT NOTE ADULT - PROBLEM SELECTOR RECOMMENDATION 4
.  s/p hemicraniotomy  -unclear what level of recovery the family was told to expect from initial event  -now c/b cardiac arrest and possible anoxic injury compounded an already compromised brain

## 2021-11-15 NOTE — CONSULT NOTE ADULT - SUBJECTIVE AND OBJECTIVE BOX
Spoke with daughter, will meet in person this afternoon when she arrives to the hospital.    Silas Cavanaugh, Palliative Care Attending  Questions/Concerns: Call [phone] (including Nights/Weekend) or message on Microsoft Teams (Silas Cavanaugh) [facility]  Silas Cavanaugh, Palliative Care Attending  Contact Info: Call [phone] (HEAL Line) or message on Microsoft Teams (Silas Cavanaugh)    HPI:  47 y/o female with PMH HTN, Multiple sclerosis, recent spinal surgery 10/8 ([facility]) presented to [facility] BIBEMS after being found unconscious at home, unknown period of time. 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 HH5, MF1. Patient was intubated at [facility], found to have blown L pupil, and sent straight to the OR for left hemicraniotomy. A-line placed intraop. Hemodynamically unstable upon arrival to [facility], bradycardic and hypertensive s/p Mannitol, Clevidipine, and Furosemide. Central line placed in NSICU. Currently sedated on Propofol, pending repeat CTH. History per patient's son, patient had recent spine surgery and was found on outpatient imaging last week to have L M1 segment aneurysm (unruptured), pt asymptomatic at this time. Per son patient taking percocet PO at home. Ureña catheter, ET tube, and arterial line placed at [facility].  (26 Oct 2021 13:03)    Patient seen and examined at bedside, daughter present. Intubated/sedated, patient unable to participate in interview. Collateral obtained from daughter, son, and brother. They are surprised by the "sudden" change in expected prognosis. Daughter does not understand why the patient had cardiac arrest if the trach procedure was deemed appropriate at the time, reinforced that there is always risk of significant complication in the critically ill.    PERTINENT PM/SXH:   HTN  Personal history of spine surgery    FAMILY HISTORY:  Non-contributory in first degree relatives    ITEMS NOT CHECKED ARE NOT PRESENT    SOCIAL HISTORY:   Significant other/partner:  []  Children:  [x]   Substance hx:  []   Tobacco hx:  []   Alcohol hx: []   Home Opioid hx:  [] I-Stop Reference No: [id number]  - 02/09/2021	02/12/2021	clonazepam 0.5 mg tablet	60	30	KorolAbimael NP	[id number]	[facility]    11/27/2020	12/01/2020	clonazepam 0.5 mg tablet	60	30	Korol, Abimael Y NP	[id number]	[facility]  Living Situation: [x]Home  []Long term care  []Rehab []Other  Buddhist/Spiritual practice: ; Role of organized Taoist [ ] important [ ] some [x] unable to assess  Coping: [ ] well [ ] with difficulty [ ] poor coping [x] unable to assess  Support system: [ ] strong [x] adequate [ ] inadequate    ADVANCE DIRECTIVES:    []MOLST  []Living Will  DECISION MAKER(s):  [] Health Care Proxy(s)  [x] Surrogate(s)  [] Guardian           Name(s)/Phone Number(s): Jayshree Stubbs    BASELINE (I)ADLs (prior to admission):  [place]: [x]Total  [] Moderate []Dependent    ALLERGIES:  No Known Allergies    MEDICATIONS  (STANDING):  acetylcysteine 20%  Inhalation 4 milliLiter(s) Inhalation three times a day  amLODIPine   Tablet 5 milliGRAM(s) Oral daily  artificial  tears Solution 1 Drop(s) Both EYES two times a day  ceFAZolin   IVPB 500 milliGRAM(s) IV Intermittent every 12 hours  chlorhexidine 0.12% Liquid 15 milliLiter(s) Oral Mucosa every 12 hours  chlorhexidine 2% Cloths 1 Application(s) Topical <User Schedule>  cisatracurium Infusion 3 MICROgram(s)/kG/Min (15.3 mL/Hr) IV Continuous <Continuous>  fentaNYL    Injectable 50 MICROGram(s) IV Push every 4 hours  heparin   Injectable 5000 Unit(s) SubCutaneous every 8 hours  hydrALAZINE 25 milliGRAM(s) Oral every 12 hours  lactated ringers. 1000 milliLiter(s) (75 mL/Hr) IV Continuous <Continuous>  levETIRAcetam  Solution 500 milliGRAM(s) Oral two times a day  pantoprazole   Suspension 40 milliGRAM(s) Oral daily  propofol Infusion 10 MICROgram(s)/kG/Min (5.1 mL/Hr) IV Continuous <Continuous>    MEDICATIONS  (PRN):  fentaNYL    Injectable 50 MICROGram(s) IV Push every 2 hours PRN agitation/pain  ondansetron Injectable 4 milliGRAM(s) IV Push every 6 hours PRN Nausea and/or Vomiting    PRESENT SYMPTOMS: [x]Unable to obtain due to poor mentation/encephalopathy  Source if other than patient:  []Family   []Team     Pain: [ ] yes [ ] no  QOL Impact -   Location -                    Aggravating Factors -  Quality -  Radiation -  Timing -  Severity (0-10 scale) -   Minimal Acceptable Level (0-10 scale) -    PAIN AD Score: 0  [email or web address].[email or web address]/painad.pdf (press ctrl +  left click to view)    Dyspnea:                           []Mild  []Moderate []Severe  Anxiety:                             []Mild []Moderate []Severe  Fatigue:                             []Mild []Moderate []Severe  Nausea:                             []Mild []Moderate []Severe  Loss of Appetite:              []Mild []Moderate []Severe  Constipation:                    []Mild []Moderate []Severe    Other Symptoms:  []All Other Review Of Systems Negative     Palliative Performance Status Version 2:  10%    http://npc.org/files/news/palliative_performance_scale_ppsv2.pdf    PHYSICAL EXAM:  GENERAL:  []Alert  []Oriented x   []Lethargic  []Cachexia  [x]Unarousable  []Verbal  [x]Non-Verbal  Behavioral:   []Anxiety  [x]Delirium []Agitation []Cooperative  HEENT:  []Normal   []Dry mouth   [x]ET Tube/Trach  []Oral lesions  PULMONARY:   []Clear []Tachypnea  []Audible excessive secretions   [x]Rhonchi        []Right []Left [x]Bilateral  []Crackles        []Right []Left []Bilateral  []Wheezing     []Right []Left []Bilateral  CARDIOVASCULAR:    [x]Regular []Irregular []Tachy  []Nirav []Murmur []Other  GASTROINTESTINAL:  [x]Soft  [x]Distended   [x]+BS  [x]Non tender []Tender  []PEG []OGT/ NGT  Last BM: +rectal tube  GENITOURINARY:  []Normal [] Incontinent   [x]Oliguria/Anuria   [x]Ureña  MUSCULOSKELETAL:   []Normal   []Weakness  [x]Bed/Wheelchair bound []Edema  NEUROLOGIC:   []No focal deficits  []Cognitive impairment  [x]Dysphagia []Dysarthria [x]Paresis [x]Encephalopathic   SKIN:   [x]Normal   []Pressure ulcer(s)  []Rash    CRITICAL CARE:  [ ]Shock Present  [ ]Septic [ ]Cardiogenic [ ]Neurologic [ ]Hypovolemic  [ ]Vasopressors [ ]Inotropes   [x]Respiratory failure present [x]Mechanical Ventilation [ ]Non-invasive ventilatory support [ ]High-Flow  [x]Acute  [ ]Chronic [x]Hypoxic  [ ]Hypercarbic  [x]Other organ failure: renal    Vital Signs Last 24 Hrs  T(C): 36.5 (15 Nov 2021 09:12), Max: 36.6 (15 Nov 2021 01:00)  T(F): 97.7 (15 Nov 2021 09:12), Max: 97.9 (15 Nov 2021 01:00)  HR: 81 (15 Nov 2021 14:00) (71 - 91)  BP: 146/76 (15 Nov 2021 14:00) (134/69 - 158/87)  BP(mean): 105 (15 Nov 2021 14:00) (91 - 119)  RR: 14 (15 Nov 2021 14:00) (14 - 22)  SpO2: 100% (15 Nov 2021 14:00) (98% - 100%) I&O's Summary    LABS:                        8.5    12.91 )-----------( 239      ( 15 Nov 2021 03:49 )             27.7     145  |  111<H>  |  43<H>  ----------------------------<  129<H>  3.9   |  22  |  5.23<H>    Ca    9.2      15 Nov 2021 03:49  Phos  4.8     11-15  Mg     2.4     11-15  TPro  6.8  /  Alb  3.0<L>  /  TBili  0.2  /  DBili  x   /  AST  268<H>  /  ALT  318<H>  /  AlkPhos  96  11-14    RADIOLOGY & ADDITIONAL STUDIES:  < from: Xray Chest 1 View- PORTABLE-Routine (Xray Chest 1 View- PORTABLE-Routine in AM.) (11.14.21 @ 08:52) >  Frontal examination of the chest demonstrates the heart to be within normal limits in transverse diameter. No interval change position remaining support devices in comparison to prior examination of the chest 11/13/2021. Left effusion. Subcutaneous emphysema overlying right and left lateral chest wall. Right perihilar infiltrate cannot be excluded.No evidence of pneumothorax IMPRESSION: Left effusion. Right perihilar infiltrate cannot be excluded.    < from: CT Head No Cont (10.26.21 @ 16:18) >  There is large recent parenchymal hematoma in the left superior and mid temporal lobe, spanning a maximum of 8 cm in oblique AP dimension and 5.5 cm in transverse dimension. Subarachnoid hemorrhage is less conspicuous, though likely seen in the sylvian fissure. Correlate with prior imaging done at outside institution. There is dissection of blood into the ventricular system, and the right lateral ventricle is enlarged consistent with entrapment. Midline shift to the right at the level of the 3rd ventricle measures 1.2 cm. The third ventricle is also enlarged anteriorly and is compressed inferiorly within the suprasellarcistern secondary to downward mass effect. The suprasellar cistern is completely effaced as are other basilar cisterns. Transtentorial herniation may be imminent, if not already present. Also, a large volume of blood fills and expands the fourth ventricle, and the cerebellar tonsils are herniated below foramen magnum portending a poor prognosis. Patient is status post decompressive left hemicraniectomy. A subgaleal drain is in place at the scalp, and there is expected small volume extra-axial gasand hemorrhage deep to the craniotomy site and a small amount of extra-axial gas over the left frontal convexity. Mastoid air cells and paranasal sinuses appear clear.  view shows the patient with nasoenteric and endotracheal tubes. IMPRESSION: Massive lobar hemorrhage in the left superior temporal lobe with intraventricular extension, and major midline shift and downward herniation despite decompressive hemicraniectomy.     REFERRALS:  [x]Social Work  []Case management []PT/OT []Chaplaincy  []Hospice  []Patient/Family Support    DISCUSSION OF CASE: Jayshree (daughter) and Jose (son) - to obtain additional history and to provide emotional support; NSICU (Dr. Vyas) - to discuss GOC so far as delineated by family    Care Coordination/Goals of Care Document:   PROGRESS NOTE  Date & Time of Note   2021-11-12 12:24  Notes: Case discussed during IDR. As per neurosurgical team patient had a  respiratory arrest during tracheostomy placement today 11/12, straight chest  thoracostomy tube has been placed, after that patients O2 saturation has been  improved. Patient is currently intubated on the full vent support.   Dispo: plan L tach.  CM remains available.  Electronically signed by:  Yamilet Penny  Electronically signed on:  2021-11-12  12:24      PALLIATIVE MEDICINE COORDINATION OF CARE DOCUMENTATION: [x] Inpatient Consult  Non-Face-to-Face prolonged service provided that relates to (face-to-face) care that has or will occur and ongoing patient management, including one or more of the following: - Reviewed documentation from other physicians and other health care professional services - Reviewed medical records and diagnostic / radiology study results - Coordination with patient's support system  ************************************************************************  MEDICATION REVIEW:  - See Medication List Above    ISTOP REFERENCE: [id number]  - 02/09/2021	02/12/2021	clonazepam 0.5 mg tablet	60	30	KorAbimael infante NP	[id number]	Insurance	[facility]    11/27/2020	12/01/2020	clonazepam 0.5 mg tablet	60	30	Korol, Abimael RAMIREZ NP	[id number]	[facility]  - PRN usage: Fentanyl x5  ------------------------------------------------------------------------  COORDINATION OF CARE:  - Palliative Care consulted for: [facility]  - Patient (to be) assessed: 11/15/21  - Patient previously seen by Palliative Care service: NO    ADVANCE CARE PLANNING  - Code status: Full Code  - MOLST reviewed in chart: NONE; None found on Alpha  - HCP/Surrogate: Jayshree Stubbs  - [facility] documents: NONE found on [place]  - HCP/Living will/Other Advanced Directives in Alpha: NONE found on Alpha  ------------------------------------------------------------------------  CARE PROVIDER DOCUMENTATION:  - SW/CM notes: Remains medically active  - Nephro notes: will not offer HD in the setting of poor neuro prognosis  - ENT notes: will formalize trach in OR later this week depending on patient stability  - NSICU notes: poor porgnosis    PLAN OF CARE  - Known Admissions in Past Year: 0  - Current Admit Date: 10/26/21  - LOS: 20  - LACE score: 8  - Current Dispo Plan: TO BE DETERMINED    10-26-21 (20d)  ------------------------------------------------------------------------  - Time Spent/Chart reviewed: 32 Minutes [including time used to gather, review and transfer data]  - Start: 9:00AM  - End: 9:32AM    Prolonged services rendered, as part of this patient's care provided by Palliative Medicine, include: i. chart review for provider and ancillary service documentation, ii. pertinent diagnostics including laboratory and imaging studies, iii. medication review including PRN use, iv. admission history including previous palliative care encounters and GOC notes, v. advance care planning documents including HCP and MOLST forms in Alpha. Part of Palliative Medicine extended evaluation and management also involves coordination of care with our IDT, the primary and consulting teams, and unit CM/SW and Hospice if eligible. Recommendations based on the information gathered and discussed are outlined in the A/P of Palliative notes.

## 2021-11-15 NOTE — CONSULT NOTE ADULT - PROBLEM SELECTOR RECOMMENDATION 7
.  Complex medical decision making in the setting of neurological encephalopathy and cardiac arrest.    Will continue to follow for ongoing GOC discussion.   Emotional support provided, questions answered.  Active Psychosocial Referrals: SW    Coping: [ ] well [ ] with difficulty [ ] poor coping [x] unable to assess  Support system: [ ] strong [x] adequate [ ] inadequate    For new or uncontrolled symptoms, please call Palliative Care at [phone]. The service is available 24/7 (including nights & weekends) to provide symptom management recommendations over the phone as appropriate

## 2021-11-15 NOTE — PROVIDER CONTACT NOTE (CHANGE IN STATUS NOTIFICATION) - NAME OF MD/NP/PA/DO NOTIFIED:
pt ET leak is continuously and now pt TV is going down pt ET leak is continuously and now pt TV is going down x2

## 2021-11-15 NOTE — PROVIDER CONTACT NOTE (CHANGE IN STATUS NOTIFICATION) - ASSESSMENT
pt sedated on prop, fent and nimbex. pt on full vent 60% fio2. received pt with air leak in the cuff. RT came by earlier and overinflated the cuff with pressure 60. pt TV went down to 200 from 400 x2 since then at 10p. inline/oral suction done with no improvement. RT at bedside, check the pressure. It went down from 60 to 40. reinflated the cuff again, still gurgling leak sounds present. pt has been maintaining the saturation at % with RR 12-14. RN tried to occlude the stoma at the neck that was originally open for pt sedated on prop, fent and nimbex. pt on full vent 60% fio2. received pt with air leak in the cuff. RT came by earlier and overinflated the cuff with pressure 60. pt TV went down to 200 from 400 x2 since then at 10p. inline/oral suction done with no improvement. RT at bedside, check the pressure, it went down from 60 to 40. reinflated the cuff again, still gurgling leak sounds present. pt has been maintaining the saturation at % with RR 12-14. RN tried to occlude the stoma at the neck that was originally open for trach, gurgling stop. Neuro paged to come assess the pt. recommended the team to call ENT to redress the stoma site as RN believes the air is leaking from there.

## 2021-11-15 NOTE — CONSULT NOTE ADULT - PROBLEM SELECTOR RECOMMENDATION 2
.  Worsening renal function after cardiac arrest  -RRT is not being offered as per Nephro  -explained to family that renal failure can very likely lead to patient's death imminently

## 2021-11-15 NOTE — PROGRESS NOTE ADULT - ASSESSMENT
46y/Female with:  L MCA ruptured aneurysm, subarachnoid hemorrhage, s/p C (10/26/2021, Dr. D'Amico @ [facility]), brain compression, cerebral edema  anemia   recent spinal surgery  UGIB    PLAN: Day 1 = 10-26 ; Day 4 = 10/29; Day 21 = 11/15  NEURO: neurochecks q1h, sedation with PRN fentanyl pushes  SAH:  cont nimodipine 60 mg po q4h to be given for 21 days (last day 11/15)   Hydrocephalus:  EVD at 5 cm H20 open to drain  neurostorming - off gabapentin   Seizure prophylaxis:  cont levetiracetam 500 BID   REHAB:  physical therapy evaluation and management    EARLY MOB:  HOB elevated    PULM:  full vent support for now, continue mucormyst and ipratropium / albuterol; pulmo toilette; for trach tomorrow in OR  CARDIO:  SBP goal 100-180mm Hg,   ENDO:  Blood sugar goals 140-180 mg/dL, insulin sliding scale  GI:  PPI OD  DIET: NPO after midnight  RENAL: maintain euvolemia, Na goal 140-145, 3%@30cc/hr, NS@45cc/hr   HEM/ONC: no coagulopathy (INR= 1.03), no ASA / Plavix use  VTE Prophylaxis: SCDs, SQL   ID: afebrile, no leukocytosis - piperacillin/tazobactam (11/05 to 11/07) Vancomycin (11/05 to 11/06) ceftriaxone 11/07, cefepime 11/08 - now; duration of ABx 1 week (last day: 11/12)  Social: will update son and daughter  WOUND: dehiscence spinal surgery wound; wound care consulted    CORE MEASURES  1.  Hunt and Griffin Score = 5  2.  VTE prophylaxis:  [ ] administered within 24 hours of admission OR [X] reason not done: fresh bleed, unsecured aneurysm.  3.  Dysphagia screening:   [X] performed before any oral meds / liquids / food  4.  Nimodipine treatment:  [X] administered within 24 hours of admission OR [ ] reason not done:    ATTENDING ATTESTATION:  I was physically present for the key portions of the evaluation and management (E/M) service provided.  I agree with the above history, physical and plan, which I have reviewed and edited where appropriate.    Patient at high risk for neurological deterioration or death due to:  ICU delirium, aspiration PNA, DVT / PE.  Critical care time:  I have personally provided 30 minutes of critical care time, excluding time spent on separate procedures.      Plan discussed with RN, house staff.     46y/Female with:  L MCA ruptured aneurysm, subarachnoid hemorrhage, s/p C (10/26/2021, Dr. D'Amico @ [facility]), brain compression, cerebral edema  anemia   recent spinal surgery  UGIB    PLAN: Day 1 = 10-26 ; Day 4 = 10/29; Day 21 = 11/15  NEURO: neurochecks q1h, sedation with PRN fentanyl pushes  SAH:  cont nimodipine 60 mg po q4h to be given for 21 days (last day 11/15)   Hydrocephalus:  EVD at 5 cm H20 open to drain  neurostorming - off gabapentin   Seizure prophylaxis:  cont levetiracetam 500 BID   REHAB:  physical therapy evaluation and management    EARLY MOB:  HOB elevated    PULM:  full vent support for now, continue mucormyst and ipratropium / albuterol; pulmo toilette; for trach tomorrow in OR  CARDIO:  SBP goal 100-180mm Hg,   ENDO:  Blood sugar goals 140-180 mg/dL, insulin sliding scale  GI:  PPI OD  DIET: NPO after midnight  RENAL: maintain euvolemia, Na goal 140-145, 3%@30cc/hr, NS@45cc/hr   HEM/ONC: no coagulopathy (INR= 1.03), no ASA / Plavix use  VTE Prophylaxis: SCDs, SQL   ID: afebrile, no leukocytosis - piperacillin/tazobactam (11/05 to 11/07) Vancomycin (11/05 to 11/06) ceftriaxone 11/07, cefepime 11/08 - now; duration of ABx 1 week (last day: 11/12)  Social: will update son and daughter  WOUND: dehiscence spinal surgery wound; wound care consulted    CORE MEASURES  1.  Hunt and Griffin Score = 5  2.  VTE prophylaxis:  [ ] administered within 24 hours of admission OR [X] reason not done: fresh bleed, unsecured aneurysm.  3.  Dysphagia screening:   [X] performed before any oral meds / liquids / food  4.  Nimodipine treatment:  [X] administered within 24 hours of admission OR [ ] reason not done:    ATTENDING ATTESTATION:  I was physically present for the key portions of the evaluation and management (E/M) service provided.  I agree with the above history, physical and plan, which I have reviewed and edited where appropriate.    Patient at high risk for neurological deterioration or death due to:  ICU delirium, aspiration PNA, DVT / PE.  Critical care time:  I have personally provided 45 minutes of critical care time, excluding time spent on separate procedures.      Plan discussed with RN, house staff.     46y/Female with:  L MCA ruptured aneurysm, subarachnoid hemorrhage, s/p [facility] (10/26/2021, Dr. D'Amico @ [place]), brain compression, cerebral edema  anemia   recent spinal surgery  UGIB    PLAN: Day 1 = 10-26 ; Day 4 = 10/29; Day 21 = 11/15  NEURO: neurochecks q1h, sedation with PRN fentanyl pushes, propofol   SAH:  d/c nimodipine  Hydrocephalus:  EVD at 5 cm H20 open to drain  Seizure prophylaxis:  cont levetiracetam 500 BID   REHAB:  physical therapy evaluation and management    EARLY MOB:  HOB elevated    PULM:  full vent support for now, continue mucormyst and ipratropium / albuterol; pulmo toilette  CARDIO:  SBP goal 100-160mm Hg, TTE  ENDO:  Blood sugar goals 140-180 mg/dL  GI:  PPI OD  DIET: NPO  RENAL: LR@75cc/hr, maintain euvolemia, Na goal 1435-145  HEM/ONC: no coagulopathy (INR= 1.03), no ASA / Plavix use  VTE Prophylaxis: SCDs, SQH tonight  ID: afebrile, leukocytosis, cefepim from 11/08; now ancef as per ENT  Social: will update son and daughter  WOUND: dehiscence spinal surgery wound; wound care consulted    CORE MEASURES  1.  Hunt and Griffin Score = 5  2.  VTE prophylaxis:  [ ] administered within 24 hours of admission OR [X] reason not done: fresh bleed, unsecured aneurysm.  3.  Dysphagia screening:   [X] performed before any oral meds / liquids / food  4.  Nimodipine treatment:  [X] administered within 24 hours of admission OR [ ] reason not done:    ATTENDING ATTESTATION:  I was physically present for the key portions of the evaluation and management (E/M) service provided.  I agree with the above history, physical and plan, which I have reviewed and edited where appropriate.    Patient at high risk for neurological deterioration or death due to:  ICU delirium, aspiration PNA, DVT / PE.  Critical care time:  I have personally provided 45 minutes of critical care time, excluding time spent on separate procedures.      Plan discussed with RN, house staff.

## 2021-11-15 NOTE — PROGRESS NOTE ADULT - SUBJECTIVE AND OBJECTIVE BOX
ENT PROGRESS NOTE    HPI: 47 y/o female with PMH HTN, Multiple sclerosis, recent spinal surgery 10/8 ([facility]) presented to [facility] after being found unconscious at home, unknown period of time. 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 ENT consulted due to tongue swelling. pt has been off sedated due to neuro checks. per nursing, pt has been biting down on tongue. nursing has not been able to place bite block. Denies any hypotensive episodes.     INTERVAL:    11/10: ENT reconsulted for trach placement - per primary team, patient was spastic with stiff neck while trying to perform bedside tracheostomy yesterday. Pt has been intubated since 10/26. Otherwise, NAEON - on EEG.    11/11: S/p tracheostomy attempt 11/12 am. Pt was seen and examined bedside - ETT in place, good ventilation. Stoma packing in place. Trops downtrending. Given 1x PRBC overnight, b/l chest tubes in place. Plan to change from cefepime to ancef today.    11/12: Pt in multisystem organ failure per primary team. Poor neuro exam    11/15: informed by primary team pt had feeds coming from nose and mouth. was also found in ETT. Pt DNR status still pending      ICU Vital Signs Last 24 Hrs  T(C): 36.5 (15 Nov 2021 09:12), Max: 36.6 (14 Nov 2021 14:15)  T(F): 97.7 (15 Nov 2021 09:12), Max: 97.9 (14 Nov 2021 14:15)  HR: 83 (15 Nov 2021 13:00) (71 - 91)  BP: 136/66 (15 Nov 2021 12:00) (134/69 - 158/87)  BP(mean): 94 (15 Nov 2021 12:00) (91 - 119)  ABP: 129/120 (15 Nov 2021 13:00) (114/92 - 177/89)  ABP(mean): 126 (15 Nov 2021 13:00) (92 - 126)  RR: 15 (15 Nov 2021 13:00) (14 - 22)  SpO2: 100% (15 Nov 2021 13:00) (98% - 100%)          PHYSICAL EXAM:    CONSTITUTIONAL: A&Ox0  HEAD: EVD in place  ORAL CAVITY/OROPHARYNX: Significant tongue swelling, most significant on ventral surface. mouth unable to be opened due to tonicity of jaw. tongue indurated, necrosis of ventral surface. OP limited due to ETT  NECK: Tracheal packing in place, no bleeding  RESPIRATORY: Respirations unlabored, no increased work of breathing with use of accessory muscles and retractions. No stridor.  CARDIAC: Warm extremities, no cyanosis.       A/P: 46 F w/ w/ significant tongue swelling, likely secondary to biting down on tongue. bite block unable to be placed due to tone of jaw muscles. ENT reconsulted for trach placement - attempted 11/12 am, but complicated by respiratory failure, coded w/ chest compressions 3x with ROSC. ETT was reinserted into tracheal orally. Feeds seen from mouth and nose, likely secondary to tube feeds refluxing    - Antibiotics while tracheal stoma packing in place  - ENT will formalize trach in OR later this week depending on patient stability  - ENT can aesthetically close neck stoma as well  - Rest of management per NSGY

## 2021-11-15 NOTE — PROGRESS NOTE ADULT - ATTENDING COMMENTS
I:  Creatinine 5.23. Remains intubated.   A: CHETAN, s/p cardiac arrest.  P: Supportive care. Not a candidate for dialysis.

## 2021-11-16 DIAGNOSIS — J96.01 ACUTE RESPIRATORY FAILURE WITH HYPOXIA: ICD-10-CM

## 2021-11-16 DIAGNOSIS — Z71.89 OTHER SPECIFIED COUNSELING: ICD-10-CM

## 2021-11-16 DIAGNOSIS — N17.9 ACUTE KIDNEY FAILURE, UNSPECIFIED: ICD-10-CM

## 2021-11-16 DIAGNOSIS — I46.9 CARDIAC ARREST, CAUSE UNSPECIFIED: ICD-10-CM

## 2021-11-16 DIAGNOSIS — I61.9 NONTRAUMATIC INTRACEREBRAL HEMORRHAGE, UNSPECIFIED: ICD-10-CM

## 2021-11-16 DIAGNOSIS — Z51.5 ENCOUNTER FOR PALLIATIVE CARE: ICD-10-CM

## 2021-11-16 DIAGNOSIS — R53.2 FUNCTIONAL QUADRIPLEGIA: ICD-10-CM

## 2021-11-16 DIAGNOSIS — J84.03 IDIOPATHIC PULMONARY HEMOSIDEROSIS: ICD-10-CM

## 2021-11-16 LAB
ALBUMIN SERPL ELPH-MCNC: 2.4 G/DL — LOW (ref 3.3–5)
ALP SERPL-CCNC: 108 U/L — SIGNIFICANT CHANGE UP (ref 40–120)
ALT FLD-CCNC: 166 U/L — HIGH (ref 10–45)
ANION GAP SERPL CALC-SCNC: 16 MMOL/L — SIGNIFICANT CHANGE UP (ref 5–17)
AST SERPL-CCNC: 88 U/L — HIGH (ref 10–40)
BASE EXCESS BLDA CALC-SCNC: -8.3 MMOL/L — LOW (ref -2–3)
BILIRUB SERPL-MCNC: <0.2 MG/DL — SIGNIFICANT CHANGE UP (ref 0.2–1.2)
BUN SERPL-MCNC: 46 MG/DL — HIGH (ref 7–23)
CALCIUM SERPL-MCNC: 8.9 MG/DL — SIGNIFICANT CHANGE UP (ref 8.4–10.5)
CHLORIDE SERPL-SCNC: 111 MMOL/L — HIGH (ref 96–108)
CO2 BLDA-SCNC: 20 MMOL/L — SIGNIFICANT CHANGE UP (ref 19–24)
CO2 SERPL-SCNC: 19 MMOL/L — LOW (ref 22–31)
CREAT SERPL-MCNC: 6.03 MG/DL — HIGH (ref 0.5–1.3)
FOLATE SERPL-MCNC: 6.2 NG/ML — SIGNIFICANT CHANGE UP
GLUCOSE SERPL-MCNC: 81 MG/DL — SIGNIFICANT CHANGE UP (ref 70–99)
HCO3 BLDA-SCNC: 18 MMOL/L — LOW (ref 21–28)
HCT VFR BLD CALC: 24.6 % — LOW (ref 34.5–45)
HCT VFR BLD CALC: 25.2 % — LOW (ref 34.5–45)
HGB BLD-MCNC: 7.6 G/DL — LOW (ref 11.5–15.5)
HGB BLD-MCNC: 7.6 G/DL — LOW (ref 11.5–15.5)
MAGNESIUM SERPL-MCNC: 2.3 MG/DL — SIGNIFICANT CHANGE UP (ref 1.6–2.6)
MCHC RBC-ENTMCNC: 25.7 PG — LOW (ref 27–34)
MCHC RBC-ENTMCNC: 26.8 PG — LOW (ref 27–34)
MCHC RBC-ENTMCNC: 30.2 GM/DL — LOW (ref 32–36)
MCHC RBC-ENTMCNC: 30.9 GM/DL — LOW (ref 32–36)
MCV RBC AUTO: 85.1 FL — SIGNIFICANT CHANGE UP (ref 80–100)
MCV RBC AUTO: 86.6 FL — SIGNIFICANT CHANGE UP (ref 80–100)
NRBC # BLD: 0 /100 WBCS — SIGNIFICANT CHANGE UP (ref 0–0)
NRBC # BLD: 0 /100 WBCS — SIGNIFICANT CHANGE UP (ref 0–0)
PCO2 BLDA: 41 MMHG — HIGH (ref 32–35)
PH BLDA: 7.26 — LOW (ref 7.35–7.45)
PHOSPHATE SERPL-MCNC: 5.2 MG/DL — HIGH (ref 2.5–4.5)
PLATELET # BLD AUTO: 215 K/UL — SIGNIFICANT CHANGE UP (ref 150–400)
PLATELET # BLD AUTO: 216 K/UL — SIGNIFICANT CHANGE UP (ref 150–400)
PO2 BLDA: 122 MMHG — HIGH (ref 83–108)
POTASSIUM SERPL-MCNC: 4.1 MMOL/L — SIGNIFICANT CHANGE UP (ref 3.5–5.3)
POTASSIUM SERPL-SCNC: 4.1 MMOL/L — SIGNIFICANT CHANGE UP (ref 3.5–5.3)
PROT SERPL-MCNC: 6.2 G/DL — SIGNIFICANT CHANGE UP (ref 6–8.3)
RBC # BLD: 2.84 M/UL — LOW (ref 3.8–5.2)
RBC # BLD: 2.96 M/UL — LOW (ref 3.8–5.2)
RBC # FLD: 22.3 % — HIGH (ref 10.3–14.5)
RBC # FLD: 23.1 % — HIGH (ref 10.3–14.5)
SAO2 % BLDA: 99 % — HIGH (ref 94–98)
SODIUM SERPL-SCNC: 146 MMOL/L — HIGH (ref 135–145)
WBC # BLD: 10.46 K/UL — SIGNIFICANT CHANGE UP (ref 3.8–10.5)
WBC # BLD: 11.3 K/UL — HIGH (ref 3.8–10.5)
WBC # FLD AUTO: 10.46 K/UL — SIGNIFICANT CHANGE UP (ref 3.8–10.5)
WBC # FLD AUTO: 11.3 K/UL — HIGH (ref 3.8–10.5)

## 2021-11-16 PROCEDURE — 70450 CT HEAD/BRAIN W/O DYE: CPT | Mod: 26

## 2021-11-16 PROCEDURE — 99292 CRITICAL CARE ADDL 30 MIN: CPT

## 2021-11-16 PROCEDURE — 99358 PROLONG SERVICE W/O CONTACT: CPT

## 2021-11-16 PROCEDURE — 99232 SBSQ HOSP IP/OBS MODERATE 35: CPT

## 2021-11-16 PROCEDURE — 99291 CRITICAL CARE FIRST HOUR: CPT

## 2021-11-16 PROCEDURE — 36600 WITHDRAWAL OF ARTERIAL BLOOD: CPT | Mod: 25

## 2021-11-16 PROCEDURE — 71045 X-RAY EXAM CHEST 1 VIEW: CPT | Mod: 26

## 2021-11-16 PROCEDURE — 99024 POSTOP FOLLOW-UP VISIT: CPT

## 2021-11-16 RX ORDER — FENTANYL CITRATE 50 UG/ML
0.5 INJECTION INTRAVENOUS
Qty: 2500 | Refills: 0 | Status: DISCONTINUED | OUTPATIENT
Start: 2021-11-16 | End: 2021-11-16

## 2021-11-16 RX ORDER — SODIUM CHLORIDE 9 MG/ML
1000 INJECTION INTRAMUSCULAR; INTRAVENOUS; SUBCUTANEOUS
Refills: 0 | Status: DISCONTINUED | OUTPATIENT
Start: 2021-11-16 | End: 2021-11-17

## 2021-11-16 RX ORDER — FENTANYL CITRATE 50 UG/ML
1 INJECTION INTRAVENOUS
Qty: 2500 | Refills: 0 | Status: DISCONTINUED | OUTPATIENT
Start: 2021-11-16 | End: 2021-11-18

## 2021-11-16 RX ADMIN — Medication 1 DROP(S): at 05:33

## 2021-11-16 RX ADMIN — LEVETIRACETAM 500 MILLIGRAM(S): 250 TABLET, FILM COATED ORAL at 18:34

## 2021-11-16 RX ADMIN — Medication 100 MILLIGRAM(S): at 11:36

## 2021-11-16 RX ADMIN — LEVETIRACETAM 500 MILLIGRAM(S): 250 TABLET, FILM COATED ORAL at 05:33

## 2021-11-16 RX ADMIN — Medication 4 MILLILITER(S): at 21:04

## 2021-11-16 RX ADMIN — AMLODIPINE BESYLATE 5 MILLIGRAM(S): 2.5 TABLET ORAL at 05:32

## 2021-11-16 RX ADMIN — Medication 100 MILLIGRAM(S): at 00:09

## 2021-11-16 RX ADMIN — FENTANYL CITRATE 8.5 MICROGRAM(S)/KG/HR: 50 INJECTION INTRAVENOUS at 21:05

## 2021-11-16 RX ADMIN — HEPARIN SODIUM 5000 UNIT(S): 5000 INJECTION INTRAVENOUS; SUBCUTANEOUS at 05:32

## 2021-11-16 RX ADMIN — CHLORHEXIDINE GLUCONATE 15 MILLILITER(S): 213 SOLUTION TOPICAL at 18:34

## 2021-11-16 RX ADMIN — Medication 4 MILLILITER(S): at 14:35

## 2021-11-16 RX ADMIN — Medication 4 MILLILITER(S): at 06:15

## 2021-11-16 RX ADMIN — Medication 25 MILLIGRAM(S): at 18:34

## 2021-11-16 RX ADMIN — Medication 25 MILLIGRAM(S): at 06:15

## 2021-11-16 RX ADMIN — HEPARIN SODIUM 5000 UNIT(S): 5000 INJECTION INTRAVENOUS; SUBCUTANEOUS at 21:04

## 2021-11-16 RX ADMIN — HEPARIN SODIUM 5000 UNIT(S): 5000 INJECTION INTRAVENOUS; SUBCUTANEOUS at 15:18

## 2021-11-16 RX ADMIN — PANTOPRAZOLE SODIUM 40 MILLIGRAM(S): 20 TABLET, DELAYED RELEASE ORAL at 11:37

## 2021-11-16 RX ADMIN — CHLORHEXIDINE GLUCONATE 15 MILLILITER(S): 213 SOLUTION TOPICAL at 05:33

## 2021-11-16 RX ADMIN — CHLORHEXIDINE GLUCONATE 1 APPLICATION(S): 213 SOLUTION TOPICAL at 05:32

## 2021-11-16 RX ADMIN — PROPOFOL 5.1 MICROGRAM(S)/KG/MIN: 10 INJECTION, EMULSION INTRAVENOUS at 13:58

## 2021-11-16 RX ADMIN — PROPOFOL 5.1 MICROGRAM(S)/KG/MIN: 10 INJECTION, EMULSION INTRAVENOUS at 21:05

## 2021-11-16 RX ADMIN — Medication 1 DROP(S): at 18:36

## 2021-11-16 NOTE — PROGRESS NOTE ADULT - SUBJECTIVE AND OBJECTIVE BOX
Patient is a 46y Female seen and evaluated at bedside remains intubated. Labs noted; creatinine continues to rise. Overall patient's prognosis remains poor. Palliative care speaking with family to come to a consensus of [facility] for patient.       Meds:    acetylcysteine 20%  Inhalation 4 three times a day  amLODIPine   Tablet 5 daily  artificial  tears Solution 1 two times a day  ceFAZolin   IVPB 500 every 12 hours  chlorhexidine 0.12% Liquid 15 every 12 hours  chlorhexidine 2% Cloths 1 <User Schedule>  cisatracurium Infusion 3 <Continuous>  fentaNYL   Infusion. 0.5 <Continuous>  heparin   Injectable 5000 every 8 hours  hydrALAZINE 25 every 12 hours  levETIRAcetam  Solution 500 two times a day  ondansetron Injectable 4 every 6 hours PRN  pantoprazole   Suspension 40 daily  propofol Infusion 10 <Continuous>  sodium chloride 0.9%. 1000 <Continuous>      T(C): , Max: 36.4 (11-15-21 @ 17:00)  T(F): , Max: 97.5 (11-15-21 @ 17:00)  HR: 115 (11-16-21 @ 09:00)  BP: 161/88 (11-16-21 @ 09:00)  BP(mean): 117 (11-16-21 @ 09:00)  RR: 12 (11-16-21 @ 09:00)  SpO2: 100% (11-16-21 @ 09:00)  Wt(kg): --    11-15 @ 07:01  -  11-16 @ 07:00  --------------------------------------------------------  IN: 2566.5 mL / OUT: 623 mL / NET: 1943.5 mL    11-16 @ 07:01  -  11-16 @ 10:09  --------------------------------------------------------  IN: 238 mL / OUT: 7 mL / NET: 231 mL          Review of Systems:  ROS negative except as per HPI      PHYSICAL EXAM:  GENERAL: intubated; unresponsive  HEENT: NCAT, MMM  CHEST/LUNG: decreased breath sounds b/l   HEART: Regular rate and rhythm, no murmur, no gallops, no rub   ABDOMEN: Soft, nontender, non distended  EXTREMITIES: no pedal edema, WWP  Neurology: intubated and sedated      LABS:                        7.6    11.30 )-----------( 215      ( 16 Nov 2021 04:23 )             25.2     11-16    146<H>  |  111<H>  |  46<H>  ----------------------------<  81  4.1   |  19<L>  |  6.03<H>    Ca    8.9      16 Nov 2021 05:47  Phos  5.2     11-16  Mg     2.3     11-16    TPro  6.2  /  Alb  2.4<L>  /  TBili  <0.2  /  DBili  x   /  AST  88<H>  /  ALT  166<H>  /  AlkPhos  108  11-16      PT/INR - ( 14 Nov 2021 17:52 )   PT: 14.7 sec;   INR: 1.24          PTT - ( 14 Nov 2021 17:52 )  PTT:29.2 sec          RADIOLOGY & ADDITIONAL STUDIES:

## 2021-11-16 NOTE — PROGRESS NOTE ADULT - SUBJECTIVE AND OBJECTIVE BOX
HPI:  45 y/o female with PMH HTN, Multiple sclerosis, recent spinal surgery 10/8 ([facility]) presented to [place] [facility] BIBEMS after being found unconscious at home, unknown period of time. 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 HH5, MF1. Patient was intubated at [place] ED, found to have blown L pupil, and sent straight to the OR for left hemicraniotomy. A-line placed intraop. Hemodynamically unstable upon arrival to [facility], bradycardic and hypertensive s/p Mannitol, Clevidipine, and Furosemide. Central line placed in NSICU. Currently sedated on Propofol, pending repeat CTH. History per patient's son, patient had recent spine surgery and was found on outpatient imaging last week to have L M1 segment aneurysm (unruptured), pt asymptomatic at this time. Per son patient taking percocet PO at home. Ureña catheter, ET tube, and arterial line placed at [facility].     NIHSS on arrival: 32   Bess Griffin 5, Mod Busby 4  Bleed Day 1 = 10/26 (26 Oct 2021 13:03)      Hospital Course:   10/26: admitted to [facility] from [facility], POD#0 s/p L hemicraniectomy for decompression and evacuation of SDH. Transferred to [facility] noted to have tense flap, received mannitol, keppra, decadron. Was hypertensive to 200s and preston to 40s, recevied 85g mannitol and bullet 23.4%. CTH on arrival demonstrated increased size in vents and new IVH. EVD placed, open at 15, central line placed. Transfused 2 u PRBC. POD0 of coiling of left MCA bifurcation aneurysm,   10/27: CTH demonstrated EVD in correct position, EVD lowered to 5, remains intubated on proprofol, pending repeat CTH in AM. Started on 3% @ 30/hr. 2LNS given for euvolemia, Mannitol given for uptrending ICPs. Bullet given 8:30 am. 3% increased to 50/hr. 1 L bolus. New EVD catheter placed using exisiting sreekanth hole. 1 u PRBC.  10/28: HH5, MF4, BD3; POD2 angio coil/embo of L MCA aneurysm. JOAQUÍN overnight, neuro stable. EVD@5cmH20 ICPs < 20 overnight, OP WNL. S/p 1 unit PRBC yesterday with appropriate response. Given 42g mannitol in AM for ICP 22 persistently, with improvement, then given 23% in PM for elevated ICP. febrile, pancultured. Standing tylenol and cooling blanket.   10/29: BD4, POD3 angio coil/embo. JOAQUÍN o/n, neuro stable. EVD@5, ICPs <20 o/n.   10/30: BD5, POD4 angio coil/embo. JOAQUÍN o/n neuro stable. EVD@5. 3% @50cc/hr.  10/31: BD6, POD5 angio coil/embo. brief period maintained upward gaze, resolved on its own. EVD@5cm h2o.    11/1: BD7, POD6 L hemicrani, angio coil/embo. JOAQUÍN overnight. Neuro exam unchanged. ICPs WNL. started bromocriptine/gabapentin/baclofen. dc'd propofol, started precedex  11/2: BD8 POD7 L hemicrani, angio coil/embo. JOAQUÍN overnight. Neuro exam stable. Remains on 3% hypertonic saline. Receieved 1 unit of PRBCs for a Hgb of 7.6.  11/3: BD 9 POD8 of L hemicrani. Elevated lipase, normal amylase, OG tube clogged and replaced. Abdominal US normal. Pending gen surg reccs regarding trach and peg. Gabapentin and Baclofen increased to help with neurostorming. restarted back on 3%, LR@35. became preston+hypotensive with nimodipine 60q4, decreased back to 30q2, NaCl bullet given.   11/4: BD10 POD9, JOAQUÍN o/n, 500cc NS for euvolemia,  neuro stable, EVD at 5. 3% increased to 75  11/5: BD11 POD10, JOAQUÍN o/n, neuro stable, EVD at 5  11/6: BD12 POD11 JOAQUÍN overnight. Neuro exam stable. Remains intubated on precedex. 3% hypertonic saline dc'd  11/7: BD13 POD 12 JOAQUÍN o/n, NGT replaced. on precex with no icp crisis   11/8: BD14 POD13 JOAQUÍN o/n, noted to have tongue maceration/macroglossia. Gauze with tongue depressor placed. Pending further recs from ENT. Pending trach and PEG placement tomorrow with gen surg. Off precedex, ICPs stable. EVD remains @ 5.   11/9: BD15, POD 13, bleeding under tongue at start of shift, fentanyl pushed with no further episodes. gabapentin stopped. PEG placed  11/10: BD 16, POD 14, neuro stable, ENT to be consulted in AM, 3% increased to 50/hr.  11/11: BD 17, POD 15, neuro exam stable. Pend CT saba in am for cranioplasty planning. Pend trach in OR with ENT. F.u BMP in am, 3%@50cc/hr for Na goal 140-145.   11/12: BD 18, POD 16. JOAQUÍN overnight, neuro stable. Pend trach placement today. CT saba completed 11/11. Off of 3%, f/u am BMP for Na goal 140-150. CSF neg. Hypoxic cardiac arrest with ROSC x 3 during tracheostomy placement. B/l chest tubes placed for resulting pneumothorax s/p CPR. salt tabs dc'd.  11/13: BD 19, POD 17. Patient tachycardic with some improvement, SBP stable off of levophed, hgb downtrending o/n, transfused 1 unit PRBCs. B/l chest tube. EVD @5cmH2O, no elevated ICPs. UOP downtrending, trend BUN/Cr, given 1LNS x1 for low urine output. F/u am CXR. Cont Cefepime until today, then change to Ancef while trach packing in place per ENT. Pend CTH when stable. Trops downtrending s/p cardiac arrest. 1L. florinef dc'd. BP goal changed to 100-160, started on amlodipine   11/14: BD20, POD18, worsening creatinine with decreased urine output. Given 250 of albumin and 500cc LR. started on hydralazine PO, decreased amantadine 100 daily given CrCl of 20.  11/15: BD21, POD18, remains oliguric, fem line dc'd, tongue swollen and unable to fit bite block in mouth, started on nimbex gtt to relax jaw. Propofol and fentanyl gtt started. Vomiting TFs through nose and mouth, ENT called. TFs held. Cr uptrending. Palliative consulted.  11/16: BD22, POD19, o/n external trach dressing replaced due to cuff leak and decreasing TV, sedated and paralyzed on nimbex, propofol, and fentanyl gtt.       Vital Signs Last 24 Hrs  T(C): 36 (16 Nov 2021 00:31), Max: 36.5 (15 Nov 2021 09:12)  T(F): 96.8 (16 Nov 2021 00:31), Max: 97.7 (15 Nov 2021 09:12)  HR: 85 (16 Nov 2021 02:00) (75 - 100)  BP: 124/73 (16 Nov 2021 02:00) (104/57 - 162/78)  BP(mean): 94 (16 Nov 2021 02:00) (75 - 117)  RR: 12 (16 Nov 2021 02:00) (12 - 20)  SpO2: 100% (16 Nov 2021 02:00) (97% - 100%)    I&O's Detail    14 Nov 2021 07:01  -  15 Nov 2021 07:00  --------------------------------------------------------  IN:    Enteral Tube Flush: 250 mL    IV PiggyBack: 100 mL    Nepro: 722 mL  Total IN: 1072 mL    OUT:    Chest Tube (mL): 18 mL    Chest Tube (mL): 50 mL    External Ventricular Device (mL): 236 mL    Indwelling Catheter - Urethral (mL): 75 mL    Rectal Tube (mL): 0 mL    Voided (mL): 0 mL  Total OUT: 379 mL    Total NET: 693 mL      15 Nov 2021 07:01  -  16 Nov 2021 02:18  --------------------------------------------------------  IN:    Cisatracurium: 235.3 mL    Enteral Tube Flush: 105 mL    FentaNYL: 36 mL    IV PiggyBack: 150 mL    Lactated Ringers: 450 mL    Nepro: 76 mL    Propofol: 179.2 mL    sodium chloride 0.9%: 825 mL  Total IN: 2056.5 mL    OUT:    Chest Tube (mL): 5 mL    Chest Tube (mL): 10 mL    External Ventricular Device (mL): 171 mL    Jevity 1.2: 0 mL    Voided (mL): 200 mL  Total OUT: 386 mL    Total NET: 1670.5 mL        I&O's Summary    14 Nov 2021 07:01  -  15 Nov 2021 07:00  --------------------------------------------------------  IN: 1072 mL / OUT: 379 mL / NET: 693 mL    15 Nov 2021 07:01  -  16 Nov 2021 02:18  --------------------------------------------------------  IN: 2056.5 mL / OUT: 386 mL / NET: 1670.5 mL        PHYSICAL EXAM:  General: NAD, pt is comfortably  laying in hospital bed, +intubated on full vent support, +sedated and paralyzed on nimbex/propofol/fentanyl gtts  HEENT: PERRL 2 mm sluggish, does not OEs, no corneal reflexes   Cardiovascular: RRR, normal S1 and S2   Respiratory: chest rise symmetric,  b/l chest tubes to suction   GI: abd soft, ND, +PEG   Neuro: nonverbal, not answering questions, WOLF 0/5  Extremities: extremities warm and dry  Wound/incision: L hemicrani incision site s C/D/I, flap soft and full. B/l posterior spinal surgical incision sites with wound dehiscence   Drains: EVD @5cmH2O       DIET:  [x] NPO  [] Mechanical  [] Tube feeds    LABS:                        8.5    12.91 )-----------( 239      ( 15 Nov 2021 03:49 )             27.7     11-15    145  |  111<H>  |  43<H>  ----------------------------<  129<H>  3.9   |  22  |  5.23<H>    Ca    9.2      15 Nov 2021 03:49  Phos  4.8     11-15  Mg     2.4     11-15    TPro  6.8  /  Alb  3.0<L>  /  TBili  0.2  /  DBili  x   /  AST  268<H>  /  ALT  318<H>  /  AlkPhos  96  11-14    PT/INR - ( 14 Nov 2021 17:52 )   PT: 14.7 sec;   INR: 1.24          PTT - ( 14 Nov 2021 17:52 )  PTT:29.2 sec    CARDIAC MARKERS ( 14 Nov 2021 04:24 )  x     / 0.10 ng/mL / 170 U/L / x     / x          CAPILLARY BLOOD GLUCOSE          Drug Levels: [] N/A    CSF Analysis: [] N/A      Allergies    No Known Allergies    Intolerances      MEDICATIONS:  Antibiotics:  ceFAZolin   IVPB 500 milliGRAM(s) IV Intermittent every 12 hours    Neuro:  cisatracurium Infusion 3 MICROgram(s)/kG/Min IV Continuous <Continuous>  fentaNYL   Infusion 0.5 MICROgram(s)/kG/Hr IV Continuous <Continuous>  levETIRAcetam  Solution 500 milliGRAM(s) Oral two times a day  ondansetron Injectable 4 milliGRAM(s) IV Push every 6 hours PRN  propofol Infusion 10 MICROgram(s)/kG/Min IV Continuous <Continuous>    Anticoagulation:  heparin   Injectable 5000 Unit(s) SubCutaneous every 8 hours    OTHER:  acetylcysteine 20%  Inhalation 4 milliLiter(s) Inhalation three times a day  amLODIPine   Tablet 5 milliGRAM(s) Oral daily  artificial  tears Solution 1 Drop(s) Both EYES two times a day  chlorhexidine 0.12% Liquid 15 milliLiter(s) Oral Mucosa every 12 hours  chlorhexidine 2% Cloths 1 Application(s) Topical <User Schedule>  hydrALAZINE 25 milliGRAM(s) Oral every 12 hours  pantoprazole   Suspension 40 milliGRAM(s) Oral daily    IVF:  sodium chloride 0.9%. 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, left    Intracranial hemorrhage, spontaneous subarachnoid, due to cerebral aneurysm, acute    Pneumothorax, left    Angiogram, cerebral, with coil embolization, in non-operating room setting    Endoscopic percutaneous gastrostomy    Angiogram, carotid and cerebral, bilateral    Chest tube placement    Insertion of central venous catheter with ultrasound guidance    Personal history of spine surgery    [email or web address]        ASSESSMENT:  45 y/o female with PMHx of HTN and MS, hx of spinal surgery at [facility] 10/8/21 presented to [place] ED BIBEMS after being found unconscious at home, unknown period of time. 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 HH5, MF4; BD #1 = 10/26. Patient was intubated at [place] ED and sent straight to the OR for left hemicraniotomy. A-line placed intraop. Hemodynamically unstable upon arrival to [facility], bradycardic and hypertensive s/p Mannitol and Furosemide. Central line placed in NSICU. S/p EVD placement, and coil embolization of left MCA bifurcation aneurysm 10/26/2021. S/p cerebral angio without findings of vasospasm 11/10. S/p cardiac arrest with ROSC x3 on 11/12 during tracheostomy at bedside now with b/l pneumothoracies and worsening kidney function.      PLAN:  NEURO:  - neuro checks q4hrs/vital signs q1hr  - Keppra 500mg IV BID renal dosing   - VEEG dc'd, neg for seizures  - Nimodipine dc'd  - fentanyl pushes PRN   - EVD open at 5cmH2O, monitor ICP/output  - CTA 11/1 with findings of moderate anterior circulation spasm   - CTH 11/8 stable   - CTH when stable  - nimbex gtt, propofol gtt, and fentanyl gtt    CARDIO:  - -160  - amlodipine 5 daily and hydral 25 PO BID   - s/p cardiac arrest  - TTE 11/15: mild LVH, EF 70%    PULM:  - intubated on full vent support  - b/l chest tubes for pneumothorax to -[id number]   - s/p acute hypoxic cardiac arrest 11/12 during tracheostomy placment with ENT c/b b/l pneumothorax now with b/l chest tubes   - daily CXR  - will need eventual trach revision with ENT  - TF noticed in ETT And through nose, concern for fistula? ENT aware, potential trach in OR, pending family decisoin     GI:  - NPO, concern for aspiration   - IVF started   - PPI QD GI ppx  - Abd US: neg   - rectal tube  - trend elevated LFTs    RENAL:  - florinef and salt tabs dc'd  - LR@75cc/hr while NPO  - euvolemia goal   - Na 140-150  - primafit in place   - Likely ATN, nephro consulted   - no dialyisis at this time     HEME:  - SCDs, SQH ppx  - s/p 2u PRBC, s/p 1u PRBC 10/27, s/p 1u PRBC 11/02, s/p 1u PRBC 11/14   - monitor H+H  - 11/9 unchanged DVT L brachial, repeat doppler 11/14 with resolution of DVT  - 11/14 LE dopplers neg  - TIBC, ferritin, retic count, iron saturation ordered 11/13, f/u labs     ID:  - leukocytosis and procal, trend   - Tylenol PRN for fever  - sputum cx 11/4:  enterobacter, proteus  - Cefepime started to cover for enterobacter/proteus in sputum, end 11/14 (total 7 days), then transition to Ancef until trach packing removed 500 BID  - trend procal     ENDO:  - ISS   - monitor FS    OTHER  - wound care recs- q2 dressing changes   - ENT consulted, reccomends trach placement and oral hygeine for tongue laceration     GOC: full code  Level of care: ICU status   Dispo: pend EVD, trach  family updated    Case discussed with Dr. Duncan         Assessment:  Present when checked    []  GCS  E   V  M     Heart Failure: []Acute, [] acute on chronic , []chronic  Heart Failure:  [] Diastolic (HFpEF), [] Systolic (HFrEF), []Combined (HFpEF and HFrEF), [] RHF, [] Pulm HTN, [] Other    [] CHETAN, [] ATN, [] AIN, [] other  [] CKD1, [] CKD2, [] CKD 3, [] CKD 4, [] CKD 5, []ESRD    Encephalopathy: [] Metabolic, [] Hepatic, [] toxic, [] Neurological, [] Other    Abnormal Nurtitional Status: [] malnurtition (see nutrition note), [ ]underweight: BMI < 19, [] morbid obesity: BMI >40, [] Cachexia    [] Sepsis  [] hypovolemic shock,[] cardiogenic shock, [] hemorrhagic shock, [] neuogenic shock  [] Acute Respiratory Failure  []Cerebral edema, [] Brain compression/ herniation,   [] Functional quadriplegia  [] Acute blood loss anemia   HPI:  47 y/o female with PMH HTN, Multiple sclerosis, recent spinal surgery 10/8 ([facility]) presented to [place] ED BIBEMS after being found unconscious at home, unknown period of time. 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 HH5, MF1. Patient was intubated at [place] ED, found to have blown L pupil, and sent straight to the OR for left hemicraniotomy. A-line placed intraop. Hemodynamically unstable upon arrival to [facility], bradycardic and hypertensive s/p Mannitol, Clevidipine, and Furosemide. Central line placed in NSICU. Currently sedated on Propofol, pending repeat CTH. History per patient's son, patient had recent spine surgery and was found on outpatient imaging last week to have L M1 segment aneurysm (unruptured), pt asymptomatic at this time. Per son patient taking percocet PO at home. Ureña catheter, ET tube, and arterial line placed at [facility].     NIHSS on arrival: 32   Bess Griffin 5, Mod Busby 4  Bleed Day 1 = 10/26 (26 Oct 2021 13:03)      Hospital Course:   10/26: admitted to [facility] from [facility], POD#0 s/p L hemicraniectomy for decompression and evacuation of SDH. Transferred to [facility] noted to have tense flap, received mannitol, keppra, decadron. Was hypertensive to 200s and preston to 40s, recevied 85g mannitol and bullet 23.4%. CTH on arrival demonstrated increased size in vents and new IVH. EVD placed, open at 15, central line placed. Transfused 2 u PRBC. POD0 of coiling of left MCA bifurcation aneurysm,   10/27: CTH demonstrated EVD in correct position, EVD lowered to 5, remains intubated on proprofol, pending repeat CTH in AM. Started on 3% @ 30/hr. 2LNS given for euvolemia, Mannitol given for uptrending ICPs. Bullet given 8:30 am. 3% increased to 50/hr. 1 L bolus. New EVD catheter placed using exisiting sreekanth hole. 1 u PRBC.  10/28: HH5, MF4, BD3; POD2 angio coil/embo of L MCA aneurysm. JOAQUÍN overnight, neuro stable. EVD@5cmH20 ICPs < 20 overnight, OP WNL. S/p 1 unit PRBC yesterday with appropriate response. Given 42g mannitol in AM for ICP 22 persistently, with improvement, then given 23% in PM for elevated ICP. febrile, pancultured. Standing tylenol and cooling blanket.   10/29: BD4, POD3 angio coil/embo. JOAQUÍN o/n, neuro stable. EVD@5, ICPs <20 o/n.   10/30: BD5, POD4 angio coil/embo. JOAQUÍN o/n neuro stable. EVD@5. 3% @50cc/hr.  10/31: BD6, POD5 angio coil/embo. brief period maintained upward gaze, resolved on its own. EVD@5cm h2o.    11/1: BD7, POD6 L hemicrani, angio coil/embo. JOAQUÍN overnight. Neuro exam unchanged. ICPs WNL. started bromocriptine/gabapentin/baclofen. dc'd propofol, started precedex  11/2: BD8 POD7 L hemicrani, angio coil/embo. JOAQUÍN overnight. Neuro exam stable. Remains on 3% hypertonic saline. Receieved 1 unit of PRBCs for a Hgb of 7.6.  11/3: BD 9 POD8 of L hemicrani. Elevated lipase, normal amylase, OG tube clogged and replaced. Abdominal US normal. Pending gen surg reccs regarding trach and peg. Gabapentin and Baclofen increased to help with neurostorming. restarted back on 3%, LR@35. became preston+hypotensive with nimodipine 60q4, decreased back to 30q2, NaCl bullet given.   11/4: BD10 POD9, JOAQUÍN o/n, 500cc NS for euvolemia,  neuro stable, EVD at 5. 3% increased to 75  11/5: BD11 POD10, JOAQUÍN o/n, neuro stable, EVD at 5  11/6: BD12 POD11 JOAQUÍN overnight. Neuro exam stable. Remains intubated on precedex. 3% hypertonic saline dc'd  11/7: BD13 POD 12 JOAQUÍN o/n, NGT replaced. on precex with no icp crisis   11/8: BD14 POD13 JOAQUÍN o/n, noted to have tongue maceration/macroglossia. Gauze with tongue depressor placed. Pending further recs from ENT. Pending trach and PEG placement tomorrow with gen surg. Off precedex, ICPs stable. EVD remains @ 5.   11/9: BD15, POD 13, bleeding under tongue at start of shift, fentanyl pushed with no further episodes. gabapentin stopped. PEG placed  11/10: BD 16, POD 14, neuro stable, ENT to be consulted in AM, 3% increased to 50/hr.  11/11: BD 17, POD 15, neuro exam stable. Pend CT saba in am for cranioplasty planning. Pend trach in OR with ENT. F.u BMP in am, 3%@50cc/hr for Na goal 140-145.   11/12: BD 18, POD 16. JOAQUÍN overnight, neuro stable. Pend trach placement today. CT saba completed 11/11. Off of 3%, f/u am BMP for Na goal 140-150. CSF neg. Hypoxic cardiac arrest with ROSC x 3 during tracheostomy placement. B/l chest tubes placed for resulting pneumothorax s/p CPR. salt tabs dc'd.  11/13: BD 19, POD 17. Patient tachycardic with some improvement, SBP stable off of levophed, hgb downtrending o/n, transfused 1 unit PRBCs. B/l chest tube. EVD @5cmH2O, no elevated ICPs. UOP downtrending, trend BUN/Cr, given 1LNS x1 for low urine output. F/u am CXR. Cont Cefepime until today, then change to Ancef while trach packing in place per ENT. Pend CTH when stable. Trops downtrending s/p cardiac arrest. 1L. florinef dc'd. BP goal changed to 100-160, started on amlodipine   11/14: BD20, POD18, worsening creatinine with decreased urine output. Given 250 of albumin and 500cc LR. started on hydralazine PO, decreased amantadine 100 daily given CrCl of 20.  11/15: BD21, POD18, remains oliguric, fem line dc'd, tongue swollen and unable to fit bite block in mouth, started on nimbex gtt to relax jaw. Propofol and fentanyl gtt started. Vomiting TFs through nose and mouth, ENT called. TFs held. Cr uptrending. Palliative consulted.  11/16: BD22, POD19, o/n external trach dressing replaced due to cuff leak and decreasing TV, sedated and paralyzed on nimbex, propofol, and fentanyl gtt.       Vital Signs Last 24 Hrs  T(C): 36 (16 Nov 2021 00:31), Max: 36.5 (15 Nov 2021 09:12)  T(F): 96.8 (16 Nov 2021 00:31), Max: 97.7 (15 Nov 2021 09:12)  HR: 85 (16 Nov 2021 02:00) (75 - 100)  BP: 124/73 (16 Nov 2021 02:00) (104/57 - 162/78)  BP(mean): 94 (16 Nov 2021 02:00) (75 - 117)  RR: 12 (16 Nov 2021 02:00) (12 - 20)  SpO2: 100% (16 Nov 2021 02:00) (97% - 100%)    I&O's Detail    14 Nov 2021 07:01  -  15 Nov 2021 07:00  --------------------------------------------------------  IN:    Enteral Tube Flush: 250 mL    IV PiggyBack: 100 mL    Nepro: 722 mL  Total IN: 1072 mL    OUT:    Chest Tube (mL): 18 mL    Chest Tube (mL): 50 mL    External Ventricular Device (mL): 236 mL    Indwelling Catheter - Urethral (mL): 75 mL    Rectal Tube (mL): 0 mL    Voided (mL): 0 mL  Total OUT: 379 mL    Total NET: 693 mL      15 Nov 2021 07:01  -  16 Nov 2021 02:18  --------------------------------------------------------  IN:    Cisatracurium: 235.3 mL    Enteral Tube Flush: 105 mL    FentaNYL: 36 mL    IV PiggyBack: 150 mL    Lactated Ringers: 450 mL    Nepro: 76 mL    Propofol: 179.2 mL    sodium chloride 0.9%: 825 mL  Total IN: 2056.5 mL    OUT:    Chest Tube (mL): 5 mL    Chest Tube (mL): 10 mL    External Ventricular Device (mL): 171 mL    Jevity 1.2: 0 mL    Voided (mL): 200 mL  Total OUT: 386 mL    Total NET: 1670.5 mL        I&O's Summary    14 Nov 2021 07:01  -  15 Nov 2021 07:00  --------------------------------------------------------  IN: 1072 mL / OUT: 379 mL / NET: 693 mL    15 Nov 2021 07:01  -  16 Nov 2021 02:18  --------------------------------------------------------  IN: 2056.5 mL / OUT: 386 mL / NET: 1670.5 mL        PHYSICAL EXAM:  General: NAD, pt is comfortably  laying in hospital bed, +intubated on full vent support, +sedated and paralyzed on nimbex/propofol/fentanyl gtts  HEENT: PERRL 2 mm sluggish, does not OEs, no corneal reflexes   Cardiovascular: RRR, normal S1 and S2   Respiratory: chest rise symmetric,  b/l chest tubes to suction   GI: abd soft, ND, +PEG   Neuro: nonverbal, not answering questions, WOLF 0/5  Extremities: extremities warm and dry  Wound/incision: L hemicrani incision site s C/D/I, flap soft and full. B/l posterior spinal surgical incision sites with wound dehiscence   Drains: EVD @5cmH2O       DIET:  [x] NPO  [] Mechanical  [] Tube feeds    LABS:                        8.5    12.91 )-----------( 239      ( 15 Nov 2021 03:49 )             27.7     11-15    145  |  111<H>  |  43<H>  ----------------------------<  129<H>  3.9   |  22  |  5.23<H>    Ca    9.2      15 Nov 2021 03:49  Phos  4.8     11-15  Mg     2.4     11-15    TPro  6.8  /  Alb  3.0<L>  /  TBili  0.2  /  DBili  x   /  AST  268<H>  /  ALT  318<H>  /  AlkPhos  96  11-14    PT/INR - ( 14 Nov 2021 17:52 )   PT: 14.7 sec;   INR: 1.24          PTT - ( 14 Nov 2021 17:52 )  PTT:29.2 sec    CARDIAC MARKERS ( 14 Nov 2021 04:24 )  x     / 0.10 ng/mL / 170 U/L / x     / x          CAPILLARY BLOOD GLUCOSE          Drug Levels: [] N/A    CSF Analysis: [] N/A      Allergies    No Known Allergies    Intolerances      MEDICATIONS:  Antibiotics:  ceFAZolin   IVPB 500 milliGRAM(s) IV Intermittent every 12 hours    Neuro:  cisatracurium Infusion 3 MICROgram(s)/kG/Min IV Continuous <Continuous>  fentaNYL   Infusion 0.5 MICROgram(s)/kG/Hr IV Continuous <Continuous>  levETIRAcetam  Solution 500 milliGRAM(s) Oral two times a day  ondansetron Injectable 4 milliGRAM(s) IV Push every 6 hours PRN  propofol Infusion 10 MICROgram(s)/kG/Min IV Continuous <Continuous>    Anticoagulation:  heparin   Injectable 5000 Unit(s) SubCutaneous every 8 hours    OTHER:  acetylcysteine 20%  Inhalation 4 milliLiter(s) Inhalation three times a day  amLODIPine   Tablet 5 milliGRAM(s) Oral daily  artificial  tears Solution 1 Drop(s) Both EYES two times a day  chlorhexidine 0.12% Liquid 15 milliLiter(s) Oral Mucosa every 12 hours  chlorhexidine 2% Cloths 1 Application(s) Topical <User Schedule>  hydrALAZINE 25 milliGRAM(s) Oral every 12 hours  pantoprazole   Suspension 40 milliGRAM(s) Oral daily    IVF:  sodium chloride 0.9%. 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, left    Intracranial hemorrhage, spontaneous subarachnoid, due to cerebral aneurysm, acute    Pneumothorax, left    Angiogram, cerebral, with coil embolization, in non-operating room setting    Endoscopic percutaneous gastrostomy    Angiogram, carotid and cerebral, bilateral    Chest tube placement    Insertion of central venous catheter with ultrasound guidance    Personal history of spine surgery    [email or web address]        ASSESSMENT:  47 y/o female with PMHx of HTN and MS, hx of spinal surgery at [facility] 10/8/21 presented to [facility] BIBEMS after being found unconscious at home, unknown period of time. 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 HH5, MF4; BD #1 = 10/26. Patient was intubated at [facility] and sent straight to the OR for left hemicraniotomy. A-line placed intraop. Hemodynamically unstable upon arrival to [facility], bradycardic and hypertensive s/p Mannitol and Furosemide. Central line placed in NSICU. S/p EVD placement, and coil embolization of left MCA bifurcation aneurysm 10/26/2021. S/p cerebral angio without findings of vasospasm 11/10. S/p cardiac arrest with ROSC x3 on 11/12 during tracheostomy at bedside now with b/l pneumothoracies and worsening kidney function.      PLAN:  NEURO:  - neuro checks q4hrs/vital signs q1hr  - Keppra 500mg IV BID renal dosing   - VEEG dc'd, neg for seizures  - Nimodipine dc'd  - fentanyl pushes PRN   - EVD open at 5cmH2O, monitor ICP/output  - CTA 11/1 with findings of moderate anterior circulation spasm   - CTH 11/8 stable   - CTH when stable  - nimbex gtt, propofol gtt, and fentanyl gtt    CARDIO:  - -160  - amlodipine 5 daily and hydral 25 PO BID   - s/p cardiac arrest  - TTE 11/15: mild LVH, EF 70%    PULM:  - intubated on full vent support  - b/l chest tubes for pneumothorax to [id number]   - s/p acute hypoxic cardiac arrest 11/12 during tracheostomy placment with ENT c/b b/l pneumothorax now with b/l chest tubes   - daily CXR  - will need eventual trach revision with ENT  - TF noticed in ETT And through nose, concern for fistula? ENT aware, potential trach in OR, pending family decisoin     GI:  - NPO, concern for aspiration   - IVF started   - PPI QD GI ppx  - Abd US: neg   - rectal tube  - trend elevated LFTs    RENAL:  - florinef and salt tabs dc'd  - LR@75cc/hr while NPO  - euvolemia goal   - Na 140-150  - primafit in place   - Likely ATN, nephro consulted   - no dialyisis at this time     HEME:  - SCDs, SQH ppx  - s/p 2u PRBC, s/p 1u PRBC 10/27, s/p 1u PRBC 11/02, s/p 1u PRBC 11/14   - monitor H+H  - 11/9 unchanged DVT L brachial, repeat doppler 11/14 with resolution of DVT  - 11/14 LE dopplers neg  - TIBC, ferritin, retic count, iron saturation ordered 11/13, f/u labs     ID:  - leukocytosis and procal, trend   - Tylenol PRN for fever  - sputum cx 11/4:  enterobacter, proteus  - Cefepime started to cover for enterobacter/proteus in sputum, end 11/14 (total 7 days), then transition to Ancef until trach packing removed 500 BID  - trend procal     ENDO:  - ISS   - monitor FS    OTHER  - wound care recs- q2 dressing changes   - ENT consulted, reccomends trach placement and oral hygeine for tongue laceration     GOC: full code  Level of care: ICU status   Dispo: pend EVD, trach  family updated    Case discussed with Dr. Duncan         Assessment:  Present when checked    []  GCS  E   V  M     Heart Failure: []Acute, [] acute on chronic , []chronic  Heart Failure:  [] Diastolic (HFpEF), [] Systolic (HFrEF), []Combined (HFpEF and HFrEF), [] RHF, [] Pulm HTN, [] Other    [x] CHETAN, [x] ATN, [] AIN, [] other  [] CKD1, [] CKD2, [] CKD 3, [] CKD 4, [] CKD 5, []ESRD    Encephalopathy: [] Metabolic, [] Hepatic, [] toxic, [] Neurological, [] Other    Abnormal Nurtitional Status: [] malnurtition (see nutrition note), [ ]underweight: BMI < 19, [] morbid obesity: BMI >40, [] Cachexia    [] Sepsis  [] hypovolemic shock,[] cardiogenic shock, [] hemorrhagic shock, [] neuogenic shock  [] Acute Respiratory Failure  []Cerebral edema, [] Brain compression/ herniation,   [] Functional quadriplegia  [] Acute blood loss anemia

## 2021-11-16 NOTE — PROGRESS NOTE ADULT - SUBJECTIVE AND OBJECTIVE BOX
O:  T(C): 36.1 (11-16-21 @ 17:00), Max: 36.5 (11-16-21 @ 09:16)  HR: 91 (11-16-21 @ 19:00) (74 - 127)  BP: 112/61 (11-16-21 @ 12:00) (112/61 - 161/88)  RR: 12 (11-16-21 @ 19:00) (12 - 20)  SpO2: 98% (11-16-21 @ 19:00) (96% - 100%)  11-15-21 @ 07:01  -  11-16-21 @ 07:00  --------------------------------------------------------  IN: 2566.5 mL / OUT: 623 mL / NET: 1943.5 mL    11-16-21 @ 07:01  -  11-16-21 @ 20:44  --------------------------------------------------------  IN: 756.8 mL / OUT: 88 mL / NET: 668.8 mL    acetylcysteine 20%  Inhalation 4 milliLiter(s) Inhalation three times a day  amLODIPine   Tablet 5 milliGRAM(s) Oral daily  artificial  tears Solution 1 Drop(s) Both EYES two times a day  ceFAZolin   IVPB 500 milliGRAM(s) IV Intermittent every 12 hours  chlorhexidine 0.12% Liquid 15 milliLiter(s) Oral Mucosa every 12 hours  chlorhexidine 2% Cloths 1 Application(s) Topical <User Schedule>  fentaNYL   Infusion. 1 MICROgram(s)/kG/Hr IV Continuous <Continuous>  heparin   Injectable 5000 Unit(s) SubCutaneous every 8 hours  hydrALAZINE 25 milliGRAM(s) Oral every 12 hours  levETIRAcetam  Solution 500 milliGRAM(s) Oral two times a day  ondansetron Injectable 4 milliGRAM(s) IV Push every 6 hours PRN  pantoprazole   Suspension 40 milliGRAM(s) Oral daily  propofol Infusion 10 MICROgram(s)/kG/Min IV Continuous <Continuous>  sodium chloride 0.9%. 1000 milliLiter(s) IV Continuous <Continuous>  Mode: AC/ CMV (Assist Control/ Continuous Mandatory Ventilation), RR (machine): 12, TV (machine): 400, FiO2: 60, PEEP: 5, ITime: 1, MAP: 7.6, PIP: 18  NEURO EXAM     LABS:  Na: 146 (11-16 @ 05:47), 145 (11-15 @ 03:49), 148 (11-14 @ 17:52), 147 (11-14 @ 04:24), 150 (11-13 @ 22:16)  K: 4.1 (11-16 @ 05:47), 3.9 (11-15 @ 03:49), 3.9 (11-14 @ 17:52), 4.0 (11-14 @ 04:24), 3.9 (11-13 @ 22:16)  Cl: 111 (11-16 @ 05:47), 111 (11-15 @ 03:49), 113 (11-14 @ 17:52), 111 (11-14 @ 04:24), 116 (11-13 @ 22:16)  CO2: 19 (11-16 @ 05:47), 22 (11-15 @ 03:49), 24 (11-14 @ 17:52), 21 (11-14 @ 04:24), 22 (11-13 @ 22:16)  BUN: 46 (11-16 @ 05:47), 43 (11-15 @ 03:49), 38 (11-14 @ 17:52), 30 (11-14 @ 04:24), 33 (11-13 @ 22:16)  Cr: 6.03 (11-16 @ 05:47), 5.23 (11-15 @ 03:49), 4.71 (11-14 @ 17:52), 3.99 (11-14 @ 04:24), 3.83 (11-13 @ 22:16)  Glu: 81(11-16 @ 05:47), 129(11-15 @ 03:49), 125(11-14 @ 17:52), 111(11-14 @ 04:24), 114(11-13 @ 22:16)    Hgb: 7.6 (11-16 @ 17:06), 7.6 (11-16 @ 04:23), 8.5 (11-15 @ 03:49), 8.5 (11-14 @ 17:52), 8.2 (11-14 @ 04:24), 8.8 (11-13 @ 22:16)  Hct: 24.6 (11-16 @ 17:06), 25.2 (11-16 @ 04:23), 27.7 (11-15 @ 03:49), 27.2 (11-14 @ 17:52), 26.8 (11-14 @ 04:24), 28.3 (11-13 @ 22:16)  WBC: 10.46 (11-16 @ 17:06), 11.30 (11-16 @ 04:23), 12.91 (11-15 @ 03:49), 14.20 (11-14 @ 17:52), 14.26 (11-14 @ 04:24), 14.04 (11-13 @ 22:16)  Plt: 216 (11-16 @ 17:06), 215 (11-16 @ 04:23), 239 (11-15 @ 03:49), 245 (11-14 @ 17:52), 253 (11-14 @ 04:24), 259 (11-13 @ 22:16)    INR: 1.24 11-14-21 @ 17:52  PTT: 29.2 11-14-21 @ 17:52            a/p  aSAH PBD  s/p     DCI prophylaxis: euvolemia (                      ) I&O's Summary    15 Nov 2021 07:01  -  16 Nov 2021 07:00  --------------------------------------------------------  IN: 2566.5 mL / OUT: 623 mL / NET: 1943.5 mL    16 Nov 2021 07:01  -  16 Nov 2021 20:44  --------------------------------------------------------  IN: 756.8 mL / OUT: 88 mL / NET: 668.8 mL      Last TCD velocities   nimodipine    Hydrocephalus: EVD at     , keep ICP< 22 mmhg. CPP> 65-70   Brain edema, hypertonic saline                 , keep sodium in                  , BMP q 6 hr   CV : SBP goal   100-160 mmhg   Pulm: acute respiratory failure, intubated, ABG and adjust vent settings accordingly   Mode: AC/ CMV (Assist Control/ Continuous Mandatory Ventilation)  RR (machine): 12  TV (machine): 400  FiO2: 60  PEEP: 5  ITime: 1  MAP: 7.6  PIP: 18    GI: on TF, at goal   Renal: see neuro   ID afebrile  Endo: DM, target sugar 120-180   Hem: SCD,   hold chemoprophylaxis as POD     35 critical care time as patient is at risk for brain henrniation, ICP crisis, seizures, ICH  O:  T(C): 36.1 (11-16-21 @ 17:00), Max: 36.5 (11-16-21 @ 09:16)  HR: 91 (11-16-21 @ 19:00) (74 - 127)  BP: 112/61 (11-16-21 @ 12:00) (112/61 - 161/88)  RR: 12 (11-16-21 @ 19:00) (12 - 20)  SpO2: 98% (11-16-21 @ 19:00) (96% - 100%)  11-15-21 @ 07:01  -  11-16-21 @ 07:00  --------------------------------------------------------  IN: 2566.5 mL / OUT: 623 mL / NET: 1943.5 mL    11-16-21 @ 07:01  -  11-16-21 @ 20:44  --------------------------------------------------------  IN: 756.8 mL / OUT: 88 mL / NET: 668.8 mL    acetylcysteine 20%  Inhalation 4 milliLiter(s) Inhalation three times a day  amLODIPine   Tablet 5 milliGRAM(s) Oral daily  artificial  tears Solution 1 Drop(s) Both EYES two times a day  ceFAZolin   IVPB 500 milliGRAM(s) IV Intermittent every 12 hours  chlorhexidine 0.12% Liquid 15 milliLiter(s) Oral Mucosa every 12 hours  chlorhexidine 2% Cloths 1 Application(s) Topical <User Schedule>  fentaNYL   Infusion. 1 MICROgram(s)/kG/Hr IV Continuous <Continuous>  heparin   Injectable 5000 Unit(s) SubCutaneous every 8 hours  hydrALAZINE 25 milliGRAM(s) Oral every 12 hours  levETIRAcetam  Solution 500 milliGRAM(s) Oral two times a day  ondansetron Injectable 4 milliGRAM(s) IV Push every 6 hours PRN  pantoprazole   Suspension 40 milliGRAM(s) Oral daily  propofol Infusion 10 MICROgram(s)/kG/Min IV Continuous <Continuous>  sodium chloride 0.9%. 1000 milliLiter(s) IV Continuous <Continuous>  Mode: AC/ CMV (Assist Control/ Continuous Mandatory Ventilation), RR (machine): 12, TV (machine): 400, FiO2: 60, PEEP: 5, ITime: 1, MAP: 7.6, PIP: 18  NEURO EXAM     LABS:  Na: 146 (11-16 @ 05:47), 145 (11-15 @ 03:49), 148 (11-14 @ 17:52), 147 (11-14 @ 04:24), 150 (11-13 @ 22:16)  K: 4.1 (11-16 @ 05:47), 3.9 (11-15 @ 03:49), 3.9 (11-14 @ 17:52), 4.0 (11-14 @ 04:24), 3.9 (11-13 @ 22:16)  Cl: 111 (11-16 @ 05:47), 111 (11-15 @ 03:49), 113 (11-14 @ 17:52), 111 (11-14 @ 04:24), 116 (11-13 @ 22:16)  CO2: 19 (11-16 @ 05:47), 22 (11-15 @ 03:49), 24 (11-14 @ 17:52), 21 (11-14 @ 04:24), 22 (11-13 @ 22:16)  BUN: 46 (11-16 @ 05:47), 43 (11-15 @ 03:49), 38 (11-14 @ 17:52), 30 (11-14 @ 04:24), 33 (11-13 @ 22:16)  Cr: 6.03 (11-16 @ 05:47), 5.23 (11-15 @ 03:49), 4.71 (11-14 @ 17:52), 3.99 (11-14 @ 04:24), 3.83 (11-13 @ 22:16)  Glu: 81(11-16 @ 05:47), 129(11-15 @ 03:49), 125(11-14 @ 17:52), 111(11-14 @ 04:24), 114(11-13 @ 22:16)    Hgb: 7.6 (11-16 @ 17:06), 7.6 (11-16 @ 04:23), 8.5 (11-15 @ 03:49), 8.5 (11-14 @ 17:52), 8.2 (11-14 @ 04:24), 8.8 (11-13 @ 22:16)  Hct: 24.6 (11-16 @ 17:06), 25.2 (11-16 @ 04:23), 27.7 (11-15 @ 03:49), 27.2 (11-14 @ 17:52), 26.8 (11-14 @ 04:24), 28.3 (11-13 @ 22:16)  WBC: 10.46 (11-16 @ 17:06), 11.30 (11-16 @ 04:23), 12.91 (11-15 @ 03:49), 14.20 (11-14 @ 17:52), 14.26 (11-14 @ 04:24), 14.04 (11-13 @ 22:16)  Plt: 216 (11-16 @ 17:06), 215 (11-16 @ 04:23), 239 (11-15 @ 03:49), 245 (11-14 @ 17:52), 253 (11-14 @ 04:24), 259 (11-13 @ 22:16)    INR: 1.24 11-14-21 @ 17:52  PTT: 29.2 11-14-21 @ 17:52            a/p  aSAH , ICH, SAH s/p DHC brain edema and compression  s/p cardiac arrest   neuro checks q 2 hr   keppra for seizure prophylaxis   Hydrocephalus: EVD at 5 cm will raise to 10 cm water      , keep ICP< 22 mmhg. CPP> 65-70   brain edema, keep sodium 145-150   on propofol and fentanyl for sedation  nimbex was discontinued   CV : SBP goal   100-160 mmhg   Pulm: acute respiratory failure, intubated,   Mode: AC/ CMV (Assist Control/ Continuous Mandatory Ventilation)  RR (machine): 12  TV (machine): 400  FiO2: 60  PEEP: 5  ITime: 1  MAP: 7.6  PIP: 18  has cuff leak, will get ABG, if abnormal will need to ET exchange it with anesthesia and ENT at bedside   GI: on TF, at 10 ml/hr given that she vomited yesterday  will increase slowly   add senna and miralax   Renal: NS 25 ml/hr   CHETAN, renal consult, renal does not recommend HD given poor prognosis   ID afebrile   on cefazolin for trach packing  Endo: DM, target sugar 120-180   Hem: SCD, heparin sc   anemia, will monitor     35 critical care time as patient is at risk for brain henrniation, ICP crisis, seizures, ICH  O: cuff leak   T(C): 36.1 (11-16-21 @ 17:00), Max: 36.5 (11-16-21 @ 09:16)  HR: 91 (11-16-21 @ 19:00) (74 - 127)  BP: 112/61 (11-16-21 @ 12:00) (112/61 - 161/88)  RR: 12 (11-16-21 @ 19:00) (12 - 20)  SpO2: 98% (11-16-21 @ 19:00) (96% - 100%)  11-15-21 @ 07:01  -  11-16-21 @ 07:00  --------------------------------------------------------  IN: 2566.5 mL / OUT: 623 mL / NET: 1943.5 mL    11-16-21 @ 07:01  -  11-16-21 @ 20:44  --------------------------------------------------------  IN: 756.8 mL / OUT: 88 mL / NET: 668.8 mL    acetylcysteine 20%  Inhalation 4 milliLiter(s) Inhalation three times a day  amLODIPine   Tablet 5 milliGRAM(s) Oral daily  artificial  tears Solution 1 Drop(s) Both EYES two times a day  ceFAZolin   IVPB 500 milliGRAM(s) IV Intermittent every 12 hours  chlorhexidine 0.12% Liquid 15 milliLiter(s) Oral Mucosa every 12 hours  chlorhexidine 2% Cloths 1 Application(s) Topical <User Schedule>  fentaNYL   Infusion. 1 MICROgram(s)/kG/Hr IV Continuous <Continuous>  heparin   Injectable 5000 Unit(s) SubCutaneous every 8 hours  hydrALAZINE 25 milliGRAM(s) Oral every 12 hours  levETIRAcetam  Solution 500 milliGRAM(s) Oral two times a day  ondansetron Injectable 4 milliGRAM(s) IV Push every 6 hours PRN  pantoprazole   Suspension 40 milliGRAM(s) Oral daily  propofol Infusion 10 MICROgram(s)/kG/Min IV Continuous <Continuous>  sodium chloride 0.9%. 1000 milliLiter(s) IV Continuous <Continuous>  Mode: AC/ CMV (Assist Control/ Continuous Mandatory Ventilation), RR (machine): 12, TV (machine): 400, FiO2: 60, PEEP: 5, ITime: 1, MAP: 7.6, PIP: 18  NEUROLOGIC EXAMINATION:  Patient opens eyes to noxious, does not follow commands, pupils 3mm equal and brisk (+) Doll's, (+) cough and gag, flap full; 0/5 all over   GENERAL: intubated   CARDIOVASCULAR: (+) S1 S2, normal rate and regular rhythm  PULMONARY: rhoncherous all lung fields, no wheezing  ABDOMEN: soft, distended, normoactive bowel sounds  EXTREMITIES: no edema        LABS:  Na: 146 (11-16 @ 05:47), 145 (11-15 @ 03:49), 148 (11-14 @ 17:52), 147 (11-14 @ 04:24), 150 (11-13 @ 22:16)  K: 4.1 (11-16 @ 05:47), 3.9 (11-15 @ 03:49), 3.9 (11-14 @ 17:52), 4.0 (11-14 @ 04:24), 3.9 (11-13 @ 22:16)  Cl: 111 (11-16 @ 05:47), 111 (11-15 @ 03:49), 113 (11-14 @ 17:52), 111 (11-14 @ 04:24), 116 (11-13 @ 22:16)  CO2: 19 (11-16 @ 05:47), 22 (11-15 @ 03:49), 24 (11-14 @ 17:52), 21 (11-14 @ 04:24), 22 (11-13 @ 22:16)  BUN: 46 (11-16 @ 05:47), 43 (11-15 @ 03:49), 38 (11-14 @ 17:52), 30 (11-14 @ 04:24), 33 (11-13 @ 22:16)  Cr: 6.03 (11-16 @ 05:47), 5.23 (11-15 @ 03:49), 4.71 (11-14 @ 17:52), 3.99 (11-14 @ 04:24), 3.83 (11-13 @ 22:16)  Glu: 81(11-16 @ 05:47), 129(11-15 @ 03:49), 125(11-14 @ 17:52), 111(11-14 @ 04:24), 114(11-13 @ 22:16)    Hgb: 7.6 (11-16 @ 17:06), 7.6 (11-16 @ 04:23), 8.5 (11-15 @ 03:49), 8.5 (11-14 @ 17:52), 8.2 (11-14 @ 04:24), 8.8 (11-13 @ 22:16)  Hct: 24.6 (11-16 @ 17:06), 25.2 (11-16 @ 04:23), 27.7 (11-15 @ 03:49), 27.2 (11-14 @ 17:52), 26.8 (11-14 @ 04:24), 28.3 (11-13 @ 22:16)  WBC: 10.46 (11-16 @ 17:06), 11.30 (11-16 @ 04:23), 12.91 (11-15 @ 03:49), 14.20 (11-14 @ 17:52), 14.26 (11-14 @ 04:24), 14.04 (11-13 @ 22:16)  Plt: 216 (11-16 @ 17:06), 215 (11-16 @ 04:23), 239 (11-15 @ 03:49), 245 (11-14 @ 17:52), 253 (11-14 @ 04:24), 259 (11-13 @ 22:16)    INR: 1.24 11-14-21 @ 17:52  PTT: 29.2 11-14-21 @ 17:52            a/p  aSAH , ICH, SAH s/p DHC brain edema and compression  s/p cardiac arrest   neuro checks q 2 hr   keppra for seizure prophylaxis   Hydrocephalus: EVD at 5 cm will raise to 10 cm water      , keep ICP< 22 mmhg. CPP> 65-70   brain edema, keep sodium 145-150   on propofol and fentanyl for sedation  nimbex was discontinued   CV : SBP goal   100-160 mmhg   Pulm: acute respiratory failure, intubated,   Mode: AC/ CMV (Assist Control/ Continuous Mandatory Ventilation)  RR (machine): 12  TV (machine): 400  FiO2: 60  PEEP: 5  ITime: 1  MAP: 7.6  PIP: 18  pneumothorax with chest tubes   has cuff leak, will get ABG, if abnormal will need to ET exchange it with anesthesia and ENT at bedside   GI: on TF, at 10 ml/hr given that she vomited yesterday  will increase slowly   add senna and miralax   Renal: NS 25 ml/hr   CHETAN, renal consult, renal does not recommend HD given poor prognosis   ID afebrile   on cefazolin for trach packing  Endo: DM, target sugar 120-180   Hem: SCD, heparin sc   anemia, will monitor     35 critical care time as patient is at risk for brain henrniation, ICP crisis, seizures, ICH

## 2021-11-16 NOTE — PROGRESS NOTE ADULT - SUBJECTIVE AND OBJECTIVE BOX
ENT PROGRESS NOTE    HPI: 47 y/o female with PMH HTN, Multiple sclerosis, recent spinal surgery 10/8 ([facility]) presented to [facility] after being found unconscious at home, unknown period of time. 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 ENT consulted due to tongue swelling. pt has been off sedated due to neuro checks. per nursing, pt has been biting down on tongue. nursing has not been able to place bite block. Denies any hypotensive episodes.     INTERVAL:    11/10: ENT reconsulted for trach placement - per primary team, patient was spastic with stiff neck while trying to perform bedside tracheostomy yesterday. Pt has been intubated since 10/26. Otherwise, NAEON - on EEG.    11/11: S/p tracheostomy attempt 11/12 am. Pt was seen and examined bedside - ETT in place, good ventilation. Stoma packing in place. Trops downtrending. Given 1x PRBC overnight, b/l chest tubes in place. Plan to change from cefepime to ancef today.    11/12: Pt in multisystem organ failure per primary team. Poor neuro exam    11/15: informed by primary team pt had feeds coming from nose and mouth. was also found in ETT. Pt DNR status still pending    11/16: informed overnight night pt is having a vent leak, concern for arising from stoma. superficial dressing changed. no longer experiencing vent leak    11/16: PM. INCOMPLETE Spoke with daughter Jayshree Stubbs at bedside this afternoon. Ms. Stubbs conferenced in her uncle and aunt into the conversation. Discussed with Ms. Stubbs current status of tracheostomy site and proposal for re-evaluating tracheostomy site tomorrow in OR followed by bronchoscopy. Explained to Ms. Stubbs procedure tomorrow, tracheostomy and bronchoscopy,  would allow for removal of ETT and evaluation of the distal airways. explained there is a risk of inability to secure airway. Pt  procedure tomorrow proposed in order to possible remove ETT and secure her airway. Also discussed the role bronchoscopy will play in evaluation of distal airway.       ICU Vital Signs Last 24 Hrs  T(C): 36 (16 Nov 2021 07:00), Max: 36.5 (15 Nov 2021 09:12)  T(F): 96.8 (16 Nov 2021 07:00), Max: 97.7 (15 Nov 2021 09:12)  HR: 83 (16 Nov 2021 07:00) (74 - 100)  BP: 115/60 (16 Nov 2021 07:00) (104/57 - 162/78)  BP(mean): 82 (16 Nov 2021 07:00) (75 - 117)  ABP: 117/77 (16 Nov 2021 07:00) (113/104 - 157/97)  ABP(mean): 95 (16 Nov 2021 07:00) (92 - 126)  RR: 12 (16 Nov 2021 07:00) (12 - 20)  SpO2: 100% (16 Nov 2021 07:00) (97% - 100%)            PHYSICAL EXAM:    CONSTITUTIONAL: A&Ox0  HEAD: EVD in place  ORAL CAVITY/OROPHARYNX: Significant tongue swelling, most significant on ventral surface. mouth unable to be opened due to tonicity of jaw. tongue indurated, necrosis of ventral surface. OP limited due to ETT  NECK: Tracheal packing removed. replaced with single 4x4 soaked gauze (previously 2 4x4 gauzes)  RESPIRATORY: Respirations unlabored, no increased work of breathing with use of accessory muscles and retractions. No stridor.  CARDIAC: Warm extremities, no cyanosis.       A/P: 46 F w/ w/ significant tongue swelling, likely secondary to biting down on tongue. bite block unable to be placed due to tone of jaw muscles. ENT reconsulted for trach placement - attempted 11/12 am, but complicated by respiratory failure, coded w/ chest compressions 3x with ROSC. ETT was reinserted into tracheal orally. Feeds seen from mouth and nose, likely secondary to tube feeds refluxing    - Antibiotics while tracheal stoma packing in place  - ENT will formalize trach in OR later this week depending on patient stability  - ENT can aesthetically close neck stoma as well  - Rest of management per NSGY ENT PROGRESS NOTE    HPI: 47 y/o female with PMH HTN, Multiple sclerosis, recent spinal surgery 10/8 ([facility]) presented to [facility] after being found unconscious at home, unknown period of time. 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 ENT consulted due to tongue swelling. pt has been off sedated due to neuro checks. per nursing, pt has been biting down on tongue. nursing has not been able to place bite block. Denies any hypotensive episodes.     INTERVAL:    11/10: ENT reconsulted for trach placement - per primary team, patient was spastic with stiff neck while trying to perform bedside tracheostomy yesterday. Pt has been intubated since 10/26. Otherwise, NAEON - on EEG.    11/11: S/p tracheostomy attempt 11/12 am. Pt was seen and examined bedside - ETT in place, good ventilation. Stoma packing in place. Trops downtrending. Given 1x PRBC overnight, b/l chest tubes in place. Plan to change from cefepime to ancef today.    11/12: Pt in multisystem organ failure per primary team. Poor neuro exam    11/15: informed by primary team pt had feeds coming from nose and mouth. was also found in ETT. Pt DNR status still pending    11/16: informed overnight night pt is having a vent leak, concern for arising from stoma. superficial dressing changed. no longer experiencing vent leak    11/16: PM. I Spoke with daughter Jayshree Stubbs at bedside this afternoon. Ms. Stubbs conferenced in her uncle and aunt into the conversation. Discussed with Ms. Stubbs current status of tracheostomy site and proposal for re-evaluating tracheostomy site tomorrow in OR followed by bronchoscopy. Explained to Ms. Stubbs procedure tomorrow, tracheostomy and bronchoscopy,  would allow for removal of ETT and evaluation of the distal airways. explained there is a risk of inability to secure airway tomorrow. Ms. Stubbs and family stated they understand and would like to defer the procedure for now given renal status.     ICU Vital Signs Last 24 Hrs  T(C): 36.5 (16 Nov 2021 09:16), Max: 36.5 (16 Nov 2021 09:16)  T(F): 97.7 (16 Nov 2021 09:16), Max: 97.7 (16 Nov 2021 09:16)  HR: 96 (16 Nov 2021 14:32) (74 - 127)  BP: 112/61 (16 Nov 2021 12:00) (104/57 - 161/88)  BP(mean): 81 (16 Nov 2021 12:00) (75 - 117)  ABP: 118/70 (16 Nov 2021 14:00) (107/71 - 157/97)  ABP(mean): 88 (16 Nov 2021 14:00) (85 - 123)  RR: 12 (16 Nov 2021 14:00) (12 - 15)  SpO2: 99% (16 Nov 2021 14:32) (96% - 100%)              PHYSICAL EXAM:    CONSTITUTIONAL: A&Ox0  HEAD: EVD in place  ORAL CAVITY/OROPHARYNX: Significant tongue swelling, most significant on ventral surface. mouth unable to be opened due to tonicity of jaw. tongue indurated, necrosis of ventral surface. OP limited due to ETT  NECK: Tracheal packing removed. replaced with single 4x4 soaked gauze (previously 2 4x4 gauzes)  RESPIRATORY: Respirations unlabored, no increased work of breathing with use of accessory muscles and retractions. No stridor.  CARDIAC: Warm extremities, no cyanosis.       A/P: 46 F w/ w/ significant tongue swelling, likely secondary to biting down on tongue. bite block unable to be placed due to tone of jaw muscles. ENT reconsulted for trach placement - attempted 11/12 am, but complicated by respiratory failure, coded w/ chest compressions 3x with ROSC. ETT was reinserted into tracheal orally. Feeds seen from mouth and nose, likely secondary to tube feeds refluxing. Pt's family would like to defer tracheostomy and bronchoscopy at this time.     - Antibiotics while tracheal stoma packing in place  -recommend formalizing trach in OR; per daughter, would like to defer procedure at this time  - Rest of management per NSGY

## 2021-11-16 NOTE — PROGRESS NOTE ADULT - SUBJECTIVE AND OBJECTIVE BOX
ENT PROGRESS NOTE    HPI: 47 y/o female with PMH HTN, Multiple sclerosis, recent spinal surgery 10/8 ([facility]) presented to [facility] after being found unconscious at home, unknown period of time. 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 ENT consulted due to tongue swelling. pt has been off sedated due to neuro checks. per nursing, pt has been biting down on tongue. nursing has not been able to place bite block. Denies any hypotensive episodes.     INTERVAL:    11/10: ENT reconsulted for trach placement - per primary team, patient was spastic with stiff neck while trying to perform bedside tracheostomy yesterday. Pt has been intubated since 10/26. Otherwise, NAEON - on EEG.    11/11: S/p tracheostomy attempt 11/12 am. Pt was seen and examined bedside - ETT in place, good ventilation. Stoma packing in place. Trops downtrending. Given 1x PRBC overnight, b/l chest tubes in place. Plan to change from cefepime to ancef today.    11/12: Pt in multisystem organ failure per primary team. Poor neuro exam    11/15: informed by primary team pt had feeds coming from nose and mouth. was also found in ETT. Pt DNR status still pending    11/16: informed overnight night pt is having a vent leak, concern for arising from stoma. superficial dressing changed. no longer experiencing vent leak      ICU Vital Signs Last 24 Hrs  T(C): 36 (16 Nov 2021 07:00), Max: 36.5 (15 Nov 2021 09:12)  T(F): 96.8 (16 Nov 2021 07:00), Max: 97.7 (15 Nov 2021 09:12)  HR: 83 (16 Nov 2021 07:00) (74 - 100)  BP: 115/60 (16 Nov 2021 07:00) (104/57 - 162/78)  BP(mean): 82 (16 Nov 2021 07:00) (75 - 117)  ABP: 117/77 (16 Nov 2021 07:00) (113/104 - 157/97)  ABP(mean): 95 (16 Nov 2021 07:00) (92 - 126)  RR: 12 (16 Nov 2021 07:00) (12 - 20)  SpO2: 100% (16 Nov 2021 07:00) (97% - 100%)            PHYSICAL EXAM:    CONSTITUTIONAL: A&Ox0  HEAD: EVD in place  ORAL CAVITY/OROPHARYNX: Significant tongue swelling, most significant on ventral surface. mouth unable to be opened due to tonicity of jaw. tongue indurated, necrosis of ventral surface. OP limited due to ETT  NECK: Tracheal packing in place, no bleeding  RESPIRATORY: Respirations unlabored, no increased work of breathing with use of accessory muscles and retractions. No stridor.  CARDIAC: Warm extremities, no cyanosis.       A/P: 46 F w/ w/ significant tongue swelling, likely secondary to biting down on tongue. bite block unable to be placed due to tone of jaw muscles. ENT reconsulted for trach placement - attempted 11/12 am, but complicated by respiratory failure, coded w/ chest compressions 3x with ROSC. ETT was reinserted into tracheal orally. Feeds seen from mouth and nose, likely secondary to tube feeds refluxing    - Antibiotics while tracheal stoma packing in place  - ENT will formalize trach in OR later this week depending on patient stability  - ENT can aesthetically close neck stoma as well  - Rest of management per NSGY ENT PROGRESS NOTE    HPI: 47 y/o female with PMH HTN, Multiple sclerosis, recent spinal surgery 10/8 ([facility]) presented to [facility] after being found unconscious at home, unknown period of time. 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 ENT consulted due to tongue swelling. pt has been off sedated due to neuro checks. per nursing, pt has been biting down on tongue. nursing has not been able to place bite block. Denies any hypotensive episodes.     INTERVAL:    11/10: ENT reconsulted for trach placement - per primary team, patient was spastic with stiff neck while trying to perform bedside tracheostomy yesterday. Pt has been intubated since 10/26. Otherwise, NAEON - on EEG.    11/11: S/p tracheostomy attempt 11/12 am. Pt was seen and examined bedside - ETT in place, good ventilation. Stoma packing in place. Trops downtrending. Given 1x PRBC overnight, b/l chest tubes in place. Plan to change from cefepime to ancef today.    11/12: Pt in multisystem organ failure per primary team. Poor neuro exam    11/15: informed by primary team pt had feeds coming from nose and mouth. was also found in ETT. Pt DNR status still pending    11/16: informed overnight night pt is having a vent leak, concern for arising from stoma. superficial dressing changed. no longer experiencing vent leak      ICU Vital Signs Last 24 Hrs  T(C): 36 (16 Nov 2021 07:00), Max: 36.5 (15 Nov 2021 09:12)  T(F): 96.8 (16 Nov 2021 07:00), Max: 97.7 (15 Nov 2021 09:12)  HR: 83 (16 Nov 2021 07:00) (74 - 100)  BP: 115/60 (16 Nov 2021 07:00) (104/57 - 162/78)  BP(mean): 82 (16 Nov 2021 07:00) (75 - 117)  ABP: 117/77 (16 Nov 2021 07:00) (113/104 - 157/97)  ABP(mean): 95 (16 Nov 2021 07:00) (92 - 126)  RR: 12 (16 Nov 2021 07:00) (12 - 20)  SpO2: 100% (16 Nov 2021 07:00) (97% - 100%)            PHYSICAL EXAM:    CONSTITUTIONAL: A&Ox0  HEAD: EVD in place  ORAL CAVITY/OROPHARYNX: Significant tongue swelling, most significant on ventral surface. mouth unable to be opened due to tonicity of jaw. tongue indurated, necrosis of ventral surface. OP limited due to ETT  NECK: Tracheal packing removed. replaced with single 4x4 soaked gauze (previously 2 4x4 gauzes)  RESPIRATORY: Respirations unlabored, no increased work of breathing with use of accessory muscles and retractions. No stridor.  CARDIAC: Warm extremities, no cyanosis.       A/P: 46 F w/ w/ significant tongue swelling, likely secondary to biting down on tongue. bite block unable to be placed due to tone of jaw muscles. ENT reconsulted for trach placement - attempted 11/12 am, but complicated by respiratory failure, coded w/ chest compressions 3x with ROSC. ETT was reinserted into tracheal orally. Feeds seen from mouth and nose, likely secondary to tube feeds refluxing    - Antibiotics while tracheal stoma packing in place  - ENT will formalize trach in OR later this week depending on patient stability  - ENT can aesthetically close neck stoma as well  - Rest of management per NSGY
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
## 2021-11-16 NOTE — CHART NOTE - NSCHARTNOTEFT_GEN_A_CORE
Further discussion with family to formalize GOC. They are not ready to finalize code status but remain optimistic about patient's prognosis. They understand that renal failure is the imminent concern to patient's well-being. Daughter is concerned about proceeding with trach given previous complications. Will continue to follow and explore GOC.    Silas Cavanaugh, Palliative Care Attending  Questions/Concerns: Call [phone] (including Nights/Weekend) or message on Microsoft Teams (Silas Cavanaugh) Further discussion with family to formalize GOC. They are not ready to finalize code status but remain optimistic about patient's prognosis. They understand that renal failure is the imminent concern to patient's well-being. Daughter is concerned about proceeding with trach given previous complications. Will continue to follow and explore GOC.    Silas Cavanaugh, Palliative Care Attending  Questions/Concerns: Call [phone] (including Nights/Weekend) or message on Microsoft Teams (Silas Cavanaugh)            PALLIATIVE MEDICINE COORDINATION OF CARE DOCUMENTATION: [x] Inpatient Consult  Non-Face-to-Face prolonged service provided that relates to (face-to-face) care that has or will occur and ongoing patient management, including one or more of the following: -Documentation review from other physicians and health care professional services -Review of medical records and diagnostic/radiology study results -Coordination with patient's support system  ************************************************************************  MEDICATION REVIEW:  MEDICATIONS  (STANDING):  acetylcysteine 20%  Inhalation 4 milliLiter(s) Inhalation three times a day  artificial  tears Solution 1 Drop(s) Both EYES two times a day  ceFAZolin   IVPB 500 milliGRAM(s) IV Intermittent every 12 hours  fentaNYL   Infusion. 1 MICROgram(s)/kG/Hr (8.5 mL/Hr) IV Continuous <Continuous>  heparin   Injectable 5000 Unit(s) SubCutaneous every 8 hours  hydrALAZINE 25 milliGRAM(s) Oral every 12 hours  lactated ringers. 1000 milliLiter(s) (75 mL/Hr) IV Continuous <Continuous>  levETIRAcetam  Solution 500 milliGRAM(s) Oral two times a day  midazolam Infusion 0.02 mG/kG/Hr (1.7 mL/Hr) IV Continuous <Continuous>  pantoprazole   Suspension 40 milliGRAM(s) Oral daily  polyethylene glycol 3350 17 Gram(s) Oral daily  senna 2 Tablet(s) Oral at bedtime    MEDICATIONS  (PRN):  labetalol Injectable 10 milliGRAM(s) IV Push every 2 hours PRN SBP >150  ondansetron Injectable 4 milliGRAM(s) IV Push every 6 hours PRN Nausea and/or Vomiting  sodium chloride 0.9% lock flush 10 milliLiter(s) IV Push every 1 hour PRN Pre/post blood products, medications, blood draw, and to maintain line patency    ISTOP REFERENCE: [id number]  ------------------------------------------------------------------------  COORDINATION OF CARE:  - Palliative Care consulted for: [facility]  - Patient (to be) assessed: 11/15/21  - Patient previously seen by Palliative Care service: NO    ADVANCE CARE PLANNING  - Code status: Full Code  - MOLST reviewed in chart: NONE; None found on [facility]  - HCP/Surrogate: Jayshree Stubbs  - [facility] documents: NONE found on [place]  - HCP/Living will/Other Advanced Directives in Alpha: NONE found on [facility]  ------------------------------------------------------------------------  CARE PROVIDER DOCUMENTATION:  - KARSON/JORDANA notes: Remains medically active  - Nephro notes: do not recommned HD in the setting of poor neuro prognosis  - ENT notes: will formalize trach in OR later this week depending on patient stability  - NSICU notes: poor prognosis    PLAN OF CARE  - Known Admissions in Past Year: 0  - Current Admit Date: 10/26/21  - LOS: 21  - LACE score: 8  - Current Dispo Plan: TO BE DETERMINED  ------------------------------------------------------------------------  - Time Spent/Chart reviewed: 40 Minutes [including time used to gather, review and transfer data]  - Start: 1:00PM  - End: 1:40PM    Prolonged services rendered, as part of this patient's care provided by Palliative Medicine, include: i. chart review for provider and ancillary service documentation, ii. pertinent diagnostics including laboratory and imaging studies, iii. medication review including PRN use, iv. admission history including previous palliative care encounters and GOC notes, v. advance care planning documents including HCP and MOLST forms in [facility]. Part of Palliative Medicine extended evaluation and management also involves coordination of care with our IDT, the primary and consulting teams, and unit CM/SW and Hospice if eligible. Recommendations based on the information gathered and discussed are outlined in the A/P of Palliative notes.

## 2021-11-16 NOTE — PROGRESS NOTE ADULT - ASSESSMENT
46F with pmhx of HTN who presented to the hospital after being found down at home for unknown period of time. 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 Nephrology consulted for CHETAN management and possible HD needs    Assessment/Plan:   #oliguric iATN  Baseline creatinine 0.6  Pt has prolonged hospital stay for a L MCA aneurysm rupture; she suffered from cardiac arrest x 3 on 11/12 and now is intubated in the ICU. Her kidney function has continued to rise over the course of the last 24 hours and her urine studies are suggestive of possible ATN given Sodium of 115 which correlates to her having hypoperfusion from the cardiac arrest. Her urine output is slowly starting to decrease as well. After discussion with Nsx team it is unlikely patient to make a meaningful recovery; therefore would not offer HD at this time and continue with supportive care and have GOC.   -Trend BMP daily  -Would not offer HD at this time given overall prognosis.  -Active GOC discussion  -Strict I/O's  -Renal diet  -Avoid nephrotoxic meds    #Hypernatremia  Likely from insensible fluid loss; her urine output is starting to decrease. Likely has a FWD of 0.5-1L  Given overall prognosis; would give fluids in small aliquots and assess response as given her kidney function  it will be very easy for the patient to become fluid overloaded.    #Anemia of Chronic disease  -Ferritin and iron profile noted.      Thank you for the opportunity to participate in the care of your patient. The nephrology service remains available to assist with any questions or concerns. Please feel free to reach us by paging the on-call nephrology fellow for urgent issues or as below.     Aj Cardoso D.O.  PGY 4 - Nephrology Fellow  [phone]

## 2021-11-16 NOTE — PROGRESS NOTE ADULT - ASSESSMENT
46y/Female with:  L MCA ruptured aneurysm, subarachnoid hemorrhage, s/p [facility] (10/26/2021, Dr. D'Amico @ [place]), brain compression, cerebral edema  anemia   recent spinal surgery  UGIB  bilateral pneumothorax  acute kidney injury, transaminitis    PLAN: Day 1 = 10-26 ; Day 4 = 10/29; Day 21 = 11/15  NEURO: neurochecks q1h, sedation with PRN fentanyl pushes, propofol   SAH:  d/c nimodipine  Hydrocephalus:  EVD at 5 cm H20 open to drain  Seizure prophylaxis:  cont levetiracetam 500 BID   REHAB:  physical therapy evaluation and management    EARLY MOB:  HOB elevated    PULM:  full vent support for now, continue mucormyst and ipratropium / albuterol; pulmo toilette  CARDIO:  SBP goal 100-160mm Hg, TTE  ENDO:  Blood sugar goals 140-180 mg/dL  GI:  PPI OD  DIET: NPO  RENAL: LR@75cc/hr, maintain euvolemia, Na goal 1435-145  HEM/ONC: no coagulopathy (INR= 1.03), no ASA / Plavix use  VTE Prophylaxis: SCDs, SQH tonight  ID: afebrile, leukocytosis, cefepim from 11/08; now ancef as per ENT  Social: will update son and daughter  WOUND: dehiscence spinal surgery wound; wound care consulted    CORE MEASURES  1.  Hunt and Griffin Score = 5  2.  VTE prophylaxis:  [ ] administered within 24 hours of admission OR [X] reason not done: fresh bleed, unsecured aneurysm.  3.  Dysphagia screening:   [X] performed before any oral meds / liquids / food  4.  Nimodipine treatment:  [X] administered within 24 hours of admission OR [ ] reason not done:    ATTENDING ATTESTATION:  I was physically present for the key portions of the evaluation and management (E/M) service provided.  I agree with the above history, physical and plan, which I have reviewed and edited where appropriate.    Patient at high risk for neurological deterioration or death due to:  ICU delirium, aspiration PNA, DVT / PE.  Critical care time:  I have personally provided 45 minutes of critical care time, excluding time spent on separate procedures.      Plan discussed with RN, house staff.     46y/Female with:  L MCA ruptured aneurysm, subarachnoid hemorrhage, s/p [facility] (10/26/2021, Dr. D'Amico @ [place]), brain compression, cerebral edema  anemia   recent spinal surgery  UGIB  bilateral pneumothorax  acute kidney injury, transaminitis    PLAN: Day 1 = 10-26 ; Day 4 = 10/29; Day 21 = 11/15  NEURO: neurochecks q1h, sedation with propofol and fentanyl; taper cisatracurium  SAH:  d/c nimodipine  Hydrocephalus:  EVD at 5 cm H20 open to drain - challenge if ok with NS  Seizure prophylaxis:  cont levetiracetam 500 BID   REHAB:  physical therapy evaluation and management    EARLY MOB:  HOB elevated    PULM:  full vent support for now, FiO2 to 40%, continue mucormyst and ipratropium / albuterol; pulmo toilette; CXR  CARDIO:  SBP goal 100-160mm Hg, TTE  ENDO:  Blood sugar goals 140-180 mg/dL  GI:  PPI OD  DIET: restart TF 10cc/hr  RENAL: NS@25cc/hr, Na goal 135-145; worsening creatinine and low urine output   HEM/ONC: no coagulopathy (INR= 1.03), no ASA / Plavix use; Hb drop - send FOBT  VTE Prophylaxis: Kathy, [facility] tonight  ID: afebrile, leukocytosis, cefepim from 11/08; now ancef while packing is in  Social: will update son and daughter, palliative care on board    CORE MEASURES  1.  Hunt and Griffin Score = 5  2.  VTE prophylaxis:  [ ] administered within 24 hours of admission OR [X] reason not done: fresh bleed, unsecured aneurysm.  3.  Dysphagia screening:   [X] performed before any oral meds / liquids / food  4.  Nimodipine treatment:  [X] administered within 24 hours of admission OR [ ] reason not done:    ATTENDING ATTESTATION:  I was physically present for the key portions of the evaluation and management (E/M) service provided.  I agree with the above history, physical and plan, which I have reviewed and edited where appropriate.    Patient at high risk for neurological deterioration or death due to:  ICU delirium, aspiration PNA, DVT / PE.  Critical care time:  I have personally provided 45 minutes of critical care time, excluding time spent on separate procedures.      Plan discussed with RN, house staff.

## 2021-11-17 LAB
ALBUMIN SERPL ELPH-MCNC: 2.5 G/DL — LOW (ref 3.3–5)
ALP SERPL-CCNC: 127 U/L — HIGH (ref 40–120)
ALT FLD-CCNC: 99 U/L — HIGH (ref 10–45)
ANION GAP SERPL CALC-SCNC: 14 MMOL/L — SIGNIFICANT CHANGE UP (ref 5–17)
ANION GAP SERPL CALC-SCNC: 15 MMOL/L — SIGNIFICANT CHANGE UP (ref 5–17)
AST SERPL-CCNC: 65 U/L — HIGH (ref 10–40)
BASE EXCESS BLDA CALC-SCNC: -6.2 MMOL/L — LOW (ref -2–3)
BASE EXCESS BLDCOV CALC-SCNC: -7 MMOL/L — SIGNIFICANT CHANGE UP (ref -9.3–0.3)
BILIRUB SERPL-MCNC: <0.2 MG/DL — SIGNIFICANT CHANGE UP (ref 0.2–1.2)
BUN SERPL-MCNC: 60 MG/DL — HIGH (ref 7–23)
BUN SERPL-MCNC: 63 MG/DL — HIGH (ref 7–23)
CALCIUM SERPL-MCNC: 9.1 MG/DL — SIGNIFICANT CHANGE UP (ref 8.4–10.5)
CALCIUM SERPL-MCNC: 9.1 MG/DL — SIGNIFICANT CHANGE UP (ref 8.4–10.5)
CHLORIDE SERPL-SCNC: 108 MMOL/L — SIGNIFICANT CHANGE UP (ref 96–108)
CHLORIDE SERPL-SCNC: 109 MMOL/L — HIGH (ref 96–108)
CO2 BLDA-SCNC: 19 MMOL/L — SIGNIFICANT CHANGE UP (ref 19–24)
CO2 BLDCOV-SCNC: 20 MMOL/L — SIGNIFICANT CHANGE UP
CO2 SERPL-SCNC: 18 MMOL/L — LOW (ref 22–31)
CO2 SERPL-SCNC: 18 MMOL/L — LOW (ref 22–31)
CREAT SERPL-MCNC: 7.15 MG/DL — HIGH (ref 0.5–1.3)
CREAT SERPL-MCNC: 7.56 MG/DL — HIGH (ref 0.5–1.3)
GAS PNL BLDCOV: 7.29 — SIGNIFICANT CHANGE UP (ref 7.25–7.45)
GLUCOSE SERPL-MCNC: 85 MG/DL — SIGNIFICANT CHANGE UP (ref 70–99)
GLUCOSE SERPL-MCNC: 97 MG/DL — SIGNIFICANT CHANGE UP (ref 70–99)
HCO3 BLDA-SCNC: 18 MMOL/L — LOW (ref 21–28)
HCO3 BLDCOV-SCNC: 19 MMOL/L — SIGNIFICANT CHANGE UP
HCT VFR BLD CALC: 23.8 % — LOW (ref 34.5–45)
HGB BLD-MCNC: 7.3 G/DL — LOW (ref 11.5–15.5)
MAGNESIUM SERPL-MCNC: 2.3 MG/DL — SIGNIFICANT CHANGE UP (ref 1.6–2.6)
MCHC RBC-ENTMCNC: 26.1 PG — LOW (ref 27–34)
MCHC RBC-ENTMCNC: 30.7 GM/DL — LOW (ref 32–36)
MCV RBC AUTO: 85 FL — SIGNIFICANT CHANGE UP (ref 80–100)
NRBC # BLD: 0 /100 WBCS — SIGNIFICANT CHANGE UP (ref 0–0)
PCO2 BLDA: 30 MMHG — LOW (ref 32–35)
PCO2 BLDCOV: 40 MMHG — SIGNIFICANT CHANGE UP (ref 27–49)
PH BLDA: 7.38 — SIGNIFICANT CHANGE UP (ref 7.35–7.45)
PHOSPHATE SERPL-MCNC: 5.7 MG/DL — HIGH (ref 2.5–4.5)
PLATELET # BLD AUTO: 230 K/UL — SIGNIFICANT CHANGE UP (ref 150–400)
PO2 BLDA: 122 MMHG — HIGH (ref 83–108)
PO2 BLDCOA: 69 MMHG — HIGH (ref 17–41)
POTASSIUM SERPL-MCNC: 4 MMOL/L — SIGNIFICANT CHANGE UP (ref 3.5–5.3)
POTASSIUM SERPL-MCNC: 4.2 MMOL/L — SIGNIFICANT CHANGE UP (ref 3.5–5.3)
POTASSIUM SERPL-SCNC: 4 MMOL/L — SIGNIFICANT CHANGE UP (ref 3.5–5.3)
POTASSIUM SERPL-SCNC: 4.2 MMOL/L — SIGNIFICANT CHANGE UP (ref 3.5–5.3)
PROT SERPL-MCNC: 6.2 G/DL — SIGNIFICANT CHANGE UP (ref 6–8.3)
RBC # BLD: 2.8 M/UL — LOW (ref 3.8–5.2)
RBC # FLD: 23.2 % — HIGH (ref 10.3–14.5)
SAO2 % BLDCOV: 95.2 % — SIGNIFICANT CHANGE UP
SODIUM SERPL-SCNC: 141 MMOL/L — SIGNIFICANT CHANGE UP (ref 135–145)
SODIUM SERPL-SCNC: 141 MMOL/L — SIGNIFICANT CHANGE UP (ref 135–145)
WBC # BLD: 9.6 K/UL — SIGNIFICANT CHANGE UP (ref 3.8–10.5)
WBC # FLD AUTO: 9.6 K/UL — SIGNIFICANT CHANGE UP (ref 3.8–10.5)

## 2021-11-17 PROCEDURE — 99291 CRITICAL CARE FIRST HOUR: CPT

## 2021-11-17 PROCEDURE — 71045 X-RAY EXAM CHEST 1 VIEW: CPT | Mod: 26

## 2021-11-17 PROCEDURE — 99024 POSTOP FOLLOW-UP VISIT: CPT

## 2021-11-17 PROCEDURE — 99233 SBSQ HOSP IP/OBS HIGH 50: CPT

## 2021-11-17 RX ORDER — SODIUM CHLORIDE 5 G/100ML
1000 INJECTION, SOLUTION INTRAVENOUS
Refills: 0 | Status: DISCONTINUED | OUTPATIENT
Start: 2021-11-17 | End: 2021-11-18

## 2021-11-17 RX ORDER — LEVETIRACETAM 250 MG/1
500 TABLET, FILM COATED ORAL
Refills: 0 | Status: DISCONTINUED | OUTPATIENT
Start: 2021-11-17 | End: 2021-11-19

## 2021-11-17 RX ORDER — CEFAZOLIN SODIUM 1 G
500 VIAL (EA) INJECTION EVERY 12 HOURS
Refills: 0 | Status: DISCONTINUED | OUTPATIENT
Start: 2021-11-17 | End: 2021-11-23

## 2021-11-17 RX ORDER — SODIUM CHLORIDE 9 MG/ML
1000 INJECTION, SOLUTION INTRAVENOUS
Refills: 0 | Status: DISCONTINUED | OUTPATIENT
Start: 2021-11-17 | End: 2021-11-18

## 2021-11-17 RX ORDER — CEFAZOLIN SODIUM 1 G
1000 VIAL (EA) INJECTION EVERY 24 HOURS
Refills: 0 | Status: DISCONTINUED | OUTPATIENT
Start: 2021-11-17 | End: 2021-11-17

## 2021-11-17 RX ORDER — METOCLOPRAMIDE HCL 10 MG
5 TABLET ORAL EVERY 8 HOURS
Refills: 0 | Status: COMPLETED | OUTPATIENT
Start: 2021-11-17 | End: 2021-11-18

## 2021-11-17 RX ADMIN — PROPOFOL 5.1 MICROGRAM(S)/KG/MIN: 10 INJECTION, EMULSION INTRAVENOUS at 14:58

## 2021-11-17 RX ADMIN — AMLODIPINE BESYLATE 5 MILLIGRAM(S): 2.5 TABLET ORAL at 06:10

## 2021-11-17 RX ADMIN — Medication 25 MILLIGRAM(S): at 06:10

## 2021-11-17 RX ADMIN — CHLORHEXIDINE GLUCONATE 1 APPLICATION(S): 213 SOLUTION TOPICAL at 05:15

## 2021-11-17 RX ADMIN — Medication 1 DROP(S): at 06:12

## 2021-11-17 RX ADMIN — Medication 4 MILLILITER(S): at 06:10

## 2021-11-17 RX ADMIN — Medication 4 MILLILITER(S): at 22:49

## 2021-11-17 RX ADMIN — Medication 4 MILLILITER(S): at 13:22

## 2021-11-17 RX ADMIN — SODIUM CHLORIDE 50 MILLILITER(S): 9 INJECTION, SOLUTION INTRAVENOUS at 10:26

## 2021-11-17 RX ADMIN — Medication 5 MILLIGRAM(S): at 21:24

## 2021-11-17 RX ADMIN — HEPARIN SODIUM 5000 UNIT(S): 5000 INJECTION INTRAVENOUS; SUBCUTANEOUS at 14:56

## 2021-11-17 RX ADMIN — LEVETIRACETAM 500 MILLIGRAM(S): 250 TABLET, FILM COATED ORAL at 17:02

## 2021-11-17 RX ADMIN — Medication 100 MILLIGRAM(S): at 18:00

## 2021-11-17 RX ADMIN — HEPARIN SODIUM 5000 UNIT(S): 5000 INJECTION INTRAVENOUS; SUBCUTANEOUS at 06:11

## 2021-11-17 RX ADMIN — Medication 1 DROP(S): at 17:04

## 2021-11-17 RX ADMIN — SODIUM CHLORIDE 30 MILLILITER(S): 5 INJECTION, SOLUTION INTRAVENOUS at 23:15

## 2021-11-17 RX ADMIN — Medication 25 MILLIGRAM(S): at 17:02

## 2021-11-17 RX ADMIN — CHLORHEXIDINE GLUCONATE 15 MILLILITER(S): 213 SOLUTION TOPICAL at 06:12

## 2021-11-17 RX ADMIN — LEVETIRACETAM 500 MILLIGRAM(S): 250 TABLET, FILM COATED ORAL at 06:12

## 2021-11-17 RX ADMIN — HEPARIN SODIUM 5000 UNIT(S): 5000 INJECTION INTRAVENOUS; SUBCUTANEOUS at 21:25

## 2021-11-17 RX ADMIN — CHLORHEXIDINE GLUCONATE 15 MILLILITER(S): 213 SOLUTION TOPICAL at 17:02

## 2021-11-17 RX ADMIN — Medication 100 MILLIGRAM(S): at 00:15

## 2021-11-17 RX ADMIN — PANTOPRAZOLE SODIUM 40 MILLIGRAM(S): 20 TABLET, DELAYED RELEASE ORAL at 11:28

## 2021-11-17 NOTE — PROGRESS NOTE ADULT - SUBJECTIVE AND OBJECTIVE BOX
HPI:  45 y/o female with PMH HTN, Multiple sclerosis, recent spinal surgery 10/8 ([facility]) presented to [facility] BIBEMS after being found unconscious at home, unknown period of time. 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 HH5, MF1. Patient was intubated at [facility], found to have blown L pupil, and sent straight to the OR for left hemicraniotomy. A-line placed intraop. Hemodynamically unstable upon arrival to [facility], bradycardic and hypertensive s/p Mannitol, Clevidipine, and Furosemide. Central line placed in NSICU. Currently sedated on Propofol, pending repeat CTH. History per patient's son, patient had recent spine surgery and was found on outpatient imaging last week to have L M1 segment aneurysm (unruptured), pt asymptomatic at this time. Per son patient taking percocet PO at home. Ureña catheter, ET tube, and arterial line placed at [facility].     NIHSS on arrival: 32   Bess Griffin 5, Mod Busby 4  Bleed Day 1 = 10/26 (26 Oct 2021 13:03)      Hospital Course:   10/26: admitted to [facility] from [facility], POD#0 s/p L hemicraniectomy for decompression and evacuation of SDH. Transferred to [facility] noted to have tense flap, received mannitol, keppra, decadron. Was hypertensive to 200s and preston to 40s, recevied 85g mannitol and bullet 23.4%. CTH on arrival demonstrated increased size in vents and new IVH. EVD placed, open at 15, central line placed. Transfused 2 u PRBC. POD0 of coiling of left MCA bifurcation aneurysm,   10/27: CTH demonstrated EVD in correct position, EVD lowered to 5, remains intubated on proprofol, pending repeat CTH in AM. Started on 3% @ 30/hr. 2LNS given for euvolemia, Mannitol given for uptrending ICPs. Bullet given 8:30 am. 3% increased to 50/hr. 1 L bolus. New EVD catheter placed using exisiting sreekanth hole. 1 u PRBC.  10/28: HH5, MF4, BD3; POD2 angio coil/embo of L MCA aneurysm. JOAQUÍN overnight, neuro stable. EVD@5cmH20 ICPs < 20 overnight, OP WNL. S/p 1 unit PRBC yesterday with appropriate response. Given 42g mannitol in AM for ICP 22 persistently, with improvement, then given 23% in PM for elevated ICP. febrile, pancultured. Standing tylenol and cooling blanket.   10/29: BD4, POD3 angio coil/embo. JOAQUÍN o/n, neuro stable. EVD@5, ICPs <20 o/n.   10/30: BD5, POD4 angio coil/embo. JOAQUÍN o/n neuro stable. EVD@5. 3% @50cc/hr.  10/31: BD6, POD5 angio coil/embo. brief period maintained upward gaze, resolved on its own. EVD@5cm h2o.    11/1: BD7, POD6 L hemicrani, angio coil/embo. JOAQUÍN overnight. Neuro exam unchanged. ICPs WNL. started bromocriptine/gabapentin/baclofen. dc'd propofol, started precedex  11/2: BD8 POD7 L hemicrani, angio coil/embo. JOAQUÍN overnight. Neuro exam stable. Remains on 3% hypertonic saline. Receieved 1 unit of PRBCs for a Hgb of 7.6.  11/3: BD 9 POD8 of L hemicrani. Elevated lipase, normal amylase, OG tube clogged and replaced. Abdominal US normal. Pending gen surg reccs regarding trach and peg. Gabapentin and Baclofen increased to help with neurostorming. restarted back on 3%, LR@35. became preston+hypotensive with nimodipine 60q4, decreased back to 30q2, NaCl bullet given.   11/4: BD10 POD9, JOAQUÍN o/n, 500cc NS for euvolemia,  neuro stable, EVD at 5. 3% increased to 75  11/5: BD11 POD10, JOAQUÍN o/n, neuro stable, EVD at 5  11/6: BD12 POD11 JOAQUÍN overnight. Neuro exam stable. Remains intubated on precedex. 3% hypertonic saline dc'd  11/7: BD13 POD 12 JOAQUÍN o/n, NGT replaced. on precex with no icp crisis   11/8: BD14 POD13 JOAQUÍN o/n, noted to have tongue maceration/macroglossia. Gauze with tongue depressor placed. Pending further recs from ENT. Pending trach and PEG placement tomorrow with gen surg. Off precedex, ICPs stable. EVD remains @ 5.   11/9: BD15, POD 13, bleeding under tongue at start of shift, fentanyl pushed with no further episodes. gabapentin stopped. PEG placed  11/10: BD 16, POD 14, neuro stable, ENT to be consulted in AM, 3% increased to 50/hr.  11/11: BD 17, POD 15, neuro exam stable. Pend CT saba in am for cranioplasty planning. Pend trach in OR with ENT. F.u BMP in am, 3%@50cc/hr for Na goal 140-145.   11/12: BD 18, POD 16. JOAQUÍN overnight, neuro stable. Pend trach placement today. CT saba completed 11/11. Off of 3%, f/u am BMP for Na goal 140-150. CSF neg. Hypoxic cardiac arrest with ROSC x 3 during tracheostomy placement. B/l chest tubes placed for resulting pneumothorax s/p CPR. salt tabs dc'd.  11/13: BD 19, POD 17. Patient tachycardic with some improvement, SBP stable off of levophed, hgb downtrending o/n, transfused 1 unit PRBCs. B/l chest tube. EVD @5cmH2O, no elevated ICPs. UOP downtrending, trend BUN/Cr, given 1LNS x1 for low urine output. F/u am CXR. Cont Cefepime until today, then change to Ancef while trach packing in place per ENT. Pend CTH when stable. Trops downtrending s/p cardiac arrest. 1L. florinef dc'd. BP goal changed to 100-160, started on amlodipine   11/14: BD20, POD18, worsening creatinine with decreased urine output. Given 250 of albumin and 500cc LR. started on hydralazine PO, decreased amantadine 100 daily given CrCl of 20.  11/15: BD21, POD18, remains oliguric, fem line dc'd, tongue swollen and unable to fit bite block in mouth, started on nimbex gtt to relax jaw. Propofol and fentanyl gtt started. Vomiting TFs through nose and mouth, ENT called. TFs held. Cr uptrending. Palliative consulted.  11/16: BD22, POD19, o/n external trach dressing replaced due to cuff leak and decreasing TV, sedated and paralyzed on nimbex, propofol, and fentanyl gtt. CTH stable.  EVD raised from 5 to 10. Nimbex weaned off. Cr uptrending, Trickle feeds started. FOB negative.   11/17: BD 23, POD20, JOAQUÍN o/n      Vital Signs Last 24 Hrs  T(C): 36.4 (17 Nov 2021 00:31), Max: 36.5 (16 Nov 2021 09:16)  T(F): 97.5 (17 Nov 2021 00:31), Max: 97.7 (16 Nov 2021 09:16)  HR: 81 (17 Nov 2021 00:00) (74 - 127)  BP: 131/65 (17 Nov 2021 00:00) (112/61 - 161/88)  BP(mean): 90 (17 Nov 2021 00:00) (81 - 117)  RR: 14 (17 Nov 2021 00:00) (12 - 20)  SpO2: 100% (17 Nov 2021 00:00) (96% - 100%)    I&O's Detail    15 Nov 2021 07:01  -  16 Nov 2021 07:00  --------------------------------------------------------  IN:    Cisatracurium: 345.3 mL    Enteral Tube Flush: 125 mL    FentaNYL: 56 mL    IV PiggyBack: 150 mL    Lactated Ringers: 450 mL    Nepro: 76 mL    Propofol: 239.2 mL    sodium chloride 0.9%: 1125 mL  Total IN: 2566.5 mL    OUT:    Chest Tube (mL): 25 mL    Chest Tube (mL): 35 mL    External Ventricular Device (mL): 213 mL    Intermittent Catheterization - Urethral (mL): 150 mL    Jevity 1.2: 0 mL    Rectal Tube (mL): 0 mL    Voided (mL): 200 mL  Total OUT: 623 mL    Total NET: 1943.5 mL      16 Nov 2021 07:01  -  17 Nov 2021 00:50  --------------------------------------------------------  IN:    Cisatracurium: 84.8 mL    Enteral Tube Flush: 20 mL    FentaNYL: 8 mL    FentaNYL: 40 mL    FentaNYL: 85 mL    IV PiggyBack: 50 mL    Nepro: 90 mL    Propofol: 210 mL    sodium chloride 0.9%: 150 mL    sodium chloride 0.9%: 375 mL  Total IN: 1112.8 mL    OUT:    Chest Tube (mL): 20 mL    Chest Tube (mL): 5 mL    External Ventricular Device (mL): 69 mL    Rectal Tube (mL): 0 mL  Total OUT: 94 mL    Total NET: 1018.8 mL        I&O's Summary    15 Nov 2021 07:01  -  16 Nov 2021 07:00  --------------------------------------------------------  IN: 2566.5 mL / OUT: 623 mL / NET: 1943.5 mL    16 Nov 2021 07:01  -  17 Nov 2021 00:50  --------------------------------------------------------  IN: 1112.8 mL / OUT: 94 mL / NET: 1018.8 mL        PHYSICAL EXAM:  General: NAD, pt is comfortably  laying in hospital bed, +intubated on full vent support, +sedated on propofol and fentanyl gtt  HEENT: PERRL 2 mm b/l, dysconjugate gaze - R eye midline, left eye temporal deviation  Cardiovascular: RRR, normal S1 and S2   Respiratory: chest rise symmetric,  b/l chest tubes to suction  GI: abd soft, ND, +PEG   Neuro: OEs noxious, nonverbal, not answering questions, WOLF 0/5, +cough/gag  Extremities: extremities warm and dry  Wound/incision: L hemicrani incision site s C/D/I, flap soft and full. B/l posterior spinal surgical incision sites with wound dehiscence   Drains: EVD @[id number]       DIET:  [ ] NPO  [] Mechanical  [x] Tube feeds    LABS:                        7.6    10.46 )-----------( 216      ( 16 Nov 2021 17:06 )             24.6     11-16    146<H>  |  111<H>  |  46<H>  ----------------------------<  81  4.1   |  19<L>  |  6.03<H>    Ca    8.9      16 Nov 2021 05:47  Phos  5.2     11-16  Mg     2.3     11-16    TPro  6.2  /  Alb  2.4<L>  /  TBili  <0.2  /  DBili  x   /  AST  88<H>  /  ALT  166<H>  /  AlkPhos  108  11-16            CAPILLARY BLOOD GLUCOSE          Drug Levels: [] N/A    CSF Analysis: [] N/A      Allergies    No Known Allergies    Intolerances      MEDICATIONS:  Antibiotics:  ceFAZolin   IVPB 500 milliGRAM(s) IV Intermittent every 12 hours    Neuro:  fentaNYL   Infusion. 1 MICROgram(s)/kG/Hr IV Continuous <Continuous>  levETIRAcetam  Solution 500 milliGRAM(s) Oral two times a day  ondansetron Injectable 4 milliGRAM(s) IV Push every 6 hours PRN  propofol Infusion 10 MICROgram(s)/kG/Min IV Continuous <Continuous>    Anticoagulation:  heparin   Injectable 5000 Unit(s) SubCutaneous every 8 hours    OTHER:  acetylcysteine 20%  Inhalation 4 milliLiter(s) Inhalation three times a day  amLODIPine   Tablet 5 milliGRAM(s) Oral daily  artificial  tears Solution 1 Drop(s) Both EYES two times a day  chlorhexidine 0.12% Liquid 15 milliLiter(s) Oral Mucosa every 12 hours  chlorhexidine 2% Cloths 1 Application(s) Topical <User Schedule>  hydrALAZINE 25 milliGRAM(s) Oral every 12 hours  pantoprazole   Suspension 40 milliGRAM(s) Oral daily    IVF:  sodium chloride 0.9%. 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, left    Intracranial hemorrhage, spontaneous subarachnoid, due to cerebral aneurysm, acute    Pneumothorax, left    Functional quadriplegia    Acute respiratory failure with hypoxia    Cardiac arrest    Encounter for palliative care    Advanced care planning/counseling discussion    IPH (idiopathic pulmonary hemosiderosis)    Acute spontaneous intraparenchymal intracranial hemorrhage due to cerebral aneurysm    CHETAN (acute kidney injury)    Angiogram, cerebral, with coil embolization, in non-operating room setting    Endoscopic percutaneous gastrostomy    Angiogram, carotid and cerebral, bilateral    Chest tube placement    Insertion of central venous catheter with ultrasound guidance    Personal history of spine surgery    [email or web address]            Assessment:   45 y/o female with PMHx of HTN and MS, hx of spinal surgery at [facility] 10/8/21 presented to [facility] BIBEMS after being found unconscious at home, unknown period of time. 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 HH5, MF4; BD #1 = 10/26. Patient was intubated at [facility] and sent straight to the OR for left hemicraniotomy. A-line placed intraop. Hemodynamically unstable upon arrival to [facility], bradycardic and hypertensive s/p Mannitol and Furosemide. Central line placed in NSICU. S/p EVD placement, and coil embolization of left MCA bifurcation aneurysm 10/26/2021. S/p cerebral angio without findings of vasospasm 11/10. S/p cardiac arrest with ROSC x3 on 11/12 during tracheostomy at bedside now with b/l pneumothoracies and worsening kidney function.      PLAN:  NEURO:  - neuro checks q4hrs/vital signs q1hr  - Keppra 500mg IV BID renal dosing   - VEEG dc'd, neg for seizures  - Nimodipine dc'd  - fentanyl standing for comfort  - EVD open at [id number], monitor ICP/output  - CTH 11/16: stable  - propofol gtt, and fentanyl gtt,     CARDIO:  - -160  - amlodipine 5 daily and hydral 25 PO BID   - s/p cardiac arrest  - TTE 11/15: mild LVH, EF 70%    PULM:  - intubated on full vent support  - b/l chest tubes for pneumothorax to -[id number]   - s/p acute hypoxic cardiac arrest 11/12 during tracheostomy placment with ENT c/b b/l pneumothorax now with b/l chest tubes   - daily CXR  - will need eventual trach revision with ENT  - TF noticed in ETT And through nose, concern for fistula? ENT aware, potential trach in OR, pending family decisoin     GI:  - PEG trickle feeds  - IVF started   - PPI QD GI ppx  - Abd US: neg   - rectal tube  - trend elevated LFTs    RENAL:  - florinef and salt tabs dc'd  - NS at 25  - euvolemia goal   - Na 140-150  - primafit in place   - Likely ATN, nephro consulted   - no dialyisis at this time     HEME:  - SCDs, SQH ppx  - s/p 2u PRBC, s/p 1u PRBC 10/27, s/p 1u PRBC 11/02, s/p 1u PRBC 11/14   - monitor H+H  - 11/9 unchanged DVT L brachial, repeat doppler 11/14 with resolution of DVT  - 11/14 LE dopplers neg  - FOB neg 11/16    ID:  - leukocytosis and procal, trend   - Tylenol PRN for fever  - sputum cx 11/4:  enterobacter, proteus  - Cefepime started to cover for enterobacter/proteus in sputum dc'd 11/15  - Ancef until trach packing removed 500 BID  - trend procal     ENDO:  - ISS   - monitor FS    OTHER  - wound care recs- q2 dressing changes   - ENT consulted, reccomends trach placement and oral hygeine for tongue laceration   - Palliative following: as of 11/16 family does not want to consent to more procedures at this time, pt remains full code    GOC: full code  Level of care: ICU status   Dispo: pend EVD, trach  family updated    Case discussed with Dr. Duncan      Present when checked    []  GCS  E   V  M     Heart Failure: []Acute, [] acute on chronic , []chronic  Heart Failure:  [] Diastolic (HFpEF), [] Systolic (HFrEF), []Combined (HFpEF and HFrEF), [] RHF, [] Pulm HTN, [] Other    [x] CHETAN, [] ATN, [] AIN, [] other  [] CKD1, [] CKD2, [] CKD 3, [] CKD 4, [] CKD 5, []ESRD    Encephalopathy: [] Metabolic, [] Hepatic, [] toxic, [] Neurological, [] Other    Abnormal Nurtitional Status: [] malnurtition (see nutrition note), [ ]underweight: BMI < 19, [] morbid obesity: BMI >40, [] Cachexia    [] Sepsis  [] hypovolemic shock,[] cardiogenic shock, [] hemorrhagic shock, [] neuogenic shock  [] Acute Respiratory Failure  []Cerebral edema, [] Brain compression/ herniation,   [] Functional quadriplegia  [] Acute blood loss anemia

## 2021-11-17 NOTE — PROGRESS NOTE ADULT - SUBJECTIVE AND OBJECTIVE BOX
Patient is a 46y Female seen and evaluated at bedside remains intubated. Discussion had with team; at this time there is no urgent or emergent indication for hemodialysis. The team is actively talking to the family about what measures they would like done and GOC. If the family insists to pursue HD we will comply and provide as required.       Meds:    acetylcysteine 20%  Inhalation 4 three times a day  amLODIPine   Tablet 5 daily  artificial  tears Solution 1 two times a day  ceFAZolin   IVPB 500 every 12 hours  chlorhexidine 0.12% Liquid 15 every 12 hours  chlorhexidine 2% Cloths 1 <User Schedule>  fentaNYL   Infusion. 1 <Continuous>  heparin   Injectable 5000 every 8 hours  hydrALAZINE 25 every 12 hours  lactated ringers. 1000 <Continuous>  levETIRAcetam  Solution 500 two times a day  ondansetron Injectable 4 every 6 hours PRN  pantoprazole   Suspension 40 daily  propofol Infusion 10 <Continuous>      T(C): , Max: 36.7 (11-17-21 @ 09:07)  T(F): , Max: 98 (11-17-21 @ 09:07)  HR: 86 (11-17-21 @ 13:00)  BP: 129/61 (11-17-21 @ 13:00)  BP(mean): 88 (11-17-21 @ 13:00)  RR: 18 (11-17-21 @ 13:00)  SpO2: 100% (11-17-21 @ 13:00)  Wt(kg): --    11-16 @ 07:01  -  11-17 @ 07:00  --------------------------------------------------------  IN: 1470.8 mL / OUT: 176 mL / NET: 1294.8 mL    11-17 @ 07:01  -  11-17 @ 13:28  --------------------------------------------------------  IN: 470 mL / OUT: 0 mL / NET: 470 mL    Review of Systems:  ROS negative except as per HPI      PHYSICAL EXAM:  GENERAL: intubated; unresponsive  HEENT: NCAT, MMM  CHEST/LUNG: decreased breath sounds b/l   HEART: Regular rate and rhythm, no murmur, no gallops, no rub   ABDOMEN: Soft, nontender, non distended  EXTREMITIES: no pedal edema, WWP  Neurology: intubated and sedated    LABS:                        7.3    9.60  )-----------( 230      ( 17 Nov 2021 05:10 )             23.8     11-17    141  |  109<H>  |  60<H>  ----------------------------<  85  4.0   |  18<L>  |  7.15<H>    Ca    9.1      17 Nov 2021 05:10  Phos  5.7     11-17  Mg     2.3     11-17    TPro  6.2  /  Alb  2.5<L>  /  TBili  <0.2  /  DBili  x   /  AST  65<H>  /  ALT  99<H>  /  AlkPhos  127<H>  11-17                RADIOLOGY & ADDITIONAL STUDIES:

## 2021-11-17 NOTE — PROGRESS NOTE ADULT - SUBJECTIVE AND OBJECTIVE BOX
ENT PROGRESS NOTE    HPI: 45 y/o female with PMH HTN, Multiple sclerosis, recent spinal surgery 10/8 ([facility]) presented to [facility] after being found unconscious at home, unknown period of time. 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 ENT consulted due to tongue swelling. pt has been off sedated due to neuro checks. per nursing, pt has been biting down on tongue. nursing has not been able to place bite block. Denies any hypotensive episodes.     INTERVAL:    11/10: ENT reconsulted for trach placement - per primary team, patient was spastic with stiff neck while trying to perform bedside tracheostomy yesterday. Pt has been intubated since 10/26. Otherwise, NAEON - on EEG.    11/11: S/p tracheostomy attempt 11/12 am. Pt was seen and examined bedside - ETT in place, good ventilation. Stoma packing in place. Trops downtrending. Given 1x PRBC overnight, b/l chest tubes in place. Plan to change from cefepime to ancef today.    11/12: Pt in multisystem organ failure per primary team. Poor neuro exam    11/15: informed by primary team pt had feeds coming from nose and mouth. was also found in ETT. Pt DNR status still pending    11/16: informed overnight night pt is having a vent leak, concern for arising from stoma. superficial dressing changed. no longer experiencing vent leak    11/16: PM. I Spoke with daughter Jayshree Stubbs at bedside this afternoon. Ms. Stubbs conferenced in her uncle and aunt into the conversation. Discussed with Ms. Stubbs current status of tracheostomy site and proposal for re-evaluating tracheostomy site tomorrow in OR followed by bronchoscopy. Explained to Ms. Stubbs procedure tomorrow, tracheostomy and bronchoscopy,  would allow for removal of ETT and evaluation of the distal airways. explained there is a risk of inability to secure airway tomorrow. Ms. Stubbs and family stated they understand and would like to defer the procedure for now given renal status.     11/17: NAEON. pt remains intubated and sedated. no plans for OR today, per family conversation      ICU Vital Signs Last 24 Hrs  T(C): 36.2 (17 Nov 2021 06:31), Max: 36.5 (16 Nov 2021 09:16)  T(F): 97.2 (17 Nov 2021 06:31), Max: 97.7 (16 Nov 2021 09:16)  HR: 97 (17 Nov 2021 06:00) (79 - 127)  BP: 124/60 (17 Nov 2021 05:00) (112/61 - 161/88)  BP(mean): 83 (17 Nov 2021 05:00) (81 - 117)  ABP: 177/95 (17 Nov 2021 06:00) (103/76 - 177/95)  ABP(mean): 129 (17 Nov 2021 06:00) (85 - 129)  RR: 16 (17 Nov 2021 06:00) (12 - 20)  SpO2: 93% (17 Nov 2021 06:00) (93% - 100%)          PHYSICAL EXAM:    CONSTITUTIONAL: A&Ox0  HEAD: EVD in place  ORAL CAVITY/OROPHARYNX: Significant tongue swelling, most significant on ventral surface. mouth unable to be opened due to tonicity of jaw. tongue indurated, necrosis of ventral surface. OP limited due to ETT. Bite block in place  NECK: Tracheal packing removed. replaced with single 4x4 soaked gauze (previously 2 4x4 gauzes)  RESPIRATORY: Respirations unlabored, no increased work of breathing with use of accessory muscles and retractions. No stridor.  CARDIAC: Warm extremities, no cyanosis.       A/P: 46 F w/ w/ significant tongue swelling, likely secondary to biting down on tongue. bite block unable to be placed due to tone of jaw muscles. ENT reconsulted for trach placement - attempted 11/12 am, but complicated by respiratory failure, coded w/ chest compressions 3x with ROSC. ETT was reinserted into tracheal orally. Feeds seen from mouth and nose, likely secondary to tube feeds refluxing. Pt's family would like to defer tracheostomy and bronchoscopy at this time.     - Antibiotics while tracheal stoma packing in place  -recommend formalizing trach in OR; per daughter, would like to defer procedure at this time  - Rest of management per NSGY

## 2021-11-17 NOTE — PROGRESS NOTE ADULT - ASSESSMENT
46y/Female with:  L MCA ruptured aneurysm, subarachnoid hemorrhage, s/p [facility] (10/26/2021, Dr. D'Amico @ [place]), brain compression, cerebral edema  anemia   recent spinal surgery  UGIB  bilateral pneumothorax  acute kidney injury, transaminitis    PLAN: Day 1 = 10-26 ; Day 4 = 10/29; Day 21 = 11/15  NEURO: neurochecks q1h, sedation with propofol and fentanyl; taper cisatracurium  SAH:  d/c nimodipine  Hydrocephalus:  EVD at 5 cm H20 open to drain - challenge if ok with NS  Seizure prophylaxis:  cont levetiracetam 500 BID   REHAB:  physical therapy evaluation and management    EARLY MOB:  HOB elevated    PULM:  full vent support for now, FiO2 to 40%, continue mucormyst and ipratropium / albuterol; pulmo toilette; CXR  CARDIO:  SBP goal 100-160mm Hg, TTE  ENDO:  Blood sugar goals 140-180 mg/dL  GI:  PPI OD  DIET: restart TF 10cc/hr  RENAL: NS@25cc/hr, Na goal 135-145; worsening creatinine and low urine output   HEM/ONC: no coagulopathy (INR= 1.03), no ASA / Plavix use; Hb drop - send FOBT  VTE Prophylaxis: Kathy, [facility] tonight  ID: afebrile, leukocytosis, cefepim from 11/08; now ancef while packing is in  Social: will update son and daughter, palliative care on board    CORE MEASURES  1.  Hunt and Griffin Score = 5  2.  VTE prophylaxis:  [ ] administered within 24 hours of admission OR [X] reason not done: fresh bleed, unsecured aneurysm.  3.  Dysphagia screening:   [X] performed before any oral meds / liquids / food  4.  Nimodipine treatment:  [X] administered within 24 hours of admission OR [ ] reason not done:    ATTENDING ATTESTATION:  I was physically present for the key portions of the evaluation and management (E/M) service provided.  I agree with the above history, physical and plan, which I have reviewed and edited where appropriate.    Patient at high risk for neurological deterioration or death due to:  ICU delirium, aspiration PNA, DVT / PE.  Critical care time:  I have personally provided 45 minutes of critical care time, excluding time spent on separate procedures.      Plan discussed with RN, house staff.     46y/Female with:  L MCA ruptured aneurysm, subarachnoid hemorrhage, s/p [facility] (10/26/2021, Dr. D'Amico @ [place]), brain compression, cerebral edema  anemia   recent spinal surgery  UGIB  bilateral pneumothorax  acute kidney injury, transaminitis    PLAN: Day 1 = 10-26 ; Day 4 = 10/29; Day 21 = 11/15  NEURO: neurochecks q1h, sedation with propofol and fentanyl; off cisatracurium  SAH:  d/c nimodipine  Hydrocephalus:  EVD clamp CT tomorrow  Seizure prophylaxis:  cont levetiracetam 500 BID   REHAB:  physical therapy evaluation and management    EARLY MOB:  HOB elevated    PULM:  full vent support for now, RR incrased to 18, check ABG in 30 mins; , continue mucormyst and ipratropium / albuterol; pulmo toilette; CXR; trach if family wants to continue aggressive care  CARDIO:  SBP goal 100-160mm Hg, TTE  ENDO:  Blood sugar goals 140-180 mg/dL  GI:  PPI OD  DIET: TF increase to goal  RENAL: LR@50; Na goal 135-145; worsening creatinine and low urine output; revisit HD goals with nephrology in light of acidosis - still not offering HD in light of poor prognosis  HEM/ONC: no coagulopathy (INR= 1.03), no ASA / Plavix use; Hb drop - transfuse if <7  VTE Prophylaxis: SCDs, [facility] tonight  ID: afebrile, no leukocytosis, cefepime from 11/08; now ancef while packing is in  Social: will update son and daughter, palliative care on board    CORE MEASURES  1.  Hunt and Griffin Score = 5  2.  VTE prophylaxis:  [ ] administered within 24 hours of admission OR [X] reason not done: fresh bleed, unsecured aneurysm.  3.  Dysphagia screening:   [X] performed before any oral meds / liquids / food  4.  Nimodipine treatment:  [X] administered within 24 hours of admission OR [ ] reason not done:    ATTENDING ATTESTATION:  I was physically present for the key portions of the evaluation and management (E/M) service provided.  I agree with the above history, physical and plan, which I have reviewed and edited where appropriate.    Patient at high risk for neurological deterioration or death due to:  ICU delirium, aspiration PNA, DVT / PE.  Critical care time:  I have personally provided 45 minutes of critical care time, excluding time spent on separate procedures.      Plan discussed with RN, house staff.

## 2021-11-17 NOTE — PROGRESS NOTE ADULT - ASSESSMENT
46F with pmhx of HTN who presented to the hospital after being found down at home for unknown period of time. 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 Nephrology consulted for CHETAN management and possible HD needs    Assessment/Plan:   #oliguric iATN  Baseline creatinine 0.6  Pt has prolonged hospital stay for a L MCA aneurysm rupture; she suffered from cardiac arrest x 3 on 11/12 and now is intubated in the ICU. Her kidney function has continued to rise over the course of the last 24 hours and her urine studies are suggestive of possible ATN given Sodium of 115 which correlates to her having hypoperfusion from the cardiac arrest. Her urine output is slowly starting to decrease as well.  -Trend BMP daily  -Active GOC discussion  -Strict I/O's  -Renal diet  -Avoid nephrotoxic meds    #Hypernatremia (resolved)  Sodium this   -Continue to monitor    #Anemia of Chronic disease  -Ferritin and iron profile noted.      Thank you for the opportunity to participate in the care of your patient. The nephrology service remains available to assist with any questions or concerns. Please feel free to reach us by paging the on-call nephrology fellow for urgent issues or as below.     Aj Cardoso D.O.  PGY 4 - Nephrology Fellow  [phone]

## 2021-11-17 NOTE — ADVANCED PRACTICE NURSE CONSULT - RECOMMEDATIONS
Recommend Triad ointment to sites, cover with foam dressing, change every other day. Spoke with STANISLAV Martinez and BOO Fuentes.
Please reconsult if new need arises. Thank you

## 2021-11-17 NOTE — CHART NOTE - NSCHARTNOTEFT_GEN_A_CORE
Interval events noted and provider documentation reviewed. Ongoing discussion with family regarding GOC. Will continue to explore and support. No updates to share today.    Silas Cavanaugh, Palliative Care Attending  Questions/Concerns: Call [phone] (including Nights/Weekend) or message on [employer] Teams (Silas Cavanaugh)

## 2021-11-17 NOTE — PROGRESS NOTE ADULT - ATTENDING COMMENTS
have reviewed status and progress, and will be avail to initiate HD should family so desire.  have discussed at length with neuro surgery team.   discussions with family continue.   vol status and chems do not require urgent HD response.   will await request of family re their decisions.   discussed with all other care givers.
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
## 2021-11-17 NOTE — PROGRESS NOTE ADULT - SUBJECTIVE AND OBJECTIVE BOX
· Subjective and Objective:   O: cuff leak   T(C): 36.1 (11-16-21 @ 17:00), Max: 36.5 (11-16-21 @ 09:16)  HR: 91 (11-16-21 @ 19:00) (74 - 127)  BP: 112/61 (11-16-21 @ 12:00) (112/61 - 161/88)  RR: 12 (11-16-21 @ 19:00) (12 - 20)  SpO2: 98% (11-16-21 @ 19:00) (96% - 100%)  11-15-21 @ 07:01  -  11-16-21 @ 07:00  --------------------------------------------------------  IN: 2566.5 mL / OUT: 623 mL / NET: 1943.5 mL    11-16-21 @ 07:01  -  11-16-21 @ 20:44  --------------------------------------------------------  IN: 756.8 mL / OUT: 88 mL / NET: 668.8 mL    acetylcysteine 20%  Inhalation 4 milliLiter(s) Inhalation three times a day  amLODIPine   Tablet 5 milliGRAM(s) Oral daily  artificial  tears Solution 1 Drop(s) Both EYES two times a day  ceFAZolin   IVPB 500 milliGRAM(s) IV Intermittent every 12 hours  chlorhexidine 0.12% Liquid 15 milliLiter(s) Oral Mucosa every 12 hours  chlorhexidine 2% Cloths 1 Application(s) Topical <User Schedule>  fentaNYL   Infusion. 1 MICROgram(s)/kG/Hr IV Continuous <Continuous>  heparin   Injectable 5000 Unit(s) SubCutaneous every 8 hours  hydrALAZINE 25 milliGRAM(s) Oral every 12 hours  levETIRAcetam  Solution 500 milliGRAM(s) Oral two times a day  ondansetron Injectable 4 milliGRAM(s) IV Push every 6 hours PRN  pantoprazole   Suspension 40 milliGRAM(s) Oral daily  propofol Infusion 10 MICROgram(s)/kG/Min IV Continuous <Continuous>  sodium chloride 0.9%. 1000 milliLiter(s) IV Continuous <Continuous>  Mode: AC/ CMV (Assist Control/ Continuous Mandatory Ventilation), RR (machine): 12, TV (machine): 400, FiO2: 60, PEEP: 5, ITime: 1, MAP: 7.6, PIP: 18  NEUROLOGIC EXAMINATION:  Patient opens eyes to noxious, does not follow commands, pupils 3mm equal and brisk (+) Doll's, (+) cough and gag, flap full; 0/5 all over   GENERAL: intubated   CARDIOVASCULAR: (+) S1 S2, normal rate and regular rhythm  PULMONARY: rhoncherous all lung fields, no wheezing  ABDOMEN: soft, distended, normoactive bowel sounds  EXTREMITIES: no edema        LABS:  Na: 146 (11-16 @ 05:47), 145 (11-15 @ 03:49), 148 (11-14 @ 17:52), 147 (11-14 @ 04:24), 150 (11-13 @ 22:16)  K: 4.1 (11-16 @ 05:47), 3.9 (11-15 @ 03:49), 3.9 (11-14 @ 17:52), 4.0 (11-14 @ 04:24), 3.9 (11-13 @ 22:16)  Cl: 111 (11-16 @ 05:47), 111 (11-15 @ 03:49), 113 (11-14 @ 17:52), 111 (11-14 @ 04:24), 116 (11-13 @ 22:16)  CO2: 19 (11-16 @ 05:47), 22 (11-15 @ 03:49), 24 (11-14 @ 17:52), 21 (11-14 @ 04:24), 22 (11-13 @ 22:16)  BUN: 46 (11-16 @ 05:47), 43 (11-15 @ 03:49), 38 (11-14 @ 17:52), 30 (11-14 @ 04:24), 33 (11-13 @ 22:16)  Cr: 6.03 (11-16 @ 05:47), 5.23 (11-15 @ 03:49), 4.71 (11-14 @ 17:52), 3.99 (11-14 @ 04:24), 3.83 (11-13 @ 22:16)  Glu: 81(11-16 @ 05:47), 129(11-15 @ 03:49), 125(11-14 @ 17:52), 111(11-14 @ 04:24), 114(11-13 @ 22:16)    Hgb: 7.6 (11-16 @ 17:06), 7.6 (11-16 @ 04:23), 8.5 (11-15 @ 03:49), 8.5 (11-14 @ 17:52), 8.2 (11-14 @ 04:24), 8.8 (11-13 @ 22:16)  Hct: 24.6 (11-16 @ 17:06), 25.2 (11-16 @ 04:23), 27.7 (11-15 @ 03:49), 27.2 (11-14 @ 17:52), 26.8 (11-14 @ 04:24), 28.3 (11-13 @ 22:16)  WBC: 10.46 (11-16 @ 17:06), 11.30 (11-16 @ 04:23), 12.91 (11-15 @ 03:49), 14.20 (11-14 @ 17:52), 14.26 (11-14 @ 04:24), 14.04 (11-13 @ 22:16)  Plt: 216 (11-16 @ 17:06), 215 (11-16 @ 04:23), 239 (11-15 @ 03:49), 245 (11-14 @ 17:52), 253 (11-14 @ 04:24), 259 (11-13 @ 22:16)    INR: 1.24 11-14-21 @ 17:52  PTT: 29.2 11-14-21 @ 17:52            a/p  aSAH , ICH, SAH s/p DHC brain edema and compression  s/p cardiac arrest   neuro checks q 2 hr   keppra for seizure prophylaxis   Hydrocephalus: EVD at 5 cm will raise to 10 cm water      , keep ICP< 22 mmhg. CPP> 65-70   brain edema, keep sodium 145-150   on propofol and fentanyl for sedation  nimbex was discontinued   CV : SBP goal   100-160 mmhg   Pulm: acute respiratory failure, intubated,   Mode: AC/ CMV (Assist Control/ Continuous Mandatory Ventilation)  RR (machine): 12  TV (machine): 400  FiO2: 60  PEEP: 5  ITime: 1  MAP: 7.6  PIP: 18  pneumothorax with chest tubes   has cuff leak, will get ABG, if abnormal will need to ET exchange it with anesthesia and ENT at bedside   GI: on TF, at 10 ml/hr given that she vomited yesterday  will increase slowly   add senna and miralax   Renal: NS 25 ml/hr   CHETAN, renal consult, renal does not recommend HD given poor prognosis   ID afebrile   on cefazolin for trach packing  Endo: DM, target sugar 120-180   Hem: SCD, heparin sc   anemia, will monitor     35 critical care time as patient is at risk for brain henrniation, ICP crisis, seizures, ICH        · Subjective and Objective:   O: EVD clamped     T(C): 36.8 (11-17-21 @ 17:05), Max: 36.8 (11-17-21 @ 17:05)  HR: 85 (11-17-21 @ 17:00) (73 - 102)  BP: 132/63 (11-17-21 @ 17:00) (115/59 - 150/89)  RR: 14 (11-17-21 @ 17:00) (12 - 18)  SpO2: 99% (11-17-21 @ 17:00) (93% - 100%)  11-16-21 @ 07:01  -  11-17-21 @ 07:00  --------------------------------------------------------  IN: 1470.8 mL / OUT: 214 mL / NET: 1256.8 mL    11-17-21 @ 07:01  -  11-17-21 @ 17:52  --------------------------------------------------------  IN: 760 mL / OUT: 150 mL / NET: 610 mL    acetylcysteine 20%  Inhalation 4 milliLiter(s) Inhalation three times a day  amLODIPine   Tablet 5 milliGRAM(s) Oral daily  artificial  tears Solution 1 Drop(s) Both EYES two times a day  ceFAZolin   IVPB 500 milliGRAM(s) IV Intermittent every 12 hours  chlorhexidine 0.12% Liquid 15 milliLiter(s) Oral Mucosa every 12 hours  chlorhexidine 2% Cloths 1 Application(s) Topical <User Schedule>  fentaNYL   Infusion. 1 MICROgram(s)/kG/Hr IV Continuous <Continuous>  heparin   Injectable 5000 Unit(s) SubCutaneous every 8 hours  hydrALAZINE 25 milliGRAM(s) Oral every 12 hours  lactated ringers. 1000 milliLiter(s) IV Continuous <Continuous>  levETIRAcetam  Solution 500 milliGRAM(s) Oral two times a day  ondansetron Injectable 4 milliGRAM(s) IV Push every 6 hours PRN  pantoprazole   Suspension 40 milliGRAM(s) Oral daily  propofol Infusion 10 MICROgram(s)/kG/Min IV Continuous <Continuous>  Mode: AC/ CMV (Assist Control/ Continuous Mandatory Ventilation), RR (machine): 14, TV (machine): 400, FiO2: 40, PEEP: 5, ITime: 1, MAP: 9,   LABS:  Na: 141 (11-17 @ 05:10), 146 (11-16 @ 05:47), 145 (11-15 @ 03:49)  K: 4.0 (11-17 @ 05:10), 4.1 (11-16 @ 05:47), 3.9 (11-15 @ 03:49)  Cl: 109 (11-17 @ 05:10), 111 (11-16 @ 05:47), 111 (11-15 @ 03:49)  CO2: 18 (11-17 @ 05:10), 19 (11-16 @ 05:47), 22 (11-15 @ 03:49)  BUN: 60 (11-17 @ 05:10), 46 (11-16 @ 05:47), 43 (11-15 @ 03:49)  Cr: 7.15 (11-17 @ 05:10), 6.03 (11-16 @ 05:47), 5.23 (11-15 @ 03:49)  Glu: 85(11-17 @ 05:10), 81(11-16 @ 05:47), 129(11-15 @ 03:49)    Hgb: 7.3 (11-17 @ 05:10), 7.6 (11-16 @ 17:06), 7.6 (11-16 @ 04:23), 8.5 (11-15 @ 03:49)  Hct: 23.8 (11-17 @ 05:10), 24.6 (11-16 @ 17:06), 25.2 (11-16 @ 04:23), 27.7 (11-15 @ 03:49)  WBC: 9.60 (11-17 @ 05:10), 10.46 (11-16 @ 17:06), 11.30 (11-16 @ 04:23), 12.91 (11-15 @ 03:49)  Plt: 230 (11-17 @ 05:10), 216 (11-16 @ 17:06), 215 (11-16 @ 04:23), 239 (11-15 @ 03:49)    INR:   PTT:         PIP: 20        NEUROLOGIC EXAMINATION:  Patient opens eyes to noxious, does not follow commands, pupils 3mm equal and brisk (+) Doll's, (+) cough and gag, flap full; 0/5 all over   GENERAL: intubated   CARDIOVASCULAR: (+) S1 S2, normal rate and regular rhythm  PULMONARY: rhoncherous all lung fields, no wheezing  ABDOMEN: soft, distended, normoactive bowel sounds  EXTREMITIES: no edema                a/p  aSAH , ICH, SAH s/p DHC brain edema and compression  s/p cardiac arrest   pneumothorax   neuro checks q 2 hr   keppra for seizure prophylaxis   Hydrocephalus: EVD clamped today   if  ICP> 25 mmhg sustained, or change in neuro exam, will unclamp    brain edema, keep sodium 145-150   wean  propofol and fentanyl for sedation  CV : SBP goal   100-160 mmhg   Pulm: acute respiratory failure, intubated, trach was attempted but failed, pending family discussion regarding new trach placement    Mode: AC/ CMV (Assist Control/ Continuous Mandatory Ventilation)  RR (machine): 12  TV (machine): 400  FiO2: 60  PEEP: 5  ITime: 1  MAP: 7.6  PIP: 18  pneumothorax with chest tubes   has cuff leak, will get ABG, if abnormal will need to ET exchange it with anesthesia and ENT at bedside   GI: on TF, at 10 ml/hr given that she vomited yesterday  will increase slowly   add senna and miralax   Renal: NS 25 ml/hr   CHETAN, renal consult, renal does not recommend HD given poor prognosis   ID afebrile   on cefazolin for trach packing  Endo: DM, target sugar 120-180   Hem: SCD, heparin sc   anemia, will monitor     35 critical care time as patient is at risk for brain henrniation, ICP crisis, seizures, ICH        · Subjective and Objective:   O: EVD clamped     T(C): 36.8 (11-17-21 @ 17:05), Max: 36.8 (11-17-21 @ 17:05)  HR: 85 (11-17-21 @ 17:00) (73 - 102)  BP: 132/63 (11-17-21 @ 17:00) (115/59 - 150/89)  RR: 14 (11-17-21 @ 17:00) (12 - 18)  SpO2: 99% (11-17-21 @ 17:00) (93% - 100%)  11-16-21 @ 07:01  -  11-17-21 @ 07:00  --------------------------------------------------------  IN: 1470.8 mL / OUT: 214 mL / NET: 1256.8 mL    11-17-21 @ 07:01  -  11-17-21 @ 17:52  --------------------------------------------------------  IN: 760 mL / OUT: 150 mL / NET: 610 mL    acetylcysteine 20%  Inhalation 4 milliLiter(s) Inhalation three times a day  amLODIPine   Tablet 5 milliGRAM(s) Oral daily  artificial  tears Solution 1 Drop(s) Both EYES two times a day  ceFAZolin   IVPB 500 milliGRAM(s) IV Intermittent every 12 hours  chlorhexidine 0.12% Liquid 15 milliLiter(s) Oral Mucosa every 12 hours  chlorhexidine 2% Cloths 1 Application(s) Topical <User Schedule>  fentaNYL   Infusion. 1 MICROgram(s)/kG/Hr IV Continuous <Continuous>  heparin   Injectable 5000 Unit(s) SubCutaneous every 8 hours  hydrALAZINE 25 milliGRAM(s) Oral every 12 hours  lactated ringers. 1000 milliLiter(s) IV Continuous <Continuous>  levETIRAcetam  Solution 500 milliGRAM(s) Oral two times a day  ondansetron Injectable 4 milliGRAM(s) IV Push every 6 hours PRN  pantoprazole   Suspension 40 milliGRAM(s) Oral daily  propofol Infusion 10 MICROgram(s)/kG/Min IV Continuous <Continuous>  Mode: AC/ CMV (Assist Control/ Continuous Mandatory Ventilation), RR (machine): 14, TV (machine): 400, FiO2: 40, PEEP: 5, ITime: 1, MAP: 9,   LABS:  Na: 141 (11-17 @ 05:10), 146 (11-16 @ 05:47), 145 (11-15 @ 03:49)  K: 4.0 (11-17 @ 05:10), 4.1 (11-16 @ 05:47), 3.9 (11-15 @ 03:49)  Cl: 109 (11-17 @ 05:10), 111 (11-16 @ 05:47), 111 (11-15 @ 03:49)  CO2: 18 (11-17 @ 05:10), 19 (11-16 @ 05:47), 22 (11-15 @ 03:49)  BUN: 60 (11-17 @ 05:10), 46 (11-16 @ 05:47), 43 (11-15 @ 03:49)  Cr: 7.15 (11-17 @ 05:10), 6.03 (11-16 @ 05:47), 5.23 (11-15 @ 03:49)  Glu: 85(11-17 @ 05:10), 81(11-16 @ 05:47), 129(11-15 @ 03:49)    Hgb: 7.3 (11-17 @ 05:10), 7.6 (11-16 @ 17:06), 7.6 (11-16 @ 04:23), 8.5 (11-15 @ 03:49)  Hct: 23.8 (11-17 @ 05:10), 24.6 (11-16 @ 17:06), 25.2 (11-16 @ 04:23), 27.7 (11-15 @ 03:49)  WBC: 9.60 (11-17 @ 05:10), 10.46 (11-16 @ 17:06), 11.30 (11-16 @ 04:23), 12.91 (11-15 @ 03:49)  Plt: 230 (11-17 @ 05:10), 216 (11-16 @ 17:06), 215 (11-16 @ 04:23), 239 (11-15 @ 03:49)    INR:   PTT:         PIP: 20        NEUROLOGIC EXAMINATION:  Patient opens eyes to noxious, does not follow commands, pupils 3mm equal and brisk (+) Doll's, (+) cough and gag, flap full; 0/5 all over   GENERAL: intubated   CARDIOVASCULAR: (+) S1 S2, normal rate and regular rhythm  PULMONARY: rhoncherous all lung fields, no wheezing  ABDOMEN: soft, distended, normoactive bowel sounds  EXTREMITIES: no edema      A/P:   aSAH , ICH, SAH s/p DHC brain edema and compression  s/p cardiac arrest   pneumothorax   neuro checks q 2 hr   kera for seizure prophylaxis   monitor crani site  Hydrocephalus: EVD clamped today   if  ICP> 25 mmhg sustained, or change in neuro exam, will unclamp    brain edema, keep sodium 145-150   wean fentanyl   keep propofol for sedation, will get TG   CV : SBP goal   100-160 mmhg   Pulm: acute respiratory failure, intubated, trach was attempted but failed, family wants aggressive measures, ENT to plan another trach placement   Mode: AC/ CMV (Assist Control/ Continuous Mandatory Ventilation)  RR (machine): 12  TV (machine): 400  FiO2: 60  PEEP: 5  ITime: 1  MAP: 7.6  PIP: 18  pneumothorax with chest tubes   GI: on TF, at 10 ml/hr   diarrhea and rectal tube    Renal: NS 25 ml/hr   CHETAN, renal consult, might consider HD    ID afebrile   on cefazolin for trach packing  Endo:  target sugar 120-180   Hem: SCD, heparin sc   anemia, will monitor     35 critical care time as patient is at risk for brain herniation ICP crisis, seizures, ICH        · Subjective and Objective:   O: EVD clamped     T(C): 36.8 (11-17-21 @ 17:05), Max: 36.8 (11-17-21 @ 17:05)  HR: 85 (11-17-21 @ 17:00) (73 - 102)  BP: 132/63 (11-17-21 @ 17:00) (115/59 - 150/89)  RR: 14 (11-17-21 @ 17:00) (12 - 18)  SpO2: 99% (11-17-21 @ 17:00) (93% - 100%)  11-16-21 @ 07:01  -  11-17-21 @ 07:00  --------------------------------------------------------  IN: 1470.8 mL / OUT: 214 mL / NET: 1256.8 mL    11-17-21 @ 07:01  -  11-17-21 @ 17:52  --------------------------------------------------------  IN: 760 mL / OUT: 150 mL / NET: 610 mL    acetylcysteine 20%  Inhalation 4 milliLiter(s) Inhalation three times a day  amLODIPine   Tablet 5 milliGRAM(s) Oral daily  artificial  tears Solution 1 Drop(s) Both EYES two times a day  ceFAZolin   IVPB 500 milliGRAM(s) IV Intermittent every 12 hours  chlorhexidine 0.12% Liquid 15 milliLiter(s) Oral Mucosa every 12 hours  chlorhexidine 2% Cloths 1 Application(s) Topical <User Schedule>  fentaNYL   Infusion. 1 MICROgram(s)/kG/Hr IV Continuous <Continuous>  heparin   Injectable 5000 Unit(s) SubCutaneous every 8 hours  hydrALAZINE 25 milliGRAM(s) Oral every 12 hours  lactated ringers. 1000 milliLiter(s) IV Continuous <Continuous>  levETIRAcetam  Solution 500 milliGRAM(s) Oral two times a day  ondansetron Injectable 4 milliGRAM(s) IV Push every 6 hours PRN  pantoprazole   Suspension 40 milliGRAM(s) Oral daily  propofol Infusion 10 MICROgram(s)/kG/Min IV Continuous <Continuous>  Mode: AC/ CMV (Assist Control/ Continuous Mandatory Ventilation), RR (machine): 14, TV (machine): 400, FiO2: 40, PEEP: 5, ITime: 1, MAP: 9    LABS:  Na: 141 (11-17 @ 05:10), 146 (11-16 @ 05:47), 145 (11-15 @ 03:49)  K: 4.0 (11-17 @ 05:10), 4.1 (11-16 @ 05:47), 3.9 (11-15 @ 03:49)  Cl: 109 (11-17 @ 05:10), 111 (11-16 @ 05:47), 111 (11-15 @ 03:49)  CO2: 18 (11-17 @ 05:10), 19 (11-16 @ 05:47), 22 (11-15 @ 03:49)  BUN: 60 (11-17 @ 05:10), 46 (11-16 @ 05:47), 43 (11-15 @ 03:49)  Cr: 7.15 (11-17 @ 05:10), 6.03 (11-16 @ 05:47), 5.23 (11-15 @ 03:49)  Glu: 85(11-17 @ 05:10), 81(11-16 @ 05:47), 129(11-15 @ 03:49)    Hgb: 7.3 (11-17 @ 05:10), 7.6 (11-16 @ 17:06), 7.6 (11-16 @ 04:23), 8.5 (11-15 @ 03:49)  Hct: 23.8 (11-17 @ 05:10), 24.6 (11-16 @ 17:06), 25.2 (11-16 @ 04:23), 27.7 (11-15 @ 03:49)  WBC: 9.60 (11-17 @ 05:10), 10.46 (11-16 @ 17:06), 11.30 (11-16 @ 04:23), 12.91 (11-15 @ 03:49)  Plt: 230 (11-17 @ 05:10), 216 (11-16 @ 17:06), 215 (11-16 @ 04:23), 239 (11-15 @ 03:49)    INR:   PTT:         PIP: 20        NEUROLOGIC EXAMINATION:  Patient opens eyes to noxious, does not follow commands, pupils 3mm equal and brisk (+) Doll's, (+) cough and gag, flap full; 0/5 all over   GENERAL: intubated   CARDIOVASCULAR: (+) S1 S2, normal rate and regular rhythm  PULMONARY: rhoncherous all lung fields, no wheezing  ABDOMEN: soft, distended, normoactive bowel sounds  EXTREMITIES: no edema      A/P:   aSAH , ICH, SAH s/p DHC brain edema and compression  s/p cardiac arrest   pneumothorax   neuro checks q 2 hr   kera for seizure prophylaxis   monitor crani site, flap tense , will repeat BMP now, if sodium continues to drop will start 2 %   Hydrocephalus: EVD clamped today  if  ICP> 25 mmhg sustained, or change in neuro exam, will unclamp    brain edema, keep sodium 145-150   keep propofol for sedation, will get TG   CV : SBP goal   100-160 mmhg   Pulm: acute respiratory failure, intubated, trach was attempted but failed, family wants aggressive measures, ENT to plan another trach placement   Mode: AC/ CMV (Assist Control/ Continuous Mandatory Ventilation)  RR (machine): 12  TV (machine): 400  FiO2: 60  PEEP: 5  ITime: 1  MAP: 7.6  PIP: 18  pneumothorax with chest tubes   GI: on TF, held due to vomiting   diarrhea has  rectal tube    reglan 5 mg q 8 hr for 24 hr   Renal: LR 50 ml/hr ml/hr , add 3 % with sodium acetate if sodium continues to drop fast   CHETAN, renal consult, might consider HD    oliguric, bladder scan q 6 hr   BMP now   ID afebrile   on cefazolin for trach packing  Endo:  target sugar 120-180   Hem: SCD, heparin sc   anemia, will monitor     40 critical care time as patient is at risk for brain herniation ICP crisis, seizures, ICH     Addendum:  While she was being turned to check her back by the nurses, patient started vomiting and her ICP spiked to the high 20s, but went back to < 20 mmhg in few seconds   stop tube feeds

## 2021-11-17 NOTE — PROCEDURE NOTE - ADDITIONAL PROCEDURE DETAILS
Patient positioned in supine position, prepped and draped in sterile fashion at bedside in ICU. R frontal EVD site staples removed, incision retracted, and existing EVD catheter visualized. Existing EVD clamped w/ hemostat, existing catheter cut w/ scissor distal to clamped end. No CSF flow from clamped drain. New EVD catheter w/ stylet inserted in-place of pre-existing EVD using existing sreekanth hole, egress of bloody CSF under high pressure from new catheter at marking of 6 at the skull. EVD tunneled posterior and medial to EVD insertion site through scalp, and affixed to scalp using 2-0 silk suture and a drain suture. Cap placed on EVD after confirming egress of bloody CSF from EVD catheter. EVD insertion site incision closed w/ staples, and EVD tubing stapled to scalp. Insertion site cleaned w/ chlorhexidine, biopatch affixed to scalp, and EVD collection system connected to EVD catheter. Patient tolerated procedure well, CSF flow seen throughout EVD collection system tubing and into buretrol. EVD set to 5cmH2O and unclamped. 1g Ancef given during procedure, patient remained on propofol for sedation, lidocaine administered to EVD incision.
During resuscitation found to have a large right pneumothorax on right sided. Right chest decompressed. Chest tube placement to follow.
Recalled by neurosurgical team after surveillance CXRs raised suspicion for pneumothorax on the left side and after they obtained consensus opinion after speaking with several radiologists. Procedure was done urgently given the concern of dissecting pneumothorax into the peritoneal space, per radiology.    14Fr pigtail chest tube placed in the mid-axillary line based on location appearance of free air in pleural space on imaging, and thoracotomy tube connected to suction drainage with positive air return.
EGD: mild gastritis noted. no abnormalities in pylorus    patient received nimbex and fentanyl    PEG at 3 cm at the skin    okay for medications now; start trickle 3: 45 pm on 11/10/21
Post decompression, ROSC obtained. Post CXR with large PTx on right sided with cardiac arrest. Right chest tube placed. Follow up Xray to evaluate left side.
Site prepped with chlorhexidine. Bounding pulse was felt and blood was obtained. Hemostasis achieved with pressure.
Part of [facility] team responding earlier in morning for hypoxic respiratory arrest during tracheostomy placement. Large right PTX detected on post-ROSC and post-intubation CXR. Patient went into cardiac arrest again concurrently. 16GA needle decompression performed in right 2nd ICS by KARLY Trujillo, followed by 20Fr Turkmen straight chest thoracostomy tube placement, with immediate air return into Pleuro-Vac. ROSC was obtained on next pulse check with improvement in O2 saturation and relative hemodynamic stabilization.
Site was prepped with chlorhexidine. Bounding pulse was felt and blood was obtained. Hemostasis achieved with pressure.
The patient's hair was shaved and the site prepped and draped in sterile fashion. 5 cc of 2% lidocaine was used for local anesthesia. An incision was made 11 cm posterior to the nasion and 3 cm lateral to the midline. A hand twist drill was used for the creation of a sreekanth hole. The dura was pierced with the sharp end of the trocar. The ventriculostomy catheter with the stylet was passed towards the ipsilateral medial canthus and tragus until it reached 7 cm at the skull. Brisk flow of pink CSF under high pressure was observed. 15 cc of CSF was drained. The catheter was tunneled to emerge from the scalp posterio-medial to the incision. The catheter was then connected to an external drainage system and placed 15 cm above the tragus. The incision was closed with staples and the catheter was secured with a 2-0 nylon suture to the scalp.

## 2021-11-17 NOTE — ADVANCED PRACTICE NURSE CONSULT - ASSESSMENT
Discussed case with RN Jania and MD Jaimes who both confirm orders from ENT for bedside RN to keep portion of tongue that is protruding moist at all times. No need for wound care assessment at this time.
46yoF with PMH HTN, MS and PSH anterior/posterior spine surgery 10/8/21 admitted to [facility] with subdural hematoma s/p hemicraniectomy for decompression and evacuation of SDH 10/26/21 transferred to [facility] noted to have tense flap s/p EVD placement and coiling of MCA bifurcation aneurysm 10/26/21. Pt with 2 horizonal, slightly dehised surgical incisions to lower back. Right incision measures approx 6x0.2 cm and left incision measures approx 8x0.2 cm, both with small amount of serosanguinous drainage on old dressing.

## 2021-11-18 LAB
ALBUMIN SERPL ELPH-MCNC: 2.7 G/DL — LOW (ref 3.3–5)
ALP SERPL-CCNC: 165 U/L — HIGH (ref 40–120)
ALT FLD-CCNC: 23 U/L — SIGNIFICANT CHANGE UP (ref 10–45)
ANION GAP SERPL CALC-SCNC: 15 MMOL/L — SIGNIFICANT CHANGE UP (ref 5–17)
ANION GAP SERPL CALC-SCNC: 15 MMOL/L — SIGNIFICANT CHANGE UP (ref 5–17)
AST SERPL-CCNC: 47 U/L — HIGH (ref 10–40)
BASE EXCESS BLDA CALC-SCNC: -6.3 MMOL/L — LOW (ref -2–3)
BASE EXCESS BLDA CALC-SCNC: -7.3 MMOL/L — LOW (ref -2–3)
BILIRUB SERPL-MCNC: <0.2 MG/DL — SIGNIFICANT CHANGE UP (ref 0.2–1.2)
BUN SERPL-MCNC: 64 MG/DL — HIGH (ref 7–23)
BUN SERPL-MCNC: 65 MG/DL — HIGH (ref 7–23)
CALCIUM SERPL-MCNC: 8.7 MG/DL — SIGNIFICANT CHANGE UP (ref 8.4–10.5)
CALCIUM SERPL-MCNC: 9.1 MG/DL — SIGNIFICANT CHANGE UP (ref 8.4–10.5)
CHLORIDE SERPL-SCNC: 109 MMOL/L — HIGH (ref 96–108)
CHLORIDE SERPL-SCNC: 111 MMOL/L — HIGH (ref 96–108)
CO2 BLDA-SCNC: 19 MMOL/L — SIGNIFICANT CHANGE UP (ref 19–24)
CO2 BLDA-SCNC: 20 MMOL/L — SIGNIFICANT CHANGE UP (ref 19–24)
CO2 SERPL-SCNC: 18 MMOL/L — LOW (ref 22–31)
CO2 SERPL-SCNC: 19 MMOL/L — LOW (ref 22–31)
CREAT SERPL-MCNC: 7.74 MG/DL — HIGH (ref 0.5–1.3)
CREAT SERPL-MCNC: 8.14 MG/DL — HIGH (ref 0.5–1.3)
GAS PNL BLDA: SIGNIFICANT CHANGE UP
GLUCOSE SERPL-MCNC: 80 MG/DL — SIGNIFICANT CHANGE UP (ref 70–99)
GLUCOSE SERPL-MCNC: 93 MG/DL — SIGNIFICANT CHANGE UP (ref 70–99)
HCO3 BLDA-SCNC: 18 MMOL/L — LOW (ref 21–28)
HCO3 BLDA-SCNC: 19 MMOL/L — LOW (ref 21–28)
HCT VFR BLD CALC: 24.5 % — LOW (ref 34.5–45)
HGB BLD-MCNC: 7.4 G/DL — LOW (ref 11.5–15.5)
MAGNESIUM SERPL-MCNC: 2.4 MG/DL — SIGNIFICANT CHANGE UP (ref 1.6–2.6)
MCHC RBC-ENTMCNC: 25.3 PG — LOW (ref 27–34)
MCHC RBC-ENTMCNC: 30.2 GM/DL — LOW (ref 32–36)
MCV RBC AUTO: 83.6 FL — SIGNIFICANT CHANGE UP (ref 80–100)
NRBC # BLD: 0 /100 WBCS — SIGNIFICANT CHANGE UP (ref 0–0)
PCO2 BLDA: 32 MMHG — SIGNIFICANT CHANGE UP (ref 32–35)
PCO2 BLDA: 39 MMHG — HIGH (ref 32–35)
PH BLDA: 7.29 — LOW (ref 7.35–7.45)
PH BLDA: 7.36 — SIGNIFICANT CHANGE UP (ref 7.35–7.45)
PHOSPHATE SERPL-MCNC: 6.4 MG/DL — HIGH (ref 2.5–4.5)
PLATELET # BLD AUTO: 243 K/UL — SIGNIFICANT CHANGE UP (ref 150–400)
PO2 BLDA: 138 MMHG — HIGH (ref 83–108)
PO2 BLDA: 151 MMHG — HIGH (ref 83–108)
POTASSIUM SERPL-MCNC: 4.5 MMOL/L — SIGNIFICANT CHANGE UP (ref 3.5–5.3)
POTASSIUM SERPL-MCNC: 4.5 MMOL/L — SIGNIFICANT CHANGE UP (ref 3.5–5.3)
POTASSIUM SERPL-SCNC: 4.5 MMOL/L — SIGNIFICANT CHANGE UP (ref 3.5–5.3)
POTASSIUM SERPL-SCNC: 4.5 MMOL/L — SIGNIFICANT CHANGE UP (ref 3.5–5.3)
PROT SERPL-MCNC: 6.5 G/DL — SIGNIFICANT CHANGE UP (ref 6–8.3)
RBC # BLD: 2.93 M/UL — LOW (ref 3.8–5.2)
RBC # FLD: 22.9 % — HIGH (ref 10.3–14.5)
SAO2 % BLDA: 99.5 % — HIGH (ref 94–98)
SAO2 % BLDA: 99.7 % — HIGH (ref 94–98)
SODIUM SERPL-SCNC: 143 MMOL/L — SIGNIFICANT CHANGE UP (ref 135–145)
SODIUM SERPL-SCNC: 144 MMOL/L — SIGNIFICANT CHANGE UP (ref 135–145)
WBC # BLD: 10.49 K/UL — SIGNIFICANT CHANGE UP (ref 3.8–10.5)
WBC # FLD AUTO: 10.49 K/UL — SIGNIFICANT CHANGE UP (ref 3.8–10.5)

## 2021-11-18 PROCEDURE — 99024 POSTOP FOLLOW-UP VISIT: CPT

## 2021-11-18 PROCEDURE — 71045 X-RAY EXAM CHEST 1 VIEW: CPT | Mod: 26

## 2021-11-18 PROCEDURE — 99291 CRITICAL CARE FIRST HOUR: CPT

## 2021-11-18 PROCEDURE — 70450 CT HEAD/BRAIN W/O DYE: CPT | Mod: 26

## 2021-11-18 PROCEDURE — 99291 CRITICAL CARE FIRST HOUR: CPT | Mod: 25

## 2021-11-18 PROCEDURE — 99292 CRITICAL CARE ADDL 30 MIN: CPT | Mod: 25

## 2021-11-18 PROCEDURE — 74018 RADEX ABDOMEN 1 VIEW: CPT | Mod: 26

## 2021-11-18 PROCEDURE — 99233 SBSQ HOSP IP/OBS HIGH 50: CPT

## 2021-11-18 PROCEDURE — 99232 SBSQ HOSP IP/OBS MODERATE 35: CPT

## 2021-11-18 PROCEDURE — 76937 US GUIDE VASCULAR ACCESS: CPT | Mod: 26,59

## 2021-11-18 PROCEDURE — 36569 INSJ PICC 5 YR+ W/O IMAGING: CPT

## 2021-11-18 RX ORDER — LABETALOL HCL 100 MG
10 TABLET ORAL
Refills: 0 | Status: DISCONTINUED | OUTPATIENT
Start: 2021-11-18 | End: 2021-11-23

## 2021-11-18 RX ORDER — AMLODIPINE BESYLATE 2.5 MG/1
10 TABLET ORAL DAILY
Refills: 0 | Status: DISCONTINUED | OUTPATIENT
Start: 2021-11-18 | End: 2021-11-19

## 2021-11-18 RX ORDER — CHLORHEXIDINE GLUCONATE 213 G/1000ML
1 SOLUTION TOPICAL
Refills: 0 | Status: DISCONTINUED | OUTPATIENT
Start: 2021-11-18 | End: 2021-12-10

## 2021-11-18 RX ORDER — MIDAZOLAM HYDROCHLORIDE 1 MG/ML
0.02 INJECTION, SOLUTION INTRAMUSCULAR; INTRAVENOUS
Qty: 100 | Refills: 0 | Status: DISCONTINUED | OUTPATIENT
Start: 2021-11-18 | End: 2021-11-20

## 2021-11-18 RX ORDER — POLYETHYLENE GLYCOL 3350 17 G/17G
17 POWDER, FOR SOLUTION ORAL DAILY
Refills: 0 | Status: DISCONTINUED | OUTPATIENT
Start: 2021-11-18 | End: 2021-11-27

## 2021-11-18 RX ORDER — SENNA PLUS 8.6 MG/1
2 TABLET ORAL AT BEDTIME
Refills: 0 | Status: DISCONTINUED | OUTPATIENT
Start: 2021-11-18 | End: 2021-12-10

## 2021-11-18 RX ORDER — POLYETHYLENE GLYCOL 3350 17 G/17G
17 POWDER, FOR SOLUTION ORAL DAILY
Refills: 0 | Status: DISCONTINUED | OUTPATIENT
Start: 2021-11-18 | End: 2021-11-18

## 2021-11-18 RX ORDER — FENTANYL CITRATE 50 UG/ML
1.6 INJECTION INTRAVENOUS
Qty: 2500 | Refills: 0 | Status: DISCONTINUED | OUTPATIENT
Start: 2021-11-18 | End: 2021-11-19

## 2021-11-18 RX ORDER — SODIUM CHLORIDE 9 MG/ML
10 INJECTION INTRAMUSCULAR; INTRAVENOUS; SUBCUTANEOUS
Refills: 0 | Status: DISCONTINUED | OUTPATIENT
Start: 2021-11-18 | End: 2021-12-10

## 2021-11-18 RX ORDER — AMLODIPINE BESYLATE 2.5 MG/1
10 TABLET ORAL DAILY
Refills: 0 | Status: DISCONTINUED | OUTPATIENT
Start: 2021-11-19 | End: 2021-11-18

## 2021-11-18 RX ORDER — SODIUM CHLORIDE 9 MG/ML
1000 INJECTION, SOLUTION INTRAVENOUS
Refills: 0 | Status: DISCONTINUED | OUTPATIENT
Start: 2021-11-18 | End: 2021-11-20

## 2021-11-18 RX ORDER — AMLODIPINE BESYLATE 2.5 MG/1
5 TABLET ORAL ONCE
Refills: 0 | Status: COMPLETED | OUTPATIENT
Start: 2021-11-18 | End: 2021-11-18

## 2021-11-18 RX ORDER — SENNA PLUS 8.6 MG/1
2 TABLET ORAL AT BEDTIME
Refills: 0 | Status: DISCONTINUED | OUTPATIENT
Start: 2021-11-18 | End: 2021-11-18

## 2021-11-18 RX ADMIN — Medication 25 MILLIGRAM(S): at 17:43

## 2021-11-18 RX ADMIN — CHLORHEXIDINE GLUCONATE 15 MILLILITER(S): 213 SOLUTION TOPICAL at 06:35

## 2021-11-18 RX ADMIN — HEPARIN SODIUM 5000 UNIT(S): 5000 INJECTION INTRAVENOUS; SUBCUTANEOUS at 06:35

## 2021-11-18 RX ADMIN — CHLORHEXIDINE GLUCONATE 15 MILLILITER(S): 213 SOLUTION TOPICAL at 17:43

## 2021-11-18 RX ADMIN — Medication 100 MILLIGRAM(S): at 18:21

## 2021-11-18 RX ADMIN — Medication 1 DROP(S): at 06:39

## 2021-11-18 RX ADMIN — AMLODIPINE BESYLATE 5 MILLIGRAM(S): 2.5 TABLET ORAL at 11:54

## 2021-11-18 RX ADMIN — LEVETIRACETAM 500 MILLIGRAM(S): 250 TABLET, FILM COATED ORAL at 06:34

## 2021-11-18 RX ADMIN — HEPARIN SODIUM 5000 UNIT(S): 5000 INJECTION INTRAVENOUS; SUBCUTANEOUS at 14:12

## 2021-11-18 RX ADMIN — PANTOPRAZOLE SODIUM 40 MILLIGRAM(S): 20 TABLET, DELAYED RELEASE ORAL at 11:54

## 2021-11-18 RX ADMIN — Medication 5 MILLIGRAM(S): at 06:33

## 2021-11-18 RX ADMIN — Medication 5 MILLIGRAM(S): at 14:10

## 2021-11-18 RX ADMIN — Medication 4 MILLILITER(S): at 22:29

## 2021-11-18 RX ADMIN — CHLORHEXIDINE GLUCONATE 1 APPLICATION(S): 213 SOLUTION TOPICAL at 06:40

## 2021-11-18 RX ADMIN — Medication 100 MILLIGRAM(S): at 06:43

## 2021-11-18 RX ADMIN — Medication 25 MILLIGRAM(S): at 06:35

## 2021-11-18 RX ADMIN — Medication 1 DROP(S): at 18:15

## 2021-11-18 RX ADMIN — AMLODIPINE BESYLATE 5 MILLIGRAM(S): 2.5 TABLET ORAL at 06:35

## 2021-11-18 RX ADMIN — MIDAZOLAM HYDROCHLORIDE 1.7 MG/KG/HR: 1 INJECTION, SOLUTION INTRAMUSCULAR; INTRAVENOUS at 12:48

## 2021-11-18 RX ADMIN — Medication 10 MILLIGRAM(S): at 12:20

## 2021-11-18 RX ADMIN — HEPARIN SODIUM 5000 UNIT(S): 5000 INJECTION INTRAVENOUS; SUBCUTANEOUS at 22:14

## 2021-11-18 RX ADMIN — SENNA PLUS 2 TABLET(S): 8.6 TABLET ORAL at 22:13

## 2021-11-18 RX ADMIN — Medication 4 MILLILITER(S): at 05:19

## 2021-11-18 RX ADMIN — LEVETIRACETAM 500 MILLIGRAM(S): 250 TABLET, FILM COATED ORAL at 17:43

## 2021-11-18 NOTE — PROGRESS NOTE ADULT - SUBJECTIVE AND OBJECTIVE BOX
HPI:  45 y/o female with PMH HTN, Multiple sclerosis, recent spinal surgery 10/8 ([facility]) presented to [facility] BIBEMS after being found unconscious at home, unknown period of time. 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 HH5, MF1. Patient was intubated at [facility], found to have blown L pupil, and sent straight to the OR for left hemicraniotomy. A-line placed intraop. Hemodynamically unstable upon arrival to [facility], bradycardic and hypertensive s/p Mannitol, Clevidipine, and Furosemide. Central line placed in NSICU. Currently sedated on Propofol, pending repeat CTH. History per patient's son, patient had recent spine surgery and was found on outpatient imaging last week to have L M1 segment aneurysm (unruptured), pt asymptomatic at this time. Per son patient taking percocet PO at home. Ureña catheter, ET tube, and arterial line placed at [facility].     NIHSS on arrival: 32   Bess Griffin 5, Mod Busby 4  Bleed Day 1 = 10/26 (26 Oct 2021 13:03)      Hospital Course:   10/26: admitted to [facility] from [facility], POD#0 s/p L hemicraniectomy for decompression and evacuation of SDH. Transferred to [facility] noted to have tense flap, received mannitol, keppra, decadron. Was hypertensive to 200s and preston to 40s, recevied 85g mannitol and bullet 23.4%. CTH on arrival demonstrated increased size in vents and new IVH. EVD placed, open at 15, central line placed. Transfused 2 u PRBC. POD0 of coiling of left MCA bifurcation aneurysm,   10/27: CTH demonstrated EVD in correct position, EVD lowered to 5, remains intubated on proprofol, pending repeat CTH in AM. Started on 3% @ 30/hr. 2LNS given for euvolemia, Mannitol given for uptrending ICPs. Bullet given 8:30 am. 3% increased to 50/hr. 1 L bolus. New EVD catheter placed using exisiting sreekanth hole. 1 u PRBC.  10/28: HH5, MF4, BD3; POD2 angio coil/embo of L MCA aneurysm. JOAQUÍN overnight, neuro stable. EVD@5cmH20 ICPs < 20 overnight, OP WNL. S/p 1 unit PRBC yesterday with appropriate response. Given 42g mannitol in AM for ICP 22 persistently, with improvement, then given 23% in PM for elevated ICP. febrile, pancultured. Standing tylenol and cooling blanket.   10/29: BD4, POD3 angio coil/embo. JOAQUÍN o/n, neuro stable. EVD@5, ICPs <20 o/n.   10/30: BD5, POD4 angio coil/embo. JOAQUÍN o/n neuro stable. EVD@5. 3% @50cc/hr.  10/31: BD6, POD5 angio coil/embo. brief period maintained upward gaze, resolved on its own. EVD@5cm h2o.    11/1: BD7, POD6 L hemicrani, angio coil/embo. JOAQUÍN overnight. Neuro exam unchanged. ICPs WNL. started bromocriptine/gabapentin/baclofen. dc'd propofol, started precedex  11/2: BD8 POD7 L hemicrani, angio coil/embo. JOAQUÍN overnight. Neuro exam stable. Remains on 3% hypertonic saline. Receieved 1 unit of PRBCs for a Hgb of 7.6.  11/3: BD 9 POD8 of L hemicrani. Elevated lipase, normal amylase, OG tube clogged and replaced. Abdominal US normal. Pending gen surg reccs regarding trach and peg. Gabapentin and Baclofen increased to help with neurostorming. restarted back on 3%, LR@35. became preston+hypotensive with nimodipine 60q4, decreased back to 30q2, NaCl bullet given.   11/4: BD10 POD9, JOAQUÍN o/n, 500cc NS for euvolemia,  neuro stable, EVD at 5. 3% increased to 75  11/5: BD11 POD10, JOAQUÍN o/n, neuro stable, EVD at 5  11/6: BD12 POD11 JOAQUÍN overnight. Neuro exam stable. Remains intubated on precedex. 3% hypertonic saline dc'd  11/7: BD13 POD 12 JOAQUÍN o/n, NGT replaced. on precex with no icp crisis   11/8: BD14 POD13 JOAQUÍN o/n, noted to have tongue maceration/macroglossia. Gauze with tongue depressor placed. Pending further recs from ENT. Pending trach and PEG placement tomorrow with gen surg. Off precedex, ICPs stable. EVD remains @ 5.   11/9: BD15, POD 13, bleeding under tongue at start of shift, fentanyl pushed with no further episodes. gabapentin stopped. PEG placed  11/10: BD 16, POD 14, neuro stable, ENT to be consulted in AM, 3% increased to 50/hr.  11/11: BD 17, POD 15, neuro exam stable. Pend CT saba in am for cranioplasty planning. Pend trach in OR with ENT. F.u BMP in am, 3%@50cc/hr for Na goal 140-145.   11/12: BD 18, POD 16. JOAQUÍN overnight, neuro stable. Pend trach placement today. CT saba completed 11/11. Off of 3%, f/u am BMP for Na goal 140-150. CSF neg. Hypoxic cardiac arrest with ROSC x 3 during tracheostomy placement. B/l chest tubes placed for resulting pneumothorax s/p CPR. salt tabs dc'd.  11/13: BD 19, POD 17. Patient tachycardic with some improvement, SBP stable off of levophed, hgb downtrending o/n, transfused 1 unit PRBCs. B/l chest tube. EVD @5cmH2O, no elevated ICPs. UOP downtrending, trend BUN/Cr, given 1LNS x1 for low urine output. F/u am CXR. Cont Cefepime until today, then change to Ancef while trach packing in place per ENT. Pend CTH when stable. Trops downtrending s/p cardiac arrest. 1L. florinef dc'd. BP goal changed to 100-160, started on amlodipine   11/14: BD20, POD18, worsening creatinine with decreased urine output. Given 250 of albumin and 500cc LR. started on hydralazine PO, decreased amantadine 100 daily given CrCl of 20.  11/15: BD21, POD18, remains oliguric, fem line dc'd, tongue swollen and unable to fit bite block in mouth, started on nimbex gtt to relax jaw. Propofol and fentanyl gtt started. Vomiting TFs through nose and mouth, ENT called. TFs held. Cr uptrending. Palliative consulted.  11/16: BD22, POD19, o/n external trach dressing replaced due to cuff leak and decreasing TV, sedated and paralyzed on nimbex, propofol, and fentanyl gtt. CTH stable.  EVD raised from 5 to 10. Nimbex weaned off. Cr uptrending, Trickle feeds started. FOB negative.   11/17: BD 23, POD20, JOAQUÍN o/n, EVD clamped, NS d/c'ed, LR@50, advancing tube feeds.   11/18: BD24, POD21 o/n 3% at 30 with Na acetate started, propofol dc'd due to elevated TG level, episode of vomiting TFs from nose and mouth into ETT with elevated ICP 30s, ICP normalized with resolution of vomiting, reglan 5mg IV given, TFs held      Vital Signs Last 24 Hrs  T(C): 37.1 (18 Nov 2021 00:33), Max: 37.1 (18 Nov 2021 00:33)  T(F): 98.8 (18 Nov 2021 00:33), Max: 98.8 (18 Nov 2021 00:33)  HR: 98 (18 Nov 2021 00:00) (73 - 102)  BP: 166/82 (18 Nov 2021 00:00) (119/59 - 166/82)  BP(mean): 117 (18 Nov 2021 00:00) (83 - 117)  RR: 12 (18 Nov 2021 00:00) (12 - 18)  SpO2: 98% (18 Nov 2021 00:00) (93% - 100%)    I&O's Detail    16 Nov 2021 07:01  -  17 Nov 2021 07:00  --------------------------------------------------------  IN:    Cisatracurium: 84.8 mL    Enteral Tube Flush: 40 mL    FentaNYL: 135 mL    FentaNYL: 8 mL    FentaNYL: 40 mL    IV PiggyBack: 50 mL    Nepro: 150 mL    Propofol: 288 mL    sodium chloride 0.9%: 150 mL    sodium chloride 0.9%: 525 mL  Total IN: 1470.8 mL    OUT:    Chest Tube (mL): 70 mL    Chest Tube (mL): 5 mL    External Ventricular Device (mL): 101 mL    Rectal Tube (mL): 0 mL  Total OUT: 176 mL    Total NET: 1294.8 mL      17 Nov 2021 07:01  -  18 Nov 2021 01:21  --------------------------------------------------------  IN:    FentaNYL: 164 mL    Lactated Ringers: 760 mL    Nepro: 330 mL    Propofol: 192 mL    sodium chloride 0.9%: 50 mL    sodium chloride 3% + sodium acetate 50:50: 60 mL  Total IN: 1556 mL    OUT:    Chest Tube (mL): 18 mL    Chest Tube (mL): 20 mL    External Ventricular Device (mL): 0 mL    Intermittent Catheterization - Urethral (mL): 350 mL  Total OUT: 388 mL    Total NET: 1168 mL        I&O's Summary    16 Nov 2021 07:01  -  17 Nov 2021 07:00  --------------------------------------------------------  IN: 1470.8 mL / OUT: 176 mL / NET: 1294.8 mL    17 Nov 2021 07:01  -  18 Nov 2021 01:21  --------------------------------------------------------  IN: 1556 mL / OUT: 388 mL / NET: 1168 mL        PHYSICAL EXAM:  General: NAD, pt is comfortably  laying in hospital bed, +intubated on full vent support, +sedated on fentanyl gtt  HEENT: PERRL 2 mm b/l, dysconjugate gaze - R eye midline, left eye temporal deviation  Cardiovascular: RRR, normal S1 and S2   Respiratory: chest rise symmetric,  b/l chest tubes to suction  GI: abd soft, ND, +PEG   Neuro: OEs noxious, nonverbal, not answering questions, WOLF 0/5, +cough/gag, +corneals  Extremities: extremities warm and dry  Wound/incision: L hemicrani incision site s C/D/I, flap soft and full. B/l posterior spinal surgical incision sites with wound dehiscence   Drains: EVD clamped      DIET:  [x ] NPO  [] Mechanical  [] Tube feeds    LABS:                        7.3    9.60  )-----------( 230      ( 17 Nov 2021 05:10 )             23.8     11-17    141  |  108  |  63<H>  ----------------------------<  97  4.2   |  18<L>  |  7.56<H>    Ca    9.1      17 Nov 2021 21:35  Phos  5.7     11-17  Mg     2.3     11-17    TPro  6.2  /  Alb  2.5<L>  /  TBili  <0.2  /  DBili  x   /  AST  65<H>  /  ALT  99<H>  /  AlkPhos  127<H>  11-17            CAPILLARY BLOOD GLUCOSE          Drug Levels: [] N/A    CSF Analysis: [] N/A      Allergies    No Known Allergies    Intolerances      MEDICATIONS:  Antibiotics:  ceFAZolin   IVPB 500 milliGRAM(s) IV Intermittent every 12 hours    Neuro:  fentaNYL   Infusion. 1 MICROgram(s)/kG/Hr IV Continuous <Continuous>  levETIRAcetam  Solution 500 milliGRAM(s) Oral two times a day  metoclopramide Injectable 5 milliGRAM(s) IV Push every 8 hours  ondansetron Injectable 4 milliGRAM(s) IV Push every 6 hours PRN    Anticoagulation:  heparin   Injectable 5000 Unit(s) SubCutaneous every 8 hours    OTHER:  acetylcysteine 20%  Inhalation 4 milliLiter(s) Inhalation three times a day  amLODIPine   Tablet 5 milliGRAM(s) Oral daily  artificial  tears Solution 1 Drop(s) Both EYES two times a day  chlorhexidine 0.12% Liquid 15 milliLiter(s) Oral Mucosa every 12 hours  chlorhexidine 2% Cloths 1 Application(s) Topical <User Schedule>  hydrALAZINE 25 milliGRAM(s) Oral every 12 hours  pantoprazole   Suspension 40 milliGRAM(s) Oral daily    IVF:  lactated ringers. 1000 milliLiter(s) IV Continuous <Continuous>  sodium chloride 3% + sodium acetate 50:50 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, left    Intracranial hemorrhage, spontaneous subarachnoid, due to cerebral aneurysm, acute    Pneumothorax, left    Functional quadriplegia    Acute respiratory failure with hypoxia    Cardiac arrest    Encounter for palliative care    Advanced care planning/counseling discussion    IPH (idiopathic pulmonary hemosiderosis)    Acute spontaneous intraparenchymal intracranial hemorrhage due to cerebral aneurysm    CHETAN (acute kidney injury)    Angiogram, cerebral, with coil embolization, in non-operating room setting    Endoscopic percutaneous gastrostomy    Angiogram, carotid and cerebral, bilateral    Chest tube placement    Insertion of central venous catheter with ultrasound guidance    Personal history of spine surgery    SysAdmin_VstLnk        ASSESSMENT:  45 y/o female with PMHx of HTN and MS, hx of spinal surgery at [facility] 10/8/21 presented to [facility] after being found unconscious at home, unknown period of time. 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 HH5, MF4; BD #1 = 10/26. Patient was intubated at [facility] and sent straight to the OR for left hemicraniotomy. A-line placed intraop. Hemodynamically unstable upon arrival to [facility], bradycardic and hypertensive s/p Mannitol and Furosemide. Central line placed in NSICU. S/p EVD placement, and coil embolization of left MCA bifurcation aneurysm 10/26/2021. S/p cerebral angio without findings of vasospasm 11/10. S/p cardiac arrest with ROSC x3 on 11/12 during tracheostomy at bedside now with b/l pneumothoracies and worsening kidney function.      PLAN:  NEURO:  - neuro checks q4hrs/vital signs q1hr  - Keppra 500mg IV BID renal dosing   - VEEG dc'd, neg for seizures  - Nimodipine dc'd  - EVD clamped, monitor ICP  - CTH this AM  - CTH 11/16: stable  - fentanyl gtt  - propofol gtt dc'd for triglycerides 292 11/17    CARDIO:  - -160  - amlodipine 5 daily and hydral 25 PO BID   - s/p cardiac arrest  - TTE 11/15: mild LVH, EF 70%  - QTc 11/17 456    PULM:  - intubated on full vent support  - b/l chest tubes for pneumothorax to -[id number]   - s/p acute hypoxic cardiac arrest 11/12 during tracheostomy placment with ENT c/b b/l pneumothorax now with b/l chest tubes   - daily CXR  - will need trach revision with ENT    GI:  - PEG TFs held for vomiting o/n  - standing reglan x 24hrs 11/17  - PPI QD GI ppx  - rectal tube  - trend elevated LFTs    RENAL:  - LR at 50, 3% w/ Na acetate at 30  - euvolemia goal   - Na 145-150  - primafit in place   - Likely ATN, nephro consulted   - no dialysis at this time     HEME:  - SCDs, SQH ppx  - s/p 2u PRBC, s/p 1u PRBC 10/27, s/p 1u PRBC 11/02, s/p 1u PRBC 11/14   - monitor H+H  - 11/9 unchanged DVT L brachial, repeat doppler 11/14 with resolution of DVT  - 11/14 LE dopplers neg  - FOB neg 11/16    ID:  - leukocytosis and procal, trend   - Tylenol PRN for fever  - sputum cx 11/4:  enterobacter, proteus  - Cefepime started to cover for enterobacter/proteus in sputum dc'd 11/15  - Ancef until trach packing removed 500 BID  - trend procal     ENDO:  - ISS   - monitor FS    OTHER  - wound care recs- q2 dressing changes   - ENT consulted, reccomends trach placement and oral hygeine for tongue laceration   - Palliative following: as of 11/16 family does not want to consent to more procedures at this time, pt remains full code  - pending PICC placement    GOC: full code  Level of care: ICU status   Dispo: pend EVD, trach  family updated    Case discussed with Dr. Duncan         Assessment:  Present when checked    []  GCS  E   V  M     Heart Failure: []Acute, [] acute on chronic , []chronic  Heart Failure:  [] Diastolic (HFpEF), [] Systolic (HFrEF), []Combined (HFpEF and HFrEF), [] RHF, [] Pulm HTN, [] Other    [] CHETAN, [x] ATN, [] AIN, [] other  [] CKD1, [] CKD2, [] CKD 3, [] CKD 4, [] CKD 5, []ESRD    Encephalopathy: [] Metabolic, [] Hepatic, [] toxic, [] Neurological, [] Other    Abnormal Nurtitional Status: [] malnurtition (see nutrition note), [ ]underweight: BMI < 19, [] morbid obesity: BMI >40, [] Cachexia    [] Sepsis  [] hypovolemic shock,[] cardiogenic shock, [] hemorrhagic shock, [] neuogenic shock  [] Acute Respiratory Failure  []Cerebral edema, [] Brain compression/ herniation,   [] Functional quadriplegia  [] Acute blood loss anemia

## 2021-11-18 NOTE — PROGRESS NOTE ADULT - ASSESSMENT
46F with pmhx of HTN who presented to the hospital after being found down at home for unknown period of time. 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 Nephrology consulted for CHETAN management and possible HD needs    Assessment/Plan:   #oliguric iATN  Baseline creatinine 0.6  Pt has prolonged hospital stay for a L MCA aneurysm rupture; she suffered from cardiac arrest x 3 on 11/12 and now is intubated in the ICU. Her kidney function has continued to rise over the course of the last 24 hours and her urine studies are suggestive of possible ATN given Sodium of 115 which correlates to her having hypoperfusion from the cardiac arrest. Her urine output is slowly starting to decrease as well.  -Trend BMP daily  -Active GOC discussion  -Strict I/O's  -Renal diet  -Avoid nephrotoxic meds    #Hypernatremia (resolved)  Sodium this   -Continue to monitor    #Anemia of Chronic disease  -Ferritin and iron profile noted.      Thank you for the opportunity to participate in the care of your patient. The nephrology service remains available to assist with any questions or concerns. Please feel free to reach us by paging the on-call nephrology fellow for urgent issues or as below.     Aj Cardoso D.O.  PGY 4 - Nephrology Fellow  [phone]   46F with pmhx of HTN who presented to the hospital after being found down at home for unknown period of time. 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 Nephrology consulted for CHETAN management and possible HD needs    Assessment/Plan:   #oliguric iATN  Baseline creatinine 0.6  Pt has prolonged hospital stay for a L MCA aneurysm rupture; she suffered from cardiac arrest x 3 on 11/12 and now is intubated in the ICU. Her kidney function has continued to rise over the course of the last 24 hours and her urine studies are suggestive of possible ATN given Sodium of 115 which correlates to her having hypoperfusion from the cardiac arrest. Her urine output is slowly starting to decrease as well.  -Trend BMP daily  -Will place HD line later this afternoon  -Strict I/O's  -Renal diet  -Avoid nephrotoxic meds    #Hypernatremia (resolved)  Sodium this   -Continue to monitor    #Anemia of Chronic disease  -Ferritin and iron profile noted.      Thank you for the opportunity to participate in the care of your patient. The nephrology service remains available to assist with any questions or concerns. Please feel free to reach us by paging the on-call nephrology fellow for urgent issues or as below.     Aj Cardoso D.O.  PGY 4 - Nephrology Fellow  [phone]

## 2021-11-18 NOTE — CHART NOTE - NSCHARTNOTEFT_GEN_A_CORE
Admitting Diagnosis:   Patient is a 46y old  Female who presents with a chief complaint of ICH (18 Nov 2021 10:33)      PAST MEDICAL & SURGICAL HISTORY:  No pertinent past medical history    Personal history of spine surgery        Current Nutrition Order:   Diet, NPO:   Except Medications (11-18-21 @ 01:35)PEG(placed 11/9) feeds of Nepro held due to reported vomiting and residuals of 125cc        PO Intake: Good (%) [   ]  Fair (50-75%) [   ] Poor (<25%) [   ]Intubated MAP:109    GI Issues: BM:11/15?fecal incontinence. Rectal tube in place    Pain: not able to access. Intubated    Skin Integrity: Surgical incision, Valerio score 11.   +1 generalized pitting edema  No pressure ulcers present  Right TLC IJ  A line    Labs:   11-18    143  |  109<H>  |  64<H>  ----------------------------<  93  4.5   |  19<L>  |  7.74<H>    Ca    9.1      18 Nov 2021 04:54  Phos  6.4     11-18  Mg     2.4     11-18    TPro  6.5  /  Alb  2.7<L>  /  TBili  <0.2  /  DBili  x   /  AST  47<H>  /  ALT  23  /  AlkPhos  165<H>  11-18    CAPILLARY BLOOD GLUCOSE          Medications:  MEDICATIONS  (STANDING):  acetylcysteine 20%  Inhalation 4 milliLiter(s) Inhalation three times a day  amLODIPine   Tablet 10 milliGRAM(s) Oral daily  amLODIPine   Tablet 5 milliGRAM(s) Oral once  artificial  tears Solution 1 Drop(s) Both EYES two times a day  ceFAZolin   IVPB 500 milliGRAM(s) IV Intermittent every 12 hours  chlorhexidine 0.12% Liquid 15 milliLiter(s) Oral Mucosa every 12 hours  chlorhexidine 2% Cloths 1 Application(s) Topical <User Schedule>  fentaNYL   Infusion. 1 MICROgram(s)/kG/Hr (8.5 mL/Hr) IV Continuous <Continuous>  heparin   Injectable 5000 Unit(s) SubCutaneous every 8 hours  hydrALAZINE 25 milliGRAM(s) Oral every 12 hours  lactated ringers. 1000 milliLiter(s) (50 mL/Hr) IV Continuous <Continuous>  levETIRAcetam  Solution 500 milliGRAM(s) Oral two times a day  metoclopramide Injectable 5 milliGRAM(s) IV Push every 8 hours  midazolam Infusion 0.02 mG/kG/Hr (1.7 mL/Hr) IV Continuous <Continuous>  pantoprazole   Suspension 40 milliGRAM(s) Oral daily  sodium chloride 3% + sodium acetate 50:50 1000 milliLiter(s) (30 mL/Hr) IV Continuous <Continuous>    MEDICATIONS  (PRN):  labetalol Injectable 10 milliGRAM(s) IV Push every 2 hours PRN SBP >150  ondansetron Injectable 4 milliGRAM(s) IV Push every 6 hours PRN Nausea and/or Vomiting      Weight: Admitted weight: October 26th :85kg  Daily     Daily Height: 61" Weight: 187lbs, IBW 105lbs+/-10%, %%, BMI 34.9 kg/m2     Weight Change: 10/26:187,11/4:187.1lbs    Estimated energy needs: IBW (48kg) used for calculations as pt >120% of IBW (178%). Needs adjusted for wound healing, critical care requiring intubation.   Kcal (25-30 kcal/kg): 0392-5947 kcal  Protein (1.4-1.6 g/kg pro): 67-76g pro  **Fluids per team    Subjective: 46y/F with recent spinal surgery, found down and brought to ED.  In ED, witnessed shaking, ?seizures, intubated.  Imaging showed SAH.  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 Repeat CT HCP - EVD R frontal inserted, s/p L hemicraniectomy for decompression and evacuation of SDH 10/26. EVD placed. Pt returned to NSICU intubated and sedated. EVD@5cm h2o. Underwent work-up for emesis, Noted tongue biting with oral trauma and mild bleeding 11/8. S/p PEG tube (11/9/2021)  Pending possible trach/Bronch. Nepro TF stopped This am. TF stopped due to vomiting episodes(Reglan ordered)Pt refluxing feeds through mouth and nose overnight. tube feeds paused. .Propofol held. .Noted plans for possible initiation of HD. Noted pert labs:11/18: Bun:64,Creatinine:7.74,Phos:6.4,MAP:109.RD spoke with team regarding TF advancement. Noted ENT follow up regarding tongue swelling. IF no HD start with Suplena(goal rate of 28ml/hr with 2LPS and with HD will warrant Nepro feeds.      Previous Nutrition Diagnosis: Inadequate oral intake RT Inability to meet est needs via PO AEB NPO, reliant on EN to meet 100% of est needs    Active [ x  ]  Resolved [   ]    If resolved, new PES:     Goal :Meet 80% of EER consistently     Recommendations:1.Until HD initiated would start on Suplena @10cc q4hrs to goal rate of 28cc/hr with 2 LPS(provides:672ccTV/1410kcal/60gmprotein/496cc free water(meets 98% of kcal needs at 30kcal/per kg IBW and 89% of protein(at 1.4gm/kg IBW) 2.Updated weights    Education: not indicated at this time    Risk Level: High [  x ] Moderate [   ] Low [   ]

## 2021-11-18 NOTE — PROGRESS NOTE ADULT - ASSESSMENT
46y/Female with:  L MCA ruptured aneurysm, subarachnoid hemorrhage, s/p [facility] (10/26/2021, Dr. D'Amico @ [place]), brain compression, cerebral edema  anemia   recent spinal surgery  UGIB  bilateral pneumothorax  acute kidney injury, transaminitis    PLAN: Day 1 = 10-26 ; Day 4 = 10/29; Day 21 = 11/15  NEURO: neurochecks q1h, sedation with propofol and fentanyl; off cisatracurium  SAH:  d/c nimodipine  Hydrocephalus:  EVD clamp CT tomorrow  Seizure prophylaxis:  cont levetiracetam 500 BID   REHAB:  physical therapy evaluation and management    EARLY MOB:  HOB elevated    PULM:  full vent support for now, RR incrased to 18, check ABG in 30 mins; , continue mucormyst and ipratropium / albuterol; pulmo toilette; CXR; trach if family wants to continue aggressive care  CARDIO:  SBP goal 100-160mm Hg, TTE  ENDO:  Blood sugar goals 140-180 mg/dL  GI:  PPI OD  DIET: TF increase to goal  RENAL: LR@50; Na goal 135-145; worsening creatinine and low urine output; revisit HD goals with nephrology in light of acidosis - still not offering HD in light of poor prognosis  HEM/ONC: no coagulopathy (INR= 1.03), no ASA / Plavix use; Hb drop - transfuse if <7  VTE Prophylaxis: SCDs, SQH tonight  ID: afebrile, no leukocytosis, cefepime from 11/08; now ancef while packing is in  Social: will update son and daughter, palliative care on board    CORE MEASURES  1.  Hunt and Griffin Score = 5  2.  VTE prophylaxis:  [ ] administered within 24 hours of admission OR [X] reason not done: fresh bleed, unsecured aneurysm.  3.  Dysphagia screening:   [X] performed before any oral meds / liquids / food  4.  Nimodipine treatment:  [X] administered within 24 hours of admission OR [ ] reason not done:    ATTENDING ATTESTATION:  I was physically present for the key portions of the evaluation and management (E/M) service provided.  I agree with the above history, physical and plan, which I have reviewed and edited where appropriate.    Patient at high risk for neurological deterioration or death due to:  ICU delirium, aspiration PNA, DVT / PE.  Critical care time:  I have personally provided 45 minutes of critical care time, excluding time spent on separate procedures.      Plan discussed with RN, house staff.     46y/Female with:  L MCA ruptured aneurysm, subarachnoid hemorrhage, s/p [facility] (10/26/2021, Dr. D'Amico @ [place]), brain compression, cerebral edema  anemia   recent spinal surgery  UGIB  bilateral pneumothorax  acute kidney injury, transaminitis    PLAN: Day 1 = 10-26 ; Day 4 = 10/29; Day 21 = 11/15  NEURO: neurochecks q1h, sedation with midazolam and fentanyl; off cisatracurium  SAH:  d/c nimodipine  Hydrocephalus:  EVD CT today and possibly discontinue  Seizure prophylaxis:  cont levetiracetam 500 BID   REHAB:  physical therapy evaluation and management    EARLY MOB:  HOB elevated    PULM:  full vent support for now, RR incrased to 20,  check ABG in 30 mins; , continue mucormyst and ipratropium / albuterol; pulmo toilette; CXR; trach schedule if family wants to continue aggressive care  CARDIO:  SBP goal 100-160mm Hg, TTE, increase amlodipine to 10mg PO daily   ENDO:  Blood sugar goals 140-180 mg/dL  GI:  PPI OD  DIET: hold tube feeds, standing reglan x 24h  RENAL: LR@50; cont 3%@30, recheck BMP this afternoon; Na goal 135-145; worsening creatinine and low urine output; revisit HD goals with nephrology in light of acidosis   HEM/ONC: no coagulopathy (INR= 1.03), no ASA / Plavix use; Hb stable - transfuse if <7  VTE Prophylaxis: SCDs, SQH tonight  ID: afebrile, no leukocytosis, ancef while packing is in  Social: spoke at length with daughter yesterday and this morning with Dr. Duncan - updated, awaiting decision regarding trach    CORE MEASURES  1.  Hunt and Griffin Score = 5  2.  VTE prophylaxis:  [ ] administered within 24 hours of admission OR [X] reason not done: fresh bleed, unsecured aneurysm.  3.  Dysphagia screening:   [X] performed before any oral meds / liquids / food  4.  Nimodipine treatment:  [X] administered within 24 hours of admission OR [ ] reason not done:    ATTENDING ATTESTATION:  I was physically present for the key portions of the evaluation and management (E/M) service provided.  I agree with the above history, physical and plan, which I have reviewed and edited where appropriate.    Patient at high risk for neurological deterioration or death due to:  ICU delirium, aspiration PNA, DVT / PE.  Critical care time:  I have personally provided 45 minutes of critical care time, excluding time spent on separate procedures.      Plan discussed with RN, house staff.     46y/Female with:  L MCA ruptured aneurysm, subarachnoid hemorrhage, s/p [facility] (10/26/2021, Dr. D'Amico @ [place]), brain compression, cerebral edema  anemia   recent spinal surgery  UGIB  bilateral pneumothorax  acute kidney injury, transaminitis    PLAN: Day 1 = 10-26 ; Day 4 = 10/29; Day 21 = 11/15  NEURO: neurochecks q1h, sedation with midazolam and fentanyl; off cisatracurium  SAH:  d/c nimodipine  Hydrocephalus:  EVD CT today and possibly discontinue  Seizure prophylaxis:  cont levetiracetam 500 BID   REHAB:  physical therapy evaluation and management    EARLY MOB:  HOB elevated    PULM:  full vent support for now, RR incrased to 20,  check ABG in 30 mins; , continue mucormyst and ipratropium / albuterol; pulmo toilette; CXR; trach schedule if family wants to continue aggressive care  CARDIO:  SBP goal 100-160mm Hg, TTE, increase amlodipine to 10mg PO daily   ENDO:  Blood sugar goals 140-180 mg/dL  GI:  PPI OD  DIET: hold tube feeds, standing reglan x 24h  RENAL: LR@50; cont 3%@30, recheck BMP this afternoon; Na goal 135-145; worsening creatinine and low urine output; revisit HD goals with nephrology in light of acidosis   HEM/ONC: no coagulopathy (INR= 1.03), no ASA / Plavix use; Hb stable - transfuse if <7  VTE Prophylaxis: SCDs, SQH tonight  ID: afebrile, no leukocytosis, ancef while packing is in  Social: spoke at length with daughter yesterday and this morning with Dr. Duncan - updated, awaiting decision regarding trach    CORE MEASURES  1.  Hunt and Griffin Score = 5  2.  VTE prophylaxis:  [ ] administered within 24 hours of admission OR [X] reason not done: fresh bleed, unsecured aneurysm.  3.  Dysphagia screening:   [X] performed before any oral meds / liquids / food  4.  Nimodipine treatment:  [X] administered within 24 hours of admission OR [ ] reason not done:    ATTENDING ATTESTATION:  I was physically present for the key portions of the evaluation and management (E/M) service provided.  I agree with the above history, physical and plan, which I have reviewed and edited where appropriate.    Patient at high risk for neurological deterioration or death due to:  ICU delirium, aspiration PNA, DVT / PE.  Critical care time:  I have personally provided 45 minutes of critical care time, excluding time spent on separate procedures.      Plan discussed with RN, house staff.    Long discussion with daughter (in person) and son (over phone) re: current status, updates, prognosis, plans.  Coordinated with ENT / primary attending neurosurgeon - family meeting tomorrow.  Plans for HD, insertion of dialysis catheter, d/c central line, PICC line insertion.    Additional critical care time:  I have personally provided 30 minutes of critical care time, excluding time spent on separate procedures.    Total critical care time: 75 minutes

## 2021-11-18 NOTE — PROGRESS NOTE ADULT - SUBJECTIVE AND OBJECTIVE BOX
ENT PROGRESS NOTE    HPI: 47 y/o female with PMH HTN, Multiple sclerosis, recent spinal surgery 10/8 ([facility]) presented to [facility] after being found unconscious at home, unknown period of time. 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 ENT consulted due to tongue swelling. pt has been off sedated due to neuro checks. per nursing, pt has been biting down on tongue. nursing has not been able to place bite block. Denies any hypotensive episodes.     INTERVAL:    11/10: ENT reconsulted for trach placement - per primary team, patient was spastic with stiff neck while trying to perform bedside tracheostomy yesterday. Pt has been intubated since 10/26. Otherwise, NAEON - on EEG.    11/11: S/p tracheostomy attempt 11/12 am. Pt was seen and examined bedside - ETT in place, good ventilation. Stoma packing in place. Trops downtrending. Given 1x PRBC overnight, b/l chest tubes in place. Plan to change from cefepime to ancef today.    11/12: Pt in multisystem organ failure per primary team. Poor neuro exam    11/15: informed by primary team pt had feeds coming from nose and mouth. was also found in ETT. Pt DNR status still pending    11/16: informed overnight night pt is having a vent leak, concern for arising from stoma. superficial dressing changed. no longer experiencing vent leak    11/16: PM. I Spoke with daughter Jayshree Stubbs at bedside this afternoon. Ms. Stubsb conferenced in her uncle and aunt into the conversation. Discussed with Ms. Stubbs current status of tracheostomy site and proposal for re-evaluating tracheostomy site tomorrow in OR followed by bronchoscopy. Explained to Ms. Stubbs procedure tomorrow, tracheostomy and bronchoscopy,  would allow for removal of ETT and evaluation of the distal airways. explained there is a risk of inability to secure airway tomorrow. Ms. Stubbs and family stated they understand and would like to defer the procedure for now given renal status.     11/17: NAEON. pt remains intubated and sedated. no plans for OR today, per family conversation    11/18: Pt refluxing feeds through mouth and nose overnight. tube feeds paused.       ICU Vital Signs Last 24 Hrs  T(C): 37.1 (18 Nov 2021 09:05), Max: 37.4 (18 Nov 2021 06:46)  T(F): 98.8 (18 Nov 2021 09:05), Max: 99.3 (18 Nov 2021 06:46)  HR: 98 (18 Nov 2021 09:00) (73 - 103)  BP: 166/87 (18 Nov 2021 09:00) (124/62 - 175/88)  BP(mean): 119 (18 Nov 2021 09:00) (87 - 124)  ABP: 162/79 (18 Nov 2021 09:00) (104/90 - 172/80)  ABP(mean): 111 (18 Nov 2021 09:00) (97 - 123)  RR: 14 (18 Nov 2021 09:00) (8 - 24)  SpO2: 99% (18 Nov 2021 09:00) (97% - 100%)        PHYSICAL EXAM:    CONSTITUTIONAL: A&Ox0  HEAD: EVD in place  ORAL CAVITY/OROPHARYNX: Significant tongue swelling, most significant on ventral surface. mouth unable to be opened due to tonicity of jaw. tongue indurated, necrosis of ventral surface. OP limited due to ETT. Bite block in place  NECK: Tracheal packing removed. replaced with single 4x4 soaked gauze.   RESPIRATORY: Respirations unlabored, no increased work of breathing with use of accessory muscles and retractions. No stridor.  CARDIAC: Warm extremities, no cyanosis.       A/P: 46 F w/ w/ significant tongue swelling, likely secondary to biting down on tongue. bite block unable to be placed due to tone of jaw muscles. ENT reconsulted for trach placement - attempted 11/12 am, but complicated by respiratory failure, coded w/ chest compressions 3x with ROSC. ETT was reinserted into tracheal orally. Feeds seen from mouth and nose, likely secondary to tube feeds refluxing. Pt's family would like to defer tracheostomy and bronchoscopy at this time.     - Antibiotics while tracheal stoma packing in place  -recommend formalizing trach in OR; per daughter, would like to defer procedure at this time  - Rest of management per NSGY

## 2021-11-18 NOTE — PROGRESS NOTE ADULT - ATTENDING COMMENTS
have reviewed status and discussed with neurosurg staff.  in view of family's desire to rx with hd, will plan for access placement today and initiation of hd tomorrow.   discussed with all   agree with findings and recommendations.

## 2021-11-18 NOTE — PROVIDER CONTACT NOTE (CHANGE IN STATUS NOTIFICATION) - ACTION/TREATMENT ORDERED:
Tube feeding held and fentanyl dose increased Tube feeding held and fentanyl dose increased and reglan ordered

## 2021-11-18 NOTE — PROGRESS NOTE ADULT - SUBJECTIVE AND OBJECTIVE BOX
NSCU ATTENDING -- ADDITIONAL PROGRESS NOTE    Nighttime rounds were performed -- please refer to earlier Progress Note for HPI details.    ICU Vital Signs Last 24 Hrs  T(C): 37.1 (18 Nov 2021 21:00), Max: 37.4 (18 Nov 2021 06:46)  T(F): 98.8 (18 Nov 2021 21:00), Max: 99.3 (18 Nov 2021 06:46)  HR: 103 (18 Nov 2021 23:00) (85 - 103)  BP: 151/70 (18 Nov 2021 23:00) (135/69 - 175/88)  BP(mean): 101 (18 Nov 2021 23:00) (76 - 124)  ABP: 152/80 (18 Nov 2021 23:00) (141/73 - 173/85)  ABP(mean): 110 (18 Nov 2021 23:00) (100 - 122)  RR: 20 (18 Nov 2021 23:00) (8 - 24)  SpO2: 100% (18 Nov 2021 23:00) (97% - 100%)      11-17-21 @ 07:01  -  11-18-21 @ 07:00  --------------------------------------------------------  IN: 2248 mL / OUT: 473 mL / NET: 1775 mL    11-18-21 @ 07:01  -  11-18-21 @ 23:21  --------------------------------------------------------  IN: 1445.9 mL / OUT: 485 mL / NET: 960.9 mL      Mode: AC/ CMV (Assist Control/ Continuous Mandatory Ventilation), RR (machine): 20, TV (machine): 400, FiO2: 40, PEEP: 5, ITime: 1, MAP: 11, PIP: 22    MEDICATIONS:   acetylcysteine 20%  Inhalation 4 milliLiter(s) Inhalation three times a day  amLODIPine   Tablet 10 milliGRAM(s) Oral daily  artificial  tears Solution 1 Drop(s) Both EYES two times a day  ceFAZolin   IVPB 500 milliGRAM(s) IV Intermittent every 12 hours  chlorhexidine 0.12% Liquid 15 milliLiter(s) Oral Mucosa every 12 hours  chlorhexidine 2% Cloths 1 Application(s) Topical <User Schedule>  chlorhexidine 2% Cloths 1 Application(s) Topical <User Schedule>  fentaNYL   Infusion. 1.6 MICROgram(s)/kG/Hr (13.6 mL/Hr) IV Continuous <Continuous>  heparin   Injectable 5000 Unit(s) SubCutaneous every 8 hours  hydrALAZINE 25 milliGRAM(s) Oral every 12 hours  labetalol Injectable 10 milliGRAM(s) IV Push every 2 hours PRN  lactated ringers. 1000 milliLiter(s) (75 mL/Hr) IV Continuous <Continuous>  levETIRAcetam  Solution 500 milliGRAM(s) Oral two times a day  midazolam Infusion 0.02 mG/kG/Hr (1.7 mL/Hr) IV Continuous <Continuous>  ondansetron Injectable 4 milliGRAM(s) IV Push every 6 hours PRN  pantoprazole   Suspension 40 milliGRAM(s) Oral daily  polyethylene glycol 3350 17 Gram(s) Oral daily  senna 2 Tablet(s) Oral at bedtime  sodium chloride 0.9% lock flush 10 milliLiter(s) IV Push every 1 hour PRN      LABS:                     7.4    10.49 )-----------( 243      ( 18 Nov 2021 04:54 )             24.5     11-18    144  |  111<H>  |  65<H>  ----------------------------<  80  4.5   |  18<L>  |  8.14<H>    Ca    8.7      18 Nov 2021 14:34  Phos  6.4     11-18  Mg     2.4     11-18    TPro  6.5  /  Alb  2.7<L>  /  TBili  <0.2  /  DBili  x   /  AST  47<H>  /  ALT  23  /  AlkPhos  165<H>  11-18    LIVER FUNCTIONS - ( 18 Nov 2021 04:54 )  Alb: 2.7 g/dL / Pro: 6.5 g/dL / ALK PHOS: 165 U/L / ALT: 23 U/L / AST: 47 U/L / GGT: x           ABG - ( 18 Nov 2021 11:09 )  pH, Arterial: 7.36  pH, Blood: x     /  pCO2: 32    /  pO2: 138   / HCO3: 18    / Base Excess: -6.3  /  SaO2: 99.5        Relevant labwork and imaging reviewed.    Patient remains critically ill.    A/P:   aSAH, ICH, SAH s/p DHC brain edema and decompression  s/p cardiac arrest   B/L pneumothorax     Neuro: Neurochecks Q4hr. Keppra for seizure prophylaxis.  Hydrocephalus: EVD removed.  No longer on propofol. On versed and fent gtt.   CV: SBP goal 100-160 mmhg. DC central line    Mode: AC/ CMV (Assist Control/ Continuous Mandatory Ventilation)  RR (machine): 20  TV (machine): 400  FiO2: 40  PEEP: 5    B/L pneumothoraces with chest tubes. To water seal. CXR.   GI: TFs held due to vomiting/ ?aspiration. S/P Reglan 5 mg Q8.   Renal: Oliguric ATN.  LR @ 75ml/hr, no longer on hypertonics. Renal following. Likely to start HD. Monitor Is/Os. Needs access.  ID: Afebrile. On cefazolin for trach packing.  Endo: Target sugar 120-180   Heme: SCD, Heparin SQ. Anemia, will monitor                NSCU ATTENDING -- ADDITIONAL PROGRESS NOTE    Nighttime rounds were performed -- please refer to earlier Progress Note for HPI details.    ICU Vital Signs Last 24 Hrs  T(C): 37.1 (18 Nov 2021 21:00), Max: 37.4 (18 Nov 2021 06:46)  T(F): 98.8 (18 Nov 2021 21:00), Max: 99.3 (18 Nov 2021 06:46)  HR: 103 (18 Nov 2021 23:00) (85 - 103)  BP: 151/70 (18 Nov 2021 23:00) (135/69 - 175/88)  BP(mean): 101 (18 Nov 2021 23:00) (76 - 124)  ABP: 152/80 (18 Nov 2021 23:00) (141/73 - 173/85)  ABP(mean): 110 (18 Nov 2021 23:00) (100 - 122)  RR: 20 (18 Nov 2021 23:00) (8 - 24)  SpO2: 100% (18 Nov 2021 23:00) (97% - 100%)      11-17-21 @ 07:01  -  11-18-21 @ 07:00  --------------------------------------------------------  IN: 2248 mL / OUT: 473 mL / NET: 1775 mL    11-18-21 @ 07:01  -  11-18-21 @ 23:21  --------------------------------------------------------  IN: 1445.9 mL / OUT: 485 mL / NET: 960.9 mL      Mode: AC/ CMV (Assist Control/ Continuous Mandatory Ventilation), RR (machine): 20, TV (machine): 400, FiO2: 40, PEEP: 5, ITime: 1, MAP: 11, PIP: 22      Neuroexam:   Patient opens eyes to noxious stim, does not follow commands, pupils 3mm equal and brisk (+) Doll's, (+) cough, gag and corneals, flaccid in uppers, TFs in lowers      MEDICATIONS:   acetylcysteine 20%  Inhalation 4 milliLiter(s) Inhalation three times a day  amLODIPine   Tablet 10 milliGRAM(s) Oral daily  artificial  tears Solution 1 Drop(s) Both EYES two times a day  ceFAZolin   IVPB 500 milliGRAM(s) IV Intermittent every 12 hours  chlorhexidine 0.12% Liquid 15 milliLiter(s) Oral Mucosa every 12 hours  chlorhexidine 2% Cloths 1 Application(s) Topical <User Schedule>  chlorhexidine 2% Cloths 1 Application(s) Topical <User Schedule>  fentaNYL   Infusion. 1.6 MICROgram(s)/kG/Hr (13.6 mL/Hr) IV Continuous <Continuous>  heparin   Injectable 5000 Unit(s) SubCutaneous every 8 hours  hydrALAZINE 25 milliGRAM(s) Oral every 12 hours  labetalol Injectable 10 milliGRAM(s) IV Push every 2 hours PRN  lactated ringers. 1000 milliLiter(s) (75 mL/Hr) IV Continuous <Continuous>  levETIRAcetam  Solution 500 milliGRAM(s) Oral two times a day  midazolam Infusion 0.02 mG/kG/Hr (1.7 mL/Hr) IV Continuous <Continuous>  ondansetron Injectable 4 milliGRAM(s) IV Push every 6 hours PRN  pantoprazole   Suspension 40 milliGRAM(s) Oral daily  polyethylene glycol 3350 17 Gram(s) Oral daily  senna 2 Tablet(s) Oral at bedtime  sodium chloride 0.9% lock flush 10 milliLiter(s) IV Push every 1 hour PRN      LABS:                     7.4    10.49 )-----------( 243      ( 18 Nov 2021 04:54 )             24.5     11-18    144  |  111<H>  |  65<H>  ----------------------------<  80  4.5   |  18<L>  |  8.14<H>    Ca    8.7      18 Nov 2021 14:34  Phos  6.4     11-18  Mg     2.4     11-18    TPro  6.5  /  Alb  2.7<L>  /  TBili  <0.2  /  DBili  x   /  AST  47<H>  /  ALT  23  /  AlkPhos  165<H>  11-18    LIVER FUNCTIONS - ( 18 Nov 2021 04:54 )  Alb: 2.7 g/dL / Pro: 6.5 g/dL / ALK PHOS: 165 U/L / ALT: 23 U/L / AST: 47 U/L / GGT: x           ABG - ( 18 Nov 2021 11:09 )  pH, Arterial: 7.36  pH, Blood: x     /  pCO2: 32    /  pO2: 138   / HCO3: 18    / Base Excess: -6.3  /  SaO2: 99.5        Relevant labwork and imaging reviewed.    A/P:   aSAH, ICH, SAH s/p DHC brain edema and decompression  s/p cardiac arrest 2/2 hypoxemia  B/L pneumothoraces  Oliguric ATN    Neuro: Neurochecks Q4hr. Keppra for seizure prophylaxis.  Hydrocephalus: Stable vent size. EVD removed.  Sedation: No longer on propofol (?MAUREEN/ hypertriglyceridemia) On versed and fent gtt.   CV: SBP goal 100-160 mmhg. DC central line   Pulm: B/L pneumothoraces with chest tubes. To water seal. Daily CXR. Appreciate pulm input.   GI: TFs held due to vomiting/ ?aspiration. S/P Reglan 5 mg Q8. Shock liver- overall some improvement noted  Renal: Oliguric ATN.  LR @ 75ml/hr, no longer on hypertonics. Renal following. Likely to start HD. Monitor Is/Os/bladder scan. Needs access for HD if being pursued  ID: Afebrile. On cefazolin for trach packing.  Endo: Target sugar 120-180   Heme: SCD, Heparin SQ. Anemia, will monitor   MISC: GOC meeting 11/19.       Patient remains critically ill.

## 2021-11-18 NOTE — CONSULT NOTE ADULT - SUBJECTIVE AND OBJECTIVE BOX
Vascular Access Service Consult Note    46yFemaleHEALTH ISSUES - PROBLEM Dx:  Pneumothorax, left    Functional quadriplegia    Acute respiratory failure with hypoxia    Cardiac arrest    Encounter for palliative care    Advanced care planning/counseling discussion    Acute spontaneous intraparenchymal intracranial hemorrhage due to cerebral aneurysm    CHETAN (acute kidney injury)               Diagnosis: SAH    Indications for Vascular Access (Check all that apply)  [  ]  Antibiotic Therapy       Antibiotic Prescribed:                                                                             Expected Duration of Therapy:               [  ]  IV Hydration  [  ]  Total Parenteral Nutrition  [  ]  Chemotherapy  [X  ]  Difficult Venous Access  [  ]  CVP monitoring  [  ]  Medications with high potential for tissue necrosis on extravasation  [  ]  Other    Screening (Check all that apply)  Previous Radiation to chest  [  ] Yes      [X  ]  No  Breast Cancer                          [  ] Left     [  ]  Right    [ X ]  No  Lymph Node Dissection         [  ] Left     [  ]  Right    [X  ]  No  Pacemaker or ICD                   [  ] Left     [  ]  Right    [X  ]  No  Upper Extremity DVT             [  ] Left     [  ]  Right    [ X ]  No  Chronic Kidney Disease         [  ]  Yes     [  X]  No  Hemodialysis                           [  ]  Yes     [X  ]  No  AV Fistula/ Graft                     [  ]  Left    [  ]  Right    [ X ]  No  Temp>101F in past 24 H       [  ]  Yes     [X  ]  No  H/O PICC/Midline                   [  ]  Yes     [ X ]  No    Lab data:                        7.4    10.49 )-----------( 243      ( 18 Nov 2021 04:54 )             24.5     11-18    144  |  x   |  x   ----------------------------<  80  4.5   |  x   |  x     Ca    8.7      18 Nov 2021 14:34  Phos  6.4     11-18  Mg     2.4     11-18    TPro  6.5  /  Alb  2.7<L>  /  TBili  <0.2  /  DBili  x   /  AST  47<H>  /  ALT  23  /  AlkPhos  165<H>  11-18              I have reviewed the chart, interviewed and examined the patient and determined that this patient:  [ ] Is a candidate for a PICC line  [ X ] Is a candidate for a Midline  [  ] Is not a candidate for vascular access device (reason)    Lumens:    [  ] Single  [ X ] Double

## 2021-11-18 NOTE — PROGRESS NOTE ADULT - SUBJECTIVE AND OBJECTIVE BOX
Patient is a 46y Female seen and evaluated at bedside remains intubated this AM. Per primary team family would like to pursue HD.       Meds:    acetylcysteine 20%  Inhalation 4 three times a day  amLODIPine   Tablet 5 daily  artificial  tears Solution 1 two times a day  ceFAZolin   IVPB 500 every 12 hours  chlorhexidine 0.12% Liquid 15 every 12 hours  chlorhexidine 2% Cloths 1 <User Schedule>  fentaNYL   Infusion. 1 <Continuous>  heparin   Injectable 5000 every 8 hours  hydrALAZINE 25 every 12 hours  lactated ringers. 1000 <Continuous>  levETIRAcetam  Solution 500 two times a day  metoclopramide Injectable 5 every 8 hours  ondansetron Injectable 4 every 6 hours PRN  pantoprazole   Suspension 40 daily  sodium chloride 3% + sodium acetate 50:50 1000 <Continuous>      T(C): , Max: 37.4 (11-18-21 @ 06:46)  T(F): , Max: 99.3 (11-18-21 @ 06:46)  HR: 98 (11-18-21 @ 09:00)  BP: 166/87 (11-18-21 @ 09:00)  BP(mean): 119 (11-18-21 @ 09:00)  RR: 14 (11-18-21 @ 09:00)  SpO2: 99% (11-18-21 @ 09:00)  Wt(kg): --    11-17 @ 07:01  -  11-18 @ 07:00  --------------------------------------------------------  IN: 2248 mL / OUT: 473 mL / NET: 1775 mL    11-18 @ 07:01  -  11-18 @ 09:52  --------------------------------------------------------  IN: 184 mL / OUT: 0 mL / NET: 184 mL      Review of Systems:  ROS negative except as per HPI    PHYSICAL EXAM:  GENERAL: intubated; unresponsive  HEENT: NCAT, MMM  CHEST/LUNG: decreased breath sounds b/l   HEART: Regular rate and rhythm, no murmur, no gallops, no rub   ABDOMEN: Soft, nontender, non distended  EXTREMITIES: no pedal edema, WWP  Neurology: intubated and sedated      LABS:                        7.4    10.49 )-----------( 243      ( 18 Nov 2021 04:54 )             24.5     11-18    143  |  109<H>  |  64<H>  ----------------------------<  93  4.5   |  19<L>  |  7.74<H>    Ca    9.1      18 Nov 2021 04:54  Phos  6.4     11-18  Mg     2.4     11-18    TPro  6.5  /  Alb  2.7<L>  /  TBili  <0.2  /  DBili  x   /  AST  47<H>  /  ALT  23  /  AlkPhos  165<H>  11-18                RADIOLOGY & ADDITIONAL STUDIES:

## 2021-11-19 LAB
ANION GAP SERPL CALC-SCNC: 16 MMOL/L — SIGNIFICANT CHANGE UP (ref 5–17)
BASE EXCESS BLDA CALC-SCNC: -6.8 MMOL/L — LOW (ref -2–3)
BLD GP AB SCN SERPL QL: NEGATIVE — SIGNIFICANT CHANGE UP
BUN SERPL-MCNC: 69 MG/DL — HIGH (ref 7–23)
CALCIUM SERPL-MCNC: 8.9 MG/DL — SIGNIFICANT CHANGE UP (ref 8.4–10.5)
CHLORIDE SERPL-SCNC: 112 MMOL/L — HIGH (ref 96–108)
CO2 BLDA-SCNC: 18 MMOL/L — LOW (ref 19–24)
CO2 SERPL-SCNC: 16 MMOL/L — LOW (ref 22–31)
CREAT SERPL-MCNC: 8.41 MG/DL — HIGH (ref 0.5–1.3)
GAS PNL BLDA: SIGNIFICANT CHANGE UP
GLUCOSE SERPL-MCNC: 80 MG/DL — SIGNIFICANT CHANGE UP (ref 70–99)
HCO3 BLDA-SCNC: 18 MMOL/L — LOW (ref 21–28)
HCT VFR BLD CALC: 22.5 % — LOW (ref 34.5–45)
HCT VFR BLD CALC: 24.6 % — LOW (ref 34.5–45)
HCT VFR BLD CALC: 24.7 % — LOW (ref 34.5–45)
HGB BLD-MCNC: 7 G/DL — CRITICAL LOW (ref 11.5–15.5)
HGB BLD-MCNC: 7.8 G/DL — LOW (ref 11.5–15.5)
HGB BLD-MCNC: 8 G/DL — LOW (ref 11.5–15.5)
MAGNESIUM SERPL-MCNC: 2.3 MG/DL — SIGNIFICANT CHANGE UP (ref 1.6–2.6)
MCHC RBC-ENTMCNC: 25.4 PG — LOW (ref 27–34)
MCHC RBC-ENTMCNC: 25.8 PG — LOW (ref 27–34)
MCHC RBC-ENTMCNC: 26.7 PG — LOW (ref 27–34)
MCHC RBC-ENTMCNC: 31.1 GM/DL — LOW (ref 32–36)
MCHC RBC-ENTMCNC: 31.6 GM/DL — LOW (ref 32–36)
MCHC RBC-ENTMCNC: 32.5 GM/DL — SIGNIFICANT CHANGE UP (ref 32–36)
MCV RBC AUTO: 81.5 FL — SIGNIFICANT CHANGE UP (ref 80–100)
MCV RBC AUTO: 81.8 FL — SIGNIFICANT CHANGE UP (ref 80–100)
MCV RBC AUTO: 82 FL — SIGNIFICANT CHANGE UP (ref 80–100)
NRBC # BLD: 0 /100 WBCS — SIGNIFICANT CHANGE UP (ref 0–0)
PCO2 BLDA: 31 MMHG — LOW (ref 32–35)
PH BLDA: 7.36 — SIGNIFICANT CHANGE UP (ref 7.35–7.45)
PHOSPHATE SERPL-MCNC: 6.3 MG/DL — HIGH (ref 2.5–4.5)
PLATELET # BLD AUTO: 251 K/UL — SIGNIFICANT CHANGE UP (ref 150–400)
PLATELET # BLD AUTO: 264 K/UL — SIGNIFICANT CHANGE UP (ref 150–400)
PLATELET # BLD AUTO: 268 K/UL — SIGNIFICANT CHANGE UP (ref 150–400)
PO2 BLDA: 167 MMHG — HIGH (ref 83–108)
POTASSIUM SERPL-MCNC: 4.1 MMOL/L — SIGNIFICANT CHANGE UP (ref 3.5–5.3)
POTASSIUM SERPL-SCNC: 4.1 MMOL/L — SIGNIFICANT CHANGE UP (ref 3.5–5.3)
RBC # BLD: 2.76 M/UL — LOW (ref 3.8–5.2)
RBC # BLD: 3 M/UL — LOW (ref 3.8–5.2)
RBC # BLD: 3.02 M/UL — LOW (ref 3.8–5.2)
RBC # FLD: 21.4 % — HIGH (ref 10.3–14.5)
RBC # FLD: 22 % — HIGH (ref 10.3–14.5)
RBC # FLD: 23 % — HIGH (ref 10.3–14.5)
RH IG SCN BLD-IMP: POSITIVE — SIGNIFICANT CHANGE UP
SAO2 % BLDA: 98.5 % — HIGH (ref 94–98)
SODIUM SERPL-SCNC: 144 MMOL/L — SIGNIFICANT CHANGE UP (ref 135–145)
TRIGL SERPL-MCNC: 205 MG/DL — HIGH
WBC # BLD: 8 K/UL — SIGNIFICANT CHANGE UP (ref 3.8–10.5)
WBC # BLD: 8.35 K/UL — SIGNIFICANT CHANGE UP (ref 3.8–10.5)
WBC # BLD: 8.37 K/UL — SIGNIFICANT CHANGE UP (ref 3.8–10.5)
WBC # FLD AUTO: 8 K/UL — SIGNIFICANT CHANGE UP (ref 3.8–10.5)
WBC # FLD AUTO: 8.35 K/UL — SIGNIFICANT CHANGE UP (ref 3.8–10.5)
WBC # FLD AUTO: 8.37 K/UL — SIGNIFICANT CHANGE UP (ref 3.8–10.5)

## 2021-11-19 PROCEDURE — 99024 POSTOP FOLLOW-UP VISIT: CPT

## 2021-11-19 PROCEDURE — 99233 SBSQ HOSP IP/OBS HIGH 50: CPT

## 2021-11-19 PROCEDURE — 99291 CRITICAL CARE FIRST HOUR: CPT

## 2021-11-19 PROCEDURE — 71045 X-RAY EXAM CHEST 1 VIEW: CPT | Mod: 26,59

## 2021-11-19 PROCEDURE — 71045 X-RAY EXAM CHEST 1 VIEW: CPT | Mod: 26,77

## 2021-11-19 PROCEDURE — 99292 CRITICAL CARE ADDL 30 MIN: CPT

## 2021-11-19 PROCEDURE — 99221 1ST HOSP IP/OBS SF/LOW 40: CPT

## 2021-11-19 RX ORDER — LEVETIRACETAM 250 MG/1
500 TABLET, FILM COATED ORAL EVERY 24 HOURS
Refills: 0 | Status: DISCONTINUED | OUTPATIENT
Start: 2021-11-20 | End: 2021-12-10

## 2021-11-19 RX ORDER — AMLODIPINE BESYLATE 2.5 MG/1
10 TABLET ORAL DAILY
Refills: 0 | Status: DISCONTINUED | OUTPATIENT
Start: 2021-11-20 | End: 2021-12-10

## 2021-11-19 RX ORDER — FENTANYL CITRATE 50 UG/ML
0.8 INJECTION INTRAVENOUS
Qty: 2500 | Refills: 0 | Status: DISCONTINUED | OUTPATIENT
Start: 2021-11-19 | End: 2021-11-21

## 2021-11-19 RX ORDER — FENTANYL CITRATE 50 UG/ML
1 INJECTION INTRAVENOUS
Qty: 2500 | Refills: 0 | Status: DISCONTINUED | OUTPATIENT
Start: 2021-11-19 | End: 2021-11-19

## 2021-11-19 RX ADMIN — CHLORHEXIDINE GLUCONATE 1 APPLICATION(S): 213 SOLUTION TOPICAL at 05:09

## 2021-11-19 RX ADMIN — Medication 25 MILLIGRAM(S): at 05:26

## 2021-11-19 RX ADMIN — Medication 25 MILLIGRAM(S): at 17:11

## 2021-11-19 RX ADMIN — SENNA PLUS 2 TABLET(S): 8.6 TABLET ORAL at 21:16

## 2021-11-19 RX ADMIN — Medication 4 MILLILITER(S): at 05:20

## 2021-11-19 RX ADMIN — Medication 10 MILLIGRAM(S): at 12:30

## 2021-11-19 RX ADMIN — Medication 100 MILLIGRAM(S): at 17:19

## 2021-11-19 RX ADMIN — LEVETIRACETAM 500 MILLIGRAM(S): 250 TABLET, FILM COATED ORAL at 05:08

## 2021-11-19 RX ADMIN — CHLORHEXIDINE GLUCONATE 15 MILLILITER(S): 213 SOLUTION TOPICAL at 05:08

## 2021-11-19 RX ADMIN — MIDAZOLAM HYDROCHLORIDE 1.7 MG/KG/HR: 1 INJECTION, SOLUTION INTRAMUSCULAR; INTRAVENOUS at 20:54

## 2021-11-19 RX ADMIN — CHLORHEXIDINE GLUCONATE 15 MILLILITER(S): 213 SOLUTION TOPICAL at 17:11

## 2021-11-19 RX ADMIN — LEVETIRACETAM 500 MILLIGRAM(S): 250 TABLET, FILM COATED ORAL at 17:11

## 2021-11-19 RX ADMIN — HEPARIN SODIUM 5000 UNIT(S): 5000 INJECTION INTRAVENOUS; SUBCUTANEOUS at 05:08

## 2021-11-19 RX ADMIN — PANTOPRAZOLE SODIUM 40 MILLIGRAM(S): 20 TABLET, DELAYED RELEASE ORAL at 11:34

## 2021-11-19 RX ADMIN — Medication 100 MILLIGRAM(S): at 05:08

## 2021-11-19 RX ADMIN — HEPARIN SODIUM 5000 UNIT(S): 5000 INJECTION INTRAVENOUS; SUBCUTANEOUS at 14:35

## 2021-11-19 RX ADMIN — AMLODIPINE BESYLATE 10 MILLIGRAM(S): 2.5 TABLET ORAL at 05:08

## 2021-11-19 RX ADMIN — POLYETHYLENE GLYCOL 3350 17 GRAM(S): 17 POWDER, FOR SOLUTION ORAL at 11:34

## 2021-11-19 RX ADMIN — Medication 1 DROP(S): at 05:26

## 2021-11-19 RX ADMIN — Medication 1 DROP(S): at 17:11

## 2021-11-19 RX ADMIN — Medication 4 MILLILITER(S): at 21:16

## 2021-11-19 RX ADMIN — Medication 10 MILLIGRAM(S): at 20:21

## 2021-11-19 RX ADMIN — FENTANYL CITRATE 50 MICROGRAM(S): 50 INJECTION INTRAVENOUS at 20:42

## 2021-11-19 NOTE — PROGRESS NOTE ADULT - SUBJECTIVE AND OBJECTIVE BOX
Patient is a 46y Female seen and evaluated at bedside remains intubated and sedated. Per discussion with team yesterday; will get a RIJ line placed today to start HD.     Meds:    acetylcysteine 20%  Inhalation 4 three times a day  amLODIPine   Tablet 10 daily  artificial  tears Solution 1 two times a day  ceFAZolin   IVPB 500 every 12 hours  chlorhexidine 0.12% Liquid 15 every 12 hours  chlorhexidine 2% Cloths 1 <User Schedule>  chlorhexidine 2% Cloths 1 <User Schedule>  fentaNYL   Infusion. 1.6 <Continuous>  heparin   Injectable 5000 every 8 hours  hydrALAZINE 25 every 12 hours  labetalol Injectable 10 every 2 hours PRN  lactated ringers. 1000 <Continuous>  levETIRAcetam  Solution 500 two times a day  midazolam Infusion 0.02 <Continuous>  ondansetron Injectable 4 every 6 hours PRN  pantoprazole   Suspension 40 daily  polyethylene glycol 3350 17 daily  senna 2 at bedtime  sodium chloride 0.9% lock flush 10 every 1 hour PRN      T(C): , Max: 37.2 (11-18-21 @ 14:00)  T(F): , Max: 99 (11-18-21 @ 14:00)  HR: 95 (11-19-21 @ 09:17)  BP: 159/78 (11-19-21 @ 09:00)  BP(mean): 111 (11-19-21 @ 09:00)  RR: 20 (11-19-21 @ 09:17)  SpO2: 100% (11-19-21 @ 09:17)  Wt(kg): --    11-18 @ 07:01  -  11-19 @ 07:00  --------------------------------------------------------  IN: 2093.3 mL / OUT: 485 mL / NET: 1608.3 mL    11-19 @ 07:01  -  11-19 @ 09:24  --------------------------------------------------------  IN: 175.5 mL / OUT: 0 mL / NET: 175.5 mL          Review of Systems:  ROS negative except as per HPI      PHYSICAL EXAM:  GENERAL: intubated; unresponsive  HEENT: NCAT, MMM  CHEST/LUNG: decreased breath sounds b/l   HEART: Regular rate and rhythm, no murmur, no gallops, no rub   ABDOMEN: Soft, nontender, non distended  EXTREMITIES: no pedal edema, WWP  Neurology: intubated and sedated      LABS:                        7.0    8.00  )-----------( 268      ( 19 Nov 2021 06:00 )             22.5     11-19    144  |  112<H>  |  69<H>  ----------------------------<  80  4.1   |  16<L>  |  8.41<H>    Ca    8.9      19 Nov 2021 06:00  Phos  6.3     11-19  Mg     2.3     11-19    TPro  6.5  /  Alb  2.7<L>  /  TBili  <0.2  /  DBili  x   /  AST  47<H>  /  ALT  23  /  AlkPhos  165<H>  11-18                RADIOLOGY & ADDITIONAL STUDIES:

## 2021-11-19 NOTE — PROCEDURE NOTE - NSPOSTPRCRAD_GEN_A_CORE
central line located in the superior vena cava/no pneumothorax/post-procedure radiography performed placement confirmed with radiology resident/central line located in the superior vena cava/no pneumothorax/post-procedure radiography performed

## 2021-11-19 NOTE — PROCEDURE NOTE - NSINFORMCONSENT_GEN_A_CORE
Benefits, risks, and possible complications of procedure explained to patient/caregiver who verbalized understanding and gave written consent.
This was an emergent procedure.
Benefits, risks, and possible complications of procedure explained to patient/caregiver who verbalized understanding and gave verbal consent.
Benefits, risks, and possible complications of procedure explained to patient/caregiver who verbalized understanding and gave written consent.
Benefits, risks, and possible complications of procedure explained to patient/caregiver who verbalized understanding and gave verbal consent.
Benefits, risks, and possible complications of procedure explained to patient/caregiver who verbalized understanding and gave verbal consent.
This was an emergent procedure.
Benefits, risks, and possible complications of procedure explained to patient/caregiver who verbalized understanding and gave written consent.
This was an emergent procedure.
Benefits, risks, and possible complications of procedure explained to patient/caregiver who verbalized understanding and gave verbal consent.
Benefits, risks, and possible complications of procedure explained to patient/caregiver who verbalized understanding and gave written consent.
Benefits, risks, and possible complications of procedure explained to patient/caregiver who verbalized understanding and gave written consent.
This was an emergent procedure.

## 2021-11-19 NOTE — CONSULT NOTE ADULT - ATTENDING COMMENTS
Agree with above.  Patient has received > 7 days of appropriate antibiotics for pneumonia.  Her CXR is remarkably clear and she is afebrile.  7 days is appropriate for Pneumonia.  No ID contraindication for  shunt placement tomorrow provided she remains afebrile
Posterior membrane perforation of trachea during bedside tracheostomy leading to bilateral pneumothoraces now s/p 2 chest tubes. Lung reinflated. Switch to water seal and monitor. Will not remove chest tubes until after tracheostomy in place and is stable.
Patient seen and examined at bedside in SICU.    Unable to extend neck adequately for percutaneous tracheostomy tube placement.  Recommend ENT consultation for open tracheostomy tube placement.    For EGD, PEG placement 11/9/2021.
Likely with ATN, Oliguric UO 15-30cc/hr, acceptable electrolytes, volume, pH, no urgent req. for RRT however iATN may continue to worsen, advise repeat labs in evening, will watch closely for need for RRT

## 2021-11-19 NOTE — PROCEDURE NOTE - NSPROCDETAILS_GEN_ALL_CORE
guidewire recovered
Seldinger technique/secured in place/sterile dressing applied/thoracostomy tube placed percutaneously
guidewire recovered/lumen(s) aspirated and flushed/sterile dressing applied/sterile technique, catheter placed/ultrasound guidance with use of sterile gel and probe cove
location identified, draped/prepped, sterile technique used/sterile dressing applied/sterile technique, catheter placed/ultrasound guidance
Seldinger technique/secured in place/sterile dressing applied/supine position/thoracostomy tube placed percutaneously
guidewire recovered/lumen(s) aspirated and flushed/sterile dressing applied/sterile technique, catheter placed/ultrasound guidance with use of sterile gel and probe cove

## 2021-11-19 NOTE — PROGRESS NOTE ADULT - ATTENDING COMMENTS
have reviewed status and examined pt, and discussed with neuro staff.   pt scheduled for placement of IJ cath early this aft, and will then proceed with hd either today or tomorrow.    agree with findings and recommendations.

## 2021-11-19 NOTE — CONSULT NOTE ADULT - ASSESSMENT
47 y/o female with PMH HTN, multiple sclerosis, recent spinal surgery 10/8 ([facility]) presented to [facility] after being found unconscious at home, unknown period of time. Found to have ruptured L MCA aneurysm with intraparenchymal hemorrhage, SAH, and left subdural hematoma w/ midline shift and herniation. Now s/p L hemicraniotomy. Hospital course c/b CODE BLUE on 11/12 after attempted bedside tracheostomy, which resulted in bilateral pneumothoraces. She is now s/p bilateral chest tubes to continuous suction. Pulmonary team consulted for management input on chest tubes.    #Acute hypoxic respiratory failure  #Bilateral pneumothorax s/p bilateral chest tubes     Recommendations:  - Per primary team, patient tentatively scheduled OR Monday for tracheostomy   - Given that the patient is currently on the ventilator, would recommend stepwise de-escalation of chest tubes due to positive pressure effects from ventilator. Please place bilateral chest tubes to water seal and monitor for any hemodynamic compromise with serial CXR. If concern for HD instability, place back on continuous suction.   - will assess after procedure about further de-escalation attempts of chest tube    Case discussed with primary team and pulmonary attending.

## 2021-11-19 NOTE — PROCEDURE NOTE - PRACTITIONER PERFORMING THE TIME OUT
Janine Dietz PA-C
Cely Long PA-C
Dr. Ovalle
Lucille DUMONT
Janine Dietz PA-C
Ronnie
Ronnie, NP
Ronnie, NP
layla Srinivasan
Myself

## 2021-11-19 NOTE — PROGRESS NOTE ADULT - ASSESSMENT
46y/Female with:  L MCA ruptured aneurysm, subarachnoid hemorrhage, s/p C (10/26/2021, Dr. D'Amico @ [place]), brain compression, cerebral edema  anemia   recent spinal surgery  UGIB  bilateral pneumothorax  acute kidney injury, transaminitis    PLAN: Day 1 = 10-26 ; Day 4 = 10/29; Day 21 = 11/15  NEURO: neurochecks q1h, sedation with midazolam and fentanyl; off cisatracurium  SAH:  d/c nimodipine  Hydrocephalus:  EVD CT today and possibly discontinue  Seizure prophylaxis:  cont levetiracetam 500 BID   REHAB:  physical therapy evaluation and management    EARLY MOB:  HOB elevated    PULM:  full vent support for now, RR increased to 20,  check ABG in 30 mins; , continue mucormyst and ipratropium / albuterol; pulmo toilette; CXR; trach schedule if family wants to continue aggressive care; f/u chest tube plans with pulmo  CARDIO:  SBP goal 100-160mm Hg, TTE, increase amlodipine to 10mg PO daily   ENDO:  Blood sugar goals 140-180 mg/dL  GI:  PPI OD  DIET: hold tube feeds, standing reglan x 24h  RENAL: LR@50; cont 3%@30, recheck BMP this afternoon; Na goal 135-145; worsening creatinine and low urine output; revisit HD goals with nephrology in light of acidosis   HEM/ONC: no coagulopathy (INR= 1.03), no ASA / Plavix use; Hb stable - transfuse if <7; 1 unit pRBC transfusion  VTE Prophylaxis: SCDs, SQH tonight  ID: afebrile, no leukocytosis, ancef while packing is in  Social: spoke at length with daughter yesterday and this morning with Dr. Duncan - updated, awaiting decision regarding trach    CORE MEASURES  1.  Hunt and Griffin Score = 5  2.  VTE prophylaxis:  [ ] administered within 24 hours of admission OR [X] reason not done: fresh bleed, unsecured aneurysm.  3.  Dysphagia screening:   [X] performed before any oral meds / liquids / food  4.  Nimodipine treatment:  [X] administered within 24 hours of admission OR [ ] reason not done:    ATTENDING ATTESTATION:  I was physically present for the key portions of the evaluation and management (E/M) service provided.  I agree with the above history, physical and plan, which I have reviewed and edited where appropriate.    Patient at high risk for neurological deterioration or death due to:  ICU delirium, aspiration PNA, DVT / PE.  Critical care time:  I have personally provided 45 minutes of critical care time, excluding time spent on separate procedures.      Plan discussed with RN, house staff.    Long discussion with daughter (in person) and son (over phone) re: current status, updates, prognosis, plans.  Coordinated with ENT / primary attending neurosurgeon - family meeting tomorrow.  Plans for HD, insertion of dialysis catheter, d/c central line, PICC line insertion.    Additional critical care time:  I have personally provided 30 minutes of critical care time, excluding time spent on separate procedures.    Total critical care time: 75 minutes       46y/Female with:  L MCA ruptured aneurysm, subarachnoid hemorrhage, s/p [facility] (10/26/2021, Dr. D'Amico @ [place]), brain compression, cerebral edema  anemia   recent spinal surgery  UGIB  bilateral pneumothorax  acute kidney injury, transaminitis    PLAN: Day 1 = 10-26 ; Day 4 = 10/29; Day 21 = 11/15  NEURO: neurochecks q1h, sedation with midazolam and fentanyl - taper down to lowest   SAH:  d/c nimodipine  Hydrocephalus:  EVD discontinued  Seizure prophylaxis:  cont levetiracetam 500 BID   REHAB:  physical therapy evaluation and management    EARLY MOB:  HOB elevated    PULM:  full vent support for now, RR 20,  check ABG in AM,, continue mucormyst and ipratropium / albuterol; pulmo toilette; CXR; trach schedule if family wants to continue aggressive care; f/u chest tube plans with pulmo  CARDIO:  SBP goal 100-160mm Hg, TTE, amlodipine 10mg PO daily   ENDO:  Blood sugar goals 140-180 mg/dL  GI:  PPI OD  DIET: TF - 10cc/hr suplena with protein supplementation; aspiration precautions  RENAL: LR@75; Na goal 135-145; dialysis possibly today; for temp dialysis cath insertion  HEM/ONC: no coagulopathy (INR= 1.03), no ASA / Plavix use; Hb stable - transfuse if <7; 1 unit pRBC transfusion; recheck CBC after   VTE Prophylaxis: SCDs, SQH tonight  ID: afebrile, no leukocytosis, ancef while trach packing is in  Social: spoke at length with daughter yesterday and this morning with Dr. Duncan - updated, awaiting decision regarding trach    CORE MEASURES  1.  Hunt and Griffin Score = 5  2.  VTE prophylaxis:  [ ] administered within 24 hours of admission OR [X] reason not done: fresh bleed, unsecured aneurysm.  3.  Dysphagia screening:   [X] performed before any oral meds / liquids / food  4.  Nimodipine treatment:  [X] administered within 24 hours of admission OR [ ] reason not done:    ATTENDING ATTESTATION:  I was physically present for the key portions of the evaluation and management (E/M) service provided.  I agree with the above history, physical and plan, which I have reviewed and edited where appropriate.    Patient at high risk for neurological deterioration or death due to:  ICU delirium, aspiration PNA, DVT / PE.  Critical care time:  I have personally provided 45 minutes of critical care time, excluding time spent on separate procedures.      Plan discussed with RN, house staff.    Long discussion with daughter (in person) and son (over phone) re: current status, updates, prognosis, plans.  Coordinated with ENT / primary attending neurosurgeon - family meeting tomorrow.  Plans for HD, insertion of dialysis catheter, d/c central line, PICC line insertion.    Additional critical care time:  I have personally provided 30 minutes of critical care time, excluding time spent on separate procedures.    Total critical care time: 75 minutes       46y/Female with:  L MCA ruptured aneurysm, subarachnoid hemorrhage, s/p [facility] (10/26/2021, Dr. D'Amico @ [place]), brain compression, cerebral edema  anemia   recent spinal surgery  UGIB  bilateral pneumothorax  acute kidney injury, transaminitis    PLAN: Day 1 = 10-26 ; Day 4 = 10/29; Day 21 = 11/15  NEURO: neurochecks q1h, sedation with midazolam and fentanyl - taper down to lowest   SAH:  d/c nimodipine  Hydrocephalus:  EVD discontinued  Seizure prophylaxis:  cont levetiracetam 500 BID   REHAB:  physical therapy evaluation and management    EARLY MOB:  HOB elevated    PULM:  full vent support for now, RR 20,  check ABG in AM,, continue mucormyst and ipratropium / albuterol; pulmo toilette; CXR; trach schedule if family wants to continue aggressive care; f/u chest tube plans with pulmo  CARDIO:  SBP goal 100-160mm Hg, TTE, amlodipine 10mg PO daily   ENDO:  Blood sugar goals 140-180 mg/dL  GI:  PPI OD  DIET: TF - 10cc/hr suplena with protein supplementation; aspiration precautions  RENAL: LR@75; Na goal 135-145; dialysis possibly today; for temp dialysis cath insertion  HEM/ONC: no coagulopathy (INR= 1.03), no ASA / Plavix use; Hb stable - transfuse if <7; 1 unit pRBC transfusion; recheck CBC after   VTE Prophylaxis: SCDs, SQH tonight  ID: afebrile, no leukocytosis, ancef while trach packing is in  Social: awaiting family meeting    CORE MEASURES  1.  Hunt and Griffin Score = 5  2.  VTE prophylaxis:  [ ] administered within 24 hours of admission OR [X] reason not done: fresh bleed, unsecured aneurysm.  3.  Dysphagia screening:   [X] performed before any oral meds / liquids / food  4.  Nimodipine treatment:  [X] administered within 24 hours of admission OR [ ] reason not done:    ATTENDING ATTESTATION:  I was physically present for the key portions of the evaluation and management (E/M) service provided.  I agree with the above history, physical and plan, which I have reviewed and edited where appropriate.    Patient at high risk for neurological deterioration or death due to:  ICU delirium, aspiration PNA, DVT / PE.  Critical care time:  I have personally provided 45 minutes of critical care time, excluding time spent on separate procedures.      Plan discussed with RN, house staff.    Family meeting with Dr. Whitfield, myself, daughter Jayshree, son Jose and brother Jose (both over the phone) to discuss updates and plans.  Family agrees to tracheostomy, which will be scheduled on Monday afternoon.    Additional critical care time:  I have personally provided 30 minutes of critical care time, excluding time spent on separate procedures.    Total critical care time: 75 minutes

## 2021-11-19 NOTE — CONSULT NOTE ADULT - SUBJECTIVE AND OBJECTIVE BOX
PULMONARY SERVICE INITIAL CONSULT NOTE    HPI:  47 y/o female with PMH HTN, Multiple sclerosis, recent spinal surgery 10/8 ([facility]) presented to [facility]IB[facility] after being found unconscious at home, unknown period of time. 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 HH5, MF1. Patient was intubated at [facility], found to have blown L pupil, and sent straight to the OR for left hemicraniotomy. A-line placed intraop. Hemodynamically unstable upon arrival to [facility], bradycardic and hypertensive s/p Mannitol, Clevidipine, and Furosemide. Central line placed in NSICU. Currently sedated on Propofol, pending repeat CTH. History per patient's son, patient had recent spine surgery and was found on outpatient imaging last week to have L M1 segment aneurysm (unruptured), pt asymptomatic at this time. Per son patient taking percocet PO at home. Ureña catheter, ET tube, and arterial line placed at [facility].     NIHSS on arrival: 32   Bess Griffin 5, Mod Busby 4  Bleed Day 1 = 10/26 (26 Oct 2021 13:03)    PULMONARY HPI: Team consulted for management of bilateral chest tubes that are currently on continuous suction after CODE Blue and pneumothoraces from 11/12. Per primary team, a family discussion was held today and the patient is tentatively scheduled for tracheostomy in the OR on Monday.     REVIEW OF SYSTEMS:  Unable to be obtained due to neurological status    PAST MEDICAL & SURGICAL HISTORY:  No pertinent past medical history    Personal history of spine surgery        FAMILY HISTORY:      SOCIAL HISTORY:  Unable to be obtained due to clinical status    MEDICATIONS:  Pulmonary:  acetylcysteine 20%  Inhalation 4 milliLiter(s) Inhalation three times a day    Antimicrobials:  ceFAZolin   IVPB 500 milliGRAM(s) IV Intermittent every 12 hours    Anticoagulants:  heparin   Injectable 5000 Unit(s) SubCutaneous every 8 hours    Onc:    GI/:  pantoprazole   Suspension 40 milliGRAM(s) Oral daily  polyethylene glycol 3350 17 Gram(s) Oral daily  senna 2 Tablet(s) Oral at bedtime    Endocrine:    Cardiac:  hydrALAZINE 25 milliGRAM(s) Oral every 12 hours  labetalol Injectable 10 milliGRAM(s) IV Push every 2 hours PRN    Other Medications:  artificial  tears Solution 1 Drop(s) Both EYES two times a day  chlorhexidine 0.12% Liquid 15 milliLiter(s) Oral Mucosa every 12 hours  chlorhexidine 2% Cloths 1 Application(s) Topical <User Schedule>  chlorhexidine 2% Cloths 1 Application(s) Topical <User Schedule>  fentaNYL   Infusion. 0.8 MICROgram(s)/kG/Hr IV Continuous <Continuous>  lactated ringers. 1000 milliLiter(s) IV Continuous <Continuous>  levETIRAcetam  Solution 500 milliGRAM(s) Oral two times a day  midazolam Infusion 0.02 mG/kG/Hr IV Continuous <Continuous>  ondansetron Injectable 4 milliGRAM(s) IV Push every 6 hours PRN  sodium chloride 0.9% lock flush 10 milliLiter(s) IV Push every 1 hour PRN      Allergies    No Known Allergies    Intolerances        Vital Signs Last 24 Hrs  T(C): 36.2 (19 Nov 2021 14:23), Max: 37.2 (18 Nov 2021 17:30)  T(F): 97.2 (19 Nov 2021 14:23), Max: 99 (18 Nov 2021 17:30)  HR: 97 (19 Nov 2021 16:21) (87 - 103)  BP: 161/77 (19 Nov 2021 16:00) (140/74 - 174/82)  BP(mean): 108 (19 Nov 2021 16:00) (100 - 120)  RR: 20 (19 Nov 2021 16:21) (0 - 20)  SpO2: 98% (19 Nov 2021 16:21) (97% - 100%)    11-18 @ 07:01  -  11-19 @ 07:00  --------------------------------------------------------  IN: 2093.3 mL / OUT: 485 mL / NET: 1608.3 mL    11-19 @ 07:01  -  11-19 @ 17:03  --------------------------------------------------------  IN: 1110.4 mL / OUT: 0 mL / NET: 1110.4 mL      Mode: AC/ CMV (Assist Control/ Continuous Mandatory Ventilation)  RR (machine): 20  TV (machine): 400  FiO2: 40  PEEP: 5  ITime: 1  MAP: 10  PIP: 22      PHYSICAL EXAM  Constitutional: intubated and sedated on ventilator  Head: NC/AT  EENT: ET tube in place, enlarged tongue  Neck: supple  Respiratory: bilateral bronchial breath sounds, R anterior chest tube to continuous suction, L posterior chest tube to continuous suction  Cardiovascular: +S1/S2, RRR  Gastrointestinal: soft, obese abdomen, PEG c/d/i   Extremities: WWP; 1+ edema on all 4 extremities, no clubbing or cyanosis  Vascular: 2+ radial pulses B/L  Neurological: intubated and sedated    LABS:  ABG - ( 19 Nov 2021 06:05 )  pH, Arterial: 7.36  pH, Blood: x     /  pCO2: 31    /  pO2: 167   / HCO3: 18    / Base Excess: -6.8  /  SaO2: 98.5                CBC Full  -  ( 19 Nov 2021 14:43 )  WBC Count : 8.35 K/uL  RBC Count : 3.02 M/uL  Hemoglobin : 7.8 g/dL  Hematocrit : 24.7 %  Platelet Count - Automated : 251 K/uL  Mean Cell Volume : 81.8 fl  Mean Cell Hemoglobin : 25.8 pg  Mean Cell Hemoglobin Concentration : 31.6 gm/dL  Auto Neutrophil # : x  Auto Lymphocyte # : x  Auto Monocyte # : x  Auto Eosinophil # : x  Auto Basophil # : x  Auto Neutrophil % : x  Auto Lymphocyte % : x  Auto Monocyte % : x  Auto Eosinophil % : x  Auto Basophil % : x    11-19    144  |  112<H>  |  69<H>  ----------------------------<  80  4.1   |  16<L>  |  8.41<H>    Ca    8.9      19 Nov 2021 06:00  Phos  6.3     11-19  Mg     2.3     11-19    TPro  6.5  /  Alb  2.7<L>  /  TBili  <0.2  /  DBili  x   /  AST  47<H>  /  ALT  23  /  AlkPhos  165<H>  11-18                      RADIOLOGY & ADDITIONAL STUDIES:
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
## 2021-11-19 NOTE — PROCEDURE NOTE - NSANESTHESIA_GEN_A_CORE
no anesthesia administered
1% lidocaine
1% lidocaine
1% lidocaine/with EPI
1% lidocaine
2% lidocaine/with EPI
1% lidocaine
no anesthesia administered

## 2021-11-19 NOTE — PROCEDURE NOTE - NSPATIENTPOSTION_GEN_A_CORE
sitting
supine
supine
seated
supine
(semi) fowlers
Trendelenburg
(semi) fowlers

## 2021-11-19 NOTE — PROGRESS NOTE ADULT - ASSESSMENT
46F with pmhx of HTN who presented to the hospital after being found down at home for unknown period of time. 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 Nephrology consulted for CHETAN management and possible HD needs    Assessment/Plan:   #oliguric iATN  Baseline creatinine 0.6  Pt has prolonged hospital stay for a L MCA aneurysm rupture; she suffered from cardiac arrest x 3 on 11/12 and now is intubated in the ICU. Her kidney function has continued to rise over the course of the last 24 hours and her urine studies are suggestive of possible ATN given Sodium of 115 which correlates to her having hypoperfusion from the cardiac arrest. Her urine output is slowly starting to decrease as well.  -Trend BMP daily  -Will get HD line later this afternoon  -Strict I/O's  -Renal diet  -Avoid nephrotoxic meds    #Hypernatremia (resolved)  Sodium this   -Continue to monitor    #Anemia of Chronic disease  -Ferritin and iron profile noted.    Thank you for the opportunity to participate in the care of your patient. The nephrology service remains available to assist with any questions or concerns. Please feel free to reach us by paging the on-call nephrology fellow for urgent issues or as below.     Aj Cardoso D.O.  PGY 4 - Nephrology Fellow  [phone]

## 2021-11-19 NOTE — PROCEDURE NOTE - NSICDXPROCEDURE_GEN_ALL_CORE_FT
PROCEDURES:  Chest tube placement 12-Nov-2021 11:31:39  Diego Newsome  
PROCEDURES:  Endoscopic percutaneous gastrostomy 09-Nov-2021 15:48:59  Jocelyn Rashid  
PROCEDURES:  Chest tube placement 12-Nov-2021 11:31:39  Diego Newsome  
PROCEDURES:  Insertion of temporary dialysis catheter 19-Nov-2021 18:49:28  Omari Manuel

## 2021-11-19 NOTE — PROGRESS NOTE ADULT - SUBJECTIVE AND OBJECTIVE BOX
NSCU ATTENDING -- ADDITIONAL PROGRESS NOTE    Nighttime rounds were performed -- please refer to earlier Progress Note for HPI details.      ICU Vital Signs Last 24 Hrs  T(C): 37 (19 Nov 2021 19:00), Max: 37.1 (18 Nov 2021 21:00)  T(F): 98.6 (19 Nov 2021 19:00), Max: 98.8 (18 Nov 2021 21:00)  HR: 96 (19 Nov 2021 20:00) (87 - 104)  BP: 167/82 (19 Nov 2021 20:00) (140/74 - 174/82)  BP(mean): 118 (19 Nov 2021 20:00) (100 - 120)  ABP: 168/92 (19 Nov 2021 20:00) (141/73 - 200/106)  ABP(mean): 123 (19 Nov 2021 20:00) (100 - 146)  RR: 20 (19 Nov 2021 20:00) (0 - 20)  SpO2: 100% (19 Nov 2021 20:00) (97% - 100%)      11-18-21 @ 07:01  -  11-19-21 @ 07:00  --------------------------------------------------------  IN: 2093.3 mL / OUT: 485 mL / NET: 1608.3 mL    11-19-21 @ 07:01  -  11-19-21 @ 20:48  --------------------------------------------------------  IN: 1532 mL / OUT: 0 mL / NET: 1532 mL      Mode: AC/ CMV (Assist Control/ Continuous Mandatory Ventilation), RR (machine): 20, TV (machine): 400, FiO2: 40, PEEP: 5, ITime: 1, MAP: 10, PIP: 22    Neuroexam:   Patient opens eyes to noxious stim, does not follow commands, pupils 3mm equal and brisk (+) Doll's, (+) cough, gag and corneals, flaccid in uppers, TFs in lowers      MEDICATIONS:   acetylcysteine 20%  Inhalation 4 milliLiter(s) Inhalation three times a day  artificial  tears Solution 1 Drop(s) Both EYES two times a day  ceFAZolin   IVPB 500 milliGRAM(s) IV Intermittent every 12 hours  chlorhexidine 0.12% Liquid 15 milliLiter(s) Oral Mucosa every 12 hours  chlorhexidine 2% Cloths 1 Application(s) Topical <User Schedule>  chlorhexidine 2% Cloths 1 Application(s) Topical <User Schedule>  fentaNYL   Infusion. 0.8 MICROgram(s)/kG/Hr (6.8 mL/Hr) IV Continuous <Continuous>  hydrALAZINE 25 milliGRAM(s) Oral every 12 hours  labetalol Injectable 10 milliGRAM(s) IV Push every 2 hours PRN  lactated ringers. 1000 milliLiter(s) (75 mL/Hr) IV Continuous <Continuous>  levETIRAcetam  Solution 500 milliGRAM(s) Oral two times a day  midazolam Infusion 0.02 mG/kG/Hr (1.7 mL/Hr) IV Continuous <Continuous>  ondansetron Injectable 4 milliGRAM(s) IV Push every 6 hours PRN  pantoprazole   Suspension 40 milliGRAM(s) Oral daily  polyethylene glycol 3350 17 Gram(s) Oral daily  senna 2 Tablet(s) Oral at bedtime  sodium chloride 0.9% lock flush 10 milliLiter(s) IV Push every 1 hour PRN      LABS:                     8.0    8.37  )-----------( 264      ( 19 Nov 2021 19:34 )             24.6     11-19    144  |  112<H>  |  69<H>  ----------------------------<  80  4.1   |  16<L>  |  8.41<H>    Ca    8.9      19 Nov 2021 06:00  Phos  6.3     11-19  Mg     2.3     11-19    TPro  6.5  /  Alb  2.7<L>  /  TBili  <0.2  /  DBili  x   /  AST  47<H>  /  ALT  23  /  AlkPhos  165<H>  11-18    LIVER FUNCTIONS - ( 18 Nov 2021 04:54 )  Alb: 2.7 g/dL / Pro: 6.5 g/dL / ALK PHOS: 165 U/L / ALT: 23 U/L / AST: 47 U/L / GGT: x           ABG - ( 19 Nov 2021 06:05 )  pH, Arterial: 7.36  pH, Blood: x     /  pCO2: 31    /  pO2: 167   / HCO3: 18    / Base Excess: -6.8  /  SaO2: 98.5        Relevant labwork and imaging reviewed.    A/P:   aSAH, ICH, SAH s/p DHC brain edema and decompression  s/p cardiac arrest 2/2 hypoxemia  B/L pneumothoraces  Oliguric ATN    Neuro: Neurochecks Q4hr. Keppra- renal dose for clearance <15  Hydrocephalus: Stable vent size.   Sedation: On versed and fent gtt for RASS neg 2. No longer on propofol (?MAUREEN/ hypertriglyceridemia). Monitor CPK, trigs   CV: SBP goal 100-160 mmhg.  Pulm: B/L pneumothoraces with chest tubes. To water seal 11/19. Daily CXR. Appreciate pulm input.   GI: On trickle feeds- Suplena. Shock liver- overall improvement noted. Monitor LFTs.   Renal:  ATN- now anuric. Metabolic acidosis. LR @ 75ml/hr. Renal following. Monitor Is/Os/bladder scan. Has Angeline in place for HD 11/20.   ID: Afebrile. On cefazolin for trach packing.  Endo: Target sugar 120-180   Heme: SCD, Heparin SQ- held. Anemia- likely bleeding from trach site. S/P 1 unit PRBCs. Hb 7.0-> 8.0.  MISC: S/P GOC meeting 11/19. Full code. Trach revision on 11/22 and HD on 11/20    Patient remains critically ill.           NSCU ATTENDING -- ADDITIONAL PROGRESS NOTE    Nighttime rounds were performed -- please refer to earlier Progress Note for HPI details.      ICU Vital Signs Last 24 Hrs  T(C): 37 (19 Nov 2021 19:00), Max: 37.1 (18 Nov 2021 21:00)  T(F): 98.6 (19 Nov 2021 19:00), Max: 98.8 (18 Nov 2021 21:00)  HR: 96 (19 Nov 2021 20:00) (87 - 104)  BP: 167/82 (19 Nov 2021 20:00) (140/74 - 174/82)  BP(mean): 118 (19 Nov 2021 20:00) (100 - 120)  ABP: 168/92 (19 Nov 2021 20:00) (141/73 - 200/106)  ABP(mean): 123 (19 Nov 2021 20:00) (100 - 146)  RR: 20 (19 Nov 2021 20:00) (0 - 20)  SpO2: 100% (19 Nov 2021 20:00) (97% - 100%)      11-18-21 @ 07:01  -  11-19-21 @ 07:00  --------------------------------------------------------  IN: 2093.3 mL / OUT: 485 mL / NET: 1608.3 mL    11-19-21 @ 07:01  -  11-19-21 @ 20:48  --------------------------------------------------------  IN: 1532 mL / OUT: 0 mL / NET: 1532 mL      Mode: AC/ CMV (Assist Control/ Continuous Mandatory Ventilation), RR (machine): 20, TV (machine): 400, FiO2: 40, PEEP: 5, ITime: 1, MAP: 10, PIP: 22    Neuroexam:   Patient opens eyes to noxious stim, does not follow commands, pupils 3mm equal and brisk (+) Doll's, (+) cough, gag and corneals, flaccid in uppers, TFs in lowers      MEDICATIONS:   acetylcysteine 20%  Inhalation 4 milliLiter(s) Inhalation three times a day  artificial  tears Solution 1 Drop(s) Both EYES two times a day  ceFAZolin   IVPB 500 milliGRAM(s) IV Intermittent every 12 hours  chlorhexidine 0.12% Liquid 15 milliLiter(s) Oral Mucosa every 12 hours  chlorhexidine 2% Cloths 1 Application(s) Topical <User Schedule>  chlorhexidine 2% Cloths 1 Application(s) Topical <User Schedule>  fentaNYL   Infusion. 0.8 MICROgram(s)/kG/Hr (6.8 mL/Hr) IV Continuous <Continuous>  hydrALAZINE 25 milliGRAM(s) Oral every 12 hours  labetalol Injectable 10 milliGRAM(s) IV Push every 2 hours PRN  lactated ringers. 1000 milliLiter(s) (75 mL/Hr) IV Continuous <Continuous>  levETIRAcetam  Solution 500 milliGRAM(s) Oral two times a day  midazolam Infusion 0.02 mG/kG/Hr (1.7 mL/Hr) IV Continuous <Continuous>  ondansetron Injectable 4 milliGRAM(s) IV Push every 6 hours PRN  pantoprazole   Suspension 40 milliGRAM(s) Oral daily  polyethylene glycol 3350 17 Gram(s) Oral daily  senna 2 Tablet(s) Oral at bedtime  sodium chloride 0.9% lock flush 10 milliLiter(s) IV Push every 1 hour PRN      LABS:                     8.0    8.37  )-----------( 264      ( 19 Nov 2021 19:34 )             24.6     11-19    144  |  112<H>  |  69<H>  ----------------------------<  80  4.1   |  16<L>  |  8.41<H>    Ca    8.9      19 Nov 2021 06:00  Phos  6.3     11-19  Mg     2.3     11-19    TPro  6.5  /  Alb  2.7<L>  /  TBili  <0.2  /  DBili  x   /  AST  47<H>  /  ALT  23  /  AlkPhos  165<H>  11-18    LIVER FUNCTIONS - ( 18 Nov 2021 04:54 )  Alb: 2.7 g/dL / Pro: 6.5 g/dL / ALK PHOS: 165 U/L / ALT: 23 U/L / AST: 47 U/L / GGT: x           ABG - ( 19 Nov 2021 06:05 )  pH, Arterial: 7.36  pH, Blood: x     /  pCO2: 31    /  pO2: 167   / HCO3: 18    / Base Excess: -6.8  /  SaO2: 98.5        Relevant labwork and imaging reviewed.    A/P:   aSAH, ICH, SAH s/p DHC brain edema and decompression  s/p cardiac arrest 2/2 hypoxia  B/L pneumothoraces  Anuric ATN  Metabolic acidosis  Multiorgan failure    Neuro: Neurochecks Q4hr. Keppra- renal dose for clearance <15  Hydrocephalus: Stable vent size.   Sedation: On versed and fent gtt for RASS neg 2. No longer on propofol (?MAUREEN/ hypertriglyceridemia). Monitor CPK, trigs   CV: SBP goal 100-160 mmhg.  Pulm: B/L pneumothoraces with chest tubes. To water seal 11/19. Daily CXR. Appreciate pulm input.   GI: On trickle feeds- Suplena. Shock liver- overall improvement noted. Monitor LFTs.   Renal:  ATN- now anuric. Metabolic acidosis. LR @ 75ml/hr. Renal following. Monitor Is/Os/bladder scan. Has Angeline in place for HD 11/20.   ID: Afebrile. On cefazolin for trach packing.  Endo: Target sugar 120-180   Heme: SCD, Heparin SQ- held. Anemia- likely bleeding from trach site. S/P 1 unit PRBCs. Hb 7.0-> 8.0.  MISC: S/P GO meeting 11/19. Full code. Trach revision on 11/22 and HD on 11/20    Patient remains critically ill.

## 2021-11-20 LAB
ALBUMIN SERPL ELPH-MCNC: 2.5 G/DL — LOW (ref 3.3–5)
ALP SERPL-CCNC: 179 U/L — HIGH (ref 40–120)
ALT FLD-CCNC: <5 U/L — LOW (ref 10–45)
ANION GAP SERPL CALC-SCNC: 17 MMOL/L — SIGNIFICANT CHANGE UP (ref 5–17)
AST SERPL-CCNC: 35 U/L — SIGNIFICANT CHANGE UP (ref 10–40)
BASE EXCESS BLDA CALC-SCNC: -1.4 MMOL/L — SIGNIFICANT CHANGE UP (ref -2–3)
BASE EXCESS BLDA CALC-SCNC: -6.8 MMOL/L — LOW (ref -2–3)
BASE EXCESS BLDA CALC-SCNC: -9.7 MMOL/L — LOW (ref -2–3)
BILIRUB DIRECT SERPL-MCNC: 0.2 MG/DL — SIGNIFICANT CHANGE UP (ref 0–0.2)
BILIRUB INDIRECT FLD-MCNC: SIGNIFICANT CHANGE UP (ref 0.2–1)
BILIRUB SERPL-MCNC: <0.2 MG/DL — SIGNIFICANT CHANGE UP (ref 0.2–1.2)
BUN SERPL-MCNC: 69 MG/DL — HIGH (ref 7–23)
CALCIUM SERPL-MCNC: 8.8 MG/DL — SIGNIFICANT CHANGE UP (ref 8.4–10.5)
CHLORIDE SERPL-SCNC: 109 MMOL/L — HIGH (ref 96–108)
CK SERPL-CCNC: 29 U/L — SIGNIFICANT CHANGE UP (ref 25–170)
CO2 BLDA-SCNC: 18 MMOL/L — LOW (ref 19–24)
CO2 BLDA-SCNC: 18 MMOL/L — LOW (ref 19–24)
CO2 BLDA-SCNC: 24 MMOL/L — SIGNIFICANT CHANGE UP (ref 19–24)
CO2 SERPL-SCNC: 17 MMOL/L — LOW (ref 22–31)
CREAT SERPL-MCNC: 8.66 MG/DL — HIGH (ref 0.5–1.3)
GAS PNL BLDA: SIGNIFICANT CHANGE UP
GLUCOSE SERPL-MCNC: 109 MG/DL — HIGH (ref 70–99)
HBV SURFACE AB SER-ACNC: REACTIVE
HBV SURFACE AG SER-ACNC: SIGNIFICANT CHANGE UP
HCO3 BLDA-SCNC: 17 MMOL/L — LOW (ref 21–28)
HCO3 BLDA-SCNC: 18 MMOL/L — LOW (ref 21–28)
HCO3 BLDA-SCNC: 23 MMOL/L — SIGNIFICANT CHANGE UP (ref 21–28)
HCT VFR BLD CALC: 26.7 % — LOW (ref 34.5–45)
HCV AB S/CO SERPL IA: 0.04 S/CO — SIGNIFICANT CHANGE UP
HCV AB SERPL-IMP: SIGNIFICANT CHANGE UP
HGB BLD-MCNC: 8.5 G/DL — LOW (ref 11.5–15.5)
MAGNESIUM SERPL-MCNC: 2.4 MG/DL — SIGNIFICANT CHANGE UP (ref 1.6–2.6)
MCHC RBC-ENTMCNC: 26.4 PG — LOW (ref 27–34)
MCHC RBC-ENTMCNC: 31.8 GM/DL — LOW (ref 32–36)
MCV RBC AUTO: 82.9 FL — SIGNIFICANT CHANGE UP (ref 80–100)
NRBC # BLD: 0 /100 WBCS — SIGNIFICANT CHANGE UP (ref 0–0)
PCO2 BLDA: 31 MMHG — LOW (ref 32–35)
PCO2 BLDA: 36 MMHG — HIGH (ref 32–35)
PCO2 BLDA: 37 MMHG — HIGH (ref 32–35)
PH BLDA: 7.26 — LOW (ref 7.35–7.45)
PH BLDA: 7.36 — SIGNIFICANT CHANGE UP (ref 7.35–7.45)
PH BLDA: 7.41 — SIGNIFICANT CHANGE UP (ref 7.35–7.45)
PHOSPHATE SERPL-MCNC: 7.7 MG/DL — HIGH (ref 2.5–4.5)
PLATELET # BLD AUTO: 272 K/UL — SIGNIFICANT CHANGE UP (ref 150–400)
PO2 BLDA: 191 MMHG — HIGH (ref 83–108)
PO2 BLDA: 212 MMHG — HIGH (ref 83–108)
PO2 BLDA: 70 MMHG — LOW (ref 83–108)
POTASSIUM SERPL-MCNC: 4.6 MMOL/L — SIGNIFICANT CHANGE UP (ref 3.5–5.3)
POTASSIUM SERPL-SCNC: 4.6 MMOL/L — SIGNIFICANT CHANGE UP (ref 3.5–5.3)
PROT SERPL-MCNC: 6.4 G/DL — SIGNIFICANT CHANGE UP (ref 6–8.3)
RBC # BLD: 3.22 M/UL — LOW (ref 3.8–5.2)
RBC # FLD: 21.5 % — HIGH (ref 10.3–14.5)
SAO2 % BLDA: 93.9 % — LOW (ref 94–98)
SAO2 % BLDA: 99 % — HIGH (ref 94–98)
SAO2 % BLDA: 99.3 % — HIGH (ref 94–98)
SARS-COV-2 RNA SPEC QL NAA+PROBE: SIGNIFICANT CHANGE UP
SODIUM SERPL-SCNC: 143 MMOL/L — SIGNIFICANT CHANGE UP (ref 135–145)
TRIGL SERPL-MCNC: 229 MG/DL — HIGH
WBC # BLD: 11.01 K/UL — HIGH (ref 3.8–10.5)
WBC # FLD AUTO: 11.01 K/UL — HIGH (ref 3.8–10.5)

## 2021-11-20 PROCEDURE — 99291 CRITICAL CARE FIRST HOUR: CPT

## 2021-11-20 PROCEDURE — 71045 X-RAY EXAM CHEST 1 VIEW: CPT | Mod: 26

## 2021-11-20 PROCEDURE — 99024 POSTOP FOLLOW-UP VISIT: CPT

## 2021-11-20 PROCEDURE — 90937 HEMODIALYSIS REPEATED EVAL: CPT

## 2021-11-20 PROCEDURE — 99292 CRITICAL CARE ADDL 30 MIN: CPT

## 2021-11-20 RX ORDER — HYDRALAZINE HCL 50 MG
25 TABLET ORAL THREE TIMES A DAY
Refills: 0 | Status: DISCONTINUED | OUTPATIENT
Start: 2021-11-20 | End: 2021-11-20

## 2021-11-20 RX ORDER — HYDRALAZINE HCL 50 MG
25 TABLET ORAL ONCE
Refills: 0 | Status: COMPLETED | OUTPATIENT
Start: 2021-11-20 | End: 2021-11-20

## 2021-11-20 RX ORDER — HEPARIN SODIUM 5000 [USP'U]/ML
5000 INJECTION INTRAVENOUS; SUBCUTANEOUS EVERY 8 HOURS
Refills: 0 | Status: DISCONTINUED | OUTPATIENT
Start: 2021-11-20 | End: 2021-11-21

## 2021-11-20 RX ORDER — HYDRALAZINE HCL 50 MG
10 TABLET ORAL ONCE
Refills: 0 | Status: COMPLETED | OUTPATIENT
Start: 2021-11-20 | End: 2021-11-20

## 2021-11-20 RX ORDER — SODIUM CHLORIDE 9 MG/ML
1000 INJECTION, SOLUTION INTRAVENOUS
Refills: 0 | Status: DISCONTINUED | OUTPATIENT
Start: 2021-11-20 | End: 2021-11-23

## 2021-11-20 RX ORDER — HYDRALAZINE HCL 50 MG
25 TABLET ORAL THREE TIMES A DAY
Refills: 0 | Status: DISCONTINUED | OUTPATIENT
Start: 2021-11-21 | End: 2021-11-20

## 2021-11-20 RX ORDER — HYDRALAZINE HCL 50 MG
50 TABLET ORAL EVERY 8 HOURS
Refills: 0 | Status: DISCONTINUED | OUTPATIENT
Start: 2021-11-20 | End: 2021-11-21

## 2021-11-20 RX ADMIN — SENNA PLUS 2 TABLET(S): 8.6 TABLET ORAL at 21:08

## 2021-11-20 RX ADMIN — Medication 10 MILLIGRAM(S): at 14:26

## 2021-11-20 RX ADMIN — CHLORHEXIDINE GLUCONATE 1 APPLICATION(S): 213 SOLUTION TOPICAL at 05:07

## 2021-11-20 RX ADMIN — PANTOPRAZOLE SODIUM 40 MILLIGRAM(S): 20 TABLET, DELAYED RELEASE ORAL at 11:29

## 2021-11-20 RX ADMIN — Medication 4 MILLILITER(S): at 05:06

## 2021-11-20 RX ADMIN — Medication 25 MILLIGRAM(S): at 21:56

## 2021-11-20 RX ADMIN — LEVETIRACETAM 500 MILLIGRAM(S): 250 TABLET, FILM COATED ORAL at 17:49

## 2021-11-20 RX ADMIN — Medication 1 DROP(S): at 17:52

## 2021-11-20 RX ADMIN — Medication 10 MILLIGRAM(S): at 22:34

## 2021-11-20 RX ADMIN — Medication 10 MILLIGRAM(S): at 20:34

## 2021-11-20 RX ADMIN — Medication 25 MILLIGRAM(S): at 17:49

## 2021-11-20 RX ADMIN — CHLORHEXIDINE GLUCONATE 15 MILLILITER(S): 213 SOLUTION TOPICAL at 17:49

## 2021-11-20 RX ADMIN — Medication 100 MILLIGRAM(S): at 17:48

## 2021-11-20 RX ADMIN — Medication 25 MILLIGRAM(S): at 23:58

## 2021-11-20 RX ADMIN — Medication 4 MILLILITER(S): at 21:07

## 2021-11-20 RX ADMIN — Medication 1 DROP(S): at 05:07

## 2021-11-20 RX ADMIN — Medication 25 MILLIGRAM(S): at 05:06

## 2021-11-20 RX ADMIN — Medication 100 MILLIGRAM(S): at 05:20

## 2021-11-20 RX ADMIN — CHLORHEXIDINE GLUCONATE 15 MILLILITER(S): 213 SOLUTION TOPICAL at 05:06

## 2021-11-20 RX ADMIN — HEPARIN SODIUM 5000 UNIT(S): 5000 INJECTION INTRAVENOUS; SUBCUTANEOUS at 21:09

## 2021-11-20 RX ADMIN — AMLODIPINE BESYLATE 10 MILLIGRAM(S): 2.5 TABLET ORAL at 05:02

## 2021-11-20 RX ADMIN — Medication 10 MILLIGRAM(S): at 14:58

## 2021-11-20 RX ADMIN — Medication 4 MILLILITER(S): at 16:39

## 2021-11-20 RX ADMIN — SODIUM CHLORIDE 75 MILLILITER(S): 9 INJECTION, SOLUTION INTRAVENOUS at 17:53

## 2021-11-20 RX ADMIN — Medication 10 MILLIGRAM(S): at 03:00

## 2021-11-20 NOTE — PROGRESS NOTE ADULT - SUBJECTIVE AND OBJECTIVE BOX
Patient was seen and evaluated on dialysis.     Dialyzer: Optiflux [id number]  QB: 250 mL/min  QD: 500 mL/min  K bath: 2  Goal UF: 1L  Duration: 120 min    Patient is tolerating the procedure well.   Continue full hemodialysis treatment as prescribed.

## 2021-11-20 NOTE — PROGRESS NOTE ADULT - ASSESSMENT
46F with pmhx of HTN who presented to the hospital after being found down at home for unknown period of time. 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 Nephrology consulted for CHETAN management and possible HD needs    Assessment/Plan:   #anuric iATN  Baseline creatinine 0.6  Pt has prolonged hospital stay for a L MCA aneurysm rupture; she suffered from cardiac arrest x 3 on 11/12 and now is intubated in the ICU. Her kidney function has continued to rise over the course of the last 24 hours and her urine studies are suggestive of possible ATN given uSodium of 115 which correlates to her having hypoperfusion from the cardiac arrest.   -Trend BMP daily  -for hemodialysis #1 starting today 11/20   -Strict I/O's  -Renal diet  -Avoid nephrotoxic meds    #Anemia of Chronic disease  -Ferritin and iron profile noted.    Thank you for the opportunity to participate in the care of your patient. The nephrology service remains available to assist with any questions or concerns. Please feel free to reach us by paging the on-call nephrology fellow for urgent issues or as below.     Linwood Hooper M.D.   PGY-5, Nephrology Fellow   C: [phone]   P: [phone]

## 2021-11-20 NOTE — PROGRESS NOTE ADULT - SUBJECTIVE AND OBJECTIVE BOX
Patient was seen and evaluated on dialysis again.     sBP > 160.     Dialyzer: Optiflux [id number]  QB: 250 mL/min  QD: 500 mL/min  K bath: 2  Goal UF: increased to 1.5L as tolerated by BP   Duration: 120 min    Patient is tolerating the procedure well.   Continue full hemodialysis treatment as above.

## 2021-11-20 NOTE — PROGRESS NOTE ADULT - SUBJECTIVE AND OBJECTIVE BOX
HPI:  45 y/o female with PMH HTN, Multiple sclerosis, recent spinal surgery 10/8 ([facility]) presented to [place] ED BIBEMS after being found unconscious at home, unknown period of time. 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 HH5, MF1. Patient was intubated at [place] ED, found to have blown L pupil, and sent straight to the OR for left hemicraniotomy. A-line placed intraop. Hemodynamically unstable upon arrival to [facility], bradycardic and hypertensive s/p Mannitol, Clevidipine, and Furosemide. Central line placed in NSICU. Currently sedated on Propofol, pending repeat CTH. History per patient's son, patient had recent spine surgery and was found on outpatient imaging last week to have L M1 segment aneurysm (unruptured), pt asymptomatic at this time. Per son patient taking percocet PO at home. Ureña catheter, ET tube, and arterial line placed at [facility].     NIHSS on arrival: 32   Bess Griffin 5, Mod Busby 4  Bleed Day 1 = 10/26 (26 Oct 2021 13:03)    Hospital Course:  10/26: admitted to [facility] from [facility], POD#0 s/p L hemicraniectomy for decompression and evacuation of SDH. Transferred to [facility] noted to have tense flap, received mannitol, keppra, decadron. Was hypertensive to 200s and preston to 40s, recevied 85g mannitol and bullet 23.4%. CTH on arrival demonstrated increased size in vents and new IVH. EVD placed, open at 15, central line placed. Transfused 2 u PRBC. POD0 of coiling of left MCA bifurcation aneurysm,   10/27: CTH demonstrated EVD in correct position, EVD lowered to 5, remains intubated on proprofol, pending repeat CTH in AM. Started on 3% @ 30/hr. 2LNS given for euvolemia, Mannitol given for uptrending ICPs. Bullet given 8:30 am. 3% increased to 50/hr. 1 L bolus. New EVD catheter placed using exisiting sreekanth hole. 1 u PRBC.  10/28: HH5, MF4, BD3; POD2 angio coil/embo of L MCA aneurysm. JOAQUÍN overnight, neuro stable. EVD@5cmH20 ICPs < 20 overnight, OP WNL. S/p 1 unit PRBC yesterday with appropriate response. Given 42g mannitol in AM for ICP 22 persistently, with improvement, then given 23% in PM for elevated ICP. febrile, pancultured. Standing tylenol and cooling blanket.   10/29: BD4, POD3 angio coil/embo. JOAQUÍN o/n, neuro stable. EVD@5, ICPs <20 o/n.   10/30: BD5, POD4 angio coil/embo. JOAQUÍN o/n neuro stable. EVD@5. 3% @50cc/hr.  10/31: BD6, POD5 angio coil/embo. brief period maintained upward gaze, resolved on its own. EVD@5cm h2o.    11/1: BD7, POD6 L hemicrani, angio coil/embo. JOAQUÍN overnight. Neuro exam unchanged. ICPs WNL. started bromocriptine/gabapentin/baclofen. dc'd propofol, started precedex  11/2: BD8 POD7 L hemicrani, angio coil/embo. JOAQUÍN overnight. Neuro exam stable. Remains on 3% hypertonic saline. Receieved 1 unit of PRBCs for a Hgb of 7.6.  11/3: BD 9 POD8 of L hemicrani. Elevated lipase, normal amylase, OG tube clogged and replaced. Abdominal US normal. Pending gen surg reccs regarding trach and peg. Gabapentin and Baclofen increased to help with neurostorming. restarted back on 3%, LR@35. became preston+hypotensive with nimodipine 60q4, decreased back to 30q2, NaCl bullet given.   11/4: BD10 POD9, JOAQUÍN o/n, 500cc NS for euvolemia,  neuro stable, EVD at 5. 3% increased to 75  11/5: BD11 POD10, JOAQUÍN o/n, neuro stable, EVD at 5  11/6: BD12 POD11 JOAQUÍN overnight. Neuro exam stable. Remains intubated on precedex. 3% hypertonic saline dc'd  11/7: BD13 POD 12 JOAQUÍN o/n, NGT replaced. on precex with no icp crisis   11/8: BD14 POD13 JOAQUÍN o/n, noted to have tongue maceration/macroglossia. Gauze with tongue depressor placed. Pending further recs from ENT. Pending trach and PEG placement tomorrow with gen surg. Off precedex, ICPs stable. EVD remains @ 5.   11/9: BD15, POD 14, bleeding under tongue at start of shift, fentanyl pushed with no further episodes. gabapentin stopped. PEG placed  11/10: BD 16, POD 15, neuro stable, ENT to be consulted in AM, 3% increased to 50/hr.  11/11: BD 17, POD 16, neuro exam stable. Pend CT saba in am for cranioplasty planning. Pend trach in OR with ENT. F.u BMP in am, 3%@50cc/hr for Na goal 140-145.   11/12: BD 18, POD 17. JOAQUÍN overnight, neuro stable. Pend trach placement today. CT saba completed 11/11. Off of 3%, f/u am BMP for Na goal 140-150. CSF neg. Hypoxic cardiac arrest with ROSC x 3 during tracheostomy placement. B/l chest tubes placed for resulting pneumothorax s/p CPR. salt tabs dc'd.  11/13: BD 19, POD 18. Patient tachycardic with some improvement, SBP stable off of levophed, hgb downtrending o/n, transfused 1 unit PRBCs. B/l chest tube. EVD @5cmH2O, no elevated ICPs. UOP downtrending, trend BUN/Cr, given 1LNS x1 for low urine output. F/u am CXR. Cont Cefepime until today, then change to Ancef while trach packing in place per ENT. Pend CTH when stable. Trops downtrending s/p cardiac arrest. 1L. florinef dc'd. BP goal changed to 100-160, started on amlodipine   11/14: BD20, POD19, worsening creatinine with decreased urine output. Given 250 of albumin and 500cc LR. started on hydralazine PO, decreased amantadine 100 daily given CrCl of 20.  11/15: BD21, POD20, remains oliguric, fem line dc'd, tongue swollen and unable to fit bite block in mouth, started on nimbex gtt to relax jaw. Propofol and fentanyl gtt started. Vomiting TFs through nose and mouth, ENT called. TFs held. Cr uptrending. Palliative consulted.  11/16: BD22, POD21, o/n external trach dressing replaced due to cuff leak and decreasing TV, sedated and paralyzed on nimbex, propofol, and fentanyl gtt. CTH stable.  EVD raised from 5 to 10. Nimbex weaned off. Cr uptrending, Trickle feeds started. FOB negative.   11/17: BD 23, POD22, JOAQUÍN o/n, EVD clamped, NS d/c'ed, LR@50, advancing tube feeds.   11/18: BD24, POD23 o/n 3% at 30 with Na acetate started, propofol dc'd due to elevated TG level, episode of vomiting TFs from nose and mouth into ETT with elevated ICP 30s, ICP normalized with resolution of vomiting, reglan 5mg IV given, TFs held, 3% stopped. midline placed   11/19; BD25, POD24 JOAQUÍN overnight. Remains on versed and fentanyl drips. Neuro exam unchanged. R IJ dialysis cath placed.   11/20: BD26 POD25 JOAQUÍN overnight. Neuro exam unchanged. Plan for HD today    Vital Signs Last 24 Hrs  T(C): 37.7 (19 Nov 2021 22:27), Max: 37.7 (19 Nov 2021 22:27)  T(F): 99.9 (19 Nov 2021 22:27), Max: 99.9 (19 Nov 2021 22:27)  HR: 88 (20 Nov 2021 00:00) (86 - 104)  BP: 151/77 (20 Nov 2021 00:00) (140/74 - 174/82)  BP(mean): 109 (20 Nov 2021 00:00) (100 - 121)  RR: 20 (20 Nov 2021 00:00) (0 - 20)  SpO2: 100% (20 Nov 2021 00:00) (97% - 100%)    I&O's Summary    18 Nov 2021 07:01  -  19 Nov 2021 07:00  --------------------------------------------------------  IN: 2093.3 mL / OUT: 485 mL / NET: 1608.3 mL    19 Nov 2021 07:01  -  20 Nov 2021 00:09  --------------------------------------------------------  IN: 2057.5 mL / OUT: 0 mL / NET: 2057.5 mL      PHYSICAL EXAM:  General: NAD, pt is comfortably laying in hospital bed, +intubated on full vent support, +sedated on fentanyl gtt and versed gtt  HEENT: PERRL 2 mm b/l, dysconjugate gaze - R eye midline, left eye temporal deviation. Tracheostomy site with packing in place  Cardiovascular: RRR, normal S1 and S2   Respiratory: chest rise symmetric,  b/l chest tubes to suction  GI: abd soft, ND, +PEG   Neuro: OEs noxious, nonverbal, not answering questions, RUE extensor posturing, LUE trace WDL, LE TF b/l, +cough/gag, +corneals  Extremities: extremities warm and dry  Wound/incision: L hemicrani incision site C/D/I, flap soft and full. B/l posterior spinal surgical incision sites with wound dehiscence     DIET:  [] NPO  [] Mechanical  [x] Tube feeds: trickle    TUBES/LINES:  [] Ureña  [] Lumbar Drain  [] Wound Drains  [x] Others:   R IJ CVC for HD  Left radial a-line  R brachial Midline    B/L chest tubes - to water seal     LABS:                        8.0    8.37  )-----------( 264      ( 19 Nov 2021 19:34 )             24.6     11-19    144  |  112<H>  |  69<H>  ----------------------------<  80  4.1   |  16<L>  |  8.41<H>    Ca    8.9      19 Nov 2021 06:00  Phos  6.3     11-19  Mg     2.3     11-19    TPro  6.5  /  Alb  2.7<L>  /  TBili  <0.2  /  DBili  x   /  AST  47<H>  /  ALT  23  /  AlkPhos  165<H>  11-18            CAPILLARY BLOOD GLUCOSE          Drug Levels: [] N/A    CSF Analysis: [] N/A      Allergies    No Known Allergies    Intolerances      MEDICATIONS:  Antibiotics:  ceFAZolin   IVPB 500 milliGRAM(s) IV Intermittent every 12 hours    Neuro:  fentaNYL   Infusion. 0.8 MICROgram(s)/kG/Hr IV Continuous <Continuous>  levETIRAcetam  Solution 500 milliGRAM(s) Oral every 24 hours  midazolam Infusion 0.02 mG/kG/Hr IV Continuous <Continuous>  ondansetron Injectable 4 milliGRAM(s) IV Push every 6 hours PRN    Anticoagulation:    OTHER:  acetylcysteine 20%  Inhalation 4 milliLiter(s) Inhalation three times a day  amLODIPine   Tablet 10 milliGRAM(s) Oral daily  artificial  tears Solution 1 Drop(s) Both EYES two times a day  chlorhexidine 0.12% Liquid 15 milliLiter(s) Oral Mucosa every 12 hours  chlorhexidine 2% Cloths 1 Application(s) Topical <User Schedule>  chlorhexidine 2% Cloths 1 Application(s) Topical <User Schedule>  hydrALAZINE 25 milliGRAM(s) Oral every 12 hours  labetalol Injectable 10 milliGRAM(s) IV Push every 2 hours PRN  pantoprazole   Suspension 40 milliGRAM(s) Oral daily  polyethylene glycol 3350 17 Gram(s) Oral daily  senna 2 Tablet(s) Oral at bedtime    IVF:  lactated ringers. 1000 milliLiter(s) IV Continuous <Continuous>    CULTURES:  Culture Results:   No growth to date (11-11 @ 17:53)    RADIOLOGY & ADDITIONAL TESTS:      ASSESSMENT:  45 y/o female with PMHx of HTN and MS, hx of spinal surgery at [facility] 10/8/21 presented to [place] [facility] after being found unconscious at home, unknown period of time. 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 HH5, MF4; BD #1 = 10/26. Patient was intubated at [place] ED and sent straight to the OR for left hemicraniotomy. A-line placed intraop. Hemodynamically unstable upon arrival to [facility], bradycardic and hypertensive s/p Mannitol and Furosemide. Central line placed in NSICU. S/p EVD placement, and coil embolization of left MCA bifurcation aneurysm 10/26/2021. S/p cerebral angio without findings of vasospasm 11/10. S/p cardiac arrest with ROSC x3 on 11/12 during tracheostomy at bedside now with b/l pneumothoracies and worsening kidney function. EVD dc'd 11/18    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, left    Intracranial hemorrhage, spontaneous subarachnoid, due to cerebral aneurysm, acute    Pneumothorax, left    Functional quadriplegia    Acute respiratory failure with hypoxia    Cardiac arrest    Encounter for palliative care    Advanced care planning/counseling discussion    IPH (idiopathic pulmonary hemosiderosis)    Acute spontaneous intraparenchymal intracranial hemorrhage due to cerebral aneurysm    CHETAN (acute kidney injury)    Angiogram, cerebral, with coil embolization, in non-operating room setting    Endoscopic percutaneous gastrostomy    Angiogram, carotid and cerebral, bilateral    Chest tube placement    Insertion of central venous catheter with ultrasound guidance    Insertion of temporary dialysis catheter    Personal history of spine surgery    [email or web address]      PLAN:  NEURO:  - neuro checks q4hrs/vital signs q1hr  - Keppra 500mg q24h renal dosing   - VEEG dc'd, neg for seizures  - Nimodipine dc'd  - EVD dced 11/18   - CTH 11/16, CTH 11/18 stable   - fentanyl gtt, versed gtt   - propofol gtt dc'd for triglycerides 292 11/17, trend     CARDIO:  - -160  - amlodipine 5 daily and hydral 25 PO BID   - s/p cardiac arrest  - TTE 11/15: mild LVH, EF 70%  - QTc 11/17 456    PULM:  - intubated on full vent support  - s/p acute hypoxic cardiac arrest 11/12 during tracheostomy placment with ENT c/b b/l pneumothorax now with b/l chest tubes   - Per pulm - b/l chest tubes to water seal 11/19 and reasess following Trach placement   - will need trach revision with ENT - plan for OR Monday 11/22  - Daily CXR     GI:  - PEG TFs restarted suplena @ 10 cc 11/19  - PPI QD GI ppx  - Bowel regimen     RENAL:   - LR at 75   - Na 140-145   - Nephro following, plan for HD 11/20      HEME:  - SCDs, SQH ppx (on hold currently 2/2 bleeding at RIJ site and anemia)  - s/p multiple transfusions this admission, most recently 1 u PRBC 11/19   - 11/14 UE/LE dopplers neg   - FOB neg 11/16     ID:  - Tylenol PRN for fever   - sputum cx 11/4: enterobacter, proteus  - Cefepime started to cover for enterobacter/proteus in sputum dc'd 11/15   - Ancef until trach packing removed 500 BID    ENDO:  - ISS   - monitor FS    OTHER  - wound care recs- q2 dressing changes   - ENT consulted, reccomends trach placement and oral hygeine for tongue laceration   - has R midline (11/18)    GOC: full code  Level of care: ICU status   Dispo: pending trach  family updated    Case discussed with Dr. Duncan and Dr. Asael meza

## 2021-11-20 NOTE — PROGRESS NOTE ADULT - SUBJECTIVE AND OBJECTIVE BOX
NSCU ATTENDING -- ADDITIONAL PROGRESS NOTE    Nighttime rounds were performed -- please refer to earlier Progress Note for HPI details.      ICU Vital Signs Last 24 Hrs  T(C): 37 (20 Nov 2021 17:00), Max: 37.7 (19 Nov 2021 22:27)  T(F): 98.6 (20 Nov 2021 17:00), Max: 99.9 (19 Nov 2021 22:27)  HR: 78 (20 Nov 2021 21:02) (78 - 98)  BP: 157/90 (20 Nov 2021 21:00) (134/71 - 187/95)  BP(mean): 118 (20 Nov 2021 21:00) (96 - 134)  ABP: 175/116 (20 Nov 2021 21:00) (127/108 - 206/110)  ABP(mean): 149 (20 Nov 2021 21:00) (109 - 151)  RR: 39 (20 Nov 2021 21:00) (12 - 39)  SpO2: 100% (20 Nov 2021 21:02) (100% - 100%)      11-19-21 @ 07:01  -  11-20-21 @ 07:00  --------------------------------------------------------  IN: 2726.1 mL / OUT: 100 mL / NET: 2626.1 mL    11-20-21 @ 07:01  -  11-20-21 @ 21:55  --------------------------------------------------------  IN: 1821.6 mL / OUT: 2000 mL / NET: -178.4 mL      Mode: AC/ CMV (Assist Control/ Continuous Mandatory Ventilation), RR (machine): 18, TV (machine): 400, FiO2: 50, PEEP: 5, ITime: 1, MAP: 13, PIP: 24      Exam:   Calm. Sedated and intubated  Patient opens eyes to noxious stim, does not follow commands, pupils 3mm equal and brisk (+) Doll's, (+) cough, gag and corneals, RUE extends, LUE 0/5, TFs in lowers  Tongue edematous with multiple lacerations.   S1/S2  Rhonchi noted B/L. Chest tubes to water seal  Abd soft non tender      MEDICATIONS:   acetylcysteine 20%  Inhalation 4 milliLiter(s) Inhalation three times a day  amLODIPine   Tablet 10 milliGRAM(s) Oral daily  artificial  tears Solution 1 Drop(s) Both EYES two times a day  ceFAZolin   IVPB 500 milliGRAM(s) IV Intermittent every 12 hours  chlorhexidine 0.12% Liquid 15 milliLiter(s) Oral Mucosa every 12 hours  chlorhexidine 2% Cloths 1 Application(s) Topical <User Schedule>  chlorhexidine 2% Cloths 1 Application(s) Topical <User Schedule>  fentaNYL   Infusion. 0.8 MICROgram(s)/kG/Hr (6.8 mL/Hr) IV Continuous <Continuous>  heparin   Injectable 5000 Unit(s) SubCutaneous every 8 hours  hydrALAZINE 25 milliGRAM(s) Oral three times a day  labetalol Injectable 10 milliGRAM(s) IV Push every 2 hours PRN  lactated ringers. 1000 milliLiter(s) (50 mL/Hr) IV Continuous <Continuous>  levETIRAcetam  Solution 500 milliGRAM(s) Oral every 24 hours  ondansetron Injectable 4 milliGRAM(s) IV Push every 6 hours PRN  pantoprazole   Suspension 40 milliGRAM(s) Oral daily  polyethylene glycol 3350 17 Gram(s) Oral daily  senna 2 Tablet(s) Oral at bedtime  sodium chloride 0.9% lock flush 10 milliLiter(s) IV Push every 1 hour PRN       LABS:                   8.5    11.01 )-----------( 272      ( 20 Nov 2021 05:34 )             26.7     11-20    143  |  109<H>  |  69<H>  ----------------------------<  109<H>  4.6   |  17<L>  |  8.66<H>    Ca    8.8      20 Nov 2021 05:34  Phos  7.7     11-20  Mg     2.4     11-20    TPro  6.4  /  Alb  2.5<L>  /  TBili  <0.2  /  DBili  0.2  /  AST  35  /  ALT  <5<L>  /  AlkPhos  179<H>  11-20    LIVER FUNCTIONS - ( 20 Nov 2021 05:34 )  Alb: 2.5 g/dL / Pro: 6.4 g/dL / ALK PHOS: 179 U/L / ALT: <5 U/L / AST: 35 U/L / GGT: x           ABG - ( 20 Nov 2021 17:38 )  pH, Arterial: 7.41  pH, Blood: x     /  pCO2: 36    /  pO2: 191   / HCO3: 23    / Base Excess: -1.4  /  SaO2: 99.3      Relevant labwork and imaging reviewed.    A/P:   aSAH, ICH, SAH s/p DHC brain edema and decompression  s/p cardiac arrest 2/2 hypoxia  B/L pneumothoraces  Anuric ATN  Metabolic acidosis  Multiorgan failure      Neuro: Neurochecks Q4hr. Keppra- renal dose for clearance <15  Hydrocephalus: Stable vent size. No longer has EVD.   Sedation: On fent gtt for RASS neg 2. No longer on propofol (?MAUREEN/ hypertriglyceridemia). Monitor trigs. CPK wnl.  CV: SBP goal 100-160 mmhg.  Pulm: B/L pneumothoraces with chest tubes. To water seal 11/19. Daily CXRs Appreciate pulm input. ABG and adjust vent as appropriate re: metabolic acidosis.  GI: On trickle feeds at 20cc/hr. Suplena. Shock liver- overall improvement noted. Monitor LFTs (alk phos uptrending)  Renal:  ATN- oliguric. Metabolic acidosis improving. LR @ 50l/hr. Renal following. S/P HD on 11/20. Monitor Is/Os and need for further HD.   ID: Afebrile. On cefazolin for trach packing.  Heme: SCD, Heparin SQ. Anemia- likely bleeding from trach site. S/P 1 unit PRBCs. Hb 7.0-> 8.5 stable. Monitor CBCs.   MISC: S/P GOC meeting 11/19. Full code. Trach revision scheduled for 11/22.    Patient remains critically ill.

## 2021-11-20 NOTE — PROGRESS NOTE ADULT - SUBJECTIVE AND OBJECTIVE BOX
Patient is a 46y Female seen and evaluated at bedside.   s/p RIJ TDC placed yesterday with oozing resolved  for hemodialysis #1 starting today   BP remains elevated     Meds:    acetylcysteine 20%  Inhalation 4 three times a day  amLODIPine   Tablet 10 daily  artificial  tears Solution 1 two times a day  ceFAZolin   IVPB 500 every 12 hours  chlorhexidine 0.12% Liquid 15 every 12 hours  chlorhexidine 2% Cloths 1 <User Schedule>  chlorhexidine 2% Cloths 1 <User Schedule>  fentaNYL   Infusion. 0.8 <Continuous>  heparin   Injectable 5000 every 8 hours  hydrALAZINE 25 every 12 hours  labetalol Injectable 10 every 2 hours PRN  lactated ringers. 1000 <Continuous>  levETIRAcetam  Solution 500 every 24 hours  midazolam Infusion 0.02 <Continuous>  ondansetron Injectable 4 every 6 hours PRN  pantoprazole   Suspension 40 daily  polyethylene glycol 3350 17 daily  senna 2 at bedtime  sodium chloride 0.9% lock flush 10 every 1 hour PRN      T(C): , Max: 37.7 (11-19-21 @ 22:27)  T(F): , Max: 99.9 (11-19-21 @ 22:27)  HR: 87 (11-20-21 @ 12:50)  BP: 158/89 (11-20-21 @ 12:50)  BP(mean): 117 (11-20-21 @ 12:50)  RR: 0 (11-20-21 @ 12:50)  SpO2: 100% (11-20-21 @ 12:50)  Wt(kg): --    11-19 @ 07:01  -  11-20 @ 07:00  --------------------------------------------------------  IN: 2726.1 mL / OUT: 100 mL / NET: 2626.1 mL    11-20 @ 07:01  -  11-20 @ 12:59  --------------------------------------------------------  IN: 483.8 mL / OUT: 0 mL / NET: 483.8 mL          Review of Systems:  ROS negative except as per HPI      PHYSICAL EXAM:  GENERAL: intubated; unresponsive  HEENT: NCAT, MMM  CHEST/LUNG: decreased breath sounds b/l   HEART: Regular rate and rhythm, no murmur, no gallops, no rub   ABDOMEN: Soft, nontender, non distended  EXTREMITIES: no pedal edema, WWP  Neurology: intubated and sedated  Access: +RIJ TDC non-tender c/d/i       LABS:                        8.5    11.01 )-----------( 272      ( 20 Nov 2021 05:34 )             26.7     11-20    143  |  109<H>  |  69<H>  ----------------------------<  109<H>  4.6   |  17<L>  |  8.66<H>    Ca    8.8      20 Nov 2021 05:34  Phos  7.7     11-20  Mg     2.4     11-20    TPro  6.4  /  Alb  2.5<L>  /  TBili  <0.2  /  DBili  0.2  /  AST  35  /  ALT  <5<L>  /  AlkPhos  179<H>  11-20    Hepatitis B Surface Antibody: Reactive *!* (11-20 @ 11:40)  Hepatitis C Virus S/CO Ratio: 0.04 S/CO (11-20 @ 11:40)              RADIOLOGY & ADDITIONAL STUDIES:

## 2021-11-20 NOTE — PROGRESS NOTE ADULT - SUBJECTIVE AND OBJECTIVE BOX
=================================  NEUROCRITICAL CARE ATTENDING NOTE  =================================    AILYN DÍAZ   MRN-[id number]  Summary:  46y/F with recent spinal surgery, found down and brought to ED.  In ED, witnessed shaking, ?seizures, intubated.  Imaging showed SAH.  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     COURSE IN THE HOSPITAL:  10/26 admitted to [facility], [place] - mannitol, 23.4%, repeat CT HCP - EVD R frontal inserted, s/p angio coil embo, 2 units pRBC  10/27 remained intubated overnight, given mannitol overnight; EVD reinserted, 1 unit pRBC  10/28 Tmax 38.2, ICPs to low 20s in AM, mannitol 0.5/Kg x1, 23.4% x1 in PM  10/29 Tmax 38.  remained intubated  10/30 TF stopped for vomiting/aspiration event  10/31 Tmax 38.2 No significant events overnight., remained intubated    Tmax 37.8 given 1 unit pRBC   ICPs teens, given bullet   no ICP issues; storming with stimulation / suctioning     remains intubated   remains intubated, s/p PEG, trach deferred due to macroglossia  11/10 remains intubated, s/p angio     failed trach - CP arrest x1 hour, PTX, 2 chest tubes    11/15 remains intubated on ventilator; aspiration event this AM; paralyzed for tongue biting   remains intubated on ventilator, cuff leak; hypothermic 95F overnight, placed on Josefa hugger   remains intubated on ventilator Clamped EVD this morning   remains intubated, vomiting overnight, tube feeds stopped, started on 3%@30, LR @50; propofol stopped (high TG); R IJ removed; R midline inserted; EVD removed   remains intubated, bleeding inline   remains intubated    Past Medical History: No pertinent past medical history  Allergies:  Allergy Status Unknown  Home meds:     PHYSICAL EXAMINATION   T(C): 36.8 ( @ 08:47), Max: 37.7 ( @ 22:27) HR: 97 ( @ 09:00) (84 - 104) BP: 148/83 ( @ 09:00) (143/77 - 173/78) RR: 20 ( @ 09:00) (0 - 20) SpO2: 100% ( @ 09:00) (97% - 100%)  NEUROLOGIC EXAMINATION:  Patient opens eyes to noxious, does not follow commands, pupils 3mm equal and brisk (+) Doll's, (+) corneals (+) cough and gag, flap full but soft; R UE extensor L UE 0/5 TF BLE  GENERAL: intubated 400 20 +5 40%  EENT:  anicteric  CARDIOVASCULAR: (+) S1 S2, normal rate and regular rhythm  PULMONARY: rhoncherous all lung fields, no wheezing  ABDOMEN: soft, distended, normoactive bowel sounds  EXTREMITIES: no edema  SKIN: no rash    LABS:              (8.37)  8.5  (8.0)  11.01 )-----------( 272      ( 2021 05:34 )             26.7     143  |  109<H>  |  69<H>  ----------------------------<  109<H>  4.6   |  17<L>  |  8.66<H> (8.41)    Ca    8.8      2021 05:34  Phos  7.7       Mg     2.4         TPro  6.4  /  Alb  2.5<L>  /  TBili  <0.2  /  DBili  0.2  /  AST  35  /  ALT  <5<L>  /  AlkPhos  179<H>   @ 07:01  -   @ 07:00  IN: 2726.1 mL / OUT: 100 mL / NET: 2626.1 mL     205 229  ABG 7.26 / 37 / 70 / 17    FOBT NEG    7.36/31/160/18    ABG - ( 2021 21:50 ) pH, Arterial: 7.26  pH, Blood: x     /  pCO2: 41    /  pO2: 122   / HCO3: 18    / Base Excess: -8.3  /  SaO2: 99.0      Bacteriology:   CSF NGTD   sputum GS:  numerous staph aureus, numerous enterobacter cloacae, moderate proteus mirabilis  10/28 CSF NGTD  10/28 Blood NG5D x2  (final)    CSF studies:  10-28  L   *** [id number] *** %N91 %L4     EEG:  Neuroimaging:  Other imaging:    MEDICATIONS:     ·	ceFAZolin   IVPB 500 IV Intermittent every 12 hours  ·	fentaNYL   Infusion. 0.8 IV Continuous <Continuous>  ·	levETIRAcetam  Solution 500 Oral every 24 hours  ·	midazolam Infusion 0.02 IV Continuous <Continuous>  ·	amLODIPine   Tablet 10 milliGRAM(s) Oral daily  ·	hydrALAZINE 25 milliGRAM(s) Oral every 12 hours  ·	acetylcysteine 20%  Inhalation 4 Inhalation three times a day  ·	pantoprazole   Suspension 40 Oral daily  ·	polyethylene glycol 3350 17 Oral daily  ·	senna 2 Oral at bedtime  ·	artificial  tears Solution 1 Both EYES two times a day  ·	lactated ringers. 1000 IV Continuous <Continuous>  ·	labetalol Injectable 10 IV Push every 2 hours PRN  ·	ondansetron Injectable 4 IV Push every 6 hours PRN  ·	midazolam Infusion 0.02 mG/kG/Hr (1.7 mL/Hr) IV Continuous <Continuous>    IV FLUIDS: LR@75cc/hr  DRIPS:   ·	midazolam Infusion 0.02 IV Continuous <Continuous> 0.02  ·	fentaNYL   Infusion. IV Continuous <Continuous> - 0.6  DIET: Suplena 10  Lines: [place]; R midline  Drains:    Wounds:    CODE STATUS:  Full Code                       GOALS OF CARE:  aggressive                      DISPOSITION:  ICU

## 2021-11-20 NOTE — PROGRESS NOTE ADULT - ATTENDING COMMENTS
See the Fellow's note written above, for details. I reviewed the fellow documentation.  I have personally seen and examined this patient today. I have reviewed vitals, labs, medications, and imaging. I agree with the fellow's findings and plans as written above with the following additions/amendments:    Patient was seen and evaluated on dialysis second time,  without issue, monitoring closely. No hemodynamic changes.   Patient is tolerating the procedure well.     HR: 85 (11-20-21 @ 17:00)  BP: 160/80 (11-20-21 @ 17:00)  PHYSICAL EXAM:  Constitutional: Intubated, sedated; NAD  HEENT: ETT tube in place, clear secretions  Respiratory: Mechanical breath sounds bilaterally, not overbreathing vent  Cardiac: +S1/S2; RRR; no M/R/G  Gastrointestinal: soft, NT/ND; no rebound or guarding; +BS  Extremities: WWP, no clubbing or cyanosis; No edema  Dermatologic: skin warm, dry and intact; no rashes, wounds, or scars  Access: [place]      Continue dialysis

## 2021-11-20 NOTE — PROGRESS NOTE ADULT - ASSESSMENT
46y/Female with:  L MCA ruptured aneurysm, subarachnoid hemorrhage, s/p [facility] (10/26/2021, Dr. D'Amico @ [place]), brain compression, cerebral edema  anemia   recent spinal surgery  UGIB  bilateral pneumothorax  acute kidney injury, transaminitis    PLAN: Day 1 = 10-26 ; Day 4 = 10/29; Day 21 = 11/15  NEURO: comachecks q2h, sedation with midazolam and fentanyl - taper midazolam to OFF, continue fentanyl drip at lowest dose  SAH:  off nimodipine  Hydrocephalus:  EVD discontinued  Seizure prophylaxis:  cont levetiracetam 500 BID   REHAB:  physical therapy evaluation and management    EARLY MOB:  HOB elevated    PULM:  full vent support for now, 24RR FiO2 60% PEEP to 8 -repeat CBC; bedside ultrasound; repeat BMP in 30 mins, continue mucormyst and ipratropium / albuterol; pulmo toilette; CXR; trach schedule if family wants to continue aggressive care; chest tube now to water seal, reassess after tracheostomy  CARDIO:  SBP goal 100-160mm Hg, TTE, amlodipine 10mg PO daily   ENDO:  Blood sugar goals 140-180 mg/dL  GI:  PPI OD  DIET: TF - to 20cc/hr suplena with protein supplementation; aspiration precautions  RENAL: LR@75; Na goal 135-145; dialysis today  HEM/ONC: no coagulopathy (INR= 1.03), no ASA / Plavix use; Hb stable   VTE Prophylaxis: SCDs, resume SQH tonight  ID: afebrile, no leukocytosis, ancef while trach packing is in  Social: family meeting yesterday - agrees to tracheostomy schedule on Monday afternoon    CORE MEASURES  1.  Hunt and Griffin Score = 5  2.  VTE prophylaxis:  [ ] administered within 24 hours of admission OR [X] reason not done: fresh bleed, unsecured aneurysm.  3.  Dysphagia screening:   [X] performed before any oral meds / liquids / food  4.  Nimodipine treatment:  [X] administered within 24 hours of admission OR [ ] reason not done:    ATTENDING ATTESTATION:  I was physically present for the key portions of the evaluation and management (E/M) service provided.  I agree with the above history, physical and plan, which I have reviewed and edited where appropriate.    Patient at high risk for neurological deterioration or death due to:  ICU delirium, aspiration PNA, DVT / PE.  Critical care time:  I have personally provided 45 minutes of critical care time, excluding time spent on separate procedures.      Plan discussed with RN, house staff.

## 2021-11-21 LAB
ALBUMIN SERPL ELPH-MCNC: 2.3 G/DL — LOW (ref 3.3–5)
ALP SERPL-CCNC: 216 U/L — HIGH (ref 40–120)
ALT FLD-CCNC: <5 U/L — LOW (ref 10–45)
ANION GAP SERPL CALC-SCNC: 17 MMOL/L — SIGNIFICANT CHANGE UP (ref 5–17)
AST SERPL-CCNC: 31 U/L — SIGNIFICANT CHANGE UP (ref 10–40)
BASE EXCESS BLDA CALC-SCNC: 1.1 MMOL/L — SIGNIFICANT CHANGE UP (ref -2–3)
BILIRUB DIRECT SERPL-MCNC: 0.2 MG/DL — SIGNIFICANT CHANGE UP (ref 0–0.2)
BILIRUB INDIRECT FLD-MCNC: SIGNIFICANT CHANGE UP (ref 0.2–1)
BILIRUB SERPL-MCNC: <0.2 MG/DL — SIGNIFICANT CHANGE UP (ref 0.2–1.2)
BUN SERPL-MCNC: 49 MG/DL — HIGH (ref 7–23)
CALCIUM SERPL-MCNC: 8.8 MG/DL — SIGNIFICANT CHANGE UP (ref 8.4–10.5)
CHLORIDE SERPL-SCNC: 99 MMOL/L — SIGNIFICANT CHANGE UP (ref 96–108)
CO2 BLDA-SCNC: 26 MMOL/L — HIGH (ref 19–24)
CO2 SERPL-SCNC: 21 MMOL/L — LOW (ref 22–31)
CREAT SERPL-MCNC: 7.11 MG/DL — HIGH (ref 0.5–1.3)
GAS PNL BLDA: SIGNIFICANT CHANGE UP
GLUCOSE SERPL-MCNC: 112 MG/DL — HIGH (ref 70–99)
HCO3 BLDA-SCNC: 25 MMOL/L — SIGNIFICANT CHANGE UP (ref 21–28)
HCT VFR BLD CALC: 25.8 % — LOW (ref 34.5–45)
HGB BLD-MCNC: 8.3 G/DL — LOW (ref 11.5–15.5)
MAGNESIUM SERPL-MCNC: 2.1 MG/DL — SIGNIFICANT CHANGE UP (ref 1.6–2.6)
MCHC RBC-ENTMCNC: 25.6 PG — LOW (ref 27–34)
MCHC RBC-ENTMCNC: 32.2 GM/DL — SIGNIFICANT CHANGE UP (ref 32–36)
MCV RBC AUTO: 79.6 FL — LOW (ref 80–100)
NRBC # BLD: 0 /100 WBCS — SIGNIFICANT CHANGE UP (ref 0–0)
PCO2 BLDA: 37 MMHG — HIGH (ref 32–35)
PH BLDA: 7.44 — SIGNIFICANT CHANGE UP (ref 7.35–7.45)
PHOSPHATE SERPL-MCNC: 6 MG/DL — HIGH (ref 2.5–4.5)
PLATELET # BLD AUTO: 235 K/UL — SIGNIFICANT CHANGE UP (ref 150–400)
PO2 BLDA: 168 MMHG — HIGH (ref 83–108)
POTASSIUM SERPL-MCNC: 3.9 MMOL/L — SIGNIFICANT CHANGE UP (ref 3.5–5.3)
POTASSIUM SERPL-SCNC: 3.9 MMOL/L — SIGNIFICANT CHANGE UP (ref 3.5–5.3)
PROT SERPL-MCNC: 6.6 G/DL — SIGNIFICANT CHANGE UP (ref 6–8.3)
RBC # BLD: 3.24 M/UL — LOW (ref 3.8–5.2)
RBC # FLD: 21.7 % — HIGH (ref 10.3–14.5)
SAO2 % BLDA: 99.2 % — HIGH (ref 94–98)
SODIUM SERPL-SCNC: 137 MMOL/L — SIGNIFICANT CHANGE UP (ref 135–145)
TRIGL SERPL-MCNC: 225 MG/DL — HIGH
WBC # BLD: 9.39 K/UL — SIGNIFICANT CHANGE UP (ref 3.8–10.5)
WBC # FLD AUTO: 9.39 K/UL — SIGNIFICANT CHANGE UP (ref 3.8–10.5)

## 2021-11-21 PROCEDURE — 99291 CRITICAL CARE FIRST HOUR: CPT

## 2021-11-21 PROCEDURE — 71045 X-RAY EXAM CHEST 1 VIEW: CPT | Mod: 26

## 2021-11-21 PROCEDURE — 90935 HEMODIALYSIS ONE EVALUATION: CPT

## 2021-11-21 PROCEDURE — 99292 CRITICAL CARE ADDL 30 MIN: CPT

## 2021-11-21 RX ORDER — HYDRALAZINE HCL 50 MG
50 TABLET ORAL EVERY 8 HOURS
Refills: 0 | Status: DISCONTINUED | OUTPATIENT
Start: 2021-11-21 | End: 2021-11-26

## 2021-11-21 RX ORDER — HEPARIN SODIUM 5000 [USP'U]/ML
5000 INJECTION INTRAVENOUS; SUBCUTANEOUS EVERY 8 HOURS
Refills: 0 | Status: COMPLETED | OUTPATIENT
Start: 2021-11-21 | End: 2021-11-21

## 2021-11-21 RX ADMIN — CHLORHEXIDINE GLUCONATE 15 MILLILITER(S): 213 SOLUTION TOPICAL at 17:46

## 2021-11-21 RX ADMIN — Medication 1 DROP(S): at 17:47

## 2021-11-21 RX ADMIN — Medication 100 MILLIGRAM(S): at 18:36

## 2021-11-21 RX ADMIN — HEPARIN SODIUM 5000 UNIT(S): 5000 INJECTION INTRAVENOUS; SUBCUTANEOUS at 15:07

## 2021-11-21 RX ADMIN — Medication 50 MILLIGRAM(S): at 21:35

## 2021-11-21 RX ADMIN — Medication 10 MILLIGRAM(S): at 06:34

## 2021-11-21 RX ADMIN — Medication 4 MILLILITER(S): at 14:51

## 2021-11-21 RX ADMIN — Medication 50 MILLIGRAM(S): at 05:01

## 2021-11-21 RX ADMIN — CHLORHEXIDINE GLUCONATE 15 MILLILITER(S): 213 SOLUTION TOPICAL at 05:01

## 2021-11-21 RX ADMIN — CHLORHEXIDINE GLUCONATE 1 APPLICATION(S): 213 SOLUTION TOPICAL at 05:02

## 2021-11-21 RX ADMIN — Medication 0.2 MILLIGRAM(S): at 12:35

## 2021-11-21 RX ADMIN — Medication 10 MILLIGRAM(S): at 09:37

## 2021-11-21 RX ADMIN — SODIUM CHLORIDE 50 MILLILITER(S): 9 INJECTION, SOLUTION INTRAVENOUS at 10:16

## 2021-11-21 RX ADMIN — ONDANSETRON 4 MILLIGRAM(S): 8 TABLET, FILM COATED ORAL at 09:36

## 2021-11-21 RX ADMIN — SENNA PLUS 2 TABLET(S): 8.6 TABLET ORAL at 21:35

## 2021-11-21 RX ADMIN — HEPARIN SODIUM 5000 UNIT(S): 5000 INJECTION INTRAVENOUS; SUBCUTANEOUS at 21:35

## 2021-11-21 RX ADMIN — Medication 1 DROP(S): at 05:02

## 2021-11-21 RX ADMIN — Medication 100 MILLIGRAM(S): at 05:01

## 2021-11-21 RX ADMIN — AMLODIPINE BESYLATE 10 MILLIGRAM(S): 2.5 TABLET ORAL at 05:01

## 2021-11-21 RX ADMIN — HEPARIN SODIUM 5000 UNIT(S): 5000 INJECTION INTRAVENOUS; SUBCUTANEOUS at 06:28

## 2021-11-21 RX ADMIN — PANTOPRAZOLE SODIUM 40 MILLIGRAM(S): 20 TABLET, DELAYED RELEASE ORAL at 12:33

## 2021-11-21 RX ADMIN — Medication 50 MILLIGRAM(S): at 15:07

## 2021-11-21 RX ADMIN — Medication 4 MILLILITER(S): at 22:00

## 2021-11-21 RX ADMIN — LEVETIRACETAM 500 MILLIGRAM(S): 250 TABLET, FILM COATED ORAL at 17:46

## 2021-11-21 RX ADMIN — Medication 10 MILLIGRAM(S): at 12:33

## 2021-11-21 RX ADMIN — Medication 4 MILLILITER(S): at 05:25

## 2021-11-21 NOTE — PROVIDER CONTACT NOTE (CHANGE IN STATUS NOTIFICATION) - RECOMMENDATIONS
Tube feeding held and will be ICP monitored if increase to 20 and maintained
Tube feeding stopped.
ENT paged and suggested to only change the outer gauze that is saturated and to not touch the inner gauze and place the tegaderm lightly d/t concerned for subcutaneous emphysema.
MD to assess pt.

## 2021-11-21 NOTE — PROGRESS NOTE ADULT - SUBJECTIVE AND OBJECTIVE BOX
HPI:  47 y/o female with PMH HTN, Multiple sclerosis, recent spinal surgery 10/8 ([facility]) presented to [place] ED BIBEMS after being found unconscious at home, unknown period of time. 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 HH5, MF1. Patient was intubated at [place] ED, found to have blown L pupil, and sent straight to the OR for left hemicraniotomy. A-line placed intraop. Hemodynamically unstable upon arrival to [facility], bradycardic and hypertensive s/p Mannitol, Clevidipine, and Furosemide. Central line placed in NSICU. Currently sedated on Propofol, pending repeat CTH. History per patient's son, patient had recent spine surgery and was found on outpatient imaging last week to have L M1 segment aneurysm (unruptured), pt asymptomatic at this time. Per son patient taking percocet PO at home. Ureña catheter, ET tube, and arterial line placed at [facility].     NIHSS on arrival: 32   Bess Griffin 5, Mod Busby 4  Bleed Day 1 = 10/26 (26 Oct 2021 13:03)      Hospital Course:   10/26: admitted to [facility] from [facility], POD#0 s/p L hemicraniectomy for decompression and evacuation of SDH. Transferred to [facility] noted to have tense flap, received mannitol, keppra, decadron. Was hypertensive to 200s and preston to 40s, recevied 85g mannitol and bullet 23.4%. CTH on arrival demonstrated increased size in vents and new IVH. EVD placed, open at 15, central line placed. Transfused 2 u PRBC. POD0 of coiling of left MCA bifurcation aneurysm,   10/27: CTH demonstrated EVD in correct position, EVD lowered to 5, remains intubated on proprofol, pending repeat CTH in AM. Started on 3% @ 30/hr. 2LNS given for euvolemia, Mannitol given for uptrending ICPs. Bullet given 8:30 am. 3% increased to 50/hr. 1 L bolus. New EVD catheter placed using exisiting sreekanth hole. 1 u PRBC.  10/28: HH5, MF4, BD3; POD2 angio coil/embo of L MCA aneurysm. JOAQUÍN overnight, neuro stable. EVD@5cmH20 ICPs < 20 overnight, OP WNL. S/p 1 unit PRBC yesterday with appropriate response. Given 42g mannitol in AM for ICP 22 persistently, with improvement, then given 23% in PM for elevated ICP. febrile, pancultured. Standing tylenol and cooling blanket.   10/29: BD4, POD3 angio coil/embo. JOAQUÍN o/n, neuro stable. EVD@5, ICPs <20 o/n.   10/30: BD5, POD4 angio coil/embo. JOAQUÍN o/n neuro stable. EVD@5. 3% @50cc/hr.  10/31: BD6, POD5 angio coil/embo. brief period maintained upward gaze, resolved on its own. EVD@5cm h2o.    11/1: BD7, POD6 L hemicrani, angio coil/embo. JOAQUÍN overnight. Neuro exam unchanged. ICPs WNL. started bromocriptine/gabapentin/baclofen. dc'd propofol, started precedex  11/2: BD8 POD7 L hemicrani, angio coil/embo. JOAQUÍN overnight. Neuro exam stable. Remains on 3% hypertonic saline. Receieved 1 unit of PRBCs for a Hgb of 7.6.  11/3: BD 9 POD8 of L hemicrani. Elevated lipase, normal amylase, OG tube clogged and replaced. Abdominal US normal. Pending gen surg reccs regarding trach and peg. Gabapentin and Baclofen increased to help with neurostorming. restarted back on 3%, LR@35. became preston+hypotensive with nimodipine 60q4, decreased back to 30q2, NaCl bullet given.   11/4: BD10 POD9, JOAQUÍN o/n, 500cc NS for euvolemia,  neuro stable, EVD at 5. 3% increased to 75  11/5: BD11 POD10, JOAQUÍN o/n, neuro stable, EVD at 5  11/6: BD12 POD11 JOAQUÍN overnight. Neuro exam stable. Remains intubated on precedex. 3% hypertonic saline dc'd  11/7: BD13 POD 12 JOAQUÍN o/n, NGT replaced. on precex with no icp crisis   11/8: BD14 POD13 JOAQUÍN o/n, noted to have tongue maceration/macroglossia. Gauze with tongue depressor placed. Pending further recs from ENT. Pending trach and PEG placement tomorrow with gen surg. Off precedex, ICPs stable. EVD remains @ 5.   11/9: BD15, POD 14, bleeding under tongue at start of shift, fentanyl pushed with no further episodes. gabapentin stopped. PEG placed  11/10: BD 16, POD 15, neuro stable, ENT to be consulted in AM, 3% increased to 50/hr.  11/11: BD 17, POD 16, neuro exam stable. Pend CT saba in am for cranioplasty planning. Pend trach in OR with ENT. F.u BMP in am, 3%@50cc/hr for Na goal 140-145.   11/12: BD 18, POD 17. JOAQUÍN overnight, neuro stable. Pend trach placement today. CT saba completed 11/11. Off of 3%, f/u am BMP for Na goal 140-150. CSF neg. Hypoxic cardiac arrest with ROSC x 3 during tracheostomy placement. B/l chest tubes placed for resulting pneumothorax s/p CPR. salt tabs dc'd.  11/13: BD 19, POD 18. Patient tachycardic with some improvement, SBP stable off of levophed, hgb downtrending o/n, transfused 1 unit PRBCs. B/l chest tube. EVD @5cmH2O, no elevated ICPs. UOP downtrending, trend BUN/Cr, given 1LNS x1 for low urine output. F/u am CXR. Cont Cefepime until today, then change to Ancef while trach packing in place per ENT. Pend CTH when stable. Trops downtrending s/p cardiac arrest. 1L. florinef dc'd. BP goal changed to 100-160, started on amlodipine   11/14: BD20, POD19, worsening creatinine with decreased urine output. Given 250 of albumin and 500cc LR. started on hydralazine PO, decreased amantadine 100 daily given CrCl of 20.  11/15: BD21, POD20, remains oliguric, fem line dc'd, tongue swollen and unable to fit bite block in mouth, started on nimbex gtt to relax jaw. Propofol and fentanyl gtt started. Vomiting TFs through nose and mouth, ENT called. TFs held. Cr uptrending. Palliative consulted.  11/16: BD22, POD21, o/n external trach dressing replaced due to cuff leak and decreasing TV, sedated and paralyzed on nimbex, propofol, and fentanyl gtt. CTH stable.  EVD raised from 5 to 10. Nimbex weaned off. Cr uptrending, Trickle feeds started. FOB negative.   11/17: BD 23, POD22, JOAQUÍN o/n, EVD clamped, NS d/c'ed, LR@50, advancing tube feeds.   11/18: BD24, POD23 o/n 3% at 30 with Na acetate started, propofol dc'd due to elevated TG level, episode of vomiting TFs from nose and mouth into ETT with elevated ICP 30s, ICP normalized with resolution of vomiting, reglan 5mg IV given, TFs held, 3% stopped. midline placed   11/19; BD25, POD24 JOAQUÍN overnight. Remains on versed and fentanyl drips. Neuro exam unchanged. R IJ dialysis cath placed.   11/20: BD26 POD25 JOAQUÍN overnight. Neuro exam unchanged. Plan for HD today  11/21: BD 27 POD 26 weaned off versed, fentanyl gtt continued.         Vital Signs Last 24 Hrs  T(C): 37.3 (21 Nov 2021 00:43), Max: 37.3 (20 Nov 2021 23:00)  T(F): 99.1 (21 Nov 2021 00:43), Max: 99.1 (20 Nov 2021 23:00)  HR: 84 (21 Nov 2021 01:10) (78 - 98)  BP: 156/82 (21 Nov 2021 00:00) (134/71 - 187/95)  BP(mean): 112 (21 Nov 2021 00:00) (96 - 134)  RR: 18 (21 Nov 2021 00:00) (12 - 39)  SpO2: 100% (21 Nov 2021 01:10) (98% - 100%)    I&O's Detail    19 Nov 2021 07:01  -  20 Nov 2021 07:00  --------------------------------------------------------  IN:    Enteral Tube Flush: 180 mL    Enteral Tube Flush: 140 mL    FentaNYL: 30.6 mL    FentaNYL: 39.5 mL    FentaNYL: 95.2 mL    IV PiggyBack: 50 mL    Lactated Ringers: 1800 mL    Midazolam: 40.8 mL    PRBCs (Packed Red Blood Cells): 350 mL  Total IN: 2726.1 mL    OUT:    Chest Tube (mL): 0 mL    Chest Tube (mL): 0 mL    Voided (mL): 100 mL  Total OUT: 100 mL    Total NET: 2626.1 mL      20 Nov 2021 07:01  -  21 Nov 2021 01:29  --------------------------------------------------------  IN:    Enteral Tube Flush: 360 mL    FentaNYL: 117.2 mL    Lactated Ringers: 900 mL    Lactated Ringers: 300 mL    Midazolam: 6.8 mL    Other (mL): 400 mL  Total IN: 2084 mL    OUT:    Chest Tube (mL): 0 mL    Chest Tube (mL): 0 mL    Intermittent Catheterization - Urethral (mL): 300 mL    Other (mL): 1900 mL    Voided (mL): 100 mL  Total OUT: 2300 mL    Total NET: -216 mL        I&O's Summary    19 Nov 2021 07:01  -  20 Nov 2021 07:00  --------------------------------------------------------  IN: 2726.1 mL / OUT: 100 mL / NET: 2626.1 mL    20 Nov 2021 07:01  -  21 Nov 2021 01:29  --------------------------------------------------------  IN: 2084 mL / OUT: 2300 mL / NET: -216 mL        PHYSICAL EXAM:  Exam on admission: pupils 2mm sluggish, RUE trace WD, LUE WD vs. extensor, b/l LE triple flexion, +cough/gag/corneals, +flap full but soft.     Lines/Drains  L radial jerri placed 10/26 -   HD catheter R IJ (11/19 - )  + R brachial Midline (11/18 - )  B/L chest tubes - to water seal   + Primafit       DEVICE/DRAIN DRESSING:    TUBES/LINES:  [] CVC  [] A-line  [] Lumbar Drain  [] Ventriculostomy  [] Other    DIET:  [] NPO  [] Mechanical  [] Tube feeds    LABS:                        8.5    11.01 )-----------( 272      ( 20 Nov 2021 05:34 )             26.7     11-20    143  |  109<H>  |  69<H>  ----------------------------<  109<H>  4.6   |  17<L>  |  8.66<H>    Ca    8.8      20 Nov 2021 05:34  Phos  7.7     11-20  Mg     2.4     11-20    TPro  6.4  /  Alb  2.5<L>  /  TBili  <0.2  /  DBili  0.2  /  AST  35  /  ALT  <5<L>  /  AlkPhos  179<H>  11-20        CARDIAC MARKERS ( 20 Nov 2021 05:34 )  x     / x     / 29 U/L / x     / x          CAPILLARY BLOOD GLUCOSE          Drug Levels: [] N/A    CSF Analysis: [] N/A      Allergies    No Known Allergies    Intolerances      MEDICATIONS:  Antibiotics:  ceFAZolin   IVPB 500 milliGRAM(s) IV Intermittent every 12 hours    Neuro:  fentaNYL   Infusion. 0.8 MICROgram(s)/kG/Hr IV Continuous <Continuous>  levETIRAcetam  Solution 500 milliGRAM(s) Oral every 24 hours  ondansetron Injectable 4 milliGRAM(s) IV Push every 6 hours PRN    Anticoagulation:  heparin   Injectable 5000 Unit(s) SubCutaneous every 8 hours    OTHER:  acetylcysteine 20%  Inhalation 4 milliLiter(s) Inhalation three times a day  amLODIPine   Tablet 10 milliGRAM(s) Oral daily  artificial  tears Solution 1 Drop(s) Both EYES two times a day  chlorhexidine 0.12% Liquid 15 milliLiter(s) Oral Mucosa every 12 hours  chlorhexidine 2% Cloths 1 Application(s) Topical <User Schedule>  chlorhexidine 2% Cloths 1 Application(s) Topical <User Schedule>  hydrALAZINE 50 milliGRAM(s) Oral every 8 hours  labetalol Injectable 10 milliGRAM(s) IV Push every 2 hours PRN  pantoprazole   Suspension 40 milliGRAM(s) Oral daily  polyethylene glycol 3350 17 Gram(s) Oral daily  senna 2 Tablet(s) Oral at bedtime    IVF:  lactated ringers. 1000 milliLiter(s) IV Continuous <Continuous>    CULTURES:  Culture Results:   No growth to date (11-11 @ 17:53)    RADIOLOGY & ADDITIONAL TESTS:      ASSESSMENT:  47 y/o female with PMHx of HTN and MS, hx of spinal surgery at [facility] 10/8/21 presented to [place] ED BIBEMS after being found unconscious at home, unknown period of time. 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 HH5, MF4; BD #1 = 10/26. Patient was intubated at [place] ED and sent straight to the OR for left hemicraniotomy. A-line placed intraop. Hemodynamically unstable upon arrival to [facility], bradycardic and hypertensive s/p Mannitol and Furosemide. Central line placed in NSICU. S/p EVD placement, and coil embolization of left MCA bifurcation aneurysm 10/26/2021. S/p cerebral angio without findings of vasospasm 11/10. S/p cardiac arrest with ROSC x3 on 11/12 during tracheostomy at bedside now with b/l pneumothoracies and worsening kidney function. EVD dc'd 11/18        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, left    Intracranial hemorrhage, spontaneous subarachnoid, due to cerebral aneurysm, acute    Pneumothorax, left    Functional quadriplegia    Acute respiratory failure with hypoxia    Cardiac arrest    Encounter for palliative care    Advanced care planning/counseling discussion    IPH (idiopathic pulmonary hemosiderosis)    Acute spontaneous intraparenchymal intracranial hemorrhage due to cerebral aneurysm    CHETAN (acute kidney injury)    Angiogram, cerebral, with coil embolization, in non-operating room setting    Endoscopic percutaneous gastrostomy    Angiogram, carotid and cerebral, bilateral    Chest tube placement    Insertion of central venous catheter with ultrasound guidance    Insertion of temporary dialysis catheter    Personal history of spine surgery    [email or web address]        Plan:   NEURO:  - neuro checks q4hrs/vital signs q1hr   - Keppra 500mg q24h renal dosing   - VEEG dc'd, neg for seizures   - Nimodipine dc'd   - EVD dced 11/18   - CTH 11/16, CTH 11/18 stable   - fentanyl gtt, versed gtt for tongue biting (edema), now off versed   - no longer on propofol   - propofol gtt dc'd for increased triglycerides (CK wnl)     CARDIO:  - -160   - amlodipine 5 daily and hydral increased 25 PO TID   - s/p cardiac arrest   - TTE 11/15: mild LVH, EF 70%   - QTc 11/17 456     PULM:  - intubated on full vent support  - s/p acute hypoxic cardiac arrest 11/12 during tracheostomy placment with ENT c/b b/l pneumothorax now with b/l chest tubes   - Per pulm - b/l chest tubes to water seal 11/19 and reasess following Trach placement   - will need trach revision with ENT - plan for OR Monday 11/22 (Nahid)   - Daily CXR     GI:  - PEG TFs suplena @ 20 cc 11/19  - PPI QD GI ppx   - Bowel regimen   - Alk phos uptrending, shocked liver, improving.     RENAL:   - LR lowered to 50, total volume of 75, oliguric   - Na 140-145   - renal failure with Cr. 8.6 post cardiac arrest ;  Nephro following, s/p HD 11/20      HEME:  - SCDs, SQH restarted 11/20   - s/p multiple transfusions this admission, most recently 1 u PRBC 11/19   - 11/14 UE/LE dopplers neg   - FOB neg 11/16     ID:  - Tylenol PRN for fever   - sputum cx 11/4: enterobacter, proteus  - Cefepime started to cover for enterobacter/proteus in sputum dc'd 11/15   - Ancef until trach packing removed 500 BID (possible removal monday post trach)     ENDO:  - ISS   - monitor FS    OTHER  - wound care recs- q2 dressing changes   - ENT consulted, reccomends trach placement and oral hygeine for tongue laceration   - has R midline (11/18)    GOC: full code  Level of care: ICU status   Dispo: pending trach  family updated    Case discussed with Dr. Duncan

## 2021-11-21 NOTE — PROGRESS NOTE ADULT - SUBJECTIVE AND OBJECTIVE BOX
O: got HD   T(C): 37.1 (11-21-21 @ 19:00), Max: 37.3 (11-20-21 @ 23:00)  HR: 81 (11-21-21 @ 19:00) (65 - 98)  BP: 145/89 (11-21-21 @ 15:15) (133/79 - 178/95)  RR: 14 (11-21-21 @ 19:00) (0 - 39)  SpO2: 98% (11-21-21 @ 19:00) (98% - 100%)  11-20-21 @ 07:01  -  11-21-21 @ 07:00  --------------------------------------------------------  IN: 2652.4 mL / OUT: 2350 mL / NET: 302.4 mL    11-21-21 @ 07:01  -  11-21-21 @ 19:53  --------------------------------------------------------  IN: 1432.8 mL / OUT: 2880 mL / NET: -1447.2 mL    acetylcysteine 20%  Inhalation 4 milliLiter(s) Inhalation three times a day  amLODIPine   Tablet 10 milliGRAM(s) Oral daily  artificial  tears Solution 1 Drop(s) Both EYES two times a day  ceFAZolin   IVPB 500 milliGRAM(s) IV Intermittent every 12 hours  chlorhexidine 0.12% Liquid 15 milliLiter(s) Oral Mucosa every 12 hours  chlorhexidine 2% Cloths 1 Application(s) Topical <User Schedule>  chlorhexidine 2% Cloths 1 Application(s) Topical <User Schedule>  cloNIDine 0.2 milliGRAM(s) Oral every 12 hours  heparin   Injectable 5000 Unit(s) SubCutaneous every 8 hours  hydrALAZINE 50 milliGRAM(s) Oral every 8 hours  labetalol Injectable 10 milliGRAM(s) IV Push every 2 hours PRN  lactated ringers. 1000 milliLiter(s) IV Continuous <Continuous>  levETIRAcetam  Solution 500 milliGRAM(s) Oral every 24 hours  ondansetron Injectable 4 milliGRAM(s) IV Push every 6 hours PRN  pantoprazole   Suspension 40 milliGRAM(s) Oral daily  polyethylene glycol 3350 17 Gram(s) Oral daily  senna 2 Tablet(s) Oral at bedtime  sodium chloride 0.9% lock flush 10 milliLiter(s) IV Push every 1 hour PRN  Mode: AC/ CMV (Assist Control/ Continuous Mandatory Ventilation), RR (machine): 18, TV (machine): 400, FiO2: 50, PEEP: 8, ITime: 1, MAP: 11, PIP: 19    NEUROLOGIC EXAMINATION:  Patient does not open eyes, does not follow commands, pupils 3mm equal and brisk (+) Doll's, (+) corneals (+) cough and gag, flap full but soft; R UE extensor L UE 0/5 TF BLE  GENERAL: intubated 400 18 +8 50%  EENT:  anicteric  CARDIOVASCULAR: (+) S1 S2, normal rate and regular rhythm  PULMONARY: rhonchi all lung fields, no wheezing  ABDOMEN: soft, distended, normoactive bowel sounds  EXTREMITIES: no edema  SKIN: no rash     LABS:  Na: 137 (11-21 @ 04:47), 143 (11-20 @ 05:34), 144 (11-19 @ 06:00)  K: 3.9 (11-21 @ 04:47), 4.6 (11-20 @ 05:34), 4.1 (11-19 @ 06:00)  Cl: 99 (11-21 @ 04:47), 109 (11-20 @ 05:34), 112 (11-19 @ 06:00)  CO2: 21 (11-21 @ 04:47), 17 (11-20 @ 05:34), 16 (11-19 @ 06:00)  BUN: 49 (11-21 @ 04:47), 69 (11-20 @ 05:34), 69 (11-19 @ 06:00)  Cr: 7.11 (11-21 @ 04:47), 8.66 (11-20 @ 05:34), 8.41 (11-19 @ 06:00)  Glu: 112(11-21 @ 04:47), 109(11-20 @ 05:34), 80(11-19 @ 06:00)    Hgb: 8.3 (11-21 @ 04:47), 8.5 (11-20 @ 05:34), 8.0 (11-19 @ 19:34), 7.8 (11-19 @ 14:43), 7.0 (11-19 @ 06:00)  Hct: 25.8 (11-21 @ 04:47), 26.7 (11-20 @ 05:34), 24.6 (11-19 @ 19:34), 24.7 (11-19 @ 14:43), 22.5 (11-19 @ 06:00)  WBC: 9.39 (11-21 @ 04:47), 11.01 (11-20 @ 05:34), 8.37 (11-19 @ 19:34), 8.35 (11-19 @ 14:43), 8.00 (11-19 @ 06:00)  Plt: 235 (11-21 @ 04:47), 272 (11-20 @ 05:34), 264 (11-19 @ 19:34), 251 (11-19 @ 14:43), 268 (11-19 @ 06:00)    INR:   PTT:             a/p aSAH , ICH, SAH s/p DHC brain edema and compression  s/p cardiac arrest  pneumothorax   neuro checks q 2 hr   Hydrocephalus EVD removed   keppra for seizure prophylaxis   CV : SBP goal   100-160 mmhg   HTN on amlodipine, clonidine, hydralazine   Pulm: acute respiratory failure, intubated, ABG and adjust vent settings accordingly   Mode: AC/ CMV (Assist Control/ Continuous Mandatory Ventilation)  RR (machine): 18  TV (machine): 400  FiO2: 50  PEEP: 8  ITime: 1  MAP: 11  PIP: 19  plan for trach tomorrow   chest tube to water seal   GI: NG, NPO, for trach tomorrow   Renal: ATN on HD today session 2   ID afebrile  cefazolin for trach packing   Endo: target sugar 120-180   Hem: SCD,   heparin on hold for trach tomorrow     30 critical care time as patient is at risk for brain henrniation, seizures, ICH  O: got HD     T(C): 37.1 (11-21-21 @ 19:00), Max: 37.3 (11-20-21 @ 23:00)  HR: 81 (11-21-21 @ 19:00) (65 - 98)  BP: 145/89 (11-21-21 @ 15:15) (133/79 - 178/95)  RR: 14 (11-21-21 @ 19:00) (0 - 39)  SpO2: 98% (11-21-21 @ 19:00) (98% - 100%)  11-20-21 @ 07:01  -  11-21-21 @ 07:00  --------------------------------------------------------  IN: 2652.4 mL / OUT: 2350 mL / NET: 302.4 mL    11-21-21 @ 07:01  -  11-21-21 @ 19:53  --------------------------------------------------------  IN: 1432.8 mL / OUT: 2880 mL / NET: -1447.2 mL    acetylcysteine 20%  Inhalation 4 milliLiter(s) Inhalation three times a day  amLODIPine   Tablet 10 milliGRAM(s) Oral daily  artificial  tears Solution 1 Drop(s) Both EYES two times a day  ceFAZolin   IVPB 500 milliGRAM(s) IV Intermittent every 12 hours  chlorhexidine 0.12% Liquid 15 milliLiter(s) Oral Mucosa every 12 hours  chlorhexidine 2% Cloths 1 Application(s) Topical <User Schedule>  chlorhexidine 2% Cloths 1 Application(s) Topical <User Schedule>  cloNIDine 0.2 milliGRAM(s) Oral every 12 hours  heparin   Injectable 5000 Unit(s) SubCutaneous every 8 hours  hydrALAZINE 50 milliGRAM(s) Oral every 8 hours  labetalol Injectable 10 milliGRAM(s) IV Push every 2 hours PRN  lactated ringers. 1000 milliLiter(s) IV Continuous <Continuous>  levETIRAcetam  Solution 500 milliGRAM(s) Oral every 24 hours  ondansetron Injectable 4 milliGRAM(s) IV Push every 6 hours PRN  pantoprazole   Suspension 40 milliGRAM(s) Oral daily  polyethylene glycol 3350 17 Gram(s) Oral daily  senna 2 Tablet(s) Oral at bedtime  sodium chloride 0.9% lock flush 10 milliLiter(s) IV Push every 1 hour PRN  Mode: AC/ CMV (Assist Control/ Continuous Mandatory Ventilation), RR (machine): 18, TV (machine): 400, FiO2: 50, PEEP: 8, ITime: 1, MAP: 11, PIP: 19    NEUROLOGIC EXAMINATION:  Patient does not open eyes, does not follow commands, pupils 3mm equal and brisk (+) Doll's, (+) corneals (+) cough and gag, flap full but soft; R UE extensor L UE 0/5 TF BLE  GENERAL: intubated  EENT:  trach packed   CARDIOVASCULAR: (+) S1 S2, normal rate and regular rhythm  PULMONARY: rhonchi all lung fields, no wheezing  ABDOMEN: soft, distended, normoactive bowel sounds  EXTREMITIES: no edema  SKIN: no rash     LABS:  Na: 137 (11-21 @ 04:47), 143 (11-20 @ 05:34), 144 (11-19 @ 06:00)  K: 3.9 (11-21 @ 04:47), 4.6 (11-20 @ 05:34), 4.1 (11-19 @ 06:00)  Cl: 99 (11-21 @ 04:47), 109 (11-20 @ 05:34), 112 (11-19 @ 06:00)  CO2: 21 (11-21 @ 04:47), 17 (11-20 @ 05:34), 16 (11-19 @ 06:00)  BUN: 49 (11-21 @ 04:47), 69 (11-20 @ 05:34), 69 (11-19 @ 06:00)  Cr: 7.11 (11-21 @ 04:47), 8.66 (11-20 @ 05:34), 8.41 (11-19 @ 06:00)  Glu: 112(11-21 @ 04:47), 109(11-20 @ 05:34), 80(11-19 @ 06:00)    Hgb: 8.3 (11-21 @ 04:47), 8.5 (11-20 @ 05:34), 8.0 (11-19 @ 19:34), 7.8 (11-19 @ 14:43), 7.0 (11-19 @ 06:00)  Hct: 25.8 (11-21 @ 04:47), 26.7 (11-20 @ 05:34), 24.6 (11-19 @ 19:34), 24.7 (11-19 @ 14:43), 22.5 (11-19 @ 06:00)  WBC: 9.39 (11-21 @ 04:47), 11.01 (11-20 @ 05:34), 8.37 (11-19 @ 19:34), 8.35 (11-19 @ 14:43), 8.00 (11-19 @ 06:00)  Plt: 235 (11-21 @ 04:47), 272 (11-20 @ 05:34), 264 (11-19 @ 19:34), 251 (11-19 @ 14:43), 268 (11-19 @ 06:00)    INR:   PTT:             a/p aSAH , ICH, SAH s/p DHC brain edema and compression  s/p cardiac arrest  pneumothorax   neuro checks q 2 hr   Hydrocephalus EVD removed   keppra for seizure prophylaxis   CV : SBP goal   100-160 mmhg   HTN on amlodipine, clonidine, hydralazine   Pulm: acute respiratory failure, intubated, ABG and adjust vent settings accordingly   Mode: AC/ CMV (Assist Control/ Continuous Mandatory Ventilation)  RR (machine): 18  TV (machine): 400  FiO2: 50  PEEP: 8  ITime: 1  MAP: 11  PIP: 19  plan for trach tomorrow   chest tube to water seal   GI: NG, NPO, for trach tomorrow   Renal: ATN on HD today session 2   LR 50 ml/hr   ID afebrile  cefazolin for trach packing   Endo: target sugar 120-180   Hem: SCD,   heparin on hold for trach tomorrow     30 critical care time as patient is at risk for brain herniation seizures, ICH
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
## 2021-11-21 NOTE — PROGRESS NOTE ADULT - SUBJECTIVE AND OBJECTIVE BOX
PRE-OP NOTE    Pre-op dx: respiratory failure, requiring tracheostomy  Planned procedure: tracheostomy  Indication: respiratory failure  Surgeon:  tita    Vital Signs Last 24 Hrs  T(C): 36.7 (21 Nov 2021 09:45), Max: 37.3 (20 Nov 2021 23:00)  T(F): 98.1 (21 Nov 2021 09:45), Max: 99.1 (20 Nov 2021 23:00)  HR: 79 (21 Nov 2021 10:00) (74 - 98)  BP: 174/95 (21 Nov 2021 09:45) (133/79 - 187/95)  BP(mean): 127 (21 Nov 2021 09:00) (96 - 134)  RR: 18 (21 Nov 2021 10:00) (12 - 39)  SpO2: 100% (21 Nov 2021 10:00) (98% - 100%)                                              8.3                   Neurophils% (auto):   x      (11-21 @ 04:47):    9.39 )-----------(235          Lymphocytes% (auto):  x                                             25.8                   Eosinphils% (auto):   x        Manual%: Neutrophils x    ; Lymphocytes x    ; Eosinophils x    ; Bands%: x    ; Blasts x          11-21    137  |  99  |  49<H>  ----------------------------<  112<H>  3.9   |  21<L>  |  7.11<H>    Ca    8.8      21 Nov 2021 04:47  Phos  6.0     11-21  Mg     2.1     11-21    TPro  6.6  /  Alb  2.3<L>  /  TBili  <0.2  /  DBili  0.2  /  AST  31  /  ALT  <5<L>  /  AlkPhos  216<H>  11-21      ABG - ( 20 Nov 2021 17:38 )  pH: 7.41  /  pCO2: 36    /  pO2: 191   / HCO3: 23    / Base Excess: -1.4  /  SaO2: 99.3  / Lactate: x          RECENT CULTURES:        A/P: 45 y/o Female w/ PMHx MS, now POD 14 with L MCA ruptured aneurysm, subarachnoid hemorrhage, s/p DHC (10/26/2021, Dr. D'Amico @ [facility]), brain compression, cerebral edema. ENT consult for tongue swelling and necrotic lesions s/p PEG 11/9 s/p hypoxemic respiratory failure during trach 11/12 -> code/chest compressions 3x -> ROSC. ETT was pushed back into trachea orally while compressions were done to achieve airway. B/l needle decompressions and chest tubes placed for pneumo. MS organ failure. for revision tracheostomy tomorrow 11/22  -To OR tomorrow  -Operative consent will be obtained  -NPO and hold all tube feeds at midnight for OR, IVF  -Pain/nausea control  - ***COVID test needed within 72 hours***  - Please page with questions  (Jethro Hill/MEET), [phone] ([facility])  --------  Javy Norton MD  Department of Otolaryngology - Head and Neck Surgery

## 2021-11-21 NOTE — PROGRESS NOTE ADULT - ASSESSMENT
46F with pmhx of HTN who presented to the hospital after being found down at home for unknown period of time. 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 Nephrology consulted for CHETAN management and possible HD needs    Assessment/Plan:   #anuric iATN  Baseline creatinine 0.6  Pt has prolonged hospital stay for a L MCA aneurysm rupture; she suffered from cardiac arrest x 3 on 11/12 and now is intubated in the ICU. Her kidney function has continued to rise over the course of the last 24 hours and her urine studies are suggestive of possible ATN given uSodium of 115 which correlates to her having hypoperfusion from the cardiac arrest.   -Trend BMP daily  -hemodialysis #1 11/20, hemodialysis #2 today 11/21   -Strict I/O's  -Renal diet  -Avoid nephrotoxic meds    #Anemia of Chronic disease  -Ferritin and iron profile noted     Patient was seen and evaluated on dialysis.     Dialyzer: Optiflux [id number]  QB: 300 mL/min  QD: 500 mL/min  K bath: 3  Goal UF: 2L  Duration: 150 min    Patient is tolerating the procedure well.   Continue full hemodialysis treatment as prescribed.    Thank you for the opportunity to participate in the care of your patient. The nephrology service remains available to assist with any questions or concerns. Please feel free to reach us by paging the on-call nephrology fellow for urgent issues or as below.     Linwood Hooper M.D.   PGY-5, Nephrology Fellow   C: [phone]   P: [phone]

## 2021-11-21 NOTE — PROGRESS NOTE ADULT - SUBJECTIVE AND OBJECTIVE BOX
Patient is a 46y Female seen and evaluated at bedside.   s/p hemodialysis #1 well-tolerated yesterday   for hemodialysis #2 this AM   BP remains elevated     Meds:    acetylcysteine 20%  Inhalation 4 three times a day  amLODIPine   Tablet 10 daily  artificial  tears Solution 1 two times a day  ceFAZolin   IVPB 500 every 12 hours  chlorhexidine 0.12% Liquid 15 every 12 hours  chlorhexidine 2% Cloths 1 <User Schedule>  chlorhexidine 2% Cloths 1 <User Schedule>  cloNIDine 0.2 every 12 hours  heparin   Injectable 5000 every 8 hours  hydrALAZINE 50 every 8 hours  labetalol Injectable 10 every 2 hours PRN  lactated ringers. 1000 <Continuous>  levETIRAcetam  Solution 500 every 24 hours  ondansetron Injectable 4 every 6 hours PRN  pantoprazole   Suspension 40 daily  polyethylene glycol 3350 17 daily  senna 2 at bedtime  sodium chloride 0.9% lock flush 10 every 1 hour PRN      T(C): , Max: 37.3 (11-20-21 @ 23:00)  T(F): , Max: 99.1 (11-20-21 @ 23:00)  HR: 75 (11-21-21 @ 15:00)  BP: 175/98 (11-21-21 @ 12:15)  BP(mean): 127 (11-21-21 @ 09:00)  RR: 18 (11-21-21 @ 15:00)  SpO2: 100% (11-21-21 @ 15:00)  Wt(kg): --    11-20 @ 07:01  -  11-21 @ 07:00  --------------------------------------------------------  IN: 2652.4 mL / OUT: 2350 mL / NET: 302.4 mL    11-21 @ 07:01  -  11-21 @ 15:11  --------------------------------------------------------  IN: 1032.8 mL / OUT: 2670 mL / NET: -1637.2 mL          Review of Systems:  ROS negative except as per HPI      PHYSICAL EXAM:  GENERAL: intubated; unresponsive  CHEST/LUNG: decreased breath sounds b/l   HEART: Regular rate and rhythm, no murmur, no gallops, no rub   ABDOMEN: Soft, nontender, non distended  EXTREMITIES: no pedal edema, WWP  Neurology: intubated and sedated  Access: +RIJ TDC non-tender c/d/i       LABS:                        8.3    9.39  )-----------( 235      ( 21 Nov 2021 04:47 )             25.8     11-21    137  |  99  |  49<H>  ----------------------------<  112<H>  3.9   |  21<L>  |  7.11<H>    Ca    8.8      21 Nov 2021 04:47  Phos  6.0     11-21  Mg     2.1     11-21    TPro  6.6  /  Alb  2.3<L>  /  TBili  <0.2  /  DBili  0.2  /  AST  31  /  ALT  <5<L>  /  AlkPhos  216<H>  11-21                RADIOLOGY & ADDITIONAL STUDIES:

## 2021-11-21 NOTE — PROGRESS NOTE ADULT - ASSESSMENT
46y/Female with:  L MCA ruptured aneurysm, subarachnoid hemorrhage, s/p [facility] (10/26/2021, Dr. D'Amico @ [place]), brain compression, cerebral edema  anemia   recent spinal surgery  UGIB  bilateral pneumothorax  acute kidney injury, transaminitis    PLAN: Day 1 = 10-26 ; Day 4 = 10/29; Day 21 = 11/15  NEURO: comachecks q2h, sedation with midazolam and fentanyl - taper midazolam to OFF, continue fentanyl drip at lowest dose  SAH:  off nimodipine  Hydrocephalus:  EVD discontinued  Seizure prophylaxis:  cont levetiracetam 500 BID   REHAB:  physical therapy evaluation and management    EARLY MOB:  HOB elevated    PULM:  full vent support for now, 24RR FiO2 60% PEEP to 8 -repeat CBC; bedside ultrasound; repeat BMP in 30 mins, continue mucormyst and ipratropium / albuterol; pulmo toilette; CXR; trach schedule if family wants to continue aggressive care; chest tube now to water seal, reassess after tracheostomy  CARDIO:  SBP goal 100-160mm Hg, TTE, amlodipine 10mg PO daily   ENDO:  Blood sugar goals 140-180 mg/dL  GI:  PPI OD  DIET: TF - to 20cc/hr suplena with protein supplementation; aspiration precautions  RENAL: LR@75; Na goal 135-145; dialysis today  HEM/ONC: no coagulopathy (INR= 1.03), no ASA / Plavix use; Hb stable   VTE Prophylaxis: SCDs, resume SQH tonight  ID: afebrile, no leukocytosis, ancef while trach packing is in  Social: family meeting yesterday - agrees to tracheostomy schedule on Monday afternoon    CORE MEASURES  1.  Hunt and Griffin Score = 5  2.  VTE prophylaxis:  [ ] administered within 24 hours of admission OR [X] reason not done: fresh bleed, unsecured aneurysm.  3.  Dysphagia screening:   [X] performed before any oral meds / liquids / food  4.  Nimodipine treatment:  [X] administered within 24 hours of admission OR [ ] reason not done:    ATTENDING ATTESTATION:  I was physically present for the key portions of the evaluation and management (E/M) service provided.  I agree with the above history, physical and plan, which I have reviewed and edited where appropriate.    Patient at high risk for neurological deterioration or death due to:  ICU delirium, aspiration PNA, DVT / PE.  Critical care time:  I have personally provided 45 minutes of critical care time, excluding time spent on separate procedures.      Plan discussed with RN, house staff. 46y/Female with:  L MCA ruptured aneurysm, subarachnoid hemorrhage, s/p [facility] (10/26/2021, Dr. D'Amico @ [place]), brain compression, cerebral edema  anemia   recent spinal surgery  UGIB  bilateral pneumothorax  acute kidney injury, transaminitis    PLAN: Day 1 = 10-26 ; Day 4 = 10/29; Day 21 = 11/15  NEURO: comachecks q2h, sedation off midazolam, d/c fentanyl drip in light of hypoactive bowel sounds and aspiration events; PRN fentanyl for analgosedation  SAH:  off nimodipine  Hydrocephalus:  EVD discontinued  Seizure prophylaxis:  cont levetiracetam 500 OD   REHAB:  physical therapy evaluation and management    EARLY MOB:  HOB elevated    PULM:  check ABG, CXR; full vent support for now, RR 18 FiO2 50%, PEEP 8, continue mucormyst and ipratropium / albuterol; pulmo toilette; trach tomorrow, chest tube now to water seal, reassess after tracheostomy  CARDIO:  SBP goal 100-160mm Hg, TTE, amlodipine 10mg PO daily; start clonidine 0.2 q12h; continue hydralazine (renally maxed to 50 q8h)  ENDO:  Blood sugar goals 140-180 mg/dL  GI:  PPI OD  DIET: TF on hold  RENAL: LR@50; Na goal 135-145; dialysis today  HEM/ONC: no coagulopathy (INR= 1.03), no ASA / Plavix use; Hb stable   VTE Prophylaxis: SCDs, SQH -p ossibly hold for OR tomorrow - inquire with ENT  ID: afebrile, no leukocytosis, ancef while trach packing is in; change tracheal dressing today  Social: family meeting yesterday - agrees to tracheostomy schedule on Monday afternoon    CORE MEASURES  1.  Hunt and Griffin Score = 5  2.  VTE prophylaxis:  [ ] administered within 24 hours of admission OR [X] reason not done: fresh bleed, unsecured aneurysm.  3.  Dysphagia screening:   [X] performed before any oral meds / liquids / food  4.  Nimodipine treatment:  [X] administered within 24 hours of admission OR [ ] reason not done:    ATTENDING ATTESTATION:  I was physically present for the key portions of the evaluation and management (E/M) service provided.  I agree with the above history, physical and plan, which I have reviewed and edited where appropriate.    Patient at high risk for neurological deterioration or death due to:  ICU delirium, aspiration PNA, DVT / PE.  Critical care time:  I have personally provided 45 minutes of critical care time, excluding time spent on separate procedures.      Plan discussed with RN, house staff.

## 2021-11-22 LAB
ANION GAP SERPL CALC-SCNC: 11 MMOL/L — SIGNIFICANT CHANGE UP (ref 5–17)
BASE EXCESS BLDA CALC-SCNC: 1.5 MMOL/L — SIGNIFICANT CHANGE UP (ref -2–3)
BLD GP AB SCN SERPL QL: NEGATIVE — SIGNIFICANT CHANGE UP
BUN SERPL-MCNC: 39 MG/DL — HIGH (ref 7–23)
CALCIUM SERPL-MCNC: 8.8 MG/DL — SIGNIFICANT CHANGE UP (ref 8.4–10.5)
CHLORIDE SERPL-SCNC: 100 MMOL/L — SIGNIFICANT CHANGE UP (ref 96–108)
CO2 BLDA-SCNC: 27 MMOL/L — HIGH (ref 19–24)
CO2 SERPL-SCNC: 27 MMOL/L — SIGNIFICANT CHANGE UP (ref 22–31)
CREAT SERPL-MCNC: 5.62 MG/DL — HIGH (ref 0.5–1.3)
GAS PNL BLDA: SIGNIFICANT CHANGE UP
GLUCOSE SERPL-MCNC: 94 MG/DL — SIGNIFICANT CHANGE UP (ref 70–99)
HCO3 BLDA-SCNC: 26 MMOL/L — SIGNIFICANT CHANGE UP (ref 21–28)
HCT VFR BLD CALC: 23.7 % — LOW (ref 34.5–45)
HGB BLD-MCNC: 7.5 G/DL — LOW (ref 11.5–15.5)
MAGNESIUM SERPL-MCNC: 2.2 MG/DL — SIGNIFICANT CHANGE UP (ref 1.6–2.6)
MCHC RBC-ENTMCNC: 25.5 PG — LOW (ref 27–34)
MCHC RBC-ENTMCNC: 31.6 GM/DL — LOW (ref 32–36)
MCV RBC AUTO: 80.6 FL — SIGNIFICANT CHANGE UP (ref 80–100)
NRBC # BLD: 0 /100 WBCS — SIGNIFICANT CHANGE UP (ref 0–0)
PCO2 BLDA: 39 MMHG — HIGH (ref 32–35)
PH BLDA: 7.43 — SIGNIFICANT CHANGE UP (ref 7.35–7.45)
PHOSPHATE SERPL-MCNC: 5.2 MG/DL — HIGH (ref 2.5–4.5)
PLATELET # BLD AUTO: 210 K/UL — SIGNIFICANT CHANGE UP (ref 150–400)
PO2 BLDA: 184 MMHG — HIGH (ref 83–108)
POTASSIUM SERPL-MCNC: 3.9 MMOL/L — SIGNIFICANT CHANGE UP (ref 3.5–5.3)
POTASSIUM SERPL-SCNC: 3.9 MMOL/L — SIGNIFICANT CHANGE UP (ref 3.5–5.3)
RBC # BLD: 2.94 M/UL — LOW (ref 3.8–5.2)
RBC # FLD: 21.5 % — HIGH (ref 10.3–14.5)
RH IG SCN BLD-IMP: POSITIVE — SIGNIFICANT CHANGE UP
SAO2 % BLDA: 99.3 % — HIGH (ref 94–98)
SODIUM SERPL-SCNC: 138 MMOL/L — SIGNIFICANT CHANGE UP (ref 135–145)
WBC # BLD: 8.78 K/UL — SIGNIFICANT CHANGE UP (ref 3.8–10.5)
WBC # FLD AUTO: 8.78 K/UL — SIGNIFICANT CHANGE UP (ref 3.8–10.5)

## 2021-11-22 PROCEDURE — 71045 X-RAY EXAM CHEST 1 VIEW: CPT | Mod: 26

## 2021-11-22 PROCEDURE — 31600 PLANNED TRACHEOSTOMY: CPT

## 2021-11-22 PROCEDURE — 99291 CRITICAL CARE FIRST HOUR: CPT | Mod: 25

## 2021-11-22 PROCEDURE — 99233 SBSQ HOSP IP/OBS HIGH 50: CPT | Mod: GC

## 2021-11-22 PROCEDURE — 90935 HEMODIALYSIS ONE EVALUATION: CPT

## 2021-11-22 RX ORDER — FENTANYL CITRATE 50 UG/ML
25 INJECTION INTRAVENOUS ONCE
Refills: 0 | Status: DISCONTINUED | OUTPATIENT
Start: 2021-11-22 | End: 2021-11-22

## 2021-11-22 RX ORDER — HYDRALAZINE HCL 50 MG
10 TABLET ORAL
Refills: 0 | Status: DISCONTINUED | OUTPATIENT
Start: 2021-11-22 | End: 2021-11-23

## 2021-11-22 RX ORDER — METOCLOPRAMIDE HCL 10 MG
5 TABLET ORAL EVERY 6 HOURS
Refills: 0 | Status: DISCONTINUED | OUTPATIENT
Start: 2021-11-22 | End: 2021-11-25

## 2021-11-22 RX ORDER — MIDAZOLAM HYDROCHLORIDE 1 MG/ML
0.02 INJECTION, SOLUTION INTRAMUSCULAR; INTRAVENOUS
Qty: 100 | Refills: 0 | Status: DISCONTINUED | OUTPATIENT
Start: 2021-11-22 | End: 2021-11-24

## 2021-11-22 RX ORDER — DEXTROSE 50 % IN WATER 50 %
25 SYRINGE (ML) INTRAVENOUS ONCE
Refills: 0 | Status: COMPLETED | OUTPATIENT
Start: 2021-11-22 | End: 2021-11-22

## 2021-11-22 RX ORDER — ERYTHROPOIETIN 10000 [IU]/ML
8000 INJECTION, SOLUTION INTRAVENOUS; SUBCUTANEOUS ONCE
Refills: 0 | Status: COMPLETED | OUTPATIENT
Start: 2021-11-22 | End: 2021-11-22

## 2021-11-22 RX ADMIN — SODIUM CHLORIDE 50 MILLILITER(S): 9 INJECTION, SOLUTION INTRAVENOUS at 23:46

## 2021-11-22 RX ADMIN — Medication 4 MILLILITER(S): at 14:35

## 2021-11-22 RX ADMIN — Medication 10 MILLIGRAM(S): at 23:38

## 2021-11-22 RX ADMIN — ONDANSETRON 4 MILLIGRAM(S): 8 TABLET, FILM COATED ORAL at 17:52

## 2021-11-22 RX ADMIN — Medication 50 MILLIGRAM(S): at 14:36

## 2021-11-22 RX ADMIN — FENTANYL CITRATE 25 MICROGRAM(S): 50 INJECTION INTRAVENOUS at 21:39

## 2021-11-22 RX ADMIN — Medication 4 MILLILITER(S): at 22:12

## 2021-11-22 RX ADMIN — ERYTHROPOIETIN 8000 UNIT(S): 10000 INJECTION, SOLUTION INTRAVENOUS; SUBCUTANEOUS at 11:14

## 2021-11-22 RX ADMIN — FENTANYL CITRATE 25 MICROGRAM(S): 50 INJECTION INTRAVENOUS at 10:38

## 2021-11-22 RX ADMIN — Medication 0.2 MILLIGRAM(S): at 23:00

## 2021-11-22 RX ADMIN — Medication 10 MILLIGRAM(S): at 22:49

## 2021-11-22 RX ADMIN — Medication 0.2 MILLIGRAM(S): at 00:05

## 2021-11-22 RX ADMIN — CHLORHEXIDINE GLUCONATE 15 MILLILITER(S): 213 SOLUTION TOPICAL at 17:31

## 2021-11-22 RX ADMIN — Medication 5 MILLIGRAM(S): at 23:24

## 2021-11-22 RX ADMIN — Medication 0.2 MILLIGRAM(S): at 12:21

## 2021-11-22 RX ADMIN — AMLODIPINE BESYLATE 10 MILLIGRAM(S): 2.5 TABLET ORAL at 05:45

## 2021-11-22 RX ADMIN — Medication 1 DROP(S): at 17:33

## 2021-11-22 RX ADMIN — Medication 100 MILLIGRAM(S): at 06:35

## 2021-11-22 RX ADMIN — FENTANYL CITRATE 25 MICROGRAM(S): 50 INJECTION INTRAVENOUS at 12:06

## 2021-11-22 RX ADMIN — Medication 50 MILLIGRAM(S): at 05:46

## 2021-11-22 RX ADMIN — Medication 100 MILLIGRAM(S): at 17:31

## 2021-11-22 RX ADMIN — Medication 1 DROP(S): at 05:48

## 2021-11-22 RX ADMIN — FENTANYL CITRATE 25 MICROGRAM(S): 50 INJECTION INTRAVENOUS at 20:54

## 2021-11-22 RX ADMIN — Medication 10 MILLIGRAM(S): at 06:20

## 2021-11-22 RX ADMIN — Medication 50 MILLIGRAM(S): at 21:08

## 2021-11-22 RX ADMIN — CHLORHEXIDINE GLUCONATE 15 MILLILITER(S): 213 SOLUTION TOPICAL at 05:45

## 2021-11-22 RX ADMIN — CHLORHEXIDINE GLUCONATE 1 APPLICATION(S): 213 SOLUTION TOPICAL at 05:47

## 2021-11-22 RX ADMIN — MIDAZOLAM HYDROCHLORIDE 1.7 MG/KG/HR: 1 INJECTION, SOLUTION INTRAMUSCULAR; INTRAVENOUS at 11:11

## 2021-11-22 RX ADMIN — Medication 0.2 MILLIGRAM(S): at 23:23

## 2021-11-22 RX ADMIN — LEVETIRACETAM 500 MILLIGRAM(S): 250 TABLET, FILM COATED ORAL at 17:33

## 2021-11-22 RX ADMIN — PANTOPRAZOLE SODIUM 40 MILLIGRAM(S): 20 TABLET, DELAYED RELEASE ORAL at 12:22

## 2021-11-22 NOTE — PROGRESS NOTE ADULT - ATTENDING COMMENTS
Posterior membrane perforation of trachea during bedside tracheostomy leading to bilateral pneumothoraces now s/p 2 chest tubes. Lung reinflated. C/w water seal. Pending trach today and will consider chest tube removal subsequently.
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
## 2021-11-22 NOTE — PROGRESS NOTE ADULT - ASSESSMENT
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 Hospital course c/b cardiac arrest x3 and anuric ATN requiring dialyisis.     Anuric iATN requiring long term dialysis  Baseline creatinine 0.6  First HD 11/20  IHD today prior to tracheostomy placement  Consult IR for tunneled cath placement  Check quantiferon and hepatitis panel for dialysis dispo    EDW tbd  BP: hypertensive, SBP goal 100-160 per neuro ICU; UF with HD  Anaemia- no need for IV iron, epo with HD  BMD: phos acceptable, no need for binders  Access: Consult IR for tunneled cath placement; using RIJ temp    Daily weights, strict I&Os, renally dose meds, renal diet

## 2021-11-22 NOTE — PROGRESS NOTE ADULT - SUBJECTIVE AND OBJECTIVE BOX
Patient was seen and evaluated on dialysis.   HR: 84 (11-22-21 @ 12:20)  BP: 189/87 (11-22-21 @ 12:00)  Wt(kg): --  11-22    138  |  100  |  39<H>  ----------------------------<  94  3.9   |  27  |  5.62<H>    Ca    8.8      22 Nov 2021 05:14  Phos  5.2     11-22  Mg     2.2     11-22    TPro  6.6  /  Alb  2.3<L>  /  TBili  <0.2  /  DBili  0.2  /  AST  31  /  ALT  <5<L>  /  AlkPhos  216<H>  11-21      Dialyzer: Optiflux [id number]  QB: 350 mL/min  QD: 500 mL/min  K bath: 3  Goal UF: 2.5L  Duration: 180 min    Patient is tolerating the procedure well. Continue full hemodialysis treatment as prescribed.

## 2021-11-22 NOTE — PROGRESS NOTE ADULT - SUBJECTIVE AND OBJECTIVE BOX
Patient is a 46y Female seen and evaluated at bedside. Non responsive, mild oedema. HD today. Plan trach this afternoon. Would consult IR for tunneled cath placement as pt will need long term HD.     Meds:    acetylcysteine 20%  Inhalation 4 three times a day  amLODIPine   Tablet 10 daily  artificial  tears Solution 1 two times a day  ceFAZolin   IVPB 500 every 12 hours  chlorhexidine 0.12% Liquid 15 every 12 hours  chlorhexidine 2% Cloths 1 <User Schedule>  chlorhexidine 2% Cloths 1 <User Schedule>  cloNIDine 0.2 every 12 hours  epoetin nannette-epbx (RETACRIT) Injectable 8000 once  hydrALAZINE 50 every 8 hours  labetalol Injectable 10 every 2 hours PRN  lactated ringers. 1000 <Continuous>  levETIRAcetam  Solution 500 every 24 hours  midazolam Infusion 0.02 <Continuous>  ondansetron Injectable 4 every 6 hours PRN  pantoprazole   Suspension 40 daily  polyethylene glycol 3350 17 daily  senna 2 at bedtime  sodium chloride 0.9% lock flush 10 every 1 hour PRN      T(C): , Max: 37.6 (11-22-21 @ 10:10)  T(F): , Max: 99.7 (11-22-21 @ 10:10)  HR: 89 (11-22-21 @ 10:10)  BP: 161/86 (11-22-21 @ 10:00)  BP(mean): 116 (11-22-21 @ 10:00)  RR: 14 (11-22-21 @ 10:10)  SpO2: 100% (11-22-21 @ 10:10)  Wt(kg): --    11-21 @ 07:01  -  11-22 @ 07:00  --------------------------------------------------------  IN: 2092.8 mL / OUT: 3140 mL / NET: -1047.2 mL    11-22 @ 07:01  -  11-22 @ 11:05  --------------------------------------------------------  IN: 100 mL / OUT: 0 mL / NET: 100 mL      Review of Systems: Unable to participate    PHYSICAL EXAM:  GENERAL: intubated, s/p cranial incision  CHEST/LUNG: Coarse bilaterally  HEART: normal S1S2, RRR  EXTREMITIES: +1 b/l UE oedema   ACCESS: RI temp cath    LABS:                        7.5    8.78  )-----------( 210      ( 22 Nov 2021 05:14 )             23.7     11-22    138  |  100  |  39<H>  ----------------------------<  94  3.9   |  27  |  5.62<H>    Ca    8.8      22 Nov 2021 05:14  Phos  5.2     11-22  Mg     2.2     11-22    TPro  6.6  /  Alb  2.3<L>  /  TBili  <0.2  /  DBili  0.2  /  AST  31  /  ALT  <5<L>  /  AlkPhos  216<H>  11-21                RADIOLOGY & ADDITIONAL STUDIES:           Patient is a 46y Female seen and evaluated at bedside. Non responsive, mild oedema. HD today. Plan trach this afternoon. Would consult IR for tunneled cath placement as pt will need long term HD.     Meds:    acetylcysteine 20%  Inhalation 4 three times a day  amLODIPine   Tablet 10 daily  artificial  tears Solution 1 two times a day  ceFAZolin   IVPB 500 every 12 hours  chlorhexidine 0.12% Liquid 15 every 12 hours  chlorhexidine 2% Cloths 1 <User Schedule>  chlorhexidine 2% Cloths 1 <User Schedule>  cloNIDine 0.2 every 12 hours  epoetin nannette-epbx (RETACRIT) Injectable 8000 once  hydrALAZINE 50 every 8 hours  labetalol Injectable 10 every 2 hours PRN  lactated ringers. 1000 <Continuous>  levETIRAcetam  Solution 500 every 24 hours  midazolam Infusion 0.02 <Continuous>  ondansetron Injectable 4 every 6 hours PRN  pantoprazole   Suspension 40 daily  polyethylene glycol 3350 17 daily  senna 2 at bedtime  sodium chloride 0.9% lock flush 10 every 1 hour PRN      T(C): , Max: 37.6 (11-22-21 @ 10:10)  T(F): , Max: 99.7 (11-22-21 @ 10:10)  HR: 89 (11-22-21 @ 10:10)  BP: 161/86 (11-22-21 @ 10:00)  BP(mean): 116 (11-22-21 @ 10:00)  RR: 14 (11-22-21 @ 10:10)  SpO2: 100% (11-22-21 @ 10:10)  Wt(kg): --    11-21 @ 07:01  -  11-22 @ 07:00  --------------------------------------------------------  IN: 2092.8 mL / OUT: 3140 mL / NET: -1047.2 mL    11-22 @ 07:01  -  11-22 @ 11:05  --------------------------------------------------------  IN: 100 mL / OUT: 0 mL / NET: 100 mL      Review of Systems: Unable to participate    PHYSICAL EXAM:  GENERAL: intubated, s/p cranial incision  CHEST/LUNG: Coarse bilaterally  HEART: normal S1S2, RRR  EXTREMITIES: +1 b/l UE oedema   ACCESS: RIJ temp cath    LABS:                        7.5    8.78  )-----------( 210      ( 22 Nov 2021 05:14 )             23.7     11-22    138  |  100  |  39<H>  ----------------------------<  94  3.9   |  27  |  5.62<H>    Ca    8.8      22 Nov 2021 05:14  Phos  5.2     11-22  Mg     2.2     11-22    TPro  6.6  /  Alb  2.3<L>  /  TBili  <0.2  /  DBili  0.2  /  AST  31  /  ALT  <5<L>  /  AlkPhos  216<H>  11-21                RADIOLOGY & ADDITIONAL STUDIES:      Reviewed

## 2021-11-22 NOTE — BRIEF OPERATIVE NOTE - OPERATION/FINDINGS
Inferior tracheal ring from site of previous trach incision    7.5 Shiley cuffed trach placed via Seldinger technique and soft suction - Good end tidal CO2 and volumes
Patient was brought to the angio suite, placed on x-ray table, placed on standard monitor by anesthesiologist. B/l groins were prepped and draped in usual sterile fashion.   6 [place] sheath was used in the right common femoral artery for access. A diagnostic angiogram was performed under monitored anesthesia care. B/l ICAs were injected and studied.     Findings:   There is a 9x6mm dysplastic left MCA aneurysm. Upward displacement of the left MCA by mass effect. Findings were discussed with Dr. Duncan. Decision was made to coil embolize aneurysm. Endovascular coil embolization of aneurysm was performed.      Full report to follow  Patient tolerated the procedure well and at baseline neurological condition.   Right groin vascular access site was closed with per close.  There is no hematoma.   Patient was transferred back to the NSICU.    Above discussed with Dr. Correa.
Under GA, using a 4 fr sheath right CFA, cerebral angiogram was performed.  Findings: Unchanged appearance of previously coil embolized left MCA aneurysm, patent parent artery. There is persistent upward displacement of left MCA, no angiographic vasospasm.  No immediate complications.  Right groin/vasc access site: Direct manual compression applied, hemostasis achieved, no hematoma.  Patient was transferred back to NSICU  Above d/w: Dr. Correa

## 2021-11-22 NOTE — PROGRESS NOTE ADULT - ASSESSMENT
47 y/o female with PMH HTN, multiple sclerosis, recent spinal surgery 10/8 ([facility]) presented to [facility] after being found unconscious at home, unknown period of time. Found to have ruptured L MCA aneurysm with intraparenchymal hemorrhage, SAH, and left subdural hematoma w/ midline shift and herniation. Now s/p L hemicraniotomy. Hospital course c/b CODE BLUE on 11/12 after attempted bedside tracheostomy, which resulted in bilateral pneumothoraces. She is now s/p bilateral chest tubes to water seal. Pulmonary team consulted for management input on chest tubes.    #Acute hypoxic respiratory failure  #Bilateral pneumothorax s/p bilateral chest tubes     Recommendations:  - Patient scheduled for tracheostomy today, per primary team   - Given that the patient is currently on the ventilator, still recommend keeping chest tubes to water seal due to positive pressure effects from ventilator. Please place bilateral chest tubes to water seal and monitor for any hemodynamic compromise with serial CXRs. If concern for HD instability, place back on continuous suction.   - will assess after procedure about further de-escalation attempts of chest tube    Case discussed with primary team and pulmonary attending.

## 2021-11-22 NOTE — PROGRESS NOTE ADULT - ATTENDING COMMENTS
See the Fellow's note written above, for details. I reviewed the fellow documentation.  I have personally seen and examined this patient today. I have reviewed vitals, labs, medications, and imaging. I agree with the fellow's findings and plans as written above with the following additions/amendments:    Patient was seen and evaluated on dialysis.   Patient is tolerating the procedure well.     HR: 79 (11-22-21 @ 17:30)  BP: 160/78 (11-22-21 @ 17:30)  Constitutional: Intubated, sedated; NAD  HEENT: ETT tube in place, clear secretions  Respiratory: Mechanical breath sounds bilaterally, not overbreathing vent  Cardiac: +S1/S2; RRR; no M/R/G  Gastrointestinal: soft, NT/ND; no rebound or guarding; +BS  Extremities: WWP, no clubbing or cyanosis; No edema  Dermatologic: skin warm, dry and intact; no rashes, wounds, or scars  Access: RI Raheemcat      Continue dialysis

## 2021-11-22 NOTE — PROGRESS NOTE ADULT - ASSESSMENT
46y/Female with:  L MCA ruptured aneurysm, subarachnoid hemorrhage, s/p [facility] (10/26/2021, Dr. D'Amico @ [place]), brain compression, cerebral edema  anemia   recent spinal surgery  UGIB  bilateral pneumothorax  acute kidney injury, transaminitis    PLAN: Day 1 = 10-26 ; Day 4 = 10/29; Day 21 = 11/15  NEURO: comachecks q2h, sedation with midazolam, PRN fentanyl for analgosedation  SAH:  off nimodipine  Hydrocephalus:  EVD discontinued  Seizure prophylaxis:  cont levetiracetam 500 OD   REHAB:  physical therapy evaluation and management    EARLY MOB:  HOB elevated    PULM:  CXR; full vent support for now, drop PEEP to 5, continue mucormyst and ipratropium / albuterol; pulmo toilette; trach tomorrow, chest tube to water seal, reassess after tracheostomy  CARDIO:  SBP goal 100-160mm Hg, TTE, amlodipine 10mg PO daily; cont clonidine 0.2 q12h; continue hydralazine (renally maxed to 50 q8h)  ENDO:  Blood sugar goals 140-180 mg/dL  GI:  PPI OD  DIET: TF on hold  RENAL: LR@50; Na goal 135-145; dialysis today  HEM/ONC: no coagulopathy (INR= 1.03), no ASA / Plavix use; Hb stable   VTE Prophylaxis: SCDs, SQH on hold for OR  ID: afebrile, no leukocytosis, ancef while trach packing is in; change tracheal dressing today  Social: will update family     CORE MEASURES  1.  Hunt and Griffin Score = 5  2.  VTE prophylaxis:  [ ] administered within 24 hours of admission OR [X] reason not done: fresh bleed, unsecured aneurysm.  3.  Dysphagia screening:   [X] performed before any oral meds / liquids / food  4.  Nimodipine treatment:  [X] administered within 24 hours of admission OR [ ] reason not done:    ATTENDING ATTESTATION:  I was physically present for the key portions of the evaluation and management (E/M) service provided.  I agree with the above history, physical and plan, which I have reviewed and edited where appropriate.    Patient at high risk for neurological deterioration or death due to:  ICU delirium, aspiration PNA, DVT / PE.  Critical care time:  I have personally provided 45 minutes of critical care time, excluding time spent on separate procedures.      Plan discussed with RN, house staff.

## 2021-11-22 NOTE — BRIEF OPERATIVE NOTE - NSICDXBRIEFPROCEDURE_GEN_ALL_CORE_FT
PROCEDURES:  Tracheostomy, planned 22-Nov-2021 19:11:45  Han Cavanaugh  
PROCEDURES:  Angiogram, carotid and cerebral, bilateral 10-Nov-2021 15:16:53  Cheng Caldera  
PROCEDURES:  Angiogram, cerebral, with coil embolization, in non-operating room setting 26-Oct-2021 19:45:34  Mario Merrill

## 2021-11-22 NOTE — PROGRESS NOTE ADULT - SUBJECTIVE AND OBJECTIVE BOX
PULMONARY CONSULT SERVICE FOLLOW-UP NOTE    INTERVAL HPI:  Reviewed chart and overnight events. Tentatively scheduled for tracheostomy today per primary team. Patient seen and examined at bedside.    MEDICATIONS:  Pulmonary:  acetylcysteine 20%  Inhalation 4 milliLiter(s) Inhalation three times a day    Antimicrobials:  ceFAZolin   IVPB 500 milliGRAM(s) IV Intermittent every 12 hours    Anticoagulants:    Cardiac:  amLODIPine   Tablet 10 milliGRAM(s) Oral daily  cloNIDine 0.2 milliGRAM(s) Oral every 12 hours  hydrALAZINE 50 milliGRAM(s) Oral every 8 hours  labetalol Injectable 10 milliGRAM(s) IV Push every 2 hours PRN      Allergies    No Known Allergies    Intolerances        Vital Signs Last 24 Hrs  T(C): 37.1 (22 Nov 2021 17:10), Max: 37.6 (22 Nov 2021 10:10)  T(F): 98.8 (22 Nov 2021 17:10), Max: 99.7 (22 Nov 2021 10:10)  HR: 88 (22 Nov 2021 16:53) (55 - 92)  BP: 144/75 (22 Nov 2021 16:05) (131/75 - 189/87)  BP(mean): 104 (22 Nov 2021 16:00) (94 - 130)  RR: 14 (22 Nov 2021 16:53) (7 - 25)  SpO2: 100% (22 Nov 2021 16:53) (88% - 100%)    11-21 @ 07:01 - 11-22 @ 07:00  --------------------------------------------------------  IN: 2092.8 mL / OUT: 3140 mL / NET: -1047.2 mL    11-22 @ 07:01  -  11-22 @ 18:24  --------------------------------------------------------  IN: 408.5 mL / OUT: 2600 mL / NET: -2191.5 mL      Mode: AC/ CMV (Assist Control/ Continuous Mandatory Ventilation)  RR (machine): 14  TV (machine): 400  FiO2: 40  PEEP: 8  ITime: 1  MAP: 8.3  PIP: 20      PHYSICAL EXAM:  Constitutional: intubated on ventilator  Head: s/p cranial incision  EENT: ET tube in place, enlarged tongue that is mildly bloody  Neck: supple  Respiratory: bronchial breath sounds, R anterior chest tube and L posterior chest tube to water seal  Cardiovascular: +S1/S2, RRR, RIJ temporary catheter  Gastrointestinal: soft, obese abdomen, PEG c/d/i   Extremities: WWP; 1+ edema on all 4 extremities, no clubbing or cyanosis  Vascular: 2+ radial pulses B/L  Neurological: unable to assess     LABS:  ABG - ( 22 Nov 2021 05:05 )  pH, Arterial: 7.43  pH, Blood: x     /  pCO2: 39    /  pO2: 184   / HCO3: 26    / Base Excess: 1.5   /  SaO2: 99.3                CBC Full  -  ( 22 Nov 2021 05:14 )  WBC Count : 8.78 K/uL  RBC Count : 2.94 M/uL  Hemoglobin : 7.5 g/dL  Hematocrit : 23.7 %  Platelet Count - Automated : 210 K/uL  Mean Cell Volume : 80.6 fl  Mean Cell Hemoglobin : 25.5 pg  Mean Cell Hemoglobin Concentration : 31.6 gm/dL  Auto Neutrophil # : x  Auto Lymphocyte # : x  Auto Monocyte # : x  Auto Eosinophil # : x  Auto Basophil # : x  Auto Neutrophil % : x  Auto Lymphocyte % : x  Auto Monocyte % : x  Auto Eosinophil % : x  Auto Basophil % : x    11-22    138  |  100  |  39<H>  ----------------------------<  94  3.9   |  27  |  5.62<H>    Ca    8.8      22 Nov 2021 05:14  Phos  5.2     11-22  Mg     2.2     11-22    TPro  6.6  /  Alb  2.3<L>  /  TBili  <0.2  /  DBili  0.2  /  AST  31  /  ALT  <5<L>  /  AlkPhos  216<H>  11-21                      RADIOLOGY & ADDITIONAL STUDIES:

## 2021-11-22 NOTE — PROGRESS NOTE ADULT - SUBJECTIVE AND OBJECTIVE BOX
ENT Attending    After meeting with pts daughter Jayshree on 11/19/21, we are planning for tracheotomy in OR today, 11/22/21. Will plan to inspect neck wound and trachea, washout and formalize the tracheotomy.  During this meeting, this procedure was discussed at length with the pts daughter, in addition to pts son and brother . All questions were answered and they wish to proceed. Consent obtained.

## 2021-11-23 LAB
ANION GAP SERPL CALC-SCNC: 10 MMOL/L — SIGNIFICANT CHANGE UP (ref 5–17)
BUN SERPL-MCNC: 23 MG/DL — SIGNIFICANT CHANGE UP (ref 7–23)
CALCIUM SERPL-MCNC: 8.9 MG/DL — SIGNIFICANT CHANGE UP (ref 8.4–10.5)
CHLORIDE SERPL-SCNC: 96 MMOL/L — SIGNIFICANT CHANGE UP (ref 96–108)
CO2 SERPL-SCNC: 30 MMOL/L — SIGNIFICANT CHANGE UP (ref 22–31)
CREAT SERPL-MCNC: 4.13 MG/DL — HIGH (ref 0.5–1.3)
GLUCOSE SERPL-MCNC: 106 MG/DL — HIGH (ref 70–99)
HAV IGM SER-ACNC: SIGNIFICANT CHANGE UP
HBV CORE IGM SER-ACNC: SIGNIFICANT CHANGE UP
HBV SURFACE AG SER-ACNC: SIGNIFICANT CHANGE UP
HCT VFR BLD CALC: 26.3 % — LOW (ref 34.5–45)
HCV AB S/CO SERPL IA: 0.05 S/CO — SIGNIFICANT CHANGE UP
HCV AB SERPL-IMP: SIGNIFICANT CHANGE UP
HGB BLD-MCNC: 8.3 G/DL — LOW (ref 11.5–15.5)
MAGNESIUM SERPL-MCNC: 1.9 MG/DL — SIGNIFICANT CHANGE UP (ref 1.6–2.6)
MCHC RBC-ENTMCNC: 25.5 PG — LOW (ref 27–34)
MCHC RBC-ENTMCNC: 31.6 GM/DL — LOW (ref 32–36)
MCV RBC AUTO: 80.9 FL — SIGNIFICANT CHANGE UP (ref 80–100)
NRBC # BLD: 0 /100 WBCS — SIGNIFICANT CHANGE UP (ref 0–0)
PHOSPHATE SERPL-MCNC: 4.6 MG/DL — HIGH (ref 2.5–4.5)
PLATELET # BLD AUTO: 213 K/UL — SIGNIFICANT CHANGE UP (ref 150–400)
POTASSIUM SERPL-MCNC: 3.4 MMOL/L — LOW (ref 3.5–5.3)
POTASSIUM SERPL-SCNC: 3.4 MMOL/L — LOW (ref 3.5–5.3)
RBC # BLD: 3.25 M/UL — LOW (ref 3.8–5.2)
RBC # FLD: 21 % — HIGH (ref 10.3–14.5)
SODIUM SERPL-SCNC: 136 MMOL/L — SIGNIFICANT CHANGE UP (ref 135–145)
WBC # BLD: 10.42 K/UL — SIGNIFICANT CHANGE UP (ref 3.8–10.5)
WBC # FLD AUTO: 10.42 K/UL — SIGNIFICANT CHANGE UP (ref 3.8–10.5)

## 2021-11-23 PROCEDURE — 99233 SBSQ HOSP IP/OBS HIGH 50: CPT | Mod: GC

## 2021-11-23 PROCEDURE — 71045 X-RAY EXAM CHEST 1 VIEW: CPT | Mod: 26,76

## 2021-11-23 PROCEDURE — 70450 CT HEAD/BRAIN W/O DYE: CPT | Mod: 26

## 2021-11-23 PROCEDURE — 99291 CRITICAL CARE FIRST HOUR: CPT

## 2021-11-23 PROCEDURE — 99232 SBSQ HOSP IP/OBS MODERATE 35: CPT

## 2021-11-23 RX ORDER — HYDRALAZINE HCL 50 MG
10 TABLET ORAL
Refills: 0 | Status: DISCONTINUED | OUTPATIENT
Start: 2021-11-23 | End: 2021-12-02

## 2021-11-23 RX ORDER — SODIUM CHLORIDE 9 MG/ML
1000 INJECTION, SOLUTION INTRAVENOUS
Refills: 0 | Status: DISCONTINUED | OUTPATIENT
Start: 2021-11-23 | End: 2021-11-24

## 2021-11-23 RX ORDER — POTASSIUM CHLORIDE 20 MEQ
20 PACKET (EA) ORAL ONCE
Refills: 0 | Status: DISCONTINUED | OUTPATIENT
Start: 2021-11-23 | End: 2021-11-23

## 2021-11-23 RX ORDER — LABETALOL HCL 100 MG
10 TABLET ORAL
Refills: 0 | Status: DISCONTINUED | OUTPATIENT
Start: 2021-11-23 | End: 2021-12-02

## 2021-11-23 RX ORDER — POTASSIUM CHLORIDE 20 MEQ
20 PACKET (EA) ORAL ONCE
Refills: 0 | Status: COMPLETED | OUTPATIENT
Start: 2021-11-23 | End: 2021-11-23

## 2021-11-23 RX ORDER — HYDRALAZINE HCL 50 MG
10 TABLET ORAL
Refills: 0 | Status: DISCONTINUED | OUTPATIENT
Start: 2021-11-23 | End: 2021-11-23

## 2021-11-23 RX ADMIN — Medication 1 DROP(S): at 05:12

## 2021-11-23 RX ADMIN — Medication 50 MILLIGRAM(S): at 22:39

## 2021-11-23 RX ADMIN — Medication 0.2 MILLIGRAM(S): at 12:22

## 2021-11-23 RX ADMIN — Medication 5 MILLIGRAM(S): at 17:04

## 2021-11-23 RX ADMIN — Medication 100 MILLIGRAM(S): at 05:10

## 2021-11-23 RX ADMIN — LEVETIRACETAM 500 MILLIGRAM(S): 250 TABLET, FILM COATED ORAL at 17:04

## 2021-11-23 RX ADMIN — Medication 20 MILLIEQUIVALENT(S): at 09:34

## 2021-11-23 RX ADMIN — PANTOPRAZOLE SODIUM 40 MILLIGRAM(S): 20 TABLET, DELAYED RELEASE ORAL at 12:22

## 2021-11-23 RX ADMIN — SENNA PLUS 2 TABLET(S): 8.6 TABLET ORAL at 22:44

## 2021-11-23 RX ADMIN — CHLORHEXIDINE GLUCONATE 15 MILLILITER(S): 213 SOLUTION TOPICAL at 17:04

## 2021-11-23 RX ADMIN — Medication 5 MILLIGRAM(S): at 05:11

## 2021-11-23 RX ADMIN — Medication 4 MILLILITER(S): at 21:21

## 2021-11-23 RX ADMIN — Medication 5 MILLIGRAM(S): at 12:21

## 2021-11-23 RX ADMIN — Medication 4 MILLILITER(S): at 16:22

## 2021-11-23 RX ADMIN — CHLORHEXIDINE GLUCONATE 1 APPLICATION(S): 213 SOLUTION TOPICAL at 05:12

## 2021-11-23 RX ADMIN — Medication 10 MILLIGRAM(S): at 16:14

## 2021-11-23 RX ADMIN — CHLORHEXIDINE GLUCONATE 1 APPLICATION(S): 213 SOLUTION TOPICAL at 05:11

## 2021-11-23 RX ADMIN — Medication 0.3 MILLIGRAM(S): at 18:40

## 2021-11-23 RX ADMIN — Medication 50 MILLIGRAM(S): at 05:10

## 2021-11-23 RX ADMIN — Medication 10 MILLIGRAM(S): at 03:19

## 2021-11-23 RX ADMIN — Medication 4 MILLILITER(S): at 05:03

## 2021-11-23 RX ADMIN — Medication 10 MILLIGRAM(S): at 09:33

## 2021-11-23 RX ADMIN — Medication 50 MILLIGRAM(S): at 14:21

## 2021-11-23 RX ADMIN — CHLORHEXIDINE GLUCONATE 15 MILLILITER(S): 213 SOLUTION TOPICAL at 05:10

## 2021-11-23 RX ADMIN — AMLODIPINE BESYLATE 10 MILLIGRAM(S): 2.5 TABLET ORAL at 05:09

## 2021-11-23 NOTE — PROGRESS NOTE ADULT - ASSESSMENT
46y/Female with:  L MCA ruptured aneurysm, subarachnoid hemorrhage, s/p [facility] (10/26/2021, Dr. D'Amico @ [place]), brain compression, cerebral edema  anemia   recent spinal surgery  UGIB  bilateral pneumothorax  acute kidney injury, transaminitis    PLAN: Day 1 = 10-26 ; Day 4 = 10/29; Day 21 = 11/15  NEURO: comachecks q2h, sedation with midazolam, PRN fentanyl for analgosedation  SAH:  off nimodipine  Hydrocephalus:  EVD discontinued  Seizure prophylaxis:  cont levetiracetam 500 OD   REHAB:  physical therapy evaluation and management    EARLY MOB:  HOB elevated    PULM:  CXR; full vent support for now, drop PEEP to 5, continue mucormyst and ipratropium / albuterol; pulmo toilette; trach tomorrow, chest tube to water seal, reassess after tracheostomy  CARDIO:  SBP goal 100-160mm Hg, TTE, amlodipine 10mg PO daily; cont clonidine 0.2 q12h; continue hydralazine (renally maxed to 50 q8h)  ENDO:  Blood sugar goals 140-180 mg/dL  GI:  PPI OD  DIET: TF on hold  RENAL: LR@50; Na goal 135-145; dialysis today  HEM/ONC: no coagulopathy (INR= 1.03), no ASA / Plavix use; Hb stable   VTE Prophylaxis: SCDs, SQH on hold for OR  ID: afebrile, no leukocytosis, ancef while trach packing is in; change tracheal dressing today  Social: will update family     CORE MEASURES  1.  Hunt and Griffin Score = 5  2.  VTE prophylaxis:  [ ] administered within 24 hours of admission OR [X] reason not done: fresh bleed, unsecured aneurysm.  3.  Dysphagia screening:   [X] performed before any oral meds / liquids / food  4.  Nimodipine treatment:  [X] administered within 24 hours of admission OR [ ] reason not done:    ATTENDING ATTESTATION:  I was physically present for the key portions of the evaluation and management (E/M) service provided.  I agree with the above history, physical and plan, which I have reviewed and edited where appropriate.    Patient at high risk for neurological deterioration or death due to:  ICU delirium, aspiration PNA, DVT / PE.  Critical care time:  I have personally provided 45 minutes of critical care time, excluding time spent on separate procedures.      Plan discussed with RN, house staff. 46y/Female with:  L MCA ruptured aneurysm, subarachnoid hemorrhage, s/p [facility] (10/26/2021, Dr. D'Amico @ [place]), brain compression, cerebral edema  anemia   recent spinal surgery  UGIB  bilateral pneumothorax  acute kidney injury, transaminitis    PLAN: Day 1 = 10-26 ; Day 4 = 10/29; Day 21 = 11/15  NEURO: comachecks q2h, VS q1h, sedation with midazolam, PRN fentanyl for analgosedation  SAH:  off nimodipine  Hydrocephalus:  EVD discontinued  Seizure prophylaxis:  cont levetiracetam 500 OD   REHAB:  physical therapy evaluation and management    EARLY MOB:  HOB elevated    PULM:  full vent support for now, continue mucormyst and ipratropium / albuterol; pulmo toilette; s/p trach, chest tube to water seal, d/c if ok with pulmo  CARDIO:  SBP goal 100-160mm Hg, TTE, amlodipine 10mg PO daily; increase clonidine 0.2 q6h; continue hydralazine (renally maxed to 50 q8h)  ENDO:  Blood sugar goals 140-180 mg/dL  GI:  PPI OD  DIET: TF restart at 10cc/hr  RENAL: LR@50; Na goal 135-145; f/u dialysis schedule  HEM/ONC: no coagulopathy (INR= 1.03), no ASA / Plavix use; Hb stable   VTE Prophylaxis: SCDs, SQH - f/u ENT  ID: afebrile, no leukocytosis, ancef d/c  Social: will update family     CORE MEASURES  1.  Hunt and Griffin Score = 5  2.  VTE prophylaxis:  [ ] administered within 24 hours of admission OR [X] reason not done: fresh bleed, unsecured aneurysm.  3.  Dysphagia screening:   [X] performed before any oral meds / liquids / food  4.  Nimodipine treatment:  [X] administered within 24 hours of admission OR [ ] reason not done:    ATTENDING ATTESTATION:  I was physically present for the key portions of the evaluation and management (E/M) service provided.  I agree with the above history, physical and plan, which I have reviewed and edited where appropriate.    Patient at high risk for neurological deterioration or death due to:  ICU delirium, aspiration PNA, DVT / PE.  Critical care time:  I have personally provided 45 minutes of critical care time, excluding time spent on separate procedures.      Plan discussed with RN, house staff.

## 2021-11-23 NOTE — CHART NOTE - NSCHARTNOTEFT_GEN_A_CORE
Admitting Diagnosis:   Patient is a 46y old  Female who presents with a chief complaint of ICH (23 Nov 2021 09:01)    PAST MEDICAL & SURGICAL HISTORY:  No pertinent past medical history  Personal history of spine surgery    Current Nutrition Order:   Diet, NPO with Tube Feed:   Tube Feeding Modality: Gastrostomy  Suplena  Total Volume for 24 Hours (mL): 240  Total Number of Cans: 1  Continuous  Starting Tube Feed Rate {mL per Hour}: 10  Until Goal Tube Feed Rate (mL per Hour): 10  Tube Feed Duration (in Hours): 24  Tube Feed Start Time: 10:00  Liquid Protein Supplement     Qty per Day:  2 (11-23-21 @ 10:10)    PO Intake: Good (%) [   ]  Fair (50-75%) [   ] Poor (<25%) [   ]- N/A    GI Issues:   WDL, last BM 11/22  No n/v/d/c  No abd distention or discomfort noted    Pain:  No pain noted at this time    Skin Integrity:  Surgical incision, rosalind score 9, ecchymotic  +2 generalized pitting edema  No pressure ulcers noted    Labs:   11-23    136  |  96  |  23  ----------------------------<  106<H>  3.4<L>   |  30  |  4.13<H>    Ca    8.9      23 Nov 2021 04:49  Phos  4.6     11-23  Mg     1.9     11-23    CAPILLARY BLOOD GLUCOSE    POCT Blood Glucose.: 92 mg/dL (22 Nov 2021 17:45)  POCT Blood Glucose.: 87 mg/dL (22 Nov 2021 11:33)    Medications:  MEDICATIONS  (STANDING):  acetylcysteine 20%  Inhalation 4 milliLiter(s) Inhalation three times a day  amLODIPine   Tablet 10 milliGRAM(s) Oral daily  artificial  tears Solution 1 Drop(s) Both EYES two times a day  ceFAZolin   IVPB 500 milliGRAM(s) IV Intermittent every 12 hours  chlorhexidine 0.12% Liquid 15 milliLiter(s) Oral Mucosa every 12 hours  chlorhexidine 2% Cloths 1 Application(s) Topical <User Schedule>  chlorhexidine 2% Cloths 1 Application(s) Topical <User Schedule>  cloNIDine 0.2 milliGRAM(s) Oral every 6 hours  dextrose 50% Injectable 25 milliLiter(s) IV Push once  hydrALAZINE 50 milliGRAM(s) Oral every 8 hours  lactated ringers. 1000 milliLiter(s) (50 mL/Hr) IV Continuous <Continuous>  levETIRAcetam  Solution 500 milliGRAM(s) Oral every 24 hours  metoclopramide Injectable 5 milliGRAM(s) IV Push every 6 hours  midazolam Infusion 0.02 mG/kG/Hr (1.7 mL/Hr) IV Continuous <Continuous>  pantoprazole   Suspension 40 milliGRAM(s) Oral daily  polyethylene glycol 3350 17 Gram(s) Oral daily  senna 2 Tablet(s) Oral at bedtime    MEDICATIONS  (PRN):  hydrALAZINE Injectable 10 milliGRAM(s) IV Push every 2 hours PRN SBP > 160  labetalol Injectable 10 milliGRAM(s) IV Push every 2 hours PRN SBP >160  ondansetron Injectable 4 milliGRAM(s) IV Push every 6 hours PRN Nausea and/or Vomiting  sodium chloride 0.9% lock flush 10 milliLiter(s) IV Push every 1 hour PRN Pre/post blood products, medications, blood draw, and to maintain line patency    Admitting Anthropometrics:  Height: 61" Weight: 187lbs, IBW 105lbs+/-10%, %%, BMI 34.9 kg/m2     Weight:  10/26 187lbs   11/3 183.6lbs  11/4 187.1lbs    Weight Change: Weights fluctuating this admission, please cont to trend weights weekly to assess for further weight changes.     Nutrition Focused Physical Exam: Completed [   ]  Not Pertinent [ x  ]    Estimated energy needs:   IBW (48kg) used for calculations as pt >120% of IBW (178%). Needs adjusted for wound healing, critical care requiring intubation. Worsening CHETAN with HD.    Kcal (25-30 kcal/kg): 9707-5143 kcal  Protein (1.4-1.6 g/kg pro): 67-76g pro  **Fluids per team    Subjective: 46y/F with recent spinal surgery, found down and brought to ED.  In ED, witnessed shaking, ?seizures, intubated.  Imaging showed SAH.  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 Repeat CT HCP - EVD R frontal inserted, s/p L hemicraniectomy for decompression and evacuation of SDH 10/26. EVD placed. Pt returned to NSICU intubated and sedated. EVD@5cm h2o. Underwent work-up for emesis, Noted tongue biting with oral trauma and mild bleeding 11/8. S/p PEG tube (11/9/2021). TF stopped due to vomiting episodes 11/18 (Reglan ordered)- Pt refluxing feeds through mouth and nose overnight- now improved. Propofol held 11/18 for elevated TG levels. S/p placement of R IJ dialysis cath 11/19 with HD sessions 11/20 and 11/22. S/p trach placement 11/22. EN now restarted at trickle feeds and tolerating well.     On assessment, pt resting in bed. Remains on full vent support. Vent AC/ VC. Tmax 96.4F. MAP 124H- ordered for versed. Currently NPO with EN of Suplena 1.8 running at trickle feeds with x2 LPS daily. To optimize nutrition at this time given pt remains on vent support and initiated on HD, please see recs below, disc with team. No n/v/d/c. Last BM 11/22. No abd distention reported. Pertinent labs: K 3.4L, Cre 4.13H, Glu 106H, Phos 4.6H. Please see nutr recs below. RD to follow.     Previous Nutrition Diagnosis: Inadequate oral intake RT Inability to meet est needs via PO AEB NPO, reliant on EN to meet 100% of est needs    Active [ x  ]  Resolved [   ]    If resolved, new PES:     Goal: Meet >80% of EER consistently     Recommendations:  1.  To optimize nutrition at this time, recommend Nepro @ 30ml/hr x24hrs with x1 LPS daily (100kcal, 15g pro) providing 720 ml TV, 1396 kcal, 73g pro, 523 ml free water.,   >> FWF per team  >> Cont to maintain aspiration precautions at all times  2. Pain and bowel regimen per team  3. Monitor lytes and replete prn  4. RD diet edu prn  **disc with team    Education: Deferred at this time    Risk Level: High [ x  ] Moderate [  ] Low [   ]. Admitting Diagnosis:   Patient is a 46y old  Female who presents with a chief complaint of ICH (23 Nov 2021 09:01)    PAST MEDICAL & SURGICAL HISTORY:  No pertinent past medical history  Personal history of spine surgery    Current Nutrition Order:   Diet, NPO with Tube Feed:   Tube Feeding Modality: Gastrostomy  Suplena  Total Volume for 24 Hours (mL): 240  Total Number of Cans: 1  Continuous  Starting Tube Feed Rate {mL per Hour}: 10  Until Goal Tube Feed Rate (mL per Hour): 10  Tube Feed Duration (in Hours): 24  Tube Feed Start Time: 10:00  Liquid Protein Supplement     Qty per Day:  2 (11-23-21 @ 10:10)    PO Intake: Good (%) [   ]  Fair (50-75%) [   ] Poor (<25%) [   ]- N/A    GI Issues:   WDL, last BM 11/22  No n/v/d/c  No abd distention or discomfort noted    Pain:  No pain noted at this time    Skin Integrity:  Surgical incision, rosalind score 9, ecchymotic  +2 generalized pitting edema  No pressure ulcers noted    Labs:   11-23    136  |  96  |  23  ----------------------------<  106<H>  3.4<L>   |  30  |  4.13<H>    Ca    8.9      23 Nov 2021 04:49  Phos  4.6     11-23  Mg     1.9     11-23    CAPILLARY BLOOD GLUCOSE    POCT Blood Glucose.: 92 mg/dL (22 Nov 2021 17:45)  POCT Blood Glucose.: 87 mg/dL (22 Nov 2021 11:33)    Medications:  MEDICATIONS  (STANDING):  acetylcysteine 20%  Inhalation 4 milliLiter(s) Inhalation three times a day  amLODIPine   Tablet 10 milliGRAM(s) Oral daily  artificial  tears Solution 1 Drop(s) Both EYES two times a day  ceFAZolin   IVPB 500 milliGRAM(s) IV Intermittent every 12 hours  chlorhexidine 0.12% Liquid 15 milliLiter(s) Oral Mucosa every 12 hours  chlorhexidine 2% Cloths 1 Application(s) Topical <User Schedule>  chlorhexidine 2% Cloths 1 Application(s) Topical <User Schedule>  cloNIDine 0.2 milliGRAM(s) Oral every 6 hours  dextrose 50% Injectable 25 milliLiter(s) IV Push once  hydrALAZINE 50 milliGRAM(s) Oral every 8 hours  lactated ringers. 1000 milliLiter(s) (50 mL/Hr) IV Continuous <Continuous>  levETIRAcetam  Solution 500 milliGRAM(s) Oral every 24 hours  metoclopramide Injectable 5 milliGRAM(s) IV Push every 6 hours  midazolam Infusion 0.02 mG/kG/Hr (1.7 mL/Hr) IV Continuous <Continuous>  pantoprazole   Suspension 40 milliGRAM(s) Oral daily  polyethylene glycol 3350 17 Gram(s) Oral daily  senna 2 Tablet(s) Oral at bedtime    MEDICATIONS  (PRN):  hydrALAZINE Injectable 10 milliGRAM(s) IV Push every 2 hours PRN SBP > 160  labetalol Injectable 10 milliGRAM(s) IV Push every 2 hours PRN SBP >160  ondansetron Injectable 4 milliGRAM(s) IV Push every 6 hours PRN Nausea and/or Vomiting  sodium chloride 0.9% lock flush 10 milliLiter(s) IV Push every 1 hour PRN Pre/post blood products, medications, blood draw, and to maintain line patency    Admitting Anthropometrics:  Height: 61" Weight: 187lbs, IBW 105lbs+/-10%, %%, BMI 34.9 kg/m2     Weight:  10/26 187lbs   11/3 183.6lbs  11/4 187.1lbs  11/20 204.3lbs/ 201lbs  11/21 208.9lbs/ 204.5lbs  11/22 202.1lbs/ 196.4lbs    Weight Change: Weights fluctuating this admission likely 2/2 CHETAN and HD, please cont to trend weights before and after each HD session.     Nutrition Focused Physical Exam: Completed [   ]  Not Pertinent [ x  ]    Estimated energy needs:   IBW (48kg) used for calculations as pt >120% of IBW (178%). Needs adjusted for wound healing, critical care requiring intubation. Worsening CHETAN with HD.    Kcal (25-30 kcal/kg): 9028-7540 kcal  Protein (1.4-1.6 g/kg pro): 67-76g pro  **Fluids per team    Subjective: 46y/F with recent spinal surgery, found down and brought to ED.  In ED, witnessed shaking, ?seizures, intubated.  Imaging showed SAH.  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 for further management. Repeat CT HCP - EVD R frontal inserted, s/p L hemicraniectomy for decompression and evacuation of SDH 10/26. EVD placed. Pt returned to NSICU intubated and sedated. EVD@5cm h2o. Underwent work-up for emesis, Noted tongue biting with oral trauma and mild bleeding 11/8. S/p PEG tube (11/9/2021). TF stopped due to vomiting episodes 11/18 (Reglan ordered)- Pt refluxing feeds through mouth and nose overnight- now improved. Propofol held 11/18 for elevated TG levels. S/p placement of R IJ dialysis cath 11/19 with HD sessions 11/20 and 11/22. S/p trach placement 11/22. EN now restarted at trickle feeds and tolerating well.     On assessment, pt resting in bed. Remains on full vent support. Vent AC/ VC. Tmax 96.4F. MAP 124H- ordered for versed. Currently NPO with EN of Suplena 1.8 running at trickle feeds with x2 LPS daily. To optimize nutrition at this time given pt remains on vent support and initiated on HD, please see recs below, disc with team. No n/v/d/c. Last BM 11/22. No abd distention reported. Pertinent labs: K 3.4L, Cre 4.13H, Glu 106H, Phos 4.6H. Please see nutr recs below. RD to follow.     Previous Nutrition Diagnosis: Inadequate oral intake RT Inability to meet est needs via PO AEB NPO, reliant on EN to meet 100% of est needs    Active [ x  ]  Resolved [   ]    If resolved, new PES:     Goal: Meet >80% of EER consistently     Recommendations:  1.  To optimize nutrition at this time, recommend Nepro @ 30ml/hr x24hrs with x1 LPS daily (100kcal, 15g pro) providing 720 ml TV, 1396 kcal, 73g pro, 523 ml free water.,   >> FWF per team  >> Cont to maintain aspiration precautions at all times  2. Pain and bowel regimen per team  3. Monitor lytes and replete prn  4. RD diet edu prn  **disc with team    Education: Deferred at this time    Risk Level: High [ x  ] Moderate [  ] Low [   ].

## 2021-11-23 NOTE — PROGRESS NOTE ADULT - SUBJECTIVE AND OBJECTIVE BOX
PULMONARY CONSULT SERVICE FOLLOW-UP NOTE    INTERVAL HPI:  Reviewed chart and overnight events. Underwent 7.5 Shiley cuff trach with ENT inferior to prior previous trach attempt. Also with bite block placement due to enlarged tongue and concern for trauma. Patient seen and examined at bedside.    MEDICATIONS:  Pulmonary:  acetylcysteine 20%  Inhalation 4 milliLiter(s) Inhalation three times a day    Antimicrobials:  ceFAZolin   IVPB 500 milliGRAM(s) IV Intermittent every 12 hours    Anticoagulants:    Cardiac:  amLODIPine   Tablet 10 milliGRAM(s) Oral daily  cloNIDine 0.4 milliGRAM(s) Oral every 12 hours  hydrALAZINE 50 milliGRAM(s) Oral every 8 hours  hydrALAZINE Injectable 10 milliGRAM(s) IV Push every 2 hours PRN  labetalol Injectable 10 milliGRAM(s) IV Push every 2 hours PRN      Allergies    No Known Allergies    Intolerances        Vital Signs Last 24 Hrs  T(C): 35.8 (23 Nov 2021 09:06), Max: 37.6 (22 Nov 2021 10:10)  T(F): 96.4 (23 Nov 2021 09:06), Max: 99.7 (22 Nov 2021 10:10)  HR: 76 (23 Nov 2021 09:00) (55 - 89)  BP: 158/80 (23 Nov 2021 09:00) (140/76 - 193/98)  BP(mean): 112 (23 Nov 2021 09:00) (102 - 138)  RR: 14 (23 Nov 2021 09:00) (7 - 25)  SpO2: 100% (23 Nov 2021 09:00) (99% - 100%)    11-22 @ 07:01  -  11-23 @ 07:00  --------------------------------------------------------  IN: 1028.9 mL / OUT: 2925 mL / NET: -1896.1 mL    11-23 @ 07:01  -  11-23 @ 09:58  --------------------------------------------------------  IN: 103.4 mL / OUT: 0 mL / NET: 103.4 mL      Mode: AC/ CMV (Assist Control/ Continuous Mandatory Ventilation)  RR (machine): 14  TV (machine): 400  FiO2: 40  PEEP: 5  ITime: 1  MAP: 8.8  PIP: 21      PHYSICAL EXAM  Constitutional: difficult to arouse, trached on ventilator   HEENT: s/p cranial sutures; bite block in place  Neck: supple  Cardiovascular: +S1/S2, RRR  Respiratory: CTA B/L; no W/R/R; on ventilator  Gastrointestinal: soft, +bowel sounds; PEG c/d/i   Extremities: WWP; trace edema on all 4 extremities; no clubbing or cyanosis  Vascular: 2+ radial pulses B/L  Neurological: A&Ox0, off sedation, non-specific movements to noxious stimuli    LABS:  ABG - ( 22 Nov 2021 05:05 )  pH, Arterial: 7.43  pH, Blood: x     /  pCO2: 39    /  pO2: 184   / HCO3: 26    / Base Excess: 1.5   /  SaO2: 99.3                CBC Full  -  ( 23 Nov 2021 04:49 )  WBC Count : 10.42 K/uL  RBC Count : 3.25 M/uL  Hemoglobin : 8.3 g/dL  Hematocrit : 26.3 %  Platelet Count - Automated : 213 K/uL  Mean Cell Volume : 80.9 fl  Mean Cell Hemoglobin : 25.5 pg  Mean Cell Hemoglobin Concentration : 31.6 gm/dL  Auto Neutrophil # : x  Auto Lymphocyte # : x  Auto Monocyte # : x  Auto Eosinophil # : x  Auto Basophil # : x  Auto Neutrophil % : x  Auto Lymphocyte % : x  Auto Monocyte % : x  Auto Eosinophil % : x  Auto Basophil % : x    11-23    136  |  96  |  23  ----------------------------<  106<H>  3.4<L>   |  30  |  4.13<H>    Ca    8.9      23 Nov 2021 04:49  Phos  4.6     11-23  Mg     1.9     11-23                        RADIOLOGY & ADDITIONAL STUDIES:
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
## 2021-11-23 NOTE — PROGRESS NOTE ADULT - SUBJECTIVE AND OBJECTIVE BOX
HPI:  47 y/o female with PMH HTN, Multiple sclerosis, recent spinal surgery 10/8 ([facility]) presented to [facility] BIBEMS after being found unconscious at home, unknown period of time. 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 HH5, MF1. Patient was intubated at [facility], found to have blown L pupil, and sent straight to the OR for left hemicraniotomy. A-line placed intraop. Hemodynamically unstable upon arrival to [facility], bradycardic and hypertensive s/p Mannitol, Clevidipine, and Furosemide. Central line placed in NSICU. Currently sedated on Propofol, pending repeat CTH. History per patient's son, patient had recent spine surgery and was found on outpatient imaging last week to have L M1 segment aneurysm (unruptured), pt asymptomatic at this time. Per son patient taking percocet PO at home. Ureña catheter, ET tube, and arterial line placed at [facility].     NIHSS on arrival: 32   Bess Griffin 5, Mod Busby 4  Bleed Day 1 = 10/26 (26 Oct 2021 13:03)    S/Overnight events:  Patient seen and evaluated at bedside in the ICU. No overnight events.     Hospital Course: 10/26: admitted to [facility] from [facility], POD#0 s/p L hemicraniectomy for decompression and evacuation of SDH. Transferred to [facility] noted to have tense flap, received mannitol, keppra, decadron. Was hypertensive to 200s and preston to 40s, recevied 85g mannitol and bullet 23.4%. CTH on arrival demonstrated increased size in vents and new IVH. EVD placed, open at 15, central line placed. Transfused 2 u PRBC. POD0 of coiling of left MCA bifurcation aneurysm,   10/27: CTH demonstrated EVD in correct position, EVD lowered to 5, remains intubated on proprofol, pending repeat CTH in AM. Started on 3% @ 30/hr. 2LNS given for euvolemia, Mannitol given for uptrending ICPs. Bullet given 8:30 am. 3% increased to 50/hr. 1 L bolus. New EVD catheter placed using exisiting sreekanth hole. 1 u PRBC.  10/28: HH5, MF4, BD3; POD2 angio coil/embo of L MCA aneurysm. JOAQUÍN overnight, neuro stable. EVD@5cmH20 ICPs < 20 overnight, OP WNL. S/p 1 unit PRBC yesterday with appropriate response. Given 42g mannitol in AM for ICP 22 persistently, with improvement, then given 23% in PM for elevated ICP. febrile, pancultured. Standing tylenol and cooling blanket.   10/29: BD4, POD3 angio coil/embo. JOAQUÍN o/n, neuro stable. EVD@5, ICPs <20 o/n.   10/30: BD5, POD4 angio coil/embo. JOAQUÍN o/n neuro stable. EVD@5. 3% @50cc/hr.  10/31: BD6, POD5 angio coil/embo. brief period maintained upward gaze, resolved on its own. EVD@5cm h2o.    11/1: BD7, POD6 L hemicrani, angio coil/embo. JOAQUÍN overnight. Neuro exam unchanged. ICPs WNL. started bromocriptine/gabapentin/baclofen. dc'd propofol, started precedex  11/2: BD8 POD7 L hemicrani, angio coil/embo. JOAQUÍN overnight. Neuro exam stable. Remains on 3% hypertonic saline. Receieved 1 unit of PRBCs for a Hgb of 7.6.  11/3: BD 9 POD8 of L hemicrani. Elevated lipase, normal amylase, OG tube clogged and replaced. Abdominal US normal. Pending gen surg reccs regarding trach and peg. Gabapentin and Baclofen increased to help with neurostorming. restarted back on 3%, LR@35. became preston+hypotensive with nimodipine 60q4, decreased back to 30q2, NaCl bullet given.   11/4: BD10 POD9, JOAQUÍN o/n, 500cc NS for euvolemia,  neuro stable, EVD at 5. 3% increased to 75  11/5: BD11 POD10, JOAQUÍN o/n, neuro stable, EVD at 5  11/6: BD12 POD11 JOAQUÍN overnight. Neuro exam stable. Remains intubated on precedex. 3% hypertonic saline dc'd  11/7: BD13 POD 12 JOAQUÍN o/n, NGT replaced. on precex with no icp crisis   11/8: BD14 POD13 JOAQUÍN o/n, noted to have tongue maceration/macroglossia. Gauze with tongue depressor placed. Pending further recs from ENT. Pending trach and PEG placement tomorrow with gen surg. Off precedex, ICPs stable. EVD remains @ 5.   11/9: BD15, POD 14, bleeding under tongue at start of shift, fentanyl pushed with no further episodes. gabapentin stopped. PEG placed  11/10: BD 16, POD 15, neuro stable, ENT to be consulted in AM, 3% increased to 50/hr.  11/11: BD 17, POD 16, neuro exam stable. Pend CT saba in am for cranioplasty planning. Pend trach in OR with ENT. F.u BMP in am, 3%@50cc/hr for Na goal 140-145.   11/12: BD 18, POD 17. JOAQUÍN overnight, neuro stable. Pend trach placement today. CT saba completed 11/11. Off of 3%, f/u am BMP for Na goal 140-150. CSF neg. Hypoxic cardiac arrest with ROSC x 3 during tracheostomy placement. B/l chest tubes placed for resulting pneumothorax s/p CPR. salt tabs dc'd.  11/13: BD 19, POD 18. Patient tachycardic with some improvement, SBP stable off of levophed, hgb downtrending o/n, transfused 1 unit PRBCs. B/l chest tube. EVD @5cmH2O, no elevated ICPs. UOP downtrending, trend BUN/Cr, given 1LNS x1 for low urine output. F/u am CXR. Cont Cefepime until today, then change to Ancef while trach packing in place per ENT. Pend CTH when stable. Trops downtrending s/p cardiac arrest. 1L. florinef dc'd. BP goal changed to 100-160, started on amlodipine   11/14: BD20, POD19, worsening creatinine with decreased urine output. Given 250 of albumin and 500cc LR. started on hydralazine PO, decreased amantadine 100 daily given CrCl of 20.  11/15: BD21, POD20, remains oliguric, fem line dc'd, tongue swollen and unable to fit bite block in mouth, started on nimbex gtt to relax jaw. Propofol and fentanyl gtt started. Vomiting TFs through nose and mouth, ENT called. TFs held. Cr uptrending. Palliative consulted.  11/16: BD22, POD21, o/n external trach dressing replaced due to cuff leak and decreasing TV, sedated and paralyzed on nimbex, propofol, and fentanyl gtt. CTH stable.  EVD raised from 5 to 10. Nimbex weaned off. Cr uptrending, Trickle feeds started. FOB negative.   11/17: BD 23, POD22, JOAQUÍN o/n, EVD clamped, NS d/c'ed, LR@50, advancing tube feeds.   11/18: BD24, POD23 o/n 3% at 30 with Na acetate started, propofol dc'd due to elevated TG level, episode of vomiting TFs from nose and mouth into ETT with elevated ICP 30s, ICP normalized with resolution of vomiting, reglan 5mg IV given, TFs held, 3% stopped. midline placed   11/19; BD25, POD24 JOAQUÍN overnight. Remains on versed and fentanyl drips. Neuro exam unchanged. R IJ dialysis cath placed.   11/20: BD26 POD25 JOAQUÍN overnight. Neuro exam unchanged. Plan for HD today  11/21: BD 27 POD 26 weaned off versed, fentanyl gtt continued.   11/22: BD 28 POD 27 JOAQUÍN o/n , off fentanyl gtt, pt is calm. s/p HD earlier today.   11/23: BD 29 POD 28,  increased clonidine to 0.4 BID, s/p trach, stable hemodynamically. neuro at baseline       Vital Signs Last 24 Hrs  T(C): 35.2 (23 Nov 2021 06:00), Max: 37.6 (22 Nov 2021 10:10)  T(F): 95.4 (23 Nov 2021 06:00), Max: 99.7 (22 Nov 2021 10:10)  HR: 80 (23 Nov 2021 07:00) (55 - 89)  BP: 141/78 (23 Nov 2021 07:00) (140/76 - 193/98)  BP(mean): 104 (23 Nov 2021 07:00) (102 - 138)  RR: 14 (23 Nov 2021 07:00) (7 - 25)  SpO2: 100% (23 Nov 2021 07:00) (99% - 100%)    I&O's Detail    22 Nov 2021 07:01  -  23 Nov 2021 07:00  --------------------------------------------------------  IN:    Lactated Ringers: 1000 mL    Midazolam: 28.9 mL  Total IN: 1028.9 mL    OUT:    Chest Tube (mL): 10 mL    Chest Tube (mL): 0 mL    Other (mL): 2600 mL    Voided (mL): 250 mL  Total OUT: 2860 mL    Total NET: -1831.1 mL        I&O's Summary    22 Nov 2021 07:01  -  23 Nov 2021 07:00  --------------------------------------------------------  IN: 1028.9 mL / OUT: 2860 mL / NET: -1831.1 mL      PHYSICAL EXAMINATION   T(C): 35.2 (11-23 @ 06:00), Max: 37.6 (11-22 @ 10:10) HR: 73 (11-23 @ 06:00) (55 - 89) BP: 146/82 (11-23 @ 06:00) (140/76 - 193/98) RR: 14 (11-23 @ 06:00) (7 - 25) SpO2: 100% (11-23 @ 06:00) (99% - 100%)  NEUROLOGIC EXAMINATION:  Patient does not open eyes, does not follow commands, pupils 3mm equal and brisk (+) Doll's, (+) corneals (+) cough and gag, flap full but soft; R UE extensor L UE 0/5 TF BLE  GENERAL: intubated 400 18 +8 50%  EENT:  anicteric  CARDIOVASCULAR: (+) S1 S2, normal rate and regular rhythm  PULMONARY: rhonchi all lung fields, no wheezing  ABDOMEN: soft, distended, normoactive bowel sounds  EXTREMITIES: no edema  SKIN: no rash    Lines/Drains  L radial jerri placed 10/26 -   HD catheter R IJ (11/19 - )  + R brachial Midline (11/18 - )  B/L chest tubes - to water seal   + Primafit     Wound/Drains:    TUBES/LINES:  [] CVC  [] A-line  [] Lumbar Drain  [] Ventriculostomy  [] Ureña  [] Other    DIET:  [] NPO  [] Mechanical  [] Tube feeds    LABS:                        8.3    10.42 )-----------( 213      ( 23 Nov 2021 04:49 )             26.3     11-23    136  |  96  |  23  ----------------------------<  106<H>  3.4<L>   |  30  |  4.13<H>    Ca    8.9      23 Nov 2021 04:49  Phos  4.6     11-23  Mg     1.9     11-23              CAPILLARY BLOOD GLUCOSE      POCT Blood Glucose.: 92 mg/dL (22 Nov 2021 17:45)  POCT Blood Glucose.: 87 mg/dL (22 Nov 2021 11:33)      Drug Levels: [] N/A    CSF Analysis: [] N/A      Allergies    No Known Allergies    Intolerances      MEDICATIONS:  Antibiotics:  ceFAZolin   IVPB 500 milliGRAM(s) IV Intermittent every 12 hours    Neuro:  levETIRAcetam  Solution 500 milliGRAM(s) Oral every 24 hours  metoclopramide Injectable 5 milliGRAM(s) IV Push every 6 hours  midazolam Infusion 0.02 mG/kG/Hr IV Continuous <Continuous>  ondansetron Injectable 4 milliGRAM(s) IV Push every 6 hours PRN    Anticoagulation:    OTHER:  acetylcysteine 20%  Inhalation 4 milliLiter(s) Inhalation three times a day  amLODIPine   Tablet 10 milliGRAM(s) Oral daily  artificial  tears Solution 1 Drop(s) Both EYES two times a day  chlorhexidine 0.12% Liquid 15 milliLiter(s) Oral Mucosa every 12 hours  chlorhexidine 2% Cloths 1 Application(s) Topical <User Schedule>  chlorhexidine 2% Cloths 1 Application(s) Topical <User Schedule>  cloNIDine 0.4 milliGRAM(s) Oral every 12 hours  dextrose 50% Injectable 25 milliLiter(s) IV Push once  hydrALAZINE 50 milliGRAM(s) Oral every 8 hours  hydrALAZINE Injectable 10 milliGRAM(s) IV Push every 2 hours PRN  labetalol Injectable 10 milliGRAM(s) IV Push every 2 hours PRN  pantoprazole   Suspension 40 milliGRAM(s) Oral daily  polyethylene glycol 3350 17 Gram(s) Oral daily  senna 2 Tablet(s) Oral at bedtime    IVF:  lactated ringers. 1000 milliLiter(s) IV Continuous <Continuous>  potassium chloride    Tablet ER 20 milliEquivalent(s) Oral once    CULTURES:  Culture Results:   No growth to date (11-11 @ 17:53)    RADIOLOGY & ADDITIONAL TESTS:      ASSESSMENT:  47 y/o female with PMHx of HTN and MS, hx of spinal surgery at [facility] 10/8/21 presented to [facility] BIBEMS after being found unconscious at home, unknown period of time. 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 HH5, MF4; BD #1 = 10/26. Patient was intubated at [facility] and sent straight to the OR for left hemicraniotomy. A-line placed intraop. Hemodynamically unstable upon arrival to [facility], bradycardic and hypertensive s/p Mannitol and Furosemide. Central line placed in NSICU. S/p EVD placement, and coil embolization of left MCA bifurcation aneurysm 10/26/2021. S/p cerebral angio without findings of vasospasm 11/10. S/p cardiac arrest with ROSC x3 on 11/12 during tracheostomy at bedside now with b/l pneumothoracies and worsening kidney function. EVD dc'd 11/18    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, left    Intracranial hemorrhage, spontaneous subarachnoid, due to cerebral aneurysm, acute    Pneumothorax, left    Functional quadriplegia    Acute respiratory failure with hypoxia    Cardiac arrest    Encounter for palliative care    Advanced care planning/counseling discussion    IPH (idiopathic pulmonary hemosiderosis)    Acute spontaneous intraparenchymal intracranial hemorrhage due to cerebral aneurysm    CHETAN (acute kidney injury)    Angiogram, cerebral, with coil embolization, in non-operating room setting    Endoscopic percutaneous gastrostomy    Angiogram, carotid and cerebral, bilateral    Chest tube placement    Insertion of central venous catheter with ultrasound guidance    Insertion of temporary dialysis catheter    Tracheostomy, planned    Personal history of spine surgery    [email or web address]        PLAN:     NEURO:  - neuro checks q2hrs/vital signs q1hr   - Keppra 500mg q24h renal dosing   - VEEG dc'd, neg for seizures   - Nimodipine dc'd   - EVD dced 11/18   - CTH 11/16, CTH 11/18 stable   - fentanyl gtt, versed gtt    CARDIO:  - -160   - amlodipine 10 daily and hydral 50 q8h, clonidine 0.2 q12h  - s/p cardiac arrest   - TTE 11/15: mild LVH, EF 70%   - QTc 11/17 456     PULM:  - intubated on full vent support  - s/p acute hypoxic cardiac arrest 11/12 during tracheostomy placement with ENT c/b b/l pneumothorax now with b/l chest tubes   - Per pulm - b/l chest tubes to water seal 11/19 and reasess following Trach placement   - s/p trach revision with ENT on 11/22 (Nah[facility])   - Daily CXR       GI:  - PEG TFs suplena dc'd 2/2 emesis  - PPI QD GI ppx   - Bowel regimen   - Alk phos uptrending, shocked liver, improving.     RENAL:   - LR lowered to 50, oliguric   - Na 140-145   - renal failure with high Cr. post cardiac arrest ;  Nephro following, s/p HD 11/20, 11/21, 11/22  - daily weights      HEME:  - SCDs, SQH restarted 11/20   - s/p multiple transfusions this admission, most recently 1 u PRBC 11/19   - 11/14 UE/LE dopplers neg   - FOB neg 11/16     ID:  - Tylenol PRN for fever   - sputum cx 11/4: enterobacter, proteus  - Cefepime started to cover for enterobacter/proteus in sputum dc'd 11/15   - Ancef until trach packing removed 500 BID (possible removal monday post trach)     ENDO:  - ISS   - monitor FS    OTHER  - wound care recs- q2 dressing changes   - ENT consulted, reccomends trach placement and oral hygeine for tongue laceration   - has R midline (11/18)    GOC: full code  Level of care: ICU status   Dispo: pending trach  family updated    Case discussed with Dr. Duncan    HPI:  47 y/o female with PMH HTN, Multiple sclerosis, recent spinal surgery 10/8 ([facility]) presented to [place] ED BIBEMS after being found unconscious at home, unknown period of time. 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 HH5, MF1. Patient was intubated at [place] ED, found to have blown L pupil, and sent straight to the OR for left hemicraniotomy. A-line placed intraop. Hemodynamically unstable upon arrival to [facility], bradycardic and hypertensive s/p Mannitol, Clevidipine, and Furosemide. Central line placed in NSICU. Currently sedated on Propofol, pending repeat CTH. History per patient's son, patient had recent spine surgery and was found on outpatient imaging last week to have L M1 segment aneurysm (unruptured), pt asymptomatic at this time. Per son patient taking percocet PO at home. Ureña catheter, ET tube, and arterial line placed at [facility].     NIHSS on arrival: 32   Bess Griffin 5, Mod Busby 4  Bleed Day 1 = 10/26 (26 Oct 2021 13:03)    S/Overnight events:  Patient seen and evaluated at bedside in the ICU. No overnight events.     Hospital Course: 10/26: admitted to [facility] from [facility], POD#0 s/p L hemicraniectomy for decompression and evacuation of SDH. Transferred to [facility] noted to have tense flap, received mannitol, keppra, decadron. Was hypertensive to 200s and preston to 40s, recevied 85g mannitol and bullet 23.4%. CTH on arrival demonstrated increased size in vents and new IVH. EVD placed, open at 15, central line placed. Transfused 2 u PRBC. POD0 of coiling of left MCA bifurcation aneurysm,   10/27: CTH demonstrated EVD in correct position, EVD lowered to 5, remains intubated on proprofol, pending repeat CTH in AM. Started on 3% @ 30/hr. 2LNS given for euvolemia, Mannitol given for uptrending ICPs. Bullet given 8:30 am. 3% increased to 50/hr. 1 L bolus. New EVD catheter placed using exisiting sreekanth hole. 1 u PRBC.  10/28: HH5, MF4, BD3; POD2 angio coil/embo of L MCA aneurysm. JOAQUÍN overnight, neuro stable. EVD@5cmH20 ICPs < 20 overnight, OP WNL. S/p 1 unit PRBC yesterday with appropriate response. Given 42g mannitol in AM for ICP 22 persistently, with improvement, then given 23% in PM for elevated ICP. febrile, pancultured. Standing tylenol and cooling blanket.   10/29: BD4, POD3 angio coil/embo. JOAQUÍN o/n, neuro stable. EVD@5, ICPs <20 o/n.   10/30: BD5, POD4 angio coil/embo. JOAQUÍN o/n neuro stable. EVD@5. 3% @50cc/hr.  10/31: BD6, POD5 angio coil/embo. brief period maintained upward gaze, resolved on its own. EVD@5cm h2o.    11/1: BD7, POD6 L hemicrani, angio coil/embo. JOAQUÍN overnight. Neuro exam unchanged. ICPs WNL. started bromocriptine/gabapentin/baclofen. dc'd propofol, started precedex  11/2: BD8 POD7 L hemicrani, angio coil/embo. JOAQUÍN overnight. Neuro exam stable. Remains on 3% hypertonic saline. Receieved 1 unit of PRBCs for a Hgb of 7.6.  11/3: BD 9 POD8 of L hemicrani. Elevated lipase, normal amylase, OG tube clogged and replaced. Abdominal US normal. Pending gen surg reccs regarding trach and peg. Gabapentin and Baclofen increased to help with neurostorming. restarted back on 3%, LR@35. became preston+hypotensive with nimodipine 60q4, decreased back to 30q2, NaCl bullet given.   11/4: BD10 POD9, JOAQUÍN o/n, 500cc NS for euvolemia,  neuro stable, EVD at 5. 3% increased to 75  11/5: BD11 POD10, JOAQUÍN o/n, neuro stable, EVD at 5  11/6: BD12 POD11 JOAQUÍN overnight. Neuro exam stable. Remains intubated on precedex. 3% hypertonic saline dc'd  11/7: BD13 POD 12 JOAQUÍN o/n, NGT replaced. on precex with no icp crisis   11/8: BD14 POD13 JOAQUÍN o/n, noted to have tongue maceration/macroglossia. Gauze with tongue depressor placed. Pending further recs from ENT. Pending trach and PEG placement tomorrow with gen surg. Off precedex, ICPs stable. EVD remains @ 5.   11/9: BD15, POD 14, bleeding under tongue at start of shift, fentanyl pushed with no further episodes. gabapentin stopped. PEG placed  11/10: BD 16, POD 15, neuro stable, ENT to be consulted in AM, 3% increased to 50/hr.  11/11: BD 17, POD 16, neuro exam stable. Pend CT saba in am for cranioplasty planning. Pend trach in OR with ENT. F.u BMP in am, 3%@50cc/hr for Na goal 140-145.   11/12: BD 18, POD 17. JOAQUÍN overnight, neuro stable. Pend trach placement today. CT saba completed 11/11. Off of 3%, f/u am BMP for Na goal 140-150. CSF neg. Hypoxic cardiac arrest with ROSC x 3 during tracheostomy placement. B/l chest tubes placed for resulting pneumothorax s/p CPR. salt tabs dc'd.  11/13: BD 19, POD 18. Patient tachycardic with some improvement, SBP stable off of levophed, hgb downtrending o/n, transfused 1 unit PRBCs. B/l chest tube. EVD @5cmH2O, no elevated ICPs. UOP downtrending, trend BUN/Cr, given 1LNS x1 for low urine output. F/u am CXR. Cont Cefepime until today, then change to Ancef while trach packing in place per ENT. Pend CTH when stable. Trops downtrending s/p cardiac arrest. 1L. florinef dc'd. BP goal changed to 100-160, started on amlodipine   11/14: BD20, POD19, worsening creatinine with decreased urine output. Given 250 of albumin and 500cc LR. started on hydralazine PO, decreased amantadine 100 daily given CrCl of 20.  11/15: BD21, POD20, remains oliguric, fem line dc'd, tongue swollen and unable to fit bite block in mouth, started on nimbex gtt to relax jaw. Propofol and fentanyl gtt started. Vomiting TFs through nose and mouth, ENT called. TFs held. Cr uptrending. Palliative consulted.  11/16: BD22, POD21, o/n external trach dressing replaced due to cuff leak and decreasing TV, sedated and paralyzed on nimbex, propofol, and fentanyl gtt. CTH stable.  EVD raised from 5 to 10. Nimbex weaned off. Cr uptrending, Trickle feeds started. FOB negative.   11/17: BD 23, POD22, JOAQUÍN o/n, EVD clamped, NS d/c'ed, LR@50, advancing tube feeds.   11/18: BD24, POD23 o/n 3% at 30 with Na acetate started, propofol dc'd due to elevated TG level, episode of vomiting TFs from nose and mouth into ETT with elevated ICP 30s, ICP normalized with resolution of vomiting, reglan 5mg IV given, TFs held, 3% stopped. midline placed   11/19; BD25, POD24 JOAQUÍN overnight. Remains on versed and fentanyl drips. Neuro exam unchanged. R IJ dialysis cath placed.   11/20: BD26 POD25 JOAQUÍN overnight. Neuro exam unchanged. Plan for HD today  11/21: BD 27 POD 26 weaned off versed, fentanyl gtt continued.   11/22: BD 28 POD 27 JOAQUÍN o/n , off fentanyl gtt, pt is calm. s/p HD earlier today.   11/23: BD 29 POD 28,  increased clonidine to 0.2 q6h, s/p trach, stable hemodynamically. neuro at baseline     Vital Signs Last 24 Hrs  T(C): 35.2 (23 Nov 2021 06:00), Max: 37.6 (22 Nov 2021 10:10)  T(F): 95.4 (23 Nov 2021 06:00), Max: 99.7 (22 Nov 2021 10:10)  HR: 80 (23 Nov 2021 07:00) (55 - 89)  BP: 141/78 (23 Nov 2021 07:00) (140/76 - 193/98)  BP(mean): 104 (23 Nov 2021 07:00) (102 - 138)  RR: 14 (23 Nov 2021 07:00) (7 - 25)  SpO2: 100% (23 Nov 2021 07:00) (99% - 100%)    I&O's Detail    22 Nov 2021 07:01  -  23 Nov 2021 07:00  --------------------------------------------------------  IN:    Lactated Ringers: 1000 mL    Midazolam: 28.9 mL  Total IN: 1028.9 mL    OUT:    Chest Tube (mL): 10 mL    Chest Tube (mL): 0 mL    Other (mL): 2600 mL    Voided (mL): 250 mL  Total OUT: 2860 mL    Total NET: -1831.1 mL        I&O's Summary    22 Nov 2021 07:01  -  23 Nov 2021 07:00  --------------------------------------------------------  IN: 1028.9 mL / OUT: 2860 mL / NET: -1831.1 mL      PHYSICAL EXAMINATION   T(C): 35.2 (11-23 @ 06:00), Max: 37.6 (11-22 @ 10:10) HR: 73 (11-23 @ 06:00) (55 - 89) BP: 146/82 (11-23 @ 06:00) (140/76 - 193/98) RR: 14 (11-23 @ 06:00) (7 - 25) SpO2: 100% (11-23 @ 06:00) (99% - 100%)  NEUROLOGIC EXAMINATION:  Patient does not open eyes, does not follow commands, pupils 3mm equal and brisk (+) Doll's, (+) corneals (+) cough and gag, flap full but soft; R UE extensor L UE 0/5 TF BLE  GENERAL: intubated 400 18 +8 50%  EENT:  anicteric  CARDIOVASCULAR: (+) S1 S2, normal rate and regular rhythm  PULMONARY: rhonchi all lung fields, no wheezing  ABDOMEN: soft, distended, normoactive bowel sounds  EXTREMITIES: no edema  SKIN: no rash    Lines/Drains  L radial jerri placed 10/26 -   HD catheter R IJ (11/19 - )  + R brachial Midline (11/18 - )  B/L chest tubes - to water seal   + Primafit     Wound/Drains:    TUBES/LINES:  [] CVC  [] A-line  [] Lumbar Drain  [] Ventriculostomy  [] Ureña  [] Other    DIET:  [] NPO  [] Mechanical  [] Tube feeds    LABS:                        8.3    10.42 )-----------( 213      ( 23 Nov 2021 04:49 )             26.3     11-23    136  |  96  |  23  ----------------------------<  106<H>  3.4<L>   |  30  |  4.13<H>    Ca    8.9      23 Nov 2021 04:49  Phos  4.6     11-23  Mg     1.9     11-23              CAPILLARY BLOOD GLUCOSE      POCT Blood Glucose.: 92 mg/dL (22 Nov 2021 17:45)  POCT Blood Glucose.: 87 mg/dL (22 Nov 2021 11:33)      Drug Levels: [] N/A    CSF Analysis: [] N/A      Allergies    No Known Allergies    Intolerances      MEDICATIONS:  Antibiotics:  ceFAZolin   IVPB 500 milliGRAM(s) IV Intermittent every 12 hours    Neuro:  levETIRAcetam  Solution 500 milliGRAM(s) Oral every 24 hours  metoclopramide Injectable 5 milliGRAM(s) IV Push every 6 hours  midazolam Infusion 0.02 mG/kG/Hr IV Continuous <Continuous>  ondansetron Injectable 4 milliGRAM(s) IV Push every 6 hours PRN    Anticoagulation:    OTHER:  acetylcysteine 20%  Inhalation 4 milliLiter(s) Inhalation three times a day  amLODIPine   Tablet 10 milliGRAM(s) Oral daily  artificial  tears Solution 1 Drop(s) Both EYES two times a day  chlorhexidine 0.12% Liquid 15 milliLiter(s) Oral Mucosa every 12 hours  chlorhexidine 2% Cloths 1 Application(s) Topical <User Schedule>  chlorhexidine 2% Cloths 1 Application(s) Topical <User Schedule>  cloNIDine 0.4 milliGRAM(s) Oral every 12 hours  dextrose 50% Injectable 25 milliLiter(s) IV Push once  hydrALAZINE 50 milliGRAM(s) Oral every 8 hours  hydrALAZINE Injectable 10 milliGRAM(s) IV Push every 2 hours PRN  labetalol Injectable 10 milliGRAM(s) IV Push every 2 hours PRN  pantoprazole   Suspension 40 milliGRAM(s) Oral daily  polyethylene glycol 3350 17 Gram(s) Oral daily  senna 2 Tablet(s) Oral at bedtime    IVF:  lactated ringers. 1000 milliLiter(s) IV Continuous <Continuous>  potassium chloride    Tablet ER 20 milliEquivalent(s) Oral once    CULTURES:  Culture Results:   No growth to date (11-11 @ 17:53)    RADIOLOGY & ADDITIONAL TESTS:      ASSESSMENT:  47 y/o female with PMHx of HTN and MS, hx of spinal surgery at [facility] 10[facility]8/21 [facility] [place] [facility] being [facility] at home, unknown period of time. 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 HH5, MF4; BD #1 = 10/26. Patient was intubated at [place] ED and sent straight to the OR for left hemicraniotomy. A-line placed intraop. Hemodynamically unstable upon arrival to [facility], bradycardic and hypertensive s/p Mannitol and Furosemide. Central line placed in NSICU. S/p EVD placement, and coil embolization of left MCA bifurcation aneurysm 10/26/2021. S/p cerebral angio without findings of vasospasm 11/10. S/p cardiac arrest with ROSC x3 on 11/12 during tracheostomy at bedside now with b/l pneumothoracies and worsening kidney function. EVD dc'd 11/18. S/p trach revision with ENT on 11/22.     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, left    Intracranial hemorrhage, spontaneous subarachnoid, due to cerebral aneurysm, acute    Pneumothorax, left    Functional quadriplegia    Acute respiratory failure with hypoxia    Cardiac arrest    Encounter for palliative care    Advanced care planning/counseling discussion    IPH (idiopathic pulmonary hemosiderosis)    Acute spontaneous intraparenchymal intracranial hemorrhage due to cerebral aneurysm    CHETAN (acute kidney injury)    Angiogram, cerebral, with coil embolization, in non-operating room setting    Endoscopic percutaneous gastrostomy    Angiogram, carotid and cerebral, bilateral    Chest tube placement    Insertion of central venous catheter with ultrasound guidance    Insertion of temporary dialysis catheter    Tracheostomy, planned    Personal history of spine surgery    SysAdmin_VstLnk        PLAN:     NEURO:  - neuro checks q2hrs/vital signs q1hr   - Keppra 500mg q24h renal dosing   - VEEG dc'd, neg for seizures   - Nimodipine dc'd   - EVD dced 11/18   - CTH 11/16, CTH 11/18 stable   - fentanyl prn, versed gtt    CARDIO:  - -160   - amlodipine 10 daily and hydral 50 q8h, clonidine 0.2 q6h  - s/p cardiac arrest   - TTE 11/15: mild LVH, EF 70%   - QTc 11/17 456     PULM:  - intubated on full vent support  - s/p acute hypoxic cardiac arrest 11/12 during tracheostomy placement with ENT c/b b/l pneumothorax now with b/l chest tubes   - Per pulm - b/l chest tubes to water seal 11/19 and reasess following Trach placement   - s/p trach revision with ENT on 11/22 (Nahid)   - Daily CXR       GI:  - PEG TFs suplene 10 cc/hr  - PPI QD GI ppx   - Bowel regimen   - Alk phos uptrending, shocked liver, improving.     RENAL:   - LR lowered to 50, oliguric   - Na 140-145   - renal failure with high Cr. post cardiac arrest ;  Nephro following, s/p HD 11/20, 11/21, 11/22  - daily weights      HEME:  - SCDs, SQH restarted 11/20   - s/p multiple transfusions this admission, most recently 1 u PRBC 11/19   - 11/14 UE/LE dopplers neg   - FOB neg 11/16     ID:  - Tylenol PRN for fever   - sputum cx 11/4: enterobacter, proteus  - Cefepime started to cover for enterobacter/proteus in sputum dc'd 11/15   - Ancef until trach packing removed 500 BID, dc'd 11/23    ENDO:  - ISS   - monitor FS    OTHER  - wound care recs- q2 dressing changes   - has R midline (11/18)    GOC: full code  Level of care: ICU status   Dispo: pending trach  family updated    Case discussed with Dr. Duncan    HPI:  47 y/o female with PMH HTN, Multiple sclerosis, recent spinal surgery 10/8 ([facility]) presented to [place] ED BIBEMS after being found unconscious at home, unknown period of time. 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 HH5, MF1. Patient was intubated at [place] ED, found to have blown L pupil, and sent straight to the OR for left hemicraniotomy. A-line placed intraop. Hemodynamically unstable upon arrival to [facility], bradycardic and hypertensive s/p Mannitol, Clevidipine, and Furosemide. Central line placed in NSICU. Currently sedated on Propofol, pending repeat CTH. History per patient's son, patient had recent spine surgery and was found on outpatient imaging last week to have L M1 segment aneurysm (unruptured), pt asymptomatic at this time. Per son patient taking percocet PO at home. Ureña catheter, ET tube, and arterial line placed at [facility].     NIHSS on arrival: 32   Bess Griffin 5, Mod Busby 4  Bleed Day 1 = 10/26 (26 Oct 2021 13:03)    S/Overnight events:  Patient seen and evaluated at bedside in the ICU. No overnight events.     Hospital Course: 10/26: admitted to [facility] from [facility], POD#0 s/p L hemicraniectomy for decompression and evacuation of SDH. Transferred to [facility] noted to have tense flap, received mannitol, keppra, decadron. Was hypertensive to 200s and preston to 40s, recevied 85g mannitol and bullet 23.4%. CTH on arrival demonstrated increased size in vents and new IVH. EVD placed, open at 15, central line placed. Transfused 2 u PRBC. POD0 of coiling of left MCA bifurcation aneurysm,   10/27: CTH demonstrated EVD in correct position, EVD lowered to 5, remains intubated on proprofol, pending repeat CTH in AM. Started on 3% @ 30/hr. 2LNS given for euvolemia, Mannitol given for uptrending ICPs. Bullet given 8:30 am. 3% increased to 50/hr. 1 L bolus. New EVD catheter placed using exisiting sreekanth hole. 1 u PRBC.  10/28: HH5, MF4, BD3; POD2 angio coil/embo of L MCA aneurysm. JOAQUÍN overnight, neuro stable. EVD@5cmH20 ICPs < 20 overnight, OP WNL. S/p 1 unit PRBC yesterday with appropriate response. Given 42g mannitol in AM for ICP 22 persistently, with improvement, then given 23% in PM for elevated ICP. febrile, pancultured. Standing tylenol and cooling blanket.   10/29: BD4, POD3 angio coil/embo. JOAQUÍN o/n, neuro stable. EVD@5, ICPs <20 o/n.   10/30: BD5, POD4 angio coil/embo. JOAQUÍN o/n neuro stable. EVD@5. 3% @50cc/hr.  10/31: BD6, POD5 angio coil/embo. brief period maintained upward gaze, resolved on its own. EVD@5cm h2o.    11/1: BD7, POD6 L hemicrani, angio coil/embo. JOAQUÍN overnight. Neuro exam unchanged. ICPs WNL. started bromocriptine/gabapentin/baclofen. dc'd propofol, started precedex  11/2: BD8 POD7 L hemicrani, angio coil/embo. JOAQUÍN overnight. Neuro exam stable. Remains on 3% hypertonic saline. Receieved 1 unit of PRBCs for a Hgb of 7.6.  11/3: BD 9 POD8 of L hemicrani. Elevated lipase, normal amylase, OG tube clogged and replaced. Abdominal US normal. Pending gen surg reccs regarding trach and peg. Gabapentin and Baclofen increased to help with neurostorming. restarted back on 3%, LR@35. became pretson+hypotensive with nimodipine 60q4, decreased back to 30q2, NaCl bullet given.   11/4: BD10 POD9, JOAQUÍN o/n, 500cc NS for euvolemia,  neuro stable, EVD at 5. 3% increased to 75  11/5: BD11 POD10, JOAQUÍN o/n, neuro stable, EVD at 5  11/6: BD12 POD11 JOAQUÍN overnight. Neuro exam stable. Remains intubated on precedex. 3% hypertonic saline dc'd  11/7: BD13 POD 12 JOAQUÍN o/n, NGT replaced. on precex with no icp crisis   11/8: BD14 POD13 JOAQUÍN o/n, noted to have tongue maceration/macroglossia. Gauze with tongue depressor placed. Pending further recs from ENT. Pending trach and PEG placement tomorrow with gen surg. Off precedex, ICPs stable. EVD remains @ 5.   11/9: BD15, POD 14, bleeding under tongue at start of shift, fentanyl pushed with no further episodes. gabapentin stopped. PEG placed  11/10: BD 16, POD 15, neuro stable, ENT to be consulted in AM, 3% increased to 50/hr.  11/11: BD 17, POD 16, neuro exam stable. Pend CT saba in am for cranioplasty planning. Pend trach in OR with ENT. F.u BMP in am, 3%@50cc/hr for Na goal 140-145.   11/12: BD 18, POD 17. JOAQUÍN overnight, neuro stable. Pend trach placement today. CT saba completed 11/11. Off of 3%, f/u am BMP for Na goal 140-150. CSF neg. Hypoxic cardiac arrest with ROSC x 3 during tracheostomy placement. B/l chest tubes placed for resulting pneumothorax s/p CPR. salt tabs dc'd.  11/13: BD 19, POD 18. Patient tachycardic with some improvement, SBP stable off of levophed, hgb downtrending o/n, transfused 1 unit PRBCs. B/l chest tube. EVD @5cmH2O, no elevated ICPs. UOP downtrending, trend BUN/Cr, given 1LNS x1 for low urine output. F/u am CXR. Cont Cefepime until today, then change to Ancef while trach packing in place per ENT. Pend CTH when stable. Trops downtrending s/p cardiac arrest. 1L. florinef dc'd. BP goal changed to 100-160, started on amlodipine   11/14: BD20, POD19, worsening creatinine with decreased urine output. Given 250 of albumin and 500cc LR. started on hydralazine PO, decreased amantadine 100 daily given CrCl of 20.  11/15: BD21, POD20, remains oliguric, fem line dc'd, tongue swollen and unable to fit bite block in mouth, started on nimbex gtt to relax jaw. Propofol and fentanyl gtt started. Vomiting TFs through nose and mouth, ENT called. TFs held. Cr uptrending. Palliative consulted.  11/16: BD22, POD21, o/n external trach dressing replaced due to cuff leak and decreasing TV, sedated and paralyzed on nimbex, propofol, and fentanyl gtt. CTH stable.  EVD raised from 5 to 10. Nimbex weaned off. Cr uptrending, Trickle feeds started. FOB negative.   11/17: BD 23, POD22, JOAQUÍN o/n, EVD clamped, NS d/c'ed, LR@50, advancing tube feeds.   11/18: BD24, POD23 o/n 3% at 30 with Na acetate started, propofol dc'd due to elevated TG level, episode of vomiting TFs from nose and mouth into ETT with elevated ICP 30s, ICP normalized with resolution of vomiting, reglan 5mg IV given, TFs held, 3% stopped. midline placed   11/19; BD25, POD24 JOAQUÍN overnight. Remains on versed and fentanyl drips. Neuro exam unchanged. R IJ dialysis cath placed.   11/20: BD26 POD25 JOAQUÍN overnight. Neuro exam unchanged. Plan for HD today  11/21: BD 27 POD 26 weaned off versed, fentanyl gtt continued.   11/22: BD 28 POD 27 JOAQUÍN o/n , off fentanyl gtt, pt is calm. s/p HD earlier today.   11/23: BD 29 POD 28,  increased clonidine to 0.2 q6h, s/p trach, stable hemodynamically. neuro at baseline     Vital Signs Last 24 Hrs  T(C): 35.2 (23 Nov 2021 06:00), Max: 37.6 (22 Nov 2021 10:10)  T(F): 95.4 (23 Nov 2021 06:00), Max: 99.7 (22 Nov 2021 10:10)  HR: 80 (23 Nov 2021 07:00) (55 - 89)  BP: 141/78 (23 Nov 2021 07:00) (140/76 - 193/98)  BP(mean): 104 (23 Nov 2021 07:00) (102 - 138)  RR: 14 (23 Nov 2021 07:00) (7 - 25)  SpO2: 100% (23 Nov 2021 07:00) (99% - 100%)    I&O's Detail    22 Nov 2021 07:01  -  23 Nov 2021 07:00  --------------------------------------------------------  IN:    Lactated Ringers: 1000 mL    Midazolam: 28.9 mL  Total IN: 1028.9 mL    OUT:    Chest Tube (mL): 10 mL    Chest Tube (mL): 0 mL    Other (mL): 2600 mL    Voided (mL): 250 mL  Total OUT: 2860 mL    Total NET: -1831.1 mL        I&O's Summary    22 Nov 2021 07:01  -  23 Nov 2021 07:00  --------------------------------------------------------  IN: 1028.9 mL / OUT: 2860 mL / NET: -1831.1 mL      PHYSICAL EXAMINATION   T(C): 35.2 (11-23 @ 06:00), Max: 37.6 (11-22 @ 10:10) HR: 73 (11-23 @ 06:00) (55 - 89) BP: 146/82 (11-23 @ 06:00) (140/76 - 193/98) RR: 14 (11-23 @ 06:00) (7 - 25) SpO2: 100% (11-23 @ 06:00) (99% - 100%)  NEUROLOGIC EXAMINATION:  Patient does not open eyes, does not follow commands, pupils 3mm equal and brisk (+) Doll's, (+) corneals (+) cough and gag, flap full but soft; R UE extensor L UE 0/5 TF BLE  GENERAL: tracheostomy in place  EENT:  anicteric  CARDIOVASCULAR: (+) S1 S2, normal rate and regular rhythm  PULMONARY: rhonchi all lung fields, no wheezing  ABDOMEN: soft, distended, normoactive bowel sounds  EXTREMITIES: no edema  SKIN: no rash    Lines/Drains  L radial jerri placed 10/26 -   HD catheter R IJ (11/19 - )  + R brachial Midline (11/18 - )  B/L chest tubes - to water seal   + Primafit     TUBES/LINES:  [] CVC  [] A-line  [] Lumbar Drain  [] Ventriculostomy  [] Ureña  [] Other    DIET:  [] NPO  [] Mechanical  [x] Tube feeds    LABS:                        8.3    10.42 )-----------( 213      ( 23 Nov 2021 04:49 )             26.3     11-23    136  |  96  |  23  ----------------------------<  106<H>  3.4<L>   |  30  |  4.13<H>    Ca    8.9      23 Nov 2021 04:49  Phos  4.6     11-23  Mg     1.9     11-23              CAPILLARY BLOOD GLUCOSE      POCT Blood Glucose.: 92 mg/dL (22 Nov 2021 17:45)  POCT Blood Glucose.: 87 mg/dL (22 Nov 2021 11:33)      Drug Levels: [] N/A    CSF Analysis: [] N/A      Allergies    No Known Allergies    Intolerances      MEDICATIONS:  Antibiotics:  ceFAZolin   IVPB 500 milliGRAM(s) IV Intermittent every 12 hours    Neuro:  levETIRAcetam  Solution 500 milliGRAM(s) Oral every 24 hours  metoclopramide Injectable 5 milliGRAM(s) IV Push every 6 hours  midazolam Infusion 0.02 mG/kG/Hr IV Continuous <Continuous>  ondansetron Injectable 4 milliGRAM(s) IV Push every 6 hours PRN    Anticoagulation:    OTHER:  acetylcysteine 20%  Inhalation 4 milliLiter(s) Inhalation three times a day  amLODIPine   Tablet 10 milliGRAM(s) Oral daily  artificial  tears Solution 1 Drop(s) Both EYES two times a day  chlorhexidine 0.12% Liquid 15 milliLiter(s) Oral Mucosa every 12 hours  chlorhexidine 2% Cloths 1 Application(s) Topical <User Schedule>  chlorhexidine 2% Cloths 1 Application(s) Topical <User Schedule>  cloNIDine 0.4 milliGRAM(s) Oral every 12 hours  dextrose 50% Injectable 25 milliLiter(s) IV Push once  hydrALAZINE 50 milliGRAM(s) Oral every 8 hours  hydrALAZINE Injectable 10 milliGRAM(s) IV Push every 2 hours PRN  labetalol Injectable 10 milliGRAM(s) IV Push every 2 hours PRN  pantoprazole   Suspension 40 milliGRAM(s) Oral daily  polyethylene glycol 3350 17 Gram(s) Oral daily  senna 2 Tablet(s) Oral at bedtime    IVF:  lactated ringers. 1000 milliLiter(s) IV Continuous <Continuous>  potassium chloride    Tablet ER 20 milliEquivalent(s) Oral once    CULTURES:  Culture Results:   No growth to date (11-11 @ 17:53)    RADIOLOGY & ADDITIONAL TESTS:      ASSESSMENT:  47 y/o female with PMHx of HTN and MS, hx of spinal surgery at [facility] 10/8/21 presented to [place] ED BIBEMS after being found unconscious at home, unknown period of time. 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 HH5, MF4; BD #1 = 10/26. Patient was intubated at [place] ED and sent straight to the OR for left hemicraniotomy. A-line placed intraop. Hemodynamically unstable upon arrival to [facility], bradycardic and hypertensive s/p Mannitol and Furosemide. Central line placed in NSICU. S/p EVD placement, and coil embolization of left MCA bifurcation aneurysm 10/26/2021. S/p cerebral angio without findings of vasospasm 11/10. S/p cardiac arrest with ROSC x3 on 11/12 during tracheostomy at bedside now with b/l pneumothoracies and worsening kidney function. EVD dc'd 11/18. S/p trach revision with ENT on 11/22.     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, left    Intracranial hemorrhage, spontaneous subarachnoid, due to cerebral aneurysm, acute    Pneumothorax, left    Functional quadriplegia    Acute respiratory failure with hypoxia    Cardiac arrest    Encounter for palliative care    Advanced care planning/counseling discussion    IPH (idiopathic pulmonary hemosiderosis)    Acute spontaneous intraparenchymal intracranial hemorrhage due to cerebral aneurysm    CHETAN (acute kidney injury)    Angiogram, cerebral, with coil embolization, in non-operating room setting    Endoscopic percutaneous gastrostomy    Angiogram, carotid and cerebral, bilateral    Chest tube placement    Insertion of central venous catheter with ultrasound guidance    Insertion of temporary dialysis catheter    Tracheostomy, planned    Personal history of spine surgery    SysAdmin_VstLnk        PLAN:     NEURO:  - neuro checks q2hrs/vital signs q1hr   - Keppra 500mg q24h renal dosing   - VEEG dc'd, neg for seizures   - Nimodipine dc'd   - EVD dced 11/18   - CTH 11/16, CTH 11/18 stable   - fentanyl prn, versed gtt    CARDIO:  - -160   - amlodipine 10 daily and hydral 50 q8h, clonidine 0.2 q6h  - s/p cardiac arrest   - TTE 11/15: mild LVH, EF 70%   - QTc 11/17 456     PULM:  - intubated on full vent support  - s/p acute hypoxic cardiac arrest 11/12 during tracheostomy placement with ENT c/b b/l pneumothorax now with b/l chest tubes   - Per pulm - b/l chest tubes to water seal 11/19 and reasess following Trach placement   - s/p trach revision with ENT on 11/22 (Nahid)   - Daily CXR       GI:  - PEG TFs suplene 10 cc/hr  - PPI QD GI ppx   - Bowel regimen   - Alk phos uptrending, shocked liver, improving.     RENAL:   - LR lowered to 50, oliguric   - Na 140-145   - renal failure with high Cr. post cardiac arrest ;  Nephro following, s/p HD 11/20, 11/21, 11/22  - daily weights      HEME:  - SCDs, SQH restarted 11/20   - s/p multiple transfusions this admission, most recently 1 u PRBC 11/19   - 11/14 UE/LE dopplers neg   - FOB neg 11/16     ID:  - Tylenol PRN for fever   - sputum cx 11/4: enterobacter, proteus  - Cefepime started to cover for enterobacter/proteus in sputum dc'd 11/15   - Ancef until trach packing removed 500 BID, dc'd 11/23    ENDO:  - ISS   - monitor FS    OTHER  - wound care recs- q2 dressing changes   - has R midline (11/18)    GOC: full code  Level of care: ICU status   Dispo: pending trach  family updated    Case discussed with Dr. Duncan    HPI:  45 y/o female with PMH HTN, Multiple sclerosis, recent spinal surgery 10/8 ([facility]) presented to [place] ED BIBEMS after being found unconscious at home, unknown period of time. 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 HH5, MF1. Patient was intubated at [place] ED, found to have blown L pupil, and sent straight to the OR for left hemicraniotomy. A-line placed intraop. Hemodynamically unstable upon arrival to [facility], bradycardic and hypertensive s/p Mannitol, Clevidipine, and Furosemide. Central line placed in NSICU. Currently sedated on Propofol, pending repeat CTH. History per patient's son, patient had recent spine surgery and was found on outpatient imaging last week to have L M1 segment aneurysm (unruptured), pt asymptomatic at this time. Per son patient taking percocet PO at home. Ureña catheter, ET tube, and arterial line placed at [facility].     NIHSS on arrival: 32   Bess Griffin 5, Mod Busby 4  Bleed Day 1 = 10/26 (26 Oct 2021 13:03)    S/Overnight events:  Patient seen and evaluated at bedside in the ICU. No overnight events.     Hospital Course: 10/26: admitted to [facility] from [facility], POD#0 s/p L hemicraniectomy for decompression and evacuation of SDH. Transferred to [facility] noted to have tense flap, received mannitol, keppra, decadron. Was hypertensive to 200s and preston to 40s, recevied 85g mannitol and bullet 23.4%. CTH on arrival demonstrated increased size in vents and new IVH. EVD placed, open at 15, central line placed. Transfused 2 u PRBC. POD0 of coiling of left MCA bifurcation aneurysm,   10/27: CTH demonstrated EVD in correct position, EVD lowered to 5, remains intubated on proprofol, pending repeat CTH in AM. Started on 3% @ 30/hr. 2LNS given for euvolemia, Mannitol given for uptrending ICPs. Bullet given 8:30 am. 3% increased to 50/hr. 1 L bolus. New EVD catheter placed using exisiting sreekanth hole. 1 u PRBC.  10/28: HH5, MF4, BD3; POD2 angio coil/embo of L MCA aneurysm. JOAQUÍN overnight, neuro stable. EVD@5cmH20 ICPs < 20 overnight, OP WNL. S/p 1 unit PRBC yesterday with appropriate response. Given 42g mannitol in AM for ICP 22 persistently, with improvement, then given 23% in PM for elevated ICP. febrile, pancultured. Standing tylenol and cooling blanket.   10/29: BD4, POD3 angio coil/embo. JOAQUÍN o/n, neuro stable. EVD@5, ICPs <20 o/n.   10/30: BD5, POD4 angio coil/embo. JOAQUÍN o/n neuro stable. EVD@5. 3% @50cc/hr.  10/31: BD6, POD5 angio coil/embo. brief period maintained upward gaze, resolved on its own. EVD@5cm h2o.    11/1: BD7, POD6 L hemicrani, angio coil/embo. JOAQUÍN overnight. Neuro exam unchanged. ICPs WNL. started bromocriptine/gabapentin/baclofen. dc'd propofol, started precedex  11/2: BD8 POD7 L hemicrani, angio coil/embo. JOAQUÍN overnight. Neuro exam stable. Remains on 3% hypertonic saline. Receieved 1 unit of PRBCs for a Hgb of 7.6.  11/3: BD 9 POD8 of L hemicrani. Elevated lipase, normal amylase, OG tube clogged and replaced. Abdominal US normal. Pending gen surg reccs regarding trach and peg. Gabapentin and Baclofen increased to help with neurostorming. restarted back on 3%, LR@35. became preston+hypotensive with nimodipine 60q4, decreased back to 30q2, NaCl bullet given.   11/4: BD10 POD9, JOAQUÍN o/n, 500cc NS for euvolemia,  neuro stable, EVD at 5. 3% increased to 75  11/5: BD11 POD10, JOAQUÍN o/n, neuro stable, EVD at 5  11/6: BD12 POD11 JOAQUÍN overnight. Neuro exam stable. Remains intubated on precedex. 3% hypertonic saline dc'd  11/7: BD13 POD 12 JOAQUÍN o/n, NGT replaced. on precex with no icp crisis   11/8: BD14 POD13 JOAQUÍN o/n, noted to have tongue maceration/macroglossia. Gauze with tongue depressor placed. Pending further recs from ENT. Pending trach and PEG placement tomorrow with gen surg. Off precedex, ICPs stable. EVD remains @ 5.   11/9: BD15, POD 14, bleeding under tongue at start of shift, fentanyl pushed with no further episodes. gabapentin stopped. PEG placed  11/10: BD 16, POD 15, neuro stable, ENT to be consulted in AM, 3% increased to 50/hr.  11/11: BD 17, POD 16, neuro exam stable. Pend CT saba in am for cranioplasty planning. Pend trach in OR with ENT. F.u BMP in am, 3%@50cc/hr for Na goal 140-145.   11/12: BD 18, POD 17. JOAQUÍN overnight, neuro stable. Pend trach placement today. CT saba completed 11/11. Off of 3%, f/u am BMP for Na goal 140-150. CSF neg. Hypoxic cardiac arrest with ROSC x 3 during tracheostomy placement. B/l chest tubes placed for resulting pneumothorax s/p CPR. salt tabs dc'd.  11/13: BD 19, POD 18. Patient tachycardic with some improvement, SBP stable off of levophed, hgb downtrending o/n, transfused 1 unit PRBCs. B/l chest tube. EVD @5cmH2O, no elevated ICPs. UOP downtrending, trend BUN/Cr, given 1LNS x1 for low urine output. F/u am CXR. Cont Cefepime until today, then change to Ancef while trach packing in place per ENT. Pend CTH when stable. Trops downtrending s/p cardiac arrest. 1L. florinef dc'd. BP goal changed to 100-160, started on amlodipine   11/14: BD20, POD19, worsening creatinine with decreased urine output. Given 250 of albumin and 500cc LR. started on hydralazine PO, decreased amantadine 100 daily given CrCl of 20.  11/15: BD21, POD20, remains oliguric, fem line dc'd, tongue swollen and unable to fit bite block in mouth, started on nimbex gtt to relax jaw. Propofol and fentanyl gtt started. Vomiting TFs through nose and mouth, ENT called. TFs held. Cr uptrending. Palliative consulted.  11/16: BD22, POD21, o/n external trach dressing replaced due to cuff leak and decreasing TV, sedated and paralyzed on nimbex, propofol, and fentanyl gtt. CTH stable.  EVD raised from 5 to 10. Nimbex weaned off. Cr uptrending, Trickle feeds started. FOB negative.   11/17: BD 23, POD22, JOAQUÍN o/n, EVD clamped, NS d/c'ed, LR@50, advancing tube feeds.   11/18: BD24, POD23 o/n 3% at 30 with Na acetate started, propofol dc'd due to elevated TG level, episode of vomiting TFs from nose and mouth into ETT with elevated ICP 30s, ICP normalized with resolution of vomiting, reglan 5mg IV given, TFs held, 3% stopped. midline placed   11/19; BD25, POD24 JOAQUÍN overnight. Remains on versed and fentanyl drips. Neuro exam unchanged. R IJ dialysis cath placed.   11/20: BD26 POD25 JOAQUÍN overnight. Neuro exam unchanged. Plan for HD today  11/21: BD 27 POD 26 weaned off versed, fentanyl gtt continued.   11/22: BD 28 POD 27 JOAQUÍN o/n , off fentanyl gtt, pt is calm. s/p HD earlier today.   11/23: BD 29 POD 28,  increased clonidine to 0.2 q6h, s/p trach, stable hemodynamically. neuro at baseline     Vital Signs Last 24 Hrs  T(C): 35.2 (23 Nov 2021 06:00), Max: 37.6 (22 Nov 2021 10:10)  T(F): 95.4 (23 Nov 2021 06:00), Max: 99.7 (22 Nov 2021 10:10)  HR: 80 (23 Nov 2021 07:00) (55 - 89)  BP: 141/78 (23 Nov 2021 07:00) (140/76 - 193/98)  BP(mean): 104 (23 Nov 2021 07:00) (102 - 138)  RR: 14 (23 Nov 2021 07:00) (7 - 25)  SpO2: 100% (23 Nov 2021 07:00) (99% - 100%)    I&O's Detail    22 Nov 2021 07:01  -  23 Nov 2021 07:00  --------------------------------------------------------  IN:    Lactated Ringers: 1000 mL    Midazolam: 28.9 mL  Total IN: 1028.9 mL    OUT:    Chest Tube (mL): 10 mL    Chest Tube (mL): 0 mL    Other (mL): 2600 mL    Voided (mL): 250 mL  Total OUT: 2860 mL    Total NET: -1831.1 mL        I&O's Summary    22 Nov 2021 07:01  -  23 Nov 2021 07:00  --------------------------------------------------------  IN: 1028.9 mL / OUT: 2860 mL / NET: -1831.1 mL      PHYSICAL EXAMINATION   T(C): 35.2 (11-23 @ 06:00), Max: 37.6 (11-22 @ 10:10) HR: 73 (11-23 @ 06:00) (55 - 89) BP: 146/82 (11-23 @ 06:00) (140/76 - 193/98) RR: 14 (11-23 @ 06:00) (7 - 25) SpO2: 100% (11-23 @ 06:00) (99% - 100%)    NEUROLOGIC EXAMINATION:  Patient does not open eyes, does not follow commands, pupils 3mm equal and brisk (+) Doll's, (+) corneals (+) cough and gag, flap full but soft; R UE extensor L UE 0/5 TF BLE  GENERAL: tracheostomy in place  EENT:  anicteric  CARDIOVASCULAR: (+) S1 S2, normal rate and regular rhythm  PULMONARY: rhonchi all lung fields, no wheezing  ABDOMEN: soft, distended, normoactive bowel sounds  EXTREMITIES: no edema  SKIN: no rash    Lines/Drains  L radial jerri placed 10/26 -   HD catheter R IJ (11/19 - )  + R brachial Midline (11/18 - )  B/L chest tubes - to water seal   + Primafit     DIET:  [] NPO  [] Mechanical  [x] Tube feeds    LABS:                        8.3    10.42 )-----------( 213      ( 23 Nov 2021 04:49 )             26.3     11-23    136  |  96  |  23  ----------------------------<  106<H>  3.4<L>   |  30  |  4.13<H>    Ca    8.9      23 Nov 2021 04:49  Phos  4.6     11-23  Mg     1.9     11-23    CAPILLARY BLOOD GLUCOSE    POCT Blood Glucose.: 92 mg/dL (22 Nov 2021 17:45)  POCT Blood Glucose.: 87 mg/dL (22 Nov 2021 11:33)    Drug Levels: [] N/A    CSF Analysis: [] N/A    Allergies    No Known Allergies    Intolerances    MEDICATIONS:  Antibiotics:  ceFAZolin   IVPB 500 milliGRAM(s) IV Intermittent every 12 hours    Neuro:  levETIRAcetam  Solution 500 milliGRAM(s) Oral every 24 hours  metoclopramide Injectable 5 milliGRAM(s) IV Push every 6 hours  midazolam Infusion 0.02 mG/kG/Hr IV Continuous <Continuous>  ondansetron Injectable 4 milliGRAM(s) IV Push every 6 hours PRN    Anticoagulation:    OTHER:  acetylcysteine 20%  Inhalation 4 milliLiter(s) Inhalation three times a day  amLODIPine   Tablet 10 milliGRAM(s) Oral daily  artificial  tears Solution 1 Drop(s) Both EYES two times a day  chlorhexidine 0.12% Liquid 15 milliLiter(s) Oral Mucosa every 12 hours  chlorhexidine 2% Cloths 1 Application(s) Topical <User Schedule>  chlorhexidine 2% Cloths 1 Application(s) Topical <User Schedule>  cloNIDine 0.4 milliGRAM(s) Oral every 12 hours  dextrose 50% Injectable 25 milliLiter(s) IV Push once  hydrALAZINE 50 milliGRAM(s) Oral every 8 hours  hydrALAZINE Injectable 10 milliGRAM(s) IV Push every 2 hours PRN  labetalol Injectable 10 milliGRAM(s) IV Push every 2 hours PRN  pantoprazole   Suspension 40 milliGRAM(s) Oral daily  polyethylene glycol 3350 17 Gram(s) Oral daily  senna 2 Tablet(s) Oral at bedtime    IVF:  lactated ringers. 1000 milliLiter(s) IV Continuous <Continuous>  potassium chloride    Tablet ER 20 milliEquivalent(s) Oral once    CULTURES:  Culture Results:   No growth to date (11-11 @ 17:53)    RADIOLOGY & ADDITIONAL TESTS:      ASSESSMENT:  45 y/o female with PMHx of HTN and MS, hx of spinal surgery at [facility] 10/8/21 presented to [place] BIBEMS after being found unconscious at home, unknown period of time. 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 HH5, MF4; BD #1 = 10/26. Patient was intubated at [place] ED and sent straight to the OR for left hemicraniotomy. A-line placed intraop. Hemodynamically unstable upon arrival to [facility], bradycardic and hypertensive s/p Mannitol and Furosemide. Central line placed in NSICU. S/p EVD placement, and coil embolization of left MCA bifurcation aneurysm 10/26/2021. S/p cerebral angio without findings of vasospasm 11/10. S/p cardiac arrest with ROSC x3 on 11/12 during tracheostomy at bedside now with b/l pneumothoracies and worsening kidney function. EVD dc'd 11/18. S/p trach revision with ENT on 11/22.     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, left    Intracranial hemorrhage, spontaneous subarachnoid, due to cerebral aneurysm, acute    Pneumothorax, left    Functional quadriplegia    Acute respiratory failure with hypoxia    Cardiac arrest    Encounter for palliative care    Advanced care planning/counseling discussion    IPH (idiopathic pulmonary hemosiderosis)    Acute spontaneous intraparenchymal intracranial hemorrhage due to cerebral aneurysm    CHETAN (acute kidney injury)    Angiogram, cerebral, with coil embolization, in non-operating room setting    Endoscopic percutaneous gastrostomy    Angiogram, carotid and cerebral, bilateral    Chest tube placement    Insertion of central venous catheter with ultrasound guidance    Insertion of temporary dialysis catheter    Tracheostomy, planned    Personal history of spine surgery    SysAdmin_VstLnk        PLAN:     NEURO:  - neuro checks q2hrs/vital signs q1hr   - Keppra 500mg q24h renal dosing   - VEEG dc'd, neg for seizures   - Nimodipine dc'd   - EVD dced 11/18   - CTH 11/16, CTH 11/18 stable   - fentanyl prn, versed gtt    CARDIO:  - -160   - amlodipine 10 daily and hydral 50 q8h, clonidine 0.2 q6h  - s/p cardiac arrest   - TTE 11/15: mild LVH, EF 70%   - QTc 11/23 qtc 456    PULM:  - trach'd on full vent support  - s/p acute hypoxic cardiac arrest 11/12 during tracheostomy placement with ENT c/b b/l pneumothorax now with b/l chest tubes   - Per pulm - b/l chest tubes clamped 11/23  - s/p trach revision with ENT on 11/22 (Nahid)   - Daily CXR     GI:  - PEG TFs nepro 10 cc/hr  - PPI QD GI ppx   - Bowel regimen   - shocked liver, Alk phos improving    RENAL:   - LR 50, oliguric  - renal failure with high Cr. post cardiac arrest, now improving with HD ;  Nephro following, s/p HD 11/20, 11/21, 11/22  - daily weights      HEME:  - SCDs, SQH held  - s/p multiple transfusions this admission, most recently 1 u PRBC 11/19   - 11/14 UE/LE dopplers neg   - FOB neg 11/16     ID:  - Tylenol PRN for fever   - sputum cx 11/4: enterobacter, proteus  - Cefepime started to cover for enterobacter/proteus in sputum dc'd 11/15   - Ancef until trach packing removed 500 BID, dc'd 11/23    ENDO:  - ISS   - monitor FS    OTHER  - wound care recs- q2 dressing changes     GOC: full code  Level of care: ICU status   Dispo: pending dispo  family updated     Case discussed with Dr. Duncan and Dr. Vyas

## 2021-11-23 NOTE — PROGRESS NOTE ADULT - SUBJECTIVE AND OBJECTIVE BOX
ENT PROGRESS NOTE    HPI: 45 y/o female with PMH HTN, Multiple sclerosis, recent spinal surgery 10/8 ([facility]) presented to [facility] after being found unconscious at home, unknown period of time. 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 ENT consulted due to tongue swelling. pt has been off sedated due to neuro checks. per nursing, pt has been biting down on tongue. nursing has not been able to place bite block. Denies any hypotensive episodes.     INTERVAL:    11/10: ENT reconsulted for trach placement - per primary team, patient was spastic with stiff neck while trying to perform bedside tracheostomy yesterday. Pt has been intubated since 10/26. Otherwise, NAEON - on EEG.    11/11: S/p tracheostomy attempt 11/12 am. Pt was seen and examined bedside - ETT in place, good ventilation. Stoma packing in place. Trops downtrending. Given 1x PRBC overnight, b/l chest tubes in place. Plan to change from cefepime to ancef today.    11/12: Pt in multisystem organ failure per primary team. Poor neuro exam    11/15: informed by primary team pt had feeds coming from nose and mouth. was also found in ETT. Pt DNR status still pending    11/16: informed overnight night pt is having a vent leak, concern for arising from stoma. superficial dressing changed. no longer experiencing vent leak    11/16: PM. I Spoke with daughter Jayshree Stubbs at bedside this afternoon. Ms. Stubbs conferenced in her uncle and aunt into the conversation. Discussed with Ms. Stubbs current status of tracheostomy site and proposal for re-evaluating tracheostomy site tomorrow in OR followed by bronchoscopy. Explained to Ms. Stubbs procedure tomorrow, tracheostomy and bronchoscopy,  would allow for removal of ETT and evaluation of the distal airways. explained there is a risk of inability to secure airway tomorrow. Ms. Stubbs and family stated they understand and would like to defer the procedure for now given renal status.     11/17: NAEON. pt remains intubated and sedated. no plans for OR today, per family conversation    11/18: Pt refluxing feeds through mouth and nose overnight. tube feeds paused.     11/23: Pt is now s/p trach placement in OR (7.5 Shiley cuffed) with no issues on 11/22. Stable on vent, no respiratory issues overnight. No significant bleeding from trach site.      ICU Vital Signs Last 24 Hrs  T(C): 35.2 (23 Nov 2021 06:00), Max: 37.6 (22 Nov 2021 10:10)  T(F): 95.4 (23 Nov 2021 06:00), Max: 99.7 (22 Nov 2021 10:10)  HR: 80 (23 Nov 2021 07:00) (55 - 89)  BP: 141/78 (23 Nov 2021 07:00) (140/76 - 193/98)  BP(mean): 104 (23 Nov 2021 07:00) (102 - 138)  ABP: 145/72 (23 Nov 2021 07:00) (132/74 - 191/100)  ABP(mean): 99 (23 Nov 2021 07:00) (97 - 139)  RR: 14 (23 Nov 2021 07:00) (7 - 25)  SpO2: 100% (23 Nov 2021 07:00) (99% - 100%)            PHYSICAL EXAM:    CONSTITUTIONAL: A&Ox0  HEAD: EVD in place  ORAL CAVITY/OROPHARYNX: Significant tongue swelling, most significant on ventral surface. mouth unable to be opened due to tonicity of jaw. tongue indurated, necrosis of ventral surface. OP limited due to ETT. Bite block in place  NECK: 7.5 cuffed Shiley trach sutured in place  RESPIRATORY: Respirations unlabored, no increased work of breathing with use of accessory muscles and retractions. No stridor.  CARDIAC: Warm extremities, no cyanosis.       A/P: 46 F w/ w/ significant tongue swelling, likely secondary to biting down on tongue. bite block unable to be placed due to tone of jaw muscles. ENT reconsulted for trach placement - attempted 11/12 am, but complicated by respiratory failure, coded w/ chest compressions 3x with ROSC. ETT was reinserted into tracheal orally. Feeds seen from mouth and nose, likely secondary to tube feeds refluxing. Pt now s/p trach placement in OR with no issues on 11/22.    - 7.5 Shiley cuffed trach sutured in place  - Routine trach care  - ENT will remove trach sutures in 5-7 days  - Rest of care per primary team

## 2021-11-23 NOTE — PROGRESS NOTE ADULT - ASSESSMENT
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 Hospital course c/b cardiac arrest x3 and anuric ATN requiring dialyisis.     Anuric iATN requiring long term dialysis  Baseline creatinine 0.6  First HD 11/20  Defer HD today   IR for tunneled cath placement  Pending quantiferon and hepatitis panel for dialysis dispo    EDW tbd  BP: hypertensive, SBP goal 100-160 per neuro ICU; UF with HD; on clonidine, hydral, norvasc  Anaemia- no need for IV iron, epo with HD  BMD: phos acceptable, no need for binders  Access: Consult IR for tunneled cath placement; using RIJ temp    Daily weights, strict I&Os, renally dose meds, renal diet

## 2021-11-23 NOTE — PROGRESS NOTE ADULT - ASSESSMENT
45 y/o female with PMH HTN, multiple sclerosis, recent spinal surgery 10/8 ([facility]) presented to [facility] after being found unconscious at home, unknown period of time. Found to have ruptured L MCA aneurysm with intraparenchymal hemorrhage, SAH, and left subdural hematoma w/ midline shift and herniation. Now s/p L hemicraniotomy. Hospital course c/b CODE BLUE on 11/12 after attempted bedside tracheostomy, which resulted in bilateral pneumothoraces. She is now s/p bilateral chest tubes to water seal and 7.5 Shiley cuff trach with ENT. Pulmonary team consulted for management input on chest tubes.    #Acute hypoxic respiratory failure  #Bilateral pneumothorax s/p bilateral chest tubes     Recommendations:  - Now that 7.5 Shiley cuff trach in place, please cap bilateral chest tubes for 4 hours. Re-assess with follow-up chest x-ray. If unchanged findings, plan to removal chest tubes tomorrow.   - If patient desaturating and/or becomes hypotensive, please uncap chest tubes due to potential pneumothorax concern.    Case discussed with primary team and pulmonary attending.

## 2021-11-23 NOTE — PROGRESS NOTE ADULT - ATTENDING COMMENTS
Posterior membrane perforation of trachea during bedside tracheostomy leading to bilateral pneumothoraces now s/p 2 chest tubes. Lung reinflated. Tracheostomy in place, no complications. Take both chest tubes off water seal, clamp, and repeat CXR in 4 hours. If lung stays reinflated then leave clamped until AM, repeat CXR then, and if still reinflated will remove the chest tube. Signs of instability include hypotension and/or hypoxemia and if that occurs unclamp both tubes.

## 2021-11-23 NOTE — PROGRESS NOTE ADULT - ATTENDING COMMENTS
See the Fellow's note written above, for details. I reviewed the fellow documentation.  I have personally seen and examined this patient today. I have reviewed vitals, labs, medications, and imaging. I agree with the fellow's findings and plans as written above with the following additions/amendments:    Patient seen and examined at bedside. Intubated, on pain control now.     .  VITAL SIGNS:  T(F): 97 (11-23-21 @ 15:00), Max: 99 (11-22-21 @ 16:05)  HR: 77 (11-23-21 @ 14:00) (71 - 88)  BP: 141/79 (11-23-21 @ 14:00) (138/81 - 193/98)  BP(mean): 104 (11-23-21 @ 14:00) (102 - 138)  RR: 14 (11-23-21 @ 14:00) (14 - 23)  SpO2: 100% (11-23-21 @ 14:00) (100% - 100%)    PHYSICAL EXAM:  Constitutional: Intubated, sedated; NAD  HEENT: ETT tube in place, clear secretions  Respiratory: Mechanical breath sounds bilaterally, not overbreathing vent  Cardiac: +S1/S2; RRR; no M/R/G  Gastrointestinal: soft, NT/ND; no rebound or guarding; +BS  Extremities: WWP, no clubbing or cyanosis; No edema  Dermatologic: skin warm, dry and intact; no rashes, wounds, or scars  Access: RIJ Vascath    Next HD tomorrow, no acute indication for today. TDC consult

## 2021-11-24 LAB
ANION GAP SERPL CALC-SCNC: 14 MMOL/L — SIGNIFICANT CHANGE UP (ref 5–17)
BUN SERPL-MCNC: 25 MG/DL — HIGH (ref 7–23)
CALCIUM SERPL-MCNC: 8.8 MG/DL — SIGNIFICANT CHANGE UP (ref 8.4–10.5)
CHLORIDE SERPL-SCNC: 96 MMOL/L — SIGNIFICANT CHANGE UP (ref 96–108)
CO2 SERPL-SCNC: 26 MMOL/L — SIGNIFICANT CHANGE UP (ref 22–31)
CREAT SERPL-MCNC: 5 MG/DL — HIGH (ref 0.5–1.3)
GLUCOSE SERPL-MCNC: 100 MG/DL — HIGH (ref 70–99)
HCT VFR BLD CALC: 25.5 % — LOW (ref 34.5–45)
HGB BLD-MCNC: 8.2 G/DL — LOW (ref 11.5–15.5)
MAGNESIUM SERPL-MCNC: 2 MG/DL — SIGNIFICANT CHANGE UP (ref 1.6–2.6)
MCHC RBC-ENTMCNC: 25.9 PG — LOW (ref 27–34)
MCHC RBC-ENTMCNC: 32.2 GM/DL — SIGNIFICANT CHANGE UP (ref 32–36)
MCV RBC AUTO: 80.4 FL — SIGNIFICANT CHANGE UP (ref 80–100)
NRBC # BLD: 0 /100 WBCS — SIGNIFICANT CHANGE UP (ref 0–0)
PHOSPHATE SERPL-MCNC: 5.1 MG/DL — HIGH (ref 2.5–4.5)
PLATELET # BLD AUTO: 249 K/UL — SIGNIFICANT CHANGE UP (ref 150–400)
POTASSIUM SERPL-MCNC: 3.5 MMOL/L — SIGNIFICANT CHANGE UP (ref 3.5–5.3)
POTASSIUM SERPL-SCNC: 3.5 MMOL/L — SIGNIFICANT CHANGE UP (ref 3.5–5.3)
RBC # BLD: 3.17 M/UL — LOW (ref 3.8–5.2)
RBC # FLD: 21.5 % — HIGH (ref 10.3–14.5)
SAO2 % BLDA: SIGNIFICANT CHANGE UP % (ref 94–98)
SODIUM SERPL-SCNC: 136 MMOL/L — SIGNIFICANT CHANGE UP (ref 135–145)
WBC # BLD: 12.15 K/UL — HIGH (ref 3.8–10.5)
WBC # FLD AUTO: 12.15 K/UL — HIGH (ref 3.8–10.5)

## 2021-11-24 PROCEDURE — 71045 X-RAY EXAM CHEST 1 VIEW: CPT | Mod: 26,77

## 2021-11-24 PROCEDURE — 99291 CRITICAL CARE FIRST HOUR: CPT | Mod: 25

## 2021-11-24 PROCEDURE — 71045 X-RAY EXAM CHEST 1 VIEW: CPT | Mod: 26

## 2021-11-24 PROCEDURE — 90935 HEMODIALYSIS ONE EVALUATION: CPT

## 2021-11-24 PROCEDURE — 99233 SBSQ HOSP IP/OBS HIGH 50: CPT | Mod: GC

## 2021-11-24 PROCEDURE — 99447 NTRPROF PH1/NTRNET/EHR 11-20: CPT

## 2021-11-24 RX ORDER — HEPARIN SODIUM 5000 [USP'U]/ML
5000 INJECTION INTRAVENOUS; SUBCUTANEOUS EVERY 8 HOURS
Refills: 0 | Status: DISCONTINUED | OUTPATIENT
Start: 2021-11-24 | End: 2021-11-26

## 2021-11-24 RX ORDER — POTASSIUM CHLORIDE 20 MEQ
20 PACKET (EA) ORAL ONCE
Refills: 0 | Status: COMPLETED | OUTPATIENT
Start: 2021-11-24 | End: 2021-11-24

## 2021-11-24 RX ORDER — ERYTHROPOIETIN 10000 [IU]/ML
10000 INJECTION, SOLUTION INTRAVENOUS; SUBCUTANEOUS ONCE
Refills: 0 | Status: COMPLETED | OUTPATIENT
Start: 2021-11-24 | End: 2021-11-24

## 2021-11-24 RX ADMIN — Medication 50 MILLIGRAM(S): at 14:22

## 2021-11-24 RX ADMIN — Medication 0.3 MILLIGRAM(S): at 12:10

## 2021-11-24 RX ADMIN — Medication 5 MILLIGRAM(S): at 05:52

## 2021-11-24 RX ADMIN — Medication 20 MILLIEQUIVALENT(S): at 11:04

## 2021-11-24 RX ADMIN — LEVETIRACETAM 500 MILLIGRAM(S): 250 TABLET, FILM COATED ORAL at 20:12

## 2021-11-24 RX ADMIN — Medication 1 DROP(S): at 05:49

## 2021-11-24 RX ADMIN — Medication 4 MILLILITER(S): at 14:21

## 2021-11-24 RX ADMIN — Medication 50 MILLIGRAM(S): at 05:52

## 2021-11-24 RX ADMIN — Medication 5 MILLIGRAM(S): at 11:04

## 2021-11-24 RX ADMIN — POLYETHYLENE GLYCOL 3350 17 GRAM(S): 17 POWDER, FOR SOLUTION ORAL at 11:04

## 2021-11-24 RX ADMIN — CHLORHEXIDINE GLUCONATE 1 APPLICATION(S): 213 SOLUTION TOPICAL at 05:37

## 2021-11-24 RX ADMIN — Medication 5 MILLIGRAM(S): at 00:24

## 2021-11-24 RX ADMIN — Medication 4 MILLILITER(S): at 22:01

## 2021-11-24 RX ADMIN — HEPARIN SODIUM 5000 UNIT(S): 5000 INJECTION INTRAVENOUS; SUBCUTANEOUS at 14:21

## 2021-11-24 RX ADMIN — Medication 0.3 MILLIGRAM(S): at 05:39

## 2021-11-24 RX ADMIN — PANTOPRAZOLE SODIUM 40 MILLIGRAM(S): 20 TABLET, DELAYED RELEASE ORAL at 11:04

## 2021-11-24 RX ADMIN — Medication 1 DROP(S): at 17:40

## 2021-11-24 RX ADMIN — CHLORHEXIDINE GLUCONATE 15 MILLILITER(S): 213 SOLUTION TOPICAL at 17:39

## 2021-11-24 RX ADMIN — CHLORHEXIDINE GLUCONATE 15 MILLILITER(S): 213 SOLUTION TOPICAL at 05:50

## 2021-11-24 RX ADMIN — HEPARIN SODIUM 5000 UNIT(S): 5000 INJECTION INTRAVENOUS; SUBCUTANEOUS at 23:02

## 2021-11-24 RX ADMIN — Medication 5 MILLIGRAM(S): at 17:39

## 2021-11-24 RX ADMIN — Medication 50 MILLIGRAM(S): at 23:02

## 2021-11-24 RX ADMIN — AMLODIPINE BESYLATE 10 MILLIGRAM(S): 2.5 TABLET ORAL at 05:39

## 2021-11-24 RX ADMIN — ERYTHROPOIETIN 10000 UNIT(S): 10000 INJECTION, SOLUTION INTRAVENOUS; SUBCUTANEOUS at 17:38

## 2021-11-24 RX ADMIN — Medication 4 MILLILITER(S): at 05:29

## 2021-11-24 RX ADMIN — Medication 0.3 MILLIGRAM(S): at 17:40

## 2021-11-24 RX ADMIN — Medication 0.3 MILLIGRAM(S): at 00:24

## 2021-11-24 NOTE — CHART NOTE - NSCHARTNOTEFT_GEN_A_CORE
Admitting Diagnosis:   Patient is a 46y old  Female who presents with a chief complaint of ICH (24 Nov 2021 11:14)    PAST MEDICAL & SURGICAL HISTORY:  No pertinent past medical history  Personal history of spine surgery    Current Nutrition Order: NPO diet  EN regimen: Nepro @20ml/hr x24hrs with x2 LPS daily (200kcal, 30g pro) providing 480 ml TV, 1064 kcal, 69g pro, 348 ml free water.    PO Intake: Good (%) [   ]  Fair (50-75%) [   ] Poor (<25%) [   ]- N/A    GI Issues:   WDL, last BM 11/24  No n/v/d/c  No abd distention or discomfort noted    Pain:  No pain noted at this time    Skin Integrity:  Surgical incision, rosalind score 10, ecchymotic  +1 generalized pitting edema (improvement)  No pressure ulcers noted    Labs:   11-24    136  |  96  |  25<H>  ----------------------------<  100<H>  3.5   |  26  |  5.00<H>    Ca    8.8      24 Nov 2021 05:32  Phos  5.1     11-24  Mg     2.0     11-24    Medications:  MEDICATIONS  (STANDING):  acetylcysteine 20%  Inhalation 4 milliLiter(s) Inhalation three times a day  amLODIPine   Tablet 10 milliGRAM(s) Oral daily  artificial  tears Solution 1 Drop(s) Both EYES two times a day  chlorhexidine 0.12% Liquid 15 milliLiter(s) Oral Mucosa every 12 hours  chlorhexidine 2% Cloths 1 Application(s) Topical <User Schedule>  chlorhexidine 2% Cloths 1 Application(s) Topical <User Schedule>  cloNIDine 0.3 milliGRAM(s) Oral every 6 hours  dextrose 50% Injectable 25 milliLiter(s) IV Push once  epoetin nannette-epbx (RETACRIT) Injectable 04475 Unit(s) IV Push once  heparin   Injectable 5000 Unit(s) SubCutaneous every 8 hours  hydrALAZINE 50 milliGRAM(s) Oral every 8 hours  levETIRAcetam  Solution 500 milliGRAM(s) Oral every 24 hours  metoclopramide Injectable 5 milliGRAM(s) IV Push every 6 hours  pantoprazole   Suspension 40 milliGRAM(s) Oral daily  polyethylene glycol 3350 17 Gram(s) Oral daily  senna 2 Tablet(s) Oral at bedtime    MEDICATIONS  (PRN):  hydrALAZINE Injectable 10 milliGRAM(s) IV Push every 2 hours PRN SBP > 160  labetalol Injectable 10 milliGRAM(s) IV Push every 2 hours PRN SBP >160  ondansetron Injectable 4 milliGRAM(s) IV Push every 6 hours PRN Nausea and/or Vomiting  sodium chloride 0.9% lock flush 10 milliLiter(s) IV Push every 1 hour PRN Pre/post blood products, medications, blood draw, and to maintain line patency    Admitting Anthropometrics:  Height: 61" Weight: 187lbs, IBW 105lbs+/-10%, %%, BMI 34.9 kg/m2     Weight:  10/26 187lbs   11/3 183.6lbs  11/4 187.1lbs  11/20 204.3lbs/ 201lbs  11/21 208.9lbs/ 204.5lbs  11/22 202.1lbs/ 196.4lbs    Weight Change: Weights fluctuating this admission likely 2/2 CHETAN and HD, please cont to trend weights before and after each HD session.     Nutrition Focused Physical Exam: Completed [   ]  Not Pertinent [ x  ]    Estimated energy needs:   IBW (48kg) used for calculations as pt >120% of IBW (178%). Needs adjusted for wound healing, critical care requiring intubation. Worsening CHETAN with HD.    Kcal (25-30 kcal/kg): 9913-7413 kcal  Protein (1.4-1.6 g/kg pro): 67-76g pro  **Fluids per team    Subjective: 46y/F with recent spinal surgery, found down and brought to ED.  In ED, witnessed shaking, ?seizures, intubated.  Imaging showed SAH.  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 for further management. Repeat CT HCP - EVD R frontal inserted, s/p L hemicraniectomy for decompression and evacuation of SDH 10/26. EVD placed. Pt returned to NSICU intubated and sedated. EVD@5cm h2o. Underwent work-up for emesis, Noted tongue biting with oral trauma and mild bleeding 11/8. S/p PEG tube (11/9/2021). TF stopped due to vomiting episodes 11/18 (Reglan ordered)- Pt refluxing feeds through mouth and nose overnight- now improved. Propofol held 11/18 for elevated TG levels. S/p placement of R IJ dialysis cath 11/19 with HD sessions 11/20 and 11/22. S/p trach placement 11/22. EN now restarted at trickle feeds with plan for slow diet advancement.     On assessment, pt resting in bed. Remains on full vent support. Vent AC/ VC. Tmax 97.2F. MAP 104H- ordered for versed. Currently on Nepro 1.8 now advanced to 20 ml/hr cont to tolerate well. To optimize nutrition at this time given pt remains on vent support and initiated on HD, please see recs below, disc with team. No n/v/d/c. Last BM 11/24. No abd distention reported. Pertinent Labs: BUN 25H, Cre 5.0H, Glu 100H, Phos 5.1H. Please see nutr recs below. RD to follow.     Previous Nutrition Diagnosis: Inadequate oral intake RT Inability to meet est needs via PO AEB NPO, reliant on EN to meet 100% of est needs    Active [ x  ]  Resolved [   ]    If resolved, new PES:     Goal: Meet >80% of EER consistently     Recommendations:  1.  To optimize nutrition at this time, recommend Nepro @ 30ml/hr x24hrs with x1 LPS daily (100kcal, 15g pro) providing 720 ml TV, 1396 kcal, 73g pro, 523 ml free water.,   >> FWF per team  >> Cont to maintain aspiration precautions at all times  2. Pain and bowel regimen per team  3. Monitor lytes and replete prn  4. RD diet edu prn  **disc with team    Education: Deferred at this time    Risk Level: High [ x  ] Moderate [  ] Low [   ].

## 2021-11-24 NOTE — PROGRESS NOTE ADULT - ASSESSMENT
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 Hospital course c/b cardiac arrest x3 and anuric ATN requiring dialyisis.     Anuric iATN requiring long term dialysis  Baseline creatinine 0.6  First HD 11/20  HD today; IR for tunneled cath placement   Pending quantiferon and hepatitis panel for dialysis dispo    EDW tbd  BP: hypertensive, SBP goal 100-160 per neuro ICU; UF with HD; on multiple anti-hypertensives, have a lot of room with UF  Anaemia- no need for IV iron, epo with HD  BMD: phos acceptable, no need for binders  Access: Consult IR for tunneled cath placement; using RIJ temp    Daily weights, strict I&Os, renally dose meds, renal diet

## 2021-11-24 NOTE — PROGRESS NOTE ADULT - ASSESSMENT
45 y/o female with PMH HTN, multiple sclerosis, recent spinal surgery 10/8 ([facility]) presented to [place] [facility] after being found unconscious at home, unknown period of time. Found to have ruptured L MCA aneurysm with intraparenchymal hemorrhage, SAH, and left subdural hematoma w/ midline shift and herniation. Now s/p L hemicraniotomy. Hospital course c/b CODE BLUE on 11/12 after attempted bedside tracheostomy, which resulted in bilateral pneumothoraces. She is now s/p bilateral chest tubes to water seal and 7.5 Shiley cuff trach with ENT. Pulmonary team consulted for management input on chest tubes.    #Acute hypoxic respiratory failure  #Bilateral pneumothorax s/p bilateral chest tubes     Recommendations:  - s/p 7.5 Shiley cuff trach on 11/22.   - bilateral chest tubes clamped yesterday afternoon with no subsequent pneumothorax, desaturation, and hypotensive events noted overnight.       INCOMPLETE NOTE. Case to be discussed with pulmonary attending.  45 y/o female with PMH HTN, multiple sclerosis, recent spinal surgery 10/8 ([facility]) presented to [facility] after being found unconscious at home, unknown period of time. Found to have ruptured L MCA aneurysm with intraparenchymal hemorrhage, SAH, and left subdural hematoma w/ midline shift and herniation. Now s/p L hemicraniotomy. Hospital course c/b CODE BLUE on 11/12 after attempted bedside tracheostomy, which resulted in bilateral pneumothoraces. She is now s/p bilateral chest tubes to water seal and 7.5 Shiley cuff trach with ENT. Pulmonary team consulted for management input on chest tubes.    #Acute hypoxic respiratory failure  #Bilateral pneumothorax s/p bilateral chest tubes     Recommendations:  - s/p 7.5 Shiley cuff trach on 11/22.   - bilateral chest tubes clamped yesterday afternoon with no subsequent pneumothorax, desaturation, and hypotensive events noted overnight.   - plan to remove chest tubes today      Case discussed with primary team and pulmonary attending.

## 2021-11-24 NOTE — CONSULT NOTE ADULT - PROVIDER SPECIALTY LIST ADULT
Surgery
ENT
Palliative Care
Infectious Disease
Intervent Radiology
Nephrology
Surgery
Pulmonology
Intervent Radiology

## 2021-11-24 NOTE — PROGRESS NOTE ADULT - SUBJECTIVE AND OBJECTIVE BOX
HPI:  47 y/o female with PMH HTN, Multiple sclerosis, recent spinal surgery 10/8 ([facility]) presented to [place] [facility] BIBEMS after being found unconscious at home, unknown period of time. 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 HH5, MF1. Patient was intubated at [place] ED, found to have blown L pupil, and sent straight to the OR for left hemicraniotomy. A-line placed intraop. Hemodynamically unstable upon arrival to [facility], bradycardic and hypertensive s/p Mannitol, Clevidipine, and Furosemide. Central line placed in NSICU. Currently sedated on Propofol, pending repeat CTH. History per patient's son, patient had recent spine surgery and was found on outpatient imaging last week to have L M1 segment aneurysm (unruptured), pt asymptomatic at this time. Per son patient taking percocet PO at home. Ureña catheter, ET tube, and arterial line placed at [facility].     NIHSS on arrival: 32   Bess Griffin 5, Mod Busby 4  Bleed Day 1 = 10/26 (26 Oct 2021 13:03)    S/Overnight events:  Patient seen and evaluated at bedside in the ICU. No overnight events.     Hospital Course: 10/26: admitted to [facility] from [facility], POD#0 s/p L hemicraniectomy for decompression and evacuation of SDH. Transferred to [facility] noted to have tense flap, received mannitol, keppra, decadron. Was hypertensive to 200s and preston to 40s, recevied 85g mannitol and bullet 23.4%. CTH on arrival demonstrated increased size in vents and new IVH. EVD placed, open at 15, central line placed. Transfused 2 u PRBC. POD0 of coiling of left MCA bifurcation aneurysm,   10/27: CTH demonstrated EVD in correct position, EVD lowered to 5, remains intubated on proprofol, pending repeat CTH in AM. Started on 3% @ 30/hr. 2LNS given for euvolemia, Mannitol given for uptrending ICPs. Bullet given 8:30 am. 3% increased to 50/hr. 1 L bolus. New EVD catheter placed using exisiting sreekanth hole. 1 u PRBC.  10/28: HH5, MF4, BD3; POD2 angio coil/embo of L MCA aneurysm. JOAQUÍN overnight, neuro stable. EVD@5cmH20 ICPs < 20 overnight, OP WNL. S/p 1 unit PRBC yesterday with appropriate response. Given 42g mannitol in AM for ICP 22 persistently, with improvement, then given 23% in PM for elevated ICP. febrile, pancultured. Standing tylenol and cooling blanket.   10/29: BD4, POD3 angio coil/embo. JOAQUÍN o/n, neuro stable. EVD@5, ICPs <20 o/n.   10/30: BD5, POD4 angio coil/embo. JOAQUÍN o/n neuro stable. EVD@5. 3% @50cc/hr.  10/31: BD6, POD5 angio coil/embo. brief period maintained upward gaze, resolved on its own. EVD@5cm h2o.    11/1: BD7, POD6 L hemicrani, angio coil/embo. JOAQUÍN overnight. Neuro exam unchanged. ICPs WNL. started bromocriptine/gabapentin/baclofen. dc'd propofol, started precedex  11/2: BD8 POD7 L hemicrani, angio coil/embo. JOAQUÍN overnight. Neuro exam stable. Remains on 3% hypertonic saline. Receieved 1 unit of PRBCs for a Hgb of 7.6.  11/3: BD 9 POD8 of L hemicrani. Elevated lipase, normal amylase, OG tube clogged and replaced. Abdominal US normal. Pending gen surg reccs regarding trach and peg. Gabapentin and Baclofen increased to help with neurostorming. restarted back on 3%, LR@35. became preston+hypotensive with nimodipine 60q4, decreased back to 30q2, NaCl bullet given.   11/4: BD10 POD9, JOAQUÍN o/n, 500cc NS for euvolemia,  neuro stable, EVD at 5. 3% increased to 75  11/5: BD11 POD10, JOAQUÍN o/n, neuro stable, EVD at 5  11/6: BD12 POD11 JOAQUÍN overnight. Neuro exam stable. Remains intubated on precedex. 3% hypertonic saline dc'd  11/7: BD13 POD 12 JOAQUÍN o/n, NGT replaced. on precex with no icp crisis   11/8: BD14 POD13 JOAQUÍN o/n, noted to have tongue maceration/macroglossia. Gauze with tongue depressor placed. Pending further recs from ENT. Pending trach and PEG placement tomorrow with gen surg. Off precedex, ICPs stable. EVD remains @ 5.   11/9: BD15, POD 14, bleeding under tongue at start of shift, fentanyl pushed with no further episodes. gabapentin stopped. PEG placed  11/10: BD 16, POD 15, neuro stable, ENT to be consulted in AM, 3% increased to 50/hr.  11/11: BD 17, POD 16, neuro exam stable. Pend CT saba in am for cranioplasty planning. Pend trach in OR with ENT. F.u BMP in am, 3%@50cc/hr for Na goal 140-145.   11/12: BD 18, POD 17. JOAQUÍN overnight, neuro stable. Pend trach placement today. CT saba completed 11/11. Off of 3%, f/u am BMP for Na goal 140-150. CSF neg. Hypoxic cardiac arrest with ROSC x 3 during tracheostomy placement. B/l chest tubes placed for resulting pneumothorax s/p CPR. salt tabs dc'd.  11/13: BD 19, POD 18. Patient tachycardic with some improvement, SBP stable off of levophed, hgb downtrending o/n, transfused 1 unit PRBCs. B/l chest tube. EVD @5cmH2O, no elevated ICPs. UOP downtrending, trend BUN/Cr, given 1LNS x1 for low urine output. F/u am CXR. Cont Cefepime until today, then change to Ancef while trach packing in place per ENT. Pend CTH when stable. Trops downtrending s/p cardiac arrest. 1L. florinef dc'd. BP goal changed to 100-160, started on amlodipine   11/14: BD20, POD19, worsening creatinine with decreased urine output. Given 250 of albumin and 500cc LR. started on hydralazine PO, decreased amantadine 100 daily given CrCl of 20.  11/15: BD21, POD20, remains oliguric, fem line dc'd, tongue swollen and unable to fit bite block in mouth, started on nimbex gtt to relax jaw. Propofol and fentanyl gtt started. Vomiting TFs through nose and mouth, ENT called. TFs held. Cr uptrending. Palliative consulted.  11/16: BD22, POD21, o/n external trach dressing replaced due to cuff leak and decreasing TV, sedated and paralyzed on nimbex, propofol, and fentanyl gtt. CTH stable.  EVD raised from 5 to 10. Nimbex weaned off. Cr uptrending, Trickle feeds started. FOB negative.   11/17: BD 23, POD22, JOAQUÍN o/n, EVD clamped, NS d/c'ed, LR@50, advancing tube feeds.   11/18: BD24, POD23 o/n 3% at 30 with Na acetate started, propofol dc'd due to elevated TG level, episode of vomiting TFs from nose and mouth into ETT with elevated ICP 30s, ICP normalized with resolution of vomiting, reglan 5mg IV given, TFs held, 3% stopped. midline placed   11/19; BD25, POD24 JOAQUÍN overnight. Remains on versed and fentanyl drips. Neuro exam unchanged. R IJ dialysis cath placed.   11/20: BD26 POD25 JOAQUÍN overnight. Neuro exam unchanged. Plan for HD today  11/21: BD 27 POD 26 weaned off versed, fentanyl gtt continued.   11/22: BD 28 POD 27 JOAQUÍN o/n , off fentanyl gtt, pt is calm. s/p HD earlier today.   11/23: BD 29 POD 28,  increased clonidine to 0.2 q6h, s/p trach, stable hemodynamically. neuro at baseline        Vital Signs Last 24 Hrs  T(C): 36.1 (24 Nov 2021 10:24), Max: 36.3 (24 Nov 2021 00:50)  T(F): 97 (24 Nov 2021 10:24), Max: 97.3 (24 Nov 2021 00:50)  HR: 76 (24 Nov 2021 10:00) (71 - 83)  BP: 139/76 (24 Nov 2021 10:00) (126/71 - 170/100)  BP(mean): 101 (24 Nov 2021 10:00) (93 - 129)  RR: 12 (24 Nov 2021 10:00) (12 - 25)  SpO2: 100% (24 Nov 2021 10:00) (100% - 100%)    I&O's Detail    23 Nov 2021 07:01  -  24 Nov 2021 07:00  --------------------------------------------------------  IN:    Enteral Tube Flush: 120 mL    Lactated Ringers: 500 mL    Lactated Ringers: 1050 mL    Midazolam: 11.9 mL    Nepro with Carb Steady: 150 mL    Suplena: 20 mL  Total IN: 1851.9 mL    OUT:    Voided (mL): 450 mL  Total OUT: 450 mL    Total NET: 1401.9 mL      24 Nov 2021 07:01  -  24 Nov 2021 11:14  --------------------------------------------------------  IN:    Lactated Ringers: 225 mL    Nepro with Carb Steady: 50 mL  Total IN: 275 mL    OUT:    Midazolam: 0 mL  Total OUT: 0 mL    Total NET: 275 mL        I&O's Summary    23 Nov 2021 07:01  -  24 Nov 2021 07:00  --------------------------------------------------------  IN: 1851.9 mL / OUT: 450 mL / NET: 1401.9 mL    24 Nov 2021 07:01  -  24 Nov 2021 11:14  --------------------------------------------------------  IN: 275 mL / OUT: 0 mL / NET: 275 mL        PHYSICAL EXAM:  Gen:  HEENT:  Neck:  Lungs:  Heart:  Abd:  Exts:  Neuro:  Wound/Drains:    NEUROLOGIC EXAMINATION:  Patient does not open eyes, does not follow commands, pupils 3mm equal and brisk (+) Doll's, (+) corneals (+) cough and gag, flap full but soft; R UE extensor L UE 0/5 TF BLE  GENERAL: tracheostomy in place  EENT:  anicteric  CARDIOVASCULAR: (+) S1 S2, normal rate and regular rhythm  PULMONARY: rhonchi all lung fields, no wheezing  ABDOMEN: soft, distended, normoactive bowel sounds  EXTREMITIES: no edema  SKIN: no rash    Lines/Drains  L radial jerri placed 10/26 -   HD catheter R IJ (11/19 - )  + R brachial Midline (11/18 - )  B/L chest tubes - to water seal   + Primafit     DIET:  [] NPO  [] Mechanical  [x] Tube feeds    LABS:                        8.2    12.15 )-----------( 249      ( 24 Nov 2021 05:32 )             25.5     11-24    136  |  96  |  25<H>  ----------------------------<  100<H>  3.5   |  26  |  5.00<H>    Ca    8.8      24 Nov 2021 05:32  Phos  5.1     11-24  Mg     2.0     11-24              CAPILLARY BLOOD GLUCOSE      POCT Blood Glucose.: 90 mg/dL (23 Nov 2021 11:39)      Drug Levels: [] N/A    CSF Analysis: [] N/A      Allergies    No Known Allergies    Intolerances      MEDICATIONS:  Antibiotics:    Neuro:  levETIRAcetam  Solution 500 milliGRAM(s) Oral every 24 hours  metoclopramide Injectable 5 milliGRAM(s) IV Push every 6 hours  ondansetron Injectable 4 milliGRAM(s) IV Push every 6 hours PRN    Anticoagulation:  heparin   Injectable 5000 Unit(s) SubCutaneous every 8 hours    OTHER:  acetylcysteine 20%  Inhalation 4 milliLiter(s) Inhalation three times a day  amLODIPine   Tablet 10 milliGRAM(s) Oral daily  artificial  tears Solution 1 Drop(s) Both EYES two times a day  chlorhexidine 0.12% Liquid 15 milliLiter(s) Oral Mucosa every 12 hours  chlorhexidine 2% Cloths 1 Application(s) Topical <User Schedule>  chlorhexidine 2% Cloths 1 Application(s) Topical <User Schedule>  cloNIDine 0.3 milliGRAM(s) Oral every 6 hours  dextrose 50% Injectable 25 milliLiter(s) IV Push once  epoetin nannette-epbx (RETACRIT) Injectable 88849 Unit(s) IV Push once  hydrALAZINE 50 milliGRAM(s) Oral every 8 hours  hydrALAZINE Injectable 10 milliGRAM(s) IV Push every 2 hours PRN  labetalol Injectable 10 milliGRAM(s) IV Push every 2 hours PRN  pantoprazole   Suspension 40 milliGRAM(s) Oral daily  polyethylene glycol 3350 17 Gram(s) Oral daily  senna 2 Tablet(s) Oral at bedtime    IVF:    CULTURES:  Culture Results:   No growth to date (11-11 @ 17:53)    RADIOLOGY & ADDITIONAL TESTS:      ASSESSMENT:  46y Female s/p    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, left    Intracranial hemorrhage, spontaneous subarachnoid, due to cerebral aneurysm, acute    Pneumothorax, left    Functional quadriplegia    Acute respiratory failure with hypoxia    Cardiac arrest    Encounter for palliative care    Advanced care planning/counseling discussion    IPH (idiopathic pulmonary hemosiderosis)    Acute spontaneous intraparenchymal intracranial hemorrhage due to cerebral aneurysm    CHETAN (acute kidney injury)    Angiogram, cerebral, with coil embolization, in non-operating room setting    Endoscopic percutaneous gastrostomy    Angiogram, carotid and cerebral, bilateral    Chest tube placement    Insertion of central venous catheter with ultrasound guidance    Insertion of temporary dialysis catheter    Tracheostomy, planned    Personal history of spine surgery    [id number]        PLAN:  NEURO:    CARDIOVASCULAR:    PULMONARY:    RENAL:    GI:    ID:    ENDO:    DVT PROPHYLAXIS:    DISPOSITION:       Assessment:  Present when checked    []  GCS  E   V  M     Heart Failure: []Acute, [] acute on chronic , []chronic  Heart Failure:  [] Diastolic (HFpEF), [] Systolic (HFrEF), []Combined (HFpEF and HFrEF), [] RHF, [] Pulm HTN, [] Other    [] CHETAN, [] ATN, [] AIN, [] other  [] CKD1, [] CKD2, [] CKD 3, [] CKD 4, [] CKD 5, []ESRD    Encephalopathy: [] Metabolic, [] Hepatic, [] toxic, [] Neurological, [] Other    Abnormal Nurtitional Status: [] malnurtition (see nutrition note), [ ]underweight: BMI < 19, [] morbid obesity: BMI >40, [] Cachexia    [] Sepsis  [] hypovolemic shock,[] cardiogenic shock, [] hemorrhagic shock, [] neuogenic shock  [] Acute Respiratory Failure  []Cerebral edema, [] Brain compression/ herniation,   [] Functional quadriplegia  [] Acute blood loss anemia   HPI:  45 y/o female with PMH HTN, Multiple sclerosis, recent spinal surgery 10/8 ([facility]) presented to [place] [facility] BIBEMS after being found unconscious at home, unknown period of time. 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 HH5, MF1. Patient was intubated at [place] ED, found to have blown L pupil, and sent straight to the OR for left hemicraniotomy. A-line placed intraop. Hemodynamically unstable upon arrival to [facility], bradycardic and hypertensive s/p Mannitol, Clevidipine, and Furosemide. Central line placed in NSICU. Currently sedated on Propofol, pending repeat CTH. History per patient's son, patient had recent spine surgery and was found on outpatient imaging last week to have L M1 segment aneurysm (unruptured), pt asymptomatic at this time. Per son patient taking percocet PO at home. Ureña catheter, ET tube, and arterial line placed at [facility].     NIHSS on arrival: 32   Bess Griffin 5, Mod Busby 4  Bleed Day 1 = 10/26 (26 Oct 2021 13:03)    S/Overnight events:  Patient seen and evaluated at bedside in the NSICU. No overnight events.     Hospital Course: 10/26: admitted to [facility] from [facility], POD#0 s/p L hemicraniectomy for decompression and evacuation of SDH. Transferred to [facility] noted to have tense flap, received mannitol, keppra, decadron. Was hypertensive to 200s and preston to 40s, recevied 85g mannitol and bullet 23.4%. CTH on arrival demonstrated increased size in vents and new IVH. EVD placed, open at 15, central line placed. Transfused 2 u PRBC. POD0 of coiling of left MCA bifurcation aneurysm,   10/27: CTH demonstrated EVD in correct position, EVD lowered to 5, remains intubated on proprofol, pending repeat CTH in AM. Started on 3% @ 30/hr. 2LNS given for euvolemia, Mannitol given for uptrending ICPs. Bullet given 8:30 am. 3% increased to 50/hr. 1 L bolus. New EVD catheter placed using exisiting sreekanth hole. 1 u PRBC.  10/28: HH5, MF4, BD3; POD2 angio coil/embo of L MCA aneurysm. JOAQUÍN overnight, neuro stable. EVD@5cmH20 ICPs < 20 overnight, OP WNL. S/p 1 unit PRBC yesterday with appropriate response. Given 42g mannitol in AM for ICP 22 persistently, with improvement, then given 23% in PM for elevated ICP. febrile, pancultured. Standing tylenol and cooling blanket.   10/29: BD4, POD3 angio coil/embo. JOAQUÍN o/n, neuro stable. EVD@5, ICPs <20 o/n.   10/30: BD5, POD4 angio coil/embo. JOAQUÍN o/n neuro stable. EVD@5. 3% @50cc/hr.  10/31: BD6, POD5 angio coil/embo. brief period maintained upward gaze, resolved on its own. EVD@5cm h2o.    11/1: BD7, POD6 L hemicrani, angio coil/embo. JOAQUÍN overnight. Neuro exam unchanged. ICPs WNL. started bromocriptine/gabapentin/baclofen. dc'd propofol, started precedex  11/2: BD8 POD7 L hemicrani, angio coil/embo. JOAQUÍN overnight. Neuro exam stable. Remains on 3% hypertonic saline. Receieved 1 unit of PRBCs for a Hgb of 7.6.  11/3: BD 9 POD8 of L hemicrani. Elevated lipase, normal amylase, OG tube clogged and replaced. Abdominal US normal. Pending gen surg reccs regarding trach and peg. Gabapentin and Baclofen increased to help with neurostorming. restarted back on 3%, LR@35. became preston+hypotensive with nimodipine 60q4, decreased back to 30q2, NaCl bullet given.   11/4: BD10 POD9, JOAQUÍN o/n, 500cc NS for euvolemia,  neuro stable, EVD at 5. 3% increased to 75  11/5: BD11 POD10, JOAQUÍN o/n, neuro stable, EVD at 5  11/6: BD12 POD11 JOAQUÍN overnight. Neuro exam stable. Remains intubated on precedex. 3% hypertonic saline dc'd  11/7: BD13 POD 12 JOAQUÍN o/n, NGT replaced. on precex with no icp crisis   11/8: BD14 POD13 JOAQUÍN o/n, noted to have tongue maceration/macroglossia. Gauze with tongue depressor placed. Pending further recs from ENT. Pending trach and PEG placement tomorrow with gen surg. Off precedex, ICPs stable. EVD remains @ 5.   11/9: BD15, POD 14, bleeding under tongue at start of shift, fentanyl pushed with no further episodes. gabapentin stopped. PEG placed  11/10: BD 16, POD 15, neuro stable, ENT to be consulted in AM, 3% increased to 50/hr.  11/11: BD 17, POD 16, neuro exam stable. Pend CT saba in am for cranioplasty planning. Pend trach in OR with ENT. F.u BMP in am, 3%@50cc/hr for Na goal 140-145.   11/12: BD 18, POD 17. JOAQUÍN overnight, neuro stable. Pend trach placement today. CT saba completed 11/11. Off of 3%, f/u am BMP for Na goal 140-150. CSF neg. Hypoxic cardiac arrest with ROSC x 3 during tracheostomy placement. B/l chest tubes placed for resulting pneumothorax s/p CPR. salt tabs dc'd.  11/13: BD 19, POD 18. Patient tachycardic with some improvement, SBP stable off of levophed, hgb downtrending o/n, transfused 1 unit PRBCs. B/l chest tube. EVD @5cmH2O, no elevated ICPs. UOP downtrending, trend BUN/Cr, given 1LNS x1 for low urine output. F/u am CXR. Cont Cefepime until today, then change to Ancef while trach packing in place per ENT. Pend CTH when stable. Trops downtrending s/p cardiac arrest. 1L. florinef dc'd. BP goal changed to 100-160, started on amlodipine   11/14: BD20, POD19, worsening creatinine with decreased urine output. Given 250 of albumin and 500cc LR. started on hydralazine PO, decreased amantadine 100 daily given CrCl of 20.  11/15: BD21, POD20, remains oliguric, fem line dc'd, tongue swollen and unable to fit bite block in mouth, started on nimbex gtt to relax jaw. Propofol and fentanyl gtt started. Vomiting TFs through nose and mouth, ENT called. TFs held. Cr uptrending. Palliative consulted.  11/16: BD22, POD21, o/n external trach dressing replaced due to cuff leak and decreasing TV, sedated and paralyzed on nimbex, propofol, and fentanyl gtt. CTH stable.  EVD raised from 5 to 10. Nimbex weaned off. Cr uptrending, Trickle feeds started. FOB negative.   11/17: BD 23, POD22, JOAQUÍN o/n, EVD clamped, NS d/c'ed, LR@50, advancing tube feeds.   11/18: BD24, POD23 o/n 3% at 30 with Na acetate started, propofol dc'd due to elevated TG level, episode of vomiting TFs from nose and mouth into ETT with elevated ICP 30s, ICP normalized with resolution of vomiting, reglan 5mg IV given, TFs held, 3% stopped. midline placed   11/19; BD25, POD24 JOAQUÍN overnight. Remains on versed and fentanyl drips. Neuro exam unchanged. R IJ dialysis cath placed.   11/20: BD26 POD25 JOAQUÍN overnight. Neuro exam unchanged. Plan for HD today  11/21: BD 27 POD 26 weaned off versed, fentanyl gtt continued.   11/22: BD 28 POD 27 JOAQUÍN o/n , off fentanyl gtt, pt is calm. s/p HD earlier today.   11/23: BD 29 POD 28,  increased clonidine to 0.3 q6h, s/p trach, stable hemodynamically. neuro at baseline. TFs stopped for vomiting and restarted 10 cc/hr.  11/24: BD 30 POD 29 JOAQUÍN o/n, TFs inc to 20cc/hr. NS dc'd. Versed gtt dc'd. Started SQ heparin 5000 units q8h.     Vital Signs Last 24 Hrs  T(C): 36.1 (24 Nov 2021 10:24), Max: 36.3 (24 Nov 2021 00:50)  T(F): 97 (24 Nov 2021 10:24), Max: 97.3 (24 Nov 2021 00:50)  HR: 76 (24 Nov 2021 10:00) (71 - 83)  BP: 139/76 (24 Nov 2021 10:00) (126/71 - 170/100)  BP(mean): 101 (24 Nov 2021 10:00) (93 - 129)  RR: 12 (24 Nov 2021 10:00) (12 - 25)  SpO2: 100% (24 Nov 2021 10:00) (100% - 100%)    I&O's Detail    23 Nov 2021 07:01  -  24 Nov 2021 07:00  --------------------------------------------------------  IN:    Enteral Tube Flush: 120 mL    Lactated Ringers: 500 mL    Lactated Ringers: 1050 mL    Midazolam: 11.9 mL    Nepro with Carb Steady: 150 mL    Suplena: 20 mL  Total IN: 1851.9 mL    OUT:    Voided (mL): 450 mL  Total OUT: 450 mL    Total NET: 1401.9 mL      24 Nov 2021 07:01  -  24 Nov 2021 11:14  --------------------------------------------------------  IN:    Lactated Ringers: 225 mL    Nepro with Carb Steady: 50 mL  Total IN: 275 mL    OUT:    Midazolam: 0 mL  Total OUT: 0 mL    Total NET: 275 mL        I&O's Summary    23 Nov 2021 07:01  -  24 Nov 2021 07:00  --------------------------------------------------------  IN: 1851.9 mL / OUT: 450 mL / NET: 1401.9 mL    24 Nov 2021 07:01  -  24 Nov 2021 11:14  --------------------------------------------------------  IN: 275 mL / OUT: 0 mL / NET: 275 mL        PHYSICAL EXAM:  NEUROLOGIC EXAMINATION:  Patient does not open eyes, does not follow commands, pupils 3mm equal and brisk (+) Doll's, (+) corneals (+) cough and gag, flap full but soft; R UE extensor L UE 0/5 TF BLE  GENERAL: tracheostomy in place  EENT:  anicteric  CARDIOVASCULAR: (+) S1 S2, normal rate and regular rhythm  PULMONARY: rhonchi all lung fields, no wheezing  ABDOMEN: soft, distended, normoactive bowel sounds  EXTREMITIES: no edema  SKIN: no rash    Lines/Drains  L radial jerri placed 10/26 -   HD catheter R IJ (11/19 - )  + R brachial Midline (11/18 - )  B/L chest tubes - to water seal   + Primafit     DIET:  [] NPO  [] Mechanical  [x] Tube feeds    LABS:                        8.2    12.15 )-----------( 249      ( 24 Nov 2021 05:32 )             25.5     11-24    136  |  96  |  25<H>  ----------------------------<  100<H>  3.5   |  26  |  5.00<H>    Ca    8.8      24 Nov 2021 05:32  Phos  5.1     11-24  Mg     2.0     11-24              CAPILLARY BLOOD GLUCOSE      POCT Blood Glucose.: 90 mg/dL (23 Nov 2021 11:39)      Drug Levels: [] N/A    CSF Analysis: [] N/A      Allergies    No Known Allergies    Intolerances      MEDICATIONS:  Antibiotics:    Neuro:  levETIRAcetam  Solution 500 milliGRAM(s) Oral every 24 hours  metoclopramide Injectable 5 milliGRAM(s) IV Push every 6 hours  ondansetron Injectable 4 milliGRAM(s) IV Push every 6 hours PRN    Anticoagulation:  heparin   Injectable 5000 Unit(s) SubCutaneous every 8 hours    OTHER:  acetylcysteine 20%  Inhalation 4 milliLiter(s) Inhalation three times a day  amLODIPine   Tablet 10 milliGRAM(s) Oral daily  artificial  tears Solution 1 Drop(s) Both EYES two times a day  chlorhexidine 0.12% Liquid 15 milliLiter(s) Oral Mucosa every 12 hours  chlorhexidine 2% Cloths 1 Application(s) Topical <User Schedule>  chlorhexidine 2% Cloths 1 Application(s) Topical <User Schedule>  cloNIDine 0.3 milliGRAM(s) Oral every 6 hours  dextrose 50% Injectable 25 milliLiter(s) IV Push once  epoetin nannette-epbx (RETACRIT) Injectable 50905 Unit(s) IV Push once  hydrALAZINE 50 milliGRAM(s) Oral every 8 hours  hydrALAZINE Injectable 10 milliGRAM(s) IV Push every 2 hours PRN  labetalol Injectable 10 milliGRAM(s) IV Push every 2 hours PRN  pantoprazole   Suspension 40 milliGRAM(s) Oral daily  polyethylene glycol 3350 17 Gram(s) Oral daily  senna 2 Tablet(s) Oral at bedtime    IVF:    CULTURES:  Culture Results:   No growth to date (11-11 @ 17:53)    RADIOLOGY & ADDITIONAL TESTS:      ASSESSMENT:  45 y/o female with PMHx of HTN and MS, hx of spinal surgery at [facility] 10/8/21 [facility] [place] [facility] being found unconscious at home, unknown period of time. 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 HH5, MF4; BD #1 = 10/26. Patient was intubated at [place] ED and sent straight to the OR for left hemicraniotomy. A-line placed intraop. Hemodynamically unstable upon arrival to [facility], bradycardic and hypertensive s/p Mannitol and Furosemide. Central line placed in NSICU. S/p EVD placement, and coil embolization of left MCA bifurcation aneurysm 10/26/2021. S/p cerebral angio without findings of vasospasm 11/10. S/p cardiac arrest with ROSC x3 on 11/12 during tracheostomy at bedside now with b/l pneumothoracies and worsening kidney function. EVD dc'd 11/18. S/p trach revision with ENT on 11/22.     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, left    Intracranial hemorrhage, spontaneous subarachnoid, due to cerebral aneurysm, acute    Pneumothorax, left    Functional quadriplegia    Acute respiratory failure with hypoxia    Cardiac arrest    Encounter for palliative care    Advanced care planning/counseling discussion    IPH (idiopathic pulmonary hemosiderosis)    Acute spontaneous intraparenchymal intracranial hemorrhage due to cerebral aneurysm    CHETAN (acute kidney injury)    Angiogram, cerebral, with coil embolization, in non-operating room setting    Endoscopic percutaneous gastrostomy    Angiogram, carotid and cerebral, bilateral    Chest tube placement    Insertion of central venous catheter with ultrasound guidance    Insertion of temporary dialysis catheter    Tracheostomy, planned    Personal history of spine surgery    SysAdmin_VstLnk    PLAN:    NEURO:  - neuro checks q2hrs/vital signs q1hr   - Keppra 500mg q24h renal dosing   - VEEG dc'd, neg for seizures   - Nimodipine dc'd   - EVD dced 11/18   - CTH 11/16, CTH 11/18 stable   - fentanyl prn, versed gtt dc'd    CARDIO:  - -160   - amlodipine 10 daily and hydral 50 q8h, clonidine 0.3 q6h  - s/p cardiac arrest   - TTE 11/15: mild LVH, EF 70%   - QTc 11/23 qtc 456    PULM:  - trach'd on full vent support  - s/p acute hypoxic cardiac arrest 11/12 during tracheostomy placement with ENT c/b b/l pneumothorax now with b/l chest tubes   - Per pulm - b/l chest tubes clamped 11/23  - s/p trach revision with ENT on 11/22 ([facility])   - Daily CXR     GI:  - PEG TFs nepro 20 cc/hr  - PPI QD GI ppx   - Bowel regimen   - shocked liver, Alk phos improving    RENAL:   - dc IVF, HD today  - -145  - renal failure with high Cr. post cardiac arrest, now improving with HD ;  Nephro following, s/p HD 11/20, 11/21, 11/22  - daily weights      HEME:  - SCDs, SQH started 11/24.   - s/p multiple transfusions this admission, most recently 1 u PRBC 11/19   - 11/14 UE/LE dopplers neg   - FOB neg 11/16     ID:  - Tylenol PRN for fever   - sputum cx 11/4: enterobacter, proteus  - Cefepime started to cover for enterobacter/proteus in sputum dc'd 11/15   - Ancef until trach packing removed 500 BID, dc'd 11/23    ENDO:  - ISS dc'd, 11/24  - monitor FS    OTHER  - wound care recs- q2 dressing changes     GOC: full code  Level of care: ICU status   Dispo: pending dispo  family updated     Case discussed with Dr. Duncan and Dr. Vyas

## 2021-11-24 NOTE — CONSULT NOTE ADULT - SUBJECTIVE AND OBJECTIVE BOX
45 y/o female with PMHx of HTN and MS, hx of spinal surgery at [facility] 10/8/21 presented to [facility] BIBEMS after being found unconscious at home, unknown period of time. 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 Patient was intubated at [facility] ED and sent straight to the OR for left hemicraniotomy. S/p EVD placement, and coil embolization of left MCA bifurcation aneurysm 10/26/2021. S/p cerebral angio without findings of vasospasm 11/10. S/p cardiac arrest with ROSC x3 on 11/12 during tracheostomy at bedside with b/l pneumothoraces now with chest tubes clamped. EVD dc'd 11/18, trach revision 11/22. Now anuric ATN requiring dialysis, s/p temporary HD cath placed bedside 11/19. IR consulted for tunneled HD catheter placement.     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, left    Intracranial hemorrhage, spontaneous subarachnoid, due to cerebral aneurysm, acute    Pneumothorax, left    Functional quadriplegia    Acute respiratory failure with hypoxia    Cardiac arrest    Encounter for palliative care    Advanced care planning/counseling discussion    IPH (idiopathic pulmonary hemosiderosis)    Acute spontaneous intraparenchymal intracranial hemorrhage due to cerebral aneurysm    CHETAN (acute kidney injury)    Angiogram, cerebral, with coil embolization, in non-operating room setting    Endoscopic percutaneous gastrostomy    Angiogram, carotid and cerebral, bilateral    Chest tube placement    Insertion of central venous catheter with ultrasound guidance    Insertion of temporary dialysis catheter    Tracheostomy, planned    Personal history of spine surgery    SysAdmin_VstLnk        Meds:acetylcysteine 20%  Inhalation 4 milliLiter(s) Inhalation three times a day  amLODIPine   Tablet 10 milliGRAM(s) Oral daily  artificial  tears Solution 1 Drop(s) Both EYES two times a day  chlorhexidine 0.12% Liquid 15 milliLiter(s) Oral Mucosa every 12 hours  chlorhexidine 2% Cloths 1 Application(s) Topical <User Schedule>  chlorhexidine 2% Cloths 1 Application(s) Topical <User Schedule>  cloNIDine 0.3 milliGRAM(s) Oral every 6 hours  dextrose 50% Injectable 25 milliLiter(s) IV Push once  epoetin nannette-epbx (RETACRIT) Injectable 55519 Unit(s) IV Push once  heparin   Injectable 5000 Unit(s) SubCutaneous every 8 hours  hydrALAZINE 50 milliGRAM(s) Oral every 8 hours  hydrALAZINE Injectable 10 milliGRAM(s) IV Push every 2 hours PRN  labetalol Injectable 10 milliGRAM(s) IV Push every 2 hours PRN  levETIRAcetam  Solution 500 milliGRAM(s) Oral every 24 hours  metoclopramide Injectable 5 milliGRAM(s) IV Push every 6 hours  ondansetron Injectable 4 milliGRAM(s) IV Push every 6 hours PRN  pantoprazole   Suspension 40 milliGRAM(s) Oral daily  polyethylene glycol 3350 17 Gram(s) Oral daily  senna 2 Tablet(s) Oral at bedtime  sodium chloride 0.9% lock flush 10 milliLiter(s) IV Push every 1 hour PRN      Allergies:No Known Allergies        Labs:                           8.2    12.15 )-----------( 249      ( 24 Nov 2021 05:32 )             25.5       11-24    136  |  96  |  25<H>  ----------------------------<  100<H>  3.5   |  26  |  5.00<H>    Ca    8.8      24 Nov 2021 05:32  Phos  5.1     11-24  Mg     2.0     11-24            Imaging Findings: reviewed

## 2021-11-24 NOTE — PROGRESS NOTE ADULT - SUBJECTIVE AND OBJECTIVE BOX
ENT PROGRESS NOTE    HPI: 45 y/o female with PMH HTN, Multiple sclerosis, recent spinal surgery 10/8 ([facility]) presented to [facility] after being found unconscious at home, unknown period of time. 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 ENT consulted due to tongue swelling. pt has been off sedated due to neuro checks. per nursing, pt has been biting down on tongue. nursing has not been able to place bite block. Denies any hypotensive episodes.     INTERVAL:    11/10: ENT reconsulted for trach placement - per primary team, patient was spastic with stiff neck while trying to perform bedside tracheostomy yesterday. Pt has been intubated since 10/26. Otherwise, NAEON - on EEG.    11/11: S/p tracheostomy attempt 11/12 am. Pt was seen and examined bedside - ETT in place, good ventilation. Stoma packing in place. Trops downtrending. Given 1x PRBC overnight, b/l chest tubes in place. Plan to change from cefepime to ancef today.    11/12: Pt in multisystem organ failure per primary team. Poor neuro exam    11/15: informed by primary team pt had feeds coming from nose and mouth. was also found in ETT. Pt DNR status still pending    11/16: informed overnight night pt is having a vent leak, concern for arising from stoma. superficial dressing changed. no longer experiencing vent leak    11/16: PM. I Spoke with daughter Jayshree Stubbs at bedside this afternoon. Ms. Stubbs conferenced in her uncle and aunt into the conversation. Discussed with Ms. Stubbs current status of tracheostomy site and proposal for re-evaluating tracheostomy site tomorrow in OR followed by bronchoscopy. Explained to Ms. Stubbs procedure tomorrow, tracheostomy and bronchoscopy,  would allow for removal of ETT and evaluation of the distal airways. explained there is a risk of inability to secure airway tomorrow. Ms. Stubbs and family stated they understand and would like to defer the procedure for now given renal status.     11/17: NAEON. pt remains intubated and sedated. no plans for OR today, per family conversation    11/18: Pt refluxing feeds through mouth and nose overnight. tube feeds paused.     11/23: Pt is now s/p trach placement in OR (7.5 Shiley cuffed) with no issues on 11/22. Stable on vent, no respiratory issues overnight. No significant bleeding from trach site.    11/24: NAEON    ICU Vital Signs Last 24 Hrs  T(C): 36.2 (24 Nov 2021 05:39), Max: 36.3 (24 Nov 2021 00:50)  T(F): 97.2 (24 Nov 2021 05:39), Max: 97.3 (24 Nov 2021 00:50)  HR: 75 (24 Nov 2021 09:00) (71 - 84)  BP: 135/77 (24 Nov 2021 09:00) (126/71 - 170/100)  BP(mean): 101 (24 Nov 2021 09:00) (93 - 129)  ABP: 138/73 (24 Nov 2021 09:00) (133/73 - 187/103)  ABP(mean): 97 (24 Nov 2021 09:00) (95 - 139)  RR: 14 (24 Nov 2021 09:00) (14 - 25)  SpO2: 100% (24 Nov 2021 09:00) (100% - 100%)              PHYSICAL EXAM:    CONSTITUTIONAL: A&Ox0  HEAD: EVD in place  ORAL CAVITY/OROPHARYNX: Significant tongue swelling, most significant on ventral surface. mouth unable to be opened due to tonicity of jaw. tongue indurated, necrosis of ventral surface. OP limited due to ETT. Bite block in place  NECK: 7.5 cuffed Shiley trach sutured in place  RESPIRATORY: Respirations unlabored, no increased work of breathing with use of accessory muscles and retractions. No stridor.  CARDIAC: Warm extremities, no cyanosis.       A/P: 46 F w/ w/ significant tongue swelling, likely secondary to biting down on tongue. bite block unable to be placed due to tone of jaw muscles. ENT reconsulted for trach placement - attempted 11/12 am, but complicated by respiratory failure, coded w/ chest compressions 3x with ROSC. ETT was reinserted into tracheal orally. Feeds seen from mouth and nose, likely secondary to tube feeds refluxing. Pt now s/p trach placement in OR with no issues on 11/22.    - 7.5 Shiley cuffed trach sutured in place  - Routine trach care  - ENT will remove trach sutures in 5-7 days  - Rest of care per primary team

## 2021-11-24 NOTE — PROGRESS NOTE ADULT - ATTENDING COMMENTS
See the Fellow's note written above, for details. I reviewed the fellow documentation.  I have personally seen and examined this patient today. I have reviewed vitals, labs, medications, and imaging. I agree with the fellow's findings and plans as written above with the following additions/amendments:    Patient was seen and evaluated on dialysis. Not responsive.   Patient is tolerating the procedure well.     HR: 93 (11-24-21 @ 19:00)  BP: 130/73 (11-24-21 @ 19:00)  Constitutional: Intubated, sedated; NAD  HEENT:  trach in place, clear secretions  Respiratory: Mechanical breath sounds bilaterally, not overbreathing vent  Cardiac: +S1/S2; RRR; no M/R/G  Gastrointestinal: soft, NT/ND; no rebound or guarding; +BS  Extremities: WWP, no clubbing or cyanosis; 1+edema  Dermatologic: skin warm, dry and intact; no rashes, wounds, or scars  Access: [place]      Continue dialysis. Next HD 11/26

## 2021-11-24 NOTE — PROGRESS NOTE ADULT - ATTENDING COMMENTS
Posterior membrane perforation of trachea during bedside tracheostomy leading to bilateral pneumothoraces now s/p 2 chest tubes. Lung reinflated. Tracheostomy in place, no complications.  No evidence of PTX with clamped chest tubes x 24 hours; will remove today.

## 2021-11-24 NOTE — PROGRESS NOTE ADULT - ASSESSMENT
46y/Female with:  L MCA ruptured aneurysm, subarachnoid hemorrhage, s/p DHC (10/26/2021, Dr. D'Amico @ [facility]), brain compression, cerebral edema  anemia   recent spinal surgery  UGIB  bilateral pneumothorax  acute kidney injury, transaminitis    PLAN: Day 1 = 10-26 ; Day 4 = 10/29; Day 21 = 11/15  NEURO: comachecks q2h, VS q1h, sedation with midazolam, PRN fentanyl for analgosedation  SAH:  off nimodipine  Hydrocephalus:  EVD discontinued  Seizure prophylaxis:  cont levetiracetam 500 OD   REHAB:  physical therapy evaluation and management    EARLY MOB:  HOB elevated    PULM:  full vent support for now, continue mucormyst and ipratropium / albuterol; pulmo toilette; s/p trach, chest tube to water seal, d/c if ok with pulmo  CARDIO:  SBP goal 100-160mm Hg, TTE, amlodipine 10mg PO daily; increase clonidine 0.2 q6h; continue hydralazine (renally maxed to 50 q8h)  ENDO:  Blood sugar goals 140-180 mg/dL  GI:  PPI OD  DIET: TF restart at 10cc/hr  RENAL: LR@50; Na goal 135-145; f/u dialysis schedule  HEM/ONC: no coagulopathy (INR= 1.03), no ASA / Plavix use; Hb stable   VTE Prophylaxis: SCDs, SQH - f/u ENT  ID: afebrile, no leukocytosis, ancef d/c  Social: will update family     CORE MEASURES  1.  Hunt and Griffin Score = 5  2.  VTE prophylaxis:  [ ] administered within 24 hours of admission OR [X] reason not done: fresh bleed, unsecured aneurysm.  3.  Dysphagia screening:   [X] performed before any oral meds / liquids / food  4.  Nimodipine treatment:  [X] administered within 24 hours of admission OR [ ] reason not done:    ATTENDING ATTESTATION:  I was physically present for the key portions of the evaluation and management (E/M) service provided.  I agree with the above history, physical and plan, which I have reviewed and edited where appropriate.    Patient at high risk for neurological deterioration or death due to:  ICU delirium, aspiration PNA, DVT / PE.  Critical care time:  I have personally provided 45 minutes of critical care time, excluding time spent on separate procedures.      Plan discussed with RN, house staff. 46y/Female with:  L MCA ruptured aneurysm, subarachnoid hemorrhage, s/p DHC (10/26/2021, Dr. D'Amico @ [facility]), brain compression, cerebral edema  anemia   recent spinal surgery  UGIB  bilateral pneumothorax  acute kidney injury, transaminitis    PLAN: Day 1 = 10-26 ; Day 4 = 10/29; Day 21 = 11/15  NEURO: comachecks q2h, VS q1h, PRN fentanyl for analgosedation  SAH:  off nimodipine  Hydrocephalus:  EVD discontinued; cranioplasty to be scheduled  Seizure prophylaxis:  cont levetiracetam 500 OD   REHAB:  physical therapy evaluation and management    EARLY MOB:  HOB elevated    PULM:  full vent support for now, continue mucormyst and ipratropium / albuterol; pulmo toilette; s/p trach, chest tube to water seal, d/c if ok with pulmo  CARDIO:  SBP goal 100-160mm Hg, TTE, amlodipine 10mg PO daily; increase clonidine 0.2 q6h; continue hydralazine   ENDO:  Blood sugar goals 140-180 mg/dL  GI:  PPI OD  DIET: TF increase to 20cc/hr, 30cc/hr this afternoon  RENAL: d/c IVF  Na goal 135-145; dialysis today  HEM/ONC: no coagulopathy (INR= 1.03), no ASA / Plavix use; Hb stable   VTE Prophylaxis: ANNABELLE Chavez   ID: afebrile, no leukocytosis  Social: Jose updated yesterday    CORE MEASURES  1.  Hunt and Griffin Score = 5  2.  VTE prophylaxis:  [ ] administered within 24 hours of admission OR [X] reason not done: fresh bleed, unsecured aneurysm.  3.  Dysphagia screening:   [X] performed before any oral meds / liquids / food  4.  Nimodipine treatment:  [X] administered within 24 hours of admission OR [ ] reason not done:    ATTENDING ATTESTATION:  I was physically present for the key portions of the evaluation and management (E/M) service provided.  I agree with the above history, physical and plan, which I have reviewed and edited where appropriate.    Patient at high risk for neurological deterioration or death due to:  ICU delirium, aspiration PNA, DVT / PE.  Critical care time:  I have personally provided 45 minutes of critical care time, excluding time spent on separate procedures.      Plan discussed with RN, house staff.

## 2021-11-24 NOTE — CONSULT NOTE ADULT - ASSESSMENT
Assessment: 46F with pmhx of HTN who presented with left middle cerebral artery aneursym with SAH, ICH s/p decompressive hemicraniectomy. Hospital course c/b cardiac arrest x3 and anuric ATN requiring dialysis Trach'd 11/12 with CP arrest and ROSC x3, trach revision 11/22. Now anuric ATN requiring dialysis, s/p temporary HD cath placed bedside 11/19. IR consulted for tunneled HD catheter placement. Case reviewed with Dr. Parks, plan for procedure on Friday pending repeat blood work (CBC, coags, BMP) and once pt is NPO.       Recommendations: NPO including tube feeds at midnight prior to procedure with sedation. D/C blood thinners. Please ensure Covid swab is up to date (within last 72 hours). Recheck AM labs Friday morning.      Communicated with: 8E neurosurg team

## 2021-11-24 NOTE — CONSULT NOTE ADULT - CONSULT REQUESTED DATE/TIME
24-Nov-2021 14:15
15-Nov-2021 13:13
02-Nov-2021 12:32
08-Nov-2021 16:41
11-Nov-2021 16:44
13-Nov-2021
18-Nov-2021 15:10
19-Nov-2021 13:00
05-Nov-2021 13:36
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
## 2021-11-24 NOTE — CONSULT NOTE ADULT - CONSULT REASON
Trach/PEG
evaluation for midline placement
request for hemodialysis catheter placement
Complex Medical Decision Making/GOC in the setting of Cardiac Arrest
Input on removal of bilateral chest tubes s/p pneumothorax
Concern for HD
ID clearance for cranioplasty surgery
Trach/PEG
Tongue swelling

## 2021-11-24 NOTE — PROGRESS NOTE ADULT - SUBJECTIVE AND OBJECTIVE BOX
PULMONARY CONSULT SERVICE FOLLOW-UP NOTE    INTERVAL HPI:  Reviewed chart and overnight events. Bilateral chest tubes clamped and no subsequent pneumothoraces noted on CXR.  Patient seen and examined at bedside.    MEDICATIONS:  Pulmonary:  acetylcysteine 20%  Inhalation 4 milliLiter(s) Inhalation three times a day    Antimicrobials:    Anticoagulants:    Cardiac:  amLODIPine   Tablet 10 milliGRAM(s) Oral daily  cloNIDine 0.3 milliGRAM(s) Oral every 6 hours  hydrALAZINE 50 milliGRAM(s) Oral every 8 hours  hydrALAZINE Injectable 10 milliGRAM(s) IV Push every 2 hours PRN  labetalol Injectable 10 milliGRAM(s) IV Push every 2 hours PRN      Allergies    No Known Allergies    Intolerances        Vital Signs Last 24 Hrs  T(C): 36.2 (24 Nov 2021 05:39), Max: 36.3 (24 Nov 2021 00:50)  T(F): 97.2 (24 Nov 2021 05:39), Max: 97.3 (24 Nov 2021 00:50)  HR: 75 (24 Nov 2021 09:00) (71 - 83)  BP: 135/77 (24 Nov 2021 09:00) (126/71 - 170/100)  BP(mean): 101 (24 Nov 2021 09:00) (93 - 129)  RR: 14 (24 Nov 2021 09:00) (14 - 25)  SpO2: 100% (24 Nov 2021 09:00) (100% - 100%)    11-23 @ 07:01 - 11-24 @ 07:00  --------------------------------------------------------  IN: 1851.9 mL / OUT: 450 mL / NET: 1401.9 mL    11-24 @ 07:01  -  11-24 @ 10:12  --------------------------------------------------------  IN: 170 mL / OUT: 0 mL / NET: 170 mL      Mode: AC/ CMV (Assist Control/ Continuous Mandatory Ventilation)  RR (machine): 14  TV (machine): 400  FiO2: 40  PEEP: 5  ITime: 0.1  MAP: 8  PIP: 18      PHYSICAL EXAM  Constitutional: difficult to arouse, trached on ventilator   HEENT: s/p cranial sutures; bite block in place  Neck: supple  Cardiovascular: +S1/S2, RRR  Respiratory: CTA B/L; no W/R/R; on ventilator; b/l chest tubes in place and clamped  Gastrointestinal: soft, +bowel sounds; PEG c/d/i   Extremities: WWP; trace/1+ edema on all 4 extremities; no clubbing or cyanosis  Vascular: 2+ radial pulses B/L  Neurological: A&Ox0, off sedation, non-specific movements to noxious stimuli    LABS:      CBC Full  -  ( 24 Nov 2021 05:32 )  WBC Count : 12.15 K/uL  RBC Count : 3.17 M/uL  Hemoglobin : 8.2 g/dL  Hematocrit : 25.5 %  Platelet Count - Automated : 249 K/uL  Mean Cell Volume : 80.4 fl  Mean Cell Hemoglobin : 25.9 pg  Mean Cell Hemoglobin Concentration : 32.2 gm/dL  Auto Neutrophil # : x  Auto Lymphocyte # : x  Auto Monocyte # : x  Auto Eosinophil # : x  Auto Basophil # : x  Auto Neutrophil % : x  Auto Lymphocyte % : x  Auto Monocyte % : x  Auto Eosinophil % : x  Auto Basophil % : x    11-24    136  |  96  |  25<H>  ----------------------------<  100<H>  3.5   |  26  |  5.00<H>    Ca    8.8      24 Nov 2021 05:32  Phos  5.1     11-24  Mg     2.0     11-24                        RADIOLOGY & ADDITIONAL STUDIES:

## 2021-11-24 NOTE — PROGRESS NOTE ADULT - SUBJECTIVE AND OBJECTIVE BOX
Patient is a 46y Female seen and evaluated at bedside. Plan for IHD today. Optimising UF- pt remains overloaded. IR for tunneled cath placement prior to dc- pt will need long term HD.    Meds:    acetylcysteine 20%  Inhalation 4 three times a day  amLODIPine   Tablet 10 daily  artificial  tears Solution 1 two times a day  chlorhexidine 0.12% Liquid 15 every 12 hours  chlorhexidine 2% Cloths 1 <User Schedule>  chlorhexidine 2% Cloths 1 <User Schedule>  cloNIDine 0.3 every 6 hours  dextrose 50% Injectable 25 once  epoetin nannette-epbx (RETACRIT) Injectable 78013 once  heparin   Injectable 5000 every 8 hours  hydrALAZINE 50 every 8 hours  hydrALAZINE Injectable 10 every 2 hours PRN  labetalol Injectable 10 every 2 hours PRN  levETIRAcetam  Solution 500 every 24 hours  metoclopramide Injectable 5 every 6 hours  ondansetron Injectable 4 every 6 hours PRN  pantoprazole   Suspension 40 daily  polyethylene glycol 3350 17 daily  senna 2 at bedtime  sodium chloride 0.9% lock flush 10 every 1 hour PRN      T(C): , Max: 36.9 (11-24-21 @ 14:00)  T(F): , Max: 98.4 (11-24-21 @ 14:00)  HR: 76 (11-24-21 @ 14:00)  BP: 139/81 (11-24-21 @ 14:00)  BP(mean): 103 (11-24-21 @ 14:00)  RR: 12 (11-24-21 @ 14:00)  SpO2: 100% (11-24-21 @ 14:00)  Wt(kg): --    11-23 @ 07:01  -  11-24 @ 07:00  --------------------------------------------------------  IN: 1851.9 mL / OUT: 450 mL / NET: 1401.9 mL    11-24 @ 07:01  -  11-24 @ 14:49  --------------------------------------------------------  IN: 415 mL / OUT: 0 mL / NET: 415 mL    Review of Systems: Unable to participate    PHYSICAL EXAM:  GENERAL: intubated, s/p hemicraniectomy- staples on her scalp  CHEST/LUNG: Coarse bilaterally  HEART: normal S1S2, RRR  EXTREMITIES: +2 b/l UE oedema   ACCESS: [facility] cath    LABS:                        8.2    12.15 )-----------( 249      ( 24 Nov 2021 05:32 )             25.5     11-24    136  |  96  |  25<H>  ----------------------------<  100<H>  3.5   |  26  |  5.00<H>    Ca    8.8      24 Nov 2021 05:32  Phos  5.1     11-24  Mg     2.0     11-24                  RADIOLOGY & ADDITIONAL STUDIES:

## 2021-11-25 LAB
ALBUMIN SERPL ELPH-MCNC: 2.7 G/DL — LOW (ref 3.3–5)
ALP SERPL-CCNC: 188 U/L — HIGH (ref 40–120)
ALT FLD-CCNC: <5 U/L — LOW (ref 10–45)
ANION GAP SERPL CALC-SCNC: 7 MMOL/L — SIGNIFICANT CHANGE UP (ref 5–17)
AST SERPL-CCNC: 28 U/L — SIGNIFICANT CHANGE UP (ref 10–40)
BILIRUB SERPL-MCNC: <0.2 MG/DL — SIGNIFICANT CHANGE UP (ref 0.2–1.2)
BUN SERPL-MCNC: 18 MG/DL — SIGNIFICANT CHANGE UP (ref 7–23)
CALCIUM SERPL-MCNC: 8.8 MG/DL — SIGNIFICANT CHANGE UP (ref 8.4–10.5)
CHLORIDE SERPL-SCNC: 97 MMOL/L — SIGNIFICANT CHANGE UP (ref 96–108)
CO2 SERPL-SCNC: 31 MMOL/L — SIGNIFICANT CHANGE UP (ref 22–31)
CREAT SERPL-MCNC: 3.92 MG/DL — HIGH (ref 0.5–1.3)
CULTURE RESULTS: NO GROWTH — SIGNIFICANT CHANGE UP
GAMMA INTERFERON BACKGROUND BLD IA-ACNC: 0.04 IU/ML — SIGNIFICANT CHANGE UP
GLUCOSE SERPL-MCNC: 129 MG/DL — HIGH (ref 70–99)
HCT VFR BLD CALC: 25.1 % — LOW (ref 34.5–45)
HGB BLD-MCNC: 8 G/DL — LOW (ref 11.5–15.5)
M TB IFN-G BLD-IMP: ABNORMAL
M TB IFN-G CD4+ BCKGRND COR BLD-ACNC: -0.01 IU/ML — SIGNIFICANT CHANGE UP
M TB IFN-G CD4+CD8+ BCKGRND COR BLD-ACNC: -0.01 IU/ML — SIGNIFICANT CHANGE UP
MAGNESIUM SERPL-MCNC: 1.8 MG/DL — SIGNIFICANT CHANGE UP (ref 1.6–2.6)
MCHC RBC-ENTMCNC: 26.2 PG — LOW (ref 27–34)
MCHC RBC-ENTMCNC: 31.9 GM/DL — LOW (ref 32–36)
MCV RBC AUTO: 82.3 FL — SIGNIFICANT CHANGE UP (ref 80–100)
NRBC # BLD: 0 /100 WBCS — SIGNIFICANT CHANGE UP (ref 0–0)
PHOSPHATE SERPL-MCNC: 3.5 MG/DL — SIGNIFICANT CHANGE UP (ref 2.5–4.5)
PLATELET # BLD AUTO: 273 K/UL — SIGNIFICANT CHANGE UP (ref 150–400)
POTASSIUM SERPL-MCNC: 3.6 MMOL/L — SIGNIFICANT CHANGE UP (ref 3.5–5.3)
POTASSIUM SERPL-SCNC: 3.6 MMOL/L — SIGNIFICANT CHANGE UP (ref 3.5–5.3)
PROT SERPL-MCNC: 6.7 G/DL — SIGNIFICANT CHANGE UP (ref 6–8.3)
QUANT TB PLUS MITOGEN MINUS NIL: 0.28 IU/ML — SIGNIFICANT CHANGE UP
RBC # BLD: 3.05 M/UL — LOW (ref 3.8–5.2)
RBC # FLD: 20.7 % — HIGH (ref 10.3–14.5)
SARS-COV-2 RNA SPEC QL NAA+PROBE: SIGNIFICANT CHANGE UP
SODIUM SERPL-SCNC: 135 MMOL/L — SIGNIFICANT CHANGE UP (ref 135–145)
SPECIMEN SOURCE: SIGNIFICANT CHANGE UP
WBC # BLD: 11.89 K/UL — HIGH (ref 3.8–10.5)
WBC # FLD AUTO: 11.89 K/UL — HIGH (ref 3.8–10.5)

## 2021-11-25 PROCEDURE — 99233 SBSQ HOSP IP/OBS HIGH 50: CPT | Mod: GC

## 2021-11-25 PROCEDURE — 99291 CRITICAL CARE FIRST HOUR: CPT

## 2021-11-25 PROCEDURE — 71045 X-RAY EXAM CHEST 1 VIEW: CPT | Mod: 26

## 2021-11-25 RX ORDER — POTASSIUM CHLORIDE 20 MEQ
20 PACKET (EA) ORAL ONCE
Refills: 0 | Status: COMPLETED | OUTPATIENT
Start: 2021-11-25 | End: 2021-11-25

## 2021-11-25 RX ORDER — METOCLOPRAMIDE HCL 10 MG
5 TABLET ORAL EVERY 8 HOURS
Refills: 0 | Status: DISCONTINUED | OUTPATIENT
Start: 2021-11-25 | End: 2021-11-25

## 2021-11-25 RX ORDER — SODIUM CHLORIDE 9 MG/ML
1000 INJECTION INTRAMUSCULAR; INTRAVENOUS; SUBCUTANEOUS
Refills: 0 | Status: DISCONTINUED | OUTPATIENT
Start: 2021-11-25 | End: 2021-11-26

## 2021-11-25 RX ORDER — SCOPALAMINE 1 MG/3D
1 PATCH, EXTENDED RELEASE TRANSDERMAL
Refills: 0 | Status: DISCONTINUED | OUTPATIENT
Start: 2021-11-25 | End: 2021-12-01

## 2021-11-25 RX ORDER — METOCLOPRAMIDE HCL 10 MG
5 TABLET ORAL EVERY 6 HOURS
Refills: 0 | Status: DISCONTINUED | OUTPATIENT
Start: 2021-11-25 | End: 2021-11-26

## 2021-11-25 RX ORDER — ACETYLCYSTEINE 200 MG/ML
4 VIAL (ML) MISCELLANEOUS EVERY 12 HOURS
Refills: 0 | Status: DISCONTINUED | OUTPATIENT
Start: 2021-11-25 | End: 2021-11-27

## 2021-11-25 RX ORDER — MAGNESIUM SULFATE 500 MG/ML
1 VIAL (ML) INJECTION ONCE
Refills: 0 | Status: COMPLETED | OUTPATIENT
Start: 2021-11-25 | End: 2021-11-25

## 2021-11-25 RX ADMIN — Medication 20 MILLIEQUIVALENT(S): at 09:20

## 2021-11-25 RX ADMIN — Medication 0.3 MILLIGRAM(S): at 23:16

## 2021-11-25 RX ADMIN — SCOPALAMINE 1 PATCH: 1 PATCH, EXTENDED RELEASE TRANSDERMAL at 11:37

## 2021-11-25 RX ADMIN — Medication 0.3 MILLIGRAM(S): at 17:22

## 2021-11-25 RX ADMIN — Medication 50 MILLIGRAM(S): at 14:17

## 2021-11-25 RX ADMIN — Medication 0.3 MILLIGRAM(S): at 11:38

## 2021-11-25 RX ADMIN — Medication 5 MILLIGRAM(S): at 17:22

## 2021-11-25 RX ADMIN — Medication 50 MILLIGRAM(S): at 06:31

## 2021-11-25 RX ADMIN — Medication 100 GRAM(S): at 09:20

## 2021-11-25 RX ADMIN — LEVETIRACETAM 500 MILLIGRAM(S): 250 TABLET, FILM COATED ORAL at 19:02

## 2021-11-25 RX ADMIN — Medication 4 MILLILITER(S): at 05:33

## 2021-11-25 RX ADMIN — HEPARIN SODIUM 5000 UNIT(S): 5000 INJECTION INTRAVENOUS; SUBCUTANEOUS at 14:17

## 2021-11-25 RX ADMIN — Medication 0.3 MILLIGRAM(S): at 00:27

## 2021-11-25 RX ADMIN — Medication 50 MILLIGRAM(S): at 23:16

## 2021-11-25 RX ADMIN — CHLORHEXIDINE GLUCONATE 1 APPLICATION(S): 213 SOLUTION TOPICAL at 06:58

## 2021-11-25 RX ADMIN — Medication 5 MILLIGRAM(S): at 00:26

## 2021-11-25 RX ADMIN — CHLORHEXIDINE GLUCONATE 15 MILLILITER(S): 213 SOLUTION TOPICAL at 17:22

## 2021-11-25 RX ADMIN — SENNA PLUS 2 TABLET(S): 8.6 TABLET ORAL at 23:17

## 2021-11-25 RX ADMIN — Medication 5 MILLIGRAM(S): at 06:31

## 2021-11-25 RX ADMIN — Medication 4 MILLILITER(S): at 17:17

## 2021-11-25 RX ADMIN — SCOPALAMINE 1 PATCH: 1 PATCH, EXTENDED RELEASE TRANSDERMAL at 18:36

## 2021-11-25 RX ADMIN — Medication 0.3 MILLIGRAM(S): at 06:31

## 2021-11-25 RX ADMIN — Medication 10 MILLIGRAM(S): at 23:47

## 2021-11-25 RX ADMIN — HEPARIN SODIUM 5000 UNIT(S): 5000 INJECTION INTRAVENOUS; SUBCUTANEOUS at 06:30

## 2021-11-25 RX ADMIN — AMLODIPINE BESYLATE 10 MILLIGRAM(S): 2.5 TABLET ORAL at 06:30

## 2021-11-25 RX ADMIN — HEPARIN SODIUM 5000 UNIT(S): 5000 INJECTION INTRAVENOUS; SUBCUTANEOUS at 23:17

## 2021-11-25 RX ADMIN — Medication 5 MILLIGRAM(S): at 23:17

## 2021-11-25 RX ADMIN — Medication 5 MILLIGRAM(S): at 11:37

## 2021-11-25 RX ADMIN — CHLORHEXIDINE GLUCONATE 15 MILLILITER(S): 213 SOLUTION TOPICAL at 06:58

## 2021-11-25 RX ADMIN — PANTOPRAZOLE SODIUM 40 MILLIGRAM(S): 20 TABLET, DELAYED RELEASE ORAL at 11:38

## 2021-11-25 RX ADMIN — Medication 1 DROP(S): at 17:52

## 2021-11-25 RX ADMIN — Medication 1 DROP(S): at 06:59

## 2021-11-25 NOTE — PROGRESS NOTE ADULT - SUBJECTIVE AND OBJECTIVE BOX
PULMONARY CONSULT SERVICE FOLLOW-UP NOTE    INTERVAL HPI:  Reviewed chart and overnight events; patient seen and examined at bedside. Patient intubated and sedated at time of examination. Chest imaging and bedside US performed this morning    MEDICATIONS:  Pulmonary:  acetylcysteine 20%  Inhalation 4 milliLiter(s) Inhalation every 12 hours    Antimicrobials:    Anticoagulants:  heparin   Injectable 5000 Unit(s) SubCutaneous every 8 hours    Cardiac:  amLODIPine   Tablet 10 milliGRAM(s) Oral daily  cloNIDine 0.3 milliGRAM(s) Oral every 6 hours  hydrALAZINE 50 milliGRAM(s) Oral every 8 hours  hydrALAZINE Injectable 10 milliGRAM(s) IV Push every 2 hours PRN  labetalol Injectable 10 milliGRAM(s) IV Push every 2 hours PRN      Allergies    No Known Allergies    Intolerances        Vital Signs Last 24 Hrs  T(C): 37.2 (25 Nov 2021 09:30), Max: 37.4 (25 Nov 2021 06:00)  T(F): 99 (25 Nov 2021 09:30), Max: 99.3 (25 Nov 2021 06:00)  HR: 74 (25 Nov 2021 10:00) (70 - 93)  BP: 135/79 (25 Nov 2021 10:00) (115/68 - 155/87)  BP(mean): 102 (25 Nov 2021 10:00) (85 - 115)  RR: 12 (25 Nov 2021 09:00) (12 - 18)  SpO2: 100% (25 Nov 2021 10:00) (94% - 100%)    11-24 @ 07:01 - 11-25 @ 07:00  --------------------------------------------------------  IN: 965 mL / OUT: 700 mL / NET: 265 mL    11-25 @ 07:01 - 11-25 @ 11:42  --------------------------------------------------------  IN: 90 mL / OUT: 0 mL / NET: 90 mL      Mode: AC/ CMV (Assist Control/ Continuous Mandatory Ventilation)  RR (machine): 12  TV (machine): 400  FiO2: 40  PEEP: 5  ITime: 1  MAP: 8.9  PIP: 20      PHYSICAL EXAM:  Constitutional: WDWN  HEENT: NC/AT; PERRL, anicteric sclera; MMM  Neck: supple  Cardiovascular: +S1/S2, RRR  Respiratory: CTA B/L; no W/R/R  Gastrointestinal: soft, NT/ND  Extremities: WWP; no edema, clubbing or cyanosis  Vascular: 2+ radial pulses B/L  Neurological: awake and alert; WOLF    LABS:      CBC Full  -  ( 25 Nov 2021 06:05 )  WBC Count : 11.89 K/uL  RBC Count : 3.05 M/uL  Hemoglobin : 8.0 g/dL  Hematocrit : 25.1 %  Platelet Count - Automated : 273 K/uL  Mean Cell Volume : 82.3 fl  Mean Cell Hemoglobin : 26.2 pg  Mean Cell Hemoglobin Concentration : 31.9 gm/dL  Auto Neutrophil # : x  Auto Lymphocyte # : x  Auto Monocyte # : x  Auto Eosinophil # : x  Auto Basophil # : x  Auto Neutrophil % : x  Auto Lymphocyte % : x  Auto Monocyte % : x  Auto Eosinophil % : x  Auto Basophil % : x    11-25    135  |  97  |  18  ----------------------------<  129<H>  3.6   |  31  |  3.92<H>    Ca    8.8      25 Nov 2021 06:05  Phos  3.5     11-25  Mg     1.8     11-25    TPro  6.7  /  Alb  2.7<L>  /  TBili  <0.2  /  DBili  x   /  AST  28  /  ALT  <5<L>  /  AlkPhos  188<H>  11-25                      RADIOLOGY & ADDITIONAL STUDIES:  < from: Xray Chest 1 View- PORTABLE-Routine (Xray Chest 1 View- PORTABLE-Routine in AM.) (11.25.21 @ 07:30) >  EXAM:  XR CHEST PORTABLE ROUTINE 1V                          PROCEDURE DATE:  11/25/2021          INTERPRETATION:  Clinical History: Ventilated    Frontal examination of the chest demonstrates mild cardiomegaly. Congestion and/or infiltrates. Left effusion. No interval change position remaining support devices in comparison to prior examination of the chest 11/24/2021.    IMPRESSION: Congestion and/or infiltrates. Left effusion

## 2021-11-25 NOTE — PROGRESS NOTE ADULT - ASSESSMENT
46y/Female with:  L MCA ruptured aneurysm, subarachnoid hemorrhage, s/p DHC (10/26/2021, Dr. D'Amico @ [place]), brain compression, cerebral edema  anemia   recent spinal surgery  UGIB  bilateral pneumothorax  acute kidney injury, transaminitis    PLAN: Day 1 = 10-26 ; Day 4 = 10/29; Day 21 = 11/15  NEURO: comachecks q2h, VS q1h, PRN fentanyl for analgosedation  SAH:  off nimodipine  Hydrocephalus:  EVD discontinued; cranioplasty to be scheduled  Seizure prophylaxis:  cont levetiracetam 500 OD   REHAB:  physical therapy evaluation and management    EARLY MOB:  HOB elevated    PULM:  full vent support for now, continue mucormyst and ipratropium / albuterol; pulmo toilette; s/p trach, chest tube to water seal, d/c if ok with pulmo  CARDIO:  SBP goal 100-160mm Hg, TTE, amlodipine 10mg PO daily; increase clonidine 0.2 q6h; continue hydralazine   ENDO:  Blood sugar goals 140-180 mg/dL  GI:  PPI OD  DIET: TF increase to 20cc/hr, 30cc/hr this afternoon  RENAL: d/c IVF  Na goal 135-145; dialysis today  HEM/ONC: no coagulopathy (INR= 1.03), no ASA / Plavix use; Hb stable   VTE Prophylaxis: ANNABELLE Chavez   ID: afebrile, no leukocytosis  Social: Jose updated yesterday    CORE MEASURES  1.  Hunt and Griffin Score = 5  2.  VTE prophylaxis:  [ ] administered within 24 hours of admission OR [X] reason not done: fresh bleed, unsecured aneurysm.  3.  Dysphagia screening:   [X] performed before any oral meds / liquids / food  4.  Nimodipine treatment:  [X] administered within 24 hours of admission OR [ ] reason not done:    ATTENDING ATTESTATION:  I was physically present for the key portions of the evaluation and management (E/M) service provided.  I agree with the above history, physical and plan, which I have reviewed and edited where appropriate.    Patient at high risk for neurological deterioration or death due to:  ICU delirium, aspiration PNA, DVT / PE.  Critical care time:  I have personally provided 45 minutes of critical care time, excluding time spent on separate procedures.      Plan discussed with RN, house staff. 46y/Female with:  L MCA ruptured aneurysm, subarachnoid hemorrhage, s/p DHC (10/26/2021, Dr. D'Amico @ [place]), brain compression, cerebral edema  anemia   recent spinal surgery  UGIB  bilateral pneumothorax  acute kidney injury, transaminitis    PLAN: Day 1 = 10-26 ; Day 4 = 10/29; Day 21 = 11/15  NEURO: comachecks q4h, VS q1h, PRN fentanyl for analgosedation  SAH:  off nimodipine  Hydrocephalus:  EVD discontinued  cranioplasty to be scheduled  Seizure prophylaxis:  cont levetiracetam 500 OD   REHAB:  physical therapy evaluation and management    EARLY MOB:  HOB elevated    PULM:  full vent support for now, continue mucormyst (decrease to q12h) and ipratropium / albuterol - switch toPRN; pulmo toilette; s/p trach, chest tube discontinued   CARDIO:  SBP goal 100-160mm Hg, TTE, amlodipine 10mg PO daily; clonidine 0.3 q6h; continue hydralazine 50q8h  ENDO:  Blood sugar goals 140-180 mg/dL  GI:  PPI OD; reglan cont 5 q6h   DIET: TF increase to 40cc/hr  RENAL: off IVF  Na goal 135-145; needs permanent catheter; dialysis tomorrow  HEM/ONC: no coagulopathy (INR= 1.03), no ASA / Plavix use; Hb stable   VTE Prophylaxis: SCDs, SQH   ID: afebrile, no leukocytosis  Social: will update family   MISC: Scopolamine patch    CORE MEASURES  1.  Hunt and Griffin Score = 5  2.  VTE prophylaxis:  [ ] administered within 24 hours of admission OR [X] reason not done: fresh bleed, unsecured aneurysm.  3.  Dysphagia screening:   [X] performed before any oral meds / liquids / food  4.  Nimodipine treatment:  [X] administered within 24 hours of admission OR [ ] reason not done:    ATTENDING ATTESTATION:  I was physically present for the key portions of the evaluation and management (E/M) service provided.  I agree with the above history, physical and plan, which I have reviewed and edited where appropriate.    Patient at high risk for neurological deterioration or death due to:  ICU delirium, aspiration PNA, DVT / PE.  Critical care time:  I have personally provided 45 minutes of critical care time, excluding time spent on separate procedures.      Plan discussed with RN, house staff.

## 2021-11-25 NOTE — PROGRESS NOTE ADULT - ASSESSMENT
47 y/o female with PMH HTN, multiple sclerosis, recent spinal surgery 10/8 ([facility]) presented to [facility] after being found unconscious at home, unknown period of time. Found to have ruptured L MCA aneurysm with intraparenchymal hemorrhage, SAH, and left subdural hematoma w/ midline shift and herniation. Now s/p L hemicraniotomy. Hospital course c/b CODE BLUE on 11/12 after attempted bedside tracheostomy, which resulted in bilateral pneumothoraces. She is now s/p bilateral chest tubes to water seal and 7.5 Shiley cuff trach with ENT. Pulmonary team consulted for management input on chest tubes.    #Acute hypoxic respiratory failure  #Bilateral pneumothorax s/p bilateral chest tubes     Recommendations:  - s/p 7.5 Shiley cuff trach on 11/22.   - bilateral chest tubes removed yesterday, 11/24, with imaging this morning showing no pneumothorax  - Lung siding bilaterally on US  - Pneumothorax resolved, pulmonary will sign off at this time please call with any questions or changes
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
## 2021-11-26 LAB
ANION GAP SERPL CALC-SCNC: 10 MMOL/L — SIGNIFICANT CHANGE UP (ref 5–17)
APTT BLD: 29.6 SEC — SIGNIFICANT CHANGE UP (ref 27.5–35.5)
BLD GP AB SCN SERPL QL: NEGATIVE — SIGNIFICANT CHANGE UP
BUN SERPL-MCNC: 29 MG/DL — HIGH (ref 7–23)
CALCIUM SERPL-MCNC: 8.9 MG/DL — SIGNIFICANT CHANGE UP (ref 8.4–10.5)
CHLORIDE SERPL-SCNC: 98 MMOL/L — SIGNIFICANT CHANGE UP (ref 96–108)
CO2 SERPL-SCNC: 28 MMOL/L — SIGNIFICANT CHANGE UP (ref 22–31)
CREAT SERPL-MCNC: 5.09 MG/DL — HIGH (ref 0.5–1.3)
GLUCOSE SERPL-MCNC: 117 MG/DL — HIGH (ref 70–99)
HCT VFR BLD CALC: 23.4 % — LOW (ref 34.5–45)
HGB BLD-MCNC: 7.3 G/DL — LOW (ref 11.5–15.5)
INR BLD: 1.1 — SIGNIFICANT CHANGE UP (ref 0.88–1.16)
MAGNESIUM SERPL-MCNC: 2.2 MG/DL — SIGNIFICANT CHANGE UP (ref 1.6–2.6)
MCHC RBC-ENTMCNC: 25.5 PG — LOW (ref 27–34)
MCHC RBC-ENTMCNC: 31.2 GM/DL — LOW (ref 32–36)
MCV RBC AUTO: 81.8 FL — SIGNIFICANT CHANGE UP (ref 80–100)
NRBC # BLD: 0 /100 WBCS — SIGNIFICANT CHANGE UP (ref 0–0)
PHOSPHATE SERPL-MCNC: 4 MG/DL — SIGNIFICANT CHANGE UP (ref 2.5–4.5)
PLATELET # BLD AUTO: 242 K/UL — SIGNIFICANT CHANGE UP (ref 150–400)
POTASSIUM SERPL-MCNC: 3.9 MMOL/L — SIGNIFICANT CHANGE UP (ref 3.5–5.3)
POTASSIUM SERPL-SCNC: 3.9 MMOL/L — SIGNIFICANT CHANGE UP (ref 3.5–5.3)
PROTHROM AB SERPL-ACNC: 13.1 SEC — SIGNIFICANT CHANGE UP (ref 10.6–13.6)
RBC # BLD: 2.86 M/UL — LOW (ref 3.8–5.2)
RBC # FLD: 21.4 % — HIGH (ref 10.3–14.5)
RH IG SCN BLD-IMP: POSITIVE — SIGNIFICANT CHANGE UP
SODIUM SERPL-SCNC: 136 MMOL/L — SIGNIFICANT CHANGE UP (ref 135–145)
WBC # BLD: 9.9 K/UL — SIGNIFICANT CHANGE UP (ref 3.8–10.5)
WBC # FLD AUTO: 9.9 K/UL — SIGNIFICANT CHANGE UP (ref 3.8–10.5)

## 2021-11-26 PROCEDURE — 76937 US GUIDE VASCULAR ACCESS: CPT | Mod: 26

## 2021-11-26 PROCEDURE — 71045 X-RAY EXAM CHEST 1 VIEW: CPT | Mod: 26

## 2021-11-26 PROCEDURE — 99024 POSTOP FOLLOW-UP VISIT: CPT

## 2021-11-26 PROCEDURE — 77001 FLUOROGUIDE FOR VEIN DEVICE: CPT | Mod: 26

## 2021-11-26 PROCEDURE — 93970 EXTREMITY STUDY: CPT | Mod: 26,59

## 2021-11-26 PROCEDURE — 99233 SBSQ HOSP IP/OBS HIGH 50: CPT

## 2021-11-26 PROCEDURE — 36558 INSERT TUNNELED CV CATH: CPT

## 2021-11-26 PROCEDURE — ZZZZZ: CPT

## 2021-11-26 RX ORDER — HYDRALAZINE HCL 50 MG
75 TABLET ORAL EVERY 8 HOURS
Refills: 0 | Status: DISCONTINUED | OUTPATIENT
Start: 2021-11-26 | End: 2021-11-28

## 2021-11-26 RX ORDER — HEPARIN SODIUM 5000 [USP'U]/ML
5000 INJECTION INTRAVENOUS; SUBCUTANEOUS EVERY 8 HOURS
Refills: 0 | Status: DISCONTINUED | OUTPATIENT
Start: 2021-11-26 | End: 2021-12-10

## 2021-11-26 RX ORDER — HYDRALAZINE HCL 50 MG
25 TABLET ORAL ONCE
Refills: 0 | Status: COMPLETED | OUTPATIENT
Start: 2021-11-26 | End: 2021-11-26

## 2021-11-26 RX ORDER — METOCLOPRAMIDE HCL 10 MG
2.5 TABLET ORAL EVERY 6 HOURS
Refills: 0 | Status: DISCONTINUED | OUTPATIENT
Start: 2021-11-26 | End: 2021-11-28

## 2021-11-26 RX ORDER — ERYTHROPOIETIN 10000 [IU]/ML
10000 INJECTION, SOLUTION INTRAVENOUS; SUBCUTANEOUS ONCE
Refills: 0 | Status: COMPLETED | OUTPATIENT
Start: 2021-11-26 | End: 2021-11-26

## 2021-11-26 RX ADMIN — Medication 10 MILLIGRAM(S): at 02:52

## 2021-11-26 RX ADMIN — Medication 10 MILLIGRAM(S): at 11:55

## 2021-11-26 RX ADMIN — Medication 0.3 MILLIGRAM(S): at 12:39

## 2021-11-26 RX ADMIN — Medication 25 MILLIGRAM(S): at 12:39

## 2021-11-26 RX ADMIN — CHLORHEXIDINE GLUCONATE 15 MILLILITER(S): 213 SOLUTION TOPICAL at 05:52

## 2021-11-26 RX ADMIN — HEPARIN SODIUM 5000 UNIT(S): 5000 INJECTION INTRAVENOUS; SUBCUTANEOUS at 22:17

## 2021-11-26 RX ADMIN — SCOPALAMINE 1 PATCH: 1 PATCH, EXTENDED RELEASE TRANSDERMAL at 06:04

## 2021-11-26 RX ADMIN — Medication 2.5 MILLIGRAM(S): at 12:40

## 2021-11-26 RX ADMIN — AMLODIPINE BESYLATE 10 MILLIGRAM(S): 2.5 TABLET ORAL at 05:53

## 2021-11-26 RX ADMIN — ERYTHROPOIETIN 10000 UNIT(S): 10000 INJECTION, SOLUTION INTRAVENOUS; SUBCUTANEOUS at 18:15

## 2021-11-26 RX ADMIN — Medication 75 MILLIGRAM(S): at 22:18

## 2021-11-26 RX ADMIN — LEVETIRACETAM 500 MILLIGRAM(S): 250 TABLET, FILM COATED ORAL at 22:18

## 2021-11-26 RX ADMIN — CHLORHEXIDINE GLUCONATE 1 APPLICATION(S): 213 SOLUTION TOPICAL at 05:54

## 2021-11-26 RX ADMIN — SCOPALAMINE 1 PATCH: 1 PATCH, EXTENDED RELEASE TRANSDERMAL at 19:03

## 2021-11-26 RX ADMIN — POLYETHYLENE GLYCOL 3350 17 GRAM(S): 17 POWDER, FOR SOLUTION ORAL at 12:39

## 2021-11-26 RX ADMIN — Medication 4 MILLILITER(S): at 18:45

## 2021-11-26 RX ADMIN — Medication 10 MILLIGRAM(S): at 02:24

## 2021-11-26 RX ADMIN — CHLORHEXIDINE GLUCONATE 15 MILLILITER(S): 213 SOLUTION TOPICAL at 18:45

## 2021-11-26 RX ADMIN — Medication 2.5 MILLIGRAM(S): at 23:06

## 2021-11-26 RX ADMIN — Medication 1 DROP(S): at 05:53

## 2021-11-26 RX ADMIN — Medication 0.3 MILLIGRAM(S): at 05:52

## 2021-11-26 RX ADMIN — Medication 2.5 MILLIGRAM(S): at 18:44

## 2021-11-26 RX ADMIN — Medication 75 MILLIGRAM(S): at 15:14

## 2021-11-26 RX ADMIN — Medication 50 MILLIGRAM(S): at 05:53

## 2021-11-26 RX ADMIN — Medication 0.3 MILLIGRAM(S): at 22:18

## 2021-11-26 RX ADMIN — HEPARIN SODIUM 5000 UNIT(S): 5000 INJECTION INTRAVENOUS; SUBCUTANEOUS at 15:14

## 2021-11-26 RX ADMIN — SENNA PLUS 2 TABLET(S): 8.6 TABLET ORAL at 22:17

## 2021-11-26 RX ADMIN — SODIUM CHLORIDE 75 MILLILITER(S): 9 INJECTION INTRAMUSCULAR; INTRAVENOUS; SUBCUTANEOUS at 00:00

## 2021-11-26 RX ADMIN — Medication 4 MILLILITER(S): at 05:33

## 2021-11-26 RX ADMIN — Medication 5 MILLIGRAM(S): at 05:53

## 2021-11-26 RX ADMIN — Medication 1 DROP(S): at 19:02

## 2021-11-26 RX ADMIN — PANTOPRAZOLE SODIUM 40 MILLIGRAM(S): 20 TABLET, DELAYED RELEASE ORAL at 12:39

## 2021-11-26 NOTE — PROGRESS NOTE ADULT - SUBJECTIVE AND OBJECTIVE BOX
ENT PROGRESS NOTE    HPI: 45 y/o female with PMH HTN, Multiple sclerosis, recent spinal surgery 10/8 ([facility]) presented to [facility] after being found unconscious at home, unknown period of time. 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 ENT consulted due to tongue swelling. pt has been off sedated due to neuro checks. per nursing, pt has been biting down on tongue. nursing has not been able to place bite block. Denies any hypotensive episodes.     INTERVAL:    11/10: ENT reconsulted for trach placement - per primary team, patient was spastic with stiff neck while trying to perform bedside tracheostomy yesterday. Pt has been intubated since 10/26. Otherwise, NAEON - on EEG.    11/11: S/p tracheostomy attempt 11/12 am. Pt was seen and examined bedside - ETT in place, good ventilation. Stoma packing in place. Trops downtrending. Given 1x PRBC overnight, b/l chest tubes in place. Plan to change from cefepime to ancef today.    11/12: Pt in multisystem organ failure per primary team. Poor neuro exam    11/15: informed by primary team pt had feeds coming from nose and mouth. was also found in ETT. Pt DNR status still pending    11/16: informed overnight night pt is having a vent leak, concern for arising from stoma. superficial dressing changed. no longer experiencing vent leak    11/16: PM. I Spoke with daughter Jayshree Stubbs at bedside this afternoon. Ms. Stubbs conferenced in her uncle and aunt into the conversation. Discussed with Ms. Stubbs current status of tracheostomy site and proposal for re-evaluating tracheostomy site tomorrow in OR followed by bronchoscopy. Explained to Ms. Stubbs procedure tomorrow, tracheostomy and bronchoscopy,  would allow for removal of ETT and evaluation of the distal airways. explained there is a risk of inability to secure airway tomorrow. Ms. Stubbs and family stated they understand and would like to defer the procedure for now given renal status.     11/17: NAEON. pt remains intubated and sedated. no plans for OR today, per family conversation    11/18: Pt refluxing feeds through mouth and nose overnight. tube feeds paused.     11/23: Pt is now s/p trach placement in OR (7.5 Shiley cuffed) with no issues on 11/22. Stable on vent, no respiratory issues overnight. No significant bleeding from trach site.    11/24: NICKOLASEON    11/26: Pt seen and assessed bedside with chief resident. Sutures on trach removed and dressing applied around trach to prevent pressure on skin. Trach tie reapplied. No respiratory issues or desats.        ICU Vital Signs Last 24 Hrs  T(C): 36.6 (26 Nov 2021 06:24), Max: 37.2 (25 Nov 2021 09:30)  T(F): 97.8 (26 Nov 2021 06:24), Max: 99 (25 Nov 2021 09:30)  HR: 66 (26 Nov 2021 07:00) (58 - 89)  BP: 159/93 (26 Nov 2021 07:00) (106/67 - 166/90)  BP(mean): 118 (26 Nov 2021 07:00) (82 - 121)  ABP: 152/76 (26 Nov 2021 07:00) (97/80 - 181/71)  ABP(mean): 105 (26 Nov 2021 07:00) (73 - 114)  RR: 12 (26 Nov 2021 07:00) (12 - 12)  SpO2: 100% (26 Nov 2021 07:00) (98% - 100%)        PHYSICAL EXAM:    CONSTITUTIONAL: A&Ox0  ORAL CAVITY/OROPHARYNX: Bite block in place, tongue no longer protruding from oral cavity.  NECK: 7.5 cuffed Shiley trach, trach ties in place  RESPIRATORY: Respirations unlabored, no increased work of breathing with use of accessory muscles and retractions. No stridor.  CARDIAC: Warm extremities, no cyanosis.       A/P: 46 F w/ w/ significant tongue swelling, likely secondary to biting down on tongue. bite block unable to be placed due to tone of jaw muscles. ENT reconsulted for trach placement - attempted 11/12 am, but complicated by respiratory failure, coded w/ chest compressions 3x with ROSC. ETT was reinserted into tracheal orally. Feeds seen from mouth and nose, likely secondary to tube feeds refluxing. Pt now s/p trach placement in OR with no issues on 11/22.    - 7.5 Shiley cuffed trach; sutures removed  - Routine trach care  - Rest of care per primary team

## 2021-11-26 NOTE — PROGRESS NOTE ADULT - SUBJECTIVE AND OBJECTIVE BOX
Patient is a 46y Female seen and evaluated at bedside. S/p IR tunneled cath placement today. Plan for HD for volume optimisation.     Meds:    acetylcysteine 20%  Inhalation 4 every 12 hours  amLODIPine   Tablet 10 daily  artificial  tears Solution 1 two times a day  chlorhexidine 0.12% Liquid 15 every 12 hours  chlorhexidine 2% Cloths 1 <User Schedule>  chlorhexidine 2% Cloths 1 <User Schedule>  cloNIDine 0.3 every 6 hours  dextrose 50% Injectable 25 once  epoetin nannette-epbx (RETACRIT) Injectable 30405 once  heparin   Injectable 5000 every 8 hours  hydrALAZINE 75 every 8 hours  hydrALAZINE Injectable 10 every 2 hours PRN  labetalol Injectable 10 every 2 hours PRN  levETIRAcetam  Solution 500 every 24 hours  metoclopramide Injectable 2.5 every 6 hours  ondansetron Injectable 4 every 6 hours PRN  pantoprazole   Suspension 40 daily  polyethylene glycol 3350 17 daily  scopolamine 1 mG/72 Hr(s) Patch 1 every 72 hours  senna 2 at bedtime  sodium chloride 0.9% lock flush 10 every 1 hour PRN      T(C): , Max: 36.8 (11-26-21 @ 00:35)  T(F): , Max: 98.2 (11-26-21 @ 00:35)  HR: 67 (11-26-21 @ 13:00)  BP: 163/88 (11-26-21 @ 11:00)  BP(mean): 120 (11-26-21 @ 11:00)  RR: 12 (11-26-21 @ 13:00)  SpO2: 100% (11-26-21 @ 13:00)  Wt(kg): --    11-25 @ 07:01  -  11-26 @ 07:00  --------------------------------------------------------  IN: 1430 mL / OUT: 200 mL / NET: 1230 mL    11-26 @ 07:01  -  11-26 @ 13:53  --------------------------------------------------------  IN: 320 mL / OUT: 0 mL / NET: 320 mL    Review of Systems: Unable to participate    PHYSICAL EXAM:  GENERAL: intubated, s/p hemicraniectomy- staples on her scalp  CHEST/LUNG: Coarse bilaterally  HEART: normal S1S2, RRR  EXTREMITIES: +2 b/l UE oedema   ACCESS: RIJ tunneled cath placed 11/26    LABS:                        7.3    9.90  )-----------( 242      ( 26 Nov 2021 06:00 )             23.4     11-26    136  |  98  |  29<H>  ----------------------------<  117<H>  3.9   |  28  |  5.09<H>    Ca    8.9      26 Nov 2021 06:00  Phos  4.0     11-26  Mg     2.2     11-26    TPro  6.7  /  Alb  2.7<L>  /  TBili  <0.2  /  DBili  x   /  AST  28  /  ALT  <5<L>  /  AlkPhos  188<H>  11-25      PT/INR - ( 26 Nov 2021 06:00 )   PT: 13.1 sec;   INR: 1.10          PTT - ( 26 Nov 2021 06:00 )  PTT:29.6 sec          RADIOLOGY & ADDITIONAL STUDIES:           Patient is a 46y Female seen and evaluated at bedside. S/p IR tunneled cath placement today. Plan for HD for volume optimisation.     Meds:    acetylcysteine 20%  Inhalation 4 every 12 hours  amLODIPine   Tablet 10 daily  artificial  tears Solution 1 two times a day  chlorhexidine 0.12% Liquid 15 every 12 hours  chlorhexidine 2% Cloths 1 <User Schedule>  chlorhexidine 2% Cloths 1 <User Schedule>  cloNIDine 0.3 every 6 hours  dextrose 50% Injectable 25 once  epoetin nannette-epbx (RETACRIT) Injectable 66958 once  heparin   Injectable 5000 every 8 hours  hydrALAZINE 75 every 8 hours  hydrALAZINE Injectable 10 every 2 hours PRN  labetalol Injectable 10 every 2 hours PRN  levETIRAcetam  Solution 500 every 24 hours  metoclopramide Injectable 2.5 every 6 hours  ondansetron Injectable 4 every 6 hours PRN  pantoprazole   Suspension 40 daily  polyethylene glycol 3350 17 daily  scopolamine 1 mG/72 Hr(s) Patch 1 every 72 hours  senna 2 at bedtime  sodium chloride 0.9% lock flush 10 every 1 hour PRN      T(C): , Max: 36.8 (11-26-21 @ 00:35)  T(F): , Max: 98.2 (11-26-21 @ 00:35)  HR: 67 (11-26-21 @ 13:00)  BP: 163/88 (11-26-21 @ 11:00)  BP(mean): 120 (11-26-21 @ 11:00)  RR: 12 (11-26-21 @ 13:00)  SpO2: 100% (11-26-21 @ 13:00)  Wt(kg): --    11-25 @ 07:01  -  11-26 @ 07:00  --------------------------------------------------------  IN: 1430 mL / OUT: 200 mL / NET: 1230 mL    11-26 @ 07:01  -  11-26 @ 13:53  --------------------------------------------------------  IN: 320 mL / OUT: 0 mL / NET: 320 mL    Review of Systems: Unable to participate    PHYSICAL EXAM:  GENERAL: intubated, s/p hemicraniectomy- staples on her scalp  CHEST/LUNG: Coarse bilaterally  HEART: normal S1S2, RRR  EXTREMITIES: +2 b/l UE oedema   ACCESS: RIJ tunneled cath placed 11/26    LABS:                        7.3    9.90  )-----------( 242      ( 26 Nov 2021 06:00 )             23.4     11-26    136  |  98  |  29<H>  ----------------------------<  117<H>  3.9   |  28  |  5.09<H>    Ca    8.9      26 Nov 2021 06:00  Phos  4.0     11-26  Mg     2.2     11-26    TPro  6.7  /  Alb  2.7<L>  /  TBili  <0.2  /  DBili  x   /  AST  28  /  ALT  <5<L>  /  AlkPhos  188<H>  11-25      PT/INR - ( 26 Nov 2021 06:00 )   PT: 13.1 sec;   INR: 1.10          PTT - ( 26 Nov 2021 06:00 )  PTT:29.6 sec          RADIOLOGY & ADDITIONAL STUDIES:      Reviewed

## 2021-11-26 NOTE — PROGRESS NOTE ADULT - ATTENDING COMMENTS
See the Fellow's note written above, for details. I reviewed the fellow documentation.  I have personally seen and examined this patient today. I have reviewed vitals, labs, medications, and imaging. I agree with the fellow's findings and plans as written above with the following additions/amendments:    Patient seen and examined bedside. TDC placed this AM, no bleeding or complications, site clear  .  VITAL SIGNS:  T(F): 97.9 (11-26-21 @ 14:00), Max: 98.2 (11-26-21 @ 00:35)  HR: 62 (11-26-21 @ 15:00) (62 - 77)  BP: 163/88 (11-26-21 @ 11:00) (106/67 - 164/89)  BP(mean): 120 (11-26-21 @ 11:00) (82 - 121)  RR: 12 (11-26-21 @ 15:00) (12 - 12)  SpO2: 100% (11-26-21 @ 15:00) (98% - 100%)    PHYSICAL EXAM:  Constitutional: Intubated, sedated; NAD  HEENT:  trach in place, clear secretions  Respiratory: Mechanical breath sounds bilaterally, not overbreathing vent  Cardiac: +S1/S2; RRR; no M/R/G  Gastrointestinal: soft, NT/ND; no rebound or guarding; +BS  Extremities: WWP, no clubbing or cyanosis; 1+edema  Dermatologic: skin warm, dry and intact; no rashes, wounds, or scars  Access: R TDC c/d/i      Dialysis later today. Discussed with primary team. Further recs as above

## 2021-11-26 NOTE — PROGRESS NOTE ADULT - SUBJECTIVE AND OBJECTIVE BOX
HPI:  45 y/o female with PMH HTN, Multiple sclerosis, recent spinal surgery 10/8 ([facility]) presented to [facility] BIBEMS after being found unconscious at home, unknown period of time. 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 HH5, MF1. Patient was intubated at [facility], found to have blown L pupil, and sent straight to the OR for left hemicraniotomy. A-line placed intraop. Hemodynamically unstable upon arrival to [facility], bradycardic and hypertensive s/p Mannitol, Clevidipine, and Furosemide. Central line placed in NSICU. Currently sedated on Propofol, pending repeat CTH. History per patient's son, patient had recent spine surgery and was found on outpatient imaging last week to have L M1 segment aneurysm (unruptured), pt asymptomatic at this time. Per son patient taking percocet PO at home. Ureña catheter, ET tube, and arterial line placed at [facility].     NIHSS on arrival: 32   Bess Griffin 5, Mod Busby 4  Bleed Day 1 = 10/26 (26 Oct 2021 13:03)    OVERNIGHT EVENTS: JOAQUÍN overnight, pending permacath placement by IR today    HOSPITAL COURSE:   10/26: admitted to [facility] from [facility], POD#0 s/p L hemicraniectomy for decompression and evacuation of SDH. Transferred to [facility] noted to have tense flap, received mannitol, keppra, decadron. Was hypertensive to 200s and preston to 40s, recevied 85g mannitol and bullet 23.4%. CTH on arrival demonstrated increased size in vents and new IVH. EVD placed, open at 15, central line placed. Transfused 2 u PRBC. POD0 of coiling of left MCA bifurcation aneurysm,   10/27: CTH demonstrated EVD in correct position, EVD lowered to 5, remains intubated on proprofol, pending repeat CTH in AM. Started on 3% @ 30/hr. 2LNS given for euvolemia, Mannitol given for uptrending ICPs. Bullet given 8:30 am. 3% increased to 50/hr. 1 L bolus. New EVD catheter placed using exisiting sreekanth hole. 1 u PRBC.  10/28: HH5, MF4, BD3; POD2 angio coil/embo of L MCA aneurysm. JOAQUÍN overnight, neuro stable. EVD@5cmH20 ICPs < 20 overnight, OP WNL. S/p 1 unit PRBC yesterday with appropriate response. Given 42g mannitol in AM for ICP 22 persistently, with improvement, then given 23% in PM for elevated ICP. febrile, pancultured. Standing tylenol and cooling blanket.   10/29: BD4, POD3 angio coil/embo. JOAQUÍN o/n, neuro stable. EVD@5, ICPs <20 o/n.   10/30: BD5, POD4 angio coil/embo. JOAQUÍN o/n neuro stable. EVD@5. 3% @50cc/hr.  10/31: BD6, POD5 angio coil/embo. brief period maintained upward gaze, resolved on its own. EVD@5cm h2o.    11/1: BD7, POD6 L hemicrani, angio coil/embo. JOAQUÍN overnight. Neuro exam unchanged. ICPs WNL. started bromocriptine/gabapentin/baclofen. dc'd propofol, started precedex  11/2: BD8 POD7 L hemicrani, angio coil/embo. JOAQUÍN overnight. Neuro exam stable. Remains on 3% hypertonic saline. Receieved 1 unit of PRBCs for a Hgb of 7.6.  11/3: BD 9 POD8 of L hemicrani. Elevated lipase, normal amylase, OG tube clogged and replaced. Abdominal US normal. Pending gen surg reccs regarding trach and peg. Gabapentin and Baclofen increased to help with neurostorming. restarted back on 3%, LR@35. became preston+hypotensive with nimodipine 60q4, decreased back to 30q2, NaCl bullet given.   11/4: BD10 POD9, JOAQÍUN o/n, 500cc NS for euvolemia,  neuro stable, EVD at 5. 3% increased to 75  11/5: BD11 POD10, JOAQUÍN o/n, neuro stable, EVD at 5  11/6: BD12 POD11 JOAQUÍN overnight. Neuro exam stable. Remains intubated on precedex. 3% hypertonic saline dc'd  11/7: BD13 POD 12 JOAQUÍN o/n, NGT replaced. on precex with no icp crisis   11/8: BD14 POD13 JOAQUÍN o/n, noted to have tongue maceration/macroglossia. Gauze with tongue depressor placed. Pending further recs from ENT. Pending trach and PEG placement tomorrow with gen surg. Off precedex, ICPs stable. EVD remains @ 5.   11/9: BD15, POD 14, bleeding under tongue at start of shift, fentanyl pushed with no further episodes. gabapentin stopped. PEG placed  11/10: BD 16, POD 15, neuro stable, ENT to be consulted in AM, 3% increased to 50/hr.  11/11: BD 17, POD 16, neuro exam stable. Pend [facility] saba in am for cranioplasty planning. Pend trach in OR with ENT. F.u BMP in am, 3%@50cc/hr for Na goal 140-145.   11/12: BD 18, POD 17. JOAQUÍN overnight, neuro stable. Pend trach placement today. CT saba completed 11/11. Off of 3%, f/u am BMP for Na goal 140-150. CSF neg. Hypoxic cardiac arrest with ROSC x 3 during tracheostomy placement. B/l chest tubes placed for resulting pneumothorax s/p CPR. salt tabs dc'd.  11/13: BD 19, POD 18. Patient tachycardic with some improvement, SBP stable off of levophed, hgb downtrending o/n, transfused 1 unit PRBCs. B/l chest tube. EVD @5cmH2O, no elevated ICPs. UOP downtrending, trend BUN/Cr, given 1LNS x1 for low urine output. F/u am CXR. Cont Cefepime until today, then change to Ancef while trach packing in place per ENT. Pend CT when stable. Trops downtrending s/p cardiac arrest. 1L. florinef dc'd. BP goal changed to 100-160, started on amlodipine   11/14: BD20, POD19, worsening creatinine with decreased urine output. Given 250 of albumin and 500cc LR. started on hydralazine PO, decreased amantadine 100 daily given CrCl of 20.  11/15: BD21, POD20, remains oliguric, fem line dc'd, tongue swollen and unable to fit bite block in mouth, started on nimbex gtt to relax jaw. Propofol and fentanyl gtt started. Vomiting TFs through nose and mouth, ENT called. TFs held. Cr uptrending. Palliative consulted.  11/16: BD22, POD21, o/n external trach dressing replaced due to cuff leak and decreasing TV, sedated and paralyzed on nimbex, propofol, and fentanyl gtt. CTH stable.  EVD raised from 5 to 10. Nimbex weaned off. Cr uptrending, Trickle feeds started. FOB negative.   11/17: BD 23, POD22, JOAQUÍN o/n, EVD clamped, NS d/c'ed, LR@50, advancing tube feeds.   11/18: BD24, POD23 o/n 3% at 30 with Na acetate started, propofol dc'd due to elevated TG level, episode of vomiting TFs from nose and mouth into ETT with elevated ICP 30s, ICP normalized with resolution of vomiting, reglan 5mg IV given, TFs held, 3% stopped. midline placed   11/19; BD25, POD24 JOAQUÍN overnight. Remains on versed and fentanyl drips. Neuro exam unchanged. R IJ dialysis cath placed.   11/20: BD26 POD25 JOAQUÍN overnight. Neuro exam unchanged. Plan for HD today  11/21: BD 27 POD 26 weaned off versed, fentanyl   11/22: BD 28 POD 27 JOAQUÍN o/n , off fentanyl gtt, pt is calm. s/p HD earlier today. s/p trach revision  11/23: BD29. incraesed clonidine to 0.4 BID, s/p trach, stable hemodynamically. neuro at baseline. TFs stopped for vomiting and restarted 10cc/hr  11/24: BD 30, POD28 TFs inc to 20cc/hr. HD today  11/25: BD31, POD29. JOAQUÍN o/n neuro stable  11/26: BD32, POD 30, JOAQUÍN overnight, pending perma cath placement with IR today        Vital Signs Last 24 Hrs  T(C): 36.7 (25 Nov 2021 21:53), Max: 37.4 (25 Nov 2021 06:00)  T(F): 98.1 (25 Nov 2021 21:53), Max: 99.3 (25 Nov 2021 06:00)  HR: 74 (26 Nov 2021 00:00) (58 - 89)  BP: 145/87 (26 Nov 2021 00:00) (106/67 - 166/90)  BP(mean): 112 (26 Nov 2021 00:00) (82 - 121)  RR: 12 (26 Nov 2021 00:00) (12 - 14)  SpO2: 100% (26 Nov 2021 00:00) (94% - 100%)    I&O's Summary    24 Nov 2021 07:01  -  25 Nov 2021 07:00  --------------------------------------------------------  IN: 965 mL / OUT: 700 mL / NET: 265 mL    25 Nov 2021 07:01  -  26 Nov 2021 00:11  --------------------------------------------------------  IN: 920 mL / OUT: 200 mL / NET: 720 mL        PHYSICAL EXAM:  General: NAD, pt is lying in bed, +trach to vent   HEENT: CN II-XII grossly intact, PERRL 2mm briskly reactive, EOMI b/l, face symmetric, tongue midline, neck FROM, +trach  Cardiovascular: RRR, normal S1 and S2   Respiratory: lungs CTAB, no wheezing, rhonchi, or crackles   GI: normoactive BS to auscultation, abd soft, NTND   Neuro: Comatose, OE to noxious, grimaces. not FC, +cough,gag, corneals. +Left flap swollen but soft, RUE trace withdrawal to noxious, LUE 0/5, b/l LE trace triple flexion.   Extremities: distal pulses 2+ x4  Wound/incision: craniectomy incision with staples removed, well healed, C/D/I, back incisions with dehiscence improving.       TUBES/LINES:  [] Ureña  [] Lumbar Drain  [] Wound Drains  [X] Others - temp cath, PEG, a-line, trach       DIET:  [] NPO  [] Mechanical  [X] Tube feeds    LABS:                        8.0    11.89 )-----------( 273      ( 25 Nov 2021 06:05 )             25.1     11-25    135  |  97  |  18  ----------------------------<  129<H>  3.6   |  31  |  3.92<H>    Ca    8.8      25 Nov 2021 06:05  Phos  3.5     11-25  Mg     1.8     11-25    TPro  6.7  /  Alb  2.7<L>  /  TBili  <0.2  /  DBili  x   /  AST  28  /  ALT  <5<L>  /  AlkPhos  188<H>  11-25            CAPILLARY BLOOD GLUCOSE      POCT Blood Glucose.: 119 mg/dL (25 Nov 2021 23:36)      Drug Levels: [] N/A    CSF Analysis: [] N/A      Allergies    No Known Allergies    Intolerances      MEDICATIONS:  Antibiotics:    Neuro:  levETIRAcetam  Solution 500 milliGRAM(s) Oral every 24 hours  metoclopramide Injectable 5 milliGRAM(s) IV Push every 6 hours  ondansetron Injectable 4 milliGRAM(s) IV Push every 6 hours PRN  scopolamine 1 mG/72 Hr(s) Patch 1 Patch Transdermal every 72 hours    Anticoagulation:    OTHER:  acetylcysteine 20%  Inhalation 4 milliLiter(s) Inhalation every 12 hours  amLODIPine   Tablet 10 milliGRAM(s) Oral daily  artificial  tears Solution 1 Drop(s) Both EYES two times a day  chlorhexidine 0.12% Liquid 15 milliLiter(s) Oral Mucosa every 12 hours  chlorhexidine 2% Cloths 1 Application(s) Topical <User Schedule>  chlorhexidine 2% Cloths 1 Application(s) Topical <User Schedule>  cloNIDine 0.3 milliGRAM(s) Oral every 6 hours  dextrose 50% Injectable 25 milliLiter(s) IV Push once  hydrALAZINE 50 milliGRAM(s) Oral every 8 hours  hydrALAZINE Injectable 10 milliGRAM(s) IV Push every 2 hours PRN  labetalol Injectable 10 milliGRAM(s) IV Push every 2 hours PRN  pantoprazole   Suspension 40 milliGRAM(s) Oral daily  polyethylene glycol 3350 17 Gram(s) Oral daily  senna 2 Tablet(s) Oral at bedtime    IVF:  sodium chloride 0.9%. 1000 milliLiter(s) IV Continuous <Continuous>    CULTURES:    RADIOLOGY & ADDITIONAL TESTS:  CXR 11/25/2021:   Frontal examination of the chest demonstrates mild cardiomegaly. Congestion and/or infiltrates. Left effusion. No interval change position remaining support devices in comparison to prior examination of the chest 11/24/2021.    IMPRESSION: Congestion and/or infiltrates. Left effusion      ASSESSMENT:  45 y/o female with PMHx of HTN and MS, hx of spinal surgery at [facility] 10/8/21 presented to [facility] BIBEMS after being found unconscious at home, unknown period of time. 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 HH5, MF4; BD #1 = 10/26. Patient was intubated at [facility] and sent straight to the OR for left hemicraniotomy. A-line placed intraop. Hemodynamically unstable upon arrival to [facility], bradycardic and hypertensive s/p Mannitol and Furosemide. Central line placed in NSICU. S/p EVD placement, and coil embolization of left MCA bifurcation aneurysm 10/26/2021. S/p cerebral angio without findings of vasospasm 11/10. S/p cardiac arrest with ROSC x3 on 11/12 during tracheostomy at bedside now with b/l pneumothoracies and worsening kidney function. EVD dc'd 11/18, trach'd 11/22    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, left    Intracranial hemorrhage, spontaneous subarachnoid, due to cerebral aneurysm, acute    Pneumothorax, left    Functional quadriplegia    Acute respiratory failure with hypoxia    Cardiac arrest    Encounter for palliative care    Advanced care planning/counseling discussion    IPH (idiopathic pulmonary hemosiderosis)    Acute spontaneous intraparenchymal intracranial hemorrhage due to cerebral aneurysm    CHETAN (acute kidney injury)    Angiogram, cerebral, with coil embolization, in non-operating room setting    Endoscopic percutaneous gastrostomy    Angiogram, carotid and cerebral, bilateral    Chest tube placement    Insertion of central venous catheter with ultrasound guidance    Insertion of temporary dialysis catheter    Tracheostomy, planned    Personal history of spine surgery    [email or web address]        PLAN:  NEURO:  - neuro checks q4hrs/vital signs q1hr   - Keppra 500mg q24h renal dosing   - EVD dced 11/18   - CTH 11/16, CTH 11/18 stable   - need CT monday 11/29     CARDIO:  - -160   - amlodipine 10 daily and hydral 50 q8h, clonidine 0.2 q6h  - s/p cardiac arrest   - TTE 11/15: mild LVH, EF 70%   - QTc 11/17 456     PULM:  - trach, on full vent support  - s/p acute hypoxic cardiac arrest 11/12 during tracheostomy placment with ENT c/b b/l pneumothorax now with b/l chest tubes   - chest tubes d/c 11/24  - scopolomine patch started for secretions  - Daily CXR     GI:  - PEG TFs nepro- 40cc, keep at 40cc/hr, slowly raise   - NPO after midnight for IR procedure  - PPI QD GI ppx   - Bowel regimen   - Alk phos uptrending, shocked liver, improving.     RENAL:   - preop for HD port with IR on 11/26, f/u in AM to confirm  - Na 140-145   - renal failure with high Cr. post cardiac arrest ;  Nephro following, s/p HD 11/20, 11/21, 11/22  - daily weights     HEME:  - SCDs, SQH restarted 11/20 - hold for IR port placement  - s/p multiple transfusions this admission, most recently 1 u PRBC 11/19   - 11/14 UE/LE dopplers neg   - FOB neg 11/16     ID:  - Tylenol PRN for fever   - sputum cx 11/4: enterobacter, proteus  - Cefepime started to cover for enterobacter/proteus in sputum dc'd 11/15   - Ancef dcd     ENDO:  - ISS   - monitor FS    OTHER  - wound care recs- q2 dressing changes   - ENT consulted, reccomends trach placement and oral hygeine for tongue laceration   - has R midline (11/18)    GOC: full code  Level of care: ICU status   Dispo: pending trach  family updated    Case discussed with Dr. Duncan       Assessment:  Present when checked    []  GCS  E   V  M     Heart Failure: []Acute, [] acute on chronic , []chronic  Heart Failure:  [] Diastolic (HFpEF), [] Systolic (HFrEF), []Combined (HFpEF and HFrEF), [] RHF, [] Pulm HTN, [] Other    [] CHETAN, [] ATN, [] AIN, [] other  [] CKD1, [] CKD2, [] CKD 3, [] CKD 4, [] CKD 5, []ESRD    Encephalopathy: [] Metabolic, [] Hepatic, [] toxic, [] Neurological, [] Other    Abnormal Nurtitional Status: [] malnurtition (see nutrition note), [ ]underweight: BMI < 19, [] morbid obesity: BMI >40, [] Cachexia    [] Sepsis  [] hypovolemic shock,[] cardiogenic shock, [] hemorrhagic shock, [] neuogenic shock  [] Acute Respiratory Failure  []Cerebral edema, [] Brain compression/ herniation,   [] Functional quadriplegia  [] Acute blood loss anemia

## 2021-11-26 NOTE — PROGRESS NOTE ADULT - ASSESSMENT
46y/Female with:  L MCA ruptured aneurysm, subarachnoid hemorrhage, s/p DHC (10/26/2021, Dr. D'Amico @ [place]), brain compression, cerebral edema  Anemia   Recent spinal surgery  UGIB  Bilateral pneumothorax  Acute kidney injury, transaminitis    PLAN: Day 1 = 10-26 ; Day 4 = 10/29; Day 21 = 11/15  NEURO: comachecks q4h, VS q1h, PRN fentanyl for analgosedation  SAH:  off nimodipine  Hydrocephalus:  EVD discontinued  cranioplasty to be scheduled  Seizure prophylaxis:  cont levetiracetam 500 daily  REHAB:  physical therapy evaluation and management    EARLY MOB:  HOB elevated    PULM:  full vent support for now, continue mucomyst (decrease to q12h) and ipratropium / albuterol - switch to PRN; pulmo toilette; s/p trach, chest tube discontinued   CARDIO:  SBP goal 100-160mm Hg, TTE, amlodipine 10mg PO daily; clonidine 0.3 q6h; continue hydralazine 50q8h  ENDO:  Blood sugar goals 140-180 mg/dL  GI:  PPI daily; reglan cont 5 q6h   DIET: TF increase to 40cc/hr  RENAL: off IVF  Na goal 135-145; needs permanent catheter; dialysis 11/26  HEM/ONC: no coagulopathy (INR= 1.03), no ASA / Plavix use; Hb stable   VTE Prophylaxis: SCDs, SQH   ID: afebrile, no leukocytosis  Social: will update family   MISC: Scopolamine patch    CORE MEASURES  1.  Hunt and Griffin Score = 5  2.  VTE prophylaxis:  [ ] administered within 24 hours of admission OR [X] reason not done: fresh bleed, unsecured aneurysm.  3.  Dysphagia screening:   [X] performed before any oral meds / liquids / food  4.  Nimodipine treatment:  [X] administered within 24 hours of admission OR [ ] reason not done:    ATTENDING ATTESTATION:  I was physically present for the key portions of the evaluation and management (E/M) service provided.  I agree with the above history, physical and plan, which I have reviewed and edited where appropriate.    Patient at high risk for neurological deterioration or death due to:  ICU delirium, aspiration PNA, DVT / PE.  Critical care time:  I have personally provided 45 minutes of critical care time, excluding time spent on separate procedures.      Plan discussed with RN, house staff. 47y/o Female HH5 MF 4 BD 32 with    L MCA ruptured aneurysm, subarachnoid hemorrhage, s/p DHC (10/26/2021, Dr. D'Amico @ [place]), brain compression, cerebral edema.   Anemia   Recent spinal surgery  UGIB  Bilateral pneumothoraces  Acute kidney injury, transaminitis  Pneumonia (enterobacter, proteus)    PLAN: Day 1 = 10-26 ; Day 4 = 10/29; Day 21 = 11/15  NEURO: Coma checks q4h, VS q1h, PRN fentanyl for analgosedation  DCI: Off nimodipine. No spasm on angio.  Hydrocephalus: EVD discontinued. No VPS. CT head pending on 11/29  Cranioplasty to be scheduled.  Seizure prophylaxis: On levetiracetam 500 daily (renal dose)  REHAB: Physical therapy evaluation and management     EARLY MOB:  HOB elevated    PULM:   Full vent support for now  Continue mucomyst q12h. Pulmonary toilet  Chest tubes discontinued on 11/24. CXR 11/26 stable. No PTX    CARDIO:    SBP goal 100-160mm Hg  On amlodipine 10mg PO daily; clonidine 0.3 q6h; increase hydralazine to 75q8h  EKG re QTc while on reglan  TTE    ENDO:  Blood sugar goals 140-180 mg/dL    GI: PPI daily; reglan (decrease dose to 2.5mg q6h and wean to off)  LBM 11/24- multiple bowel movements.   DIET: TF at 40cc/hr.    RENAL:  Na goal 135-145. Scheduled for permanent catheter on 11/26; dialysis 11/26  EPO. Transfuse 1 PRBCs with HD.     HEM/ONC: Anemia. Possible anemia of chronic disease; did have previous days of oozing from trach which has resolved  VTE Prophylaxis: SCDs, SQH   Repeat upper and lower extremity dopplers 11/26 (thus far all wnl)  Erythropoetin. Transfuse 1 PRBCs with HD 11/26.    ID: Afebrile, no leukocytosis. S/P treatment of PNA    Social: Will update family     MISC: Scopolamine patch    CORE MEASURES  1.  Hunt and Griffin Score = 5  2.  VTE prophylaxis:  [ ] administered within 24 hours of admission OR [X] reason not done: fresh bleed, unsecured aneurysm.  3.  Dysphagia screening:   [X] performed before any oral meds / liquids / food  4.  Nimodipine treatment:  [X] administered within 24 hours of admission OR [ ] reason not done:    ATTENDING ATTESTATION:  I was physically present for the key portions of the evaluation and management (E/M) service provided.  I agree with the above history, physical and plan, which I have reviewed and edited where appropriate.    Patient at high risk for neurological deterioration or death due to:  ICU delirium, aspiration PNA, DVT / PE.  Critical care time:  I have personally provided 45 minutes of critical care time, excluding time spent on separate procedures.      Plan discussed with RN, house staff. 47y/o Female HH5 MF 4 BD 32 with    L MCA ruptured aneurysm, subarachnoid hemorrhage, s/p DHC (10/26/2021, Dr. D'Amico @ [place]), brain compression, cerebral edema.   Anemia   Recent spinal surgery  UGIB  Bilateral pneumothoraces  Acute kidney injury, transaminitis  Pneumonia (enterobacter, proteus)    PLAN: Day 1 = 10-26 ; Day 4 = 10/29; Day 21 = 11/15  NEURO: Coma checks q4h, VS q1h, PRN fentanyl for analgosedation  DCI: Off nimodipine. No spasm on angio.  Hydrocephalus: EVD discontinued. No VPS. CT head pending on 11/29  Cranioplasty to be scheduled.  Seizure prophylaxis: On levetiracetam 500 daily (renal dose)  REHAB: Physical therapy evaluation and management     EARLY MOB:  HOB elevated    PULM:   Full vent support for now. CPAP as tolerated  Continue mucomyst q12h. Pulmonary toilet  Chest tubes discontinued on 11/24. CXR 11/26 stable. No PTX.    CARDIO:    SBP goal 100-160mm Hg  On amlodipine 10mg PO daily; clonidine 0.3 q6h; increase hydralazine to 75q8h  EKG re QTc while on reglan    ENDO:  Blood sugar goals 140-180 mg/dL    GI: PPI daily; reglan (decrease dose to 2.5mg q6h and wean to off)  LBM 11/24- multiple bowel movements.   DIET: TF at 40cc/hr.    RENAL:  Na goal 135-145. Scheduled for permanent catheter on 11/26; dialysis 11/26  EPO. Transfuse 1 PRBCs with HD.     HEM/ONC: Anemia. Possible anemia of chronic disease; did have previous days of oozing from trach which has resolved  VTE Prophylaxis: SCDs, SQH   Repeat upper and lower extremity dopplers 11/26 (thus far all wnl)  Erythropoetin. Transfuse 1 PRBCs with HD 11/26.    ID: Afebrile, no leukocytosis. S/P treatment of PNA    Social: Will update family     MISC: Scopolamine patch    CORE MEASURES  1.  Hunt and Griffin Score = 5  2.  VTE prophylaxis:  [ ] administered within 24 hours of admission OR [X] reason not done: fresh bleed, unsecured aneurysm.  3.  Dysphagia screening:   [X] performed before any oral meds / liquids / food  4.  Nimodipine treatment:  [X] administered within 24 hours of admission OR [ ] reason not done:    ATTENDING ATTESTATION:  I was physically present for the key portions of the evaluation and management (E/M) service provided.  I agree with the above history, physical and plan, which I have reviewed and edited where appropriate.    Patient at high risk for neurological deterioration or death due to:  ICU delirium, aspiration PNA, DVT / PE.  Critical care time:  I have personally provided 45 minutes of critical care time, excluding time spent on separate procedures.      Plan discussed with RN, house staff.

## 2021-11-26 NOTE — PROVIDER CONTACT NOTE (OTHER) - ASSESSMENT
JULIA Horton at bedside. RN verbalized the concern for increasing the nimbex to meet the goal of 2/4 as the pt is not in icp crisis. nimbex was originally initiated to have the swollen tongue to relax so the RT can placed the special bite block in place. Neuro status couldn't be obtained. Prop at 25, Fent at .5, Nimbex at 6.
Obese abdomen.  (+) normoactive bowel sounds.
Obese abdomen.  (+) normoactive bowel sounds.
Patient intubated on full vent support.  Biteblock not able to be inserted secondary to swollen tongue
Pt noted with arterial BP 180s-190s, cuff within parameters < 160, s/p Labetolol x1
Pt noted with arterial BP 180s-190s, cuff within parameters < 160, s/p Labetolol x1
SEE FLOWSHEET
VSS, afebrile. ICP 8, EVD output been less than 20ml.hr. B/l UE extension to pain stimulus. triple flex on b/l LE.
neuro status unchanged. VSS. see flowsheet.
pt is sedated on prop, fent, and nimbex. 3 RT at bedside assessing the gurgling air leak that present since this am. pt has an open stoma at the neck with gauze packing. despite placing the air in the cuff, pt continuously to have gurgling sounds. per RT, she overinflated the cuff, cuff pressure reading at [id number] and pt remained to have air leak. unsure if the cuff burst. per RT recommendation, pt need to change the tube. Due to pt unstable condition, will remained with this tube. JASON holland.
pt is semicomatose on full vent. has 2 chest tube b/l with [id number] suction. noticed L chest tube with what appears to be thick ss output at the beginning on the tube and couldn't tell if its clogged. R chest tube has no visible clog and patent. both chest tube has no air leak, and no air in the skin noticed around the tubes nor the neck b/l. confirmed with STANISLAV Nye
increased swelling to Left side noted. neuro status unchanged, VSS. Left head site remains soft (as was earlier assessed.)
pt is on full vent, obtunded. pt starks was removed yeterday and pt hasn't passed tov since. bladder scan at 7p last night was 121 and re-bladder scan at 2am shows 180. abdominal remained soft.
Neuro exam remains at pt's baseline. HR now 72. /81. RR 16 O2 100% with FiO2 40%
SBP = 130 on transport monitor, pt intubated and unable to respond to questions regarding other symptoms
AOx0. Trach to vent.  Pupils 2mm brisk bilaterally.  L head swelling, but soft to palpation  LUE no response  RUE extension to pain  BLE flexion-abnormal    VS as noted
With the bite block there are no room for the tongue to move. Pt's gums ecchymotic as well. The tongue is swollen, ecchymotic and laceration. There are bleeding and clots underneath the tongue.
see flowsheet chart. VSS except; Heart rate. Sedated on nimbex.
Pt. neuro status remains unchanged. see cognitive/neuro assessment in flowsheet. VSS.
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
## 2021-11-27 DIAGNOSIS — E83.51 HYPOCALCEMIA: ICD-10-CM

## 2021-11-27 LAB
ALBUMIN SERPL ELPH-MCNC: 2.7 G/DL — LOW (ref 3.3–5)
ALP SERPL-CCNC: 179 U/L — HIGH (ref 40–120)
ALT FLD-CCNC: <5 U/L — LOW (ref 10–45)
ANION GAP SERPL CALC-SCNC: 11 MMOL/L — SIGNIFICANT CHANGE UP (ref 5–17)
ANION GAP SERPL CALC-SCNC: 11 MMOL/L — SIGNIFICANT CHANGE UP (ref 5–17)
ANION GAP SERPL CALC-SCNC: 9 MMOL/L — SIGNIFICANT CHANGE UP (ref 5–17)
AST SERPL-CCNC: 27 U/L — SIGNIFICANT CHANGE UP (ref 10–40)
BILIRUB SERPL-MCNC: <0.2 MG/DL — SIGNIFICANT CHANGE UP (ref 0.2–1.2)
BUN SERPL-MCNC: 22 MG/DL — SIGNIFICANT CHANGE UP (ref 7–23)
BUN SERPL-MCNC: 26 MG/DL — HIGH (ref 7–23)
BUN SERPL-MCNC: 32 MG/DL — HIGH (ref 7–23)
CALCIUM SERPL-MCNC: 6.4 MG/DL — CRITICAL LOW (ref 8.4–10.5)
CALCIUM SERPL-MCNC: 7.6 MG/DL — LOW (ref 8.4–10.5)
CALCIUM SERPL-MCNC: 7.9 MG/DL — LOW (ref 8.4–10.5)
CHLORIDE SERPL-SCNC: 93 MMOL/L — LOW (ref 96–108)
CHLORIDE SERPL-SCNC: 93 MMOL/L — LOW (ref 96–108)
CHLORIDE SERPL-SCNC: 98 MMOL/L — SIGNIFICANT CHANGE UP (ref 96–108)
CO2 SERPL-SCNC: 28 MMOL/L — SIGNIFICANT CHANGE UP (ref 22–31)
CO2 SERPL-SCNC: 29 MMOL/L — SIGNIFICANT CHANGE UP (ref 22–31)
CO2 SERPL-SCNC: 31 MMOL/L — SIGNIFICANT CHANGE UP (ref 22–31)
CREAT ?TM UR-MCNC: 64 MG/DL — SIGNIFICANT CHANGE UP
CREAT SERPL-MCNC: 3.82 MG/DL — HIGH (ref 0.5–1.3)
CREAT SERPL-MCNC: 4.32 MG/DL — HIGH (ref 0.5–1.3)
CREAT SERPL-MCNC: 4.65 MG/DL — HIGH (ref 0.5–1.3)
GLUCOSE SERPL-MCNC: 131 MG/DL — HIGH (ref 70–99)
GLUCOSE SERPL-MCNC: 136 MG/DL — HIGH (ref 70–99)
GLUCOSE SERPL-MCNC: 143 MG/DL — HIGH (ref 70–99)
HCT VFR BLD CALC: 25.2 % — LOW (ref 34.5–45)
HGB BLD-MCNC: 7.9 G/DL — LOW (ref 11.5–15.5)
MAGNESIUM SERPL-MCNC: 2 MG/DL — SIGNIFICANT CHANGE UP (ref 1.6–2.6)
MCHC RBC-ENTMCNC: 25.2 PG — LOW (ref 27–34)
MCHC RBC-ENTMCNC: 31.3 GM/DL — LOW (ref 32–36)
MCV RBC AUTO: 80.5 FL — SIGNIFICANT CHANGE UP (ref 80–100)
NRBC # BLD: 0 /100 WBCS — SIGNIFICANT CHANGE UP (ref 0–0)
OSMOLALITY SERPL: 287 MOSM/KG — SIGNIFICANT CHANGE UP (ref 275–300)
OSMOLALITY UR: 224 MOSM/KG — LOW (ref 300–900)
PHOSPHATE SERPL-MCNC: 3 MG/DL — SIGNIFICANT CHANGE UP (ref 2.5–4.5)
PLATELET # BLD AUTO: 224 K/UL — SIGNIFICANT CHANGE UP (ref 150–400)
POTASSIUM SERPL-MCNC: 3.5 MMOL/L — SIGNIFICANT CHANGE UP (ref 3.5–5.3)
POTASSIUM SERPL-MCNC: 4.1 MMOL/L — SIGNIFICANT CHANGE UP (ref 3.5–5.3)
POTASSIUM SERPL-MCNC: 4.2 MMOL/L — SIGNIFICANT CHANGE UP (ref 3.5–5.3)
POTASSIUM SERPL-SCNC: 3.5 MMOL/L — SIGNIFICANT CHANGE UP (ref 3.5–5.3)
POTASSIUM SERPL-SCNC: 4.1 MMOL/L — SIGNIFICANT CHANGE UP (ref 3.5–5.3)
POTASSIUM SERPL-SCNC: 4.2 MMOL/L — SIGNIFICANT CHANGE UP (ref 3.5–5.3)
PROT SERPL-MCNC: 7.1 G/DL — SIGNIFICANT CHANGE UP (ref 6–8.3)
RBC # BLD: 3.13 M/UL — LOW (ref 3.8–5.2)
RBC # FLD: 20.5 % — HIGH (ref 10.3–14.5)
SODIUM SERPL-SCNC: 132 MMOL/L — LOW (ref 135–145)
SODIUM SERPL-SCNC: 133 MMOL/L — LOW (ref 135–145)
SODIUM SERPL-SCNC: 138 MMOL/L — SIGNIFICANT CHANGE UP (ref 135–145)
SODIUM UR-SCNC: 50 MMOL/L — SIGNIFICANT CHANGE UP
UUN UR-MCNC: 100 MG/DL — SIGNIFICANT CHANGE UP
WBC # BLD: 10.3 K/UL — SIGNIFICANT CHANGE UP (ref 3.8–10.5)
WBC # FLD AUTO: 10.3 K/UL — SIGNIFICANT CHANGE UP (ref 3.8–10.5)

## 2021-11-27 PROCEDURE — 71045 X-RAY EXAM CHEST 1 VIEW: CPT | Mod: 26

## 2021-11-27 PROCEDURE — 99232 SBSQ HOSP IP/OBS MODERATE 35: CPT

## 2021-11-27 RX ORDER — CALCIUM GLUCONATE 100 MG/ML
2 VIAL (ML) INTRAVENOUS ONCE
Refills: 0 | Status: COMPLETED | OUTPATIENT
Start: 2021-11-27 | End: 2021-11-27

## 2021-11-27 RX ORDER — SODIUM CHLORIDE 5 G/100ML
250 INJECTION, SOLUTION INTRAVENOUS
Refills: 0 | Status: COMPLETED | OUTPATIENT
Start: 2021-11-27 | End: 2021-11-27

## 2021-11-27 RX ORDER — SODIUM CHLORIDE 9 MG/ML
2 INJECTION INTRAMUSCULAR; INTRAVENOUS; SUBCUTANEOUS EVERY 6 HOURS
Refills: 0 | Status: DISCONTINUED | OUTPATIENT
Start: 2021-11-27 | End: 2021-12-07

## 2021-11-27 RX ORDER — ACETYLCYSTEINE 200 MG/ML
4 VIAL (ML) MISCELLANEOUS EVERY 6 HOURS
Refills: 0 | Status: DISCONTINUED | OUTPATIENT
Start: 2021-11-27 | End: 2021-12-10

## 2021-11-27 RX ORDER — SODIUM CHLORIDE 9 MG/ML
4 INJECTION INTRAMUSCULAR; INTRAVENOUS; SUBCUTANEOUS EVERY 6 HOURS
Refills: 0 | Status: DISCONTINUED | OUTPATIENT
Start: 2021-11-27 | End: 2021-12-01

## 2021-11-27 RX ORDER — POLYETHYLENE GLYCOL 3350 17 G/17G
17 POWDER, FOR SOLUTION ORAL
Refills: 0 | Status: DISCONTINUED | OUTPATIENT
Start: 2021-11-27 | End: 2021-12-09

## 2021-11-27 RX ORDER — POTASSIUM CHLORIDE 20 MEQ
40 PACKET (EA) ORAL ONCE
Refills: 0 | Status: COMPLETED | OUTPATIENT
Start: 2021-11-27 | End: 2021-11-27

## 2021-11-27 RX ADMIN — SODIUM CHLORIDE 4 MILLILITER(S): 9 INJECTION INTRAMUSCULAR; INTRAVENOUS; SUBCUTANEOUS at 16:25

## 2021-11-27 RX ADMIN — Medication 1 DROP(S): at 18:20

## 2021-11-27 RX ADMIN — Medication 2.5 MILLIGRAM(S): at 11:48

## 2021-11-27 RX ADMIN — SODIUM CHLORIDE 2 GRAM(S): 9 INJECTION INTRAMUSCULAR; INTRAVENOUS; SUBCUTANEOUS at 18:20

## 2021-11-27 RX ADMIN — CHLORHEXIDINE GLUCONATE 15 MILLILITER(S): 213 SOLUTION TOPICAL at 05:25

## 2021-11-27 RX ADMIN — CHLORHEXIDINE GLUCONATE 15 MILLILITER(S): 213 SOLUTION TOPICAL at 18:20

## 2021-11-27 RX ADMIN — LEVETIRACETAM 500 MILLIGRAM(S): 250 TABLET, FILM COATED ORAL at 21:33

## 2021-11-27 RX ADMIN — Medication 2.5 MILLIGRAM(S): at 05:27

## 2021-11-27 RX ADMIN — SCOPALAMINE 1 PATCH: 1 PATCH, EXTENDED RELEASE TRANSDERMAL at 21:05

## 2021-11-27 RX ADMIN — CHLORHEXIDINE GLUCONATE 1 APPLICATION(S): 213 SOLUTION TOPICAL at 05:23

## 2021-11-27 RX ADMIN — POLYETHYLENE GLYCOL 3350 17 GRAM(S): 17 POWDER, FOR SOLUTION ORAL at 18:22

## 2021-11-27 RX ADMIN — Medication 0.3 MILLIGRAM(S): at 10:08

## 2021-11-27 RX ADMIN — Medication 0.3 MILLIGRAM(S): at 21:31

## 2021-11-27 RX ADMIN — Medication 1 DROP(S): at 05:27

## 2021-11-27 RX ADMIN — Medication 75 MILLIGRAM(S): at 21:31

## 2021-11-27 RX ADMIN — SENNA PLUS 2 TABLET(S): 8.6 TABLET ORAL at 21:32

## 2021-11-27 RX ADMIN — Medication 10 MILLIGRAM(S): at 16:24

## 2021-11-27 RX ADMIN — PANTOPRAZOLE SODIUM 40 MILLIGRAM(S): 20 TABLET, DELAYED RELEASE ORAL at 11:49

## 2021-11-27 RX ADMIN — Medication 0.3 MILLIGRAM(S): at 16:25

## 2021-11-27 RX ADMIN — Medication 4 MILLILITER(S): at 16:24

## 2021-11-27 RX ADMIN — Medication 75 MILLIGRAM(S): at 05:26

## 2021-11-27 RX ADMIN — HEPARIN SODIUM 5000 UNIT(S): 5000 INJECTION INTRAVENOUS; SUBCUTANEOUS at 05:27

## 2021-11-27 RX ADMIN — AMLODIPINE BESYLATE 10 MILLIGRAM(S): 2.5 TABLET ORAL at 05:26

## 2021-11-27 RX ADMIN — Medication 75 MILLIGRAM(S): at 13:59

## 2021-11-27 RX ADMIN — Medication 40 MILLIEQUIVALENT(S): at 06:27

## 2021-11-27 RX ADMIN — Medication 4 MILLILITER(S): at 11:48

## 2021-11-27 RX ADMIN — Medication 4 MILLILITER(S): at 21:28

## 2021-11-27 RX ADMIN — SCOPALAMINE 1 PATCH: 1 PATCH, EXTENDED RELEASE TRANSDERMAL at 06:21

## 2021-11-27 RX ADMIN — SODIUM CHLORIDE 500 MILLILITER(S): 5 INJECTION, SOLUTION INTRAVENOUS at 16:25

## 2021-11-27 RX ADMIN — HEPARIN SODIUM 5000 UNIT(S): 5000 INJECTION INTRAVENOUS; SUBCUTANEOUS at 13:59

## 2021-11-27 RX ADMIN — Medication 200 GRAM(S): at 06:32

## 2021-11-27 RX ADMIN — Medication 4 MILLILITER(S): at 06:28

## 2021-11-27 RX ADMIN — HEPARIN SODIUM 5000 UNIT(S): 5000 INJECTION INTRAVENOUS; SUBCUTANEOUS at 21:31

## 2021-11-27 RX ADMIN — SODIUM CHLORIDE 4 MILLILITER(S): 9 INJECTION INTRAMUSCULAR; INTRAVENOUS; SUBCUTANEOUS at 21:32

## 2021-11-27 RX ADMIN — Medication 2.5 MILLIGRAM(S): at 18:21

## 2021-11-27 RX ADMIN — Medication 0.3 MILLIGRAM(S): at 03:30

## 2021-11-27 RX ADMIN — SODIUM CHLORIDE 4 MILLILITER(S): 9 INJECTION INTRAMUSCULAR; INTRAVENOUS; SUBCUTANEOUS at 11:48

## 2021-11-27 NOTE — PROGRESS NOTE ADULT - SUBJECTIVE AND OBJECTIVE BOX
=================================  NEUROCRITICAL CARE ATTENDING NOTE  =================================    AILYN DÍAZ   MRN-[id number]  Summary:  45 y/o F with recent spinal surgery, found down and brought to ED.  In ED, witnessed shaking, ?seizures, intubated.  Imaging showed SAH.  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       COURSE IN THE HOSPITAL:  10/26: Admitted to [facility], [place] - mannitol, 23.4%, repeat CT HCP - EVD R frontal inserted, s/p angio coil embo, 2 units pRBC  10/27: Remained intubated overnight, given mannitol overnight; EVD reinserted, 1 unit pRBC  10/28: Tmax 38.2, ICPs to low 20s in AM, mannitol 0.5/Kg x1, 23.4% x1 in PM  10/29: Tmax 38.  remained intubated  10/30: TF stopped for vomiting/aspiration event  10/31: Tmax 38.2 No significant events overnight., remained intubated   11/02: Tmax 37.8 given 1 unit pRBC  11/03: ICPs teens, given 23.4% NaCl  11/04: no ICP issues; storming with stimulation / suctioning  11/09: Remains intubated, s/p PEG, trach deferred due to macroglossia  11/10: Remains intubated, s/p angio  11/12: Failed trach - CP arrest x1 hour, PTX, 2 chest tubes  11/15: Remains intubated on ventilator; aspiration event this AM; paralyzed for tongue biting  11/16: Remains intubated on ventilator, cuff leak; hypothermic 95F overnight, placed on Josefa hugger  11/17: Remains intubated on ventilator Clamped EVD.   11/18: Remains intubated, vomiting overnight, tube feeds stopped, started on 3%@30, LR @50; propofol stopped (high TG); R IJ removed; R midline inserted; EVD removed  11/19: Remains intubated, bleeding in-line, TF restarted at 10  11/20: Remains intubated, off midazolam, TF increased to 20; s/p HD  11/21: Remains intubated, high BP - given multiple labetalol; aspiration event this morning (mouth, nothing inline, ~5cc)TF held  11/22: Remains intubated, bleeding from tongue this morning; s/p tracheostomy  11/23: Remains trached, on full vent support; blood tinged trach - improved and cleared overnight; chest tube clamped; TF @10  11/24 Remains trached, on full vent support; TF 20cc/hr --> 30cc/hr at 3 p.m.; chest tubes removed  11/26: Hb 7.3. No overt bleeding. Permacath placed. Received HD via permacath.  CXR- no PTX.   11/27: No acute events overnight. Remains with stably poor exam.     Past Medical History: No pertinent past medical history  Allergies:  Allergy Status Unknown  Home meds:       ICU Vital Signs Last 24 Hrs  T(C): 36.8 (27 Nov 2021 05:12), Max: 37.1 (26 Nov 2021 21:43)  T(F): 98.3 (27 Nov 2021 05:12), Max: 98.8 (26 Nov 2021 21:43)  HR: 69 (27 Nov 2021 07:00) (62 - 89)  BP: 163/88 (26 Nov 2021 11:00) (132/88 - 163/88)  BP(mean): 120 (26 Nov 2021 11:00) (102 - 120)  ABP: 150/76 (27 Nov 2021 07:00) (118/64 - 183/91)  ABP(mean): 105 (27 Nov 2021 07:00) (83 - 126)  RR: 12 (27 Nov 2021 07:00) (12 - 29)  SpO2: 100% (27 Nov 2021 07:00) (98% - 100%)      11-26-21 @ 07:01  -  11-27-21 @ 07:00  --------------------------------------------------------  IN: 1960 mL / OUT: 3700 mL / NET: -1740 mL      Mode: AC/ CMV (Assist Control/ Continuous Mandatory Ventilation), RR (machine): 12, TV (machine): 400, FiO2: 40, PEEP: 2, ITime: 1, MAP: 9.9, PIP: 26    PHYSICAL EXAMINATION:  NEUROLOGIC EXAMINATION:  Patient open eyes to noxious stim, does not track, does not follow commands, pupils 3mm equal and brisk (+) Doll's, (+) corneals (+) cough and gag, flap full but soft; R UE extensor (trace), L UE 0/5 TF BLE trace  GENERAL: Trached   EENT:  Anicteric  CARDIOVASCULAR: (+) S1 S2, normal rate and regular rhythm  PULMONARY: Diminished at bases  ABDOMEN: Soft, distended, normoactive bowel sounds. PEG site C/D/I  EXTREMITIES: No edema  SKIN: No rash      MEDICATIONS:   acetylcysteine 20%  Inhalation 4 milliLiter(s) Inhalation every 12 hours  amLODIPine   Tablet 10 milliGRAM(s) Oral daily  artificial  tears Solution 1 Drop(s) Both EYES two times a day  chlorhexidine 0.12% Liquid 15 milliLiter(s) Oral Mucosa every 12 hours  chlorhexidine 2% Cloths 1 Application(s) Topical <User Schedule>  chlorhexidine 2% Cloths 1 Application(s) Topical <User Schedule>  cloNIDine 0.3 milliGRAM(s) Oral every 6 hours  dextrose 50% Injectable 25 milliLiter(s) IV Push once  heparin   Injectable 5000 Unit(s) SubCutaneous every 8 hours  hydrALAZINE 75 milliGRAM(s) Oral every 8 hours  hydrALAZINE Injectable 10 milliGRAM(s) IV Push every 2 hours PRN  labetalol Injectable 10 milliGRAM(s) IV Push every 2 hours PRN  levETIRAcetam  Solution 500 milliGRAM(s) Oral every 24 hours  metoclopramide Injectable 2.5 milliGRAM(s) IV Push every 6 hours  ondansetron Injectable 4 milliGRAM(s) IV Push every 6 hours PRN  pantoprazole   Suspension 40 milliGRAM(s) Oral daily  polyethylene glycol 3350 17 Gram(s) Oral daily  scopolamine 1 mG/72 Hr(s) Patch 1 Patch Transdermal every 72 hours  senna 2 Tablet(s) Oral at bedtime  sodium chloride 0.9% lock flush 10 milliLiter(s) IV Push every 1 hour PRN    LABS:                         7.9    10.30 )-----------( 224      ( 27 Nov 2021 05:23 )             25.2     11-27    133<L>  |  93<L>  |  22  ----------------------------<  131<H>  3.5   |  31  |  3.82<H>    Ca    6.4<LL>      27 Nov 2021 05:23  Phos  3.0     11-27  Mg     2.0     11-27        Bacteriology:  11/11 CSF NGTD  11/04 sputum GS:  numerous staph aureus, numerous enterobacter cloacae, moderate proteus mirabilis  10/28 CSF NGTD  10/28 Blood NG5D x2  (final)    IV FLUIDS: IVL   DIET: NPO  Lines: [place]; R double lumen midline; R IJ HD cath  Primafit  Wounds: Dehiscence from OSH    CODE STATUS:  Full Code                         GOALS OF CARE:  aggressive                        DISPOSITION:  ICU =================================  NEUROCRITICAL CARE ATTENDING NOTE  =================================    AILYN DÍAZ   MRN-[id number]  Summary:  47 y/o F with recent spinal surgery, found down and brought to ED.  In ED, witnessed shaking, ?seizures, intubated.  Imaging showed SAH.  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       COURSE IN THE HOSPITAL:  10/26: Admitted to [facility], [place] - mannitol, 23.4%, repeat CT HCP - EVD R frontal inserted, s/p angio coil embo, 2 units pRBC  10/27: Remained intubated overnight, given mannitol overnight; EVD reinserted, 1 unit pRBC  10/28: Tmax 38.2, ICPs to low 20s in AM, mannitol 0.5/Kg x1, 23.4% x1 in PM  10/30: TF stopped for vomiting/aspiration event  11/02: Tmax 37.8 given 1 unit pRBC  11/03: ICPs teens, given 23.4% NaCl 30ccx 1  11/04: No ICP issues; storming with stimulation / suctioning  11/09: Remains intubated, s/p PEG, trach deferred due to macroglossia  11/10: Remains intubated, s/p angio  11/12: Failed trach - CP arrest x1 hour, PTX, 2 chest tubes  11/15: Remains intubated on ventilator; aspiration event this AM; paralyzed for tongue biting  11/16: Remains intubated on ventilator, cuff leak; hypothermic 95F overnight, placed on Josefa hugger  11/17: Remains intubated on ventilator Clamped EVD.   11/18: Remains intubated, vomiting overnight, tube feeds stopped, started on 3%@30, LR @50; propofol stopped (high TG); R IJ removed; R midline inserted; EVD removed  11/19: Remains intubated, bleeding in-line, TF restarted at 10  11/20: Remains intubated, off midazolam, TF increased to 20; s/p HD  11/21: Remains intubated, high BP - given multiple labetalol; aspiration event this morning (mouth, nothing inline, ~5cc)TF held  11/22: Remains intubated, bleeding from tongue this morning; s/p tracheostomy  11/23: Remains trached, on full vent support; blood tinged trach - improved and cleared overnight; chest tube clamped; TF @10  11/24 Remains trached, on full vent support; TF 20cc/hr --> 30cc/hr at 3 p.m.; chest tubes removed  11/26: Hb 7.3. No overt bleeding. Permacath placed. Received HD via permacath.  CXR- no PTX.   11/27: Remains trached. No acute events overnight. Remains with stably poor exam.     Past Medical History: No pertinent past medical history  Allergies:  Allergy Status Unknown  Home meds:     ICU Vital Signs Last 24 Hrs  T(C): 36.8 (27 Nov 2021 05:12), Max: 37.1 (26 Nov 2021 21:43)  T(F): 98.3 (27 Nov 2021 05:12), Max: 98.8 (26 Nov 2021 21:43)  HR: 69 (27 Nov 2021 07:00) (62 - 89)  BP: 163/88 (26 Nov 2021 11:00) (132/88 - 163/88)  BP(mean): 120 (26 Nov 2021 11:00) (102 - 120)  ABP: 150/76 (27 Nov 2021 07:00) (118/64 - 183/91)  ABP(mean): 105 (27 Nov 2021 07:00) (83 - 126)  RR: 12 (27 Nov 2021 07:00) (12 - 29)  SpO2: 100% (27 Nov 2021 07:00) (98% - 100%)      11-26-21 @ 07:01  -  11-27-21 @ 07:00  --------------------------------------------------------  IN: 1960 mL / OUT: 3700 mL / NET: -1740 mL      Mode: AC/ CMV (Assist Control/ Continuous Mandatory Ventilation), RR (machine): 12, TV (machine): 400, FiO2: 40, PEEP: 2, ITime: 1, MAP: 9.9, PIP: 26    PHYSICAL EXAMINATION:  NEUROLOGIC EXAMINATION: Patient open eyes to noxious stim, does not track, does not follow commands, pupils 3mm equal and brisk (+) Doll's, (+) corneals (+) cough and gag, flap full but soft; R UE extensor (trace), L UE 0/5 TF BLE trace  GENERAL: Trached   EENT:  Anicteric  CARDIOVASCULAR: (+) S1 S2, normal rate and regular rhythm  PULMONARY: Diminished at bases  ABDOMEN: Soft, distended, normoactive bowel sounds. PEG site C/D/I  EXTREMITIES: No edema  SKIN: No rash      MEDICATIONS:   acetylcysteine 20%  Inhalation 4 milliLiter(s) Inhalation every 12 hours  amLODIPine   Tablet 10 milliGRAM(s) Oral daily  artificial  tears Solution 1 Drop(s) Both EYES two times a day  chlorhexidine 0.12% Liquid 15 milliLiter(s) Oral Mucosa every 12 hours  chlorhexidine 2% Cloths 1 Application(s) Topical <User Schedule>  chlorhexidine 2% Cloths 1 Application(s) Topical <User Schedule>  cloNIDine 0.3 milliGRAM(s) Oral every 6 hours  dextrose 50% Injectable 25 milliLiter(s) IV Push once  heparin   Injectable 5000 Unit(s) SubCutaneous every 8 hours  hydrALAZINE 75 milliGRAM(s) Oral every 8 hours  hydrALAZINE Injectable 10 milliGRAM(s) IV Push every 2 hours PRN  labetalol Injectable 10 milliGRAM(s) IV Push every 2 hours PRN  levETIRAcetam  Solution 500 milliGRAM(s) Oral every 24 hours  metoclopramide Injectable 2.5 milliGRAM(s) IV Push every 6 hours  ondansetron Injectable 4 milliGRAM(s) IV Push every 6 hours PRN  pantoprazole   Suspension 40 milliGRAM(s) Oral daily  polyethylene glycol 3350 17 Gram(s) Oral daily  scopolamine 1 mG/72 Hr(s) Patch 1 Patch Transdermal every 72 hours  senna 2 Tablet(s) Oral at bedtime  sodium chloride 0.9% lock flush 10 milliLiter(s) IV Push every 1 hour PRN    LABS:                         7.9    10.30 )-----------( 224      ( 27 Nov 2021 05:23 )             25.2     11-27    133<L>  |  93<L>  |  22  ----------------------------<  131<H>  3.5   |  31  |  3.82<H>    Ca    6.4<LL>      27 Nov 2021 05:23  Phos  3.0     11-27  Mg     2.0     11-27        Bacteriology:  11/11 CSF NGTD  11/04 sputum GS:  numerous staph aureus, numerous enterobacter cloacae, moderate proteus mirabilis  10/28 CSF NGTD  10/28 Blood NG5D x2  (final)    IV FLUIDS: IVL   DIET: [place]  Lines: [place]; R double lumen midline; R IJ HD permacath  Primafit  Wounds: Dehiscence from OSH    CODE STATUS:  Full Code                         GOALS OF CARE:  aggressive                        DISPOSITION:  ICU =================================  NEUROCRITICAL CARE ATTENDING NOTE  =================================    AILYN DÍAZ   MRN-[id number]  Summary:  45 y/o F with recent spinal surgery, found down and brought to ED.  In ED, witnessed shaking, ?seizures, intubated.  Imaging showed SAH.  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       COURSE IN THE HOSPITAL:  10/26: Admitted to [facility], [place] - mannitol, 23.4%, repeat CT HCP - EVD R frontal inserted, s/p angio coil embo, 2 units pRBC  10/27: Remained intubated overnight, given mannitol overnight; EVD reinserted, 1 unit pRBC  10/28: Tmax 38.2, ICPs to low 20s in AM, mannitol 0.5/Kg x1, 23.4% x1 in PM  10/30: TF stopped for vomiting/aspiration event  11/02: Tmax 37.8 given 1 unit pRBC  11/03: ICPs teens, given 23.4% NaCl 30ccx 1  11/04: No ICP issues; storming with stimulation / suctioning  11/09: Remains intubated, s/p PEG, trach deferred due to macroglossia  11/10: Remains intubated, s/p angio  11/12: Failed trach - CP arrest x1 hour, PTX, 2 chest tubes  11/15: Remains intubated on ventilator; aspiration event this AM; paralyzed for tongue biting  11/16: Remains intubated on ventilator, cuff leak; hypothermic 95F overnight, placed on Josefa hugger  11/17: Remains intubated on ventilator Clamped EVD.   11/18: Remains intubated, vomiting overnight, tube feeds stopped, started on 3%@30, LR @50; propofol stopped (high TG); R IJ removed; R midline inserted; EVD removed  11/19: Remains intubated, bleeding in-line, TF restarted at 10  11/20: Remains intubated, off midazolam, TF increased to 20; s/p HD  11/21: Remains intubated, high BP - given multiple labetalol; aspiration event this morning (mouth, nothing inline, ~5cc)TF held  11/22: Remains intubated, bleeding from tongue this morning; s/p tracheostomy  11/23: Remains trached, on full vent support; blood tinged trach - improved and cleared overnight; chest tube clamped; TF @10  11/24 Remains trached, on full vent support; TF 20cc/hr --> 30cc/hr at 3 p.m.; chest tubes removed  11/26: Hb 7.3. No overt bleeding. Permacath placed. Received HD via permacath.  CXR- no PTX.   11/27: Remains trached. No acute events overnight. Remains with stably poor exam.     Past Medical History: No pertinent past medical history  Allergies:  Allergy Status Unknown  Home meds:     ICU Vital Signs Last 24 Hrs  T(C): 36.8 (27 Nov 2021 05:12), Max: 37.1 (26 Nov 2021 21:43)  T(F): 98.3 (27 Nov 2021 05:12), Max: 98.8 (26 Nov 2021 21:43)  HR: 69 (27 Nov 2021 07:00) (62 - 89)  BP: 163/88 (26 Nov 2021 11:00) (132/88 - 163/88)  BP(mean): 120 (26 Nov 2021 11:00) (102 - 120)  ABP: 150/76 (27 Nov 2021 07:00) (118/64 - 183/91)  ABP(mean): 105 (27 Nov 2021 07:00) (83 - 126)  RR: 12 (27 Nov 2021 07:00) (12 - 29)  SpO2: 100% (27 Nov 2021 07:00) (98% - 100%)      11-26-21 @ 07:01  -  11-27-21 @ 07:00  --------------------------------------------------------  IN: 1960 mL / OUT: 3700 mL / NET: -1740 mL      Mode: AC/ CMV (Assist Control/ Continuous Mandatory Ventilation), RR (machine): 12, TV (machine): 400, FiO2: 40, PEEP: 2, ITime: 1, MAP: 9.9, PIP: 26    PHYSICAL EXAMINATION:  NEUROLOGIC EXAMINATION: Patient open eyes to noxious stim, does not track, does not follow commands, pupils 3mm equal and brisk (+) Doll's, (+) corneals (+) cough and gag, flap full but soft; RUE trace movement, LUE trace attempt to localise, TF BLE trace  GENERAL: Trached   EENT:  Anicteric  CARDIOVASCULAR: (+) S1 S2, normal rate and regular rhythm  PULMONARY: Diminished at bases  ABDOMEN: Soft, distended, normoactive bowel sounds. PEG site C/D/I  EXTREMITIES: No edema  SKIN: No rash      MEDICATIONS:   acetylcysteine 20%  Inhalation 4 milliLiter(s) Inhalation every 12 hours  amLODIPine   Tablet 10 milliGRAM(s) Oral daily  artificial  tears Solution 1 Drop(s) Both EYES two times a day  chlorhexidine 0.12% Liquid 15 milliLiter(s) Oral Mucosa every 12 hours  chlorhexidine 2% Cloths 1 Application(s) Topical <User Schedule>  chlorhexidine 2% Cloths 1 Application(s) Topical <User Schedule>  cloNIDine 0.3 milliGRAM(s) Oral every 6 hours  dextrose 50% Injectable 25 milliLiter(s) IV Push once  heparin   Injectable 5000 Unit(s) SubCutaneous every 8 hours  hydrALAZINE 75 milliGRAM(s) Oral every 8 hours  hydrALAZINE Injectable 10 milliGRAM(s) IV Push every 2 hours PRN  labetalol Injectable 10 milliGRAM(s) IV Push every 2 hours PRN  levETIRAcetam  Solution 500 milliGRAM(s) Oral every 24 hours  metoclopramide Injectable 2.5 milliGRAM(s) IV Push every 6 hours  ondansetron Injectable 4 milliGRAM(s) IV Push every 6 hours PRN  pantoprazole   Suspension 40 milliGRAM(s) Oral daily  polyethylene glycol 3350 17 Gram(s) Oral daily  scopolamine 1 mG/72 Hr(s) Patch 1 Patch Transdermal every 72 hours  senna 2 Tablet(s) Oral at bedtime  sodium chloride 0.9% lock flush 10 milliLiter(s) IV Push every 1 hour PRN    LABS:                         7.9    10.30 )-----------( 224      ( 27 Nov 2021 05:23 )             25.2     11-27    133<L>  |  93<L>  |  22  ----------------------------<  131<H>  3.5   |  31  |  3.82<H>    Ca    6.4<LL>      27 Nov 2021 05:23  Phos  3.0     11-27  Mg     2.0     11-27        Bacteriology:  11/11 CSF NGTD  11/04 sputum GS:  numerous staph aureus, numerous enterobacter cloacae, moderate proteus mirabilis  10/28 CSF NGTD  10/28 Blood NG5D x2  (final)    IV FLUIDS: IVL   DIET: Nepro  Lines: [place]; R double lumen midline; R IJ HD permacath  Primafit  Wounds: Dehiscence from OSH    CODE STATUS:  Full Code                         GOALS OF CARE:  aggressive                        DISPOSITION:  ICU

## 2021-11-27 NOTE — PROGRESS NOTE ADULT - SUBJECTIVE AND OBJECTIVE BOX
CC: HEAD BLEED        INTERVAL HISTORY: lying in bed in NAD      ROS: No chest pain, no sob, no abd pain. No n/v/d    PAST MEDICAL & SURGICAL HISTORY:  No pertinent past medical history    Personal history of spine surgery        PHYSICAL EXAM:  T(C): 36.8 (11-27-21 @ 05:12), Max: 37.1 (11-26-21 @ 21:43)  HR: 69 (11-27-21 @ 07:00)  BP: 163/88 (11-26-21 @ 11:00) (132/88 - 163/88)  RR: 12 (11-27-21 @ 07:00)  SpO2: 100% (11-27-21 @ 07:00)  Wt(kg): --  I&O's Summary    26 Nov 2021 07:01  -  27 Nov 2021 07:00  --------------------------------------------------------  IN: 1960 mL / OUT: 3700 mL / NET: -1740 mL      Weight 91 (11-22 @ 16:05)  General:  NAD.  HEENT: moist mucous membranes, no pallor/cyanosis.  Neck: no JVD visible.  Cardiac: S1, S2. RRR. No murmurs   Respratory: CTA b/l, no access muscle use.   Abdomen: soft. nontender. nondistended  Skin: no rashes.  Extremities: no LE edema b/l  Access:       DATA:                        7.9<L>  10.30 )-----------( 224      ( 27 Nov 2021 05:23 )             25.2<L>    Ferritin, Serum: 778 ng/mL *H* (11-13 @ 22:16)      133<L>  |  93<L>  |  22     ----------------------------<  131<H>  Ca:6.4<LL> (27 Nov 2021 05:23)  3.5     |  31     |  3.82<H>      eGFR if Non : 13 *L*  eGFR if : 16 *L*    TPro  6.7    /  Alb  2.7<L>  /  TBili  <0.2   /  DBili  x      /  AST  28     /  ALT  <5<L>  /  AlkPhos  188<H>  25 Nov 2021 06:05                    MEDICATIONS  (STANDING):  acetylcysteine 20%  Inhalation 4 milliLiter(s) Inhalation every 12 hours  amLODIPine   Tablet 10 milliGRAM(s) Oral daily  artificial  tears Solution 1 Drop(s) Both EYES two times a day  chlorhexidine 0.12% Liquid 15 milliLiter(s) Oral Mucosa every 12 hours  chlorhexidine 2% Cloths 1 Application(s) Topical <User Schedule>  chlorhexidine 2% Cloths 1 Application(s) Topical <User Schedule>  cloNIDine 0.3 milliGRAM(s) Oral every 6 hours  dextrose 50% Injectable 25 milliLiter(s) IV Push once  heparin   Injectable 5000 Unit(s) SubCutaneous every 8 hours  hydrALAZINE 75 milliGRAM(s) Oral every 8 hours  levETIRAcetam  Solution 500 milliGRAM(s) Oral every 24 hours  metoclopramide Injectable 2.5 milliGRAM(s) IV Push every 6 hours  pantoprazole   Suspension 40 milliGRAM(s) Oral daily  polyethylene glycol 3350 17 Gram(s) Oral daily  scopolamine 1 mG/72 Hr(s) Patch 1 Patch Transdermal every 72 hours  senna 2 Tablet(s) Oral at bedtime    MEDICATIONS  (PRN):  hydrALAZINE Injectable 10 milliGRAM(s) IV Push every 2 hours PRN SBP > 160  labetalol Injectable 10 milliGRAM(s) IV Push every 2 hours PRN SBP >160  ondansetron Injectable 4 milliGRAM(s) IV Push every 6 hours PRN Nausea and/or Vomiting  sodium chloride 0.9% lock flush 10 milliLiter(s) IV Push every 1 hour PRN Pre/post blood products, medications, blood draw, and to maintain line patency

## 2021-11-27 NOTE — PROGRESS NOTE ADULT - SUBJECTIVE AND OBJECTIVE BOX
HPI:  47 y/o female with PMH HTN, Multiple sclerosis, recent spinal surgery 10/8 ([facility]) presented to [facility] BIBEMS after being found unconscious at home, unknown period of time. 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 HH5, MF1. Patient was intubated at [facility], found to have blown L pupil, and sent straight to the OR for left hemicraniotomy. A-line placed intraop. Hemodynamically unstable upon arrival to [facility], bradycardic and hypertensive s/p Mannitol, Clevidipine, and Furosemide. Central line placed in NSICU. Currently sedated on Propofol, pending repeat CTH. History per patient's son, patient had recent spine surgery and was found on outpatient imaging last week to have L M1 segment aneurysm (unruptured), pt asymptomatic at this time. Per son patient taking percocet PO at home. Ureña catheter, ET tube, and arterial line placed at [facility].     NIHSS on arrival: 32   Bess Griffin 5, Mod Busby 4  Bleed Day 1 = 10/26 (26 Oct 2021 13:03)    OVERNIGHT EVENTS: JOAQUÍN overnight, BP better controlled, s/p permacath placement, hemodialysis and 1 unit pRBCs    HOSPITAL COURSE:   10/26: admitted to [facility] from [facility], POD#0 s/p L hemicraniectomy for decompression and evacuation of SDH. Transferred to [facility] noted to have tense flap, received mannitol, keppra, decadron. Was hypertensive to 200s and preston to 40s, recevied 85g mannitol and bullet 23.4%. CTH on arrival demonstrated increased size in vents and new IVH. EVD placed, open at 15, central line placed. Transfused 2 u PRBC. POD0 of coiling of left MCA bifurcation aneurysm,   10/27: CTH demonstrated EVD in correct position, EVD lowered to 5, remains intubated on proprofol, pending repeat CTH in AM. Started on 3% @ 30/hr. 2LNS given for euvolemia, Mannitol given for uptrending ICPs. Bullet given 8:30 am. 3% increased to 50/hr. 1 L bolus. New EVD catheter placed using exisiting sreekanth hole. 1 u PRBC.  10/28: HH5, MF4, BD3; POD2 angio coil/embo of L MCA aneurysm. JOAQUÍN overnight, neuro stable. EVD@5cmH20 ICPs < 20 overnight, OP WNL. S/p 1 unit PRBC yesterday with appropriate response. Given 42g mannitol in AM for ICP 22 persistently, with improvement, then given 23% in PM for elevated ICP. febrile, pancultured. Standing tylenol and cooling blanket.   10/29: BD4, POD3 angio coil/embo. JOAQUÍN o/n, neuro stable. EVD@5, ICPs <20 o/n.   10/30: BD5, POD4 angio coil/embo. JOAQUÍN o/n neuro stable. EVD@5. 3% @50cc/hr.  10/31: BD6, POD5 angio coil/embo. brief period maintained upward gaze, resolved on its own. EVD@5cm h2o.    11/1: BD7, POD6 L hemicrani, angio coil/embo. JOAQUÍN overnight. Neuro exam unchanged. ICPs WNL. started bromocriptine/gabapentin/baclofen. dc'd propofol, started precedex  11/2: BD8 POD7 L hemicrani, angio coil/embo. JOAQUÍN overnight. Neuro exam stable. Remains on 3% hypertonic saline. Receieved 1 unit of PRBCs for a Hgb of 7.6.  11/3: BD 9 POD8 of L hemicrani. Elevated lipase, normal amylase, OG tube clogged and replaced. Abdominal US normal. Pending gen surg reccs regarding trach and peg. Gabapentin and Baclofen increased to help with neurostorming. restarted back on 3%, LR@35. became preston+hypotensive with nimodipine 60q4, decreased back to 30q2, NaCl bullet given.   11/4: BD10 POD9, JOAQUÍN o/n, 500cc NS for euvolemia,  neuro stable, EVD at 5. 3% increased to 75  11/5: BD11 POD10, JOAQUÍN o/n, neuro stable, EVD at 5  11/6: BD12 POD11 JOAQUÍN overnight. Neuro exam stable. Remains intubated on precedex. 3% hypertonic saline dc'd  11/7: BD13 POD 12 JOAQUÍN o/n, NGT replaced. on precex with no icp crisis   11/8: BD14 POD13 JOAQUÍN o/n, noted to have tongue maceration/macroglossia. Gauze with tongue depressor placed. Pending further recs from ENT. Pending trach and PEG placement tomorrow with gen surg. Off precedex, ICPs stable. EVD remains @ 5.   11/9: BD15, POD 14, bleeding under tongue at start of shift, fentanyl pushed with no further episodes. gabapentin stopped. PEG placed  11/10: BD 16, POD 15, neuro stable, ENT to be consulted in AM, 3% increased to 50/hr.  11/11: BD 17, POD 16, neuro exam stable. Pend [facility] saba in am for cranioplasty planning. Pend trach in OR with ENT. F.u BMP in am, 3%@50cc/hr for Na goal 140-145.   11/12: BD 18, POD 17. JOAQUÍN overnight, neuro stable. Pend trach placement today. CT saba completed 11/11. Off of 3%, f/u am BMP for Na goal 140-150. CSF neg. Hypoxic cardiac arrest with ROSC x 3 during tracheostomy placement. B/l chest tubes placed for resulting pneumothorax s/p CPR. salt tabs dc'd.  11/13: BD 19, POD 18. Patient tachycardic with some improvement, SBP stable off of levophed, hgb downtrending o/n, transfused 1 unit PRBCs. B/l chest tube. EVD @5cmH2O, no elevated ICPs. UOP downtrending, trend BUN/Cr, given 1LNS x1 for low urine output. F/u am CXR. Cont Cefepime until today, then change to Ancef while trach packing in place per ENT. Pend CTH when stable. Trops downtrending s/p cardiac arrest. 1L. florinef dc'd. BP goal changed to 100-160, started on amlodipine   11/14: BD20, POD19, worsening creatinine with decreased urine output. Given 250 of albumin and 500cc LR. started on hydralazine PO, decreased amantadine 100 daily given CrCl of 20.  11/15: BD21, POD20, remains oliguric, fem line dc'd, tongue swollen and unable to fit bite block in mouth, started on nimbex gtt to relax jaw. Propofol and fentanyl gtt started. Vomiting TFs through nose and mouth, ENT called. TFs held. Cr uptrending. Palliative consulted.  11/16: BD22, POD21, o/n external trach dressing replaced due to cuff leak and decreasing TV, sedated and paralyzed on nimbex, propofol, and fentanyl gtt. CTH stable.  EVD raised from 5 to 10. Nimbex weaned off. Cr uptrending, Trickle feeds started. FOB negative.   11/17: BD 23, POD22, JOAQUÍN o/n, EVD clamped, NS d/c'ed, LR@50, advancing tube feeds.   11/18: BD24, POD23 o/n 3% at 30 with Na acetate started, propofol dc'd due to elevated TG level, episode of vomiting TFs from nose and mouth into ETT with elevated ICP 30s, ICP normalized with resolution of vomiting, reglan 5mg IV given, TFs held, 3% stopped. midline placed   11/19; BD25, POD24 JOAQUÍN overnight. Remains on versed and fentanyl drips. Neuro exam unchanged. R IJ dialysis cath placed.   11/20: BD26 POD25 JOAQUÍN overnight. Neuro exam unchanged. Plan for HD today  11/21: BD 27 POD 26 weaned off versed, fentanyl   11/22: BD 28 POD 27 JOAQUÍN o/n , off fentanyl gtt, pt is calm. s/p HD earlier today. s/p trach revision  11/23: BD29. incraesed clonidine to 0.4 BID, s/p trach, stable hemodynamically. neuro at baseline. TFs stopped for vomiting and restarted 10cc/hr  11/24: BD 30, POD29 TFs inc to 20cc/hr. HD today  11/25: BD31, POD30. JOAQUÍN o/n neuro stable  11/26: BD32, POD 31, JOAQUÍN overnight,  perma cath placement with IR today , hydralazine inc 75q8, reglan decreased 2.5q6. Hemodialyzed today, took off 3L and given 1unit pRBCs  11/27: BD33, POD32, JOAQUÍN overnight, pending LTACH,     Vital Signs Last 24 Hrs  T(C): 37.1 (26 Nov 2021 21:43), Max: 37.1 (26 Nov 2021 21:43)  T(F): 98.8 (26 Nov 2021 21:43), Max: 98.8 (26 Nov 2021 21:43)  HR: 80 (26 Nov 2021 23:00) (62 - 89)  BP: 163/88 (26 Nov 2021 11:00) (110/69 - 164/89)  BP(mean): 120 (26 Nov 2021 11:00) (85 - 120)  RR: 14 (26 Nov 2021 23:00) (12 - 29)  SpO2: 100% (26 Nov 2021 23:00) (98% - 100%)    I&O's Summary    25 Nov 2021 07:01  -  26 Nov 2021 07:00  --------------------------------------------------------  IN: 1430 mL / OUT: 200 mL / NET: 1230 mL    26 Nov 2021 07:01  -  27 Nov 2021 00:14  --------------------------------------------------------  IN: 1460 mL / OUT: 3700 mL / NET: -2240 mL        PHYSICAL EXAM:      TUBES/LINES:  [] Ureña  [] Lumbar Drain  [] Wound Drains  [X] Others - permacath, +PEG, +trach       DIET:  [] NPO  [] Mechanical  [X] Tube feeds    LABS:                        7.3    9.90  )-----------( 242      ( 26 Nov 2021 06:00 )             23.4     11-26    136  |  98  |  29<H>  ----------------------------<  117<H>  3.9   |  28  |  5.09<H>    Ca    8.9      26 Nov 2021 06:00  Phos  4.0     11-26  Mg     2.2     11-26    TPro  6.7  /  Alb  2.7<L>  /  TBili  <0.2  /  DBili  x   /  AST  28  /  ALT  <5<L>  /  AlkPhos  188<H>  11-25    PT/INR - ( 26 Nov 2021 06:00 )   PT: 13.1 sec;   INR: 1.10          PTT - ( 26 Nov 2021 06:00 )  PTT:29.6 sec        CAPILLARY BLOOD GLUCOSE      POCT Blood Glucose.: 117 mg/dL (26 Nov 2021 06:27)      Drug Levels: [] N/A    CSF Analysis: [] N/A      Allergies    No Known Allergies    Intolerances      MEDICATIONS:  Antibiotics:    Neuro:  levETIRAcetam  Solution 500 milliGRAM(s) Oral every 24 hours  metoclopramide Injectable 2.5 milliGRAM(s) IV Push every 6 hours  ondansetron Injectable 4 milliGRAM(s) IV Push every 6 hours PRN  scopolamine 1 mG/72 Hr(s) Patch 1 Patch Transdermal every 72 hours    Anticoagulation:  heparin   Injectable 5000 Unit(s) SubCutaneous every 8 hours    OTHER:  acetylcysteine 20%  Inhalation 4 milliLiter(s) Inhalation every 12 hours  amLODIPine   Tablet 10 milliGRAM(s) Oral daily  artificial  tears Solution 1 Drop(s) Both EYES two times a day  chlorhexidine 0.12% Liquid 15 milliLiter(s) Oral Mucosa every 12 hours  chlorhexidine 2% Cloths 1 Application(s) Topical <User Schedule>  chlorhexidine 2% Cloths 1 Application(s) Topical <User Schedule>  cloNIDine 0.3 milliGRAM(s) Oral every 6 hours  dextrose 50% Injectable 25 milliLiter(s) IV Push once  hydrALAZINE 75 milliGRAM(s) Oral every 8 hours  hydrALAZINE Injectable 10 milliGRAM(s) IV Push every 2 hours PRN  labetalol Injectable 10 milliGRAM(s) IV Push every 2 hours PRN  pantoprazole   Suspension 40 milliGRAM(s) Oral daily  polyethylene glycol 3350 17 Gram(s) Oral daily  senna 2 Tablet(s) Oral at bedtime    IVF:    CULTURES:    RADIOLOGY & ADDITIONAL TESTS:  CXR 11/26/2021: mild congestion   CXR 11/25/2021: IMPRESSION: Congestion and/or infiltrates. Left effusion  11/26/2021: UE/LE dopplers pending read     ASSESSMENT:  47 y/o female with PMHx of HTN and MS, hx of spinal surgery at [facility] 10/8/21 presented to [facility] BIBEMS after being found unconscious at home, unknown period of time. 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 HH5, MF4; BD #1 = 10/26. Patient was intubated at [facility] and sent straight to the OR for left hemicraniotomy. A-line placed intraop. Hemodynamically unstable upon arrival to [facility], bradycardic and hypertensive s/p Mannitol and Furosemide. Central line placed in NSICU. S/p EVD placement, and coil embolization of left MCA bifurcation aneurysm 10/26/2021. S/p cerebral angio without findings of vasospasm 11/10. S/p cardiac arrest with ROSC x3 on 11/12 during tracheostomy at bedside now with b/l pneumothoracies and worsening kidney function. EVD dc'd 11/18, trach'd 11/22, permacath placed 11/26, pending LTACH     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, left    Intracranial hemorrhage, spontaneous subarachnoid, due to cerebral aneurysm, acute    Pneumothorax, left    Functional quadriplegia    Acute respiratory failure with hypoxia    Cardiac arrest    Encounter for palliative care    Advanced care planning/counseling discussion    IPH (idiopathic pulmonary hemosiderosis)    Acute spontaneous intraparenchymal intracranial hemorrhage due to cerebral aneurysm    CHETAN (acute kidney injury)    Angiogram, cerebral, with coil embolization, in non-operating room setting    Endoscopic percutaneous gastrostomy    Angiogram, carotid and cerebral, bilateral    Chest tube placement    Insertion of central venous catheter with ultrasound guidance    Insertion of temporary dialysis catheter    Tracheostomy, planned    Personal history of spine surgery    [email or web address]      PLAN:  NEURO:  - neuro checks q4hrs/vital signs q1hr   - Keppra 500mg q24h renal dosing   - EVD dced 11/18   - CTH 11/16, CTH 11/18 stable   - need CTH monday 11/29   - plan for cranioplasty in ~3 months     CARDIO:  - -160   - amlodipine 10 daily and hydral 75 q8h, clonidine 0.2 q6h  - s/p cardiac arrest   - TTE 11/15: mild LVH, EF 70%   - QTc 11/26 422    PULM:  - trach, on full vent support  - s/p acute hypoxic cardiac arrest 11/12 during tracheostomy placment with ENT c/b b/l pneumothorax now with b/l chest tubes   - chest tubes d/c 11/24  - scopolomine patch started for secretions  - Daily CXR   - mucomyst q12    GI:  - PEG TFs nepro- 40cc, keep at 40cc/hr, slowly raise   - PPI QD GI ppx   - Bowel regimen   - Alk phos uptrending, shocked liver, improving.   - standing reglan 2.5q6    RENAL:   - s/p  long term HD port with IR 11/26  - S/p dialysis 11/26, 3L taken off  - Na 135-145   - renal failure with high Cr. post cardiac arrest ;  Nephro following, s/p HD 11/20, 11/21, 11/22, 11/24, 11/26  - daily weights     HEME:  - SCDs, SQH restarted 11/20 - hold for IR port placement  - s/p multiple transfusions this admission, most recently 1 u PRBC 11/19, s/p 1unit PRBC 11/26  - 11/14 UE/LE dopplers neg, repeat today  - FOB neg 11/16     ID:  - Tylenol PRN for fever   - sputum cx 11/4: enterobacter, proteus  - Cefepime started to cover for enterobacter/proteus in sputum dc'd 11/15   - Ancef dcd     ENDO:  - ISS   - monitor FS    OTHER  - wound care recs- q2 dressing changes   - ENT consulted, reccomends trach placement and oral hygeine for tongue laceration   - has R midline (11/18)    GOC: full code  Level of care: ICU status   Dispo: pending trach  family updated    Case discussed with Dr. Duncan   Assessment:  Present when checked    []  GCS  E   V  M     Heart Failure: []Acute, [] acute on chronic , []chronic  Heart Failure:  [] Diastolic (HFpEF), [] Systolic (HFrEF), []Combined (HFpEF and HFrEF), [] RHF, [] Pulm HTN, [] Other    [] CHETAN, [] ATN, [] AIN, [] other  [] CKD1, [] CKD2, [] CKD 3, [] CKD 4, [] CKD 5, []ESRD    Encephalopathy: [] Metabolic, [] Hepatic, [] toxic, [] Neurological, [] Other    Abnormal Nurtitional Status: [] malnurtition (see nutrition note), [ ]underweight: BMI < 19, [] morbid obesity: BMI >40, [] Cachexia    [] Sepsis  [] hypovolemic shock,[] cardiogenic shock, [] hemorrhagic shock, [] neuogenic shock  [] Acute Respiratory Failure  []Cerebral edema, [] Brain compression/ herniation,   [] Functional quadriplegia  [] Acute blood loss anemia

## 2021-11-27 NOTE — PROGRESS NOTE ADULT - ASSESSMENT
45 y/o Female HH5 MF 4 BD 33 with:    L MCA ruptured aneurysm, subarachnoid hemorrhage, s/p C (10/26/2021, Dr. D'Amico @ [place]), brain compression, cerebral edema.   Anemia   Recent spinal surgery  UGIB  Bilateral pneumothoraces  Acute kidney injury, transaminitis  Pneumonia (enterobacter, proteus)    PLAN: Day 1 = 10-26 ; Day 4 = 10/29; Day 21 = 11/15  NEURO: Coma checks q4h, VS q1h, PRN fentanyl for analgosedation  DCI: Off nimodipine. No spasm on angio.  Hydrocephalus: EVD discontinued. No VPS. CT head pending on 11/29  Cranioplasty to be scheduled.  Seizure prophylaxis: On levetiracetam 500 daily (renal dose)  REHAB: Physical therapy evaluation and management     EARLY MOB:  HOB elevated    PULM:   Full vent support for now. CPAP as tolerated  Continue mucomyst q12h. Pulmonary toilet  Chest tubes discontinued on 11/24. CXR 11/26 stable. No PTX.    CARDIO:    SBP goal 100-160mm Hg  On amlodipine 10mg PO daily; clonidine 0.3 q6h; increase hydralazine to 75q8h  EKG re QTc while on reglan    ENDO:  Blood sugar goals 140-180 mg/dL    GI: PPI daily; reglan (decrease dose to 2.5mg q6h and wean to off)  LBM 11/24- multiple bowel movements.   DIET: TF at 40cc/hr.    RENAL:  Na goal 135-145. Scheduled for permanent catheter on 11/26; dialysis 11/26.    HEM/ONC: Anemia. Possible anemia of chronic disease; did have previous days of oozing from trach which has resolved  VTE Prophylaxis: SCDs, SQH   Repeat upper and lower extremity dopplers 11/26 (thus far all wnl)  S/P Erythropoetin with HD. Transfused 1 PRBCs with HD 11/26. Monitor CBC.     ID: Afebrile, no leukocytosis. S/P treatment of PNA    Social: Will update family     MISC: Scopolamine patch    CORE MEASURES  1.  Hunt and Griffin Score = 5  2.  VTE prophylaxis:  [ ] administered within 24 hours of admission OR [X] reason not done: fresh bleed, unsecured aneurysm.  3.  Dysphagia screening:   [X] performed before any oral meds / liquids / food  4.  Nimodipine treatment:  [X] administered within 24 hours of admission OR [ ] reason not done:    ATTENDING ATTESTATION:  I was physically present for the key portions of the evaluation and management (E/M) service provided.  I agree with the above history, physical and plan, which I have reviewed and edited where appropriate.    Patient at high risk for neurological deterioration or death due to:  ICU delirium, aspiration PNA, DVT / PE.  Critical care time:  I have personally provided 45 minutes of critical care time, excluding time spent on separate procedures.      Plan discussed with RN, house staff. 45 y/o Female HH5 MF 4 BD 33 with:    L MCA ruptured aneurysm, subarachnoid hemorrhage, s/p C (10/26/2021, Dr. D'Amico @ [place]), brain compression, cerebral edema.   Anemia   Recent spinal surgery  UGIB  Bilateral pneumothoraces  Acute kidney injury, transaminitis  Pneumonia (enterobacter, proteus)    PLAN: Day 1 = 10-26 ; Day 4 = 10/29; Day 21 = 11/15  NEURO: Coma checks q4h, VS q1h  DCI: Off nimodipine. No spasm on angio.  Hydrocephalus: EVD discontinued. No VPS. CT head pending on 11/29  Cranioplasty to be scheduled in future  Seizure prophylaxis: On levetiracetam 500 daily (renal dose)  REHAB: Physical therapy evaluation and management     EARLY MOB:  HOB elevated    PULM:   Full vent support for now. CPAP as tolerated  Continue mucomyst q12h. Pulmonary toilet  Chest tubes discontinued on 11/24. CXR 11/26 stable. No PTX.    CARDIO:    SBP goal 100-160mm Hg  On amlodipine 10mg PO daily; clonidine 0.3 q6h; increase hydralazine to 75q8h  EKG re QTc while on reglan    ENDO:  Blood sugar goals 140-180 mg/dL    GI: PPI daily; reglan (decrease dose to 2.5mg q6h and wean to off)  LBM 11/25- multiple bowel movements on 11/25.  DIET: TF Nepro at 40cc/hr.    RENAL:  Na goal 135-145. S/P permanent catheter on 11/26; S/P dialysis 11/26. Monitor Is/Os.  Repeat BMP at 2pm re Na, ionized calcium. Renal following. Appreciate recs.     HEM/ONC: Anemia. Possible anemia of chronic disease; did have previous days of oozing from trach which has resolved.   VTE Prophylaxis: SCDs, SQH   Repeat upper and lower extremity dopplers 11/26.   S/P Erythropoetin with HD. Transfused 1 PRBCs with HD 11/26. Suboptimal response. No overt bleed. Monitor CBC.  FOBT neg     ID: Afebrile, no leukocytosis. S/P treatment of PNA    Social: Will update family. Dispo LTACH.     MISC: Scopolamine patch    CORE MEASURES  1.  Hunt and Griffin Score = 5  2.  VTE prophylaxis:  [ ] administered within 24 hours of admission OR [X] reason not done: fresh bleed, unsecured aneurysm.  3.  Dysphagia screening:   [X] performed before any oral meds / liquids / food  4.  Nimodipine treatment:  [X] administered within 24 hours of admission OR [ ] reason not done:    ATTENDING ATTESTATION:  I was physically present for the key portions of the evaluation and management (E/M) service provided.  I agree with the above history, physical and plan, which I have reviewed and edited where appropriate.    Patient at high risk for neurological deterioration or death due to:  ICU delirium, aspiration PNA, DVT / PE.  Critical care time:  I have personally provided 45 minutes of critical care time, excluding time spent on separate procedures.      Plan discussed with RN, house staff. 45 y/o Female HH5 MF 4 BD 33 with:    L MCA ruptured aneurysm, subarachnoid hemorrhage, s/p DHC (10/26/2021, Dr. D'Amico @ [place]), brain compression, cerebral edema.   Anemia   Recent spinal surgery  UGIB  Bilateral pneumothoraces  Acute kidney injury, transaminitis  Pneumonia (enterobacter, proteus)    PLAN: Day 1 = 10-26 ; Day 4 = 10/29; Day 21 = 11/15  NEURO: Coma checks q4h, VS q1h  DCI: Off nimodipine. No spasm on angio.  Hydrocephalus: EVD discontinued. No VPS. CT head pending on 11/29  Cranioplasty to be scheduled in future  Seizure prophylaxis: On levetiracetam 500 daily (renal dose)  REHAB: Physical therapy evaluation and management     EARLY MOB:  HOB elevated    PULM:   Full vent support for now. CPAP as tolerated  Continue mucomyst q12h. Pulmonary toilet  Chest tubes discontinued on 11/24. CXR 11/26 stable. No PTX.    CARDIO:    SBP goal 100-160mm Hg  On amlodipine 10mg PO daily; clonidine 0.3 q6h; increase hydralazine to 75q8h  EKG re QTc while on reglan    ENDO:  Blood sugar goals 140-180 mg/dL    GI: PPI daily; reglan (decrease dose to 2.5mg q6h and wean to off)  LBM 11/25- multiple bowel movements on 11/25.  DIET: TF Nepro at 40cc/hr.    RENAL:  Na goal 135-145. S/P permanent catheter on 11/26; S/P dialysis 11/26. Monitor Is/Os.  Repeat BMP at 2pm re Na, ionized calcium. Renal following. Appreciate recs.     HEM/ONC: Anemia. Possible anemia of chronic disease; did have previous days of oozing from trach which has resolved.   VTE Prophylaxis: SCDs, SQH   Repeat upper and lower extremity dopplers 11/26.   S/P Erythropoetin with HD. Transfused 1 PRBCs with HD 11/26. Suboptimal response 7.3-> 7.9. No overt bleed.   FOBT neg     ID: Afebrile, no leukocytosis. S/P treatment of PNA    Social: Will update family. Dispo LTACH.     MISC: Scopolamine patch    CORE MEASURES  1.  Hunt and Griffin Score = 5  2.  VTE prophylaxis:  [ ] administered within 24 hours of admission OR [X] reason not done: fresh bleed, unsecured aneurysm.  3.  Dysphagia screening:   [X] performed before any oral meds / liquids / food  4.  Nimodipine treatment:  [X] administered within 24 hours of admission OR [ ] reason not done:    ATTENDING ATTESTATION:  I was physically present for the key portions of the evaluation and management (E/M) service provided.  I agree with the above history, physical and plan, which I have reviewed and edited where appropriate.    Patient at high risk for neurological deterioration or death due to:  ICU delirium, aspiration PNA, DVT / PE.  Critical care time:  I have personally provided 45 minutes of critical care time, excluding time spent on separate procedures.      Plan discussed with RN, house staff. 47 y/o Female HH5 MF 4 BD 33 with:    L MCA ruptured aneurysm, subarachnoid hemorrhage, s/p DHC (10/26/2021, Dr. D'Amico @ [place]), brain compression, cerebral edema.   Anemia   Recent spinal surgery  UGIB  Bilateral pneumothoraces  Acute kidney injury, transaminitis  Pneumonia (enterobacter, proteus)    PLAN: Day 1 = 10-26 ; Day 4 = 10/29; Day 21 = 11/15  NEURO: Coma checks q4h, VS q1h  DCI: Off nimodipine. No spasm on angio.  Hydrocephalus: EVD discontinued. No VPS. CT head pending on 11/29  Cranioplasty to be scheduled in future  Seizure prophylaxis: On levetiracetam 500 daily (renal dose)  REHAB: Physical therapy evaluation and management     EARLY MOB:  HOB elevated    PULM:   Full vent support for now. CPAP as tolerated  Continue mucomyst q12h. Pulmonary toilet  Chest tubes discontinued on 11/24. CXR 11/26 stable. No PTX.    CARDIO:    SBP goal 100-160mm Hg  On amlodipine 10mg PO daily; clonidine 0.3 q6h;  hydralazine to 75q8h  EKG re QTc while on reglan    ENDO:  Blood sugar goals 140-180 mg/dL    GI: PPI daily; reglan (2.5mg q6h and wean to off 11/27)  LBM 11/25- multiple bowel movements on 11/25.  DIET: TF Nepro at 40cc/hr.    RENAL:  Na goal 135-145. S/P permanent catheter on 11/26; S/P dialysis 11/26. Monitor Is/Os.  Repeat BMP at 2pm re Na, ionized calcium. Renal following. Appreciate recs.     HEM/ONC: Anemia. Possible anemia of chronic disease; did have previous days of oozing from trach which has resolved.   VTE Prophylaxis: SCDs, SQH   Repeat upper and lower extremity dopplers 11/26.   S/P Erythropoetin with HD. Transfused 1 PRBCs with HD 11/26. Suboptimal response 7.3-> 7.9. No overt bleed.   FOBT neg     ID: Afebrile, no leukocytosis. S/P treatment of PNA    Social: Will update family. Dispo LTACH.     MISC: Scopolamine patch    CORE MEASURES  1.  Hunt and Griffin Score = 5  2.  VTE prophylaxis:  [ ] administered within 24 hours of admission OR [X] reason not done: fresh bleed, unsecured aneurysm.  3.  Dysphagia screening:   [X] performed before any oral meds / liquids / food  4.  Nimodipine treatment:  [X] administered within 24 hours of admission OR [ ] reason not done:    ATTENDING ATTESTATION:  I was physically present for the key portions of the evaluation and management (E/M) service provided.  I agree with the above history, physical and plan, which I have reviewed and edited where appropriate.    Patient at high risk for neurological deterioration or death due to:  ICU delirium, aspiration PNA, DVT / PE.  Critical care time:  I have personally provided 45 minutes of critical care time, excluding time spent on separate procedures.      Plan discussed with RN, house staff.

## 2021-11-27 NOTE — PROGRESS NOTE ADULT - ASSESSMENT
45 y/o F with recent spinal surgery, found down and brought to ED.  In ED, witnessed shaking, ?seizures, intubated.  Imaging showed SAH.  Emergent decompressive hemicraniectomy for herniation syndrome,  complicated by cardiac arrest with resultant anuric ATN requiring hemodialysis

## 2021-11-28 LAB
ALBUMIN SERPL ELPH-MCNC: 2.6 G/DL — LOW (ref 3.3–5)
ALP SERPL-CCNC: 155 U/L — HIGH (ref 40–120)
ALT FLD-CCNC: <5 U/L — LOW (ref 10–45)
ANION GAP SERPL CALC-SCNC: 11 MMOL/L — SIGNIFICANT CHANGE UP (ref 5–17)
AST SERPL-CCNC: 27 U/L — SIGNIFICANT CHANGE UP (ref 10–40)
BILIRUB SERPL-MCNC: <0.2 MG/DL — SIGNIFICANT CHANGE UP (ref 0.2–1.2)
BUN SERPL-MCNC: 37 MG/DL — HIGH (ref 7–23)
CALCIUM SERPL-MCNC: 8.6 MG/DL — SIGNIFICANT CHANGE UP (ref 8.4–10.5)
CHLORIDE SERPL-SCNC: 99 MMOL/L — SIGNIFICANT CHANGE UP (ref 96–108)
CO2 SERPL-SCNC: 28 MMOL/L — SIGNIFICANT CHANGE UP (ref 22–31)
CREAT SERPL-MCNC: 5.01 MG/DL — HIGH (ref 0.5–1.3)
GLUCOSE SERPL-MCNC: 135 MG/DL — HIGH (ref 70–99)
HCT VFR BLD CALC: 26.1 % — LOW (ref 34.5–45)
HGB BLD-MCNC: 8.3 G/DL — LOW (ref 11.5–15.5)
MAGNESIUM SERPL-MCNC: 2.2 MG/DL — SIGNIFICANT CHANGE UP (ref 1.6–2.6)
MCHC RBC-ENTMCNC: 26.3 PG — LOW (ref 27–34)
MCHC RBC-ENTMCNC: 31.8 GM/DL — LOW (ref 32–36)
MCV RBC AUTO: 82.6 FL — SIGNIFICANT CHANGE UP (ref 80–100)
NRBC # BLD: 0 /100 WBCS — SIGNIFICANT CHANGE UP (ref 0–0)
PHOSPHATE SERPL-MCNC: 4.1 MG/DL — SIGNIFICANT CHANGE UP (ref 2.5–4.5)
PLATELET # BLD AUTO: 246 K/UL — SIGNIFICANT CHANGE UP (ref 150–400)
POTASSIUM SERPL-MCNC: 4.5 MMOL/L — SIGNIFICANT CHANGE UP (ref 3.5–5.3)
POTASSIUM SERPL-SCNC: 4.5 MMOL/L — SIGNIFICANT CHANGE UP (ref 3.5–5.3)
PROT SERPL-MCNC: 6.7 G/DL — SIGNIFICANT CHANGE UP (ref 6–8.3)
RBC # BLD: 3.16 M/UL — LOW (ref 3.8–5.2)
RBC # FLD: 19.9 % — HIGH (ref 10.3–14.5)
SODIUM SERPL-SCNC: 138 MMOL/L — SIGNIFICANT CHANGE UP (ref 135–145)
WBC # BLD: 11.74 K/UL — HIGH (ref 3.8–10.5)
WBC # FLD AUTO: 11.74 K/UL — HIGH (ref 3.8–10.5)

## 2021-11-28 PROCEDURE — 99232 SBSQ HOSP IP/OBS MODERATE 35: CPT

## 2021-11-28 PROCEDURE — 71045 X-RAY EXAM CHEST 1 VIEW: CPT | Mod: 26

## 2021-11-28 RX ORDER — HYDRALAZINE HCL 50 MG
100 TABLET ORAL EVERY 8 HOURS
Refills: 0 | Status: DISCONTINUED | OUTPATIENT
Start: 2021-11-28 | End: 2021-12-10

## 2021-11-28 RX ORDER — FENTANYL CITRATE 50 UG/ML
12.5 INJECTION INTRAVENOUS ONCE
Refills: 0 | Status: DISCONTINUED | OUTPATIENT
Start: 2021-11-28 | End: 2021-11-28

## 2021-11-28 RX ORDER — HYDRALAZINE HCL 50 MG
25 TABLET ORAL ONCE
Refills: 0 | Status: COMPLETED | OUTPATIENT
Start: 2021-11-28 | End: 2021-11-28

## 2021-11-28 RX ORDER — ACETAMINOPHEN 500 MG
650 TABLET ORAL ONCE
Refills: 0 | Status: COMPLETED | OUTPATIENT
Start: 2021-11-28 | End: 2021-11-28

## 2021-11-28 RX ADMIN — SCOPALAMINE 1 PATCH: 1 PATCH, EXTENDED RELEASE TRANSDERMAL at 18:55

## 2021-11-28 RX ADMIN — Medication 4 MILLILITER(S): at 16:20

## 2021-11-28 RX ADMIN — SENNA PLUS 2 TABLET(S): 8.6 TABLET ORAL at 23:04

## 2021-11-28 RX ADMIN — Medication 1 DROP(S): at 17:11

## 2021-11-28 RX ADMIN — SODIUM CHLORIDE 2 GRAM(S): 9 INJECTION INTRAMUSCULAR; INTRAVENOUS; SUBCUTANEOUS at 13:33

## 2021-11-28 RX ADMIN — Medication 100 MILLIGRAM(S): at 23:03

## 2021-11-28 RX ADMIN — CHLORHEXIDINE GLUCONATE 1 APPLICATION(S): 213 SOLUTION TOPICAL at 06:02

## 2021-11-28 RX ADMIN — Medication 4 MILLILITER(S): at 05:45

## 2021-11-28 RX ADMIN — FENTANYL CITRATE 12.5 MICROGRAM(S): 50 INJECTION INTRAVENOUS at 18:00

## 2021-11-28 RX ADMIN — Medication 10 MILLIGRAM(S): at 14:14

## 2021-11-28 RX ADMIN — HEPARIN SODIUM 5000 UNIT(S): 5000 INJECTION INTRAVENOUS; SUBCUTANEOUS at 05:27

## 2021-11-28 RX ADMIN — SODIUM CHLORIDE 4 MILLILITER(S): 9 INJECTION INTRAMUSCULAR; INTRAVENOUS; SUBCUTANEOUS at 10:37

## 2021-11-28 RX ADMIN — Medication 0.3 MILLIGRAM(S): at 17:10

## 2021-11-28 RX ADMIN — FENTANYL CITRATE 12.5 MICROGRAM(S): 50 INJECTION INTRAVENOUS at 17:35

## 2021-11-28 RX ADMIN — Medication 650 MILLIGRAM(S): at 18:00

## 2021-11-28 RX ADMIN — SCOPALAMINE 1 PATCH: 1 PATCH, EXTENDED RELEASE TRANSDERMAL at 11:25

## 2021-11-28 RX ADMIN — POLYETHYLENE GLYCOL 3350 17 GRAM(S): 17 POWDER, FOR SOLUTION ORAL at 18:55

## 2021-11-28 RX ADMIN — SODIUM CHLORIDE 2 GRAM(S): 9 INJECTION INTRAMUSCULAR; INTRAVENOUS; SUBCUTANEOUS at 05:28

## 2021-11-28 RX ADMIN — Medication 10 MILLIGRAM(S): at 12:23

## 2021-11-28 RX ADMIN — CHLORHEXIDINE GLUCONATE 15 MILLILITER(S): 213 SOLUTION TOPICAL at 17:10

## 2021-11-28 RX ADMIN — Medication 10 MILLIGRAM(S): at 23:27

## 2021-11-28 RX ADMIN — POLYETHYLENE GLYCOL 3350 17 GRAM(S): 17 POWDER, FOR SOLUTION ORAL at 05:29

## 2021-11-28 RX ADMIN — CHLORHEXIDINE GLUCONATE 15 MILLILITER(S): 213 SOLUTION TOPICAL at 05:27

## 2021-11-28 RX ADMIN — Medication 75 MILLIGRAM(S): at 13:33

## 2021-11-28 RX ADMIN — HEPARIN SODIUM 5000 UNIT(S): 5000 INJECTION INTRAVENOUS; SUBCUTANEOUS at 23:02

## 2021-11-28 RX ADMIN — SCOPALAMINE 1 PATCH: 1 PATCH, EXTENDED RELEASE TRANSDERMAL at 11:43

## 2021-11-28 RX ADMIN — Medication 2.5 MILLIGRAM(S): at 05:27

## 2021-11-28 RX ADMIN — Medication 10 MILLIGRAM(S): at 17:10

## 2021-11-28 RX ADMIN — SODIUM CHLORIDE 2 GRAM(S): 9 INJECTION INTRAMUSCULAR; INTRAVENOUS; SUBCUTANEOUS at 00:12

## 2021-11-28 RX ADMIN — Medication 2.5 MILLIGRAM(S): at 00:12

## 2021-11-28 RX ADMIN — SODIUM CHLORIDE 2 GRAM(S): 9 INJECTION INTRAMUSCULAR; INTRAVENOUS; SUBCUTANEOUS at 17:10

## 2021-11-28 RX ADMIN — Medication 4 MILLILITER(S): at 22:53

## 2021-11-28 RX ADMIN — PANTOPRAZOLE SODIUM 40 MILLIGRAM(S): 20 TABLET, DELAYED RELEASE ORAL at 11:24

## 2021-11-28 RX ADMIN — Medication 4 MILLILITER(S): at 10:15

## 2021-11-28 RX ADMIN — Medication 0.3 MILLIGRAM(S): at 04:38

## 2021-11-28 RX ADMIN — Medication 75 MILLIGRAM(S): at 05:28

## 2021-11-28 RX ADMIN — Medication 0.3 MILLIGRAM(S): at 11:24

## 2021-11-28 RX ADMIN — HEPARIN SODIUM 5000 UNIT(S): 5000 INJECTION INTRAVENOUS; SUBCUTANEOUS at 13:33

## 2021-11-28 RX ADMIN — SODIUM CHLORIDE 2 GRAM(S): 9 INJECTION INTRAMUSCULAR; INTRAVENOUS; SUBCUTANEOUS at 23:03

## 2021-11-28 RX ADMIN — SCOPALAMINE 1 PATCH: 1 PATCH, EXTENDED RELEASE TRANSDERMAL at 07:09

## 2021-11-28 RX ADMIN — SODIUM CHLORIDE 4 MILLILITER(S): 9 INJECTION INTRAMUSCULAR; INTRAVENOUS; SUBCUTANEOUS at 22:53

## 2021-11-28 RX ADMIN — Medication 0.3 MILLIGRAM(S): at 23:02

## 2021-11-28 RX ADMIN — AMLODIPINE BESYLATE 10 MILLIGRAM(S): 2.5 TABLET ORAL at 05:28

## 2021-11-28 RX ADMIN — Medication 650 MILLIGRAM(S): at 17:35

## 2021-11-28 RX ADMIN — LEVETIRACETAM 500 MILLIGRAM(S): 250 TABLET, FILM COATED ORAL at 23:04

## 2021-11-28 RX ADMIN — SODIUM CHLORIDE 4 MILLILITER(S): 9 INJECTION INTRAMUSCULAR; INTRAVENOUS; SUBCUTANEOUS at 17:50

## 2021-11-28 RX ADMIN — Medication 1 DROP(S): at 05:30

## 2021-11-28 RX ADMIN — Medication 25 MILLIGRAM(S): at 17:35

## 2021-11-28 RX ADMIN — SODIUM CHLORIDE 4 MILLILITER(S): 9 INJECTION INTRAMUSCULAR; INTRAVENOUS; SUBCUTANEOUS at 05:46

## 2021-11-28 NOTE — PROGRESS NOTE ADULT - PROBLEM SELECTOR PLAN 1
still anuric with 20cc on bladder scan  plan for hemodialysis tomorrow for UF and clearance
hypocalcemia on this morning's labs  please repeat since appears to be an isolated reading

## 2021-11-28 NOTE — PROGRESS NOTE ADULT - PROBLEM SELECTOR PLAN 2
treated yesterday with Ca glluconate  please check Vit D levels - will probably need to supplement with hemodialysis  check PTH

## 2021-11-28 NOTE — PROGRESS NOTE ADULT - SUBJECTIVE AND OBJECTIVE BOX
CC: HEAD BLEED        INTERVAL HISTORY:  trached/sedated      ROS: No chest pain, no sob, no abd pain. No n/v/d    PAST MEDICAL & SURGICAL HISTORY:  No pertinent past medical history    Personal history of spine surgery        PHYSICAL EXAM:  T(C): 37.2 (11-28-21 @ 06:35), Max: 37.3 (11-28-21 @ 00:30)  HR: 73 (11-28-21 @ 07:00)  BP: 131/76 (11-28-21 @ 01:00) (119/69 - 165/87)  RR: 12 (11-28-21 @ 07:00)  SpO2: 100% (11-28-21 @ 07:00)  Wt(kg): --  I&O's Summary    27 Nov 2021 07:01  -  28 Nov 2021 07:00  --------------------------------------------------------  IN: 1780 mL / OUT: 200 mL / NET: 1580 mL      Weight 91 (11-22 @ 16:05)  General:,  NAD.  HEENT: moist mucous membranes, no pallor/cyanosis.  Neck: no JVD visible.  Cardiac: S1, S2. RRR. No murmurs   Respiratory: CTA b/l, no access muscle use.   Abdomen: soft. nontender. nondistended  Skin: no rashes.  Extremities:+ UE swelling  Access: R IJ      DATA:                        8.3<L>  11.74<H> )-----------( 246      ( 28 Nov 2021 05:31 )             26.1<L>    Ferritin, Serum: 778 ng/mL *H* (11-13 @ 22:16)      138    |  99     |  37<H>  ----------------------------<  135<H>  Ca:8.6   (28 Nov 2021 05:31)  4.5     |  28     |  5.01<H>      eGFR if Non : 10 *L*  eGFR if : 11 *L*    TPro  6.7    /  Alb  2.6<L>  /  TBili  <0.2   /  DBili  x      /  AST  27     /  ALT  <5<L>  /  AlkPhos  155<H>  28 Nov 2021 05:31    Osmolality, Serum: 287 mosm/kg (11-27 @ 18:09)        Osmolality, Random Urine: 224 mosm/kg (11-27 @ 18:09)  Sodium, Random Urine: 50 mmol/L (11-27 @ 16:18)  Creatinine, Random Urine: 64 mg/dL (11-27 @ 16:18)            MEDICATIONS  (STANDING):  acetylcysteine 20%  Inhalation 4 milliLiter(s) Inhalation every 6 hours  amLODIPine   Tablet 10 milliGRAM(s) Oral daily  artificial  tears Solution 1 Drop(s) Both EYES two times a day  chlorhexidine 0.12% Liquid 15 milliLiter(s) Oral Mucosa every 12 hours  chlorhexidine 2% Cloths 1 Application(s) Topical <User Schedule>  chlorhexidine 2% Cloths 1 Application(s) Topical <User Schedule>  cloNIDine 0.3 milliGRAM(s) Oral every 6 hours  dextrose 50% Injectable 25 milliLiter(s) IV Push once  heparin   Injectable 5000 Unit(s) SubCutaneous every 8 hours  hydrALAZINE 75 milliGRAM(s) Oral every 8 hours  levETIRAcetam  Solution 500 milliGRAM(s) Oral every 24 hours  metoclopramide Injectable 2.5 milliGRAM(s) IV Push every 6 hours  pantoprazole   Suspension 40 milliGRAM(s) Oral daily  polyethylene glycol 3350 17 Gram(s) Oral two times a day  scopolamine 1 mG/72 Hr(s) Patch 1 Patch Transdermal every 72 hours  senna 2 Tablet(s) Oral at bedtime  sodium chloride 2 Gram(s) Oral every 6 hours  sodium chloride 3%  Inhalation 4 milliLiter(s) Inhalation every 6 hours    MEDICATIONS  (PRN):  hydrALAZINE Injectable 10 milliGRAM(s) IV Push every 2 hours PRN SBP > 160  labetalol Injectable 10 milliGRAM(s) IV Push every 2 hours PRN SBP >160  ondansetron Injectable 4 milliGRAM(s) IV Push every 6 hours PRN Nausea and/or Vomiting  sodium chloride 0.9% lock flush 10 milliLiter(s) IV Push every 1 hour PRN Pre/post blood products, medications, blood draw, and to maintain line patency

## 2021-11-28 NOTE — PROGRESS NOTE ADULT - SUBJECTIVE AND OBJECTIVE BOX
HPI:  47 y/o female with PMH HTN, Multiple sclerosis, recent spinal surgery 10/8 ([facility]) presented to [place] [facility] BIBEMS after being found unconscious at home, unknown period of time. 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 HH5, MF1. Patient was intubated at [place] ED, found to have blown L pupil, and sent straight to the OR for left hemicraniotomy. A-line placed intraop. Hemodynamically unstable upon arrival to [facility], bradycardic and hypertensive s/p Mannitol, Clevidipine, and Furosemide. Central line placed in NSICU. Currently sedated on Propofol, pending repeat CTH. History per patient's son, patient had recent spine surgery and was found on outpatient imaging last week to have L M1 segment aneurysm (unruptured), pt asymptomatic at this time. Per son patient taking percocet PO at home. Ureña catheter, ET tube, and arterial line placed at [facility].     NIHSS on arrival: 32   Bess Griffin 5, Mod Busby 4  Bleed Day 1 = 10/26 (26 Oct 2021 13:03)    HOSPITAL COURSE:   10/26: admitted to [facility] from [facility], POD#0 s/p L hemicraniectomy for decompression and evacuation of SDH. Transferred to [facility] noted to have tense flap, received mannitol, keppra, decadron. Was hypertensive to 200s and preston to 40s, recevied 85g mannitol and bullet 23.4%. CTH on arrival demonstrated increased size in vents and new IVH. EVD placed, open at 15, central line placed. Transfused 2 u PRBC. POD0 of coiling of left MCA bifurcation aneurysm,   10/27: CTH demonstrated EVD in correct position, EVD lowered to 5, remains intubated on proprofol, pending repeat CTH in AM. Started on 3% @ 30/hr. 2LNS given for euvolemia, Mannitol given for uptrending ICPs. Bullet given 8:30 am. 3% increased to 50/hr. 1 L bolus. New EVD catheter placed using exisiting sreekanth hole. 1 u PRBC.  10/28: HH5, MF4, BD3; POD2 angio coil/embo of L MCA aneurysm. JOAQUÍN overnight, neuro stable. EVD@5cmH20 ICPs < 20 overnight, OP WNL. S/p 1 unit PRBC yesterday with appropriate response. Given 42g mannitol in AM for ICP 22 persistently, with improvement, then given 23% in PM for elevated ICP. febrile, pancultured. Standing tylenol and cooling blanket.   10/29: BD4, POD3 angio coil/embo. JOAQUÍN o/n, neuro stable. EVD@5, ICPs <20 o/n.   10/30: BD5, POD4 angio coil/embo. JOAQUÍN o/n neuro stable. EVD@5. 3% @50cc/hr.  10/31: BD6, POD5 angio coil/embo. brief period maintained upward gaze, resolved on its own. EVD@5cm h2o.    11/1: BD7, POD6 L hemicrani, angio coil/embo. JOAQUÍN overnight. Neuro exam unchanged. ICPs WNL. started bromocriptine/gabapentin/baclofen. dc'd propofol, started precedex  11/2: BD8 POD7 L hemicrani, angio coil/embo. JOAQUÍN overnight. Neuro exam stable. Remains on 3% hypertonic saline. Receieved 1 unit of PRBCs for a Hgb of 7.6.  11/3: BD 9 POD8 of L hemicrani. Elevated lipase, normal amylase, OG tube clogged and replaced. Abdominal US normal. Pending gen surg reccs regarding trach and peg. Gabapentin and Baclofen increased to help with neurostorming. restarted back on 3%, LR@35. became preston+hypotensive with nimodipine 60q4, decreased back to 30q2, NaCl bullet given.   11/4: BD10 POD9, JOAQUÍN o/n, 500cc NS for euvolemia,  neuro stable, EVD at 5. 3% increased to 75  11/5: BD11 POD10, JOAQUÍN o/n, neuro stable, EVD at 5  11/6: BD12 POD11 JOAQUÍN overnight. Neuro exam stable. Remains intubated on precedex. 3% hypertonic saline dc'd  11/7: BD13 POD 12 JOAQUÍN o/n, NGT replaced. on precex with no icp crisis   11/8: BD14 POD13 JOAQUÍN o/n, noted to have tongue maceration/macroglossia. Gauze with tongue depressor placed. Pending further recs from ENT. Pending trach and PEG placement tomorrow with gen surg. Off precedex, ICPs stable. EVD remains @ 5.   11/9: BD15, POD 14, bleeding under tongue at start of shift, fentanyl pushed with no further episodes. gabapentin stopped. PEG placed  11/10: BD 16, POD 15, neuro stable, ENT to be consulted in AM, 3% increased to 50/hr.  11/11: BD 17, POD 16, neuro exam stable. Pend CT saba in am for cranioplasty planning. Pend trach in OR with ENT. F.u BMP in am, 3%@50cc/hr for Na goal 140-145.   11/12: BD 18, POD 17. JOAQUÍN overnight, neuro stable. Pend trach placement today. CT saba completed 11/11. Off of 3%, f/u am BMP for Na goal 140-150. CSF neg. Hypoxic cardiac arrest with ROSC x 3 during tracheostomy placement. B/l chest tubes placed for resulting pneumothorax s/p CPR. salt tabs dc'd.  11/13: BD 19, POD 18. Patient tachycardic with some improvement, SBP stable off of levophed, hgb downtrending o/n, transfused 1 unit PRBCs. B/l chest tube. EVD @5cmH2O, no elevated ICPs. UOP downtrending, trend BUN/Cr, given 1LNS x1 for low urine output. F/u am CXR. Cont Cefepime until today, then change to Ancef while trach packing in place per ENT. Pend CTH when stable. Trops downtrending s/p cardiac arrest. 1L. florinef dc'd. BP goal changed to 100-160, started on amlodipine   11/14: BD20, POD19, worsening creatinine with decreased urine output. Given 250 of albumin and 500cc LR. started on hydralazine PO, decreased amantadine 100 daily given CrCl of 20.  11/15: BD21, POD20, remains oliguric, fem line dc'd, tongue swollen and unable to fit bite block in mouth, started on nimbex gtt to relax jaw. Propofol and fentanyl gtt started. Vomiting TFs through nose and mouth, ENT called. TFs held. Cr uptrending. Palliative consulted.  11/16: BD22, POD21, o/n external trach dressing replaced due to cuff leak and decreasing TV, sedated and paralyzed on nimbex, propofol, and fentanyl gtt. CTH stable.  EVD raised from 5 to 10. Nimbex weaned off. Cr uptrending, Trickle feeds started. FOB negative.   11/17: BD 23, POD22, JOAQUÍN o/n, EVD clamped, NS d/c'ed, LR@50, advancing tube feeds.   11/18: BD24, POD23 o/n 3% at 30 with Na acetate started, propofol dc'd due to elevated TG level, episode of vomiting TFs from nose and mouth into ETT with elevated ICP 30s, ICP normalized with resolution of vomiting, reglan 5mg IV given, TFs held, 3% stopped. midline placed   11/19; BD25, POD24 JOAQUÍN overnight. Remains on versed and fentanyl drips. Neuro exam unchanged. R IJ dialysis cath placed.   11/20: BD26 POD25 JOAQUÍN overnight. Neuro exam unchanged. Plan for HD today  11/21: BD 27 POD 26 weaned off versed, fentanyl   11/22: BD 28 POD 27 JOAQUÍN o/n , off fentanyl gtt, pt is calm. s/p HD earlier today. s/p trach revision  11/23: BD29. incraesed clonidine to 0.4 BID, s/p trach, stable hemodynamically. neuro at baseline. TFs stopped for vomiting and restarted 10cc/hr  11/24: BD 30, POD29 TFs inc to 20cc/hr. HD today  11/25: BD31, POD30. JOAQUÍN o/n neuro stable  11/26: BD32, POD 31, JOAQUÍN overnight,  perma cath placement with IR today , hydralazine inc 75q8, reglan decreased 2.5q6. Hemodialyzed today, took off 3L and given 1unit pRBCs  11/27: BD33, POD32, JOAQUÍN overnight, pending LTACH, hyponatremic, urine lytes sent. 250cc bolus 3% started, salt tabs started  11/28: POD 33 L hemicrani. JOAQUÍN o/n. Hyponatremia improved, NaCl 2 g q 6. Pending LTAC at [facility]       Vital Signs Last 24 Hrs  T(C): 37 (27 Nov 2021 22:00), Max: 37.1 (27 Nov 2021 00:47)  T(F): 98.6 (27 Nov 2021 22:00), Max: 98.8 (27 Nov 2021 00:47)  HR: 88 (28 Nov 2021 00:00) (63 - 90)  BP: 163/95 (28 Nov 2021 00:00) (119/69 - 165/87)  BP(mean): 123 (28 Nov 2021 00:00) (88 - 123)  RR: 17 (28 Nov 2021 00:00) (12 - 25)  SpO2: 100% (28 Nov 2021 00:00) (99% - 100%)    I&O's Detail    26 Nov 2021 07:01  -  27 Nov 2021 07:00  --------------------------------------------------------  IN:    Enteral Tube Flush: 240 mL    IV PiggyBack: 100 mL    Nepro with Carb Steady: 620 mL    Other (mL): 400 mL    PRBCs (Packed Red Blood Cells): 300 mL    sodium chloride 0.9%: 300 mL  Total IN: 1960 mL    OUT:    Other (mL): 3700 mL  Total OUT: 3700 mL    Total NET: -1740 mL      27 Nov 2021 07:01  -  28 Nov 2021 00:23  --------------------------------------------------------  IN:    Enteral Tube Flush: 450 mL    Nepro with Carb Steady: 680 mL    sodium chloride 3%: 250 mL  Total IN: 1380 mL    OUT:    Intermittent Catheterization - Urethral (mL): 200 mL  Total OUT: 200 mL    Total NET: 1180 mL        I&O's Summary    26 Nov 2021 07:01  -  27 Nov 2021 07:00  --------------------------------------------------------  IN: 1960 mL / OUT: 3700 mL / NET: -1740 mL    27 Nov 2021 07:01  -  28 Nov 2021 00:23  --------------------------------------------------------  IN: 1380 mL / OUT: 200 mL / NET: 1180 mL        PHYSICAL EXAM:  General: NAD, pt is lying in bed, +trach to vent   HEENT: CN II-XII grossly intact, PERRL 2mm briskly reactive, EOMI b/l, face symmetric, neck FROM, +trach  Cardiovascular: RRR, normal S1 and S2   Respiratory: lungs CTAB, no wheezing, rhonchi, or crackles   GI: normoactive BS to auscultation, abd soft, NTND   Neuro: Comatose, OE to noxious, grimaces. not FC, +cough,gag, corneals. +Left flap full but soft, RUE trace withdrawal to noxious, LUE 0/5, b/l LE trace triple flexion.   Extremities: distal pulses 2+ x4  Wound/incision: craniectomy incision with staples removed, well healed, C/D/I, back incisions with dehiscence improving.     TUBES/LINES:  [] Ureña  [] Lumbar Drain  [] Wound Drains  [X] Others - temp cath, PEG, a-line, trach     DIET:  [] NPO  [] Mechanical  [X] Tube feeds    LABS:                        7.9    10.30 )-----------( 224      ( 27 Nov 2021 05:23 )             25.2     11-27    138  |  98  |  32<H>  ----------------------------<  143<H>  4.2   |  29  |  4.65<H>    Ca    7.9<L>      27 Nov 2021 22:37  Phos  3.0     11-27  Mg     2.0     11-27    TPro  7.1  /  Alb  2.7<L>  /  TBili  <0.2  /  DBili  x   /  AST  27  /  ALT  <5<L>  /  AlkPhos  179<H>  11-27    PT/INR - ( 26 Nov 2021 06:00 )   PT: 13.1 sec;   INR: 1.10          PTT - ( 26 Nov 2021 06:00 )  PTT:29.6 sec        CAPILLARY BLOOD GLUCOSE          Drug Levels: [] N/A    CSF Analysis: [] N/A      Allergies    No Known Allergies    Intolerances      MEDICATIONS:  Antibiotics:    Neuro:  levETIRAcetam  Solution 500 milliGRAM(s) Oral every 24 hours  metoclopramide Injectable 2.5 milliGRAM(s) IV Push every 6 hours  ondansetron Injectable 4 milliGRAM(s) IV Push every 6 hours PRN  scopolamine 1 mG/72 Hr(s) Patch 1 Patch Transdermal every 72 hours    Anticoagulation:  heparin   Injectable 5000 Unit(s) SubCutaneous every 8 hours    OTHER:  acetylcysteine 20%  Inhalation 4 milliLiter(s) Inhalation every 6 hours  amLODIPine   Tablet 10 milliGRAM(s) Oral daily  artificial  tears Solution 1 Drop(s) Both EYES two times a day  chlorhexidine 0.12% Liquid 15 milliLiter(s) Oral Mucosa every 12 hours  chlorhexidine 2% Cloths 1 Application(s) Topical <User Schedule>  chlorhexidine 2% Cloths 1 Application(s) Topical <User Schedule>  cloNIDine 0.3 milliGRAM(s) Oral every 6 hours  dextrose 50% Injectable 25 milliLiter(s) IV Push once  hydrALAZINE 75 milliGRAM(s) Oral every 8 hours  hydrALAZINE Injectable 10 milliGRAM(s) IV Push every 2 hours PRN  labetalol Injectable 10 milliGRAM(s) IV Push every 2 hours PRN  pantoprazole   Suspension 40 milliGRAM(s) Oral daily  polyethylene glycol 3350 17 Gram(s) Oral two times a day  senna 2 Tablet(s) Oral at bedtime  sodium chloride 3%  Inhalation 4 milliLiter(s) Inhalation every 6 hours    IVF:  sodium chloride 2 Gram(s) Oral every 6 hours    CULTURES:    RADIOLOGY & ADDITIONAL TESTS:      ASSESSMENT:  47 y/o female with PMHx of HTN and MS, hx of spinal surgery at [facility] 10/8/21 presented to [facility] after being found unconscious at home, unknown period of time. 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 HH5, MF4; BD #1 = 10/26. Patient was intubated at [place] ED and sent straight to the OR for left hemicraniotomy. A-line placed intraop. Hemodynamically unstable upon arrival to [facility], bradycardic and hypertensive s/p Mannitol and Furosemide. Central line placed in NSICU. S/p EVD placement, and coil embolization of left MCA bifurcation aneurysm 10/26/2021. S/p cerebral angio without findings of vasospasm 11/10. S/p cardiac arrest with ROSC x3 on 11/12 during tracheostomy at bedside now with b/l pneumothoracies and worsening kidney function. EVD dc'd 11/18, trach'd 11/22, permacath placed 11/26, pending LTACH     PLAN:  NEURO:  - neuro checks q4hrs/vital signs q1hr   - Keppra 500mg q24h renal dosing   - CTH 11/18 stable, need CTH monday 11/29   - plan for cranioplasty in ~3 months   - staples removed    CARDIO:  - -160   - amlodipine 10 daily and hydral 75 q8h, clonidine 0.3 q6h  - s/p cardiac arrest   - TTE 11/15: mild LVH, EF 70%   - QTc 11/26 422    PULM:  - trach, CPAP during day, full vent support overnight  - s/p acute hypoxic cardiac arrest 11/12 during tracheostomy placment with ENT c/b b/l pneumothorax and b/l chest tubes (d/kiko 11/24)  - scopolomine patch started for secretions  - mucomyst q12    GI:  - PEG TFs nepro- 40cc, keep at 40cc/hr, slowly raise   - PPI QD GI ppx   - Bowel regimen   - Alk phos uptrending, shocked liver, improving   - standing reglan 2.5q6    RENAL:   - s/p  long term HD port with IR 11/26  - S/p dialysis 11/26, 3L taken off  - Na 135-145   - salt tabs 2q6  - renal failure with high Cr. post cardiac arrest   - Nephro following, s/p HD last 11/26  - daily weights     HEME:  - SCDs, SQH  - s/p multiple transfusions this admission, most recently s/p 1unit PRBC 11/26  - UE and LE dopplers negative 11/26  - FOB neg 11/16     ID:  - Tylenol PRN for fever   - sputum cx 11/4: enterobacter, proteus  - Cefepime started to cover for enterobacter/proteus in sputum dc'd 11/15     ENDO:  - ISS   - monitor FS     OTHER  - wound care recs- q2 dressing changes   - ENT consulted, reccomends trach placement and oral hygeine for tongue laceration   - has R midline (11/18)    GOC: full code  Level of care: ICU status   Dispo: pending LTAC Cr [facility]  family updated    Case discussed with Dr. Duncan

## 2021-11-28 NOTE — PROGRESS NOTE ADULT - SUBJECTIVE AND OBJECTIVE BOX
=================================  NEUROCRITICAL CARE ATTENDING NOTE  =================================    AILYN DÍAZ   MRN-[id number]  Summary:  47 y/o F with recent spinal surgery, found down and brought to ED.  In ED, witnessed shaking, ?seizures, intubated.  Imaging showed SAH.  Emergent decompressive hemicraniectomy for herniation syndrome, given mannitol, levetiracetam, propofol, transferred to [facility] for further management.       COURSE IN THE HOSPITAL:  10/26: Admitted to [facility], [place] - mannitol, 23.4%, repeat CT HCP - EVD R frontal inserted, s/p angio coil embo, 2 units pRBC  10/27: Remained intubated overnight, given mannitol overnight; EVD reinserted, 1 unit pRBC  10/28: Tmax 38.2, ICPs to low 20s in AM, mannitol 0.5/Kg x1, 23.4% x1 in PM  10/30: TF stopped for vomiting/aspiration event  11/02: Tmax 37.8 given 1 unit pRBC  11/03: ICPs teens, given 23.4% NaCl 30ccx 1  11/04: No ICP issues; storming with stimulation / suctioning  11/09: Remains intubated, s/p PEG, trach deferred due to macroglossia  11/10: Remains intubated, s/p angio  11/12: Failed trach - CP arrest x1 hour, PTX, 2 chest tubes  11/15: Remains intubated on ventilator; aspiration event this AM; paralyzed for tongue biting  11/16: Remains intubated on ventilator, cuff leak; hypothermic 95F overnight, placed on Josefa hugger  11/17: Remains intubated on ventilator Clamped EVD.   11/18: Remains intubated, vomiting overnight, tube feeds stopped, started on 3%@30, LR @50; propofol stopped (high TG); R IJ removed; R midline inserted; EVD removed  11/19: Remains intubated, bleeding in-line, TF restarted at 10  11/20: Remains intubated, off midazolam, TF increased to 20; s/p HD  11/21: Remains intubated, high BP - given multiple labetalol; aspiration event this morning (mouth, nothing inline, ~5cc)TF held  11/22: Remains intubated, bleeding from tongue this morning; s/p tracheostomy  11/23: Remains trached, on full vent support; blood tinged trach - improved and cleared overnight; chest tube clamped; TF @10  11/24 Remains trached, on full vent support; TF 20cc/hr --> 30cc/hr at 3 p.m.; chest tubes removed  11/26: Hb 7.3. No overt bleeding. Permacath placed. Received HD via permacath.  CXR- no PTX.   11/27: Remains trached. No acute events overnight. Remains with stably poor exam. Hyponatremic 132. 250cc 3% and salt tabs. CPAP trials.  11/28: No acute events overnight.     Past Medical History: No pertinent past medical history  Allergies:  Allergy Status Unknown  Home meds:     ICU Vital Signs Last 24 Hrs  T(C): 37.2 (28 Nov 2021 06:35), Max: 37.3 (28 Nov 2021 00:30)  T(F): 99 (28 Nov 2021 06:35), Max: 99.1 (28 Nov 2021 00:30)  HR: 73 (28 Nov 2021 07:00) (63 - 90)  BP: 131/76 (28 Nov 2021 01:00) (119/69 - 165/87)  BP(mean): 98 (28 Nov 2021 01:00) (88 - 123)  ABP: 134/65 (28 Nov 2021 07:00) (122/61 - 166/79)  ABP(mean): 91 (28 Nov 2021 07:00) (82 - 113)  RR: 12 (28 Nov 2021 07:00) (12 - 25)  SpO2: 100% (28 Nov 2021 07:00) (99% - 100%)      11-27-21 @ 07:01  -  11-28-21 @ 07:00  --------------------------------------------------------  IN: 1480 mL / OUT: 200 mL / NET: 1280 mL      Mode: AC/ CMV (Assist Control/ Continuous Mandatory Ventilation), RR (machine): 12, TV (machine): 400, FiO2: 40, PEEP: 5, ITime: 1, MAP: 8, PIP: 20      PHYSICAL EXAMINATION:  NEUROLOGIC EXAMINATION: Patient open eyes to noxious stim, does not track, does not follow commands, pupils 3mm equal and brisk (+) Doll's, (+) corneals (+) cough and gag, flap full but soft; RUE trace movement, LUE WD, TF BLE trace  GENERAL: Trached   EENT:  Anicteric  CARDIOVASCULAR: (+) S1 S2, normal rate and regular rhythm  PULMONARY: Diminished at bases  ABDOMEN: Soft, distended, normoactive bowel sounds. PEG site C/D/I  EXTREMITIES: No edema  SKIN: No rash      MEDICATIONS:   acetylcysteine 20%  Inhalation 4 milliLiter(s) Inhalation every 6 hours  amLODIPine   Tablet 10 milliGRAM(s) Oral daily  artificial  tears Solution 1 Drop(s) Both EYES two times a day  chlorhexidine 0.12% Liquid 15 milliLiter(s) Oral Mucosa every 12 hours  chlorhexidine 2% Cloths 1 Application(s) Topical <User Schedule>  chlorhexidine 2% Cloths 1 Application(s) Topical <User Schedule>  cloNIDine 0.3 milliGRAM(s) Oral every 6 hours  dextrose 50% Injectable 25 milliLiter(s) IV Push once  heparin   Injectable 5000 Unit(s) SubCutaneous every 8 hours  hydrALAZINE 75 milliGRAM(s) Oral every 8 hours  hydrALAZINE Injectable 10 milliGRAM(s) IV Push every 2 hours PRN  labetalol Injectable 10 milliGRAM(s) IV Push every 2 hours PRN  levETIRAcetam  Solution 500 milliGRAM(s) Oral every 24 hours  metoclopramide Injectable 2.5 milliGRAM(s) IV Push every 6 hours  ondansetron Injectable 4 milliGRAM(s) IV Push every 6 hours PRN  pantoprazole   Suspension 40 milliGRAM(s) Oral daily  polyethylene glycol 3350 17 Gram(s) Oral two times a day  scopolamine 1 mG/72 Hr(s) Patch 1 Patch Transdermal every 72 hours  senna 2 Tablet(s) Oral at bedtime  sodium chloride 2 Gram(s) Oral every 6 hours  sodium chloride 0.9% lock flush 10 milliLiter(s) IV Push every 1 hour PRN  sodium chloride 3%  Inhalation 4 milliLiter(s) Inhalation every 6 hours      LABS:                     8.3    11.74 )-----------( 246      ( 28 Nov 2021 05:31 )             26.1     11-28    138  |  99  |  37<H>  ----------------------------<  135<H>  4.5   |  28  |  5.01<H>    Ca    8.6      28 Nov 2021 05:31  Phos  4.1     11-28  Mg     2.2     11-28    TPro  6.7  /  Alb  2.6<L>  /  TBili  <0.2  /  DBili  x   /  AST  27  /  ALT  <5<L>  /  AlkPhos  155<H>  11-28    LIVER FUNCTIONS - ( 28 Nov 2021 05:31 )  Alb: 2.6 g/dL / Pro: 6.7 g/dL / ALK PHOS: 155 U/L / ALT: <5 U/L / AST: 27 U/L / GGT: x             Bacteriology:  11/11 CSF NGTD  11/04 sputum GS:  numerous staph aureus, numerous enterobacter cloacae, moderate proteus mirabilis  10/28 CSF NGTD  10/28 Blood NGTD x2  (final)    IV FLUIDS: IVL   DIET: Nepro  Lines: [place]; R double lumen midline; R IJ HD permacath  Primafit  Wounds: Dehiscence from OSH    CODE STATUS:  Full Code                         GOALS OF CARE:  aggressive                        DISPOSITION:  ICU

## 2021-11-28 NOTE — PROGRESS NOTE ADULT - ASSESSMENT
47 y/o Female HH5 MF 4 BD 34 with:    L MCA ruptured aneurysm, subarachnoid hemorrhage, s/p DHC (10/26/2021, Dr. D'Amico @ [place]), brain compression, cerebral edema.   Anemia   Recent spinal surgery  UGIB  Bilateral pneumothoraces  Acute kidney injury, transaminitis  Pneumonia (enterobacter, proteus)    PLAN: Day 1 = 10-26 ; Day 4 = 10/29; Day 21 = 11/15  NEURO: Coma checks q4h, VS q1h  DCI: Off nimodipine. No spasm on angio.  Hydrocephalus: EVD discontinued. No VPS. CT head pending on 11/29.  Cranioplasty to be scheduled in future  Seizure prophylaxis: On levetiracetam 500 daily (renal dose)  REHAB: Physical therapy evaluation and management     EARLY MOB:  HOB elevated    PULM:   On full vent support. CPAP as tolerated  Secretions: thick. Continue mucomyst, 3% nebs. Pulmonary toilet  Chest tubes discontinued on 11/24. CXR 11/26 stable. No PTX.    CARDIO:    SBP goal 100-160mm Hg  On amlodipine 10mg PO daily; clonidine 0.3 q6h; hydralazine to 75q8h  EKG re QTc while on reglan.    ENDO:  Blood sugar goals 140-180 mg/dL    GI: PPI daily; reglan (2.5mg q6h and wean to off 11/28)  LBM 11/25- multiple bowel movements on 11/25.  DIET: TF Nepro at 40cc/hr.    RENAL: CHETAN related to ATN. S/P dialysis (has permacath). Monitor Is/Os.   Hyponatremia- noted 11/27 likely related to HD. Improved S/P hypertonics. Discussed with renal to adjust dialysate 11/29 to avoid hyponatremia.  Na goal 135-145.   Renal following. Appreciate recs.     HEM/ONC: Anemia. Possible anemia of chronic disease; did have previous days of oozing from trach which has resolved.   VTE Prophylaxis: SCDs, SQH 5000u Q8  Repeat upper and lower extremity dopplers 11/26.   S/P Erythropoetin with HD. Transfused 1 PRBCs with HD 11/26. Suboptimal response 7.3-> 7.9. No overt bleed.   FOBT neg     ID: Afebrile, no leukocytosis. S/P treatment of PNA    Social: Will update family. Dispo LTACH.     MISC: Scopolamine patch    CORE MEASURES  1.  Hunt and Griffin Score = 5  2.  VTE prophylaxis:  [ ] administered within 24 hours of admission OR [X] reason not done: fresh bleed, unsecured aneurysm.  3.  Dysphagia screening:   [X] performed before any oral meds / liquids / food  4.  Nimodipine treatment:  [X] administered within 24 hours of admission OR [ ] reason not done:    ATTENDING ATTESTATION:  I was physically present for the key portions of the evaluation and management (E/M) service provided.  I agree with the above history, physical and plan, which I have reviewed and edited where appropriate.    Patient at high risk for neurological deterioration or death due to:  ICU delirium, aspiration PNA, DVT / PE.  Critical care time:  I have personally provided 45 minutes of critical care time, excluding time spent on separate procedures.      Plan discussed with RN, house staff. 45 y/o Female HH5 MF 4 BD 34 with:    L MCA ruptured aneurysm, subarachnoid hemorrhage, s/p DHC (10/26/2021, Dr. D'Amico @ [place]), brain compression, cerebral edema.   Anemia   Recent spinal surgery  UGIB  Bilateral pneumothoraces  Acute kidney injury, transaminitis  Pneumonia (enterobacter, proteus)    PLAN: Day 1 = 10-26 ; Day 4 = 10/29; Day 21 = 11/15  NEURO: Coma checks q4h, VS q4   DCI: Off nimodipine. No spasm on angio.  Hydrocephalus: EVD discontinued. No VPS. CT head pending on 11/29.  Cranioplasty to be scheduled in future  Seizure prophylaxis: On levetiracetam 500 daily (renal dose)  REHAB: Physical therapy evaluation and management     EARLY MOB:  HOB elevated    PULM:   On full vent support. CPAP as tolerated  Secretions: thick. Continue mucomyst, 3% nebs. Pulmonary toilet  Chest tubes discontinued on 11/24. CXR 11/26 stable. No PTX.    CARDIO:    SBP goal 100-160mm Hg  On amlodipine 10mg PO daily; clonidine 0.3 q6h; hydralazine to 75q8h  EKG re QTc while on reglan. DC once off reglan    ENDO:  Blood sugar goals 140-180 mg/dL    GI: PPI daily; DC reglan as no further residuals or emesis.  LBM 11/25- multiple bowel movements on 11/25.  DIET: TF Nepro at 40cc/hr.    RENAL: CHETAN related to ATN. S/P dialysis (has permacath). Monitor Is/Os.   Hyponatremia- noted 11/27 likely related to HD. Improved S/P hypertonics. Discussed with renal to adjust dialysate 11/29 to avoid hyponatremia.  Na goal 135-145.   Renal following. Appreciate recs.     HEM/ONC: Anemia. Possible anemia of chronic disease; did have previous days of oozing from trach which has resolved.   VTE Prophylaxis: SCDs, SQH 5000u Q8  Repeat upper and lower extremity dopplers 11/26- resolved.   FOBT neg. Hb stable.    ID: Afebrile, no leukocytosis. S/P treatment of PNA    Social: Will update family. Dispo LTACH.     MISC: Scopolamine patch    CORE MEASURES  1.  Hunt and Griffin Score = 5  2.  VTE prophylaxis:  [ ] administered within 24 hours of admission OR [X] reason not done: fresh bleed, unsecured aneurysm.  3.  Dysphagia screening:   [X] performed before any oral meds / liquids / food  4.  Nimodipine treatment:  [X] administered within 24 hours of admission OR [ ] reason not done:    ATTENDING ATTESTATION:  I was physically present for the key portions of the evaluation and management (E/M) service provided.  I agree with the above history, physical and plan, which I have reviewed and edited where appropriate.    Patient at high risk for neurological deterioration or death due to:  ICU delirium, aspiration PNA, DVT / PE.  Critical care time:  I have personally provided 45 minutes of critical care time, excluding time spent on separate procedures.      Plan discussed with RN, house staff. 45 y/o Female HH5 MF 4 BD 34 with:    L MCA ruptured aneurysm, subarachnoid hemorrhage, s/p DHC (10/26/2021, Dr. D'Amico @ [place]), brain compression, cerebral edema.   Anemia   Recent spinal surgery  UGIB  Bilateral pneumothoraces  Acute kidney injury, transaminitis  Pneumonia (enterobacter, proteus)    PLAN: Day 1 = 10-26 ; Day 4 = 10/29; Day 21 = 11/15  NEURO: Coma checks q4h, VS q4   DCI: Off nimodipine. No spasm on angio.  Hydrocephalus: EVD discontinued. No VPS. CT head pending on 11/29.  Cranioplasty to be scheduled in future  Seizure prophylaxis: On levetiracetam 500 daily (renal dose)  REHAB: Physical therapy evaluation and management     EARLY MOB:  HOB elevated    PULM:   On full vent support. CPAP as tolerated  Secretions: thick. Continue mucomyst, 3% nebs. Pulmonary toilet  Chest tubes discontinued on 11/24. CXR 11/26 stable. No PTX.    CARDIO:    SBP goal 100-160mm Hg  On amlodipine 10mg PO daily; clonidine 0.3 q6h; hydralazine to 75q8h  EKG re QTc while on reglan. DC once off reglan    ENDO:  Blood sugar goals 140-180 mg/dL    GI: PPI daily; DC reglan as no further residuals or emesis.  LBM 11/25- multiple bowel movements on 11/25.  DIET: TF Nepro at 40cc/hr.    RENAL: CHETAN related to ATN. S/P dialysis (has permacath). Monitor Is/Os.   Hyponatremia- noted 11/27 likely related to HD. Improved S/P hypertonics. Discussed with renal to adjust dialysate 11/29 to avoid hyponatremia.  Na goal 135-145.   Renal following. Appreciate recs.     HEM/ONC: Anemia. Possible anemia of chronic disease; did have previous days of oozing from trach which has resolved.   VTE Prophylaxis: SCDs, SQH 5000u Q8  Repeat upper and lower extremity dopplers 11/26- resolved.   FOBT neg. Hb stable.    ID: Afebrile, no leukocytosis. S/P treatment of PNA    Social: Will update family. Dispo LTACH.     MISC: Scopolamine patch    CORE MEASURES  1.  Hunt and Griffin Score = 5  2.  VTE prophylaxis:  [ ] administered within 24 hours of admission OR [X] reason not done: fresh bleed, unsecured aneurysm.  3.  Dysphagia screening:   [X] performed before any oral meds / liquids / food  4.  Nimodipine treatment:  [X] administered within 24 hours of admission OR [ ] reason not done:    ATTENDING ATTESTATION:  I was physically present for the key portions of the evaluation and management (E/M) service provided.  I agree with the above history, physical and plan, which I have reviewed and edited where appropriate.    Patient at high risk for neurological deterioration or death due to:  ICU delirium, aspiration PNA, DVT / PE.  Critical care time:  I have personally provided 45 minutes of critical care time, excluding time spent on separate procedures.      Plan discussed with RN, house staff.

## 2021-11-29 LAB
ALBUMIN SERPL ELPH-MCNC: 2.8 G/DL — LOW (ref 3.3–5)
ALP SERPL-CCNC: 158 U/L — HIGH (ref 40–120)
ALT FLD-CCNC: <5 U/L — LOW (ref 10–45)
ANION GAP SERPL CALC-SCNC: 11 MMOL/L — SIGNIFICANT CHANGE UP (ref 5–17)
AST SERPL-CCNC: 24 U/L — SIGNIFICANT CHANGE UP (ref 10–40)
BILIRUB SERPL-MCNC: <0.2 MG/DL — SIGNIFICANT CHANGE UP (ref 0.2–1.2)
BUN SERPL-MCNC: 53 MG/DL — HIGH (ref 7–23)
CALCIUM SERPL-MCNC: 8.8 MG/DL — SIGNIFICANT CHANGE UP (ref 8.4–10.5)
CALCIUM SERPL-MCNC: 9.1 MG/DL — SIGNIFICANT CHANGE UP (ref 8.4–10.5)
CHLORIDE SERPL-SCNC: 99 MMOL/L — SIGNIFICANT CHANGE UP (ref 96–108)
CO2 SERPL-SCNC: 29 MMOL/L — SIGNIFICANT CHANGE UP (ref 22–31)
CREAT SERPL-MCNC: 6.02 MG/DL — HIGH (ref 0.5–1.3)
GLUCOSE SERPL-MCNC: 139 MG/DL — HIGH (ref 70–99)
HCT VFR BLD CALC: 26.4 % — LOW (ref 34.5–45)
HGB BLD-MCNC: 8 G/DL — LOW (ref 11.5–15.5)
MAGNESIUM SERPL-MCNC: 2.3 MG/DL — SIGNIFICANT CHANGE UP (ref 1.6–2.6)
MCHC RBC-ENTMCNC: 25.5 PG — LOW (ref 27–34)
MCHC RBC-ENTMCNC: 30.3 GM/DL — LOW (ref 32–36)
MCV RBC AUTO: 84.1 FL — SIGNIFICANT CHANGE UP (ref 80–100)
NRBC # BLD: 0 /100 WBCS — SIGNIFICANT CHANGE UP (ref 0–0)
PHOSPHATE SERPL-MCNC: 4.8 MG/DL — HIGH (ref 2.5–4.5)
PLATELET # BLD AUTO: 296 K/UL — SIGNIFICANT CHANGE UP (ref 150–400)
POTASSIUM SERPL-MCNC: 4.5 MMOL/L — SIGNIFICANT CHANGE UP (ref 3.5–5.3)
POTASSIUM SERPL-SCNC: 4.5 MMOL/L — SIGNIFICANT CHANGE UP (ref 3.5–5.3)
PROT SERPL-MCNC: 6.8 G/DL — SIGNIFICANT CHANGE UP (ref 6–8.3)
PTH-INTACT FLD-MCNC: 77 PG/ML — HIGH (ref 15–65)
RBC # BLD: 3.14 M/UL — LOW (ref 3.8–5.2)
RBC # FLD: 20.2 % — HIGH (ref 10.3–14.5)
SODIUM SERPL-SCNC: 139 MMOL/L — SIGNIFICANT CHANGE UP (ref 135–145)
VIT D25+D1,25 OH+D1,25 PNL SERPL-MCNC: 18.1 PG/ML — LOW (ref 19.9–79.3)
WBC # BLD: 12.08 K/UL — HIGH (ref 3.8–10.5)
WBC # FLD AUTO: 12.08 K/UL — HIGH (ref 3.8–10.5)

## 2021-11-29 PROCEDURE — 99291 CRITICAL CARE FIRST HOUR: CPT

## 2021-11-29 PROCEDURE — 99024 POSTOP FOLLOW-UP VISIT: CPT

## 2021-11-29 PROCEDURE — 99232 SBSQ HOSP IP/OBS MODERATE 35: CPT

## 2021-11-29 PROCEDURE — 70450 CT HEAD/BRAIN W/O DYE: CPT | Mod: 26

## 2021-11-29 RX ORDER — ERYTHROPOIETIN 10000 [IU]/ML
10000 INJECTION, SOLUTION INTRAVENOUS; SUBCUTANEOUS ONCE
Refills: 0 | Status: DISCONTINUED | OUTPATIENT
Start: 2021-11-29 | End: 2021-11-30

## 2021-11-29 RX ADMIN — POLYETHYLENE GLYCOL 3350 17 GRAM(S): 17 POWDER, FOR SOLUTION ORAL at 18:24

## 2021-11-29 RX ADMIN — Medication 10 MILLIGRAM(S): at 02:36

## 2021-11-29 RX ADMIN — Medication 10 MILLIGRAM(S): at 18:23

## 2021-11-29 RX ADMIN — SODIUM CHLORIDE 2 GRAM(S): 9 INJECTION INTRAMUSCULAR; INTRAVENOUS; SUBCUTANEOUS at 06:33

## 2021-11-29 RX ADMIN — Medication 1 DROP(S): at 08:13

## 2021-11-29 RX ADMIN — SENNA PLUS 2 TABLET(S): 8.6 TABLET ORAL at 21:50

## 2021-11-29 RX ADMIN — HEPARIN SODIUM 5000 UNIT(S): 5000 INJECTION INTRAVENOUS; SUBCUTANEOUS at 06:33

## 2021-11-29 RX ADMIN — Medication 10 MILLIGRAM(S): at 06:36

## 2021-11-29 RX ADMIN — HEPARIN SODIUM 5000 UNIT(S): 5000 INJECTION INTRAVENOUS; SUBCUTANEOUS at 14:11

## 2021-11-29 RX ADMIN — Medication 1 DROP(S): at 06:00

## 2021-11-29 RX ADMIN — SODIUM CHLORIDE 2 GRAM(S): 9 INJECTION INTRAMUSCULAR; INTRAVENOUS; SUBCUTANEOUS at 12:43

## 2021-11-29 RX ADMIN — POLYETHYLENE GLYCOL 3350 17 GRAM(S): 17 POWDER, FOR SOLUTION ORAL at 08:13

## 2021-11-29 RX ADMIN — Medication 0.3 MILLIGRAM(S): at 10:23

## 2021-11-29 RX ADMIN — Medication 100 MILLIGRAM(S): at 14:12

## 2021-11-29 RX ADMIN — CHLORHEXIDINE GLUCONATE 15 MILLILITER(S): 213 SOLUTION TOPICAL at 08:13

## 2021-11-29 RX ADMIN — AMLODIPINE BESYLATE 10 MILLIGRAM(S): 2.5 TABLET ORAL at 06:33

## 2021-11-29 RX ADMIN — Medication 0.3 MILLIGRAM(S): at 04:12

## 2021-11-29 RX ADMIN — Medication 100 MILLIGRAM(S): at 21:51

## 2021-11-29 RX ADMIN — SODIUM CHLORIDE 2 GRAM(S): 9 INJECTION INTRAMUSCULAR; INTRAVENOUS; SUBCUTANEOUS at 23:26

## 2021-11-29 RX ADMIN — HEPARIN SODIUM 5000 UNIT(S): 5000 INJECTION INTRAVENOUS; SUBCUTANEOUS at 21:51

## 2021-11-29 RX ADMIN — Medication 100 MILLIGRAM(S): at 06:32

## 2021-11-29 RX ADMIN — CHLORHEXIDINE GLUCONATE 1 APPLICATION(S): 213 SOLUTION TOPICAL at 06:35

## 2021-11-29 RX ADMIN — CHLORHEXIDINE GLUCONATE 15 MILLILITER(S): 213 SOLUTION TOPICAL at 18:43

## 2021-11-29 RX ADMIN — Medication 0.3 MILLIGRAM(S): at 21:51

## 2021-11-29 RX ADMIN — PANTOPRAZOLE SODIUM 40 MILLIGRAM(S): 20 TABLET, DELAYED RELEASE ORAL at 12:43

## 2021-11-29 RX ADMIN — LEVETIRACETAM 500 MILLIGRAM(S): 250 TABLET, FILM COATED ORAL at 21:51

## 2021-11-29 RX ADMIN — SODIUM CHLORIDE 4 MILLILITER(S): 9 INJECTION INTRAMUSCULAR; INTRAVENOUS; SUBCUTANEOUS at 05:39

## 2021-11-29 RX ADMIN — Medication 0.3 MILLIGRAM(S): at 16:30

## 2021-11-29 RX ADMIN — Medication 1 DROP(S): at 18:25

## 2021-11-29 RX ADMIN — Medication 10 MILLIGRAM(S): at 22:14

## 2021-11-29 RX ADMIN — CHLORHEXIDINE GLUCONATE 1 APPLICATION(S): 213 SOLUTION TOPICAL at 08:13

## 2021-11-29 RX ADMIN — SODIUM CHLORIDE 4 MILLILITER(S): 9 INJECTION INTRAMUSCULAR; INTRAVENOUS; SUBCUTANEOUS at 11:22

## 2021-11-29 RX ADMIN — SCOPALAMINE 1 PATCH: 1 PATCH, EXTENDED RELEASE TRANSDERMAL at 19:32

## 2021-11-29 RX ADMIN — Medication 4 MILLILITER(S): at 21:05

## 2021-11-29 RX ADMIN — Medication 4 MILLILITER(S): at 16:22

## 2021-11-29 RX ADMIN — Medication 4 MILLILITER(S): at 05:39

## 2021-11-29 RX ADMIN — SODIUM CHLORIDE 4 MILLILITER(S): 9 INJECTION INTRAMUSCULAR; INTRAVENOUS; SUBCUTANEOUS at 16:22

## 2021-11-29 RX ADMIN — SODIUM CHLORIDE 2 GRAM(S): 9 INJECTION INTRAMUSCULAR; INTRAVENOUS; SUBCUTANEOUS at 18:24

## 2021-11-29 RX ADMIN — SODIUM CHLORIDE 4 MILLILITER(S): 9 INJECTION INTRAMUSCULAR; INTRAVENOUS; SUBCUTANEOUS at 21:05

## 2021-11-29 NOTE — PROGRESS NOTE ADULT - SUBJECTIVE AND OBJECTIVE BOX
=================================  NEUROCRITICAL CARE ATTENDING NOTE  =================================  AILYN DÍAZ   MRN-[id number]  Summary:  45 y/o F with recent spinal surgery, found down and brought to ED.  In ED, witnessed shaking, ?seizures, intubated.  Imaging showed SAH.  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     COURSE IN THE HOSPITAL:  10/26: Admitted to [facility], [place] - mannitol, 23.4%, repeat CT HCP - EVD R frontal inserted, s/p angio coil embo, 2 units pRBC  10/27: Remained intubated overnight, given mannitol overnight; EVD reinserted, 1 unit pRBC  10/28: Tmax 38.2, ICPs to low 20s in AM, mannitol 0.5/Kg x1, 23.4% x1 in PM  10/30: TF stopped for vomiting/aspiration event  11/02: Tmax 37.8 given 1 unit pRBC  11/03: ICPs teens, given 23.4% NaCl 30ccx 1  11/04: No ICP issues; storming with stimulation / suctioning  11/09: Remains intubated, s/p PEG, trach deferred due to macroglossia  11/10: Remains intubated, s/p angio  11/12: Failed trach - CP arrest x1 hour, PTX, 2 chest tubes  11/15: Remains intubated on ventilator; aspiration event this AM; paralyzed for tongue biting  11/16: Remains intubated on ventilator, cuff leak; hypothermic 95F overnight, placed on Josefa hugger  11/17: Remains intubated on ventilator Clamped EVD.   11/18: Remains intubated, vomiting overnight, tube feeds stopped, started on 3%@30, LR @50; propofol stopped (high TG); R IJ removed; R midline inserted; EVD removed  11/19: Remains intubated, bleeding in-line, TF restarted at 10  11/20: Remains intubated, off midazolam, TF increased to 20; s/p HD  11/21: Remains intubated, high BP - given multiple labetalol; aspiration event this morning (mouth, nothing inline, ~5cc)TF held  11/22: Remains intubated, bleeding from tongue this morning; s/p tracheostomy  11/23: Remains trached, on full vent support; blood tinged trach - improved and cleared overnight; chest tube clamped; TF @10  11/24 Remains trached, on full vent support; TF 20cc/hr --> 30cc/hr at 3 p.m.; chest tubes removed  11/26: Hb 7.3. No overt bleeding. Permacath placed. Received HD via permacath.  CXR- no PTX.   11/27: Remains trached. No acute events overnight. Remains with stably poor exam. Hyponatremic 132. 250cc 3% and salt tabs. CPAP trials.  11/28: No acute events overnight.     Past Medical History: No pertinent past medical history  Allergies:  Allergy Status Unknown  Home meds:       ICU Vital Signs Last 24 Hrs  T(C): 36.8 (29 Nov 2021 05:27), Max: 37.3 (28 Nov 2021 14:32)  T(F): 98.2 (29 Nov 2021 05:27), Max: 99.2 (28 Nov 2021 14:32)  HR: 71 (29 Nov 2021 05:43) (60 - 79)  BP: 129/75 (29 Nov 2021 04:00) (129/75 - 172/94)  BP(mean): 97 (29 Nov 2021 04:00) (96 - 126)  ABP: 140/70 (29 Nov 2021 05:00) (119/65 - 175/90)  ABP(mean): 97 (29 Nov 2021 05:00) (85 - 125)  RR: 12 (29 Nov 2021 05:43) (12 - 21)  SpO2: 99% (29 Nov 2021 05:43) (98% - 100%)      11-28-21 @ 07:01  -  11-29-21 @ 07:00  --------------------------------------------------------  IN: 240 mL / OUT: 0 mL / NET: 240 mL      Mode: AC/ CMV (Assist Control/ Continuous Mandatory Ventilation), RR (machine): 12, TV (machine): 400, FiO2: 40, PEEP: 5, ITime: 1, MAP: 7.8, PIP: 20      PHYSICAL EXAMINATION:  NEUROLOGIC EXAMINATION: Patient open eyes to noxious stim, does not track, does not follow commands, pupils 3mm equal and brisk (+) Doll's, (+) corneals (+) cough and gag, flap full but soft; RUE trace movement, LUE WD, TF BLE trace  GENERAL: Trached   EENT:  Anicteric  CARDIOVASCULAR: (+) S1 S2, normal rate and regular rhythm  PULMONARY: Diminished at bases  ABDOMEN: Soft, distended, normoactive bowel sounds. PEG site C/D/I  EXTREMITIES: No edema  SKIN: No rash      MEDICATIONS:  acetylcysteine 20%  Inhalation 4 milliLiter(s) Inhalation every 6 hours  amLODIPine   Tablet 10 milliGRAM(s) Oral daily  artificial  tears Solution 1 Drop(s) Both EYES two times a day  chlorhexidine 0.12% Liquid 15 milliLiter(s) Oral Mucosa every 12 hours  chlorhexidine 2% Cloths 1 Application(s) Topical <User Schedule>  chlorhexidine 2% Cloths 1 Application(s) Topical <User Schedule>  cloNIDine 0.3 milliGRAM(s) Oral every 6 hours  dextrose 50% Injectable 25 milliLiter(s) IV Push once  heparin   Injectable 5000 Unit(s) SubCutaneous every 8 hours  hydrALAZINE 100 milliGRAM(s) Oral every 8 hours  hydrALAZINE Injectable 10 milliGRAM(s) IV Push every 2 hours PRN  labetalol Injectable 10 milliGRAM(s) IV Push every 2 hours PRN  levETIRAcetam  Solution 500 milliGRAM(s) Oral every 24 hours  ondansetron Injectable 4 milliGRAM(s) IV Push every 6 hours PRN  pantoprazole   Suspension 40 milliGRAM(s) Oral daily  polyethylene glycol 3350 17 Gram(s) Oral two times a day  scopolamine 1 mG/72 Hr(s) Patch 1 Patch Transdermal every 72 hours  senna 2 Tablet(s) Oral at bedtime  sodium chloride 2 Gram(s) Oral every 6 hours  sodium chloride 0.9% lock flush 10 milliLiter(s) IV Push every 1 hour PRN  sodium chloride 3%  Inhalation 4 milliLiter(s) Inhalation every 6 hours    LABS:                        8.0    12.08 )-----------( 296      ( 29 Nov 2021 05:51 )             26.4     11-29    139  |  99  |  53<H>  ----------------------------<  139<H>  4.5   |  29  |  6.02<H>    Ca    9.1      29 Nov 2021 05:51  Phos  4.8     11-29  Mg     2.3     11-29    TPro  6.8  /  Alb  2.8<L>  /  TBili  <0.2  /  DBili  x   /  AST  24  /  ALT  <5<L>  /  AlkPhos  158<H>  11-29    LIVER FUNCTIONS - ( 29 Nov 2021 05:51 )  Alb: 2.8 g/dL / Pro: 6.8 g/dL / ALK PHOS: 158 U/L / ALT: <5 U/L / AST: 24 U/L / GGT: x             Bacteriology:  11/11 CSF NGTD  11/04 sputum GS:  numerous staph aureus, numerous enterobacter cloacae, moderate proteus mirabilis  10/28 CSF NGTD  10/28 Blood NGTD x2  (final)    IV FLUIDS: IVL   DIET: Nepro  Lines: Madison; R double lumen midline; R IJ HD permacath  Primafit  Wounds: Dehiscence from OSH    CODE STATUS:  Full Code                         GOALS OF CARE:  aggressive                        DISPOSITION:  ICU =================================  NEUROCRITICAL CARE ATTENDING NOTE  =================================  AILYN DÍAZ   MRN-[id number]  Summary:  47 y/o F with recent spinal surgery, found down and brought to ED.  In ED, witnessed shaking, ?seizures, intubated.  Imaging showed SAH.  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     COURSE IN THE HOSPITAL:  10/26: Admitted to [facility], [place] - mannitol, 23.4%, repeat CT HCP - EVD R frontal inserted, s/p angio coil embo, 2 units pRBC  10/27: Remained intubated overnight, given mannitol overnight; EVD reinserted, 1 unit pRBC  10/28: Tmax 38.2, ICPs to low 20s in AM, mannitol 0.5/Kg x1, 23.4% x1 in PM  10/30: TF stopped for vomiting/aspiration event  11/02: Tmax 37.8 given 1 unit pRBC  11/03: ICPs teens, given 23.4% NaCl 30ccx 1  11/04: No ICP issues; storming with stimulation / suctioning  11/09: Remains intubated, s/p PEG, trach deferred due to macroglossia  11/10: Remains intubated, s/p angio  11/12: Failed trach - CP arrest x1 hour, PTX, 2 chest tubes  11/15: Remains intubated on ventilator; aspiration event this AM; paralyzed for tongue biting  11/16: Remains intubated on ventilator, cuff leak; hypothermic 95F overnight, placed on Josefa hugger  11/17: Remains intubated on ventilator Clamped EVD.   11/18: Remains intubated, vomiting overnight, tube feeds stopped, started on 3%@30, LR @50; propofol stopped (high TG); R IJ removed; R midline inserted; EVD removed  11/19: Remains intubated, bleeding in-line, TF restarted at 10  11/20: Remains intubated, off midazolam, TF increased to 20; s/p HD  11/21: Remains intubated, high BP - given multiple labetalol; aspiration event this morning (mouth, nothing inline, ~5cc)TF held  11/22: Remains intubated, bleeding from tongue this morning; s/p tracheostomy  11/23: Remains trached, on full vent support; blood tinged trach - improved and cleared overnight; chest tube clamped; TF @10  11/24 Remains trached, on full vent support; TF 20cc/hr --> 30cc/hr at 3 p.m.; chest tubes removed  11/26: Hb 7.3. No overt bleeding. Permacath placed. Received HD via permacath.  CXR- no PTX.   11/27: Remains trached. No acute events overnight. Remains with stably poor exam. Hyponatremic 132. 250cc 3% and salt tabs. CPAP trials.  11/28: No acute events overnight.       Past Medical History: No pertinent past medical history  Allergies:  Allergy Status Unknown  Home meds:       ICU Vital Signs Last 24 Hrs  T(C): 36.8 (29 Nov 2021 05:27), Max: 37.3 (28 Nov 2021 14:32)  T(F): 98.2 (29 Nov 2021 05:27), Max: 99.2 (28 Nov 2021 14:32)  HR: 71 (29 Nov 2021 05:43) (60 - 79)  BP: 129/75 (29 Nov 2021 04:00) (129/75 - 172/94)  BP(mean): 97 (29 Nov 2021 04:00) (96 - 126)  ABP: 140/70 (29 Nov 2021 05:00) (119/65 - 175/90)  ABP(mean): 97 (29 Nov 2021 05:00) (85 - 125)  RR: 12 (29 Nov 2021 05:43) (12 - 21)  SpO2: 99% (29 Nov 2021 05:43) (98% - 100%)      11-28-21 @ 07:01  -  11-29-21 @ 07:00  --------------------------------------------------------  IN: 240 mL / OUT: 0 mL / NET: 240 mL      Mode: AC/ CMV (Assist Control/ Continuous Mandatory Ventilation), RR (machine): 12, TV (machine): 400, FiO2: 40, PEEP: 5, ITime: 1, MAP: 7.8, PIP: 20      PHYSICAL EXAMINATION:  NEUROLOGIC EXAMINATION: Patient open eyes to noxious stim, does not track, does not follow commands, pupils 3mm equal and brisk (+) Doll's, (+) corneals (+) cough and gag, flap full but soft; RUE trace movement, LUE WD, TF BLE trace  GENERAL: Trached   EENT:  Anicteric  CARDIOVASCULAR: (+) S1 S2, normal rate and regular rhythm  PULMONARY: Diminished at bases  ABDOMEN: Soft, distended, normoactive bowel sounds. PEG site C/D/I  EXTREMITIES: No edema  SKIN: No rash      MEDICATIONS:  acetylcysteine 20%  Inhalation 4 milliLiter(s) Inhalation every 6 hours  amLODIPine   Tablet 10 milliGRAM(s) Oral daily  artificial  tears Solution 1 Drop(s) Both EYES two times a day  chlorhexidine 0.12% Liquid 15 milliLiter(s) Oral Mucosa every 12 hours  chlorhexidine 2% Cloths 1 Application(s) Topical <User Schedule>  chlorhexidine 2% Cloths 1 Application(s) Topical <User Schedule>  cloNIDine 0.3 milliGRAM(s) Oral every 6 hours  dextrose 50% Injectable 25 milliLiter(s) IV Push once  heparin   Injectable 5000 Unit(s) SubCutaneous every 8 hours  hydrALAZINE 100 milliGRAM(s) Oral every 8 hours  hydrALAZINE Injectable 10 milliGRAM(s) IV Push every 2 hours PRN  labetalol Injectable 10 milliGRAM(s) IV Push every 2 hours PRN  levETIRAcetam  Solution 500 milliGRAM(s) Oral every 24 hours  ondansetron Injectable 4 milliGRAM(s) IV Push every 6 hours PRN  pantoprazole   Suspension 40 milliGRAM(s) Oral daily  polyethylene glycol 3350 17 Gram(s) Oral two times a day  scopolamine 1 mG/72 Hr(s) Patch 1 Patch Transdermal every 72 hours  senna 2 Tablet(s) Oral at bedtime  sodium chloride 2 Gram(s) Oral every 6 hours  sodium chloride 0.9% lock flush 10 milliLiter(s) IV Push every 1 hour PRN  sodium chloride 3%  Inhalation 4 milliLiter(s) Inhalation every 6 hours    LABS:                        8.0    12.08 )-----------( 296      ( 29 Nov 2021 05:51 )             26.4     11-29    139  |  99  |  53<H>  ----------------------------<  139<H>  4.5   |  29  |  6.02<H>    Ca    9.1      29 Nov 2021 05:51  Phos  4.8     11-29  Mg     2.3     11-29    TPro  6.8  /  Alb  2.8<L>  /  TBili  <0.2  /  DBili  x   /  AST  24  /  ALT  <5<L>  /  AlkPhos  158<H>  11-29    LIVER FUNCTIONS - ( 29 Nov 2021 05:51 )  Alb: 2.8 g/dL / Pro: 6.8 g/dL / ALK PHOS: 158 U/L / ALT: <5 U/L / AST: 24 U/L / GGT: x             Bacteriology:  11/11 CSF NGTD  11/04 sputum GS:  numerous staph aureus, numerous enterobacter cloacae, moderate proteus mirabilis  10/28 CSF NGTD  10/28 Blood NGTD x2  (final)    IV FLUIDS: IVL   DIET: Nepro  Lines: Madison; R double lumen midline; R IJ HD permacath  Primafit  Wounds: Dehiscence from OSH    CODE STATUS:  Full Code                         GOALS OF CARE:  aggressive                        DISPOSITION:  ICU

## 2021-11-29 NOTE — PROGRESS NOTE ADULT - ASSESSMENT
45 y/o Female HH5 MF 4 BD 35 with:    L MCA ruptured aneurysm, subarachnoid hemorrhage, s/p DHC (10/26/2021, Dr. D'Amico @ [place]), brain compression, cerebral edema.   Anemia   Recent spinal surgery  UGIB  Bilateral pneumothoraces  Acute kidney injury, transaminitis  Pneumonia (enterobacter, proteus)    PLAN: Day 1 = 10-26 ; Day 4 = 10/29; Day 21 = 11/15  NEURO: Coma checks q4h, VS q4   DCI: Off nimodipine. No spasm on angio.  Hydrocephalus: EVD discontinued. No VPS. CT head pending on 11/29.  Cranioplasty to be scheduled in future  Seizure prophylaxis: On levetiracetam 500 daily (renal dose)  REHAB: Physical therapy evaluation and management     EARLY MOB:  HOB elevated    PULM:   On full vent support. CPAP as tolerated  Secretions: thick. Continue mucomyst, 3% nebs. Pulmonary toilet  Chest tubes discontinued on 11/24. CXR 11/26 stable. No PTX.    CARDIO:    SBP goal 100-160mm Hg  On amlodipine 10mg PO daily; clonidine 0.3 q6h; hydralazine to 75q8h  EKG re QTc while on reglan. DC once off reglan    ENDO:  Blood sugar goals 140-180 mg/dL    GI: PPI daily; DC reglan as no further residuals or emesis.  LBM 11/25- multiple bowel movements on 11/25.  DIET: TF Nepro at 40cc/hr.    RENAL: CHETAN related to ATN. S/P dialysis (has permacath). Monitor Is/Os.   Hyponatremia- noted 11/27 likely related to HD. Improved S/P hypertonics. Discussed with renal to adjust dialysate 11/29 to avoid hyponatremia.  Na goal 135-145.   Renal following. Appreciate recs.     HEM/ONC: Anemia. Possible anemia of chronic disease; did have previous days of oozing from trach which has resolved.   VTE Prophylaxis: SCDs, SQH 5000u Q8  Repeat upper and lower extremity dopplers 11/26- resolved.   FOBT neg. Hb stable.    ID: Afebrile, no leukocytosis. S/P treatment of PNA    Social: Will update family. Dispo LTACH.     MISC: Scopolamine patch    CORE MEASURES  1.  Hunt and Griffin Score = 5  2.  VTE prophylaxis:  [ ] administered within 24 hours of admission OR [X] reason not done: fresh bleed, unsecured aneurysm.  3.  Dysphagia screening:   [X] performed before any oral meds / liquids / food  4.  Nimodipine treatment:  [X] administered within 24 hours of admission OR [ ] reason not done:    ATTENDING ATTESTATION:  I was physically present for the key portions of the evaluation and management (E/M) service provided.  I agree with the above history, physical and plan, which I have reviewed and edited where appropriate.    Patient at high risk for neurological deterioration or death due to:  ICU delirium, aspiration PNA, DVT / PE.  Critical care time:  I have personally provided 45 minutes of critical care time, excluding time spent on separate procedures.      Plan discussed with RN, house staff. 45 y/o Female HH5 MF 4 BD 35 with:    L MCA ruptured aneurysm, subarachnoid hemorrhage, s/p DHC (10/26/2021, Dr. D'Amico @ [place]), brain compression, cerebral edema.   Anemia   Recent spinal surgery  UGIB  Bilateral pneumothoraces  Acute kidney injury, transaminitis  Pneumonia (enterobacter, proteus)    PLAN: Day 1 = 10-26 ; Day 4 = 10/29; Day 21 = 11/15  NEURO: Coma checks q4h, VS q4   DCI: Off nimodipine. No spasm on angio.  Hydrocephalus: EVD discontinued. No VPS. CT head pending on 11/29.  Cranioplasty to be scheduled in future (~ 3months)   Seizure prophylaxis: On levetiracetam 500 daily (renal dose). Recommend DC as patient never seized.   REHAB: Physical therapy evaluation and management     EARLY MOB: HOB elevated. Pending Cr AN. Accepted.    PULM: B/L pneumothoraces- resolved.   On full vent support. CPAP as tolerated  Secretions: thick. Continue mucomyst, 3% nebs. Pulmonary toilet  Chest tubes discontinued on 11/24. CXR 11/26 stable. No PTX.    CARDIO:    SBP goal 100-160mm Hg  On amlodipine 10mg PO daily; clonidine 0.3 q6h; hydralazine to 75q8h  No longer requires daily EKGs as off reglan    ENDO:  Blood sugar goals 140-180 mg/dL    GI: PPI daily; DC reglan as no further residuals or emesis.  LBM 11/25- multiple bowel movements on 11/25.  DIET: TF Nepro at 40cc/hr.    RENAL: CHETAN related to ATN. S/P dialysis (has permacath). Monitor Is/Os.   Hyponatremia- noted 11/27 likely related to HD. Improved S/P hypertonics. Discussed with renal to adjust dialysate 11/29 to avoid hyponatremia.  Na goal 135-145.   Renal following. Appreciate recs.     HEM/ONC: Anemia. Possible anemia of chronic disease; did have previous days of oozing from trach which has resolved.   VTE Prophylaxis: SCDs, SQH 5000u Q8  Repeat upper and lower extremity dopplers 11/26- resolved.   FOBT neg. Hb stable.    ID: Afebrile, no leukocytosis. S/P treatment of PNA    Social: Updated family. Dispo LTACH.     MISC: Scopolamine patch    CORE MEASURES  1.  Hunt and Griffin Score = 5  2.  VTE prophylaxis:  [ ] administered within 24 hours of admission OR [X] reason not done: fresh bleed, unsecured aneurysm.  3.  Dysphagia screening:   [X] performed before any oral meds / liquids / food  4.  Nimodipine treatment:  [X] administered within 24 hours of admission OR [ ] reason not done:    ATTENDING ATTESTATION:  I was physically present for the key portions of the evaluation and management (E/M) service provided.  I agree with the above history, physical and plan, which I have reviewed and edited where appropriate.    Patient at high risk for neurological deterioration or death due to:  ICU delirium, aspiration PNA, DVT / PE.  Critical care time:  I have personally provided 45 minutes of critical care time, excluding time spent on separate procedures.      Plan discussed with RN, house staff. 47 y/o Female HH5 MF 4 BD 35 with:    L MCA ruptured aneurysm, subarachnoid hemorrhage, s/p DHC (10/26/2021, Dr. D'Amico @ [place]), brain compression, cerebral edema.   Anemia   Recent spinal surgery  UGIB  Bilateral pneumothoraces  Acute kidney injury, transaminitis  Pneumonia (enterobacter, proteus)    PLAN: Day 1 = 10-26 ; Day 4 = 10/29; Day 21 = 11/15  NEURO: Coma checks q4h, VS q4   DCI: Off nimodipine. No spasm on angio.  Hydrocephalus: EVD discontinued. No VPS. CT head pending on 11/29.  Cranioplasty to be scheduled in future (~ 3months)   Seizure prophylaxis: On levetiracetam 500 daily (renal dose). Recommend DC as patient never seized.   REHAB: Physical therapy evaluation and management     EARLY MOB: HOB elevated. Pending Cr [facility]. Accepted.    PULM: B/L pneumothoraces- resolved.   On full vent support. CPAP as tolerated  Secretions: thick. Continue mucomyst, 3% nebs. Pulmonary toilet  Chest tubes discontinued on 11/24. CXR 11/26 stable. No PTX.    CARDIO:    SBP goal 100-160mm Hg  On amlodipine 10mg PO daily; clonidine 0.3 q6h; hydralazine 75q8h  No longer requires daily EKGs as off reglan    ENDO:  Blood sugar goals 140-180 mg/dL    GI: DC reglan as no further residuals or emesis.  LBM 11/25- multiple bowel movements on 11/25.  DIET: TF Nepro at 40cc/hr.    RENAL: CHETAN related to ATN. S/P dialysis (has permacath). Monitor Is/Os.   Hyponatremia- noted 11/27 likely related to HD. Improved S/P hypertonics. Discussed with renal to adjust dialysate 11/29 to avoid hyponatremia.  Na goal 135-145.   Renal following. Appreciate recs.     HEM/ONC: Anemia. Possible anemia of chronic disease; did have previous days of oozing from trach which has resolved.   VTE Prophylaxis: SCDs, SQH 5000u Q8  Repeat upper and lower extremity dopplers 11/26- resolved.   FOBT neg. Hb stable.    ID: Afebrile, no leukocytosis. S/P treatment of PNA    Social: Updated family. Dispo LTACH.     MISC: Scopolamine patch    CORE MEASURES  1.  Hunt and Griffin Score = 5  2.  VTE prophylaxis:  [ ] administered within 24 hours of admission OR [X] reason not done: fresh bleed, unsecured aneurysm.  3.  Dysphagia screening:   [X] performed before any oral meds / liquids / food  4.  Nimodipine treatment:  [X] administered within 24 hours of admission OR [ ] reason not done:    ATTENDING ATTESTATION:  I was physically present for the key portions of the evaluation and management (E/M) service provided.  I agree with the above history, physical and plan, which I have reviewed and edited where appropriate.    Patient at high risk for neurological deterioration or death due to:  ICU delirium, aspiration PNA, DVT / PE.  Critical care time:  I have personally provided 45 minutes of critical care time, excluding time spent on separate procedures.      Plan discussed with RN, house staff.

## 2021-11-29 NOTE — PROGRESS NOTE ADULT - SUBJECTIVE AND OBJECTIVE BOX
Patient is a 46y Female seen and evaluated at bedside. Na at goal, will defer HD today. Plan for tomorrow.     Meds:    acetylcysteine 20%  Inhalation 4 every 6 hours  amLODIPine   Tablet 10 daily  artificial  tears Solution 1 two times a day  chlorhexidine 0.12% Liquid 15 every 12 hours  chlorhexidine 2% Cloths 1 <User Schedule>  cloNIDine 0.3 every 6 hours  dextrose 50% Injectable 25 once  epoetin nannette-epbx (RETACRIT) Injectable 14310 once  heparin   Injectable 5000 every 8 hours  hydrALAZINE 100 every 8 hours  hydrALAZINE Injectable 10 every 2 hours PRN  labetalol Injectable 10 every 2 hours PRN  levETIRAcetam  Solution 500 every 24 hours  ondansetron Injectable 4 every 6 hours PRN  pantoprazole   Suspension 40 daily  polyethylene glycol 3350 17 two times a day  scopolamine 1 mG/72 Hr(s) Patch 1 every 72 hours  senna 2 at bedtime  sodium chloride 2 every 6 hours  sodium chloride 0.9% lock flush 10 every 1 hour PRN  sodium chloride 3%  Inhalation 4 every 6 hours      T(C): , Max: 37.3 (11-28-21 @ 14:32)  T(F): , Max: 99.2 (11-28-21 @ 14:32)  HR: 74 (11-29-21 @ 11:43)  BP: 135/73 (11-29-21 @ 08:00)  BP(mean): 98 (11-29-21 @ 08:00)  RR: 16 (11-29-21 @ 11:43)  SpO2: 100% (11-29-21 @ 11:43)  Wt(kg): --    11-28 @ 07:01  -  11-29 @ 07:00  --------------------------------------------------------  IN: 240 mL / OUT: 0 mL / NET: 240 mL    Review of Systems: Unable to participate    PHYSICAL EXAM:  GENERAL: intubated, s/p hemicraniectomy- staples on her scalp  CHEST/LUNG: Coarse bilaterally  HEART: normal S1S2, RRR  EXTREMITIES: +2 b/l UE oedema   ACCESS: RIJ tunneled cath placed 11/26    LABS:                        8.0    12.08 )-----------( 296      ( 29 Nov 2021 05:51 )             26.4     11-29    139  |  99  |  53<H>  ----------------------------<  139<H>  4.5   |  29  |  6.02<H>    Ca    9.1      29 Nov 2021 05:51  Phos  4.8     11-29  Mg     2.3     11-29    TPro  6.8  /  Alb  2.8<L>  /  TBili  <0.2  /  DBili  x   /  AST  24  /  ALT  <5<L>  /  AlkPhos  158<H>  11-29          Osmolality, Random Urine: 224 mosm/kg (11-27 @ 18:09)  Sodium, Random Urine: 50 mmol/L (11-27 @ 16:18)  Creatinine, Random Urine: 64 mg/dL (11-27 @ 16:18)        RADIOLOGY & ADDITIONAL STUDIES:           Patient is a 46y Female seen and evaluated at bedside. Na at goal, will defer HD today. Plan for tomorrow.     Meds:    acetylcysteine 20%  Inhalation 4 every 6 hours  amLODIPine   Tablet 10 daily  artificial  tears Solution 1 two times a day  chlorhexidine 0.12% Liquid 15 every 12 hours  chlorhexidine 2% Cloths 1 <User Schedule>  cloNIDine 0.3 every 6 hours  dextrose 50% Injectable 25 once  epoetin nannette-epbx (RETACRIT) Injectable 12628 once  heparin   Injectable 5000 every 8 hours  hydrALAZINE 100 every 8 hours  hydrALAZINE Injectable 10 every 2 hours PRN  labetalol Injectable 10 every 2 hours PRN  levETIRAcetam  Solution 500 every 24 hours  ondansetron Injectable 4 every 6 hours PRN  pantoprazole   Suspension 40 daily  polyethylene glycol 3350 17 two times a day  scopolamine 1 mG/72 Hr(s) Patch 1 every 72 hours  senna 2 at bedtime  sodium chloride 2 every 6 hours  sodium chloride 0.9% lock flush 10 every 1 hour PRN  sodium chloride 3%  Inhalation 4 every 6 hours      T(C): , Max: 37.3 (11-28-21 @ 14:32)  T(F): , Max: 99.2 (11-28-21 @ 14:32)  HR: 74 (11-29-21 @ 11:43)  BP: 135/73 (11-29-21 @ 08:00)  BP(mean): 98 (11-29-21 @ 08:00)  RR: 16 (11-29-21 @ 11:43)  SpO2: 100% (11-29-21 @ 11:43)  Wt(kg): --    11-28 @ 07:01  -  11-29 @ 07:00  --------------------------------------------------------  IN: 240 mL / OUT: 0 mL / NET: 240 mL    Review of Systems: Unable to participate    PHYSICAL EXAM:  GENERAL: intubated, s/p hemicraniectomy- staples on her scalp  CHEST/LUNG: Coarse bilaterally  HEART: normal S1S2, RRR  EXTREMITIES: +2 b/l UE oedema   ACCESS: RIJ tunneled cath placed 11/26    LABS:                        8.0    12.08 )-----------( 296      ( 29 Nov 2021 05:51 )             26.4     11-29    139  |  99  |  53<H>  ----------------------------<  139<H>  4.5   |  29  |  6.02<H>    Ca    9.1      29 Nov 2021 05:51  Phos  4.8     11-29  Mg     2.3     11-29    TPro  6.8  /  Alb  2.8<L>  /  TBili  <0.2  /  DBili  x   /  AST  24  /  ALT  <5<L>  /  AlkPhos  158<H>  11-29          Osmolality, Random Urine: 224 mosm/kg (11-27 @ 18:09)  Sodium, Random Urine: 50 mmol/L (11-27 @ 16:18)  Creatinine, Random Urine: 64 mg/dL (11-27 @ 16:18)        RADIOLOGY & ADDITIONAL STUDIES:      Reviewed

## 2021-11-29 NOTE — PROGRESS NOTE ADULT - ASSESSMENT
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 Hospital course c/b cardiac arrest x3 and anuric ATN requiring dialyisis.     Anuric iATN requiring long term dialysis  Baseline creatinine 0.6  First HD 11/20  Defer HD today; S/p IR tunneled cath placement 11/26  Indeterminate quantiferon and wnl hepatitis panel    EDW tbd  SBP goal 100-160 per neuro ICU; UF with HD  Anaemia- no need for IV iron, epo with HD  BMD: phos acceptable, no need for binders  Access: S/p IR tunneled cath placement 11/26    Daily weights, strict I&Os, renally dose meds, renal diet

## 2021-11-29 NOTE — PROGRESS NOTE ADULT - ATTENDING COMMENTS
See the Fellow's note written above, for details. I reviewed the fellow documentation.  I have personally seen and examined this patient today. I have reviewed vitals, labs, medications, and imaging. I agree with the fellow's findings and plans as written above with the following additions/amendments:    Patient seen and examined at bedside. comatose    .  VITAL SIGNS:  T(F): 99.1 (11-29-21 @ 18:01), Max: 99.1 (11-29-21 @ 18:01)  HR: 86 (11-29-21 @ 19:00) (60 - 86)  BP: 177/92 (11-29-21 @ 19:00) (128/69 - 177/92)  BP(mean): 128 (11-29-21 @ 19:00) (93 - 128)  RR: 33 (11-29-21 @ 19:00) (12 - 34)  SpO2: 100% (11-29-21 @ 19:00) (96% - 100%)    PHYSICAL EXAM:  Constitutional: Intubated, comatose; NAD  HEENT:  trach in place, clear secretions  Respiratory: Mechanical breath sounds bilaterally, not overbreathing vent  Cardiac: +S1/S2; RRR; no M/R/G  Gastrointestinal: soft, NT/ND; no rebound or guarding; +BS  Extremities: WWP, no clubbing or cyanosis; 1+edema  Dermatologic: skin warm, dry and intact; no rashes, wounds, or scars  Access: R TDC c/d/i      Defer HD today, for HD tomorrow, monitoring.

## 2021-11-29 NOTE — CHART NOTE - NSCHARTNOTEFT_GEN_A_CORE
Admitting Diagnosis:   Patient is a 46y old  Female who presents with a chief complaint of ICH (29 Nov 2021 13:02)      PAST MEDICAL & SURGICAL HISTORY:  No pertinent past medical history    Personal history of spine surgery        Current Nutrition Order: NPO diet  Nepro @40ml/hr x24hrs with x2 LPS daily (200kcal, 30g pro) via PEG. Provides: 960ml free H2O, 1928kcal, 108g protein, 698ml free H2O, 2.25g/kg IBW protein.     PO Intake: Good (%) [   ]  Fair (50-75%) [   ] Poor (<25%) [   ]- N/A    GI Issues:   Unable to assess at this time 2/2 trach, poor mental status  No episodes of emesis recorded  Last BM 11/25- ordered for miralax     Pain:  Non-verbal indicators of pain absent     Skin Integrity:  Head surgical incision, rosalind score 15, no edema   No pressure ulcers noted    Labs:   11-29    139  |  99  |  53<H>  ----------------------------<  139<H>  4.5   |  29  |  6.02<H>    Ca    9.1      29 Nov 2021 05:51  Phos  4.8     11-29  Mg     2.3     11-29    TPro  6.8  /  Alb  2.8<L>  /  TBili  <0.2  /  DBili  x   /  AST  24  /  ALT  <5<L>  /  AlkPhos  158<H>  11-29    CAPILLARY BLOOD GLUCOSE          Medications:  MEDICATIONS  (STANDING):  acetylcysteine 20%  Inhalation 4 milliLiter(s) Inhalation every 6 hours  amLODIPine   Tablet 10 milliGRAM(s) Oral daily  artificial  tears Solution 1 Drop(s) Both EYES two times a day  chlorhexidine 0.12% Liquid 15 milliLiter(s) Oral Mucosa every 12 hours  chlorhexidine 2% Cloths 1 Application(s) Topical <User Schedule>  cloNIDine 0.3 milliGRAM(s) Oral every 6 hours  dextrose 50% Injectable 25 milliLiter(s) IV Push once  epoetin nannette-epbx (RETACRIT) Injectable 75626 Unit(s) IV Push once  heparin   Injectable 5000 Unit(s) SubCutaneous every 8 hours  hydrALAZINE 100 milliGRAM(s) Oral every 8 hours  levETIRAcetam  Solution 500 milliGRAM(s) Oral every 24 hours  pantoprazole   Suspension 40 milliGRAM(s) Oral daily  polyethylene glycol 3350 17 Gram(s) Oral two times a day  scopolamine 1 mG/72 Hr(s) Patch 1 Patch Transdermal every 72 hours  senna 2 Tablet(s) Oral at bedtime  sodium chloride 2 Gram(s) Oral every 6 hours  sodium chloride 3%  Inhalation 4 milliLiter(s) Inhalation every 6 hours    MEDICATIONS  (PRN):  hydrALAZINE Injectable 10 milliGRAM(s) IV Push every 2 hours PRN SBP > 160  labetalol Injectable 10 milliGRAM(s) IV Push every 2 hours PRN SBP >160  ondansetron Injectable 4 milliGRAM(s) IV Push every 6 hours PRN Nausea and/or Vomiting  sodium chloride 0.9% lock flush 10 milliLiter(s) IV Push every 1 hour PRN Pre/post blood products, medications, blood draw, and to maintain line patency      Admitting Anthropometrics:  Height: 61" Weight: 187lbs, IBW 105lbs+/-10%, %%, BMI 34.9 kg/m2     Weight:  10/26 187lbs   11/3 183.6lbs  11/4 187.1lbs  11/20 204.3lbs/ 201lbs  11/21 208.9lbs/ 204.5lbs  11/22 202.1lbs/ 196.4lbs  11/26 195lbs (dry)    Weight Change: Weights fluctuating this admission likely 2/2 CHETAN and HD, please cont to trend weights before and after each HD session.     Nutrition Focused Physical Exam: Completed [   ]  Not Pertinent [ x  ]    Estimated energy needs:   IBW (48kg) used for calculations as pt >120% of IBW (178%). Needs adjusted for wound healing, critical care requiring intubation.  Kcal (25-30 kcal/kg): 6222-6148 kcal  Protein (1.4-1.6 g/kg pro): 67-76g pro  **Fluids per team 2/2 HD    Subjective: 46y/F with recent spinal surgery, found down and brought to ED.  In ED, witnessed shaking, ?seizures, intubated.  Imaging showed SAH.  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 Repeat CT HCP - EVD R frontal inserted, s/p L hemicraniectomy for decompression and evacuation of SDH 10/26. EVD placed. Pt returned to NSICU intubated and sedated. EVD@5cm h2o. Underwent work-up for emesis, Noted tongue biting with oral trauma and mild bleeding 11/8. S/p PEG tube (11/9/2021). TF stopped due to vomiting episodes 11/18 (Reglan ordered)- Pt refluxing feeds through mouth and nose overnight- now improved. Propofol held 11/18 for elevated TG levels. S/p trach placement 11/22. S/p permacath on 11/26.     Pt seen in room, she remains trached to vent on CPAP mode, off of sedation, no change in mental status. - not on pressors at this time, on multiple anti-HTN agents. EN running at 40ml/hr with no episodes of emesis recorded. Last BM 11/25- ordered for miralax. Ordered for HD session today. Afebrile. Pending CTH today. Pertinent labs: WBC 12.08 (H), Phos 4.8 (H), BUN 53/Cr 6.02 (H), AlkPhos 158 (H), . Pending LTACH placement. Will continue to follow per RD protocol.     Previous Nutrition Diagnosis: Inadequate oral intake RT Inability to meet est needs via PO AEB NPO, reliant on EN to meet 100% of est needs    Active [ x  ]  Resolved [   ]    If resolved, new PES:     Goal: Meet >80% of EER consistently via EN     Recommendations:  1.  To optimize nutrition at this time and avoid over feeding, please change feeds to Nepro @ 30ml/hr x24hrs with x1 LPS daily (100kcal, 15g pro) via PEG. Provides: 720 ml TV, 1396 kcal, 73g pro, 523 ml free water, 1.52g/kg IBW protein. Monitor for s/s intolerance; maintain aspiration precautions at all times   2. Pain and bowel regimen per team  3. Monitor lytes and replete prn. POC BG q6hrs   4. Trend dry wts   *d/w PA on phone     Education: Not appropriate based on current clinical/mental status     Risk Level: High [ x  ] Moderate [   ] Low [   ].

## 2021-11-29 NOTE — PROGRESS NOTE ADULT - SUBJECTIVE AND OBJECTIVE BOX
HPI:  47 y/o female with unknown PMHx presented to [place] [facility] BIBEMS after being found unconscious at home, unknown period of time. 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 HH5, MF4. Patient was intubated at [place] [facility] and sent straight to the OR for left hemicraniotomy. A-line placed intraop. Hemodynamically unstable upon arrival to [facility], bradycardic and hypertensive s/p Mannitol, Clevidipine, and Furosemide. Central line placed in NSICU. Currently sedated on Propofol, pending repeat CTH. History per patient's son, patient had recent spine surgery (in [place], unknown which hospital) and was found on outpatient imaging last week to have L M1 segment aneurysm (unruptured), pt asymptomatic at this time. Per son patient taking percocet PO at home. Ureña catheter, ET tube, and arterial line placed at [facility].     NIHSS on arrival: 32     Hospital Course:   10/26: admitted to [facility] from [facility], POD#0 s/p L hemicraniectomy for decompression and evacuation of SDH. Transferred to [facility] noted to have tense flap, received mannitol, keppra, decadron. Was hypertensive to 200s and preston to 40s, recevied 85g mannitol and bullet 23.4%. CTH on arrival demonstrated increased size in vents and new IVH. EVD placed, open at 15, central line placed. Transfused 2 u PRBC. POD0 of coiling of left MCA bifurcation aneurysm,   10/27: CTH demonstrated EVD in correct position, EVD lowered to 5, remains intubated on proprofol, pending repeat CTH in AM. Started on 3% @ 30/hr. 2LNS given for euvolemia, Mannitol given for uptrending ICPs. Bullet given 8:30 am. 3% increased to 50/hr. 1 L bolus. New EVD catheter placed using exisiting sreekanth hole. 1 u PRBC.  10/28: HH5, MF4, BD3; POD2 angio coil/embo of L MCA aneurysm. JOAQUÍN overnight, neuro stable. EVD@5cmH20 ICPs < 20 overnight, OP WNL. S/p 1 unit PRBC yesterday with appropriate response. Given 42g mannitol in AM for ICP 22 persistently, with improvement, then given 23% in PM for elevated ICP. febrile, pancultured. Standing tylenol and cooling blanket.   10/29: BD4, POD3 angio coil/embo. JOAQUÍN o/n, neuro stable. EVD@5, ICPs <20 o/n.   10/30: BD5, POD4 angio coil/embo. JOAQUÍN o/n neuro stable. EVD@5. 3% @50cc/hr.  10/31: BD6, POD5 angio coil/embo. brief period maintained upward gaze, resolved on its own. EVD@5cm h2o.    11/1: BD7, POD6 L hemicrani, angio coil/embo. JOAQUÍN overnight. Neuro exam unchanged. ICPs WNL. started bromocriptine/gabapentin/baclofen. dc'd propofol, started precedex  11/2: BD8 POD7 L hemicrani, angio coil/embo. JOAQUÍN overnight. Neuro exam stable. Remains on 3% hypertonic saline. Receieved 1 unit of PRBCs for a Hgb of 7.6.  11/3: BD 9 POD8 of L hemicrani. Elevated lipase, normal amylase, OG tube clogged and replaced. Abdominal US normal. Pending gen surg reccs regarding trach and peg. Gabapentin and Baclofen increased to help with neurostorming. restarted back on 3%, LR@35. became preston+hypotensive with nimodipine 60q4, decreased back to 30q2, NaCl bullet given.   11/4: BD10 POD9, JOAQUÍN o/n, 500cc NS for euvolemia,  neuro stable, EVD at 5. 3% increased to 75  11/5: BD11 POD10, JOAQUÍN o/n, neuro stable, EVD at 5  11/6: BD12 POD11 JOAQUÍN overnight. Neuro exam stable. Remains intubated on precedex. 3% hypertonic saline dc'd  11/7: BD13 POD 12 JOAQUÍN o/n, NGT replaced. on precex with no icp crisis   11/8: BD14 POD13 JOAQUÍN o/n, noted to have tongue maceration/macroglossia. Gauze with tongue depressor placed. Pending further recs from ENT. Pending trach and PEG placement tomorrow with gen surg. Off precedex, ICPs stable. EVD remains @ 5.   11/9: BD15, POD 14, bleeding under tongue at start of shift, fentanyl pushed with no further episodes. gabapentin stopped. PEG placed  11/10: BD 16, POD 15, neuro stable, ENT to be consulted in AM, 3% increased to 50/hr.  11/11: BD 17, POD 16, neuro exam stable. Pend CT saba in am for cranioplasty planning. Pend trach in OR with ENT. F.u BMP in am, 3%@50cc/hr for Na goal 140-145.   11/12: BD 18, POD 17. JOAQUÍN overnight, neuro stable. Pend trach placement today. [facility] saba completed 11/11. Off of 3%, f/u am BMP for Na goal 140-150. CSF neg. Hypoxic cardiac arrest with ROSC x 3 during tracheostomy placement. B/l chest tubes placed for resulting pneumothorax s/p CPR. salt tabs dc'd.  11/13: BD 19, POD 18. Patient tachycardic with some improvement, SBP stable off of levophed, hgb downtrending o/n, transfused 1 unit PRBCs. B/l chest tube. EVD @5cmH2O, no elevated ICPs. UOP downtrending, trend BUN/Cr, given 1LNS x1 for low urine output. F/u am CXR. Cont Cefepime until today, then change to Ancef while trach packing in place per ENT. Pend CT when stable. Trops downtrending s/p cardiac arrest. 1L. florinef dc'd. BP goal changed to 100-160, started on amlodipine   11/14: BD20, POD19, worsening creatinine with decreased urine output. Given 250 of albumin and 500cc LR. started on hydralazine PO, decreased amantadine 100 daily given CrCl of 20.  11/15: BD21, POD20, remains oliguric, fem line dc'd, tongue swollen and unable to fit bite block in mouth, started on nimbex gtt to relax jaw. Propofol and fentanyl gtt started. Vomiting TFs through nose and mouth, ENT called. TFs held. Cr uptrending. Palliative consulted.  11/16: BD22, POD21, o/n external trach dressing replaced due to cuff leak and decreasing TV, sedated and paralyzed on nimbex, propofol, and fentanyl gtt. CTH stable.  EVD raised from 5 to 10. Nimbex weaned off. Cr uptrending, Trickle feeds started. FOB negative.   11/17: BD 23, POD22, JOAQUÍN o/n, EVD clamped, NS d/c'ed, LR@50, advancing tube feeds.   11/18: BD24, POD23 o/n 3% at 30 with Na acetate started, propofol dc'd due to elevated TG level, episode of vomiting TFs from nose and mouth into ETT with elevated ICP 30s, ICP normalized with resolution of vomiting, reglan 5mg IV given, TFs held, 3% stopped. midline placed   11/19; BD25, POD24 JOAQUÍN overnight. Remains on versed and fentanyl drips. Neuro exam unchanged. R IJ dialysis cath placed.   11/20: BD26 POD25 JOAQUÍN overnight. Neuro exam unchanged. Plan for HD today  11/21: BD 27 POD 26 weaned off versed, fentanyl   11/22: BD 28 POD 27 JOAQUÍN o/n , off fentanyl gtt, pt is calm. s/p HD earlier today. s/p trach revision  11/23: BD29. incraesed clonidine to 0.4 BID, s/p trach, stable hemodynamically. neuro at baseline. TFs stopped for vomiting and restarted 10cc/hr  11/24: BD 30, POD29 TFs inc to 20cc/hr. HD today  11/25: BD31, POD30. JOAQUÍN o/n neuro stable  11/26: BD32, POD 31, JOAQUÍN overnight,  perma cath placement with IR today , hydralazine inc 75q8, reglan decreased 2.5q6. Hemodialyzed today, took off 3L and given 1unit pRBCs  11/27: BD33, POD32, JOAQUÍN overnight, pending LTAC. hyponatremic, urine lytes sent. 250cc bolus 3% started, salt tabs started  11/28: POD 33 L hemicrani. JOAQUÍN o/n. Hyponatremia improved, NaCl 2 g q 6. Pending [facility] at [facility], increased prn requirements for BP (hydral + labetalol x1), hydral PO increased to 100q8, Fentanyl x1 for pain.   11/29: POD 34 s/p L hemicrani, JOAQUÍN o/n, pend dialysis tomorrow. CTH ordered for am, exit CTH pend. Plan for discharge to [facility]. Tolerating CPAP during the day, full vent support overnight.         Vital Signs Last 24 Hrs  T(C): 37 (29 Nov 2021 00:56), Max: 37.3 (28 Nov 2021 14:32)  T(F): 98.6 (29 Nov 2021 00:56), Max: 99.2 (28 Nov 2021 14:32)  HR: 62 (29 Nov 2021 00:50) (61 - 79)  BP: 131/77 (29 Nov 2021 00:00) (131/72 - 169/90)  BP(mean): 99 (29 Nov 2021 00:00) (96 - 124)  RR: 12 (29 Nov 2021 00:50) (12 - 21)  SpO2: 100% (29 Nov 2021 00:50) (100% - 100%)    I&O's Detail    27 Nov 2021 07:01  -  28 Nov 2021 07:00  --------------------------------------------------------  IN:    Enteral Tube Flush: 570 mL    Nepro with Carb Steady: 960 mL    sodium chloride 3%: 250 mL  Total IN: 1780 mL    OUT:    Intermittent Catheterization - Urethral (mL): 200 mL  Total OUT: 200 mL    Total NET: 1580 mL      28 Nov 2021 07:01  -  29 Nov 2021 01:21  --------------------------------------------------------  IN:    Nepro with Carb Steady: 240 mL  Total IN: 240 mL    OUT:  Total OUT: 0 mL    Total NET: 240 mL        I&O's Summary    27 Nov 2021 07:01  -  28 Nov 2021 07:00  --------------------------------------------------------  IN: 1780 mL / OUT: 200 mL / NET: 1580 mL    28 Nov 2021 07:01  -  29 Nov 2021 01:21  --------------------------------------------------------  IN: 240 mL / OUT: 0 mL / NET: 240 mL        PHYSICAL EXAM:  General: NAD, pt is comfortably  laying in hospital bed, +trach on full vent support   HEENT: PERRL 3mm, OE spont, b/l corneals, blinks to threat b/l   Cardiovascular: RRR, normal S1 and S2   Respiratory: lungs CTAB, no wheezing, rhonchi, or crackles   GI: normoactive BS to auscultation, abd soft, NTND, +PEG   Neuro: nonverbal, not answering questions, OE spont but not to command  RUE extensor posture LUE 0/5, b/l LE triple flex to noxious    Extremities: distal pulses 2+ x4   Wound/incision: L hemicrani incision site C/D/I, flap soft and full. B/l posterior spinal surgical incision sites with improving areas of wound dehiscence     TUBES/LINES:  [] Ureña  [] Lumbar Drain  [] Wound Drains  [X] Others - primafit, PEG, a-line, trach     DIET:  [] NPO  [] Mechanical  [X] Tube feeds      LABS:                        8.3    11.74 )-----------( 246      ( 28 Nov 2021 05:31 )             26.1     11-28    138  |  99  |  37<H>  ----------------------------<  135<H>  4.5   |  28  |  5.01<H>    Ca    8.6      28 Nov 2021 05:31  Phos  4.1     11-28  Mg     2.2     11-28    TPro  6.7  /  Alb  2.6<L>  /  TBili  <0.2  /  DBili  x   /  AST  27  /  ALT  <5<L>  /  AlkPhos  155<H>  11-28            CAPILLARY BLOOD GLUCOSE          Drug Levels: [] N/A    CSF Analysis: [] N/A      Allergies    No Known Allergies    Intolerances      MEDICATIONS:  Antibiotics:    Neuro:  levETIRAcetam  Solution 500 milliGRAM(s) Oral every 24 hours  ondansetron Injectable 4 milliGRAM(s) IV Push every 6 hours PRN  scopolamine 1 mG/72 Hr(s) Patch 1 Patch Transdermal every 72 hours    Anticoagulation:  heparin   Injectable 5000 Unit(s) SubCutaneous every 8 hours    OTHER:  acetylcysteine 20%  Inhalation 4 milliLiter(s) Inhalation every 6 hours  amLODIPine   Tablet 10 milliGRAM(s) Oral daily  artificial  tears Solution 1 Drop(s) Both EYES two times a day  chlorhexidine 0.12% Liquid 15 milliLiter(s) Oral Mucosa every 12 hours  chlorhexidine 2% Cloths 1 Application(s) Topical <User Schedule>  chlorhexidine 2% Cloths 1 Application(s) Topical <User Schedule>  cloNIDine 0.3 milliGRAM(s) Oral every 6 hours  dextrose 50% Injectable 25 milliLiter(s) IV Push once  hydrALAZINE 100 milliGRAM(s) Oral every 8 hours  hydrALAZINE Injectable 10 milliGRAM(s) IV Push every 2 hours PRN  labetalol Injectable 10 milliGRAM(s) IV Push every 2 hours PRN  pantoprazole   Suspension 40 milliGRAM(s) Oral daily  polyethylene glycol 3350 17 Gram(s) Oral two times a day  senna 2 Tablet(s) Oral at bedtime  sodium chloride 3%  Inhalation 4 milliLiter(s) Inhalation every 6 hours    IVF:  sodium chloride 2 Gram(s) Oral every 6 hours    CULTURES:    RADIOLOGY & ADDITIONAL TESTS:      ASSESSMENT:  47 y/o female with PMHx of HTN and MS, hx of spinal surgery at [facility] 10/8/21 presented to [place] [facility] BIBEMS after being found unconscious at home, unknown period of time. 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 HH5, MF4; BD #1 = 10/26. Patient was intubated at [facility] and sent straight to the OR for left hemicraniotomy. A-line placed intraop. Hemodynamically unstable upon arrival to [facility], bradycardic and hypertensive s/p Mannitol and Furosemide. Central line placed in NSICU. S/p EVD placement, and coil embolization of left MCA bifurcation aneurysm 10/26/2021. S/p cerebral angio without findings of vasospasm 11/10. S/p cardiac arrest with ROSC x3 on 11/12 during tracheostomy at bedside now with b/l pneumothoracies and worsening kidney function. EVD dc'd 11/18, trach'd 11/22, permacath placed 11/26, pending LTACH.        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, left    Intracranial hemorrhage, spontaneous subarachnoid, due to cerebral aneurysm, acute    Pneumothorax, left    Functional quadriplegia    Acute respiratory failure with hypoxia    Cardiac arrest    Encounter for palliative care    Advanced care planning/counseling discussion    IPH (idiopathic pulmonary hemosiderosis)    Acute spontaneous intraparenchymal intracranial hemorrhage due to cerebral aneurysm    CHETAN (acute kidney injury)    Hypocalcemia    Angiogram, cerebral, with coil embolization, in non-operating room setting    Endoscopic percutaneous gastrostomy    Angiogram, carotid and cerebral, bilateral    Chest tube placement    Insertion of central venous catheter with ultrasound guidance    Insertion of temporary dialysis catheter    Tracheostomy, planned    Personal history of spine surgery    CHETAN (acute kidney injury)    SysAdmin_VstLnk        PLAN:  NEURO:  - neuro checks q4hrs/vital signs q4hr   - Keppra 500mg q24h renal dosing   - CTH 11/18 stable, need CTH 11/29 prior to discharge   - plan for cranioplasty in ~3 months   - staples removed    CARDIO:  - -160   - amlodipine 10 daily and hydral 100 q8h, clonidine 0.3 q6h  - s/p cardiac arrest   - TTE 11/15: mild LVH, EF 70%       PULM:  - trach, CPAP during day, full vent support overnight  - s/p acute hypoxic cardiac arrest 11/12 during tracheostomy placment with ENT c/b b/l pneumothorax and b/l chest tubes (d/kiko 11/24)  - scopolomine patch started for secretions  - mucomyst q12    GI:  - PEG TFs nepro- 40cc, keep at 40cc/hr, slowly raise   - PPI QD GI ppx   - Bowel regimen   - Alk phos uptrending, shocked liver, improving       RENAL:   - s/p  long term HD port with IR 11/26  - S/p dialysis 11/26, 3L taken off  - Na 135-145   - salt tabs 2q6  - renal failure with high Cr. post cardiac arrest   - Nephro following, s/p HD last 11/26  - daily weights     HEME:  - SCDs, SQH  - s/p multiple transfusions this admission, most recently s/p 1unit PRBC 11/26  - UE and LE dopplers negative 11/26  - FOB neg 11/16     ID:  - Tylenol PRN for fever   - sputum cx 11/4: enterobacter, proteus  - Cefepime started to cover for enterobacter/proteus in sputum dc'd 11/15     ENDO:  - ISS   - monitor FS     OTHER  - wound care recs- q2 dressing changes   - ENT consulted, reccomends trach placement and oral hygeine for tongue laceration   - has R midline (11/18)    GOC: full code  Level of care: ICU status   Dispo: pending LTAC [facility]  family updated    Case discussed with Dr. Duncan

## 2021-11-30 LAB
ALBUMIN SERPL ELPH-MCNC: 2.9 G/DL — LOW (ref 3.3–5)
ALP SERPL-CCNC: 177 U/L — HIGH (ref 40–120)
ALT FLD-CCNC: <5 U/L — LOW (ref 10–45)
ANION GAP SERPL CALC-SCNC: 11 MMOL/L — SIGNIFICANT CHANGE UP (ref 5–17)
AST SERPL-CCNC: 28 U/L — SIGNIFICANT CHANGE UP (ref 10–40)
BILIRUB SERPL-MCNC: 0.2 MG/DL — SIGNIFICANT CHANGE UP (ref 0.2–1.2)
BUN SERPL-MCNC: 57 MG/DL — HIGH (ref 7–23)
CALCIUM SERPL-MCNC: 9.4 MG/DL — SIGNIFICANT CHANGE UP (ref 8.4–10.5)
CHLORIDE SERPL-SCNC: 99 MMOL/L — SIGNIFICANT CHANGE UP (ref 96–108)
CO2 SERPL-SCNC: 27 MMOL/L — SIGNIFICANT CHANGE UP (ref 22–31)
CREAT SERPL-MCNC: 6.67 MG/DL — HIGH (ref 0.5–1.3)
GLUCOSE SERPL-MCNC: 142 MG/DL — HIGH (ref 70–99)
HCT VFR BLD CALC: 26.4 % — LOW (ref 34.5–45)
HGB BLD-MCNC: 8.1 G/DL — LOW (ref 11.5–15.5)
MAGNESIUM SERPL-MCNC: 2.4 MG/DL — SIGNIFICANT CHANGE UP (ref 1.6–2.6)
MCHC RBC-ENTMCNC: 25.7 PG — LOW (ref 27–34)
MCHC RBC-ENTMCNC: 30.7 GM/DL — LOW (ref 32–36)
MCV RBC AUTO: 83.8 FL — SIGNIFICANT CHANGE UP (ref 80–100)
NRBC # BLD: 0 /100 WBCS — SIGNIFICANT CHANGE UP (ref 0–0)
PHOSPHATE SERPL-MCNC: 4.8 MG/DL — HIGH (ref 2.5–4.5)
PLATELET # BLD AUTO: 326 K/UL — SIGNIFICANT CHANGE UP (ref 150–400)
POTASSIUM SERPL-MCNC: 4.7 MMOL/L — SIGNIFICANT CHANGE UP (ref 3.5–5.3)
POTASSIUM SERPL-SCNC: 4.7 MMOL/L — SIGNIFICANT CHANGE UP (ref 3.5–5.3)
PROT SERPL-MCNC: 6.9 G/DL — SIGNIFICANT CHANGE UP (ref 6–8.3)
RBC # BLD: 3.15 M/UL — LOW (ref 3.8–5.2)
RBC # FLD: 20.1 % — HIGH (ref 10.3–14.5)
SODIUM SERPL-SCNC: 137 MMOL/L — SIGNIFICANT CHANGE UP (ref 135–145)
WBC # BLD: 12.19 K/UL — HIGH (ref 3.8–10.5)
WBC # FLD AUTO: 12.19 K/UL — HIGH (ref 3.8–10.5)

## 2021-11-30 PROCEDURE — 90935 HEMODIALYSIS ONE EVALUATION: CPT

## 2021-11-30 PROCEDURE — 99291 CRITICAL CARE FIRST HOUR: CPT | Mod: 25

## 2021-11-30 RX ORDER — ERYTHROPOIETIN 10000 [IU]/ML
10000 INJECTION, SOLUTION INTRAVENOUS; SUBCUTANEOUS ONCE
Refills: 0 | Status: COMPLETED | OUTPATIENT
Start: 2021-11-30 | End: 2021-11-30

## 2021-11-30 RX ORDER — NIFEDIPINE 30 MG
30 TABLET, EXTENDED RELEASE 24 HR ORAL DAILY
Refills: 0 | Status: DISCONTINUED | OUTPATIENT
Start: 2021-11-30 | End: 2021-11-30

## 2021-11-30 RX ORDER — LACTULOSE 10 G/15ML
10 SOLUTION ORAL DAILY
Refills: 0 | Status: DISCONTINUED | OUTPATIENT
Start: 2021-11-30 | End: 2021-12-02

## 2021-11-30 RX ORDER — FENTANYL CITRATE 50 UG/ML
25 INJECTION INTRAVENOUS ONCE
Refills: 0 | Status: DISCONTINUED | OUTPATIENT
Start: 2021-11-30 | End: 2021-11-30

## 2021-11-30 RX ORDER — IRON SUCROSE 20 MG/ML
200 INJECTION, SOLUTION INTRAVENOUS ONCE
Refills: 0 | Status: COMPLETED | OUTPATIENT
Start: 2021-11-30 | End: 2021-11-30

## 2021-11-30 RX ADMIN — IRON SUCROSE 110 MILLIGRAM(S): 20 INJECTION, SOLUTION INTRAVENOUS at 09:56

## 2021-11-30 RX ADMIN — Medication 10 MILLIGRAM(S): at 23:53

## 2021-11-30 RX ADMIN — SODIUM CHLORIDE 2 GRAM(S): 9 INJECTION INTRAMUSCULAR; INTRAVENOUS; SUBCUTANEOUS at 11:51

## 2021-11-30 RX ADMIN — SODIUM CHLORIDE 4 MILLILITER(S): 9 INJECTION INTRAMUSCULAR; INTRAVENOUS; SUBCUTANEOUS at 05:28

## 2021-11-30 RX ADMIN — FENTANYL CITRATE 25 MICROGRAM(S): 50 INJECTION INTRAVENOUS at 15:52

## 2021-11-30 RX ADMIN — CHLORHEXIDINE GLUCONATE 1 APPLICATION(S): 213 SOLUTION TOPICAL at 06:00

## 2021-11-30 RX ADMIN — ERYTHROPOIETIN 10000 UNIT(S): 10000 INJECTION, SOLUTION INTRAVENOUS; SUBCUTANEOUS at 11:21

## 2021-11-30 RX ADMIN — SODIUM CHLORIDE 4 MILLILITER(S): 9 INJECTION INTRAMUSCULAR; INTRAVENOUS; SUBCUTANEOUS at 17:01

## 2021-11-30 RX ADMIN — LEVETIRACETAM 500 MILLIGRAM(S): 250 TABLET, FILM COATED ORAL at 22:22

## 2021-11-30 RX ADMIN — AMLODIPINE BESYLATE 10 MILLIGRAM(S): 2.5 TABLET ORAL at 05:27

## 2021-11-30 RX ADMIN — Medication 10 MILLIGRAM(S): at 15:05

## 2021-11-30 RX ADMIN — Medication 10 MILLIGRAM(S): at 09:29

## 2021-11-30 RX ADMIN — SODIUM CHLORIDE 4 MILLILITER(S): 9 INJECTION INTRAMUSCULAR; INTRAVENOUS; SUBCUTANEOUS at 11:30

## 2021-11-30 RX ADMIN — FENTANYL CITRATE 25 MICROGRAM(S): 50 INJECTION INTRAVENOUS at 00:00

## 2021-11-30 RX ADMIN — POLYETHYLENE GLYCOL 3350 17 GRAM(S): 17 POWDER, FOR SOLUTION ORAL at 05:28

## 2021-11-30 RX ADMIN — SODIUM CHLORIDE 2 GRAM(S): 9 INJECTION INTRAMUSCULAR; INTRAVENOUS; SUBCUTANEOUS at 05:27

## 2021-11-30 RX ADMIN — HEPARIN SODIUM 5000 UNIT(S): 5000 INJECTION INTRAVENOUS; SUBCUTANEOUS at 22:23

## 2021-11-30 RX ADMIN — Medication 4 MILLILITER(S): at 05:27

## 2021-11-30 RX ADMIN — Medication 0.3 MILLIGRAM(S): at 15:46

## 2021-11-30 RX ADMIN — Medication 10 MILLIGRAM(S): at 01:58

## 2021-11-30 RX ADMIN — HEPARIN SODIUM 5000 UNIT(S): 5000 INJECTION INTRAVENOUS; SUBCUTANEOUS at 05:29

## 2021-11-30 RX ADMIN — SODIUM CHLORIDE 2 GRAM(S): 9 INJECTION INTRAMUSCULAR; INTRAVENOUS; SUBCUTANEOUS at 23:20

## 2021-11-30 RX ADMIN — CHLORHEXIDINE GLUCONATE 15 MILLILITER(S): 213 SOLUTION TOPICAL at 22:18

## 2021-11-30 RX ADMIN — Medication 10 MILLIGRAM(S): at 19:33

## 2021-11-30 RX ADMIN — Medication 100 MILLIGRAM(S): at 22:22

## 2021-11-30 RX ADMIN — POLYETHYLENE GLYCOL 3350 17 GRAM(S): 17 POWDER, FOR SOLUTION ORAL at 17:01

## 2021-11-30 RX ADMIN — Medication 10 MILLIGRAM(S): at 08:17

## 2021-11-30 RX ADMIN — Medication 4 MILLILITER(S): at 17:01

## 2021-11-30 RX ADMIN — Medication 0.3 MILLIGRAM(S): at 10:45

## 2021-11-30 RX ADMIN — Medication 100 MILLIGRAM(S): at 14:55

## 2021-11-30 RX ADMIN — CHLORHEXIDINE GLUCONATE 15 MILLILITER(S): 213 SOLUTION TOPICAL at 16:58

## 2021-11-30 RX ADMIN — SENNA PLUS 2 TABLET(S): 8.6 TABLET ORAL at 22:24

## 2021-11-30 RX ADMIN — Medication 100 MILLIGRAM(S): at 05:27

## 2021-11-30 RX ADMIN — Medication 1 DROP(S): at 16:58

## 2021-11-30 RX ADMIN — SCOPALAMINE 1 PATCH: 1 PATCH, EXTENDED RELEASE TRANSDERMAL at 17:10

## 2021-11-30 RX ADMIN — Medication 0.3 MILLIGRAM(S): at 22:22

## 2021-11-30 RX ADMIN — SODIUM CHLORIDE 2 GRAM(S): 9 INJECTION INTRAMUSCULAR; INTRAVENOUS; SUBCUTANEOUS at 17:01

## 2021-11-30 RX ADMIN — LACTULOSE 10 GRAM(S): 10 SOLUTION ORAL at 11:51

## 2021-11-30 RX ADMIN — HEPARIN SODIUM 5000 UNIT(S): 5000 INJECTION INTRAVENOUS; SUBCUTANEOUS at 14:55

## 2021-11-30 RX ADMIN — Medication 4 MILLILITER(S): at 11:29

## 2021-11-30 RX ADMIN — Medication 0.3 MILLIGRAM(S): at 04:49

## 2021-11-30 RX ADMIN — Medication 10 MILLIGRAM(S): at 04:54

## 2021-11-30 NOTE — PROGRESS NOTE ADULT - ASSESSMENT
47 y/o Female HH5 MF 4 BD 36 with:    L MCA ruptured aneurysm, subarachnoid hemorrhage, s/p DHC (10/26/2021, Dr. D'Amico @ [place]), brain compression, cerebral edema.   Anemia   Recent spinal surgery  UGIB  Bilateral pneumothoraces  Acute kidney injury, transaminitis  Pneumonia (enterobacter, proteus)    PLAN: Day 1 = 10-26 ; Day 4 = 10/29; Day 21 = 11/15  NEURO: Coma checks q4h, VS q4   DCI: Off nimodipine. No spasm on angio.  Hydrocephalus: EVD discontinued. No VPS. CT head on 11/29: mild increase in vent size.  Cranioplasty to be scheduled in future (~ 3months)   Seizure prophylaxis: On levetiracetam 500 daily (renal dose). Recommend DC as patient never seized.   REHAB: Physical therapy evaluation and management     EARLY MOB: HOB elevated. Pending Cr AN. Accepted.    PULM: B/L pneumothoraces- resolved.   On full vent support. CPAP as tolerated  Secretions: thick. Continue mucomyst, 3% nebs. Pulmonary toilet  Chest tubes discontinued on 11/24. CXR 11/26 stable. No PTX.    CARDIO:    SBP goal 100-160mm Hg  On amlodipine 10mg PO daily; clonidine 0.3 q6h; hydralazine 75q8h  No longer requires daily EKGs as off reglan    ENDO:  Blood sugar goals 140-180 mg/dL    GI: DC reglan as no further residuals or emesis.  LBM 11/25- multiple bowel movements on 11/25.  DIET: TF Nepro at 40cc/hr.    RENAL: CHETAN related to ATN. S/P dialysis (has permacath). Monitor Is/Os.   Hyponatremia- noted 11/27 likely related to HD. Improved S/P hypertonics. Discussed with renal to adjust dialysate 11/29 to avoid hyponatremia.  Na goal 135-145.   Renal following. Appreciate recs.     HEM/ONC: Anemia. Possible anemia of chronic disease; did have previous days of oozing from trach which has resolved.   VTE Prophylaxis: SCDs, SQH 5000u Q8  Repeat upper and lower extremity dopplers 11/26- resolved.   FOBT neg. Hb stable.    ID: Afebrile, no leukocytosis. S/P treatment of PNA    Social: Updated family. Dispo LTACH.     MISC: Scopolamine patch    CORE MEASURES  1.  Hunt and Griffin Score = 5  2.  VTE prophylaxis:  [ ] administered within 24 hours of admission OR [X] reason not done: fresh bleed, unsecured aneurysm.  3.  Dysphagia screening:   [X] performed before any oral meds / liquids / food  4.  Nimodipine treatment:  [X] administered within 24 hours of admission OR [ ] reason not done:    ATTENDING ATTESTATION:  I was physically present for the key portions of the evaluation and management (E/M) service provided.  I agree with the above history, physical and plan, which I have reviewed and edited where appropriate.    Plan discussed with RN, house staff. 47 y/o Female HH5 MF 4 BD 35 with:    L MCA ruptured aneurysm, subarachnoid hemorrhage, s/p DHC (10/26/2021, Dr. D'Amico @ [place]), brain compression, cerebral edema.   Anemia   Recent spinal surgery  UGIB  Bilateral pneumothoraces  Acute kidney injury, transaminitis  Pneumonia (enterobacter, proteus)    PLAN: Day 1 = 10-26 ; Day 4 = 10/29; Day 21 = 11/15  NEURO: Coma checks q4h, VS q4   DCI: Completed nimodipine. No spasm on angio.  Hydrocephalus: EVD discontinued. No VPS. CT head on 11/29: mild increase in vent size.   Cranioplasty to be scheduled in future (~ 3months)   Seizure prophylaxis: On levetiracetam 500 daily (renal dose). Recommend DC as patient never seized.   REHAB: Physical therapy evaluation and management     EARLY MOB: HOB elevated. Pending Cr AN. Awaiting.     PULM: B/L pneumothoraces- resolved.   CPAP as tolerated- takes low tidal volumes ~250s frequently. Unable to trach collar at this time.  Secretions: thick. Continue mucomyst, 3% nebs. Pulmonary toilet  Chest tubes discontinued on 11/24. CXR 11/26 stable. No PTX.    CARDIO:    SBP goal 100-160mm Hg  On amlodipine 10mg PO daily; clonidine 0.3 q6h; hydralazine 75q8h    ENDO: Blood sugar goals 140-180 mg/dL    GI: No further residuals or emesis.  LBM 11/25- multiple bowel movements on 11/25. None since then.   Lactulose dose x1.   DC protonix   DIET: TF Nepro at 30cc/hr.    RENAL: CHETAN related to ATN. S/P dialysis (has permacath). Monitor Is/Os.   Hyponatremia- noted 11/27 likely related to HD. Improved S/P hypertonics. Discussed with renal to adjust dialysate 11/30 to avoid hyponatremia. Na goal 135-145.   Renal following. Appreciate recs.     HEM/ONC: Anemia. Possible anemia of chronic disease; did have previous days of oozing from trach which has resolved.   VTE Prophylaxis: SCDs, SQH 5000u Q8  Repeat upper and lower extremity dopplers 11/26- resolved.   FOBT neg. Hb stable.    ID: Afebrile, no leukocytosis. S/P treatment of PNA    Social: Updated family. Dispo LTACH.     MISC: Scopolamine patch    CORE MEASURES  1.  Hunt and Griffin Score = 5  2.  VTE prophylaxis:  [ ] administered within 24 hours of admission OR [X] reason not done: fresh bleed, unsecured aneurysm.  3.  Dysphagia screening:   [X] performed before any oral meds / liquids / food  4.  Nimodipine treatment:  [X] administered within 24 hours of admission OR [ ] reason not done:    ATTENDING ATTESTATION:  I was physically present for the key portions of the evaluation and management (E/M) service provided.  I agree with the above history, physical and plan, which I have reviewed and edited where appropriate.    Plan discussed with RN, house staff. 45 y/o Female HH5 MF 4 BD 36 with:    L MCA ruptured aneurysm, subarachnoid hemorrhage, s/p DHC (10/26/2021, Dr. D'Amico @ [place]), brain compression, cerebral edema.   Anemia   Recent spinal surgery  UGIB  Bilateral pneumothoraces  Acute kidney injury, transaminitis  Pneumonia (enterobacter, proteus)    PLAN: Day 1 = 10-26 ; Day 4 = 10/29; Day 21 = 11/15  NEURO: Coma checks q4h, VS q4   DCI: Completed nimodipine. No spasm on angio.  Hydrocephalus: EVD discontinued. No VPS. CT head on 11/29: mild increase in vent size.   Cranioplasty to be scheduled in future (~ 3months)   Seizure prophylaxis: On levetiracetam 500 daily (renal dose). Recommend DC as patient never seized.   REHAB: Physical therapy evaluation and management     EARLY MOB: HOB elevated. Pending Cr AN. Awaiting.     PULM: B/L pneumothoraces- resolved.   CPAP as tolerated- takes low tidal volumes ~250s frequently. Unable to trach collar at this time.  Secretions: thick. Continue mucomyst, 3% nebs. Pulmonary toilet  Chest tubes discontinued on 11/24. CXR 11/26 stable. No PTX.    CARDIO:    SBP goal 100-160mm Hg  On amlodipine 10mg PO daily; clonidine 0.3 q6h; hydralazine 100q8h. Titrate prn    ENDO: Blood sugar goals 140-180 mg/dL    GI: No further residuals or emesis.  LBM 11/25- multiple bowel movements on 11/25. None since then.   Lactulose dose x1.   DC protonix   DIET: TF Nepro at 30cc/hr.    RENAL: CHETAN related to ATN. S/P dialysis (has permacath). Monitor Is/Os.   Hyponatremia- noted 11/27 likely related to HD. Improved S/P hypertonics. Discussed with renal to adjust dialysate 11/30 to avoid hyponatremia. Na goal 135-145.   Renal following. Appreciate recs.     HEM/ONC: Anemia. Possible anemia of chronic disease; did have previous days of oozing from trach which has resolved.   VTE Prophylaxis: SCDs, SQH 5000u Q8 (if anemia stable,  may need 7500u Q 8 based on weight)  Repeat upper and lower extremity dopplers 11/26- resolved.   FOBT neg. Hb stable.    ID: Afebrile, no leukocytosis. S/P treatment of PNA    Social: Updated family. Dispo LTACH.     MISC: Scopolamine patch    CORE MEASURES  1.  Hunt and Griffin Score = 5  2.  VTE prophylaxis:  [ ] administered within 24 hours of admission OR [X] reason not done: fresh bleed, unsecured aneurysm.  3.  Dysphagia screening:   [X] performed before any oral meds / liquids / food  4.  Nimodipine treatment:  [X] administered within 24 hours of admission OR [ ] reason not done:    ATTENDING ATTESTATION:  I was physically present for the key portions of the evaluation and management (E/M) service provided.  I agree with the above history, physical and plan, which I have reviewed and edited where appropriate.    Plan discussed with RN, house staff.

## 2021-11-30 NOTE — PROGRESS NOTE ADULT - SUBJECTIVE AND OBJECTIVE BOX
Patient is a 46y Female seen and evaluated at bedside during dialysis. Tolerating well. BP stable, continue HD.     Meds:    acetylcysteine 20%  Inhalation 4 every 6 hours  amLODIPine   Tablet 10 daily  artificial  tears Solution 1 two times a day  chlorhexidine 0.12% Liquid 15 every 12 hours  chlorhexidine 2% Cloths 1 <User Schedule>  cloNIDine 0.3 every 6 hours  dextrose 50% Injectable 25 once  heparin   Injectable 5000 every 8 hours  hydrALAZINE 100 every 8 hours  hydrALAZINE Injectable 10 every 2 hours PRN  labetalol Injectable 10 every 2 hours PRN  lactulose Syrup 10 daily  levETIRAcetam  Solution 500 every 24 hours  ondansetron Injectable 4 every 6 hours PRN  pantoprazole   Suspension 40 daily  polyethylene glycol 3350 17 two times a day  scopolamine 1 mG/72 Hr(s) Patch 1 every 72 hours  senna 2 at bedtime  sodium chloride 2 every 6 hours  sodium chloride 0.9% lock flush 10 every 1 hour PRN  sodium chloride 3%  Inhalation 4 every 6 hours      T(C): , Max: 37.3 (11-29-21 @ 18:01)  T(F): , Max: 99.1 (11-29-21 @ 18:01)  HR: 60 (11-30-21 @ 13:00)  BP: 145/84 (11-30-21 @ 13:00)  BP(mean): 109 (11-30-21 @ 13:00)  RR: 15 (11-30-21 @ 13:00)  SpO2: 100% (11-30-21 @ 13:00)  Wt(kg): --    11-29 @ 07:01  -  11-30 @ 07:00  --------------------------------------------------------  IN: 360 mL / OUT: 0 mL / NET: 360 mL    11-30 @ 07:01  -  11-30 @ 13:38  --------------------------------------------------------  IN: 180 mL / OUT: 0 mL / NET: 180 mL          Review of Systems:  RESPIRATORY: No shortness of breath  CARDIOVASCULAR: No chest pain   MUSCULOSKELETAL: No leg oedema    PHYSICAL EXAM:  GENERAL: alert, no acute distress at present on   NECK: supple, No JVD  CHEST/LUNG: Clear to auscultation bilaterally  HEART: normal S1S2, RRR  ABDOMEN: Soft, Nontender, No flank tenderness bilateral  EXTREMITIES: No oedema   ACCESS: good thrill and bruit appreciated    LABS:                        8.1    12.19 )-----------( 326      ( 30 Nov 2021 05:21 )             26.4     11-30    137  |  99  |  57<H>  ----------------------------<  142<H>  4.7   |  27  |  6.67<H>    Ca    9.4      30 Nov 2021 05:21  Phos  4.8     11-30  Mg     2.4     11-30    TPro  6.9  /  Alb  2.9<L>  /  TBili  0.2  /  DBili  x   /  AST  28  /  ALT  <5<L>  /  AlkPhos  177<H>  11-30                RADIOLOGY & ADDITIONAL STUDIES:           Patient is a 46y Female seen and evaluated at bedside during dialysis. Tolerating well. BP stable, continue HD.     Meds:    acetylcysteine 20%  Inhalation 4 every 6 hours  amLODIPine   Tablet 10 daily  artificial  tears Solution 1 two times a day  chlorhexidine 0.12% Liquid 15 every 12 hours  chlorhexidine 2% Cloths 1 <User Schedule>  cloNIDine 0.3 every 6 hours  dextrose 50% Injectable 25 once  heparin   Injectable 5000 every 8 hours  hydrALAZINE 100 every 8 hours  hydrALAZINE Injectable 10 every 2 hours PRN  labetalol Injectable 10 every 2 hours PRN  lactulose Syrup 10 daily  levETIRAcetam  Solution 500 every 24 hours  ondansetron Injectable 4 every 6 hours PRN  pantoprazole   Suspension 40 daily  polyethylene glycol 3350 17 two times a day  scopolamine 1 mG/72 Hr(s) Patch 1 every 72 hours  senna 2 at bedtime  sodium chloride 2 every 6 hours  sodium chloride 0.9% lock flush 10 every 1 hour PRN  sodium chloride 3%  Inhalation 4 every 6 hours      T(C): , Max: 37.3 (11-29-21 @ 18:01)  T(F): , Max: 99.1 (11-29-21 @ 18:01)  HR: 60 (11-30-21 @ 13:00)  BP: 145/84 (11-30-21 @ 13:00)  BP(mean): 109 (11-30-21 @ 13:00)  RR: 15 (11-30-21 @ 13:00)  SpO2: 100% (11-30-21 @ 13:00)  Wt(kg): --    11-29 @ 07:01  -  11-30 @ 07:00  --------------------------------------------------------  IN: 360 mL / OUT: 0 mL / NET: 360 mL    11-30 @ 07:01  -  11-30 @ 13:38  --------------------------------------------------------  IN: 180 mL / OUT: 0 mL / NET: 180 mL    Review of Systems: Unable to participate    PHYSICAL EXAM:  GENERAL: intubated, s/p hemicraniectomy- staples on her scalp  CHEST/LUNG: Coarse bilaterally  HEART: normal S1S2, RRR  EXTREMITIES: +2 b/l UE oedema   ACCESS: RIJ tunneled cath placed 11/26    LABS:                        8.1    12.19 )-----------( 326      ( 30 Nov 2021 05:21 )             26.4     11-30    137  |  99  |  57<H>  ----------------------------<  142<H>  4.7   |  27  |  6.67<H>    Ca    9.4      30 Nov 2021 05:21  Phos  4.8     11-30  Mg     2.4     11-30    TPro  6.9  /  Alb  2.9<L>  /  TBili  0.2  /  DBili  x   /  AST  28  /  ALT  <5<L>  /  AlkPhos  177<H>  11-30                RADIOLOGY & ADDITIONAL STUDIES:

## 2021-11-30 NOTE — PROGRESS NOTE ADULT - SUBJECTIVE AND OBJECTIVE BOX
=================================  NEUROCRITICAL CARE ATTENDING NOTE  =================================  AILYN DÍAZ   MRN-[id number]  Summary:  45 y/o F with recent spinal surgery, found down and brought to ED.  In ED, witnessed shaking, ?seizures, intubated.  Imaging showed SAH.  Emergent decompressive hemicraniectomy for herniation syndrome, given mannitol, levetiracetam, propofol, transferred to [facility] for further management.     COURSE IN THE HOSPITAL:  10/26: Admitted to [facility], [place] - mannitol, 23.4%, repeat CT HCP - EVD R frontal inserted, s/p angio coil embo, 2 units pRBC  10/27: Remained intubated overnight, given mannitol overnight; EVD reinserted, 1 unit pRBC  10/28: Tmax 38.2, ICPs to low 20s in AM, mannitol 0.5/Kg x1, 23.4% x1 in PM  10/30: TF stopped for vomiting/aspiration event  11/02: Tmax 37.8 given 1 unit pRBC  11/03: ICPs teens, given 23.4% NaCl 30ccx 1  11/04: No ICP issues; storming with stimulation / suctioning  11/09: Remains intubated, s/p PEG, trach deferred due to macroglossia  11/10: Remains intubated, s/p angio  11/12: Failed trach - CP arrest x1 hour, PTX, 2 chest tubes  11/15: Remains intubated on ventilator; aspiration event this AM; paralyzed for tongue biting  11/16: Remains intubated on ventilator, cuff leak; hypothermic 95F overnight, placed on Josefa hugger  11/17: Remains intubated on ventilator Clamped EVD.   11/18: Remains intubated, vomiting overnight, tube feeds stopped, started on 3%@30, LR @50; propofol stopped (high TG); R IJ removed; R midline inserted; EVD removed  11/19: Remains intubated, bleeding in-line, TF restarted at 10  11/20: Remains intubated, off midazolam, TF increased to 20; s/p HD  11/21: Remains intubated, high BP - given multiple labetalol; aspiration event this morning (mouth, nothing inline, ~5cc)TF held  11/22: Remains intubated, bleeding from tongue this morning; s/p tracheostomy  11/23: Remains trached, on full vent support; blood tinged trach - improved and cleared overnight; chest tube clamped; TF @10  11/24 Remains trached, on full vent support; TF 20cc/hr --> 30cc/hr at 3 p.m.; chest tubes removed  11/26: Hb 7.3. No overt bleeding. Permacath placed. Received HD via permacath.  CXR- no PTX.   11/27: Remains trached. No acute events overnight. Remains with stably poor exam. Hyponatremic 132. 250cc 3% and salt tabs. CPAP trials.  11/29: No acute events overnight. CPAP though pulling only low volumes 250cc. No desats. Thick secretions. HD done.   11/30:        Past Medical History: No pertinent past medical history  Allergies:  Allergy Status Unknown  Home meds:       ICU Vital Signs Last 24 Hrs  T(C): 36.8 (29 Nov 2021 05:27), Max: 37.3 (28 Nov 2021 14:32)  T(F): 98.2 (29 Nov 2021 05:27), Max: 99.2 (28 Nov 2021 14:32)  HR: 71 (29 Nov 2021 05:43) (60 - 79)  BP: 129/75 (29 Nov 2021 04:00) (129/75 - 172/94)  BP(mean): 97 (29 Nov 2021 04:00) (96 - 126)  ABP: 140/70 (29 Nov 2021 05:00) (119/65 - 175/90)  ABP(mean): 97 (29 Nov 2021 05:00) (85 - 125)  RR: 12 (29 Nov 2021 05:43) (12 - 21)  SpO2: 99% (29 Nov 2021 05:43) (98% - 100%)      11-28-21 @ 07:01  -  11-29-21 @ 07:00  --------------------------------------------------------  IN: 240 mL / OUT: 0 mL / NET: 240 mL      Mode: AC/ CMV (Assist Control/ Continuous Mandatory Ventilation), RR (machine): 12, TV (machine): 400, FiO2: 40, PEEP: 5, ITime: 1, MAP: 7.8, PIP: 20      PHYSICAL EXAMINATION:  NEUROLOGIC EXAMINATION: Patient open eyes to noxious stim, does not track, does not follow commands, pupils 3mm equal and brisk (+) Doll's, (+) corneals (+) cough and gag, flap full but soft; RUE trace movement, LUE WD, TF BLE trace  GENERAL: Trached   EENT:  Anicteric  CARDIOVASCULAR: (+) S1 S2, normal rate and regular rhythm  PULMONARY: Diminished at bases  ABDOMEN: Soft, distended, normoactive bowel sounds. PEG site C/D/I  EXTREMITIES: No edema  SKIN: No rash      MEDICATIONS:  acetylcysteine 20%  Inhalation 4 milliLiter(s) Inhalation every 6 hours  amLODIPine   Tablet 10 milliGRAM(s) Oral daily  artificial  tears Solution 1 Drop(s) Both EYES two times a day  chlorhexidine 0.12% Liquid 15 milliLiter(s) Oral Mucosa every 12 hours  chlorhexidine 2% Cloths 1 Application(s) Topical <User Schedule>  chlorhexidine 2% Cloths 1 Application(s) Topical <User Schedule>  cloNIDine 0.3 milliGRAM(s) Oral every 6 hours  dextrose 50% Injectable 25 milliLiter(s) IV Push once  heparin   Injectable 5000 Unit(s) SubCutaneous every 8 hours  hydrALAZINE 100 milliGRAM(s) Oral every 8 hours  hydrALAZINE Injectable 10 milliGRAM(s) IV Push every 2 hours PRN  labetalol Injectable 10 milliGRAM(s) IV Push every 2 hours PRN  levETIRAcetam  Solution 500 milliGRAM(s) Oral every 24 hours  ondansetron Injectable 4 milliGRAM(s) IV Push every 6 hours PRN  pantoprazole   Suspension 40 milliGRAM(s) Oral daily  polyethylene glycol 3350 17 Gram(s) Oral two times a day  scopolamine 1 mG/72 Hr(s) Patch 1 Patch Transdermal every 72 hours  senna 2 Tablet(s) Oral at bedtime  sodium chloride 2 Gram(s) Oral every 6 hours  sodium chloride 0.9% lock flush 10 milliLiter(s) IV Push every 1 hour PRN  sodium chloride 3%  Inhalation 4 milliLiter(s) Inhalation every 6 hours    LABS:                        8.0    12.08 )-----------( 296      ( 29 Nov 2021 05:51 )             26.4     11-29    139  |  99  |  53<H>  ----------------------------<  139<H>  4.5   |  29  |  6.02<H>    Ca    9.1      29 Nov 2021 05:51  Phos  4.8     11-29  Mg     2.3     11-29    TPro  6.8  /  Alb  2.8<L>  /  TBili  <0.2  /  DBili  x   /  AST  24  /  ALT  <5<L>  /  AlkPhos  158<H>  11-29    LIVER FUNCTIONS - ( 29 Nov 2021 05:51 )  Alb: 2.8 g/dL / Pro: 6.8 g/dL / ALK PHOS: 158 U/L / ALT: <5 U/L / AST: 24 U/L / GGT: x             Bacteriology:  11/11 CSF NGTD  11/04 sputum GS:  numerous staph aureus, numerous enterobacter cloacae, moderate proteus mirabilis  10/28 CSF NGTD  10/28 Blood NGTD x2  (final)    IV FLUIDS: IVL   DIET: Nepro  Lines: [place]; R double lumen midline; R IJ HD permacath  Primafit  Wounds: Dehiscence from OSH    CODE STATUS:  Full Code                         GOALS OF CARE:  aggressive                        DISPOSITION:  ICU =================================  NEUROCRITICAL CARE ATTENDING NOTE  =================================  AILYN DÍAZ   MRN-[id number]  Summary:  45 y/o F with recent spinal surgery, found down and brought to ED.  In ED, witnessed shaking, ?seizures, intubated.  Imaging showed SAH.  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 for further management.     COURSE IN THE HOSPITAL:  10/26: Admitted to [facility], [place] - mannitol, 23.4%, repeat CT HCP - EVD R frontal inserted, s/p angio coil embo, 2 units pRBC  10/27: Remained intubated overnight, given mannitol overnight; EVD reinserted, 1 unit pRBC  10/28: Tmax 38.2, ICPs to low 20s in AM, mannitol 0.5/Kg x1, 23.4% x1 in PM  10/30: TF stopped for vomiting/aspiration event  11/02: Tmax 37.8 given 1 unit pRBC  11/03: ICPs teens, given 23.4% NaCl 30ccx 1  11/04: No ICP issues; storming with stimulation / suctioning  11/09: Remains intubated, s/p PEG, trach deferred due to macroglossia  11/10: Remains intubated, s/p angio  11/12: Failed trach - CP arrest x1 hour, PTX, 2 chest tubes  11/15: Remains intubated on ventilator; aspiration event this AM; paralyzed for tongue biting  11/16: Remains intubated on ventilator, cuff leak; hypothermic 95F overnight, placed on Josefa hugger  11/17: Remains intubated on ventilator Clamped EVD.   11/18: Remains intubated, vomiting overnight, tube feeds stopped, started on 3%@30, LR @50; propofol stopped (high TG); R IJ removed; R midline inserted; EVD removed  11/19: Remains intubated, bleeding in-line, TF restarted at 10  11/20: Remains intubated, off midazolam, TF increased to 20; s/p HD  11/21: Remains intubated, high BP - given multiple labetalol; aspiration event this morning (mouth, nothing inline, ~5cc)TF held  11/22: Remains intubated, bleeding from tongue this morning; s/p tracheostomy  11/23: Remains trached, on full vent support; blood tinged trach - improved and cleared overnight; chest tube clamped; TF @10  11/24 Remains trached, on full vent support; TF 20cc/hr --> 30cc/hr at 3 p.m.; chest tubes removed  11/26: Hb 7.3. No overt bleeding. Permacath placed. Received HD via permacath.  CXR- no PTX.   11/27: Remains trached. No acute events overnight. Remains with stably poor exam. Hyponatremic 132. 250cc 3% and salt tabs. CPAP trials.  11/29: No acute events overnight. CPAP though pulling only low volumes 250cc. No desats. Thick secretions.  11/30: HD done. Stably poor exam. On CPAP.       Past Medical History: No pertinent past medical history  Allergies:  Allergy Status Unknown  Home meds:       ICU Vital Signs Last 24 Hrs  T(C): 36.8 (29 Nov 2021 05:27), Max: 37.3 (28 Nov 2021 14:32)  T(F): 98.2 (29 Nov 2021 05:27), Max: 99.2 (28 Nov 2021 14:32)  HR: 71 (29 Nov 2021 05:43) (60 - 79)  BP: 129/75 (29 Nov 2021 04:00) (129/75 - 172/94)  BP(mean): 97 (29 Nov 2021 04:00) (96 - 126)  ABP: 140/70 (29 Nov 2021 05:00) (119/65 - 175/90)  ABP(mean): 97 (29 Nov 2021 05:00) (85 - 125)  RR: 12 (29 Nov 2021 05:43) (12 - 21)  SpO2: 99% (29 Nov 2021 05:43) (98% - 100%)      11-28-21 @ 07:01  -  11-29-21 @ 07:00  --------------------------------------------------------  IN: 240 mL / OUT: 0 mL / NET: 240 mL      Mode: AC/ CMV (Assist Control/ Continuous Mandatory Ventilation), RR (machine): 12, TV (machine): 400, FiO2: 40, PEEP: 5, ITime: 1, MAP: 7.8, PIP: 20      PHYSICAL EXAMINATION:  NEUROLOGIC EXAMINATION: Patient open eyes to noxious stim, does not track, does not follow commands, pupils 3mm equal and brisk (+) Doll's, (+) corneals (+) cough and gag, flap full but soft; RUE trace movement, LUE WD, TF BLE trace  GENERAL: Trached   EENT:  Anicteric  CARDIOVASCULAR: (+) S1 S2, normal rate and regular rhythm  PULMONARY: Diminished at bases  ABDOMEN: Soft, distended, normoactive bowel sounds. PEG site C/D/I  EXTREMITIES: No edema  SKIN: No rash      MEDICATIONS:  acetylcysteine 20%  Inhalation 4 milliLiter(s) Inhalation every 6 hours  amLODIPine   Tablet 10 milliGRAM(s) Oral daily  artificial  tears Solution 1 Drop(s) Both EYES two times a day  chlorhexidine 0.12% Liquid 15 milliLiter(s) Oral Mucosa every 12 hours  chlorhexidine 2% Cloths 1 Application(s) Topical <User Schedule>  chlorhexidine 2% Cloths 1 Application(s) Topical <User Schedule>  cloNIDine 0.3 milliGRAM(s) Oral every 6 hours  dextrose 50% Injectable 25 milliLiter(s) IV Push once  heparin   Injectable 5000 Unit(s) SubCutaneous every 8 hours  hydrALAZINE 100 milliGRAM(s) Oral every 8 hours  hydrALAZINE Injectable 10 milliGRAM(s) IV Push every 2 hours PRN  labetalol Injectable 10 milliGRAM(s) IV Push every 2 hours PRN  levETIRAcetam  Solution 500 milliGRAM(s) Oral every 24 hours  ondansetron Injectable 4 milliGRAM(s) IV Push every 6 hours PRN  pantoprazole   Suspension 40 milliGRAM(s) Oral daily  polyethylene glycol 3350 17 Gram(s) Oral two times a day  scopolamine 1 mG/72 Hr(s) Patch 1 Patch Transdermal every 72 hours  senna 2 Tablet(s) Oral at bedtime  sodium chloride 2 Gram(s) Oral every 6 hours  sodium chloride 0.9% lock flush 10 milliLiter(s) IV Push every 1 hour PRN  sodium chloride 3%  Inhalation 4 milliLiter(s) Inhalation every 6 hours    LABS:                        8.0    12.08 )-----------( 296      ( 29 Nov 2021 05:51 )             26.4     11-29    139  |  99  |  53<H>  ----------------------------<  139<H>  4.5   |  29  |  6.02<H>    Ca    9.1      29 Nov 2021 05:51  Phos  4.8     11-29  Mg     2.3     11-29    TPro  6.8  /  Alb  2.8<L>  /  TBili  <0.2  /  DBili  x   /  AST  24  /  ALT  <5<L>  /  AlkPhos  158<H>  11-29    LIVER FUNCTIONS - ( 29 Nov 2021 05:51 )  Alb: 2.8 g/dL / Pro: 6.8 g/dL / ALK PHOS: 158 U/L / ALT: <5 U/L / AST: 24 U/L / GGT: x             Bacteriology:  11/11 CSF NGTD  11/04 sputum GS:  numerous staph aureus, numerous enterobacter cloacae, moderate proteus mirabilis  10/28 CSF NGTD  10/28 Blood NGTD x2  (final)    IV FLUIDS: IVL   DIET: Nepro  Lines: [place]; R double lumen midline; R IJ HD permacath  Primafit  Wounds: Dehiscence from OSH    CODE STATUS:  Full Code                         GOALS OF CARE:  aggressive                        DISPOSITION:  ICU

## 2021-11-30 NOTE — PROGRESS NOTE ADULT - ASSESSMENT
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 Hospital course c/b cardiac arrest x3 and anuric ATN requiring dialyisis.     Anuric iATN requiring long term dialysis  Baseline creatinine 0.6  First HD 11/20;  S/p IR tunneled cath placement 11/26  HD today  Indeterminate quantiferon and wnl hepatitis panel    EDW tbd  SBP goal 100-160 per neuro ICU; UF with HD  Anaemia- no need for IV iron, epo with HD  BMD: phos acceptable, no need for binders  Access: S/p IR tunneled cath placement 11/26    Patient was seen and evaluated on dialysis.     Dialyzer: Optiflux [id number]  QB: 400 mL/min  QD: 500 mL/min  K bath: 2  Na bath: 140  Goal UF: 3L  Duration: 180 min    Patient is tolerating the procedure well. Continue full hemodialysis treatment as prescribed.  Daily weights, strict I&Os, renally dose meds, renal diet

## 2021-11-30 NOTE — PROGRESS NOTE ADULT - ATTENDING COMMENTS
See the Fellow's note written above, for details. I reviewed the fellow documentation.  I have personally seen and examined this patient today. I have reviewed vitals, labs, medications, and imaging. I agree with the fellow's findings and plans as written above with the following additions/amendments:    Patient was seen and evaluated on dialysis. BPs elevated, tolerating UF well.   Patient is tolerating the procedure well.     HR: 71 (11-30-21 @ 21:00)  BP: 169/84 (11-30-21 @ 21:00)  Constitutional: Intubated, comatose; NAD  HEENT:  trach in place, clear secretions  Respiratory: Mechanical breath sounds bilaterally, not overbreathing vent  Cardiac: +S1/S2; RRR; no M/R/G  Gastrointestinal: soft, NT/ND; no rebound or guarding; +BS  Extremities: WWP, no clubbing or cyanosis; 1+edema  Dermatologic: skin warm, dry and intact; no rashes, wounds, or scars  Access: R TDC c/d/i      Continue dialysis

## 2021-12-01 ENCOUNTER — FORM ENCOUNTER (OUTPATIENT)
Age: 46
End: 2021-12-01

## 2021-12-01 LAB
ANION GAP SERPL CALC-SCNC: 12 MMOL/L — SIGNIFICANT CHANGE UP (ref 5–17)
BUN SERPL-MCNC: 44 MG/DL — HIGH (ref 7–23)
CALCIUM SERPL-MCNC: 9.5 MG/DL — SIGNIFICANT CHANGE UP (ref 8.4–10.5)
CHLORIDE SERPL-SCNC: 98 MMOL/L — SIGNIFICANT CHANGE UP (ref 96–108)
CO2 SERPL-SCNC: 28 MMOL/L — SIGNIFICANT CHANGE UP (ref 22–31)
CREAT SERPL-MCNC: 5.21 MG/DL — HIGH (ref 0.5–1.3)
GLUCOSE SERPL-MCNC: 147 MG/DL — HIGH (ref 70–99)
HCT VFR BLD CALC: 27.8 % — LOW (ref 34.5–45)
HGB BLD-MCNC: 8.2 G/DL — LOW (ref 11.5–15.5)
MAGNESIUM SERPL-MCNC: 2.2 MG/DL — SIGNIFICANT CHANGE UP (ref 1.6–2.6)
MCHC RBC-ENTMCNC: 24.7 PG — LOW (ref 27–34)
MCHC RBC-ENTMCNC: 29.5 GM/DL — LOW (ref 32–36)
MCV RBC AUTO: 83.7 FL — SIGNIFICANT CHANGE UP (ref 80–100)
NRBC # BLD: 0 /100 WBCS — SIGNIFICANT CHANGE UP (ref 0–0)
PHOSPHATE SERPL-MCNC: 4.1 MG/DL — SIGNIFICANT CHANGE UP (ref 2.5–4.5)
PLATELET # BLD AUTO: 237 K/UL — SIGNIFICANT CHANGE UP (ref 150–400)
POTASSIUM SERPL-MCNC: 4.2 MMOL/L — SIGNIFICANT CHANGE UP (ref 3.5–5.3)
POTASSIUM SERPL-SCNC: 4.2 MMOL/L — SIGNIFICANT CHANGE UP (ref 3.5–5.3)
RBC # BLD: 3.32 M/UL — LOW (ref 3.8–5.2)
RBC # FLD: 20.2 % — HIGH (ref 10.3–14.5)
SODIUM SERPL-SCNC: 138 MMOL/L — SIGNIFICANT CHANGE UP (ref 135–145)
WBC # BLD: 12.92 K/UL — HIGH (ref 3.8–10.5)
WBC # FLD AUTO: 12.92 K/UL — HIGH (ref 3.8–10.5)

## 2021-12-01 PROCEDURE — 99232 SBSQ HOSP IP/OBS MODERATE 35: CPT | Mod: GC

## 2021-12-01 PROCEDURE — 93971 EXTREMITY STUDY: CPT | Mod: 26,RT

## 2021-12-01 PROCEDURE — 99291 CRITICAL CARE FIRST HOUR: CPT

## 2021-12-01 RX ORDER — LABETALOL HCL 100 MG
100 TABLET ORAL EVERY 12 HOURS
Refills: 0 | Status: DISCONTINUED | OUTPATIENT
Start: 2021-12-01 | End: 2021-12-02

## 2021-12-01 RX ORDER — FENTANYL CITRATE 50 UG/ML
25 INJECTION INTRAVENOUS ONCE
Refills: 0 | Status: DISCONTINUED | OUTPATIENT
Start: 2021-12-01 | End: 2021-12-01

## 2021-12-01 RX ADMIN — Medication 10 MILLIGRAM(S): at 04:17

## 2021-12-01 RX ADMIN — Medication 100 MILLIGRAM(S): at 10:51

## 2021-12-01 RX ADMIN — Medication 100 MILLIGRAM(S): at 15:11

## 2021-12-01 RX ADMIN — Medication 100 MILLIGRAM(S): at 21:20

## 2021-12-01 RX ADMIN — Medication 4 MILLILITER(S): at 11:16

## 2021-12-01 RX ADMIN — Medication 10 MILLIGRAM(S): at 20:32

## 2021-12-01 RX ADMIN — HEPARIN SODIUM 5000 UNIT(S): 5000 INJECTION INTRAVENOUS; SUBCUTANEOUS at 05:31

## 2021-12-01 RX ADMIN — LACTULOSE 10 GRAM(S): 10 SOLUTION ORAL at 11:54

## 2021-12-01 RX ADMIN — Medication 1 DROP(S): at 05:36

## 2021-12-01 RX ADMIN — AMLODIPINE BESYLATE 10 MILLIGRAM(S): 2.5 TABLET ORAL at 05:31

## 2021-12-01 RX ADMIN — Medication 10 MILLIGRAM(S): at 13:21

## 2021-12-01 RX ADMIN — SODIUM CHLORIDE 4 MILLILITER(S): 9 INJECTION INTRAMUSCULAR; INTRAVENOUS; SUBCUTANEOUS at 00:36

## 2021-12-01 RX ADMIN — SCOPALAMINE 1 PATCH: 1 PATCH, EXTENDED RELEASE TRANSDERMAL at 10:34

## 2021-12-01 RX ADMIN — Medication 0.3 MILLIGRAM(S): at 05:31

## 2021-12-01 RX ADMIN — FENTANYL CITRATE 25 MICROGRAM(S): 50 INJECTION INTRAVENOUS at 19:30

## 2021-12-01 RX ADMIN — Medication 4 MILLILITER(S): at 04:14

## 2021-12-01 RX ADMIN — Medication 1 DROP(S): at 17:42

## 2021-12-01 RX ADMIN — POLYETHYLENE GLYCOL 3350 17 GRAM(S): 17 POWDER, FOR SOLUTION ORAL at 05:36

## 2021-12-01 RX ADMIN — Medication 4 MILLILITER(S): at 23:30

## 2021-12-01 RX ADMIN — Medication 4 MILLILITER(S): at 00:35

## 2021-12-01 RX ADMIN — SODIUM CHLORIDE 2 GRAM(S): 9 INJECTION INTRAMUSCULAR; INTRAVENOUS; SUBCUTANEOUS at 11:44

## 2021-12-01 RX ADMIN — Medication 100 MILLIGRAM(S): at 05:30

## 2021-12-01 RX ADMIN — Medication 10 MILLIGRAM(S): at 18:12

## 2021-12-01 RX ADMIN — SODIUM CHLORIDE 2 GRAM(S): 9 INJECTION INTRAMUSCULAR; INTRAVENOUS; SUBCUTANEOUS at 18:10

## 2021-12-01 RX ADMIN — LEVETIRACETAM 500 MILLIGRAM(S): 250 TABLET, FILM COATED ORAL at 21:19

## 2021-12-01 RX ADMIN — SODIUM CHLORIDE 2 GRAM(S): 9 INJECTION INTRAMUSCULAR; INTRAVENOUS; SUBCUTANEOUS at 05:31

## 2021-12-01 RX ADMIN — Medication 4 MILLILITER(S): at 16:05

## 2021-12-01 RX ADMIN — HEPARIN SODIUM 5000 UNIT(S): 5000 INJECTION INTRAVENOUS; SUBCUTANEOUS at 15:10

## 2021-12-01 RX ADMIN — CHLORHEXIDINE GLUCONATE 15 MILLILITER(S): 213 SOLUTION TOPICAL at 17:42

## 2021-12-01 RX ADMIN — Medication 100 MILLIGRAM(S): at 21:19

## 2021-12-01 RX ADMIN — FENTANYL CITRATE 25 MICROGRAM(S): 50 INJECTION INTRAVENOUS at 20:58

## 2021-12-01 RX ADMIN — SODIUM CHLORIDE 2 GRAM(S): 9 INJECTION INTRAMUSCULAR; INTRAVENOUS; SUBCUTANEOUS at 23:30

## 2021-12-01 RX ADMIN — Medication 0.3 MILLIGRAM(S): at 13:17

## 2021-12-01 RX ADMIN — Medication 0.3 MILLIGRAM(S): at 21:20

## 2021-12-01 RX ADMIN — Medication 10 MILLIGRAM(S): at 18:40

## 2021-12-01 RX ADMIN — SODIUM CHLORIDE 4 MILLILITER(S): 9 INJECTION INTRAMUSCULAR; INTRAVENOUS; SUBCUTANEOUS at 04:15

## 2021-12-01 RX ADMIN — Medication 10 MILLIGRAM(S): at 11:46

## 2021-12-01 RX ADMIN — HEPARIN SODIUM 5000 UNIT(S): 5000 INJECTION INTRAVENOUS; SUBCUTANEOUS at 21:19

## 2021-12-01 NOTE — PROGRESS NOTE ADULT - SUBJECTIVE AND OBJECTIVE BOX
HPI:  45 y/o female with PMH HTN, Multiple sclerosis, recent spinal surgery 10/8 ([facility]) presented to [place] ED BIBEMS after being found unconscious at home, unknown period of time. 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 HH5, MF1. Patient was intubated at [place] ED, found to have blown L pupil, and sent straight to the OR for left hemicraniotomy. A-line placed intraop. Hemodynamically unstable upon arrival to [facility], bradycardic and hypertensive s/p Mannitol, Clevidipine, and Furosemide. Central line placed in NSICU. Currently sedated on Propofol, pending repeat CTH. History per patient's son, patient had recent spine surgery and was found on outpatient imaging last week to have L M1 segment aneurysm (unruptured), pt asymptomatic at this time. Per son patient taking percocet PO at home. Ureña catheter, ET tube, and arterial line placed at [facility].     NIHSS on arrival: 32   Bess Griffin 5, Mod Busby 4  Bleed Day 1 = 10/26 (26 Oct 2021 13:03)    Hospital Course:   10/26: admitted to [facility] from [facility], POD#0 s/p L hemicraniectomy for decompression and evacuation of SDH. Transferred to [facility] noted to have tense flap, received mannitol, keppra, decadron. Was hypertensive to 200s and preston to 40s, recevied 85g mannitol and bullet 23.4%. CTH on arrival demonstrated increased size in vents and new IVH. EVD placed, open at 15, central line placed. Transfused 2 u PRBC. POD0 of coiling of left MCA bifurcation aneurysm,   10/27: CTH demonstrated EVD in correct position, EVD lowered to 5, remains intubated on proprofol, pending repeat CTH in AM. Started on 3% @ 30/hr. 2LNS given for euvolemia, Mannitol given for uptrending ICPs. Bullet given 8:30 am. 3% increased to 50/hr. 1 L bolus. New EVD catheter placed using exisiting sreekanth hole. 1 u PRBC.  10/28: HH5, MF4, BD3; POD2 angio coil/embo of L MCA aneurysm. JOAQUÍN overnight, neuro stable. EVD@5cmH20 ICPs < 20 overnight, OP WNL. S/p 1 unit PRBC yesterday with appropriate response. Given 42g mannitol in AM for ICP 22 persistently, with improvement, then given 23% in PM for elevated ICP. febrile, pancultured. Standing tylenol and cooling blanket.   10/29: BD4, POD3 angio coil/embo. JOAQUÍN o/n, neuro stable. EVD@5, ICPs <20 o/n.   10/30: BD5, POD4 angio coil/embo. JOAQUÍN o/n neuro stable. EVD@5. 3% @50cc/hr.  10/31: BD6, POD5 angio coil/embo. brief period maintained upward gaze, resolved on its own. EVD@5cm h2o.    11/1: BD7, POD6 L hemicrani, angio coil/embo. JOAQUÍN overnight. Neuro exam unchanged. ICPs WNL. started bromocriptine/gabapentin/baclofen. dc'd propofol, started precedex  11/2: BD8 POD7 L hemicrani, angio coil/embo. JOAQUÍN overnight. Neuro exam stable. Remains on 3% hypertonic saline. Receieved 1 unit of PRBCs for a Hgb of 7.6.  11/3: BD 9 POD8 of L hemicrani. Elevated lipase, normal amylase, OG tube clogged and replaced. Abdominal US normal. Pending gen surg reccs regarding trach and peg. Gabapentin and Baclofen increased to help with neurostorming. restarted back on 3%, LR@35. became preston+hypotensive with nimodipine 60q4, decreased back to 30q2, NaCl bullet given.   11/4: BD10 POD9, JOAQUÍN o/n, 500cc NS for euvolemia,  neuro stable, EVD at 5. 3% increased to 75  11/5: BD11 POD10, JOAQUÍN o/n, neuro stable, EVD at 5  11/6: BD12 POD11 JOAQUÍN overnight. Neuro exam stable. Remains intubated on precedex. 3% hypertonic saline dc'd  11/7: BD13 POD 12 JOAQUÍN o/n, NGT replaced. on precex with no icp crisis   11/8: BD14 POD13 JOAQUÍN o/n, noted to have tongue maceration/macroglossia. Gauze with tongue depressor placed. Pending further recs from ENT. Pending trach and PEG placement tomorrow with gen surg. Off precedex, ICPs stable. EVD remains @ 5.   11/9: BD15, POD 14, bleeding under tongue at start of shift, fentanyl pushed with no further episodes. gabapentin stopped. PEG placed  11/10: BD 16, POD 15, neuro stable, ENT to be consulted in AM, 3% increased to 50/hr.  11/11: BD 17, POD 16, neuro exam stable. Pend CT saba in am for cranioplasty planning. Pend trach in OR with ENT. F.u BMP in am, 3%@50cc/hr for Na goal 140-145.   11/12: BD 18, POD 17. JOAQUÍN overnight, neuro stable. Pend trach placement today. CT saba completed 11/11. Off of 3%, f/u am BMP for Na goal 140-150. CSF neg. Hypoxic cardiac arrest with ROSC x 3 during tracheostomy placement. B/l chest tubes placed for resulting pneumothorax s/p CPR. salt tabs dc'd.  11/13: BD 19, POD 18. Patient tachycardic with some improvement, SBP stable off of levophed, hgb downtrending o/n, transfused 1 unit PRBCs. B/l chest tube. EVD @5cmH2O, no elevated ICPs. UOP downtrending, trend BUN/Cr, given 1LNS x1 for low urine output. F/u am CXR. Cont Cefepime until today, then change to Ancef while trach packing in place per ENT. Pend CTH when stable. Trops downtrending s/p cardiac arrest. 1L. florinef dc'd. BP goal changed to 100-160, started on amlodipine   11/14: BD20, POD19, worsening creatinine with decreased urine output. Given 250 of albumin and 500cc LR. started on hydralazine PO, decreased amantadine 100 daily given CrCl of 20.  11/15: BD21, POD20, remains oliguric, fem line dc'd, tongue swollen and unable to fit bite block in mouth, started on nimbex gtt to relax jaw. Propofol and fentanyl gtt started. Vomiting TFs through nose and mouth, ENT called. TFs held. Cr uptrending. Palliative consulted.  11/16: BD22, POD21, o/n external trach dressing replaced due to cuff leak and decreasing TV, sedated and paralyzed on nimbex, propofol, and fentanyl gtt. CTH stable.  EVD raised from 5 to 10. Nimbex weaned off. Cr uptrending, Trickle feeds started. FOB negative.   11/17: BD 23, POD22, JOAQUÍN o/n, EVD clamped, NS d/c'ed, LR@50, advancing tube feeds.   11/18: BD24, POD23 o/n 3% at 30 with Na acetate started, propofol dc'd due to elevated TG level, episode of vomiting TFs from nose and mouth into ETT with elevated ICP 30s, ICP normalized with resolution of vomiting, reglan 5mg IV given, TFs held, 3% stopped. midline placed   11/19; BD25, POD24 JOAQUÍN overnight. Remains on versed and fentanyl drips. Neuro exam unchanged. R IJ dialysis cath placed.   11/20: BD26 POD25 JOAQUÍN overnight. Neuro exam unchanged. Plan for HD today  11/21: BD 27 POD 26 weaned off versed, fentanyl   11/22: BD 28 POD 27 JOAQUÍN o/n , off fentanyl gtt, pt is calm. s/p HD earlier today. s/p trach revision  11/23: BD29. incraesed clonidine to 0.4 BID, s/p trach, stable hemodynamically. neuro at baseline. TFs stopped for vomiting and restarted 10cc/hr  11/24: BD 30, POD29 TFs inc to 20cc/hr. HD today  11/25: BD31, POD30. JOAQUÍN o/n neuro stable  11/26: BD32, POD 31, JOAQUÍN overnight,  perma cath placement with IR today , hydralazine inc 75q8, reglan decreased 2.5q6. Hemodialyzed today, took off 3L and given 1unit pRBCs  11/27: BD33, POD32, JOAQUÍN overnight, pending LTAC. hyponatremic, urine lytes sent. 250cc bolus 3% started, salt tabs started  11/28: BD34 POD 33 L hemicrani. JOAQUÍN o/n. Hyponatremia improved, NaCl 2 g q 6. Pending LTAC at [facility], increased prn requirements for BP (hydral + labetalol x1), hydral PO increased to 100q8, Fentanyl x1 for pain.   11/29: BD35 POD 34 s/p L hemicrani, JOAQUÍN o/n, pend dialysis tomorrow. CTH ordered for am, exit CTH pend. Plan for discharge to [facility]. Tolerating CPAP during the day, full vent support overnight. HD today  11/30: BD 36 POD 35 pending LTAC to [facility], received on HD through the port. HD pending on 11/30. hypertensive, renal following, hydralazine pushes prn for now.   12/1: HD today, tolerated CPAP, tolerating TF (30cc),         Vital Signs Last 24 Hrs  T(C): 36.5 (01 Dec 2021 01:40), Max: 37.3 (30 Nov 2021 05:58)  T(F): 97.7 (01 Dec 2021 01:40), Max: 99.1 (30 Nov 2021 05:58)  HR: 73 (01 Dec 2021 03:00) (60 - 77)  BP: 158/83 (01 Dec 2021 03:00) (129/77 - 179/96)  BP(mean): 111 (01 Dec 2021 03:00) (99 - 131)  RR: 12 (01 Dec 2021 03:00) (12 - 32)  SpO2: 100% (01 Dec 2021 03:00) (99% - 100%)    I&O's Detail    29 Nov 2021 07:01  -  30 Nov 2021 07:00  --------------------------------------------------------  IN:    Nepro with Carb Steady: 360 mL  Total IN: 360 mL    OUT:  Total OUT: 0 mL    Total NET: 360 mL      30 Nov 2021 07:01  -  01 Dec 2021 04:12  --------------------------------------------------------  IN:    Enteral Tube Flush: 30 mL    Nepro with Carb Steady: 330 mL    Other (mL): 500 mL  Total IN: 860 mL    OUT:    Other (mL): 3600 mL  Total OUT: 3600 mL    Total NET: -2740 mL        I&O's Summary    29 Nov 2021 07:01  -  30 Nov 2021 07:00  --------------------------------------------------------  IN: 360 mL / OUT: 0 mL / NET: 360 mL    30 Nov 2021 07:01  -  01 Dec 2021 04:12  --------------------------------------------------------  IN: 860 mL / OUT: 3600 mL / NET: -2740 mL        PHYSICAL EXAM:  Exam on admission: pupils 2mm sluggish, RUE trace WD, LUE WD vs. extensor, b/l LE triple flexion, +cough/gag/corneals, +flap full but soft.     Lines/Drains  L radial jerri placed 10/26 -   HD catheter R IJ (11/19 - )  + R brachial Midline (11/18 - )  + Primafit       DIET:  [] NPO  [] Mechanical  [x] Tube feeds    LABS:                        8.1    12.19 )-----------( 326      ( 30 Nov 2021 05:21 )             26.4     11-30    137  |  99  |  57<H>  ----------------------------<  142<H>  4.7   |  27  |  6.67<H>    Ca    9.4      30 Nov 2021 05:21  Phos  4.8     11-30  Mg     2.4     11-30    TPro  6.9  /  Alb  2.9<L>  /  TBili  0.2  /  DBili  x   /  AST  28  /  ALT  <5<L>  /  AlkPhos  177<H>  11-30            CAPILLARY BLOOD GLUCOSE      POCT Blood Glucose.: 142 mg/dL (30 Nov 2021 12:29)      Drug Levels: [] N/A    CSF Analysis: [] N/A      Allergies    No Known Allergies    Intolerances      MEDICATIONS:  Antibiotics:    Neuro:  levETIRAcetam  Solution 500 milliGRAM(s) Oral every 24 hours  ondansetron Injectable 4 milliGRAM(s) IV Push every 6 hours PRN  scopolamine 1 mG/72 Hr(s) Patch 1 Patch Transdermal every 72 hours    Anticoagulation:  heparin   Injectable 5000 Unit(s) SubCutaneous every 8 hours    OTHER:  acetylcysteine 20%  Inhalation 4 milliLiter(s) Inhalation every 6 hours  amLODIPine   Tablet 10 milliGRAM(s) Oral daily  artificial  tears Solution 1 Drop(s) Both EYES two times a day  bisacodyl Suppository 10 milliGRAM(s) Rectal daily  chlorhexidine 0.12% Liquid 15 milliLiter(s) Oral Mucosa every 12 hours  chlorhexidine 2% Cloths 1 Application(s) Topical <User Schedule>  cloNIDine 0.3 milliGRAM(s) Oral every 6 hours  dextrose 50% Injectable 25 milliLiter(s) IV Push once  hydrALAZINE 100 milliGRAM(s) Oral every 8 hours  hydrALAZINE Injectable 10 milliGRAM(s) IV Push every 2 hours PRN  labetalol Injectable 10 milliGRAM(s) IV Push every 2 hours PRN  lactulose Syrup 10 Gram(s) Oral daily  polyethylene glycol 3350 17 Gram(s) Oral two times a day  senna 2 Tablet(s) Oral at bedtime  sodium chloride 3%  Inhalation 4 milliLiter(s) Inhalation every 6 hours    IVF:  sodium chloride 2 Gram(s) Oral every 6 hours    CULTURES:    RADIOLOGY & ADDITIONAL TESTS:      ASSESSMENT:  45 y/o female with PMHx of HTN and MS, hx of spinal surgery at [facility] 10/8/21 presented to [facility] after being found unconscious at home, unknown period of time. 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 HH5, MF4; BD #1 = 10/26. Patient was intubated at [facility] and sent straight to the OR for left hemicraniotomy. A-line placed intraop. Hemodynamically unstable upon arrival to [facility], bradycardic and hypertensive s/p Mannitol and Furosemide. Central line placed in NSICU. S/p EVD placement, and coil embolization of left MCA bifurcation aneurysm 10/26/2021. S/p cerebral angio without findings of vasospasm 11/10. S/p cardiac arrest with ROSC x3 on 11/12 during tracheostomy at bedside now with b/l pneumothoracies and worsening kidney function. EVD dc'd 11/18, trach'd 11/22, permacath placed 11/26, pending LTACH.      PLAN:  NEURO:  - neuro checks q4hrs/vital signs q4hr .  - Keppra 500mg q24h renal dosing   - CTH 11/18 stable, CTH 11/29 stable  - plan for cranioplasty in ~3 months     CARDIO:  - -160   - amlodipine 10 daily and hydral 100 q8h, clonidine 0.3 q6h  - s/p cardiac arrest   - TTE 11/15: mild LVH, EF 70%     PULM:  - trach, CPAP during day, full vent support overnight  - s/p acute hypoxic cardiac arrest 11/12 during tracheostomy placment with ENT c/b b/l pneumothorax and b/l chest tubes (d/kiko 11/24)  - scopolomine patch started for secretions  - mucomyst q12  GI:  - PEG TFs nepro- 40cc, keep at 40cc/hr, slowly raise   - Bowel regimen, last BM 11/24  - Alk phos uptrending, shocked liver, improving       RENAL:   - s/p  long term HD port with IR 11/26   - S/p dialysis 11/26, 3L taken off  - Na 135-145   - salt tabs 2q6  - renal failure with high Cr. post cardiac arrest   - Nephro following, s/p HD last 11/26  - daily weights     HEME:  - SCDs, SQH  - s/p multiple transfusions this admission, most recently s/p 1unit PRBC 11/26  - UE and LE dopplers negative 11/26  - FOB neg 11/16     ID:  - Tylenol PRN for fever   - sputum cx 11/4: enterobacter, proteus  - Cefepime started to cover for enterobacter/proteus in sputum dc'd 11/15     ENDO:  - monitor BMP glucoses    OTHER  - wound care recs- q2 dressing changes   - ENT consulted, reccomends trach placement and oral hygeine for tongue laceration   - has R midline (11/18)    GOC: full code  Level of care: ICU status   Dispo: pending LTAC [facility]  family updated    Case discussed with Dr. Duncan

## 2021-12-01 NOTE — PROGRESS NOTE ADULT - SUBJECTIVE AND OBJECTIVE BOX
=================================  NEUROCRITICAL CARE ATTENDING NOTE  =================================    AILYN DÍAZ   MRN-[id number]  Summary:  46y/F with recent spinal surgery, found down and brought to ED.  In ED, witnessed shaking, ?seizures, intubated.  Imaging showed SAH.  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     COURSE IN THE HOSPITAL:  10/26 admitted to [facility], [place] - mannitol, 23.4%, repeat CT HCP - EVD R frontal inserted, s/p angio coil embo, 2 units pRBC  10/27 remained intubated overnight, given mannitol overnight; EVD reinserted, 1 unit pRBC  10/28 Tmax 38.2, ICPs to low 20s in AM, mannitol 0.5/Kg x1, 23.4% x1 in PM  10/29 Tmax 38.  remained intubated  10/30 TF stopped for vomiting/aspiration event  10/31 Tmax 38.2 No significant events overnight., remained intubated    Tmax 37.8 given 1 unit pRBC   ICPs teens, given bullet   no ICP issues; storming with stimulation / suctioning     remains intubated   remains intubated, s/p PEG, trach deferred due to macroglossia  11/10 remains intubated, s/p angio     failed trach - CP arrest x1 hour, PTX, 2 chest tubes    11/15 remains intubated on ventilator; aspiration event this AM; paralyzed for tongue biting   remains intubated on ventilator, cuff leak; hypothermic 95F overnight, placed on Josefa hugger   remains intubated on ventilator Clamped EVD this morning   remains intubated, vomiting overnight, tube feeds stopped, started on 3%@30, LR @50; propofol stopped (high TG); R IJ removed; R midline inserted; EVD removed   remains intubated, bleeding inline, TF restarted at 10   remains intubated, off midazolam, TF increased to 20; s/p HD   remains intubated, high BP - given multiple labetalol; aspiration event this morning (mouth, nothing inline, ~5cc)TF held   remains intubated, bleeding from tongue this morning; s/p tracheostomy   remains trached, on full vent support; blood tinged trach - improved and cleared overnight; chest tube clamped; TF @10   remains trached, on full vent support; TF 20cc/hr --> 30cc/hr at 3 p.m.; chest tubes removed   remains trached, on full vent support     SBP to 170s when moved    Past Medical History: No pertinent past medical history  Allergies:  Allergy Status Unknown  Home meds:     PHYSICAL EXAMINATION   T(C): 36.7 ( @ 05:17), Max: 37.2 ( @ 17:00) HR: 77 ( @ 05:00) (60 - 77) BP: 146/78 ( @ 05:00) (129/77 - 179/96) RR: 12 ( @ 05:00) (12 - 32) SpO2: 100% ( @ 05:00) (99% - 100%)  NEUROLOGIC EXAMINATION:  Patient open eyes to stimulation, does not track, does not follow commands, pupils 3mm equal and brisk (+) Doll's, (+) corneals (+) cough and gag, flap full but soft; R UE extensor (trace), L UE 0/5 TF BLE trace  GENERAL: trached 400 12 +5 40%  EENT:  anicteric  CARDIOVASCULAR: (+) S1 S2, normal rate and regular rhythm  PULMONARY: clear lungs, no wheezing  ABDOMEN: soft, distended, normoactive bowel sounds  EXTREMITIES: no edema  SKIN: no rash  WOUNDS:  back incision clean    LABS:   CAPILLARY BLOOD GLUCOSE 142    (12.19)  8.2  (8.1)  12.92 )-----------( 237      ( 01 Dec 2021 04:57 )             27.8     138  |  98  |  44<H>  ----------------------------<  147<H>  4.2   |  28  |  5.21<H>    Ca    9.5      01 Dec 2021 04:57  Phos  4.1       Mg     2.2         TPro  6.9  /  Alb  2.9<L>  /  TBili  0.2  /  DBili  x   /  AST  28  /  ALT  <5<L>  /  AlkPhos  177<H>   @ 07:01  -   @ 07:00  IN: 360 mL / OUT: 0 mL / NET: 360 mL     Bacteriology:   CSF NGTD   sputum GS:  numerous staph aureus, numerous enterobacter cloacae, moderate proteus mirabilis  10/28 CSF NGTD  10/28 Blood NG5D x2  (final)    CSF studies:  10-28  L   *** [id number] *** %N91 %L4     EEG:  Neuroimaging:  Other imaging:    MEDICATIONS:     ·	heparin   Injectable 5000 SubCutaneous every 8 hours  ·	levETIRAcetam  Solution 500 Oral every 24 hours  ·	scopolamine 1 mG/72 Hr(s) Patch 1 Transdermal every 72 hours  ·	amLODIPine   Tablet 10 milliGRAM(s) Oral daily  ·	cloNIDine 0.3 milliGRAM(s) Oral every 6 hours  ·	hydrALAZINE 100 milliGRAM(s) Oral every 8 hours  ·	acetylcysteine 20%  Inhalation 4 Inhalation every 6 hours  ·	sodium chloride 3%  Inhalation 4 Inhalation every 6 hours  ·	bisacodyl Suppository 10 Rectal daily  ·	lactulose Syrup 10 Oral daily  ·	polyethylene glycol 3350 17 Oral two times a day  ·	senna 2 Oral at bedtime  ·	sodium chloride 2 Oral every 6 hours  ·	hydrALAZINE Injectable 10 IV Push every 2 hours PRN  ·	labetalol Injectable 10 IV Push every 2 hours PRN  ·	ondansetron Injectable 4 IV Push every 6 hours PRN    IV FLUIDS: IVL   DRIPS:   DIET: Nepro 30cc/hr  Lines: [place]; R midline; R IJ HD cath  Drains:  2 chest tubes removed   Wounds:    CODE STATUS:  Full Code                       GOALS OF CARE:  aggressive                      DISPOSITION:  ICU =================================  NEUROCRITICAL CARE ATTENDING NOTE  =================================    AILYN DÍAZ   MRN-[id number]  Summary:  46y/F with recent spinal surgery, found down and brought to ED.  In ED, witnessed shaking, ?seizures, intubated.  Imaging showed SAH.  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 for further management.     COURSE IN THE HOSPITAL:  10/26 admitted to [facility], [place] - mannitol, 23.4%, repeat CT HCP - EVD R frontal inserted, s/p angio coil embo, 2 units pRBC  10/27 remained intubated overnight, given mannitol overnight; EVD reinserted, 1 unit pRBC  10/28 Tmax 38.2, ICPs to low 20s in AM, mannitol 0.5/Kg x1, 23.4% x1 in PM  10/29 Tmax 38.  remained intubated  10/30 TF stopped for vomiting/aspiration event  10/31 Tmax 38.2 No significant events overnight., remained intubated    Tmax 37.8 given 1 unit pRBC   ICPs teens, given bullet   no ICP issues; storming with stimulation / suctioning     remains intubated   remains intubated, s/p PEG, trach deferred due to macroglossia  11/10 remains intubated, s/p angio     failed trach - CP arrest x1 hour, PTX, 2 chest tubes    11/15 remains intubated on ventilator; aspiration event this AM; paralyzed for tongue biting   remains intubated on ventilator, cuff leak; hypothermic 95F overnight, placed on Josefa hugger   remains intubated on ventilator Clamped EVD this morning   remains intubated, vomiting overnight, tube feeds stopped, started on 3%@30, LR @50; propofol stopped (high TG); R IJ removed; R midline inserted; EVD removed   remains intubated, bleeding inline, TF restarted at 10   remains intubated, off midazolam, TF increased to 20; s/p HD   remains intubated, high BP - given multiple labetalol; aspiration event this morning (mouth, nothing inline, ~5cc)TF held   remains intubated, bleeding from tongue this morning; s/p tracheostomy   remains trached, on full vent support; blood tinged trach - improved and cleared overnight; chest tube clamped; TF @10   remains trached, on full vent support; TF 20cc/hr --> 30cc/hr at 3 p.m.; chest tubes removed   remains trached, on full vent support    tolerating CPAP on days      SBP to 170s when moved    Past Medical History: No pertinent past medical history  Allergies:  Allergy Status Unknown  Home meds:     Antihypertensive MEDS GIVEN past 24h:  ·	amLODIPine   Tablet  10 ( @ 05:31) x1  ·	cloNIDine  0.3 ( @ 10:45)  0.3 ( @ 15:46)  0.3 ( @ 22:22)  0.3 ( @ :31) x4   ·	hydrALAZINE  100 ( @ 14:55)  100 ( @ 22:22)  100 ( @ 05:30) x3  ·	hydrALAZINE Injectable  10 ( @ 09:29)  10 ( @ 15:05)  10 ( @ 19:33)  10 ( @ 23:53) x4  ·	labetalol Injectable  10 ( @ 04:17) x1    PHYSICAL EXAMINATION   T(C): 36.7 ( @ 05:17), Max: 37.2 ( @ 17:00) HR: 77 ( @ 05:00) (60 - 77) BP: 146/78 ( @ 05:00) (129/77 - 179/96) RR: 12 ( @ 05:00) (12 - 32) SpO2: 100% ( 05:00) (99% - 100%)  NEUROLOGIC EXAMINATION:  Patient open eyes to stimulation, does not track, does not follow commands, pupils 3mm equal and brisk (+) Doll's, (+) corneals (+) cough and gag, flap full but soft; R UE extensor (trace), L UE 0/5 TF BLE trace  GENERAL: trached 400 12 +5 40%  EENT:  anicteric  CARDIOVASCULAR: (+) S1 S2, normal rate and regular rhythm  PULMONARY: clear lungs, no wheezing  ABDOMEN: soft, distended, normoactive bowel sounds  EXTREMITIES: no edema  SKIN: no rash  WOUNDS:  back incision clean    LABS:   CAPILLARY BLOOD GLUCOSE 142    (12.19)  8.2  (8.1)  12.92 )-----------( 237      ( 01 Dec 2021 04:57 )             27.8     138  |  98  |  44<H>  ----------------------------<  147<H>  4.2   |  28  |  5.21<H>    Ca    9.5      01 Dec 2021 04:57  Phos  4.1       Mg     2.2         TPro  6.9  /  Alb  2.9<L>  /  TBili  0.2  /  DBili  x   /  AST  28  /  ALT  <5<L>  /  AlkPhos  177<H>   @ 07:01  -   @ 07:00  IN: 360 mL / OUT: 0 mL / NET: 360 mL     Bacteriology:   CSF NGTD   sputum GS:  numerous staph aureus, numerous enterobacter cloacae, moderate proteus mirabilis  10/28 CSF NGTD  10/28 Blood NG5D x2  (final)    CSF studies:  10-28  L   *** [id number] *** %N91 %L4     EEG:  Neuroimaging:  Other imaging:    MEDICATIONS:     ·	heparin   Injectable 5000 SubCutaneous every 8 hours  ·	levETIRAcetam  Solution 500 Oral every 24 hours  ·	scopolamine 1 mG/72 Hr(s) Patch 1 Transdermal every 72 hours  ·	amLODIPine   Tablet 10 milliGRAM(s) Oral daily  ·	cloNIDine 0.3 milliGRAM(s) Oral every 6 hours  ·	hydrALAZINE 100 milliGRAM(s) Oral every 8 hours  ·	acetylcysteine 20%  Inhalation 4 Inhalation every 6 hours  ·	sodium chloride 3%  Inhalation 4 Inhalation every 6 hours  ·	bisacodyl Suppository 10 Rectal daily  ·	lactulose Syrup 10 Oral daily  ·	polyethylene glycol 3350 17 Oral two times a day  ·	senna 2 Oral at bedtime  ·	sodium chloride 2 Oral every 6 hours  ·	hydrALAZINE Injectable 10 IV Push every 2 hours PRN  ·	labetalol Injectable 10 IV Push every 2 hours PRN  ·	ondansetron Injectable 4 IV Push every 6 hours PRN    IV FLUIDS: IVL   DRIPS:   DIET: Nepro 30cc/hr  Lines: L [place]; R midline; R IJ HD cath  Drains:    Wounds:    CODE STATUS:  Full Code                       GOALS OF CARE:  aggressive                      DISPOSITION:  ICU

## 2021-12-01 NOTE — PROGRESS NOTE ADULT - ATTENDING COMMENTS
I: HTN uncontrolled.  Stable on dialysis.   A: Stable CHETAN.  P: Continue dialysis. Continue BP meds.

## 2021-12-01 NOTE — PROVIDER CONTACT NOTE (CHANGE IN STATUS NOTIFICATION) - DATE AND TIME:
06-Nov-2021 09:15
13-Nov-2021 03:12
14-Nov-2021 19:51
19-Nov-2021 13:30
21-Nov-2021 09:35
30-Nov-2021 15:30
01-Dec-2021 09:00
13-Nov-2021 19:00
28-Oct-2021 11:30
15-Nov-2021 22:34
27-Oct-2021 11:30
28-Oct-2021 08:30
17-Nov-2021 21:30
12-Nov-2021 15:00
28-Oct-2021 19:54

## 2021-12-01 NOTE — PROVIDER CONTACT NOTE (CHANGE IN STATUS NOTIFICATION) - SITUATION
pt ET leak is continuously and now pt TV is going down
Patient has right upward gaze. Change from baseline
S/p CT and laying supine, pt became bradycardic and began to vomit.
Trop 0.19 (downtrending), urine output 17
b/p 192/100 at 7Pm, HR 80's. Given Fentanyl 50mcg IVP for hypertension. SBP down to 160's after Fentanyl IVP.
Blood noted in ET Tube while suctioning.  Clot passed while suctioning.    Stoma noted to be more sanguinous at time. Stoma previously secreting yellow thin liquid.
Due to void at 8pm. bladder scanned at 715pm with 121cc.
ICP 17 S/p mannitol
ICP 22
Pt hypertensive 186/94 30 minutes s/p 100mg hydralazine PO and 10mg hydralazine IVP
Right arm swelling and warmth
vomited approximately 50cc orally while HOB 35degrees.
No movement noted to all extremities, despite painful stimuli.
Upon 8pm neuro check assessment, lower extremities extensor posuturing
Patient Became tachycardic and started to vomit with ICP elevated to 33 before trending 18

## 2021-12-01 NOTE — PROGRESS NOTE ADULT - ASSESSMENT
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 Hospital course c/b cardiac arrest x3 and anuric ATN requiring dialyisis.     Anuric iATN requiring long term dialysis  Baseline creatinine 0.6  First HD 11/20;  S/p IR tunneled cath placement 11/26  HD tomorrow 12/2  Indeterminate quantiferon and wnl hepatitis panel    EDW tbd  SBP goal 100-160 per neuro ICU; UF with HD  Anemia- no need for IV iron, epo with HD  BMD: phos acceptable, no need for binders  Access: S/p IR tunneled cath placement 11/26

## 2021-12-01 NOTE — PROGRESS NOTE ADULT - ASSESSMENT
46y/Female with:  L MCA ruptured aneurysm, subarachnoid hemorrhage, s/p DHC (10/26/2021, Dr. D'Amico @ [place]), brain compression, cerebral edema  anemia   recent spinal surgery  UGIB  bilateral pneumothorax  acute kidney injury, transaminitis    PLAN: Day 1 = 10-26 ; Day 4 = 10/29; Day 21 = 11/15  NEURO: comachecks q4h, VS q1h, PRN fentanyl for analgosedation  SAH:  off nimodipine  Hydrocephalus:  EVD discontinued  cranioplasty to be scheduled  Seizure prophylaxis:  cont levetiracetam 500 OD   REHAB:  physical therapy evaluation and management    EARLY MOB:  HOB elevated    PULM:  full vent support for now, continue mucormyst (decrease to q12h) and ipratropium / albuterol - switch toPRN; pulmo toilette; s/p trach, chest tube discontinued   CARDIO:  SBP goal 100-160mm Hg, TTE, amlodipine 10mg PO daily; clonidine 0.3 q6h; continue hydralazine 50q8h  ENDO:  Blood sugar goals 140-180 mg/dL  GI:  PPI OD; reglan cont 5 q6h   DIET: TF increase to 40cc/hr  RENAL: off IVF  Na goal 135-145; needs permanent catheter; dialysis tomorrow  HEM/ONC: no coagulopathy (INR= 1.03), no ASA / Plavix use; Hb stable   VTE Prophylaxis: SCDs, [facility]   ID: afebrile, no leukocytosis  Social: will update family   MISC: Scopolamine patch    CORE MEASURES  1.  Hunt and Griffin Score = 5  2.  VTE prophylaxis:  [ ] administered within 24 hours of admission OR [X] reason not done: fresh bleed, unsecured aneurysm.  3.  Dysphagia screening:   [X] performed before any oral meds / liquids / food  4.  Nimodipine treatment:  [X] administered within 24 hours of admission OR [ ] reason not done:    ATTENDING ATTESTATION:  I was physically present for the key portions of the evaluation and management (E/M) service provided.  I agree with the above history, physical and plan, which I have reviewed and edited where appropriate.    Patient at high risk for neurological deterioration or death due to:  ICU delirium, aspiration PNA, DVT / PE.  Critical care time:  I have personally provided 45 minutes of critical care time, excluding time spent on separate procedures.      Plan discussed with RN, house staff. 46y/Female with:  L MCA ruptured aneurysm, subarachnoid hemorrhage, s/p C (10/26/2021, Dr. D'Amico @ [place]), brain compression, cerebral edema  anemia   recent spinal surgery  UGIB  bilateral pneumothorax  acute kidney injury, transaminitis    PLAN: Day 1 = 10-26 ; Day 4 = 10/29; Day 21 = 11/15  NEURO: comachecks q4h, VS q1h, PRN fentanyl for analgosedation  SAH:  off nimodipine  Hydrocephalus:  EVD discontinued  cranioplasty to be scheduled  Seizure prophylaxis:  cont levetiracetam 500 OD   REHAB:  physical therapy evaluation and management    EARLY MOB:  HOB elevated    PULM:  CPAP; full vent support overnight, continue mucormyst (decrease to q12h) and ipratropium / albuterol; d/c 3%   CARDIO:  SBP goal 100-160mm Hg, TTE, amlodipine 10mg PO daily; decrease clonidine 0.3 q8h; continue hydralazine 100q8h; add labetalol 100 q12h  ENDO:  Blood sugar goals 140-180 mg/dL  GI:  off PPI OD  DIET: TF at goal  RENAL: off IVF  Na goal 135-145; cont salt, dialysis as per renal  HEM/ONC: no coagulopathy (INR= 1.03), no ASA / Plavix use; Hb stable   VTE Prophylaxis: [facility]   ID: afebrile, no leukocytosis  Social: will update family; pending dispo  MISC: d/c Scopolamine patch    CORE MEASURES  1.  Hunt and Griffin Score = 5  2.  VTE prophylaxis:  [ ] administered within 24 hours of admission OR [X] reason not done: fresh bleed, unsecured aneurysm.  3.  Dysphagia screening:   [X] performed before any oral meds / liquids / food  4.  Nimodipine treatment:  [X] administered within 24 hours of admission OR [ ] reason not done:    ATTENDING ATTESTATION:  I was physically present for the key portions of the evaluation and management (E/M) service provided.  I agree with the above history, physical and plan, which I have reviewed and edited where appropriate.    Patient at high risk for neurological deterioration or death due to:  ICU delirium, aspiration PNA, DVT / PE.  Critical care time:  I have personally provided 45 minutes of critical care time, excluding time spent on separate procedures.      Plan discussed with RN, house staff.

## 2021-12-02 LAB
ALBUMIN SERPL ELPH-MCNC: 2.9 G/DL — LOW (ref 3.3–5)
ALP SERPL-CCNC: 164 U/L — HIGH (ref 40–120)
ALT FLD-CCNC: <5 U/L — LOW (ref 10–45)
ANION GAP SERPL CALC-SCNC: 12 MMOL/L — SIGNIFICANT CHANGE UP (ref 5–17)
AST SERPL-CCNC: 23 U/L — SIGNIFICANT CHANGE UP (ref 10–40)
BILIRUB SERPL-MCNC: 0.2 MG/DL — SIGNIFICANT CHANGE UP (ref 0.2–1.2)
BUN SERPL-MCNC: 53 MG/DL — HIGH (ref 7–23)
CALCIUM SERPL-MCNC: 9.5 MG/DL — SIGNIFICANT CHANGE UP (ref 8.4–10.5)
CHLORIDE SERPL-SCNC: 101 MMOL/L — SIGNIFICANT CHANGE UP (ref 96–108)
CO2 SERPL-SCNC: 27 MMOL/L — SIGNIFICANT CHANGE UP (ref 22–31)
CREAT SERPL-MCNC: 5.97 MG/DL — HIGH (ref 0.5–1.3)
GGT SERPL-CCNC: 71 U/L — HIGH (ref 8–40)
GLUCOSE SERPL-MCNC: 133 MG/DL — HIGH (ref 70–99)
HCT VFR BLD CALC: 25.9 % — LOW (ref 34.5–45)
HGB BLD-MCNC: 7.8 G/DL — LOW (ref 11.5–15.5)
MAGNESIUM SERPL-MCNC: 2.4 MG/DL — SIGNIFICANT CHANGE UP (ref 1.6–2.6)
MCHC RBC-ENTMCNC: 24.9 PG — LOW (ref 27–34)
MCHC RBC-ENTMCNC: 30.1 GM/DL — LOW (ref 32–36)
MCV RBC AUTO: 82.7 FL — SIGNIFICANT CHANGE UP (ref 80–100)
NRBC # BLD: 0 /100 WBCS — SIGNIFICANT CHANGE UP (ref 0–0)
PHOSPHATE SERPL-MCNC: 4.6 MG/DL — HIGH (ref 2.5–4.5)
PLATELET # BLD AUTO: 245 K/UL — SIGNIFICANT CHANGE UP (ref 150–400)
POTASSIUM SERPL-MCNC: 4.6 MMOL/L — SIGNIFICANT CHANGE UP (ref 3.5–5.3)
POTASSIUM SERPL-SCNC: 4.6 MMOL/L — SIGNIFICANT CHANGE UP (ref 3.5–5.3)
PROT SERPL-MCNC: 7.1 G/DL — SIGNIFICANT CHANGE UP (ref 6–8.3)
RBC # BLD: 3.13 M/UL — LOW (ref 3.8–5.2)
RBC # FLD: 20.1 % — HIGH (ref 10.3–14.5)
SARS-COV-2 RNA SPEC QL NAA+PROBE: SIGNIFICANT CHANGE UP
SODIUM SERPL-SCNC: 140 MMOL/L — SIGNIFICANT CHANGE UP (ref 135–145)
TROPONIN T SERPL-MCNC: 0.07 NG/ML — CRITICAL HIGH (ref 0–0.01)
TROPONIN T SERPL-MCNC: 0.08 NG/ML — CRITICAL HIGH (ref 0–0.01)
WBC # BLD: 13.73 K/UL — HIGH (ref 3.8–10.5)
WBC # FLD AUTO: 13.73 K/UL — HIGH (ref 3.8–10.5)

## 2021-12-02 PROCEDURE — 95718 EEG PHYS/QHP 2-12 HR W/VEEG: CPT

## 2021-12-02 PROCEDURE — 99291 CRITICAL CARE FIRST HOUR: CPT

## 2021-12-02 PROCEDURE — 90935 HEMODIALYSIS ONE EVALUATION: CPT | Mod: GC

## 2021-12-02 RX ORDER — HYDROCORTISONE 1 %
1 OINTMENT (GRAM) TOPICAL
Refills: 0 | Status: DISCONTINUED | OUTPATIENT
Start: 2021-12-02 | End: 2021-12-02

## 2021-12-02 RX ORDER — ACETAMINOPHEN 500 MG
650 TABLET ORAL ONCE
Refills: 0 | Status: COMPLETED | OUTPATIENT
Start: 2021-12-02 | End: 2021-12-02

## 2021-12-02 RX ORDER — LABETALOL HCL 100 MG
50 TABLET ORAL EVERY 12 HOURS
Refills: 0 | Status: DISCONTINUED | OUTPATIENT
Start: 2021-12-02 | End: 2021-12-03

## 2021-12-02 RX ORDER — HYDRALAZINE HCL 50 MG
10 TABLET ORAL EVERY 4 HOURS
Refills: 0 | Status: DISCONTINUED | OUTPATIENT
Start: 2021-12-02 | End: 2021-12-10

## 2021-12-02 RX ORDER — ERYTHROPOIETIN 10000 [IU]/ML
9000 INJECTION, SOLUTION INTRAVENOUS; SUBCUTANEOUS ONCE
Refills: 0 | Status: COMPLETED | OUTPATIENT
Start: 2021-12-02 | End: 2021-12-02

## 2021-12-02 RX ADMIN — Medication 4 MILLILITER(S): at 16:38

## 2021-12-02 RX ADMIN — Medication 10 MILLIGRAM(S): at 10:59

## 2021-12-02 RX ADMIN — Medication 650 MILLIGRAM(S): at 00:00

## 2021-12-02 RX ADMIN — Medication 100 MILLIGRAM(S): at 05:33

## 2021-12-02 RX ADMIN — Medication 650 MILLIGRAM(S): at 02:28

## 2021-12-02 RX ADMIN — Medication 4 MILLILITER(S): at 05:33

## 2021-12-02 RX ADMIN — Medication 50 MILLIGRAM(S): at 23:49

## 2021-12-02 RX ADMIN — AMLODIPINE BESYLATE 10 MILLIGRAM(S): 2.5 TABLET ORAL at 05:32

## 2021-12-02 RX ADMIN — HEPARIN SODIUM 5000 UNIT(S): 5000 INJECTION INTRAVENOUS; SUBCUTANEOUS at 14:39

## 2021-12-02 RX ADMIN — SODIUM CHLORIDE 2 GRAM(S): 9 INJECTION INTRAMUSCULAR; INTRAVENOUS; SUBCUTANEOUS at 11:55

## 2021-12-02 RX ADMIN — Medication 0.2 MILLIGRAM(S): at 14:36

## 2021-12-02 RX ADMIN — Medication 0.3 MILLIGRAM(S): at 05:32

## 2021-12-02 RX ADMIN — HEPARIN SODIUM 5000 UNIT(S): 5000 INJECTION INTRAVENOUS; SUBCUTANEOUS at 05:32

## 2021-12-02 RX ADMIN — CHLORHEXIDINE GLUCONATE 1 APPLICATION(S): 213 SOLUTION TOPICAL at 05:34

## 2021-12-02 RX ADMIN — Medication 100 MILLIGRAM(S): at 14:38

## 2021-12-02 RX ADMIN — LEVETIRACETAM 500 MILLIGRAM(S): 250 TABLET, FILM COATED ORAL at 21:15

## 2021-12-02 RX ADMIN — Medication 10 MILLIGRAM(S): at 00:34

## 2021-12-02 RX ADMIN — Medication 0.2 MILLIGRAM(S): at 21:15

## 2021-12-02 RX ADMIN — Medication 50 MILLIGRAM(S): at 11:55

## 2021-12-02 RX ADMIN — SODIUM CHLORIDE 2 GRAM(S): 9 INJECTION INTRAMUSCULAR; INTRAVENOUS; SUBCUTANEOUS at 05:32

## 2021-12-02 RX ADMIN — Medication 10 MILLIGRAM(S): at 19:26

## 2021-12-02 RX ADMIN — Medication 100 MILLIGRAM(S): at 21:15

## 2021-12-02 RX ADMIN — HEPARIN SODIUM 5000 UNIT(S): 5000 INJECTION INTRAVENOUS; SUBCUTANEOUS at 21:16

## 2021-12-02 RX ADMIN — SODIUM CHLORIDE 2 GRAM(S): 9 INJECTION INTRAMUSCULAR; INTRAVENOUS; SUBCUTANEOUS at 23:49

## 2021-12-02 RX ADMIN — Medication 10 MILLIGRAM(S): at 06:53

## 2021-12-02 RX ADMIN — CHLORHEXIDINE GLUCONATE 15 MILLILITER(S): 213 SOLUTION TOPICAL at 18:07

## 2021-12-02 RX ADMIN — Medication 1 DROP(S): at 06:14

## 2021-12-02 RX ADMIN — Medication 4 MILLILITER(S): at 22:06

## 2021-12-02 RX ADMIN — CHLORHEXIDINE GLUCONATE 15 MILLILITER(S): 213 SOLUTION TOPICAL at 05:31

## 2021-12-02 RX ADMIN — SODIUM CHLORIDE 2 GRAM(S): 9 INJECTION INTRAMUSCULAR; INTRAVENOUS; SUBCUTANEOUS at 18:07

## 2021-12-02 RX ADMIN — Medication 10 MILLIGRAM(S): at 04:38

## 2021-12-02 RX ADMIN — ERYTHROPOIETIN 9000 UNIT(S): 10000 INJECTION, SOLUTION INTRAVENOUS; SUBCUTANEOUS at 14:15

## 2021-12-02 RX ADMIN — Medication 4 MILLILITER(S): at 12:00

## 2021-12-02 NOTE — PROGRESS NOTE ADULT - ASSESSMENT
46y/Female with:  L MCA ruptured aneurysm, subarachnoid hemorrhage, s/p C (10/26/2021, Dr. D'Amico @ [place]), brain compression, cerebral edema  anemia   recent spinal surgery  UGIB  bilateral pneumothorax  acute kidney injury, transaminitis    PLAN: Day 1 = 10-26 ; Day 4 = 10/29; Day 21 = 11/15  NEURO: comachecks q4h, VS q1h, PRN fentanyl for analgosedation  SAH:  off nimodipine  Hydrocephalus:  EVD discontinued  cranioplasty to be scheduled  Seizure prophylaxis:  cont levetiracetam 500 OD   REHAB:  physical therapy evaluation and management    EARLY MOB:  HOB elevated    PULM:  CPAP; full vent support overnight, continue mucormyst (decrease to q12h) and ipratropium / albuterol; d/c 3%   CARDIO:  SBP goal 100-160mm Hg, TTE, amlodipine 10mg PO daily; decrease clonidine 0.3 q8h; continue hydralazine 100q8h; add labetalol 100 q12h  ENDO:  Blood sugar goals 140-180 mg/dL  GI:  off PPI OD  DIET: TF at goal  RENAL: off IVF  Na goal 135-145; cont salt, dialysis as per renal  HEM/ONC: no coagulopathy (INR= 1.03), no ASA / Plavix use; Hb stable   VTE Prophylaxis: [facility]   ID: afebrile, no leukocytosis  Social: will update family; pending dispo  MISC: d/c Scopolamine patch    CORE MEASURES  1.  Hunt and Griffin Score = 5  2.  VTE prophylaxis:  [ ] administered within 24 hours of admission OR [X] reason not done: fresh bleed, unsecured aneurysm.  3.  Dysphagia screening:   [X] performed before any oral meds / liquids / food  4.  Nimodipine treatment:  [X] administered within 24 hours of admission OR [ ] reason not done:    ATTENDING ATTESTATION:  I was physically present for the key portions of the evaluation and management (E/M) service provided.  I agree with the above history, physical and plan, which I have reviewed and edited where appropriate.    Patient at high risk for neurological deterioration or death due to:  ICU delirium, aspiration PNA, DVT / PE.  Critical care time:  I have personally provided 45 minutes of critical care time, excluding time spent on separate procedures.      Plan discussed with RN, house staff. 46y/Female with:  L MCA ruptured aneurysm, subarachnoid hemorrhage, s/p C (10/26/2021, Dr. D'Amico @ [place]), brain compression, cerebral edema  anemia   recent spinal surgery  UGIB  bilateral pneumothorax  acute kidney injury, transaminitis    PLAN: Day 1 = 10-26 ; Day 4 = 10/29; Day 21 = 11/15  NEURO: comachecks q4h, VS q1h, PRN fentanyl for analgosedation  SAH:  off nimodipine  Hydrocephalus:  EVD discontinued  cranioplasty to be scheduled  Seizure prophylaxis:  cont levetiracetam 500 OD; EEG x24h  REHAB:  physical therapy evaluation and management    EARLY MOB:  HOB elevated    PULM:  cont full vent support, continue mucormyst (q12h) and ipratropium / albuterol  CARDIO:  SBP goal 100-160mm Hg, TTE, amlodipine 10mg PO daily; clonidine 0.2 q8h; continue hydralazine 100q8h; decrease labetalol 50 q12h with hold parameters HR 60 BP 90  ENDO:  Blood sugar goals 140-180 mg/dL  GI:  off PPI OD  DIET: TF at goal  RENAL: off IVF  Na goal 135-145; cont salt, dialysis as per renal  HEM/ONC: no coagulopathy (INR= 1.03), no ASA / Plavix use; Hb stable   VTE Prophylaxis: SCDs, [facility]   ID: afebrile, no leukocytosis; panculture if fever spike  Social: son updated yesterday; pending dispo  MISC: ENT for tongue wound    CORE MEASURES  1.  Hunt and Griffin Score = 5  2.  VTE prophylaxis:  [ ] administered within 24 hours of admission OR [X] reason not done: fresh bleed, unsecured aneurysm.  3.  Dysphagia screening:   [X] performed before any oral meds / liquids / food  4.  Nimodipine treatment:  [X] administered within 24 hours of admission OR [ ] reason not done:    ATTENDING ATTESTATION:  I was physically present for the key portions of the evaluation and management (E/M) service provided.  I agree with the above history, physical and plan, which I have reviewed and edited where appropriate.    Patient at high risk for neurological deterioration or death due to:  ICU delirium, aspiration PNA, DVT / PE.  Critical care time:  I have personally provided 45 minutes of critical care time, excluding time spent on separate procedures.      Plan discussed with RN, house staff.

## 2021-12-02 NOTE — PROVIDER CONTACT NOTE (CRITICAL VALUE NOTIFICATION) - ACTION/TREATMENT ORDERED:
JULIA Guajardo and team made aware of troponin of 0.07, no further actions ordered
monitor patient for sign or symptom
Calcium gluconate

## 2021-12-02 NOTE — PROVIDER CONTACT NOTE (OTHER) - NAME OF MD/NP/PA/DO NOTIFIED:
JULIA Arroyo
JULIA Guajardo
JULIA Lee & JULIA Arroyo
J[facility]
Janeen Menard MD
Dr. Vyas and JULIA Dietz
JULIA Pichardo
JULIA Pichardo
MD AVILES
MD Janeen Menard
MD Janeen Menard
Dr Katie Velásquez
JULIA Hughes
JULIA Hughes
JULIA Mcginnis
JULIA Mcginnis
JULIA Pichardo
Candace Parsons
MD Vyas
JULIA Blackburn
MD Vyas and JULIA Horton
Yared Barnett PA
Yared Barnett PA

## 2021-12-02 NOTE — CHART NOTE - NSCHARTNOTEFT_GEN_A_CORE
Admitting Diagnosis:   Patient is a 46y old  Female who presents with a chief complaint of ICH (02 Dec 2021 02:20)    PAST MEDICAL & SURGICAL HISTORY:  No pertinent past medical history  Personal history of spine surgery    Current Nutrition Order: NPO diet  Nepro @40ml/hr x24hrs with x2 LPS daily (200kcal, 30g pro) via PEG. Provides: 960ml free H2O, 1928kcal, 108g protein, 698ml free H2O, 2.25g/kg IBW protein.     PO Intake: Good (%) [   ]  Fair (50-75%) [   ] Poor (<25%) [   ]- N/A    GI Issues:   Unable to assess at this time 2/2 trach, poor mental status  No episodes of emesis/diarrhea/constipation recorded  PEG in place for EN feeds  **Ordered for bowel regimen, lactulose, Zofran    Pain:  Non-verbal indicators of pain absent     Skin Integrity:  Head surgical incision, rosalind score 15,   +2 Generalized pitting edema  No pressure ulcers noted    Labs:   12-02    140  |  101  |  53<H>  ----------------------------<  133<H>  4.6   |  27  |  5.97<H>    Ca    9.5      02 Dec 2021 05:29  Phos  4.6     12-02  Mg     2.4     12-02    TPro  7.1  /  Alb  2.9<L>  /  TBili  0.2  /  DBili  x   /  AST  23  /  ALT  <5<L>  /  AlkPhos  164<H>  12-02    CAPILLARY BLOOD GLUCOSE    POCT Blood Glucose.: 147 mg/dL (02 Dec 2021 10:51)    Medications:  MEDICATIONS  (STANDING):  acetylcysteine 20%  Inhalation 4 milliLiter(s) Inhalation every 6 hours  amLODIPine   Tablet 10 milliGRAM(s) Oral daily  artificial  tears Solution 1 Drop(s) Both EYES two times a day  bisacodyl Suppository 10 milliGRAM(s) Rectal daily  chlorhexidine 0.12% Liquid 15 milliLiter(s) Oral Mucosa every 12 hours  chlorhexidine 2% Cloths 1 Application(s) Topical <User Schedule>  cloNIDine 0.2 milliGRAM(s) Oral every 8 hours  epoetin nannette-epbx (RETACRIT) Injectable 9000 Unit(s) IV Push once  heparin   Injectable 5000 Unit(s) SubCutaneous every 8 hours  hydrALAZINE 100 milliGRAM(s) Oral every 8 hours  labetalol 50 milliGRAM(s) Oral every 12 hours  lactulose Syrup 10 Gram(s) Oral daily  levETIRAcetam  Solution 500 milliGRAM(s) Oral every 24 hours  polyethylene glycol 3350 17 Gram(s) Oral two times a day  senna 2 Tablet(s) Oral at bedtime  sodium chloride 2 Gram(s) Oral every 6 hours    MEDICATIONS  (PRN):  hydrALAZINE Injectable 10 milliGRAM(s) IV Push every 4 hours PRN SBP >180  ondansetron Injectable 4 milliGRAM(s) IV Push every 6 hours PRN Nausea and/or Vomiting  sodium chloride 0.9% lock flush 10 milliLiter(s) IV Push every 1 hour PRN Pre/post blood products, medications, blood draw, and to maintain line patency    Admitting Anthropometrics:  Height: 61" Weight: 187lbs, IBW 105lbs+/-10%, %%, BMI 34.9 kg/m2     Weight:  10/26 187lbs   11/3 183.6lbs  11/4 187.1lbs  11/20 204.3lbs/ 201lbs  11/21 208.9lbs/ 204.5lbs  11/22 202.1lbs/ 196.4lbs  11/26 195lbs (dry)  11/28 197.3lbs  11/30 201.7lbs/ 194.8lbs  12/2 194.6lbs    Weight Change: Weights fluctuating this admission likely 2/2 CHETAN and HD, please cont to trend weights before and after each HD session.     Nutrition Focused Physical Exam: Completed [   ]  Not Pertinent [ x  ]    Estimated energy needs:   IBW (48kg) used for calculations as pt >120% of IBW (178%). Needs adjusted for wound healing, critical care requiring intubation.  Kcal (25-30 kcal/kg): 9974-3614 kcal  Protein (1.4-1.6 g/kg pro): 67-76g pro  **Fluids per team 2/2 HD    Subjective: 46y/F with recent spinal surgery, found down and brought to ED.  In ED, witnessed shaking, ?seizures, intubated.  Imaging showed SAH.  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 Repeat CT HCP - EVD R frontal inserted, s/p L hemicraniectomy for decompression and evacuation of SDH 10/26. EVD placed. Pt returned to NSICU intubated and sedated. EVD@5cm h2o. Underwent work-up for emesis, Noted tongue biting with oral trauma and mild bleeding 11/8. S/p PEG tube (11/9/2021). TF stopped due to vomiting episodes 11/18 (Reglan ordered)- Pt refluxing feeds through mouth and nose overnight- now improved. Propofol held 11/18 for elevated TG levels. S/p trach placement 11/22. S/p permacath on 11/26.     Pt seen in room, she remains trached to vent with trial CPAP mode during the day, off of sedation, no change in mental status. - not on pressors at this time, on multiple anti-HTN agents. Tmax 97.9F. EN running at goal of Nepro @ 30 ml/hr w/ x1 LPS daily tolerating well. No n/v/d/c reported- currently on bowel regimen, lactulose. S/p HD session today. Pertinent labs: POCT 147H, Phos 4.6H, BUN 53H, Cre 5.97H, Glu 133H, Alb 2.9L, Alk Phos 164H, ALT <5L, estimated GFR 9L. Pending LTACH placement. Will continue to follow per RD protocol.     Previous Nutrition Diagnosis: Inadequate oral intake RT Inability to meet est needs via PO AEB NPO, reliant on EN to meet 100% of est needs    Active [ x  ]  Resolved [   ]    If resolved, new PES:     Goal: Meet >80% of EER consistently via EN     Recommendations:  1.  Cont with current EN regimen; Nepro @ 30ml/hr x24hrs with x1 LPS daily (100kcal, 15g pro) via PEG. Provides: 720 ml TV, 1396 kcal, 73g pro, 523 ml free water, 1.52g/kg IBW protein.   >>Monitor for s/s intolerance; maintain aspiration precautions at all times   2. Pain and bowel regimen per team  3. Monitor lytes and replete prn. POC BG q6hrs   4. Trend dry wts     Education: Not appropriate based on current clinical/mental status     Risk Level: High [ x  ] Moderate [   ] Low [   ].

## 2021-12-02 NOTE — PROVIDER CONTACT NOTE (OTHER) - REASON
Trach Site oozing
Increased left head swelling
Pt's temperature increased from 99.5F at start of blood transfusion to 100.4F at 15 min check
pt. ICP at 15 and flap feels firm
pt. tachycardic to 120-130's
A-line reading not correlating with non-invasive cuff
HD Cath site bleeding
unsure if L chest tube is clogged
BP high
Pt has 4secs pause when suctioning
increased WOB
pt TOF baseline 7 4/4 twitches and remained with 4/4 twitches
pt continuously has gurgling sounds despite placing air in the cuff
pt's tongue swollen, ecchymotic, and laceration.
pt. blood pressure elevated to 180-190's systolic
L head increased swelling
Patient has bitten through the right side of her tongue
Pt Left Pupil is 3mm and sluggish
bladder scan 2am shows 180cc
leak in trach cuff
Tube feed residual 180 ccs. Tube feed colored contents noted in inline suction.
Tube feed residual 220cc
pt. evd output is at zero for 2hrs

## 2021-12-02 NOTE — PROGRESS NOTE ADULT - SUBJECTIVE AND OBJECTIVE BOX
HPI:  45 y/o female with PMH HTN, Multiple sclerosis, recent spinal surgery 10/8 ([facility]) presented to [place] [facility] BIBEMS after being found unconscious at home, unknown period of time. 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 HH5, MF1. Patient was intubated at [place] [facility], found to have blown L pupil, and sent straight to the OR for left hemicraniotomy. A-line placed intraop. Hemodynamically unstable upon arrival to [facility], bradycardic and hypertensive s/p Mannitol, Clevidipine, and Furosemide. Central line placed in NSICU. Currently sedated on Propofol, pending repeat CTH. History per patient's son, patient had recent spine surgery and was found on outpatient imaging last week to have L M1 segment aneurysm (unruptured), pt asymptomatic at this time. Per son patient taking percocet PO at home. Ureña catheter, ET tube, and arterial line placed at [facility].     NIHSS on arrival: 32   Bess Griffin 5, Mod Busby 4  Bleed Day 1 = 10/26 (26 Oct 2021 13:03)      Hospital Course:   10/26: admitted to [facility] from [facility], POD#0 s/p L hemicraniectomy for decompression and evacuation of SDH. Transferred to [facility] noted to have tense flap, received mannitol, keppra, decadron. Was hypertensive to 200s and preston to 40s, recevied 85g mannitol and bullet 23.4%. CTH on arrival demonstrated increased size in vents and new IVH. EVD placed, open at 15, central line placed. Transfused 2 u PRBC. POD0 of coiling of left MCA bifurcation aneurysm,   10/27: CTH demonstrated EVD in correct position, EVD lowered to 5, remains intubated on proprofol, pending repeat CTH in AM. Started on 3% @ 30/hr. 2LNS given for euvolemia, Mannitol given for uptrending ICPs. Bullet given 8:30 am. 3% increased to 50/hr. 1 L bolus. New EVD catheter placed using exisiting sreekanth hole. 1 u PRBC.  10/28: HH5, MF4, BD3; POD2 angio coil/embo of L MCA aneurysm. JOAQUÍN overnight, neuro stable. EVD@5cmH20 ICPs < 20 overnight, OP WNL. S/p 1 unit PRBC yesterday with appropriate response. Given 42g mannitol in AM for ICP 22 persistently, with improvement, then given 23% in PM for elevated ICP. febrile, pancultured. Standing tylenol and cooling blanket.   10/29: BD4, POD3 angio coil/embo. JOAQUÍN o/n, neuro stable. EVD@5, ICPs <20 o/n.   10/30: BD5, POD4 angio coil/embo. JOAQUÍN o/n neuro stable. EVD@5. 3% @50cc/hr.  10/31: BD6, POD5 angio coil/embo. brief period maintained upward gaze, resolved on its own. EVD@5cm h2o.    11/1: BD7, POD6 L hemicrani, angio coil/embo. JOAQUÍN overnight. Neuro exam unchanged. ICPs WNL. started bromocriptine/gabapentin/baclofen. dc'd propofol, started precedex  11/2: BD8 POD7 L hemicrani, angio coil/embo. JOAQUÍN overnight. Neuro exam stable. Remains on 3% hypertonic saline. Receieved 1 unit of PRBCs for a Hgb of 7.6.  11/3: BD 9 POD8 of L hemicrani. Elevated lipase, normal amylase, OG tube clogged and replaced. Abdominal US normal. Pending gen surg reccs regarding trach and peg. Gabapentin and Baclofen increased to help with neurostorming. restarted back on 3%, LR@35. became preston+hypotensive with nimodipine 60q4, decreased back to 30q2, NaCl bullet given.   11/4: BD10 POD9, JOAQUÍN o/n, 500cc NS for euvolemia,  neuro stable, EVD at 5. 3% increased to 75  11/5: BD11 POD10, JOAQUÍN o/n, neuro stable, EVD at 5  11/6: BD12 POD11 JOAQUÍN overnight. Neuro exam stable. Remains intubated on precedex. 3% hypertonic saline dc'd  11/7: BD13 POD 12 JOAQUÍN o/n, NGT replaced. on precex with no icp crisis   11/8: BD14 POD13 JOAQUÍN o/n, noted to have tongue maceration/macroglossia. Gauze with tongue depressor placed. Pending further recs from ENT. Pending trach and PEG placement tomorrow with gen surg. Off precedex, ICPs stable. EVD remains @ 5.   11/9: BD15, POD 14, bleeding under tongue at start of shift, fentanyl pushed with no further episodes. gabapentin stopped. PEG placed  11/10: BD 16, POD 15, neuro stable, ENT to be consulted in AM, 3% increased to 50/hr.  11/11: BD 17, POD 16, neuro exam stable. Pend CT saba in am for cranioplasty planning. Pend trach in OR with ENT. F.u BMP in am, 3%@50cc/hr for Na goal 140-145.   11/12: BD 18, POD 17. JOAQUÍN overnight, neuro stable. Pend trach placement today. CT saba completed 11/11. Off of 3%, f/u am BMP for Na goal 140-150. CSF neg. Hypoxic cardiac arrest with ROSC x 3 during tracheostomy placement. B/l chest tubes placed for resulting pneumothorax s/p CPR. salt tabs dc'd.  11/13: BD 19, POD 18. Patient tachycardic with some improvement, SBP stable off of levophed, hgb downtrending o/n, transfused 1 unit PRBCs. B/l chest tube. EVD @5cmH2O, no elevated ICPs. UOP downtrending, trend BUN/Cr, given 1LNS x1 for low urine output. F/u am CXR. Cont Cefepime until today, then change to Ancef while trach packing in place per ENT. Pend CTH when stable. Trops downtrending s/p cardiac arrest. 1L. florinef dc'd. BP goal changed to 100-160, started on amlodipine   11/14: BD20, POD19, worsening creatinine with decreased urine output. Given 250 of albumin and 500cc LR. started on hydralazine PO, decreased amantadine 100 daily given CrCl of 20.  11/15: BD21, POD20, remains oliguric, fem line dc'd, tongue swollen and unable to fit bite block in mouth, started on nimbex gtt to relax jaw. Propofol and fentanyl gtt started. Vomiting TFs through nose and mouth, ENT called. TFs held. Cr uptrending. Palliative consulted.  11/16: BD22, POD21, o/n external trach dressing replaced due to cuff leak and decreasing TV, sedated and paralyzed on nimbex, propofol, and fentanyl gtt. CTH stable.  EVD raised from 5 to 10. Nimbex weaned off. Cr uptrending, Trickle feeds started. FOB negative.   11/17: BD 23, POD22, JOAQUÍN o/n, EVD clamped, NS d/c'ed, LR@50, advancing tube feeds.   11/18: BD24, POD23 o/n 3% at 30 with Na acetate started, propofol dc'd due to elevated TG level, episode of vomiting TFs from nose and mouth into ETT with elevated ICP 30s, ICP normalized with resolution of vomiting, reglan 5mg IV given, TFs held, 3% stopped. midline placed   11/19; BD25, POD24 JOAQUÍN overnight. Remains on versed and fentanyl drips. Neuro exam unchanged. R IJ dialysis cath placed.   11/20: BD26 POD25 JOAQUÍN overnight. Neuro exam unchanged. Plan for HD today  11/21: BD 27 POD 26 weaned off versed, fentanyl   11/22: BD 28 POD 27 JOAQUÍN o/n , off fentanyl gtt, pt is calm. s/p HD earlier today. s/p trach revision  11/23: BD29. incraesed clonidine to 0.4 BID, s/p trach, stable hemodynamically. neuro at baseline. TFs stopped for vomiting and restarted 10cc/hr  11/24: BD 30, POD29 TFs inc to 20cc/hr. HD today  11/25: BD31, POD30. JOAQUÍN o/n neuro stable  11/26: BD32, POD 31, JOAQUÍN overnight,  perma cath placement with IR today , hydralazine inc 75q8, reglan decreased 2.5q6. Hemodialyzed today, took off 3L and given 1unit pRBCs  11/27: BD33, POD32, JOAQUÍN overnight, pending LTAC. hyponatremic, urine lytes sent. 250cc bolus 3% started, salt tabs started  11/28: BD34 POD 33 L hemicrani. JOAQUÍN o/n. Hyponatremia improved, NaCl 2 g q 6. Pending LTAC at [facility], increased prn requirements for BP (hydral + labetalol x1), hydral PO increased to 100q8, Fentanyl x1 for pain.   11/29: BD35 POD 34 s/p L hemicrani, JOAQUÍN o/n, pend dialysis tomorrow. CTH ordered for am, exit CTH pend. Plan for discharge to [facility]. Tolerating CPAP during the day, full vent support overnight. HD today  11/30: BD 36 POD 35 pending LTAC to [facility], received on HD through the port. HD , 3L removed, hypertensive, renal following, hydralazine pushes prn for now.   12/1: Intermittent episodes of hypertension, decreased clonidine to q8 and added labetalol 100 BID. RUE doppler done   LGF - tylenol given       Vital Signs Last 24 Hrs  T(C): 38 (02 Dec 2021 02:00), Max: 38 (02 Dec 2021 02:00)  T(F): 100.4 (02 Dec 2021 02:00), Max: 100.4 (02 Dec 2021 02:00)  HR: 84 (02 Dec 2021 04:00) (71 - 89)  BP: 154/81 (02 Dec 2021 04:00) (136/70 - 172/92)  BP(mean): 112 (02 Dec 2021 04:00) (97 - 126)  RR: 14 (02 Dec 2021 04:00) (12 - 23)  SpO2: 100% (02 Dec 2021 04:00) (100% - 100%)    I&O's Detail    30 Nov 2021 07:01  -  01 Dec 2021 07:00  --------------------------------------------------------  IN:    Enteral Tube Flush: 30 mL    Nepro with Carb Steady: 330 mL    Other (mL): 500 mL  Total IN: 860 mL    OUT:    Other (mL): 3600 mL  Total OUT: 3600 mL    Total NET: -2740 mL      01 Dec 2021 07:01  -  02 Dec 2021 04:33  --------------------------------------------------------  IN:    Enteral Tube Flush: 60 mL    Nepro with Carb Steady: 540 mL  Total IN: 600 mL    OUT:  Total OUT: 0 mL    Total NET: 600 mL        I&O's Summary    30 Nov 2021 07:01  -  01 Dec 2021 07:00  --------------------------------------------------------  IN: 860 mL / OUT: 3600 mL / NET: -2740 mL    01 Dec 2021 07:01  -  02 Dec 2021 04:33  --------------------------------------------------------  IN: 600 mL / OUT: 0 mL / NET: 600 mL        PHYSICAL EXAM:  Exam on admission: pupils 2mm sluggish, RUE trace WD, LUE WD vs. extensor, b/l LE triple flexion, +cough/gag/corneals, +flap full but soft.     Lines/Drains  L radial jerri placed 10/26 -   HD catheter R IJ (11/19 - )  + R brachial Midline (11/18 - )  + Primafit       Incision/Wound: back incision c/d, healing well, small areas of residual dehiscence- wound care follwoing     DIET:  [] NPO  [] Mechanical  [x] Tube feeds    LABS:                        8.2    12.92 )-----------( 237      ( 01 Dec 2021 04:57 )             27.8     12-01    138  |  98  |  44<H>  ----------------------------<  147<H>  4.2   |  28  |  5.21<H>    Ca    9.5      01 Dec 2021 04:57  Phos  4.1     12-01  Mg     2.2     12-01    TPro  6.9  /  Alb  2.9<L>  /  TBili  0.2  /  DBili  x   /  AST  28  /  ALT  <5<L>  /  AlkPhos  177<H>  11-30            CAPILLARY BLOOD GLUCOSE          Drug Levels: [] N/A    CSF Analysis: [] N/A      Allergies    No Known Allergies    Intolerances      MEDICATIONS:  Antibiotics:    Neuro:  levETIRAcetam  Solution 500 milliGRAM(s) Oral every 24 hours  ondansetron Injectable 4 milliGRAM(s) IV Push every 6 hours PRN    Anticoagulation:  heparin   Injectable 5000 Unit(s) SubCutaneous every 8 hours    OTHER:  acetylcysteine 20%  Inhalation 4 milliLiter(s) Inhalation every 6 hours  amLODIPine   Tablet 10 milliGRAM(s) Oral daily  artificial  tears Solution 1 Drop(s) Both EYES two times a day  bisacodyl Suppository 10 milliGRAM(s) Rectal daily  chlorhexidine 0.12% Liquid 15 milliLiter(s) Oral Mucosa every 12 hours  chlorhexidine 2% Cloths 1 Application(s) Topical <User Schedule>  cloNIDine 0.3 milliGRAM(s) Oral every 8 hours  dextrose 50% Injectable 25 milliLiter(s) IV Push once  hydrALAZINE 100 milliGRAM(s) Oral every 8 hours  hydrALAZINE Injectable 10 milliGRAM(s) IV Push every 2 hours PRN  labetalol 100 milliGRAM(s) Oral every 12 hours  labetalol Injectable 10 milliGRAM(s) IV Push every 2 hours PRN  lactulose Syrup 10 Gram(s) Oral daily  polyethylene glycol 3350 17 Gram(s) Oral two times a day  senna 2 Tablet(s) Oral at bedtime    IVF:  sodium chloride 2 Gram(s) Oral every 6 hours    CULTURES:    RADIOLOGY & ADDITIONAL TESTS:    45 y/o female with PMHx of HTN and MS, hx of spinal surgery at [facility] 10/8/21 presented to [facility] after being found unconscious at home, unknown period of time. 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 HH5, MF4; BD #1 = 10/26. Patient was intubated at [facility] and sent straight to the OR for left hemicraniotomy. A-line placed intraop. Hemodynamically unstable upon arrival to [facility], bradycardic and hypertensive s/p Mannitol and Furosemide. Central line placed in NSICU. S/p EVD placement, and coil embolization of left MCA bifurcation aneurysm 10/26/2021. S/p cerebral angio without findings of vasospasm 11/10. S/p cardiac arrest with ROSC x3 on 11/12 during tracheostomy at bedside now with b/l pneumothoracies and worsening kidney function. EVD dc'd 11/18, trach'd 11/22, permacath placed 11/26, pending LTACH.      PLAN:  NEURO:  - neuro checks q4hrs/vital signs q4hr .  - Keppra 500mg q24h renal dosing   - CTH 11/18 stable, CTH 11/29 stable  - plan for cranioplasty in ~3 months     CARDIO:  - -160   - amlodipine 10 daily and hydral 100 q8h, clonidine 0.3 q 8 and labetalol 100 BID  -  hydralazine and labetelol pushes given o/n   - s/p cardiac arrest   - TTE 11/15: mild LVH, EF 70%     PULM:  - trach, CPAP during day, full vent support overnight  - s/p acute hypoxic cardiac arrest 11/12 during tracheostomy placment with ENT c/b b/l pneumothorax and b/l chest tubes (d/kiko 11/24)  - mucomyst q12     GI:  - PEG TFs nepro- 40cc, keep at 40cc/hr, slowly raise   - Bowel regimen, last BM 11/24  - Alk phos uptrending, shocked liver, improving     RENAL:   - post cardiac arrest renal failure on long term HD/   - port with IR 11/26 , nephro following  - Na 135-145   - salt tabs 2q6  - daily weights     HEME:  - SCDs, SQH  - s/p multiple transfusions this admission, most recently s/p 1unit PRBC 11/26  - UE and LE dopplers negative 11/26  - FOB neg 11/16     ID:  - LGF o/n, monitor temp   - Tylenol PRN for fever   - sputum cx 11/4: enterobacter, proteus  - Cefepime started to cover for enterobacter/proteus in sputum dc'd 11/15     ENDO:  - monitor BMP glucoses    OTHER  - wound care recs- q2 dressing changes   - ENT consulted, reccomends trach placement and oral hygeine for tongue laceration   - has R midline (11/18)    GOC: full code  Level of care: ICU status   Dispo: pending LTAC acceptence by  [facility], pending auth   family updated    Case discussed with Dr. Duncan

## 2021-12-02 NOTE — PROVIDER CONTACT NOTE (OTHER) - ACTION/TREATMENT ORDERED:
Continue to monitor.
Increase fentanyl to 1mcg.  No further interventions at this time. Will continue to monitor.
PA was at bedside when th 4 secs pause happened. No intervention at the moment. Will continue to monitor.
Remove bite block and place tongue depressor with gauze wrap.  Will continue to monitor.
fentanyl 25 mcg ivp given
fentanyl, hydralazine, labetolol and clonidine administered as per order. EKG done
no interventions ordered at this time. will continue to monitor & reinflate cuff when deflated.
MD and PA at bedside. swelling noted. no intervention ordered. will continue to monitor.
PA at bedside, applied pressure to site and changed dressing.  Will continue to monitor.
Propofol increased to 30mcg  fentanyl 50mcg IV push given  fentanyl drip started  nimbex drip started
Feeds stopped. Anti-emetic added to regimen.
Hydralazine order to be changed, Fentanyl drip adjusted to 0.8 from 0.6
MD aware, top gauze changed with tegaderm - actual packing untouched. Will notify provider if site continues to ooze
Return residual to patient. Continue to monitor.
no intervention at this time. Will continue to monitor.
As per MD Sy, do not stop transfusion, instead give tylenol. As pt just went to cath lab, MD said we should endorse to cath lab that she needs tylenol 1g IV. STANISLAV Alamo endorsed to JULIA Caldera
As per MD, follow cuff for BP readings, if BP increases, Fentanyl drip could be increased
Assessed pt. at bedside. No further intervention at this time. Will continue to monitor.
No intervention at the moment.

## 2021-12-02 NOTE — PROVIDER CONTACT NOTE (OTHER) - RECOMMENDATIONS
bladder scan again in 8 hrs. as of now, no intervention required. pt DNR status remained pending.
for MD to assess pt at bedside
Confer exam with provider
Remove bite block and place tongue depressor with gauze wrap.
PA notified
JULIA Horton agree to keep the nimbex at 4/4 twitches and okay to not increases the rate to meet the goal of 2/4
will call sicu team to come take and look. x ray was ordered. pending further is instruction.
Dr. Vyas and JULIA Dietz notified and at bedside.  ENT consulted and at bedside. Recommended to increase propofol to 30mcg, fentanyl 50mcg IV push, start nimbex drip
Asked MD Vyas if we should stop transfusion?
Pause feeds
Stop feeds.

## 2021-12-02 NOTE — PROVIDER CONTACT NOTE (OTHER) - DATE AND TIME:
02-Dec-2021 05:00
03-Nov-2021 12:38
07-Nov-2021 22:30
17-Nov-2021 18:00
19-Nov-2021 17:30
26-Oct-2021 18:45
14-Nov-2021 21:50
15-Nov-2021
15-Nov-2021 20:32
15-Nov-2021 20:53
20-Nov-2021 23:15
01-Nov-2021 23:47
02-Nov-2021 06:47
02-Nov-2021 19:10
15-Nov-2021 02:31
16-Nov-2021 20:00
21-Nov-2021 06:00
20-Nov-2021 21:00
07-Nov-2021 20:45
22-Nov-2021 20:30
25-Nov-2021 17:00
26-Nov-2021 00:37
16-Nov-2021 09:00
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
## 2021-12-02 NOTE — PROGRESS NOTE ADULT - ASSESSMENT
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 Hospital course c/b cardiac arrest x3 and anuric ATN requiring dialyisis.     Anuric iATN requiring long term dialysis  Baseline creatinine 0.6  First HD 11/20;  S/p IR tunneled cath placement 11/26  HD today  Indeterminate quantiferon and wnl hepatitis panel    EDW tbd  SBP goal 100-160 per neuro ICU; UF with HD  Anemia- epo w/ HD  BMD: phos acceptable, no need for binders  Access: S/p IR tunneled cath placement 11/26    Patient was seen and evaluated on dialysis.     Dialyzer: Optiflux [id number]  QB: 400 mL/min  QD: 500 mL/min  K bath: 2  Na bath: 140  Goal UF: 3L  Duration: 180 min    Patient is tolerating the procedure well. Continue full hemodialysis treatment as prescribed.  Daily weights, strict I&Os, renally dose meds, renal diet

## 2021-12-02 NOTE — PROVIDER CONTACT NOTE (OTHER) - SITUATION
Pt has a 4 sec pause of HR during suctioning.
Trach opening site oozing blood, gauze saturated
pt. icp is observed to be increasing to 15; also left side of head with flap feels firmer now; compared to earlier assessment
Pt noted with arterial BP 180s-190s, cuff within parameters < 160, s/p Labetolol x1
Pt. heart rate increased and sustaining to 120-130.
HD Cath site oozing blood.  Large amount of blood noted.  Saturated sheet, yaw, and pillow.
Pt with increased WOB on the vent. Tachypneic in the 40's. BP in the 200's systolic
pt continuously has gurgling sounds despite placing air in the cuff
Pt been biting her tongue, bite block in place but pt still biting her tongue. Pt's tongue is very swollen. Left side of the tongue is ecchymotic, there is laceration, blood and clots.
Pt's pupils were 3mm and brisk, now right pupil is 3mm and brisk, Left pupil 3mm and sluggish.
SBP remains high despite Labetolol x 2 and Hydralazine x1. Arterial reading 190-200, SBP >180
pt TOF baseline 7 4/4 twitches and remained with 4/4 twitches
respiratory therapist reinflated trach cuff twice. snoring noise heard from patient when cuff deflated. tidal volume remains normal, O2 sat 100% & no tachypnea when cuff deflated
unsure if L chest tube is clogged
Patient s/p reintubation noted with swollen tongue.  While suctioning patient right side of tongue noted to be bitten through.
pt. blood pressure elevated to 180-190's systolic
pt. evd output is at zero for 2hrs
Pt with increased left head swelling.
Pt's temperature increased from 99.5F at start of blood transfusion to 100.4F at 15 min check via rectal probe. Pt en route to cath lab for angiogram.
Tube feed residual 180 ccs. Tube feed colored contents noted in inline suction
Tube feed residual 220cc

## 2021-12-02 NOTE — PROGRESS NOTE ADULT - SUBJECTIVE AND OBJECTIVE BOX
Patient is a 46y Female seen and evaluated at bedside. patient still remains hypertensive this AM w/ systolics in the 170's; to have HD today       Meds:    acetylcysteine 20%  Inhalation 4 every 6 hours  amLODIPine   Tablet 10 daily  artificial  tears Solution 1 two times a day  bisacodyl Suppository 10 daily  chlorhexidine 0.12% Liquid 15 every 12 hours  chlorhexidine 2% Cloths 1 <User Schedule>  cloNIDine 0.2 every 8 hours  epoetin nannette-epbx (RETACRIT) Injectable 9000 once  heparin   Injectable 5000 every 8 hours  hydrALAZINE 100 every 8 hours  hydrALAZINE Injectable 10 every 4 hours PRN  labetalol 50 every 12 hours  lactulose Syrup 10 daily  levETIRAcetam  Solution 500 every 24 hours  ondansetron Injectable 4 every 6 hours PRN  polyethylene glycol 3350 17 two times a day  senna 2 at bedtime  sodium chloride 2 every 6 hours  sodium chloride 0.9% lock flush 10 every 1 hour PRN      T(C): , Max: 38 (12-02-21 @ 02:00)  T(F): , Max: 100.4 (12-02-21 @ 02:00)  HR: 72 (12-02-21 @ 12:00)  BP: 161/84 (12-02-21 @ 12:00)  BP(mean): 114 (12-02-21 @ 12:00)  RR: 14 (12-02-21 @ 12:00)  SpO2: 100% (12-02-21 @ 12:00)  Wt(kg): --    12-01 @ 07:01  -  12-02 @ 07:00  --------------------------------------------------------  IN: 750 mL / OUT: 0 mL / NET: 750 mL    12-02 @ 07:01  -  12-02 @ 13:46  --------------------------------------------------------  IN: 240 mL / OUT: 0 mL / NET: 240 mL          Review of Systems:  unable to participate as patient is intubated and sedated      PHYSICAL EXAM:  Review of Systems: Unable to participate    PHYSICAL EXAM:  GENERAL: intubated, s/p hemicraniectomy- staples on her scalp on 50% fi02  CHEST/LUNG: Coarse bilaterally  HEART: normal S1S2, RRR  EXTREMITIES: +2 b/l UE edema   ACCESS: RIJ tunneled cath placed 11/26    LABS:                        7.8    13.73 )-----------( 245      ( 02 Dec 2021 05:29 )             25.9     12-02    140  |  101  |  53<H>  ----------------------------<  133<H>  4.6   |  27  |  5.97<H>    Ca    9.5      02 Dec 2021 05:29  Phos  4.6     12-02  Mg     2.4     12-02    TPro  7.1  /  Alb  2.9<L>  /  TBili  0.2  /  DBili  x   /  AST  23  /  ALT  <5<L>  /  AlkPhos  164<H>  12-02                RADIOLOGY & ADDITIONAL STUDIES:

## 2021-12-02 NOTE — CHART NOTE - NSCHARTNOTEFT_GEN_A_CORE
Pt seen and examined on evening rounds with ENT chief resident. Per ICU team, pt's trach cuff had to be inflated once today (12/2/2021) by RT, as the cuff was noted to be slowly deflating. Pt without any desaturation or signs of respiratory distress at time of re-inflation. On PM rounds, pt also stable from respiratory perspective, cuff balloon holding. Discussed with RN at bedside the plan for exchange of pt's tracheostomy tube tomorrow, 12/3/2021. Also discussed with ENT attending, Dr. Whitfield. All of RN's questions asked and answered, concerns addressed. ENT can be paged if pt develops any desaturations or respiratory distress. Informed RN to sign-out to night shift that it is okay if they have to inflate cuff a few times overnight, as long as pt remains asymptomatic.

## 2021-12-03 LAB
ALBUMIN SERPL ELPH-MCNC: 2.9 G/DL — LOW (ref 3.3–5)
ALP SERPL-CCNC: 158 U/L — HIGH (ref 40–120)
ALT FLD-CCNC: <5 U/L — LOW (ref 10–45)
ANION GAP SERPL CALC-SCNC: 8 MMOL/L — SIGNIFICANT CHANGE UP (ref 5–17)
AST SERPL-CCNC: 25 U/L — SIGNIFICANT CHANGE UP (ref 10–40)
BILIRUB SERPL-MCNC: 0.2 MG/DL — SIGNIFICANT CHANGE UP (ref 0.2–1.2)
BUN SERPL-MCNC: 37 MG/DL — HIGH (ref 7–23)
CALCIUM SERPL-MCNC: 9.5 MG/DL — SIGNIFICANT CHANGE UP (ref 8.4–10.5)
CHLORIDE SERPL-SCNC: 100 MMOL/L — SIGNIFICANT CHANGE UP (ref 96–108)
CO2 SERPL-SCNC: 29 MMOL/L — SIGNIFICANT CHANGE UP (ref 22–31)
CREAT SERPL-MCNC: 4.07 MG/DL — HIGH (ref 0.5–1.3)
GLUCOSE SERPL-MCNC: 134 MG/DL — HIGH (ref 70–99)
HCT VFR BLD CALC: 26.3 % — LOW (ref 34.5–45)
HGB BLD-MCNC: 8.3 G/DL — LOW (ref 11.5–15.5)
MAGNESIUM SERPL-MCNC: 2.1 MG/DL — SIGNIFICANT CHANGE UP (ref 1.6–2.6)
MCHC RBC-ENTMCNC: 26.3 PG — LOW (ref 27–34)
MCHC RBC-ENTMCNC: 31.6 GM/DL — LOW (ref 32–36)
MCV RBC AUTO: 83.2 FL — SIGNIFICANT CHANGE UP (ref 80–100)
NRBC # BLD: 0 /100 WBCS — SIGNIFICANT CHANGE UP (ref 0–0)
PHOSPHATE SERPL-MCNC: 3.5 MG/DL — SIGNIFICANT CHANGE UP (ref 2.5–4.5)
PLATELET # BLD AUTO: 215 K/UL — SIGNIFICANT CHANGE UP (ref 150–400)
POTASSIUM SERPL-MCNC: 3.7 MMOL/L — SIGNIFICANT CHANGE UP (ref 3.5–5.3)
POTASSIUM SERPL-SCNC: 3.7 MMOL/L — SIGNIFICANT CHANGE UP (ref 3.5–5.3)
PROT SERPL-MCNC: 7 G/DL — SIGNIFICANT CHANGE UP (ref 6–8.3)
RBC # BLD: 3.16 M/UL — LOW (ref 3.8–5.2)
RBC # FLD: 19 % — HIGH (ref 10.3–14.5)
SODIUM SERPL-SCNC: 137 MMOL/L — SIGNIFICANT CHANGE UP (ref 135–145)
WBC # BLD: 14.25 K/UL — HIGH (ref 3.8–10.5)
WBC # FLD AUTO: 14.25 K/UL — HIGH (ref 3.8–10.5)

## 2021-12-03 PROCEDURE — 95720 EEG PHY/QHP EA INCR W/VEEG: CPT

## 2021-12-03 PROCEDURE — 99291 CRITICAL CARE FIRST HOUR: CPT

## 2021-12-03 PROCEDURE — 99024 POSTOP FOLLOW-UP VISIT: CPT

## 2021-12-03 PROCEDURE — 99232 SBSQ HOSP IP/OBS MODERATE 35: CPT | Mod: GC

## 2021-12-03 PROCEDURE — 99221 1ST HOSP IP/OBS SF/LOW 40: CPT

## 2021-12-03 RX ORDER — POTASSIUM CHLORIDE 20 MEQ
40 PACKET (EA) ORAL ONCE
Refills: 0 | Status: COMPLETED | OUTPATIENT
Start: 2021-12-03 | End: 2021-12-03

## 2021-12-03 RX ORDER — HYDRALAZINE HCL 50 MG
10 TABLET ORAL ONCE
Refills: 0 | Status: COMPLETED | OUTPATIENT
Start: 2021-12-03 | End: 2021-12-03

## 2021-12-03 RX ORDER — FENTANYL CITRATE 50 UG/ML
25 INJECTION INTRAVENOUS
Refills: 0 | Status: DISCONTINUED | OUTPATIENT
Start: 2021-12-03 | End: 2021-12-04

## 2021-12-03 RX ADMIN — Medication 0.2 MILLIGRAM(S): at 21:05

## 2021-12-03 RX ADMIN — SODIUM CHLORIDE 2 GRAM(S): 9 INJECTION INTRAMUSCULAR; INTRAVENOUS; SUBCUTANEOUS at 11:00

## 2021-12-03 RX ADMIN — Medication 1 DROP(S): at 19:15

## 2021-12-03 RX ADMIN — SODIUM CHLORIDE 2 GRAM(S): 9 INJECTION INTRAMUSCULAR; INTRAVENOUS; SUBCUTANEOUS at 19:12

## 2021-12-03 RX ADMIN — Medication 10 MILLIGRAM(S): at 03:12

## 2021-12-03 RX ADMIN — SODIUM CHLORIDE 2 GRAM(S): 9 INJECTION INTRAMUSCULAR; INTRAVENOUS; SUBCUTANEOUS at 23:59

## 2021-12-03 RX ADMIN — Medication 4 MILLILITER(S): at 05:18

## 2021-12-03 RX ADMIN — AMLODIPINE BESYLATE 10 MILLIGRAM(S): 2.5 TABLET ORAL at 05:33

## 2021-12-03 RX ADMIN — SENNA PLUS 2 TABLET(S): 8.6 TABLET ORAL at 21:04

## 2021-12-03 RX ADMIN — HEPARIN SODIUM 5000 UNIT(S): 5000 INJECTION INTRAVENOUS; SUBCUTANEOUS at 05:33

## 2021-12-03 RX ADMIN — Medication 10 MILLIGRAM(S): at 01:58

## 2021-12-03 RX ADMIN — HEPARIN SODIUM 5000 UNIT(S): 5000 INJECTION INTRAVENOUS; SUBCUTANEOUS at 21:05

## 2021-12-03 RX ADMIN — Medication 0.2 MILLIGRAM(S): at 14:50

## 2021-12-03 RX ADMIN — Medication 40 MILLIEQUIVALENT(S): at 07:56

## 2021-12-03 RX ADMIN — Medication 100 MILLIGRAM(S): at 21:05

## 2021-12-03 RX ADMIN — Medication 0.2 MILLIGRAM(S): at 05:34

## 2021-12-03 RX ADMIN — Medication 4 MILLILITER(S): at 16:31

## 2021-12-03 RX ADMIN — Medication 100 MILLIGRAM(S): at 14:51

## 2021-12-03 RX ADMIN — CHLORHEXIDINE GLUCONATE 1 APPLICATION(S): 213 SOLUTION TOPICAL at 05:35

## 2021-12-03 RX ADMIN — CHLORHEXIDINE GLUCONATE 15 MILLILITER(S): 213 SOLUTION TOPICAL at 05:33

## 2021-12-03 RX ADMIN — CHLORHEXIDINE GLUCONATE 15 MILLILITER(S): 213 SOLUTION TOPICAL at 19:12

## 2021-12-03 RX ADMIN — Medication 4 MILLILITER(S): at 10:35

## 2021-12-03 RX ADMIN — Medication 1 DROP(S): at 05:34

## 2021-12-03 RX ADMIN — SODIUM CHLORIDE 2 GRAM(S): 9 INJECTION INTRAMUSCULAR; INTRAVENOUS; SUBCUTANEOUS at 05:33

## 2021-12-03 RX ADMIN — Medication 4 MILLILITER(S): at 22:14

## 2021-12-03 RX ADMIN — HEPARIN SODIUM 5000 UNIT(S): 5000 INJECTION INTRAVENOUS; SUBCUTANEOUS at 14:52

## 2021-12-03 RX ADMIN — Medication 100 MILLIGRAM(S): at 05:34

## 2021-12-03 RX ADMIN — LEVETIRACETAM 500 MILLIGRAM(S): 250 TABLET, FILM COATED ORAL at 21:04

## 2021-12-03 NOTE — PROGRESS NOTE ADULT - ASSESSMENT
46y/Female with:  L MCA ruptured aneurysm, subarachnoid hemorrhage, s/p C (10/26/2021, Dr. D'Amico @ [place]), brain compression, cerebral edema  anemia   recent spinal surgery  UGIB  bilateral pneumothorax  acute kidney injury, transaminitis    PLAN: Day 1 = 10-26 ; Day 4 = 10/29; Day 21 = 11/15  NEURO: comachecks q4h, VS q1h, PRN fentanyl for analgosedation  SAH:  off nimodipine  Hydrocephalus:  EVD discontinued  cranioplasty to be scheduled  Seizure prophylaxis:  cont levetiracetam 500 OD; EEG x24h  REHAB:  physical therapy evaluation and management    EARLY MOB:  HOB elevated    PULM:  cont full vent support, continue mucormyst (q12h) and ipratropium / albuterol  CARDIO:  SBP goal 100-160mm Hg, TTE, amlodipine 10mg PO daily; clonidine 0.2 q8h; continue hydralazine 100q8h; decrease labetalol 50 q12h with hold parameters HR 60 BP 90  ENDO:  Blood sugar goals 140-180 mg/dL  GI:  off PPI OD  DIET: TF at goal  RENAL: off IVF  Na goal 135-145; cont salt, dialysis as per renal  HEM/ONC: no coagulopathy (INR= 1.03), no ASA / Plavix use; Hb stable   VTE Prophylaxis: SCDs, [facility]   ID: afebrile, no leukocytosis; panculture if fever spike  Social: son updated yesterday; pending dispo  MISC: ENT for tongue wound    CORE MEASURES  1.  Hunt and Griffin Score = 5  2.  VTE prophylaxis:  [ ] administered within 24 hours of admission OR [X] reason not done: fresh bleed, unsecured aneurysm.  3.  Dysphagia screening:   [X] performed before any oral meds / liquids / food  4.  Nimodipine treatment:  [X] administered within 24 hours of admission OR [ ] reason not done:    ATTENDING ATTESTATION:  I was physically present for the key portions of the evaluation and management (E/M) service provided.  I agree with the above history, physical and plan, which I have reviewed and edited where appropriate.    Patient at high risk for neurological deterioration or death due to:  ICU delirium, aspiration PNA, DVT / PE.  Critical care time:  I have personally provided 45 minutes of critical care time, excluding time spent on separate procedures.      Plan discussed with RN, house staff. 46y/Female with:  L MCA ruptured aneurysm, subarachnoid hemorrhage, s/p C (10/26/2021, Dr. D'Amico @ [place]), brain compression, cerebral edema  anemia   recent spinal surgery  UGIB  bilateral pneumothorax  acute kidney injury, transaminitis    PLAN: Day 1 = 10-26 ; Day 4 = 10/29; Day 21 = 11/15  NEURO: comachecks q4h, VS q1h, PRN fentanyl for analgosedation  SAH:  off nimodipine  Hydrocephalus:  EVD discontinued  cranioplasty to be scheduled  Seizure prophylaxis:  cont levetiracetam 500 OD; f/u official EEG if NEG d/c  REHAB:  physical therapy evaluation and management    EARLY MOB:  HOB elevated    PULM:  cont full vent support, continue mucormyst (q12h) and ipratropium / albuterol  CARDIO:  SBP goal 100-160mm Hg, TTE, amlodipine 10mg PO daily; clonidine 0.2 q8h; continue hydralazine 100q8h; d/c labetalol; EP   ENDO:  Blood sugar goals 140-180 mg/dL  GI:  off PPI OD  DIET: TF at goal  RENAL: Na goal 135-145; cont salt, dialysis as per renal  HEM/ONC: no coagulopathy (INR= 1.03), no ASA / Plavix use; Hb stable   VTE Prophylaxis: SCDs, [facility]H   ID: afebrile, leukocytosis - will trend; panculture if fever spike  Social: son updated yesterday; pending dispo  MISC: ENT for tongue wound     CORE MEASURES  1.  Hunt and Griffin Score = 5  2.  VTE prophylaxis:  [ ] administered within 24 hours of admission OR [X] reason not done: fresh bleed, unsecured aneurysm.  3.  Dysphagia screening:   [X] performed before any oral meds / liquids / food  4.  Nimodipine treatment:  [X] administered within 24 hours of admission OR [ ] reason not done:    ATTENDING ATTESTATION:  I was physically present for the key portions of the evaluation and management (E/M) service provided.  I agree with the above history, physical and plan, which I have reviewed and edited where appropriate.    Patient at high risk for neurological deterioration or death due to:  ICU delirium, aspiration PNA, DVT / PE.  Critical care time:  I have personally provided 45 minutes of critical care time, excluding time spent on separate procedures.      Plan discussed with RN, house staff.

## 2021-12-03 NOTE — PROGRESS NOTE ADULT - ATTENDING COMMENTS
I: HGB 8.3.   Last dialyzed 2 days ago.   A: Anemia stable. CHETAN, now ESRD.  P: Continue dialysis. JENIFER at HD.

## 2021-12-03 NOTE — PROGRESS NOTE ADULT - SUBJECTIVE AND OBJECTIVE BOX
Patient is a 46y Female seen and evaluated at bedside. patient still remains intubated sedated still hypertensive with systolics in the 170's though still at BP goal as per neuro; Hgb 8. 3; Scr improving though patient still remains oliguric       Meds:    acetylcysteine 20%  Inhalation 4 every 6 hours  amLODIPine   Tablet 10 daily  artificial  tears Solution 1 two times a day  bisacodyl Suppository 10 daily  chlorhexidine 0.12% Liquid 15 every 12 hours  chlorhexidine 2% Cloths 1 <User Schedule>  cloNIDine 0.2 every 8 hours  fentaNYL    Injectable 25 every 2 hours PRN  heparin   Injectable 5000 every 8 hours  hydrALAZINE 100 every 8 hours  hydrALAZINE Injectable 10 every 4 hours PRN  levETIRAcetam  Solution 500 every 24 hours  ondansetron Injectable 4 every 6 hours PRN  polyethylene glycol 3350 17 two times a day  senna 2 at bedtime  sodium chloride 2 every 6 hours  sodium chloride 0.9% lock flush 10 every 1 hour PRN      T(C): , Max: 37.3 (12-02-21 @ 14:00)  T(F): , Max: 99.1 (12-02-21 @ 14:00)  HR: 86 (12-03-21 @ 12:00)  BP: 142/69 (12-03-21 @ 12:00)  BP(mean): 99 (12-03-21 @ 12:00)  RR: 21 (12-03-21 @ 12:00)  SpO2: 100% (12-03-21 @ 12:00)  Wt(kg): --    12-02 @ 07:01  -  12-03 @ 07:00  --------------------------------------------------------  IN: 1170 mL / OUT: 3300 mL / NET: -2130 mL    12-03 @ 07:01  -  12-03 @ 13:19  --------------------------------------------------------  IN: 260 mL / OUT: 0 mL / NET: 260 mL          Review of Systems:  unable to participate    PHYSICAL EXAM:  GENERAL: intubated, s/p hemicraniectomy- staples on her scalp on 50% fi02  CHEST/LUNG: Coarse bilaterally  HEART: normal S1S2, RRR  EXTREMITIES: +1 b/l UE edema   ACCESS: RIJ tunneled cath placed 11/26      LABS:                        8.3    14.25 )-----------( 215      ( 03 Dec 2021 05:33 )             26.3     12-03    137  |  100  |  37<H>  ----------------------------<  134<H>  3.7   |  29  |  4.07<H>    Ca    9.5      03 Dec 2021 05:34  Phos  3.5     12-03  Mg     2.1     12-03    TPro  7.0  /  Alb  2.9<L>  /  TBili  0.2  /  DBili  x   /  AST  25  /  ALT  <5<L>  /  AlkPhos  158<H>  12-03                RADIOLOGY & ADDITIONAL STUDIES:

## 2021-12-03 NOTE — EEG REPORT - NS EEG TEXT BOX
[facility] Department of Neurology  Inpatient Epilepsy Monitoring Unit video-Electroencephalography Report    Acquisition Details:  Electroencephalography was acquired using a minimum of 21 channels on an [employer] system v 8.5.1 with electrode placement according to the standard International 10-20 system following ACNS (American Clinical Neurophysiology Society) guidelines for Long-Term Video EEG monitoring.  Anterior temporal T1 and T2 electrodes were utilized whenever possible.   The [employer]K automated spike & seizure detections were all reviewed in detail, in addition to extensive portions of raw EEG.  Specially-trained nurses were available for seizure-related events.  Continuous live-time video monitoring of the patients for seizure-related and safety events was performed by specially-trained technicians.    Day 1: 12/2/2021 @ 2:30:22 PM to next morning @ 07:00 am  Background:  continuous, with predominantly delta frequencies with superimposed fast activity.   Significant artifact in the recording.   Symmetry:  Continuous (90+%) right temporal delta slowing.   Posterior Dominant Rhythm:  No clear PDR   Organization: Absent.  Voltage:  Normal (20+ uV)  Variability: Yes. 		Reactivity: Yes.  N2 sleep: Rudimentary but likely N2 transients present.  Spontaneous Activity:  No epileptiform discharges.  Periodic/rhythmic activity:  None.  Events:  No electrographic seizures or significant clinical events.  Provocations:  Hyperventilation and Photic stimulation: was not performed.    Daily Summary:    Marked generalized  slowing suggestive of a similar degree of diffuse or multifocal  dysfunction.  Focal slowing in the right temporal region.   No epileptiform activity and no significant clinical events occurred.  Fast activity is typically consistent with concomitant medications.       Brandy Forte MD  Attending Neurologist, [facility] Epilepsy Program

## 2021-12-03 NOTE — PROGRESS NOTE ADULT - SUBJECTIVE AND OBJECTIVE BOX
HPI:  45 y/o female with PMH HTN, Multiple sclerosis, recent spinal surgery 10/8 ([facility]) presented to [facility] BIBEMS after being found unconscious at home, unknown period of time. 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 HH5, MF1. Patient was intubated at [facility] ED, found to have blown L pupil, and sent straight to the OR for left hemicraniotomy. A-line placed intraop. Hemodynamically unstable upon arrival to [facility], bradycardic and hypertensive s/p Mannitol, Clevidipine, and Furosemide. Central line placed in NSICU. Currently sedated on Propofol, pending repeat CTH. History per patient's son, patient had recent spine surgery and was found on outpatient imaging last week to have L M1 segment aneurysm (unruptured), pt asymptomatic at this time. Per son patient taking percocet PO at home. Ureña catheter, ET tube, and arterial line placed at [facility].     NIHSS on arrival: 32   Bess Griffin 5, Mod Busby 4  Bleed Day 1 = 10/26 (26 Oct 2021 13:03)      Hospital Course:   10/26: admitted to [facility] from [facility], POD#0 s/p L hemicraniectomy for decompression and evacuation of SDH. Transferred to [facility] noted to have tense flap, received mannitol, keppra, decadron. Was hypertensive to 200s and preston to 40s, recevied 85g mannitol and bullet 23.4%. CTH on arrival demonstrated increased size in vents and new IVH. EVD placed, open at 15, central line placed. Transfused 2 u PRBC. POD0 of coiling of left MCA bifurcation aneurysm,   10/27: CTH demonstrated EVD in correct position, EVD lowered to 5, remains intubated on proprofol, pending repeat CTH in AM. Started on 3% @ 30/hr. 2LNS given for euvolemia, Mannitol given for uptrending ICPs. Bullet given 8:30 am. 3% increased to 50/hr. 1 L bolus. New EVD catheter placed using exisiting sreekanth hole. 1 u PRBC.  10/28: HH5, MF4, BD3; POD2 angio coil/embo of L MCA aneurysm. JOAQUÍN overnight, neuro stable. EVD@5cmH20 ICPs < 20 overnight, OP WNL. S/p 1 unit PRBC yesterday with appropriate response. Given 42g mannitol in AM for ICP 22 persistently, with improvement, then given 23% in PM for elevated ICP. febrile, pancultured. Standing tylenol and cooling blanket.   10/29: BD4, POD3 angio coil/embo. JOAQUÍN o/n, neuro stable. EVD@5, ICPs <20 o/n.   10/30: BD5, POD4 angio coil/embo. JOAQUÍN o/n neuro stable. EVD@5. 3% @50cc/hr.  10/31: BD6, POD5 angio coil/embo. brief period maintained upward gaze, resolved on its own. EVD@5cm h2o.    11/1: BD7, POD6 L hemicrani, angio coil/embo. JOAQUÍN overnight. Neuro exam unchanged. ICPs WNL. started bromocriptine/gabapentin/baclofen. dc'd propofol, started precedex  11/2: BD8 POD7 L hemicrani, angio coil/embo. JOAQUÍN overnight. Neuro exam stable. Remains on 3% hypertonic saline. Receieved 1 unit of PRBCs for a Hgb of 7.6.  11/3: BD 9 POD8 of L hemicrani. Elevated lipase, normal amylase, OG tube clogged and replaced. Abdominal US normal. Pending gen surg reccs regarding trach and peg. Gabapentin and Baclofen increased to help with neurostorming. restarted back on 3%, LR@35. became preston+hypotensive with nimodipine 60q4, decreased back to 30q2, NaCl bullet given.   11/4: BD10 POD9, JOAQUÍN o/n, 500cc NS for euvolemia,  neuro stable, EVD at 5. 3% increased to 75  11/5: BD11 POD10, JOAQUÍN o/n, neuro stable, EVD at 5  11/6: BD12 POD11 JOAQUÍN overnight. Neuro exam stable. Remains intubated on precedex. 3% hypertonic saline dc'd  11/7: BD13 POD 12 JOAQUÍN o/n, NGT replaced. on precex with no icp crisis   11/8: BD14 POD13 JOAQUÍN o/n, noted to have tongue maceration/macroglossia. Gauze with tongue depressor placed. Pending further recs from ENT. Pending trach and PEG placement tomorrow with gen surg. Off precedex, ICPs stable. EVD remains @ 5.   11/9: BD15, POD 14, bleeding under tongue at start of shift, fentanyl pushed with no further episodes. gabapentin stopped. PEG placed  11/10: BD 16, POD 15, neuro stable, ENT to be consulted in AM, 3% increased to 50/hr.  11/11: BD 17, POD 16, neuro exam stable. Pend CT saba in am for cranioplasty planning. Pend trach in OR with ENT. F.u BMP in am, 3%@50cc/hr for Na goal 140-145.   11/12: BD 18, POD 17. JOAQUÍN overnight, neuro stable. Pend trach placement today. CT saba completed 11/11. Off of 3%, f/u am BMP for Na goal 140-150. CSF neg. Hypoxic cardiac arrest with ROSC x 3 during tracheostomy placement. B/l chest tubes placed for resulting pneumothorax s/p CPR. salt tabs dc'd.  11/13: BD 19, POD 18. Patient tachycardic with some improvement, SBP stable off of levophed, hgb downtrending o/n, transfused 1 unit PRBCs. B/l chest tube. EVD @5cmH2O, no elevated ICPs. UOP downtrending, trend BUN/Cr, given 1LNS x1 for low urine output. F/u am CXR. Cont Cefepime until today, then change to Ancef while trach packing in place per ENT. Pend CTH when stable. Trops downtrending s/p cardiac arrest. 1L. florinef dc'd. BP goal changed to 100-160, started on amlodipine   11/14: BD20, POD19, worsening creatinine with decreased urine output. Given 250 of albumin and 500cc LR. started on hydralazine PO, decreased amantadine 100 daily given CrCl of 20.  11/15: BD21, POD20, remains oliguric, fem line dc'd, tongue swollen and unable to fit bite block in mouth, started on nimbex gtt to relax jaw. Propofol and fentanyl gtt started. Vomiting TFs through nose and mouth, ENT called. TFs held. Cr uptrending. Palliative consulted.  11/16: BD22, POD21, o/n external trach dressing replaced due to cuff leak and decreasing TV, sedated and paralyzed on nimbex, propofol, and fentanyl gtt. CTH stable.  EVD raised from 5 to 10. Nimbex weaned off. Cr uptrending, Trickle feeds started. FOB negative.   11/17: BD 23, POD22, JOAQUÍN o/n, EVD clamped, NS d/c'ed, LR@50, advancing tube feeds.   11/18: BD24, POD23 o/n 3% at 30 with Na acetate started, propofol dc'd due to elevated TG level, episode of vomiting TFs from nose and mouth into ETT with elevated ICP 30s, ICP normalized with resolution of vomiting, reglan 5mg IV given, TFs held, 3% stopped. midline placed   11/19; BD25, POD24 JOAQUÍN overnight. Remains on versed and fentanyl drips. Neuro exam unchanged. R IJ dialysis cath placed.   11/20: BD26 POD25 JOAQUÍN overnight. Neuro exam unchanged. Plan for HD today  11/21: BD 27 POD 26 weaned off versed, fentanyl   11/22: BD 28 POD 27 JOAQUÍN o/n , off fentanyl gtt, pt is calm. s/p HD earlier today. s/p trach revision  11/23: BD29. incraesed clonidine to 0.4 BID, s/p trach, stable hemodynamically. neuro at baseline. TFs stopped for vomiting and restarted 10cc/hr  11/24: BD 30, POD29 TFs inc to 20cc/hr. HD today  11/25: BD31, POD30. JOAQUÍN o/n neuro stable  11/26: BD32, POD 31, JOAQUÍN overnight,  perma cath placement with IR today , hydralazine inc 75q8, reglan decreased 2.5q6. Hemodialyzed today, took off 3L and given 1unit pRBCs  11/27: BD33, POD32, JOAQUÍN overnight, pending LTAC. hyponatremic, urine lytes sent. 250cc bolus 3% started, salt tabs started  11/28: BD34 POD 33 L hemicrani. JOAQUÍN o/n. Hyponatremia improved, NaCl 2 g q 6. Pending LTAC at [facility], increased prn requirements for BP (hydral + labetalol x1), hydral PO increased to 100q8, Fentanyl x1 for pain.   11/29: BD35 POD 34 s/p L hemicrani, JOAQUÍN o/n, pend dialysis tomorrow. CTH ordered for am, exit CTH pend. Plan for discharge to [facility]. Tolerating CPAP during the day, full vent support overnight. HD today  11/30: BD 36 POD 35 pending LTAC to [facility], received on HD through the port. HD , 3L removed, hypertensive, renal following, hydralazine pushes prn for now.   12/1: Intermittent episodes of hypertension, decreased clonidine to q8 and added labetalol 100 BID. RUE doppler done   LGF - tylenol given   12/2: Multiple episodes of asystole, witnessed upward gaze associated with 5 second pause and later 8 second pause. EKG stable, troponin 0.08.  labetalol decreased to 50BID. cuff leak: reinflated during day and ENT to replace  tomorrow. EEG placed for upward gaze. Dialysis today.  12/3: JOAQUÍN o/n, recieved multiple pushes IV hydralazine for HTN      Vital Signs Last 24 Hrs  T(C): 36.9 (03 Dec 2021 02:12), Max: 37.4 (02 Dec 2021 11:25)  T(F): 98.4 (03 Dec 2021 02:12), Max: 99.3 (02 Dec 2021 11:25)  HR: 78 (03 Dec 2021 03:00) (0 - 97)  BP: 149/76 (03 Dec 2021 03:00) (134/68 - 197/108)  BP(mean): 104 (03 Dec 2021 03:00) (96 - 142)  RR: 14 (03 Dec 2021 03:00) (14 - 18)  SpO2: 100% (03 Dec 2021 03:00) (97% - 100%)    I&O's Detail    01 Dec 2021 07:01  -  02 Dec 2021 07:00  --------------------------------------------------------  IN:    Enteral Tube Flush: 90 mL    Nepro with Carb Steady: 660 mL  Total IN: 750 mL    OUT:  Total OUT: 0 mL    Total NET: 750 mL      02 Dec 2021 07:01  -  03 Dec 2021 03:55  --------------------------------------------------------  IN:    Enteral Tube Flush: 360 mL    Nepro with Carb Steady: 600 mL  Total IN: 960 mL    OUT:    Other (mL): 3000 mL  Total OUT: 3000 mL    Total NET: -2040 mL        I&O's Summary    01 Dec 2021 07:01  -  02 Dec 2021 07:00  --------------------------------------------------------  IN: 750 mL / OUT: 0 mL / NET: 750 mL    02 Dec 2021 07:01  -  03 Dec 2021 03:55  --------------------------------------------------------  IN: 960 mL / OUT: 3000 mL / NET: -2040 mL        PHYSICAL EXAM:  General: patient seen laying supine in bed in NAD  Neuro: nonverbal, does not FC, OEs to noxious, face symmetric, w/d to noxious b/l UEs, TF to noxious b/l LEs, +corneals  HEENT: PERRL, +trach  Neck: supple  Cardiac: RRR, S1S2  Pulmonary: chest rise symmetric, +cough/gag  Abdomen: soft, nontender, nondistended  Ext: warm, perfusing well  Wound: L cranial flap full, soft      TUBES/LINES:  [] CVC  [x] A-line  [] Lumbar Drain  [] Ventriculostomy  [] Other    DIET:  [] NPO  [] Mechanical  [x] Tube feeds    LABS:                        7.8    13.73 )-----------( 245      ( 02 Dec 2021 05:29 )             25.9     12-02    140  |  101  |  53<H>  ----------------------------<  133<H>  4.6   |  27  |  5.97<H>    Ca    9.5      02 Dec 2021 05:29  Phos  4.6     12-02  Mg     2.4     12-02    TPro  7.1  /  Alb  2.9<L>  /  TBili  0.2  /  DBili  x   /  AST  23  /  ALT  <5<L>  /  AlkPhos  164<H>  12-02        CARDIAC MARKERS ( 02 Dec 2021 16:54 )  x     / 0.08 ng/mL / x     / x     / x      CARDIAC MARKERS ( 02 Dec 2021 08:52 )  x     / 0.07 ng/mL / x     / x     / x          CAPILLARY BLOOD GLUCOSE      POCT Blood Glucose.: 137 mg/dL (02 Dec 2021 15:51)  POCT Blood Glucose.: 147 mg/dL (02 Dec 2021 10:51)      Drug Levels: [] N/A    CSF Analysis: [] N/A      Allergies    No Known Allergies    Intolerances      MEDICATIONS:  Antibiotics:    Neuro:  levETIRAcetam  Solution 500 milliGRAM(s) Oral every 24 hours  ondansetron Injectable 4 milliGRAM(s) IV Push every 6 hours PRN    Anticoagulation:  heparin   Injectable 5000 Unit(s) SubCutaneous every 8 hours    OTHER:  acetylcysteine 20%  Inhalation 4 milliLiter(s) Inhalation every 6 hours  amLODIPine   Tablet 10 milliGRAM(s) Oral daily  artificial  tears Solution 1 Drop(s) Both EYES two times a day  bisacodyl Suppository 10 milliGRAM(s) Rectal daily  chlorhexidine 0.12% Liquid 15 milliLiter(s) Oral Mucosa every 12 hours  chlorhexidine 2% Cloths 1 Application(s) Topical <User Schedule>  cloNIDine 0.2 milliGRAM(s) Oral every 8 hours  hydrALAZINE 100 milliGRAM(s) Oral every 8 hours  hydrALAZINE Injectable 10 milliGRAM(s) IV Push every 4 hours PRN  labetalol 50 milliGRAM(s) Oral every 12 hours  polyethylene glycol 3350 17 Gram(s) Oral two times a day  senna 2 Tablet(s) Oral at bedtime    IVF:  sodium chloride 2 Gram(s) Oral every 6 hours    CULTURES:    RADIOLOGY & ADDITIONAL TESTS:    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, left    Intracranial hemorrhage, spontaneous subarachnoid, due to cerebral aneurysm, acute    Pneumothorax, left    Functional quadriplegia    Acute respiratory failure with hypoxia    Cardiac arrest    Encounter for palliative care    Advanced care planning/counseling discussion    IPH (idiopathic pulmonary hemosiderosis)    Acute spontaneous intraparenchymal intracranial hemorrhage due to cerebral aneurysm    CHETAN (acute kidney injury)    Hypocalcemia    Angiogram, cerebral, with coil embolization, in non-operating room setting    Endoscopic percutaneous gastrostomy    Angiogram, carotid and cerebral, bilateral    Chest tube placement    Insertion of central venous catheter with ultrasound guidance    Insertion of temporary dialysis catheter    Tracheostomy, planned    Personal history of spine surgery    CHETAN (acute kidney injury)    SysAdmin_VstLnk        ASSESSMENT:  45 y/o female with PMHx of HTN and MS, hx of spinal surgery at [facility] 10/8/21 presented to [facility] BIBEMS after being found unconscious at home, unknown period of time. 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 HH5, MF4; BD #1 = 10/26. Patient was intubated at [facility] ED and sent straight to the OR for left hemicraniotomy. A-line placed intraop. Hemodynamically unstable upon arrival to [facility], bradycardic and hypertensive s/p Mannitol and Furosemide. Central line placed in NSICU. S/p EVD placement, and coil embolization of left MCA bifurcation aneurysm 10/26/2021. S/p cerebral angio without findings of vasospasm 11/10. S/p cardiac arrest with ROSC x3 on 11/12 during tracheostomy at bedside now with b/l pneumothoracies and worsening kidney function. EVD dc'd 11/18, trach'd 11/22, permacath placed 11/26, pending LTACH.      PLAN:  NEURO:  - neuro checks q4hrs/vital signs q4hr .  - Keppra 500mg q24h renal dosing   - CTH 11/18 stable, CTH 11/29 stable  - plan for cranioplasty in ~3 months   - EEG x 24 hrs     CARDIO:  - -180   - amlodipine 10 daily and hydral 100 q8h, clonidine 0.2 q 8 and labetalol 50 BID  - hydralazine IV prn, avoid BB IV   - s/p cardiac arrest   - TTE 11/15: mild LVH, EF 70%     PULM:  - trach, continue full vent support overnight  - cuff leak:  ENT to replace tube today  - s/p acute hypoxic cardiac arrest 11/12 during tracheostomy placment with ENT c/b b/l pneumothorax and b/l chest tubes (d/kiko 11/24)  - mucomyst q 6     GI:  - PEG TFs nepro- at goal 30cc/hr  - Bowel regimen, last BM 12/1  - Alk phos elevated, shocked liver, improving     RENAL:   - post cardiac arrest renal failure on long term HD  - port with IR 11/26 , nephro following  - Na 135-145   - salt tabs 2q6  - daily weights     HEME:  - SCDs, SQH  - s/p multiple transfusions this admission, most recently s/p 1unit PRBC 11/26  - UE and LE dopplers negative 11/26, RUE 12/1 negative   - FOB neg 11/16     ID:  - LGF o/n, monitor temp   - Tylenol PRN for fever   - sputum cx 11/4: enterobacter, proteus  - Cefepime started to cover for enterobacter/proteus in sputum dc'd 11/15     ENDO:  - monitor BMP glucoses    OTHER  - wound care recs- q2 dressing changes   - ENT reconsulted for trach cuff leak and tongue injury.  - has R midline (11/18)    GOC: full code  Level of care: ICU status   Dispo: pending [facility]  family updated    Case discussed with Dr. Duncan         Assessment:  Present when checked    []  GCS  E   V  M     Heart Failure: []Acute, [] acute on chronic , []chronic  Heart Failure:  [] Diastolic (HFpEF), [] Systolic (HFrEF), []Combined (HFpEF and HFrEF), [] RHF, [] Pulm HTN, [] Other    [x] CHETAN, [] ATN, [] AIN, [] other  [] CKD1, [] CKD2, [] CKD 3, [] CKD 4, [] CKD 5, []ESRD    Encephalopathy: [] Metabolic, [] Hepatic, [] toxic, [] Neurological, [] Other    Abnormal Nurtitional Status: [] malnurtition (see nutrition note), [ ]underweight: BMI < 19, [] morbid obesity: BMI >40, [] Cachexia    [] Sepsis  [] hypovolemic shock,[] cardiogenic shock, [] hemorrhagic shock, [] neuogenic shock  [] Acute Respiratory Failure  []Cerebral edema, [] Brain compression/ herniation,   [] Functional quadriplegia  [] Acute blood loss anemia

## 2021-12-03 NOTE — PROGRESS NOTE ADULT - SUBJECTIVE AND OBJECTIVE BOX
Electrophysiology:     47 y/o F presented with left MCA ruptured aneurysm and SAH, s/p emergent left decompressive hemicraniotomy at [facility], and was transferred to [facility] for further care. s/p cerebral angiogram and coil embolization of aneurysm 10/26. She is trached.  Pt is now anuric ATN requiring dialysis via right IJ permacath.    Telemetry on 12/2/21 noted 2 episodes of sinus pauses at 6 am and 7 am (6 sec and 9 sec respectively).  Per report, HR slowed down during trach tube manipulation today.    Her latest EKG on 11/28/21 showed NSR 77 bpm with normal axis and intervals.  Echo in 11/15/21 showed normal LVEF w/o significant valvular disease.   EPS is called to comment on these pause episodes.    On review of telemetry, the episodes on 12/2 were consistent with slowing of the sinus node prior to pauses. There were no CHB. These are reflective baroreceptor sensitivity likely from the permacath location and high vagal tone from trach.  Given her current neurological status, even if she has these pauses there would be no concern for falls.  We would not recommend ppm for this.  Please consider switching the permacath line to other site if feasible.   D/W Dr. Villanueva.

## 2021-12-03 NOTE — PROGRESS NOTE ADULT - ASSESSMENT
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 Hospital course c/b cardiac arrest x3 and anuric ATN requiring dialyisis.     Anuric iATN requiring long term dialysis  Baseline creatinine 0.6  First HD 11/20;  S/p IR tunneled cath placement 11/26  HD tomorrow 12/4 for UF and clearance   Indeterminate quantiferon and wnl hepatitis panel    EDW tbd  SBP goal 100-180 per neuro ICU; UF with HD  Anemia- epo w/ HD  BMD: phos acceptable, no need for binders  Access: S/p IR tunneled cath placement 11/26
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
## 2021-12-04 LAB
ANION GAP SERPL CALC-SCNC: 13 MMOL/L — SIGNIFICANT CHANGE UP (ref 5–17)
BUN SERPL-MCNC: 43 MG/DL — HIGH (ref 7–23)
CALCIUM SERPL-MCNC: 9.7 MG/DL — SIGNIFICANT CHANGE UP (ref 8.4–10.5)
CHLORIDE SERPL-SCNC: 102 MMOL/L — SIGNIFICANT CHANGE UP (ref 96–108)
CO2 SERPL-SCNC: 26 MMOL/L — SIGNIFICANT CHANGE UP (ref 22–31)
CREAT SERPL-MCNC: 4.51 MG/DL — HIGH (ref 0.5–1.3)
GLUCOSE SERPL-MCNC: 127 MG/DL — HIGH (ref 70–99)
HCT VFR BLD CALC: 27.2 % — LOW (ref 34.5–45)
HGB BLD-MCNC: 8.1 G/DL — LOW (ref 11.5–15.5)
MAGNESIUM SERPL-MCNC: 2.3 MG/DL — SIGNIFICANT CHANGE UP (ref 1.6–2.6)
MCHC RBC-ENTMCNC: 24.8 PG — LOW (ref 27–34)
MCHC RBC-ENTMCNC: 29.8 GM/DL — LOW (ref 32–36)
MCV RBC AUTO: 83.2 FL — SIGNIFICANT CHANGE UP (ref 80–100)
NRBC # BLD: 0 /100 WBCS — SIGNIFICANT CHANGE UP (ref 0–0)
PHOSPHATE SERPL-MCNC: 3.5 MG/DL — SIGNIFICANT CHANGE UP (ref 2.5–4.5)
PLATELET # BLD AUTO: 230 K/UL — SIGNIFICANT CHANGE UP (ref 150–400)
POTASSIUM SERPL-MCNC: 4.1 MMOL/L — SIGNIFICANT CHANGE UP (ref 3.5–5.3)
POTASSIUM SERPL-SCNC: 4.1 MMOL/L — SIGNIFICANT CHANGE UP (ref 3.5–5.3)
RBC # BLD: 3.27 M/UL — LOW (ref 3.8–5.2)
RBC # FLD: 19.6 % — HIGH (ref 10.3–14.5)
SODIUM SERPL-SCNC: 141 MMOL/L — SIGNIFICANT CHANGE UP (ref 135–145)
WBC # BLD: 13.13 K/UL — HIGH (ref 3.8–10.5)
WBC # FLD AUTO: 13.13 K/UL — HIGH (ref 3.8–10.5)

## 2021-12-04 PROCEDURE — 71045 X-RAY EXAM CHEST 1 VIEW: CPT | Mod: 26

## 2021-12-04 PROCEDURE — 99291 CRITICAL CARE FIRST HOUR: CPT

## 2021-12-04 PROCEDURE — 99232 SBSQ HOSP IP/OBS MODERATE 35: CPT | Mod: GC

## 2021-12-04 PROCEDURE — 95720 EEG PHY/QHP EA INCR W/VEEG: CPT

## 2021-12-04 RX ORDER — BACLOFEN 100 %
5 POWDER (GRAM) MISCELLANEOUS EVERY 8 HOURS
Refills: 0 | Status: DISCONTINUED | OUTPATIENT
Start: 2021-12-04 | End: 2021-12-07

## 2021-12-04 RX ORDER — FENTANYL CITRATE 50 UG/ML
25 INJECTION INTRAVENOUS EVERY 4 HOURS
Refills: 0 | Status: DISCONTINUED | OUTPATIENT
Start: 2021-12-04 | End: 2021-12-05

## 2021-12-04 RX ORDER — ERYTHROPOIETIN 10000 [IU]/ML
9000 INJECTION, SOLUTION INTRAVENOUS; SUBCUTANEOUS ONCE
Refills: 0 | Status: COMPLETED | OUTPATIENT
Start: 2021-12-04 | End: 2021-12-04

## 2021-12-04 RX ORDER — FENTANYL CITRATE 50 UG/ML
25 INJECTION INTRAVENOUS
Refills: 0 | Status: DISCONTINUED | OUTPATIENT
Start: 2021-12-04 | End: 2021-12-04

## 2021-12-04 RX ORDER — ACETAMINOPHEN 500 MG
325 TABLET ORAL ONCE
Refills: 0 | Status: COMPLETED | OUTPATIENT
Start: 2021-12-04 | End: 2021-12-04

## 2021-12-04 RX ADMIN — Medication 0.2 MILLIGRAM(S): at 05:26

## 2021-12-04 RX ADMIN — Medication 325 MILLIGRAM(S): at 21:06

## 2021-12-04 RX ADMIN — Medication 100 MILLIGRAM(S): at 14:56

## 2021-12-04 RX ADMIN — Medication 1 DROP(S): at 05:27

## 2021-12-04 RX ADMIN — ERYTHROPOIETIN 9000 UNIT(S): 10000 INJECTION, SOLUTION INTRAVENOUS; SUBCUTANEOUS at 11:57

## 2021-12-04 RX ADMIN — POLYETHYLENE GLYCOL 3350 17 GRAM(S): 17 POWDER, FOR SOLUTION ORAL at 05:26

## 2021-12-04 RX ADMIN — Medication 4 MILLILITER(S): at 10:13

## 2021-12-04 RX ADMIN — Medication 100 MILLIGRAM(S): at 21:09

## 2021-12-04 RX ADMIN — Medication 4 MILLILITER(S): at 22:53

## 2021-12-04 RX ADMIN — FENTANYL CITRATE 25 MICROGRAM(S): 50 INJECTION INTRAVENOUS at 18:30

## 2021-12-04 RX ADMIN — FENTANYL CITRATE 25 MICROGRAM(S): 50 INJECTION INTRAVENOUS at 21:05

## 2021-12-04 RX ADMIN — POLYETHYLENE GLYCOL 3350 17 GRAM(S): 17 POWDER, FOR SOLUTION ORAL at 18:55

## 2021-12-04 RX ADMIN — Medication 4 MILLILITER(S): at 16:08

## 2021-12-04 RX ADMIN — CHLORHEXIDINE GLUCONATE 15 MILLILITER(S): 213 SOLUTION TOPICAL at 18:55

## 2021-12-04 RX ADMIN — HEPARIN SODIUM 5000 UNIT(S): 5000 INJECTION INTRAVENOUS; SUBCUTANEOUS at 05:27

## 2021-12-04 RX ADMIN — FENTANYL CITRATE 25 MICROGRAM(S): 50 INJECTION INTRAVENOUS at 18:54

## 2021-12-04 RX ADMIN — CHLORHEXIDINE GLUCONATE 15 MILLILITER(S): 213 SOLUTION TOPICAL at 05:26

## 2021-12-04 RX ADMIN — LEVETIRACETAM 500 MILLIGRAM(S): 250 TABLET, FILM COATED ORAL at 21:08

## 2021-12-04 RX ADMIN — HEPARIN SODIUM 5000 UNIT(S): 5000 INJECTION INTRAVENOUS; SUBCUTANEOUS at 21:08

## 2021-12-04 RX ADMIN — Medication 0.2 MILLIGRAM(S): at 21:12

## 2021-12-04 RX ADMIN — AMLODIPINE BESYLATE 10 MILLIGRAM(S): 2.5 TABLET ORAL at 05:26

## 2021-12-04 RX ADMIN — Medication 0.2 MILLIGRAM(S): at 14:56

## 2021-12-04 RX ADMIN — FENTANYL CITRATE 25 MICROGRAM(S): 50 INJECTION INTRAVENOUS at 14:30

## 2021-12-04 RX ADMIN — FENTANYL CITRATE 25 MICROGRAM(S): 50 INJECTION INTRAVENOUS at 14:55

## 2021-12-04 RX ADMIN — Medication 100 MILLIGRAM(S): at 05:27

## 2021-12-04 RX ADMIN — Medication 5 MILLIGRAM(S): at 18:55

## 2021-12-04 RX ADMIN — HEPARIN SODIUM 5000 UNIT(S): 5000 INJECTION INTRAVENOUS; SUBCUTANEOUS at 14:55

## 2021-12-04 RX ADMIN — CHLORHEXIDINE GLUCONATE 1 APPLICATION(S): 213 SOLUTION TOPICAL at 05:25

## 2021-12-04 RX ADMIN — SODIUM CHLORIDE 2 GRAM(S): 9 INJECTION INTRAMUSCULAR; INTRAVENOUS; SUBCUTANEOUS at 12:01

## 2021-12-04 RX ADMIN — Medication 1 DROP(S): at 18:58

## 2021-12-04 RX ADMIN — SODIUM CHLORIDE 2 GRAM(S): 9 INJECTION INTRAMUSCULAR; INTRAVENOUS; SUBCUTANEOUS at 18:55

## 2021-12-04 RX ADMIN — Medication 10 MILLIGRAM(S): at 12:01

## 2021-12-04 RX ADMIN — Medication 5 MILLIGRAM(S): at 21:07

## 2021-12-04 RX ADMIN — FENTANYL CITRATE 25 MICROGRAM(S): 50 INJECTION INTRAVENOUS at 12:03

## 2021-12-04 RX ADMIN — Medication 4 MILLILITER(S): at 04:49

## 2021-12-04 RX ADMIN — SENNA PLUS 2 TABLET(S): 8.6 TABLET ORAL at 21:09

## 2021-12-04 RX ADMIN — SODIUM CHLORIDE 2 GRAM(S): 9 INJECTION INTRAMUSCULAR; INTRAVENOUS; SUBCUTANEOUS at 23:03

## 2021-12-04 RX ADMIN — FENTANYL CITRATE 25 MICROGRAM(S): 50 INJECTION INTRAVENOUS at 12:30

## 2021-12-04 RX ADMIN — SODIUM CHLORIDE 2 GRAM(S): 9 INJECTION INTRAMUSCULAR; INTRAVENOUS; SUBCUTANEOUS at 05:26

## 2021-12-04 NOTE — PROGRESS NOTE ADULT - ASSESSMENT
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 Hospital course c/b cardiac arrest x3 and anuric ATN requiring dialyisis.     Anuric iATN requiring long term dialysis  Baseline creatinine 0.6  EDW tbd  First HD 11/20;  S/p IR tunneled cath placement 11/26  Indeterminate quantiferon and wnl hepatitis panel  -Hd to be done later today  -Lytes noted; acceptable    #Blood pressure  -SBP goal 100-180 per neuro ICU; UF with HD    #Anemia  - epo w/ HD    #BMD  -phos acceptable, no need for binders    #Access: S/p IR tunneled cath placement 11/26

## 2021-12-04 NOTE — PROGRESS NOTE ADULT - SUBJECTIVE AND OBJECTIVE BOX
HPI:  47 y/o female with PMH HTN, Multiple sclerosis, recent spinal surgery 10/8 ([facility]) presented to [facility] BIBEMS after being found unconscious at home, unknown period of time. 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 HH5, MF1. Patient was intubated at [facility], found to have blown L pupil, and sent straight to the OR for left hemicraniotomy. A-line placed intraop. Hemodynamically unstable upon arrival to [facility], bradycardic and hypertensive s/p Mannitol, Clevidipine, and Furosemide. Central line placed in NSICU. Currently sedated on Propofol, pending repeat CTH. History per patient's son, patient had recent spine surgery and was found on outpatient imaging last week to have L M1 segment aneurysm (unruptured), pt asymptomatic at this time. Per son patient taking percocet PO at home. Ureña catheter, ET tube, and arterial line placed at [facility].     NIHSS on arrival: 32   Bess Griffin 5, Mod Busby 4  Bleed Day 1 = 10/26 (26 Oct 2021 13:03)      Hospital Course:   10/26: admitted to [facility] from [facility], POD#0 s/p L hemicraniectomy for decompression and evacuation of SDH. Transferred to [facility] noted to have tense flap, received mannitol, keppra, decadron. Was hypertensive to 200s and preston to 40s, recevied 85g mannitol and bullet 23.4%. CTH on arrival demonstrated increased size in vents and new IVH. EVD placed, open at 15, central line placed. Transfused 2 u PRBC. POD0 of coiling of left MCA bifurcation aneurysm,   10/27: CTH demonstrated EVD in correct position, EVD lowered to 5, remains intubated on proprofol, pending repeat CTH in AM. Started on 3% @ 30/hr. 2LNS given for euvolemia, Mannitol given for uptrending ICPs. Bullet given 8:30 am. 3% increased to 50/hr. 1 L bolus. New EVD catheter placed using exisiting sreekanth hole. 1 u PRBC.  10/28: HH5, MF4, BD3; POD2 angio coil/embo of L MCA aneurysm. JOAQUÍN overnight, neuro stable. EVD@5cmH20 ICPs < 20 overnight, OP WNL. S/p 1 unit PRBC yesterday with appropriate response. Given 42g mannitol in AM for ICP 22 persistently, with improvement, then given 23% in PM for elevated ICP. febrile, pancultured. Standing tylenol and cooling blanket.   10/29: BD4, POD3 angio coil/embo. JOAQUÍN o/n, neuro stable. EVD@5, ICPs <20 o/n.   10/30: BD5, POD4 angio coil/embo. JOAQUÍN o/n neuro stable. EVD@5. 3% @50cc/hr.  10/31: BD6, POD5 angio coil/embo. brief period maintained upward gaze, resolved on its own. EVD@5cm h2o.    11/1: BD7, POD6 L hemicrani, angio coil/embo. JOAQUÍN overnight. Neuro exam unchanged. ICPs WNL. started bromocriptine/gabapentin/baclofen. dc'd propofol, started precedex  11/2: BD8 POD7 L hemicrani, angio coil/embo. JOAQUÍN overnight. Neuro exam stable. Remains on 3% hypertonic saline. Receieved 1 unit of PRBCs for a Hgb of 7.6.  11/3: BD 9 POD8 of L hemicrani. Elevated lipase, normal amylase, OG tube clogged and replaced. Abdominal US normal. Pending gen surg reccs regarding trach and peg. Gabapentin and Baclofen increased to help with neurostorming. restarted back on 3%, LR@35. became preston+hypotensive with nimodipine 60q4, decreased back to 30q2, NaCl bullet given.   11/4: BD10 POD9, JOAQUÍN o/n, 500cc NS for euvolemia,  neuro stable, EVD at 5. 3% increased to 75  11/5: BD11 POD10, JOAQUÍN o/n, neuro stable, EVD at 5  11/6: BD12 POD11 JOAQUÍN overnight. Neuro exam stable. Remains intubated on precedex. 3% hypertonic saline dc'd  11/7: BD13 POD 12 JOAQUÍN o/n, NGT replaced. on precex with no icp crisis   11/8: BD14 POD13 JOAQUÍN o/n, noted to have tongue maceration/macroglossia. Gauze with tongue depressor placed. Pending further recs from ENT. Pending trach and PEG placement tomorrow with gen surg. Off precedex, ICPs stable. EVD remains @ 5.   11/9: BD15, POD 14, bleeding under tongue at start of shift, fentanyl pushed with no further episodes. gabapentin stopped. PEG placed  11/10: BD 16, POD 15, neuro stable, ENT to be consulted in AM, 3% increased to 50/hr.  11/11: BD 17, POD 16, neuro exam stable. Pend CT saba in am for cranioplasty planning. Pend trach in OR with ENT. F.u BMP in am, 3%@50cc/hr for Na goal 140-145.   11/12: BD 18, POD 17. JOAQUÍN overnight, neuro stable. Pend trach placement today. CT saba completed 11/11. Off of 3%, f/u am BMP for Na goal 140-150. CSF neg. Hypoxic cardiac arrest with ROSC x 3 during tracheostomy placement. B/l chest tubes placed for resulting pneumothorax s/p CPR. salt tabs dc'd.  11/13: BD 19, POD 18. Patient tachycardic with some improvement, SBP stable off of levophed, hgb downtrending o/n, transfused 1 unit PRBCs. B/l chest tube. EVD @5cmH2O, no elevated ICPs. UOP downtrending, trend BUN/Cr, given 1LNS x1 for low urine output. F/u am CXR. Cont Cefepime until today, then change to Ancef while trach packing in place per ENT. Pend CTH when stable. Trops downtrending s/p cardiac arrest. 1L. florinef dc'd. BP goal changed to 100-160, started on amlodipine   11/14: BD20, POD19, worsening creatinine with decreased urine output. Given 250 of albumin and 500cc LR. started on hydralazine PO, decreased amantadine 100 daily given CrCl of 20.  11/15: BD21, POD20, remains oliguric, fem line dc'd, tongue swollen and unable to fit bite block in mouth, started on nimbex gtt to relax jaw. Propofol and fentanyl gtt started. Vomiting TFs through nose and mouth, ENT called. TFs held. Cr uptrending. Palliative consulted.  11/16: BD22, POD21, o/n external trach dressing replaced due to cuff leak and decreasing TV, sedated and paralyzed on nimbex, propofol, and fentanyl gtt. CTH stable.  EVD raised from 5 to 10. Nimbex weaned off. Cr uptrending, Trickle feeds started. FOB negative.   11/17: BD 23, POD22, JOAQUÍN o/n, EVD clamped, NS d/c'ed, LR@50, advancing tube feeds.   11/18: BD24, POD23 o/n 3% at 30 with Na acetate started, propofol dc'd due to elevated TG level, episode of vomiting TFs from nose and mouth into ETT with elevated ICP 30s, ICP normalized with resolution of vomiting, reglan 5mg IV given, TFs held, 3% stopped. midline placed   11/19; BD25, POD24 JOAQUÍN overnight. Remains on versed and fentanyl drips. Neuro exam unchanged. R IJ dialysis cath placed.   11/20: BD26 POD25 JOAQUÍN overnight. Neuro exam unchanged. Plan for HD today  11/21: BD 27 POD 26 weaned off versed, fentanyl   11/22: BD 28 POD 27 JOAQUÍN o/n , off fentanyl gtt, pt is calm. s/p HD earlier today. s/p trach revision  11/23: BD29. incraesed clonidine to 0.4 BID, s/p trach, stable hemodynamically. neuro at baseline. TFs stopped for vomiting and restarted 10cc/hr  11/24: BD 30, POD29 TFs inc to 20cc/hr. HD today  11/25: BD31, POD30. JOAQUÍN o/n neuro stable  11/26: BD32, POD 31, JOAQUÍN overnight,  perma cath placement with IR today , hydralazine inc 75q8, reglan decreased 2.5q6. Hemodialyzed today, took off 3L and given 1unit pRBCs  11/27: BD33, POD32, JOAQUÍN overnight, pending LTAC. hyponatremic, urine lytes sent. 250cc bolus 3% started, salt tabs started  11/28: BD34 POD 33 L hemicrani. JOAQUÍN o/n. Hyponatremia improved, NaCl 2 g q 6. Pending [facility], increased prn requirements for BP (hydral + labetalol x1), hydral PO increased to 100q8, Fentanyl x1 for pain.   11/29: BD35 POD 34 s/p L hemicrani, JOAQUÍN o/n, pend dialysis tomorrow. CTH ordered for am, exit CTH pend. Plan for discharge to [facility]. Tolerating CPAP during the day, full vent support overnight. HD today  11/30: BD 36 POD 35 pending LTAC to [facility], received on HD through the port. HD , 3L removed, hypertensive, renal following, hydralazine pushes prn for now.   12/1: Intermittent episodes of hypertension, decreased clonidine to q8 and added labetalol 100 BID. RUE doppler done   LGF - tylenol given   12/2: Multiple episodes of asystole, witnessed upward gaze associated with 5 second pause and later 8 second pause. EKG stable, troponin 0.08.  labetalol decreased to 50BID. cuff leak: reinflated during day and ENT to replace  tomorrow. EEG placed for upward gaze. Dialysis today.  12/3: JOAQUÍN o/n, recieved multiple pushes IV hydralazine for HTN. during day bradycardic to 40s when moving head and resolved, EP consulted - thought to be vagal sensitivity. No cuff leak today so tube not replaced  12/4: JOAQUÍN o/n, neuro unchanged      Vital Signs Last 24 Hrs  T(C): 36.4 (04 Dec 2021 01:31), Max: 37.6 (03 Dec 2021 21:00)  T(F): 97.5 (04 Dec 2021 01:31), Max: 99.7 (03 Dec 2021 21:00)  HR: 74 (04 Dec 2021 05:00) (73 - 94)  BP: 157/76 (04 Dec 2021 05:00) (123/63 - 168/77)  BP(mean): 109 (04 Dec 2021 05:00) (85 - 111)  RR: 15 (04 Dec 2021 05:00) (14 - 21)  SpO2: 99% (04 Dec 2021 05:00) (98% - 100%)    I&O's Detail    02 Dec 2021 07:01  -  03 Dec 2021 07:00  --------------------------------------------------------  IN:    Enteral Tube Flush: 450 mL    Nepro with Carb Steady: 720 mL  Total IN: 1170 mL    OUT:    Other (mL): 3000 mL    Voided (mL): 300 mL  Total OUT: 3300 mL    Total NET: -2130 mL      03 Dec 2021 07:01  -  04 Dec 2021 05:35  --------------------------------------------------------  IN:    Enteral Tube Flush: 170 mL    Nepro with Carb Steady: 540 mL  Total IN: 710 mL    OUT:  Total OUT: 0 mL    Total NET: 710 mL        I&O's Summary    02 Dec 2021 07:01  -  03 Dec 2021 07:00  --------------------------------------------------------  IN: 1170 mL / OUT: 3300 mL / NET: -2130 mL    03 Dec 2021 07:01  -  04 Dec 2021 05:35  --------------------------------------------------------  IN: 710 mL / OUT: 0 mL / NET: 710 mL        PHYSICAL EXAM:    General: patient seen laying supine in bed in NAD  Neuro: nonverbal, does not FC, OEs to noxious, face symmetric, b/l UEs 0/5, TF to noxious b/l LEs, +corneals  HEENT: PERRL, +trach  Neck: supple  Cardiac: RRR, S1S2  Pulmonary: chest rise symmetric, +cough/gag  Abdomen: soft, nontender, nondistended  Ext: warm, perfusing well  Wound: L cranial flap full, soft      TUBES/LINES:  [] CVC  [x] A-line  [] Lumbar Drain  [] Ventriculostomy  [] Other    DIET:  [] NPO  [] Mechanical  [x] Tube feeds    LABS:                        8.3    14.25 )-----------( 215      ( 03 Dec 2021 05:33 )             26.3     12-03    137  |  100  |  37<H>  ----------------------------<  134<H>  3.7   |  29  |  4.07<H>    Ca    9.5      03 Dec 2021 05:34  Phos  3.5     12-03  Mg     2.1     12-03    TPro  7.0  /  Alb  2.9<L>  /  TBili  0.2  /  DBili  x   /  AST  25  /  ALT  <5<L>  /  AlkPhos  158<H>  12-03        CARDIAC MARKERS ( 02 Dec 2021 16:54 )  x     / 0.08 ng/mL / x     / x     / x      CARDIAC MARKERS ( 02 Dec 2021 08:52 )  x     / 0.07 ng/mL / x     / x     / x          CAPILLARY BLOOD GLUCOSE          Drug Levels: [] N/A    CSF Analysis: [] N/A      Allergies    No Known Allergies    Intolerances      MEDICATIONS:  Antibiotics:    Neuro:  fentaNYL    Injectable 25 MICROGram(s) IV Push every 2 hours PRN  levETIRAcetam  Solution 500 milliGRAM(s) Oral every 24 hours  ondansetron Injectable 4 milliGRAM(s) IV Push every 6 hours PRN    Anticoagulation:  heparin   Injectable 5000 Unit(s) SubCutaneous every 8 hours    OTHER:  acetylcysteine 20%  Inhalation 4 milliLiter(s) Inhalation every 6 hours  amLODIPine   Tablet 10 milliGRAM(s) Oral daily  artificial  tears Solution 1 Drop(s) Both EYES two times a day  bisacodyl Suppository 10 milliGRAM(s) Rectal daily  chlorhexidine 0.12% Liquid 15 milliLiter(s) Oral Mucosa every 12 hours  chlorhexidine 2% Cloths 1 Application(s) Topical <User Schedule>  cloNIDine 0.2 milliGRAM(s) Oral every 8 hours  hydrALAZINE 100 milliGRAM(s) Oral every 8 hours  hydrALAZINE Injectable 10 milliGRAM(s) IV Push every 4 hours PRN  polyethylene glycol 3350 17 Gram(s) Oral two times a day  senna 2 Tablet(s) Oral at bedtime    IVF:  sodium chloride 2 Gram(s) Oral every 6 hours    CULTURES:    RADIOLOGY & ADDITIONAL TESTS:    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, left    Intracranial hemorrhage, spontaneous subarachnoid, due to cerebral aneurysm, acute    Pneumothorax, left    Functional quadriplegia    Acute respiratory failure with hypoxia    Cardiac arrest    Encounter for palliative care    Advanced care planning/counseling discussion    IPH (idiopathic pulmonary hemosiderosis)    Acute spontaneous intraparenchymal intracranial hemorrhage due to cerebral aneurysm    CHETAN (acute kidney injury)    Hypocalcemia    Angiogram, cerebral, with coil embolization, in non-operating room setting    Endoscopic percutaneous gastrostomy    Angiogram, carotid and cerebral, bilateral    Chest tube placement    Insertion of central venous catheter with ultrasound guidance    Insertion of temporary dialysis catheter    Tracheostomy, planned    Personal history of spine surgery    CHETAN (acute kidney injury)    SysAdmin_VstLnk        ASSESSMENT:  47 y/o female with PMHx of HTN and MS, hx of spinal surgery at [facility] 10/8/21 presented to [facility] BIBEMS after being found unconscious at home, unknown period of time. 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 HH5, MF4; BD #1 = 10/26. Patient was intubated at [facility] and sent straight to the OR for left hemicraniotomy. A-line placed intraop. Hemodynamically unstable upon arrival to [facility], bradycardic and hypertensive s/p Mannitol and Furosemide. Central line placed in NSICU. S/p EVD placement, and coil embolization of left MCA bifurcation aneurysm 10/26/2021. S/p cerebral angio without findings of vasospasm 11/10. S/p cardiac arrest with ROSC x3 on 11/12 during tracheostomy at bedside now with b/l pneumothoracies and worsening kidney function. EVD dc'd 11/18, trach'd 11/22, permacath placed 11/26, pending LTACH.      PLAN:  NEURO:  - neuro checks q4hrs/vital signs q4hr .  - Keppra 500mg q24h renal dosing   - CTH 11/18 stable, CTH 11/29 stable  - plan for cranioplasty in ~3 months   - EEG x 24 hrs   - fentanyl 25 q2 prn    CARDIO:  - -180   - amlodipine 10 daily and hydral 100 q8h, clonidine 0.2 q 8 and labetalol dc'd  - hydralazine IV prn, avoid BB IV   - s/p cardiac arrest   - TTE 11/15: mild LVH, EF 70%   - EP consulted: pauses on telemetry and bradycardia with neck movement likely due to baroreceptor sensitivity, no need for pacemaker    PULM:  - trach, continue full vent support overnight  - s/p acute hypoxic cardiac arrest 11/12 during tracheostomy placment with ENT c/b b/l pneumothorax and b/l chest tubes (d/kiko 11/24)  - mucomyst q 6     GI:  - PEG TFs nepro- at goal 30cc/hr  - Bowel regimen, last BM 12/1  - Alk phos elevated, shocked liver, improving     RENAL:   - post cardiac arrest renal failure on long term HD  - port with IR 11/26 , nephro following  - Na 135-145   - salt tabs 2q6  - daily weights     HEME:  - SCDs, SQH  - s/p multiple transfusions this admission, most recently s/p 1unit PRBC 11/26  - UE and LE dopplers negative 11/26, RUE 12/1 negative   - FOB neg 11/16     ID:  - LGF o/n, monitor temp   - Tylenol PRN for fever   - sputum cx 11/4: enterobacter, proteus  - Cefepime started to cover for enterobacter/proteus in sputum dc'd 11/15     ENDO:  - monitor BMP glucoses    OTHER  - wound care recs- q2 dressing changes   - ENT reconsulted for trach cuff leak and tongue injury.  - has R midline (11/18)    GOC: full code  Level of care: ICU status   Dispo: pending [facility]  family updated    Case discussed with Dr. Duncan         Assessment:  Present when checked    []  GCS  E   V  M     Heart Failure: []Acute, [] acute on chronic , []chronic  Heart Failure:  [] Diastolic (HFpEF), [] Systolic (HFrEF), []Combined (HFpEF and HFrEF), [] RHF, [] Pulm HTN, [] Other    [x] CHETAN, [] ATN, [] AIN, [] other  [] CKD1, [] CKD2, [] CKD 3, [] CKD 4, [] CKD 5, []ESRD    Encephalopathy: [] Metabolic, [] Hepatic, [] toxic, [] Neurological, [] Other    Abnormal Nurtitional Status: [] malnurtition (see nutrition note), [ ]underweight: BMI < 19, [] morbid obesity: BMI >40, [] Cachexia    [] Sepsis  [] hypovolemic shock,[] cardiogenic shock, [] hemorrhagic shock, [] neuogenic shock  [] Acute Respiratory Failure  []Cerebral edema, [] Brain compression/ herniation,   [] Functional quadriplegia  [] Acute blood loss anemia

## 2021-12-04 NOTE — PROGRESS NOTE ADULT - ASSESSMENT
46y/Female with:  L MCA ruptured aneurysm, subarachnoid hemorrhage, s/p C (10/26/2021, Dr. D'Amico @ [place]), brain compression, cerebral edema  anemia   recent spinal surgery  UGIB  bilateral pneumothorax  acute kidney injury, transaminitis    PLAN: Day 1 = 10-26 ; Day 4 = 10/29; Day 21 = 11/15  NEURO: comachecks q4h, VS q1h, PRN fentanyl for analgosedation  SAH:  off nimodipine  Hydrocephalus:  EVD discontinued  cranioplasty to be scheduled  Seizure prophylaxis:  cont levetiracetam 500 OD; f/u official EEG if NEG d/c  REHAB:  physical therapy evaluation and management    EARLY MOB:  HOB elevated    PULM:  cont full vent support, continue mucormyst (q12h) and ipratropium / albuterol  CARDIO:  SBP goal 100-160mm Hg, TTE, amlodipine 10mg PO daily; clonidine 0.2 q8h; continue hydralazine 100q8h; d/c labetalol; EP consulted - NTD  ENDO:  Blood sugar goals 140-180 mg/dL  GI:  off PPI OD  DIET: TF at goal  RENAL: Na goal 135-145; cont salt, HD today  HEM/ONC: no coagulopathy (INR= 1.03), no ASA / Plavix use; Hb stable   VTE Prophylaxis: SCDs, SQH   ID: afebrile, leukocytosis - will trend; panculture if fever spike  Social: son updated yesterday; pending dispo  MISC: ENT consulted for tongue wound - NTD    CORE MEASURES  1.  Hunt and Griffin Score = 5  2.  VTE prophylaxis:  [ ] administered within 24 hours of admission OR [X] reason not done: fresh bleed, unsecured aneurysm.  3.  Dysphagia screening:   [X] performed before any oral meds / liquids / food  4.  Nimodipine treatment:  [X] administered within 24 hours of admission OR [ ] reason not done:    ATTENDING ATTESTATION:  I was physically present for the key portions of the evaluation and management (E/M) service provided.  I agree with the above history, physical and plan, which I have reviewed and edited where appropriate.    Patient at high risk for neurological deterioration or death due to:  ICU delirium, aspiration PNA, DVT / PE.  Critical care time:  I have personally provided 45 minutes of critical care time, excluding time spent on separate procedures.      Plan discussed with RN, house staff. 46y/Female with:  L MCA ruptured aneurysm, subarachnoid hemorrhage, s/p [facility] (10/26/2021, Dr. D'Amico @ [place]), brain compression, cerebral edema  anemia   recent spinal surgery  UGIB  bilateral pneumothorax  acute kidney injury, transaminitis    PLAN: Day 1 = 10-26 ; Day 4 = 10/29; Day 21 = 11/15  NEURO: comachecks q4h, VS q1h, standing fentanyl q6h for now - sympathetic overdrive  SAH:  off nimodipine  Hydrocephalus:  EVD discontinued  cranioplasty to be scheduled  Seizure prophylaxis:  cont levetiracetam 500 OD; EEG negative   REHAB:  physical therapy evaluation and management    EARLY MOB:  HOB elevated    PULM:  cont full vent support, continue mucormyst (q12h) and ipratropium / albuterol  CARDIO:  SBP goal 100-160mm Hg, TTE, amlodipine 10mg PO daily; clonidine 0.2 q8h; continue hydralazine 100q8h; EP consulted - NTD  ENDO:  Blood sugar goals 140-180 mg/dL  GI:  off PPI OD  DIET: TF at goal  RENAL: Na goal 135-145; cont salt, HD today  HEM/ONC: no coagulopathy (INR= 1.03), no ASA / Plavix use; Hb stable   VTE Prophylaxis: SCDs, SQH   ID: afebrile, leukocytosis downtrending, panculture if fever spike  Social: will update family - dispo planning  MISC: ENT consulted for tongue wound - NTD    CORE MEASURES  1.  Hunt and Griffin Score = 5  2.  VTE prophylaxis:  [ ] administered within 24 hours of admission OR [X] reason not done: fresh bleed, unsecured aneurysm.  3.  Dysphagia screening:   [X] performed before any oral meds / liquids / food  4.  Nimodipine treatment:  [X] administered within 24 hours of admission OR [ ] reason not done:    ATTENDING ATTESTATION:  I was physically present for the key portions of the evaluation and management (E/M) service provided.  I agree with the above history, physical and plan, which I have reviewed and edited where appropriate.    Patient at high risk for neurological deterioration or death due to:  ICU delirium, aspiration PNA, DVT / PE.  Critical care time:  I have personally provided 45 minutes of critical care time, excluding time spent on separate procedures.      Plan discussed with RN, house staff.
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
## 2021-12-04 NOTE — EEG REPORT - NS EEG TEXT BOX
[facility] Department of Neurology  Inpatient Continuous video-Electroencephalography Report    Acquisition Details:  Electroencephalography was acquired using a minimum of 21 channels on an [employer] system v 8.5.1 with electrode placement according to the standard International 10-20 system following ACNS (American Clinical Neurophysiology Society) guidelines for Long-Term Video EEG monitoring.  Anterior temporal T1 and T2 electrodes were utilized whenever possible.   The [employer] automated spike & seizure detections were all reviewed in detail, in addition to extensive portions of raw EEG.      Daily Updates (from 07:00 am until 07:00 am):  Day 2  Dec 3-4, 2021:   Pertinent medications:  LEV 500mg/d  Awake Background:  continuous, with predominantly low amplitude delta and very low amplitude alpha/beta frequencies.  Symmetry:  No persistent asymmetries of voltage or frequency.  Organization: Absent.  Posterior Dominant Rhythm: No clear PDR  Voltage:  Suppressed (all <10 uV)  Variability: Unclear. 		Reactivity: Unknown, though eyeblinks present  N2 sleep: absent.  Spontaneous Activity:  No epileptiform discharges.  Periodic/rhythmic activity:  Events:  No electrographic seizures or significant clinical events.  Provocations:  Hyperventilation and Photic stimulation: not performed.    Daily Summary:    The background was low amplitude, cerebral rhythms typically below 5uV.  No clear asymmetry.  A more wakeful state indicated by muscle artifact and eyeblinks, but unclear if EEG rhythms changed significantly.  No epileptiform activity and no events occurred.    Dejon Chairez MD  Attending Neurologist, [facility]

## 2021-12-04 NOTE — PROGRESS NOTE ADULT - SUBJECTIVE AND OBJECTIVE BOX
Patient is a 46y Female seen and evaluated at bedside remains stable. Per family meeting; family wants HD still done on patient. Ordered for HD today.  Labs noted sodium stable, bicarb stable and hemoglobin stable      Meds:    acetylcysteine 20%  Inhalation 4 every 6 hours  amLODIPine   Tablet 10 daily  artificial  tears Solution 1 two times a day  bisacodyl Suppository 10 daily  chlorhexidine 0.12% Liquid 15 every 12 hours  chlorhexidine 2% Cloths 1 <User Schedule>  cloNIDine 0.2 every 8 hours  epoetin nannette-epbx (RETACRIT) Injectable 9000 once  fentaNYL    Injectable 25 every 2 hours PRN  heparin   Injectable 5000 every 8 hours  hydrALAZINE 100 every 8 hours  hydrALAZINE Injectable 10 every 4 hours PRN  levETIRAcetam  Solution 500 every 24 hours  ondansetron Injectable 4 every 6 hours PRN  polyethylene glycol 3350 17 two times a day  senna 2 at bedtime  sodium chloride 2 every 6 hours  sodium chloride 0.9% lock flush 10 every 1 hour PRN      T(C): , Max: 37.6 (12-03-21 @ 21:00)  T(F): , Max: 99.7 (12-03-21 @ 21:00)  HR: 83 (12-04-21 @ 09:00)  BP: 132/74 (12-04-21 @ 09:00)  BP(mean): 97 (12-04-21 @ 09:00)  RR: 15 (12-04-21 @ 09:00)  SpO2: 99% (12-04-21 @ 09:00)  Wt(kg): --    12-03 @ 07:01  -  12-04 @ 07:00  --------------------------------------------------------  IN: 830 mL / OUT: 0 mL / NET: 830 mL          Review of Systems:  ROS negative except as per HPI      PHYSICAL EXAM:  GENERAL: intubated, s/p hemicraniectomy- staples on her scalp on 50% fi02  CHEST/LUNG: Coarse bilaterally  HEART: normal S1S2, RRR  EXTREMITIES: +1 b/l UE edema   ACCESS: RIJ tunneled cath placed 11/26      LABS:                        8.1    13.13 )-----------( 230      ( 04 Dec 2021 07:42 )             27.2     12-04    141  |  102  |  43<H>  ----------------------------<  127<H>  4.1   |  26  |  4.51<H>    Ca    9.7      04 Dec 2021 07:42  Phos  3.5     12-04  Mg     2.3     12-04    TPro  7.0  /  Alb  2.9<L>  /  TBili  0.2  /  DBili  x   /  AST  25  /  ALT  <5<L>  /  AlkPhos  158<H>  12-03                RADIOLOGY & ADDITIONAL STUDIES:

## 2021-12-04 NOTE — PROGRESS NOTE ADULT - ATTENDING COMMENTS
I: HGB 8.1. Dialyzed 2 d ago.   A: Anemia stable. Stable CHETAN. Now ESRD.   P: Continue dialysis. JENIFER at HD.

## 2021-12-05 LAB
ANION GAP SERPL CALC-SCNC: 11 MMOL/L — SIGNIFICANT CHANGE UP (ref 5–17)
APPEARANCE UR: ABNORMAL
BACTERIA # UR AUTO: PRESENT /HPF
BILIRUB UR-MCNC: NEGATIVE — SIGNIFICANT CHANGE UP
BUN SERPL-MCNC: 31 MG/DL — HIGH (ref 7–23)
BUN SERPL-MCNC: SIGNIFICANT CHANGE UP MG/DL (ref 7–23)
CALCIUM SERPL-MCNC: 9.4 MG/DL — SIGNIFICANT CHANGE UP (ref 8.4–10.5)
CALCIUM SERPL-MCNC: 9.4 MG/DL — SIGNIFICANT CHANGE UP (ref 8.4–10.5)
CHLORIDE SERPL-SCNC: 102 MMOL/L — SIGNIFICANT CHANGE UP (ref 96–108)
CHLORIDE SERPL-SCNC: 107 MMOL/L — SIGNIFICANT CHANGE UP (ref 96–108)
CO2 SERPL-SCNC: 27 MMOL/L — SIGNIFICANT CHANGE UP (ref 22–31)
CO2 SERPL-SCNC: SIGNIFICANT CHANGE UP MMOL/L (ref 22–31)
COLOR SPEC: YELLOW — SIGNIFICANT CHANGE UP
CREAT SERPL-MCNC: 3.21 MG/DL — HIGH (ref 0.5–1.3)
CREAT SERPL-MCNC: SIGNIFICANT CHANGE UP MG/DL (ref 0.5–1.3)
DIFF PNL FLD: NEGATIVE — SIGNIFICANT CHANGE UP
EPI CELLS # UR: ABNORMAL /HPF (ref 0–5)
GLUCOSE SERPL-MCNC: 143 MG/DL — HIGH (ref 70–99)
GLUCOSE SERPL-MCNC: SIGNIFICANT CHANGE UP MG/DL (ref 70–99)
GLUCOSE UR QL: NEGATIVE — SIGNIFICANT CHANGE UP
GRAM STN FLD: SIGNIFICANT CHANGE UP
HCT VFR BLD CALC: 28.9 % — LOW (ref 34.5–45)
HGB BLD-MCNC: 8.7 G/DL — LOW (ref 11.5–15.5)
KETONES UR-MCNC: NEGATIVE — SIGNIFICANT CHANGE UP
LEUKOCYTE ESTERASE UR-ACNC: ABNORMAL
MAGNESIUM SERPL-MCNC: 2 MG/DL — SIGNIFICANT CHANGE UP (ref 1.6–2.6)
MAGNESIUM SERPL-MCNC: 2.1 MG/DL — SIGNIFICANT CHANGE UP (ref 1.6–2.6)
MCHC RBC-ENTMCNC: 24.9 PG — LOW (ref 27–34)
MCHC RBC-ENTMCNC: 30.1 GM/DL — LOW (ref 32–36)
MCV RBC AUTO: 82.6 FL — SIGNIFICANT CHANGE UP (ref 80–100)
NITRITE UR-MCNC: NEGATIVE — SIGNIFICANT CHANGE UP
NRBC # BLD: 0 /100 WBCS — SIGNIFICANT CHANGE UP (ref 0–0)
PH UR: 8.5 — HIGH (ref 5–8)
PHOSPHATE SERPL-MCNC: 2.6 MG/DL — SIGNIFICANT CHANGE UP (ref 2.5–4.5)
PHOSPHATE SERPL-MCNC: 3.1 MG/DL — SIGNIFICANT CHANGE UP (ref 2.5–4.5)
PLATELET # BLD AUTO: 187 K/UL — SIGNIFICANT CHANGE UP (ref 150–400)
POTASSIUM SERPL-MCNC: 3.5 MMOL/L — SIGNIFICANT CHANGE UP (ref 3.5–5.3)
POTASSIUM SERPL-MCNC: 4 MMOL/L — SIGNIFICANT CHANGE UP (ref 3.5–5.3)
POTASSIUM SERPL-SCNC: 3.5 MMOL/L — SIGNIFICANT CHANGE UP (ref 3.5–5.3)
POTASSIUM SERPL-SCNC: 4 MMOL/L — SIGNIFICANT CHANGE UP (ref 3.5–5.3)
PROT UR-MCNC: 100 MG/DL
RBC # BLD: 3.5 M/UL — LOW (ref 3.8–5.2)
RBC # FLD: 19.5 % — HIGH (ref 10.3–14.5)
RBC CASTS # UR COMP ASSIST: < 5 /HPF — SIGNIFICANT CHANGE UP
SODIUM SERPL-SCNC: 140 MMOL/L — SIGNIFICANT CHANGE UP (ref 135–145)
SODIUM SERPL-SCNC: 141 MMOL/L — SIGNIFICANT CHANGE UP (ref 135–145)
SP GR SPEC: 1.02 — SIGNIFICANT CHANGE UP (ref 1–1.03)
SPECIMEN SOURCE: SIGNIFICANT CHANGE UP
UROBILINOGEN FLD QL: 0.2 E.U./DL — SIGNIFICANT CHANGE UP
WBC # BLD: 19.42 K/UL — HIGH (ref 3.8–10.5)
WBC # FLD AUTO: 19.42 K/UL — HIGH (ref 3.8–10.5)
WBC UR QL: ABNORMAL /HPF

## 2021-12-05 PROCEDURE — 71045 X-RAY EXAM CHEST 1 VIEW: CPT | Mod: 26

## 2021-12-05 PROCEDURE — 99291 CRITICAL CARE FIRST HOUR: CPT

## 2021-12-05 PROCEDURE — 36415 COLL VENOUS BLD VENIPUNCTURE: CPT

## 2021-12-05 PROCEDURE — 99232 SBSQ HOSP IP/OBS MODERATE 35: CPT | Mod: GC

## 2021-12-05 RX ORDER — PIPERACILLIN AND TAZOBACTAM 4; .5 G/20ML; G/20ML
4.5 INJECTION, POWDER, LYOPHILIZED, FOR SOLUTION INTRAVENOUS EVERY 12 HOURS
Refills: 0 | Status: DISCONTINUED | OUTPATIENT
Start: 2021-12-05 | End: 2021-12-07

## 2021-12-05 RX ORDER — FENTANYL CITRATE 50 UG/ML
25 INJECTION INTRAVENOUS ONCE
Refills: 0 | Status: DISCONTINUED | OUTPATIENT
Start: 2021-12-05 | End: 2021-12-05

## 2021-12-05 RX ORDER — PIPERACILLIN AND TAZOBACTAM 4; .5 G/20ML; G/20ML
4.5 INJECTION, POWDER, LYOPHILIZED, FOR SOLUTION INTRAVENOUS ONCE
Refills: 0 | Status: COMPLETED | OUTPATIENT
Start: 2021-12-05 | End: 2021-12-05

## 2021-12-05 RX ORDER — FENTANYL CITRATE 50 UG/ML
25 INJECTION INTRAVENOUS EVERY 4 HOURS
Refills: 0 | Status: DISCONTINUED | OUTPATIENT
Start: 2021-12-05 | End: 2021-12-06

## 2021-12-05 RX ORDER — HYDRALAZINE HCL 50 MG
10 TABLET ORAL ONCE
Refills: 0 | Status: COMPLETED | OUTPATIENT
Start: 2021-12-05 | End: 2021-12-05

## 2021-12-05 RX ADMIN — Medication 4 MILLILITER(S): at 05:50

## 2021-12-05 RX ADMIN — FENTANYL CITRATE 25 MICROGRAM(S): 50 INJECTION INTRAVENOUS at 06:54

## 2021-12-05 RX ADMIN — HEPARIN SODIUM 5000 UNIT(S): 5000 INJECTION INTRAVENOUS; SUBCUTANEOUS at 06:25

## 2021-12-05 RX ADMIN — FENTANYL CITRATE 25 MICROGRAM(S): 50 INJECTION INTRAVENOUS at 06:27

## 2021-12-05 RX ADMIN — LEVETIRACETAM 500 MILLIGRAM(S): 250 TABLET, FILM COATED ORAL at 21:16

## 2021-12-05 RX ADMIN — Medication 325 MILLIGRAM(S): at 06:54

## 2021-12-05 RX ADMIN — Medication 0.2 MILLIGRAM(S): at 17:38

## 2021-12-05 RX ADMIN — Medication 100 MILLIGRAM(S): at 21:18

## 2021-12-05 RX ADMIN — Medication 1 DROP(S): at 06:54

## 2021-12-05 RX ADMIN — SENNA PLUS 2 TABLET(S): 8.6 TABLET ORAL at 21:18

## 2021-12-05 RX ADMIN — CHLORHEXIDINE GLUCONATE 1 APPLICATION(S): 213 SOLUTION TOPICAL at 06:28

## 2021-12-05 RX ADMIN — FENTANYL CITRATE 25 MICROGRAM(S): 50 INJECTION INTRAVENOUS at 21:35

## 2021-12-05 RX ADMIN — Medication 0.2 MILLIGRAM(S): at 21:18

## 2021-12-05 RX ADMIN — FENTANYL CITRATE 25 MICROGRAM(S): 50 INJECTION INTRAVENOUS at 04:58

## 2021-12-05 RX ADMIN — FENTANYL CITRATE 25 MICROGRAM(S): 50 INJECTION INTRAVENOUS at 06:55

## 2021-12-05 RX ADMIN — SODIUM CHLORIDE 2 GRAM(S): 9 INJECTION INTRAMUSCULAR; INTRAVENOUS; SUBCUTANEOUS at 06:26

## 2021-12-05 RX ADMIN — SODIUM CHLORIDE 2 GRAM(S): 9 INJECTION INTRAMUSCULAR; INTRAVENOUS; SUBCUTANEOUS at 23:47

## 2021-12-05 RX ADMIN — FENTANYL CITRATE 25 MICROGRAM(S): 50 INJECTION INTRAVENOUS at 02:09

## 2021-12-05 RX ADMIN — Medication 100 MILLIGRAM(S): at 17:43

## 2021-12-05 RX ADMIN — HEPARIN SODIUM 5000 UNIT(S): 5000 INJECTION INTRAVENOUS; SUBCUTANEOUS at 17:38

## 2021-12-05 RX ADMIN — Medication 10 MILLIGRAM(S): at 04:31

## 2021-12-05 RX ADMIN — FENTANYL CITRATE 25 MICROGRAM(S): 50 INJECTION INTRAVENOUS at 18:00

## 2021-12-05 RX ADMIN — Medication 4 MILLILITER(S): at 23:47

## 2021-12-05 RX ADMIN — Medication 10 MILLIGRAM(S): at 03:07

## 2021-12-05 RX ADMIN — CHLORHEXIDINE GLUCONATE 15 MILLILITER(S): 213 SOLUTION TOPICAL at 06:20

## 2021-12-05 RX ADMIN — POLYETHYLENE GLYCOL 3350 17 GRAM(S): 17 POWDER, FOR SOLUTION ORAL at 17:40

## 2021-12-05 RX ADMIN — AMLODIPINE BESYLATE 10 MILLIGRAM(S): 2.5 TABLET ORAL at 06:26

## 2021-12-05 RX ADMIN — FENTANYL CITRATE 25 MICROGRAM(S): 50 INJECTION INTRAVENOUS at 21:19

## 2021-12-05 RX ADMIN — Medication 5 MILLIGRAM(S): at 21:18

## 2021-12-05 RX ADMIN — Medication 1 DROP(S): at 18:00

## 2021-12-05 RX ADMIN — FENTANYL CITRATE 25 MICROGRAM(S): 50 INJECTION INTRAVENOUS at 17:33

## 2021-12-05 RX ADMIN — SODIUM CHLORIDE 2 GRAM(S): 9 INJECTION INTRAMUSCULAR; INTRAVENOUS; SUBCUTANEOUS at 17:33

## 2021-12-05 RX ADMIN — Medication 5 MILLIGRAM(S): at 06:27

## 2021-12-05 RX ADMIN — CHLORHEXIDINE GLUCONATE 15 MILLILITER(S): 213 SOLUTION TOPICAL at 17:33

## 2021-12-05 RX ADMIN — Medication 4 MILLILITER(S): at 12:07

## 2021-12-05 RX ADMIN — Medication 5 MILLIGRAM(S): at 17:38

## 2021-12-05 RX ADMIN — POLYETHYLENE GLYCOL 3350 17 GRAM(S): 17 POWDER, FOR SOLUTION ORAL at 06:25

## 2021-12-05 RX ADMIN — SODIUM CHLORIDE 2 GRAM(S): 9 INJECTION INTRAMUSCULAR; INTRAVENOUS; SUBCUTANEOUS at 12:06

## 2021-12-05 RX ADMIN — HEPARIN SODIUM 5000 UNIT(S): 5000 INJECTION INTRAVENOUS; SUBCUTANEOUS at 21:18

## 2021-12-05 RX ADMIN — PIPERACILLIN AND TAZOBACTAM 25 GRAM(S): 4; .5 INJECTION, POWDER, LYOPHILIZED, FOR SOLUTION INTRAVENOUS at 17:35

## 2021-12-05 RX ADMIN — Medication 0.2 MILLIGRAM(S): at 06:27

## 2021-12-05 RX ADMIN — Medication 100 MILLIGRAM(S): at 06:26

## 2021-12-05 RX ADMIN — Medication 10 MILLIGRAM(S): at 12:07

## 2021-12-05 NOTE — PROGRESS NOTE ADULT - SUBJECTIVE AND OBJECTIVE BOX
HPI:  45 y/o female with PMH HTN, Multiple sclerosis, recent spinal surgery 10/8 ([facility]) presented to [facility] BIBEMS after being found unconscious at home, unknown period of time. 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 HH5, MF1. Patient was intubated at [facility], found to have blown L pupil, and sent straight to the OR for left hemicraniotomy. A-line placed intraop. Hemodynamically unstable upon arrival to [facility], bradycardic and hypertensive s/p Mannitol, Clevidipine, and Furosemide. Central line placed in NSICU. Currently sedated on Propofol, pending repeat CTH. History per patient's son, patient had recent spine surgery and was found on outpatient imaging last week to have L M1 segment aneurysm (unruptured), pt asymptomatic at this time. Per son patient taking percocet PO at home. Ureña catheter, ET tube, and arterial line placed at [facility].     NIHSS on arrival: 32   Bess Griffin 5, Mod Busby 4  Bleed Day 1 = 10/26 (26 Oct 2021 13:03)      Hospital Course:   10/26: admitted to [facility] from [facility], POD#0 s/p L hemicraniectomy for decompression and evacuation of SDH. Transferred to [facility] noted to have tense flap, received mannitol, keppra, decadron. Was hypertensive to 200s and preston to 40s, recevied 85g mannitol and bullet 23.4%. CTH on arrival demonstrated increased size in vents and new IVH. EVD placed, open at 15, central line placed. Transfused 2 u PRBC. POD0 of coiling of left MCA bifurcation aneurysm,   10/27: CTH demonstrated EVD in correct position, EVD lowered to 5, remains intubated on proprofol, pending repeat CTH in AM. Started on 3% @ 30/hr. 2LNS given for euvolemia, Mannitol given for uptrending ICPs. Bullet given 8:30 am. 3% increased to 50/hr. 1 L bolus. New EVD catheter placed using exisiting sreekanth hole. 1 u PRBC.  10/28: HH5, MF4, BD3; POD2 angio coil/embo of L MCA aneurysm. JOAQUÍN overnight, neuro stable. EVD@5cmH20 ICPs < 20 overnight, OP WNL. S/p 1 unit PRBC yesterday with appropriate response. Given 42g mannitol in AM for ICP 22 persistently, with improvement, then given 23% in PM for elevated ICP. febrile, pancultured. Standing tylenol and cooling blanket.   10/29: BD4, POD3 angio coil/embo. JOAQUÍN o/n, neuro stable. EVD@5, ICPs <20 o/n.   10/30: BD5, POD4 angio coil/embo. JOAQUÍN o/n neuro stable. EVD@5. 3% @50cc/hr.  10/31: BD6, POD5 angio coil/embo. brief period maintained upward gaze, resolved on its own. EVD@5cm h2o.    11/1: BD7, POD6 L hemicrani, angio coil/embo. JOAQUÍN overnight. Neuro exam unchanged. ICPs WNL. started bromocriptine/gabapentin/baclofen. dc'd propofol, started precedex  11/2: BD8 POD7 L hemicrani, angio coil/embo. JOAQUÍN overnight. Neuro exam stable. Remains on 3% hypertonic saline. Receieved 1 unit of PRBCs for a Hgb of 7.6.  11/3: BD 9 POD8 of L hemicrani. Elevated lipase, normal amylase, OG tube clogged and replaced. Abdominal US normal. Pending gen surg reccs regarding trach and peg. Gabapentin and Baclofen increased to help with neurostorming. restarted back on 3%, LR@35. became preston+hypotensive with nimodipine 60q4, decreased back to 30q2, NaCl bullet given.   11/4: BD10 POD9, JOAQUÍN o/n, 500cc NS for euvolemia,  neuro stable, EVD at 5. 3% increased to 75  11/5: BD11 POD10, JOAQUÍN o/n, neuro stable, EVD at 5  11/6: BD12 POD11 JOAQUÍN overnight. Neuro exam stable. Remains intubated on precedex. 3% hypertonic saline dc'd  11/7: BD13 POD 12 JOAQUÍN o/n, NGT replaced. on precex with no icp crisis   11/8: BD14 POD13 JOAQUÍN o/n, noted to have tongue maceration/macroglossia. Gauze with tongue depressor placed. Pending further recs from ENT. Pending trach and PEG placement tomorrow with gen surg. Off precedex, ICPs stable. EVD remains @ 5.   11/9: BD15, POD 14, bleeding under tongue at start of shift, fentanyl pushed with no further episodes. gabapentin stopped. PEG placed  11/10: BD 16, POD 15, neuro stable, ENT to be consulted in AM, 3% increased to 50/hr.  11/11: BD 17, POD 16, neuro exam stable. Pend CT saba in am for cranioplasty planning. Pend trach in OR with ENT. F.u BMP in am, 3%@50cc/hr for Na goal 140-145.   11/12: BD 18, POD 17. JOAQUÍN overnight, neuro stable. Pend trach placement today. CT saba completed 11/11. Off of 3%, f/u am BMP for Na goal 140-150. CSF neg. Hypoxic cardiac arrest with ROSC x 3 during tracheostomy placement. B/l chest tubes placed for resulting pneumothorax s/p CPR. salt tabs dc'd.  11/13: BD 19, POD 18. Patient tachycardic with some improvement, SBP stable off of levophed, hgb downtrending o/n, transfused 1 unit PRBCs. B/l chest tube. EVD @5cmH2O, no elevated ICPs. UOP downtrending, trend BUN/Cr, given 1LNS x1 for low urine output. F/u am CXR. Cont Cefepime until today, then change to Ancef while trach packing in place per ENT. Pend CTH when stable. Trops downtrending s/p cardiac arrest. 1L. florinef dc'd. BP goal changed to 100-160, started on amlodipine   11/14: BD20, POD19, worsening creatinine with decreased urine output. Given 250 of albumin and 500cc LR. started on hydralazine PO, decreased amantadine 100 daily given CrCl of 20.  11/15: BD21, POD20, remains oliguric, fem line dc'd, tongue swollen and unable to fit bite block in mouth, started on nimbex gtt to relax jaw. Propofol and fentanyl gtt started. Vomiting TFs through nose and mouth, ENT called. TFs held. Cr uptrending. Palliative consulted.  11/16: BD22, POD21, o/n external trach dressing replaced due to cuff leak and decreasing TV, sedated and paralyzed on nimbex, propofol, and fentanyl gtt. CTH stable.  EVD raised from 5 to 10. Nimbex weaned off. Cr uptrending, Trickle feeds started. FOB negative.   11/17: BD 23, POD22, JOAQUÍN o/n, EVD clamped, NS d/c'ed, LR@50, advancing tube feeds.   11/18: BD24, POD23 o/n 3% at 30 with Na acetate started, propofol dc'd due to elevated TG level, episode of vomiting TFs from nose and mouth into ETT with elevated ICP 30s, ICP normalized with resolution of vomiting, reglan 5mg IV given, TFs held, 3% stopped. midline placed   11/19; BD25, POD24 JOAQUÍN overnight. Remains on versed and fentanyl drips. Neuro exam unchanged. R IJ dialysis cath placed.   11/20: BD26 POD25 JOAQUÍN overnight. Neuro exam unchanged. Plan for HD today  11/21: BD 27 POD 26 weaned off versed, fentanyl   11/22: BD 28 POD 27 JOAQUÍN o/n , off fentanyl gtt, pt is calm. s/p HD earlier today. s/p trach revision  11/23: BD29. incraesed clonidine to 0.4 BID, s/p trach, stable hemodynamically. neuro at baseline. TFs stopped for vomiting and restarted 10cc/hr  11/24: BD 30, POD29 TFs inc to 20cc/hr. HD today  11/25: BD31, POD30. JOAQUÍN o/n neuro stable  11/26: BD32, POD 31, JOAQUÍN overnight,  perma cath placement with IR today , hydralazine inc 75q8, reglan decreased 2.5q6. Hemodialyzed today, took off 3L and given 1unit pRBCs  11/27: BD33, POD32, JOAQUÍN overnight, pending LTAC. hyponatremic, urine lytes sent. 250cc bolus 3% started, salt tabs started  11/28: BD34 POD 33 L hemicrani. JOAQUÍN o/n. Hyponatremia improved, NaCl 2 g q 6. Pending LTAC at [facility], increased prn requirements for BP (hydral + labetalol x1), hydral PO increased to 100q8, Fentanyl x1 for pain.   11/29: BD35 POD 34 s/p L hemicrani, JOAQUÍN o/n, pend dialysis tomorrow. CTH ordered for am, exit CTH pend. Plan for discharge to [facility]. Tolerating CPAP during the day, full vent support overnight. HD today  11/30: BD 36 POD 35 pending LTAC to [facility], received on HD through the port. HD , 3L removed, hypertensive, renal following, hydralazine pushes prn for now.   12/1: Intermittent episodes of hypertension, decreased clonidine to q8 and added labetalol 100 BID. RUE doppler done   LGF - tylenol given   12/2: Multiple episodes of asystole, witnessed upward gaze associated with 5 second pause and later 8 second pause. EKG stable, troponin 0.08.  labetalol decreased to 50BID. cuff leak: reinflated during day and ENT to replace  tomorrow. EEG placed for upward gaze. Dialysis today.  12/3: JOAQUÍN o/n, recieved multiple pushes IV hydralazine for HTN. during day bradycardic to 40s when moving head and resolved, EP consulted - thought to be vagal sensitivity. No cuff leak today so tube not replaced  12/4: JOAQUÍN o/n, neuro unchanged, started on standing fentanyl x 1 day for HTN unclear if pain related, baclofen started for rigidity, dialysis today  12/5: o/n fever tmax 102, pancultured, neuro stable      Vital Signs Last 24 Hrs  T(C): 37.6 (05 Dec 2021 01:20), Max: 38.9 (04 Dec 2021 22:00)  T(F): 99.7 (05 Dec 2021 01:20), Max: 102 (04 Dec 2021 22:00)  HR: 80 (05 Dec 2021 03:00) (79 - 111)  BP: 160/78 (05 Dec 2021 03:00) (132/71 - 184/93)  BP(mean): 112 (05 Dec 2021 03:00) (91 - 131)  RR: 17 (05 Dec 2021 03:00) (14 - 33)  SpO2: 100% (05 Dec 2021 03:00) (94% - 100%)    I&O's Detail    03 Dec 2021 07:01  -  04 Dec 2021 07:00  --------------------------------------------------------  IN:    Enteral Tube Flush: 230 mL    Nepro with Carb Steady: 600 mL  Total IN: 830 mL    OUT:  Total OUT: 0 mL    Total NET: 830 mL      04 Dec 2021 07:01  -  05 Dec 2021 05:19  --------------------------------------------------------  IN:    Enteral Tube Flush: 120 mL    Nepro with Carb Steady: 480 mL    Other (mL): 400 mL  Total IN: 1000 mL    OUT:    Other (mL): 3400 mL    Voided (mL): 400 mL  Total OUT: 3800 mL    Total NET: -2800 mL        I&O's Summary    03 Dec 2021 07:01  -  04 Dec 2021 07:00  --------------------------------------------------------  IN: 830 mL / OUT: 0 mL / NET: 830 mL    04 Dec 2021 07:01  -  05 Dec 2021 05:19  --------------------------------------------------------  IN: 1000 mL / OUT: 3800 mL / NET: -2800 mL        PHYSICAL EXAM:    General: patient seen laying supine in bed in NAD  Neuro: nonverbal, does not FC, OEs to noxious, face symmetric, b/l UEs 0/5, TF to noxious b/l LEs, +corneals  HEENT: PERRL, +trach  Neck: supple  Cardiac: RRR, S1S2  Pulmonary: chest rise symmetric, +cough/gag  Abdomen: soft, nontender, nondistended  Ext: warm, perfusing well  Wound: L cranial flap full, soft      TUBES/LINES:  [] CVC  [x] A-line  [] Lumbar Drain  [] Ventriculostomy  [] Other    DIET:  [] NPO  [] Mechanical  [x] Tube feeds  LABS:                        8.7    19.42 )-----------( 187      ( 05 Dec 2021 04:46 )             28.9     12-05    140  |  102  |  31<H>  ----------------------------<  143<H>  4.0   |  27  |  3.21<H>    Ca    9.4      05 Dec 2021 04:46  Phos  2.6     12-05  Mg     2.1     12-05    TPro  7.0  /  Alb  2.9<L>  /  TBili  0.2  /  DBili  x   /  AST  25  /  ALT  <5<L>  /  AlkPhos  158<H>  12-03            CAPILLARY BLOOD GLUCOSE          Drug Levels: [] N/A    CSF Analysis: [] N/A      Allergies    No Known Allergies    Intolerances      MEDICATIONS:  Antibiotics:    Neuro:  baclofen 5 milliGRAM(s) Oral every 8 hours  fentaNYL    Injectable 25 MICROGram(s) IV Push every 4 hours  levETIRAcetam  Solution 500 milliGRAM(s) Oral every 24 hours  ondansetron Injectable 4 milliGRAM(s) IV Push every 6 hours PRN    Anticoagulation:  heparin   Injectable 5000 Unit(s) SubCutaneous every 8 hours    OTHER:  acetylcysteine 20%  Inhalation 4 milliLiter(s) Inhalation every 6 hours  amLODIPine   Tablet 10 milliGRAM(s) Oral daily  artificial  tears Solution 1 Drop(s) Both EYES two times a day  bisacodyl Suppository 10 milliGRAM(s) Rectal daily  chlorhexidine 0.12% Liquid 15 milliLiter(s) Oral Mucosa every 12 hours  chlorhexidine 2% Cloths 1 Application(s) Topical <User Schedule>  cloNIDine 0.2 milliGRAM(s) Oral every 8 hours  hydrALAZINE 100 milliGRAM(s) Oral every 8 hours  hydrALAZINE Injectable 10 milliGRAM(s) IV Push every 4 hours PRN  polyethylene glycol 3350 17 Gram(s) Oral two times a day  senna 2 Tablet(s) Oral at bedtime    IVF:  sodium chloride 2 Gram(s) Oral every 6 hours    CULTURES:    RADIOLOGY & ADDITIONAL TESTS:    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, left    Intracranial hemorrhage, spontaneous subarachnoid, due to cerebral aneurysm, acute    Pneumothorax, left    Functional quadriplegia    Acute respiratory failure with hypoxia    Cardiac arrest    Encounter for palliative care    Advanced care planning/counseling discussion    IPH (idiopathic pulmonary hemosiderosis)    Acute spontaneous intraparenchymal intracranial hemorrhage due to cerebral aneurysm    CHETAN (acute kidney injury)    Hypocalcemia    Angiogram, cerebral, with coil embolization, in non-operating room setting    Endoscopic percutaneous gastrostomy    Angiogram, carotid and cerebral, bilateral    Chest tube placement    Insertion of central venous catheter with ultrasound guidance    Insertion of temporary dialysis catheter    Tracheostomy, planned    Personal history of spine surgery    CHETAN (acute kidney injury)    SysAdmin_VstLnk        ASSESSMENT:  45 y/o female with PMHx of HTN and MS, hx of spinal surgery at [facility] 10/8/21 presented to [facility] BIBEMS after being found unconscious at home, unknown period of time. 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 HH5, MF4; BD #1 = 10/26. Patient was intubated at [facility] and sent straight to the OR for left hemicraniotomy. A-line placed intraop. Hemodynamically unstable upon arrival to [facility], bradycardic and hypertensive s/p Mannitol and Furosemide. Central line placed in NSICU. S/p EVD placement, and coil embolization of left MCA bifurcation aneurysm 10/26/2021. S/p cerebral angio without findings of vasospasm 11/10. S/p cardiac arrest with ROSC x3 on 11/12 during tracheostomy at bedside now with b/l pneumothoracies and worsening kidney function. EVD dc'd 11/18, trach'd 11/22, permacath placed 11/26, pending LTACH.      PLAN:  NEURO:  - neuro checks q4hrs/vital signs q4hr .  - Keppra 500mg q24h renal dosing   - CTH 11/18 stable, CTH 11/29 stable  - plan for cranioplasty in ~3 months   - EEG x 24 hrs   - fentanyl 25 q4 x24hr  - baclofen started 12/4    CARDIO:  - -180   - amlodipine 10 daily and hydral 100 q8h, clonidine 0.2 q 8 and labetalol dc'd  - hydralazine IV prn, avoid BB IV   - s/p cardiac arrest   - TTE 11/15: mild LVH, EF 70%   - EP consulted: pauses on telemetry and bradycardia with neck movement likely due to baroreceptor sensitivity, no need for pacemaker    PULM:  - trach, continue full vent support overnight  - s/p acute hypoxic cardiac arrest 11/12 during tracheostomy placment with ENT c/b b/l pneumothorax and b/l chest tubes (d/kiko 11/24)  - mucomyst q 6     GI:  - PEG TFs nepro- at goal 30cc/hr  - Bowel regimen, last BM 12/1  - Alk phos elevated, shocked liver, improving     RENAL:   - post cardiac arrest renal failure on long term HD  - port with IR 11/26 , nephro following  - Na 135-145   - salt tabs 2q6  - daily weights     HEME:  - SCDs, SQH  - s/p multiple transfusions this admission, most recently s/p 1unit PRBC 11/26  - UE and LE dopplers negative 11/26, RUE 12/1 negative   - FOB neg 11/16     ID:  - Cefepime started to cover for enterobacter/proteus in sputum dc'd 11/15   - f/u pancx 12/5    ENDO:  - monitor BMP glucoses    OTHER  - wound care recs- q2 dressing changes   - ENT reconsulted for trach cuff leak and tongue injury.  - has R midline (11/18)    GOC: full code  Level of care: ICU status   Dispo: pending [facility]  family updated    Case discussed with Dr. Duncan       Assessment:  Present when checked    []  GCS  E   V  M     Heart Failure: []Acute, [] acute on chronic , []chronic  Heart Failure:  [] Diastolic (HFpEF), [] Systolic (HFrEF), []Combined (HFpEF and HFrEF), [] RHF, [] Pulm HTN, [] Other    [x] CHETAN, [] ATN, [] AIN, [] other  [] CKD1, [] CKD2, [] CKD 3, [] CKD 4, [] CKD 5, []ESRD    Encephalopathy: [] Metabolic, [] Hepatic, [] toxic, [] Neurological, [] Other    Abnormal Nurtitional Status: [] malnurtition (see nutrition note), [ ]underweight: BMI < 19, [] morbid obesity: BMI >40, [] Cachexia    [] Sepsis  [] hypovolemic shock,[] cardiogenic shock, [] hemorrhagic shock, [] neuogenic shock  [] Acute Respiratory Failure  []Cerebral edema, [] Brain compression/ herniation,   [] Functional quadriplegia  [] Acute blood loss anemia

## 2021-12-05 NOTE — PROGRESS NOTE ADULT - SUBJECTIVE AND OBJECTIVE BOX
CC: HEAD BLEED      INTERVAL HISTORY:    Dialyzed yesterday. BP controlled. Remains unresponsive.     ROS: unobtainable, nonverbal     PAST MEDICAL & SURGICAL HISTORY:  No pertinent past medical history    Personal history of spine surgery      PHYSICAL EXAM:  T(C): 37.5 (05 Dec 2021 14:42), Max: 38.9 (04 Dec 2021 22:00)  HR: 90 (05 Dec 2021 18:00)  BP: 135/64 (05 Dec 2021 18:00) (104/59 - 160/78)  RR: 14 (05 Dec 2021 18:00)  SpO2: 100% (05 Dec 2021 18:00)      04 Dec 2021 07:01  -  05 Dec 2021 07:00  --------------------------------------------------------  IN:    Enteral Tube Flush: 120 mL    Nepro with Carb Steady: 540 mL    Other (mL): 400 mL  Total IN: 1060 mL    OUT:    Other (mL): 3400 mL    Voided (mL): 600 mL  Total OUT: 4000 mL    Total NET: -2940 mL      05 Dec 2021 07:01  -  05 Dec 2021 19:38  --------------------------------------------------------  IN:    Nepro with Carb Steady: 300 mL  Total IN: 300 mL    OUT:  Total OUT: 0 mL    Total NET: 300 mL        Weight     Appearance: intubated  ENT: oral mucosa moist, no pallor/cyanosis.  Neck: no JVD visible.  Cardiac: no rubs. no murmurs. Extremities: pitting ankles   Skin: no rashes.  Extremities (digits): no clubbing or cyanosis.  Respratory effort: no access muscle use. Lungs: CTA anteriorly.  Abdomen: soft. nontender. no masses.      MEDICATIONS  (STANDING):  acetylcysteine 20%  Inhalation 4 milliLiter(s) Inhalation every 6 hours  amLODIPine   Tablet 10 milliGRAM(s) Oral daily  artificial  tears Solution 1 Drop(s) Both EYES two times a day  baclofen 5 milliGRAM(s) Oral every 8 hours  bisacodyl Suppository 10 milliGRAM(s) Rectal daily  chlorhexidine 0.12% Liquid 15 milliLiter(s) Oral Mucosa every 12 hours  chlorhexidine 2% Cloths 1 Application(s) Topical <User Schedule>  cloNIDine 0.2 milliGRAM(s) Oral every 8 hours  fentaNYL    Injectable 25 MICROGram(s) IV Push every 4 hours  heparin   Injectable 5000 Unit(s) SubCutaneous every 8 hours  hydrALAZINE 100 milliGRAM(s) Oral every 8 hours  levETIRAcetam  Solution 500 milliGRAM(s) Oral every 24 hours  piperacillin/tazobactam IVPB.. 4.5 Gram(s) IV Intermittent every 12 hours  polyethylene glycol 3350 17 Gram(s) Oral two times a day  senna 2 Tablet(s) Oral at bedtime  sodium chloride 2 Gram(s) Oral every 6 hours    MEDICATIONS  (PRN):  hydrALAZINE Injectable 10 milliGRAM(s) IV Push every 4 hours PRN SBP >180  ondansetron Injectable 4 milliGRAM(s) IV Push every 6 hours PRN Nausea and/or Vomiting  sodium chloride 0.9% lock flush 10 milliLiter(s) IV Push every 1 hour PRN Pre/post blood products, medications, blood draw, and to maintain line patency    DATA:  140    |  102    |  31<H>  ----------------------------<  143<H>  Ca:9.4   (05 Dec 2021 04:46)  4.0     |  27     |  3.21<H>      eGFR if Non : 17 *L*  eGFR if : 19 *L*    TPro  7.0    /  Alb  2.9<L>  /  TBili  0.2    /  DBili  x      /  AST  25     /  ALT  <5<L>  /  AlkPhos  158<H>  03 Dec 2021 05:34    SCr 3.21 [05 Dec 2021 04:46]  SCr see note [05 Dec 2021 04:01]  SCr 4.51 [04 Dec 2021 07:42]  SCr 4.07 [03 Dec 2021 05:34]  SCr 5.97 [02 Dec 2021 05:29]                          8.7<L>  19.42<H> )-----------( 187      ( 05 Dec 2021 04:46 )             28.9<L>    Phos:2.6 mg/dL M.1 mg/dL PTH:-- Uric acid:-- Serum Osm:--  Ferritin:-- Iron:-- TIBC:-- Tsat:--  B12:-- TSH:-- (05 Dec 2021 04:46)    Urinalysis Basic - ( 05 Dec 2021 06:10 )  Color: Yellow / Appearance: SL Cloudy<!> / S.020 / pH: x  Gluc: x / Ketone: NEGATIVE  / Bili: Negative / Urobili: 0.2 E.U./dL   Blood: x / Protein: 100 mg/dL<!> / Nitrite: NEGATIVE   Leuk Esterase: Moderate<!> / RBC: < 5 /HPF / WBC 5-10 /HPF<!>   Sq Epi: x / Non Sq Epi: 5-10 /HPF<!> / Bacteria: Present /HPF<!>      UProt:-- UCr:-- P/C Ratio:-- 24 hour Prot:-- UVol:-- CrCl:--  Jovi:-- UOsm:224 mosm/kg<L> UVol:-- UCl:-- [place]:-- (2021 18:09)

## 2021-12-05 NOTE — PROCEDURE NOTE - GENERAL PROCEDURE NAME
Blood cultures x 2
External Ventricular Drain
EVD removal
Right Needle Chest Decompression
ALEXIS and HMV drain removal
EVD placement
CSF cultures x2
blood cultures
Left sided needle chest decompression

## 2021-12-05 NOTE — PROGRESS NOTE ADULT - ASSESSMENT
S/P SAH. CHETAN, now ESRD. Anemia.     Suggest:    1. Cont HD.  2. Continue BP meds.  3. JENIFER for anemia.     Please call with any questions.    Derick Rodarte MD, FACP, FASN | kidney.[facility]  Nephrology, Hypertension, and Internal Medicine  Mobile: [phone] (Daytime Hours Only)  Office/Answering Service: [phone]  Asst. Prof. of Medicine, [employer] at [facility] - Nephrology at [address]  [address], [address], [place], [place]

## 2021-12-05 NOTE — PROGRESS NOTE ADULT - ASSESSMENT
46y/Female with:  L MCA ruptured aneurysm, subarachnoid hemorrhage, s/p [facility] (10/26/2021, Dr. D'Amico @ [place]), brain compression, cerebral edema  anemia   recent spinal surgery  UGIB  bilateral pneumothorax  acute kidney injury, transaminitis    PLAN: Day 1 = 10-26 ; Day 4 = 10/29; Day 21 = 11/15  NEURO: comachecks q4h, VS q1h, standing fentanyl q6h for now - sympathetic overdrive  SAH:  off nimodipine  Hydrocephalus:  EVD discontinued  cranioplasty to be scheduled  Seizure prophylaxis:  cont levetiracetam 500 OD; EEG negative   REHAB:  physical therapy evaluation and management    EARLY MOB:  HOB elevated    PULM:  cont full vent support, continue mucormyst (q12h) and ipratropium / albuterol  CARDIO:  SBP goal 100-160mm Hg, TTE, amlodipine 10mg PO daily; clonidine 0.2 q8h; continue hydralazine 100q8h; EP consulted - NTD  ENDO:  Blood sugar goals 140-180 mg/dL  GI:  off PPI OD  DIET: TF at goal  RENAL: Na goal 135-145; cont salt, HD today  HEM/ONC: no coagulopathy (INR= 1.03), no ASA / Plavix use; Hb stable   VTE Prophylaxis: SCDs, SQH   ID: afebrile, leukocytosis downtrending, panculture if fever spike  Social: will update family - dispo planning  MISC: ENT consulted for tongue wound - NTD    CORE MEASURES  1.  Hunt and Griffin Score = 5  2.  VTE prophylaxis:  [ ] administered within 24 hours of admission OR [X] reason not done: fresh bleed, unsecured aneurysm.  3.  Dysphagia screening:   [X] performed before any oral meds / liquids / food  4.  Nimodipine treatment:  [X] administered within 24 hours of admission OR [ ] reason not done:    ATTENDING ATTESTATION:  I was physically present for the key portions of the evaluation and management (E/M) service provided.  I agree with the above history, physical and plan, which I have reviewed and edited where appropriate.    Patient at high risk for neurological deterioration or death due to:  ICU delirium, aspiration PNA, DVT / PE.  Critical care time:  I have personally provided 45 minutes of critical care time, excluding time spent on separate procedures.      Plan discussed with RN, house staff. 46y/Female with:  L MCA ruptured aneurysm, subarachnoid hemorrhage, s/p [facility] (10/26/2021, Dr. D'Amico @ [place]), brain compression, cerebral edema  anemia   recent spinal surgery  UGIB  bilateral pneumothorax  acute kidney injury, transaminitis    PLAN: Day 1 = 10-26 ; Day 4 = 10/29; Day 21 = 11/15  NEURO: comachecks q4h, VS q1h, standing fentanyl q6h for now - sympathetic overdrive  SAH:  off nimodipine  Hydrocephalus:  EVD discontinued  cranioplasty to be scheduled  continue baclofen  Seizure prophylaxis:  cont levetiracetam 500 OD; EEG negative   REHAB:  physical therapy evaluation and management    EARLY MOB:  HOB elevated    PULM:  cont full vent support, continue mucormyst (q12h) and ipratropium / albuterol  CARDIO:  SBP goal 100-160mm Hg, TTE, amlodipine 10mg PO daily; clonidine 0.2 q8h; continue hydralazine 100q8h; EP consulted - NTD  ENDO:  Blood sugar goals 140-180 mg/dL  GI:  off PPI OD  DIET: TF at goal  RENAL: Na goal 135-145; cont salt, HD today  HEM/ONC: no coagulopathy (INR= 1.03), no ASA / Plavix use; Hb stable   VTE Prophylaxis: SCDs, SQH   ID: afebrile, leukocytosis downtrending, start piperacillin/tazobactam x 5-7days, panculture, CXR, sputum  Social: will update family - dispo planning  MISC: ENT consulted for tongue wound - NTD    CORE MEASURES  1.  Hunt and Griffin Score = 5  2.  VTE prophylaxis:  [ ] administered within 24 hours of admission OR [X] reason not done: fresh bleed, unsecured aneurysm.  3.  Dysphagia screening:   [X] performed before any oral meds / liquids / food  4.  Nimodipine treatment:  [X] administered within 24 hours of admission OR [ ] reason not done:    ATTENDING ATTESTATION:  I was physically present for the key portions of the evaluation and management (E/M) service provided.  I agree with the above history, physical and plan, which I have reviewed and edited where appropriate.    Patient at high risk for neurological deterioration or death due to:  ICU delirium, aspiration PNA, DVT / PE.  Critical care time:  I have personally provided 45 minutes of critical care time, excluding time spent on separate procedures.      Plan discussed with RN, house staff.

## 2021-12-05 NOTE — PROCEDURE NOTE - GENERAL PROCEDURE DETAILS
EVD site cleaned with chlorhexidine, anchor staple removed, sterility maintained, anchor suture removed, EVD removed and 2 staples in place at EVD exit site.
Correct patient confirmed prior to the start of the procedure, 2 sets of CSF cultures obtained from buretrol sterilely, patient tolerated the procedure well with no complications
Venipuncture performed to R foot and arterial puncture performed to right radial artery. 2 sets of blood cultures collected. Manual pressure held and clean dressing placed.
Status post concern for pneumothorax.
Absent lung sliding with hypotension. Subcutaneous emphysema noted. Left side decompressed.
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
## 2021-12-06 LAB
ANION GAP SERPL CALC-SCNC: 10 MMOL/L — SIGNIFICANT CHANGE UP (ref 5–17)
BLD GP AB SCN SERPL QL: NEGATIVE — SIGNIFICANT CHANGE UP
BUN SERPL-MCNC: 36 MG/DL — HIGH (ref 7–23)
CALCIUM SERPL-MCNC: 8.5 MG/DL — SIGNIFICANT CHANGE UP (ref 8.4–10.5)
CHLORIDE SERPL-SCNC: 108 MMOL/L — SIGNIFICANT CHANGE UP (ref 96–108)
CO2 SERPL-SCNC: 23 MMOL/L — SIGNIFICANT CHANGE UP (ref 22–31)
CREAT SERPL-MCNC: 3.43 MG/DL — HIGH (ref 0.5–1.3)
GLUCOSE SERPL-MCNC: 126 MG/DL — HIGH (ref 70–99)
GRAM STN FLD: SIGNIFICANT CHANGE UP
GRAM STN FLD: SIGNIFICANT CHANGE UP
HCT VFR BLD CALC: 25.3 % — LOW (ref 34.5–45)
HCT VFR BLD CALC: 25.6 % — LOW (ref 34.5–45)
HCT VFR BLD CALC: 25.6 % — LOW (ref 34.5–45)
HGB BLD-MCNC: 7.4 G/DL — LOW (ref 11.5–15.5)
HGB BLD-MCNC: 7.5 G/DL — LOW (ref 11.5–15.5)
HGB BLD-MCNC: 7.7 G/DL — LOW (ref 11.5–15.5)
MAGNESIUM SERPL-MCNC: 1.9 MG/DL — SIGNIFICANT CHANGE UP (ref 1.6–2.6)
MCHC RBC-ENTMCNC: 24.4 PG — LOW (ref 27–34)
MCHC RBC-ENTMCNC: 24.5 PG — LOW (ref 27–34)
MCHC RBC-ENTMCNC: 25.2 PG — LOW (ref 27–34)
MCHC RBC-ENTMCNC: 29.2 GM/DL — LOW (ref 32–36)
MCHC RBC-ENTMCNC: 29.3 GM/DL — LOW (ref 32–36)
MCHC RBC-ENTMCNC: 30.1 GM/DL — LOW (ref 32–36)
MCV RBC AUTO: 83.4 FL — SIGNIFICANT CHANGE UP (ref 80–100)
MCV RBC AUTO: 83.7 FL — SIGNIFICANT CHANGE UP (ref 80–100)
MCV RBC AUTO: 83.8 FL — SIGNIFICANT CHANGE UP (ref 80–100)
METHOD TYPE: SIGNIFICANT CHANGE UP
NRBC # BLD: 0 /100 WBCS — SIGNIFICANT CHANGE UP (ref 0–0)
OB PNL STL: NEGATIVE — SIGNIFICANT CHANGE UP
PHOSPHATE SERPL-MCNC: 3 MG/DL — SIGNIFICANT CHANGE UP (ref 2.5–4.5)
PLATELET # BLD AUTO: 181 K/UL — SIGNIFICANT CHANGE UP (ref 150–400)
PLATELET # BLD AUTO: 183 K/UL — SIGNIFICANT CHANGE UP (ref 150–400)
PLATELET # BLD AUTO: 193 K/UL — SIGNIFICANT CHANGE UP (ref 150–400)
POTASSIUM SERPL-MCNC: 3.5 MMOL/L — SIGNIFICANT CHANGE UP (ref 3.5–5.3)
POTASSIUM SERPL-SCNC: 3.5 MMOL/L — SIGNIFICANT CHANGE UP (ref 3.5–5.3)
RBC # BLD: 3.02 M/UL — LOW (ref 3.8–5.2)
RBC # BLD: 3.06 M/UL — LOW (ref 3.8–5.2)
RBC # BLD: 3.07 M/UL — LOW (ref 3.8–5.2)
RBC # FLD: 18.8 % — HIGH (ref 10.3–14.5)
RBC # FLD: 19.2 % — HIGH (ref 10.3–14.5)
RBC # FLD: 19.4 % — HIGH (ref 10.3–14.5)
RH IG SCN BLD-IMP: POSITIVE — SIGNIFICANT CHANGE UP
S MARCESCENS DNA BLD POS NAA+NON-PROBE: SIGNIFICANT CHANGE UP
SARS-COV-2 RNA SPEC QL NAA+PROBE: SIGNIFICANT CHANGE UP
SODIUM SERPL-SCNC: 141 MMOL/L — SIGNIFICANT CHANGE UP (ref 135–145)
SPECIMEN SOURCE: SIGNIFICANT CHANGE UP
WBC # BLD: 11.78 K/UL — HIGH (ref 3.8–10.5)
WBC # BLD: 13.9 K/UL — HIGH (ref 3.8–10.5)
WBC # BLD: 14.1 K/UL — HIGH (ref 3.8–10.5)
WBC # FLD AUTO: 11.78 K/UL — HIGH (ref 3.8–10.5)
WBC # FLD AUTO: 13.9 K/UL — HIGH (ref 3.8–10.5)
WBC # FLD AUTO: 14.1 K/UL — HIGH (ref 3.8–10.5)

## 2021-12-06 PROCEDURE — 71045 X-RAY EXAM CHEST 1 VIEW: CPT | Mod: 26

## 2021-12-06 PROCEDURE — 99232 SBSQ HOSP IP/OBS MODERATE 35: CPT | Mod: GC

## 2021-12-06 PROCEDURE — 99024 POSTOP FOLLOW-UP VISIT: CPT

## 2021-12-06 PROCEDURE — 99291 CRITICAL CARE FIRST HOUR: CPT

## 2021-12-06 RX ORDER — POTASSIUM CHLORIDE 20 MEQ
20 PACKET (EA) ORAL ONCE
Refills: 0 | Status: COMPLETED | OUTPATIENT
Start: 2021-12-06 | End: 2021-12-06

## 2021-12-06 RX ORDER — MAGNESIUM SULFATE 500 MG/ML
1 VIAL (ML) INJECTION ONCE
Refills: 0 | Status: COMPLETED | OUTPATIENT
Start: 2021-12-06 | End: 2021-12-06

## 2021-12-06 RX ORDER — FENTANYL CITRATE 50 UG/ML
25 INJECTION INTRAVENOUS EVERY 4 HOURS
Refills: 0 | Status: DISCONTINUED | OUTPATIENT
Start: 2021-12-06 | End: 2021-12-08

## 2021-12-06 RX ADMIN — Medication 4 MILLILITER(S): at 12:07

## 2021-12-06 RX ADMIN — LEVETIRACETAM 500 MILLIGRAM(S): 250 TABLET, FILM COATED ORAL at 21:35

## 2021-12-06 RX ADMIN — AMLODIPINE BESYLATE 10 MILLIGRAM(S): 2.5 TABLET ORAL at 05:51

## 2021-12-06 RX ADMIN — Medication 100 MILLIGRAM(S): at 05:51

## 2021-12-06 RX ADMIN — Medication 5 MILLIGRAM(S): at 05:51

## 2021-12-06 RX ADMIN — HEPARIN SODIUM 5000 UNIT(S): 5000 INJECTION INTRAVENOUS; SUBCUTANEOUS at 14:23

## 2021-12-06 RX ADMIN — FENTANYL CITRATE 25 MICROGRAM(S): 50 INJECTION INTRAVENOUS at 17:36

## 2021-12-06 RX ADMIN — FENTANYL CITRATE 25 MICROGRAM(S): 50 INJECTION INTRAVENOUS at 05:52

## 2021-12-06 RX ADMIN — PIPERACILLIN AND TAZOBACTAM 25 GRAM(S): 4; .5 INJECTION, POWDER, LYOPHILIZED, FOR SOLUTION INTRAVENOUS at 17:22

## 2021-12-06 RX ADMIN — Medication 0.2 MILLIGRAM(S): at 05:50

## 2021-12-06 RX ADMIN — SODIUM CHLORIDE 2 GRAM(S): 9 INJECTION INTRAMUSCULAR; INTRAVENOUS; SUBCUTANEOUS at 17:21

## 2021-12-06 RX ADMIN — FENTANYL CITRATE 25 MICROGRAM(S): 50 INJECTION INTRAVENOUS at 10:19

## 2021-12-06 RX ADMIN — SODIUM CHLORIDE 2 GRAM(S): 9 INJECTION INTRAMUSCULAR; INTRAVENOUS; SUBCUTANEOUS at 12:10

## 2021-12-06 RX ADMIN — Medication 0.2 MILLIGRAM(S): at 14:23

## 2021-12-06 RX ADMIN — Medication 1 DROP(S): at 05:53

## 2021-12-06 RX ADMIN — Medication 0.2 MILLIGRAM(S): at 21:36

## 2021-12-06 RX ADMIN — SENNA PLUS 2 TABLET(S): 8.6 TABLET ORAL at 21:36

## 2021-12-06 RX ADMIN — CHLORHEXIDINE GLUCONATE 15 MILLILITER(S): 213 SOLUTION TOPICAL at 17:22

## 2021-12-06 RX ADMIN — Medication 100 GRAM(S): at 10:18

## 2021-12-06 RX ADMIN — Medication 20 MILLIEQUIVALENT(S): at 12:11

## 2021-12-06 RX ADMIN — HEPARIN SODIUM 5000 UNIT(S): 5000 INJECTION INTRAVENOUS; SUBCUTANEOUS at 05:51

## 2021-12-06 RX ADMIN — Medication 100 MILLIGRAM(S): at 14:23

## 2021-12-06 RX ADMIN — Medication 10 MILLIGRAM(S): at 12:10

## 2021-12-06 RX ADMIN — FENTANYL CITRATE 25 MICROGRAM(S): 50 INJECTION INTRAVENOUS at 02:00

## 2021-12-06 RX ADMIN — HEPARIN SODIUM 5000 UNIT(S): 5000 INJECTION INTRAVENOUS; SUBCUTANEOUS at 21:36

## 2021-12-06 RX ADMIN — Medication 5 MILLIGRAM(S): at 14:23

## 2021-12-06 RX ADMIN — FENTANYL CITRATE 25 MICROGRAM(S): 50 INJECTION INTRAVENOUS at 01:39

## 2021-12-06 RX ADMIN — CHLORHEXIDINE GLUCONATE 15 MILLILITER(S): 213 SOLUTION TOPICAL at 05:53

## 2021-12-06 RX ADMIN — POLYETHYLENE GLYCOL 3350 17 GRAM(S): 17 POWDER, FOR SOLUTION ORAL at 05:52

## 2021-12-06 RX ADMIN — FENTANYL CITRATE 25 MICROGRAM(S): 50 INJECTION INTRAVENOUS at 06:00

## 2021-12-06 RX ADMIN — Medication 4 MILLILITER(S): at 22:01

## 2021-12-06 RX ADMIN — FENTANYL CITRATE 25 MICROGRAM(S): 50 INJECTION INTRAVENOUS at 21:37

## 2021-12-06 RX ADMIN — FENTANYL CITRATE 25 MICROGRAM(S): 50 INJECTION INTRAVENOUS at 17:21

## 2021-12-06 RX ADMIN — CHLORHEXIDINE GLUCONATE 1 APPLICATION(S): 213 SOLUTION TOPICAL at 05:52

## 2021-12-06 RX ADMIN — Medication 4 MILLILITER(S): at 05:45

## 2021-12-06 RX ADMIN — Medication 100 MILLIGRAM(S): at 21:36

## 2021-12-06 RX ADMIN — Medication 4 MILLILITER(S): at 17:34

## 2021-12-06 RX ADMIN — Medication 5 MILLIGRAM(S): at 21:35

## 2021-12-06 RX ADMIN — FENTANYL CITRATE 25 MICROGRAM(S): 50 INJECTION INTRAVENOUS at 22:00

## 2021-12-06 RX ADMIN — SODIUM CHLORIDE 2 GRAM(S): 9 INJECTION INTRAMUSCULAR; INTRAVENOUS; SUBCUTANEOUS at 05:51

## 2021-12-06 RX ADMIN — Medication 1 DROP(S): at 17:28

## 2021-12-06 RX ADMIN — PIPERACILLIN AND TAZOBACTAM 25 GRAM(S): 4; .5 INJECTION, POWDER, LYOPHILIZED, FOR SOLUTION INTRAVENOUS at 05:52

## 2021-12-06 RX ADMIN — FENTANYL CITRATE 25 MICROGRAM(S): 50 INJECTION INTRAVENOUS at 10:34

## 2021-12-06 NOTE — PROGRESS NOTE ADULT - ASSESSMENT
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 Hospital course c/b cardiac arrest x3 and anuric ATN requiring dialyisis.     Anuric iATN requiring long term dialysis  Baseline creatinine 0.6  First HD 11/20; S/p IR tunneled cath placement 11/26  Defer HD today  Indeterminate quantiferon and wnl hepatitis panel    INCOMPLETE    BP: hypertensive, SBP goal 100-160 per neuro ICU; UF with HD; on multiple anti-hypertensives, have a lot of room with UF  Anaemia- no need for IV iron, epo with HD  BMD: phos acceptable, no need for binders  Access: S/p IR tunneled cath placement 11/26    Daily weights, strict I&Os, renally dose meds, renal diet 46F with pmhx of HTN who presented with left middle cerebral artery aneursym with SAH, ICH s/p decompressive hemicraniectomy. Hospital course c/b cardiac arrest x3 and anuric ATN requiring dialyisis.     Anuric iATN requiring long term dialysis  Baseline creatinine 0.6  First HD 11/20; S/p IR tunneled cath placement 11/26  Defer HD today- on TTS inpt schedule  Indeterminate quantiferon and wnl hepatitis panel    BP: hypertensive, SBP goal 100-160 per neuro ICU; UF with HD  Anaemia- no need for IV iron, epo with HD  BMD: phos acceptable, no need for binders  Access: S/p IR tunneled cath placement 11/26    Daily weights, strict I&Os, renally dose meds, renal diet

## 2021-12-06 NOTE — PROGRESS NOTE ADULT - ATTENDING COMMENTS
I: HTN controlled. Last dialyzed 2d ago. HGB 7.7.     A: Stable CHETAN.  P: Continue dialysis. JENIFER at HD.

## 2021-12-06 NOTE — PROGRESS NOTE ADULT - SUBJECTIVE AND OBJECTIVE BOX
Patient is a 46y Female seen and evaluated at bedside. NAD, volume and lytes stable. Defer HD today- plan for tomorrow.    Meds:    acetylcysteine 20%  Inhalation 4 every 6 hours  amLODIPine   Tablet 10 daily  artificial  tears Solution 1 two times a day  baclofen 5 every 8 hours  bisacodyl Suppository 10 daily  chlorhexidine 0.12% Liquid 15 every 12 hours  chlorhexidine 2% Cloths 1 <User Schedule>  cloNIDine 0.2 every 8 hours  heparin   Injectable 5000 every 8 hours  hydrALAZINE 100 every 8 hours  hydrALAZINE Injectable 10 every 4 hours PRN  levETIRAcetam  Solution 500 every 24 hours  ondansetron Injectable 4 every 6 hours PRN  piperacillin/tazobactam IVPB.. 4.5 every 12 hours  polyethylene glycol 3350 17 two times a day  potassium chloride   Solution 20 once  senna 2 at bedtime  sodium chloride 2 every 6 hours  sodium chloride 0.9% lock flush 10 every 1 hour PRN      T(C): , Max: 37.8 (21 @ 05:22)  T(F): , Max: 100 (21 @ 05:22)  HR: 97 (21 @ 11:00)  BP: 121/62 (21 @ 04:00)  BP(mean): 87 (21 @ 04:00)  RR: 21 (21 @ 11:00)  SpO2: 100% (21 @ 11:00)  Wt(kg): --    :  -   @ 07:00  --------------------------------------------------------  IN: 1300 mL / OUT: 450 mL / NET: 850 mL     @ 07:01  -   @ 11:54  --------------------------------------------------------  IN: 190 mL / OUT: 0 mL / NET: 190 mL    Review of Systems: Unable to participate    PHYSICAL EXAM:  GENERAL: intubated, s/p hemicraniectomy- staples on her scalp  CHEST/LUNG: Coarse bilaterally  HEART: normal S1S2, RRR  EXTREMITIES: +2 b/l UE oedema   ACCESS: RIJ tunneled cath placed     LABS:                        7.7    13.90 )-----------( 193      ( 06 Dec 2021 07:19 )             25.6     12-    141  |  108  |  36<H>  ----------------------------<  126<H>  3.5   |  23  |  3.43<H>    Ca    8.5      06 Dec 2021 05:40  Phos  3.0       Mg     1.9               Urinalysis Basic - ( 05 Dec 2021 06:10 )    Color: Yellow / Appearance: SL Cloudy / S.020 / pH: x  Gluc: x / Ketone: NEGATIVE  / Bili: Negative / Urobili: 0.2 E.U./dL   Blood: x / Protein: 100 mg/dL / Nitrite: NEGATIVE   Leuk Esterase: Moderate / RBC: < 5 /HPF / WBC 5-10 /HPF   Sq Epi: x / Non Sq Epi: 5-10 /HPF / Bacteria: Present /HPF            RADIOLOGY & ADDITIONAL STUDIES:           Patient is a 46y Female seen and evaluated at bedside. NAD, volume and lytes stable. Defer HD today- plan for tomorrow. Has been on TTS schedule inpt.     Meds:    acetylcysteine 20%  Inhalation 4 every 6 hours  amLODIPine   Tablet 10 daily  artificial  tears Solution 1 two times a day  baclofen 5 every 8 hours  bisacodyl Suppository 10 daily  chlorhexidine 0.12% Liquid 15 every 12 hours  chlorhexidine 2% Cloths 1 <User Schedule>  cloNIDine 0.2 every 8 hours  heparin   Injectable 5000 every 8 hours  hydrALAZINE 100 every 8 hours  hydrALAZINE Injectable 10 every 4 hours PRN  levETIRAcetam  Solution 500 every 24 hours  ondansetron Injectable 4 every 6 hours PRN  piperacillin/tazobactam IVPB.. 4.5 every 12 hours  polyethylene glycol 3350 17 two times a day  potassium chloride   Solution 20 once  senna 2 at bedtime  sodium chloride 2 every 6 hours  sodium chloride 0.9% lock flush 10 every 1 hour PRN      T(C): , Max: 37.8 (21 @ 05:22)  T(F): , Max: 100 (21 @ 05:22)  HR: 97 (21 @ 11:00)  BP: 121/62 (21 @ 04:00)  BP(mean): 87 (21 @ 04:00)  RR: 21 (21 @ 11:00)  SpO2: 100% (21 @ 11:00)  Wt(kg): --    :  -   @ 07:00  --------------------------------------------------------  IN: 1300 mL / OUT: 450 mL / NET: 850 mL     07:01  -   @ 11:54  --------------------------------------------------------  IN: 190 mL / OUT: 0 mL / NET: 190 mL    Review of Systems: Unable to participate    PHYSICAL EXAM:  GENERAL: intubated, s/p hemicraniectomy- staples on her scalp  CHEST/LUNG: Coarse bilaterally  HEART: normal S1S2, RRR  EXTREMITIES: +2 b/l UE oedema   ACCESS: RIJ tunneled cath placed     LABS:                        7.7    13.90 )-----------( 193      ( 06 Dec 2021 07:19 )             25.6         141  |  108  |  36<H>  ----------------------------<  126<H>  3.5   |  23  |  3.43<H>    Ca    8.5      06 Dec 2021 05:40  Phos  3.0       Mg     1.9               Urinalysis Basic - ( 05 Dec 2021 06:10 )    Color: Yellow / Appearance: SL Cloudy / S.020 / pH: x  Gluc: x / Ketone: NEGATIVE  / Bili: Negative / Urobili: 0.2 E.U./dL   Blood: x / Protein: 100 mg/dL / Nitrite: NEGATIVE   Leuk Esterase: Moderate / RBC: < 5 /HPF / WBC 5-10 /HPF   Sq Epi: x / Non Sq Epi: 5-10 /HPF / Bacteria: Present /HPF            RADIOLOGY & ADDITIONAL STUDIES:

## 2021-12-06 NOTE — PROGRESS NOTE ADULT - SUBJECTIVE AND OBJECTIVE BOX
HPI:  45 y/o female with PMH HTN, Multiple sclerosis, recent spinal surgery 10/8 ([facility]) presented to [facility] BIBEMS after being found unconscious at home, unknown period of time. 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 HH5, MF1. Patient was intubated at [facility], found to have blown L pupil, and sent straight to the OR for left hemicraniotomy. A-line placed intraop. Hemodynamically unstable upon arrival to [facility], bradycardic and hypertensive s/p Mannitol, Clevidipine, and Furosemide. Central line placed in NSICU. Currently sedated on Propofol, pending repeat CTH. History per patient's son, patient had recent spine surgery and was found on outpatient imaging last week to have L M1 segment aneurysm (unruptured), pt asymptomatic at this time. Per son patient taking percocet PO at home. Ureña catheter, ET tube, and arterial line placed at [facility].     NIHSS on arrival: 32   Bess Griffin 5, Mod Busby 4  Bleed Day 1 = 10/26 (26 Oct 2021 13:03)    Hospital Course:  10/26: admitted to [facility] from [facility], POD#0 s/p L hemicraniectomy for decompression and evacuation of SDH. Transferred to [facility] noted to have tense flap, received mannitol, keppra, decadron. Was hypertensive to 200s and preston to 40s, recevied 85g mannitol and bullet 23.4%. CTH on arrival demonstrated increased size in vents and new IVH. EVD placed, open at 15, central line placed. Transfused 2 u PRBC. POD0 of coiling of left MCA bifurcation aneurysm,   10/27: CTH demonstrated EVD in correct position, EVD lowered to 5, remains intubated on proprofol, pending repeat CTH in AM. Started on 3% @ 30/hr. 2LNS given for euvolemia, Mannitol given for uptrending ICPs. Bullet given 8:30 am. 3% increased to 50/hr. 1 L bolus. New EVD catheter placed using exisiting sreekanth hole. 1 u PRBC.  10/28: HH5, MF4, BD3; POD2 angio coil/embo of L MCA aneurysm. JOAQUÍN overnight, neuro stable. EVD@5cmH20 ICPs < 20 overnight, OP WNL. S/p 1 unit PRBC yesterday with appropriate response. Given 42g mannitol in AM for ICP 22 persistently, with improvement, then given 23% in PM for elevated ICP. febrile, pancultured. Standing tylenol and cooling blanket.   10/29: BD4, POD3 angio coil/embo. JOAQUÍN o/n, neuro stable. EVD@5, ICPs <20 o/n.   10/30: BD5, POD4 angio coil/embo. JOAQUÍN o/n neuro stable. EVD@5. 3% @50cc/hr.  10/31: BD6, POD5 angio coil/embo. brief period maintained upward gaze, resolved on its own. EVD@5cm h2o.    : BD7, POD6 L hemicrani, angio coil/embo. JOAQUÍN overnight. Neuro exam unchanged. ICPs WNL. started bromocriptine/gabapentin/baclofen. dc'd propofol, started precedex  : BD8 POD7 L hemicrani, angio coil/embo. JOAQUÍN overnight. Neuro exam stable. Remains on 3% hypertonic saline. Receieved 1 unit of PRBCs for a Hgb of 7.6.  11/3: BD 9 POD8 of L hemicrani. Elevated lipase, normal amylase, OG tube clogged and replaced. Abdominal US normal. Pending gen surg reccs regarding trach and peg. Gabapentin and Baclofen increased to help with neurostorming. restarted back on 3%, LR@35. became preston+hypotensive with nimodipine 60q4, decreased back to 30q2, NaCl bullet given.   : BD10 POD9, JOAQUÍN o/n, 500cc NS for euvolemia,  neuro stable, EVD at 5. 3% increased to 75  11: BD11 POD10, JOAQUÍN o/n, neuro stable, EVD at 5  116: BD12 POD11 JOAQUÍN overnight. Neuro exam stable. Remains intubated on precedex. 3% hypertonic saline dc'd  : BD13 POD 12 JOAQUÍN o/n, NGT replaced. on precex with no icp crisis   11/8: BD14 POD13 JOAQUÍN o/n, noted to have tongue maceration/macroglossia. Gauze with tongue depressor placed. Pending further recs from ENT. Pending trach and PEG placement tomorrow with gen surg. Off precedex, ICPs stable. EVD remains @ 5.   : BD15, POD 14, bleeding under tongue at start of shift, fentanyl pushed with no further episodes. gabapentin stopped. PEG placed  11/10: BD 16, POD 15, neuro stable, ENT to be consulted in AM, 3% increased to 50/hr.  : BD 17, POD 16, neuro exam stable. Pend CT saba in am for cranioplasty planning. Pend trach in OR with ENT. F.u BMP in am, 3%@50cc/hr for Na goal 140-145.   : BD 18, POD 17. JOAQUÍN overnight, neuro stable. Pend trach placement today. CT saba completed . Off of 3%, f/u am BMP for Na goal 140-150. CSF neg. Hypoxic cardiac arrest with ROSC x 3 during tracheostomy placement. B/l chest tubes placed for resulting pneumothorax s/p CPR. salt tabs dc'd.  : BD 19, POD 18. Patient tachycardic with some improvement, SBP stable off of levophed, hgb downtrending o/n, transfused 1 unit PRBCs. B/l chest tube. EVD @5cmH2O, no elevated ICPs. UOP downtrending, trend BUN/Cr, given 1LNS x1 for low urine output. F/u am CXR. Cont Cefepime until today, then change to Ancef while trach packing in place per ENT. Pend CTH when stable. Trops downtrending s/p cardiac arrest. 1L. florinef dc'd. BP goal changed to 100-160, started on amlodipine   : BD20, POD19, worsening creatinine with decreased urine output. Given 250 of albumin and 500cc LR. started on hydralazine PO, decreased amantadine 100 daily given CrCl of 20.  11/15: BD21, POD20, remains oliguric, fem line dc'd, tongue swollen and unable to fit bite block in mouth, started on nimbex gtt to relax jaw. Propofol and fentanyl gtt started. Vomiting TFs through nose and mouth, ENT called. TFs held. Cr uptrending. Palliative consulted.  : BD22, POD21, o/n external trach dressing replaced due to cuff leak and decreasing TV, sedated and paralyzed on nimbex, propofol, and fentanyl gtt. CTH stable.  EVD raised from 5 to 10. Nimbex weaned off. Cr uptrending, Trickle feeds started. FOB negative.   : BD 23, POD22, JOAQUÍN o/n, EVD clamped, NS d/c'ed, LR@50, advancing tube feeds.   : BD24, POD23 o/n 3% at 30 with Na acetate started, propofol dc'd due to elevated TG level, episode of vomiting TFs from nose and mouth into ETT with elevated ICP 30s, ICP normalized with resolution of vomiting, reglan 5mg IV given, TFs held, 3% stopped. midline placed   ; BD25, POD24 JOAQUÍN overnight. Remains on versed and fentanyl drips. Neuro exam unchanged. R IJ dialysis cath placed.   : BD26 POD25 JOAQUÍN overnight. Neuro exam unchanged. Plan for HD today  : BD 27 POD 26 weaned off versed, fentanyl   : BD 28 POD 27 JOAQUÍN o/n , off fentanyl gtt, pt is calm. s/p HD earlier today. s/p trach revision  : BD29. incraesed clonidine to 0.4 BID, s/p trach, stable hemodynamically. neuro at baseline. TFs stopped for vomiting and restarted 10cc/hr  : BD 30, POD29 TFs inc to 20cc/hr. HD today  : BD31, POD30. JOAQUÍN o/n neuro stable  : BD32, POD 31, JOAQUÍN overnight,  perma cath placement with IR today , hydralazine inc 75q8, reglan decreased 2.5q6. Hemodialyzed today, took off 3L and given 1unit pRBCs  : BD33, POD32, JOAQUÍN overnight, pending LTAC. hyponatremic, urine lytes sent. 250cc bolus 3% started, salt tabs started  : BD34 POD 33 L hemicrani. JOAQUÍN o/n. Hyponatremia improved, NaCl 2 g q 6. Pending [facility] at [facility], increased prn requirements for BP (hydral + labetalol x1), hydral PO increased to 100q8, Fentanyl x1 for pain.   : BD35 POD 34 s/p L hemicrani, JOAQUÍN o/n, pend dialysis tomorrow. CTH ordered for am, exit CTH pend. Plan for discharge to [facility]. Tolerating CPAP during the day, full vent support overnight. HD today  : BD 36 POD 35 pending LTAC to [facility], received on HD through the port. HD , 3L removed, hypertensive, renal following, hydralazine pushes prn for now.   : Intermittent episodes of hypertension, decreased clonidine to q8 and added labetalol 100 BID. RUE doppler done   LGF - tylenol given   : Multiple episodes of asystole, witnessed upward gaze associated with 5 second pause and later 8 second pause. EKG stable, troponin 0.08.  labetalol decreased to 50BID. cuff leak: reinflated during day and ENT to replace  tomorrow. EEG placed for upward gaze. Dialysis today.  12/3: JOAQUÍN o/n, recieved multiple pushes IV hydralazine for HTN. during day bradycardic to 40s when moving head and resolved, EP consulted - thought to be vagal sensitivity. No cuff leak today so tube not replaced  : JOAQUÍN o/n, neuro unchanged, started on standing fentanyl x 1 day for HTN unclear if pain related, baclofen started for rigidity, dialysis today  : o/n fever tmax 102, pancultured, neuro stable; HD yesterday  Started on zosyn for PNA   : JOAQUÍN overnight. Neuro exam stable.     Vital Signs Last 24 Hrs  T(C): 37.5 (06 Dec 2021 00:30), Max: 37.6 (05 Dec 2021 01:20)  T(F): 99.5 (06 Dec 2021 00:30), Max: 99.7 (05 Dec 2021 01:20)  HR: 94 (06 Dec 2021 00:06) (79 - 100)  BP: 123/64 (06 Dec 2021 00:00) (102/54 - 160/78)  BP(mean): 88 (06 Dec 2021 00:00) (74 - 112)  RR: 19 (06 Dec 2021 00:06) (14 - 23)  SpO2: 100% (06 Dec 2021 00:06) (99% - 100%)    I&O's Summary    04 Dec 2021 07:01  -  05 Dec 2021 07:00  --------------------------------------------------------  IN: 1060 mL / OUT: 4000 mL / NET: -2940 mL    05 Dec 2021 07:01  -  06 Dec 2021 00:31  --------------------------------------------------------  IN: 880 mL / OUT: 350 mL / NET: 530 mL    PHYSICAL EXAM:    General: patient seen laying supine in bed in NAD  Neuro: nonverbal, does not FC, OEs to noxious, face symmetric, b/l UEs 0/5, TF to noxious b/l LEs, +corneals, +cough  HEENT: PERRL, +trach  Neck: supple  Cardiac: RRR, S1S2  Pulmonary: chest rise symmetric, +cough/gag  Abdomen: soft, nontender, nondistended  Ext: warm, perfusing well  Wound: L cranial flap full, soft    TUBES/LINES:  [] CVC  [x] A-line  [] Lumbar Drain  [] Ventriculostomy  [x] Other: R IJ HD cath, R midline    DIET:  [] NPO  [] Mechanical  [x] Tube feeds    LABS:                        8.7    19.42 )-----------( 187      ( 05 Dec 2021 04:46 )             28.9     12-05    140  |  102  |  31<H>  ----------------------------<  143<H>  4.0   |  27  |  3.21<H>    Ca    9.4      05 Dec 2021 04:46  Phos  2.6     12-05  Mg     2.1             Urinalysis Basic - ( 05 Dec 2021 06:10 )    Color: Yellow / Appearance: SL Cloudy / S.020 / pH: x  Gluc: x / Ketone: NEGATIVE  / Bili: Negative / Urobili: 0.2 E.U./dL   Blood: x / Protein: 100 mg/dL / Nitrite: NEGATIVE   Leuk Esterase: Moderate / RBC: < 5 /HPF / WBC 5-10 /HPF   Sq Epi: x / Non Sq Epi: 5-10 /HPF / Bacteria: Present /HPF          CAPILLARY BLOOD GLUCOSE      POCT Blood Glucose.: 128 mg/dL (05 Dec 2021 23:42)      Drug Levels: [] N/A    CSF Analysis: [] N/A      Allergies    No Known Allergies    Intolerances      MEDICATIONS:  Antibiotics:  piperacillin/tazobactam IVPB.. 4.5 Gram(s) IV Intermittent every 12 hours    Neuro:  baclofen 5 milliGRAM(s) Oral every 8 hours  fentaNYL    Injectable 25 MICROGram(s) IV Push every 4 hours  levETIRAcetam  Solution 500 milliGRAM(s) Oral every 24 hours  ondansetron Injectable 4 milliGRAM(s) IV Push every 6 hours PRN    Anticoagulation:  heparin   Injectable 5000 Unit(s) SubCutaneous every 8 hours    OTHER:  acetylcysteine 20%  Inhalation 4 milliLiter(s) Inhalation every 6 hours  amLODIPine   Tablet 10 milliGRAM(s) Oral daily  artificial  tears Solution 1 Drop(s) Both EYES two times a day  bisacodyl Suppository 10 milliGRAM(s) Rectal daily  chlorhexidine 0.12% Liquid 15 milliLiter(s) Oral Mucosa every 12 hours  chlorhexidine 2% Cloths 1 Application(s) Topical <User Schedule>  cloNIDine 0.2 milliGRAM(s) Oral every 8 hours  hydrALAZINE 100 milliGRAM(s) Oral every 8 hours  hydrALAZINE Injectable 10 milliGRAM(s) IV Push every 4 hours PRN  polyethylene glycol 3350 17 Gram(s) Oral two times a day  senna 2 Tablet(s) Oral at bedtime    IVF:  sodium chloride 2 Gram(s) Oral every 6 hours    CULTURES:  Culture Results:   No growth at 12 hours ( @ 01:04)  Culture Results:   No growth at 12 hours ( @ 01:04)    RADIOLOGY & ADDITIONAL TESTS:      ASSESSMENT:  45 y/o female with PMHx of HTN and MS, hx of spinal surgery at [facility] 10/8/21 presented to [facility] BIBEMS after being found unconscious at home, unknown period of time. 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 HH5, MF4; BD #1 = 10/26. Patient was intubated at [facility] and sent straight to the OR for left hemicraniotomy. A-line placed intraop. Hemodynamically unstable upon arrival to [facility], bradycardic and hypertensive s/p Mannitol and Furosemide. Central line placed in NSICU. S/p EVD placement, and coil embolization of left MCA bifurcation aneurysm 10/26/2021. S/p cerebral angio without findings of vasospasm 11/10. S/p cardiac arrest with ROSC x3 on  during tracheostomy at bedside now with b/l pneumothoracies and worsening kidney function. EVD dc'd , trach'd , permacath placed , pending LTACH/[facility]      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, left    Intracranial hemorrhage, spontaneous subarachnoid, due to cerebral aneurysm, acute    Pneumothorax, left    Functional quadriplegia    Acute respiratory failure with hypoxia    Cardiac arrest    Encounter for palliative care    Advanced care planning/counseling discussion    IPH (idiopathic pulmonary hemosiderosis)    Acute spontaneous intraparenchymal intracranial hemorrhage due to cerebral aneurysm    CHETAN (acute kidney injury)    Hypocalcemia    Angiogram, cerebral, with coil embolization, in non-operating room setting    Endoscopic percutaneous gastrostomy    Angiogram, carotid and cerebral, bilateral    Chest tube placement    Insertion of central venous catheter with ultrasound guidance    Insertion of temporary dialysis catheter    Tracheostomy, planned    Personal history of spine surgery    CHETAN (acute kidney injury)    SysAdmin_VstLnk      PLAN:  NEURO:  - neuro checks q4hrs/vital signs q4hr .  - Keppra 500mg q24h renal dosing   - CTH  stable, CTH  stable  - plan for cranioplasty in ~3 months   - EEG x 24 hrs   - fentanyl 25 q4 x24hr  - baclofen started     CARDIO:  - -180   - amlodipine 10 daily and hydral 100 q8h, clonidine 0.2 q 8 and labetalol dc'd  - hydralazine IV prn, avoid BB IV   - s/p cardiac arrest   - TTE 11/15: mild LVH, EF 70%   - EP consulted: pauses on telemetry and bradycardia with neck movement likely due to baroreceptor sensitivity, no need for pacemaker    PULM:  - trach, continue full vent support overnight  - s/p acute hypoxic cardiac arrest  during tracheostomy placment with ENT c/b b/l pneumothorax and b/l chest tubes (d/kiko )  - mucomyst q 6     GI:  - PEG TFs nepro- at goal 30cc/hr  - Bowel regimen, last BM   - Alk phos elevated, shocked liver, improving     RENAL:   - post cardiac arrest renal failure on long term HD  - port with IR  , nephro following, last HD   - Na 135-145   - salt tabs 2q6  - daily weights     HEME:  - SCDs, SQH  - s/p multiple transfusions this admission, most recently s/p 1unit PRBC   - UE and LE dopplers negative , RUE  with superficial thrombophlebitis in the right cephalic vein   - FOB neg      ID:  - Cefepime started to cover for enterobacter/proteus in sputum dc'd 11/15   - f/u pancx   - started zosyn     ENDO:  - monitor BMP glucoses    OTHER  - wound care recs- q2 dressing changes   - ENT reconsulted for trach cuff leak and tongue injury, NTD just mointor  - has R midline ()    GOC: full code  Level of care: ICU status   Dispo: pending [facility]  family updated    Case discussed with Dr. Duncan and Dr. Vyas

## 2021-12-06 NOTE — PROGRESS NOTE ADULT - ASSESSMENT
46y/Female with:  L MCA ruptured aneurysm, subarachnoid hemorrhage, s/p C (10/26/2021, Dr. D'Amico @ [place]), brain compression, cerebral edema  anemia   recent spinal surgery  UGIB  bilateral pneumothorax  acute kidney injury, transaminitis    PLAN: Day 1 = 10-26 ; Day 4 = 10/29; Day 21 = 11/15  NEURO: comachecks q4h, VS q1h, standing fentanyl q6h for now - sympathetic overdrive  SAH:  off nimodipine  Hydrocephalus:  EVD discontinued  cranioplasty to be scheduled  continue baclofen  Seizure prophylaxis:  cont levetiracetam 500 OD; EEG negative   REHAB:  physical therapy evaluation and management    EARLY MOB:  HOB elevated    PULM:  cont full vent support, continue mucormyst (q12h) and ipratropium / albuterol  CARDIO:  SBP goal 100-160mm Hg, TTE, amlodipine 10mg PO daily; clonidine 0.2 q8h; continue hydralazine 100q8h; EP consulted - NTD  ENDO:  Blood sugar goals 140-180 mg/dL  GI:  off PPI OD  DIET: TF at goal  RENAL: Na goal 135-145; cont salt, HD today  HEM/ONC: no coagulopathy (INR= 1.03), no ASA / Plavix use; Hb stable   VTE Prophylaxis: SCDs, SQH   ID: afebrile, leukocytosis downtrending, start piperacillin/tazobactam x 5-7days, panculture, CXR, sputum  Social: will update family - dispo planning  MISC: ENT consulted for tongue wound - NTD    CORE MEASURES  1.  Hunt and Griffin Score = 5  2.  VTE prophylaxis:  [ ] administered within 24 hours of admission OR [X] reason not done: fresh bleed, unsecured aneurysm.  3.  Dysphagia screening:   [X] performed before any oral meds / liquids / food  4.  Nimodipine treatment:  [X] administered within 24 hours of admission OR [ ] reason not done:    ATTENDING ATTESTATION:  I was physically present for the key portions of the evaluation and management (E/M) service provided.  I agree with the above history, physical and plan, which I have reviewed and edited where appropriate.    Patient at high risk for neurological deterioration or death due to:  ICU delirium, aspiration PNA, DVT / PE.  Critical care time:  I have personally provided 45 minutes of critical care time, excluding time spent on separate procedures.      Plan discussed with RN, house staff. 46y/Female with:  L MCA ruptured aneurysm, subarachnoid hemorrhage, s/p C (10/26/2021, Dr. D'Amico @ [place]), brain compression, cerebral edema  anemia   recent spinal surgery  UGIB  bilateral pneumothorax  acute kidney injury, transaminitis    PLAN: Day 1 = 10-26 ; Day 4 = 10/29; Day 21 = 11/15  NEURO: comachecks q4h, VS q1h, standing fentanyl q6h for now - sympathetic overdrive  SAH:  off nimodipine  Hydrocephalus:  EVD discontinued  cranioplasty to be scheduled  continue baclofen  Seizure prophylaxis:  cont levetiracetam 500 OD; EEG negative   REHAB:  physical therapy evaluation and management    EARLY MOB:  HOB elevated    PULM:  cont full vent support, continue mucormyst (q12h) and ipratropium / albuterol  CARDIO:  SBP goal 100-160mm Hg, TTE, amlodipine 10mg PO daily; clonidine 0.2 q8h; continue hydralazine 100q8h; EP consulted - NTD  ENDO:  Blood sugar goals 140-180 mg/dL  GI:  off PPI OD  DIET: TF at goal  RENAL: Na goal 135-145; cont salt, HD tomorrow  HEM/ONC: no coagulopathy (INR= 1.03), no ASA / Plavix use; Hb drop, FOBT, recheck CBC this afternoon; transfuse if Hb <7.0  VTE Prophylaxis: SCDs, SQH   ID: afebrile, leukocytosis downtrending, start piperacillin/tazobactam x 5-7days, panculture, CXR, f/u sputum culture sensitivities  Social: will update family - dispo planning  MISC: ENT consulted for tongue wound - NTD    CORE MEASURES  1.  Hunt and Griffin Score = 5  2.  VTE prophylaxis:  [ ] administered within 24 hours of admission OR [X] reason not done: fresh bleed, unsecured aneurysm.  3.  Dysphagia screening:   [X] performed before any oral meds / liquids / food  4.  Nimodipine treatment:  [X] administered within 24 hours of admission OR [ ] reason not done:    ATTENDING ATTESTATION:  I was physically present for the key portions of the evaluation and management (E/M) service provided.  I agree with the above history, physical and plan, which I have reviewed and edited where appropriate.    Patient at high risk for neurological deterioration or death due to:  ICU delirium, aspiration PNA, DVT / PE.  Critical care time:  I have personally provided 45 minutes of critical care time, excluding time spent on separate procedures.      Plan discussed with RN, house staff. 46y/Female with:  L MCA ruptured aneurysm, subarachnoid hemorrhage, s/p C (10/26/2021, Dr. D'Amico @ [place]), brain compression, cerebral edema  anemia   recent spinal surgery  UGIB  bilateral pneumothorax  acute kidney injury, transaminitis    PLAN: Day 1 = 10-26 ; Day 4 = 10/29; Day 21 = 11/15  NEURO: comachecks q4h, VS q1h, standing fentanyl q6h for now - sympathetic overdrive  SAH:  off nimodipine  Hydrocephalus:  EVD discontinued  cranioplasty to be scheduled  continue baclofen  Seizure prophylaxis:  cont levetiracetam 500 OD; EEG negative   REHAB:  physical therapy evaluation and management    EARLY MOB:  HOB elevated    PULM:  cont full vent support, continue mucormyst (q12h) and ipratropium / albuterol  CARDIO:  SBP goal 100-160mm Hg, TTE, amlodipine 10mg PO daily; clonidine 0.2 q8h; continue hydralazine 100q8h; EP consulted - NTD  ENDO:  Blood sugar goals 140-180 mg/dL  GI:  off PPI OD  DIET: TF at goal  RENAL: Na goal 135-145; cont salt, HD tomorrow  HEM/ONC: no coagulopathy (INR= 1.03), no ASA / Plavix use; Hb drop, FOBT, recheck CBC this afternoon; transfuse if Hb <7.0  VTE Prophylaxis: SCDs, SQH   ID: afebrile, leukocytosis downtrending, start piperacillin/tazobactam x 5-7days, panculture, CXR, f/u sputum culture sensitivities  Social: will update family - dispo planning  MISC: ENT consulted for tongue wound - NTD    CORE MEASURES  1.  Hunt and Griffin Score = 5  2.  VTE prophylaxis:  [ ] administered within 24 hours of admission OR [X] reason not done: fresh bleed, unsecured aneurysm.  3.  Dysphagia screening:   [X] performed before any oral meds / liquids / food  4.  Nimodipine treatment:  [X] administered within 24 hours of admission OR [ ] reason not done:    ATTENDING ATTESTATION:  I was physically present for the key portions of the evaluation and management (E/M) service provided.  I agree with the above history, physical and plan, which I have reviewed and edited where appropriate.    Patient at high risk for neurological deterioration or death due to:  ICU delirium, aspiration PNA, DVT / PE.  Critical care time:  I have personally provided 45 minutes of critical care time, excluding time spent on separate procedures.      Plan discussed with RN, house staff.    LATERAL TRANSFER CHECKLIST    I have reviewed the patient’s history, performed a clinical examination and assessed the patient to be stable for transfer to a non-neurosurgical intensive care unit.      Specifically, the patient:  [X] does not have cerebrovascular insufficiency or require hemodynamic augmentation  [X] does not have vasospasm or delayed cerebral ischemia with subarachnoid hemorrhage  [X] does not require active titration of meds for uncontrolled sympathetic storming  [X] does not have an external ventricular drain (except lateral transfers to [facility])  [X] does not have a lumbar drain  [X] did not have a complex skull base surgery or complex intracranial tumor in the immediate post-operative period
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
## 2021-12-07 LAB
ANION GAP SERPL CALC-SCNC: 10 MMOL/L — SIGNIFICANT CHANGE UP (ref 5–17)
BUN SERPL-MCNC: 49 MG/DL — HIGH (ref 7–23)
CALCIUM SERPL-MCNC: 9.8 MG/DL — SIGNIFICANT CHANGE UP (ref 8.4–10.5)
CHLORIDE SERPL-SCNC: 108 MMOL/L — SIGNIFICANT CHANGE UP (ref 96–108)
CO2 SERPL-SCNC: 26 MMOL/L — SIGNIFICANT CHANGE UP (ref 22–31)
CREAT SERPL-MCNC: 3.85 MG/DL — HIGH (ref 0.5–1.3)
CULTURE RESULTS: SIGNIFICANT CHANGE UP
GLUCOSE SERPL-MCNC: 139 MG/DL — HIGH (ref 70–99)
HCT VFR BLD CALC: 25.7 % — LOW (ref 34.5–45)
HGB BLD-MCNC: 7.7 G/DL — LOW (ref 11.5–15.5)
MAGNESIUM SERPL-MCNC: 2.3 MG/DL — SIGNIFICANT CHANGE UP (ref 1.6–2.6)
MCHC RBC-ENTMCNC: 24.5 PG — LOW (ref 27–34)
MCHC RBC-ENTMCNC: 30 GM/DL — LOW (ref 32–36)
MCV RBC AUTO: 81.8 FL — SIGNIFICANT CHANGE UP (ref 80–100)
METHOD TYPE: SIGNIFICANT CHANGE UP
NRBC # BLD: 0 /100 WBCS — SIGNIFICANT CHANGE UP (ref 0–0)
ORGANISM # SPEC MICROSCOPIC CNT: SIGNIFICANT CHANGE UP
ORGANISM # SPEC MICROSCOPIC CNT: SIGNIFICANT CHANGE UP
PHOSPHATE SERPL-MCNC: 3.6 MG/DL — SIGNIFICANT CHANGE UP (ref 2.5–4.5)
PLATELET # BLD AUTO: 216 K/UL — SIGNIFICANT CHANGE UP (ref 150–400)
POTASSIUM SERPL-MCNC: 4 MMOL/L — SIGNIFICANT CHANGE UP (ref 3.5–5.3)
POTASSIUM SERPL-SCNC: 4 MMOL/L — SIGNIFICANT CHANGE UP (ref 3.5–5.3)
RBC # BLD: 3.14 M/UL — LOW (ref 3.8–5.2)
RBC # FLD: 19.3 % — HIGH (ref 10.3–14.5)
SODIUM SERPL-SCNC: 144 MMOL/L — SIGNIFICANT CHANGE UP (ref 135–145)
SPECIMEN SOURCE: SIGNIFICANT CHANGE UP
WBC # BLD: 13.03 K/UL — HIGH (ref 3.8–10.5)
WBC # FLD AUTO: 13.03 K/UL — HIGH (ref 3.8–10.5)

## 2021-12-07 PROCEDURE — 71045 X-RAY EXAM CHEST 1 VIEW: CPT | Mod: 26

## 2021-12-07 PROCEDURE — 99024 POSTOP FOLLOW-UP VISIT: CPT

## 2021-12-07 PROCEDURE — 99233 SBSQ HOSP IP/OBS HIGH 50: CPT | Mod: GC

## 2021-12-07 PROCEDURE — 99291 CRITICAL CARE FIRST HOUR: CPT

## 2021-12-07 PROCEDURE — 90935 HEMODIALYSIS ONE EVALUATION: CPT | Mod: GC

## 2021-12-07 RX ORDER — CEFEPIME 1 G/1
1000 INJECTION, POWDER, FOR SOLUTION INTRAMUSCULAR; INTRAVENOUS EVERY 24 HOURS
Refills: 0 | Status: DISCONTINUED | OUTPATIENT
Start: 2021-12-07 | End: 2021-12-10

## 2021-12-07 RX ORDER — ERYTHROPOIETIN 10000 [IU]/ML
9000 INJECTION, SOLUTION INTRAVENOUS; SUBCUTANEOUS ONCE
Refills: 0 | Status: COMPLETED | OUTPATIENT
Start: 2021-12-07 | End: 2021-12-07

## 2021-12-07 RX ORDER — SODIUM CHLORIDE 9 MG/ML
2 INJECTION INTRAMUSCULAR; INTRAVENOUS; SUBCUTANEOUS EVERY 8 HOURS
Refills: 0 | Status: DISCONTINUED | OUTPATIENT
Start: 2021-12-07 | End: 2021-12-09

## 2021-12-07 RX ORDER — BACLOFEN 100 %
10 POWDER (GRAM) MISCELLANEOUS EVERY 8 HOURS
Refills: 0 | Status: DISCONTINUED | OUTPATIENT
Start: 2021-12-07 | End: 2021-12-10

## 2021-12-07 RX ADMIN — Medication 4 MILLILITER(S): at 23:14

## 2021-12-07 RX ADMIN — HEPARIN SODIUM 5000 UNIT(S): 5000 INJECTION INTRAVENOUS; SUBCUTANEOUS at 05:14

## 2021-12-07 RX ADMIN — FENTANYL CITRATE 25 MICROGRAM(S): 50 INJECTION INTRAVENOUS at 23:30

## 2021-12-07 RX ADMIN — FENTANYL CITRATE 25 MICROGRAM(S): 50 INJECTION INTRAVENOUS at 05:13

## 2021-12-07 RX ADMIN — FENTANYL CITRATE 25 MICROGRAM(S): 50 INJECTION INTRAVENOUS at 10:40

## 2021-12-07 RX ADMIN — FENTANYL CITRATE 25 MICROGRAM(S): 50 INJECTION INTRAVENOUS at 19:40

## 2021-12-07 RX ADMIN — Medication 4 MILLILITER(S): at 05:14

## 2021-12-07 RX ADMIN — FENTANYL CITRATE 25 MICROGRAM(S): 50 INJECTION INTRAVENOUS at 01:31

## 2021-12-07 RX ADMIN — Medication 100 MILLIGRAM(S): at 21:07

## 2021-12-07 RX ADMIN — FENTANYL CITRATE 25 MICROGRAM(S): 50 INJECTION INTRAVENOUS at 15:02

## 2021-12-07 RX ADMIN — FENTANYL CITRATE 25 MICROGRAM(S): 50 INJECTION INTRAVENOUS at 10:25

## 2021-12-07 RX ADMIN — CHLORHEXIDINE GLUCONATE 15 MILLILITER(S): 213 SOLUTION TOPICAL at 05:14

## 2021-12-07 RX ADMIN — FENTANYL CITRATE 25 MICROGRAM(S): 50 INJECTION INTRAVENOUS at 02:00

## 2021-12-07 RX ADMIN — HEPARIN SODIUM 5000 UNIT(S): 5000 INJECTION INTRAVENOUS; SUBCUTANEOUS at 21:07

## 2021-12-07 RX ADMIN — Medication 0.2 MILLIGRAM(S): at 21:07

## 2021-12-07 RX ADMIN — SODIUM CHLORIDE 2 GRAM(S): 9 INJECTION INTRAMUSCULAR; INTRAVENOUS; SUBCUTANEOUS at 05:14

## 2021-12-07 RX ADMIN — Medication 0.2 MILLIGRAM(S): at 13:58

## 2021-12-07 RX ADMIN — Medication 5 MILLIGRAM(S): at 05:14

## 2021-12-07 RX ADMIN — Medication 100 MILLIGRAM(S): at 05:15

## 2021-12-07 RX ADMIN — SODIUM CHLORIDE 2 GRAM(S): 9 INJECTION INTRAMUSCULAR; INTRAVENOUS; SUBCUTANEOUS at 13:54

## 2021-12-07 RX ADMIN — CEFEPIME 100 MILLIGRAM(S): 1 INJECTION, POWDER, FOR SOLUTION INTRAMUSCULAR; INTRAVENOUS at 19:24

## 2021-12-07 RX ADMIN — POLYETHYLENE GLYCOL 3350 17 GRAM(S): 17 POWDER, FOR SOLUTION ORAL at 05:15

## 2021-12-07 RX ADMIN — Medication 1 DROP(S): at 18:26

## 2021-12-07 RX ADMIN — Medication 0.2 MILLIGRAM(S): at 05:13

## 2021-12-07 RX ADMIN — Medication 4 MILLILITER(S): at 18:26

## 2021-12-07 RX ADMIN — CHLORHEXIDINE GLUCONATE 15 MILLILITER(S): 213 SOLUTION TOPICAL at 18:26

## 2021-12-07 RX ADMIN — AMLODIPINE BESYLATE 10 MILLIGRAM(S): 2.5 TABLET ORAL at 05:15

## 2021-12-07 RX ADMIN — PIPERACILLIN AND TAZOBACTAM 25 GRAM(S): 4; .5 INJECTION, POWDER, LYOPHILIZED, FOR SOLUTION INTRAVENOUS at 05:13

## 2021-12-07 RX ADMIN — Medication 10 MILLIGRAM(S): at 13:58

## 2021-12-07 RX ADMIN — Medication 1 DROP(S): at 05:13

## 2021-12-07 RX ADMIN — FENTANYL CITRATE 25 MICROGRAM(S): 50 INJECTION INTRAVENOUS at 19:23

## 2021-12-07 RX ADMIN — Medication 10 MILLIGRAM(S): at 21:07

## 2021-12-07 RX ADMIN — SODIUM CHLORIDE 2 GRAM(S): 9 INJECTION INTRAMUSCULAR; INTRAVENOUS; SUBCUTANEOUS at 21:07

## 2021-12-07 RX ADMIN — CHLORHEXIDINE GLUCONATE 1 APPLICATION(S): 213 SOLUTION TOPICAL at 05:15

## 2021-12-07 RX ADMIN — FENTANYL CITRATE 25 MICROGRAM(S): 50 INJECTION INTRAVENOUS at 23:15

## 2021-12-07 RX ADMIN — FENTANYL CITRATE 25 MICROGRAM(S): 50 INJECTION INTRAVENOUS at 15:15

## 2021-12-07 RX ADMIN — HEPARIN SODIUM 5000 UNIT(S): 5000 INJECTION INTRAVENOUS; SUBCUTANEOUS at 13:54

## 2021-12-07 RX ADMIN — ERYTHROPOIETIN 9000 UNIT(S): 10000 INJECTION, SOLUTION INTRAVENOUS; SUBCUTANEOUS at 10:19

## 2021-12-07 RX ADMIN — SODIUM CHLORIDE 2 GRAM(S): 9 INJECTION INTRAMUSCULAR; INTRAVENOUS; SUBCUTANEOUS at 00:59

## 2021-12-07 RX ADMIN — FENTANYL CITRATE 25 MICROGRAM(S): 50 INJECTION INTRAVENOUS at 05:30

## 2021-12-07 RX ADMIN — Medication 4 MILLILITER(S): at 11:07

## 2021-12-07 RX ADMIN — Medication 100 MILLIGRAM(S): at 13:59

## 2021-12-07 RX ADMIN — SENNA PLUS 2 TABLET(S): 8.6 TABLET ORAL at 21:07

## 2021-12-07 RX ADMIN — LEVETIRACETAM 500 MILLIGRAM(S): 250 TABLET, FILM COATED ORAL at 21:07

## 2021-12-07 NOTE — PROGRESS NOTE ADULT - ASSESSMENT
46y/Female with:  L MCA ruptured aneurysm, subarachnoid hemorrhage, s/p C (10/26/2021, Dr. D'Amico @ [place]), brain compression, cerebral edema  anemia   recent spinal surgery  UGIB  bilateral pneumothorax  acute kidney injury, transaminitis    PLAN: Day 1 = 10-26 ; Day 4 = 10/29; Day 21 = 11/15  NEURO: comachecks q4h, VS q1h, standing fentanyl q6h for now - sympathetic overdrive  SAH:  off nimodipine  Hydrocephalus:  EVD discontinued  cranioplasty to be scheduled  continue baclofen  Seizure prophylaxis:  cont levetiracetam 500 OD; EEG negative   REHAB:  physical therapy evaluation and management    EARLY MOB:  HOB elevated    PULM:  cont full vent support, continue mucormyst (q12h) and ipratropium / albuterol  CARDIO:  SBP goal 100-160mm Hg, TTE, amlodipine 10mg PO daily; clonidine 0.2 q8h; continue hydralazine 100q8h; EP consulted - NTD  ENDO:  Blood sugar goals 140-180 mg/dL  GI:  off PPI OD  DIET: TF at goal  RENAL: Na goal 135-145; cont salt, HD tomorrow  HEM/ONC: no coagulopathy (INR= 1.03), no ASA / Plavix use; Hb drop, FOBT, recheck CBC this afternoon; transfuse if Hb <7.0  VTE Prophylaxis: SCDs, SQH   ID: afebrile, leukocytosis downtrending, start piperacillin/tazobactam x 5-7days, panculture, CXR, f/u sputum culture sensitivities  Social: will update family - dispo planning  MISC: ENT consulted for tongue wound - NTD    CORE MEASURES  1.  Hunt and Griffin Score = 5  2.  VTE prophylaxis:  [ ] administered within 24 hours of admission OR [X] reason not done: fresh bleed, unsecured aneurysm.  3.  Dysphagia screening:   [X] performed before any oral meds / liquids / food  4.  Nimodipine treatment:  [X] administered within 24 hours of admission OR [ ] reason not done:    ATTENDING ATTESTATION:  I was physically present for the key portions of the evaluation and management (E/M) service provided.  I agree with the above history, physical and plan, which I have reviewed and edited where appropriate.    Patient at high risk for neurological deterioration or death due to:  ICU delirium, aspiration PNA, DVT / PE.  Critical care time:  I have personally provided 45 minutes of critical care time, excluding time spent on separate procedures.      Plan discussed with RN, house staff.    LATERAL TRANSFER CHECKLIST    I have reviewed the patient’s history, performed a clinical examination and assessed the patient to be stable for transfer to a non-neurosurgical intensive care unit.      Specifically, the patient:  [X] does not have cerebrovascular insufficiency or require hemodynamic augmentation  [X] does not have vasospasm or delayed cerebral ischemia with subarachnoid hemorrhage  [X] does not require active titration of meds for uncontrolled sympathetic storming  [X] does not have an external ventricular drain (except lateral transfers to [facility])  [X] does not have a lumbar drain  [X] did not have a complex skull base surgery or complex intracranial tumor in the immediate post-operative period   46y/Female with:  L MCA ruptured aneurysm, subarachnoid hemorrhage, s/p C (10/26/2021, Dr. D'Amico @ [place]), brain compression, cerebral edema  anemia   recent spinal surgery  UGIB  bilateral pneumothorax  acute kidney injury, transaminitis    PLAN: Day 1 = 10-26 ; Day 4 = 10/29; Day 21 = 11/15  NEURO: comachecks q4h, VS q1h, standing fentanyl q4h for now - sympathetic overdrive  SAH:  off nimodipine  Hydrocephalus:  EVD discontinued  cranioplasty to be scheduled  continue baclofen - increase to 10mg q8h  Seizure prophylaxis:  cont levetiracetam 500 OD; EEG negative   REHAB:  physical therapy evaluation and management    EARLY MOB:  HOB elevated    PULM:  cont full vent support, continue mucormyst (q12h) and ipratropium / albuterol  CARDIO:  SBP goal 100-160mm Hg, TTE, amlodipine 10mg PO daily; clonidine 0.2 q8h; continue hydralazine 100q8h; EP consulted - NTD  ENDO:  Blood sugar goals 140-180 mg/dL  GI:  off PPI OD; FOBT NEG  DIET: TF at goal  RENAL: Na goal 135-145; decrease salt to 2G q8h, HD today   HEM/ONC: no coagulopathy (INR= 1.03), no ASA / Plavix use; Hb stable  VTE Prophylaxis: SCDs, SQH   ID: afebrile, leukocytosis downtrending, piperacillin/tazobactam x 5-7days, panculture, CXR, f/u sputum culture sensitivities; ID consult;   Social: will update family - dispo planning  MISC: ENT consulted for tongue wound - NTD    CORE MEASURES  1.  Hunt and Griffin Score = 5  2.  VTE prophylaxis:  [ ] administered within 24 hours of admission OR [X] reason not done: fresh bleed, unsecured aneurysm.  3.  Dysphagia screening:   [X] performed before any oral meds / liquids / food  4.  Nimodipine treatment:  [X] administered within 24 hours of admission OR [ ] reason not done:    ATTENDING ATTESTATION:  I was physically present for the key portions of the evaluation and management (E/M) service provided.  I agree with the above history, physical and plan, which I have reviewed and edited where appropriate.    Patient at high risk for neurological deterioration or death due to:  ICU delirium, aspiration PNA, DVT / PE.  Critical care time:  I have personally provided 45 minutes of critical care time, excluding time spent on separate procedures.      Plan discussed with RN, house staff.    LATERAL TRANSFER CHECKLIST    I have reviewed the patient’s history, performed a clinical examination and assessed the patient to be stable for transfer to a non-neurosurgical intensive care unit.      Specifically, the patient:  [X] does not have cerebrovascular insufficiency or require hemodynamic augmentation  [X] does not have vasospasm or delayed cerebral ischemia with subarachnoid hemorrhage  [X] does not require active titration of meds for uncontrolled sympathetic storming  [X] does not have an external ventricular drain (except lateral transfers to [facility])  [X] does not have a lumbar drain  [X] did not have a complex skull base surgery or complex intracranial tumor in the immediate post-operative period
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
## 2021-12-07 NOTE — PROCEDURE NOTE - NSANATOMICLLOCATION_GEN_A_CORE
right
right/radial artery
right
right/radial artery
Chest wall between 2nd and 3rd intercostal space mid-clavicular line./right
b/l doralis pedis arteries
right
frontal/right
Frontal/right
right/basilic vein
2nd and 3rd intercostal space mid clavicular line./left

## 2021-12-07 NOTE — PROCEDURE NOTE - PROCEDURE DATE TIME, MLM
03-Nov-2021 15:15
09-Nov-2021
11-Nov-2021 18:21
31-Oct-2021
12-Nov-2021 08:40
15-Nov-2021 14:18
26-Oct-2021 17:22
16-Nov-2021 21:39
05-Dec-2021 00:53
07-Dec-2021
17-Nov-2021 10:51
18-Nov-2021 12:48
18-Nov-2021 16:09
12-Nov-2021 11:00
26-Oct-2021 13:05
27-Oct-2021 13:39
12-Nov-2021 09:15
12-Nov-2021 14:30
19-Nov-2021 15:30
12-Nov-2021 09:20

## 2021-12-07 NOTE — PROCEDURE NOTE - NSASSISTBY_GEN_A_CORE
PA
Rachid PGY5; Demarcus PGY1/Myself/Resident
Myself
PA
Daiati/Attending
PA
Aaron Vivas PA-C/PA
Dr. Hooper/Fellow
Dr. Vyas/Attending
Myself
supervised by Dr. Claudio
BOO Trujillo

## 2021-12-07 NOTE — PROCEDURE NOTE - NSPROCNAME_GEN_A_CORE
General
Chest Tube
Arterial Puncture/Cannulation
General
Arterial Puncture/Cannulation
Arterial Puncture/Cannulation
Central Line Insertion
Feeding Tube Placement
Midline Insertion
Arterial Puncture/Cannulation
Feeding Tube Replacement
General
Chest Tube
Feeding Tube Placement
General
Central Line Insertion
Central Line Insertion

## 2021-12-07 NOTE — PROGRESS NOTE ADULT - SUBJECTIVE AND OBJECTIVE BOX
Infectious Disease Reconsult Note  Brief HPI:47 y/o female with PMHx of HTN and MS, hx of spinal surgery at [facility] 10[facility]8/21 [facility] [place] [facility] at [facility], unknown period of time. 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 Patient was intubated at [place] ED and sent straight to the OR for left hemicraniotomy. S/p EVD placement, and coil embolization of left MCA bifurcation aneurysm 10/26/2021. S/p cerebral angio without findings of vasospasm 11/10. S/p cardiac arrest with ROSC x3 on 11/12 during tracheostomy at bedside with b/l pneumothoraces now with chest tubes clamped. EVD dc'd 11/18, trach revision 11/22. ID consulted for serratia bacteremia for which she was started on Zosyn. Of note, On 11/11/21, ID consulted for recommendations for treatment of sputum cultures from 11/4/21 growing MSSA, Enterobacter, and Proteus, as well as clearance for cranioplasty. At that time, Patient was treated with a 7-day course of cefepime which she finished on 11/8.     Culture Data: Sputum 11/4: Numerous staph aureus, enterobacter clocae, proteus. Sputum 11/5: Proteus. Blood cx 12/5: serratia. CSF negative. UCX pending.  Imaging: Xray 12/7-patchy consolidation LLL       SUBJECTIVE / INTERVAL HPI: Patient seen and examined at bedside.     VITAL SIGNS:  Vital Signs Last 24 Hrs  T(C): 36.9 (07 Dec 2021 09:45), Max: 37.3 (07 Dec 2021 06:00)  T(F): 98.4 (07 Dec 2021 09:45), Max: 99.1 (07 Dec 2021 06:00)  HR: 95 (07 Dec 2021 12:00) (75 - 101)  BP: 147/82 (07 Dec 2021 10:00) (138/76 - 164/85)  BP(mean): 105 (07 Dec 2021 09:45) (95 - 119)  RR: 14 (07 Dec 2021 12:00) (14 - 20)  SpO2: 100% (07 Dec 2021 12:00) (95% - 100%)    PHYSICAL EXAM:    General: WDWN  HEENT: NC/AT; PERRL, anicteric sclera; MMM  Neck: supple  Cardiovascular: +S1/S2; RRR  Respiratory: CTA B/L; no W/R/R  Gastrointestinal: soft, NT/ND; +BSx4  Extremities: WWP; no edema, clubbing or cyanosis  Vascular: 2+ radial, DP/PT pulses B/L  Neurological: AAOx3; no focal deficits    MEDICATIONS:  MEDICATIONS  (STANDING):  acetylcysteine 20%  Inhalation 4 milliLiter(s) Inhalation every 6 hours  amLODIPine   Tablet 10 milliGRAM(s) Oral daily  artificial  tears Solution 1 Drop(s) Both EYES two times a day  baclofen 10 milliGRAM(s) Oral every 8 hours  bisacodyl Suppository 10 milliGRAM(s) Rectal daily  chlorhexidine 0.12% Liquid 15 milliLiter(s) Oral Mucosa every 12 hours  chlorhexidine 2% Cloths 1 Application(s) Topical <User Schedule>  cloNIDine 0.2 milliGRAM(s) Oral every 8 hours  fentaNYL    Injectable 25 MICROGram(s) IV Push every 4 hours  heparin   Injectable 5000 Unit(s) SubCutaneous every 8 hours  hydrALAZINE 100 milliGRAM(s) Oral every 8 hours  levETIRAcetam  Solution 500 milliGRAM(s) Oral every 24 hours  piperacillin/tazobactam IVPB.. 4.5 Gram(s) IV Intermittent every 12 hours  polyethylene glycol 3350 17 Gram(s) Oral two times a day  senna 2 Tablet(s) Oral at bedtime  sodium chloride 2 Gram(s) Oral every 8 hours    MEDICATIONS  (PRN):  hydrALAZINE Injectable 10 milliGRAM(s) IV Push every 4 hours PRN SBP >180  ondansetron Injectable 4 milliGRAM(s) IV Push every 6 hours PRN Nausea and/or Vomiting  sodium chloride 0.9% lock flush 10 milliLiter(s) IV Push every 1 hour PRN Pre/post blood products, medications, blood draw, and to maintain line patency      ALLERGIES:  Allergies    No Known Allergies    Intolerances        LABS:                        7.7    13.03 )-----------( 216      ( 07 Dec 2021 05:58 )             25.7     12-07    144  |  108  |  49<H>  ----------------------------<  139<H>  4.0   |  26  |  3.85<H>    Ca    9.8      07 Dec 2021 05:58  Phos  3.6     12-07  Mg     2.3     12-07          CAPILLARY BLOOD GLUCOSE      POCT Blood Glucose.: 161 mg/dL (06 Dec 2021 06:24)      RADIOLOGY & ADDITIONAL TESTS: Reviewed.    ASSESSMENT:    PLAN:  Infectious Disease Reconsult Note  Brief HPI:47 y/o female with PMHx of HTN and MS, hx of spinal surgery at [facility] 10[facility]8/21 [facility] [place] [facility] at home, unknown period of time. 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 Patient was intubated at [place] ED and sent straight to the OR for left hemicraniotomy. S/p EVD placement, and coil embolization of left MCA bifurcation aneurysm 10/26/2021. S/p cerebral angio without findings of vasospasm 11/10. S/p cardiac arrest with ROSC x3 on 11/12 during tracheostomy at bedside with b/l pneumothoraces now with chest tubes clamped. EVD dc'd 11/18, trach revision 11/22. ID consulted for serratia bacteremia for which she was started on Zosyn. Of note, On 11/11/21, ID consulted for recommendations for treatment of sputum cultures from 11/4/21 growing MSSA, Enterobacter, and Proteus, as well as clearance for cranioplasty. At that time, Patient was treated with a 7-day course of cefepime which she finished on 11/8.     Culture Data: Sputum 11/4: Numerous staph aureus, enterobacter clocae, proteus. Sputum 11/5: Proteus. Blood cx 12/5: serratia. CSF negative. UCX pending.  Imaging: Xray 12/7-patchy consolidation LLL       SUBJECTIVE / INTERVAL HPI: Patient seen and examined at bedside.     VITAL SIGNS:  Vital Signs Last 24 Hrs  T(C): 36.9 (07 Dec 2021 09:45), Max: 37.3 (07 Dec 2021 06:00)  T(F): 98.4 (07 Dec 2021 09:45), Max: 99.1 (07 Dec 2021 06:00)  HR: 95 (07 Dec 2021 12:00) (75 - 101)  BP: 147/82 (07 Dec 2021 10:00) (138/76 - 164/85)  BP(mean): 105 (07 Dec 2021 09:45) (95 - 119)  RR: 14 (07 Dec 2021 12:00) (14 - 20)  SpO2: 100% (07 Dec 2021 12:00) (95% - 100%)    PHYSICAL EXAM:    General: intubated and sedated  HEENT: NC/AT; PERRL, anicteric sclera; MMM  Neck: supple  Cardiovascular: +S1/S2; RRR  Respiratory: Course breath sounds.   Gastrointestinal: soft, NT/ND; +BSx4  Extremities: WWP; no edema, clubbing or cyanosis  Vascular: 2+ radial, DP/PT pulses B/L  Lines: HD cath in place on right-11/19, [place] 11/18.       MEDICATIONS:  MEDICATIONS  (STANDING):  acetylcysteine 20%  Inhalation 4 milliLiter(s) Inhalation every 6 hours  amLODIPine   Tablet 10 milliGRAM(s) Oral daily  artificial  tears Solution 1 Drop(s) Both EYES two times a day  baclofen 10 milliGRAM(s) Oral every 8 hours  bisacodyl Suppository 10 milliGRAM(s) Rectal daily  chlorhexidine 0.12% Liquid 15 milliLiter(s) Oral Mucosa every 12 hours  chlorhexidine 2% Cloths 1 Application(s) Topical <User Schedule>  cloNIDine 0.2 milliGRAM(s) Oral every 8 hours  fentaNYL    Injectable 25 MICROGram(s) IV Push every 4 hours  heparin   Injectable 5000 Unit(s) SubCutaneous every 8 hours  hydrALAZINE 100 milliGRAM(s) Oral every 8 hours  levETIRAcetam  Solution 500 milliGRAM(s) Oral every 24 hours  piperacillin/tazobactam IVPB.. 4.5 Gram(s) IV Intermittent every 12 hours  polyethylene glycol 3350 17 Gram(s) Oral two times a day  senna 2 Tablet(s) Oral at bedtime  sodium chloride 2 Gram(s) Oral every 8 hours    MEDICATIONS  (PRN):  hydrALAZINE Injectable 10 milliGRAM(s) IV Push every 4 hours PRN SBP >180  ondansetron Injectable 4 milliGRAM(s) IV Push every 6 hours PRN Nausea and/or Vomiting  sodium chloride 0.9% lock flush 10 milliLiter(s) IV Push every 1 hour PRN Pre/post blood products, medications, blood draw, and to maintain line patency      ALLERGIES:  Allergies    No Known Allergies    Intolerances        LABS:                        7.7    13.03 )-----------( 216      ( 07 Dec 2021 05:58 )             25.7     12-07    144  |  108  |  49<H>  ----------------------------<  139<H>  4.0   |  26  |  3.85<H>    Ca    9.8      07 Dec 2021 05:58  Phos  3.6     12-07  Mg     2.3     12-07          CAPILLARY BLOOD GLUCOSE      POCT Blood Glucose.: 161 mg/dL (06 Dec 2021 06:24)      RADIOLOGY & ADDITIONAL TESTS: Reviewed.    ASSESSMENT:    PLAN:

## 2021-12-07 NOTE — PROCEDURE NOTE - ATTENDING PROVIDER
Dakota
Dr. Duncan
Dakota
Dr. Duncan
Dakota
Dakota
Dr. Duncan
Eunice
Dr. Duncan
Derian
Dr. Arellano

## 2021-12-07 NOTE — PROGRESS NOTE ADULT - SUBJECTIVE AND OBJECTIVE BOX
Patient is a 46y Female seen and evaluated at bedside during bedside. NAD, stable, comfortable.     Meds:    acetylcysteine 20%  Inhalation 4 every 6 hours  amLODIPine   Tablet 10 daily  artificial  tears Solution 1 two times a day  baclofen 10 every 8 hours  bisacodyl Suppository 10 daily  chlorhexidine 0.12% Liquid 15 every 12 hours  chlorhexidine 2% Cloths 1 <User Schedule>  cloNIDine 0.2 every 8 hours  fentaNYL    Injectable 25 every 4 hours  heparin   Injectable 5000 every 8 hours  hydrALAZINE 100 every 8 hours  hydrALAZINE Injectable 10 every 4 hours PRN  levETIRAcetam  Solution 500 every 24 hours  ondansetron Injectable 4 every 6 hours PRN  piperacillin/tazobactam IVPB.. 4.5 every 12 hours  polyethylene glycol 3350 17 two times a day  senna 2 at bedtime  sodium chloride 2 every 8 hours  sodium chloride 0.9% lock flush 10 every 1 hour PRN      T(C): , Max: 37.3 (12-07-21 @ 06:00)  T(F): , Max: 99.1 (12-07-21 @ 06:00)  HR: 89 (12-07-21 @ 13:00)  BP: 150/80 (12-07-21 @ 13:00)  BP(mean): 109 (12-07-21 @ 13:00)  RR: 14 (12-07-21 @ 13:00)  SpO2: 99% (12-07-21 @ 13:00)  Wt(kg): --    12-06 @ 07:01  -  12-07 @ 07:00  --------------------------------------------------------  IN: 1080 mL / OUT: 550 mL / NET: 530 mL    12-07 @ 07:01  -  12-07 @ 14:06  --------------------------------------------------------  IN: 550 mL / OUT: 3550 mL / NET: -3000 mL    Review of Systems: Unable to participate    PHYSICAL EXAM:  GENERAL: intubated, s/p hemicraniectomy- staples on her scalp  CHEST/LUNG: Coarse bilaterally  HEART: normal S1S2, RRR  EXTREMITIES: +2 b/l UE oedema   ACCESS: RIJ tunneled cath placed 11/26    LABS:                        7.7    13.03 )-----------( 216      ( 07 Dec 2021 05:58 )             25.7     12-07    144  |  108  |  49<H>  ----------------------------<  139<H>  4.0   |  26  |  3.85<H>    Ca    9.8      07 Dec 2021 05:58  Phos  3.6     12-07  Mg     2.3     12-07                  RADIOLOGY & ADDITIONAL STUDIES:

## 2021-12-07 NOTE — PROCEDURE NOTE - NSINDICATIONS_GEN_A_CORE
arterial puncture to obtain ABG's
pneumothorax
arterial puncture to obtain ABG's
feeding difficulties
feeding tube replacement
feeding difficulties
critical illness/dialysis/CRRT
cultures/blood sampling
arterial puncture to obtain ABG's
critical illness/emergency venous access/venous access
critical illness/emergency venous access
venous access
pneumothorax

## 2021-12-07 NOTE — PROGRESS NOTE ADULT - SUBJECTIVE AND OBJECTIVE BOX
HPI:  47 y/o female with PMH HTN, Multiple sclerosis, recent spinal surgery 10/8 ([facility]) presented to [place] ED BIBEMS after being found unconscious at home, unknown period of time. 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 HH5, MF1. Patient was intubated at [place] ED, found to have blown L pupil, and sent straight to the OR for left hemicraniotomy. A-line placed intraop. Hemodynamically unstable upon arrival to [facility], bradycardic and hypertensive s/p Mannitol, Clevidipine, and Furosemide. Central line placed in NSICU. Currently sedated on Propofol, pending repeat CTH. History per patient's son, patient had recent spine surgery and was found on outpatient imaging last week to have L M1 segment aneurysm (unruptured), pt asymptomatic at this time. Per son patient taking percocet PO at home. Ureña catheter, ET tube, and arterial line placed at [facility].     NIHSS on arrival: 32   Bess Griffin 5, Mod Busby 4  Bleed Day 1 = 10/26 (26 Oct 2021 13:03)    Hospital Course:  10/26: admitted to [facility] from [facility], POD#0 s/p L hemicraniectomy for decompression and evacuation of SDH. Transferred to [facility] noted to have tense flap, received mannitol, keppra, decadron. Was hypertensive to 200s and preston to 40s, recevied 85g mannitol and bullet 23.4%. CTH on arrival demonstrated increased size in vents and new IVH. EVD placed, open at 15, central line placed. Transfused 2 u PRBC. POD0 of coiling of left MCA bifurcation aneurysm,   10/27: CTH demonstrated EVD in correct position, EVD lowered to 5, remains intubated on proprofol, pending repeat CTH in AM. Started on 3% @ 30/hr. 2LNS given for euvolemia, Mannitol given for uptrending ICPs. Bullet given 8:30 am. 3% increased to 50/hr. 1 L bolus. New EVD catheter placed using exisiting sreekanth hole. 1 u PRBC.  10/28: HH5, MF4, BD3; POD2 angio coil/embo of L MCA aneurysm. JOAQUÍN overnight, neuro stable. EVD@5cmH20 ICPs < 20 overnight, OP WNL. S/p 1 unit PRBC yesterday with appropriate response. Given 42g mannitol in AM for ICP 22 persistently, with improvement, then given 23% in PM for elevated ICP. febrile, pancultured. Standing tylenol and cooling blanket.   10/29: BD4, POD3 angio coil/embo. JOAQUÍN o/n, neuro stable. EVD@5, ICPs <20 o/n.   10/30: BD5, POD4 angio coil/embo. JOAQUÍN o/n neuro stable. EVD@5. 3% @50cc/hr.  10/31: BD6, POD5 angio coil/embo. brief period maintained upward gaze, resolved on its own. EVD@5cm h2o.    : BD7, POD6 L hemicrani, angio coil/embo. JOAQUÍN overnight. Neuro exam unchanged. ICPs WNL. started bromocriptine/gabapentin/baclofen. dc'd propofol, started precedex  : BD8 POD7 L hemicrani, angio coil/embo. JOAQUÍN overnight. Neuro exam stable. Remains on 3% hypertonic saline. Receieved 1 unit of PRBCs for a Hgb of 7.6.  11/3: BD 9 POD8 of L hemicrani. Elevated lipase, normal amylase, OG tube clogged and replaced. Abdominal US normal. Pending gen surg reccs regarding trach and peg. Gabapentin and Baclofen increased to help with neurostorming. restarted back on 3%, LR@35. became preston+hypotensive with nimodipine 60q4, decreased back to 30q2, NaCl bullet given.   : BD10 POD9, JOAQUÍN o/n, 500cc NS for euvolemia,  neuro stable, EVD at 5. 3% increased to 75  11: BD11 POD10, JOAQUÍN o/n, neuro stable, EVD at 5  116: BD12 POD11 JOAQUÍN overnight. Neuro exam stable. Remains intubated on precedex. 3% hypertonic saline dc'd  : BD13 POD 12 JOAQUÍN o/n, NGT replaced. on precex with no icp crisis   11/8: BD14 POD13 JOAQUÍN o/n, noted to have tongue maceration/macroglossia. Gauze with tongue depressor placed. Pending further recs from ENT. Pending trach and PEG placement tomorrow with gen surg. Off precedex, ICPs stable. EVD remains @ 5.   : BD15, POD 14, bleeding under tongue at start of shift, fentanyl pushed with no further episodes. gabapentin stopped. PEG placed  11/10: BD 16, POD 15, neuro stable, ENT to be consulted in AM, 3% increased to 50/hr.  : BD 17, POD 16, neuro exam stable. Pend CT saba in am for cranioplasty planning. Pend trach in OR with ENT. F.u BMP in am, 3%@50cc/hr for Na goal 140-145.   : BD 18, POD 17. JOAQUÍN overnight, neuro stable. Pend trach placement today. CT saba completed . Off of 3%, f/u am BMP for Na goal 140-150. CSF neg. Hypoxic cardiac arrest with ROSC x 3 during tracheostomy placement. B/l chest tubes placed for resulting pneumothorax s/p CPR. salt tabs dc'd.  : BD 19, POD 18. Patient tachycardic with some improvement, SBP stable off of levophed, hgb downtrending o/n, transfused 1 unit PRBCs. B/l chest tube. EVD @5cmH2O, no elevated ICPs. UOP downtrending, trend BUN/Cr, given 1LNS x1 for low urine output. F/u am CXR. Cont Cefepime until today, then change to Ancef while trach packing in place per ENT. Pend CTH when stable. Trops downtrending s/p cardiac arrest. 1L. florinef dc'd. BP goal changed to 100-160, started on amlodipine   : BD20, POD19, worsening creatinine with decreased urine output. Given 250 of albumin and 500cc LR. started on hydralazine PO, decreased amantadine 100 daily given CrCl of 20.  11/15: BD21, POD20, remains oliguric, fem line dc'd, tongue swollen and unable to fit bite block in mouth, started on nimbex gtt to relax jaw. Propofol and fentanyl gtt started. Vomiting TFs through nose and mouth, ENT called. TFs held. Cr uptrending. Palliative consulted.  : BD22, POD21, o/n external trach dressing replaced due to cuff leak and decreasing TV, sedated and paralyzed on nimbex, propofol, and fentanyl gtt. CTH stable.  EVD raised from 5 to 10. Nimbex weaned off. Cr uptrending, Trickle feeds started. FOB negative.   : BD 23, POD22, JOAQUÍN o/n, EVD clamped, NS d/c'ed, LR@50, advancing tube feeds.   : BD24, POD23 o/n 3% at 30 with Na acetate started, propofol dc'd due to elevated TG level, episode of vomiting TFs from nose and mouth into ETT with elevated ICP 30s, ICP normalized with resolution of vomiting, reglan 5mg IV given, TFs held, 3% stopped. midline placed   ; BD25, POD24 JOAQUÍN overnight. Remains on versed and fentanyl drips. Neuro exam unchanged. R IJ dialysis cath placed.   : BD26 POD25 JOAQUÍN overnight. Neuro exam unchanged. Plan for HD today  : BD 27 POD 26 weaned off versed, fentanyl   : BD 28 POD 27 JOAQUÍN o/n , off fentanyl gtt, pt is calm. s/p HD earlier today. s/p trach revision  : BD29. incraesed clonidine to 0.4 BID, s/p trach, stable hemodynamically. neuro at baseline. TFs stopped for vomiting and restarted 10cc/hr  : BD 30, POD29 TFs inc to 20cc/hr. HD today  : BD31, POD30. JOAQUÍN o/n neuro stable  : BD32, POD 31, JOAQUÍN overnight,  perma cath placement with IR today , hydralazine inc 75q8, reglan decreased 2.5q6. Hemodialyzed today, took off 3L and given 1unit pRBCs  : BD33, POD32, JOQAUÍN overnight, pending LTAC. hyponatremic, urine lytes sent. 250cc bolus 3% started, salt tabs started  : BD34 POD 33 L hemicrani. JOAQUÍN o/n. Hyponatremia improved, NaCl 2 g q 6. Pending [facility]AC at [facility], increased prn requirements for BP (hydral + labetalol x1), hydral PO increased to 100q8, Fentanyl x1 for pain.   : BD35 POD 34 s/p L hemicrani, JOAQUÍN o/n, pend dialysis tomorrow. CTH ordered for am, exit CTH pend. Plan for discharge to [facility]. Tolerating CPAP during the day, full vent support overnight. HD today  : BD 36 POD 35 pending LTAC to [facility], received on HD through the port. HD , 3L removed, hypertensive, renal following, hydralazine pushes prn for now.   : Intermittent episodes of hypertension, decreased clonidine to q8 and added labetalol 100 BID. RUE doppler done   LGF - tylenol given   : Multiple episodes of asystole, witnessed upward gaze associated with 5 second pause and later 8 second pause. EKG stable, troponin 0.08.  labetalol decreased to 50BID. cuff leak: reinflated during day and ENT to replace  tomorrow. EEG placed for upward gaze. Dialysis today.  12/3: JOAQUÍN o/n, recieved multiple pushes IV hydralazine for HTN. during day bradycardic to 40s when moving head and resolved, EP consulted - thought to be vagal sensitivity. No cuff leak today so tube not replaced  : JOAQUÍN o/n, neuro unchanged, started on standing fentanyl x 1 day for HTN unclear if pain related, baclofen started for rigidity, dialysis today  : o/n fever tmax 102, pancultured, neuro stable; HD yesterday  Started on zosyn for PNA   : JOAQUÍN overnight. Neuro exam stable.  : JOAQUÍN overnight. Neuro exam stable. Pending rehab placement    Vital Signs Last 24 Hrs  T(C): 37.1 (06 Dec 2021 18:38), Max: 37.8 (06 Dec 2021 05:22)  T(F): 98.8 (06 Dec 2021 18:38), Max: 100 (06 Dec 2021 05:22)  HR: 101 (07 Dec 2021 00:10) (80 - 101)  BP: 143/68 (06 Dec 2021 18:38) (115/58 - 164/85)  BP(mean): 95 (06 Dec 2021 18:38) (81 - 119)  RR: 14 (07 Dec 2021 00:10) (14 - 21)  SpO2: 99% (07 Dec 2021 00:10) (94% - 100%)    I&O's Summary    05 Dec 2021 07:01  -  06 Dec 2021 07:00  --------------------------------------------------------  IN: 1300 mL / OUT: 450 mL / NET: 850 mL    06 Dec 2021 07:01  -  07 Dec 2021 01:31  --------------------------------------------------------  IN: 800 mL / OUT: 400 mL / NET: 400 mL      PHYSICAL EXAM:    General: patient seen laying supine in bed in NAD  Neuro: nonverbal, does not FC, OEs to noxious, face symmetric, b/l UEs 0/5, TF to noxious b/l LEs, +corneals, +cough  HEENT: PERRL, +trach  Neck: supple  Cardiac: RRR, S1S2  Pulmonary: chest rise symmetric, +cough/gag  Abdomen: soft, nontender, nondistended  Ext: warm, perfusing well  Wound: L cranial flap full, soft    TUBES/LINES:  [] CVC  [x] A-line  [] Lumbar Drain  [] Ventriculostomy  [x] Other: R IJ HD cath, R midline    DIET:  [] NPO  [] Mechanical  [x] Tube feeds    LABS:                        7.5    11.78 )-----------( 183      ( 06 Dec 2021 16:09 )             25.6     12-06    141  |  108  |  36<H>  ----------------------------<  126<H>  3.5   |  23  |  3.43<H>    Ca    8.5      06 Dec 2021 05:40  Phos  3.0       Mg     1.9             Urinalysis Basic - ( 05 Dec 2021 06:10 )    Color: Yellow / Appearance: SL Cloudy / S.020 / pH: x  Gluc: x / Ketone: NEGATIVE  / Bili: Negative / Urobili: 0.2 E.U./dL   Blood: x / Protein: 100 mg/dL / Nitrite: NEGATIVE   Leuk Esterase: Moderate / RBC: < 5 /HPF / WBC 5-10 /HPF   Sq Epi: x / Non Sq Epi: 5-10 /HPF / Bacteria: Present /HPF          CAPILLARY BLOOD GLUCOSE      POCT Blood Glucose.: 161 mg/dL (06 Dec 2021 06:24)      Drug Levels: [] N/A    CSF Analysis: [] N/A      Allergies    No Known Allergies    Intolerances      MEDICATIONS:  Antibiotics:  piperacillin/tazobactam IVPB.. 4.5 Gram(s) IV Intermittent every 12 hours    Neuro:  baclofen 5 milliGRAM(s) Oral every 8 hours  fentaNYL    Injectable 25 MICROGram(s) IV Push every 4 hours  levETIRAcetam  Solution 500 milliGRAM(s) Oral every 24 hours  ondansetron Injectable 4 milliGRAM(s) IV Push every 6 hours PRN    Anticoagulation:  heparin   Injectable 5000 Unit(s) SubCutaneous every 8 hours    OTHER:  acetylcysteine 20%  Inhalation 4 milliLiter(s) Inhalation every 6 hours  amLODIPine   Tablet 10 milliGRAM(s) Oral daily  artificial  tears Solution 1 Drop(s) Both EYES two times a day  bisacodyl Suppository 10 milliGRAM(s) Rectal daily  chlorhexidine 0.12% Liquid 15 milliLiter(s) Oral Mucosa every 12 hours  chlorhexidine 2% Cloths 1 Application(s) Topical <User Schedule>  cloNIDine 0.2 milliGRAM(s) Oral every 8 hours  hydrALAZINE 100 milliGRAM(s) Oral every 8 hours  hydrALAZINE Injectable 10 milliGRAM(s) IV Push every 4 hours PRN  polyethylene glycol 3350 17 Gram(s) Oral two times a day  senna 2 Tablet(s) Oral at bedtime    IVF:  sodium chloride 2 Gram(s) Oral every 6 hours    CULTURES:  Culture Results:   Culture in progress ( @ 22:42)  Culture Results:   Culture in progress ( @ 21:16)    RADIOLOGY & ADDITIONAL TESTS:      ASSESSMENT:  47 y/o female with PMHx of HTN and MS, hx of spinal surgery at [facility] 10/8/21 presented to [place] ED BIBEMS after being found unconscious at home, unknown period of time. 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 HH5, MF4; BD #1 = 10/26. Patient was intubated at [place] ED and sent straight to the OR for left hemicraniotomy. A-line placed intraop. Hemodynamically unstable upon arrival to [facility], bradycardic and hypertensive s/p Mannitol and Furosemide. Central line placed in NSICU. S/p EVD placement, and coil embolization of left MCA bifurcation aneurysm 10/26/2021. S/p cerebral angio without findings of vasospasm 11/10. S/p cardiac arrest with ROSC x3 on  during tracheostomy at bedside now with b/l pneumothoracies and worsening kidney function. EVD dc'd , trach'd , permacath placed , pending LTACH/[facility].      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, left    Intracranial hemorrhage, spontaneous subarachnoid, due to cerebral aneurysm, acute    Pneumothorax, left    Functional quadriplegia    Acute respiratory failure with hypoxia    Cardiac arrest    Encounter for palliative care    Advanced care planning/counseling discussion    IPH (idiopathic pulmonary hemosiderosis)    Acute spontaneous intraparenchymal intracranial hemorrhage due to cerebral aneurysm    CHETAN (acute kidney injury)    Hypocalcemia    Angiogram, cerebral, with coil embolization, in non-operating room setting    Endoscopic percutaneous gastrostomy    Angiogram, carotid and cerebral, bilateral    Chest tube placement    Insertion of central venous catheter with ultrasound guidance    Insertion of temporary dialysis catheter    Tracheostomy, planned    Personal history of spine surgery    CHETAN (acute kidney injury)    SysAdmin_VstLnk      PLAN:  NEURO:  - neuro checks q4hrs/vital signs q 1  - Keppra 500mg q24h renal dosing   - CTH  stable, CTH  stable  - plan for cranioplasty in ~3 months   - EEG x 24 hrs   - fentanyl 25 q4 x24hr  - baclofen started     CARDIO:  - -180   - amlodipine 10 daily and hydral 100 q8h, clonidine 0.2 q 8 and labetalol dc'd  - hydralazine IV prn, avoid BB IV   - s/p cardiac arrest   - TTE 11/15: mild LVH, EF 70%   - EP consulted: pauses on telemetry and bradycardia with neck movement likely due to baroreceptor sensitivity, no need for pacemaker     PULM:  - trach, continue full vent support overnight  - s/p acute hypoxic cardiac arrest  during tracheostomy placment with ENT c/b b/l pneumothorax and b/l chest tubes (d/kiko )  - mucomyst q 6     GI:  - PEG TFs nepro- at goal 30cc/hr  - Bowel regimen, last BM   - Alk phos elevated, shocked liver, improving     RENAL:   - post cardiac arrest renal failure on long term HD  - port with IR  , nephro following, last HD   - Na 135-145   - salt tabs 2q6  - daily weights     HEME:  - SCDs, SQH  - s/p multiple transfusions this admission, most recently s/p 1unit PRBC   - UE and LE dopplers negative , RUE  with superficial thrombophlebitis in the right cephalic vein   - FOB neg ,     ID:  - s/p cefepime for enterobacter/proteus in sputum dc'd 11/15   - started zosyn  for GNR in the sputum, + UA, culture pending.   - blood culture from growing gram neg rods    ENDO:  - monitor BMP glucoses    OTHER  - wound care recs- q2 dressing changes   - ENT reconsulted for trach cuff leak and tongue injury, NTD just monitor  - has R midline ()    GOC: full code  Level of care: ICU status   Dispo: [facility] (4 seasons) AUTH until 12/10   family updated    Case discussed with Dr. Duncan and Dr. Vyas

## 2021-12-07 NOTE — CHART NOTE - NSCHARTNOTEFT_GEN_A_CORE
Admitting Diagnosis:   Patient is a 46y old  Female who presents with a chief complaint of ICH (07 Dec 2021 01:30)      PAST MEDICAL & SURGICAL HISTORY:  No pertinent past medical history    Personal history of spine surgery    Current Nutrition Order: NPO via EN via PEG: NeproCarbSteady @ 30 mL/hr x 24 hours + LPS 1x/day (100 kcal, 15 g protein per serving). This provides: 720 mL TV, 1396 kcal, 73.3 g protein, 523 mL free water. Meetin kcal/kg, 1.5 g/kg based on IBW 48 kg.    PO Intake: Good (%) [   ]  Fair (50-75%) [   ] Poor (<25%) [   ] [x] NPO    GI Issues: No nausea/vomiting documented at this time. Last documented bowel movement .    Pain: Unable to assess at this time.    Skin Integrity: Left arm/right arm edema 2+. Left head surgical incision per chart. Right buttocks stage 2 pressure injury per chart. Valerio score: 10.    Labs:       144  |  108  |  49<H>  ----------------------------<  139<H>  4.0   |  26  |  3.85<H>    Ca    9.8      07 Dec 2021 05:58  Phos  3.6       Mg     2.3           CAPILLARY BLOOD GLUCOSE          Medications:  MEDICATIONS  (STANDING):  acetylcysteine 20%  Inhalation 4 milliLiter(s) Inhalation every 6 hours  amLODIPine   Tablet 10 milliGRAM(s) Oral daily  artificial  tears Solution 1 Drop(s) Both EYES two times a day  baclofen 10 milliGRAM(s) Oral every 8 hours  bisacodyl Suppository 10 milliGRAM(s) Rectal daily  chlorhexidine 0.12% Liquid 15 milliLiter(s) Oral Mucosa every 12 hours  chlorhexidine 2% Cloths 1 Application(s) Topical <User Schedule>  cloNIDine 0.2 milliGRAM(s) Oral every 8 hours  epoetin nannette-epbx (RETACRIT) Injectable 9000 Unit(s) IV Push once  fentaNYL    Injectable 25 MICROGram(s) IV Push every 4 hours  heparin   Injectable 5000 Unit(s) SubCutaneous every 8 hours  hydrALAZINE 100 milliGRAM(s) Oral every 8 hours  levETIRAcetam  Solution 500 milliGRAM(s) Oral every 24 hours  piperacillin/tazobactam IVPB.. 4.5 Gram(s) IV Intermittent every 12 hours  polyethylene glycol 3350 17 Gram(s) Oral two times a day  senna 2 Tablet(s) Oral at bedtime  sodium chloride 2 Gram(s) Oral every 8 hours    MEDICATIONS  (PRN):  hydrALAZINE Injectable 10 milliGRAM(s) IV Push every 4 hours PRN SBP >180  ondansetron Injectable 4 milliGRAM(s) IV Push every 6 hours PRN Nausea and/or Vomiting  sodium chloride 0.9% lock flush 10 milliLiter(s) IV Push every 1 hour PRN Pre/post blood products, medications, blood draw, and to maintain line patency      Admitting Anthropometrics:  Height: 61" Weight: 187lbs, IBW 105lbs+/-10%, %%, BMI 34.9 kg/m2     Weight:  10/26 187lbs   11/3 183.6lbs   187.1lbs   204.3lbs/ 201lbs   208.9lbs/ 204.5lbs   202.1lbs/ 196.4lbs   195lbs (dry)   197.3lbs   201.7lbs/ 194.8lbs   194.6lbs/ 188 pounds    192.4 pounds/ 185.8 pounds    193.1 pounds     Weight Change: Weights fluctuating this admission likely 2/2 CHETAN and HD, please cont to trend weights before and after each HD session.     Nutrition Focused Physical Exam: Completed [   ]  Not Pertinent [ x  ]    Estimated energy needs:   IBW (48kg) used for calculations as pt >120% of IBW (178%). Needs adjusted for wound healing, critical care requiring intubation.  Kcal (25-30 kcal/kg): 8556-4361 kcal  Protein (1.4-1.6 g/kg pro): 67-76g pro  **Fluids per team 2/2 HD    Subjective: 46y/F with recent spinal surgery, found down and brought to ED.  In ED, witnessed shaking, ?seizures, intubated.  Imaging showed SAH.  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 for further management. Repeat CT HCP - EVD R frontal inserted, s/p L hemicraniectomy for decompression and evacuation of SDH 10/26. EVD placed. Pt returned to NSICU intubated and sedated. EVD@5cm h2o. Underwent work-up for emesis, Noted tongue biting with oral trauma and mild bleeding . S/p PEG tube (2021). TF stopped due to vomiting episodes  (Reglan ordered)- Pt refluxing feeds through mouth and nose overnight- now improved. Propofol held  for elevated TG levels. S/p trach placement . S/p permacath on . Pt continues to present w/ HTN w/ systolics in 170s. Last HD . EN residuals : 30 mL.     Pt seen at bedside for follow up assessment- trached to vent on VC/AC mode, remains off sedation. Pt asleep at time of RD interview thus interview deferred to EMR. MAP-104. Observed EN hung and running @ 30 mL/hr x 24 hours. No s/s of intolerance in EMR. Labs reviewed ; BUN/Cr elevated. RD to follow up per protocol.     Previous Nutrition Diagnosis: Inadequate oral intake RT Inability to meet est needs via PO AEB NPO, reliant on EN to meet 100% of est needs    Active [ x  ]  Resolved [   ]    If resolved, new PES:     Goal:  Meet >80% of EER consistently via EN     Recommendations:  1.  Cont with current EN regimen; Nepro @ 30ml/hr x24hrs with x1 LPS daily (100kcal, 15g pro) via PEG. Provides: 720 ml TV, 1396 kcal, 73g pro, 523 ml free water, 1.52g/kg IBW protein.   >>Monitor for s/s intolerance; maintain aspiration precautions at all times   2. Pain and bowel regimen per team  3. Monitor lytes and replete prn. POC BG q6hrs   4. Trend dry wts     Education: Not appropriate based on current clinical/mental status     Risk Level: High [ x  ] Moderate [   ] Low [   ]

## 2021-12-07 NOTE — PROGRESS NOTE ADULT - TIME BILLING
case complexity as detailed above.
As above; Coordination of care with primary team, discussed TDC, HD planning  This excludes any time spent on separate procedures or teaching.
As above; Coordination of care with primary team, discussed HD planning  This excludes any time spent on separate procedures or teaching.
Complex hospital course
As above; Coordination of care with primary team, discussed HD planning  This excludes any time spent on separate procedures or teaching.

## 2021-12-07 NOTE — PROCEDURE NOTE - NSPERFORMEDBY_GEN_A_CORE
Myself
PA
Attending
Myself

## 2021-12-07 NOTE — PROGRESS NOTE ADULT - ASSESSMENT
Brief HPI:45 y/o female with PMHx of HTN and MS, hx of spinal surgery at [facility] 10/8/21 [facility] [place] [facility] at [facility], unknown period of time. 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 Patient was intubated at [place] ED and sent straight to the OR for left hemicraniotomy. S/p EVD placement, and coil embolization of left MCA bifurcation aneurysm 10/26/2021. S/p cerebral angio without findings of vasospasm 11/10. S/p cardiac arrest with ROSC x3 on 11/12 during tracheostomy at bedside with b/l pneumothoraces now with chest tubes clamped. EVD dc'd 11/18, trach revision 11/22. ID consulted for serratia bacteremia for which she was started on Zosyn. Of note, On 11/11/21, ID consulted for recommendations for treatment of sputum cultures from 11/4/21 growing MSSA, Enterobacter, and Proteus, as well as clearance for cranioplasty. At that time, Patient was treated with a 7-day course of cefepime which she finished on 11/8.     Culture Data: Sputum 11/4: Numerous staph aureus, enterobacter clocae, proteus. Sputum 11/5: Proteus. Blood cx 12/5: serratia. CSF negative. UCX pending.  Imaging: Xray 12/7-patchy consolidation LLL     Recommendations:   Brief HPI:45 y/o female with PMHx of HTN and MS, hx of spinal surgery at [facility] 10[facility] [place] [facility] at home, unknown period of time. 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 Patient was intubated at [place] ED and sent straight to the OR for left hemicraniotomy. S/p EVD placement, and coil embolization of left MCA bifurcation aneurysm 10/26/2021. S/p cerebral angio without findings of vasospasm 11/10. S/p cardiac arrest with ROSC x3 on 11/12 during tracheostomy at bedside with b/l pneumothoraces now with chest tubes clamped. EVD dc'd 11/18, trach revision 11/22. ID consulted for serratia bacteremia for which she was started on Zosyn. Of note, On 11/11/21, ID consulted for recommendations for treatment of sputum cultures from 11/4/21 growing MSSA, Enterobacter, and Proteus, as well as clearance for cranioplasty. At that time, Patient was treated with a 7-day course of cefepime which she finished on 11/8.     Culture Data: Sputum 11/4: Numerous staph aureus, enterobacter clocae, proteus. Sputum 11/5: Proteus. Blood cx 12/5: serratia. CSF negative. UCX pending.  Imaging: Xray 12/7-patchy consolidation LLL     Recommendations:  -Please discontinue Zosyn and start Cefepime [id number] days of dialysis, please give AFTER dialysis.   -Please obtain surveillance cultures, if positive, will recommend changing lines.   -Will continue to follow sputum and urine cultures.     ID to continue to follow, plan discussed with primary team and attending.  Brief HPI:47 y/o female with PMHx of HTN and MS, hx of spinal surgery at [facility] [place] [facility], unknown period of time. 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 Patient was intubated at [place] [facility] and sent straight to the OR for left hemicraniotomy. S/p EVD placement, and coil embolization of left MCA bifurcation aneurysm 10/26/2021. S/p cerebral angio without findings of vasospasm 11/10. S/p cardiac arrest with ROSC x3 on 11/12 during tracheostomy at bedside with b/l pneumothoraces now with chest tubes clamped. EVD dc'd 11/18, trach revision 11/22. ID consulted for serratia bacteremia for which she was started on Zosyn. Of note, On 11/11/21, ID consulted for recommendations for treatment of sputum cultures from 11/4/21 growing MSSA, Enterobacter, and Proteus, as well as clearance for cranioplasty. At that time, Patient was treated with a 7-day course of cefepime which she finished on 11/8.     Culture Data: Sputum 11/4: Numerous staph aureus, enterobacter clocae, proteus. Sputum 11/5: Proteus. Blood cx 12/5: serratia. CSF negative. UCX pending.  Imaging: Xray 12/7-patchy consolidation LLL     Recommendations:  -Please discontinue Zosyn and start Cefepime [id number] daily but on days of dialysis, please give AFTER dialysis.   -Please obtain surveillance cultures, if positive, will recommend changing lines.   -Will continue to follow sputum and urine cultures.     ID to continue to follow, plan discussed with primary team and attending.

## 2021-12-07 NOTE — PROCEDURE NOTE - NSCOMPLICATION_GEN_A_CORE
no complications

## 2021-12-07 NOTE — PROGRESS NOTE ADULT - ASSESSMENT
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 Hospital course c/b cardiac arrest x3 and anuric ATN requiring dialysis.     Anuric iATN requiring long term dialysis  Baseline creatinine 0.6  First HD 11/20; S/p IR tunneled cath placement 11/26  HD today- on TTS inpt schedule  Indeterminate quantiferon and wnl hepatitis panel    BP: hypertensive, SBP goal 100-160 per neuro ICU; UF with HD  Anaemia- no need for IV iron, epo with HD  BMD: phos acceptable, no need for binders  Access: S/p IR tunneled cath placement 11/26    Patient was seen and evaluated on dialysis.   Dialyzer: Optiflux [id number]  QB: 400 mL/min  QD: 500 mL/min  K bath: 3  Goal UF: 3L  Duration: 180 min    Patient is tolerating the procedure well. Continue full hemodialysis treatment as prescribed.  Daily weights, strict I&Os, renally dose meds, renal diet

## 2021-12-07 NOTE — PROGRESS NOTE ADULT - ATTENDING COMMENTS
Recalled for Serratia bacteremia.  47 yo F with HTN, MS transferred from [facility] after L MCA aneurysm rupture with intraparenchymal hemorrhage, SAH and L SDH with herniation s/p L hemicraniotomy and coil embolization 10/26 with prolonged hospital course c/b PEA arrest X 2 during trach with bilateral tension PTX requiring chest tubes on 11/12, subsequent ATN with requirement for ongoing HD.  Regarding ID issues, she has been treated periop with cefazolin, she received cefepime from 11/8-11/15 for pneumonia (VAP) with sputum culture from 11/4 that grew MSSA, E. cloacae and Proteus mirabilis.  She was again febrile on 12/4, was started on vanc and pip-tazo on 12/5.  CXR has new LLL infiltrate, sputum culture with Serratia and Proteus, numerous WBCs on gram stain, blood cultures with Serratia marcescens by PCR (1/4 bottles).  VAP is likely source.  Would f/u blood cultures from 12/5 for growth/susceptibility, f/u surveillance blood culture from today, f/u susceptibilities of sputum isolate.  If surveillance cultures are positive, will need to consider d/c R IJ HD catheter (11/26) and midline (11/18).  Though she has defervesced on pip-tazo, would treat with cefepime b/o concern for development of resistance on therapy.  Agree with d/c vanc.  Will follow with you – team 1.

## 2021-12-07 NOTE — PROCEDURE NOTE - NSSITEPREP_SKIN_A_CORE
chlorhexidine/Adherence to aseptic technique: hand hygiene prior to donning barriers (gown, gloves), don cap and mask, sterile drape over patient
chlorhexidine
chlorhexidine/Adherence to aseptic technique: hand hygiene prior to donning barriers (gown, gloves), don cap and mask, sterile drape over patient
chlorhexidine
chlorhexidine
chlorhexidine/Adherence to aseptic technique: hand hygiene prior to donning barriers (gown, gloves), don cap and mask, sterile drape over patient
chlorhexidine
chlorhexidine/povidone iodine (if allergic to chlorhexidine)
chlorhexidine
alcohol
chlorhexidine
chlorhexidine/Adherence to aseptic technique: hand hygiene prior to donning barriers (gown, gloves), don cap and mask, sterile drape over patient
chlorhexidine
chlorhexidine
chlorhexidine/Adherence to aseptic technique: hand hygiene prior to donning barriers (gown, gloves), don cap and mask, sterile drape over patient

## 2021-12-07 NOTE — PROCEDURE NOTE - NSTOLERANCE_GEN_A_CORE
Patient tolerated procedure well.

## 2021-12-08 LAB
-  AMPICILLIN/SULBACTAM: SIGNIFICANT CHANGE UP
-  AMPICILLIN: SIGNIFICANT CHANGE UP
-  CEFAZOLIN: SIGNIFICANT CHANGE UP
-  CEFEPIME: SIGNIFICANT CHANGE UP
-  CEFOXITIN: SIGNIFICANT CHANGE UP
-  CEFOXITIN: SIGNIFICANT CHANGE UP
-  CEFTRIAXONE: SIGNIFICANT CHANGE UP
-  CIPROFLOXACIN: SIGNIFICANT CHANGE UP
-  ERTAPENEM: SIGNIFICANT CHANGE UP
-  GENTAMICIN: SIGNIFICANT CHANGE UP
-  PIPERACILLIN/TAZOBACTAM: SIGNIFICANT CHANGE UP
-  TOBRAMYCIN: SIGNIFICANT CHANGE UP
-  TRIMETHOPRIM/SULFAMETHOXAZOLE: SIGNIFICANT CHANGE UP
ANION GAP SERPL CALC-SCNC: 8 MMOL/L — SIGNIFICANT CHANGE UP (ref 5–17)
BUN SERPL-MCNC: 31 MG/DL — HIGH (ref 7–23)
CALCIUM SERPL-MCNC: 9.5 MG/DL — SIGNIFICANT CHANGE UP (ref 8.4–10.5)
CHLORIDE SERPL-SCNC: 103 MMOL/L — SIGNIFICANT CHANGE UP (ref 96–108)
CO2 SERPL-SCNC: 30 MMOL/L — SIGNIFICANT CHANGE UP (ref 22–31)
CREAT SERPL-MCNC: 2.56 MG/DL — HIGH (ref 0.5–1.3)
CULTURE RESULTS: SIGNIFICANT CHANGE UP
CULTURE RESULTS: SIGNIFICANT CHANGE UP
GLUCOSE SERPL-MCNC: 148 MG/DL — HIGH (ref 70–99)
HCT VFR BLD CALC: 25.2 % — LOW (ref 34.5–45)
HGB BLD-MCNC: 7.5 G/DL — LOW (ref 11.5–15.5)
MAGNESIUM SERPL-MCNC: 1.9 MG/DL — SIGNIFICANT CHANGE UP (ref 1.6–2.6)
MCHC RBC-ENTMCNC: 24.1 PG — LOW (ref 27–34)
MCHC RBC-ENTMCNC: 29.8 GM/DL — LOW (ref 32–36)
MCV RBC AUTO: 81 FL — SIGNIFICANT CHANGE UP (ref 80–100)
METHOD TYPE: SIGNIFICANT CHANGE UP
NRBC # BLD: 0 /100 WBCS — SIGNIFICANT CHANGE UP (ref 0–0)
ORGANISM # SPEC MICROSCOPIC CNT: SIGNIFICANT CHANGE UP
PHOSPHATE SERPL-MCNC: 2.6 MG/DL — SIGNIFICANT CHANGE UP (ref 2.5–4.5)
PLATELET # BLD AUTO: 202 K/UL — SIGNIFICANT CHANGE UP (ref 150–400)
POTASSIUM SERPL-MCNC: 3.8 MMOL/L — SIGNIFICANT CHANGE UP (ref 3.5–5.3)
POTASSIUM SERPL-SCNC: 3.8 MMOL/L — SIGNIFICANT CHANGE UP (ref 3.5–5.3)
RBC # BLD: 3.11 M/UL — LOW (ref 3.8–5.2)
RBC # FLD: 18.8 % — HIGH (ref 10.3–14.5)
SODIUM SERPL-SCNC: 141 MMOL/L — SIGNIFICANT CHANGE UP (ref 135–145)
SPECIMEN SOURCE: SIGNIFICANT CHANGE UP
SPECIMEN SOURCE: SIGNIFICANT CHANGE UP
WBC # BLD: 15.27 K/UL — HIGH (ref 3.8–10.5)
WBC # FLD AUTO: 15.27 K/UL — HIGH (ref 3.8–10.5)

## 2021-12-08 PROCEDURE — 99232 SBSQ HOSP IP/OBS MODERATE 35: CPT | Mod: GC

## 2021-12-08 PROCEDURE — 71045 X-RAY EXAM CHEST 1 VIEW: CPT | Mod: 26,59

## 2021-12-08 PROCEDURE — 99291 CRITICAL CARE FIRST HOUR: CPT

## 2021-12-08 RX ORDER — ACETAMINOPHEN 500 MG
650 TABLET ORAL EVERY 6 HOURS
Refills: 0 | Status: DISCONTINUED | OUTPATIENT
Start: 2021-12-08 | End: 2021-12-10

## 2021-12-08 RX ORDER — FENTANYL CITRATE 50 UG/ML
25 INJECTION INTRAVENOUS EVERY 6 HOURS
Refills: 0 | Status: DISCONTINUED | OUTPATIENT
Start: 2021-12-08 | End: 2021-12-10

## 2021-12-08 RX ADMIN — CHLORHEXIDINE GLUCONATE 15 MILLILITER(S): 213 SOLUTION TOPICAL at 18:05

## 2021-12-08 RX ADMIN — FENTANYL CITRATE 25 MICROGRAM(S): 50 INJECTION INTRAVENOUS at 03:40

## 2021-12-08 RX ADMIN — Medication 10 MILLIGRAM(S): at 13:35

## 2021-12-08 RX ADMIN — Medication 10 MILLIGRAM(S): at 05:24

## 2021-12-08 RX ADMIN — HEPARIN SODIUM 5000 UNIT(S): 5000 INJECTION INTRAVENOUS; SUBCUTANEOUS at 05:24

## 2021-12-08 RX ADMIN — Medication 650 MILLIGRAM(S): at 11:00

## 2021-12-08 RX ADMIN — SODIUM CHLORIDE 2 GRAM(S): 9 INJECTION INTRAMUSCULAR; INTRAVENOUS; SUBCUTANEOUS at 13:36

## 2021-12-08 RX ADMIN — SODIUM CHLORIDE 2 GRAM(S): 9 INJECTION INTRAMUSCULAR; INTRAVENOUS; SUBCUTANEOUS at 05:23

## 2021-12-08 RX ADMIN — FENTANYL CITRATE 25 MICROGRAM(S): 50 INJECTION INTRAVENOUS at 03:14

## 2021-12-08 RX ADMIN — HEPARIN SODIUM 5000 UNIT(S): 5000 INJECTION INTRAVENOUS; SUBCUTANEOUS at 13:36

## 2021-12-08 RX ADMIN — POLYETHYLENE GLYCOL 3350 17 GRAM(S): 17 POWDER, FOR SOLUTION ORAL at 05:23

## 2021-12-08 RX ADMIN — Medication 4 MILLILITER(S): at 05:09

## 2021-12-08 RX ADMIN — FENTANYL CITRATE 25 MICROGRAM(S): 50 INJECTION INTRAVENOUS at 13:23

## 2021-12-08 RX ADMIN — SODIUM CHLORIDE 2 GRAM(S): 9 INJECTION INTRAMUSCULAR; INTRAVENOUS; SUBCUTANEOUS at 21:09

## 2021-12-08 RX ADMIN — FENTANYL CITRATE 25 MICROGRAM(S): 50 INJECTION INTRAVENOUS at 18:04

## 2021-12-08 RX ADMIN — CHLORHEXIDINE GLUCONATE 1 APPLICATION(S): 213 SOLUTION TOPICAL at 05:24

## 2021-12-08 RX ADMIN — Medication 100 MILLIGRAM(S): at 13:35

## 2021-12-08 RX ADMIN — Medication 0.2 MILLIGRAM(S): at 05:23

## 2021-12-08 RX ADMIN — Medication 1 DROP(S): at 18:09

## 2021-12-08 RX ADMIN — Medication 4 MILLILITER(S): at 23:37

## 2021-12-08 RX ADMIN — HEPARIN SODIUM 5000 UNIT(S): 5000 INJECTION INTRAVENOUS; SUBCUTANEOUS at 21:09

## 2021-12-08 RX ADMIN — POLYETHYLENE GLYCOL 3350 17 GRAM(S): 17 POWDER, FOR SOLUTION ORAL at 18:05

## 2021-12-08 RX ADMIN — Medication 10 MILLIGRAM(S): at 21:09

## 2021-12-08 RX ADMIN — Medication 1 DROP(S): at 05:24

## 2021-12-08 RX ADMIN — AMLODIPINE BESYLATE 10 MILLIGRAM(S): 2.5 TABLET ORAL at 05:24

## 2021-12-08 RX ADMIN — SENNA PLUS 2 TABLET(S): 8.6 TABLET ORAL at 21:09

## 2021-12-08 RX ADMIN — FENTANYL CITRATE 25 MICROGRAM(S): 50 INJECTION INTRAVENOUS at 06:04

## 2021-12-08 RX ADMIN — FENTANYL CITRATE 25 MICROGRAM(S): 50 INJECTION INTRAVENOUS at 06:30

## 2021-12-08 RX ADMIN — LEVETIRACETAM 500 MILLIGRAM(S): 250 TABLET, FILM COATED ORAL at 21:09

## 2021-12-08 RX ADMIN — Medication 100 MILLIGRAM(S): at 21:08

## 2021-12-08 RX ADMIN — Medication 10 MILLIGRAM(S): at 18:34

## 2021-12-08 RX ADMIN — FENTANYL CITRATE 25 MICROGRAM(S): 50 INJECTION INTRAVENOUS at 23:37

## 2021-12-08 RX ADMIN — Medication 10 MILLIGRAM(S): at 11:53

## 2021-12-08 RX ADMIN — Medication 0.2 MILLIGRAM(S): at 13:35

## 2021-12-08 RX ADMIN — Medication 4 MILLILITER(S): at 17:19

## 2021-12-08 RX ADMIN — Medication 650 MILLIGRAM(S): at 10:37

## 2021-12-08 RX ADMIN — CHLORHEXIDINE GLUCONATE 15 MILLILITER(S): 213 SOLUTION TOPICAL at 05:23

## 2021-12-08 RX ADMIN — CEFEPIME 100 MILLIGRAM(S): 1 INJECTION, POWDER, FOR SOLUTION INTRAMUSCULAR; INTRAVENOUS at 18:08

## 2021-12-08 RX ADMIN — FENTANYL CITRATE 25 MICROGRAM(S): 50 INJECTION INTRAVENOUS at 11:52

## 2021-12-08 RX ADMIN — FENTANYL CITRATE 25 MICROGRAM(S): 50 INJECTION INTRAVENOUS at 19:30

## 2021-12-08 RX ADMIN — Medication 0.2 MILLIGRAM(S): at 21:09

## 2021-12-08 RX ADMIN — Medication 4 MILLILITER(S): at 11:24

## 2021-12-08 RX ADMIN — Medication 100 MILLIGRAM(S): at 05:23

## 2021-12-08 NOTE — PROGRESS NOTE ADULT - ASSESSMENT
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 Hospital course c/b cardiac arrest x3 and anuric ATN requiring dialysis.     Anuric iATN requiring long term dialysis  Baseline creatinine 0.6  First HD 11/20; S/p IR tunneled cath placement 11/26  Defer HD today- on TTS inpt schedule  Indeterminate quantiferon and wnl hepatitis panel    BP: hypertensive, SBP goal 100-160 per neuro ICU; UF with HD  Anaemia- no need for IV iron, epo with HD  BMD: phos acceptable, no need for binders  Access: S/p IR tunneled cath placement 11/26; f/u blood cx and ID    Daily weights, strict I&Os, renally dose meds, renal diet

## 2021-12-08 NOTE — CHART NOTE - NSCHARTNOTEFT_GEN_A_CORE
Admitting Diagnosis:   Patient is a 46y old  Female who presents with a chief complaint of ICH (08 Dec 2021 12:49)      PAST MEDICAL & SURGICAL HISTORY:  No pertinent past medical history    Personal history of spine surgery      Current Nutrition Order:  NPO via EN via PEG: NeproCarbSteady @ 30 mL/hr x 24 hours + LPS 1x/day (100 kcal, 15 g protein per serving)  >> This provides: 720 mL TV, 1396 kcal, 73.3 g protein, 523 mL free water    PO Intake: Good (%) [   ]  Fair (50-75%) [   ] Poor (<25%) [   ] - NA NPO/TF     GI Issues:   +BM 12/6, 12/7  RN denies diarrhea at this time, having formed stools   Orderd for Dulcolax miralax senna and protonix     Pain:  non-verbal indicators absent    Skin Integrity:  Sx site noted, Right buttocks stage 2 pressure injury per chart  Valerio score: 13  1+generalized and 2+R arm edema           Labs:   12-08    141  |  103  |  31<H>  ----------------------------<  148<H>  3.8   |  30  |  2.56<H>    Ca    9.5      08 Dec 2021 05:29  Phos  2.6     12-08  Mg     1.9     12-08      CAPILLARY BLOOD GLUCOSE          Medications:  MEDICATIONS  (STANDING):  acetylcysteine 20%  Inhalation 4 milliLiter(s) Inhalation every 6 hours  amLODIPine   Tablet 10 milliGRAM(s) Oral daily  artificial  tears Solution 1 Drop(s) Both EYES two times a day  baclofen 10 milliGRAM(s) Oral every 8 hours  bisacodyl Suppository 10 milliGRAM(s) Rectal daily  cefepime   IVPB 1000 milliGRAM(s) IV Intermittent every 24 hours  chlorhexidine 0.12% Liquid 15 milliLiter(s) Oral Mucosa every 12 hours  chlorhexidine 2% Cloths 1 Application(s) Topical <User Schedule>  cloNIDine 0.2 milliGRAM(s) Oral every 8 hours  fentaNYL    Injectable 25 MICROGram(s) IV Push every 6 hours  heparin   Injectable 5000 Unit(s) SubCutaneous every 8 hours  hydrALAZINE 100 milliGRAM(s) Oral every 8 hours  levETIRAcetam  Solution 500 milliGRAM(s) Oral every 24 hours  polyethylene glycol 3350 17 Gram(s) Oral two times a day  senna 2 Tablet(s) Oral at bedtime  sodium chloride 2 Gram(s) Oral every 8 hours    MEDICATIONS  (PRN):  acetaminophen    Suspension .. 650 milliGRAM(s) Oral every 6 hours PRN Temp greater or equal to 38C (100.4F), Mild Pain (1 - 3)  hydrALAZINE Injectable 10 milliGRAM(s) IV Push every 4 hours PRN SBP >180  ondansetron Injectable 4 milliGRAM(s) IV Push every 6 hours PRN Nausea and/or Vomiting  sodium chloride 0.9% lock flush 10 milliLiter(s) IV Push every 1 hour PRN Pre/post blood products, medications, blood draw, and to maintain line patency            Admitting Anthropometrics:  Height: 61" Weight: 187lbs, IBW 105lbs+/-10%, %%, BMI 34.9 kg/m2     Weight:  10/26 187lbs   11/3 183.6lbs  11/4 187.1lbs  11/20 204.3lbs/ 201lbs  11/21 208.9lbs/ 204.5lbs  11/22 202.1lbs/ 196.4lbs  11/26 195lbs (dry)  11/28 197.3lbs  11/30 201.7lbs/ 194.8lbs  12/2 194.6lbs/ 188 pounds   12/4 192.4 pounds/ 185.8 pounds   12/5 193.1 pounds   12/7 189.1 pounds/ 195.7 pounds     Weight Change: Weights fluctuating this admission likely 2/2 CHETAN and HD, please cont to trend weights before and after each HD session.     Nutrition Focused Physical Exam: Completed [   ]  Not Pertinent [ x  ]    Estimated energy needs:   IBW (48kg) used for calculations as pt >120% of IBW (178%)  Needs adjusted for wound healing, ESRD/HD, critical care requiring intubation.  Kcal (25-30 kcal/kg): 0907-2086 kcal  Protein (1.4-1.6 g/kg pro): 67-76g pro  **Fluids per team 2/2 HD    Subjective:   47 y/o female with PMHx of HTN and MS, hx of spinal surgery at [facility] 10/8/21 presented to [place] ED [facility]IB[facility] after being found unconscious at home. 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 Pt intubated at [place] ED and sent straight to the OR for left hemicraniotomy. S/p EVD placement, and coil embolization of left MCA bifurcation aneurysm 10/26.  S/p PEG tube 11/9. S/p cerebral angio without findings of vasospasm 11/10. S/p cardiac arrest with ROSC x3 11/12 during tracheostomy at bedside with b/l pneumothoraces now with chest tubes clamped. EVD dc'd 11/18, trach revision 11/22. S/p permacath 11/26. EEG placed for upward gaze 12/2. Last HD 12/7. ID following, may Possibly remove midline/cath port if cultures continually positive. Now Pending rehab placement with plan for cranioplasty in ~3 months; AUTH until 12/10.     Pt seen at bedside for follow up assessment- trached to vent on ac/cmv mode. . RN reports pt tolerating TF without issues at this time.  See RD Recs Below, RD to follow up per protocol.     Previous Nutrition Diagnosis: Inadequate oral intake RT Inability to meet est needs via PO AEB NPO, reliant on EN to meet 100% of est needs  Active [ x  ]  Resolved [   ]  Goal:  Meet >80% of EER consistently via EN     Recommendations:  1.  Nepro @ 30ml/hr x24hrs with x1 LPS daily (100kcal, 15g pro) via PEG. Provides: 720 ml TV, 1396 kcal, 73g pro, 523 ml free water.   >> Monitor GI, for s/s intolerance; maintain aspiration precautions at all times.  2. Pain and bowel regimen per team.  3. Labs: monitor BMP, CBC, glucose, lytes, trend renal indices, LFTs, POCT, lipids/TG, lactate; MAP.   4. Trend dry wts (pre/post HD wts). Monitor Pain, Skin, GOC.  5. RD to remain available for additional nutrition interventions as needed.     Education: Not appropriate based on current clinical/mental status     Risk Level: High [ x  ] Moderate [   ] Low [   ].
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
## 2021-12-08 NOTE — PROGRESS NOTE ADULT - ASSESSMENT
46y/Female with:  L MCA ruptured aneurysm, subarachnoid hemorrhage, s/p C (10/26/2021, Dr. D'Amico @ [place]), brain compression, cerebral edema  anemia   recent spinal surgery  UGIB  bilateral pneumothorax  acute kidney injury, transaminitis    PLAN: Day 1 = 10-26 ; Day 4 = 10/29; Day 21 = 11/15  NEURO: comachecks q4h, VS q1h, standing fentanyl q4h for now - sympathetic overdrive  SAH:  off nimodipine  Hydrocephalus:  EVD discontinued  cranioplasty to be scheduled  continue baclofen - increase to 10mg q8h  Seizure prophylaxis:  cont levetiracetam 500 OD; EEG negative   REHAB:  physical therapy evaluation and management    EARLY MOB:  HOB elevated    PULM:  cont full vent support, continue mucormyst (q12h) and ipratropium / albuterol  CARDIO:  SBP goal 100-160mm Hg, TTE, amlodipine 10mg PO daily; clonidine 0.2 q8h; continue hydralazine 100q8h; EP consulted - NTD  ENDO:  Blood sugar goals 140-180 mg/dL  GI:  off PPI OD; FOBT NEG  DIET: TF at goal  RENAL: Na goal 135-145; decrease salt to 2G q8h, HD today   HEM/ONC: no coagulopathy (INR= 1.03), no ASA / Plavix use; Hb stable  VTE Prophylaxis: SCDs, SQH   ID: afebrile, leukocytosis downtrending, piperacillin/tazobactam x 5-7days, panculture, CXR, f/u sputum culture sensitivities; ID consult;   Social: will update family - dispo planning  MISC: ENT consulted for tongue wound - NTD    CORE MEASURES  1.  Hunt and Griffin Score = 5  2.  VTE prophylaxis:  [ ] administered within 24 hours of admission OR [X] reason not done: fresh bleed, unsecured aneurysm.  3.  Dysphagia screening:   [X] performed before any oral meds / liquids / food  4.  Nimodipine treatment:  [X] administered within 24 hours of admission OR [ ] reason not done:    ATTENDING ATTESTATION:  I was physically present for the key portions of the evaluation and management (E/M) service provided.  I agree with the above history, physical and plan, which I have reviewed and edited where appropriate.    Patient at high risk for neurological deterioration or death due to:  ICU delirium, aspiration PNA, DVT / PE.  Critical care time:  I have personally provided 45 minutes of critical care time, excluding time spent on separate procedures.      Plan discussed with RN, house staff.    LATERAL TRANSFER CHECKLIST    I have reviewed the patient’s history, performed a clinical examination and assessed the patient to be stable for transfer to a non-neurosurgical intensive care unit.      Specifically, the patient:  [X] does not have cerebrovascular insufficiency or require hemodynamic augmentation  [X] does not have vasospasm or delayed cerebral ischemia with subarachnoid hemorrhage  [X] does not require active titration of meds for uncontrolled sympathetic storming  [X] does not have an external ventricular drain (except lateral transfers to [facility])  [X] does not have a lumbar drain  [X] did not have a complex skull base surgery or complex intracranial tumor in the immediate post-operative period   46y/Female with:  L MCA ruptured aneurysm, subarachnoid hemorrhage, s/p [facility] (10/26/2021, Dr. D'Amico @ [place]), brain compression, cerebral edema  anemia   recent spinal surgery  UGIB  bilateral pneumothorax  acute kidney injury, transaminitis    PLAN: Day 1 = 10-26 ; Day 4 = 10/29; Day 21 = 11/15  NEURO: comachecks q4h, VS q1h, standing fentanyl q6h - sympathetic overdrive  SAH:  off nimodipine  Hydrocephalus:  EVD discontinued  cranioplasty to be scheduled  continue baclofen - to 10mg q8h  Seizure prophylaxis:  cont levetiracetam 500 OD; EEG negative   REHAB:  physical therapy evaluation and management    EARLY MOB:  HOB elevated    PULM:  cont full vent support, continue mucormyst (q12h) and ipratropium / albuterol  CARDIO:  SBP goal 100-160mm Hg, TTE, amlodipine 10mg PO daily; clonidine 0.2 q8h; continue hydralazine 100q8h; EP consulted - NTD  ENDO:  Blood sugar goals 140-180 mg/dL  GI:  off PPI OD; FOBT NEG  DIET: TF at goal  RENAL: Na goal 135-145; salt 2G q8h, HD tomorrow  HEM/ONC: no coagulopathy (INR= 1.03), no ASA / Plavix use; Hb stable  VTE Prophylaxis: SCDs, SQH   ID: afebrile, leukocytosis, antibiotics switched to cefepime, duration as per ID; CXR  Social: will update family - dispo planning  MISC: ENT consulted for tongue wound - NTD    CORE MEASURES  1.  Hunt and Griffin Score = 5  2.  VTE prophylaxis:  [ ] administered within 24 hours of admission OR [X] reason not done: fresh bleed, unsecured aneurysm.  3.  Dysphagia screening:   [X] performed before any oral meds / liquids / food  4.  Nimodipine treatment:  [X] administered within 24 hours of admission OR [ ] reason not done:    ATTENDING ATTESTATION:  I was physically present for the key portions of the evaluation and management (E/M) service provided.  I agree with the above history, physical and plan, which I have reviewed and edited where appropriate.    Patient at high risk for neurological deterioration or death due to:  ICU delirium, aspiration PNA, DVT / PE.  Critical care time:  I have personally provided 45 minutes of critical care time, excluding time spent on separate procedures.      Plan discussed with RN, house staff.    LATERAL TRANSFER CHECKLIST    I have reviewed the patient’s history, performed a clinical examination and assessed the patient to be stable for transfer to a non-neurosurgical intensive care unit.      Specifically, the patient:  [X] does not have cerebrovascular insufficiency or require hemodynamic augmentation  [X] does not have vasospasm or delayed cerebral ischemia with subarachnoid hemorrhage  [X] does not require active titration of meds for uncontrolled sympathetic storming  [X] does not have an external ventricular drain (except lateral transfers to [facility])  [X] does not have a lumbar drain  [X] did not have a complex skull base surgery or complex intracranial tumor in the immediate post-operative period

## 2021-12-08 NOTE — PROGRESS NOTE ADULT - SUBJECTIVE AND OBJECTIVE BOX
OVERNIGHT EVENTS: Low grade temperature of 100.2 at 10pm and 100 at 5 am.     SUBJECTIVE / INTERVAL HPI: Patient seen and examined at bedside. Intubated and sedated.     VITAL SIGNS:  Vital Signs Last 24 Hrs  T(C): 37.1 (08 Dec 2021 09:03), Max: 37.9 (07 Dec 2021 21:55)  T(F): 98.8 (08 Dec 2021 09:03), Max: 100.2 (07 Dec 2021 21:55)  HR: 85 (08 Dec 2021 09:03) (76 - 101)  BP: 131/76 (08 Dec 2021 09:00) (126/71 - 155/85)  BP(mean): 98 (08 Dec 2021 09:00) (93 - 115)  RR: 14 (08 Dec 2021 09:00) (14 - 23)  SpO2: 99% (08 Dec 2021 09:03) (97% - 100%)    PHYSICAL EXAM:    General: intubated and sedated  HEENT: NC/AT; PERRL, anicteric sclera; MMM  Neck: supple  Cardiovascular: +S1/S2; RRR  Respiratory: CTA B/L; no W/R/R  Gastrointestinal: soft, NT/ND; +BSx4  Extremities: WWP; no edema, clubbing or cyanosis  Vascular: 2+ radial, DP/PT pulses B/L      MEDICATIONS:  MEDICATIONS  (STANDING):  acetylcysteine 20%  Inhalation 4 milliLiter(s) Inhalation every 6 hours  amLODIPine   Tablet 10 milliGRAM(s) Oral daily  artificial  tears Solution 1 Drop(s) Both EYES two times a day  baclofen 10 milliGRAM(s) Oral every 8 hours  bisacodyl Suppository 10 milliGRAM(s) Rectal daily  cefepime   IVPB 1000 milliGRAM(s) IV Intermittent every 24 hours  chlorhexidine 0.12% Liquid 15 milliLiter(s) Oral Mucosa every 12 hours  chlorhexidine 2% Cloths 1 Application(s) Topical <User Schedule>  cloNIDine 0.2 milliGRAM(s) Oral every 8 hours  fentaNYL    Injectable 25 MICROGram(s) IV Push every 6 hours  heparin   Injectable 5000 Unit(s) SubCutaneous every 8 hours  hydrALAZINE 100 milliGRAM(s) Oral every 8 hours  levETIRAcetam  Solution 500 milliGRAM(s) Oral every 24 hours  polyethylene glycol 3350 17 Gram(s) Oral two times a day  senna 2 Tablet(s) Oral at bedtime  sodium chloride 2 Gram(s) Oral every 8 hours    MEDICATIONS  (PRN):  acetaminophen    Suspension .. 650 milliGRAM(s) Oral every 6 hours PRN Temp greater or equal to 38C (100.4F), Mild Pain (1 - 3)  hydrALAZINE Injectable 10 milliGRAM(s) IV Push every 4 hours PRN SBP >180  ondansetron Injectable 4 milliGRAM(s) IV Push every 6 hours PRN Nausea and/or Vomiting  sodium chloride 0.9% lock flush 10 milliLiter(s) IV Push every 1 hour PRN Pre/post blood products, medications, blood draw, and to maintain line patency      ALLERGIES:  Allergies    No Known Allergies    Intolerances        LABS:                        7.5    15.27 )-----------( 202      ( 08 Dec 2021 05:29 )             25.2     12-08    141  |  103  |  31<H>  ----------------------------<  148<H>  3.8   |  30  |  2.56<H>    Ca    9.5      08 Dec 2021 05:29  Phos  2.6     12-08  Mg     1.9     12-08          CAPILLARY BLOOD GLUCOSE          RADIOLOGY & ADDITIONAL TESTS: Reviewed.    ASSESSMENT:    PLAN:

## 2021-12-08 NOTE — PROGRESS NOTE ADULT - SUBJECTIVE AND OBJECTIVE BOX
HPI:  45 y/o female with PMH HTN, Multiple sclerosis, recent spinal surgery 10/8 ([facility]) presented to [place] ED BIBEMS after being found unconscious at home, unknown period of time. 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 HH5, MF1. Patient was intubated at [place] ED, found to have blown L pupil, and sent straight to the OR for left hemicraniotomy. A-line placed intraop. Hemodynamically unstable upon arrival to [facility], bradycardic and hypertensive s/p Mannitol, Clevidipine, and Furosemide. Central line placed in NSICU. Currently sedated on Propofol, pending repeat CTH. History per patient's son, patient had recent spine surgery and was found on outpatient imaging last week to have L M1 segment aneurysm (unruptured), pt asymptomatic at this time. Per son patient taking percocet PO at home. Ureña catheter, ET tube, and arterial line placed at [facility].     NIHSS on arrival: 32   Bess Griffin 5, Mod Busby 4  Bleed Day 1 = 10/26 (26 Oct 2021 13:03)    OVERNIGHT EVENTS: JOAQUÍN overnight, neuro stable    HOSPITAL COURSE  10/26: admitted to [facility] from [facility], POD#0 s/p L hemicraniectomy for decompression and evacuation of SDH. Transferred to [facility] noted to have tense flap, received mannitol, keppra, decadron. Was hypertensive to 200s and preston to 40s, recevied 85g mannitol and bullet 23.4%. CTH on arrival demonstrated increased size in vents and new IVH. EVD placed, open at 15, central line placed. Transfused 2 u PRBC. POD0 of coiling of left MCA bifurcation aneurysm,   10/27: CTH demonstrated EVD in correct position, EVD lowered to 5, remains intubated on proprofol, pending repeat CTH in AM. Started on 3% @ 30/hr. 2LNS given for euvolemia, Mannitol given for uptrending ICPs. Bullet given 8:30 am. 3% increased to 50/hr. 1 L bolus. New EVD catheter placed using exisiting sreekanth hole. 1 u PRBC.  10/28: HH5, MF4, BD3; POD2 angio coil/embo of L MCA aneurysm. JOAQUÍN overnight, neuro stable. EVD@5cmH20 ICPs < 20 overnight, OP WNL. S/p 1 unit PRBC yesterday with appropriate response. Given 42g mannitol in AM for ICP 22 persistently, with improvement, then given 23% in PM for elevated ICP. febrile, pancultured. Standing tylenol and cooling blanket.   10/29: BD4, POD3 angio coil/embo. JOAQUÍN o/n, neuro stable. EVD@5, ICPs <20 o/n.   10/30: BD5, POD4 angio coil/embo. JOAQUÍN o/n neuro stable. EVD@5. 3% @50cc/hr.  10/31: BD6, POD5 angio coil/embo. brief period maintained upward gaze, resolved on its own. EVD@5cm h2o.    11/1: BD7, POD6 L hemicrani, angio coil/embo. JOAQUÍN overnight. Neuro exam unchanged. ICPs WNL. started bromocriptine/gabapentin/baclofen. dc'd propofol, started precedex  11/2: BD8 POD7 L hemicrani, angio coil/embo. JOAQUÍN overnight. Neuro exam stable. Remains on 3% hypertonic saline. Receieved 1 unit of PRBCs for a Hgb of 7.6.  11/3: BD 9 POD8 of L hemicrani. Elevated lipase, normal amylase, OG tube clogged and replaced. Abdominal US normal. Pending gen surg reccs regarding trach and peg. Gabapentin and Baclofen increased to help with neurostorming. restarted back on 3%, LR@35. became preston+hypotensive with nimodipine 60q4, decreased back to 30q2, NaCl bullet given.   11/4: BD10 POD9, JOAQUÍN o/n, 500cc NS for euvolemia,  neuro stable, EVD at 5. 3% increased to 75  11/5: BD11 POD10, JOAQUÍN o/n, neuro stable, EVD at 5  11/6: BD12 POD11 JOAQUÍN overnight. Neuro exam stable. Remains intubated on precedex. 3% hypertonic saline dc'd  11/7: BD13 POD 12 JOAQUÍN o/n, NGT replaced. on precex with no icp crisis   11/8: BD14 POD13 JOAQUÍN o/n, noted to have tongue maceration/macroglossia. Gauze with tongue depressor placed. Pending further recs from ENT. Pending trach and PEG placement tomorrow with gen surg. Off precedex, ICPs stable. EVD remains @ 5.   11/9: BD15, POD 14, bleeding under tongue at start of shift, fentanyl pushed with no further episodes. gabapentin stopped. PEG placed  11/10: BD 16, POD 15, neuro stable, ENT to be consulted in AM, 3% increased to 50/hr.  11/11: BD 17, POD 16, neuro exam stable. Pend [facility] saba in am for cranioplasty planning. Pend trach in OR with ENT. F.u BMP in am, 3%@50cc/hr for Na goal 140-145.   11/12: BD 18, POD 17. JOAQUÍN overnight, neuro stable. Pend trach placement today. CT saba completed 11/11. Off of 3%, f/u am BMP for Na goal 140-150. CSF neg. Hypoxic cardiac arrest with ROSC x 3 during tracheostomy placement. B/l chest tubes placed for resulting pneumothorax s/p CPR. salt tabs dc'd.  11/13: BD 19, POD 18. Patient tachycardic with some improvement, SBP stable off of levophed, hgb downtrending o/n, transfused 1 unit PRBCs. B/l chest tube. EVD @5cmH2O, no elevated ICPs. UOP downtrending, trend BUN/Cr, given 1LNS x1 for low urine output. F/u am CXR. Cont Cefepime until today, then change to Ancef while trach packing in place per ENT. Pend CTH when stable. Trops downtrending s/p cardiac arrest. 1L. florinef dc'd. BP goal changed to 100-160, started on amlodipine   11/14: BD20, POD19, worsening creatinine with decreased urine output. Given 250 of albumin and 500cc LR. started on hydralazine PO, decreased amantadine 100 daily given CrCl of 20.  11/15: BD21, POD20, remains oliguric, fem line dc'd, tongue swollen and unable to fit bite block in mouth, started on nimbex gtt to relax jaw. Propofol and fentanyl gtt started. Vomiting TFs through nose and mouth, ENT called. TFs held. Cr uptrending. Palliative consulted.  11/16: BD22, POD21, o/n external trach dressing replaced due to cuff leak and decreasing TV, sedated and paralyzed on nimbex, propofol, and fentanyl gtt. CTH stable.  EVD raised from 5 to 10. Nimbex weaned off. Cr uptrending, Trickle feeds started. FOB negative.   11/17: BD 23, POD22, JOAQUÍN o/n, EVD clamped, NS d/c'ed, LR@50, advancing tube feeds.   11/18: BD24, POD23 o/n 3% at 30 with Na acetate started, propofol dc'd due to elevated TG level, episode of vomiting TFs from nose and mouth into ETT with elevated ICP 30s, ICP normalized with resolution of vomiting, reglan 5mg IV given, TFs held, 3% stopped. midline placed   11/19; BD25, POD24 JOAQUÍN overnight. Remains on versed and fentanyl drips. Neuro exam unchanged. R IJ dialysis cath placed.   11/20: BD26 POD25 JOAQUÍN overnight. Neuro exam unchanged. Plan for HD today  11/21: BD 27 POD 26 weaned off versed, fentanyl   11/22: BD 28 POD 27 JOAQUÍN o/n , off fentanyl gtt, pt is calm. s/p HD earlier today. s/p trach revision  11/23: BD29. incraesed clonidine to 0.4 BID, s/p trach, stable hemodynamically. neuro at baseline. TFs stopped for vomiting and restarted 10cc/hr  11/24: BD 30, POD29 TFs inc to 20cc/hr. HD today  11/25: BD31, POD30. JOAQUÍN o/n neuro stable  11/26: BD32, POD 31, JOAQUÍN overnight,  perma cath placement with IR today , hydralazine inc 75q8, reglan decreased 2.5q6. Hemodialyzed today, took off 3L and given 1unit pRBCs  11/27: BD33, POD32, JOAQUÍN overnight, pending LTAC. hyponatremic, urine lytes sent. 250cc bolus 3% started, salt tabs started  11/28: BD34 POD 33 L hemicrani. JOAQUÍN o/n. Hyponatremia improved, NaCl 2 g q 6. Pending LTAC at [facility], increased prn requirements for BP (hydral + labetalol x1), hydral PO increased to 100q8, Fentanyl x1 for pain.   11/29: BD35 POD 34 s/p L hemicrani, JOAQUÍN o/n, pend dialysis tomorrow. CTH ordered for am, exit CTH pend. Plan for discharge to [facility]. Tolerating CPAP during the day, full vent support overnight. HD today  11/30: BD 36 POD 35 pending LTAC to [facility], received on HD through the port. HD , 3L removed, hypertensive, renal following, hydralazine pushes prn for now.   12/1: Intermittent episodes of hypertension, decreased clonidine to q8 and added labetalol 100 BID. RUE doppler done   LGF - tylenol given   12/2: Multiple episodes of asystole, witnessed upward gaze associated with 5 second pause and later 8 second pause. EKG stable, troponin 0.08.  labetalol decreased to 50BID. cuff leak: reinflated during day and ENT to replace  tomorrow. EEG placed for upward gaze. Dialysis today.  12/3: JOAQUÍN o/n, recieved multiple pushes IV hydralazine for HTN. during day bradycardic to 40s when moving head and resolved, EP consulted - thought to be vagal sensitivity. No cuff leak today so tube not replaced  12/4: JOAQUÍN o/n, neuro unchanged, started on standing fentanyl x 1 day for HTN unclear if pain related, baclofen started for rigidity, dialysis today  12/5: o/n fever tmax 102, pancultured, neuro stable; HD yesterday  Started on zosyn for PNA   12/6: JOAQUÍN overnight. Neuro exam stable.  12/7: JOAQUÍN overnight. Neuro exam stable. Pending rehab placement, HD today, 3 liters taken off, zosyn switched to cefepime   12/8: JOAQUÍN overnight, neuro stable       Vital Signs Last 24 Hrs  T(C): 37.9 (07 Dec 2021 21:55), Max: 37.9 (07 Dec 2021 21:55)  T(F): 100.2 (07 Dec 2021 21:55), Max: 100.2 (07 Dec 2021 21:55)  HR: 81 (07 Dec 2021 23:00) (75 - 101)  BP: 134/76 (07 Dec 2021 23:00) (126/71 - 155/85)  BP(mean): 100 (07 Dec 2021 23:00) (93 - 115)  RR: 14 (07 Dec 2021 23:00) (14 - 23)  SpO2: 99% (07 Dec 2021 23:00) (97% - 100%)    I&O's Summary    06 Dec 2021 07:01  -  07 Dec 2021 07:00  --------------------------------------------------------  IN: 1080 mL / OUT: 550 mL / NET: 530 mL    07 Dec 2021 07:01  -  08 Dec 2021 00:03  --------------------------------------------------------  IN: 970 mL / OUT: 3650 mL / NET: -2680 mL        PHYSICAL EXAM:  General: NAD, pt is comfortably sitting up in bed, trached to vent   HEENT: PERRL 2-3mm reactive, neck FROM, +trach, +flap full but soft.  Cardiovascular: RRR, normal S1 and S2   Respiratory: lungs CTAB, no wheezing, rhonchi, or crackles   GI: normoactive BS to auscultation, abd soft, NTND, +PEG  Neuro: Non-verbal, does not FC, not oriented, OE to noxious, grimaces, +cough/gag/corneals. Bilateral UE 0/5 to noxious stimuli, bilateral LE trace triple flexion   Extremities: distal pulses 2+ x4  Wound/incision: +Well healed craniotomy incision, C/D/I, healing back incisions, C/D, improving dehiscence      TUBES/LINES:  [] Ureña  [] Lumbar Drain  [] Wound Drains  [X] Others - Permacath, Right brachial midline, PEG, Trach, a-line       DIET:  [] NPO  [] Mechanical  [X] Tube feeds    LABS:                        7.7    13.03 )-----------( 216      ( 07 Dec 2021 05:58 )             25.7     12-07    144  |  108  |  49<H>  ----------------------------<  139<H>  4.0   |  26  |  3.85<H>    Ca    9.8      07 Dec 2021 05:58  Phos  3.6     12-07  Mg     2.3     12-07              CAPILLARY BLOOD GLUCOSE          Drug Levels: [] N/A    CSF Analysis: [] N/A      Allergies    No Known Allergies    Intolerances      MEDICATIONS:  Antibiotics:  cefepime   IVPB 1000 milliGRAM(s) IV Intermittent every 24 hours    Neuro:  baclofen 10 milliGRAM(s) Oral every 8 hours  fentaNYL    Injectable 25 MICROGram(s) IV Push every 4 hours  levETIRAcetam  Solution 500 milliGRAM(s) Oral every 24 hours  ondansetron Injectable 4 milliGRAM(s) IV Push every 6 hours PRN    Anticoagulation:  heparin   Injectable 5000 Unit(s) SubCutaneous every 8 hours    OTHER:  acetylcysteine 20%  Inhalation 4 milliLiter(s) Inhalation every 6 hours  amLODIPine   Tablet 10 milliGRAM(s) Oral daily  artificial  tears Solution 1 Drop(s) Both EYES two times a day  bisacodyl Suppository 10 milliGRAM(s) Rectal daily  chlorhexidine 0.12% Liquid 15 milliLiter(s) Oral Mucosa every 12 hours  chlorhexidine 2% Cloths 1 Application(s) Topical <User Schedule>  cloNIDine 0.2 milliGRAM(s) Oral every 8 hours  hydrALAZINE 100 milliGRAM(s) Oral every 8 hours  hydrALAZINE Injectable 10 milliGRAM(s) IV Push every 4 hours PRN  polyethylene glycol 3350 17 Gram(s) Oral two times a day  senna 2 Tablet(s) Oral at bedtime    IVF:  sodium chloride 2 Gram(s) Oral every 8 hours    CULTURES:  Culture Results:   Culture in progress (12-05 @ 22:42)  Culture Results:   Numerous Serratia marcescens  Numerous Proteus mirabilis  Susceptibility to follow.  Accompanied by normal respiratory melinda (12-05 @ 21:16)    RADIOLOGY & ADDITIONAL TESTS:  CXR 12/7/2021:   FINDINGS: Patient rotated to the left. Right internal jugular venous HD catheter with tip overlying right atrium. No pneumothorax or pleural effusion. Tracheostomy tube in situ. Right lung grossly clear. Cannot rule out patchy consolidation in the left retrocardiac region/left lower lobe.  IMPRESSION: Cannot rule out patchy consolidation left lower lobe/left retrocardiac region.      ASSESSMENT:  45 y/o female with PMHx of HTN and MS, hx of spinal surgery at [facility] 10/8/21 presented to [place] ED BIBEMS after being found unconscious at home, unknown period of time. 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 HH5, MF4; BD #1 = 10/26. Patient was intubated at [place] ED and sent straight to the OR for left hemicraniotomy. A-line placed intraop. Hemodynamically unstable upon arrival to [facility], bradycardic and hypertensive s/p Mannitol and Furosemide. Central line placed in NSICU. S/p EVD placement, and coil embolization of left MCA bifurcation aneurysm 10/26/2021. S/p cerebral angio without findings of vasospasm 11/10. S/p cardiac arrest with ROSC x3 on 11/12 during tracheostomy at bedside now with b/l pneumothoracies and worsening kidney function. EVD dc'd 11/18, trach'd 11/22, permacath placed 11/26, pending LTACH/[facility].      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, left    Intracranial hemorrhage, spontaneous subarachnoid, due to cerebral aneurysm, acute    Pneumothorax, left    Functional quadriplegia    Acute respiratory failure with hypoxia    Cardiac arrest    Encounter for palliative care    Advanced care planning/counseling discussion    IPH (idiopathic pulmonary hemosiderosis)    Acute spontaneous intraparenchymal intracranial hemorrhage due to cerebral aneurysm    CHETAN (acute kidney injury)    Hypocalcemia    Angiogram, cerebral, with coil embolization, in non-operating room setting    Endoscopic percutaneous gastrostomy    Angiogram, carotid and cerebral, bilateral    Chest tube placement    Insertion of central venous catheter with ultrasound guidance    Insertion of temporary dialysis catheter    Tracheostomy, planned    Personal history of spine surgery    CHETAN (acute kidney injury)    SysAdmin_VstLnk        PLAN:  NEURO:  - neuro checks q4hrs/vital signs q 1  - Keppra 500mg q24h renal dosing   - CTH 11/18 stable, CTH 11/29 stable  - plan for cranioplasty in ~3 months   - EEG x 24 hrs   - fentanyl 25 q4 x24hr  - baclofen 10 q8     CARDIO:  - -180   - amlodipine 10 daily and hydral 100 q8h, clonidine 0.2 q 8 and labetalol dc'd  - hydralazine IV prn, avoid BB IV   - s/p cardiac arrest   - TTE 11/15: mild LVH, EF 70%   - EP consulted: pauses on telemetry and bradycardia with neck movement likely due to baroreceptor sensitivity, no need for pacemaker     PULM:  - trach, continue full vent support   - s/p acute hypoxic cardiac arrest 11/12 during tracheostomy placment with ENT c/b b/l pneumothorax and b/l chest tubes (d/kiko 11/24)  - mucomyst q 6     GI:  - PEG TFs nepro- at goal 30cc/hr  - Bowel regimen, last BM 12/1  - Alk phos elevated, shocked liver, improving     RENAL:   - post cardiac arrest renal failure on long term HD  - port with IR 11/26 , nephro following, last HD 12/7  - Na 135-145   - salt tabs 2q8  - daily weights     HEME:  - SCDs, SQH  - s/p multiple transfusions this admission, most recently s/p 1unit PRBC 11/26  - UE and LE dopplers negative 11/26, RUE 12/1 with superficial thrombophlebitis in the right cephalic vein   - FOB neg 11/16, 12/6    ID:  - s/p cefepime for enterobacter/proteus in sputum dc'd 11/15   - ID following appreciate recs   - started zosyn 12/5 for serratia in blood and serratia&proteus in sputum. Negative urine culture,  - Cefipime 1Gq24 hours daily, on days with dialysis give AFTER dialysis   - Daily blood cultures til negative cultures  - Possibly remove midline/cath port if cultures continually positive     ENDO:  - monitor BMP glucoses    OTHER  - wound care recs- q2 dressing changes   - ENT reconsulted for trach cuff leak and tongue injury, NTD just mointor  - has R midline (11/18)    GOC: full code  Level of care: ICU status   Dispo: [facility] (4 seasons) AUTH until 12/10   family updated    Case discussed with Dr. Duncan and Dr. Vyas      Assessment:  Present when checked    []  GCS  E   V  M     Heart Failure: []Acute, [] acute on chronic , []chronic  Heart Failure:  [] Diastolic (HFpEF), [] Systolic (HFrEF), []Combined (HFpEF and HFrEF), [] RHF, [] Pulm HTN, [] Other    [] CHETAN, [] ATN, [] AIN, [] other  [] CKD1, [] CKD2, [] CKD 3, [] CKD 4, [] CKD 5, []ESRD    Encephalopathy: [] Metabolic, [] Hepatic, [] toxic, [] Neurological, [] Other    Abnormal Nurtitional Status: [] malnurtition (see nutrition note), [ ]underweight: BMI < 19, [] morbid obesity: BMI >40, [] Cachexia    [] Sepsis  [] hypovolemic shock,[] cardiogenic shock, [] hemorrhagic shock, [] neuogenic shock  [] Acute Respiratory Failure  []Cerebral edema, [] Brain compression/ herniation,   [] Functional quadriplegia  [] Acute blood loss anemia

## 2021-12-08 NOTE — PROGRESS NOTE ADULT - ATTENDING COMMENTS
I: HTN controlled. HGB 7.5.     A: ESRD. Anemia stable.   P: Continue dialysis. JENIFER at HD. Continue BP meds.

## 2021-12-08 NOTE — CHART NOTE - NSCHARTNOTESELECT_GEN_ALL_CORE
Follow Up Nutrition Assessment/Nutrition Services
Nutrition Services
Palliative Care Coordination
Anti-infective approval note/Event Note
Code Blue
Event Note
Family discussion/Event Note
Family/Event Note
Follow Up Nutrition Assessment/Nutrition Services
GOC/Event Note
Nutrition Follow Up/Nutrition Services
Nutrition Services
Nutrition Services
Palliative Care Coordination
VTE Note/Event Note

## 2021-12-08 NOTE — PROGRESS NOTE ADULT - SUBJECTIVE AND OBJECTIVE BOX
Patient is a 46y Female seen and evaluated at bedside. VAP serratia bacteraemia. ID on board- f/u their recommendations regarding abx and HD cath removal. No HD today.     Meds:    acetaminophen    Suspension .. 650 every 6 hours PRN  acetylcysteine 20%  Inhalation 4 every 6 hours  amLODIPine   Tablet 10 daily  artificial  tears Solution 1 two times a day  baclofen 10 every 8 hours  bisacodyl Suppository 10 daily  cefepime   IVPB 1000 every 24 hours  chlorhexidine 0.12% Liquid 15 every 12 hours  chlorhexidine 2% Cloths 1 <User Schedule>  cloNIDine 0.2 every 8 hours  fentaNYL    Injectable 25 every 6 hours  heparin   Injectable 5000 every 8 hours  hydrALAZINE 100 every 8 hours  hydrALAZINE Injectable 10 every 4 hours PRN  levETIRAcetam  Solution 500 every 24 hours  ondansetron Injectable 4 every 6 hours PRN  polyethylene glycol 3350 17 two times a day  senna 2 at bedtime  sodium chloride 2 every 8 hours  sodium chloride 0.9% lock flush 10 every 1 hour PRN      T(C): , Max: 38.1 (12-08-21 @ 10:00)  T(F): , Max: 100.6 (12-08-21 @ 10:00)  HR: 79 (12-08-21 @ 12:00)  BP: 129/74 (12-08-21 @ 12:00)  BP(mean): 96 (12-08-21 @ 12:00)  RR: 4 (12-08-21 @ 12:00)  SpO2: 99% (12-08-21 @ 12:00)  Wt(kg): --    12-07 @ 07:01  -  12-08 @ 07:00  --------------------------------------------------------  IN: 1270 mL / OUT: 3750 mL / NET: -2480 mL    12-08 @ 07:01  -  12-08 @ 12:49  --------------------------------------------------------  IN: 30 mL / OUT: 0 mL / NET: 30 mL    Review of Systems: Unable to participate    PHYSICAL EXAM:  GENERAL: intubated, s/p hemicraniectomy- staples on her scalp  CHEST/LUNG: Coarse bilaterally  HEART: normal S1S2, RRR  EXTREMITIES: +2 b/l UE oedema   ACCESS: RIJ tunneled cath placed 11/26    LABS:                        7.5    15.27 )-----------( 202      ( 08 Dec 2021 05:29 )             25.2     12-08    141  |  103  |  31<H>  ----------------------------<  148<H>  3.8   |  30  |  2.56<H>    Ca    9.5      08 Dec 2021 05:29  Phos  2.6     12-08  Mg     1.9     12-08                  RADIOLOGY & ADDITIONAL STUDIES:

## 2021-12-08 NOTE — PROGRESS NOTE ADULT - ATTENDING COMMENTS
Tm 100.6 this morning, WBC may be trending up, Serratia in blood and sputum, as well as Proteus in sputum are susceptible to cefepime, would continue, f/u surveillance blood culture, trend fever curve/WBC, reculture as needed.  If surveillance cultures positive, would need to change lines.  Will follow with you – ID Team 1.  Dr. Ny will assume care tomorrow.

## 2021-12-08 NOTE — PROGRESS NOTE ADULT - ASSESSMENT
45 y/o female with PMHx of HTN and MS, hx of spinal surgery at [facility] 10/8/21 [facility] [place] [facility] at [facility], unknown period of time. 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 Patient was intubated at [place] ED and sent straight to the OR for left hemicraniotomy. S/p EVD placement, and coil embolization of left MCA bifurcation aneurysm 10/26/2021. S/p cerebral angio without findings of vasospasm 11/10. S/p cardiac arrest with ROSC x3 on 11/12 during tracheostomy at bedside with b/l pneumothoraces now with chest tubes clamped. EVD dc'd 11/18, trach revision 11/22. ID consulted for serratia bacteremia for which she was started on Zosyn. Of note, On 11/11/21, ID consulted for recommendations for treatment of sputum cultures from 11/4/21 growing MSSA, Enterobacter, and Proteus, as well as clearance for cranioplasty. At that time, Patient was treated with a 7-day course of cefepime which she finished on 11/8.     Culture Data: Sputum 11/4: Numerous staph aureus, enterobacter clocae, proteus. Sputum 11/5: Proteus. Blood cx 12/5: serratia. CSF negative. UCX pending.  Imaging: Xray 12/7-patchy consolidation LLL   Surveillance 12/7: NGTD    Recommendations:  -Please continue with Cefepime [id number] daily but on days of dialysis, please give AFTER dialysis.   -Will continue to follow sputum and urine cultures.     ID to continue to follow, plan discussed with primary team and attending.

## 2021-12-09 LAB
ALBUMIN SERPL ELPH-MCNC: 3 G/DL — LOW (ref 3.3–5)
ALP SERPL-CCNC: 150 U/L — HIGH (ref 40–120)
ALT FLD-CCNC: 10 U/L — SIGNIFICANT CHANGE UP (ref 10–45)
ANION GAP SERPL CALC-SCNC: 15 MMOL/L — SIGNIFICANT CHANGE UP (ref 5–17)
AST SERPL-CCNC: 23 U/L — SIGNIFICANT CHANGE UP (ref 10–40)
BILIRUB SERPL-MCNC: 0.2 MG/DL — SIGNIFICANT CHANGE UP (ref 0.2–1.2)
BUN SERPL-MCNC: 40 MG/DL — HIGH (ref 7–23)
CALCIUM SERPL-MCNC: 10.1 MG/DL — SIGNIFICANT CHANGE UP (ref 8.4–10.5)
CHLORIDE SERPL-SCNC: 104 MMOL/L — SIGNIFICANT CHANGE UP (ref 96–108)
CO2 SERPL-SCNC: 24 MMOL/L — SIGNIFICANT CHANGE UP (ref 22–31)
CREAT SERPL-MCNC: 2.73 MG/DL — HIGH (ref 0.5–1.3)
GLUCOSE SERPL-MCNC: 118 MG/DL — HIGH (ref 70–99)
HCT VFR BLD CALC: 28.4 % — LOW (ref 34.5–45)
HGB BLD-MCNC: 8.5 G/DL — LOW (ref 11.5–15.5)
MAGNESIUM SERPL-MCNC: 2 MG/DL — SIGNIFICANT CHANGE UP (ref 1.6–2.6)
MCHC RBC-ENTMCNC: 24.6 PG — LOW (ref 27–34)
MCHC RBC-ENTMCNC: 29.9 GM/DL — LOW (ref 32–36)
MCV RBC AUTO: 82.3 FL — SIGNIFICANT CHANGE UP (ref 80–100)
NRBC # BLD: 0 /100 WBCS — SIGNIFICANT CHANGE UP (ref 0–0)
PHOSPHATE SERPL-MCNC: 3.9 MG/DL — SIGNIFICANT CHANGE UP (ref 2.5–4.5)
PLATELET # BLD AUTO: 237 K/UL — SIGNIFICANT CHANGE UP (ref 150–400)
POTASSIUM SERPL-MCNC: 4 MMOL/L — SIGNIFICANT CHANGE UP (ref 3.5–5.3)
POTASSIUM SERPL-SCNC: 4 MMOL/L — SIGNIFICANT CHANGE UP (ref 3.5–5.3)
PROCALCITONIN SERPL-MCNC: 0.45 NG/ML — HIGH (ref 0.02–0.1)
PROT SERPL-MCNC: 7.7 G/DL — SIGNIFICANT CHANGE UP (ref 6–8.3)
RBC # BLD: 3.45 M/UL — LOW (ref 3.8–5.2)
RBC # FLD: 18.5 % — HIGH (ref 10.3–14.5)
SODIUM SERPL-SCNC: 143 MMOL/L — SIGNIFICANT CHANGE UP (ref 135–145)
WBC # BLD: 15.47 K/UL — HIGH (ref 3.8–10.5)
WBC # FLD AUTO: 15.47 K/UL — HIGH (ref 3.8–10.5)

## 2021-12-09 PROCEDURE — 99232 SBSQ HOSP IP/OBS MODERATE 35: CPT

## 2021-12-09 PROCEDURE — 99291 CRITICAL CARE FIRST HOUR: CPT

## 2021-12-09 PROCEDURE — 90935 HEMODIALYSIS ONE EVALUATION: CPT | Mod: GC

## 2021-12-09 PROCEDURE — 71045 X-RAY EXAM CHEST 1 VIEW: CPT | Mod: 26

## 2021-12-09 PROCEDURE — 99024 POSTOP FOLLOW-UP VISIT: CPT

## 2021-12-09 RX ORDER — ERYTHROPOIETIN 10000 [IU]/ML
9000 INJECTION, SOLUTION INTRAVENOUS; SUBCUTANEOUS ONCE
Refills: 0 | Status: COMPLETED | OUTPATIENT
Start: 2021-12-09 | End: 2021-12-09

## 2021-12-09 RX ORDER — SODIUM CHLORIDE 9 MG/ML
1 INJECTION INTRAMUSCULAR; INTRAVENOUS; SUBCUTANEOUS EVERY 8 HOURS
Refills: 0 | Status: DISCONTINUED | OUTPATIENT
Start: 2021-12-09 | End: 2021-12-10

## 2021-12-09 RX ADMIN — FENTANYL CITRATE 25 MICROGRAM(S): 50 INJECTION INTRAVENOUS at 00:00

## 2021-12-09 RX ADMIN — HEPARIN SODIUM 5000 UNIT(S): 5000 INJECTION INTRAVENOUS; SUBCUTANEOUS at 13:40

## 2021-12-09 RX ADMIN — SENNA PLUS 2 TABLET(S): 8.6 TABLET ORAL at 22:01

## 2021-12-09 RX ADMIN — Medication 0.2 MILLIGRAM(S): at 13:42

## 2021-12-09 RX ADMIN — SODIUM CHLORIDE 1 GRAM(S): 9 INJECTION INTRAMUSCULAR; INTRAVENOUS; SUBCUTANEOUS at 22:00

## 2021-12-09 RX ADMIN — Medication 10 MILLIGRAM(S): at 22:00

## 2021-12-09 RX ADMIN — CHLORHEXIDINE GLUCONATE 15 MILLILITER(S): 213 SOLUTION TOPICAL at 05:45

## 2021-12-09 RX ADMIN — Medication 100 MILLIGRAM(S): at 13:42

## 2021-12-09 RX ADMIN — Medication 10 MILLIGRAM(S): at 13:42

## 2021-12-09 RX ADMIN — Medication 1 DROP(S): at 18:58

## 2021-12-09 RX ADMIN — CHLORHEXIDINE GLUCONATE 15 MILLILITER(S): 213 SOLUTION TOPICAL at 18:36

## 2021-12-09 RX ADMIN — Medication 1 DROP(S): at 05:47

## 2021-12-09 RX ADMIN — ERYTHROPOIETIN 9000 UNIT(S): 10000 INJECTION, SOLUTION INTRAVENOUS; SUBCUTANEOUS at 13:19

## 2021-12-09 RX ADMIN — CHLORHEXIDINE GLUCONATE 1 APPLICATION(S): 213 SOLUTION TOPICAL at 05:44

## 2021-12-09 RX ADMIN — FENTANYL CITRATE 25 MICROGRAM(S): 50 INJECTION INTRAVENOUS at 05:44

## 2021-12-09 RX ADMIN — FENTANYL CITRATE 25 MICROGRAM(S): 50 INJECTION INTRAVENOUS at 13:41

## 2021-12-09 RX ADMIN — Medication 0.2 MILLIGRAM(S): at 05:46

## 2021-12-09 RX ADMIN — Medication 100 MILLIGRAM(S): at 05:46

## 2021-12-09 RX ADMIN — FENTANYL CITRATE 25 MICROGRAM(S): 50 INJECTION INTRAVENOUS at 13:56

## 2021-12-09 RX ADMIN — HEPARIN SODIUM 5000 UNIT(S): 5000 INJECTION INTRAVENOUS; SUBCUTANEOUS at 21:59

## 2021-12-09 RX ADMIN — CEFEPIME 100 MILLIGRAM(S): 1 INJECTION, POWDER, FOR SOLUTION INTRAMUSCULAR; INTRAVENOUS at 13:42

## 2021-12-09 RX ADMIN — Medication 4 MILLILITER(S): at 13:40

## 2021-12-09 RX ADMIN — Medication 0.2 MILLIGRAM(S): at 22:00

## 2021-12-09 RX ADMIN — SODIUM CHLORIDE 2 GRAM(S): 9 INJECTION INTRAMUSCULAR; INTRAVENOUS; SUBCUTANEOUS at 05:46

## 2021-12-09 RX ADMIN — Medication 4 MILLILITER(S): at 05:45

## 2021-12-09 RX ADMIN — HEPARIN SODIUM 5000 UNIT(S): 5000 INJECTION INTRAVENOUS; SUBCUTANEOUS at 05:47

## 2021-12-09 RX ADMIN — AMLODIPINE BESYLATE 10 MILLIGRAM(S): 2.5 TABLET ORAL at 05:46

## 2021-12-09 RX ADMIN — LEVETIRACETAM 500 MILLIGRAM(S): 250 TABLET, FILM COATED ORAL at 22:00

## 2021-12-09 RX ADMIN — Medication 100 MILLIGRAM(S): at 22:01

## 2021-12-09 RX ADMIN — FENTANYL CITRATE 25 MICROGRAM(S): 50 INJECTION INTRAVENOUS at 18:36

## 2021-12-09 RX ADMIN — Medication 4 MILLILITER(S): at 23:53

## 2021-12-09 RX ADMIN — Medication 10 MILLIGRAM(S): at 05:46

## 2021-12-09 RX ADMIN — Medication 4 MILLILITER(S): at 18:23

## 2021-12-09 RX ADMIN — SODIUM CHLORIDE 1 GRAM(S): 9 INJECTION INTRAMUSCULAR; INTRAVENOUS; SUBCUTANEOUS at 13:41

## 2021-12-09 RX ADMIN — FENTANYL CITRATE 25 MICROGRAM(S): 50 INJECTION INTRAVENOUS at 06:00

## 2021-12-09 RX ADMIN — FENTANYL CITRATE 25 MICROGRAM(S): 50 INJECTION INTRAVENOUS at 19:01

## 2021-12-09 NOTE — PROGRESS NOTE ADULT - ASSESSMENT
46y/Female with:  L MCA ruptured aneurysm, subarachnoid hemorrhage, s/p [facility] (10/26/2021, Dr. D'Amico @ [place]), brain compression, cerebral edema  anemia   recent spinal surgery  UGIB  bilateral pneumothorax  acute kidney injury, transaminitis    PLAN: Day 1 = 10-26 ; Day 4 = 10/29; Day 21 = 11/15  NEURO: comachecks q4h, VS q1h, standing fentanyl q6h - sympathetic overdrive  SAH:  off nimodipine  Hydrocephalus:  EVD discontinued  cranioplasty to be scheduled  continue baclofen - to 10mg q8h  Seizure prophylaxis:  cont levetiracetam 500 OD; EEG negative   REHAB:  physical therapy evaluation and management    EARLY MOB:  HOB elevated    PULM:  cont full vent support, continue mucormyst (q12h) and ipratropium / albuterol  CARDIO:  SBP goal 100-160mm Hg, TTE, amlodipine 10mg PO daily; clonidine 0.2 q8h; continue hydralazine 100q8h; EP consulted - NTD  ENDO:  Blood sugar goals 140-180 mg/dL  GI:  off PPI OD; FOBT NEG  DIET: TF at goal  RENAL: Na goal 135-145; salt 2G q8h, HD tomorrow  HEM/ONC: no coagulopathy (INR= 1.03), no ASA / Plavix use; Hb stable  VTE Prophylaxis: SCDs, SQH   ID: afebrile, leukocytosis, antibiotics switched to cefepime, duration as per ID; CXR  Social: will update family - dispo planning  MISC: ENT consulted for tongue wound - NTD    CORE MEASURES  1.  Hunt and Griffin Score = 5  2.  VTE prophylaxis:  [ ] administered within 24 hours of admission OR [X] reason not done: fresh bleed, unsecured aneurysm.  3.  Dysphagia screening:   [X] performed before any oral meds / liquids / food  4.  Nimodipine treatment:  [X] administered within 24 hours of admission OR [ ] reason not done:    ATTENDING ATTESTATION:  I was physically present for the key portions of the evaluation and management (E/M) service provided.  I agree with the above history, physical and plan, which I have reviewed and edited where appropriate.    Patient at high risk for neurological deterioration or death due to:  ICU delirium, aspiration PNA, DVT / PE.  Critical care time:  I have personally provided 45 minutes of critical care time, excluding time spent on separate procedures.      Plan discussed with RN, house staff.    LATERAL TRANSFER CHECKLIST    I have reviewed the patient’s history, performed a clinical examination and assessed the patient to be stable for transfer to a non-neurosurgical intensive care unit.      Specifically, the patient:  [X] does not have cerebrovascular insufficiency or require hemodynamic augmentation  [X] does not have vasospasm or delayed cerebral ischemia with subarachnoid hemorrhage  [X] does not require active titration of meds for uncontrolled sympathetic storming  [X] does not have an external ventricular drain (except lateral transfers to [facility])  [X] does not have a lumbar drain  [X] did not have a complex skull base surgery or complex intracranial tumor in the immediate post-operative period   46y/Female with:  L MCA ruptured aneurysm, subarachnoid hemorrhage, s/p [facility] (10/26/2021, Dr. D'Amico @ [place]), brain compression, cerebral edema  anemia   recent spinal surgery  UGIB  bilateral pneumothorax  acute kidney injury, transaminitis    PLAN: Day 1 = 10-26 ; Day 4 = 10/29; Day 21 = 11/15  NEURO: comachecks q4h, VS q1h, standing fentanyl q6h - sympathetic overdrive  SAH:  off nimodipine  Hydrocephalus:  EVD discontinued  cranioplasty to be scheduled  continue baclofen 10mg q8h  Seizure prophylaxis:  cont levetiracetam 500 OD  REHAB:  physical therapy evaluation and management    EARLY MOB:  HOB elevated    PULM:  cont full vent support, continue mucormyst (q12h) and ipratropium / albuterol  CARDIO:  SBP goal 100-160mm Hg, TTE, amlodipine 10mg PO daily; clonidine 0.2 q8h; continue hydralazine 100q8h; EP consulted - NTD  ENDO:  Blood sugar goals 140-180 mg/dL  GI:  off PPI OD; FOBT NEG  DIET: TF at goal  RENAL: Na goal 135-145; salt 1G q8h, HD today  HEM/ONC: no coagulopathy (INR= 1.03), no ASA / Plavix use; Hb stable  VTE Prophylaxis: SCDs, SQH   ID: afebrile, leukocytosis, cont cefepime, duration as per ID; CXR; final ID clearance pending dispo  Social: will update family - dispo planning  MISC: ENT consulted for tongue wound - NTD    CORE MEASURES  1.  Hunt and Griffin Score = 5  2.  VTE prophylaxis:  [ ] administered within 24 hours of admission OR [X] reason not done: fresh bleed, unsecured aneurysm.  3.  Dysphagia screening:   [X] performed before any oral meds / liquids / food  4.  Nimodipine treatment:  [X] administered within 24 hours of admission OR [ ] reason not done:    ATTENDING ATTESTATION:  I was physically present for the key portions of the evaluation and management (E/M) service provided.  I agree with the above history, physical and plan, which I have reviewed and edited where appropriate.    Patient at high risk for neurological deterioration or death due to:  ICU delirium, aspiration PNA, DVT / PE.  Critical care time:  I have personally provided 45 minutes of critical care time, excluding time spent on separate procedures.      Plan discussed with RN, house staff.    LATERAL TRANSFER CHECKLIST    I have reviewed the patient’s history, performed a clinical examination and assessed the patient to be stable for transfer to a non-neurosurgical intensive care unit.      Specifically, the patient:  [X] does not have cerebrovascular insufficiency or require hemodynamic augmentation  [X] does not have vasospasm or delayed cerebral ischemia with subarachnoid hemorrhage  [X] does not require active titration of meds for uncontrolled sympathetic storming  [X] does not have an external ventricular drain (except lateral transfers to [facility])  [X] does not have a lumbar drain  [X] did not have a complex skull base surgery or complex intracranial tumor in the immediate post-operative period

## 2021-12-09 NOTE — PROGRESS NOTE ADULT - SUBJECTIVE AND OBJECTIVE BOX
OVERNIGHT EVENTS: NAEON    SUBJECTIVE / INTERVAL HPI: Patient seen and examined at bedside. Intubated and Sedated    VITAL SIGNS:  Vital Signs Last 24 Hrs  T(C): 37.2 (09 Dec 2021 10:00), Max: 37.9 (08 Dec 2021 11:00)  T(F): 98.9 (09 Dec 2021 10:00), Max: 100.2 (08 Dec 2021 11:00)  HR: 83 (09 Dec 2021 09:00) (71 - 112)  BP: 169/97 (09 Dec 2021 09:00) (126/72 - 192/99)  BP(mean): 127 (09 Dec 2021 09:00) (93 - 138)  RR: 14 (09 Dec 2021 09:00) (4 - 19)  SpO2: 100% (09 Dec 2021 09:00) (98% - 100%)    PHYSICAL EXAM:    General: intubated and sedated  HEENT: NC/AT; PERRL, anicteric sclera; MMM  Neck: supple  Cardiovascular: +S1/S2; RRR  Respiratory: CTA B/L; no W/R/R  Gastrointestinal: soft, NT/ND; +BSx4  Extremities: WWP; no edema, clubbing or cyanosis  Vascular: 2+ radial, DP/PT pulses B/L    MEDICATIONS:  MEDICATIONS  (STANDING):  acetylcysteine 20%  Inhalation 4 milliLiter(s) Inhalation every 6 hours  amLODIPine   Tablet 10 milliGRAM(s) Oral daily  artificial  tears Solution 1 Drop(s) Both EYES two times a day  baclofen 10 milliGRAM(s) Oral every 8 hours  bisacodyl Suppository 10 milliGRAM(s) Rectal daily  cefepime   IVPB 1000 milliGRAM(s) IV Intermittent every 24 hours  chlorhexidine 0.12% Liquid 15 milliLiter(s) Oral Mucosa every 12 hours  chlorhexidine 2% Cloths 1 Application(s) Topical <User Schedule>  cloNIDine 0.2 milliGRAM(s) Oral every 8 hours  epoetin nannette-epbx (RETACRIT) Injectable 9000 Unit(s) IV Push once  fentaNYL    Injectable 25 MICROGram(s) IV Push every 6 hours  heparin   Injectable 5000 Unit(s) SubCutaneous every 8 hours  hydrALAZINE 100 milliGRAM(s) Oral every 8 hours  levETIRAcetam  Solution 500 milliGRAM(s) Oral every 24 hours  polyethylene glycol 3350 17 Gram(s) Oral two times a day  senna 2 Tablet(s) Oral at bedtime  sodium chloride 2 Gram(s) Oral every 8 hours    MEDICATIONS  (PRN):  acetaminophen    Suspension .. 650 milliGRAM(s) Oral every 6 hours PRN Temp greater or equal to 38C (100.4F), Mild Pain (1 - 3)  hydrALAZINE Injectable 10 milliGRAM(s) IV Push every 4 hours PRN SBP >180  ondansetron Injectable 4 milliGRAM(s) IV Push every 6 hours PRN Nausea and/or Vomiting  sodium chloride 0.9% lock flush 10 milliLiter(s) IV Push every 1 hour PRN Pre/post blood products, medications, blood draw, and to maintain line patency      ALLERGIES:  Allergies    No Known Allergies    Intolerances        LABS:                        8.5    15.47 )-----------( 237      ( 09 Dec 2021 06:16 )             28.4     12-09    143  |  104  |  40<H>  ----------------------------<  118<H>  4.0   |  24  |  2.73<H>    Ca    10.1      09 Dec 2021 06:16  Phos  3.9     12-09  Mg     2.0     12-09    TPro  7.7  /  Alb  3.0<L>  /  TBili  0.2  /  DBili  x   /  AST  23  /  ALT  10  /  AlkPhos  150<H>  12-09        CAPILLARY BLOOD GLUCOSE          RADIOLOGY & ADDITIONAL TESTS: Reviewed.    ASSESSMENT:    PLAN:

## 2021-12-09 NOTE — PROGRESS NOTE ADULT - SUBJECTIVE AND OBJECTIVE BOX
HPI:  45 y/o female with PMH HTN, Multiple sclerosis, recent spinal surgery 10/8 ([facility]) presented to [facility] BIBEMS after being found unconscious at home, unknown period of time. 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 HH5, MF1. Patient was intubated at [facility] ED, found to have blown L pupil, and sent straight to the OR for left hemicraniotomy. A-line placed intraop. Hemodynamically unstable upon arrival to [facility], bradycardic and hypertensive s/p Mannitol, Clevidipine, and Furosemide. Central line placed in NSICU. Currently sedated on Propofol, pending repeat CTH. History per patient's son, patient had recent spine surgery and was found on outpatient imaging last week to have L M1 segment aneurysm (unruptured), pt asymptomatic at this time. Per son patient taking percocet PO at home. Ureña catheter, ET tube, and arterial line placed at [facility].     NIHSS on arrival: 32   Bess Griffin 5, Mod Busby 4  Bleed Day 1 = 10/26 (26 Oct 2021 13:03)    Hospital Course:  10/26: admitted to [facility] from [facility], POD#0 s/p L hemicraniectomy for decompression and evacuation of SDH. Transferred to [facility] noted to have tense flap, received mannitol, keppra, decadron. Was hypertensive to 200s and preston to 40s, recevied 85g mannitol and bullet 23.4%. CTH on arrival demonstrated increased size in vents and new IVH. EVD placed, open at 15, central line placed. Transfused 2 u PRBC. POD0 of coiling of left MCA bifurcation aneurysm,   10/27: CTH demonstrated EVD in correct position, EVD lowered to 5, remains intubated on proprofol, pending repeat CTH in AM. Started on 3% @ 30/hr. 2LNS given for euvolemia, Mannitol given for uptrending ICPs. Bullet given 8:30 am. 3% increased to 50/hr. 1 L bolus. New EVD catheter placed using exisiting sreekanth hole. 1 u PRBC.  10/28: HH5, MF4, BD3; POD2 angio coil/embo of L MCA aneurysm. JOAQUÍN overnight, neuro stable. EVD@5cmH20 ICPs < 20 overnight, OP WNL. S/p 1 unit PRBC yesterday with appropriate response. Given 42g mannitol in AM for ICP 22 persistently, with improvement, then given 23% in PM for elevated ICP. febrile, pancultured. Standing tylenol and cooling blanket.   10/29: BD4, POD3 angio coil/embo. JOAQUÍN o/n, neuro stable. EVD@5, ICPs <20 o/n.   10/30: BD5, POD4 angio coil/embo. JOAQUÍN o/n neuro stable. EVD@5. 3% @50cc/hr.  10/31: BD6, POD5 angio coil/embo. brief period maintained upward gaze, resolved on its own. EVD@5cm h2o.    11/1: BD7, POD6 L hemicrani, angio coil/embo. JOAQUÍN overnight. Neuro exam unchanged. ICPs WNL. started bromocriptine/gabapentin/baclofen. dc'd propofol, started precedex  11/2: BD8 POD7 L hemicrani, angio coil/embo. JOAQUÍN overnight. Neuro exam stable. Remains on 3% hypertonic saline. Receieved 1 unit of PRBCs for a Hgb of 7.6.  11/3: BD 9 POD8 of L hemicrani. Elevated lipase, normal amylase, OG tube clogged and replaced. Abdominal US normal. Pending gen surg reccs regarding trach and peg. Gabapentin and Baclofen increased to help with neurostorming. restarted back on 3%, LR@35. became preston+hypotensive with nimodipine 60q4, decreased back to 30q2, NaCl bullet given.   11/4: BD10 POD9, JOAQUÍN o/n, 500cc NS for euvolemia,  neuro stable, EVD at 5. 3% increased to 75  11/5: BD11 POD10, JOAQUÍN o/n, neuro stable, EVD at 5  11/6: BD12 POD11 JOAQUÍN overnight. Neuro exam stable. Remains intubated on precedex. 3% hypertonic saline dc'd  11/7: BD13 POD 12 JOAQUÍN o/n, NGT replaced. on precex with no icp crisis   11/8: BD14 POD13 JOAQUÍN o/n, noted to have tongue maceration/macroglossia. Gauze with tongue depressor placed. Pending further recs from ENT. Pending trach and PEG placement tomorrow with gen surg. Off precedex, ICPs stable. EVD remains @ 5.   11/9: BD15, POD 14, bleeding under tongue at start of shift, fentanyl pushed with no further episodes. gabapentin stopped. PEG placed  11/10: BD 16, POD 15, neuro stable, ENT to be consulted in AM, 3% increased to 50/hr.  11/11: BD 17, POD 16, neuro exam stable. Pend CT saba in am for cranioplasty planning. Pend trach in OR with ENT. F.u BMP in am, 3%@50cc/hr for Na goal 140-145.   11/12: BD 18, POD 17. JOAQUÍN overnight, neuro stable. Pend trach placement today. CT saba completed 11/11. Off of 3%, f/u am BMP for Na goal 140-150. CSF neg. Hypoxic cardiac arrest with ROSC x 3 during tracheostomy placement. B/l chest tubes placed for resulting pneumothorax s/p CPR. salt tabs dc'd.  11/13: BD 19, POD 18. Patient tachycardic with some improvement, SBP stable off of levophed, hgb downtrending o/n, transfused 1 unit PRBCs. B/l chest tube. EVD @5cmH2O, no elevated ICPs. UOP downtrending, trend BUN/Cr, given 1LNS x1 for low urine output. F/u am CXR. Cont Cefepime until today, then change to Ancef while trach packing in place per ENT. Pend CTH when stable. Trops downtrending s/p cardiac arrest. 1L. florinef dc'd. BP goal changed to 100-160, started on amlodipine   11/14: BD20, POD19, worsening creatinine with decreased urine output. Given 250 of albumin and 500cc LR. started on hydralazine PO, decreased amantadine 100 daily given CrCl of 20.  11/15: BD21, POD20, remains oliguric, fem line dc'd, tongue swollen and unable to fit bite block in mouth, started on nimbex gtt to relax jaw. Propofol and fentanyl gtt started. Vomiting TFs through nose and mouth, ENT called. TFs held. Cr uptrending. Palliative consulted.  11/16: BD22, POD21, o/n external trach dressing replaced due to cuff leak and decreasing TV, sedated and paralyzed on nimbex, propofol, and fentanyl gtt. CTH stable.  EVD raised from 5 to 10. Nimbex weaned off. Cr uptrending, Trickle feeds started. FOB negative.   11/17: BD 23, POD22, JOAQUÍN o/n, EVD clamped, NS d/c'ed, LR@50, advancing tube feeds.   11/18: BD24, POD23 o/n 3% at 30 with Na acetate started, propofol dc'd due to elevated TG level, episode of vomiting TFs from nose and mouth into ETT with elevated ICP 30s, ICP normalized with resolution of vomiting, reglan 5mg IV given, TFs held, 3% stopped. midline placed   11/19; BD25, POD24 JOAQUÍN overnight. Remains on versed and fentanyl drips. Neuro exam unchanged. R IJ dialysis cath placed.   11/20: BD26 POD25 JOAQUÍN overnight. Neuro exam unchanged. Plan for HD today  11/21: BD 27 POD 26 weaned off versed, fentanyl   11/22: BD 28 POD 27 JOAQUÍN o/n , off fentanyl gtt, pt is calm. s/p HD earlier today. s/p trach revision  11/23: BD29. incraesed clonidine to 0.4 BID, s/p trach, stable hemodynamically. neuro at baseline. TFs stopped for vomiting and restarted 10cc/hr  11/24: BD 30, POD29 TFs inc to 20cc/hr. HD today  11/25: BD31, POD30. JOAQUÍN o/n neuro stable  11/26: BD32, POD 31, JOAQUÍN overnight,  perma cath placement with IR today , hydralazine inc 75q8, reglan decreased 2.5q6. Hemodialyzed today, took off 3L and given 1unit pRBCs  11/27: BD33, POD32, JOAQUÍN overnight, pending LTAC. hyponatremic, urine lytes sent. 250cc bolus 3% started, salt tabs started  11/28: BD34 POD 33 L hemicrani. JOAQUÍN o/n. Hyponatremia improved, NaCl 2 g q 6. Pending LTAC at [facility], increased prn requirements for BP (hydral + labetalol x1), hydral PO increased to 100q8, Fentanyl x1 for pain.   11/29: BD35 POD 34 s/p L hemicrani, JOAQUÍN o/n, pend dialysis tomorrow. CTH ordered for am, exit CTH pend. Plan for discharge to [facility]. Tolerating CPAP during the day, full vent support overnight. HD today  11/30: BD 36 POD 35 pending LTAC to [facility], received on HD through the port. HD , 3L removed, hypertensive, renal following, hydralazine pushes prn for now.   12/1: Intermittent episodes of hypertension, decreased clonidine to q8 and added labetalol 100 BID. RUE doppler done   LGF - tylenol given   12/2: Multiple episodes of asystole, witnessed upward gaze associated with 5 second pause and later 8 second pause. EKG stable, troponin 0.08.  labetalol decreased to 50BID. cuff leak: reinflated during day and ENT to replace  tomorrow. EEG placed for upward gaze. Dialysis today.  12/3: JOAQUÍN o/n, recieved multiple pushes IV hydralazine for HTN. during day bradycardic to 40s when moving head and resolved, EP consulted - thought to be vagal sensitivity. No cuff leak today so tube not replaced  12/4: JOAQUÍN o/n, neuro unchanged, started on standing fentanyl x 1 day for HTN unclear if pain related, baclofen started for rigidity, dialysis today  12/5: o/n fever tmax 102, pancultured, neuro stable; HD yesterday  Started on zosyn for PNA   12/6: JOAQUÍN overnight. Neuro exam stable.  12/7: JOAQUÍN overnight. Neuro exam stable. Pending rehab placement, HD today, 3 liters taken off, zosyn switched to cefepime   12/8: JOAQUÍN overnight, neuro stable   12/9: JOAQUÍN overnight. Neuro exam stable.     Vital Signs Last 24 Hrs  T(C): 37.3 (08 Dec 2021 22:50), Max: 38.1 (08 Dec 2021 10:00)  T(F): 99.1 (08 Dec 2021 22:50), Max: 100.6 (08 Dec 2021 10:00)  HR: 73 (08 Dec 2021 23:00) (73 - 112)  BP: 134/76 (08 Dec 2021 23:00) (123/70 - 192/99)  BP(mean): 100 (08 Dec 2021 23:00) (91 - 138)  RR: 14 (08 Dec 2021 23:00) (4 - 19)  SpO2: 98% (08 Dec 2021 23:00) (98% - 100%)    I&O's Summary    07 Dec 2021 07:01  -  08 Dec 2021 07:00  --------------------------------------------------------  IN: 1270 mL / OUT: 3750 mL / NET: -2480 mL    08 Dec 2021 07:01  -  09 Dec 2021 00:03  --------------------------------------------------------  IN: 150 mL / OUT: 200 mL / NET: -50 mL      PHYSICAL EXAM:  General: NAD, pt is comfortably sitting up in bed, trached to vent   HEENT: PERRL 2-3mm reactive, neck FROM, +trach, +flap full but soft.  Cardiovascular: RRR, normal S1 and S2   Respiratory: lungs CTAB, no wheezing, rhonchi, or crackles   GI: normoactive BS to auscultation, abd soft, NTND, +PEG  Neuro: Non-verbal, does not FC, not oriented, OE to noxious, grimaces, +cough/gag/corneals. Bilateral UE 0/5 to noxious stimuli, bilateral LE trace triple flexion   Extremities: distal pulses 2+ x4  Wound/incision: +Well healed craniotomy incision, C/D/I, healing back incisions, C/D, improving dehiscence      TUBES/LINES:  [] Ureña  [] Lumbar Drain  [] Wound Drains  [X] Others - Permacath R IJ, Right brachial midline, PEG, Trach    DIET:  [] NPO  [] Mechanical  [X] Tube feeds    LABS:                        7.5    15.27 )-----------( 202      ( 08 Dec 2021 05:29 )             25.2     12-08    141  |  103  |  31<H>  ----------------------------<  148<H>  3.8   |  30  |  2.56<H>    Ca    9.5      08 Dec 2021 05:29  Phos  2.6     12-08  Mg     1.9     12-08              CAPILLARY BLOOD GLUCOSE          Drug Levels: [] N/A    CSF Analysis: [] N/A      Allergies    No Known Allergies    Intolerances      MEDICATIONS:  Antibiotics:  cefepime   IVPB 1000 milliGRAM(s) IV Intermittent every 24 hours    Neuro:  acetaminophen    Suspension .. 650 milliGRAM(s) Oral every 6 hours PRN  baclofen 10 milliGRAM(s) Oral every 8 hours  fentaNYL    Injectable 25 MICROGram(s) IV Push every 6 hours  levETIRAcetam  Solution 500 milliGRAM(s) Oral every 24 hours  ondansetron Injectable 4 milliGRAM(s) IV Push every 6 hours PRN    Anticoagulation:  heparin   Injectable 5000 Unit(s) SubCutaneous every 8 hours    OTHER:  acetylcysteine 20%  Inhalation 4 milliLiter(s) Inhalation every 6 hours  amLODIPine   Tablet 10 milliGRAM(s) Oral daily  artificial  tears Solution 1 Drop(s) Both EYES two times a day  bisacodyl Suppository 10 milliGRAM(s) Rectal daily  chlorhexidine 0.12% Liquid 15 milliLiter(s) Oral Mucosa every 12 hours  chlorhexidine 2% Cloths 1 Application(s) Topical <User Schedule>  cloNIDine 0.2 milliGRAM(s) Oral every 8 hours  hydrALAZINE 100 milliGRAM(s) Oral every 8 hours  hydrALAZINE Injectable 10 milliGRAM(s) IV Push every 4 hours PRN  polyethylene glycol 3350 17 Gram(s) Oral two times a day  senna 2 Tablet(s) Oral at bedtime    IVF:  sodium chloride 2 Gram(s) Oral every 8 hours    CULTURES:  Culture Results:   No growth at 1 day. (12-07 @ 15:44)  Culture Results:   Specimen appears CONTAMINATED. Lab suggests repeat clean catch specimen. (12-05 @ 22:42)    RADIOLOGY & ADDITIONAL TESTS:      ASSESSMENT:  45 y/o female with PMHx of HTN and MS, hx of spinal surgery at [facility] 10/8/21 presented to [facility] ED BIBEMS after being found unconscious at home, unknown period of time. 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 HH5, MF4; BD #1 = 10/26. Patient was intubated at [facility] and sent straight to the OR for left hemicraniotomy. A-line placed intraop. Hemodynamically unstable upon arrival to [facility], bradycardic and hypertensive s/p Mannitol and Furosemide. Central line placed in NSICU. S/p EVD placement, and coil embolization of left MCA bifurcation aneurysm 10/26/2021. S/p cerebral angio without findings of vasospasm 11/10. S/p cardiac arrest with ROSC x3 on 11/12 during tracheostomy at bedside now with b/l pneumothoracies and worsening kidney function. EVD dc'd 11/18, trach'd 11/22, permacath placed 11/26, pending LTACH/[facility].      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, left    Intracranial hemorrhage, spontaneous subarachnoid, due to cerebral aneurysm, acute    Epistaxis    Pneumothorax, left    Functional quadriplegia    Acute respiratory failure with hypoxia    Cardiac arrest    Encounter for palliative care    Advanced care planning/counseling discussion    IPH (idiopathic pulmonary hemosiderosis)    Acute spontaneous intraparenchymal intracranial hemorrhage due to cerebral aneurysm    CHETAN (acute kidney injury)    Hypocalcemia    Angiogram, cerebral, with coil embolization, in non-operating room setting    Endoscopic percutaneous gastrostomy    Angiogram, carotid and cerebral, bilateral    Chest tube placement    Insertion of central venous catheter with ultrasound guidance    Insertion of temporary dialysis catheter    Tracheostomy, planned    Personal history of spine surgery    CHETAN (acute kidney injury)    SysAdmin_VstLnk      PLAN:  NEURO:  - neuro checks q4hrs/vital signs q 1  - Keppra 500mg q24h renal dosing   - CTH 11/18 stable, CTH 11/29 stable  - plan for cranioplasty in ~3 months   - EEG x 24 hrs   - fentanyl 25 q4   - baclofen 10 q8     CARDIO:  - -180   - amlodipine 10 daily and hydral 100 q8h, clonidine 0.2 q 8 and labetalol dc'd  - hydralazine IV prn, avoid BB IV   - s/p cardiac arrest   - TTE 11/15: mild LVH, EF 70%   - EP consulted: pauses on telemetry and bradycardia with neck movement likely due to baroreceptor sensitivity, no need for pacemaker     PULM:  - trach, continue full vent support   - s/p acute hypoxic cardiac arrest 11/12 during tracheostomy placment with ENT c/b b/l pneumothorax and b/l chest tubes (d/kiko 11/24)  - mucomyst q 6     GI:  - PEG TFs nepro- at goal 30cc/hr  - Bowel regimen, last BM 12/7  - Alk phos elevated, shocked liver, improving     RENAL:   - post cardiac arrest renal failure on long term HD  - port with IR 11/26 , nephro following, last HD 12/7, T/T/F schedule  - Na 135-145   - salt tabs 2q8  - daily weights     HEME:  - SCDs, SQH  - s/p multiple transfusions this admission, most recently s/p 1unit PRBC 11/26  - UE and LE dopplers negative 11/26, RUE 12/1 with superficial thrombophlebitis in the right cephalic vein   - FOB neg 11/16, 12/6    ID:  - s/p cefepime for enterobacter/proteus in sputum dc'd 11/15   - ID following appreciate recs   - started zosyn 12/5 for serratia in blood and serratia&proteus in sputum. Negative urine culture,  - Cefepime 1Gq24 hours daily, on days with dialysis give AFTER dialysis   - Daily blood cultures til negative cultures  - Possibly remove midline/cath port if cultures continually positive per ID, but okay to keep for now    ENDO:  - monitor BMP glucoses    OTHER  - wound care recs- q2 dressing changes   - ENT reconsulted for trach cuff leak and tongue injury, NTD just mointor  - has R midline (11/18) and HD catheter R IJ     GOC: full code  Level of care: ICU status   Dispo: [facility] (4 seasons) AUTH until 12/10   family updated    Case discussed with Dr. Duncan and Dr. Vyas

## 2021-12-09 NOTE — PROGRESS NOTE ADULT - ASSESSMENT
47 y/o female with PMHx of HTN and MS, hx of spinal surgery at [facility] 10[facility]8/[facility] [place] [facility], unknown period of time. 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 Patient was intubated at [place] ED and sent straight to the OR for left hemicraniotomy. S/p EVD placement, and coil embolization of left MCA bifurcation aneurysm 10/26/2021. S/p cerebral angio without findings of vasospasm 11/10. S/p cardiac arrest with ROSC x3 on 11/12 during tracheostomy at bedside with b/l pneumothoraces now with chest tubes clamped. EVD dc'd 11/18, trach revision 11/22. ID consulted for serratia bacteremia for which she was started on Zosyn. Of note, On 11/11/21, ID consulted for recommendations for treatment of sputum cultures from 11/4/21 growing MSSA, Enterobacter, and Proteus, as well as clearance for cranioplasty. At that time, Patient was treated with a 7-day course of cefepime which she finished on 11/8.     Culture Data: Sputum 11/4: Numerous staph aureus, enterobacter clocae, proteus. Sputum 11/5: Proteus. Blood cx 12/5: serratia. CSF negative. UCX pending.  Imaging: Xray 12/7-patchy consolidation LLL   Surveillance 12/7: NGTD    Recommendations:   47 y/o female with PMHx of HTN and MS, [facility] [place] [facility], unknown period of time. 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 Patient was intubated at [place] ED and sent straight to the OR for left hemicraniotomy. S/p EVD placement, and coil embolization of left MCA bifurcation aneurysm 10/26/2021. S/p cerebral angio without findings of vasospasm 11/10. S/p cardiac arrest with ROSC x3 on 11/12 during tracheostomy at bedside with b/l pneumothoraces now with chest tubes clamped. EVD dc'd 11/18, trach revision 11/22. ID consulted for serratia bacteremia for which she was started on Zosyn. Of note, On 11/11/21, ID consulted for recommendations for treatment of sputum cultures from 11/4/21 growing MSSA, Enterobacter, and Proteus, as well as clearance for cranioplasty. At that time, Patient was treated with a 7-day course of cefepime which she finished on 11/8.     Culture Data: Sputum 11/4: Numerous staph aureus, enterobacter clocae, proteus. Sputum 11/5: Proteus. Blood cx 12/5: serratia. CSF negative. UCX pending.  Imaging: Xray 12/7-patchy consolidation LLL   Surveillance 12/7: NGTD    Recommendations:  -Please continue with Cefepime [id number] daily but on days of dialysis, please give AFTER dialysis.   -If patient remains afebrile tomorrow, with stable wbc, can discharge on Cefepime 1gq24H for 7 day course treatment of pneumonia to end on 12/14/21.       ID to continue to follow, plan discussed with primary team and attending.
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
## 2021-12-09 NOTE — PROGRESS NOTE ADULT - ASSESSMENT
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 Hospital course c/b cardiac arrest x3 and anuric ATN requiring dialysis.     Anuric iATN requiring long term dialysis  Baseline creatinine 0.6  First HD 11/20; S/p IR tunneled cath placement 11/26  HD today- on TTS inpt schedule  Indeterminate quantiferon and wnl hepatitis panel    SBP goal 100-160 per neuro ICU; UF with HD  Anaemia- no need for IV iron, epo with HD  BMD: phos acceptable, no need for binders  Access: S/p IR tunneled cath placement 11/26; f/u blood cx and ID    Dialyzer: Optiflux E720ABh  QB: 400 mL/min  QD: 500 mL/min  K bath: 3  Goal UF: 3L  Duration: 180 min    Patient is tolerating the procedure well. Continue full hemodialysis treatment as prescribed.  Daily weights, strict I&Os, renally dose meds, renal diet

## 2021-12-09 NOTE — PROGRESS NOTE ADULT - ATTENDING COMMENTS
BCx 12/7 ngtd, WBC remains elevated but now afebrile >24h, and vent setting unchanged.  Cont cefepime to treat serratia bacteremia and VAP.      Team 1 will follow you.  Case d/w primary team.    Janice Ny MD, MS  Infectious Disease attending  work cell [phone]   For any questions during evening/weekend/holiday, please page ID on call

## 2021-12-09 NOTE — PROGRESS NOTE ADULT - SUBJECTIVE AND OBJECTIVE BOX
Patient is a 46y Female seen and evaluated at bedside during dialysis. Approaching euvolaemia- less swollen, Repeat blood cx NGTD.     Meds:    acetaminophen    Suspension .. 650 every 6 hours PRN  acetylcysteine 20%  Inhalation 4 every 6 hours  amLODIPine   Tablet 10 daily  artificial  tears Solution 1 two times a day  baclofen 10 every 8 hours  bisacodyl Suppository 10 daily  cefepime   IVPB 1000 every 24 hours  chlorhexidine 0.12% Liquid 15 every 12 hours  chlorhexidine 2% Cloths 1 <User Schedule>  cloNIDine 0.2 every 8 hours  fentaNYL    Injectable 25 every 6 hours  heparin   Injectable 5000 every 8 hours  hydrALAZINE 100 every 8 hours  hydrALAZINE Injectable 10 every 4 hours PRN  levETIRAcetam  Solution 500 every 24 hours  ondansetron Injectable 4 every 6 hours PRN  polyethylene glycol 3350 17 two times a day  senna 2 at bedtime  sodium chloride 1 every 8 hours  sodium chloride 0.9% lock flush 10 every 1 hour PRN      T(C): , Max: 37.6 (12-08-21 @ 17:55)  T(F): , Max: 99.7 (12-08-21 @ 17:55)  HR: 107 (12-09-21 @ 13:00)  BP: 158/91 (12-09-21 @ 13:00)  BP(mean): 117 (12-09-21 @ 13:00)  RR: 14 (12-09-21 @ 13:00)  SpO2: 99% (12-09-21 @ 13:00)  Wt(kg): --    12-08 @ 07:01  -  12-09 @ 07:00  --------------------------------------------------------  IN: 390 mL / OUT: 350 mL / NET: 40 mL    12-09 @ 07:01  -  12-09 @ 13:51  --------------------------------------------------------  IN: 30 mL / OUT: 0 mL / NET: 30 mL    Review of Systems: Unable to participate    PHYSICAL EXAM:  GENERAL: intubated, s/p hemicraniectomy- staples on her scalp  CHEST/LUNG: Clear bilaterally  HEART: normal S1S2, RRR  EXTREMITIES: +1 b/l UE oedema   ACCESS: RIJ tunneled cath placed 11/26    LABS:                        8.5    15.47 )-----------( 237      ( 09 Dec 2021 06:16 )             28.4     12-09    143  |  104  |  40<H>  ----------------------------<  118<H>  4.0   |  24  |  2.73<H>    Ca    10.1      09 Dec 2021 06:16  Phos  3.9     12-09  Mg     2.0     12-09    TPro  7.7  /  Alb  3.0<L>  /  TBili  0.2  /  DBili  x   /  AST  23  /  ALT  10  /  AlkPhos  150<H>  12-09                RADIOLOGY & ADDITIONAL STUDIES:

## 2021-12-10 VITALS — OXYGEN SATURATION: 99 % | HEART RATE: 82 BPM | RESPIRATION RATE: 15 BRPM

## 2021-12-10 LAB
ANION GAP SERPL CALC-SCNC: 10 MMOL/L — SIGNIFICANT CHANGE UP (ref 5–17)
BUN SERPL-MCNC: 35 MG/DL — HIGH (ref 7–23)
CALCIUM SERPL-MCNC: 9.9 MG/DL — SIGNIFICANT CHANGE UP (ref 8.4–10.5)
CHLORIDE SERPL-SCNC: 101 MMOL/L — SIGNIFICANT CHANGE UP (ref 96–108)
CO2 SERPL-SCNC: 29 MMOL/L — SIGNIFICANT CHANGE UP (ref 22–31)
CREAT SERPL-MCNC: 2.05 MG/DL — HIGH (ref 0.5–1.3)
CULTURE RESULTS: SIGNIFICANT CHANGE UP
GLUCOSE SERPL-MCNC: 151 MG/DL — HIGH (ref 70–99)
HCT VFR BLD CALC: 27.5 % — LOW (ref 34.5–45)
HGB BLD-MCNC: 8.1 G/DL — LOW (ref 11.5–15.5)
MAGNESIUM SERPL-MCNC: 1.9 MG/DL — SIGNIFICANT CHANGE UP (ref 1.6–2.6)
MCHC RBC-ENTMCNC: 24 PG — LOW (ref 27–34)
MCHC RBC-ENTMCNC: 29.5 GM/DL — LOW (ref 32–36)
MCV RBC AUTO: 81.4 FL — SIGNIFICANT CHANGE UP (ref 80–100)
NRBC # BLD: 0 /100 WBCS — SIGNIFICANT CHANGE UP (ref 0–0)
PHOSPHATE SERPL-MCNC: 3.4 MG/DL — SIGNIFICANT CHANGE UP (ref 2.5–4.5)
PLATELET # BLD AUTO: 206 K/UL — SIGNIFICANT CHANGE UP (ref 150–400)
POTASSIUM SERPL-MCNC: 3.9 MMOL/L — SIGNIFICANT CHANGE UP (ref 3.5–5.3)
POTASSIUM SERPL-SCNC: 3.9 MMOL/L — SIGNIFICANT CHANGE UP (ref 3.5–5.3)
RBC # BLD: 3.38 M/UL — LOW (ref 3.8–5.2)
RBC # FLD: 18.9 % — HIGH (ref 10.3–14.5)
SODIUM SERPL-SCNC: 140 MMOL/L — SIGNIFICANT CHANGE UP (ref 135–145)
SPECIMEN SOURCE: SIGNIFICANT CHANGE UP
WBC # BLD: 12.59 K/UL — HIGH (ref 3.8–10.5)
WBC # FLD AUTO: 12.59 K/UL — HIGH (ref 3.8–10.5)

## 2021-12-10 PROCEDURE — 99291 CRITICAL CARE FIRST HOUR: CPT

## 2021-12-10 PROCEDURE — 99232 SBSQ HOSP IP/OBS MODERATE 35: CPT

## 2021-12-10 RX ORDER — AMLODIPINE BESYLATE 2.5 MG/1
1 TABLET ORAL
Qty: 0 | Refills: 0 | DISCHARGE
Start: 2021-12-10

## 2021-12-10 RX ORDER — METOPROLOL TARTRATE 50 MG
1 TABLET ORAL
Qty: 0 | Refills: 0 | DISCHARGE

## 2021-12-10 RX ORDER — SENNA PLUS 8.6 MG/1
2 TABLET ORAL
Qty: 0 | Refills: 0 | DISCHARGE
Start: 2021-12-10

## 2021-12-10 RX ORDER — GLATIRAMER ACETATE 20 MG/ML
40 INJECTION, SOLUTION SUBCUTANEOUS
Qty: 0 | Refills: 0 | DISCHARGE

## 2021-12-10 RX ORDER — DIMETHYL FUMARATE 240 MG/1
1 CAPSULE ORAL
Qty: 0 | Refills: 0 | DISCHARGE

## 2021-12-10 RX ORDER — HYDRALAZINE HCL 50 MG
1 TABLET ORAL
Qty: 0 | Refills: 0 | DISCHARGE
Start: 2021-12-10

## 2021-12-10 RX ORDER — SODIUM CHLORIDE 9 MG/ML
1 INJECTION INTRAMUSCULAR; INTRAVENOUS; SUBCUTANEOUS
Qty: 0 | Refills: 0 | DISCHARGE
Start: 2021-12-10

## 2021-12-10 RX ORDER — AMLODIPINE BESYLATE AND BENAZEPRIL HYDROCHLORIDE 10; 20 MG/1; MG/1
1 CAPSULE ORAL
Qty: 0 | Refills: 0 | DISCHARGE

## 2021-12-10 RX ORDER — ACETYLCYSTEINE 200 MG/ML
4 VIAL (ML) MISCELLANEOUS
Qty: 0 | Refills: 0 | DISCHARGE
Start: 2021-12-10

## 2021-12-10 RX ORDER — FENTANYL CITRATE 50 UG/ML
25 INJECTION INTRAVENOUS
Qty: 0 | Refills: 0 | DISCHARGE
Start: 2021-12-10

## 2021-12-10 RX ORDER — BACLOFEN 100 %
1 POWDER (GRAM) MISCELLANEOUS
Qty: 0 | Refills: 0 | DISCHARGE
Start: 2021-12-10

## 2021-12-10 RX ORDER — CEFEPIME 1 G/1
2000 INJECTION, POWDER, FOR SOLUTION INTRAMUSCULAR; INTRAVENOUS EVERY 12 HOURS
Refills: 0 | Status: DISCONTINUED | OUTPATIENT
Start: 2021-12-10 | End: 2021-12-10

## 2021-12-10 RX ORDER — CYCLOBENZAPRINE HYDROCHLORIDE 10 MG/1
1 TABLET, FILM COATED ORAL
Qty: 0 | Refills: 0 | DISCHARGE

## 2021-12-10 RX ORDER — ACETAMINOPHEN 500 MG
20.31 TABLET ORAL
Qty: 0 | Refills: 0 | DISCHARGE
Start: 2021-12-10

## 2021-12-10 RX ORDER — CHLORHEXIDINE GLUCONATE 213 G/1000ML
15 SOLUTION TOPICAL
Qty: 0 | Refills: 0 | DISCHARGE
Start: 2021-12-10

## 2021-12-10 RX ORDER — CEFEPIME 1 G/1
2 INJECTION, POWDER, FOR SOLUTION INTRAMUSCULAR; INTRAVENOUS
Qty: 0 | Refills: 0 | DISCHARGE
Start: 2021-12-10

## 2021-12-10 RX ORDER — LEVETIRACETAM 250 MG/1
5 TABLET, FILM COATED ORAL
Qty: 0 | Refills: 0 | DISCHARGE
Start: 2021-12-10

## 2021-12-10 RX ORDER — HEPARIN SODIUM 5000 [USP'U]/ML
5000 INJECTION INTRAVENOUS; SUBCUTANEOUS
Qty: 0 | Refills: 0 | DISCHARGE
Start: 2021-12-10

## 2021-12-10 RX ADMIN — Medication 4 MILLILITER(S): at 05:05

## 2021-12-10 RX ADMIN — FENTANYL CITRATE 25 MICROGRAM(S): 50 INJECTION INTRAVENOUS at 12:12

## 2021-12-10 RX ADMIN — Medication 0.2 MILLIGRAM(S): at 13:49

## 2021-12-10 RX ADMIN — SODIUM CHLORIDE 1 GRAM(S): 9 INJECTION INTRAMUSCULAR; INTRAVENOUS; SUBCUTANEOUS at 13:49

## 2021-12-10 RX ADMIN — AMLODIPINE BESYLATE 10 MILLIGRAM(S): 2.5 TABLET ORAL at 05:11

## 2021-12-10 RX ADMIN — CHLORHEXIDINE GLUCONATE 15 MILLILITER(S): 213 SOLUTION TOPICAL at 05:10

## 2021-12-10 RX ADMIN — FENTANYL CITRATE 25 MICROGRAM(S): 50 INJECTION INTRAVENOUS at 06:00

## 2021-12-10 RX ADMIN — Medication 0.2 MILLIGRAM(S): at 05:11

## 2021-12-10 RX ADMIN — Medication 4 MILLILITER(S): at 11:56

## 2021-12-10 RX ADMIN — HEPARIN SODIUM 5000 UNIT(S): 5000 INJECTION INTRAVENOUS; SUBCUTANEOUS at 05:10

## 2021-12-10 RX ADMIN — CHLORHEXIDINE GLUCONATE 1 APPLICATION(S): 213 SOLUTION TOPICAL at 05:12

## 2021-12-10 RX ADMIN — Medication 100 MILLIGRAM(S): at 05:11

## 2021-12-10 RX ADMIN — Medication 10 MILLIGRAM(S): at 13:49

## 2021-12-10 RX ADMIN — FENTANYL CITRATE 25 MICROGRAM(S): 50 INJECTION INTRAVENOUS at 04:51

## 2021-12-10 RX ADMIN — Medication 100 MILLIGRAM(S): at 13:49

## 2021-12-10 RX ADMIN — SODIUM CHLORIDE 1 GRAM(S): 9 INJECTION INTRAMUSCULAR; INTRAVENOUS; SUBCUTANEOUS at 05:10

## 2021-12-10 RX ADMIN — FENTANYL CITRATE 25 MICROGRAM(S): 50 INJECTION INTRAVENOUS at 05:21

## 2021-12-10 RX ADMIN — Medication 10 MILLIGRAM(S): at 05:11

## 2021-12-10 RX ADMIN — FENTANYL CITRATE 25 MICROGRAM(S): 50 INJECTION INTRAVENOUS at 11:56

## 2021-12-10 RX ADMIN — HEPARIN SODIUM 5000 UNIT(S): 5000 INJECTION INTRAVENOUS; SUBCUTANEOUS at 13:49

## 2021-12-10 NOTE — PROGRESS NOTE ADULT - SUBJECTIVE AND OBJECTIVE BOX
HPI:  45 y/o female with PMH HTN, Multiple sclerosis, recent spinal surgery 10/8 ([facility]) presented to [place] [facility] BIBEMS after being found unconscious at home, unknown period of time. 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 HH5, MF1. Patient was intubated at [facility] ED, found to have blown L pupil, and sent straight to the OR for left hemicraniotomy. A-line placed intraop. Hemodynamically unstable upon arrival to [facility], bradycardic and hypertensive s/p Mannitol, Clevidipine, and Furosemide. Central line placed in NSICU. Currently sedated on Propofol, pending repeat CTH. History per patient's son, patient had recent spine surgery and was found on outpatient imaging last week to have L M1 segment aneurysm (unruptured), pt asymptomatic at this time. Per son patient taking percocet PO at home. Ureña catheter, ET tube, and arterial line placed at [facility].     NIHSS on arrival: 32   Bess Griffin 5, Mod Busby 4  Bleed Day 1 = 10/26 (26 Oct 2021 13:03)    OVERNIGHT EVENTS: JOAQUÍN overnight, neuro stable     HOSPITAL COURSE:  10/26: admitted to [facility] from [facility], POD#0 s/p L hemicraniectomy for decompression and evacuation of SDH. Transferred to [facility] noted to have tense flap, received mannitol, keppra, decadron. Was hypertensive to 200s and preston to 40s, recevied 85g mannitol and bullet 23.4%. CTH on arrival demonstrated increased size in vents and new IVH. EVD placed, open at 15, central line placed. Transfused 2 u PRBC. POD0 of coiling of left MCA bifurcation aneurysm,   10/27: CTH demonstrated EVD in correct position, EVD lowered to 5, remains intubated on proprofol, pending repeat CTH in AM. Started on 3% @ 30/hr. 2LNS given for euvolemia, Mannitol given for uptrending ICPs. Bullet given 8:30 am. 3% increased to 50/hr. 1 L bolus. New EVD catheter placed using exisiting sreekanth hole. 1 u PRBC.  10/28: HH5, MF4, BD3; POD2 angio coil/embo of L MCA aneurysm. JOAQUÍN overnight, neuro stable. EVD@5cmH20 ICPs < 20 overnight, OP WNL. S/p 1 unit PRBC yesterday with appropriate response. Given 42g mannitol in AM for ICP 22 persistently, with improvement, then given 23% in PM for elevated ICP. febrile, pancultured. Standing tylenol and cooling blanket.   10/29: BD4, POD3 angio coil/embo. JOAQUÍN o/n, neuro stable. EVD@5, ICPs <20 o/n.   10/30: BD5, POD4 angio coil/embo. JOAQUÍN o/n neuro stable. EVD@5. 3% @50cc/hr.  10/31: BD6, POD5 angio coil/embo. brief period maintained upward gaze, resolved on its own. EVD@5cm h2o.    11/1: BD7, POD6 L hemicrani, angio coil/embo. JOAQUÍN overnight. Neuro exam unchanged. ICPs WNL. started bromocriptine/gabapentin/baclofen. dc'd propofol, started precedex  11/2: BD8 POD7 L hemicrani, angio coil/embo. JOAQUÍN overnight. Neuro exam stable. Remains on 3% hypertonic saline. Receieved 1 unit of PRBCs for a Hgb of 7.6.  11/3: BD 9 POD8 of L hemicrani. Elevated lipase, normal amylase, OG tube clogged and replaced. Abdominal US normal. Pending gen surg reccs regarding trach and peg. Gabapentin and Baclofen increased to help with neurostorming. restarted back on 3%, LR@35. became preston+hypotensive with nimodipine 60q4, decreased back to 30q2, NaCl bullet given.   11/4: BD10 POD9, JOAQUÍN o/n, 500cc NS for euvolemia,  neuro stable, EVD at 5. 3% increased to 75  11/5: BD11 POD10, JOAQUÍN o/n, neuro stable, EVD at 5  11/6: BD12 POD11 JOAQUÍN overnight. Neuro exam stable. Remains intubated on precedex. 3% hypertonic saline dc'd  11/7: BD13 POD 12 JOAQUÍN o/n, NGT replaced. on precex with no icp crisis   11/8: BD14 POD13 JOAQUÍN o/n, noted to have tongue maceration/macroglossia. Gauze with tongue depressor placed. Pending further recs from ENT. Pending trach and PEG placement tomorrow with gen surg. Off precedex, ICPs stable. EVD remains @ 5.   11/9: BD15, POD 14, bleeding under tongue at start of shift, fentanyl pushed with no further episodes. gabapentin stopped. PEG placed  11/10: BD 16, POD 15, neuro stable, ENT to be consulted in AM, 3% increased to 50/hr.  11/11: BD 17, POD 16, neuro exam stable. Pend CT saba in am for cranioplasty planning. Pend trach in OR with ENT. F.u BMP in am, 3%@50cc/hr for Na goal 140-145.   11/12: BD 18, POD 17. JOAQUÍN overnight, neuro stable. Pend trach placement today. CT saba completed 11/11. Off of 3%, f/u am BMP for Na goal 140-150. CSF neg. Hypoxic cardiac arrest with ROSC x 3 during tracheostomy placement. B/l chest tubes placed for resulting pneumothorax s/p CPR. salt tabs dc'd.  11/13: BD 19, POD 18. Patient tachycardic with some improvement, SBP stable off of levophed, hgb downtrending o/n, transfused 1 unit PRBCs. B/l chest tube. EVD @5cmH2O, no elevated ICPs. UOP downtrending, trend BUN/Cr, given 1LNS x1 for low urine output. F/u am CXR. Cont Cefepime until today, then change to Ancef while trach packing in place per ENT. Pend CTH when stable. Trops downtrending s/p cardiac arrest. 1L. florinef dc'd. BP goal changed to 100-160, started on amlodipine   11/14: BD20, POD19, worsening creatinine with decreased urine output. Given 250 of albumin and 500cc LR. started on hydralazine PO, decreased amantadine 100 daily given CrCl of 20.  11/15: BD21, POD20, remains oliguric, fem line dc'd, tongue swollen and unable to fit bite block in mouth, started on nimbex gtt to relax jaw. Propofol and fentanyl gtt started. Vomiting TFs through nose and mouth, ENT called. TFs held. Cr uptrending. Palliative consulted.  11/16: BD22, POD21, o/n external trach dressing replaced due to cuff leak and decreasing TV, sedated and paralyzed on nimbex, propofol, and fentanyl gtt. CTH stable.  EVD raised from 5 to 10. Nimbex weaned off. Cr uptrending, Trickle feeds started. FOB negative.   11/17: BD 23, POD22, JOAQUÍN o/n, EVD clamped, NS d/c'ed, LR@50, advancing tube feeds.   11/18: BD24, POD23 o/n 3% at 30 with Na acetate started, propofol dc'd due to elevated TG level, episode of vomiting TFs from nose and mouth into ETT with elevated ICP 30s, ICP normalized with resolution of vomiting, reglan 5mg IV given, TFs held, 3% stopped. midline placed   11/19; BD25, POD24 JOAQUÍN overnight. Remains on versed and fentanyl drips. Neuro exam unchanged. R IJ dialysis cath placed.   11/20: BD26 POD25 JOAQUÍN overnight. Neuro exam unchanged. Plan for HD today  11/21: BD 27 POD 26 weaned off versed, fentanyl   11/22: BD 28 POD 27 JOAQUÍN o/n , off fentanyl gtt, pt is calm. s/p HD earlier today. s/p trach revision  11/23: BD29. incraesed clonidine to 0.4 BID, s/p trach, stable hemodynamically. neuro at baseline. TFs stopped for vomiting and restarted 10cc/hr  11/24: BD 30, POD29 TFs inc to 20cc/hr. HD today  11/25: BD31, POD30. JOAQUÍN o/n neuro stable  11/26: BD32, POD 31, JOAQUÍN overnight,  perma cath placement with IR today , hydralazine inc 75q8, reglan decreased 2.5q6. Hemodialyzed today, took off 3L and given 1unit pRBCs  11/27: BD33, POD32, JOAQUÍN overnight, pending LTAC. hyponatremic, urine lytes sent. 250cc bolus 3% started, salt tabs started  11/28: BD34 POD 33 L hemicrani. JOAQUÍN o/n. Hyponatremia improved, NaCl 2 g q 6. Pending LTAC at [facility], increased prn requirements for BP (hydral + labetalol x1), hydral PO increased to 100q8, Fentanyl x1 for pain.   11/29: BD35 POD 34 s/p L hemicrani, JOAQUÍN o/n, pend dialysis tomorrow. CTH ordered for am, exit CTH pend. Plan for discharge to [facility]. Tolerating CPAP during the day, full vent support overnight. HD today  11/30: BD 36 POD 35 pending CHAPARRO received on HD through the port. HD , 3L removed, hypertensive, renal following, hydralazine pushes prn for now.   12/1: Intermittent episodes of hypertension, decreased clonidine to q8 and added labetalol 100 BID. RUE doppler done   LGF - tylenol given   12/2: Multiple episodes of asystole, witnessed upward gaze associated with 5 second pause and later 8 second pause. EKG stable, troponin 0.08.  labetalol decreased to 50BID. cuff leak: reinflated during day and ENT to replace  tomorrow. EEG placed for upward gaze. Dialysis today.  12/3: JOAQUÍN o/n, recieved multiple pushes IV hydralazine for HTN. during day bradycardic to 40s when moving head and resolved, EP consulted - thought to be vagal sensitivity. No cuff leak today so tube not replaced  12/4: JOAQUÍN o/n, neuro unchanged, started on standing fentanyl x 1 day for HTN unclear if pain related, baclofen started for rigidity, dialysis today  12/5: o/n fever tmax 102, pancultured, neuro stable; HD yesterday  Started on zosyn for PNA   12/6: JOAQUÍN overnight. Neuro exam stable.  12/7: JOAQUÍN overnight. Neuro exam stable. Pending rehab placement, HD today, 3 liters taken off, zosyn switched to cefepime   12/8: JOAQUÍN overnight, neuro stable   12/9: JOAQUÍN overnight. Neuro exam stable. ID final recs: continue Cefepime IV 1g q24hrs (to be given after dialysis on dialysis days, T/Th/Sat) with end date 12/14 (total 7days). rectal tube removed   12/10: JOAQUÍN overnight, neuro stable, pending 4 seasons CHAPARRO      Vital Signs Last 24 Hrs  T(C): 37.4 (09 Dec 2021 21:45), Max: 37.5 (09 Dec 2021 17:50)  T(F): 99.3 (09 Dec 2021 21:45), Max: 99.5 (09 Dec 2021 17:50)  HR: 93 (10 Dec 2021 00:00) (67 - 107)  BP: 139/76 (10 Dec 2021 00:00) (107/61 - 178/96)  BP(mean): 102 (10 Dec 2021 00:00) (77 - 130)  RR: 14 (10 Dec 2021 00:00) (14 - 19)  SpO2: 100% (10 Dec 2021 00:00) (97% - 100%)    I&O's Summary    08 Dec 2021 07:01  -  09 Dec 2021 07:00  --------------------------------------------------------  IN: 420 mL / OUT: 350 mL / NET: 70 mL    09 Dec 2021 07:01  -  10 Dec 2021 00:08  --------------------------------------------------------  IN: 390 mL / OUT: 2083 mL / NET: -1693 mL        PHYSICAL EXAM:  General: NAD, pt is comfortably sitting up in bed, trached to vent   HEENT: PERRL 2-3mm reactive, neck FROM, +trach, +flap full but soft.  Cardiovascular: RRR, normal S1 and S2   Respiratory: lungs CTAB, no wheezing, rhonchi, or crackles   GI: normoactive BS to auscultation, abd soft, NTND, +PEG  Neuro: Non-verbal, does not FC, not oriented, OE to noxious, grimaces, +cough/gag/corneals. Bilateral UE 0/5 to noxious stimuli, bilateral LE trace triple flexion   Extremities: distal pulses 2+ x4  Wound/incision: +Well healed craniotomy incision, C/D/I, healing back incisions, C/D, improving dehiscence    TUBES/LINES:  [] Ureña  [] Lumbar Drain  [] Wound Drains  [X] Others - Permacath, Right brachial midline, PEG, Trach      DIET:  [] NPO  [] Mechanical  [X] Tube feeds    LABS:                        8.5    15.47 )-----------( 237      ( 09 Dec 2021 06:16 )             28.4     12-09    143  |  104  |  40<H>  ----------------------------<  118<H>  4.0   |  24  |  2.73<H>    Ca    10.1      09 Dec 2021 06:16  Phos  3.9     12-09  Mg     2.0     12-09    TPro  7.7  /  Alb  3.0<L>  /  TBili  0.2  /  DBili  x   /  AST  23  /  ALT  10  /  AlkPhos  150<H>  12-09            CAPILLARY BLOOD GLUCOSE          Drug Levels: [] N/A    CSF Analysis: [] N/A      Allergies    No Known Allergies    Intolerances      MEDICATIONS:  Antibiotics:  cefepime   IVPB 1000 milliGRAM(s) IV Intermittent every 24 hours    Neuro:  acetaminophen    Suspension .. 650 milliGRAM(s) Oral every 6 hours PRN  baclofen 10 milliGRAM(s) Oral every 8 hours  fentaNYL    Injectable 25 MICROGram(s) IV Push every 6 hours  levETIRAcetam  Solution 500 milliGRAM(s) Oral every 24 hours  ondansetron Injectable 4 milliGRAM(s) IV Push every 6 hours PRN    Anticoagulation:  heparin   Injectable 5000 Unit(s) SubCutaneous every 8 hours    OTHER:  acetylcysteine 20%  Inhalation 4 milliLiter(s) Inhalation every 6 hours  amLODIPine   Tablet 10 milliGRAM(s) Oral daily  artificial  tears Solution 1 Drop(s) Both EYES two times a day  chlorhexidine 0.12% Liquid 15 milliLiter(s) Oral Mucosa every 12 hours  chlorhexidine 2% Cloths 1 Application(s) Topical <User Schedule>  cloNIDine 0.2 milliGRAM(s) Oral every 8 hours  hydrALAZINE 100 milliGRAM(s) Oral every 8 hours  hydrALAZINE Injectable 10 milliGRAM(s) IV Push every 4 hours PRN  senna 2 Tablet(s) Oral at bedtime    IVF:  sodium chloride 1 Gram(s) Oral every 8 hours    CULTURES:  Culture Results:   No growth at 1 day. (12-08 @ 13:11)  Culture Results:   No growth at 1 day. (12-08 @ 13:11)    RADIOLOGY & ADDITIONAL TESTS:      ASSESSMENT:  45 y/o female with PMHx of HTN and MS, hx of spinal surgery at [facility] 10/8/21 presented to [facility] ED BIBEMS after being found unconscious at home, unknown period of time. 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 HH5, MF4; BD #1 = 10/26. Patient was intubated at [facility] ED and sent straight to the OR for left hemicraniotomy. A-line placed intraop. Hemodynamically unstable upon arrival to [facility], bradycardic and hypertensive s/p Mannitol and Furosemide. Central line placed in NSICU. S/p EVD placement, and coil embolization of left MCA bifurcation aneurysm 10/26/2021. S/p cerebral angio without findings of vasospasm 11/10. S/p cardiac arrest with ROSC x3 on 11/12 during tracheostomy at bedside now with b/l pneumothoracies and worsening kidney function. EVD dc'd 11/18, trach'd 11/22, permacath placed 11/26, pending [facility]O.    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, left    Intracranial hemorrhage, spontaneous subarachnoid, due to cerebral aneurysm, acute    Epistaxis    Pneumothorax, left    Functional quadriplegia    Acute respiratory failure with hypoxia    Cardiac arrest    Encounter for palliative care    Advanced care planning/counseling discussion    IPH (idiopathic pulmonary hemosiderosis)    Acute spontaneous intraparenchymal intracranial hemorrhage due to cerebral aneurysm    CHETAN (acute kidney injury)    Hypocalcemia    Angiogram, cerebral, with coil embolization, in non-operating room setting    Endoscopic percutaneous gastrostomy    Angiogram, carotid and cerebral, bilateral    Chest tube placement    Insertion of central venous catheter with ultrasound guidance    Insertion of temporary dialysis catheter    Tracheostomy, planned    Personal history of spine surgery    CHETAN (acute kidney injury)    SysAdmin_VstLnk        PLAN:  NEURO:  - neuro checks q4hrs/vital signs q 1  - Keppra 500mg q24h renal dosing   - CTH 11/18 stable, CTH 11/29 stable  - plan for cranioplasty in ~3 months   - EEG x 24 hrs   - fentanyl 25 q4   - baclofen 10 q8     CARDIO:  - -180   - amlodipine 10 daily and hydral 100 q8h, clonidine 0.2 q 8 and labetalol dc'd  - hydralazine IV prn, avoid BB IV   - s/p cardiac arrest   - TTE 11/15: mild LVH, EF 70%   - EP consulted: pauses on telemetry and bradycardia with neck movement likely due to baroreceptor sensitivity, no need for pacemaker     PULM:  - trach, continue full vent support   - s/p acute hypoxic cardiac arrest 11/12 during tracheostomy placment with ENT c/b b/l pneumothorax and b/l chest tubes (d/kiko 11/24)  - mucomyst q 6     GI:  - PEG TFs nepro- at goal 30cc/hr  - Bowel regimen, last BM 12/7  - Alk phos elevated, shocked liver, improving     RENAL:   - post cardiac arrest renal failure on long term HD  - port with IR 11/26 , nephro following, last HD 12/7, T/T/F schedule  - Na 135-145   - salt tabs 2q8  - daily weights     HEME:  - SCDs, SQH  - s/p multiple transfusions this admission, most recently s/p 1unit PRBC 11/26  - UE and LE dopplers negative 11/26, RUE 12/1 with superficial thrombophlebitis in the right cephalic vein   - FOB neg 11/16, 12/6    ID:  - s/p cefepime for enterobacter/proteus in sputum dc'd 11/15   - ID following appreciate recs   - started zosyn 12/5 for serratia in blood and serratia&proteus in sputum. Negative urine culture,  - Cefepime 1Gq24 hours daily, on days with dialysis give AFTER dialysis til 12/14   - Daily blood cultures til negative cultures  - Possibly remove midline/cath port if cultures continually positive per ID, but okay to keep for now    ENDO:  - monitor BMP glucoses    OTHER  - wound care recs- q2 dressing changes   - ENT reconsulted for trach cuff leak and tongue injury, NTD just mointor  - has R midline (11/18) and HD catheter R IJ     GOC: full code  Level of care: ICU status   Dispo: [facility] (4 seasons) AUTH until 12/10   family updated    Case discussed with Dr. Duncan and Dr. Vyas      Assessment:  Present when checked    []  GCS  E   V  M     Heart Failure: []Acute, [] acute on chronic , []chronic  Heart Failure:  [] Diastolic (HFpEF), [] Systolic (HFrEF), []Combined (HFpEF and HFrEF), [] RHF, [] Pulm HTN, [] Other    [] CHETAN, [] ATN, [] AIN, [] other  [] CKD1, [] CKD2, [] CKD 3, [] CKD 4, [] CKD 5, []ESRD    Encephalopathy: [] Metabolic, [] Hepatic, [] toxic, [] Neurological, [] Other    Abnormal Nurtitional Status: [] malnurtition (see nutrition note), [ ]underweight: BMI < 19, [] morbid obesity: BMI >40, [] Cachexia    [] Sepsis  [] hypovolemic shock,[] cardiogenic shock, [] hemorrhagic shock, [] neuogenic shock  [] Acute Respiratory Failure  []Cerebral edema, [] Brain compression/ herniation,   [] Functional quadriplegia  [] Acute blood loss anemia

## 2021-12-10 NOTE — PROGRESS NOTE ADULT - ASSESSMENT
47 y/o female with PMHx of HTN and MS, hx of spinal surgery at [facility] 10/8/21 [facility] at [facility], unknown period of time. 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 Patient was intubated at [facility] ED and sent straight to the OR for left hemicraniotomy. S/p EVD placement, and coil embolization of left MCA bifurcation aneurysm 10/26/2021. S/p cerebral angio without findings of vasospasm 11/10. S/p cardiac arrest with ROSC x3 on 11/12 during tracheostomy at bedside with b/l pneumothoraces now with chest tubes clamped. EVD dc'd 11/18, trach revision 11/22. ID consulted for serratia bacteremia for which she was started on Zosyn. Of note, On 11/11/21, ID consulted for recommendations for treatment of sputum cultures from 11/4/21 growing MSSA, Enterobacter, and Proteus, as well as clearance for cranioplasty. At that time, Patient was treated with a 7-day course of cefepime which she finished on 11/8.     Culture Data: Sputum 11/4: Numerous staph aureus, enterobacter clocae, proteus. Sputum 11/5: Proteus. Blood cx 12/5: serratia. CSF negative. UCX pending.  Imaging: Xray 12/7-patchy consolidation LLL   Surveillance 12/7-8: NGTD    Recommendations:  -Increase Cefepime to 2gq12h  -Patient has been afebrile with downtrending WBC for 24 hours, as such recommend to discharge on Cefepime 2gq12 until 12/16/21 for treatment of VAP and serratia bacteremia.  -Would recommend to discontinue Midline.    ID to sign off, plan discussed with primary team and attending. Please reconsult with further questions.    45 y/o female with PMHx of HTN and MS, hx of spinal surgery at [facility] [place] [facility] at [facility], unknown period of time. 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 Patient was intubated at [place] ED and sent straight to the OR for left hemicraniotomy. S/p EVD placement, and coil embolization of left MCA bifurcation aneurysm 10/26/2021. S/p cerebral angio without findings of vasospasm 11/10. S/p cardiac arrest with ROSC x3 on 11/12 during tracheostomy at bedside with b/l pneumothoraces now with chest tubes clamped. EVD dc'd 11/18, trach revision 11/22. ID consulted for serratia bacteremia for which she was started on Zosyn. Of note, On 11/11/21, ID consulted for recommendations for treatment of sputum cultures from 11/4/21 growing MSSA, Enterobacter, and Proteus, as well as clearance for cranioplasty. At that time, Patient was treated with a 7-day course of cefepime which she finished on 11/8.     Culture Data: Sputum 11/4: Numerous staph aureus, enterobacter clocae, proteus. Sputum 11/5: Proteus. Blood cx 12/5: serratia. CSF negative. UCX pending.  Imaging: Xray 12/7-patchy consolidation LLL   Surveillance 12/7-8: NGTD    Recommendations:  -Increase Cefepime to 2gq12h  -Patient has been afebrile with downtrending WBC for 24 hours, as such recommend to discharge on Cefepime 2gq12 until 12/16/21 for treatment of VAP and serratia bacteremia.  -Would recommend to discontinue Midline at the end of the 10 day course.     ID to sign off, plan discussed with primary team and attending. Please reconsult with further questions.    47 y/o female with PMHx of HTN and MS, hx of spinal surgery at [facility] [place] [facility] at [facility], unknown period of time. 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 Patient was intubated at [place] ED and sent straight to the OR for left hemicraniotomy. S/p EVD placement, and coil embolization of left MCA bifurcation aneurysm 10/26/2021. S/p cerebral angio without findings of vasospasm 11/10. S/p cardiac arrest with ROSC x3 on 11/12 during tracheostomy at bedside with b/l pneumothoraces now with chest tubes clamped. EVD dc'd 11/18, trach revision 11/22. ID consulted for serratia bacteremia for which she was started on Zosyn. Of note, On 11/11/21, ID consulted for recommendations for treatment of sputum cultures from 11/4/21 growing MSSA, Enterobacter, and Proteus, as well as clearance for cranioplasty. At that time, Patient was treated with a 7-day course of cefepime which she finished on 11/8.     Culture Data: Sputum 11/4: Numerous staph aureus, enterobacter clocae, proteus. Sputum 11/5: Proteus. Blood cx 12/5: serratia. CSF negative. UCX pending.  Imaging: Xray 12/7-patchy consolidation LLL   Surveillance 12/7-8: NGTD    Recommendations:  -Increase Cefepime to 2gq12h  -Patient has been afebrile with downtrending WBC for 24 hours, as such recommend to discharge on Cefepime 2gq12 until 12/16/21 for treatment of VAP and serratia bacteremia.  - discontinue Midline at the end of the 10 day course.     ID to sign off, plan discussed with primary team and attending. Please reconsult with further questions.

## 2021-12-10 NOTE — PROGRESS NOTE ADULT - ATTENDING COMMENTS
now afebrile, WBC downtrending, Cr improving.  Treat sereratia bacteremia and VAP with cefepime x 10 days since the first negative BCx (12/7-12/16).  Increase cefepime dosage to q12h given improving CrCl.  Midline should be removed upon the completoin of abx therapy.    Thank you for your consult.  Please re-consult us or call us with questions.  Case d/w primary team.    Janice Ny MD, MS  Infectious Disease attending  work cell [phone]  For any questions during evening/weekend/holiday, please page ID on call

## 2021-12-10 NOTE — DISCHARGE NOTE PROVIDER - NSDCCONDITION_GEN_ALL_CORE
[FreeTextEntry3] : I, Elijah Mota MD, personally saw and evaluated the patient and developed the plan as documented above. I concur or have edited the note as appropriate.\par  Stable

## 2021-12-10 NOTE — DISCHARGE NOTE PROVIDER - NSDCCPCAREPLAN_GEN_ALL_CORE_FT
PRINCIPAL DISCHARGE DIAGNOSIS  Diagnosis: Acute spontaneous intraparenchymal intracranial hemorrhage due to cerebral aneurysm  Assessment and Plan of Treatment:       SECONDARY DISCHARGE DIAGNOSES  Diagnosis: Functional quadriplegia  Assessment and Plan of Treatment:     Diagnosis: Acute respiratory failure with hypoxia  Assessment and Plan of Treatment:     Diagnosis: Encounter for palliative care  Assessment and Plan of Treatment:     Diagnosis: Cardiac arrest  Assessment and Plan of Treatment:     Diagnosis: CHETAN (acute kidney injury)  Assessment and Plan of Treatment:     Diagnosis: Hypocalcemia  Assessment and Plan of Treatment:     Diagnosis: Advanced care planning/counseling discussion  Assessment and Plan of Treatment:     Diagnosis: Pneumothorax, left  Assessment and Plan of Treatment:

## 2021-12-10 NOTE — DISCHARGE NOTE PROVIDER - CARE PROVIDER_API CALL
Terence Duncan)  Neurosurgery  [address], [place] [facility]  Phone: [phone]  Fax: [phone]  Follow Up Time:    Terence Duncan)  Neurosurgery  [address], [address] [place] [address]  Phone: [phone]  Fax: [phone]  Follow Up Time:     Janice Ny)  Infectious Disease; Internal Medicine  [address], [address] [place] [address]  Phone: [phone]  Fax: [phone]  Follow Up Time:     Derick Rodarte)  Internal Medicine; Nephrology  [address], [address]  [place] [address]  Phone: [phone]  Fax: [phone]  Follow Up Time:     Raj Villanueva)  Cardiac Electrophysiology; Cardiovascular Disease; Internal Medicine  [address] [address]  Phone: [phone]  Fax: [phone]  Follow Up Time:
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
## 2021-12-10 NOTE — DISCHARGE NOTE PROVIDER - HOSPITAL COURSE
45 y/o female with unknown PMHx presented to [place] [facility] BIBEMS after being found unconscious at home, unknown period of time. 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 HH5, MF4. Patient was intubated at [place] [facility] and sent straight to the OR for left hemicraniotomy. A-line placed intraop. Hemodynamically unstable upon arrival to [facility], bradycardic and hypertensive s/p Mannitol, Clevidipine, and Furosemide. Central line placed in NSICU. Currently sedated on Propofol, pending repeat CTH. History per patient's son, patient had recent spine surgery (in [place], unknown which hospital) and was found on outpatient imaging last week to have L M1 segment aneurysm (unruptured), pt asymptomatic at this time. Per son patient taking percocet PO at home. Ureña catheter, ET tube, and arterial line placed at [facility].     Hospital Course:  10/26: admitted to [facility] from [facility], POD#0 s/p L hemicraniectomy for decompression and evacuation of SDH. Transferred to [facility] noted to have tense flap, received mannitol, keppra, decadron. Was hypertensive to 200s and preston to 40s, recevied 85g mannitol and bullet 23.4%. CTH on arrival demonstrated increased size in vents and new IVH. EVD placed, open at 15, central line placed. Transfused 2 u PRBC. POD0 of coiling of left MCA bifurcation aneurysm,   10/27: CTH demonstrated EVD in correct position, EVD lowered to 5, remains intubated on proprofol, pending repeat CTH in AM. Started on 3% @ 30/hr. 2LNS given for euvolemia, Mannitol given for uptrending ICPs. Bullet given 8:30 am. 3% increased to 50/hr. 1 L bolus. New EVD catheter placed using exisiting sreekanth hole. 1 u PRBC.  10/28: HH5, MF4, BD3; POD2 angio coil/embo of L MCA aneurysm. JOAQUÍN overnight, neuro stable. EVD@5cmH20 ICPs < 20 overnight, OP WNL. S/p 1 unit PRBC yesterday with appropriate response. Given 42g mannitol in AM for ICP 22 persistently, with improvement, then given 23% in PM for elevated ICP. febrile, pancultured. Standing tylenol and cooling blanket.   10/29: BD4, POD3 angio coil/embo. JOAQUÍN o/n, neuro stable. EVD@5, ICPs <20 o/n.   10/30: BD5, POD4 angio coil/embo. JOAQUÍN o/n neuro stable. EVD@5. 3% @50cc/hr.  10/31: BD6, POD5 angio coil/embo. brief period maintained upward gaze, resolved on its own. EVD@5cm h2o.    11/1: BD7, POD6 L hemicrani, angio coil/embo. JOAQUÍN overnight. Neuro exam unchanged. ICPs WNL. started bromocriptine/gabapentin/baclofen. dc'd propofol, started precedex  11/2: BD8 POD7 L hemicrani, angio coil/embo. JOAQUÍN overnight. Neuro exam stable. Remains on 3% hypertonic saline. Receieved 1 unit of PRBCs for a Hgb of 7.6.  11/3: BD 9 POD8 of L hemicrani. Elevated lipase, normal amylase, OG tube clogged and replaced. Abdominal US normal. Pending gen surg reccs regarding trach and peg. Gabapentin and Baclofen increased to help with neurostorming. restarted back on 3%, LR@35. became preston+hypotensive with nimodipine 60q4, decreased back to 30q2, NaCl bullet given.   11/4: BD10 POD9, JOAQUÍN o/n, 500cc NS for euvolemia,  neuro stable, EVD at 5. 3% increased to 75  11/5: BD11 POD10, JOAQUÍN o/n, neuro stable, EVD at 5  11/6: BD12 POD11 JOAQUÍN overnight. Neuro exam stable. Remains intubated on precedex. 3% hypertonic saline dc'd  11/7: BD13 POD 12 JOAQUÍN o/n, NGT replaced. on precex with no icp crisis   11/8: BD14 POD13 JOAQUÍN o/n, noted to have tongue maceration/macroglossia. Gauze with tongue depressor placed. Pending further recs from ENT. Pending trach and PEG placement tomorrow with gen surg. Off precedex, ICPs stable. EVD remains @ 5.   11/9: BD15, POD 14, bleeding under tongue at start of shift, fentanyl pushed with no further episodes. gabapentin stopped. PEG placed  11/10: BD 16, POD 15, neuro stable, ENT to be consulted in AM, 3% increased to 50/hr.  11/11: BD 17, POD 16, neuro exam stable. Pend [facility] saba in am for cranioplasty planning. Pend trach in OR with ENT. F.u BMP in am, 3%@50cc/hr for Na goal 140-145.   11/12: BD 18, POD 17. JOAQUÍN overnight, neuro stable. Pend trach placement today. [facility] saba completed 11/11. Off of 3%, f/u am BMP for Na goal 140-150. CSF neg. Hypoxic cardiac arrest with ROSC x 3 during tracheostomy placement. B/l chest tubes placed for resulting pneumothorax s/p CPR. salt tabs dc'd.  11/13: BD 19, POD 18. Patient tachycardic with some improvement, SBP stable off of levophed, hgb downtrending o/n, transfused 1 unit PRBCs. B/l chest tube. EVD @5cmH2O, no elevated ICPs. UOP downtrending, trend BUN/Cr, given 1LNS x1 for low urine output. F/u am CXR. Cont Cefepime until today, then change to Ancef while trach packing in place per ENT. Pend CT when stable. Trops downtrending s/p cardiac arrest. 1L. florinef dc'd. BP goal changed to 100-160, started on amlodipine   11/14: BD20, POD19, worsening creatinine with decreased urine output. Given 250 of albumin and 500cc LR. started on hydralazine PO, decreased amantadine 100 daily given CrCl of 20.  11/15: BD21, POD20, remains oliguric, fem line dc'd, tongue swollen and unable to fit bite block in mouth, started on nimbex gtt to relax jaw. Propofol and fentanyl gtt started. Vomiting TFs through nose and mouth, ENT called. TFs held. Cr uptrending. Palliative consulted.  11/16: BD22, POD21, o/n external trach dressing replaced due to cuff leak and decreasing TV, sedated and paralyzed on nimbex, propofol, and fentanyl gtt. CTH stable.  EVD raised from 5 to 10. Nimbex weaned off. Cr uptrending, Trickle feeds started. FOB negative.   11/17: BD 23, POD22, JOAQUÍN o/n, EVD clamped, NS d/c'ed, LR@50, advancing tube feeds.   11/18: BD24, POD23 o/n 3% at 30 with Na acetate started, propofol dc'd due to elevated TG level, episode of vomiting TFs from nose and mouth into ETT with elevated ICP 30s, ICP normalized with resolution of vomiting, reglan 5mg IV given, TFs held, 3% stopped. midline placed   11/19; BD25, POD24 JOAQUÍN overnight. Remains on versed and fentanyl drips. Neuro exam unchanged. R IJ dialysis cath placed.   11/20: BD26 POD25 JOAQUÍN overnight. Neuro exam unchanged. Plan for HD today  11/21: BD 27 POD 26 weaned off versed, fentanyl   11/22: BD 28 POD 27 JOAQUÍN o/n , off fentanyl gtt, pt is calm. s/p HD earlier today. s/p trach revision  11/23: BD29. incraesed clonidine to 0.4 BID, s/p trach, stable hemodynamically. neuro at baseline. TFs stopped for vomiting and restarted 10cc/hr  11/24: BD 30, POD29 TFs inc to 20cc/hr. HD today  11/25: BD31, POD30. JOAQUÍN o/n neuro stable  11/26: BD32, POD 31, JOAQUÍN overnight,  perma cath placement with IR today , hydralazine inc 75q8, reglan decreased 2.5q6. Hemodialyzed today, took off 3L and given 1unit pRBCs  11/27: BD33, POD32, JOAQUÍN overnight, pending LTAC. hyponatremic, urine lytes sent. 250cc bolus 3% started, salt tabs started  11/28: BD34 POD 33 L hemicrani. JOAQUÍN o/n. Hyponatremia improved, NaCl 2 g q 6. Pending LTAC at [facility], increased prn requirements for BP (hydral + labetalol x1), hydral PO increased to 100q8, Fentanyl x1 for pain.   11/29: BD35 POD 34 s/p L hemicrani, JOAQUÍN o/n, pend dialysis tomorrow. CTH ordered for am, exit CTH pend. Plan for discharge to [facility]. Tolerating CPAP during the day, full vent support overnight. HD today  11/30: BD 36 POD 35 pending CHAPARRO received on HD through the port. HD , 3L removed, hypertensive, renal following, hydralazine pushes prn for now.   12/1: Intermittent episodes of hypertension, decreased clonidine to q8 and added labetalol 100 BID. RUE doppler done   LGF - tylenol given   12/2: Multiple episodes of asystole, witnessed upward gaze associated with 5 second pause and later 8 second pause. EKG stable, troponin 0.08.  labetalol decreased to 50BID. cuff leak: reinflated during day and ENT to replace  tomorrow. EEG placed for upward gaze. Dialysis today.  12/3: JOAQUÍN o/n, recieved multiple pushes IV hydralazine for HTN. during day bradycardic to 40s when moving head and resolved, EP consulted - thought to be vagal sensitivity. No cuff leak today so tube not replaced  12/4: JOAQUÍN o/n, neuro unchanged, started on standing fentanyl x 1 day for HTN unclear if pain related, baclofen started for rigidity, dialysis today  12/5: o/n fever tmax 102, pancultured, neuro stable; HD yesterday  Started on zosyn for PNA   12/6: JOAQUÍN overnight. Neuro exam stable.  12/7: JOAQUÍN overnight. Neuro exam stable. Pending rehab placement, HD today, 3 liters taken off, zosyn switched to cefepime   12/8: JOAQUÍN overnight, neuro stable   12/9: JOAQUÍN overnight. Neuro exam stable. ID final recs: continue Cefepime IV 1g q24hrs (to be given after dialysis on dialysis days, T/Th/Sat) with end date 12/14 (total 7days). rectal tube removed   12/10: JOAQUÍN overnight, neuro stable, pending 4 seasons CHAPARRO      Patient evaluated by PT/OT who recommened: CHAPARRO   Patient is going home? rehab? hospice? Facility Name: [facility]    Hospital course c/b: cardiac arrest, pneumonia, bilateral pneumothoracies, multi-organ dysfunction, kidney failure requiring dilaysis     Exam on day of discharge:  General: NAD, pt is comfortably sitting up in bed, trached to vent   HEENT: PERRL 2-3mm reactive, neck FROM, +trach, +flap full but soft.  Cardiovascular: RRR, normal S1 and S2   Respiratory: lungs CTAB, no wheezing, rhonchi, or crackles   GI: normoactive BS to auscultation, abd soft, NTND, +PEG  Neuro: Non-verbal, does not FC, not oriented, OE to noxious, grimaces, +cough/gag/corneals. Bilateral UE 0/5 to noxious stimuli, bilateral LE trace triple flexion   Extremities: distal pulses 2+ x4  Wound/incision: +Well healed craniotomy incision, C/D/I, healing back incisions, C/D, improving dehiscence    Checklist:   - Patient is neurologically stable, afebrile, labs and vitals stable.        47 y/o female with unknown PMHx presented to [place] [facility] BIBEMS after being found unconscious at home, unknown period of time. 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 HH5, MF4. Patient was intubated at [place] [facility] and sent straight to the OR for left hemicraniotomy. A-line placed intraop. Hemodynamically unstable upon arrival to [facility], bradycardic and hypertensive s/p Mannitol, Clevidipine, and Furosemide. Central line placed in NSICU. Currently sedated on Propofol, pending repeat CTH. History per patient's son, patient had recent spine surgery (in [place], unknown which hospital) and was found on outpatient imaging last week to have L M1 segment aneurysm (unruptured), pt asymptomatic at this time. Per son patient taking percocet PO at home. Ureña catheter, ET tube, and arterial line placed at [facility].     Hospital Course:  10/26: admitted to [facility] from [facility], POD#0 s/p L hemicraniectomy for decompression and evacuation of SDH. Transferred to [facility] noted to have tense flap, received mannitol, keppra, decadron. Was hypertensive to 200s and preston to 40s, recevied 85g mannitol and bullet 23.4%. CTH on arrival demonstrated increased size in vents and new IVH. EVD placed, open at 15, central line placed. Transfused 2 u PRBC. POD0 of coiling of left MCA bifurcation aneurysm,   10/27: CTH demonstrated EVD in correct position, EVD lowered to 5, remains intubated on proprofol, pending repeat CTH in AM. Started on 3% @ 30/hr. 2LNS given for euvolemia, Mannitol given for uptrending ICPs. Bullet given 8:30 am. 3% increased to 50/hr. 1 L bolus. New EVD catheter placed using exisiting sreekanth hole. 1 u PRBC.  10/28: HH5, MF4, BD3; POD2 angio coil/embo of L MCA aneurysm. JOAQUÍN overnight, neuro stable. EVD@5cmH20 ICPs < 20 overnight, OP WNL. S/p 1 unit PRBC yesterday with appropriate response. Given 42g mannitol in AM for ICP 22 persistently, with improvement, then given 23% in PM for elevated ICP. febrile, pancultured. Standing tylenol and cooling blanket.   10/29: BD4, POD3 angio coil/embo. JOAQUÍN o/n, neuro stable. EVD@5, ICPs <20 o/n.   10/30: BD5, POD4 angio coil/embo. JOAQUÍN o/n neuro stable. EVD@5. 3% @50cc/hr.  10/31: BD6, POD5 angio coil/embo. brief period maintained upward gaze, resolved on its own. EVD@5cm h2o.    11/1: BD7, POD6 L hemicrani, angio coil/embo. JOAQUÍN overnight. Neuro exam unchanged. ICPs WNL. started bromocriptine/gabapentin/baclofen. dc'd propofol, started precedex  11/2: BD8 POD7 L hemicrani, angio coil/embo. JOAQUÍN overnight. Neuro exam stable. Remains on 3% hypertonic saline. Receieved 1 unit of PRBCs for a Hgb of 7.6.  11/3: BD 9 POD8 of L hemicrani. Elevated lipase, normal amylase, OG tube clogged and replaced. Abdominal US normal. Pending gen surg reccs regarding trach and peg. Gabapentin and Baclofen increased to help with neurostorming. restarted back on 3%, LR@35. became preston+hypotensive with nimodipine 60q4, decreased back to 30q2, NaCl bullet given.   11/4: BD10 POD9, JOAQUÍN o/n, 500cc NS for euvolemia,  neuro stable, EVD at 5. 3% increased to 75  11/5: BD11 POD10, JOAQUÍN o/n, neuro stable, EVD at 5  11/6: BD12 POD11 JOAQUÍN overnight. Neuro exam stable. Remains intubated on precedex. 3% hypertonic saline dc'd  11/7: BD13 POD 12 JOAQUÍN o/n, NGT replaced. on precex with no icp crisis   11/8: BD14 POD13 JOAQUÍN o/n, noted to have tongue maceration/macroglossia. Gauze with tongue depressor placed. Pending further recs from ENT. Pending trach and PEG placement tomorrow with gen surg. Off precedex, ICPs stable. EVD remains @ 5.   11/9: BD15, POD 14, bleeding under tongue at start of shift, fentanyl pushed with no further episodes. gabapentin stopped. PEG placed  11/10: BD 16, POD 15, neuro stable, ENT to be consulted in AM, 3% increased to 50/hr.  11/11: BD 17, POD 16, neuro exam stable. Pend [facility] saba in am for cranioplasty planning. Pend trach in OR with ENT. F.u BMP in am, 3%@50cc/hr for Na goal 140-145.   11/12: BD 18, POD 17. JOAQUÍN overnight, neuro stable. Pend trach placement today. [facility] saba completed 11/11. Off of 3%, f/u am BMP for Na goal 140-150. CSF neg. Hypoxic cardiac arrest with ROSC x 3 during tracheostomy placement. B/l chest tubes placed for resulting pneumothorax s/p CPR. salt tabs dc'd.  11/13: BD 19, POD 18. Patient tachycardic with some improvement, SBP stable off of levophed, hgb downtrending o/n, transfused 1 unit PRBCs. B/l chest tube. EVD @5cmH2O, no elevated ICPs. UOP downtrending, trend BUN/Cr, given 1LNS x1 for low urine output. F/u am CXR. Cont Cefepime until today, then change to Ancef while trach packing in place per ENT. Pend CT when stable. Trops downtrending s/p cardiac arrest. 1L. florinef dc'd. BP goal changed to 100-160, started on amlodipine   11/14: BD20, POD19, worsening creatinine with decreased urine output. Given 250 of albumin and 500cc LR. started on hydralazine PO, decreased amantadine 100 daily given CrCl of 20.  11/15: BD21, POD20, remains oliguric, fem line dc'd, tongue swollen and unable to fit bite block in mouth, started on nimbex gtt to relax jaw. Propofol and fentanyl gtt started. Vomiting TFs through nose and mouth, ENT called. TFs held. Cr uptrending. Palliative consulted.  11/16: BD22, POD21, o/n external trach dressing replaced due to cuff leak and decreasing TV, sedated and paralyzed on nimbex, propofol, and fentanyl gtt. CTH stable.  EVD raised from 5 to 10. Nimbex weaned off. Cr uptrending, Trickle feeds started. FOB negative.   11/17: BD 23, POD22, JOAQUÍN o/n, EVD clamped, NS d/c'ed, LR@50, advancing tube feeds.   11/18: BD24, POD23 o/n 3% at 30 with Na acetate started, propofol dc'd due to elevated TG level, episode of vomiting TFs from nose and mouth into ETT with elevated ICP 30s, ICP normalized with resolution of vomiting, reglan 5mg IV given, TFs held, 3% stopped. midline placed   11/19; BD25, POD24 JOAQUÍN overnight. Remains on versed and fentanyl drips. Neuro exam unchanged. R IJ dialysis cath placed.   11/20: BD26 POD25 JOAQUÍN overnight. Neuro exam unchanged. Plan for HD today  11/21: BD 27 POD 26 weaned off versed, fentanyl   11/22: BD 28 POD 27 JOAQUÍN o/n , off fentanyl gtt, pt is calm. s/p HD earlier today. s/p trach revision  11/23: BD29. incraesed clonidine to 0.4 BID, s/p trach, stable hemodynamically. neuro at baseline. TFs stopped for vomiting and restarted 10cc/hr  11/24: BD 30, POD29 TFs inc to 20cc/hr. HD today  11/25: BD31, POD30. JOAQUÍN o/n neuro stable  11/26: BD32, POD 31, JOAQUÍN overnight,  perma cath placement with IR today , hydralazine inc 75q8, reglan decreased 2.5q6. Hemodialyzed today, took off 3L and given 1unit pRBCs  11/27: BD33, POD32, JOAQUÍN overnight, pending LTAC. hyponatremic, urine lytes sent. 250cc bolus 3% started, salt tabs started  11/28: BD34 POD 33 L hemicrani. JOAQUÍN o/n. Hyponatremia improved, NaCl 2 g q 6. Pending LTAC at [facility], increased prn requirements for BP (hydral + labetalol x1), hydral PO increased to 100q8, Fentanyl x1 for pain.   11/29: BD35 POD 34 s/p L hemicrani, JOAQUÍN o/n, pend dialysis tomorrow. CTH ordered for am, exit CTH pend. Plan for discharge to [facility]. Tolerating CPAP during the day, full vent support overnight. HD today  11/30: BD 36 POD 35 pending CHAPARRO received on HD through the port. HD , 3L removed, hypertensive, renal following, hydralazine pushes prn for now.   12/1: Intermittent episodes of hypertension, decreased clonidine to q8 and added labetalol 100 BID. RUE doppler done   LGF - tylenol given   12/2: Multiple episodes of asystole, witnessed upward gaze associated with 5 second pause and later 8 second pause. EKG stable, troponin 0.08.  labetalol decreased to 50BID. cuff leak: reinflated during day and ENT to replace  tomorrow. EEG placed for upward gaze. Dialysis today.  12/3: JOAQUÍN o/n, recieved multiple pushes IV hydralazine for HTN. during day bradycardic to 40s when moving head and resolved, EP consulted - thought to be vagal sensitivity. No cuff leak today so tube not replaced  12/4: JOAQUÍN o/n, neuro unchanged, started on standing fentanyl x 1 day for HTN unclear if pain related, baclofen started for rigidity, dialysis today  12/5: o/n fever tmax 102, pancultured, neuro stable; HD yesterday  Started on zosyn for PNA   12/6: JOAQUÍN overnight. Neuro exam stable.  12/7: JOAQUÍN overnight. Neuro exam stable. Pending rehab placement, HD today, 3 liters taken off, zosyn switched to cefepime   12/8: JOAQUÍN overnight, neuro stable   12/9: JOAQUÍN overnight. Neuro exam stable. ID final recs: continue Cefepime IV 1g q24hrs (to be given after dialysis on dialysis days, T/Th/Sat) with end date 12/14 (total 7days). rectal tube removed   12/10: JAOQUÍN overnight, neuro stable, pending 4 seasons CH[facility]ARRO      Patient evaluated by PT/OT who recommened: CHAPARRO   Patient is going home? rehab? hospice? Facility Name: [facility]    Hospital course c/b: cardiac arrest, pneumonia, bilateral pneumothoracies, multi-organ dysfunction, kidney failure requiring dilaysis     Exam on day of discharge:  General: NAD, pt is comfortably sitting up in bed, trached to vent   HEENT: PERRL 2-3mm reactive, neck FROM, +trach, +flap full but soft.  Cardiovascular: RRR, normal S1 and S2   Respiratory: lungs CTAB, no wheezing, rhonchi, or crackles   GI: normoactive BS to auscultation, abd soft, NTND, +PEG  Neuro: Non-verbal, does not FC, not oriented, OE to noxious, grimaces, +cough/gag/corneals. Bilateral UE 0/5 to noxious stimuli, bilateral LE trace triple flexion   Extremities: distal pulses 2+ x4  Wound/incision: +Well healed craniotomy incision, C/D/I, healing back incisions, C/D, improving dehiscence. Helmet at bedside for transfer.     Checklist:   - Patient is neurologically stable, afebrile, labs and vitals stable.        45 y/o female with unknown PMHx presented to [place] [facility] BIBEMS after being found unconscious at home, unknown period of time. 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 HH5, MF4. Patient was intubated at [place] ED and sent straight to the OR for left hemicraniotomy. A-line placed intraop. Hemodynamically unstable upon arrival to [facility], bradycardic and hypertensive s/p Mannitol, Clevidipine, and Furosemide. Central line placed in NSICU. Currently sedated on Propofol, pending repeat CTH. History per patient's son, patient had recent spine surgery (in [place], unknown which hospital) and was found on outpatient imaging last week to have L M1 segment aneurysm (unruptured), pt asymptomatic at this time. Per son patient taking percocet PO at home. Ureña catheter, ET tube, and arterial line placed at [facility].     Hospital Course:  10/26: admitted to [facility] from [facility], POD#0 s/p L hemicraniectomy for decompression and evacuation of SDH. Transferred to [facility] noted to have tense flap, received mannitol, keppra, decadron. Was hypertensive to 200s and preston to 40s, recevied 85g mannitol and bullet 23.4%. CTH on arrival demonstrated increased size in vents and new IVH. EVD placed, open at 15, central line placed. Transfused 2 u PRBC. POD0 of coiling of left MCA bifurcation aneurysm,   10/27: CTH demonstrated EVD in correct position, EVD lowered to 5, remains intubated on proprofol, pending repeat CTH in AM. Started on 3% @ 30/hr. 2LNS given for euvolemia, Mannitol given for uptrending ICPs. Bullet given 8:30 am. 3% increased to 50/hr. 1 L bolus. New EVD catheter placed using exisiting sreekanth hole. 1 u PRBC.  10/28: HH5, MF4, BD3; POD2 angio coil/embo of L MCA aneurysm. JOAQUÍN overnight, neuro stable. EVD@5cmH20 ICPs < 20 overnight, OP WNL. S/p 1 unit PRBC yesterday with appropriate response. Given 42g mannitol in AM for ICP 22 persistently, with improvement, then given 23% in PM for elevated ICP. febrile, pancultured. Standing tylenol and cooling blanket.   10/29: BD4, POD3 angio coil/embo. JOAQUÍN o/n, neuro stable. EVD@5, ICPs <20 o/n.   10/30: BD5, POD4 angio coil/embo. JOAQUÍN o/n neuro stable. EVD@5. 3% @50cc/hr.  10/31: BD6, POD5 angio coil/embo. brief period maintained upward gaze, resolved on its own. EVD@5cm h2o.    11/1: BD7, POD6 L hemicrani, angio coil/embo. JOAQUÍN overnight. Neuro exam unchanged. ICPs WNL. started bromocriptine/gabapentin/baclofen. dc'd propofol, started precedex  11/2: BD8 POD7 L hemicrani, angio coil/embo. JOAQUÍN overnight. Neuro exam stable. Remains on 3% hypertonic saline. Receieved 1 unit of PRBCs for a Hgb of 7.6.  11/3: BD 9 POD8 of L hemicrani. Elevated lipase, normal amylase, OG tube clogged and replaced. Abdominal US normal. Pending gen surg reccs regarding trach and peg. Gabapentin and Baclofen increased to help with neurostorming. restarted back on 3%, LR@35. became preston+hypotensive with nimodipine 60q4, decreased back to 30q2, NaCl bullet given.   11/4: BD10 POD9, JOAQUÍN o/n, 500cc NS for euvolemia,  neuro stable, EVD at 5. 3% increased to 75  11/5: BD11 POD10, JOAQUÍN o/n, neuro stable, EVD at 5  11/6: BD12 POD11 JOAQUÍN overnight. Neuro exam stable. Remains intubated on precedex. 3% hypertonic saline dc'd  11/7: BD13 POD 12 JOAQUÍN o/n, NGT replaced. on precex with no icp crisis   11/8: BD14 POD13 JOAQUÍN o/n, noted to have tongue maceration/macroglossia. Gauze with tongue depressor placed. Pending further recs from ENT. Pending trach and PEG placement tomorrow with gen surg. Off precedex, ICPs stable. EVD remains @ 5.   11/9: BD15, POD 14, bleeding under tongue at start of shift, fentanyl pushed with no further episodes. gabapentin stopped. PEG placed  11/10: BD 16, POD 15, neuro stable, ENT to be consulted in AM, 3% increased to 50/hr.  11/11: BD 17, POD 16, neuro exam stable. Pend [facility] saba in am for cranioplasty planning. Pend trach in OR with ENT. F.u BMP in am, 3%@50cc/hr for Na goal 140-145.   11/12: BD 18, POD 17. JOAQUÍN overnight, neuro stable. Pend trach placement today. [facility] saba completed 11/11. Off of 3%, f/u am BMP for Na goal 140-150. CSF neg. Hypoxic cardiac arrest with ROSC x 3 during tracheostomy placement. B/l chest tubes placed for resulting pneumothorax s/p CPR. salt tabs dc'd.  11/13: BD 19, POD 18. Patient tachycardic with some improvement, SBP stable off of levophed, hgb downtrending o/n, transfused 1 unit PRBCs. B/l chest tube. EVD @5cmH2O, no elevated ICPs. UOP downtrending, trend BUN/Cr, given 1LNS x1 for low urine output. F/u am CXR. Cont Cefepime until today, then change to Ancef while trach packing in place per ENT. Pend CT when stable. Trops downtrending s/p cardiac arrest. 1L. florinef dc'd. BP goal changed to 100-160, started on amlodipine   11/14: BD20, POD19, worsening creatinine with decreased urine output. Given 250 of albumin and 500cc LR. started on hydralazine PO, decreased amantadine 100 daily given CrCl of 20.  11/15: BD21, POD20, remains oliguric, fem line dc'd, tongue swollen and unable to fit bite block in mouth, started on nimbex gtt to relax jaw. Propofol and fentanyl gtt started. Vomiting TFs through nose and mouth, ENT called. TFs held. Cr uptrending. Palliative consulted.  11/16: BD22, POD21, o/n external trach dressing replaced due to cuff leak and decreasing TV, sedated and paralyzed on nimbex, propofol, and fentanyl gtt. CTH stable.  EVD raised from 5 to 10. Nimbex weaned off. Cr uptrending, Trickle feeds started. FOB negative.   11/17: BD 23, POD22, JOAQUÍN o/n, EVD clamped, NS d/c'ed, LR@50, advancing tube feeds.   11/18: BD24, POD23 o/n 3% at 30 with Na acetate started, propofol dc'd due to elevated TG level, episode of vomiting TFs from nose and mouth into ETT with elevated ICP 30s, ICP normalized with resolution of vomiting, reglan 5mg IV given, TFs held, 3% stopped. midline placed   11/19; BD25, POD24 JOAQUÍN overnight. Remains on versed and fentanyl drips. Neuro exam unchanged. R IJ dialysis cath placed.   11/20: BD26 POD25 JOAQUÍN overnight. Neuro exam unchanged. Plan for HD today  11/21: BD 27 POD 26 weaned off versed, fentanyl   11/22: BD 28 POD 27 JOAQUÍN o/n , off fentanyl gtt, pt is calm. s/p HD earlier today. s/p trach revision  11/23: BD29. incraesed clonidine to 0.4 BID, s/p trach, stable hemodynamically. neuro at baseline. TFs stopped for vomiting and restarted 10cc/hr  11/24: BD 30, POD29 TFs inc to 20cc/hr. HD today  11/25: BD31, POD30. JOAQUÍN o/n neuro stable  11/26: BD32, POD 31, JOAQUÍN overnight,  perma cath placement with IR today , hydralazine inc 75q8, reglan decreased 2.5q6. Hemodialyzed today, took off 3L and given 1unit pRBCs  11/27: BD33, POD32, JOAQUÍN overnight, pending LTAC. hyponatremic, urine lytes sent. 250cc bolus 3% started, salt tabs started  11/28: BD34 POD 33 L hemicrani. JOAQUÍN o/n. Hyponatremia improved, NaCl 2 g q 6. Pending LTAC at [facility], increased prn requirements for BP (hydral + labetalol x1), hydral PO increased to 100q8, Fentanyl x1 for pain.   11/29: BD35 POD 34 s/p L hemicrani, JOAQUÍN o/n, pend dialysis tomorrow. CTH ordered for am, exit CTH pend. Plan for discharge to [facility]. Tolerating CPAP during the day, full vent support overnight. HD today  11/30: BD 36 POD 35 pending CHAPARRO received on HD through the port. HD , 3L removed, hypertensive, renal following, hydralazine pushes prn for now.   12/1: Intermittent episodes of hypertension, decreased clonidine to q8 and added labetalol 100 BID. RUE doppler done   LGF - tylenol given   12/2: Multiple episodes of asystole, witnessed upward gaze associated with 5 second pause and later 8 second pause. EKG stable, troponin 0.08.  labetalol decreased to 50BID. cuff leak: reinflated during day and ENT to replace  tomorrow. EEG placed for upward gaze. Dialysis today.  12/3: JOAQUNÍ o/n, recieved multiple pushes IV hydralazine for HTN. during day bradycardic to 40s when moving head and resolved, EP consulted - thought to be vagal sensitivity. No cuff leak today so tube not replaced  12/4: JOAQUÍN o/n, neuro unchanged, started on standing fentanyl x 1 day for HTN unclear if pain related, baclofen started for rigidity, dialysis today  12/5: o/n fever tmax 102, pancultured, neuro stable; HD yesterday  Started on zosyn for PNA   12/6: JOAQUÍN overnight. Neuro exam stable.  12/7: JOAQUÍN overnight. Neuro exam stable. Pending rehab placement, HD today, 3 liters taken off, zosyn switched to cefepime   12/8: JOAQUÍN overnight, neuro stable   12/9: JOAQUÍN overnight. Neuro exam stable. ID final recs: continue Cefepime IV 1g q24hrs (to be given after dialysis on dialysis days, T/Th/Sat) with end date 12/14 (total 7days). Rectal tube removed   12/10: JOAQUÍN overnight, neuro stable, pending [facility]. Final IV abx Cefepime 2g IV q12hrs (to be given after dialysis on dialysis days, T/Th/Sat) with end date 12/16.       Patient evaluated by PT/OT who recommened: CHAPARRO   Patient is going home? rehab? hospice? Facility Name: [facility]    Hospital course c/b: cardiac arrest, pneumonia, bilateral pneumothoracies, multi-organ dysfunction, kidney failure requiring dilaysis     Exam on day of discharge:  General: NAD, pt is comfortably sitting up in bed, trached to vent   HEENT: PERRL 2-3mm reactive, neck FROM, +trach, +flap full but soft.  Cardiovascular: RRR, normal S1 and S2   Respiratory: lungs CTAB, no wheezing, rhonchi, or crackles   GI: normoactive BS to auscultation, abd soft, NTND, +PEG  Neuro: Non-verbal, does not FC, not oriented, OE to noxious, grimaces, +cough/gag/corneals. Bilateral UE 0/5 to noxious stimuli, bilateral LE trace triple flexion   Extremities: distal pulses 2+ x4  Wound/incision: +Well healed craniotomy incision, C/D/I, healing back incisions, C/D, improving dehiscence. Helmet at bedside for transfer.     Checklist:   - Patient is neurologically stable, afebrile, labs and vitals stable.

## 2021-12-10 NOTE — DISCHARGE NOTE PROVIDER - PROVIDER TOKENS
PROVIDER:[TOKEN:[[id number]]] PROVIDER:[TOKEN:[[id number]]],PROVIDER:[TOKEN:[[id number]]],PROVIDER:[TOKEN:[[id number]]],PROVIDER:[TOKEN:[[id number]]]

## 2021-12-10 NOTE — DISCHARGE NOTE PROVIDER - CARE PROVIDERS DIRECT ADDRESSES
,[email or web address]

## 2021-12-10 NOTE — DISCHARGE NOTE PROVIDER - NSDCFUADDAPPT_GEN_ALL_CORE_FT
Please follow-up with the office of Dr. Duncan in 3 months regarding planning for Cranioplasty. The office should reach out to you regarding this surgery, but if you have any further questions please contact the office at [phone].

## 2021-12-10 NOTE — PROGRESS NOTE ADULT - CRITICAL CARE SERVICES PROVIDED
Critical care services provided

## 2021-12-10 NOTE — PROGRESS NOTE ADULT - ATTENDING SUPERVISION STATEMENT
Fellow
ACP
ACP
Fellow
Resident
Resident
Resident/Fellow
Resident/Fellow
Fellow
Resident
Resident
Fellow
Fellow

## 2021-12-10 NOTE — DISCHARGE NOTE PROVIDER - NSDCFUADDINST_GEN_ALL_CORE_FT
Neurosurgery follow up appointment date/time:  - Your staples/sutures have been removed  - please call the office to confirm appointment at [phone]    Wound Care:  - Do not submerge head/soak incision.  - Shampoo head, rinse and pat dry, do not use conditioner.     Devices:  - If out of bed, wear crani helment     Drains/Lines:  - You have a permacath dialysis port in place  - You have a right midline in place.     Activity:  - fatigue is common after surgery, rest if you feel tired   - no bending, lifting, twisting or heavy lifting   - walking is recommended, ambulate as tolerated  - no bathing   - keep hydrated, drink plenty of water     Inpatient consults:  - Infectious disease     Please also follow up with your primary care doctor.     Pain Expectations:  - pain after surgery is expected  - please take pain meds as prescribed     Medications:  - changes to home meds (ex. AED's)?  - new meds?  - pain meds?  - when can antiplatelets or anticoagulants be restarted?  - were adverse affects of meds discussed with patients?   - pain medications can cause constipation, you should eat a high fiber diet and may take a stool softener while on pain meds   - Avoid taking Advil (ibuprofen), Motrin (naproxen), or Aspirin for pain as they can cause bleeding     Call the office or come to ED if:  - wound has drainage or bleeding, increased redness or pain at incision site, neurological change, fever (>101), chills, night sweats, syncope, nausea/vomiting      Playback:  - discharge instructions are uploaded to playback     WITHIN 24 HOURS OF DISCHARGE, PLEASE CONTACT NEURO PA  WITH ANY QUESTIONS OR CONCERNS: [phone]   OTHERWISE, PLEASE CALL THE OFFICE WITH ANY QUESTIONS OR CONCERNS:  Neurosurgery follow up appointment date/time:  - Your staples/sutures have been removed  - please call the office to confirm appointment at [phone]    Wound Care:  - Do not submerge head/soak incision.  - Shampoo head, rinse and pat dry, do not use conditioner.     Devices:  - If out of bed, wear crani helmet     Drains/Lines:  - You have a permacath dialysis port in place  - You have a right midline in place.     Activity:  - fatigue is common after surgery, rest if you feel tired   - no bending, lifting, twisting or heavy lifting   - walking is recommended, ambulate as tolerated  - no bathing   - keep hydrated, drink plenty of water     Inpatient consults:  - Infectious disease     Please also follow up with your primary care doctor.     Please perform repeat surveillance dopplers of the upper and lower extremities while at rehab.     Pain Expectations:  - pain after surgery is expected  - please take pain meds as prescribed     Medications:  - changes to home meds (ex. AED's)?  - new meds?  - pain meds?  - when can antiplatelets or anticoagulants be restarted?  - were adverse affects of meds discussed with patients?   - pain medications can cause constipation, you should eat a high fiber diet and may take a stool softener while on pain meds   - Avoid taking Advil (ibuprofen), Motrin (naproxen), or Aspirin for pain as they can cause bleeding     Call the office or come to ED if:  - wound has drainage or bleeding, increased redness or pain at incision site, neurological change, fever (>101), chills, night sweats, syncope, nausea/vomiting      Playback:  - discharge instructions are uploaded to playback     WITHIN 24 HOURS OF DISCHARGE, PLEASE CONTACT NEURO PA  WITH ANY QUESTIONS OR CONCERNS: [phone]   OTHERWISE, PLEASE CALL THE OFFICE WITH ANY QUESTIONS OR CONCERNS:  Neurosurgery follow up appointment date/time:  - Your staples/sutures have been removed  - please call the office to confirm appointment for future Cranioplasty in 3 months from discharge to rehab; phone #: [phone]    ANTIBIOTIC REGIMEN:   - For Ventilator associated bacteremia, patient should continue to take Cefepime 2g q12hrs IV (to be given after dialysis on dialysis days, T/Th/Sat) with end date 12/16 (total 10days). Patient will be discharged with existing hemodialysis port and midline. Please remove midline at rehab after antibiotics discontinued on 12/16.  Please closely monitor BMP and renal function.     Wound Care:  - Do not submerge head/soak incision.  - Shampoo head, rinse and pat dry, do not use conditioner   - Patient has sensitive vagal tone, noted to have asystole/preston episode correlating manipulation of neck area - make sure to minimize neck and trach manipulation.     Devices:  - If out of bed, wear crani helmet   - For transfers please wear crani helmet     Drains/Lines:  - You have a permacath dialysis port in place  - You have a right midline in place. PLEASE remove right midline after completing antibiotic course of Cefepime for bacteremia on 12/16.     Inpatient consults:  - Infectious disease, please follow below antibiotic regimen upon discharge to rehab for VAP bacteremia.     Please also follow up with your primary care doctor.     Please perform repeat surveillance dopplers of the upper and lower extremities while at rehab to assess.   Last dopplers 12/1: Probable superficial thrombophlebitis in the right cephalic vein at level of antecubital fossa. No evidence of deep venous thrombosis above the knees in either lower extremity.    Pain Expectations:  - please take pain meds as prescribed   - Please administer pain medications as prescribed.     Medications:  - Please continue medications as written upon discharge to rehab. Please take into consideration renal function while on dialysis and renal dosing.   - Please continue DVT chemoprophylaxis of SQH 5000units q8hrs.   - Avoid taking Advil (ibuprofen), Motrin (naproxen), or Aspirin unless otherwise specified by patient's Neurosurgeon Dr. Duncan     Call the office or come to ED if:  - wound has drainage or bleeding, increased redness or pain at incision site, neurological change, fever (>101), chills, night sweats, syncope, nausea/vomiting      Playback:  - discharge instructions are uploaded to playback     WITHIN 24 HOURS OF DISCHARGE, PLEASE CONTACT NEURO PA  WITH ANY QUESTIONS OR CONCERNS: [phone]   OTHERWISE, PLEASE CALL THE OFFICE WITH ANY QUESTIONS OR CONCERNS:

## 2021-12-10 NOTE — PROGRESS NOTE ADULT - ASSESSMENT
46y/Female with:  L MCA ruptured aneurysm, subarachnoid hemorrhage, s/p [facility] (10/26/2021, Dr. D'Amico @ [place]), brain compression, cerebral edema  anemia   recent spinal surgery  UGIB  bilateral pneumothorax  acute kidney injury, transaminitis    PLAN: Day 1 = 10-26 ; Day 4 = 10/29; Day 21 = 11/15  NEURO: comachecks q4h, VS q1h, standing fentanyl q6h - sympathetic overdrive  SAH:  off nimodipine  Hydrocephalus:  EVD discontinued  cranioplasty to be scheduled  continue baclofen 10mg q8h  Seizure prophylaxis:  cont levetiracetam 500 OD  REHAB:  physical therapy evaluation and management    EARLY MOB:  HOB elevated    PULM:  cont full vent support, continue mucormyst (q12h) and ipratropium / albuterol  CARDIO:  SBP goal 100-160mm Hg, TTE, amlodipine 10mg PO daily; clonidine 0.2 q8h; continue hydralazine 100q8h; EP consulted - NTD  ENDO:  Blood sugar goals 140-180 mg/dL  GI:  off PPI OD; FOBT NEG  DIET: TF at goal  RENAL: Na goal 135-145; salt 1G q8h, HD today  HEM/ONC: no coagulopathy (INR= 1.03), no ASA / Plavix use; Hb stable  VTE Prophylaxis: SCDs, SQH   ID: afebrile, leukocytosis, cont cefepime, duration as per ID; CXR; final ID clearance pending dispo  Social: will update family - dispo planning  MISC: ENT consulted for tongue wound - NTD    CORE MEASURES  1.  Hunt and Griffin Score = 5  2.  VTE prophylaxis:  [ ] administered within 24 hours of admission OR [X] reason not done: fresh bleed, unsecured aneurysm.  3.  Dysphagia screening:   [X] performed before any oral meds / liquids / food  4.  Nimodipine treatment:  [X] administered within 24 hours of admission OR [ ] reason not done:    ATTENDING ATTESTATION:  I was physically present for the key portions of the evaluation and management (E/M) service provided.  I agree with the above history, physical and plan, which I have reviewed and edited where appropriate.    Patient at high risk for neurological deterioration or death due to:  ICU delirium, aspiration PNA, DVT / PE.  Critical care time:  I have personally provided 45 minutes of critical care time, excluding time spent on separate procedures.      Plan discussed with RN, house staff.    LATERAL TRANSFER CHECKLIST    I have reviewed the patient’s history, performed a clinical examination and assessed the patient to be stable for transfer to a non-neurosurgical intensive care unit.      Specifically, the patient:  [X] does not have cerebrovascular insufficiency or require hemodynamic augmentation  [X] does not have vasospasm or delayed cerebral ischemia with subarachnoid hemorrhage  [X] does not require active titration of meds for uncontrolled sympathetic storming  [X] does not have an external ventricular drain (except lateral transfers to [facility])  [X] does not have a lumbar drain  [X] did not have a complex skull base surgery or complex intracranial tumor in the immediate post-operative period   46y/Female with:  L MCA ruptured aneurysm, subarachnoid hemorrhage, s/p [facility] (10/26/2021, Dr. D'Amico @ [place]), brain compression, cerebral edema  anemia   recent spinal surgery  UGIB  bilateral pneumothorax  acute kidney injury, transaminitis    PLAN: Day 1 = 10-26 ; Day 4 = 10/29; Day 21 = 11/15  NEURO: comachecks q4h, VS q1h, standing fentanyl q6h - sympathetic overdrive  SAH:  off nimodipine  Hydrocephalus:  EVD discontinued  cranioplasty to be scheduled; helmet provided prior to discharage  continue baclofen 10mg q8h  Seizure prophylaxis:  cont levetiracetam 500 OD  REHAB:  physical therapy evaluation and management    EARLY MOB:  HOB elevated    PULM:  cont full vent support, continue mucormyst (q12h) and ipratropium / albuterol  CARDIO:  SBP goal 100-160mm Hg, TTE, amlodipine 10mg PO daily; clonidine 0.2 q8h; continue hydralazine 100q8h; EP consulted - NTD  ENDO:  Blood sugar goals 140-180 mg/dL  GI:  off PPI OD; FOBT NEG  DIET: TF at goal  RENAL: Na goal 135-145; salt 1G q8h, HD T Th Sat  HEM/ONC: no coagulopathy (INR= 1.03), no ASA / Plavix use; Hb stable  VTE Prophylaxis: SCDs, SQH   ID: afebrile, leukocytosis, cont cefepime, duration until 12/14 as per ID  Social: will update family - dispo planning  MISC: ENT consulted for tongue wound - NTD    CORE MEASURES  1.  Hunt and Griffin Score = 5  2.  VTE prophylaxis:  [ ] administered within 24 hours of admission OR [X] reason not done: fresh bleed, unsecured aneurysm.  3.  Dysphagia screening:   [X] performed before any oral meds / liquids / food  4.  Nimodipine treatment:  [X] administered within 24 hours of admission OR [ ] reason not done:    ATTENDING ATTESTATION:  I was physically present for the key portions of the evaluation and management (E/M) service provided.  I agree with the above history, physical and plan, which I have reviewed and edited where appropriate.    Patient at high risk for neurological deterioration or death due to:  ICU delirium, aspiration PNA, DVT / PE.  Critical care time:  I have personally provided 45 minutes of critical care time, excluding time spent on separate procedures.      Plan discussed with RN, house staff.    LATERAL TRANSFER CHECKLIST    I have reviewed the patient’s history, performed a clinical examination and assessed the patient to be stable for transfer to a non-neurosurgical intensive care unit.      Specifically, the patient:  [X] does not have cerebrovascular insufficiency or require hemodynamic augmentation  [X] does not have vasospasm or delayed cerebral ischemia with subarachnoid hemorrhage  [X] does not require active titration of meds for uncontrolled sympathetic storming  [X] does not have an external ventricular drain (except lateral transfers to [facility])  [X] does not have a lumbar drain  [X] did not have a complex skull base surgery or complex intracranial tumor in the immediate post-operative period

## 2021-12-10 NOTE — DISCHARGE NOTE PROVIDER - NSDCCPTREATMENT_GEN_ALL_CORE_FT
PRINCIPAL PROCEDURE  Procedure: Angiogram, cerebral, with coil embolization, in non-operating room setting  Findings and Treatment:       SECONDARY PROCEDURE  Procedure: Angiogram, carotid and cerebral, bilateral  Findings and Treatment:     Procedure: Chest tube placement  Findings and Treatment:     Procedure: Insertion of central venous catheter with ultrasound guidance  Findings and Treatment:     Procedure: Tracheostomy, planned  Findings and Treatment:     Procedure: Insertion of temporary dialysis catheter  Findings and Treatment:     Procedure: Endoscopic percutaneous gastrostomy  Findings and Treatment:

## 2021-12-10 NOTE — PROGRESS NOTE ADULT - REASON FOR ADMISSION
ICH
SAH
SAH
ICH

## 2021-12-10 NOTE — DISCHARGE NOTE PROVIDER - NSDCMRMEDTOKEN_GEN_ALL_CORE_FT
amlodipine-benazepril 10 mg-40 mg oral capsule: 1 cap(s) orally once a day  cyclobenzaprine 5 mg oral tablet: 1 tab(s) orally once a day (at bedtime), As Needed  dimethyl fumarate 240 mg oral delayed release capsule: 1 cap(s) orally 2 times a day  glatiramer 40 mg/mL subcutaneous solution: 40 milligram(s) subcutaneous 3 times a week ([id number])  metoprolol succinate 50 mg oral tablet, extended release: 1 tab(s) orally once a day  naproxen 375 mg oral tablet: 1 tab(s) orally 2 times a day, As Needed   acetaminophen 160 mg/5 mL oral suspension: 20.31 milliliter(s) orally every 6 hours, As needed, Temp greater or equal to 38C (100.4F), Mild Pain (1 - 3)  acetylcysteine 20% inhalation solution: 4 milliliter(s) inhaled every 6 hours  amLODIPine 10 mg oral tablet: 1 tab(s) orally once a day  baclofen 10 mg oral tablet: 1 tab(s) orally every 8 hours for muscle spasm/torticolis  cefepime 2 g intravenous injection: 2 gram(s) intravenous every 12 hours  chlorhexidine 0.12% mucous membrane liquid: 15 milliliter(s) mucous membrane every 12 hours  cloNIDine 0.2 mg oral tablet: 1 tab(s) orally every 8 hours. Hold for SBP &lt; 110  fentaNYL 5 mcg/mL-NaCl 0.9% intravenous solution: 25 microgram(s) intravenous every 6 hours, As Needed for pain/discomfort  heparin: 5000 unit(s) subcutaneous every 8 hours  hydrALAZINE 100 mg oral tablet: 1 tab(s) orally every 8 hours  levETIRAcetam 100 mg/mL oral solution: 5 milliliter(s) orally every 24 hours  ocular lubricant ophthalmic solution: 1 drop(s) to each affected eye 2 times a day  ondansetron 32 mg/50 mL-NaCl 0.9% intravenous solution: 4 milligram(s) intravenous every 6 hours, As Needed nausea/vomiting  senna oral tablet: 2 tab(s) orally once a day (at bedtime)  sodium chloride 0.9% injectable solution: 1 gram(s) orally every 8 hours  sodium chloride 1 g oral tablet: 1 tab(s) orally every 8 hours

## 2021-12-10 NOTE — PROGRESS NOTE ADULT - SUBJECTIVE AND OBJECTIVE BOX
OVERNIGHT EVENTS: NAEON    SUBJECTIVE / INTERVAL HPI: Patient seen and examined at bedside. Intuabted and sedated.     VITAL SIGNS:  Vital Signs Last 24 Hrs  T(C): 37.4 (10 Dec 2021 07:07), Max: 37.5 (09 Dec 2021 17:50)  T(F): 99.3 (10 Dec 2021 07:07), Max: 99.5 (09 Dec 2021 17:50)  HR: 78 (10 Dec 2021 09:00) (67 - 107)  BP: 132/80 (10 Dec 2021 09:00) (101/56 - 178/96)  BP(mean): 99 (10 Dec 2021 09:00) (72 - 130)  RR: 14 (10 Dec 2021 09:00) (0 - 19)  SpO2: 99% (10 Dec 2021 09:00) (97% - 100%)    PHYSICAL EXAM:    General: intubated and sedated  HEENT: NC/AT; PERRL, anicteric sclera; MMM  Neck: supple  Cardiovascular: +S1/S2; RRR  Respiratory: CTA B/L; no W/R/R  Gastrointestinal: soft, NT/ND; +BSx4  Extremities: WWP; no edema, clubbing or cyanosis  Vascular: 2+ radial, DP/PT pulses B/L    MEDICATIONS:  MEDICATIONS  (STANDING):  acetylcysteine 20%  Inhalation 4 milliLiter(s) Inhalation every 6 hours  amLODIPine   Tablet 10 milliGRAM(s) Oral daily  artificial  tears Solution 1 Drop(s) Both EYES two times a day  baclofen 10 milliGRAM(s) Oral every 8 hours  cefepime   IVPB 1000 milliGRAM(s) IV Intermittent every 24 hours  chlorhexidine 0.12% Liquid 15 milliLiter(s) Oral Mucosa every 12 hours  chlorhexidine 2% Cloths 1 Application(s) Topical <User Schedule>  cloNIDine 0.2 milliGRAM(s) Oral every 8 hours  fentaNYL    Injectable 25 MICROGram(s) IV Push every 6 hours  heparin   Injectable 5000 Unit(s) SubCutaneous every 8 hours  hydrALAZINE 100 milliGRAM(s) Oral every 8 hours  levETIRAcetam  Solution 500 milliGRAM(s) Oral every 24 hours  senna 2 Tablet(s) Oral at bedtime  sodium chloride 1 Gram(s) Oral every 8 hours    MEDICATIONS  (PRN):  acetaminophen    Suspension .. 650 milliGRAM(s) Oral every 6 hours PRN Temp greater or equal to 38C (100.4F), Mild Pain (1 - 3)  hydrALAZINE Injectable 10 milliGRAM(s) IV Push every 4 hours PRN SBP >180  ondansetron Injectable 4 milliGRAM(s) IV Push every 6 hours PRN Nausea and/or Vomiting  sodium chloride 0.9% lock flush 10 milliLiter(s) IV Push every 1 hour PRN Pre/post blood products, medications, blood draw, and to maintain line patency      ALLERGIES:  Allergies    No Known Allergies    Intolerances        LABS:                        8.1    12.59 )-----------( 206      ( 10 Dec 2021 06:25 )             27.5     12-10    140  |  101  |  35<H>  ----------------------------<  151<H>  3.9   |  29  |  2.05<H>    Ca    9.9      10 Dec 2021 06:25  Phos  3.4     12-10  Mg     1.9     12-10    TPro  7.7  /  Alb  3.0<L>  /  TBili  0.2  /  DBili  x   /  AST  23  /  ALT  10  /  AlkPhos  150<H>  12-09        CAPILLARY BLOOD GLUCOSE          RADIOLOGY & ADDITIONAL TESTS: Reviewed.    ASSESSMENT:    PLAN:

## 2021-12-10 NOTE — DISCHARGE NOTE PROVIDER - INSTRUCTIONS
Nepro with Carb Steady 30ml/hr with banatrol twice a day over 24 hours  Nepro with Carb Steady 30ml/hr with banatrol twice a day over 24 hours.

## 2021-12-10 NOTE — PROGRESS NOTE ADULT - PROVIDER SPECIALTY LIST ADULT
ENT
Infectious Disease
NSICU
Nephrology
Neurosurgery
Pulmonology
Pulmonology
ENT
Electrophysiology
Infectious Disease
NSICU
Nephrology
Neurosurgery
Pulmonology
Pulmonology
ENT
ENT
Infectious Disease
NSICU
Nephrology
Neurosurgery
Surgery
Infectious Disease
NSICU
Nephrology
Neurosurgery
NSICU
Neurosurgery
NSICU
Nephrology
Nephrology

## 2021-12-11 LAB
CULTURE RESULTS: SIGNIFICANT CHANGE UP
ORGANISM # SPEC MICROSCOPIC CNT: SIGNIFICANT CHANGE UP
SPECIMEN SOURCE: SIGNIFICANT CHANGE UP

## 2021-12-12 LAB
CULTURE RESULTS: SIGNIFICANT CHANGE UP
SPECIMEN SOURCE: SIGNIFICANT CHANGE UP

## 2021-12-13 LAB
CULTURE RESULTS: SIGNIFICANT CHANGE UP
CULTURE RESULTS: SIGNIFICANT CHANGE UP
SPECIMEN SOURCE: SIGNIFICANT CHANGE UP
SPECIMEN SOURCE: SIGNIFICANT CHANGE UP

## 2021-12-14 DIAGNOSIS — I60.12 NONTRAUMATIC SUBARACHNOID HEMORRHAGE FROM LEFT MIDDLE CEREBRAL ARTERY: ICD-10-CM

## 2021-12-14 DIAGNOSIS — R11.10 VOMITING, UNSPECIFIED: ICD-10-CM

## 2021-12-14 DIAGNOSIS — E87.0 HYPEROSMOLALITY AND HYPERNATREMIA: ICD-10-CM

## 2021-12-14 DIAGNOSIS — G93.5 COMPRESSION OF BRAIN: ICD-10-CM
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
## 2021-12-14 DIAGNOSIS — E87.6 HYPOKALEMIA: ICD-10-CM

## 2021-12-14 DIAGNOSIS — J15.6 PNEUMONIA DUE TO OTHER GRAM-NEGATIVE BACTERIA: ICD-10-CM

## 2021-12-14 DIAGNOSIS — R53.2 FUNCTIONAL QUADRIPLEGIA: ICD-10-CM

## 2021-12-14 DIAGNOSIS — K72.00 ACUTE AND SUBACUTE HEPATIC FAILURE WITHOUT COMA: ICD-10-CM

## 2021-12-14 DIAGNOSIS — I61.5 NONTRAUMATIC INTRACEREBRAL HEMORRHAGE, INTRAVENTRICULAR: ICD-10-CM

## 2021-12-14 DIAGNOSIS — R74.01 ELEVATION OF LEVELS OF LIVER TRANSAMINASE LEVELS: ICD-10-CM

## 2021-12-14 DIAGNOSIS — R29.732 NIHSS SCORE 32: ICD-10-CM

## 2021-12-14 DIAGNOSIS — D63.8 ANEMIA IN OTHER CHRONIC DISEASES CLASSIFIED ELSEWHERE: ICD-10-CM

## 2021-12-14 DIAGNOSIS — B95.61 METHICILLIN SUSCEPTIBLE STAPHYLOCOCCUS AUREUS INFECTION AS THE CAUSE OF DISEASES CLASSIFIED ELSEWHERE: ICD-10-CM

## 2021-12-14 DIAGNOSIS — N17.0 ACUTE KIDNEY FAILURE WITH TUBULAR NECROSIS: ICD-10-CM

## 2021-12-14 DIAGNOSIS — I97.711 INTRAOPERATIVE CARDIAC ARREST DURING OTHER SURGERY: ICD-10-CM

## 2021-12-14 DIAGNOSIS — J93.9 PNEUMOTHORAX, UNSPECIFIED: ICD-10-CM
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
## 2021-12-14 DIAGNOSIS — G93.6 CEREBRAL EDEMA: ICD-10-CM

## 2021-12-14 DIAGNOSIS — I67.848 OTHER CEREBROVASCULAR VASOSPASM AND VASOCONSTRICTION: ICD-10-CM

## 2021-12-14 DIAGNOSIS — E87.1 HYPO-OSMOLALITY AND HYPONATREMIA: ICD-10-CM

## 2021-12-14 DIAGNOSIS — R13.10 DYSPHAGIA, UNSPECIFIED: ICD-10-CM

## 2021-12-14 DIAGNOSIS — J98.2 INTERSTITIAL EMPHYSEMA: ICD-10-CM

## 2021-12-14 DIAGNOSIS — I10 ESSENTIAL (PRIMARY) HYPERTENSION: ICD-10-CM

## 2021-12-14 DIAGNOSIS — I27.20 PULMONARY HYPERTENSION, UNSPECIFIED: ICD-10-CM

## 2021-12-14 DIAGNOSIS — K14.8 OTHER DISEASES OF TONGUE: ICD-10-CM

## 2021-12-14 DIAGNOSIS — J96.01 ACUTE RESPIRATORY FAILURE WITH HYPOXIA: ICD-10-CM

## 2021-12-14 DIAGNOSIS — I82.621 ACUTE EMBOLISM AND THROMBOSIS OF DEEP VEINS OF RIGHT UPPER EXTREMITY: ICD-10-CM

## 2021-12-14 DIAGNOSIS — G91.9 HYDROCEPHALUS, UNSPECIFIED: ICD-10-CM

## 2021-12-14 DIAGNOSIS — G35 MULTIPLE SCLEROSIS: ICD-10-CM

## 2021-12-17 PROBLEM — Z00.00 ENCOUNTER FOR PREVENTIVE HEALTH EXAMINATION: Status: ACTIVE | Noted: 2021-12-17

## 2021-12-22 PROCEDURE — 36415 COLL VENOUS BLD VENIPUNCTURE: CPT

## 2021-12-22 PROCEDURE — 76937 US GUIDE VASCULAR ACCESS: CPT

## 2021-12-22 PROCEDURE — 86706 HEP B SURFACE ANTIBODY: CPT

## 2021-12-22 PROCEDURE — 84702 CHORIONIC GONADOTROPIN TEST: CPT

## 2021-12-22 PROCEDURE — 83690 ASSAY OF LIPASE: CPT

## 2021-12-22 PROCEDURE — 85610 PROTHROMBIN TIME: CPT

## 2021-12-22 PROCEDURE — 82272 OCCULT BLD FECES 1-3 TESTS: CPT

## 2021-12-22 PROCEDURE — 82553 CREATINE MB FRACTION: CPT

## 2021-12-22 PROCEDURE — 80307 DRUG TEST PRSMV CHEM ANLYZR: CPT

## 2021-12-22 PROCEDURE — 83550 IRON BINDING TEST: CPT

## 2021-12-22 PROCEDURE — 87150 DNA/RNA AMPLIFIED PROBE: CPT

## 2021-12-22 PROCEDURE — 85730 THROMBOPLASTIN TIME PARTIAL: CPT

## 2021-12-22 PROCEDURE — C1750: CPT

## 2021-12-22 PROCEDURE — 84145 PROCALCITONIN (PCT): CPT

## 2021-12-22 PROCEDURE — 87635 SARS-COV-2 COVID-19 AMP PRB: CPT

## 2021-12-22 PROCEDURE — 36410 VNPNXR 3YR/> PHY/QHP DX/THER: CPT

## 2021-12-22 PROCEDURE — 70496 CT ANGIOGRAPHY HEAD: CPT

## 2021-12-22 PROCEDURE — P9016: CPT

## 2021-12-22 PROCEDURE — 95700 EEG CONT REC W/VID EEG TECH: CPT

## 2021-12-22 PROCEDURE — 84478 ASSAY OF TRIGLYCERIDES: CPT

## 2021-12-22 PROCEDURE — 82728 ASSAY OF FERRITIN: CPT

## 2021-12-22 PROCEDURE — 83735 ASSAY OF MAGNESIUM: CPT

## 2021-12-22 PROCEDURE — 83935 ASSAY OF URINE OSMOLALITY: CPT

## 2021-12-22 PROCEDURE — 71045 X-RAY EXAM CHEST 1 VIEW: CPT

## 2021-12-22 PROCEDURE — 95714 VEEG EA 12-26 HR UNMNTR: CPT

## 2021-12-22 PROCEDURE — 90935 HEMODIALYSIS ONE EVALUATION: CPT

## 2021-12-22 PROCEDURE — C1769: CPT

## 2021-12-22 PROCEDURE — 94003 VENT MGMT INPAT SUBQ DAY: CPT

## 2021-12-22 PROCEDURE — 83540 ASSAY OF IRON: CPT

## 2021-12-22 PROCEDURE — 86900 BLOOD TYPING SEROLOGIC ABO: CPT

## 2021-12-22 PROCEDURE — 87086 URINE CULTURE/COLONY COUNT: CPT

## 2021-12-22 PROCEDURE — 86850 RBC ANTIBODY SCREEN: CPT

## 2021-12-22 PROCEDURE — 82977 ASSAY OF GGT: CPT

## 2021-12-22 PROCEDURE — P9045: CPT

## 2021-12-22 PROCEDURE — 80053 COMPREHEN METABOLIC PANEL: CPT

## 2021-12-22 PROCEDURE — 77001 FLUOROGUIDE FOR VEIN DEVICE: CPT

## 2021-12-22 PROCEDURE — 87186 SC STD MICRODIL/AGAR DIL: CPT

## 2021-12-22 PROCEDURE — 81003 URINALYSIS AUTO W/O SCOPE: CPT

## 2021-12-22 PROCEDURE — 93971 EXTREMITY STUDY: CPT

## 2021-12-22 PROCEDURE — 84484 ASSAY OF TROPONIN QUANT: CPT

## 2021-12-22 PROCEDURE — 83970 ASSAY OF PARATHORMONE: CPT

## 2021-12-22 PROCEDURE — C1760: CPT

## 2021-12-22 PROCEDURE — 74018 RADEX ABDOMEN 1 VIEW: CPT

## 2021-12-22 PROCEDURE — 80076 HEPATIC FUNCTION PANEL: CPT

## 2021-12-22 PROCEDURE — 84157 ASSAY OF PROTEIN OTHER: CPT

## 2021-12-22 PROCEDURE — 87040 BLOOD CULTURE FOR BACTERIA: CPT

## 2021-12-22 PROCEDURE — 89051 BODY FLUID CELL COUNT: CPT

## 2021-12-22 PROCEDURE — 81025 URINE PREGNANCY TEST: CPT

## 2021-12-22 PROCEDURE — 85045 AUTOMATED RETICULOCYTE COUNT: CPT

## 2021-12-22 PROCEDURE — 84540 ASSAY OF URINE/UREA-N: CPT

## 2021-12-22 PROCEDURE — 82306 VITAMIN D 25 HYDROXY: CPT

## 2021-12-22 PROCEDURE — U0005: CPT

## 2021-12-22 PROCEDURE — 94640 AIRWAY INHALATION TREATMENT: CPT

## 2021-12-22 PROCEDURE — 82962 GLUCOSE BLOOD TEST: CPT

## 2021-12-22 PROCEDURE — 76700 US EXAM ABDOM COMPLETE: CPT

## 2021-12-22 PROCEDURE — 82746 ASSAY OF FOLIC ACID SERUM: CPT

## 2021-12-22 PROCEDURE — 86923 COMPATIBILITY TEST ELECTRIC: CPT

## 2021-12-22 PROCEDURE — 82570 ASSAY OF URINE CREATININE: CPT

## 2021-12-22 PROCEDURE — 87205 SMEAR GRAM STAIN: CPT

## 2021-12-22 PROCEDURE — 36430 TRANSFUSION BLD/BLD COMPNT: CPT

## 2021-12-22 PROCEDURE — 85027 COMPLETE CBC AUTOMATED: CPT

## 2021-12-22 PROCEDURE — 80074 ACUTE HEPATITIS PANEL: CPT

## 2021-12-22 PROCEDURE — 80061 LIPID PANEL: CPT

## 2021-12-22 PROCEDURE — 94002 VENT MGMT INPAT INIT DAY: CPT

## 2021-12-22 PROCEDURE — 82652 VIT D 1 25-DIHYDROXY: CPT

## 2021-12-22 PROCEDURE — 72131 CT LUMBAR SPINE W/O DYE: CPT

## 2021-12-22 PROCEDURE — 84300 ASSAY OF URINE SODIUM: CPT

## 2021-12-22 PROCEDURE — 85025 COMPLETE CBC W/AUTO DIFF WBC: CPT

## 2021-12-22 PROCEDURE — 86480 TB TEST CELL IMMUN MEASURE: CPT

## 2021-12-22 PROCEDURE — 82550 ASSAY OF CK (CPK): CPT

## 2021-12-22 PROCEDURE — 82150 ASSAY OF AMYLASE: CPT

## 2021-12-22 PROCEDURE — 70450 CT HEAD/BRAIN W/O DYE: CPT

## 2021-12-22 PROCEDURE — 87340 HEPATITIS B SURFACE AG IA: CPT

## 2021-12-22 PROCEDURE — 82310 ASSAY OF CALCIUM: CPT

## 2021-12-22 PROCEDURE — 82803 BLOOD GASES ANY COMBINATION: CPT

## 2021-12-22 PROCEDURE — 83605 ASSAY OF LACTIC ACID: CPT

## 2021-12-22 PROCEDURE — 85520 HEPARIN ASSAY: CPT

## 2021-12-22 PROCEDURE — 81001 URINALYSIS AUTO W/SCOPE: CPT

## 2021-12-22 PROCEDURE — 83036 HEMOGLOBIN GLYCOSYLATED A1C: CPT

## 2021-12-22 PROCEDURE — 80202 ASSAY OF VANCOMYCIN: CPT

## 2021-12-22 PROCEDURE — 93306 TTE W/DOPPLER COMPLETE: CPT

## 2021-12-22 PROCEDURE — C1894: CPT

## 2021-12-22 PROCEDURE — 87070 CULTURE OTHR SPECIMN AEROBIC: CPT

## 2021-12-22 PROCEDURE — C1887: CPT

## 2021-12-22 PROCEDURE — 83930 ASSAY OF BLOOD OSMOLALITY: CPT

## 2021-12-22 PROCEDURE — 93970 EXTREMITY STUDY: CPT

## 2021-12-22 PROCEDURE — 84100 ASSAY OF PHOSPHORUS: CPT

## 2021-12-22 PROCEDURE — L8699: CPT

## 2021-12-22 PROCEDURE — U0003: CPT

## 2021-12-22 PROCEDURE — 80048 BASIC METABOLIC PNL TOTAL CA: CPT

## 2021-12-22 PROCEDURE — 86901 BLOOD TYPING SEROLOGIC RH(D): CPT

## 2021-12-22 PROCEDURE — 84443 ASSAY THYROID STIM HORMONE: CPT

## 2021-12-22 PROCEDURE — 86803 HEPATITIS C AB TEST: CPT

## 2021-12-22 PROCEDURE — 82945 GLUCOSE OTHER FLUID: CPT

## 2021-12-22 PROCEDURE — 36558 INSERT TUNNELED CV CATH: CPT

## 2022-03-16 ENCOUNTER — INPATIENT (INPATIENT)
Facility: HOSPITAL | Age: 47
LOS: 8 days | Discharge: NURSING, REHAB OR LONG-TERM CARE | DRG: 870 | End: 2022-03-25
Attending: STUDENT IN AN ORGANIZED HEALTH CARE EDUCATION/TRAINING PROGRAM
Payer: MEDICARE

## 2022-03-16 VITALS
OXYGEN SATURATION: 100 % | TEMPERATURE: 101 F | HEIGHT: 61.42 IN | RESPIRATION RATE: 14 BRPM | HEART RATE: 105 BPM | SYSTOLIC BLOOD PRESSURE: 129 MMHG | DIASTOLIC BLOOD PRESSURE: 87 MMHG

## 2022-03-16 DIAGNOSIS — Z98.890 OTHER SPECIFIED POSTPROCEDURAL STATES: Chronic | ICD-10-CM

## 2022-03-16 LAB
ALBUMIN SERPL ELPH-MCNC: 3.7 G/DL — SIGNIFICANT CHANGE UP (ref 3.3–5)
ALP SERPL-CCNC: 122 U/L — HIGH (ref 40–120)
ALT FLD-CCNC: 39 U/L — SIGNIFICANT CHANGE UP (ref 10–45)
ANION GAP SERPL CALC-SCNC: 15 MMOL/L — SIGNIFICANT CHANGE UP (ref 5–17)
ANISOCYTOSIS BLD QL: SLIGHT — SIGNIFICANT CHANGE UP
APPEARANCE UR: CLEAR — SIGNIFICANT CHANGE UP
APTT BLD: 24.8 SEC — LOW (ref 27.5–35.5)
AST SERPL-CCNC: 32 U/L — SIGNIFICANT CHANGE UP (ref 10–40)
BACTERIA # UR AUTO: PRESENT /HPF
BASOPHILS # BLD AUTO: 0 K/UL — SIGNIFICANT CHANGE UP (ref 0–0.2)
BASOPHILS NFR BLD AUTO: 0 % — SIGNIFICANT CHANGE UP (ref 0–2)
BILIRUB SERPL-MCNC: 0.3 MG/DL — SIGNIFICANT CHANGE UP (ref 0.2–1.2)
BILIRUB UR-MCNC: NEGATIVE — SIGNIFICANT CHANGE UP
BUN SERPL-MCNC: 59 MG/DL — HIGH (ref 7–23)
CALCIUM SERPL-MCNC: 11 MG/DL — HIGH (ref 8.4–10.5)
CHLORIDE SERPL-SCNC: 97 MMOL/L — SIGNIFICANT CHANGE UP (ref 96–108)
CO2 SERPL-SCNC: 28 MMOL/L — SIGNIFICANT CHANGE UP (ref 22–31)
COLOR SPEC: YELLOW — SIGNIFICANT CHANGE UP
COMMENT - URINE: SIGNIFICANT CHANGE UP
CREAT SERPL-MCNC: 0.71 MG/DL — SIGNIFICANT CHANGE UP (ref 0.5–1.3)
DACRYOCYTES BLD QL SMEAR: SLIGHT — SIGNIFICANT CHANGE UP
DIFF PNL FLD: NEGATIVE — SIGNIFICANT CHANGE UP
EGFR: 106 ML/MIN/1.73M2 — SIGNIFICANT CHANGE UP
EOSINOPHIL # BLD AUTO: 0 K/UL — SIGNIFICANT CHANGE UP (ref 0–0.5)
EOSINOPHIL NFR BLD AUTO: 0 % — SIGNIFICANT CHANGE UP (ref 0–6)
EPI CELLS # UR: SIGNIFICANT CHANGE UP /HPF (ref 0–5)
GIANT PLATELETS BLD QL SMEAR: PRESENT — SIGNIFICANT CHANGE UP
GLUCOSE BLDC GLUCOMTR-MCNC: 124 MG/DL — HIGH (ref 70–99)
GLUCOSE SERPL-MCNC: 148 MG/DL — HIGH (ref 70–99)
GLUCOSE UR QL: NEGATIVE — SIGNIFICANT CHANGE UP
HCT VFR BLD CALC: 29.8 % — LOW (ref 34.5–45)
HGB BLD-MCNC: 9.2 G/DL — LOW (ref 11.5–15.5)
HYALINE CASTS # UR AUTO: SIGNIFICANT CHANGE UP /LPF (ref 0–2)
HYPOCHROMIA BLD QL: SLIGHT — SIGNIFICANT CHANGE UP
INR BLD: 1.12 — SIGNIFICANT CHANGE UP (ref 0.88–1.16)
KETONES UR-MCNC: NEGATIVE — SIGNIFICANT CHANGE UP
LACTATE SERPL-SCNC: 2 MMOL/L — SIGNIFICANT CHANGE UP (ref 0.5–2)
LACTATE SERPL-SCNC: 2.6 MMOL/L — HIGH (ref 0.5–2)
LEUKOCYTE ESTERASE UR-ACNC: ABNORMAL
LYMPHOCYTES # BLD AUTO: 19.1 % — SIGNIFICANT CHANGE UP (ref 13–44)
LYMPHOCYTES # BLD AUTO: 3.3 K/UL — SIGNIFICANT CHANGE UP (ref 1–3.3)
MANUAL SMEAR VERIFICATION: SIGNIFICANT CHANGE UP
MCHC RBC-ENTMCNC: 24.9 PG — LOW (ref 27–34)
MCHC RBC-ENTMCNC: 30.9 GM/DL — LOW (ref 32–36)
MCV RBC AUTO: 80.8 FL — SIGNIFICANT CHANGE UP (ref 80–100)
MICROCYTES BLD QL: SLIGHT — SIGNIFICANT CHANGE UP
MONOCYTES # BLD AUTO: 1.66 K/UL — HIGH (ref 0–0.9)
MONOCYTES NFR BLD AUTO: 9.6 % — SIGNIFICANT CHANGE UP (ref 2–14)
NEUTROPHILS # BLD AUTO: 12.33 K/UL — HIGH (ref 1.8–7.4)
NEUTROPHILS NFR BLD AUTO: 71.3 % — SIGNIFICANT CHANGE UP (ref 43–77)
NITRITE UR-MCNC: NEGATIVE — SIGNIFICANT CHANGE UP
PH UR: 6 — SIGNIFICANT CHANGE UP (ref 5–8)
PLAT MORPH BLD: NORMAL — SIGNIFICANT CHANGE UP
PLATELET # BLD AUTO: 291 K/UL — SIGNIFICANT CHANGE UP (ref 150–400)
POIKILOCYTOSIS BLD QL AUTO: SIGNIFICANT CHANGE UP
POLYCHROMASIA BLD QL SMEAR: SLIGHT — SIGNIFICANT CHANGE UP
POTASSIUM SERPL-MCNC: 3.5 MMOL/L — SIGNIFICANT CHANGE UP (ref 3.5–5.3)
POTASSIUM SERPL-SCNC: 3.5 MMOL/L — SIGNIFICANT CHANGE UP (ref 3.5–5.3)
PROT SERPL-MCNC: 8.5 G/DL — HIGH (ref 6–8.3)
PROT UR-MCNC: ABNORMAL MG/DL
PROTHROM AB SERPL-ACNC: 13.3 SEC — SIGNIFICANT CHANGE UP (ref 10.5–13.4)
RBC # BLD: 3.69 M/UL — LOW (ref 3.8–5.2)
RBC # FLD: 17.7 % — HIGH (ref 10.3–14.5)
RBC BLD AUTO: ABNORMAL
RBC CASTS # UR COMP ASSIST: < 5 /HPF — SIGNIFICANT CHANGE UP
SARS-COV-2 RNA SPEC QL NAA+PROBE: SIGNIFICANT CHANGE UP
SCHISTOCYTES BLD QL AUTO: SLIGHT — SIGNIFICANT CHANGE UP
SODIUM SERPL-SCNC: 140 MMOL/L — SIGNIFICANT CHANGE UP (ref 135–145)
SP GR SPEC: 1.01 — SIGNIFICANT CHANGE UP (ref 1–1.03)
SPHEROCYTES BLD QL SMEAR: SLIGHT — SIGNIFICANT CHANGE UP
STOMATOCYTES BLD QL SMEAR: SLIGHT — SIGNIFICANT CHANGE UP
TARGETS BLD QL SMEAR: SIGNIFICANT CHANGE UP
UROBILINOGEN FLD QL: 0.2 E.U./DL — SIGNIFICANT CHANGE UP
WBC # BLD: 17.29 K/UL — HIGH (ref 3.8–10.5)
WBC # FLD AUTO: 17.29 K/UL — HIGH (ref 3.8–10.5)
WBC UR QL: ABNORMAL /HPF

## 2022-03-16 PROCEDURE — 93010 ELECTROCARDIOGRAM REPORT: CPT

## 2022-03-16 PROCEDURE — 71045 X-RAY EXAM CHEST 1 VIEW: CPT | Mod: 26

## 2022-03-16 PROCEDURE — 74177 CT ABD & PELVIS W/CONTRAST: CPT | Mod: 26,MA

## 2022-03-16 PROCEDURE — 99223 1ST HOSP IP/OBS HIGH 75: CPT | Mod: GC

## 2022-03-16 PROCEDURE — 99232 SBSQ HOSP IP/OBS MODERATE 35: CPT | Mod: GC

## 2022-03-16 PROCEDURE — 99285 EMERGENCY DEPT VISIT HI MDM: CPT | Mod: 25

## 2022-03-16 RX ORDER — CEFEPIME 1 G/1
1000 INJECTION, POWDER, FOR SOLUTION INTRAMUSCULAR; INTRAVENOUS ONCE
Refills: 0 | Status: COMPLETED | OUTPATIENT
Start: 2022-03-16 | End: 2022-03-16

## 2022-03-16 RX ORDER — SODIUM CHLORIDE 9 MG/ML
1000 INJECTION INTRAMUSCULAR; INTRAVENOUS; SUBCUTANEOUS ONCE
Refills: 0 | Status: COMPLETED | OUTPATIENT
Start: 2022-03-16 | End: 2022-03-16

## 2022-03-16 RX ORDER — KETOROLAC TROMETHAMINE 30 MG/ML
15 SYRINGE (ML) INJECTION ONCE
Refills: 0 | Status: DISCONTINUED | OUTPATIENT
Start: 2022-03-16 | End: 2022-03-16

## 2022-03-16 RX ORDER — ACETAMINOPHEN 500 MG
650 TABLET ORAL ONCE
Refills: 0 | Status: COMPLETED | OUTPATIENT
Start: 2022-03-16 | End: 2022-03-16

## 2022-03-16 RX ORDER — SODIUM CHLORIDE 9 MG/ML
1000 INJECTION INTRAMUSCULAR; INTRAVENOUS; SUBCUTANEOUS ONCE
Refills: 0 | Status: DISCONTINUED | OUTPATIENT
Start: 2022-03-16 | End: 2022-03-16

## 2022-03-16 RX ORDER — SODIUM CHLORIDE 9 MG/ML
500 INJECTION INTRAMUSCULAR; INTRAVENOUS; SUBCUTANEOUS ONCE
Refills: 0 | Status: COMPLETED | OUTPATIENT
Start: 2022-03-16 | End: 2022-03-16

## 2022-03-16 RX ORDER — VANCOMYCIN HCL 1 G
1000 VIAL (EA) INTRAVENOUS ONCE
Refills: 0 | Status: COMPLETED | OUTPATIENT
Start: 2022-03-16 | End: 2022-03-16

## 2022-03-16 RX ADMIN — SODIUM CHLORIDE 1000 MILLILITER(S): 9 INJECTION INTRAMUSCULAR; INTRAVENOUS; SUBCUTANEOUS at 14:30

## 2022-03-16 RX ADMIN — SODIUM CHLORIDE 1000 MILLILITER(S): 9 INJECTION INTRAMUSCULAR; INTRAVENOUS; SUBCUTANEOUS at 14:50

## 2022-03-16 RX ADMIN — Medication 15 MILLIGRAM(S): at 16:24

## 2022-03-16 RX ADMIN — CEFEPIME 100 MILLIGRAM(S): 1 INJECTION, POWDER, FOR SOLUTION INTRAMUSCULAR; INTRAVENOUS at 14:46

## 2022-03-16 RX ADMIN — Medication 650 MILLIGRAM(S): at 14:30

## 2022-03-16 RX ADMIN — SODIUM CHLORIDE 500 MILLILITER(S): 9 INJECTION INTRAMUSCULAR; INTRAVENOUS; SUBCUTANEOUS at 14:50

## 2022-03-16 RX ADMIN — Medication 250 MILLIGRAM(S): at 22:11

## 2022-03-16 RX ADMIN — Medication 650 MILLIGRAM(S): at 15:00

## 2022-03-16 NOTE — ED ADULT NURSE NOTE - OBJECTIVE STATEMENT
Received 47Y Female BIBA for wound check. Pt sent from [facility] for wound check as per family members request. Pt with trach, on vent. Pt is non verbal, quadriplegic, grimaces with painful stimulus. On assessment, noted stage 4 wound to sacral area and stage 2 wound to bilateral heels. Received 47Y Female BIBA for wound check. Pt sent from [facility] for wound check as per family members request. Pt with trach, on vent. Double lumen port to the R chest wall. Pt is non verbal, quadriplegic, grimaces with painful stimulus. On assessment, noted stage 4 wound to sacral area and stage 2 wound to bilateral heels.

## 2022-03-16 NOTE — CONSULT NOTE ADULT - SUBJECTIVE AND OBJECTIVE BOX
HPI:   47F  with pmhx of ESRD (TTHS) 2/2 anuric ATN (12/21), ICH, trach ,peg who presents to the hospital from nursing home w/ concern for decubitus ulcer evaluation. As per daughter mom was suppose to be going to a clinic for evaluation of her ulcers but then was informed she was coming to the hospital. They also did not think mom looked well. Patient has been receiving dialysis three times a week TTHS while at nursing home w/ last session on 3/15 tolerating it well. She has a R TDC placed. On labs found to have elevated WBC count, fever concerning for SIRS w/ unclear etiology. Nephrology consulted for dialysis.     PAST MEDICAL & SURGICAL HISTORY:  SDH (subdural hematoma)    SAH (subarachnoid hemorrhage)    Decubitus ulcer of coccygeal region, unspecified pressure ulcer stage    Personal history of spine surgery    H/O craniotomy      Allergies:  No Known Allergies      Home Medications:       Hospital Medications:   MEDICATIONS  (STANDING):      SOCIAL HISTORY:  Denies ETOh, Smoking,     Family History:  FAMILY HISTORY:  non-contributory    VITALS:  T(F): 100.9 (03-16-22 @ 13:26), Max: 100.9 (03-16-22 @ 13:26)  HR: 109 (03-16-22 @ 14:00)  BP: 150/81 (03-16-22 @ 14:00)  RR: 14 (03-16-22 @ 14:00)  SpO2: 100% (03-16-22 @ 14:00)  Wt(kg): --    Height (cm): 156 (03-16 @ 13:26)  CAPILLARY BLOOD GLUCOSE          Review of Systems:  ROS negative except as per HPI    PHYSICAL EXAM:  GENERAL: AAOx0 w/ trach appears uncomfortable  HEENT: KWAME, EOMI, neck supple, no JVP  CHEST/LUNG: Bilateral clear breath sounds  HEART: tachyardic no murmurs rubs gallops or clicks   ABDOMEN: Soft, nontender, non distended, peg tube in place  EXTREMITIES: no pedal edema, 1+ edema b/l   Neurology: AAOx0  ACCESS: R TDC    LABS:  03-16    140  |  97  |  59<H>  ----------------------------<  148<H>  3.5   |  28  |  0.71    Ca    11.0<H>      16 Mar 2022 14:34    TPro  8.5<H>  /  Alb  3.7  /  TBili  0.3  /  DBili      /  AST  32  /  ALT  39  /  AlkPhos  122<H>  03-16    Creatinine Trend: 0.71 <--                        9.2    17.29 )-----------( 291      ( 16 Mar 2022 14:34 )             29.8     Urine Studies:

## 2022-03-16 NOTE — PATIENT PROFILE ADULT - FALL HARM RISK - HARM RISK INTERVENTIONS

## 2022-03-16 NOTE — ED PROVIDER NOTE - OBJECTIVE STATEMENT
48 yo female functional quadriplegic 2/2 h/o ruptured left middle cerebral artery aneurysm with intraparenchymal hemorrhage, SAH, and left subdural hematoma with midline shift and herniation 12/21 s/p hemicraniotomy, hospital course complicated by bilat ptx, pna, multiorgan failure, lucina requiring dialysis sent from NH at request from family for 1. wanting pt transferred to new facility.  Daughter states they feel pt is receiving poor care as evidenced by severe pressure wound on back and developing wounds on heels.  2. PEG tube replacement (18 fr per facility) - tube cut off.  Tube originally placed by surg (Dr Ovalle) 10/21.  Pt unable to give history.  Pt noted to have fever on arrival.  Daughter denies recent fever, uri sx, cough, sob, v/d. Daughter - Jayshree Stubbs [phone].   Med records indicate pt prev with enterobacter/proteus in sputum and also serratia in blood and serratia&proteus in sputum.

## 2022-03-16 NOTE — ED PROVIDER NOTE - CARE PLAN
Principal Discharge DX:	Sepsis, unspecified organism  Secondary Diagnosis:	Abscess of buttock  Secondary Diagnosis:	Pneumonia  Secondary Diagnosis:	Pressure ulcer   1

## 2022-03-16 NOTE — CONSULT NOTE ADULT - SUBJECTIVE AND OBJECTIVE BOX
HPI:  47 y/o female with PMHx of HTN and MS, functional quadriplegic 2/2 h/o ruptured left middle cerebral artery aneurysm with intraparenchymal hemorrhage, SAH, and left subdural hematoma with midline shift and herniation  s/p hemicraniotomy, and prolonged hospital course complicated by bilat ptx, pna, multiorgan failure, ATN requiring dialysis, and hx of Sacral decubitus ulcer s/p debridement by plastics who was sent from NH fro evaluation of decubitus ulcer and PEG tube, found to have fever in the Ed. General surgery consulted for PEG tube evaluation. Patient nonverbal at baseline no family at bedside. Spoke with health care proxy on phone. Family concerned that nursing facility not managing her wounds appropriately reports has had PEG since november has not had it replaced; does not know when it was last used or when feeding tube adapter lost/missing; no other concerns or complaints.     In the ED. Patient was febrile to 100.9, tachy to 109 with normal BP, trach dependent with normal saturations. WBC of 17.29, anemic with Hg 9.2, lactate of 2.6, and unremarkable chemistry. UA pending.       PAST MEDICAL & SURGICAL HISTORY:  SDH (subdural hematoma)    SAH (subarachnoid hemorrhage)    Decubitus ulcer of coccygeal region, unspecified pressure ulcer stage    Personal history of spine surgery    H/O craniotomy        MEDICATIONS  (STANDING):  ketorolac   Injectable 15 milliGRAM(s) IV Push Once    MEDICATIONS  (PRN):      Allergies    No Known Allergies    Intolerances        SOCIAL HISTORY:    FAMILY HISTORY:      Vital Signs Last 24 Hrs  T(C): 38.2 (16 Mar 2022 15:45), Max: 38.3 (16 Mar 2022 13:26)  T(F): 100.8 (16 Mar 2022 15:45), Max: 100.9 (16 Mar 2022 13:26)  HR: 104 (16 Mar 2022 15:45) (104 - 109)  BP: 121/83 (16 Mar 2022 15:45) (121/83 - 150/81)  BP(mean): --  RR: 14 (16 Mar 2022 15:45) (14 - 14)  SpO2: 100% (16 Mar 2022 15:45) (100% - 100%)    PHYSICAL EXAM:  GENERAL: NAD, no verbal   HEENT: NCAT, MMM,  RESP: Nonlabored breathing, No respiratory distress with trach in place   CARD: Normal rate, Normal peripheral perfusion  GI: Soft, ND, NT, No guarding, No rebound tenderness, PEG tube without erythema, drainage or tenderness around tube, bolus feeding adapter missing with only tube and bump stopper in place   EXTREM: WWP, No edema, stage II left heel ulcer, Stage 4 decubitus ulcer with good granulation tissue and small fibrous/necrotic tissue at base, no drainage or discharge   SKIN: No rashes, no lesions  NEURO: GCS 10T    LABS:                        9.2    17.29 )-----------( 291      ( 16 Mar 2022 14:34 )             29.8     03-16    140  |  97  |  59<H>  ----------------------------<  148<H>  3.5   |  28  |  0.71    Ca    11.0<H>      16 Mar 2022 14:34    TPro  8.5<H>  /  Alb  3.7  /  TBili  0.3  /  DBili  x   /  AST  32  /  ALT  39  /  AlkPhos  122<H>  03-16    PT/INR - ( 16 Mar 2022 14:34 )   PT: 13.3 sec;   INR: 1.12          PTT - ( 16 Mar 2022 14:34 )  PTT:24.8 sec  Urinalysis Basic - ( 16 Mar 2022 15:58 )    Color: Yellow / Appearance: Clear / S.010 / pH: x  Gluc: x / Ketone: NEGATIVE  / Bili: Negative / Urobili: 0.2 E.U./dL   Blood: x / Protein: Trace mg/dL / Nitrite: NEGATIVE   Leuk Esterase: Small / RBC: x / WBC x   Sq Epi: x / Non Sq Epi: x / Bacteria: x        RADIOLOGY & ADDITIONAL STUDIES: HPI:  45 y/o female with PMHx of HTN and MS, functional quadriplegic 2/2 h/o ruptured left middle cerebral artery aneurysm with intraparenchymal hemorrhage, SAH, and left subdural hematoma with midline shift and herniation  s/p hemicraniotomy, and prolonged hospital course complicated by bilat ptx, pna, multiorgan failure, ATN requiring dialysis, and hx of Sacral decubitus ulcer s/p debridement by plastics who was sent from NH fro evaluation of decubitus ulcer and PEG tube, found to have fever in the Ed. General surgery consulted for PEG tube evaluation. Patient nonverbal at baseline no family at bedside. Spoke with health care proxy on phone. Family concerned that nursing facility not managing her wounds appropriately reports has had PEG since november has not had it replaced; does not know when it was last used or when feeding tube adapter lost/missing; no other concerns or complaints.     In the ED. Patient was febrile to 100.9, tachy to 109 with normal BP, trach dependent with normal saturations. WBC of 17.29, anemic with Hg 9.2, lactate of 2.6, and unremarkable chemistry. UA pending.       PAST MEDICAL & SURGICAL HISTORY:  SDH (subdural hematoma)    SAH (subarachnoid hemorrhage)    Decubitus ulcer of coccygeal region, unspecified pressure ulcer stage    Personal history of spine surgery    H/O craniotomy        MEDICATIONS  (STANDING):  ketorolac   Injectable 15 milliGRAM(s) IV Push Once    MEDICATIONS  (PRN):      Allergies    No Known Allergies    Intolerances        SOCIAL HISTORY:    FAMILY HISTORY:      Vital Signs Last 24 Hrs  T(C): 38.2 (16 Mar 2022 15:45), Max: 38.3 (16 Mar 2022 13:26)  T(F): 100.8 (16 Mar 2022 15:45), Max: 100.9 (16 Mar 2022 13:26)  HR: 104 (16 Mar 2022 15:45) (104 - 109)  BP: 121/83 (16 Mar 2022 15:45) (121/83 - 150/81)  BP(mean): --  RR: 14 (16 Mar 2022 15:45) (14 - 14)  SpO2: 100% (16 Mar 2022 15:45) (100% - 100%)    PHYSICAL EXAM:  GENERAL: NAD, no verbal   HEENT: NCAT, MMM,  RESP: Nonlabored breathing, No respiratory distress with trach in place   CARD: Normal rate, Normal peripheral perfusion  GI: Soft, ND, NT, No guarding, No rebound tenderness, PEG tube without erythema, drainage or tenderness around tube, bolus feeding adapter missing with only tube and bump stopper in place; unable to flush PEG tube at bedside   EXTREM: WWP, No edema, stage II left heel ulcer, Stage 4 decubitus ulcer with good granulation tissue and small fibrous/necrotic tissue at base, no drainage or discharge   SKIN: No rashes, no lesions  NEURO: GCS 10T    LABS:                        9.2    17.29 )-----------( 291      ( 16 Mar 2022 14:34 )             29.8     03-16    140  |  97  |  59<H>  ----------------------------<  148<H>  3.5   |  28  |  0.71    Ca    11.0<H>      16 Mar 2022 14:34    TPro  8.5<H>  /  Alb  3.7  /  TBili  0.3  /  DBili  x   /  AST  32  /  ALT  39  /  AlkPhos  122<H>  03-16    PT/INR - ( 16 Mar 2022 14:34 )   PT: 13.3 sec;   INR: 1.12          PTT - ( 16 Mar 2022 14:34 )  PTT:24.8 sec  Urinalysis Basic - ( 16 Mar 2022 15:58 )    Color: Yellow / Appearance: Clear / S.010 / pH: x  Gluc: x / Ketone: NEGATIVE  / Bili: Negative / Urobili: 0.2 E.U./dL   Blood: x / Protein: Trace mg/dL / Nitrite: NEGATIVE   Leuk Esterase: Small / RBC: x / WBC x   Sq Epi: x / Non Sq Epi: x / Bacteria: x        RADIOLOGY & ADDITIONAL STUDIES:

## 2022-03-16 NOTE — ED ADULT NURSE NOTE - CHIEF COMPLAINT QUOTE
pt transferred from NH for evaluation of pressure injury , pt is chronic vented pt with a tracheostomy ,quadriplegia, HD,sacral pressure sore

## 2022-03-16 NOTE — CONSULT NOTE ADULT - ATTENDING COMMENTS
events noted  reviewed with Dr. Walker GAN, BUN, creat volume status stable  Ca 11- need to assess for tertiary hyperparathyroidism- and paraproteinemia as outlined above  no HD today- completing med eval.  anticipate repeat HD 3/17

## 2022-03-16 NOTE — ED PROVIDER NOTE - PHYSICAL EXAMINATION
VITAL SIGNS: I have reviewed nursing notes and confirm.  CONSTITUTIONAL: Well-developed; well-nourished; in no acute distress. baseline ms unresponsive  SKIN:  warm and dry, no acute rash.  10 x 15.5 cm stage 4 decubitus ulcer sacrum, 1 x 5 cm stage 4 decubitus ulcer R buttock, 1 cm stage 2 ulcer L heel, stage 1 ulcer R heel, no odor, drainage, erythema  HEAD:  normocephalic, atraumatic.  EYES: conjunctiva and sclera clear.  ENT: No nasal discharge; airway clear. trach in place  NECK: Supple; non tender.  CARD: S1, S2 normal; no murmurs, gallops, or rubs. Rapid, regular rate and rhythm.   RESP:  Clear to auscultation b/l, no wheezes, rales or rhonchi.  dialysis catheter R chest wall c/d/i  ABD: Normal bowel sounds; soft; non-distended; non-tender; no guarding/ rebound. PEG in place   MSK:   No clubbing, cyanosis or edema.    NEURO: Awake, nonverbal, no voluntary movements, unable to assess sens

## 2022-03-16 NOTE — ED PROVIDER NOTE - PROGRESS NOTE DETAILS
Plastics consulted (Dr Stephens) - recommends wet to dry dressings, no surg intervention at this time.  Wound care team also consulted and will eval pt in ed for recommendations.  Surg consulted for PEG replacement since tube damaged. Surg recommends IR for tube change; will hold on this given pt w fever.  Surg also recommends ct w iv contrast to eval for any complication due to g tube.  Labs notable for elevated lactate, wbc, nl chem, ua pending, cxr neg on my read. CT w LLL pna, also w intramusc abscess adjacent to pt's decub, concern for osteo.  Surg updated, will review but currently don't feel pt needs acute intervention, recommend admit to med w surg consulting.  SEVERIANO contacted and wants to discuss w surg before med admit.  Await return call. Reeval by surg - no intervention now, plan drainage tomorrow but cont to recommend admit to med.  Surg recommends mri to eval for osteo and poss ortho consult if present.  SEVERIANO updated and agrees w plan.  SEVERIANO requests vanco - ordered and discussed w rn. CT w LLL pna, also w intramusc abscess adjacent to pt's decub, concern for osteo.  Surg updated, will review but currently don't feel pt needs acute intervention, recommend admit to med w surg consulting.  SEVERIANO contacted and wants to discuss w surg before med admit.  Await return call.  VM left for daughter to call back for update.

## 2022-03-16 NOTE — ED ADULT TRIAGE NOTE - CHIEF COMPLAINT QUOTE
pt transferred from NH for evaluation of pressure injury , pt is chronic vented pt with a tracheostomy ,quadriplegia, HD pt transferred from NH for evaluation of pressure injury , pt is chronic vented pt with a tracheostomy ,quadriplegia, HD,sacral pressure sore

## 2022-03-16 NOTE — PATIENT PROFILE ADULT - FUNCTIONAL ASSESSMENT - DAILY ACTIVITY ASSESSMENT TYPE
11/28/2017      RE: Gillian Burk  [address]    [facility] [address]       Dear Colleague,    Thank you for the opportunity to participate in the care of your patient, Gillian Burk, at the [facility]. Please see a copy of my visit note below.    Cardiology Clinic Progress Note  Gillian Burk MRN# [id number]   YOB: 1926 Age: 89 year old     Reason for visit: Follow up coronary angiogram           Assessment and Plan:     1. Coronary artery disease    Recent coronary angiogram revealed a 60-70% hemodynamically significant mid to distal LAD lesion    Due to contrast dye load, it is recommended that she return  for a staged intervention of her LAD    She very little chest discomfort since the increase of her metoprolol, imdur and additional of amlodipine    Return for staged intervention with Dr. Workman    2. Severe aortic stenosis    S/p TAVR in August of 2016    3. Cardiomyopathy, presumed ischemic    EF 35-40%    Compensated    4. Hyperlipidemia, LDL goal <70    Will need repeat FLP prior to next visit as last one was in 2015    Continue Lipitor 40 mg daily         History of Presenting Illness:    Gillian Burk is a very pleasant 89 year old patient of Dr. Lee who presents today follow-up today post coronary angiogram. She has a past medical history significant for severe aortic stenosis status post transcatheter aortic valve replacement in August 2016.  Her coronary angiogram prior to the valve replacement showed mild nonocclusive disease. She had a nontransmural myocardial infarction in November 2016 possibly due to a thrombotic lesion of the RCA that was treated medically in [place]. The LAD and the circumflex were found to have mild nonocclusive disease.  The RCA was difficult to engage and visualize due to the shadowing of the TAVR. Since that time she has been  progressively titrating up nitrates and requiring sublingual nitroglycerin. She also has a cardiomyopathy with an ejection fraction of 35-40%.     When she saw Dr. Lee due to this steadily progressive typical exertional anginal symptoms requiring multiple nitroglycerin tablet she recommended invasive coronary angiography. He also increased Imdur to 60 mg BID to be taken at 6 AM and 2 PM he also increased her metoprolol.     She underwent a coronary angiogram on 11/16/2017 by a left radial approach. She was found to have a 60-70% stenosis of the mid to distal LAD.  An iFR was obtained which was positive at 0.80. She was also noted to have a 40% and 40% stenosis of the mid ramus. Due to the contrast limit, the LAD was not intervened upon.  It was recommended that she return for a staged PCI of the mid LAD.  She was discharged on Plavix and aspirin and Norvasc was added as an additional anti-anginal. There was difficulty engaging the  RCA and there was difficulty passing due to a possible strut from the Treva valve. Due to the difficultly of passing the left radial artery due to arterial tortuosity upon her return a femoral approach is recommended for her staged intervention.    Today in clinic she reports that she has had very little chest discomfort since her coronary angiogram and the addition of Norvasc. Discussed risks and benefits of repeat coronary angiogram with including the possibilities of renal failure, bleeding, heart attack, stroke, emergency surgery and death. She understands the risks and is willing to proceed.           This note was completed in part using Dragon voice recognition software. Although reviewed after completion, some word and grammatical errors may occur       Review of Systems:   Review of Systems:  Skin:  Positive for bruising   Eyes:  Positive for glasses  ENT:  Negative    Respiratory:  Negative    Cardiovascular:    Positive for;chest pain  Gastroenterology: Negative   "  Genitourinary:  Negative    Musculoskeletal:  Positive for arthritis  Neurologic:  Positive for numbness or tingling of feet  Psychiatric:  Positive for anxiety  Heme/Lymph/Imm:  Negative    Endocrine:  Positive for thyroid disorder              Physical Exam:     Vitals: /68 (BP Location: Right arm, Patient Position: Fowlers, Cuff Size: Adult Large)  Pulse 80  Ht 1.676 m (5' 6\")  Wt 65.7 kg (144 lb 12.8 oz)  SpO2 97%  BMI 23.37 kg/m2  Constitutional:  cooperative, alert and oriented, well developed, well nourished, in no acute distress        Skin:  warm and dry to the touch, no apparent skin lesions or masses noted        Head:  normocephalic, no masses or lesions        Eyes:  pupils equal and round;conjunctivae and lids unremarkable        ENT:  no pallor or cyanosis        Neck:           Chest:  clear to auscultation        Cardiac: regular rhythm       systolic murmur;LUSB;grade 1          Abdomen:  abdomen soft        Vascular: pulses full and equal, no bruits auscultated                                      Extremities and Back:  no edema   left upper extremity diffusely ecchymotic with tenderness on palpation. No bruit     Neurological:  no gross motor deficits;affect appropriate                 Medications:     Current Outpatient Prescriptions   Medication Sig Dispense Refill     amLODIPine (NORVASC) 2.5 MG tablet Take 1 tablet (2.5 mg) by mouth daily 90 tablet 1     metoprolol (TOPROL-XL) 25 MG 24 hr tablet Take 2 tablets (50 mg) by mouth daily 180 tablet 3     atorvastatin (LIPITOR) 40 MG tablet Take 1 tablet (40 mg) by mouth daily Reports taking 90 tablet 0     famotidine (PEPCID) 40 MG tablet TAKE ONE TABLET BY MOUTH AT BEDTIME 30 tablet 9     folic acid (FOLVITE) 1 MG tablet Take 1 tablet (1,000 mcg) by mouth daily 90 tablet 1     levothyroxine (SYNTHROID) 50 MCG tablet Take 1 tablet (50 mcg) by mouth daily 30 tablet 3     Calcium & Magnesium Carbonates (MYLANTA PO) 2 tblsp every 4 hours " as needed       predniSONE (DELTASONE) 5 MG tablet TAKE ONE TABLET BY MOUTH EVERY OTHER DAY ALTERNATING  WITH  1/2  TABLET  EVERY  OTHER  DAY 23 tablet 13     clopidogrel (PLAVIX) 75 MG tablet Take 1 tablet (75 mg) by mouth daily 90 tablet 3     LANsoprazole (PREVACID) 30 MG CR capsule Take 1 capsule (30 mg) by mouth daily Take 30-60 minutes before a meal. 30 capsule 3     mirtazapine (REMERON) 30 MG tablet TAKE ONE TABLET BY MOUTH AT BEDTIME 90 tablet 2     Ferrous Sulfate (IRON) 325 (65 FE) MG tablet TAKE ONE TABLET BY MOUTH ONCE DAILY WITH BREAKFAST 30 tablet 11     levETIRAcetam (KEPPRA) 500 MG tablet TAKE ONE TABLET BY MOUTH ONCE DAILY AND one AT BEDTIME 180 tablet 0     ALPRAZolam (XANAX) 0.25 MG tablet Take 1 tablet (0.25 mg) by mouth 2 times daily as needed for anxiety 28 tablet 0     isosorbide mononitrate (IMDUR) 60 MG 24 hr tablet Take 1 tablet (60 mg) by mouth daily 90 tablet 3     aspirin EC 81 MG EC tablet Take 1 tablet (81 mg) by mouth daily       methotrexate 2.5 MG tablet Take 4 tablets (10 mg) by mouth once a week Wed 52 tablet 3     acetaminophen (TYLENOL ARTHRITIS PAIN) 650 MG CR tablet Take 2 tablets (1,300 mg) by mouth every 8 hours as needed for mild pain       calcium carbonate (TUMS) 500 MG chewable tablet Take 2-4 tablets (1,000-2,000 mg) by mouth as needed for heartburn       docusate sodium (COLACE) 100 MG capsule Take 200 mg by mouth daily 2 capsules       multivitamin, therapeutic with minerals (MULTI-VITAMIN) TABS Take 1 tablet by mouth daily       Lactobacillus (FLORAJEN ACIDOPHILUS) CAPS Take 1 capsule by mouth daily       nitroGLYcerin (NITROSTAT) 0.4 MG sublingual tablet Pt reports taking - -  -For chest pain place 1 tablet under the tongue every 5 minutes for 3 doses. If symptoms persist 5 minutes after 1st dose call 911. (Patient not taking: Reported on 11/28/2017) 25 tablet 0           Family History   Problem Relation Age of Onset     Hyperlipidemia Mother      Family History  Negative No family hx of        Social History     Social History     Marital status:      Spouse name: N/A     Number of children: 4     Years of education: N/A     Occupational History      Retired     Social History Main Topics     Smoking status: Never Smoker     Smokeless tobacco: Never Used     Alcohol use No     Drug use: No     Sexual activity: No     Other Topics Concern     Special Diet Yes     low salt      Exercise Yes     semi recument bike 15 mins daily      Social History Narrative    .  Lives in assisted living. Independent. Has help with making bed and changing sheets.    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 .     4 children - 1 with RA                Past Medical History:     Past Medical History:   Diagnosis Date     Aortic stenosis     s/p TAVR 8/17/16     Chronic migraine      Hyperlipidaemia      Hypertension      Hypothyroidism      Myocardial infarction      Need for SBE (subacute bacterial endocarditis) prophylaxis      Orthostatic hypotension     wears compression stockings     PUD (peptic ulcer disease)      Secondary Sjogren's syndrome (H)      Seizures (H)     after stroke (partial onset)     Seropositive rheumatoid arthritis (H)      Stroke (H) 05/2013    with visual field cut, left occipital lobe     Syncope 2014    due to orthostatic hypotension              Past Surgical History:     Past Surgical History:   Procedure Laterality Date     C TOTAL HIP ARTHROPLASTY Right 2010     C TOTAL HIP ARTHROPLASTY Left 2014     CATARACT IOL, RT/LT Bilateral 2000     EYE SURGERY  1999    macular pucker eye surgery     HEART CATH FEMORAL CANNULIZATION WITH OPEN STANDBY REPAIR AORTIC VALVE N/A 8/3/2016    Procedure: HEART CATH FEMORAL CANNULIZATION WITH OPEN STANDBY REPAIR AORTIC VALVE;  Surgeon: Owen Schultz MD;  Location: [facility] OR     HYSTERECTOMY TOTAL ABDOMINAL  1970    ovaries out     MOUTH SURGERY  2011    jaw surgery due to fracture      TRANSCATHETER AORTIC VALVE IMPLANT ANESTHESIA N/A 8/3/2016    Procedure: TRANSCATHETER AORTIC VALVE IMPLANT ANESTHESIA;  Surgeon: GENERIC ANESTHESIA PROVIDER;  Location: [facility] OR              Allergies:   Caffeine; Hydrocodone; Ibuprofen; Penicillins; Tramadol; and Metal [staples]       Data:   All laboratory data reviewed:    LAST CHOLESTEROL:  Lab Results   Component Value Date    CHOL 172 07/07/2015     Lab Results   Component Value Date    HDL 75 07/07/2015     Lab Results   Component Value Date    LDL 69 07/07/2015     Lab Results   Component Value Date    TRIG 142 07/07/2015     Lab Results   Component Value Date    CHOLHDLRATIO 2.3 07/07/2015       LAST BMP:  Lab Results   Component Value Date     11/13/2017      Lab Results   Component Value Date    POTASSIUM 4.2 11/13/2017     Lab Results   Component Value Date    CHLORIDE 102 11/13/2017     Lab Results   Component Value Date    CHEYANNE 8.4 11/13/2017     Lab Results   Component Value Date    CO2 28 11/13/2017     Lab Results   Component Value Date    BUN 17 11/13/2017     Lab Results   Component Value Date    CR 1.01 11/21/2017     Lab Results   Component Value Date     11/13/2017       LAST CBC:  Lab Results   Component Value Date    WBC 8.8 11/21/2017     Lab Results   Component Value Date    RBC 3.61 11/21/2017     Lab Results   Component Value Date    HGB 11.7 11/21/2017     Lab Results   Component Value Date    HCT 35.0 11/21/2017     Lab Results   Component Value Date    MCV 97 11/21/2017     Lab Results   Component Value Date    MCH 32.4 11/21/2017     Lab Results   Component Value Date    MCHC 33.4 11/21/2017     Lab Results   Component Value Date    RDW 15.9 11/21/2017     Lab Results   Component Value Date     11/21/2017         JESSICA JACK, APRN, CNP     Admission

## 2022-03-16 NOTE — ED PROVIDER NOTE - CLINICAL SUMMARY MEDICAL DECISION MAKING FREE TEXT BOX
Pt sent from NH for pressure ulcers, PEG replacement and family being unhappy w care at NH and wanting transfer.  Wounds eval and discussed w plastics - recommend freq turning q 2h and local care w wet to dry, agree that wounds appear clean w/o signs of infection on exam.  Pt found to have fever - no obvious source (decubs appear clean but could be point of entry for bacteremia), ? 2/2 dialysis cath, ? pna, ? uti.  Plan labs, cx's, plan abx but will hold while assessing for source (poss empiric broad spectrum abx if abnl labs, vs, etc).  Likely admit.

## 2022-03-16 NOTE — ED PROVIDER NOTE - NSICDXPASTMEDICALHX_GEN_ALL_CORE_FT
PAST MEDICAL HISTORY:  Decubitus ulcer of coccygeal region, unspecified pressure ulcer stage     SAH (subarachnoid hemorrhage)     SDH (subdural hematoma)

## 2022-03-17 DIAGNOSIS — L02.31 CUTANEOUS ABSCESS OF BUTTOCK: ICD-10-CM

## 2022-03-17 DIAGNOSIS — A41.9 SEPSIS, UNSPECIFIED ORGANISM: ICD-10-CM

## 2022-03-17 DIAGNOSIS — R63.8 OTHER SYMPTOMS AND SIGNS CONCERNING FOOD AND FLUID INTAKE: ICD-10-CM

## 2022-03-17 DIAGNOSIS — K94.23 GASTROSTOMY MALFUNCTION: ICD-10-CM

## 2022-03-17 DIAGNOSIS — M86.10 OTHER ACUTE OSTEOMYELITIS, UNSPECIFIED SITE: ICD-10-CM

## 2022-03-17 DIAGNOSIS — L89.159 PRESSURE ULCER OF SACRAL REGION, UNSPECIFIED STAGE: ICD-10-CM

## 2022-03-17 DIAGNOSIS — I60.9 NONTRAUMATIC SUBARACHNOID HEMORRHAGE, UNSPECIFIED: ICD-10-CM

## 2022-03-17 DIAGNOSIS — R53.2 FUNCTIONAL QUADRIPLEGIA: ICD-10-CM

## 2022-03-17 DIAGNOSIS — J18.9 PNEUMONIA, UNSPECIFIED ORGANISM: ICD-10-CM

## 2022-03-17 DIAGNOSIS — Z51.5 ENCOUNTER FOR PALLIATIVE CARE: ICD-10-CM

## 2022-03-17 DIAGNOSIS — N18.6 END STAGE RENAL DISEASE: ICD-10-CM

## 2022-03-17 DIAGNOSIS — L89.154 PRESSURE ULCER OF SACRAL REGION, STAGE 4: ICD-10-CM

## 2022-03-17 DIAGNOSIS — J96.90 RESPIRATORY FAILURE, UNSPECIFIED, UNSPECIFIED WHETHER WITH HYPOXIA OR HYPERCAPNIA: ICD-10-CM

## 2022-03-17 DIAGNOSIS — D64.9 ANEMIA, UNSPECIFIED: ICD-10-CM

## 2022-03-17 LAB
ANION GAP SERPL CALC-SCNC: 14 MMOL/L — SIGNIFICANT CHANGE UP (ref 5–17)
BASOPHILS # BLD AUTO: 0.04 K/UL — SIGNIFICANT CHANGE UP (ref 0–0.2)
BASOPHILS NFR BLD AUTO: 0.3 % — SIGNIFICANT CHANGE UP (ref 0–2)
BUN SERPL-MCNC: 47 MG/DL — HIGH (ref 7–23)
CALCIUM SERPL-MCNC: 10.3 MG/DL — SIGNIFICANT CHANGE UP (ref 8.4–10.5)
CHLORIDE SERPL-SCNC: 105 MMOL/L — SIGNIFICANT CHANGE UP (ref 96–108)
CO2 SERPL-SCNC: 24 MMOL/L — SIGNIFICANT CHANGE UP (ref 22–31)
CREAT SERPL-MCNC: 0.62 MG/DL — SIGNIFICANT CHANGE UP (ref 0.5–1.3)
CRP SERPL-MCNC: 30.8 MG/L — HIGH (ref 0–4)
EGFR: 111 ML/MIN/1.73M2 — SIGNIFICANT CHANGE UP
EOSINOPHIL # BLD AUTO: 0.06 K/UL — SIGNIFICANT CHANGE UP (ref 0–0.5)
EOSINOPHIL NFR BLD AUTO: 0.4 % — SIGNIFICANT CHANGE UP (ref 0–6)
ERYTHROCYTE [SEDIMENTATION RATE] IN BLOOD: 106 MM/HR — HIGH
FERRITIN SERPL-MCNC: 2188 NG/ML — HIGH (ref 15–150)
GLUCOSE SERPL-MCNC: 120 MG/DL — HIGH (ref 70–99)
HBV SURFACE AG SER-ACNC: SIGNIFICANT CHANGE UP
HCT VFR BLD CALC: 25.9 % — LOW (ref 34.5–45)
HCV AB S/CO SERPL IA: 0.06 S/CO — SIGNIFICANT CHANGE UP
HCV AB SERPL-IMP: SIGNIFICANT CHANGE UP
HGB BLD-MCNC: 7.9 G/DL — LOW (ref 11.5–15.5)
IMM GRANULOCYTES NFR BLD AUTO: 0.6 % — SIGNIFICANT CHANGE UP (ref 0–1.5)
IRON SATN MFR SERPL: 35 % — SIGNIFICANT CHANGE UP (ref 14–50)
IRON SATN MFR SERPL: 61 UG/DL — SIGNIFICANT CHANGE UP (ref 30–160)
LYMPHOCYTES # BLD AUTO: 15.9 % — SIGNIFICANT CHANGE UP (ref 13–44)
LYMPHOCYTES # BLD AUTO: 2.31 K/UL — SIGNIFICANT CHANGE UP (ref 1–3.3)
MAGNESIUM SERPL-MCNC: 2.1 MG/DL — SIGNIFICANT CHANGE UP (ref 1.6–2.6)
MCHC RBC-ENTMCNC: 25.1 PG — LOW (ref 27–34)
MCHC RBC-ENTMCNC: 30.5 GM/DL — LOW (ref 32–36)
MCV RBC AUTO: 82.2 FL — SIGNIFICANT CHANGE UP (ref 80–100)
MONOCYTES # BLD AUTO: 1.32 K/UL — HIGH (ref 0–0.9)
MONOCYTES NFR BLD AUTO: 9.1 % — SIGNIFICANT CHANGE UP (ref 2–14)
NEUTROPHILS # BLD AUTO: 10.7 K/UL — HIGH (ref 1.8–7.4)
NEUTROPHILS NFR BLD AUTO: 73.7 % — SIGNIFICANT CHANGE UP (ref 43–77)
NRBC # BLD: 0 /100 WBCS — SIGNIFICANT CHANGE UP (ref 0–0)
PHOSPHATE SERPL-MCNC: 2.8 MG/DL — SIGNIFICANT CHANGE UP (ref 2.5–4.5)
PLATELET # BLD AUTO: 275 K/UL — SIGNIFICANT CHANGE UP (ref 150–400)
POTASSIUM SERPL-MCNC: 2.9 MMOL/L — CRITICAL LOW (ref 3.5–5.3)
POTASSIUM SERPL-SCNC: 2.9 MMOL/L — CRITICAL LOW (ref 3.5–5.3)
RBC # BLD: 3.15 M/UL — LOW (ref 3.8–5.2)
RBC # BLD: 3.15 M/UL — LOW (ref 3.8–5.2)
RBC # FLD: 17.3 % — HIGH (ref 10.3–14.5)
RETICS #: 138.3 K/UL — HIGH (ref 25–125)
RETICS/RBC NFR: 4.4 % — HIGH (ref 0.5–2.5)
SODIUM SERPL-SCNC: 143 MMOL/L — SIGNIFICANT CHANGE UP (ref 135–145)
TIBC SERPL-MCNC: 173 UG/DL — LOW (ref 220–430)
UIBC SERPL-MCNC: 112 UG/DL — SIGNIFICANT CHANGE UP (ref 110–370)
WBC # BLD: 14.51 K/UL — HIGH (ref 3.8–10.5)
WBC # FLD AUTO: 14.51 K/UL — HIGH (ref 3.8–10.5)

## 2022-03-17 PROCEDURE — 99223 1ST HOSP IP/OBS HIGH 75: CPT

## 2022-03-17 PROCEDURE — 90935 HEMODIALYSIS ONE EVALUATION: CPT

## 2022-03-17 PROCEDURE — 99447 NTRPROF PH1/NTRNET/EHR 11-20: CPT

## 2022-03-17 PROCEDURE — 99233 SBSQ HOSP IP/OBS HIGH 50: CPT

## 2022-03-17 PROCEDURE — 99358 PROLONG SERVICE W/O CONTACT: CPT | Mod: NC

## 2022-03-17 RX ORDER — PIPERACILLIN AND TAZOBACTAM 4; .5 G/20ML; G/20ML
4.5 INJECTION, POWDER, LYOPHILIZED, FOR SOLUTION INTRAVENOUS ONCE
Refills: 0 | Status: COMPLETED | OUTPATIENT
Start: 2022-03-17 | End: 2022-03-17

## 2022-03-17 RX ORDER — VANCOMYCIN HCL 1 G
1000 VIAL (EA) INTRAVENOUS EVERY 24 HOURS
Refills: 0 | Status: DISCONTINUED | OUTPATIENT
Start: 2022-03-17 | End: 2022-03-18

## 2022-03-17 RX ORDER — CHLORHEXIDINE GLUCONATE 213 G/1000ML
15 SOLUTION TOPICAL EVERY 12 HOURS
Refills: 0 | Status: DISCONTINUED | OUTPATIENT
Start: 2022-03-17 | End: 2022-03-25

## 2022-03-17 RX ORDER — IPRATROPIUM/ALBUTEROL SULFATE 18-103MCG
3 AEROSOL WITH ADAPTER (GRAM) INHALATION
Qty: 0 | Refills: 0 | DISCHARGE

## 2022-03-17 RX ORDER — POTASSIUM CHLORIDE 20 MEQ
10 PACKET (EA) ORAL
Refills: 0 | Status: DISCONTINUED | OUTPATIENT
Start: 2022-03-17 | End: 2022-03-17

## 2022-03-17 RX ORDER — CARVEDILOL PHOSPHATE 80 MG/1
1 CAPSULE, EXTENDED RELEASE ORAL
Qty: 0 | Refills: 0 | DISCHARGE

## 2022-03-17 RX ORDER — SENNA PLUS 8.6 MG/1
2 TABLET ORAL AT BEDTIME
Refills: 0 | Status: DISCONTINUED | OUTPATIENT
Start: 2022-03-17 | End: 2022-03-25

## 2022-03-17 RX ORDER — LEVETIRACETAM 250 MG/1
500 TABLET, FILM COATED ORAL DAILY
Refills: 0 | Status: DISCONTINUED | OUTPATIENT
Start: 2022-03-17 | End: 2022-03-17

## 2022-03-17 RX ORDER — ACETYLCYSTEINE 200 MG/ML
4 VIAL (ML) MISCELLANEOUS EVERY 6 HOURS
Refills: 0 | Status: DISCONTINUED | OUTPATIENT
Start: 2022-03-17 | End: 2022-03-25

## 2022-03-17 RX ORDER — VANCOMYCIN HCL 1 G
1000 VIAL (EA) INTRAVENOUS EVERY 12 HOURS
Refills: 0 | Status: DISCONTINUED | OUTPATIENT
Start: 2022-03-17 | End: 2022-03-17

## 2022-03-17 RX ORDER — DIMETHYL FUMARATE 240 MG/1
1 CAPSULE ORAL
Qty: 0 | Refills: 0 | DISCHARGE

## 2022-03-17 RX ORDER — LEVETIRACETAM 250 MG/1
500 TABLET, FILM COATED ORAL EVERY 24 HOURS
Refills: 0 | Status: DISCONTINUED | OUTPATIENT
Start: 2022-03-17 | End: 2022-03-24

## 2022-03-17 RX ORDER — HEPARIN SODIUM 5000 [USP'U]/ML
5000 INJECTION INTRAVENOUS; SUBCUTANEOUS EVERY 8 HOURS
Refills: 0 | Status: DISCONTINUED | OUTPATIENT
Start: 2022-03-17 | End: 2022-03-25

## 2022-03-17 RX ORDER — PIPERACILLIN AND TAZOBACTAM 4; .5 G/20ML; G/20ML
4.5 INJECTION, POWDER, LYOPHILIZED, FOR SOLUTION INTRAVENOUS EVERY 6 HOURS
Refills: 0 | Status: DISCONTINUED | OUTPATIENT
Start: 2022-03-17 | End: 2022-03-18

## 2022-03-17 RX ORDER — ERYTHROPOIETIN 10000 [IU]/ML
7000 INJECTION, SOLUTION INTRAVENOUS; SUBCUTANEOUS ONCE
Refills: 0 | Status: COMPLETED | OUTPATIENT
Start: 2022-03-17 | End: 2022-03-17

## 2022-03-17 RX ORDER — ONDANSETRON 8 MG/1
4 TABLET, FILM COATED ORAL
Qty: 0 | Refills: 0 | DISCHARGE

## 2022-03-17 RX ORDER — CYCLOBENZAPRINE HYDROCHLORIDE 10 MG/1
1 TABLET, FILM COATED ORAL
Qty: 0 | Refills: 0 | DISCHARGE

## 2022-03-17 RX ORDER — AMLODIPINE BESYLATE 2.5 MG/1
10 TABLET ORAL DAILY
Refills: 0 | Status: DISCONTINUED | OUTPATIENT
Start: 2022-03-17 | End: 2022-03-25

## 2022-03-17 RX ADMIN — Medication 4 MILLILITER(S): at 23:53

## 2022-03-17 RX ADMIN — Medication 1 DROP(S): at 13:14

## 2022-03-17 RX ADMIN — CHLORHEXIDINE GLUCONATE 15 MILLILITER(S): 213 SOLUTION TOPICAL at 18:32

## 2022-03-17 RX ADMIN — ERYTHROPOIETIN 7000 UNIT(S): 10000 INJECTION, SOLUTION INTRAVENOUS; SUBCUTANEOUS at 13:40

## 2022-03-17 RX ADMIN — LEVETIRACETAM 400 MILLIGRAM(S): 250 TABLET, FILM COATED ORAL at 15:52

## 2022-03-17 RX ADMIN — PIPERACILLIN AND TAZOBACTAM 25 GRAM(S): 4; .5 INJECTION, POWDER, LYOPHILIZED, FOR SOLUTION INTRAVENOUS at 23:48

## 2022-03-17 RX ADMIN — PIPERACILLIN AND TAZOBACTAM 200 GRAM(S): 4; .5 INJECTION, POWDER, LYOPHILIZED, FOR SOLUTION INTRAVENOUS at 03:16

## 2022-03-17 RX ADMIN — Medication 4 MILLILITER(S): at 16:01

## 2022-03-17 RX ADMIN — PIPERACILLIN AND TAZOBACTAM 25 GRAM(S): 4; .5 INJECTION, POWDER, LYOPHILIZED, FOR SOLUTION INTRAVENOUS at 15:52

## 2022-03-17 RX ADMIN — Medication 250 MILLIGRAM(S): at 23:52

## 2022-03-17 RX ADMIN — HEPARIN SODIUM 5000 UNIT(S): 5000 INJECTION INTRAVENOUS; SUBCUTANEOUS at 23:52

## 2022-03-17 RX ADMIN — PIPERACILLIN AND TAZOBACTAM 25 GRAM(S): 4; .5 INJECTION, POWDER, LYOPHILIZED, FOR SOLUTION INTRAVENOUS at 08:51

## 2022-03-17 RX ADMIN — HEPARIN SODIUM 5000 UNIT(S): 5000 INJECTION INTRAVENOUS; SUBCUTANEOUS at 15:53

## 2022-03-17 RX ADMIN — SENNA PLUS 2 TABLET(S): 8.6 TABLET ORAL at 23:53

## 2022-03-17 NOTE — PROGRESS NOTE ADULT - SUBJECTIVE AND OBJECTIVE BOX
OVERNIGHT EVENTS: No acute events overnight     SUBJECTIVE / INTERVAL HPI: Patient seen and examined at bedside.     VITAL SIGNS:  Vital Signs Last 24 Hrs  T(C): 36.4 (17 Mar 2022 13:55), Max: 38.2 (16 Mar 2022 15:45)  T(F): 97.6 (17 Mar 2022 13:55), Max: 100.8 (16 Mar 2022 15:45)  HR: 98 (17 Mar 2022 13:55) (87 - 113)  BP: 139/60 (17 Mar 2022 13:55) (121/83 - 161/108)  BP(mean): 112 (16 Mar 2022 20:45) (112 - 112)  RR: 16 (17 Mar 2022 13:55) (14 - 19)  SpO2: 100% (17 Mar 2022 13:55) (98% - 100%)    PHYSICAL EXAM:  General: breathing via ventilator   HEENT: PERRL, anicteric sclera; MMM  Cardiovascular: +S1/S2, RRR  Respiratory: CTA B/L; no W/R/R, Trach in place   Gastrointestinal: soft, NT/ND; +BSx4, PEG in place appears clean and dry  Extremities: WWP; no edema, clubbing or cyanosis  Vascular: 2+ radial, DP/PT pulses B/L  Neurological: AAOx0, strenght & sensation absent b/l UE and LE     MEDICATIONS:  MEDICATIONS  (STANDING):  acetylcysteine 20%  Inhalation 4 milliLiter(s) Inhalation every 6 hours  amLODIPine   Tablet 10 milliGRAM(s) Oral daily  artificial tears (preservative free) Ophthalmic Solution 1 Drop(s) Both EYES two times a day  chlorhexidine 0.12% Liquid 15 milliLiter(s) Oral Mucosa every 12 hours  heparin   Injectable 5000 Unit(s) SubCutaneous every 8 hours  levETIRAcetam  IVPB 500 milliGRAM(s) IV Intermittent every 24 hours  piperacillin/tazobactam IVPB.. 4.5 Gram(s) IV Intermittent every 6 hours  senna 2 Tablet(s) Oral at bedtime  vancomycin  IVPB 1000 milliGRAM(s) IV Intermittent every 24 hours    MEDICATIONS  (PRN):      ALLERGIES:  Allergies    No Known Allergies    Intolerances        LABS:                        7.9    14.51 )-----------( 275      ( 17 Mar 2022 12:08 )             25.9     03-17    143  |  105  |  47<H>  ----------------------------<  120<H>  2.9<LL>   |  24  |  0.62    Ca    10.3      17 Mar 2022 12:08  Phos  2.8       Mg     2.1         TPro  8.5<H>  /  Alb  3.7  /  TBili  0.3  /  DBili  x   /  AST  32  /  ALT  39  /  AlkPhos  122<H>  03-16    PT/INR - ( 16 Mar 2022 14:34 )   PT: 13.3 sec;   INR: 1.12          PTT - ( 16 Mar 2022 14:34 )  PTT:24.8 sec  Urinalysis Basic - ( 16 Mar 2022 15:58 )    Color: Yellow / Appearance: Clear / S.010 / pH: x  Gluc: x / Ketone: NEGATIVE  / Bili: Negative / Urobili: 0.2 E.U./dL   Blood: x / Protein: Trace mg/dL / Nitrite: NEGATIVE   Leuk Esterase: Small / RBC: < 5 /HPF / WBC 5-10 /HPF   Sq Epi: x / Non Sq Epi: 0-5 /HPF / Bacteria: Present /HPF      CAPILLARY BLOOD GLUCOSE      POCT Blood Glucose.: 124 mg/dL (16 Mar 2022 22:00)      RADIOLOGY & ADDITIONAL TESTS: Reviewed.

## 2022-03-17 NOTE — H&P ADULT - PROBLEM SELECTOR PLAN 7
F: S/p 2.5L NS  E: Replete for K<4, Mag<2  N: NPO with TFs  A: SCDs  Code status: Full  Dispo: [facility] Family complaining that PEG tube has not been changed and is currently not functioning.  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

## 2022-03-17 NOTE — PROGRESS NOTE ADULT - PROBLEM SELECTOR PLAN 5
Family complaining that PEG tube has not been changed and is currently not functioning.  - Dr. Rodas from surgery who placed the initially PEG is out of states, and surgery recommended either GI to place PEG or place an NGT

## 2022-03-17 NOTE — H&P ADULT - PROBLEM SELECTOR PLAN 10
F: S/p 2.5L NS  E: Replete for K<4, Mag<2  N: NPO with TFs  A: SCDs  Code status: Full  Dispo: [facility] CT A/P:  Probable developing left lower lobe pneumonia, less likely pulmonary edema. No hypoxia or increased work of breathing. No crackles on lung auscultation.  - Management of sepsis as above

## 2022-03-17 NOTE — DIETITIAN INITIAL EVALUATION ADULT. - ENTERAL
Nepro via PEG @ 30ml/hr x24hrs +1 liquid protein supplement (provides 720ml TV, 1396kcal, 73gpro, 523ml free water) +additional fluids per team

## 2022-03-17 NOTE — CONSULT NOTE ADULT - SUBJECTIVE AND OBJECTIVE BOX
AILYN DÍAZ          MRN-[id number]            (1975)    HPI:  46F w/ PMHx of HTN, MS, functional quadriplegic 2/2 h/o ruptured left middle cerebral artery aneurysm with intraparenchymal hemorrhage, SAH, and left subdural hematoma with midline shift and herniation 2021 s/p hemicraniotomy and s/p trach/PEG, with prolonged hospital course complicated by b/l PNTX, PNA, multiorgan failure, ESRD on HD (//S) 2/2 anuric ATN, and Sacral decubitus ulcer s/p debridement by plastics, sent in from NH for evaluation of decubitus ulcer and PEG tube. Patient nonverbal at baseline no family at bedside. ED provider spoke with HCP over the phone who report that family is concerned that the NH is not managing her wounds appropriately and that patient has had PEG since November and has not had it replaced; does not know when it was last used or when feeding tube adapter lost/missing; no other concerns or complaints.     In the ED:  Initial vital signs: T: 100.9 F rectal, HR: 105, BP: 129/95, R: 14, SpO2: 100% on ventilator  Labs: significant for WBC 17.3, Hgb 9.2, lactate 2.6, BUN 59, Cr 0.71, UA negative nitrites, small LEs, 5-10 WBC, Bacteria present  Imaging:  CT A/P w/ IV contrast: Intramuscular abscess medial right gluteus valente adjacent to large sacral decubitus ulcer with exposed bone probably underlying osteomyelitis, few tiny bony fragments in region of previously seen coccyx, eroded or surgically removed. Probable developing left lower lobe pneumonia, less likely pulmonary edema. Central venous catheter tip at junction of suprahepatic IVC and left atrium. Consider retraction. Small bowel intussusception in left mid abdomen, likely transient, doubtful clinically significance.  CXR: No pneumonia or CHF  EKG: Sinus tach  Medications: Vanc 1g, Cefepime 1g, NS 2.5L, Ketorolac 15 IV, Tylenol 650  Consults: Gen surg, Renal (17 Mar 2022 02:03)    PAST MEDICAL & SURGICAL HISTORY:  SDH (subdural hematoma)    SAH (subarachnoid hemorrhage)    Decubitus ulcer of coccygeal region, unspecified pressure ulcer stage    Personal history of spine surgery    H/O craniotomy    FAMILY HISTORY:  No pertinent family history in first degree relatives     Reviewed and found non contributory in mother or father    SOCIAL HISTORY: Patient is lives in Nursing home. Has children.     PSYCHOSOCIAL ASSESSMENT:  Faith/Spiritual practice: unknown   Role of organized [employer]ism [ ] important [ ] some [x ] unable to assess dt pt mentation   Coping: [ ] well [ ] with difficulty [ ] poor coping [x ] unable to assess dt pt mentation  Support system: [x ] strong [ ] adequate [ ] inadequate    ROS:    Unable to attain due to:   unable to assess    Dyspnea (Ana 0-10):  0                     N/V (Y/N):                     N         Secretions (Y/N) :          N      Agitation(Y/N): N  Pain (Y/N):     N  -Provocation/Palliation: n/a   -Quality/Quantity: n/a   -Radiating: n/a   -Severity: n/a   -Timing/Frequency: n/a   -Impact on ADLs: n/a     General:  unable to obtain  HEENT:  unable to obtain  Neck:  unable to obtain  CVS:  unable to obtain  Resp:  unable to obtain  GI:  unable to obtain  :  unable to obtain  Musc:  unable to obtain  Neuro: unable to obtain  Psych:  unable to obtain  Skin:  unable to obtain  Lymph:  unable to obtain    Allergies    No Known Allergies    Intolerances      Opiate Naive (Y/N):  Y  -iStop reviewed (Y/N):  Y (Ref#:  [id number]   )    Medications:      MEDICATIONS  (STANDING):  acetylcysteine 20%  Inhalation 4 milliLiter(s) Inhalation every 6 hours  amLODIPine   Tablet 10 milliGRAM(s) Oral daily  artificial tears (preservative free) Ophthalmic Solution 1 Drop(s) Both EYES two times a day  chlorhexidine 0.12% Liquid 15 milliLiter(s) Oral Mucosa every 12 hours  epoetin nannette-epbx (RETACRIT) Injectable 7000 Unit(s) IV Push once  heparin   Injectable 5000 Unit(s) SubCutaneous every 8 hours  levETIRAcetam  IVPB 500 milliGRAM(s) IV Intermittent every 24 hours  piperacillin/tazobactam IVPB.. 4.5 Gram(s) IV Intermittent every 6 hours  senna 2 Tablet(s) Oral at bedtime  vancomycin  IVPB 1000 milliGRAM(s) IV Intermittent every 24 hours    MEDICATIONS  (PRN):      Labs:    CBC:                        9.2    17.29 )-----------( 291      ( 16 Mar 2022 14:34 )             29.8     CMP:        140  |  97  |  59<H>  ----------------------------<  148<H>  3.5   |  28  |  0.71    Ca    11.0<H>      16 Mar 2022 14:34    TPro  8.5<H>  /  Alb  3.7  /  TBili  0.3  /  DBili  x   /  AST  32  /  ALT  39  /  AlkPhos  122<H>      PT/INR - ( 16 Mar 2022 14:34 )   PT: 13.3 sec;   INR: 1.12          PTT - ( 16 Mar 2022 14:34 )  PTT:24.8 sec  Urinalysis Basic - ( 16 Mar 2022 15:58 )    Color: Yellow / Appearance: Clear / S.010 / pH: x  Gluc: x / Ketone: NEGATIVE  / Bili: Negative / Urobili: 0.2 E.U./dL   Blood: x / Protein: Trace mg/dL / Nitrite: NEGATIVE   Leuk Esterase: Small / RBC: < 5 /HPF / WBC 5-10 /HPF   Sq Epi: x / Non Sq Epi: 0-5 /HPF / Bacteria: Present /HPF    Imaging:    < from: CT Abdomen and Pelvis w/ IV Cont (22 @ 18:30) >    ACC: [id number] EXAM:  CT ABDOMEN AND PELVIS IC                          PROCEDURE DATE:  2022    Impression:  1.  Intramuscular abscess medial right gluteus valente (series 3 image   124) adjacent to large sacral decubitus ulcer with exposed bone probably   underlying osteomyelitis, few tiny bony fragments in region of previously   seen coccyx, eroded or surgically removed.  2.  Probable developing left lower lobe pneumonia, less likely pulmonary   edema.  3.  Central venous catheter tip at junction of suprahepatic IVC and left   atrium. Consider retraction.  4.  Small bowel intussusception in left mid abdomen, likely transient,   doubtful clinically significance.    < end of copied text >    PEx:  T(C): 36.3 (22 @ 05:37), Max: 38.3 (22 @ 13:26)  HR: 94 (22 @ 10:19) (87 - 109)  BP: 145/85 (22 @ 05:37) (121/83 - 150/81)  RR: 14 (22 @ 10:19) (14 - 16)  SpO2: 100% (22 @ 10:19) (98% - 100%)  Wt(kg): 70.9kG  Daily Height in cm: 156 (16 Mar 2022 13:26)    Daily Weight in k.9 (16 Mar 2022 23:05)  CAPILLARY BLOOD GLUCOSE    POCT Blood Glucose.: 124 mg/dL (16 Mar 2022 22:00)    I&O's Summary    16 Mar 2022 07:01  -  17 Mar 2022 07:00  --------------------------------------------------------  IN: 0 mL / OUT: 178 mL / NET: -178 mL    GENERAL:  [x ]Alert  [ ]Oriented x   [ ]Lethargic  [ ]Cachexia  [ ]Unarousable  [ ]Verbal  [ x]Non-Verbal  Behavioral:   [ ] Anxiety  [ ] Delirium [ ] Agitation [x ] Other - calm   HEENT:  [ ]Normal   [ ]Dry mouth   [ x]Trach to vent  [ ]Oral lesions  PULMONARY:   [ ]Clear  [ ]Tachypnea  [ ]Audible excessive secretions   [ x]Rhonchi  anteriorly        [ ]Right [ ]Left [ ]Bilateral  [ ]Crackles        [ ]Right [ ]Left [ ]Bilateral  [ ]Wheezing     [ ]Right [ ]Left [ ]Bilateral  CARDIOVASCULAR:    [ ]Regular [ ]Irregular [ ]Tachy  [ ]Nirav [ ]Murmur [ ]Other  GASTROINTESTINAL:  [ x]Soft  [ ]Distended   [ ]+BS  [x ]Non tender [ ]Tender  [x ]PEG [ ]OGT/ NGT  Last BM:   GENITOURINARY:  [ ]Normal [x ] Incontinent   [ ]Oliguria/Anuria   [ ]Ureña  MUSCULOSKELETAL:   [ ]Normal   [ ]Weakness  [ x]Bed/Wheelchair bound [ ]Edema  NEUROLOGIC:   [ ]No focal deficits  [ x] Cognitive impairment  [ ] Dysphagia [ ]Dysarthria [ ] Paresis [ ]Other   SKIN:   [ ]Normal   [x ]Pressure ulcer(s) - sacrum stage IV  [ ]Rash      Preadmit Karnofsky:  10%           Current Karnofsky:    10%  Cachexia (Y/N): N  BMI: 29.1kg/m2    Advanced Directives:     Full Code     DECISION MAKER: Patient is unable to make decision for himself.  LEGAL SURROGATE: Abdullahiderrick Enoc [phone]    GOALS OF CARE DISCUSSION       Palliative care info/counseling provided	           Documentation of GOC: 	Full code           REFERRALS	        Unit SW/Case Mgmt

## 2022-03-17 NOTE — PROGRESS NOTE ADULT - PROBLEM SELECTOR PLAN 1
Febrile 100.9, tachycardic 105, WBC 17, lactate 2.6, likely 2/2 gluteal abscess with underlying osteomyelitis vs. probable left lower lobe pneumonia.  - S/p 2.5L NS and Vanc and cefepime in ED  - Blood and urine culture negative; , CRP 30.8  - Surgery consulted for PEG replacement and abscess drainage     To do:   - C/w Vanc and Zosyn  - F/u Vanv trough  - f/u MRI pelvis to r/o osteomyelitis

## 2022-03-17 NOTE — CONSULT NOTE ADULT - PROBLEM SELECTOR RECOMMENDATION 3
Patient has ATN in 12/2021 and is now requiring Dialysis. Currently getting dialysis on T/Th/Sat. Appreciate Nephrology involvement.

## 2022-03-17 NOTE — PROGRESS NOTE ADULT - SUBJECTIVE AND OBJECTIVE BOX
--------------------------------------------------------------------------------  Chief Complaint: ESRD/Ongoing hemodialysis requirement    24 hour events/subjective:  Remains stable, no new labs this AM. For HD today.       PAST HISTORY  --------------------------------------------------------------------------------  No significant changes to PMH, PSH, FHx, SHx, unless otherwise noted    ALLERGIES & MEDICATIONS  --------------------------------------------------------------------------------  Allergies    No Known Allergies    Intolerances      Standing Inpatient Medications  acetylcysteine 20%  Inhalation 4 milliLiter(s) Inhalation every 6 hours  amLODIPine   Tablet 10 milliGRAM(s) Oral daily  artificial tears (preservative free) Ophthalmic Solution 1 Drop(s) Both EYES two times a day  epoetin nannette-epbx (RETACRIT) Injectable 7000 Unit(s) IV Push once  heparin   Injectable 5000 Unit(s) SubCutaneous every 8 hours  levETIRAcetam  IVPB 500 milliGRAM(s) IV Intermittent every 24 hours  piperacillin/tazobactam IVPB.. 4.5 Gram(s) IV Intermittent every 6 hours  senna 2 Tablet(s) Oral at bedtime  vancomycin  IVPB 1000 milliGRAM(s) IV Intermittent every 24 hours    PRN Inpatient Medications      REVIEW OF SYSTEMS  --------------------------------------------------------------------------------  ROS negative except as per HPI    VITALS/PHYSICAL EXAM  --------------------------------------------------------------------------------  T(C): 36.3 (03-17-22 @ 05:37), Max: 38.3 (03-16-22 @ 13:26)  HR: 89 (03-17-22 @ 06:18) (87 - 109)  BP: 145/85 (03-17-22 @ 05:37) (121/83 - 150/81)  RR: 16 (03-17-22 @ 05:37) (14 - 16)  SpO2: 99% (03-17-22 @ 06:18) (98% - 100%)  Wt(kg): --  Drug Dosing Weight  Height (cm): 156 (16 Mar 2022 13:26)  Weight (kg): 70.9 (16 Mar 2022 23:05)  BMI (kg/m2): 29.1 (16 Mar 2022 23:05)  BSA (m2): 1.71 (16 Mar 2022 23:05)  Height (cm): 156 (03-16-22 @ 13:26)  Weight (kg): 70.9 (03-16-22 @ 23:05)  BMI (kg/m2): 29.1 (03-16-22 @ 23:05)  BSA (m2): 1.71 (03-16-22 @ 23:05)      03-16-22 @ 07:01  -  03-17-22 @ 07:00  --------------------------------------------------------  IN: 0 mL / OUT: 178 mL / NET: -178 mL      PHYSICAL EXAM:  GENERAL: AAOx0 w/ trach   HEENT: KWAME, EOMI, neck supple, no JVP  CHEST/LUNG: Bilateral clear breath sounds  HEART: tachyardic no murmurs rubs gallops or clicks   ABDOMEN: Soft, nontender, non distended, peg tube in place  EXTREMITIES: no pedal edema, 1+ edema b/l   ACCESS: R TDC      LABS/STUDIES  --------------------------------------------------------------------------------              9.2    17.29 >-----------<  291      [03-16-22 @ 14:34]              29.8     140  |  97  |  59  ----------------------------<  148      [03-16-22 @ 14:34]  3.5   |  28  |  0.71        Ca     11.0     [03-16-22 @ 14:34]    TPro  8.5  /  Alb  3.7  /  TBili  0.3  /  DBili  x   /  AST  32  /  ALT  39  /  AlkPhos  122  [03-16-22 @ 14:34]    PT/INR: PT 13.3 , INR 1.12       [03-16-22 @ 14:34]  PTT: 24.8       [03-16-22 @ 14:34]      Iron 46, TIBC 159, %sat 29      [11-13-21 @ 22:16]  Ferritin 778      [11-13-21 @ 22:16]  PTH -- (Ca 8.8)      [11-29-21 @ 10:47]   77  Vitamin D (25OH) 16.9      [11-29-21 @ 10:47]  TSH 0.227      [10-27-21 @ 05:22]  Lipid: chol --, , HDL --, LDL --      [11-21-21 @ 04:47]        RADIOLOGY:  --------------------------------------------------------------------------------------

## 2022-03-17 NOTE — DIETITIAN INITIAL EVALUATION ADULT. - ADD RECOMMEND
1. When able to use PEG, recommend start TF above. Monitor tolerance closely 2. Monitor lytes and replete prn 3. Trend pre/post HD wts 4. MVI, zinc, vit C for wound healing 5. Align nutrition with GOC at all times

## 2022-03-17 NOTE — H&P ADULT - PROBLEM SELECTOR PLAN 9
lower/lower quadrant/upper quadrant 2/2 ruptured left middle cerebral artery aneurysm resulting in functional quadriplegia  - Palliative consult for goals of care discussion  - Social work consult to coordinate placement in new NH as requested by family CT A/P showing probable underlying OM.  - F/u general surgery  - Consider MRI  - C/w antibiotics

## 2022-03-17 NOTE — DIETITIAN INITIAL EVALUATION ADULT. - OTHER CALCULATIONS
IBW used for calculations as pt >120% of IBW (148%), adjusted for trach, wound healing, fluids per team d/t HD

## 2022-03-17 NOTE — H&P ADULT - PROBLEM SELECTOR PLAN 6
2/2 ruptured left middle cerebral artery aneurysm resulting in functional quadriplegia  - Palliative consult for goals of care discussion  - Social work consult to coordinate placement in new NH as requested by family 2/2 ATN in December 2021. Anuric.  - Renal consulted  - C/w HD on T/Th/Sa Hgb 9 on admission. Baseline Hgb ~8.   - Likely 2/2 AOCD from ESRD  - F/u iron studies

## 2022-03-17 NOTE — DIETITIAN INITIAL EVALUATION ADULT. - RD TO REMAIN AVAILABLE
SUBJECTIVE:   CC: Tosin Pfeiffer is an 37 year old woman who presents for preventive health visit.     Healthy Habits:     Getting at least 3 servings of Calcium per day:  Yes    Bi-annual eye exam:  Yes    Dental care twice a year:  Yes    Sleep apnea or symptoms of sleep apnea:  None    Diet:  Regular (no restrictions)    Frequency of exercise:  4-5 days/week    Duration of exercise:  45-60 minutes    Taking medications regularly:  Yes    Medication side effects:  Not applicable    PHQ-2 Total Score: 0    Additional concerns today:  Yes          Skin check. Hump upper neck area    Today's PHQ-2 Score:   PHQ-2 ( 1999 [employer]) 8/20/2020   Q1: Little interest or pleasure in doing things 0   Q2: Feeling down, depressed or hopeless 0   PHQ-2 Score 0   Q1: Little interest or pleasure in doing things Not at all   Q2: Feeling down, depressed or hopeless Not at all   PHQ-2 Score 0       Abuse: Current or Past(Physical, Sexual or Emotional)- No  Do you feel safe in your environment? Yes    No future appointments.  Appointment Notes for this encounter:   physical , patient has no symtoms    Mask, shield and gloves worn during rooming.    Caleb [facility]          Health Maintenance Due   Topic Date Due     ANNUAL REVIEW OF HM ORDERS  1983     PREVENTIVE CARE VISIT  05/09/2018     ASTHMA ACTION PLAN  05/09/2018     ASTHMA CONTROL TEST  05/17/2019     Health Maintenance addressed:  ACT        My[employer] Status:  Active and Using        Social History     Tobacco Use     Smoking status: Former Smoker     Packs/day: 1.00     Types: Cigarettes     Start date: 2000     Last attempt to quit: 2010     Years since quitting: 10.6     Smokeless tobacco: Never Used   Substance Use Topics     Alcohol use: No         Alcohol Use 8/20/2020   Prescreen: >3 drinks/day or >7 drinks/week? No   Prescreen: >3 drinks/day or >7 drinks/week? -       Reviewed orders with patient.  Reviewed health maintenance and updated orders accordingly -  "Yes  BP Readings from Last 3 Encounters:   08/21/20 118/84   05/06/20 134/82   04/28/20 128/78    Wt Readings from Last 3 Encounters:   08/21/20 96.2 kg (212 lb)   04/28/20 111.8 kg (246 lb 6.4 oz)   03/24/20 107.8 kg (237 lb 9.6 oz)                  Current Outpatient Medications   Medication Sig Dispense Refill     Prenatal Vit-Fe Fumarate-FA (PRENATAL MULTIVITAMIN PLUS IRON) 27-0.8 MG TABS per tablet Take by mouth daily       acetaminophen (TYLENOL) 325 MG tablet Take 325-650 mg by mouth every 6 hours as needed for mild pain       blood glucose (NO BRAND SPECIFIED) test strip Use to test blood sugar 4 times daily or as directed. accu chek brand 120 strip 3     blood glucose (ONETOUCH VERIO IQ) test strip Verio- Use to test blood sugar 4 times daily or as directed. 120 strip 11     blood glucose monitoring (ACCU-CHEK COMPACT CARE KIT) meter device kit Use to test blood sugar 4 times daily or as directed. 1 kit 0     blood glucose monitoring (ACCU-CHEK MULTICLIX) lancets Use to test blood sugar 4 times daily or as directed. 120 each 3     Docosahexaenoic Acid (DHA) 200 MG CAPS        insulin pen needle (BD CHRIS U/F) 32G X 4 MM miscellaneous Use 3 pen needles daily or as directed. 100 each 3     insulin syringe-needle U-100 (BD VEO INSULIN SYRINGE U/F) 31G X 15/64\" 0.5 ML Use 1 syringe daily or as directed. 100 each 1     omeprazole (PRILOSEC) 40 MG DR capsule Take 1 capsule (40 mg) by mouth daily 90 capsule 3     Urine Glucose-Ketones Test STRP Daily as needed 50 each 1       Mammogram not appropriate for this patient based on age.    Pertinent mammograms are reviewed under the imaging tab.  History of abnormal Pap smear: NO - age 30-65 PAP every 5 years with negative HPV co-testing recommended  PAP / HPV Latest Ref Rng & Units 5/9/2017 2/15/2013   PAP - NIL NIL   HPV 16 DNA NEG Negative -   HPV 18 DNA NEG Negative -   OTHER HR HPV NEG Negative -     Reviewed and updated as needed this visit by clinical staff     "     Reviewed and updated as needed this visit by Provider            Review of Systems   Constitutional: Negative for chills and fever.   HENT: Negative for congestion, ear pain, hearing loss and sore throat.    Eyes: Negative for pain and visual disturbance.   Respiratory: Negative for cough and shortness of breath.    Cardiovascular: Negative for chest pain, palpitations and peripheral edema.   Gastrointestinal: Negative for abdominal pain, constipation, diarrhea, heartburn, hematochezia and nausea.   Breasts:  Negative for tenderness, breast mass and discharge.   Genitourinary: Negative for dysuria, frequency, genital sores, hematuria, pelvic pain, urgency, vaginal bleeding and vaginal discharge.   Musculoskeletal: Negative for arthralgias, joint swelling and myalgias.   Skin: Negative for rash.   Neurological: Negative for dizziness, weakness, headaches and paresthesias.   Psychiatric/Behavioral: Negative for mood changes. The patient is not nervous/anxious.        OBJECTIVE:   There were no vitals taken for this visit.  Physical Exam  GENERAL: healthy, alert and no distress  EYES: Eyes grossly normal to inspection, PERRL and conjunctivae and sclerae normal  HENT: ear canals and TM's normal, nose and mouth without ulcers or lesions  NECK: no adenopathy, no asymmetry, masses, or scars and thyroid normal to palpation  RESP: lungs clear to auscultation - no rales, rhonchi or wheezes  BREAST: normal without masses, tenderness or nipple discharge and no palpable axillary masses or adenopathy  CV: regular rate and rhythm, normal S1 S2, no S3 or S4, no murmur, click or rub, no peripheral edema and peripheral pulses strong  ABDOMEN: soft, nontender, no hepatosplenomegaly, no masses and bowel sounds normal  MS: no gross musculoskeletal defects noted, no edema  SKIN: no suspicious lesions or rashes  NEURO: Normal strength and tone, mentation intact and speech normal  PSYCH: mentation appears normal, affect  "normal/bright    Diagnostic Test Results:  Labs reviewed in Epic    ASSESSMENT/PLAN:   1. Screening for blood disease    - CBC with platelets    2. Screening for thyroid disorder    - TSH with free T4 reflex    3. Screening, lipid    - Lipid panel reflex to direct LDL Fasting    4. Screening for diabetes mellitus    - Comprehensive metabolic panel    5. Morbid obesity (H)      6. Skin cancer screening    - DERMATOLOGY ADULT REFERRAL; Future    COUNSELING:  Reviewed preventive health counseling, as reflected in patient instructions       Regular exercise       Healthy diet/nutrition       Vision screening       Hearing screening    Estimated body mass index is 44.35 kg/m  as calculated from the following:    Height as of 4/28/20: 1.588 m (5' 2.5\").    Weight as of 4/28/20: 111.8 kg (246 lb 6.4 oz).    Weight management plan: Discussed healthy diet and exercise guidelines     reports that she quit smoking about 10 years ago. Her smoking use included cigarettes. She started smoking about 20 years ago. She smoked 1.00 pack per day. She has never used smokeless tobacco.      Counseling Resources:  ATP IV Guidelines  Pooled Cohorts Equation Calculator  Breast Cancer Risk Calculator  FRAX Risk Assessment  ICSI Preventive Guidelines  Dietary Guidelines for Americans, 2010  USDA's MyPlate  ASA Prophylaxis  Lung CA Screening    Ramona Ann Aaseby-Aguilera, PA-C  [facility]  " High Risk/yes

## 2022-03-17 NOTE — DIETITIAN INITIAL EVALUATION ADULT. - PROBLEM SELECTOR PLAN 9
2/2 ruptured left middle cerebral artery aneurysm resulting in functional quadriplegia  - Palliative consult for goals of care discussion  - Social work consult to coordinate placement in new NH as requested by family

## 2022-03-17 NOTE — H&P ADULT - PROBLEM SELECTOR PLAN 1
Febrile 100.9, tachycardic 105, WBC 17, lactate 2.6, likely 2/2 gluteal abscess with underlying osteomyelitis vs. probable left lower lobe pneumonia.  - S/p 2.5L NS in ED  - S/p Vanc and cefepime in ED  - C/w Vanc and Zosyn  - F/u BCx and UCx Febrile 100.9, tachycardic 105, WBC 17, lactate 2.6, likely 2/2 gluteal abscess with underlying osteomyelitis vs. probable left lower lobe pneumonia.  - S/p 2.5L NS in ED  - S/p Vanc and cefepime in ED  - C/w Vanc and Zosyn  - F/u BCx and UCx  - IR consult for PEG tube replacement Febrile 100.9, tachycardic 105, WBC 17, lactate 2.6, likely 2/2 gluteal abscess with underlying osteomyelitis vs. probable left lower lobe pneumonia.  - S/p 2.5L NS in ED  - S/p Vanc and cefepime in ED  - C/w Vanc and Zosyn  - F/u BCx and UCx  - F/u ESR/CRP

## 2022-03-17 NOTE — H&P ADULT - PROBLEM SELECTOR PLAN 4
H/o sacral decubitus ulcer s/p debridement by plastic surgery in the past. Currently with intragluteal abscess with likely underlying osteomyelitis.  - Surgery consult, osteomyelitis plan as above  - C/w Vanc/Zosyn  - C/w dressing changes per wound care CT A/P:  Probable developing left lower lobe pneumonia, less likely pulmonary edema. No hypoxia or increased work of breathing. No crackles on lung auscultation.  - Management of sepsis as above 2/2 ATN in December 2021. Anuric.  - Renal consulted  - C/w HD on T/Th/Sa

## 2022-03-17 NOTE — H&P ADULT - NSHPLABSRESULTS_GEN_ALL_CORE
.  LABS:                         9.2    17.29 )-----------( 291      ( 16 Mar 2022 14:34 )             29.8         140  |  97  |  59<H>  ----------------------------<  148<H>  3.5   |  28  |  0.71    Ca    11.0<H>      16 Mar 2022 14:34    TPro  8.5<H>  /  Alb  3.7  /  TBili  0.3  /  DBili  x   /  AST  32  /  ALT  39  /  AlkPhos  122<H>      PT/INR - ( 16 Mar 2022 14:34 )   PT: 13.3 sec;   INR: 1.12          PTT - ( 16 Mar 2022 14:34 )  PTT:24.8 sec  Urinalysis Basic - ( 16 Mar 2022 15:58 )    Color: Yellow / Appearance: Clear / S.010 / pH: x  Gluc: x / Ketone: NEGATIVE  / Bili: Negative / Urobili: 0.2 E.U./dL   Blood: x / Protein: Trace mg/dL / Nitrite: NEGATIVE   Leuk Esterase: Small / RBC: < 5 /HPF / WBC 5-10 /HPF   Sq Epi: x / Non Sq Epi: 0-5 /HPF / Bacteria: Present /HPF            Lactate, Blood: 2.0 mmol/L ( @ 19:48)      RADIOLOGY, EKG & ADDITIONAL TESTS: Reviewed.     < from: CT Abdomen and Pelvis w/ IV Cont (22 @ 18:30) >    Impression:  1.  Intramuscular abscess medial right gluteus valente (series 3 image   124) adjacent to large sacral decubitus ulcer with exposed bone probably   underlying osteomyelitis, few tiny bony fragments in region of previously   seen coccyx, eroded or surgically removed.  2.  Probable developing left lower lobe pneumonia, less likely pulmonary   edema.  3.  Central venous catheter tip at junction of suprahepatic IVC and left   atrium. Consider retraction.  4.  Small bowel intussusception in left mid abdomen, likely transient,   doubtful clinically significance.    < end of copied text >

## 2022-03-17 NOTE — PROGRESS NOTE ADULT - ASSESSMENT
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 left lower lobe pneumonia.

## 2022-03-17 NOTE — ADVANCED PRACTICE NURSE CONSULT - RECOMMEDATIONS
Recommend Aquacel extra to be packed into sacral and right gluteal wounds, change every other day, Triad to right and left elbows, right ear every other day. Spoke with STANISLAV Casanova and house staff.

## 2022-03-17 NOTE — DIETITIAN INITIAL EVALUATION ADULT. - PROBLEM SELECTOR PLAN 4
CT A/P:  Probable developing left lower lobe pneumonia, less likely pulmonary edema. No hypoxia or increased work of breathing. No crackles on lung auscultation.  - Management of sepsis as above

## 2022-03-17 NOTE — PROGRESS NOTE ADULT - ASSESSMENT
47F  with pmhx of ESRD (TTHS) 2/2 anuric ATN (), ICH, trach ,peg who presents to the hospital from nursing home w/ concern for decubitus ulcer evaluation.    Assessment/Plan:   #ESRD on HD TTHS @NH  usual Rx 3h   PT was started on HD 2/2 anuric ATN back in 2021. Has been maintained on HD at the NH on TTS schedule. Unclear if she has had any kidney recovery. Unclear of urine output per discussion with family. Will monitor while inpatient.   -Hd today as per schedule  -Can draw labs at HD  -UF w/ Hd  -Unclear EDW    #access   [facility] TDC     #anemia  Hb within goal  obtain iron studies including ferritin, transferrin, iron, and TIBC to be able to calculate % saturation     #renal bone disease   Calcium found to be 11.0  Will check PTH To see if parathyroid etiology, otherwise may require secondary work up for hypercalcemia evaluation  -Dialysis on low calcium bath  -Trend phos daily  -Check PTH      Thank you for the opportunity to participate in the care of your patient. The nephrology service remains available to assist with any questions or concerns. Please feel free to reach us by paging the on-call nephrology fellow for urgent issues or as below.     Aj Cardoso D.O.  PGY-4, Nephrology Fellow    P: [phone]       Hemoglobin: 9.2 g/dL (22 @ 14:34)    Albumin, Serum: 3.7 g/dL (22 @ 14:34)    epoetin nannette-epbx (RETACRIT) Injectable 7000 Unit(s) IV Push once, 22 @ 08:32, Routine, Stop order after: 1 Doses      Hemodialysis Treatment.:     Schedule: Once, Modality: Hemodialysis, Access: Internal Jugular Central Venous Catheter    Dialyzer: Optiflux [id number], Time: 180 Min    Blood Flow: 400 mL/Min , Dialysate Flow: 500 mL/Min, Dialysate Temp: 36.5, Tubinmm (Adult)    Target Dry Weight: 69 kG    Dialysate Electrolytes (mEq/L): Potassium 3, Calcium 2.5, Sodium 138, Bicarbonate 35 (22 @ 08:32) [Active]   47F  with pmhx of ESRD (TTHS) 2/2 anuric ATN (), ICH, trach ,peg who presents to the hospital from nursing home w/ concern for decubitus ulcer evaluation.    Assessment/Plan:   #ESRD on HD TT @NH  usual Rx 3h   PT was started on HD 2/2 anuric ATN back in 2021. Has been maintained on HD at the NH on TTS schedule. Unclear if she has had any kidney recovery. Unclear of urine output per discussion with family. Will monitor while inpatient.   -Hd today as per schedule  -Can draw labs at HD  -UF w/ Hd  -Unclear EDW    #access   [facility] TDC     #anemia  Hb within goal  obtain iron studies including ferritin, transferrin, iron, and TIBC to be able to calculate % saturation     #renal bone disease   Calcium found to be 11.0  Will check PTH To see if parathyroid etiology, otherwise may require secondary work up for hypercalcemia evaluation  -Check Ionized calcium  -Pending iCal will determine if needs low calcium bath going forward  -Trend phos daily  -Check PTH      Thank you for the opportunity to participate in the care of your patient. The nephrology service remains available to assist with any questions or concerns. Please feel free to reach us by paging the on-call nephrology fellow for urgent issues or as below.     Aj Cardoso D.O.  PGY-4, Nephrology Fellow    P: [phone]       Hemoglobin: 9.2 g/dL (22 @ 14:34)    Albumin, Serum: 3.7 g/dL (22 @ 14:34)    epoetin nannette-epbx (RETACRIT) Injectable 7000 Unit(s) IV Push once, 22 @ 08:32, Routine, Stop order after: 1 Doses      Hemodialysis Treatment.:     Schedule: Once, Modality: Hemodialysis, Access: Internal Jugular Central Venous Catheter    Dialyzer: Optiflux [id number], Time: 180 Min    Blood Flow: 400 mL/Min , Dialysate Flow: 500 mL/Min, Dialysate Temp: 36.5, Tubinmm (Adult)    Target Dry Weight: 69 kG    Dialysate Electrolytes (mEq/L): Potassium 3, Calcium 2.5, Sodium 138, Bicarbonate 35 (22 @ 08:32) [Active]    Seen and evaluated on hemodialysis, tolerating prescription as outlined as above   47F  with pmhx of ESRD (TTHS) 2/2 anuric ATN (), ICH, trach ,peg who presents to the hospital from nursing home w/ concern for decubitus ulcer evaluation.    Assessment/Plan:   #ESRD on HD TTHS @NH  usual Rx 3h   PT was started on HD 2/2 anuric ATN back in 2021. Has been maintained on HD at the NH on TTS schedule. Unclear if she has had any kidney recovery. Unclear of urine output per discussion with family. Will monitor while inpatient.   -Hd today as per schedule  -Can draw labs at HD  -UF w/ Hd  -Unclear EDW    #access   [facility] TDC     #anemia  Hb within goal  -Epo w/ HD 7000 units  obtain iron studies including ferritin, transferrin, iron, and TIBC to be able to calculate % saturation     #renal bone disease   Calcium found to be 11.0  Will check PTH To see if parathyroid etiology, otherwise may require secondary work up for hypercalcemia evaluation  -Check Ionized calcium  -Pending iCal will determine if needs low calcium bath going forward  -Trend phos daily  -Check PTH      Thank you for the opportunity to participate in the care of your patient. The nephrology service remains available to assist with any questions or concerns. Please feel free to reach us by paging the on-call nephrology fellow for urgent issues or as below.     Aj Cardoso D.O.  PGY-4, Nephrology Fellow    P: [phone]       Hemoglobin: 9.2 g/dL (22 @ 14:34)    Albumin, Serum: 3.7 g/dL (22 @ 14:34)    epoetin nannette-epbx (RETACRIT) Injectable 7000 Unit(s) IV Push once, 22 @ 08:32, Routine, Stop order after: 1 Doses      Hemodialysis Treatment.:     Schedule: Once, Modality: Hemodialysis, Access: Internal Jugular Central Venous Catheter    Dialyzer: Optiflux [id number], Time: 180 Min    Blood Flow: 400 mL/Min , Dialysate Flow: 500 mL/Min, Dialysate Temp: 36.5, Tubinmm (Adult)    Target Dry Weight: 69 kG    Dialysate Electrolytes (mEq/L): Potassium 3, Calcium 2.5, Sodium 138, Bicarbonate 35 (22 @ 08:32) [Active]    Seen and evaluated on hemodialysis, tolerating prescription as outlined as above   47 F with pmhx of ESRD (TTHS) 2/2 anuric ATN (), ICH, trach ,peg who presents to the hospital from nursing home w/ concern for decubitus ulcer evaluation.    Assessment/Plan:   #ESRD on HD TTHS @NH  usual Rx 3h   PT was started on HD 2/2 anuric ATN back in 2021. Has been maintained on HD at the NH on TTS schedule. Unclear if she has had any kidney recovery. Unclear of urine output per discussion with family. Will monitor while inpatient.   -Hd today as per schedule  -Can draw labs at HD  -UF w/ Hd  -Unclear EDW    #access   [facility] TDC     #anemia  Hb within goal  -Epo w/ HD 7000 units  obtain iron studies including ferritin, transferrin, iron, and TIBC to be able to calculate % saturation     #renal bone disease   Calcium found to be 11.0  Will check PTH To see if parathyroid etiology, otherwise may require secondary work up for hypercalcemia evaluation  -Check Ionized calcium  -Pending iCal will determine if needs low calcium bath going forward  -Trend phos daily  -Check PTH      Thank you for the opportunity to participate in the care of your patient. The nephrology service remains available to assist with any questions or concerns. Please feel free to reach us by paging the on-call nephrology fellow for urgent issues or as below.     Aj Cardoso D.O.  PGY-4, Nephrology Fellow    P: [phone]       Hemoglobin: 9.2 g/dL (22 @ 14:34)    Albumin, Serum: 3.7 g/dL (22 @ 14:34)    epoetin nannette-epbx (RETACRIT) Injectable 7000 Unit(s) IV Push once, 22 @ 08:32, Routine, Stop order after: 1 Doses      Hemodialysis Treatment.:     Schedule: Once, Modality: Hemodialysis, Access: Internal Jugular Central Venous Catheter    Dialyzer: Optiflux [id number], Time: 180 Min    Blood Flow: 400 mL/Min , Dialysate Flow: 500 mL/Min, Dialysate Temp: 36.5, Tubinmm (Adult)    Target Dry Weight: 69 kG    Dialysate Electrolytes (mEq/L): Potassium 3, Calcium 2.5, Sodium 138, Bicarbonate 35 (22 @ 08:32) [Active]    Seen and evaluated on hemodialysis, tolerating prescription as outlined as above

## 2022-03-17 NOTE — H&P ADULT - HISTORY OF PRESENT ILLNESS
In the ED:  Initial vital signs: T: 97.7 F, HR: 87, BP: 146/95, R: 14, SpO2: 100% on ventilator  Labs: significant for WBC 17.3, Hgb 9.2, plts 291, lactate 2.6, BUN 59, Cr 0.71  Imaging:  CTAP: Intramuscular abscess medial right gluteus valente adjacent to large sacral decubitus ulcer with exposed bone probably underlying osteomyelitis, few tiny bony fragments in region of previously seen coccyx, eroded or surgically removed. Probable developing left lower lobe pneumonia, less likely pulmonary edema. Central venous catheter tip at junction of suprahepatic IVC and left atrium. Consider retraction. Small bowel intussusception in left mid abdomen, likely transient, doubtful clinically significance.  CXR: No acute infiltrates, No pneumonia or CHF  EKG: Sinus tach  Medications: Vanc 1g, Cefepime 1g, NS 2.5L, Ketorolac 15 IV, Tylenol 650  Consults: none  46F w/ PMHx of HTN, MS, functional quadriplegic 2/2 h/o ruptured left middle cerebral artery aneurysm with intraparenchymal hemorrhage, SAH, and left subdural hematoma with midline shift and herniation December 2021 s/p hemicraniotomy, and prolonged hospital course complicated by b/l PNTX, PNA, multiorgan failure, ESRD on HD (T/Th/S) 2/2 anuric ATN, and Sacral decubitus ulcer s/p debridement by plastics, sent in from NH for evaluation of decubitus ulcer and PEG tube. Patient nonverbal at baseline no family at bedside. ED provider spoke with HCP over the phone who report that family is concerned that the NH is not managing her wounds appropriately and that patient has had PEG since November and has not had it replaced; does not know when it was last used or when feeding tube adapter lost/missing; no other concerns or complaints.     In the ED:  Initial vital signs: T: 100.9 F rectal, HR: 105, BP: 129/95, R: 14, SpO2: 100% on ventilator  Labs: significant for WBC 17.3, Hgb 9.2, plts 291, lactate 2.6, BUN 59, Cr 0.71  Imaging:  CT A/P w/ IV contrast: Intramuscular abscess medial right gluteus valente adjacent to large sacral decubitus ulcer with exposed bone probably underlying osteomyelitis, few tiny bony fragments in region of previously seen coccyx, eroded or surgically removed. Probable developing left lower lobe pneumonia, less likely pulmonary edema. Central venous catheter tip at junction of suprahepatic IVC and left atrium. Consider retraction. Small bowel intussusception in left mid abdomen, likely transient, doubtful clinically significance.  CXR: No acute infiltrates, No pneumonia or CHF  EKG: Sinus tach  Medications: Vanc 1g, Cefepime 1g, NS 2.5L, Ketorolac 15 IV, Tylenol 650  Consults: none  46F w/ PMHx of HTN, MS, functional quadriplegic 2/2 h/o ruptured left middle cerebral artery aneurysm with intraparenchymal hemorrhage, SAH, and left subdural hematoma with midline shift and herniation December 2021 s/p hemicraniotomy, and prolonged hospital course complicated by b/l PNTX, PNA, multiorgan failure, ESRD on HD (T/Th/S) 2/2 anuric ATN, and Sacral decubitus ulcer s/p debridement by plastics, sent in from NH for evaluation of decubitus ulcer and PEG tube. Patient nonverbal at baseline no family at bedside. ED provider spoke with HCP over the phone who report that family is concerned that the NH is not managing her wounds appropriately and that patient has had PEG since November and has not had it replaced; does not know when it was last used or when feeding tube adapter lost/missing; no other concerns or complaints.     In the ED:  Initial vital signs: T: 100.9 F rectal, HR: 105, BP: 129/95, R: 14, SpO2: 100% on ventilator  Labs: significant for WBC 17.3, Hgb 9.2, plts 291, lactate 2.6, BUN 59, Cr 0.71  Imaging:  CT A/P w/ IV contrast: Intramuscular abscess medial right gluteus valente adjacent to large sacral decubitus ulcer with exposed bone probably underlying osteomyelitis, few tiny bony fragments in region of previously seen coccyx, eroded or surgically removed. Probable developing left lower lobe pneumonia, less likely pulmonary edema. Central venous catheter tip at junction of suprahepatic IVC and left atrium. Consider retraction. Small bowel intussusception in left mid abdomen, likely transient, doubtful clinically significance.  CXR: No acute infiltrates, No pneumonia or CHF  EKG: Sinus tach  Medications: Vanc 1g, Cefepime 1g, NS 2.5L, Ketorolac 15 IV, Tylenol 650  Consults: Gen surg, Renal 46F w/ PMHx of HTN, MS, functional quadriplegic 2/2 h/o ruptured left middle cerebral artery aneurysm with intraparenchymal hemorrhage, SAH, and left subdural hematoma with midline shift and herniation December 2021 s/p hemicraniotomy and s/p trach/PEG, with prolonged hospital course complicated by b/l PNTX, PNA, multiorgan failure, ESRD on HD (T/Th/S) 2/2 anuric ATN, and Sacral decubitus ulcer s/p debridement by plastics, sent in from NH for evaluation of decubitus ulcer and PEG tube. Patient nonverbal at baseline no family at bedside. ED provider spoke with HCP over the phone who report that family is concerned that the NH is not managing her wounds appropriately and that patient has had PEG since November and has not had it replaced; does not know when it was last used or when feeding tube adapter lost/missing; no other concerns or complaints.     In the ED:  Initial vital signs: T: 100.9 F rectal, HR: 105, BP: 129/95, R: 14, SpO2: 100% on ventilator  Labs: significant for WBC 17.3, Hgb 9.2, lactate 2.6, BUN 59, Cr 0.71, UA negative nitrites, small LEs, 5-10 WBC, Bacteria present  Imaging:  CT A/P w/ IV contrast: Intramuscular abscess medial right gluteus valente adjacent to large sacral decubitus ulcer with exposed bone probably underlying osteomyelitis, few tiny bony fragments in region of previously seen coccyx, eroded or surgically removed. Probable developing left lower lobe pneumonia, less likely pulmonary edema. Central venous catheter tip at junction of suprahepatic IVC and left atrium. Consider retraction. Small bowel intussusception in left mid abdomen, likely transient, doubtful clinically significance.  CXR: No pneumonia or CHF  EKG: Sinus tach  Medications: Vanc 1g, Cefepime 1g, NS 2.5L, Ketorolac 15 IV, Tylenol 650  Consults: Gen surg, Renal

## 2022-03-17 NOTE — DIETITIAN INITIAL EVALUATION ADULT. - OTHER INFO
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 left lower lobe pneumonia.    Pt seen in room, resting in bed. Pt nonverbal, no family at bedside. Pt had PEG placed 11/9/2021, discharged on Nepro via PEG @ 30ml/hr x24hrs +1 liquid protein supplement (provides 720ml TV, 1396kcal, 73gpro, 523ml free water). EMR wts fluctuating 187-208lbs during previous admission d/t fluids shifts on HD. Admit wt 156lbs reflects 31lb/16% wt loss. Of note, pt with 2+edema during previous admission, weight loss likely d/t fluid shifts (edema, HD). Continue to trend pre/post HD wts. Per team, plan to replace PEG today then restart TF. Pt with multiple pressure injuries: stage IV to sacrum, right ear, stage III to right buttocks, stage II to bilateral heel. Last BM 3/16. Nonverbal pain indicators absent. Please see full nutrition recs below. Will continue to follow per RD protocol.  supplement 46F w/ PMHx of HTN, MS, functional quadriplegic 2/2 h/o ruptured left middle cerebral artery aneurysm with intraparenchymal hemorrhage, SAH, and left subdural hematoma with midline shift and herniation December 2021 s/p hemicraniotomy and s/p trach/PEG, with prolonged hospital course complicated by b/l PNTX, PNA, multiorgan failure, ESRD on HD (T/Th/S) 2/2 anuric ATN, and Sacral decubitus ulcer s/p debridement by plastics, sent in from NH for evaluation of decubitus ulcer and PEG tube, found to have severe sepsis likely 2/2 gluteal abscess with underlying osteomyelitis vs. left lower lobe pneumonia.    Pt seen in room, resting in bed. Pt nonverbal, no family at bedside. Pt had PEG placed 11/9/2021, discharged on Nepro via PEG @ 30ml/hr x24hrs +1 liquid protein supplement (provides 720ml TV, 1396kcal, 73gpro, 523ml free water). EMR wts fluctuating 187-208lbs during previous admission d/t fluids shifts on HD. Admit wt 156lbs reflects 31lb/16% wt loss. Of note, pt with 2+edema during previous admission, weight loss likely d/t fluid shifts (edema, HD). Continue to trend pre/post HD wts. Per team, plan to replace PEG today then restart TF. Pt with multiple pressure injuries: stage IV to sacrum, right ear, stage III to right buttocks, stage II to bilateral heel. Last BM 3/16. Nonverbal pain indicators absent. Please see full nutrition recs below. Will continue to follow per RD protocol.

## 2022-03-17 NOTE — H&P ADULT - NSHPPHYSICALEXAM_GEN_ALL_CORE
.  VITAL SIGNS:  T(C): 36.5 (03-16-22 @ 23:05), Max: 38.3 (03-16-22 @ 13:26)  T(F): 97.7 (03-16-22 @ 23:05), Max: 100.9 (03-16-22 @ 13:26)  HR: 87 (03-16-22 @ 23:05) (87 - 109)  BP: 146/95 (03-16-22 @ 23:05) (121/83 - 150/81)  BP(mean): 112 (03-16-22 @ 20:45) (112 - 112)  RR: 14 (03-16-22 @ 23:05) (14 - 14)  SpO2: 100% (03-16-22 @ 23:05) (98% - 100%)  Wt(kg): --    PHYSICAL EXAM:    Constitutional: WDWN resting comfortably in bed; NAD  Head: NC/AT  Eyes: PERRL, EOMI, anicteric sclera  ENT: no nasal discharge; uvula midline, no oropharyngeal erythema or exudates; MMM  Neck: supple; no JVD or thyromegaly  Respiratory: CTA B/L; no W/R/R, no retractions  Cardiac: +S1/S2; RRR; no M/R/G; PMI non-displaced  Gastrointestinal: abdomen soft, NT/ND; no rebound or guarding; +BSx4  Back: spine midline, no bony tenderness or step-offs; no CVAT B/L  Extremities: WWP, no clubbing or cyanosis; no peripheral edema  Musculoskeletal: NROM x4; no joint swelling, tenderness or erythema  Vascular: 2+ radial, femoral, DP/PT pulses B/L  Dermatologic: skin warm, dry and intact; no rashes, wounds, or scars  Lymphatic: no submandibular or cervical LAD  Neurologic: AAOx3; CNII-XII grossly intact; no focal deficits  Psychiatric: affect and characteristics of appearance, verbalizations, behaviors are appropriate .  VITAL SIGNS:  T(C): 36.5 (03-16-22 @ 23:05), Max: 38.3 (03-16-22 @ 13:26)  T(F): 97.7 (03-16-22 @ 23:05), Max: 100.9 (03-16-22 @ 13:26)  HR: 87 (03-16-22 @ 23:05) (87 - 109)  BP: 146/95 (03-16-22 @ 23:05) (121/83 - 150/81)  BP(mean): 112 (03-16-22 @ 20:45) (112 - 112)  RR: 14 (03-16-22 @ 23:05) (14 - 14)  SpO2: 100% (03-16-22 @ 23:05) (98% - 100%)  Wt(kg): --    PHYSICAL EXAM:    Constitutional: NAD  Head: NC/AT  Eyes: PERRL, EOMI, anicteric sclera  ENT: trach in place  Neck: supple; no JVD or thyromegaly  Respiratory: CTA B/L; no W/R/R, no retractions  Cardiac: +S1/S2; RRR; no M/R/G; PMI non-displaced  Gastrointestinal: abdomen soft, NT/ND; no rebound or guarding; +BSx4; PEG tube clean and dry  Extremities: WWP, no clubbing or cyanosis; no peripheral edema  Vascular: 2+ radial, femoral, DP/PT pulses B/L  Dermatologic: skin warm, dry and intact; no rashes, wounds, or scars; stage 4 sacral decubitus ulcer  Lymphatic: no submandibular or cervical LAD  Neurologic: AAOx0; grimaces to painful stimuli, does not track with eyes; no blink reflex  Psychiatric: affect and characteristics of appearance, verbalizations, behaviors are appropriate

## 2022-03-17 NOTE — CHART NOTE - NSCHARTNOTEFT_GEN_A_CORE
PALLIATIVE MEDICINE COORDINATION OF CARE NOTE FOR AILYN DÍAZ  [  ] ED Trigger   [  ] MICU Trigger     [ X ] Consult    [  ] AI Comanagement    Patient last seen by palliative on 11/15/2021    ___30___ Minutes; Start: __0700am___  End: _00730am___, of non-face-to-face prolonged service provided that relates to (face-to-face) care that has or will occur and ongoing patient management, including one or more of the following:   - Reviewed records from other physicians or other health care professional services, including one or more of the following: other medical records and diagnostic / radiology study results     HPI:  46F w/ PMHx of HTN, MS, functional quadriplegic 2/2 h/o ruptured left middle cerebral artery aneurysm with intraparenchymal hemorrhage, SAH, and left subdural hematoma with midline shift and herniation December 2021 s/p hemicraniotomy and s/p trach/PEG, with prolonged hospital course complicated by b/l PNTX, PNA, multiorgan failure, ESRD on HD (T/Th/S) 2/2 anuric ATN, and Sacral decubitus ulcer s/p debridement by plastics, sent in from NH for evaluation of decubitus ulcer and PEG tube. Patient nonverbal at baseline no family at bedside. ED provider spoke with HCP over the phone who report that family is concerned that the NH is not managing her wounds appropriately and that patient has had PEG since November and has not had it replaced; does not know when it was last used or when feeding tube adapter lost/missing; no other concerns or complaints.     In the ED:  Initial vital signs: T: 100.9 F rectal, HR: 105, BP: 129/95, R: 14, SpO2: 100% on ventilator  Labs: significant for WBC 17.3, Hgb 9.2, lactate 2.6, BUN 59, Cr 0.71, UA negative nitrites, small LEs, 5-10 WBC, Bacteria present  Imaging:  CT A/P w/ IV contrast: Intramuscular abscess medial right gluteus valente adjacent to large sacral decubitus ulcer with exposed bone probably underlying osteomyelitis, few tiny bony fragments in region of previously seen coccyx, eroded or surgically removed. Probable developing left lower lobe pneumonia, less likely pulmonary edema. Central venous catheter tip at junction of suprahepatic IVC and left atrium. Consider retraction. Small bowel intussusception in left mid abdomen, likely transient, doubtful clinically significance.  CXR: No pneumonia or CHF  EKG: Sinus tach  Medications: Vanc 1g, Cefepime 1g, NS 2.5L, Ketorolac 15 IV, Tylenol 650  Consults: Gen surg, Renal (17 Mar 2022 02:03)      - Other: iStop reviewed.    Rx found on iStop review. Ref #:  [id number]    - Other: Medication reviewed.    The patient HAS NOT used PRN's in the last 24h.    MEDICATIONS  (STANDING):  acetylcysteine 20%  Inhalation 4 milliLiter(s) Inhalation every 6 hours  amLODIPine   Tablet 10 milliGRAM(s) Oral daily  artificial tears (preservative free) Ophthalmic Solution 1 Drop(s) Both EYES two times a day  chlorhexidine 0.12% Liquid 15 milliLiter(s) Oral Mucosa every 12 hours  epoetin nannette-epbx (RETACRIT) Injectable 7000 Unit(s) IV Push once  heparin   Injectable 5000 Unit(s) SubCutaneous every 8 hours  levETIRAcetam  IVPB 500 milliGRAM(s) IV Intermittent every 24 hours  piperacillin/tazobactam IVPB.. 4.5 Gram(s) IV Intermittent every 6 hours  senna 2 Tablet(s) Oral at bedtime  vancomycin  IVPB 1000 milliGRAM(s) IV Intermittent every 24 hours    MEDICATIONS  (PRN):      - Other: Advanced directives     Full Code     No documented MOLST form found on [facility]     No documented HCP form found on [facility]     No Living will / POA / Advance directives found on [place] / [facility].     [facility] notes on [facility] 11/14/2021    - Other: Coordination/Plan of care     _2___ admissions in 1 year     Current admission LOS: _1__ days     LACE score: __9__ NOT ADVANCE ILLNESS PATIENT.     Patient NOT previously seen by palliative medicine consult service.      Discussed with Primary team.  Consult request for: "  46F w/ PMHx of HTN, MS, functional quadriplegic 2/2 h/o ruptured left middle cerebral artery aneurysm with intraparenchymal hemorrhage, now trach and PEG, ESRD on HD (T/Th/S) 2/2 anuric ATN, and Sacral decubitus ulcers here for possible gluteal abscess and malfunctioned PEG. Coming from nursing home. Full code. Would benefit from code status and debility discussion with family. Unsure if hospice appropriate.  "    Full consult to follow within 24h.

## 2022-03-17 NOTE — CONSULT NOTE ADULT - PROBLEM SELECTOR RECOMMENDATION 4
Patient presented with severe sepsis most likely from her gluteal abscess with underlying osteomyelitis. Is currently getting vancomycin and Zosyn. Continue care Patient presented with severe sepsis most likely from her gluteal abscess with underlying osteomyelitis. Is currently getting vancomycin and Zosyn. Continue care as per

## 2022-03-17 NOTE — DIETITIAN INITIAL EVALUATION ADULT. - PROBLEM SELECTOR PLAN 1
Febrile 100.9, tachycardic 105, WBC 17, lactate 2.6, likely 2/2 gluteal abscess with underlying osteomyelitis vs. probable left lower lobe pneumonia.  - S/p 2.5L NS in ED  - S/p Vanc and cefepime in ED  - C/w Vanc and Zosyn  - F/u BCx and UCx  - F/u ESR/CRP

## 2022-03-17 NOTE — CONSULT NOTE ADULT - PROBLEM SELECTOR RECOMMENDATION 6
Patient remains full code. Will reach out to family and introduce geriatric and palliative care services.  As discussed during the palliative IDT meeting, the patients PSSA screening did not identify any current psychosocial need or spiritual support deficits.  - Orthodoxy/Spiritual practice: unknown   - Support system: [ x] strong [ ] adequate [ ] inadequate  - All questions answered, emotional support provided  -  primary team   - Please contact Palliative Medicine 24/7 at [phone] for any acute symptoms or further questions  - Will continue to follow with you Patient remains full code. Will reach out to family and introduce geriatric and palliative care services.  Spoke to patients daughter and she is overwhelmed and does not like the care the nursing home. She is sad that her mother has shown no improvement and has only gotten worse. Did bring up DNR, and she stated that she will need to discuss with the rest of the family. Offered to discuss with her family as well, she stated that she will let me know.   As discussed during the palliative IDT meeting, the patients PSSA screening did not identify any current psychosocial need or spiritual support deficits.  - Baptism/Spiritual practice: unknown   - Support system: [ x] strong [ ] adequate [ ] inadequate  - All questions answered, emotional support provided  -  primary team   - Please contact Palliative Medicine 24/7 at [phone] for any acute symptoms or further questions  - Will continue to follow with you

## 2022-03-17 NOTE — H&P ADULT - PROBLEM SELECTOR PLAN 8
Hgb 9 on admission. Baseline Hgb ~8.   - Likely 2/2 AOCD from ESRD  - F/u iron studies F: S/p 2.5L NS  E: Replete for K<4, Mag<2  N: NPO with TFs  A: SCDs  Code status: Full  Dispo: [facility]

## 2022-03-17 NOTE — H&P ADULT - PROBLEM SELECTOR PROBLEM 4
Decubitus ulcer of coccygeal region, unspecified pressure ulcer stage Decubitus ulcer of sacral region, stage 4 Left lower lobe pneumonia ESRD on dialysis

## 2022-03-17 NOTE — PROGRESS NOTE ADULT - PROBLEM SELECTOR PLAN 7
2/2 ruptured left middle cerebral artery aneurysm resulting in functional quadriplegia  - Palliative consult for goals of care discussion  - Social work consult to coordinate placement in new NH as requested by family  - cw Keppra IV daily

## 2022-03-17 NOTE — CHART NOTE - NSCHARTNOTEFT_GEN_A_CORE
Called MRI requesting pelvic MRI to be performed tonight. Extensive conversation with MRI personnel Pedro--Pt not able to go down because pt is ventilated and needs full team including respiratory tech to be downstairs.  Discussed with senior and will defer MRI scan until day.

## 2022-03-17 NOTE — ADVANCED PRACTICE NURSE CONSULT - ASSESSMENT
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 left lower lobe pneumonia. Pt admitted with the following pressure injuries: Stage 4 to right ear, 2.5 x 1.3 x 0.2 cm, exposed cartilage, right elbow unstageable, 1.2 x 1.4 cm, covered 100% with eschar, no fluctulance, left elbow healing stage 3 with scar tissue visible at periwound. Stage 4 pressure injury to sacrococcygeal area measuring 14.5 x 18 x 5 cm, undermining from 10-12 o'clock, deepest aspect from 10-11 o'clock, 6 cm. Undermining also noted from 3-4 o'clock, deepest aspect 6 cm. Wound is 5% exposed bone, approx 20% adherent gray slough, remainder is muscle. Right gluteal fold with stage 4 measuring 5 x 3.5 cm with muscle visible. Undermining from 6-12 o'clock, deepest aspect at 8 o'clock, 4 cm. Moderate amount of serosanguinous drainage from both wounds. No communication noted between the 2 wounds. Right heel healing stage 2, 2.5 x 3 cm, absorbing, dry blister. Left heel healing stage 3, 0.9 x 3 cm, scar tissue visible at periwound. Envella bed ordered for pressure injury relief.

## 2022-03-17 NOTE — H&P ADULT - PROBLEM SELECTOR PLAN 2
CT A/P: Intramuscular abscess medial right gluteus valente adjacent to large sacral decubitus ulcer with exposed bone probably underlying osteomyelitis  - C/w Vanc/Zosyn  - General surgery consulted: no acute/emergent intervention warranted at this time. will continue to follow patient and evaluate patient for abscess drainage vs additional imaging, i.e. MRI to r/o osteomyelitis.

## 2022-03-17 NOTE — H&P ADULT - NSHPSOCIALHISTORY_GEN_ALL_CORE
Lives in nursing home with functional quadriplegia 2/2 h/o ruptured left middle cerebral artery aneurysm with intraparenchymal hemorrhage.

## 2022-03-17 NOTE — H&P ADULT - PROBLEM SELECTOR PLAN 5
2/2 ATN in December 2021. HD on T/Th/Sa. Anuric.  - Renal consulted H/o sacral decubitus ulcer s/p debridement by plastic surgery in the past. Currently with intragluteal abscess with likely underlying osteomyelitis.  - Surgery consult, abscess treatment plan as above  - C/w dressing changes per wound care Family complaining that PEG tube has not been changed and is currently not functioning.  - IR consult for PEG tube replacement

## 2022-03-17 NOTE — H&P ADULT - PROBLEM SELECTOR PLAN 3
CT A/P:  Probable developing left lower lobe pneumonia, less likely pulmonary edema. No hypoxia or increased work of breathing. No crackles on lung auscultation.  - C/w Vanc/Zosyn  - Management of sepsis as above CT A/P showing probable underlying OM.  - F/u general surgery  - Consider MRI  - C/w antibiotics H/o sacral decubitus ulcer s/p debridement by plastic surgery in the past. Currently with intragluteal abscess with likely underlying osteomyelitis.  - Surgery consult, abscess treatment plan as above  - C/w dressing changes per wound care

## 2022-03-17 NOTE — H&P ADULT - NSICDXPASTSURGICALHX_GEN_ALL_CORE_FT
Verified Results  * MAMMO SCREENING BILATERAL W CAD [id number] 09:50AM Karlo Dillard Order Number: [id number]     Test Name Result Flag Reference   MAMMO SCREENING BILATERAL W CAD (Report)     Patient History:   Patient is postmenopausal    Family history of unknown cancer in father at age 48 or over and    unknown cancer in mother at age 48 or over  Took hormonal contraceptives for 8 years beginning at age 22  Patient has never smoked  Patient's BMI is 28 5  Reason for exam: screening (asymptomatic)  Mammo Screening Bilateral W CAD: June 7, 2016 - Check In #:    [de-identified]   Bilateral MLO, CC, and XCCL view(s) were taken  Technologist: RONI George (R)(M)   Prior study comparison: December 18, 2015, left breast unilateral   limited RBC UP, performed at [address] [place] [address]  June 23, 2015, left breast unilateral limited RBC UP, performed    at [address] [place] [address]  June 23, 2015, left breast   unilateral diagnostic mammogram, performed at [address] [place] [address]  June 23, 2015, digital bilateral    screening mammogram performed at [facility]  October 1, 2014, bilateral OPMA digital scrn    mammo w/CAD, performed at [address] [place] [address]  October 16, 2013, bilateral OPMA digital scrn mammo    w/CAD, performed at [address] [place] [address] 70  There are scattered fibroglandular densities  No new dominant    soft tissue mass, architectural distortion or suspicious    calcifications are noted  The skin and nipple structures are    within normal limits  Stable nodular densities present in the    left breast correlating with previous ultrasound findings  No mammographic evidence of malignancy  No    significant changes when compared with prior studies  ASSESSMENT: BiRad:2 - Benign     Recommendation:   Routine screening mammogram of both breasts in 1 year  Analyzed by CAD     8-10% of cancers will be missed on mammography  Management of a    palpable abnormality must be based on clinical grounds  Patients   will be notified of their results via letter from our facility  Accredited by [employer] of [employer] and FDA       Transcription Location: RONI Orta 98: [id number]     Risk Value(s):   Tyrer-Cuzick 10 Year: 2 391%, Tyrer-Cuzick Lifetime: 4 605%,    Myriad Table: 1 5%, ORA 5 Year: 1 4%, NCI Lifetime: 4 8%   Signed by:   Elijah Greenfield MD   6/7/16 PAST SURGICAL HISTORY:  H/O craniotomy     Personal history of spine surgery

## 2022-03-17 NOTE — H&P ADULT - ATTENDING COMMENTS
reviewed pertinent data , h&p  patient seen and examined overnight   a/f severe sepsis in setting of gluteal abscess, also w/ possible OM, possible developing LLL PNA    Pt nonverbal on trach attached to vent, eyes do not track; cardiac/lung exam as above; abdomen soft/nt/nd, PEG findings per PGY2; large stage 4 sacral noted covered in dressing. +heel ulcers and right ear ulcer noted; no lower ext tenderness b/l, there is +2 nonpitting edema; right sided HD cath noted at chest    1. severe sepsis 2/2 gluteal abscess: on vanc/zosyn, followup ctxs, followup surgery recs.   2. r/o OM: further imaging w/ MRI if able to tolerate imaging procedure. followup ESR/ CRP   3. IR to replaced PEG tube  4. r/o LLL PNA: small opacity noted, monitor respiratory status   5. followup renal recs; HD cath may need to be adjusted based on imaging studies , consult  IR      rest of  plan as above

## 2022-03-17 NOTE — CONSULT NOTE ADULT - SUBJECTIVE AND OBJECTIVE BOX
47F w/ PMHx of HTN, MS, functional quadriplegic 2/2 h/o ruptured left middle cerebral artery aneurysm with intraparenchymal hemorrhage, SAH, and left subdural hematoma with midline shift and herniation December 2021 s/p hemicraniotomy and s/p trach (Dr. Whitfield) and PEG (Dr. Ovalle) in November 2021, with prolonged hospital course complicated by b/l PNTX, PNA, multiorgan failure, ESRD on HD (T/Th/S) 2/2 anuric ATN, and Sacral decubitus ulcer s/p debridement by plastics, sent in from NH for evaluation of decubitus ulcer and PEG tube. Patient nonverbal at baseline no family at bedside. ED provider spoke with HCP over the phone who report that family is concerned that the NH is not managing her wounds appropriately and that patient has had PEG since November and has not had it replaced; does not know when it was last used or when feeding tube adapter lost/missing; no other concerns or complaints. IR consulted for PEG evaluation.     CT reading showed incidental finding of HD catheter in IVC. Per team, catheter is functioning well.     Clinical History: SEPSIS, UNSPECIFICED ORGANISM; ABSCESS OF BUTTOC    No pertinent family history in first degree relatives    Handoff    MEWS Score    No pertinent past medical history    SDH (subdural hematoma)    SAH (subarachnoid hemorrhage)    Decubitus ulcer of coccygeal region, unspecified pressure ulcer stage    Sepsis, unspecified organism    Severe sepsis    Acute osteomyelitis    Decubitus ulcer of coccygeal region, unspecified pressure ulcer stage    Left lower lobe pneumonia    SAH (subarachnoid hemorrhage)    ESRD on dialysis    Nutrition, metabolism, and development symptoms    Decubitus ulcer of sacral region, stage 4    Gluteal abscess    Anemia    PEG tube malfunction    Respiratory failure    Functional quadriplegia    Encounter for palliative care    Personal history of spine surgery    H/O craniotomy    MED EVAL    Acute osteomyelitis    LLL pneumonia    90+    Abscess of buttock    Pneumonia    Pressure ulcer    [email or web address]        Meds:acetylcysteine 20%  Inhalation 4 milliLiter(s) Inhalation every 6 hours  amLODIPine   Tablet 10 milliGRAM(s) Oral daily  artificial tears (preservative free) Ophthalmic Solution 1 Drop(s) Both EYES two times a day  chlorhexidine 0.12% Liquid 15 milliLiter(s) Oral Mucosa every 12 hours  heparin   Injectable 5000 Unit(s) SubCutaneous every 8 hours  levETIRAcetam  IVPB 500 milliGRAM(s) IV Intermittent every 24 hours  piperacillin/tazobactam IVPB.. 4.5 Gram(s) IV Intermittent every 6 hours  senna 2 Tablet(s) Oral at bedtime  vancomycin  IVPB 1000 milliGRAM(s) IV Intermittent every 24 hours      Allergies:No Known Allergies        Labs:                           7.9    14.51 )-----------( 275      ( 17 Mar 2022 12:08 )             25.9     PT/INR - ( 16 Mar 2022 14:34 )   PT: 13.3 sec;   INR: 1.12          PTT - ( 16 Mar 2022 14:34 )  PTT:24.8 sec  03-17    143  |  105  |  47<H>  ----------------------------<  120<H>  2.9<LL>   |  24  |  0.62    Ca    10.3      17 Mar 2022 12:08  Phos  2.8     03-17  Mg     2.1     03-17    TPro  8.5<H>  /  Alb  3.7  /  TBili  0.3  /  DBili  x   /  AST  32  /  ALT  39  /  AlkPhos  122<H>  03-16

## 2022-03-17 NOTE — PROGRESS NOTE ADULT - PROBLEM SELECTOR PLAN 3
H/o sacral decubitus ulcer s/p debridement by plastic surgery in the past. Currently with intragluteal abscess with likely underlying osteomyelitis.  - Surgery consult, abscess treatment plan as above  Wound care: stage 4 on right ear, stage 4 on sacrum, stage 4 on rt gluteal fold  stage 3 on left elbow, unstagable on rt elbow, b/l heels pressure injury     To do:  - C/w dressing changes per wound care  - speciality bed by wound care

## 2022-03-17 NOTE — PROGRESS NOTE ADULT - PROBLEM SELECTOR PLAN 4
2/2 ATN in December 2021. Anuric.  - Renal consulted, S/p HD today     To do:   - C/w HD on T/Th/Sa  - given the MRI, would need HD on 2 conseq days after the MRI later tonight    #HTN  - cw amlodipine 10 mg daily

## 2022-03-17 NOTE — H&P ADULT - ASSESSMENT
46F w/ PMHx of HTN, MS, functional quadriplegic 2/2 h/o ruptured left middle cerebral artery aneurysm with intraparenchymal hemorrhage, SAH, and left subdural hematoma with midline shift and herniation December 2021 s/p hemicraniotomy, and prolonged hospital course complicated by b/l PNTX, PNA, multiorgan failure, ESRD on HD (T/Th/S) 2/2 anuric ATN, and Sacral decubitus ulcer s/p debridement by plastics, sent in from NH for evaluation of decubitus ulcer and PEG tube, found to have severe sepsis likely 2/2 gluteal abscess with underlying osteomyelitis vs. left lower lobe pneumonia. 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 left lower lobe pneumonia.

## 2022-03-17 NOTE — CHART NOTE - NSCHARTNOTEFT_GEN_A_CORE
Called by primary medical service regarding concern for PEG malfunction. Review of chart showing PEG placed November 2021 by Dr. Ovalle and the surgical service. Examination of PEG showing missing adapter, but freely mobile with bolster at 3cm; site without erythema, fluctuance, or drainage. 20Fr replacement adapter placed and PEG flushed with 50cc of sterile water without resistance. Given age of PEG, tract is mature and tue appears to be in appropriate positioning, flushing with ability to draw back with aspiration of gastric contents. PEG ready and suitable for immediate use. For further troubleshooting or concerns would recommend surgical consultation for evaluation of their previously placed device. If GI service can be of further assistance, please recall as needed.

## 2022-03-17 NOTE — PROGRESS NOTE ADULT - PROBLEM SELECTOR PLAN 6
Hgb 9 on admission. Baseline Hgb ~8.   - Likely 2/2 AOCD from ESRD as confirmed by iron studies    To do:  - active type and screen  - transfuse prn

## 2022-03-17 NOTE — PROGRESS NOTE ADULT - PROBLEM SELECTOR PLAN 2
CT A/P: Intramuscular abscess medial right gluteus valente adjacent to large sacral decubitus ulcer with exposed bone probably underlying osteomyelitis  - Surgery consulted for PEG replacement and abscess drainage     To do:   - C/w Vanc and Zosyn  - F/u Vanv trough  - f/u MRI pelvis to r/o osteomyelitis

## 2022-03-17 NOTE — DIETITIAN INITIAL EVALUATION ADULT. - PROBLEM SELECTOR PLAN 5
H/o sacral decubitus ulcer s/p debridement by plastic surgery in the past. Currently with intragluteal abscess with likely underlying osteomyelitis.  - Surgery consult, abscess treatment plan as above  - C/w dressing changes per wound care

## 2022-03-17 NOTE — CONSULT NOTE ADULT - PROBLEM SELECTOR RECOMMENDATION 5
Patient with a history of SAH, which required her to have hemicraniotomy in late 2021. During the process of placing a trach in 2021 patient had a cardiac arrest and most likely has anoxic brain injury. Continue care as per primary team.

## 2022-03-18 DIAGNOSIS — Z71.89 OTHER SPECIFIED COUNSELING: ICD-10-CM

## 2022-03-18 LAB
ANION GAP SERPL CALC-SCNC: 18 MMOL/L — HIGH (ref 5–17)
BLD GP AB SCN SERPL QL: NEGATIVE — SIGNIFICANT CHANGE UP
BUN SERPL-MCNC: 33 MG/DL — HIGH (ref 7–23)
CALCIUM SERPL-MCNC: 10.2 MG/DL — SIGNIFICANT CHANGE UP (ref 8.4–10.5)
CALCIUM SERPL-MCNC: 10.2 MG/DL — SIGNIFICANT CHANGE UP (ref 8.4–10.5)
CHLORIDE SERPL-SCNC: 94 MMOL/L — LOW (ref 96–108)
CO2 SERPL-SCNC: 26 MMOL/L — SIGNIFICANT CHANGE UP (ref 22–31)
CREAT SERPL-MCNC: 0.95 MG/DL — SIGNIFICANT CHANGE UP (ref 0.5–1.3)
CULTURE RESULTS: NO GROWTH — SIGNIFICANT CHANGE UP
CULTURE RESULTS: SIGNIFICANT CHANGE UP
EGFR: 75 ML/MIN/1.73M2 — SIGNIFICANT CHANGE UP
GLUCOSE SERPL-MCNC: 144 MG/DL — HIGH (ref 70–99)
HAV IGM SER-ACNC: SIGNIFICANT CHANGE UP
HBV CORE AB SER-ACNC: REACTIVE
HBV CORE IGM SER-ACNC: SIGNIFICANT CHANGE UP
HBV SURFACE AB SER-ACNC: REACTIVE
HCT VFR BLD CALC: 28.5 % — LOW (ref 34.5–45)
HGB BLD-MCNC: 8.8 G/DL — LOW (ref 11.5–15.5)
MAGNESIUM SERPL-MCNC: 2.1 MG/DL — SIGNIFICANT CHANGE UP (ref 1.6–2.6)
MCHC RBC-ENTMCNC: 24.5 PG — LOW (ref 27–34)
MCHC RBC-ENTMCNC: 30.9 GM/DL — LOW (ref 32–36)
MCV RBC AUTO: 79.4 FL — LOW (ref 80–100)
NRBC # BLD: 0 /100 WBCS — SIGNIFICANT CHANGE UP (ref 0–0)
PHOSPHATE SERPL-MCNC: 2.8 MG/DL — SIGNIFICANT CHANGE UP (ref 2.5–4.5)
PLATELET # BLD AUTO: 269 K/UL — SIGNIFICANT CHANGE UP (ref 150–400)
POTASSIUM SERPL-MCNC: 3.3 MMOL/L — LOW (ref 3.5–5.3)
POTASSIUM SERPL-SCNC: 3.3 MMOL/L — LOW (ref 3.5–5.3)
PTH-INTACT FLD-MCNC: 16 PG/ML — SIGNIFICANT CHANGE UP (ref 15–65)
RBC # BLD: 3.59 M/UL — LOW (ref 3.8–5.2)
RBC # FLD: 17 % — HIGH (ref 10.3–14.5)
RH IG SCN BLD-IMP: POSITIVE — SIGNIFICANT CHANGE UP
SODIUM SERPL-SCNC: 138 MMOL/L — SIGNIFICANT CHANGE UP (ref 135–145)
SPECIMEN SOURCE: SIGNIFICANT CHANGE UP
WBC # BLD: 11.99 K/UL — HIGH (ref 3.8–10.5)
WBC # FLD AUTO: 11.99 K/UL — HIGH (ref 3.8–10.5)

## 2022-03-18 PROCEDURE — 10160 PNXR ASPIR ABSC HMTMA BULLA: CPT | Mod: 52

## 2022-03-18 PROCEDURE — 99222 1ST HOSP IP/OBS MODERATE 55: CPT

## 2022-03-18 PROCEDURE — 99232 SBSQ HOSP IP/OBS MODERATE 35: CPT

## 2022-03-18 PROCEDURE — 99233 SBSQ HOSP IP/OBS HIGH 50: CPT | Mod: GC

## 2022-03-18 PROCEDURE — 99358 PROLONG SERVICE W/O CONTACT: CPT | Mod: NC

## 2022-03-18 PROCEDURE — 76942 ECHO GUIDE FOR BIOPSY: CPT | Mod: 26

## 2022-03-18 PROCEDURE — 99233 SBSQ HOSP IP/OBS HIGH 50: CPT

## 2022-03-18 RX ORDER — PIPERACILLIN AND TAZOBACTAM 4; .5 G/20ML; G/20ML
4.5 INJECTION, POWDER, LYOPHILIZED, FOR SOLUTION INTRAVENOUS EVERY 12 HOURS
Refills: 0 | Status: COMPLETED | OUTPATIENT
Start: 2022-03-18 | End: 2022-03-23

## 2022-03-18 RX ADMIN — Medication 4 MILLILITER(S): at 18:26

## 2022-03-18 RX ADMIN — HEPARIN SODIUM 5000 UNIT(S): 5000 INJECTION INTRAVENOUS; SUBCUTANEOUS at 22:54

## 2022-03-18 RX ADMIN — CHLORHEXIDINE GLUCONATE 15 MILLILITER(S): 213 SOLUTION TOPICAL at 18:26

## 2022-03-18 RX ADMIN — LEVETIRACETAM 400 MILLIGRAM(S): 250 TABLET, FILM COATED ORAL at 09:27

## 2022-03-18 RX ADMIN — Medication 4 MILLILITER(S): at 06:35

## 2022-03-18 RX ADMIN — HEPARIN SODIUM 5000 UNIT(S): 5000 INJECTION INTRAVENOUS; SUBCUTANEOUS at 06:36

## 2022-03-18 RX ADMIN — Medication 4 MILLILITER(S): at 22:54

## 2022-03-18 RX ADMIN — Medication 4 MILLILITER(S): at 11:04

## 2022-03-18 RX ADMIN — CHLORHEXIDINE GLUCONATE 15 MILLILITER(S): 213 SOLUTION TOPICAL at 06:35

## 2022-03-18 RX ADMIN — PIPERACILLIN AND TAZOBACTAM 25 GRAM(S): 4; .5 INJECTION, POWDER, LYOPHILIZED, FOR SOLUTION INTRAVENOUS at 11:03

## 2022-03-18 RX ADMIN — HEPARIN SODIUM 5000 UNIT(S): 5000 INJECTION INTRAVENOUS; SUBCUTANEOUS at 13:01

## 2022-03-18 RX ADMIN — Medication 1 DROP(S): at 13:01

## 2022-03-18 RX ADMIN — SENNA PLUS 2 TABLET(S): 8.6 TABLET ORAL at 22:54

## 2022-03-18 RX ADMIN — Medication 1 DROP(S): at 01:56

## 2022-03-18 RX ADMIN — PIPERACILLIN AND TAZOBACTAM 25 GRAM(S): 4; .5 INJECTION, POWDER, LYOPHILIZED, FOR SOLUTION INTRAVENOUS at 06:36

## 2022-03-18 RX ADMIN — PIPERACILLIN AND TAZOBACTAM 200 GRAM(S): 4; .5 INJECTION, POWDER, LYOPHILIZED, FOR SOLUTION INTRAVENOUS at 22:54

## 2022-03-18 RX ADMIN — AMLODIPINE BESYLATE 10 MILLIGRAM(S): 2.5 TABLET ORAL at 06:35

## 2022-03-18 NOTE — PROGRESS NOTE ADULT - PROBLEM SELECTOR PLAN 3
H/o sacral decubitus ulcer s/p debridement by plastic surgery in the past. Currently with intragluteal abscess with likely underlying osteomyelitis.  - Surgery consult, abscess treatment plan as above  Wound care: stage 4 on right ear, stage 4 on sacrum, stage 4 on rt gluteal fold  stage 3 on left elbow, unstagable on rt elbow, b/l heels pressure injury     To do:  - C/w dressing changes per wound care: Aquacel extra to be packed  - speciality bed by wound care

## 2022-03-18 NOTE — PROCEDURE NOTE - PROCEDURE FINDINGS AND DETAILS
no definite collection corresponding to CT finding.  Attempted drainage - drain passed through tissue into decub.  Discussed with resident.

## 2022-03-18 NOTE — PROGRESS NOTE ADULT - PROBLEM SELECTOR PLAN 6
Hgb 9 on admission. Baseline Hgb ~8. On 03/18, Hb 8.8  - Likely 2/2 AOCD from ESRD as confirmed by iron studies    To do:  - active type and screen  - transfuse prn

## 2022-03-18 NOTE — CONSULT NOTE ADULT - SUBJECTIVE AND OBJECTIVE BOX
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 Patient nonverbal at baseline no family at bedside. ED provider spoke with HCP over the phone who report that family is concerned that the NH is not managing her wounds appropriately and that patient has had PEG since November and has not had it replaced; does not know when it was last used or when feeding tube adapter lost/missing; no other concerns or complaints.    (17 Mar 2022 02:03)      PAST MEDICAL & SURGICAL HISTORY:  SDH (subdural hematoma)    SAH (subarachnoid hemorrhage)    Decubitus ulcer of coccygeal region, unspecified pressure ulcer stage    Personal history of spine surgery    H/O craniotomy          REVIEW OF SYSTEMS:    UTO      ANTIBIOTICS:  MEDICATIONS  (STANDING):  acetylcysteine 20%  Inhalation 4 milliLiter(s) Inhalation every 6 hours  amLODIPine   Tablet 10 milliGRAM(s) Oral daily  artificial tears (preservative free) Ophthalmic Solution 1 Drop(s) Both EYES two times a day  chlorhexidine 0.12% Liquid 15 milliLiter(s) Oral Mucosa every 12 hours  heparin   Injectable 5000 Unit(s) SubCutaneous every 8 hours  levETIRAcetam  IVPB 500 milliGRAM(s) IV Intermittent every 24 hours  piperacillin/tazobactam IVPB.. 4.5 Gram(s) IV Intermittent every 6 hours  senna 2 Tablet(s) Oral at bedtime  vancomycin  IVPB 1000 milliGRAM(s) IV Intermittent every 24 hours    MEDICATIONS  (PRN):      Allergies    No Known Allergies    Intolerances        SOCIAL HISTORY:    FAMILY HISTORY:  No pertinent family history in first degree relatives        Vital Signs Last 24 Hrs  T(C): 36.9 (18 Mar 2022 10:03), Max: 37.2 (17 Mar 2022 22:03)  T(F): 98.5 (18 Mar 2022 10:03), Max: 99 (17 Mar 2022 22:03)  HR: 104 (18 Mar 2022 10:03) (98 - 107)  BP: 138/87 (18 Mar 2022 10:03) (112/79 - 138/87)  BP(mean): --  RR: 18 (18 Mar 2022 10:03) (14 - 18)  SpO2: 100% (18 Mar 2022 10:03) (98% - 100%)    PHYSICAL EXAM:  Constitutional: NAD  Eyes: KWAME, EOMI  Ear/Nose/Throat: no oral lesion, no sinus tenderness on percussion	  Neck: no JVD, no lymphadenopathy, supple  Respiratory: CTA maty  Cardiovascular: S1S2 RRR, no murmurs  Gastrointestinal: soft, (+) BS, no HSM  Extremities:no e/e/c  Vascular: DP Pulse: right normal; left normal            LABS:                        8.8    11.99 )-----------( 269      ( 18 Mar 2022 08:52 )             28.5     03-18    138  |  94<L>  |  33<H>  ----------------------------<  144<H>  3.3<L>   |  26  |  0.95    Ca    10.2      18 Mar 2022 08:52  Phos  2.8     03-18  Mg     2.1     03-18        Urinalysis Basic - ( 16 Mar 2022 15:58 )    Color: Yellow / Appearance: Clear / S.010 / pH: x  Gluc: x / Ketone: NEGATIVE  / Bili: Negative / Urobili: 0.2 E.U./dL   Blood: x / Protein: Trace mg/dL / Nitrite: NEGATIVE   Leuk Esterase: Small / RBC: < 5 /HPF / WBC 5-10 /HPF   Sq Epi: x / Non Sq Epi: 0-5 /HPF / Bacteria: Present /HPF        MICROBIOLOGY: Culture - Blood (22 @ 15:27)    Specimen Source: .Blood Blood    Culture Results:   No growth at 1 day.      RADIOLOGY & ADDITIONAL STUDIES: < from: CT Abdomen and Pelvis w/ IV Cont (22 @ 18:30) >  Pelvic organs: Hysterectomy. No adnexal masses. Nondistended bladder   limiting evaluation, small gas in bladder lumen likely from recent   instrumentation. Multiple surgical clips and lower retroperitoneum in   left hemipelvis. Linear postsurgical 1.0 cm metallic density in left   lower quadrant abutting anterior peritoneum.    Soft tissues: Rim-enhancing collection of complex fluid and gas in medial   right gluteus valente 3.0 x 2.0 cm (series 3 image 124) adjacent to   sacral decubitus ulcer with sacral bone exposure. Few tiny bone fragments   in region of previously seen coccyx. Linear soft tissue and gas density   in left gluteal subcutaneous tissues without drainable collection. Small   midline supra umbilical hernia containing only fat.    Bones: Redemonstrated mild anterolisthesis L4 and L5, chronic bilateral   L5 pars defects, post anterior and posterior fixation hardware spanning   L4-5 with L4 decompressive laminectomy. Suspect coccygeal destruction   related to sacral decubitus ulcer/underlying osteomyelitis as above.    Impression:  1.  Intramuscular abscess medial right gluteus valente (series 3 image   124) adjacent to large sacral decubitus ulcer with exposed bone probably   underlying osteomyelitis, few tiny bony fragments in region of previously   seen coccyx, eroded or surgically removed.  2.  Probable developing left lower lobe pneumonia, less likely pulmonary   edema.  3.  Central venous catheter tip at junction of suprahepatic IVC and left   atrium. Consider retraction.  4.  Small bowel intussusception in left mid abdomen, likely transient,   doubtful clinically significance.    < end of copied text >

## 2022-03-18 NOTE — CONSULT NOTE ADULT - SUBJECTIVE AND OBJECTIVE BOX
Consult requested by: Jorge Escamilla MD   		Role: Resident       		Service: Medicine        Attending: Jj Carcamo MD  Consultant: Melva Franco MS, [facility]- (Medical Ethicist)                             Contact #s: [phone] (c)  [phone] (o)  Consult purpose:  To assist the healthcare team in an ethical dilemma of a 47-year-old female admitted for sepsis secondary to gluteal abscess, currently with guarded prognosis, regarding goals of care.    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 Hospital course in 2021 complicated by bilateral pneumothorax, pneumonia, multiorgan failure, end stage renal disease (ESRD) on hemodialysis (T/Th/S) secondary to anuric acute tubular necrosis, and sacral decubitus ulcer status post debridement by Plastics. Patient admitted from Nursing Home for evaluation of decubitus ulcer and PEG tube as patient has had PEG since November 2021 and it has not been replaced. At baseline, patient nonverbal. On admission, patient found to have severe sepsis likely secondary to gluteal abscess with underlying osteomyelitis vs. left lower lobe pneumonia. CT abdomen/pelvis on 3/16 showed intramuscular abscess medial right gluteus valente adjacent to large sacral decubitus ulcer with exposed bone probably underlying osteomyelitis. General Surgery consulted on 3/16 and deemed no acute/emergent intervention warranted at this time, but will continue to follow patient and evaluate patient for abscess drainage and additional imaging. Nephrology consulted on 3/16 for ESRD and dialysis. Per Nephrology, possible renal recovery unlikely. Patient evaluated by Wound Care and found to have multiple staged ulcers—stage IV right ear, stage III left elbow, stage IV sacrococcygeal. On 3/17, PEG examined by GI and showed missing adapter, but freely mobile with bolster at 3cm; site without erythema, fluctuance or drainage. Adapter replaced and PEG flushed and ready for immediate use. Currently, family desires continued medical interventions. Palliative on consult. Per Palliative, during the process of placing a tracheostomy in 2021, patient had a cardiac arrest and most likely has anoxic brain injury. Ethics consult initiated to facilitate optimization of interdisciplinary communication and to clarify goals of care given the patient’s current state and her guarded prognosis.      Prognosis Estimate (survival in days, wks, mos, yrs): Guarded   Patient Decision-Making Capacity:   Has capacity 	  Lacks capacity (patient is alert and oriented x0 at baseline)  Patient Aware of:  Diagnosis:   Yes    No   Unknown    Prognosis:   Yes    No   Unknown       Name of medical decision-maker should patient lack capacity: Jayshree Stubbs and Jose Stubbs	  Relationship: Daughter and Son  Role:   Health Care Proxy      Legal Surrogates   Contact #(s): [phone] (Jayshree) ; [phone] (Jose)  Evidence of Patient’s Preference of Life-Sustaining Treatment (Written or Oral): Yes – see Palliative notes   Resuscitation status:  DNR:  Yes   No      DNI:  Yes   No    Discussions:   Discussion with Jorge Escamilla MD (Medicine Resident) on 3/18/22 (12:15 – 12:25): Reviewed patient’s medical record and guarded prognosis. Despite patient’s current condition, family wishes for patient to remain full code and for continued aggressive measures. Given her clinical state, Ethics reinforced that physicians are NOT obligated to perform procedures that are not in accordance with acceptable practice and where harm exceeds benefits. In this case, certain interventions such as chest compressions and vasopressors may be considered non-beneficial by the health providers in light of this patient’s current condition.  Discussion with Pedro Brown NP (Palliative) on 3/18/22 (12:30 – 12:45): Discussed patient’s case and conversations had with patient’s family. Palliative spoke with patient’s daughter yesterday, 3/17, and daughter wanted to speak with rest of family. Agatha from Palliative to reach out to daughter today, 3/18. Goals of care conversations are ongoing.    Bioethics analysis:  The central ethical issue that exists in this case is (A) respecting the patient’s autonomy versus (B) the providers’ desire for beneficence and non-maleficence. The conflict that exists in this situation is one hospital clinicians infrequently encounter for chronically ill patients who have a guarded prognosis for recovery. In this case, where clinicians are concerned with the risks involved to sustain life. Before any light can be shed on the ethical principles of beneficence, non-maleficence, autonomy and justice, there must be adequate and abiding communication between each of the parties involved in the care of the patient—especially between the (often large and interdisciplinary) medical team, and the patient and her family. Beneficence calls on clinicians to honor and preserve a patient’s sense of dignity and personhood, to promote the best possible health or quality of living or dying with aggressive interventions when they help prolong life with "good quality," with comfort care when cure and remission are not possible, as comfort care aims achieve the best "quality of dying." Beneficence is understood by most physicians, but needs to be communicated to patients and their families. The same is true for the clinician’s duty to non-maleficence, avoiding medical interventions whose risk and burden exceeds their benefit ("first do no harm" or primum non nocere).    The 2010 [facility] Health Care Decision Act (CDA)1 addresses the situation of a sick individual who lacks capacity, but has an available surrogate: the surrogate has the exclusive right to make decisions about whether to use or forgo life sustaining treatments (LSTs) when it appears that the patient will die without them.1 The ethical standard the surrogate is supposed to apply to such decision-making is, first "substituted judgment" – based on the patient’s prior articulation of preferences for such a situation or second "best interests." In this case, the patient’s daughter and son are the appropriate surrogate decision makers for Ms. Graham according to the CDA hierarchy.    This case invokes the consideration of beneficence. In this case, beneficence relates to the health care provider’s responsibility to promote the patient’s health and overall well-being, while minimizing her pain and suffering. A central ethical issue in this case is whether additional medical interventions can improve the outcome of this patient, a facet determined by the medical team.    Ms. Graham has an innate moral status – that is her distinct personhood, personal experience and interpretation of suffering, life-story, etc. – which must be respected. Decisions concerning treatment are complex and must be taken into consideration given the higher rates of mortality and morbidity associated in this specific case. The patient’s prognosis is poor. The benefits of LSTs must be weighed against the harm LSTs may cause when they no longer improve prognosis, but introduce suffering and risk.    A particular concern regarding this ethical dilemma is the framework for determining the balance between non-maleficence and beneficence. Specifically, physicians are NOT obligated to perform procedures that are not in accordance with acceptable practice and where harm exceeds benefits. In this case, it is important to recognize that beneficence extends to easing the caregiver burden and stress of the patient’s family. Patients and families may be protected from making detailed medical decisions about treatments that offer no medical benefit through the use of not escalating treatments that have no expectation of improving goals of restoring patient to function.2     In this case, certain life-sustaining measures may be considered non-beneficial by the health providers in light of this patient’s illness and poor prognosis. The benefits of each life sustaining measure must be weighed against the possible harm of each procedure or intervention when it no longer improves a patient’s prognosis, but introduces risk of suffering.      Physicians are under no legal or ethical obligation to offer treatments that would 1) provide no medically indicated benefit or 2) impose unnecessary risk or burden to the patient. The benefits of interventions (e.g. chest compressions and vasopressors) and other life-sustaining treatments (LSTs) must be weighed against the harm from LSTs that may cause when it no longer improves prognosis but introduces suffering and risk. The clinical team cannot impose a unilateral Do Not Resuscitate. Clinical decisions need to be assessed on a risk/benefit analysis.    In accordance with the Palliative Care Access Act ([place] PH[place] § 2997-d), the attending healthcare practitioner is required to provide patients with a terminal illness not only prognosis, risks and benefits of treatment options but also patient’s legal rights to symptom management at the end of life. Fundamental to a palliative approach is the aim to reduce suffering and improve the quality of life for dying patients.    Goals of care now are to maintain current life sustaining treatment, providing the family with sufficient information to make an informed decision that honors the patient’s moral status. Particularly critical is ensuring palliation and comfort care measures for this patient as providing care is "everything that is medically possible at present."   Consult requested by: Jorge Escamilla MD   		Role: Resident       		Service: Medicine        Attending: Jj Carcamo MD  Consultant: Melva Franco MS, [facility]- (Medical Ethicist)                             Contact #s: [phone] (c)  [phone] (o)  Consult purpose:  To assist the healthcare team in an ethical dilemma of a 47-year-old female with a complex medical history admitted for sepsis secondary to gluteal abscess, currently with guarded prognosis, regarding goals of care.    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 Hospital course in 2021 complicated by bilateral pneumothorax, pneumonia, multiorgan failure, end stage renal disease (ESRD) on hemodialysis (T/Th/S) secondary to anuric acute tubular necrosis, and sacral decubitus ulcer status post debridement by Plastics. Patient admitted from Nursing Home for evaluation of decubitus ulcer and PEG tube as patient has had PEG since November 2021 and it has not been replaced. At baseline, patient nonverbal. On admission, patient found to have severe sepsis likely secondary to gluteal abscess with underlying osteomyelitis vs. left lower lobe pneumonia. CT abdomen/pelvis on 3/16 showed intramuscular abscess medial right gluteus valente adjacent to large sacral decubitus ulcer with exposed bone probably underlying osteomyelitis. General Surgery consulted on 3/16 and deemed no acute/emergent intervention warranted at this time, but will continue to follow patient and evaluate patient for abscess drainage and additional imaging. Nephrology consulted on 3/16 for ESRD and dialysis. Per Nephrology, possible renal recovery unlikely. Patient evaluated by Wound Care and found to have multiple staged ulcers—stage IV right ear, stage III left elbow, stage IV sacrococcygeal. On 3/17, PEG examined by GI and showed missing adapter, but freely mobile with bolster at 3cm; site without erythema, fluctuance or drainage. Adapter replaced and PEG flushed and ready for immediate use. Currently, family desires continued medical interventions. Palliative on consult. Per Palliative, during the process of placing a tracheostomy in 2021, patient had a cardiac arrest and most likely has anoxic brain injury. Ethics consult initiated to facilitate optimization of interdisciplinary communication and to clarify goals of care given the patient’s current state and her guarded prognosis.      Prognosis Estimate (survival in days, wks, mos, yrs): Guarded   Patient Decision-Making Capacity:   Has capacity 	  Lacks capacity (patient is alert and oriented x0 at baseline)  Patient Aware of:  Diagnosis:   Yes    No   Unknown    Prognosis:   Yes    No   Unknown       Name of medical decision-maker should patient lack capacity: Wilbertomariana Enoc and Jose Stubbs	  Relationship: Daughter and Son  Role:   Health Care Proxy      Legal Surrogates   Contact #(s): [phone] (Jayshree) ; [phone] (Jose)  Evidence of Patient’s Preference of Life-Sustaining Treatment (Written or Oral): Yes – see Palliative notes   Resuscitation status:  DNR:  Yes   No      DNI:  Yes   No    Discussions:   Discussion with Jorge Escamilla MD (Medicine Resident) on 3/18/22 (12:15 – 12:25): Reviewed patient’s medical record and guarded prognosis. Despite patient’s current condition, family wishes for patient to remain full code and for continued aggressive measures. Given her clinical state, Ethics reinforced that physicians are NOT obligated to perform procedures that are not in accordance with acceptable practice and where harm exceeds benefits. In this case, certain interventions such as chest compressions and vasopressors may be considered non-beneficial by the health providers in light of this patient’s current condition.  Discussion with Pedro Brown NP (Palliative) on 3/18/22 (12:30 – 12:45): Discussed patient’s case and conversations had with patient’s family. Palliative spoke with patient’s daughter yesterday, 3/17, and daughter wanted to speak with rest of family. Agatha from Palliative to reach out to daughter today, 3/18. Goals of care conversations are ongoing.    Bioethics analysis:  The central ethical issue that exists in this case is (A) respecting the patient’s autonomy versus (B) the providers’ desire for beneficence and non-maleficence. The conflict that exists in this situation is one hospital clinicians infrequently encounter for chronically ill patients who have a guarded prognosis for recovery. In this case, where clinicians are concerned with the risks involved to sustain life. Before any light can be shed on the ethical principles of beneficence, non-maleficence, autonomy and justice, there must be adequate and abiding communication between each of the parties involved in the care of the patient—especially between the (often large and interdisciplinary) medical team, and the patient and her family. Beneficence calls on clinicians to honor and preserve a patient’s sense of dignity and personhood, to promote the best possible health or quality of living or dying with aggressive interventions when they help prolong life with "good quality," with comfort care when cure and remission are not possible, as comfort care aims achieve the best "quality of dying." Beneficence is understood by most physicians, but needs to be communicated to patients and their families. The same is true for the clinician’s duty to non-maleficence, avoiding medical interventions whose risk and burden exceeds their benefit ("first do no harm" or primum non nocere).    The 2010 [facility] Health Care Decision Act (CDA)1 addresses the situation of a sick individual who lacks capacity, but has an available surrogate: the surrogate has the exclusive right to make decisions about whether to use or forgo life sustaining treatments (LSTs) when it appears that the patient will die without them.1 The ethical standard the surrogate is supposed to apply to such decision-making is, first "substituted judgment" – based on the patient’s prior articulation of preferences for such a situation or second "best interests." In this case, the patient’s daughter and son are the appropriate surrogate decision makers for Ms. Graham according to the CDA hierarchy.    This case invokes the consideration of beneficence. In this case, beneficence relates to the health care provider’s responsibility to promote the patient’s health and overall well-being, while minimizing her pain and suffering. A central ethical issue in this case is whether additional medical interventions can improve the outcome of this patient, a facet determined by the medical team.    Ms. Graham has an innate moral status – that is her distinct personhood, personal experience and interpretation of suffering, life-story, etc. – which must be respected. Decisions concerning treatment are complex and must be taken into consideration given the higher rates of mortality and morbidity associated in this specific case. The patient’s prognosis is poor. The benefits of LSTs must be weighed against the harm LSTs may cause when they no longer improve prognosis, but introduce suffering and risk.    A particular concern regarding this ethical dilemma is the framework for determining the balance between non-maleficence and beneficence. Specifically, physicians are NOT obligated to perform procedures that are not in accordance with acceptable practice and where harm exceeds benefits. In this case, it is important to recognize that beneficence extends to easing the caregiver burden and stress of the patient’s family. Patients and families may be protected from making detailed medical decisions about treatments that offer no medical benefit through the use of not escalating treatments that have no expectation of improving goals of restoring patient to function.2     In this case, certain life-sustaining measures may be considered non-beneficial by the health providers in light of this patient’s illness and poor prognosis. The benefits of each life sustaining measure must be weighed against the possible harm of each procedure or intervention when it no longer improves a patient’s prognosis, but introduces risk of suffering.      Physicians are under no legal or ethical obligation to offer treatments that would 1) provide no medically indicated benefit or 2) impose unnecessary risk or burden to the patient. The benefits of interventions (e.g. chest compressions and vasopressors) and other life-sustaining treatments (LSTs) must be weighed against the harm from LSTs that may cause when it no longer improves prognosis but introduces suffering and risk. The clinical team cannot impose a unilateral Do Not Resuscitate. Clinical decisions need to be assessed on a risk/benefit analysis.    In accordance with the Palliative Care Access Act ([place] [facility] § 2997-d), the attending healthcare practitioner is required to provide patients with a terminal illness not only prognosis, risks and benefits of treatment options but also patient’s legal rights to symptom management at the end of life. Fundamental to a palliative approach is the aim to reduce suffering and improve the quality of life for dying patients.    Goals of care now are to maintain current life sustaining treatment, providing the family with sufficient information to make an informed decision that honors the patient’s moral status. Particularly critical is ensuring palliation and comfort care measures for this patient as providing care is "everything that is medically possible at present."

## 2022-03-18 NOTE — CONSULT NOTE ADULT - CONSULT REASON
To assist the healthcare team in an ethical dilemma of a 47-year-old female admitted for sepsis secondary to gluteal abscess, currently with guarded prognosis, regarding goals of care.
gluteal abscess
ESRD
PEG tube malfunction
request for PEG evaluation and HD catheter repositioning
Complex decision making related to goals of care

## 2022-03-18 NOTE — PROGRESS NOTE ADULT - PROBLEM SELECTOR PLAN 6
Patient remains full code. Spoke to daughter yesterday and briefly touched upon Code status. Daughter stated that she needs to speak to the rest of the patients family. Ongoing support to be provided. Patient and family support to reach out to family.  - Latter-day/Spiritual practice: unknown   - Support system: [ x] strong [ ] adequate [ ] inadequate  - All questions answered, emotional support provided  -  primary team   - Please contact Palliative Medicine 24/7 at [phone] for any acute symptoms or further questions  - Will continue to follow with you.

## 2022-03-18 NOTE — PROGRESS NOTE ADULT - SUBJECTIVE AND OBJECTIVE BOX
OVERNIGHT EVENTS: no acute events overnight     SUBJECTIVE / INTERVAL HPI: Patient seen and examined at bedside.     VITAL SIGNS:  Vital Signs Last 24 Hrs  T(C): 36.9 (18 Mar 2022 10:03), Max: 37.2 (17 Mar 2022 22:03)  T(F): 98.5 (18 Mar 2022 10:03), Max: 99 (17 Mar 2022 22:03)  HR: 104 (18 Mar 2022 10:03) (98 - 113)  BP: 138/87 (18 Mar 2022 10:03) (112/79 - 139/60)  BP(mean): --  RR: 18 (18 Mar 2022 10:03) (14 - 18)  SpO2: 100% (18 Mar 2022 10:03) (98% - 100%)    PHYSICAL EXAM:    General: breathing via ventilator   HEENT: PERRL, anicteric sclera; MMM  Cardiovascular: +S1/S2, RRR  Respiratory: CTA B/L; no W/R/R, Trach in place   Gastrointestinal: soft, NT/ND; +BSx4, PEG with adapter in place appears clean and dry  Extremities: WWP; no edema, clubbing or cyanosis  Vascular: 2+ radial, DP/PT pulses B/L  Neurological: AAOx0, strenght & sensation absent b/l UE and LE     MEDICATIONS:  MEDICATIONS  (STANDING):  acetylcysteine 20%  Inhalation 4 milliLiter(s) Inhalation every 6 hours  amLODIPine   Tablet 10 milliGRAM(s) Oral daily  artificial tears (preservative free) Ophthalmic Solution 1 Drop(s) Both EYES two times a day  chlorhexidine 0.12% Liquid 15 milliLiter(s) Oral Mucosa every 12 hours  heparin   Injectable 5000 Unit(s) SubCutaneous every 8 hours  levETIRAcetam  IVPB 500 milliGRAM(s) IV Intermittent every 24 hours  piperacillin/tazobactam IVPB.. 4.5 Gram(s) IV Intermittent every 6 hours  senna 2 Tablet(s) Oral at bedtime  vancomycin  IVPB 1000 milliGRAM(s) IV Intermittent every 24 hours    MEDICATIONS  (PRN):      ALLERGIES:  Allergies    No Known Allergies    Intolerances        LABS:                        8.8    11.99 )-----------( 269      ( 18 Mar 2022 08:52 )             28.5     03-18    138  |  94<L>  |  33<H>  ----------------------------<  144<H>  3.3<L>   |  26  |  0.95    Ca    10.2      18 Mar 2022 08:52  Phos  2.8     03-18  Mg     2.1     03-18    TPro  8.5<H>  /  Alb  3.7  /  TBili  0.3  /  DBili  x   /  AST  32  /  ALT  39  /  AlkPhos  122<H>  03-16    PT/INR - ( 16 Mar 2022 14:34 )   PT: 13.3 sec;   INR: 1.12          PTT - ( 16 Mar 2022 14:34 )  PTT:24.8 sec  Urinalysis Basic - ( 16 Mar 2022 15:58 )    Color: Yellow / Appearance: Clear / S.010 / pH: x  Gluc: x / Ketone: NEGATIVE  / Bili: Negative / Urobili: 0.2 E.U./dL   Blood: x / Protein: Trace mg/dL / Nitrite: NEGATIVE   Leuk Esterase: Small / RBC: < 5 /HPF / WBC 5-10 /HPF   Sq Epi: x / Non Sq Epi: 0-5 /HPF / Bacteria: Present /HPF      CAPILLARY BLOOD GLUCOSE      POCT Blood Glucose.: 124 mg/dL (16 Mar 2022 22:00)      RADIOLOGY & ADDITIONAL TESTS: Reviewed.

## 2022-03-18 NOTE — PROGRESS NOTE ADULT - ASSESSMENT
47 F with pmhx of ESRD (TTHS) 2/2 anuric ATN (12/21), ICH, trach ,peg who presents to the hospital from nursing home w/ concern for decubitus ulcer evaluation.    Assessment/Plan:   #ESRD on HD TTHS @NH  usual Rx 3h   PT was started on HD 2/2 anuric ATN back in December of 2021. Has been maintained on HD at the NH on TTS schedule. Unclear if she has had any kidney recovery. Unclear of urine output per discussion with family. Will monitor while inpatient.   -S/p HD on 3/17 with 2L net UF  -To go for MRI w/ IV contrast. Please inform nephrology when done, can coordinate for HD after.  -EDW TBD    #access   [facility] TDC     #anemia  Hb within goal  -Epo w/ HD 7000 units  -No indication for IV iron    #renal bone disease   Calcium found to be 10.2  Phos noted 2.8  PTH found to be 16  Will check PTH To see if parathyroid etiology, otherwise may require secondary work up for hypercalcemia evaluation  -Check Ionized calcium  -Trend phos daily        Thank you for the opportunity to participate in the care of your patient. The nephrology service remains available to assist with any questions or concerns. Please feel free to reach us by paging the on-call nephrology fellow for urgent issues or as below.     Aj Cardoso D.O.  PGY-4, Nephrology Fellow    P: [phone]    47 F with pmhx of ESRD (TTHS) 2/2 anuric ATN (12/21), ICH, trach ,peg who presents to the hospital from nursing home w/ concern for decubitus ulcer evaluation.    Assessment/Plan:   #ESRD on HD TTHS @NH  usual Rx 3h   PT was started on HD 2/2 anuric ATN back in December of 2021. Has been maintained on HD at the NH on TTS schedule. Unclear if she has had any kidney recovery. Unclear of urine output per discussion with family. Will monitor while inpatient.   -S/p HD on 3/17 with 2L net UF  -To go for MRI w/ IV contrast. Please inform nephrology when done, can coordinate for HD after, however CANNOT be done on sunday, therefore cannot have MRI with IV contrast on saturday night,   -EDW TBD    #access   [facility] TDC     #anemia  Hb within goal  -Epo w/ HD 7000 units  -No indication for IV iron    #renal bone disease   Calcium found to be 10.2  Phos noted 2.8  PTH found to be 16  Will check PTH To see if parathyroid etiology, otherwise may require secondary work up for hypercalcemia evaluation  -Check Ionized calcium  -Trend phos daily        Thank you for the opportunity to participate in the care of your patient. The nephrology service remains available to assist with any questions or concerns. Please feel free to reach us by paging the on-call nephrology fellow for urgent issues or as below.     Aj Cardoso D.O.  PGY-4, Nephrology Fellow    P: [phone]

## 2022-03-18 NOTE — PROGRESS NOTE ADULT - PROBLEM SELECTOR PLAN 3
Patient has ATN in 12/2021 and is now requiring Dialysis. Currently getting dialysis on T/Th/Sat and is tolerating dialysis. Appreciate Nephrology involvement.

## 2022-03-18 NOTE — PROGRESS NOTE ADULT - ASSESSMENT
47 year old woman with respiratory failure, Functional quadriplegia, ESRD on dialysis, Severe Sepsis, subarachnoid Hemorrhage  and encounter for palliative care.

## 2022-03-18 NOTE — PROGRESS NOTE ADULT - SUBJECTIVE AND OBJECTIVE BOX
AILYN DÍAZ             MRN-[id number]    CC: gluteal abscess    HPI:  46F w/ PMHx of HTN, MS, functional quadriplegic 2/2 h/o ruptured left middle cerebral artery aneurysm with intraparenchymal hemorrhage, SAH, and left subdural hematoma with midline shift and herniation December 2021 s/p hemicraniotomy and s/p trach/PEG, with prolonged hospital course complicated by b/l PNTX, PNA, multiorgan failure, ESRD on HD (T/Th/S) 2/2 anuric ATN, and Sacral decubitus ulcer s/p debridement by plastics, sent in from NH for evaluation of decubitus ulcer and PEG tube. Patient nonverbal at baseline no family at bedside. ED provider spoke with HCP over the phone who report that family is concerned that the NH is not managing her wounds appropriately and that patient has had PEG since November and has not had it replaced; does not know when it was last used or when feeding tube adapter lost/missing; no other concerns or complaints.     In the ED:  Initial vital signs: T: 100.9 F rectal, HR: 105, BP: 129/95, R: 14, SpO2: 100% on ventilator  Labs: significant for WBC 17.3, Hgb 9.2, lactate 2.6, BUN 59, Cr 0.71, UA negative nitrites, small LEs, 5-10 WBC, Bacteria present  Imaging:  CT A/P w/ IV contrast: Intramuscular abscess medial right gluteus valente adjacent to large sacral decubitus ulcer with exposed bone probably underlying osteomyelitis, few tiny bony fragments in region of previously seen coccyx, eroded or surgically removed. Probable developing left lower lobe pneumonia, less likely pulmonary edema. Central venous catheter tip at junction of suprahepatic IVC and left atrium. Consider retraction. Small bowel intussusception in left mid abdomen, likely transient, doubtful clinically significance.  CXR: No pneumonia or CHF  EKG: Sinus tach  Medications: Vanc 1g, Cefepime 1g, NS 2.5L, Ketorolac 15 IV, Tylenol 650  Consults: Gen surg, Renal (17 Mar 2022 02:03)    SUBJECTIVE: Patient resting in bed, no distress noted.     ROS:  DYSPNEA (Ana): 0	  NAUS/VOM: N	  SECRETIONS: N	  AGITATION: N  Pain (Y/N):     N  -Provocation/Palliation: n/a   -Quality/Quantity: n/a   -Radiating: n/a   -Severity: n/a   -Timing/Frequency: n/a   -Impact on ADLs: n/a     OTHER REVIEW OF SYSTEMS: unable to obtain   UNABLE TO OBTAIN  due to: Cognitive impairment     PEx:  T(C): 36.9 (03-18-22 @ 10:03), Max: 37.2 (03-17-22 @ 22:03)  HR: 104 (03-18-22 @ 10:03) (95 - 113)  BP: 138/87 (03-18-22 @ 10:03) (112/79 - 161/108)  RR: 18 (03-18-22 @ 10:03) (14 - 19)  SpO2: 100% (03-18-22 @ 10:03) (98% - 100%)  Wt(kg): 70.9kg      GENERAL:  [x ]Alert  [ ]Oriented x   [ ]Lethargic  [ ]Cachexia  [ ]Unarousable  [ ]Verbal  [ x]Non-Verbal  Behavioral:   [ ] Anxiety  [ ] Delirium [ ] Agitation [x ] Other - calm   HEENT:  [ ]Normal   [ ]Dry mouth   [ x]Trach to vent  [ ]Oral lesions  PULMONARY:   [ ]Clear  [ ]Tachypnea  [ ]Audible excessive secretions   [ x]Rhonchi  anteriorly        [ ]Right [ ]Left [ ]Bilateral  [ ]Crackles        [ ]Right [ ]Left [ ]Bilateral  [ ]Wheezing     [ ]Right [ ]Left [ ]Bilateral  CARDIOVASCULAR:    [ ]Regular [ ]Irregular [x ]Tachy  [ ]Nirav [ ]Murmur [ ]Other  GASTROINTESTINAL:  [ x]Soft  [ ]Distended   [ ]+BS  [x ]Non tender [ ]Tender  [x ]PEG [ ]OGT/ NGT  Last BM:   GENITOURINARY:  [ ]Normal [x ] Incontinent   [ ]Oliguria/Anuria   [ ]Ureña  MUSCULOSKELETAL:   [ ]Normal   [ ]Weakness  [ x]Bed/Wheelchair bound [ ]Edema  NEUROLOGIC:   [ ]No focal deficits  [ x] Cognitive impairment  [ ] Dysphagia [ ]Dysarthria [ ] Paresis [ ]Other   SKIN:   [ ]Normal   [x ]Pressure ulcer(s) - sacrum stage IV  [ ]Rash      ALLERGIES: No Known Allergies      OPIATE NAÏVE (Y/N): Y    MEDICATIONS: REVIEWED  MEDICATIONS  (STANDING):  acetylcysteine 20%  Inhalation 4 milliLiter(s) Inhalation every 6 hours  amLODIPine   Tablet 10 milliGRAM(s) Oral daily  artificial tears (preservative free) Ophthalmic Solution 1 Drop(s) Both EYES two times a day  chlorhexidine 0.12% Liquid 15 milliLiter(s) Oral Mucosa every 12 hours  heparin   Injectable 5000 Unit(s) SubCutaneous every 8 hours  levETIRAcetam  IVPB 500 milliGRAM(s) IV Intermittent every 24 hours  piperacillin/tazobactam IVPB.. 4.5 Gram(s) IV Intermittent every 6 hours  senna 2 Tablet(s) Oral at bedtime  vancomycin  IVPB 1000 milliGRAM(s) IV Intermittent every 24 hours    MEDICATIONS  (PRN):      LABS: REVIEWED  CBC:                        8.8    11.99 )-----------( 269      ( 18 Mar 2022 08:52 )             28.5     CMP:    03-18    138  |  94<L>  |  33<H>  ----------------------------<  144<H>  3.3<L>   |  26  |  0.95    Ca    10.2      18 Mar 2022 08:52  Phos  2.8     03-18  Mg     2.1     03-18    TPro  8.5<H>  /  Alb  3.7  /  TBili  0.3  /  DBili  x   /  AST  32  /  ALT  39  /  AlkPhos  122<H>  03-16      IMAGING: REVIEWED    Advanced Directives:     Full Code     DECISION MAKER: Patient is unable to make decision for himself.  LEGAL SURROGATE: Wilbertomariana Enoc [phone]    PSYCHOSOCIAL-SPIRITUAL ASSESSMENT:       Reviewed       Care plan unchanged    GOALS OF CARE DISCUSSION       Palliative care info/counseling provided	           Documentation of GOC: Full code   	      AGENCY CHOICE DISCUSSED:     non    REFERRALS	        Patient and family support       Unit SW/Case Mgmt

## 2022-03-18 NOTE — PROGRESS NOTE ADULT - SUBJECTIVE AND OBJECTIVE BOX
SUBJECTIVE: Patient seen and examined bedside. Pt AAOx1 at baseline s/p intracerebral hemorrhage, no subjective history obtained    amLODIPine   Tablet 10 milliGRAM(s) Oral daily  heparin   Injectable 5000 Unit(s) SubCutaneous every 8 hours  piperacillin/tazobactam IVPB.. 4.5 Gram(s) IV Intermittent every 6 hours  vancomycin  IVPB 1000 milliGRAM(s) IV Intermittent every 24 hours      Vital Signs Last 24 Hrs  T(C): 37.1 (18 Mar 2022 06:25), Max: 37.2 (17 Mar 2022 22:03)  T(F): 98.7 (18 Mar 2022 06:25), Max: 99 (17 Mar 2022 22:03)  HR: 103 (18 Mar 2022 06:25) (94 - 113)  BP: 120/86 (18 Mar 2022 06:25) (112/79 - 161/108)  BP(mean): --  RR: 15 (18 Mar 2022 06:25) (14 - 19)  SpO2: 100% (18 Mar 2022 06:25) (98% - 100%)  I&O's Detail    17 Mar 2022 07:01  -  18 Mar 2022 07:00  --------------------------------------------------------  IN:    Other (mL): 400 mL  Total IN: 400 mL    OUT:    Other (mL): 2400 mL    Voided (mL): 400 mL  Total OUT: 2800 mL    Total NET: -2400 mL          General: NAD, resting comfortably in bed  C/V: NSR  Pulm: Nonlabored breathing, no respiratory distress  Abd: soft, NT/ND. PEG tube in place, flushing well  Sacrum: sacral decub ulcer extending to bone with foul odor, mild purulence on packing noted  Extrem: WWP, no edema, SCDs in place        LABS:                        7.9    14.51 )-----------( 275      ( 17 Mar 2022 12:08 )             25.9     03-17    143  |  105  |  47<H>  ----------------------------<  120<H>  2.9<LL>   |  24  |  0.62    Ca    10.3      17 Mar 2022 12:08  Phos  2.8     03-17  Mg     2.1     03-17    TPro  8.5<H>  /  Alb  3.7  /  TBili  0.3  /  DBili  x   /  AST  32  /  ALT  39  /  AlkPhos  122<H>  03-16    PT/INR - ( 16 Mar 2022 14:34 )   PT: 13.3 sec;   INR: 1.12          PTT - ( 16 Mar 2022 14:34 )  PTT:24.8 sec  Urinalysis Basic - ( 16 Mar 2022 15:58 )    Color: Yellow / Appearance: Clear / S.010 / pH: x  Gluc: x / Ketone: NEGATIVE  / Bili: Negative / Urobili: 0.2 E.U./dL   Blood: x / Protein: Trace mg/dL / Nitrite: NEGATIVE   Leuk Esterase: Small / RBC: < 5 /HPF / WBC 5-10 /HPF   Sq Epi: x / Non Sq Epi: 0-5 /HPF / Bacteria: Present /HPF        RADIOLOGY & ADDITIONAL STUDIES:

## 2022-03-18 NOTE — PROGRESS NOTE ADULT - PROBLEM SELECTOR PLAN 4
2/2 ATN in December 2021. Anuric.  - Renal consulted, S/p HD today     To do:   - C/w HD on T/Th/Sa  - given the MRI, would need HD on 2 conseq days after the MRI     #HTN  - cw amlodipine 10 mg daily 2/2 ATN in December 2021. Anuric.  - Renal consulted, S/p HD today     To do:   - C/w HD on T/Th/Sa  - Dialysis today    #HTN  - cw amlodipine 10 mg daily  - montior vitals

## 2022-03-18 NOTE — PROGRESS NOTE ADULT - PROBLEM SELECTOR PLAN 1
Febrile 100.9, tachycardic 105, WBC 17, lactate 2.6, likely 2/2 gluteal abscess with underlying osteomyelitis vs. probable left lower lobe pneumonia.  - S/p 2.5L NS and Vanc and cefepime in ED  - Blood and urine culture negative; , CRP 30.8  - Surgery consulted for PEG replacement and abscess drainage     To do:   - C/w Vanc and Zosyn  - F/u Vanv trough  - f/u MRI pelvis to r/o osteomyelitis Febrile 100.9, tachycardic 105, WBC 17, lactate 2.6, likely 2/2 gluteal abscess with underlying osteomyelitis vs. probable left lower lobe pneumonia.  - S/p 2.5L NS and Vanc and cefepime in ED  - Blood and urine culture negative; , CRP 30.8  - s/p IR intervention and ruled out abscess    To do:   - C/w Vanc and Zosyn  - F/u Vanv trough  - Midline for remaining ab course

## 2022-03-18 NOTE — PROGRESS NOTE ADULT - PROBLEM SELECTOR PLAN 4
Patient presented with severe sepsis most likely from her gluteal abscess with underlying osteomyelitis. Is currently getting vancomycin and Zosyn. Continue care as per primary team. Patient is pending MRI of her pelvic area.

## 2022-03-18 NOTE — CONSULT NOTE ADULT - ASSESSMENT
45 y/o female with PMHx of HTN and MS, functional quadriplegic 2/2 h/o ruptured left middle cerebral artery aneurysm with intraparenchymal hemorrhage, SAH, and left subdural hematoma with midline shift and herniation 12/21 s/p hemicraniotomy, and prolonged hospital course complicated by bilat ptx, pna, multiorgan failure, ATN requiring dialysis, and hx of Sacral decubitus ulcer s/p debridement by plastics who was sent from NH fro evaluation of decubitus ulcer and PEG tube, found to have sepsis of unknonw etiology. General surgery consulted for PEG tube evaluation.     plan:   no acute surgical intervention at this time   recommend IR consult for PEG tube replacement   recommend workup for source of infection; wound recommend CT Abd/pelvis with IV contrast, to rule out abdomen/PEG site as source of infection   will continue to follow   
48 yo female with functional quadriplegia since ruptured brain aneurysm, course c/b VAP 11/2021 and Serratia BSI 12/2021 now presents from nursing home for evaluation of pressure ulcers/decubiti; found to have 2 X 3 cm R gluteus valente abscess proximate to sacral decubitus ulcer.  - f/u GOC given she has an incurable infection (chronic sacral osteomyelitis due to inability to offload pressure and regular feculent contamination of site)  - advise against MRI pelvis as will be unlikely to  and will be likely to prolong length of stay  - would revisit I&D of gluteal abscess with surgery; incision and drainage remains the mainstay for management of abscesses; antibiotics are secondary; if/when performed advise send specimen for gram stain and culture (although expect polymicrobial growth given location of abscess)  - check pre-HD vancomycin level 3/19; if < 20, advise redose 1g post HD on 3/19  - continue Zosyn--adjust to 4.5g IV q12h  - anticipate 1-2 week course of above for treatment of soft tissue infection; I do not intend to commit the patient to a futile 6-week course of IV antibiotics for incurable osteomyelitis    d/w primary team    Dr. Shields to cover this evening through Sunday; I return Monday 3/21    ID Team 2
Recommendation: Consistent with the ethical principle of beneficence, the clinical team must critically assess the efficacy of the therapies on a case-by-case basis to ascertain if an intervention will cause more harm than benefit. The clinical team has a duty to appropriately offer measures that promote comfort. In this case, certain interventions such as chest compressions and vasopressors may be considered non-beneficial by the health providers in light of this patient’s current condition. Particularly critical is ensuring palliation and comfort care measures for this patient as providing care is "everything that is medically possible at present." Goals of Care conversations with patient’s family will continue throughout patient’s hospital course, as the patient’s family may require the tincture of time to process the patient’s current prognosis.  Thank you for this challenging case. Please do not hesitate to call us if there are any questions.     Ethics Service will remain available for further conversation about the patient’s current condition and decision points that may occur.       					  DISCUSSED WITH MEDICAL ETHICS ATTENDINGS: ALEKSANDRA CARLSON MD & FRANCK REYNOSO DNP (DIRECTOR OF MEDICAL ETHICS)   More than 50% of the time of this consultation was spent in coordination of Care of Patient					      References: 		    1 [place]) Art. 29-CC (April 2010); [place] Family Health Care Decisions [place], [place] [facility] J., Vj. 2010, at 18.   2JCURT mayer, & AMBER Garsia (2015). Easing the [place] of Surrogate Decision Making: The Role of a Do-Not-Escalate-Treatment Order. Journal of palliative medicine, 18(3), 306-309.  
Assessment: 47F w/ PMHx of HTN, MS, functional quadriplegic 2/2 h/o ruptured left middle cerebral artery aneurysm with intraparenchymal hemorrhage, SAH, and left subdural hematoma with midline shift and herniation December 2021 s/p hemicraniotomy and s/p trach (Dr. Whitfield) and PEG (Dr. Ovalle) in November 2021, with prolonged hospital course complicated by b/l PNTX, PNA, multiorgan failure, ESRD on HD (T/Th/S) 2/2 anuric ATN, and Sacral decubitus ulcer s/p debridement by plastics, sent in from NH for evaluation of decubitus ulcer and PEG tube. IR consulted for PEG evaluation. CT reading showed incidental finding of HD catheter in IVC. Per team, catheter is functioning well. Case reviewed with Dr. Pollock, surgically placed PEG tube-recommend surgical evaluation and management. HD catheter is functioning. No indication for IR intervention at this time for PEG or HD repositioning.     Communicated with: primary team    
47F  with pmhx of ESRD (TTHS) 2/2 anuric ATN (12/21), ICH, trach ,peg who presents to the hospital from nursing home w/ concern for decubitus ulcer evaluation.    Assessment/Plan:   #ESRD on HD TTHS @NH  usual Rx 3h   PT was started on HD 2/2 anuric ATN back in December of 2021. Has been maintained on HD at the NH on TTS schedule. Unclear if she has had any kidney recovery. Unclear of urine output per discussion with family. Will monitor while inpatient.   -Electrolytes at goal Na of 140, K of 3.5  -Has some edema b/l lower extremities unclear baseline    #access   [facility] TDC     #anemia  Hb within goal  obtain iron studies including ferritin, transferrin, iron, and TIBC to be able to calculate % saturation   transfusion as per primary team     #renal bone disease   Calcium found to be 11.0  -Can consider IVF  -Dialysis on low calcium bath  -Trend phos daily  -Check PTH      Thank you for the opportunity to participate in the care of your patient. The nephrology service remains available to assist with any questions or concerns. Please feel free to reach us by paging the on-call nephrology fellow for urgent issues or as below.     Aj Cardoso D.O.  PGY-4, Nephrology Fellow    P: [phone]   
47 year old woman with respiratory failure, Functional quadriplegia, ESRD on dialysis, Severe Sepsis, subarachnoid Hemorrhage  and encounter for palliative care.

## 2022-03-18 NOTE — PROGRESS NOTE ADULT - SUBJECTIVE AND OBJECTIVE BOX
--------------------------------------------------------------------------------  Chief Complaint: ESRD/Ongoing hemodialysis requirement    24 hour events/subjective:  Seen this AM, remains stable. To go for MRI w/ IV contrast at some point. Will require HD after.  HD done yesterday 2L UF removed, tolerated well    PAST HISTORY  --------------------------------------------------------------------------------  No significant changes to PMH, PSH, FHx, SHx, unless otherwise noted    ALLERGIES & MEDICATIONS  --------------------------------------------------------------------------------  Allergies    No Known Allergies    Intolerances      Standing Inpatient Medications  acetylcysteine 20%  Inhalation 4 milliLiter(s) Inhalation every 6 hours  amLODIPine   Tablet 10 milliGRAM(s) Oral daily  artificial tears (preservative free) Ophthalmic Solution 1 Drop(s) Both EYES two times a day  chlorhexidine 0.12% Liquid 15 milliLiter(s) Oral Mucosa every 12 hours  heparin   Injectable 5000 Unit(s) SubCutaneous every 8 hours  levETIRAcetam  IVPB 500 milliGRAM(s) IV Intermittent every 24 hours  piperacillin/tazobactam IVPB.. 4.5 Gram(s) IV Intermittent every 6 hours  senna 2 Tablet(s) Oral at bedtime  vancomycin  IVPB 1000 milliGRAM(s) IV Intermittent every 24 hours    PRN Inpatient Medications      REVIEW OF SYSTEMS  --------------------------------------------------------------------------------  ROS negative except as per HPI    VITALS/PHYSICAL EXAM  --------------------------------------------------------------------------------  T(C): 37.1 (03-18-22 @ 06:25), Max: 37.2 (03-17-22 @ 22:03)  HR: 103 (03-18-22 @ 06:25) (94 - 113)  BP: 120/86 (03-18-22 @ 06:25) (112/79 - 161/108)  RR: 15 (03-18-22 @ 06:25) (14 - 19)  SpO2: 100% (03-18-22 @ 06:25) (98% - 100%)  Wt(kg): --  Drug Dosing Weight  Height (cm): 156 (16 Mar 2022 13:26)  Weight (kg): 70.9 (16 Mar 2022 23:05)  BMI (kg/m2): 29.1 (16 Mar 2022 23:05)  BSA (m2): 1.71 (16 Mar 2022 23:05)  Height (cm): 156 (03-16-22 @ 13:26)  Weight (kg): 70.9 (03-16-22 @ 23:05)  BMI (kg/m2): 29.1 (03-16-22 @ 23:05)  BSA (m2): 1.71 (03-16-22 @ 23:05)      03-17-22 @ 07:01  -  03-18-22 @ 07:00  --------------------------------------------------------  IN: 400 mL / OUT: 2800 mL / NET: -2400 mL    PHYSICAL EXAM:  GENERAL: AAOx0 w/ trach   HEENT: KWAME, EOMI, neck supple, no JVP  CHEST/LUNG: Bilateral clear breath sounds  HEART: tachyardic no murmurs rubs gallops or clicks   ABDOMEN: Soft, nontender, non distended, peg tube in place  EXTREMITIES: no pedal edema, 1+ edema b/l   ACCESS: R [facility]    LABS/STUDIES  --------------------------------------------------------------------------------              7.9    14.51 >-----------<  275      [03-17-22 @ 12:08]              25.9     143  |  105  |  47  ----------------------------<  120      [03-17-22 @ 12:08]  2.9   |  24  |  0.62        Ca     10.3     [03-17-22 @ 12:08]      Mg     2.1     [03-17-22 @ 12:08]      Phos  2.8     [03-17-22 @ 12:08]    TPro  8.5  /  Alb  3.7  /  TBili  0.3  /  DBili  x   /  AST  32  /  ALT  39  /  AlkPhos  122  [03-16-22 @ 14:34]    PT/INR: PT 13.3 , INR 1.12       [03-16-22 @ 14:34]  PTT: 24.8       [03-16-22 @ 14:34]      Iron 61, TIBC 173, %sat 35      [03-17-22 @ 12:09]  Ferritin 2188      [03-17-22 @ 12:09]  PTH -- (Ca 10.2)      [03-18-22 @ 04:18]   16  PTH -- (Ca 8.8)      [11-29-21 @ 10:47]   77  Vitamin D (25OH) 16.9      [11-29-21 @ 10:47]  TSH 0.227      [10-27-21 @ 05:22]  Lipid: chol --, , HDL --, LDL --      [11-21-21 @ 04:47]    HBsAb Reactive      [03-17-22 @ 18:44]  HBsAg Nonreact      [03-17-22 @ 18:44]  HBcAb Reactive      [03-17-22 @ 18:44]  HCV 0.06, Nonreact      [03-17-22 @ 18:44]      RADIOLOGY:  --------------------------------------------------------------------------------------

## 2022-03-18 NOTE — PROGRESS NOTE ADULT - ASSESSMENT
47 y/o female with PMHx of HTN and MS, functional quadriplegic 2/2 h/o ruptured left middle cerebral artery aneurysm with intraparenchymal hemorrhage, SAH, and left subdural hematoma with midline shift and herniation 12/21 s/p hemicraniotomy, and prolonged hospital course complicated by bilat ptx, pna, multiorgan failure, ATN requiring dialysis, and hx of Sacral decubitus ulcer s/p debridement by plastics who was sent from NH fro evaluation of decubitus ulcer and PEG tube, found to have sepsis of unknonw etiology. General surgery consulted for PEG tube evaluation. PEG tube replaced by GI and ready for immediate use.     plan:   no acute surgical intervention at this time   PEG tube working  Sacral decubitus ulcer unchanged, f/u pelvic MRI  will continue to follow

## 2022-03-18 NOTE — PROGRESS NOTE ADULT - PROBLEM SELECTOR PLAN 7
Patient last assessed:  3/17/2022 to manage: GOC/AD, symptoms, and support.  30 Minutes; Start: 0900am  End:  0930am , of non-face-to-face prolonged service provided that relates to (face-to-face) care that has or will occur and ongoing patient management, including one or more of the following:   - Reviewed records from other physicians or other health care professional services, including one or more of the following: other medical records and diagnostic / radiology study results

## 2022-03-18 NOTE — PROGRESS NOTE ADULT - PROBLEM SELECTOR PLAN 2
CT A/P: Intramuscular abscess medial right gluteus valente adjacent to large sacral decubitus ulcer with exposed bone probably underlying osteomyelitis  - Surgery consulted for PEG replacement and abscess drainage   - IR and ID consulted     To do:   - C/w Vanc and Zosyn  - F/u Vanv trough  - f/u MRI sacral spine to r/o osteomyelitis CT A/P: Intramuscular abscess medial right gluteus valente adjacent to large sacral decubitus ulcer with exposed bone probably underlying osteomyelitis   s/p IR intervention and ruled out abscess  - PEG adapter replaced by GI    To do:   - C/w Vanc and Zosyn  - F/u Vanv trough  - Midline for remaining ab course

## 2022-03-18 NOTE — PROGRESS NOTE ADULT - PROBLEM SELECTOR PLAN 5
Family complaining that PEG tube has not been changed and is currently not functioning.  - PEG replaced by GI Family complaining that PEG tube has not been changed and is currently not functioning.  - PEG replaced by GI: functioning well without any sign of inflammation and drainage

## 2022-03-19 LAB
ALBUMIN SERPL ELPH-MCNC: 3.4 G/DL — SIGNIFICANT CHANGE UP (ref 3.3–5)
ALP SERPL-CCNC: 100 U/L — SIGNIFICANT CHANGE UP (ref 40–120)
ALT FLD-CCNC: 20 U/L — SIGNIFICANT CHANGE UP (ref 10–45)
ANION GAP SERPL CALC-SCNC: 12 MMOL/L — SIGNIFICANT CHANGE UP (ref 5–17)
AST SERPL-CCNC: 17 U/L — SIGNIFICANT CHANGE UP (ref 10–40)
BASOPHILS # BLD AUTO: 0.03 K/UL — SIGNIFICANT CHANGE UP (ref 0–0.2)
BASOPHILS NFR BLD AUTO: 0.3 % — SIGNIFICANT CHANGE UP (ref 0–2)
BILIRUB SERPL-MCNC: 0.3 MG/DL — SIGNIFICANT CHANGE UP (ref 0.2–1.2)
BUN SERPL-MCNC: 38 MG/DL — HIGH (ref 7–23)
CA-I BLD-SCNC: 6.1 MG/DL — HIGH (ref 4.5–5.6)
CALCIUM SERPL-MCNC: 10.6 MG/DL — HIGH (ref 8.4–10.5)
CHLORIDE SERPL-SCNC: 93 MMOL/L — LOW (ref 96–108)
CO2 SERPL-SCNC: 28 MMOL/L — SIGNIFICANT CHANGE UP (ref 22–31)
CREAT SERPL-MCNC: 1.29 MG/DL — SIGNIFICANT CHANGE UP (ref 0.5–1.3)
CULTURE RESULTS: SIGNIFICANT CHANGE UP
EGFR: 52 ML/MIN/1.73M2 — LOW
EOSINOPHIL # BLD AUTO: 0.12 K/UL — SIGNIFICANT CHANGE UP (ref 0–0.5)
EOSINOPHIL NFR BLD AUTO: 1.2 % — SIGNIFICANT CHANGE UP (ref 0–6)
GAMMA INTERFERON BACKGROUND BLD IA-ACNC: 0.04 IU/ML — SIGNIFICANT CHANGE UP
GLUCOSE SERPL-MCNC: 123 MG/DL — HIGH (ref 70–99)
HCT VFR BLD CALC: 26.1 % — LOW (ref 34.5–45)
HGB BLD-MCNC: 8.1 G/DL — LOW (ref 11.5–15.5)
IMM GRANULOCYTES NFR BLD AUTO: 0.5 % — SIGNIFICANT CHANGE UP (ref 0–1.5)
LYMPHOCYTES # BLD AUTO: 2.52 K/UL — SIGNIFICANT CHANGE UP (ref 1–3.3)
LYMPHOCYTES # BLD AUTO: 24.4 % — SIGNIFICANT CHANGE UP (ref 13–44)
M TB IFN-G BLD-IMP: NEGATIVE — SIGNIFICANT CHANGE UP
M TB IFN-G CD4+ BCKGRND COR BLD-ACNC: 0 IU/ML — SIGNIFICANT CHANGE UP
M TB IFN-G CD4+CD8+ BCKGRND COR BLD-ACNC: 0.01 IU/ML — SIGNIFICANT CHANGE UP
MAGNESIUM SERPL-MCNC: 2.1 MG/DL — SIGNIFICANT CHANGE UP (ref 1.6–2.6)
MCHC RBC-ENTMCNC: 24.2 PG — LOW (ref 27–34)
MCHC RBC-ENTMCNC: 31 GM/DL — LOW (ref 32–36)
MCV RBC AUTO: 77.9 FL — LOW (ref 80–100)
MONOCYTES # BLD AUTO: 0.9 K/UL — SIGNIFICANT CHANGE UP (ref 0–0.9)
MONOCYTES NFR BLD AUTO: 8.7 % — SIGNIFICANT CHANGE UP (ref 2–14)
NEUTROPHILS # BLD AUTO: 6.72 K/UL — SIGNIFICANT CHANGE UP (ref 1.8–7.4)
NEUTROPHILS NFR BLD AUTO: 64.9 % — SIGNIFICANT CHANGE UP (ref 43–77)
NRBC # BLD: 0 /100 WBCS — SIGNIFICANT CHANGE UP (ref 0–0)
PHOSPHATE SERPL-MCNC: 2.9 MG/DL — SIGNIFICANT CHANGE UP (ref 2.5–4.5)
PLATELET # BLD AUTO: 317 K/UL — SIGNIFICANT CHANGE UP (ref 150–400)
POTASSIUM SERPL-MCNC: 2.7 MMOL/L — CRITICAL LOW (ref 3.5–5.3)
POTASSIUM SERPL-SCNC: 2.7 MMOL/L — CRITICAL LOW (ref 3.5–5.3)
PROT SERPL-MCNC: 7.6 G/DL — SIGNIFICANT CHANGE UP (ref 6–8.3)
QUANT TB PLUS MITOGEN MINUS NIL: 9.96 IU/ML — SIGNIFICANT CHANGE UP
RBC # BLD: 3.35 M/UL — LOW (ref 3.8–5.2)
RBC # FLD: 16.8 % — HIGH (ref 10.3–14.5)
SODIUM SERPL-SCNC: 133 MMOL/L — LOW (ref 135–145)
VANCOMYCIN TROUGH SERPL-MCNC: 22 UG/ML — HIGH (ref 10–20)
WBC # BLD: 10.34 K/UL — SIGNIFICANT CHANGE UP (ref 3.8–10.5)
WBC # FLD AUTO: 10.34 K/UL — SIGNIFICANT CHANGE UP (ref 3.8–10.5)

## 2022-03-19 PROCEDURE — 99232 SBSQ HOSP IP/OBS MODERATE 35: CPT

## 2022-03-19 PROCEDURE — 99233 SBSQ HOSP IP/OBS HIGH 50: CPT | Mod: GC

## 2022-03-19 PROCEDURE — 90935 HEMODIALYSIS ONE EVALUATION: CPT

## 2022-03-19 RX ORDER — ERYTHROPOIETIN 10000 [IU]/ML
7000 INJECTION, SOLUTION INTRAVENOUS; SUBCUTANEOUS ONCE
Refills: 0 | Status: COMPLETED | OUTPATIENT
Start: 2022-03-19 | End: 2022-03-19

## 2022-03-19 RX ADMIN — PIPERACILLIN AND TAZOBACTAM 200 GRAM(S): 4; .5 INJECTION, POWDER, LYOPHILIZED, FOR SOLUTION INTRAVENOUS at 13:25

## 2022-03-19 RX ADMIN — ERYTHROPOIETIN 7000 UNIT(S): 10000 INJECTION, SOLUTION INTRAVENOUS; SUBCUTANEOUS at 12:36

## 2022-03-19 RX ADMIN — CHLORHEXIDINE GLUCONATE 15 MILLILITER(S): 213 SOLUTION TOPICAL at 17:11

## 2022-03-19 RX ADMIN — Medication 4 MILLILITER(S): at 06:17

## 2022-03-19 RX ADMIN — HEPARIN SODIUM 5000 UNIT(S): 5000 INJECTION INTRAVENOUS; SUBCUTANEOUS at 06:17

## 2022-03-19 RX ADMIN — Medication 4 MILLILITER(S): at 11:07

## 2022-03-19 RX ADMIN — AMLODIPINE BESYLATE 10 MILLIGRAM(S): 2.5 TABLET ORAL at 06:17

## 2022-03-19 RX ADMIN — Medication 4 MILLILITER(S): at 22:21

## 2022-03-19 RX ADMIN — Medication 4 MILLILITER(S): at 17:11

## 2022-03-19 RX ADMIN — LEVETIRACETAM 400 MILLIGRAM(S): 250 TABLET, FILM COATED ORAL at 09:13

## 2022-03-19 RX ADMIN — HEPARIN SODIUM 5000 UNIT(S): 5000 INJECTION INTRAVENOUS; SUBCUTANEOUS at 22:21

## 2022-03-19 RX ADMIN — HEPARIN SODIUM 5000 UNIT(S): 5000 INJECTION INTRAVENOUS; SUBCUTANEOUS at 13:26

## 2022-03-19 RX ADMIN — Medication 1 DROP(S): at 01:03

## 2022-03-19 RX ADMIN — CHLORHEXIDINE GLUCONATE 15 MILLILITER(S): 213 SOLUTION TOPICAL at 06:12

## 2022-03-19 RX ADMIN — Medication 1 DROP(S): at 12:18

## 2022-03-19 NOTE — PROGRESS NOTE ADULT - SUBJECTIVE AND OBJECTIVE BOX
OVERNIGHT EVENTS:    SUBJECTIVE / INTERVAL HPI: Patient seen and examined at bedside.     VITAL SIGNS:  Vital Signs Last 24 Hrs  T(C): 36.3 (19 Mar 2022 09:05), Max: 36.9 (18 Mar 2022 10:03)  T(F): 97.4 (19 Mar 2022 09:05), Max: 98.5 (18 Mar 2022 10:03)  HR: 88 (19 Mar 2022 09:05) (80 - 104)  BP: 151/99 (19 Mar 2022 09:05) (138/87 - 151/99)  BP(mean): --  RR: 14 (19 Mar 2022 09:05) (14 - 18)  SpO2: 100% (19 Mar 2022 09:05) (100% - 100%)    PHYSICAL EXAM:    General: WDWN  HEENT: NC/AT; PERRL, anicteric sclera; MMM  Neck: supple  Cardiovascular: +S1/S2, RRR  Respiratory: CTA B/L; no W/R/R  Gastrointestinal: soft, NT/ND; +BSx4  Extremities: WWP; no edema, clubbing or cyanosis  Vascular: 2+ radial, DP/PT pulses B/L  Neurological: AAOx3; no focal deficits    MEDICATIONS:  MEDICATIONS  (STANDING):  acetylcysteine 20%  Inhalation 4 milliLiter(s) Inhalation every 6 hours  amLODIPine   Tablet 10 milliGRAM(s) Oral daily  artificial tears (preservative free) Ophthalmic Solution 1 Drop(s) Both EYES two times a day  chlorhexidine 0.12% Liquid 15 milliLiter(s) Oral Mucosa every 12 hours  epoetin nannette-epbx (RETACRIT) Injectable 7000 Unit(s) IV Push once  heparin   Injectable 5000 Unit(s) SubCutaneous every 8 hours  levETIRAcetam  IVPB 500 milliGRAM(s) IV Intermittent every 24 hours  piperacillin/tazobactam IVPB.. 4.5 Gram(s) IV Intermittent every 12 hours  senna 2 Tablet(s) Oral at bedtime    MEDICATIONS  (PRN):      ALLERGIES:  Allergies    No Known Allergies    Intolerances        LABS:                        8.8    11.99 )-----------( 269      ( 18 Mar 2022 08:52 )             28.5     03-18    138  |  94<L>  |  33<H>  ----------------------------<  144<H>  3.3<L>   |  26  |  0.95    Ca    10.2      18 Mar 2022 08:52  Phos  2.8     03-18  Mg     2.1     03-18          CAPILLARY BLOOD GLUCOSE          RADIOLOGY & ADDITIONAL TESTS: Reviewed. OVERNIGHT EVENTS: No acute events overnight    SUBJECTIVE / INTERVAL HPI: Patient seen and examined at bedside.     VITAL SIGNS:  Vital Signs Last 24 Hrs  T(C): 36.3 (19 Mar 2022 09:05), Max: 36.9 (18 Mar 2022 10:03)  T(F): 97.4 (19 Mar 2022 09:05), Max: 98.5 (18 Mar 2022 10:03)  HR: 88 (19 Mar 2022 09:05) (80 - 104)  BP: 151/99 (19 Mar 2022 09:05) (138/87 - 151/99)  BP(mean): --  RR: 14 (19 Mar 2022 09:05) (14 - 18)  SpO2: 100% (19 Mar 2022 09:05) (100% - 100%)    PHYSICAL EXAM:  General: breathing via ventilator   HEENT: PERRL, anicteric sclera; MMM  Cardiovascular: +S1/S2, RRR  Respiratory: CTA B/L; no W/R/R, Trach in place   Gastrointestinal: soft, NT/ND; +BSx4, PEG with adapter in place appears clean and dry  Extremities: WWP; no edema, clubbing or cyanosis  Vascular: 2+ radial, DP/PT pulses B/L  Neurological: AAOx0, strenght & sensation absent b/l UE and LE       MEDICATIONS:  MEDICATIONS  (STANDING):  acetylcysteine 20%  Inhalation 4 milliLiter(s) Inhalation every 6 hours  amLODIPine   Tablet 10 milliGRAM(s) Oral daily  artificial tears (preservative free) Ophthalmic Solution 1 Drop(s) Both EYES two times a day  chlorhexidine 0.12% Liquid 15 milliLiter(s) Oral Mucosa every 12 hours  epoetin nannette-epbx (RETACRIT) Injectable 7000 Unit(s) IV Push once  heparin   Injectable 5000 Unit(s) SubCutaneous every 8 hours  levETIRAcetam  IVPB 500 milliGRAM(s) IV Intermittent every 24 hours  piperacillin/tazobactam IVPB.. 4.5 Gram(s) IV Intermittent every 12 hours  senna 2 Tablet(s) Oral at bedtime    MEDICATIONS  (PRN):      ALLERGIES:  Allergies    No Known Allergies    Intolerances        LABS:                        8.8    11.99 )-----------( 269      ( 18 Mar 2022 08:52 )             28.5     03-18    138  |  94<L>  |  33<H>  ----------------------------<  144<H>  3.3<L>   |  26  |  0.95    Ca    10.2      18 Mar 2022 08:52  Phos  2.8     03-18  Mg     2.1     03-18          CAPILLARY BLOOD GLUCOSE          RADIOLOGY & ADDITIONAL TESTS: Reviewed.

## 2022-03-19 NOTE — PROGRESS NOTE ADULT - SUBJECTIVE AND OBJECTIVE BOX
Patient is a 47y Female seen and evaluated at bedside during hemodialysis; no acute events overnight patient hemodynamically stable-        Meds:    acetylcysteine 20%  Inhalation 4 every 6 hours  amLODIPine   Tablet 10 daily  artificial tears (preservative free) Ophthalmic Solution 1 two times a day  chlorhexidine 0.12% Liquid 15 every 12 hours  epoetin nannette-epbx (RETACRIT) Injectable 7000 once  heparin   Injectable 5000 every 8 hours  levETIRAcetam  IVPB 500 every 24 hours  piperacillin/tazobactam IVPB.. 4.5 every 12 hours  senna 2 at bedtime      T(C): , Max: 36.4 (03-18-22 @ 16:18)  T(F): , Max: 97.6 (03-18-22 @ 16:18)  HR: 97 (03-19-22 @ 10:10)  BP: 144/96 (03-19-22 @ 10:10)  BP(mean): --  RR: 14 (03-19-22 @ 10:10)  SpO2: 100% (03-19-22 @ 10:10)  Wt(kg): --    03-18 @ 07:01  -  03-19 @ 07:00  --------------------------------------------------------  IN: 825 mL / OUT: 0 mL / NET: 825 mL          Review of Systems:  CONSTITUTIONAL: No fever or chills, No fatigue or tiredness  RESPIRATORY: No shortness of breath, cough, hemoptysis  CARDIOVASCULAR: No chest pain or shortness of breath  GASTROINTESTINAL: No abdominal or flank pain, No nausea or vomiting, No diarrhea  GENITOURINARY: No dysuria or urinary burning, No difficulty passing urine, No hematuria  NEUROLOGICAL: No headaches or blurred vision  SKIN: No skin rashes   MUSCULOSKELETAL: No arthralgia, No leg edema, No muscle pain      PHYSICAL EXAM:  GENERAL: well-developed, well nourished, alert, no acute distress at present  NECK: supple, No JVD  CHEST/LUNG: Clear to auscultation bilaterally  HEART: normal S1S2, RRR  ABDOMEN: Soft, Nontender, +BS, No flank tenderness bilateral  EXTREMITIES: No clubbing, cyanosis, or edema   NEUROLOGY: AAO x3, no focal neurological deficit  ACCESS: good thrill and bruit appreciated      LABS:                        8.1    10.34 )-----------( 317      ( 19 Mar 2022 10:58 )             26.1     03-18    138  |  94<L>  |  33<H>  ----------------------------<  144<H>  3.3<L>   |  26  |  0.95    Ca    10.2      18 Mar 2022 08:52  Phos  2.8     03-18  Mg     2.1     03-18                  RADIOLOGY & ADDITIONAL STUDIES:           Patient is a 47y Female seen and evaluated at bedside during hemodialysis; no acute events overnight patient hemodynamically stable- has not gotten her MRI yet; to aim for 2L UF off w/ HD       Meds:    acetylcysteine 20%  Inhalation 4 every 6 hours  amLODIPine   Tablet 10 daily  artificial tears (preservative free) Ophthalmic Solution 1 two times a day  chlorhexidine 0.12% Liquid 15 every 12 hours  epoetin nannette-epbx (RETACRIT) Injectable 7000 once  heparin   Injectable 5000 every 8 hours  levETIRAcetam  IVPB 500 every 24 hours  piperacillin/tazobactam IVPB.. 4.5 every 12 hours  senna 2 at bedtime      T(C): , Max: 36.4 (03-18-22 @ 16:18)  T(F): , Max: 97.6 (03-18-22 @ 16:18)  HR: 97 (03-19-22 @ 10:10)  BP: 144/96 (03-19-22 @ 10:10)  BP(mean): --  RR: 14 (03-19-22 @ 10:10)  SpO2: 100% (03-19-22 @ 10:10)  Wt(kg): --    03-18 @ 07:01  -  03-19 @ 07:00  --------------------------------------------------------  IN: 825 mL / OUT: 0 mL / NET: 825 mL          Review of Systems:  unable to participate     PHYSICAL EXAM:  GENERAL: well-developed, well nourished, alert, no acute distress at present  NECK: supple, No JVD  CHEST/LUNG: Clear to auscultation bilaterally  HEART: normal S1S2, RRR  ABDOMEN: Soft, Nontender, +BS, No flank tenderness bilateral  EXTREMITIES: No clubbing, cyanosis, or edema   NEUROLOGY: AAO x3, no focal neurological deficit  ACCESS: good thrill and bruit appreciated      LABS:                        8.1    10.34 )-----------( 317      ( 19 Mar 2022 10:58 )             26.1     03-18    138  |  94<L>  |  33<H>  ----------------------------<  144<H>  3.3<L>   |  26  |  0.95    Ca    10.2      18 Mar 2022 08:52  Phos  2.8     03-18  Mg     2.1     03-18                  RADIOLOGY & ADDITIONAL STUDIES:           Patient is a 47y Female seen and evaluated at bedside during hemodialysis; no acute events overnight patient hemodynamically stable- has not gotten her MRI yet; to aim for 2L UF off w/ HD       Meds:    acetylcysteine 20%  Inhalation 4 every 6 hours  amLODIPine   Tablet 10 daily  artificial tears (preservative free) Ophthalmic Solution 1 two times a day  chlorhexidine 0.12% Liquid 15 every 12 hours  epoetin nannette-epbx (RETACRIT) Injectable 7000 once  heparin   Injectable 5000 every 8 hours  levETIRAcetam  IVPB 500 every 24 hours  piperacillin/tazobactam IVPB.. 4.5 every 12 hours  senna 2 at bedtime      T(C): , Max: 36.4 (22 @ 16:18)  T(F): , Max: 97.6 (22 @ 16:18)  HR: 97 (22 @ 10:10)  BP: 144/96 (22 @ 10:10)  BP(mean): --  RR: 14 (22 @ 10:10)  SpO2: 100% (22 @ 10:10)  Wt(kg): --     @ 07:01  -   @ 07:00  --------------------------------------------------------  IN: 825 mL / OUT: 0 mL / NET: 825 mL          Review of Systems:  unable to participate     PHYSICAL EXAM:  GENERAL: NAD  NECK: trach collar  CHEST/LUNG: mechanical breath sounds BL  HEART: normal S1S2, RRR  ABDOMEN: Soft, Nontender, +BS, No flank tenderness bilateral  EXTREMITIES: trace pedal edema BL  ACCESS: R TDC      LABS:                        8.1    10.34 )-----------( 317      ( 19 Mar 2022 10:58 )             26.1         138  |  94<L>  |  33<H>  ----------------------------<  144<H>  3.3<L>   |  26  |  0.95    Ca    10.2      18 Mar 2022 08:52  Phos  2.8       Mg     2.1                 Hemoglobin: 8.1 g/dL (22 @ 10:58)  Hemoglobin: 8.8 g/dL (22 @ 08:52)  Phosphorus Level, Serum: 2.8 mg/dL (22 @ 08:52)  Ferritin, Serum: 2188 ng/mL (22 @ 12:09)    Hepatitis B Surface Antigen: Nonreact (22 @ 18:44)  Hepatitis B Surface Antibody: Reactive (22 @ 18:44)    epoetin nannette-epbx (RETACRIT) Injectable 7000 Unit(s) IV Push once, 22 @ 08:32, Routine, Stop order after: 1 Doses  epoetin nannette-epbx (RETACRIT) Injectable 7000 Unit(s) IV Push once, 22 @ 07:36, Routine, Stop order after: 1 Doses    Hemodialysis Treatment.:     Schedule: Once, Modality: Hemodialysis, Access: Internal Jugular Central Venous Catheter    Dialyzer: Optiflux [id number], Time: 180 Min    Blood Flow: 400 mL/Min , Dialysate Flow: 500 mL/Min, Dialysate Temp: 36.5, Tubinmm (Adult)    Target Dry Weight: 69 kG    Dialysate Electrolytes (mEq/L): Potassium 4, Calcium 2.5, Sodium 138, Bicarbonate 35 (22 @ 08:34) [Completed]  Hemodialysis Treatment.:     Schedule: Once, Modality: Hemodialysis, Access: Internal Jugular Central Venous Catheter    Dialyzer: Optiflux [id number], Time: 180 Min    Blood Flow: 400 mL/Min , Dialysate Flow: 500 mL/Min, Dialysate Temp: 36.5, Tubinmm (Adult)    Target Dry Weight: 694 kG    Dialysate Electrolytes (mEq/L): Potassium 4, Calcium 2.5, Sodium 138, Bicarbonate 35 (22 @ 08:32) [Discontinued]  Hemodialysis Treatment.:     Schedule: Once, Modality: Hemodialysis, Access: Internal Jugular Central Venous Catheter    Dialyzer: Optiflux [id number], Time: 180 Min    Blood Flow: 400 mL/Min , Dialysate Flow: 500 mL/Min, Dialysate Temp: 36.5, Tubinmm (Adult)    Target Fluid Removal: 2 Liters    Target Dry Weight: 69 kG    Dialysate Electrolytes (mEq/L): Potassium 3, Calcium 2.5, Sodium 138, Bicarbonate 35 (22 @ 07:36) [Discontinued]        RADIOLOGY & ADDITIONAL STUDIES:           Patient is a 47y Female seen and evaluated at bedside during hemodialysis; no acute events overnight patient hemodynamically stable- will not be getting MRI; to aim for 2L UF off w/ HD       Meds:    acetylcysteine 20%  Inhalation 4 every 6 hours  amLODIPine   Tablet 10 daily  artificial tears (preservative free) Ophthalmic Solution 1 two times a day  chlorhexidine 0.12% Liquid 15 every 12 hours  epoetin nannette-epbx (RETACRIT) Injectable 7000 once  heparin   Injectable 5000 every 8 hours  levETIRAcetam  IVPB 500 every 24 hours  piperacillin/tazobactam IVPB.. 4.5 every 12 hours  senna 2 at bedtime      T(C): , Max: 36.4 (22 @ 16:18)  T(F): , Max: 97.6 (22 @ 16:18)  HR: 97 (22 @ 10:10)  BP: 144/96 (22 @ 10:10)  BP(mean): --  RR: 14 (22 @ 10:10)  SpO2: 100% (22 @ 10:10)  Wt(kg): --     @ 07:01  -   @ 07:00  --------------------------------------------------------  IN: 825 mL / OUT: 0 mL / NET: 825 mL          Review of Systems:  unable to participate     PHYSICAL EXAM:  GENERAL: NAD  NECK: trach collar  CHEST/LUNG: mechanical breath sounds BL  HEART: normal S1S2, RRR  ABDOMEN: Soft, Nontender, +BS, No flank tenderness bilateral  EXTREMITIES: trace pedal edema BL  ACCESS: R TDC      LABS:                        8.1    10.34 )-----------( 317      ( 19 Mar 2022 10:58 )             26.1         138  |  94<L>  |  33<H>  ----------------------------<  144<H>  3.3<L>   |  26  |  0.95    Ca    10.2      18 Mar 2022 08:52  Phos  2.8       Mg     2.1                 Hemoglobin: 8.1 g/dL (22 @ 10:58)  Hemoglobin: 8.8 g/dL (22 @ 08:52)  Phosphorus Level, Serum: 2.8 mg/dL (22 @ 08:52)  Ferritin, Serum: 2188 ng/mL (22 @ 12:09)    Hepatitis B Surface Antigen: Nonreact (22 @ 18:44)  Hepatitis B Surface Antibody: Reactive (22 @ 18:44)    epoetin nannette-epbx (RETACRIT) Injectable 7000 Unit(s) IV Push once, 22 @ 08:32, Routine, Stop order after: 1 Doses  epoetin nannette-epbx (RETACRIT) Injectable 7000 Unit(s) IV Push once, 22 @ 07:36, Routine, Stop order after: 1 Doses    Hemodialysis Treatment.:     Schedule: Once, Modality: Hemodialysis, Access: Internal Jugular Central Venous Catheter    Dialyzer: Optiflux [id number], Time: 180 Min    Blood Flow: 400 mL/Min , Dialysate Flow: 500 mL/Min, Dialysate Temp: 36.5, Tubinmm (Adult)    Target Dry Weight: 69 kG    Dialysate Electrolytes (mEq/L): Potassium 4, Calcium 2.5, Sodium 138, Bicarbonate 35 (22 @ 08:34) [Completed]  Hemodialysis Treatment.:     Schedule: Once, Modality: Hemodialysis, Access: Internal Jugular Central Venous Catheter    Dialyzer: Optiflux [id number], Time: 180 Min    Blood Flow: 400 mL/Min , Dialysate Flow: 500 mL/Min, Dialysate Temp: 36.5, Tubinmm (Adult)    Target Dry Weight: 694 kG    Dialysate Electrolytes (mEq/L): Potassium 4, Calcium 2.5, Sodium 138, Bicarbonate 35 (22 @ 08:32) [Discontinued]  Hemodialysis Treatment.:     Schedule: Once, Modality: Hemodialysis, Access: Internal Jugular Central Venous Catheter    Dialyzer: Optiflux [id number], Time: 180 Min    Blood Flow: 400 mL/Min , Dialysate Flow: 500 mL/Min, Dialysate Temp: 36.5, Tubinmm (Adult)    Target Fluid Removal: 2 Liters    Target Dry Weight: 69 kG    Dialysate Electrolytes (mEq/L): Potassium 3, Calcium 2.5, Sodium 138, Bicarbonate 35 (22 @ 07:36) [Discontinued]        RADIOLOGY & ADDITIONAL STUDIES:           Patient is a 47y Female seen and evaluated at bedside during hemodialysis; no acute events overnight patient hemodynamically stable- will not be getting MRI; to aim for 2L UF off w/ HD       Meds:    acetylcysteine 20%  Inhalation 4 every 6 hours  amLODIPine   Tablet 10 daily  artificial tears (preservative free) Ophthalmic Solution 1 two times a day  chlorhexidine 0.12% Liquid 15 every 12 hours  epoetin nannette-epbx (RETACRIT) Injectable 7000 once  heparin   Injectable 5000 every 8 hours  levETIRAcetam  IVPB 500 every 24 hours  piperacillin/tazobactam IVPB.. 4.5 every 12 hours  senna 2 at bedtime      T(C): , Max: 36.4 (22 @ 16:18)  T(F): , Max: 97.6 (22 @ 16:18)  HR: 97 (22 @ 10:10)  BP: 144/96 (22 @ 10:10)  BP(mean): --  RR: 14 (22 @ 10:10)  SpO2: 100% (22 @ 10:10)  Wt(kg): --     @ 07:01  -   @ 07:00  --------------------------------------------------------  IN: 825 mL / OUT: 0 mL / NET: 825 mL          Review of Systems:  unable to participate     PHYSICAL EXAM:  GENERAL: NAD  NECK: trach collar  CHEST/LUNG: mechanical breath sounds BL  HEART: normal S1S2, RRR  ABDOMEN: Soft, Nontender, +BS, No flank tenderness bilateral  EXTREMITIES: trace pedal edema BL  ACCESS: R TDC      LABS:                        8.1    10.34 )-----------( 317      ( 19 Mar 2022 10:58 )             26.1         138  |  94<L>  |  33<H>  ----------------------------<  144<H>  3.3<L>   |  26  |  0.95    Ca    10.2      18 Mar 2022 08:52  Phos  2.8       Mg     2.1                 Hemoglobin: 8.1 g/dL (22 @ 10:58)  Hemoglobin: 8.8 g/dL (22 @ 08:52)  Phosphorus Level, Serum: 2.8 mg/dL (22 @ 08:52)  Ferritin, Serum: 2188 ng/mL (22 @ 12:09)    Hepatitis B Surface Antigen: Nonreact (22 @ 18:44)  Hepatitis B Surface Antibody: Reactive (22 @ 18:44)    epoetin nannette-epbx (RETACRIT) Injectable 7000 Unit(s) IV Push once, 22 @ 08:32, Routine, Stop order after: 1 Doses  epoetin nannette-epbx (RETACRIT) Injectable 7000 Unit(s) IV Push once, 22 @ 07:36, Routine, Stop order after: 1 Doses    Hemodialysis Treatment.:     Schedule: Once, Modality: Hemodialysis, Access: Internal Jugular Central Venous Catheter    Dialyzer: Optiflux [id number], Time: 180 Min    Blood Flow: 400 mL/Min , Dialysate Flow: 500 mL/Min, Dialysate Temp: 36.5, Tubinmm (Adult)    Target Dry Weight: 69 kG    Dialysate Electrolytes (mEq/L): Potassium 4, Calcium 2.5, Sodium 138, Bicarbonate 35 (22 @ 08:34) [Completed]  Hemodialysis Treatment.:     Schedule: Once, Modality: Hemodialysis, Access: Internal Jugular Central Venous Catheter    Dialyzer: Optiflux [id number], Time: 180 Min    Blood Flow: 400 mL/Min , Dialysate Flow: 500 mL/Min, Dialysate Temp: 36.5, Tubinmm (Adult)    Target Dry Weight: 694 kG    Dialysate Electrolytes (mEq/L): Potassium 4, Calcium 2.5, Sodium 138, Bicarbonate 35 (22 @ 08:32) [Discontinued]  Hemodialysis Treatment.:     Schedule: Once, Modality: Hemodialysis, Access: Internal Jugular Central Venous Catheter    Dialyzer: Optiflux [id number], Time: 180 Min    Blood Flow: 400 mL/Min , Dialysate Flow: 500 mL/Min, Dialysate Temp: 36.5, Tubinmm (Adult)    Target Fluid Removal: 2 Liters    Target Dry Weight: 69 kG    Dialysate Electrolytes (mEq/L): Potassium 3, Calcium 2.5, Sodium 138, Bicarbonate 35 (22 @ 07:36) [Discontinued]        RADIOLOGY & ADDITIONAL STUDIES:      Reviewed

## 2022-03-19 NOTE — PROGRESS NOTE ADULT - ASSESSMENT
47 F with pmhx of ESRD (TTHS) 2/2 anuric ATN (12/21), ICH, trach ,peg who presents to the hospital from nursing home w/ concern for decubitus ulcer evaluation.    Assessment/Plan:   #ESRD on HD TTHS @[facility]  usual Rx 3h   HD today for UF and clearance  will aim for 2 Liters   Please inform nephrology when done, can coordinate for HD after, however CANNOT be done on sunday, therefore cannot have MRI with IV contrast on saturday night,     #Access   RIJ TDC     #Anemia  Hb within goal  -Epo w/ HD 7000 units    #Renal Bone Disease  CA 10.2  please check PTH   trend ionized calcium daily- may need to dialzye on low Ca bath  trend phos daily w/ labs    Gisella Hill D.O.  PGY 4 Nephrology fellow  [phone]   47 F with pmhx of ESRD (TTHS) 2/2 anuric ATN (12/21), ICH, trach ,peg who presents to the hospital from nursing home w/ concern for decubitus ulcer evaluation.    Assessment/Plan:   #ESRD on HD TTHS @[facility]  usual Rx 3h   HD today for UF and clearance  will aim for 2 Liters      #Access   RIJ TDC     #Anemia  Hb within goal  -Epo w/ HD 7000 units    #Renal Bone Disease  CA 10.2  please check PTH   trend ionized calcium daily- may need to dialzye on low Ca bath  trend phos daily w/ labs    Gisella Hill D.O.  PGY 4 Nephrology fellow  [phone]

## 2022-03-19 NOTE — PROGRESS NOTE ADULT - SUBJECTIVE AND OBJECTIVE BOX
INTERVAL HPI/OVERNIGHT EVENTS:  Coverage for Dr. Michael.  Afebrile.      ROS:  Unobtainable - nonverbal, not interactive    ANTIBIOTICS/RELEVANT:          Vital Signs Last 24 Hrs  T(C): 36.6 (19 Mar 2022 13:10), Max: 36.6 (19 Mar 2022 13:10)  T(F): 97.8 (19 Mar 2022 13:10), Max: 97.8 (19 Mar 2022 13:10)  HR: 106 (19 Mar 2022 13:10) (80 - 106)  BP: 133/89 (19 Mar 2022 13:10) (133/89 - 151/99)  BP(mean): --  RR: 18 (19 Mar 2022 13:10) (14 - 18)  SpO2: 100% (19 Mar 2022 13:10) (100% - 100%)    PHYSICAL EXAM:  Constitutional:  Chronically ill appearing, on vent  Eyes:  Sclerae anicteric, conjunctivae clear  Ear/Nose/Throat:  No nasal exudate or sinus tenderness;  Unable to evaluate pharynx	  Neck:  Supple, +trach  Respiratory:  Coarse BS bilaterally anteriorly  Cardiovascular:  RRR, S1S2, no murmur appreciated  Gastrointestinal:  +PEG, normoactive BS, soft, NT, no masses, guarding or rebound.  No HSM  Extremities:  No edema      LABS:                        8.1    10.34 )-----------( 317      ( 19 Mar 2022 10:58 )             26.1         03-19    133<L>  |  93<L>  |  38<H>  ----------------------------<  123<H>  2.7<LL>   |  28  |  1.29    Ca    10.6<H>      19 Mar 2022 10:57  Phos  2.9     03-19  Mg     2.1     03-19    TPro  7.6  /  Alb  3.4  /  TBili  0.3  /  DBili  x   /  AST  17  /  ALT  20  /  AlkPhos  100  03-19    Vanc trough 22 (3/19 at 10:57)      MICROBIOLOGY:        RADIOLOGY & ADDITIONAL STUDIES:

## 2022-03-19 NOTE — PROGRESS NOTE ADULT - SUBJECTIVE AND OBJECTIVE BOX
SUBJECTIVE: Pt seen and examined at bedside this am by surgery team. S/p intracerebral hemorrhage, nonverbal. no subjective history obtained.      MEDICATIONS  (STANDING):  acetylcysteine 20%  Inhalation 4 milliLiter(s) Inhalation every 6 hours  amLODIPine   Tablet 10 milliGRAM(s) Oral daily  artificial tears (preservative free) Ophthalmic Solution 1 Drop(s) Both EYES two times a day  chlorhexidine 0.12% Liquid 15 milliLiter(s) Oral Mucosa every 12 hours  heparin   Injectable 5000 Unit(s) SubCutaneous every 8 hours  levETIRAcetam  IVPB 500 milliGRAM(s) IV Intermittent every 24 hours  piperacillin/tazobactam IVPB.. 4.5 Gram(s) IV Intermittent every 12 hours  senna 2 Tablet(s) Oral at bedtime    MEDICATIONS  (PRN):      Vital Signs Last 24 Hrs  T(C): 36.4 (19 Mar 2022 15:39), Max: 36.6 (19 Mar 2022 13:10)  T(F): 97.5 (19 Mar 2022 15:39), Max: 97.8 (19 Mar 2022 13:10)  HR: 107 (19 Mar 2022 18:01) (88 - 115)  BP: 126/91 (19 Mar 2022 15:39) (126/91 - 151/99)  BP(mean): --  RR: 14 (19 Mar 2022 16:29) (14 - 18)  SpO2: 100% (19 Mar 2022 16:29) (100% - 100%)    Physical Exam  General: NAD, resting comfortably in bed  C/V: NSR  Pulm: Nonlabored breathing, no respiratory distress, on trach ventilator  Abd: soft, NT/ND. PEG tube in place, no erethyma or swelling around the tube  Sacrum: sacral decub ulcer extending to bone without odor, mild purulence on obdulio pad noted, smaller subcutaneous ulcer without purulent drainage.  Extrem: WWP, no edema, SCDs in place        I&O's Detail    18 Mar 2022 07:01  -  19 Mar 2022 07:00  --------------------------------------------------------  IN:    Enteral Tube Flush: 375 mL    Nepro with Carb Steady: 450 mL  Total IN: 825 mL    OUT:  Total OUT: 0 mL    Total NET: 825 mL      19 Mar 2022 07:01  -  19 Mar 2022 19:20  --------------------------------------------------------  IN:    Other (mL): 400 mL  Total IN: 400 mL    OUT:    Other (mL): 2400 mL  Total OUT: 2400 mL    Total NET: -2000 mL          LABS:                        8.1    10.34 )-----------( 317      ( 19 Mar 2022 10:58 )             26.1     03-19    133<L>  |  93<L>  |  38<H>  ----------------------------<  123<H>  2.7<LL>   |  28  |  1.29    Ca    10.6<H>      19 Mar 2022 10:57  Phos  2.9     03-19  Mg     2.1     03-19    TPro  7.6  /  Alb  3.4  /  TBili  0.3  /  DBili  x   /  AST  17  /  ALT  20  /  AlkPhos  100  03-19          RADIOLOGY & ADDITIONAL STUDIES:

## 2022-03-19 NOTE — PROGRESS NOTE ADULT - ASSESSMENT
46 yo female with functional quadriplegia since ruptured brain aneurysm, course c/b VAP 11/2021 and Serratia BSI 12/2021 now presents from nursing home for evaluation of pressure ulcers/decubiti; found to have 2 X 3 cm R gluteus valente abscess proximate to sacral decubitus ulcer.  Vanc level (post HD?) 22  - f/u GOC given she has an incurable infection (chronic sacral osteomyelitis due to inability to offload pressure and regular feculent contamination of site)  - advise against MRI pelvis as will be unlikely to  and will be likely to prolong length of stay  - would revisit I&D of gluteal abscess with surgery; incision and drainage remains the mainstay for management of abscesses; antibiotics are secondary; if/when performed advise send specimen for gram stain and culture (although expect polymicrobial growth given location of abscess)  - No vancomycin today.  Check pre-HD vancomycin level 3/22; if < 20, advise redose 1g post HD   - continue Zosyn 4.5g IV q12h  - anticipate 1-2 week course of above for treatment of soft tissue infection; I do not intend to commit the patient to a futile 6-week course of IV antibiotics for incurable osteomyelitis  Recommendations discussed with primary team.  Will follow with you – ID Team 2.  I will cover through 3/20, Dr. Michael will resume care 3/21.

## 2022-03-19 NOTE — PROGRESS NOTE ADULT - ASSESSMENT
47 y/o female with PMHx of HTN and MS, functional quadriplegic 2/2 h/o ruptured left middle cerebral artery aneurysm with intraparenchymal hemorrhage, SAH, and left subdural hematoma with midline shift and herniation 12/21 s/p hemicraniotomy, and prolonged hospital course complicated by bilat ptx, pna, multiorgan failure, ATN requiring dialysis, and hx of Sacral decubitus ulcer s/p debridement by plastics who was sent from NH fro evaluation of decubitus ulcer and PEG tube, found to have sepsis of unknonw etiology. General surgery consulted for PEG tube evaluation. PEG tube replaced by GI and ready for immediate use.     plan:   no acute surgical intervention at this time   PEG tube functioning  Sacral decubitus ulcer unchanged, f/u pelvic MRI  will continue to follow 45 y/o female with PMHx of HTN and MS, functional quadriplegic 2/2 h/o ruptured left middle cerebral artery aneurysm with intraparenchymal hemorrhage, SAH, and left subdural hematoma with midline shift and herniation 12/21 s/p hemicraniotomy, and prolonged hospital course complicated by bilat ptx, pna, multiorgan failure, ATN requiring dialysis, and hx of Sacral decubitus ulcer s/p debridement by plastics who was sent from NH fro evaluation of decubitus ulcer and PEG tube, found to have sepsis of unknonw etiology. General surgery consulted for PEG tube evaluation. PEG tube replaced by GI and ready for immediate use.     plan:   no acute surgical intervention at this time   PEG tube functioning at goal  Sacral decubitus ulcer unchanged, f/u pelvic MRI  will continue to follow

## 2022-03-20 LAB
ANION GAP SERPL CALC-SCNC: 14 MMOL/L — SIGNIFICANT CHANGE UP (ref 5–17)
BASOPHILS # BLD AUTO: 0.04 K/UL — SIGNIFICANT CHANGE UP (ref 0–0.2)
BASOPHILS NFR BLD AUTO: 0.4 % — SIGNIFICANT CHANGE UP (ref 0–2)
BUN SERPL-MCNC: 22 MG/DL — SIGNIFICANT CHANGE UP (ref 7–23)
CALCIUM SERPL-MCNC: 10.7 MG/DL — HIGH (ref 8.4–10.5)
CHLORIDE SERPL-SCNC: 94 MMOL/L — LOW (ref 96–108)
CO2 SERPL-SCNC: 26 MMOL/L — SIGNIFICANT CHANGE UP (ref 22–31)
CREAT SERPL-MCNC: 1.19 MG/DL — SIGNIFICANT CHANGE UP (ref 0.5–1.3)
EGFR: 57 ML/MIN/1.73M2 — LOW
EOSINOPHIL # BLD AUTO: 0.08 K/UL — SIGNIFICANT CHANGE UP (ref 0–0.5)
EOSINOPHIL NFR BLD AUTO: 0.7 % — SIGNIFICANT CHANGE UP (ref 0–6)
GLUCOSE SERPL-MCNC: 122 MG/DL — HIGH (ref 70–99)
HCT VFR BLD CALC: 30.9 % — LOW (ref 34.5–45)
HGB BLD-MCNC: 9.6 G/DL — LOW (ref 11.5–15.5)
IMM GRANULOCYTES NFR BLD AUTO: 0.4 % — SIGNIFICANT CHANGE UP (ref 0–1.5)
LYMPHOCYTES # BLD AUTO: 29 % — SIGNIFICANT CHANGE UP (ref 13–44)
LYMPHOCYTES # BLD AUTO: 3.26 K/UL — SIGNIFICANT CHANGE UP (ref 1–3.3)
MAGNESIUM SERPL-MCNC: 2.2 MG/DL — SIGNIFICANT CHANGE UP (ref 1.6–2.6)
MCHC RBC-ENTMCNC: 24.4 PG — LOW (ref 27–34)
MCHC RBC-ENTMCNC: 31.1 GM/DL — LOW (ref 32–36)
MCV RBC AUTO: 78.6 FL — LOW (ref 80–100)
MONOCYTES # BLD AUTO: 0.74 K/UL — SIGNIFICANT CHANGE UP (ref 0–0.9)
MONOCYTES NFR BLD AUTO: 6.6 % — SIGNIFICANT CHANGE UP (ref 2–14)
NEUTROPHILS # BLD AUTO: 7.1 K/UL — SIGNIFICANT CHANGE UP (ref 1.8–7.4)
NEUTROPHILS NFR BLD AUTO: 62.9 % — SIGNIFICANT CHANGE UP (ref 43–77)
NRBC # BLD: 0 /100 WBCS — SIGNIFICANT CHANGE UP (ref 0–0)
PHOSPHATE SERPL-MCNC: 2.2 MG/DL — LOW (ref 2.5–4.5)
PLATELET # BLD AUTO: 344 K/UL — SIGNIFICANT CHANGE UP (ref 150–400)
POTASSIUM SERPL-MCNC: 3.8 MMOL/L — SIGNIFICANT CHANGE UP (ref 3.5–5.3)
POTASSIUM SERPL-SCNC: 3.8 MMOL/L — SIGNIFICANT CHANGE UP (ref 3.5–5.3)
RBC # BLD: 3.93 M/UL — SIGNIFICANT CHANGE UP (ref 3.8–5.2)
RBC # FLD: 16.8 % — HIGH (ref 10.3–14.5)
SODIUM SERPL-SCNC: 134 MMOL/L — LOW (ref 135–145)
TSH SERPL-MCNC: 7.3 UIU/ML — HIGH (ref 0.27–4.2)
WBC # BLD: 11.26 K/UL — HIGH (ref 3.8–10.5)
WBC # FLD AUTO: 11.26 K/UL — HIGH (ref 3.8–10.5)

## 2022-03-20 PROCEDURE — 71045 X-RAY EXAM CHEST 1 VIEW: CPT | Mod: 26

## 2022-03-20 PROCEDURE — 99233 SBSQ HOSP IP/OBS HIGH 50: CPT | Mod: GC

## 2022-03-20 PROCEDURE — 93010 ELECTROCARDIOGRAM REPORT: CPT

## 2022-03-20 RX ORDER — SODIUM CHLORIDE 9 MG/ML
250 INJECTION, SOLUTION INTRAVENOUS ONCE
Refills: 0 | Status: COMPLETED | OUTPATIENT
Start: 2022-03-20 | End: 2022-03-20

## 2022-03-20 RX ADMIN — HEPARIN SODIUM 5000 UNIT(S): 5000 INJECTION INTRAVENOUS; SUBCUTANEOUS at 21:38

## 2022-03-20 RX ADMIN — SODIUM CHLORIDE 500 MILLILITER(S): 9 INJECTION, SOLUTION INTRAVENOUS at 15:08

## 2022-03-20 RX ADMIN — LEVETIRACETAM 400 MILLIGRAM(S): 250 TABLET, FILM COATED ORAL at 10:44

## 2022-03-20 RX ADMIN — HEPARIN SODIUM 5000 UNIT(S): 5000 INJECTION INTRAVENOUS; SUBCUTANEOUS at 13:26

## 2022-03-20 RX ADMIN — Medication 4 MILLILITER(S): at 10:46

## 2022-03-20 RX ADMIN — Medication 1 DROP(S): at 15:08

## 2022-03-20 RX ADMIN — Medication 4 MILLILITER(S): at 17:18

## 2022-03-20 RX ADMIN — CHLORHEXIDINE GLUCONATE 15 MILLILITER(S): 213 SOLUTION TOPICAL at 07:09

## 2022-03-20 RX ADMIN — Medication 4 MILLILITER(S): at 06:22

## 2022-03-20 RX ADMIN — AMLODIPINE BESYLATE 10 MILLIGRAM(S): 2.5 TABLET ORAL at 07:09

## 2022-03-20 RX ADMIN — PIPERACILLIN AND TAZOBACTAM 200 GRAM(S): 4; .5 INJECTION, POWDER, LYOPHILIZED, FOR SOLUTION INTRAVENOUS at 15:09

## 2022-03-20 RX ADMIN — CHLORHEXIDINE GLUCONATE 15 MILLILITER(S): 213 SOLUTION TOPICAL at 17:17

## 2022-03-20 RX ADMIN — SENNA PLUS 2 TABLET(S): 8.6 TABLET ORAL at 21:38

## 2022-03-20 RX ADMIN — PIPERACILLIN AND TAZOBACTAM 200 GRAM(S): 4; .5 INJECTION, POWDER, LYOPHILIZED, FOR SOLUTION INTRAVENOUS at 04:05

## 2022-03-20 RX ADMIN — HEPARIN SODIUM 5000 UNIT(S): 5000 INJECTION INTRAVENOUS; SUBCUTANEOUS at 07:09

## 2022-03-20 RX ADMIN — Medication 4 MILLILITER(S): at 21:39

## 2022-03-20 NOTE — PROGRESS NOTE ADULT - SUBJECTIVE AND OBJECTIVE BOX
SUBJECTIVE: Patient seen and evaluated      MEDICATIONS  (STANDING):  acetylcysteine 20%  Inhalation 4 milliLiter(s) Inhalation every 6 hours  amLODIPine   Tablet 10 milliGRAM(s) Oral daily  artificial tears (preservative free) Ophthalmic Solution 1 Drop(s) Both EYES two times a day  chlorhexidine 0.12% Liquid 15 milliLiter(s) Oral Mucosa every 12 hours  heparin   Injectable 5000 Unit(s) SubCutaneous every 8 hours  lactated ringers Bolus 250 milliLiter(s) IV Bolus once  levETIRAcetam  IVPB 500 milliGRAM(s) IV Intermittent every 24 hours  piperacillin/tazobactam IVPB.. 4.5 Gram(s) IV Intermittent every 12 hours  senna 2 Tablet(s) Oral at bedtime    MEDICATIONS  (PRN):      Vital Signs Last 24 Hrs  T(C): 37.3 (20 Mar 2022 10:58), Max: 37.3 (20 Mar 2022 10:58)  T(F): 99.1 (20 Mar 2022 10:58), Max: 99.1 (20 Mar 2022 10:58)  HR: 111 (20 Mar 2022 10:58) (106 - 115)  BP: 136/95 (20 Mar 2022 10:58) (117/87 - 136/95)  BP(mean): --  RR: 16 (20 Mar 2022 10:58) (14 - 18)  SpO2: 100% (20 Mar 2022 10:58) (100% - 100%)    Physical Exam:  General: NAD, resting comfortably in bed  C/V: NSR  Pulm: Nonlabored breathing, no respiratory distress, on trach ventilator  Abd: soft, NT/ND. PEG tube in place, no erethyma or swelling around the tube  Sacrum: sacral decub ulcer extending to bone without odor, mild purulence on stanley pad noted, smaller subcutaneous ulcer without purulent drainage.  Extrem: WWP, no edema, SCDs in place    I&O's Summary    19 Mar 2022 07:01  -  20 Mar 2022 07:00  --------------------------------------------------------  IN: 1260 mL / OUT: 2400 mL / NET: -1140 mL        LABS:                        9.6    11.26 )-----------( 344      ( 20 Mar 2022 06:42 )             30.9     03-20    134<L>  |  94<L>  |  22  ----------------------------<  122<H>  3.8   |  26  |  1.19    Ca    10.7<H>      20 Mar 2022 06:41  Phos  2.2     03-20  Mg     2.2     03-20    TPro  7.6  /  Alb  3.4  /  TBili  0.3  /  DBili  x   /  AST  17  /  ALT  20  /  AlkPhos  100  03-19        CAPILLARY BLOOD GLUCOSE        LIVER FUNCTIONS - ( 19 Mar 2022 10:57 )  Alb: 3.4 g/dL / Pro: 7.6 g/dL / ALK PHOS: 100 U/L / ALT: 20 U/L / AST: 17 U/L / GGT: x             RADIOLOGY & ADDITIONAL STUDIES:

## 2022-03-20 NOTE — PROGRESS NOTE ADULT - SUBJECTIVE AND OBJECTIVE BOX
OVERNIGHT EVENTS: Tachycardic overnight    SUBJECTIVE / INTERVAL HPI: Patient seen and examined at bedside. Pt unable to participate in history due to     VITAL SIGNS:  Vital Signs Last 24 Hrs  T(C): 36.3 (19 Mar 2022 09:05), Max: 36.9 (18 Mar 2022 10:03)  T(F): 97.4 (19 Mar 2022 09:05), Max: 98.5 (18 Mar 2022 10:03)  HR: 88 (19 Mar 2022 09:05) (80 - 104)  BP: 151/99 (19 Mar 2022 09:05) (138/87 - 151/99)  BP(mean): --  RR: 14 (19 Mar 2022 09:05) (14 - 18)  SpO2: 100% (19 Mar 2022 09:05) (100% - 100%)    PHYSICAL EXAM:  General: breathing via ventilator   HEENT: PERRL, anicteric sclera; MMM  Cardiovascular: +S1/S2, RRR  Respiratory: CTA B/L; no W/R/R, Trach in place   Gastrointestinal: soft, NT/ND; +BSx4, PEG with adapter in place appears clean and dry  Extremities: WWP; no edema, clubbing or cyanosis  Vascular: 2+ radial, DP/PT pulses B/L  Neurological: AAOx0, strenght & sensation absent b/l UE and LE       MEDICATIONS  (STANDING):  acetylcysteine 20%  Inhalation 4 milliLiter(s) Inhalation every 6 hours  amLODIPine   Tablet 10 milliGRAM(s) Oral daily  artificial tears (preservative free) Ophthalmic Solution 1 Drop(s) Both EYES two times a day  chlorhexidine 0.12% Liquid 15 milliLiter(s) Oral Mucosa every 12 hours  heparin   Injectable 5000 Unit(s) SubCutaneous every 8 hours  levETIRAcetam  IVPB 500 milliGRAM(s) IV Intermittent every 24 hours  piperacillin/tazobactam IVPB.. 4.5 Gram(s) IV Intermittent every 12 hours  senna 2 Tablet(s) Oral at bedtime    MEDICATIONS  (PRN):        ALLERGIES:  Allergies    No Known Allergies    Intolerances        LABS:                                   9.6    11.26 )-----------( 344      ( 20 Mar 2022 06:42 )             30.9   03-20    134<L>  |  94<L>  |  22  ----------------------------<  122<H>  3.8   |  26  |  1.19    Ca    10.7<H>      20 Mar 2022 06:41  Phos  2.2     03-20  Mg     2.2     03-20    TPro  7.6  /  Alb  3.4  /  TBili  0.3  /  DBili  x   /  AST  17  /  ALT  20  /  AlkPhos  100  03-19            CAPILLARY BLOOD GLUCOSE          RADIOLOGY & ADDITIONAL TESTS: Reviewed.

## 2022-03-20 NOTE — PROGRESS NOTE ADULT - ASSESSMENT
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 General surgery consulted for PEG tube evaluation. PEG tube replaced by GI and ready for immediate use.        No acute surgical intervention at this time   PEG tube functioning at goal  Sacral decubitus ulcer unchanged  f/u pelvic MRI  will continue to follow

## 2022-03-20 NOTE — PROGRESS NOTE ADULT - PROBLEM SELECTOR PLAN 4
2/2 ATN in December 2021. Anuric.  - Renal consulted, S/p HD today     To do:   - C/w HD on T/Th/Sa  - Dialysis today    #HTN  - cw amlodipine 10 mg daily  - montior vitals

## 2022-03-20 NOTE — PROGRESS NOTE ADULT - PROBLEM SELECTOR PLAN 5
Family complaining that PEG tube has not been changed and is currently not functioning.  - PEG replaced by GI: functioning well without any sign of inflammation and drainage

## 2022-03-20 NOTE — PROGRESS NOTE ADULT - PROBLEM SELECTOR PLAN 2
CT A/P: Intramuscular abscess medial right gluteus valente adjacent to large sacral decubitus ulcer with exposed bone probably underlying osteomyelitis   s/p IR intervention and ruled out abscess    -Plan as above

## 2022-03-20 NOTE — PROGRESS NOTE ADULT - PROBLEM SELECTOR PLAN 1
POA, Febrile 100.9, tachycardic 105, WBC 17, lactate 2.6, likely 2/2 gluteal abscess with underlying osteomyelitis   - Blood and urine culture negative; , CRP 30.8  - s/p IR intervention unable to drain anything     To do:   - C/w Vanc and Zosyn  - F/u Vanc trough  - Will likely need Midline for remaining ab course - but will f/u ID recs    Pt now w/ new tachycardia - will check rectal, EKG, CXR, discuss w/ renal as all lines on CBC increased (fluid status?)

## 2022-03-21 DIAGNOSIS — Z71.89 OTHER SPECIFIED COUNSELING: ICD-10-CM

## 2022-03-21 LAB
ANION GAP SERPL CALC-SCNC: 15 MMOL/L — SIGNIFICANT CHANGE UP (ref 5–17)
APTT BLD: 29.1 SEC — SIGNIFICANT CHANGE UP (ref 27.5–35.5)
BASOPHILS # BLD AUTO: 0.04 K/UL — SIGNIFICANT CHANGE UP (ref 0–0.2)
BASOPHILS NFR BLD AUTO: 0.4 % — SIGNIFICANT CHANGE UP (ref 0–2)
BUN SERPL-MCNC: 29 MG/DL — HIGH (ref 7–23)
CALCIUM SERPL-MCNC: 11.1 MG/DL — HIGH (ref 8.4–10.5)
CHLORIDE SERPL-SCNC: 95 MMOL/L — LOW (ref 96–108)
CO2 SERPL-SCNC: 26 MMOL/L — SIGNIFICANT CHANGE UP (ref 22–31)
CREAT SERPL-MCNC: 1.38 MG/DL — HIGH (ref 0.5–1.3)
CULTURE RESULTS: SIGNIFICANT CHANGE UP
CULTURE RESULTS: SIGNIFICANT CHANGE UP
EGFR: 48 ML/MIN/1.73M2 — LOW
EOSINOPHIL # BLD AUTO: 0.07 K/UL — SIGNIFICANT CHANGE UP (ref 0–0.5)
EOSINOPHIL NFR BLD AUTO: 0.8 % — SIGNIFICANT CHANGE UP (ref 0–6)
GLUCOSE SERPL-MCNC: 128 MG/DL — HIGH (ref 70–99)
HCT VFR BLD CALC: 31.3 % — LOW (ref 34.5–45)
HGB BLD-MCNC: 9.6 G/DL — LOW (ref 11.5–15.5)
IMM GRANULOCYTES NFR BLD AUTO: 0.2 % — SIGNIFICANT CHANGE UP (ref 0–1.5)
INR BLD: 1.02 — SIGNIFICANT CHANGE UP (ref 0.88–1.16)
LYMPHOCYTES # BLD AUTO: 2.54 K/UL — SIGNIFICANT CHANGE UP (ref 1–3.3)
LYMPHOCYTES # BLD AUTO: 28.5 % — SIGNIFICANT CHANGE UP (ref 13–44)
MAGNESIUM SERPL-MCNC: 2.6 MG/DL — SIGNIFICANT CHANGE UP (ref 1.6–2.6)
MCHC RBC-ENTMCNC: 24.7 PG — LOW (ref 27–34)
MCHC RBC-ENTMCNC: 30.7 GM/DL — LOW (ref 32–36)
MCV RBC AUTO: 80.7 FL — SIGNIFICANT CHANGE UP (ref 80–100)
MONOCYTES # BLD AUTO: 0.87 K/UL — SIGNIFICANT CHANGE UP (ref 0–0.9)
MONOCYTES NFR BLD AUTO: 9.8 % — SIGNIFICANT CHANGE UP (ref 2–14)
NEUTROPHILS # BLD AUTO: 5.38 K/UL — SIGNIFICANT CHANGE UP (ref 1.8–7.4)
NEUTROPHILS NFR BLD AUTO: 60.3 % — SIGNIFICANT CHANGE UP (ref 43–77)
NRBC # BLD: 0 /100 WBCS — SIGNIFICANT CHANGE UP (ref 0–0)
PHOSPHATE SERPL-MCNC: 3.1 MG/DL — SIGNIFICANT CHANGE UP (ref 2.5–4.5)
PLATELET # BLD AUTO: 382 K/UL — SIGNIFICANT CHANGE UP (ref 150–400)
POTASSIUM SERPL-MCNC: 3.4 MMOL/L — LOW (ref 3.5–5.3)
POTASSIUM SERPL-SCNC: 3.4 MMOL/L — LOW (ref 3.5–5.3)
PROTHROM AB SERPL-ACNC: 12.2 SEC — SIGNIFICANT CHANGE UP (ref 10.5–13.4)
RBC # BLD: 3.88 M/UL — SIGNIFICANT CHANGE UP (ref 3.8–5.2)
RBC # FLD: 16.9 % — HIGH (ref 10.3–14.5)
SODIUM SERPL-SCNC: 136 MMOL/L — SIGNIFICANT CHANGE UP (ref 135–145)
SPECIMEN SOURCE: SIGNIFICANT CHANGE UP
SPECIMEN SOURCE: SIGNIFICANT CHANGE UP
T4 AB SER-ACNC: 8.54 UG/DL — SIGNIFICANT CHANGE UP (ref 4.5–11.7)
WBC # BLD: 8.92 K/UL — SIGNIFICANT CHANGE UP (ref 3.8–10.5)
WBC # FLD AUTO: 8.92 K/UL — SIGNIFICANT CHANGE UP (ref 3.8–10.5)

## 2022-03-21 PROCEDURE — 99232 SBSQ HOSP IP/OBS MODERATE 35: CPT

## 2022-03-21 PROCEDURE — 99233 SBSQ HOSP IP/OBS HIGH 50: CPT | Mod: GC

## 2022-03-21 PROCEDURE — 99233 SBSQ HOSP IP/OBS HIGH 50: CPT

## 2022-03-21 PROCEDURE — 99497 ADVNCD CARE PLAN 30 MIN: CPT | Mod: 25

## 2022-03-21 PROCEDURE — 99358 PROLONG SERVICE W/O CONTACT: CPT | Mod: NC

## 2022-03-21 RX ORDER — CARVEDILOL PHOSPHATE 80 MG/1
3.12 CAPSULE, EXTENDED RELEASE ORAL EVERY 12 HOURS
Refills: 0 | Status: DISCONTINUED | OUTPATIENT
Start: 2022-03-21 | End: 2022-03-22

## 2022-03-21 RX ORDER — CHLORHEXIDINE GLUCONATE 213 G/1000ML
1 SOLUTION TOPICAL DAILY
Refills: 0 | Status: DISCONTINUED | OUTPATIENT
Start: 2022-03-21 | End: 2022-03-25

## 2022-03-21 RX ADMIN — LEVETIRACETAM 400 MILLIGRAM(S): 250 TABLET, FILM COATED ORAL at 09:54

## 2022-03-21 RX ADMIN — CHLORHEXIDINE GLUCONATE 15 MILLILITER(S): 213 SOLUTION TOPICAL at 18:41

## 2022-03-21 RX ADMIN — Medication 4 MILLILITER(S): at 06:14

## 2022-03-21 RX ADMIN — Medication 1 DROP(S): at 06:14

## 2022-03-21 RX ADMIN — AMLODIPINE BESYLATE 10 MILLIGRAM(S): 2.5 TABLET ORAL at 06:14

## 2022-03-21 RX ADMIN — PIPERACILLIN AND TAZOBACTAM 200 GRAM(S): 4; .5 INJECTION, POWDER, LYOPHILIZED, FOR SOLUTION INTRAVENOUS at 06:13

## 2022-03-21 RX ADMIN — HEPARIN SODIUM 5000 UNIT(S): 5000 INJECTION INTRAVENOUS; SUBCUTANEOUS at 06:13

## 2022-03-21 RX ADMIN — CHLORHEXIDINE GLUCONATE 1 APPLICATION(S): 213 SOLUTION TOPICAL at 13:01

## 2022-03-21 RX ADMIN — HEPARIN SODIUM 5000 UNIT(S): 5000 INJECTION INTRAVENOUS; SUBCUTANEOUS at 13:02

## 2022-03-21 RX ADMIN — CARVEDILOL PHOSPHATE 3.12 MILLIGRAM(S): 80 CAPSULE, EXTENDED RELEASE ORAL at 23:05

## 2022-03-21 RX ADMIN — Medication 1 DROP(S): at 23:05

## 2022-03-21 RX ADMIN — Medication 4 MILLILITER(S): at 18:40

## 2022-03-21 RX ADMIN — HEPARIN SODIUM 5000 UNIT(S): 5000 INJECTION INTRAVENOUS; SUBCUTANEOUS at 23:05

## 2022-03-21 RX ADMIN — Medication 1 DROP(S): at 13:02

## 2022-03-21 RX ADMIN — CHLORHEXIDINE GLUCONATE 15 MILLILITER(S): 213 SOLUTION TOPICAL at 06:14

## 2022-03-21 RX ADMIN — SENNA PLUS 2 TABLET(S): 8.6 TABLET ORAL at 23:04

## 2022-03-21 RX ADMIN — Medication 4 MILLILITER(S): at 11:11

## 2022-03-21 RX ADMIN — PIPERACILLIN AND TAZOBACTAM 200 GRAM(S): 4; .5 INJECTION, POWDER, LYOPHILIZED, FOR SOLUTION INTRAVENOUS at 17:15

## 2022-03-21 RX ADMIN — Medication 4 MILLILITER(S): at 23:05

## 2022-03-21 RX ADMIN — CARVEDILOL PHOSPHATE 3.12 MILLIGRAM(S): 80 CAPSULE, EXTENDED RELEASE ORAL at 11:00

## 2022-03-21 NOTE — PROGRESS NOTE ADULT - PROBLEM SELECTOR PLAN 6
Patient remains full code. Per last discussion with daughter she stated that she  needs to speak to the rest of the patients family. Will follow up with daughter later today to see if she spoke to her family. Ongoing support to be provided.   - Restorationist/Spiritual practice: unknown   - Support system: [ x] strong [ ] adequate [ ] inadequate  - All questions answered, emotional support provided  -  primary team   - Please contact Palliative Medicine 24/7 at [phone] for any acute symptoms or further questions  - Will continue to follow with you. Patient remains full code. Per last discussion with daughter she stated that she  needs to speak to the rest of the patients family. Will follow up with daughter later today to see if she spoke to her family. Ongoing support to be provided.  Please refer to [facility] note above.   - Voodoo/Spiritual practice: unknown   - Support system: [ x] strong [ ] adequate [ ] inadequate  - All questions answered, emotional support provided  -  primary team   - Please contact Palliative Medicine 24/7 at [phone] for any acute symptoms or further questions  - Will continue to follow with you.

## 2022-03-21 NOTE — PROGRESS NOTE ADULT - SUBJECTIVE AND OBJECTIVE BOX
OVERNIGHT EVENTS:    SUBJECTIVE / INTERVAL HPI: Patient seen and examined at bedside.     VITAL SIGNS:  Vital Signs Last 24 Hrs  T(C): 36.6 (21 Mar 2022 07:41), Max: 37.4 (20 Mar 2022 11:30)  T(F): 97.8 (21 Mar 2022 07:41), Max: 99.3 (20 Mar 2022 11:30)  HR: 107 (21 Mar 2022 09:31) (100 - 112)  BP: 136/97 (21 Mar 2022 07:41) (101/71 - 145/97)  BP(mean): --  RR: 14 (21 Mar 2022 09:31) (14 - 22)  SpO2: 100% (21 Mar 2022 09:31) (100% - 100%)    PHYSICAL EXAM:    General: WDWN  HEENT: NC/AT; PERRL, anicteric sclera; MMM  Neck: supple  Cardiovascular: +S1/S2, RRR  Respiratory: CTA B/L; no W/R/R  Gastrointestinal: soft, NT/ND; +BSx4  Extremities: WWP; no edema, clubbing or cyanosis  Vascular: 2+ radial, DP/PT pulses B/L  Neurological: AAOx3; no focal deficits    MEDICATIONS:  MEDICATIONS  (STANDING):  acetylcysteine 20%  Inhalation 4 milliLiter(s) Inhalation every 6 hours  amLODIPine   Tablet 10 milliGRAM(s) Oral daily  artificial tears (preservative free) Ophthalmic Solution 1 Drop(s) Both EYES two times a day  carvedilol 3.125 milliGRAM(s) Oral every 12 hours  chlorhexidine 0.12% Liquid 15 milliLiter(s) Oral Mucosa every 12 hours  heparin   Injectable 5000 Unit(s) SubCutaneous every 8 hours  levETIRAcetam  IVPB 500 milliGRAM(s) IV Intermittent every 24 hours  piperacillin/tazobactam IVPB.. 4.5 Gram(s) IV Intermittent every 12 hours  senna 2 Tablet(s) Oral at bedtime    MEDICATIONS  (PRN):      ALLERGIES:  Allergies    No Known Allergies    Intolerances        LABS:                        9.6    8.92  )-----------( 382      ( 21 Mar 2022 07:38 )             31.3     03-21    136  |  95<L>  |  29<H>  ----------------------------<  128<H>  3.4<L>   |  26  |  1.38<H>    Ca    11.1<H>      21 Mar 2022 07:38  Phos  3.1     03-21  Mg     2.6     03-21    TPro  7.6  /  Alb  3.4  /  TBili  0.3  /  DBili  x   /  AST  17  /  ALT  20  /  AlkPhos  100  03-19    PT/INR - ( 21 Mar 2022 07:38 )   PT: 12.2 sec;   INR: 1.02          PTT - ( 21 Mar 2022 07:38 )  PTT:29.1 sec    CAPILLARY BLOOD GLUCOSE          RADIOLOGY & ADDITIONAL TESTS: Reviewed. OVERNIGHT EVENTS: No acute overnight events     SUBJECTIVE / INTERVAL HPI: Patient seen and examined at bedside.    VITAL SIGNS:  Vital Signs Last 24 Hrs  T(C): 36.6 (21 Mar 2022 07:41), Max: 37.4 (20 Mar 2022 11:30)  T(F): 97.8 (21 Mar 2022 07:41), Max: 99.3 (20 Mar 2022 11:30)  HR: 107 (21 Mar 2022 09:31) (100 - 112)  BP: 136/97 (21 Mar 2022 07:41) (101/71 - 145/97)  BP(mean): --  RR: 14 (21 Mar 2022 09:31) (14 - 22)  SpO2: 100% (21 Mar 2022 09:31) (100% - 100%)    PHYSICAL EXAM:  General: breathing via ventilator   HEENT: PERRL, anicteric sclera; MMM  Cardiovascular: +S1/S2, RRR  Respiratory: CTA B/L; no W/R/R, Trach in place   Gastrointestinal: soft, NT/ND; +BSx4, PEG with adapter in place appears clean and dry  Extremities: WWP; no edema, clubbing or cyanosis  Vascular: 2+ radial, DP/PT pulses B/L  Neurological: AAOx0, strenght & sensation absent b/l UE and LE     MEDICATIONS:  MEDICATIONS  (STANDING):  acetylcysteine 20%  Inhalation 4 milliLiter(s) Inhalation every 6 hours  amLODIPine   Tablet 10 milliGRAM(s) Oral daily  artificial tears (preservative free) Ophthalmic Solution 1 Drop(s) Both EYES two times a day  carvedilol 3.125 milliGRAM(s) Oral every 12 hours  chlorhexidine 0.12% Liquid 15 milliLiter(s) Oral Mucosa every 12 hours  heparin   Injectable 5000 Unit(s) SubCutaneous every 8 hours  levETIRAcetam  IVPB 500 milliGRAM(s) IV Intermittent every 24 hours  piperacillin/tazobactam IVPB.. 4.5 Gram(s) IV Intermittent every 12 hours  senna 2 Tablet(s) Oral at bedtime    MEDICATIONS  (PRN):      ALLERGIES:  Allergies    No Known Allergies    Intolerances        LABS:                        9.6    8.92  )-----------( 382      ( 21 Mar 2022 07:38 )             31.3     03-21    136  |  95<L>  |  29<H>  ----------------------------<  128<H>  3.4<L>   |  26  |  1.38<H>    Ca    11.1<H>      21 Mar 2022 07:38  Phos  3.1     03-21  Mg     2.6     03-21    TPro  7.6  /  Alb  3.4  /  TBili  0.3  /  DBili  x   /  AST  17  /  ALT  20  /  AlkPhos  100  03-19    PT/INR - ( 21 Mar 2022 07:38 )   PT: 12.2 sec;   INR: 1.02          PTT - ( 21 Mar 2022 07:38 )  PTT:29.1 sec    CAPILLARY BLOOD GLUCOSE          RADIOLOGY & ADDITIONAL TESTS: Reviewed.

## 2022-03-21 NOTE — PROGRESS NOTE ADULT - PROBLEM SELECTOR PLAN 1
Febrile 100.9, tachycardic 105, WBC 17, lactate 2.6, likely 2/2 gluteal abscess with underlying osteomyelitis vs. probable left lower lobe pneumonia.  - Blood and urine culture negative; , CRP 30.8  - s/p IR intervention and ruled out abscess    To do:   - C/w Vanc on the day of dialysis after checking am vanc trough and Zosyn  - Midline for remaining ab course Febrile 100.9, tachycardic 105, WBC 17, lactate 2.6, likely 2/2 gluteal abscess with underlying osteomyelitis vs. probable left lower lobe pneumonia.  - Blood and urine culture negative; , CRP 30.8  - s/p IR intervention and ruled out abscess    To do:   - C/w Vanc on the day of dialysis after checking am vanc trough and Zosyn  - Midline for remaining ab course  - cw coreg 3.125 for tachycardia Febrile 100.9, tachycardic 105, WBC 17, lactate 2.6, likely 2/2 gluteal abscess with underlying osteomyelitis vs. probable left lower lobe pneumonia.  - Blood and urine culture negative; , CRP 30.8  - s/p IR intervention and ruled out abscess    To do:   - C/w Vanc 750 mg on the day of dialysis  - cw Zosyn TID for 5 more days 03/21- 03/26  - cw coreg 3.125 for tachycardia

## 2022-03-21 NOTE — PROGRESS NOTE ADULT - SUBJECTIVE AND OBJECTIVE BOX
AILYN DÍAZ             MRN-[id number]    CC: gluteal abscess    HPI:  46F w/ PMHx of HTN, MS, functional quadriplegic 2/2 h/o ruptured left middle cerebral artery aneurysm with intraparenchymal hemorrhage, SAH, and left subdural hematoma with midline shift and herniation December 2021 s/p hemicraniotomy and s/p trach/PEG, with prolonged hospital course complicated by b/l PNTX, PNA, multiorgan failure, ESRD on HD (T/Th/S) 2/2 anuric ATN, and Sacral decubitus ulcer s/p debridement by plastics, sent in from NH for evaluation of decubitus ulcer and PEG tube. Patient nonverbal at baseline no family at bedside. ED provider spoke with HCP over the phone who report that family is concerned that the NH is not managing her wounds appropriately and that patient has had PEG since November and has not had it replaced; does not know when it was last used or when feeding tube adapter lost/missing; no other concerns or complaints.     In the ED:  Initial vital signs: T: 100.9 F rectal, HR: 105, BP: 129/95, R: 14, SpO2: 100% on ventilator  Labs: significant for WBC 17.3, Hgb 9.2, lactate 2.6, BUN 59, Cr 0.71, UA negative nitrites, small LEs, 5-10 WBC, Bacteria present  Imaging:  CT A/P w/ IV contrast: Intramuscular abscess medial right gluteus valente adjacent to large sacral decubitus ulcer with exposed bone probably underlying osteomyelitis, few tiny bony fragments in region of previously seen coccyx, eroded or surgically removed. Probable developing left lower lobe pneumonia, less likely pulmonary edema. Central venous catheter tip at junction of suprahepatic IVC and left atrium. Consider retraction. Small bowel intussusception in left mid abdomen, likely transient, doubtful clinically significance.  CXR: No pneumonia or CHF  EKG: Sinus tach  Medications: Vanc 1g, Cefepime 1g, NS 2.5L, Ketorolac 15 IV, Tylenol 650  Consults: Gen surg, Renal (17 Mar 2022 02:03)    SUBJECTIVE: Patient resting in bed. No distress noted.  Remains on Ventilator and continues to receive dialysis.     ROS:  DYSPNEA (Ana): 0	  NAUS/VOM: N	  SECRETIONS: N	  AGITATION: N  Pain (Y/N):   N    -Provocation/Palliation: n/a   -Quality/Quantity: n/a  -Radiating: n/a  -Severity: n/a  -Timing/Frequency: n/a  -Impact on ADLs: n/a    OTHER REVIEW OF SYSTEMS: unable to obtain   UNABLE TO OBTAIN  due to: n/a     PEx:  T(C): 36.6 (03-21-22 @ 07:41), Max: 36.6 (03-20-22 @ 20:56)  HR: 107 (03-21-22 @ 09:31) (100 - 112)  BP: 136/97 (03-21-22 @ 07:41) (101/71 - 145/97)  RR: 14 (03-21-22 @ 09:31) (14 - 22)  SpO2: 100% (03-21-22 @ 09:31) (100% - 100%)  Wt(kg):  70.9kg    GENERAL:  [x ]Alert  [ ]Oriented x   [ ]Lethargic  [ ]Cachexia  [ ]Unarousable  [ ]Verbal  [ x]Non-Verbal  Behavioral:   [ ] Anxiety  [ ] Delirium [ ] Agitation [x ] Other - calm   HEENT:  [ ]Normal   [ ]Dry mouth   [ x]Trach to vent  [ ]Oral lesions  PULMONARY:   [ ]Clear  [ ]Tachypnea  [ ]Audible excessive secretions   [ x]Rhonchi  anteriorly        [ ]Right [ ]Left [ ]Bilateral  [ ]Crackles        [ ]Right [ ]Left [ ]Bilateral  [ ]Wheezing     [ ]Right [ ]Left [ ]Bilateral  CARDIOVASCULAR:    [ ]Regular [ ]Irregular [x ]Tachy  [ ]Nirav [ ]Murmur [ ]Other  GASTROINTESTINAL:  [ x]Soft  [ ]Distended   [ ]+BS  [x ]Non tender [ ]Tender  [x ]PEG [ ]OGT/ NGT  Last BM:   GENITOURINARY:  [ ]Normal [x ] Incontinent   [ ]Oliguria/Anuria   [ ]Ureña  MUSCULOSKELETAL:   [ ]Normal   [ ]Weakness  [ x]Bed/Wheelchair bound [ ]Edema  NEUROLOGIC:   [ ]No focal deficits  [ x] Cognitive impairment  [ ] Dysphagia [ ]Dysarthria [ ] Paresis [ ]Other   SKIN:   [ ]Normal   [x ]Pressure ulcer(s) - sacrum stage IV  [ ]Rash      ALLERGIES: No Known Allergies      OPIATE NAÏVE (Y/N): Y    MEDICATIONS: REVIEWED  MEDICATIONS  (STANDING):  acetylcysteine 20%  Inhalation 4 milliLiter(s) Inhalation every 6 hours  amLODIPine   Tablet 10 milliGRAM(s) Oral daily  artificial tears (preservative free) Ophthalmic Solution 1 Drop(s) Both EYES two times a day  carvedilol 3.125 milliGRAM(s) Oral every 12 hours  chlorhexidine 0.12% Liquid 15 milliLiter(s) Oral Mucosa every 12 hours  chlorhexidine 2% Cloths 1 Application(s) Topical daily  heparin   Injectable 5000 Unit(s) SubCutaneous every 8 hours  levETIRAcetam  IVPB 500 milliGRAM(s) IV Intermittent every 24 hours  piperacillin/tazobactam IVPB.. 4.5 Gram(s) IV Intermittent every 12 hours  senna 2 Tablet(s) Oral at bedtime    MEDICATIONS  (PRN):    LABS: REVIEWED  CBC:                        9.6    8.92  )-----------( 382      ( 21 Mar 2022 07:38 )             31.3     CMP:    03-21    136  |  95<L>  |  29<H>  ----------------------------<  128<H>  3.4<L>   |  26  |  1.38<H>    Ca    11.1<H>      21 Mar 2022 07:38  Phos  3.1     03-21  Mg     2.6     03-21      IMAGING: REVIEWED    Advanced Directives:     Full Code     DECISION MAKER: Patient is unable to make decision for himself.  LEGAL SURROGATE: Jayshree Enoc [phone]    PSYCHOSOCIAL-SPIRITUAL ASSESSMENT:       Reviewed       Care plan unchanged    GOALS OF CARE DISCUSSION       Palliative care info/counseling provided	           Documentation of GOC: Full code   	      AGENCY CHOICE DISCUSSED:     non    REFERRALS	        Patient and family support       Unit SW/Case Mgmt

## 2022-03-21 NOTE — PROGRESS NOTE ADULT - ASSESSMENT
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 General surgery consulted for PEG tube evaluation. PEG tube replaced by GI and ready for immediate use.     No acute surgical intervention at this time   Pelvic MR has been cancelled  Clx on CT is draining to sacral decub  IR drainage failed because clx was contiguous with decub  No role for operative drainage  Sacral decubitus ulcer unchanged  Wet to dry dressing changes   Surgery team 4c signing off  Discussed with Dr. Ruby

## 2022-03-21 NOTE — PROGRESS NOTE ADULT - PROBLEM SELECTOR PLAN 7
Patient last assessed:  3/18/2022 to manage: GOC/AD, symptoms, and support.  30 Minutes; Start: 0900am  End:  0930am , of non-face-to-face prolonged service provided that relates to (face-to-face) care that has or will occur and ongoing patient management, including one or more of the following:   - Reviewed records from other physicians or other health care professional services, including one or more of the following: other medical records and diagnostic / radiology study results 30 minutes was spent discussing advance care planning with the family over the phone.     Patient last assessed:  3/18/2022 to manage: GOC/AD, symptoms, and support.  30 Minutes; Start: 0900am  End:  0930am , of non-face-to-face prolonged service provided that relates to (face-to-face) care that has or will occur and ongoing patient management, including one or more of the following:   - Reviewed records from other physicians or other health care professional services, including one or more of the following: other medical records and diagnostic / radiology study results

## 2022-03-21 NOTE — PROGRESS NOTE ADULT - ASSESSMENT
47 F with pmhx of ESRD (TTHS) 2/2 anuric ATN (12/21), ICH, trach ,peg who presents to the hospital from nursing home w/ concern for decubitus ulcer evaluation.    Assessment/Plan:   #ESRD on HD TTHS @NH  usual Rx 3h   -Labs noted, stable  -HD as per schedule 3/22    #Access   [facility] TDC     #Anemia  Hb within goal  -Epo w/ HD 7000 units    #Renal Bone Disease  CA 11.1  Phos at goal  -Pth noted to be 16  -Will dialyze on low calcium bath  -Please order serum free light chains as well as immunoelectrophoresis w/ UPEP to r/o MM  trend phos daily w/ labs   47 F with pmhx of ESRD (TTHS) 2/2 anuric ATN (12/21), ICH, trach ,peg who presents to the hospital from nursing home w/ concern for decubitus ulcer evaluation.    Assessment/Plan:   #ESRD on HD TTHS @NH  usual Rx 3h   -Labs noted, stable  -HD as per schedule 3/22    #Access   [facility] TDC     #Anemia  Hb within goal  -Epo w/ HD 7000 units    #Renal Bone Disease  CA 11.1  Phos at goal  -Pth noted to be 16  -Will dialyze on low calcium bath  -Please order serum free light chains as well as immunoelectrophoresis w/ UPEP to r/o MM; Likely immobilization hypercalcemia  trend phos daily w/ labs

## 2022-03-21 NOTE — PROGRESS NOTE ADULT - SUBJECTIVE AND OBJECTIVE BOX
INTERVAL HPI/OVERNIGHT EVENTS: JOAQUÍN.    ROS: UTO    ANTIBIOTICS/RELEVANT:    MEDICATIONS  (STANDING):  acetylcysteine 20%  Inhalation 4 milliLiter(s) Inhalation every 6 hours  amLODIPine   Tablet 10 milliGRAM(s) Oral daily  artificial tears (preservative free) Ophthalmic Solution 1 Drop(s) Both EYES two times a day  carvedilol 3.125 milliGRAM(s) Oral every 12 hours  chlorhexidine 0.12% Liquid 15 milliLiter(s) Oral Mucosa every 12 hours  chlorhexidine 2% Cloths 1 Application(s) Topical daily  heparin   Injectable 5000 Unit(s) SubCutaneous every 8 hours  levETIRAcetam  IVPB 500 milliGRAM(s) IV Intermittent every 24 hours  piperacillin/tazobactam IVPB.. 4.5 Gram(s) IV Intermittent every 12 hours  senna 2 Tablet(s) Oral at bedtime    MEDICATIONS  (PRN):        Vital Signs Last 24 Hrs  T(C): 36.6 (21 Mar 2022 07:41), Max: 36.6 (20 Mar 2022 20:56)  T(F): 97.8 (21 Mar 2022 07:41), Max: 97.9 (20 Mar 2022 20:56)  HR: 105 (21 Mar 2022 13:17) (100 - 112)  BP: 136/97 (21 Mar 2022 07:41) (101/71 - 145/97)  BP(mean): --  RR: 14 (21 Mar 2022 13:17) (14 - 22)  SpO2: 100% (21 Mar 2022 13:17) (100% - 100%)    PHYSICAL EXAM:  Constitutional: NAD  Eyes: KWAME, EOMI  Ear/Nose/Throat: no oral lesion, no sinus tenderness on percussion	  Neck: no JVD, no lymphadenopathy, supple  Respiratory: CTA maty  Cardiovascular: S1S2 RRR, no murmurs  Gastrointestinal:soft, (+) BS, no HSM  Extremities:no e/e/c  Vascular: DP Pulse:	right normal; left normal      LABS:                        9.6    8.92  )-----------( 382      ( 21 Mar 2022 07:38 )             31.3     03-21    136  |  95<L>  |  29<H>  ----------------------------<  128<H>  3.4<L>   |  26  |  1.38<H>    Ca    11.1<H>      21 Mar 2022 07:38  Phos  3.1     03-21  Mg     2.6     03-21      PT/INR - ( 21 Mar 2022 07:38 )   PT: 12.2 sec;   INR: 1.02          PTT - ( 21 Mar 2022 07:38 )  PTT:29.1 sec      MICROBIOLOGY: reviewed    RADIOLOGY & ADDITIONAL STUDIES: reviewed

## 2022-03-21 NOTE — PROGRESS NOTE ADULT - ASSESSMENT
47 year old woman with respiratory failure, Functional quadriplegia, ESRD on dialysis, Severe Sepsis, subarachnoid Hemorrhage  and encounter for palliative care.  47 year old woman with respiratory failure, Functional quadriplegia, ESRD on dialysis, Severe Sepsis, subarachnoid Hemorrhage and encounter for palliative care.

## 2022-03-21 NOTE — PROGRESS NOTE ADULT - PROBLEM SELECTOR PLAN 4
2/2 ATN in December 2021. Anuric.  - Renal consulted, S/p HD today     To do:   - C/w HD on T/Th/Sa    #HTN  - cw amlodipine 10 mg daily  - montior vitals 2/2 ATN in December 2021. Anuric.  - Renal consulted  - Pt is hypercalcemia 11.1 on 03/21; PTH noted to be 16, Phos 3.1; renal will dialyze on low calcium bath    To do:   - C/w HD on T/Th/Sa  - f/u  serum free light chains, immunoelectrophoresis w/ UPEP to r/o MM  - trend phos daily     #HTN  - cw amlodipine 10 mg daily  - montior vitals

## 2022-03-21 NOTE — CHART NOTE - NSCHARTNOTEFT_GEN_A_CORE
Per Infectious disease recommendations, patient is to be treated for soft tissue skin infection. She is to finish a 10-day course of IV zosyn 4.5mg q12h (March 17 - March 26). Vancomycin 750mg IV to be given on post-HD days this week (Tuesday/Thursday/Saturday). Last vancomycin dose to be given on March 26th. Per Infectious disease recommendations, patient is to be treated for soft tissue skin infection. She is to finish a 10-day course of IV zosyn 4.5g q12h (March 17 - March 26). Vancomycin 750mg IV to be given on post-HD days this week (Tuesday/Thursday/Saturday). Last vancomycin dose to be given on March 26th.

## 2022-03-21 NOTE — PROGRESS NOTE ADULT - PROBLEM SELECTOR PLAN 4
Patient presented with severe sepsis most likely from her gluteal abscess with underlying osteomyelitis. Is currently getting Zosyn. Continue care as per primary team. Patient MRI was cancelled, continue wound care.

## 2022-03-21 NOTE — PROGRESS NOTE ADULT - CONVERSATION DETAILS
In addition to the EM visit, an advance care planning meeting was performed  Start time: 330pm  End time: 400pm  Total time: 30 minutes   A telephone meeting  to discuss advance care planning was held today regarding: AILYN DÍAZ  Primary decision maker: Jayshree Stubbs and Jose Stubbs  Alternate/surrogate: Jose Ruiz  Discussed advance directives including, but not limited to, healthcare proxy and code status.  Decision regarding code status: None  Documentation completed today: None    Discussion had with patients family over phone with Medical Team. DIT was explained that patients overall prognosis is poor to the family and mentioned that patient continues to have skin breakdown and most like will not have any chance of meaningful recovery. Patient most likely has weeks to months to live but family remain hopeful that she will recover. Family stated that no one from her the neurology team did not mention poor prognosis. Support was provided. No decisions regarding codes status were made.

## 2022-03-21 NOTE — PROGRESS NOTE ADULT - ASSESSMENT
46 yo female with functional quadriplegia since ruptured brain aneurysm, course c/b VAP 11/2021 and Serratia BSI 12/2021, ESRD on HD, now presents from nursing home for evaluation of pressure ulcers/decubiti; found to have 2 X 3 cm R gluteus valente abscess proximate to sacral decubitus ulcer. As per surgery abscess is associated with decubitus ulcer and no surgical intervention is needed.  - can give vancomycin 750mg post HD T/Th/S and continue Zosyn 4.5g IV q12h--last date for both antibiotics is 3/26/22    Please reconsult with ?    ID Team 2

## 2022-03-21 NOTE — PROGRESS NOTE ADULT - SUBJECTIVE AND OBJECTIVE BOX
--------------------------------------------------------------------------------  Chief Complaint: ESRD/Ongoing hemodialysis requirement    24 hour events/subjective:  Seen this morning, remains stable. Will do HD as per schedule on 3/22    PAST HISTORY  --------------------------------------------------------------------------------  No significant changes to PMH, PSH, FHx, SHx, unless otherwise noted    ALLERGIES & MEDICATIONS  --------------------------------------------------------------------------------  Allergies    No Known Allergies    Intolerances      Standing Inpatient Medications  acetylcysteine 20%  Inhalation 4 milliLiter(s) Inhalation every 6 hours  amLODIPine   Tablet 10 milliGRAM(s) Oral daily  artificial tears (preservative free) Ophthalmic Solution 1 Drop(s) Both EYES two times a day  carvedilol 3.125 milliGRAM(s) Oral every 12 hours  chlorhexidine 0.12% Liquid 15 milliLiter(s) Oral Mucosa every 12 hours  heparin   Injectable 5000 Unit(s) SubCutaneous every 8 hours  levETIRAcetam  IVPB 500 milliGRAM(s) IV Intermittent every 24 hours  piperacillin/tazobactam IVPB.. 4.5 Gram(s) IV Intermittent every 12 hours  senna 2 Tablet(s) Oral at bedtime    PRN Inpatient Medications      REVIEW OF SYSTEMS  --------------------------------------------------------------------------------  ROS negative except as per HPI    VITALS/PHYSICAL EXAM  --------------------------------------------------------------------------------  T(C): 36.6 (03-21-22 @ 06:06), Max: 37.4 (03-20-22 @ 11:30)  HR: 107 (03-21-22 @ 09:31) (100 - 112)  BP: 139/94 (03-21-22 @ 06:06) (101/71 - 145/97)  RR: 14 (03-21-22 @ 09:31) (14 - 22)  SpO2: 100% (03-21-22 @ 09:31) (100% - 100%)  Wt(kg): --  Drug Dosing Weight  Height (cm): 156 (16 Mar 2022 13:26)  Weight (kg): 70.9 (16 Mar 2022 23:05)  BMI (kg/m2): 29.1 (16 Mar 2022 23:05)  BSA (m2): 1.71 (16 Mar 2022 23:05)        03-20-22 @ 07:01  -  03-21-22 @ 07:00  --------------------------------------------------------  IN: 720 mL / OUT: 0 mL / NET: 720 mL    PHYSICAL EXAM:  GENERAL: NAD  NECK: trach collar  CHEST/LUNG: mechanical breath sounds BL  HEART: normal S1S2, RRR  ABDOMEN: Soft, Nontender, +BS, No flank tenderness bilateral  EXTREMITIES: trace pedal edema BL  ACCESS: R TD    LABS/STUDIES  --------------------------------------------------------------------------------              9.6    8.92  >-----------<  382      [03-21-22 @ 07:38]              31.3     136  |  95  |  29  ----------------------------<  128      [03-21-22 @ 07:38]  3.4   |  26  |  1.38        Ca     11.1     [03-21-22 @ 07:38]      Mg     2.6     [03-21-22 @ 07:38]      Phos  3.1     [03-21-22 @ 07:38]    TPro  7.6  /  Alb  3.4  /  TBili  0.3  /  DBili  x   /  AST  17  /  ALT  20  /  AlkPhos  100  [03-19-22 @ 10:57]    PT/INR: PT 12.2 , INR 1.02       [03-21-22 @ 07:38]  PTT: 29.1       [03-21-22 @ 07:38]      Iron 61, TIBC 173, %sat 35      [03-17-22 @ 12:09]  Ferritin 2188      [03-17-22 @ 12:09]  PTH -- (Ca 10.2)      [03-18-22 @ 04:18]   16  PTH -- (Ca 8.8)      [11-29-21 @ 10:47]   77  Vitamin D (25OH) 16.9      [11-29-21 @ 10:47]  TSH 7.300      [03-20-22 @ 06:41]  Lipid: chol --, , HDL --, LDL --      [11-21-21 @ 04:47]    HBsAb Reactive      [03-17-22 @ 18:44]  HBsAg Nonreact      [03-17-22 @ 18:44]  HBcAb Reactive      [03-17-22 @ 18:44]  HCV 0.06, Nonreact      [03-17-22 @ 18:44]      RADIOLOGY:  --------------------------------------------------------------------------------------

## 2022-03-21 NOTE — PROGRESS NOTE ADULT - SUBJECTIVE AND OBJECTIVE BOX
NAEON  No acute findings this AM    Pelvic MR cancelled by primary team after further discussion with ID    Orders: SQH, zosyn, norvasc, keppra, senna, NPO w/ TF    VSS, HR remains low 100s, vent via trach on 40% FiO2    Exam  Gen: JOAQUÍN, resting comfortably  Pulm: nonlabored, on vent via trach, no respiratory distress  Abdo: soft, ND, NT  Sacrum: decubitus ulcer extends to bone, draining mild purulence  Extrem: WWP, nonedematous    AM labs pending  Yesterday AM: WBC 11.3, H/H 9.6/30.9; Cr elevated to 1.2 from baseline 0.6    No new micro    CT 3/16 showed right gluteal intramuscular abscess contiguous with sacral decubitus  MR pelvis has been cancelled

## 2022-03-21 NOTE — PROGRESS NOTE ADULT - PROBLEM SELECTOR PLAN 2
CT A/P: Intramuscular abscess medial right gluteus valente adjacent to large sacral decubitus ulcer with exposed bone probably underlying osteomyelitis   s/p IR intervention and ruled out abscess  - PEG adapter replaced by GI  - ID: advise against MRI pelvis as will be unlikely to  and will be likely to prolong length of stay    To do:   - C/w Vanc on the day of dialysis after checking am vanc trough and Zosyn  - Midline for remaining ab course CT A/P: Intramuscular abscess medial right gluteus valente adjacent to large sacral decubitus ulcer with exposed bone probably underlying osteomyelitis   s/p IR intervention and ruled out abscess  - PEG adapter replaced by GI  - ID: advise against MRI pelvis as will be unlikely to  and will be likely to prolong length of stay    To do:   - C/w Vanc 750 mg on the day of dialysis  - cw Zosyn TID for 5 more days 03/21- 03/26

## 2022-03-22 ENCOUNTER — TRANSCRIPTION ENCOUNTER (OUTPATIENT)
Age: 47
End: 2022-03-22

## 2022-03-22 LAB
ANION GAP SERPL CALC-SCNC: 14 MMOL/L — SIGNIFICANT CHANGE UP (ref 5–17)
BUN SERPL-MCNC: 35 MG/DL — HIGH (ref 7–23)
CALCIUM SERPL-MCNC: 11.2 MG/DL — HIGH (ref 8.4–10.5)
CHLORIDE SERPL-SCNC: 95 MMOL/L — LOW (ref 96–108)
CO2 SERPL-SCNC: 25 MMOL/L — SIGNIFICANT CHANGE UP (ref 22–31)
CREAT SERPL-MCNC: 1.42 MG/DL — HIGH (ref 0.5–1.3)
EGFR: 46 ML/MIN/1.73M2 — LOW
GLUCOSE SERPL-MCNC: 121 MG/DL — HIGH (ref 70–99)
HCT VFR BLD CALC: 33.4 % — LOW (ref 34.5–45)
HGB BLD-MCNC: 9.9 G/DL — LOW (ref 11.5–15.5)
IGA FLD-MCNC: 232 MG/DL — SIGNIFICANT CHANGE UP (ref 84–499)
IGG FLD-MCNC: 2162 MG/DL — HIGH (ref 610–1660)
IGM SERPL-MCNC: 82 MG/DL — SIGNIFICANT CHANGE UP (ref 35–242)
KAPPA LC SER QL IFE: 16.11 MG/DL — HIGH (ref 0.33–1.94)
KAPPA LC SER QL IFE: 16.11 MG/DL — HIGH (ref 0.33–1.94)
KAPPA/LAMBDA FREE LIGHT CHAIN RATIO, SERUM: 1.4 RATIO — SIGNIFICANT CHANGE UP (ref 0.26–1.65)
KAPPA/LAMBDA FREE LIGHT CHAIN RATIO, SERUM: 1.4 RATIO — SIGNIFICANT CHANGE UP (ref 0.26–1.65)
LAMBDA LC SER QL IFE: 11.53 MG/DL — HIGH (ref 0.57–2.63)
LAMBDA LC SER QL IFE: 11.53 MG/DL — HIGH (ref 0.57–2.63)
MAGNESIUM SERPL-MCNC: 2.4 MG/DL — SIGNIFICANT CHANGE UP (ref 1.6–2.6)
MCHC RBC-ENTMCNC: 25.2 PG — LOW (ref 27–34)
MCHC RBC-ENTMCNC: 29.6 GM/DL — LOW (ref 32–36)
MCV RBC AUTO: 85 FL — SIGNIFICANT CHANGE UP (ref 80–100)
NRBC # BLD: 0 /100 WBCS — SIGNIFICANT CHANGE UP (ref 0–0)
PHOSPHATE SERPL-MCNC: 3.5 MG/DL — SIGNIFICANT CHANGE UP (ref 2.5–4.5)
PLATELET # BLD AUTO: 415 K/UL — HIGH (ref 150–400)
POTASSIUM SERPL-MCNC: 4.1 MMOL/L — SIGNIFICANT CHANGE UP (ref 3.5–5.3)
POTASSIUM SERPL-SCNC: 4.1 MMOL/L — SIGNIFICANT CHANGE UP (ref 3.5–5.3)
RBC # BLD: 3.93 M/UL — SIGNIFICANT CHANGE UP (ref 3.8–5.2)
RBC # FLD: 18.6 % — HIGH (ref 10.3–14.5)
SODIUM SERPL-SCNC: 134 MMOL/L — LOW (ref 135–145)
WBC # BLD: 11.45 K/UL — HIGH (ref 3.8–10.5)
WBC # FLD AUTO: 11.45 K/UL — HIGH (ref 3.8–10.5)

## 2022-03-22 PROCEDURE — 90935 HEMODIALYSIS ONE EVALUATION: CPT

## 2022-03-22 PROCEDURE — 99233 SBSQ HOSP IP/OBS HIGH 50: CPT | Mod: GC

## 2022-03-22 RX ORDER — ERYTHROPOIETIN 10000 [IU]/ML
7000 INJECTION, SOLUTION INTRAVENOUS; SUBCUTANEOUS ONCE
Refills: 0 | Status: COMPLETED | OUTPATIENT
Start: 2022-03-22 | End: 2022-03-22

## 2022-03-22 RX ORDER — CARVEDILOL PHOSPHATE 80 MG/1
12.5 CAPSULE, EXTENDED RELEASE ORAL EVERY 12 HOURS
Refills: 0 | Status: DISCONTINUED | OUTPATIENT
Start: 2022-03-22 | End: 2022-03-25

## 2022-03-22 RX ORDER — BACLOFEN 100 %
10 POWDER (GRAM) MISCELLANEOUS EVERY 8 HOURS
Refills: 0 | Status: DISCONTINUED | OUTPATIENT
Start: 2022-03-22 | End: 2022-03-25

## 2022-03-22 RX ORDER — CARVEDILOL PHOSPHATE 80 MG/1
12.5 CAPSULE, EXTENDED RELEASE ORAL EVERY 12 HOURS
Refills: 0 | Status: DISCONTINUED | OUTPATIENT
Start: 2022-03-22 | End: 2022-03-22

## 2022-03-22 RX ORDER — CARVEDILOL PHOSPHATE 80 MG/1
3.12 CAPSULE, EXTENDED RELEASE ORAL ONCE
Refills: 0 | Status: COMPLETED | OUTPATIENT
Start: 2022-03-22 | End: 2022-03-22

## 2022-03-22 RX ORDER — VANCOMYCIN HCL 1 G
750 VIAL (EA) INTRAVENOUS ONCE
Refills: 0 | Status: COMPLETED | OUTPATIENT
Start: 2022-03-22 | End: 2022-03-22

## 2022-03-22 RX ADMIN — HEPARIN SODIUM 5000 UNIT(S): 5000 INJECTION INTRAVENOUS; SUBCUTANEOUS at 06:11

## 2022-03-22 RX ADMIN — HEPARIN SODIUM 5000 UNIT(S): 5000 INJECTION INTRAVENOUS; SUBCUTANEOUS at 14:00

## 2022-03-22 RX ADMIN — Medication 4 MILLILITER(S): at 11:32

## 2022-03-22 RX ADMIN — CHLORHEXIDINE GLUCONATE 15 MILLILITER(S): 213 SOLUTION TOPICAL at 18:04

## 2022-03-22 RX ADMIN — Medication 1 DROP(S): at 23:04

## 2022-03-22 RX ADMIN — Medication 4 MILLILITER(S): at 06:11

## 2022-03-22 RX ADMIN — Medication 4 MILLILITER(S): at 23:04

## 2022-03-22 RX ADMIN — PIPERACILLIN AND TAZOBACTAM 200 GRAM(S): 4; .5 INJECTION, POWDER, LYOPHILIZED, FOR SOLUTION INTRAVENOUS at 06:10

## 2022-03-22 RX ADMIN — Medication 10 MILLIGRAM(S): at 23:04

## 2022-03-22 RX ADMIN — HEPARIN SODIUM 5000 UNIT(S): 5000 INJECTION INTRAVENOUS; SUBCUTANEOUS at 23:04

## 2022-03-22 RX ADMIN — CARVEDILOL PHOSPHATE 3.12 MILLIGRAM(S): 80 CAPSULE, EXTENDED RELEASE ORAL at 11:33

## 2022-03-22 RX ADMIN — ERYTHROPOIETIN 7000 UNIT(S): 10000 INJECTION, SOLUTION INTRAVENOUS; SUBCUTANEOUS at 15:16

## 2022-03-22 RX ADMIN — CHLORHEXIDINE GLUCONATE 1 APPLICATION(S): 213 SOLUTION TOPICAL at 11:32

## 2022-03-22 RX ADMIN — CARVEDILOL PHOSPHATE 12.5 MILLIGRAM(S): 80 CAPSULE, EXTENDED RELEASE ORAL at 18:05

## 2022-03-22 RX ADMIN — Medication 250 MILLIGRAM(S): at 20:18

## 2022-03-22 RX ADMIN — AMLODIPINE BESYLATE 10 MILLIGRAM(S): 2.5 TABLET ORAL at 06:11

## 2022-03-22 RX ADMIN — Medication 1 DROP(S): at 12:39

## 2022-03-22 RX ADMIN — CHLORHEXIDINE GLUCONATE 15 MILLILITER(S): 213 SOLUTION TOPICAL at 06:10

## 2022-03-22 RX ADMIN — PIPERACILLIN AND TAZOBACTAM 200 GRAM(S): 4; .5 INJECTION, POWDER, LYOPHILIZED, FOR SOLUTION INTRAVENOUS at 18:05

## 2022-03-22 RX ADMIN — Medication 10 MILLIGRAM(S): at 14:00

## 2022-03-22 RX ADMIN — Medication 4 MILLILITER(S): at 18:05

## 2022-03-22 RX ADMIN — CARVEDILOL PHOSPHATE 3.12 MILLIGRAM(S): 80 CAPSULE, EXTENDED RELEASE ORAL at 12:37

## 2022-03-22 RX ADMIN — LEVETIRACETAM 400 MILLIGRAM(S): 250 TABLET, FILM COATED ORAL at 08:52

## 2022-03-22 NOTE — DISCHARGE NOTE PROVIDER - CARE PROVIDER_API CALL
SAM JANE  Internal Medicine  [address] [place], [place] [address]  Phone: [phone]  Fax: [phone]  Follow Up Time: 2 weeks   SAM JANE  Internal Medicine  [address] [place], [place] [id number]  Phone: ([phone]  Fax: [phone])-  Follow Up Time:

## 2022-03-22 NOTE — DISCHARGE NOTE PROVIDER - HOSPITAL COURSE
#Discharge: do not delete    Patient is 46 yo female with functional quadriplegia since ruptured brain aneurysm, course c/b VAP 11/2021 and Serratia BSI 12/2021, ESRD on HD, presented from nursing home for evaluation of pressure ulcers/decubiti; found to have 2 X 3 cm R gluteus valente abscess proximate to sacral decubitus ulcer    Hospital course (by problem):   1. Severe sepsis 2/2 Gluteal abscess  - Febrile 100.9, tachycardic 105, WBC 17, lactate 2.6-->2.0 in the ED  - CT A/P: Intramuscular abscess medial right gluteus valente adjacent to large sacral decubitus ulcer with exposed bone probably underlying osteomyelitis  - Blood and urine culture negative; On 03/17 , CRP 30.8  - IR consulted: s/p intervention and a abscess was ruled out   - ID consulted: recommended Zosyn q12 and vancomycin after every dialysis session until 03/26 and advise against MRI pelvis as will be unlikely to  and will be likely to prolong length of stay    2. Ulcers:   - stage 4 on right ear, stage 4 on sacrum, stage 4 on rt gluteal fold, stage 3 on left elbow, unstagable on rt elbow, b/l heels pressure injury   - wound care consulted  - C/w dressing changes and speciality bed per wound care: Aquacel extra to be packed    3.  ESRD on dialysis.  - 2/2 ATN in December 2021. Anuric with HD on Tuesday/ Thursdays and Saturday with renal following inpatient   - hypercalcemia 11.1 on 03/21; PTH noted to be 16, Phos 3.1; renal will dialyze on low calcium bath  -  f/u  serum free light chains, immunoelectrophoresis w/ UPEP to r/o MM      Patient was discharged to: (home/[facility]/acute rehab/hospice, etc, and with what services – home health PT/RN? Home O2?)    New medications:   Changes to old medications:  Medications that were stopped:    Items to follow up as outpatient:    Physical exam at the time of discharge:       #Discharge: do not delete    Patient is 46 yo female with functional quadriplegia since ruptured brain aneurysm, course c/b VAP 11/2021 and Serratia BSI 12/2021, ESRD on HD, presented from nursing home for evaluation of pressure ulcers/decubiti; found to have 2 X 3 cm R gluteus valente abscess proximate to sacral decubitus ulcer    Hospital course (by problem):   1. Severe sepsis 2/2 Gluteal abscess  - Febrile 100.9, tachycardic 105, WBC 17, lactate 2.6-->2.0 in the ED  - CT A/P: Intramuscular abscess medial right gluteus valente adjacent to large sacral decubitus ulcer with exposed bone probably underlying osteomyelitis  - Blood and urine culture negative; On 03/17 , CRP 30.8  - IR consulted: s/p intervention and a abscess was ruled out   - ID consulted: recommended Zosyn q12 and vancomycin after every dialysis session until 03/26 and advise against MRI pelvis as will be unlikely to  and will be likely to prolong length of stay     2. Ulcers:   - stage 4 on right ear, stage 4 on sacrum, stage 4 on rt gluteal fold, stage 3 on left elbow, unstagable on rt elbow, b/l heels pressure injury   - wound care consulted  - C/w dressing changes and speciality bed per wound care: Aquacel extra to be packed    3.  ESRD on dialysis.  - 2/2 ATN in December 2021. Anuric with HD on Tuesday/ Thursdays and Saturday with renal following inpatient   - hypercalcemia 11.1 on 03/21; PTH noted to be 16, Phos 3.1; renal will dialyze on low calcium bath  -  f/u  serum free light chains, immunoelectrophoresis w/ UPEP to r/o MM      Patient was discharged to: (home/[facility]/acute rehab/hospice, etc, and with what services – home health PT/RN? Home O2?)    New medications:   Changes to old medications:  Medications that were stopped:    Items to follow up as outpatient:    Physical exam at the time of discharge:       #Discharge: do not delete    Patient is 46 yo female with functional quadriplegia since ruptured brain aneurysm, course c/b VAP 11/2021 and Serratia BSI 12/2021, ESRD on HD, presented from nursing home for evaluation of pressure ulcers/decubiti; found to have 2 X 3 cm R gluteus valente abscess proximate to sacral decubitus ulcer    Hospital course (by problem):   1. Severe sepsis 2/2 Gluteal abscess vs LLL pneumonia  - Febrile 100.9, tachycardic 105, WBC 17, lactate 2.6-->2.0 in the ED  - CT A/P: Intramuscular abscess medial right gluteus valente adjacent to large sacral decubitus ulcer with exposed bone probably underlying osteomyelitis  - CT A/P:  Probable developing left lower lobe pneumonia, less likely pulmonary edema. No hypoxia or increased work of breathing. No crackles on lung auscultation.  - Blood and urine culture negative; On 03/17 , CRP 30.8  - IR consulted: s/p intervention and a abscess was ruled out   - ID consulted: recommended Zosyn q12 and vancomycin after every dialysis session until 03/26 and advise against MRI pelvis as will be unlikely to  and will be likely to prolong length of stay     2. Ulcers:   - stage 4 on right ear, stage 4 on sacrum, stage 4 on rt gluteal fold, stage 3 on left elbow, unstagable on rt elbow, b/l heels pressure injury   - wound care consulted  - C/w dressing changes and speciality bed per wound care: Aquacel extra to be packed    3.  ESRD on dialysis.  - 2/2 ATN in December 2021. Anuric with HD on Tuesday/ Thursdays and Saturday with renal following inpatient   - hypercalcemia 11.1 on 03/21; PTH noted to be 16, Phos 3.1; renal will dialyze on low calcium bath  -  f/u  serum free light chains, immunoelectrophoresis w/ UPEP to r/o MM    4. HTN  - cw amlodipine 10 mg daily  - Pt has been tachycardiac while inpatient with home coreg being held which was slowly being restarted with carefully monitoring vitals  - re-assess outpatient clonidine HCL 0.2mg q8h and hydral 100mg q8h     5. Anemia.   - Baseline: 8 and 8-9 while inpatient   - Likely 2/2 AOCD from ESRD as confirmed by iron studies    6. SAH (subarachnoid hemorrhage).   - 2/2 ruptured left middle cerebral artery aneurysm resulting in functional quadriplegia  -  cw Keppra IV daily.    7. Patient on tube feeds  - cw nephro 1.8 1000cc, 65cc/hr, 400 @27cc/hr water flush    Patient was discharged to: [facility]     New medications: Zosyn q12 and vancomycin after every dialysis session until 03/26  Changes to old medications: none  Medications that were stopped: none    Items to follow up as outpatient:    Physical exam at the time of discharge:       #Discharge: do not delete    Patient is 46 yo female with functional quadriplegia since ruptured brain aneurysm, course c/b VAP 11/2021 and Serratia BSI 12/2021, ESRD on HD, presented from nursing home for evaluation of pressure ulcers/decubiti; found to have 2 X 3 cm R gluteus valente abscess proximate to sacral decubitus ulcer    Hospital course (by problem):   1. Severe sepsis 2/2 Gluteal abscess vs LLL pneumonia - RESOLVED   - Febrile 100.9, tachycardic 105, WBC 17, lactate 2.6-->2.0 in the ED  - CT A/P: Intramuscular abscess medial right gluteus valente adjacent to large sacral decubitus ulcer with exposed bone probably underlying osteomyelitis  - CT A/P:  Probable developing left lower lobe pneumonia, less likely pulmonary edema. No hypoxia or increased work of breathing. No crackles on lung auscultation.  - Blood and urine culture negative; On 03/17 , CRP 30.8  - IR consulted: s/p intervention and a abscess was ruled out   - ID consulted: recommended Zosyn q12 and vancomycin after every dialysis session until 03/26 and advise against MRI pelvis as will be unlikely to  and will be likely to prolong length of stay  Plan: Continue w/ Vancomycin and Zosyn after dialysis until 3/26.      2. Ulcers:   - stage 4 on right ear, stage 4 on sacrum, stage 4 on rt gluteal fold, stage 3 on left elbow, unstagable on rt elbow, b/l heels pressure injury   - wound care consulted  - C/w dressing changes and speciality bed per wound care: Aquacel extra to be packed  Plan: Continue w/ wound maintenance as per wound care.     3.  ESRD on dialysis.  - 2/2 ATN in December 2021. Anuric with HD on Tuesday/ Thursdays and Saturday with renal following inpatient   - hypercalcemia 11.1 on 03/21; PTH noted to be 16, Phos 3.1; renal will dialyze on low calcium bath  -  f/u  serum free light chains, immunoelectrophoresis w/ UPEP to r/o MM    4. HTN  - cw amlodipine 10 mg daily  - Pt has been tachycardiac while inpatient with home coreg being held which was slowly being restarted with carefully monitoring vitals  - re-assess outpatient clonidine HCL 0.2mg q8h and hydral 100mg q8h     5. Anemia.   - Baseline: 8 and 8-9 while inpatient   - Likely 2/2 AOCD from ESRD as confirmed by iron studies    6. SAH (subarachnoid hemorrhage).   - 2/2 ruptured left middle cerebral artery aneurysm resulting in functional quadriplegia  -  cw Keppra IV daily.    7. Patient on tube feeds  - cw nephro 1.8 1000cc, 65cc/hr, 400 @27cc/hr water flush    Patient was discharged to: [facility]     New medications: Zosyn q12 and vancomycin after every dialysis session until 03/26  Changes to old medications: none  Medications that were stopped: none    Items to follow up as outpatient:    Physical exam at the time of discharge:       #Discharge: do not delete    Patient is 48 yo female with functional quadriplegia since ruptured brain aneurysm, course c/b VAP 11/2021 and Serratia BSI 12/2021, ESRD on HD, presented from nursing home for evaluation of pressure ulcers/decubiti; found to have 2 X 3 cm R gluteus valente abscess proximate to sacral decubitus ulcer    Hospital course (by problem):   1. Severe sepsis 2/2 Gluteal abscess vs LLL pneumonia - RESOLVED   - Febrile 100.9, tachycardic 105, WBC 17, lactate 2.6-->2.0 in the ED  - CT A/P: Intramuscular abscess medial right gluteus valente adjacent to large sacral decubitus ulcer with exposed bone probably underlying osteomyelitis  - CT A/P:  Probable developing left lower lobe pneumonia, less likely pulmonary edema. No hypoxia or increased work of breathing. No crackles on lung auscultation.  - Blood and urine culture negative; On 03/17 , CRP 30.8  - IR consulted: s/p intervention and a abscess was ruled out   - ID consulted: recommended Zosyn q12 and vancomycin after every dialysis session until 03/26 and advise against MRI pelvis as will be unlikely to  and will be likely to prolong length of stay  Plan: Continue w/ Vancomycin and Zosyn after dialysis until 3/26. No need for ID follow up as per Dr. Michael. Pt will follow up w/ PCP.      2. Pressure Injuries:   - stage 4 on right ear, stage 4 on sacrum, stage 4 on rt gluteal fold, stage 3 on left elbow, unstagable on rt elbow, b/l heels pressure injury   - wound care consulted  - C/w dressing changes and speciality bed per wound care: Aquacel extra to be packed  Plan: Continue w/ wound maintenance as per wound care. Pack extra Aquacel in sacral and gluteal wounds. Change every other day. Apply triad both elbows and right ear every other day.    3.  ESRD on dialysis.  - 2/2 ATN in December 2021. Anuric with HD on Tuesday/ Thursdays and Saturday with renal following inpatient   - hypercalcemia 11.1 on 03/21; PTH noted to be 16, Phos 3.1; renal will dialyze on low calcium bath  Plan: Continue w/ HD on TTS schedule.     4. HTN  - cw amlodipine 10 mg daily  - Home coreg 12.5 BID initially held in the setting of sepsis. Resumed while inpatient and titrated to home dose.   - Pt has been tachycardiac while inpatient with home coreg being held which was slowly being restarted with carefully monitoring vitals  - clonidine HCL 0.2mg q8h and hydralazine 100mg q8h held, initially in the setting of sepsis. Continue to hold given normotension after resuming Coreg.     5. Anemia.   - Baseline: 8 and 8-9 while inpatient   - Likely 2/2 AOCD from ESRD as confirmed by iron studies    6. SAH (subarachnoid hemorrhage).   - 2/2 ruptured left middle cerebral artery aneurysm resulting in functional quadriplegia  -  cw Keppra IV daily.    7. Patient on tube feeds  - cw nephro 1.8 1000cc, 65cc/hr, 400 @27cc/hr water flush    Patient was discharged to: [facility]     New medications: Zosyn q12 and vancomycin after every dialysis session until 03/26  Changes to old medications: none  Medications that were stopped: none    Items to follow up as outpatient:    Physical exam at the time of discharge:       #Discharge: do not delete    Patient is 48 yo female with functional quadriplegia since ruptured brain aneurysm, course c/b VAP 11/2021 and Serratia BSI 12/2021, ESRD on HD, presented from nursing home for evaluation of pressure ulcers/decubiti; found to have 2 X 3 cm R gluteus valente abscess proximate to sacral decubitus ulcer    Hospital course (by problem):   1. Severe sepsis 2/2 Gluteal abscess vs LLL pneumonia - RESOLVED   - Febrile 100.9, tachycardic 105, WBC 17, lactate 2.6-->2.0 in the ED  - CT A/P: Intramuscular abscess medial right gluteus valente adjacent to large sacral decubitus ulcer with exposed bone probably underlying osteomyelitis  - CT A/P:  Probable developing left lower lobe pneumonia, less likely pulmonary edema. No hypoxia or increased work of breathing. No crackles on lung auscultation.  - Blood and urine culture negative; On 03/17 , CRP 30.8  - IR consulted: s/p intervention and a abscess was ruled out   - ID consulted: recommended Zosyn 4.5mg  q12 and vancomycin 700mg after every dialysis session until 03/26 and advise against MRI pelvis as will be unlikely to  and will be likely to prolong length of stay  Plan: Continue w/ Vancomycin after dialysis  and zosyn until 3/26. No need for ID follow up as per Dr. Michael. Pt will follow up w/ PCP.      2. Pressure Injuries:   - stage 4 on right ear, stage 4 on sacrum, stage 4 on rt gluteal fold, stage 3 on left elbow, unstagable on rt elbow, b/l heels pressure injury   - wound care consulted  - C/w dressing changes and speciality bed per wound care: Aquacel extra to be packed  Plan: Continue w/ wound maintenance as per wound care. Pack extra Aquacel in sacral and gluteal wounds. Change every other day. Apply triad both elbows and right ear every other day.    3.  ESRD on dialysis.  - 2/2 ATN in December 2021. Anuric with HD on Tuesday/ Thursdays and Saturday with renal following inpatient   - hypercalcemia 11.1 on 03/21; PTH noted to be 16, Phos 3.1; renal will dialyze on low calcium bath  Plan: Continue w/ HD on TTS schedule.     4. HTN  - cw amlodipine 10 mg daily  - Home coreg 12.5 BID initially held in the setting of sepsis. Resumed while inpatient and titrated to home dose.   - Pt has been tachycardiac while inpatient with home coreg being held which was slowly being restarted with carefully monitoring vitals  - clonidine HCL 0.2mg q8h and hydralazine 100mg q8h held, initially in the setting of sepsis. Continue to hold given normotension after resuming Coreg.     5. Anemia.   - Baseline: 8 and 8-9 while inpatient   - Likely 2/2 AOCD from ESRD as confirmed by iron studies  - EPO 700U administered during HD   Plan: continue w/ EPO during HD     6. SAH (subarachnoid hemorrhage).   - 2/2 ruptured left middle cerebral artery aneurysm resulting in functional quadriplegia  Plan: c/w Keppra IV daily.    7. Patient on tube feeds  - cw nephro 1.8 1000cc, 65cc/hr, 400 @27cc/hr water flush  Plan: Continue w/ nephro    Patient was discharged to: [facility]     New medications: Zosyn q12 and vancomycin after every dialysis session until 03/26  Changes to old medications: none  Medications that were stopped: Clonidine and hydralazine     Items to follow up as outpatient: pressure injuries w/ soft tissue infection, ESRD    Physical exam at the time of discharge:    PHYSICAL EXAM:    Constitutional: NAD   HEENT: PERR, EOMI, MMM  Neck: Soft and supple, No LAD, No JVD  Chest: Right HD cath site clean/dry/intact   Respiratory: Breath sounds are clear bilaterally, No wheezing, rales or rhonchi  Cardiovascular: S1 and S2, regular rate and rhythm, no Murmurs, gallops or rubs  Gastrointestinal: Bowel Sounds present, soft, nontender, nondistended, no guarding, no rebound. PEG site clean/dry   Extremities: No peripheral edema  Vascular: 2+ peripheral pulses  Neurological: non-verbal, responds to physical and vocal stimuli, tracks movements, does not follow commands   Musculoskeletal: mild spasticity in right upper extremity. no spontaneous movement  of bilateral upper or lower extremities.   Skin: Several stage 4 sacral and gluteal wounds, pressure injury on right ear. dry skin on LE w/ peeling

## 2022-03-22 NOTE — DISCHARGE NOTE PROVIDER - NSDCMRMEDTOKEN_GEN_ALL_CORE_FT
amLODIPine 10 mg oral tablet: 1 tab(s) orally once a day  baclofen 10 mg oral tablet: 1 tab(s) orally every 8 hours for muscle spasm/torticolis  carvedilol 12.5 mg oral tablet: 1 tab(s) orally 2 times a day  chlorhexidine 0.12% mucous membrane liquid: 15 milliliter(s) mucous membrane every 12 hours  cloNIDine 0.2 mg oral tablet: 1 tab(s) orally every 8 hours. Hold for SBP &lt; 110  cyclobenzaprine 5 mg oral tablet: 1 tab(s) orally 3 times a day  diazePAM 5 mg/5 mL oral solution: 5 milliliter(s) orally 4 times a day, As Needed  dimethyl fumarate 240 mg oral delayed release capsule: 1 cap(s) orally 2 times a day  fentaNYL 5 mcg/mL-NaCl 0.9% intravenous solution: 25 microgram(s) intravenous every 6 hours, As Needed for pain/discomfort  heparin: 5000 unit(s) subcutaneous every 8 hours  hydrALAZINE 100 mg oral tablet: 1 tab(s) orally every 8 hours  ipratropium-albuterol 0.5 mg-2.5 mg/3 mL inhalation solution: 3 milliliter(s) inhaled 4 times a day, As Needed  levETIRAcetam 100 mg/mL oral solution: 5 milliliter(s) orally every 24 hours  ocular lubricant ophthalmic solution: 1 drop(s) to each affected eye 2 times a day  senna oral tablet: 2 tab(s) orally once a day (at bedtime)  silver sulfADIAZINE 1% topical cream: Apply topically to affected area 2 times a day  Toprol-XL 50 mg oral tablet, extended release: 1 tab(s) orally once a day   acetylcysteine 20% inhalation solution: 4 milliliter(s) inhaled every 6 hours  amLODIPine 10 mg oral tablet: 1 tab(s) orally once a day  carvedilol 12.5 mg oral tablet: 1 tab(s) orally 2 times a day  chlorhexidine 0.12% mucous membrane liquid: 15 milliliter(s) mucous membrane every 12 hours  cyclobenzaprine 5 mg oral tablet: 1 tab(s) orally 3 times a day  dimethyl fumarate 240 mg oral delayed release capsule: 1 cap(s) orally 2 times a day  fentaNYL 5 mcg/mL-NaCl 0.9% intravenous solution: 25 microgram(s) intravenous every 6 hours, As Needed for pain/discomfort  heparin: 5000 unit(s) subcutaneous every 8 hours  ipratropium-albuterol 0.5 mg-2.5 mg/3 mL inhalation solution: 3 milliliter(s) inhaled 4 times a day, As Needed  levETIRAcetam 100 mg/mL oral solution: 5 milliliter(s) orally every 24 hours  ocular lubricant ophthalmic solution: 1 drop(s) to each affected eye 2 times a day  piperacillin-tazobactam: 4.5 gram(s) intravenous q12hrs  senna oral tablet: 2 tab(s) orally once a day (at bedtime)  silver sulfADIAZINE 1% topical cream: Apply topically to affected area 2 times a day

## 2022-03-22 NOTE — PROGRESS NOTE ADULT - SUBJECTIVE AND OBJECTIVE BOX
--------------------------------------------------------------------------------  Chief Complaint: ESRD/Ongoing hemodialysis requirement    24 hour events/subjective:  Seen this morning, remains stable. Labs noted, rate of creatinine rise slowing down, but remains hypertensive.       PAST HISTORY  --------------------------------------------------------------------------------  No significant changes to PMH, PSH, FHx, SHx, unless otherwise noted    ALLERGIES & MEDICATIONS  --------------------------------------------------------------------------------  Allergies    No Known Allergies    Intolerances      Standing Inpatient Medications  acetylcysteine 20%  Inhalation 4 milliLiter(s) Inhalation every 6 hours  amLODIPine   Tablet 10 milliGRAM(s) Oral daily  artificial tears (preservative free) Ophthalmic Solution 1 Drop(s) Both EYES two times a day  carvedilol 3.125 milliGRAM(s) Oral every 12 hours  chlorhexidine 0.12% Liquid 15 milliLiter(s) Oral Mucosa every 12 hours  chlorhexidine 2% Cloths 1 Application(s) Topical daily  heparin   Injectable 5000 Unit(s) SubCutaneous every 8 hours  levETIRAcetam  IVPB 500 milliGRAM(s) IV Intermittent every 24 hours  piperacillin/tazobactam IVPB.. 4.5 Gram(s) IV Intermittent every 12 hours  senna 2 Tablet(s) Oral at bedtime    PRN Inpatient Medications      REVIEW OF SYSTEMS  --------------------------------------------------------------------------------  ROS negative except as per HPI    VITALS/PHYSICAL EXAM  --------------------------------------------------------------------------------  T(C): 37.1 (03-22-22 @ 05:04), Max: 37.2 (03-21-22 @ 20:59)  HR: 114 (03-22-22 @ 06:00) (100 - 115)  BP: 146/98 (03-22-22 @ 05:04) (135/88 - 146/98)  RR: 17 (03-22-22 @ 05:04) (14 - 17)  SpO2: 100% (03-22-22 @ 06:00) (100% - 100%)  Wt(kg): --  Drug Dosing Weight  Height (cm): 156 (16 Mar 2022 13:26)  Weight (kg): 70.9 (16 Mar 2022 23:05)  BMI (kg/m2): 29.1 (16 Mar 2022 23:05)  BSA (m2): 1.71 (16 Mar 2022 23:05)    PHYSICAL EXAM:  GENERAL: NAD  NECK: trach collar  CHEST/LUNG: mechanical breath sounds BL  HEART: normal S1S2, RRR  ABDOMEN: Soft, Nontender, +BS, No flank tenderness bilateral  EXTREMITIES: trace pedal edema BL  ACCESS: R [facility]    LABS/STUDIES  --------------------------------------------------------------------------------              9.9    11.45 >-----------<  415      [03-22-22 @ 06:56]              33.4     134  |  95  |  35  ----------------------------<  121      [03-22-22 @ 06:56]  4.1   |  25  |  1.42        Ca     11.2     [03-22-22 @ 06:56]      Mg     2.4     [03-22-22 @ 06:56]      Phos  3.5     [03-22-22 @ 06:56]      PT/INR: PT 12.2 , INR 1.02       [03-21-22 @ 07:38]  PTT: 29.1       [03-21-22 @ 07:38]      Iron 61, TIBC 173, %sat 35      [03-17-22 @ 12:09]  Ferritin 2188      [03-17-22 @ 12:09]  PTH -- (Ca 10.2)      [03-18-22 @ 04:18]   16  PTH -- (Ca 8.8)      [11-29-21 @ 10:47]   77  Vitamin D (25OH) 16.9      [11-29-21 @ 10:47]  TSH 7.300      [03-20-22 @ 06:41]  Lipid: chol --, , HDL --, LDL --      [11-21-21 @ 04:47]    HBsAb Reactive      [03-17-22 @ 18:44]  HBsAg Nonreact      [03-17-22 @ 18:44]  HBcAb Reactive      [03-17-22 @ 18:44]  HCV 0.06, Nonreact      [03-17-22 @ 18:44]      RADIOLOGY:  --------------------------------------------------------------------------------------

## 2022-03-22 NOTE — PROGRESS NOTE ADULT - PROBLEM SELECTOR PLAN 3
H/o sacral decubitus ulcer s/p debridement by plastic surgery in the past. Currently with intragluteal abscess with likely underlying osteomyelitis.  - Surgery consult, abscess treatment plan as above  Wound care: stage 4 on right ear, stage 4 on sacrum, stage 4 on rt gluteal fold  stage 3 on left elbow, unstagable on rt elbow, b/l heels pressure injury     To do:  - C/w dressing changes per wound care: Aquacel extra to be packed  - speciality bed by wound care H/o sacral decubitus ulcer s/p debridement by plastic surgery in the past. Currently with intragluteal abscess with likely underlying osteomyelitis.  s/p IR intervention and ruled out abscess  Wound care: stage 4 on right ear, stage 4 on sacrum, stage 4 on rt gluteal fold  stage 3 on left elbow, unstagable on rt elbow, b/l heels pressure injury     To do:  - C/w dressing changes per wound care: Aquacel extra to be packed  - speciality bed by wound care

## 2022-03-22 NOTE — PROGRESS NOTE ADULT - PROBLEM SELECTOR PLAN 2
CT A/P: Intramuscular abscess medial right gluteus valente adjacent to large sacral decubitus ulcer with exposed bone probably underlying osteomyelitis   s/p IR intervention and ruled out abscess  - PEG adapter replaced by GI  - ID: advise against MRI pelvis as will be unlikely to  and will be likely to prolong length of stay    To do:   - C/w Vanc 750 mg on the day of dialysis  - cw Zosyn TID for 5 more days 03/21- 03/26

## 2022-03-22 NOTE — PROGRESS NOTE ADULT - SUBJECTIVE AND OBJECTIVE BOX
INCOMPLETE OVERNIGHT EVENTS: No acute events overnight    SUBJECTIVE / INTERVAL HPI: Patient seen and examined at bedside.     VITAL SIGNS:  Vital Signs Last 24 Hrs  T(C): 37.1 (22 Mar 2022 05:04), Max: 37.2 (21 Mar 2022 20:59)  T(F): 98.7 (22 Mar 2022 05:04), Max: 98.9 (21 Mar 2022 20:59)  HR: 111 (22 Mar 2022 08:31) (100 - 115)  BP: 146/98 (22 Mar 2022 05:04) (135/88 - 146/98)  BP(mean): --  RR: 17 (22 Mar 2022 05:04) (14 - 17)  SpO2: 100% (22 Mar 2022 12:14) (100% - 100%)    PHYSICAL EXAM:  General: breathing via ventilator   HEENT: PERRL, anicteric sclera; MMM  Cardiovascular: +S1/S2, RRR  Respiratory: CTA B/L; no W/R/R, Trach in place   Gastrointestinal: soft, NT/ND; +BSx4, PEG with adapter in place appears clean and dry  Extremities: WWP; no edema, clubbing or cyanosis  Vascular: 2+ radial, DP/PT pulses B/L  Neurological: AAOx0, strenght & sensation absent b/l UE and LE     MEDICATIONS:  MEDICATIONS  (STANDING):  acetylcysteine 20%  Inhalation 4 milliLiter(s) Inhalation every 6 hours  amLODIPine   Tablet 10 milliGRAM(s) Oral daily  artificial tears (preservative free) Ophthalmic Solution 1 Drop(s) Both EYES two times a day  baclofen 10 milliGRAM(s) Oral every 8 hours  carvedilol 3.125 milliGRAM(s) Oral every 12 hours  chlorhexidine 0.12% Liquid 15 milliLiter(s) Oral Mucosa every 12 hours  chlorhexidine 2% Cloths 1 Application(s) Topical daily  epoetin nannette-epbx (RETACRIT) Injectable 7000 Unit(s) IV Push once  heparin   Injectable 5000 Unit(s) SubCutaneous every 8 hours  levETIRAcetam  IVPB 500 milliGRAM(s) IV Intermittent every 24 hours  piperacillin/tazobactam IVPB.. 4.5 Gram(s) IV Intermittent every 12 hours  senna 2 Tablet(s) Oral at bedtime    MEDICATIONS  (PRN):      ALLERGIES:  Allergies    No Known Allergies    Intolerances        LABS:                        9.9    11.45 )-----------( 415      ( 22 Mar 2022 06:56 )             33.4     03-22    134<L>  |  95<L>  |  35<H>  ----------------------------<  121<H>  4.1   |  25  |  1.42<H>    Ca    11.2<H>      22 Mar 2022 06:56  Phos  3.5     03-22  Mg     2.4     03-22      PT/INR - ( 21 Mar 2022 07:38 )   PT: 12.2 sec;   INR: 1.02          PTT - ( 21 Mar 2022 07:38 )  PTT:29.1 sec    CAPILLARY BLOOD GLUCOSE          RADIOLOGY & ADDITIONAL TESTS: Reviewed. 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 left lower lobe pneumonia. ID (advised against MRI and recommended antibiotics till 03/26), IR (s/p intervention and ruled out abscess), GI (replaced PEG adapter) and wound care for multiple ulcer were consulted inpatient. Patient is getting dialysis for ESRD every T/th/Sat and is currently pending DC to [facility].     OVERNIGHT EVENTS: No acute events overnight    SUBJECTIVE / INTERVAL HPI: Patient seen and examined at bedside.     VITAL SIGNS:  Vital Signs Last 24 Hrs  T(C): 37.1 (22 Mar 2022 05:04), Max: 37.2 (21 Mar 2022 20:59)  T(F): 98.7 (22 Mar 2022 05:04), Max: 98.9 (21 Mar 2022 20:59)  HR: 111 (22 Mar 2022 08:31) (100 - 115)  BP: 146/98 (22 Mar 2022 05:04) (135/88 - 146/98)  BP(mean): --  RR: 17 (22 Mar 2022 05:04) (14 - 17)  SpO2: 100% (22 Mar 2022 12:14) (100% - 100%)    PHYSICAL EXAM:  General: breathing via ventilator   HEENT: PERRL, anicteric sclera; MMM  Cardiovascular: +S1/S2, RRR  Respiratory: CTA B/L; no W/R/R, Trach in place   Gastrointestinal: soft, NT/ND; +BSx4, PEG with adapter in place appears clean and dry  Extremities: WWP; no edema, clubbing or cyanosis  Vascular: 2+ radial, DP/PT pulses B/L  Neurological: AAOx0, strength 0/5 & sensation absent b/l UE and LE     MEDICATIONS:  MEDICATIONS  (STANDING):  acetylcysteine 20%  Inhalation 4 milliLiter(s) Inhalation every 6 hours  amLODIPine   Tablet 10 milliGRAM(s) Oral daily  artificial tears (preservative free) Ophthalmic Solution 1 Drop(s) Both EYES two times a day  baclofen 10 milliGRAM(s) Oral every 8 hours  carvedilol 3.125 milliGRAM(s) Oral every 12 hours  chlorhexidine 0.12% Liquid 15 milliLiter(s) Oral Mucosa every 12 hours  chlorhexidine 2% Cloths 1 Application(s) Topical daily  epoetin nannette-epbx (RETACRIT) Injectable 7000 Unit(s) IV Push once  heparin   Injectable 5000 Unit(s) SubCutaneous every 8 hours  levETIRAcetam  IVPB 500 milliGRAM(s) IV Intermittent every 24 hours  piperacillin/tazobactam IVPB.. 4.5 Gram(s) IV Intermittent every 12 hours  senna 2 Tablet(s) Oral at bedtime    MEDICATIONS  (PRN):      ALLERGIES:  Allergies    No Known Allergies    Intolerances        LABS:                        9.9    11.45 )-----------( 415      ( 22 Mar 2022 06:56 )             33.4     03-22    134<L>  |  95<L>  |  35<H>  ----------------------------<  121<H>  4.1   |  25  |  1.42<H>    Ca    11.2<H>      22 Mar 2022 06:56  Phos  3.5     03-22  Mg     2.4     03-22      PT/INR - ( 21 Mar 2022 07:38 )   PT: 12.2 sec;   INR: 1.02          PTT - ( 21 Mar 2022 07:38 )  PTT:29.1 sec    CAPILLARY BLOOD GLUCOSE          RADIOLOGY & ADDITIONAL TESTS: Reviewed.

## 2022-03-22 NOTE — PROGRESS NOTE ADULT - PROBLEM SELECTOR PLAN 7
2/2 ruptured left middle cerebral artery aneurysm resulting in functional quadriplegia  - Palliative and family discussion discussion: full code   - Social work consult to coordinate placement in new NH as requested by family  - cw Keppra IV daily

## 2022-03-22 NOTE — PROGRESS NOTE ADULT - PROBLEM SELECTOR PLAN 4
2/2 ATN in December 2021. Anuric.  - Renal consulted  - Pt is hypercalcemia 11.1 on 03/21; PTH noted to be 16, Phos 3.1; renal will dialyze on low calcium bath    To do:   - C/w HD on T/Th/Sa  - f/u  serum free light chains, immunoelectrophoresis w/ UPEP to r/o MM  - trend phos daily     #HTN  - cw amlodipine 10 mg daily  - montior vitals

## 2022-03-22 NOTE — PROGRESS NOTE ADULT - PROBLEM SELECTOR PLAN 1
Febrile 100.9, tachycardic 105, WBC 17, lactate 2.6, likely 2/2 gluteal abscess with underlying osteomyelitis vs. probable left lower lobe pneumonia.  - Blood and urine culture negative; , CRP 30.8  - s/p IR intervention and ruled out abscess    To do:   - C/w Vanc 750 mg on the day of dialysis  - cw Zosyn TID for 5 more days 03/21- 03/26  - cw coreg 3.125 for tachycardia with up titration Febrile 100.9, tachycardic 105, WBC 17, lactate 2.6, likely 2/2 gluteal abscess with underlying osteomyelitis vs. probable left lower lobe pneumonia.  - Blood and urine culture negative; , CRP 30.8  - s/p IR intervention and ruled out abscess; antibiotics as per ID     To do:   - C/w Vanc 750 mg on the day of dialysis  - cw Zosyn TID for 5 more days 03/21- 03/26  - cw coreg 12.5 BID for tachycardia with up titration

## 2022-03-22 NOTE — DISCHARGE NOTE PROVIDER - NSDCCPCAREPLAN_GEN_ALL_CORE_FT
PRINCIPAL DISCHARGE DIAGNOSIS  Diagnosis: Sepsis due to skin infection  Assessment and Plan of Treatment: Sepsis is a life threatening condition that occurs when your body is responding to a severe infection. You came to the hospital with sepsis due to an infection in the skin around your pressure injuries.  You were found to have an abscess, or fluids, dead tissue, and pus. This was drained by our intervential radiology team, and you were treated IV antibiotics called  zosyn and vancomycin.   You will complete your IV antibiotic cours on Saturday 3/26. Continue the treatement course as follows:   - Vancomycin 750mg post HD T/Th/S administered after HD   - Zosyn 4.5g IV every 12 hours   Follow up with PCP, to ensure that the infection has cleared.      SECONDARY DISCHARGE DIAGNOSES  Diagnosis: Pressure ulcer  Assessment and Plan of Treatment: When an individual is unable to move for extended periods of time, the skin breaks down and forms a perssuer ulcer. You have multiple wounds on your buttock and right ear.   Our wound care team evlauated you and provided instructions to care for your pressure ulcers.   Pack extra Aquacel in sacral and gluteal wounds. Change every other day. Apply triad both elbows and right ear every other day.    Diagnosis: ESRD on dialysis  Assessment and Plan of Treatment:     Diagnosis: PEG tube malfunction  Assessment and Plan of Treatment:     Diagnosis: HTN (hypertension)  Assessment and Plan of Treatment:     Diagnosis: History of nontraumatic rupture of cerebral aneurysm  Assessment and Plan of Treatment:      PRINCIPAL DISCHARGE DIAGNOSIS  Diagnosis: Sepsis due to skin infection  Assessment and Plan of Treatment: Sepsis is a life threatening condition that occurs when your body is responding to a severe infection. You came to the hospital with sepsis due to an infection in the skin around your pressure injuries.  You were found to have an abscess, or fluids, dead tissue, and pus. This was drained by our intervential radiology team, and you were treated IV antibiotics called  zosyn and vancomycin.   You will complete your IV antibiotic cours on Saturday 3/26. Continue the treatement course as follows:   - Vancomycin 750mg post HD T/Th/S administered after HD   - Zosyn 4.5g IV every 12 hours   Follow up with PCP, to ensure that the infection has cleared.      SECONDARY DISCHARGE DIAGNOSES  Diagnosis: Pressure ulcer  Assessment and Plan of Treatment: When an individual is unable to move for extended periods of time, the skin breaks down and forms a perssuer ulcer. You have multiple wounds on your buttock and right ear.   Our wound care team evlauated you and provided instructions to care for your pressure ulcers.   Pack extra Aquacel in sacral and gluteal wounds. Change every other day. Apply triad both elbows and right ear every other day.    Diagnosis: ESRD on dialysis  Assessment and Plan of Treatment: You were previously diagnosed with end stage renal disease and require hemodialysius. Please continue hemodialysis on your normal Tuesday, Thursday, Saturday schedule.   Decreased kidney function can result in low red blood cell counts and electrolyte abnormalities. Continue to get EPO 700U and calcium adjustemnts during hemodialysis.    Diagnosis: PEG tube malfunction  Assessment and Plan of Treatment:     Diagnosis: HTN (hypertension)  Assessment and Plan of Treatment:     Diagnosis: History of nontraumatic rupture of cerebral aneurysm  Assessment and Plan of Treatment:      PRINCIPAL DISCHARGE DIAGNOSIS  Diagnosis: Sepsis due to skin infection  Assessment and Plan of Treatment: Sepsis is a life threatening condition that occurs when your body is responding to a severe infection. You came to the hospital with sepsis due to an infection in the skin around your pressure injuries.  You were found to have an abscess, or fluids, dead tissue, and pus. This was drained by our intervential radiology team, and you were treated IV antibiotics called  zosyn and vancomycin.   You will complete your IV antibiotic cours on Saturday 3/26. Continue the treatement course as follows:   - Vancomycin 750mg post HD T/Th/S administered after HD   - Zosyn 4.5g IV every 12 hours   Follow up with PCP, to ensure that the infection has cleared.      SECONDARY DISCHARGE DIAGNOSES  Diagnosis: Pressure ulcer  Assessment and Plan of Treatment: When an individual is unable to move for extended periods of time, the skin breaks down and forms a perssuer ulcer. You have multiple wounds on your buttock and right ear.   Our wound care team evlauated you and provided instructions to care for your pressure ulcers.   Pack extra Aquacel in sacral and gluteal wounds. Change every other day. Apply triad both elbows and right ear every other day.    Diagnosis: ESRD on dialysis  Assessment and Plan of Treatment: You were previously diagnosed with end stage renal disease and require hemodialysius. Please continue hemodialysis on your normal Tuesday, Thursday, Saturday schedule.   Decreased kidney function can result in low red blood cell counts and electrolyte abnormalities. Continue to get EPO 700U and calcium adjustemnts during hemodialysis.    Diagnosis: PEG tube malfunction  Assessment and Plan of Treatment: During your admission, it was noted that your PEG tube was malfunctioning and the gastroenterology team examined it and afterwards it was functional and ready for use.   PEG feed instructions: Please continue Neprorth 1.8 1000cc, 65cc/hr, 400 @27cc/hr water flush.    Diagnosis: HTN (hypertension)  Assessment and Plan of Treatment: You have a history of hypertension on home medications hydralazine 100mg every 8 hours, clonidine 0.2mg every 8 hours, coreg 12.5mg every 12 hours, and amlodipine 10mg daily. During your hospitalization your hdyralazine and clonidine were held. Please continue to take coreg 12.5mg every 12 hours and amlodipine 10mg daily.    Diagnosis: History of nontraumatic rupture of cerebral aneurysm  Assessment and Plan of Treatment: You have a history of ruptured left middle cerebral artery aneurysm resulting in functional quadriplegia. Please continue to take Keppra IV 500mg daily.     PRINCIPAL DISCHARGE DIAGNOSIS  Diagnosis: Sepsis due to skin infection  Assessment and Plan of Treatment: Sepsis is a life threatening condition that occurs when your body is responding to a severe infection. You came to the hospital with sepsis due to an infection in the skin around your pressure injuries.  You were found to have an abscess, or fluids, dead tissue, and pus. This was drained by our intervential radiology team, and you were treated IV antibiotics called  zosyn and vancomycin.   You will complete your IV antibiotic cours on Saturday 3/26. Continue the treatement course as follows:   - Vancomycin 750mg post HD T/Th/S administered after HD   - Zosyn 4.5g IV every 12 hours   Follow up with PCP, to ensure that the infection has cleared.      SECONDARY DISCHARGE DIAGNOSES  Diagnosis: Pressure ulcer  Assessment and Plan of Treatment: When an individual is unable to move for extended periods of time, the skin breaks down and forms a perssuer ulcer. You have multiple wounds on your buttock and right ear.   Our wound care team evlauated you and provided instructions to care for your pressure ulcers.   Pack extra Aquacel in sacral and gluteal wounds. Change every other day. Apply triad both elbows and right ear every other day.    Diagnosis: ESRD on dialysis  Assessment and Plan of Treatment: You were previously diagnosed with end stage renal disease and require hemodialysius. Please continue hemodialysis on your normal Tuesday, Thursday, Saturday schedule.   Decreased kidney function can result in low red blood cell counts and electrolyte abnormalities. Continue to get EPO 700U and calcium adjustemnts during hemodialysis.    Diagnosis: PEG tube malfunction  Assessment and Plan of Treatment: During your admission, it was noted that your PEG tube was malfunctioning and the gastroenterology team examined it and afterwards it was functional and ready for use.   PEG feed instructions: Please continue Neprorth 1.8 1000cc, 65cc/hr, 400 @27cc/hr water flush.    Diagnosis: HTN (hypertension)  Assessment and Plan of Treatment: You have a history of hypertension on home medications hydralazine 100mg every 8 hours, clonidine 0.2mg every 8 hours, coreg 12.5mg every 12 hours, and amlodipine 10mg daily. During your hospitalization your hdyralazine and clonidine were held. Please continue to take coreg 12.5mg every 12 hours and amlodipine 10mg daily.  If you become hypertensive w/ systolic BP > 140, please resume hydralazine. You may resume clonidine as well if symptoms persist    Diagnosis: History of nontraumatic rupture of cerebral aneurysm  Assessment and Plan of Treatment: You have a history of ruptured left middle cerebral artery aneurysm resulting in functional quadriplegia.   Please continue to take Keppra IV 500mg daily.

## 2022-03-22 NOTE — PROGRESS NOTE ADULT - ASSESSMENT
47 F with pmhx of ESRD (TTHS) 2/2 anuric ATN (12/21), ICH, trach ,peg who presents to the hospital from nursing home w/ concern for decubitus ulcer evaluation.    Assessment/Plan:   #ESRD on HD TTHS @NH  usual Rx 3h   -Labs noted, stable  -Rate of creatinine rise appears to be plateauing although pt remains hypertensive  -UF w/HD    #Access   [facility] T[facility]     #Anemia  Hb within goal  -Epo w/ HD 7000 units    #Renal Bone Disease  CA 11.1  Phos at goal  -Pth noted to be 16  -Will dialyze on low calcium bath  -SPEP & Immunofixation pending for MM work up; hypercalcemia likely related to immobilization  trend phos daily w/ labs   47 F with pmhx of ESRD (TTHS) 2/2 anuric ATN (12/21), ICH, trach ,peg who presents to the hospital from nursing home w/ concern for decubitus ulcer evaluation.    Assessment/Plan:   #ESRD on HD TTHS @NH  usual Rx 3h   -Labs noted, stable  -Rate of creatinine rise appears to be plateauing although pt remains hypertensive  -UF w/HD  -HD today as per schedule  -If patient is urinating (not documented) would start a 24 hr urine collection sodium and Cr at 10pm tonight    #Access   [facility]     #Anemia  Hb within goal  -Epo w/ HD 7000 units    #Renal Bone Disease  CA 11.1  Phos at goal  -Pth noted to be 16  -Will dialyze on low calcium bath  -SPEP & Immunofixation pending for MM work up; hypercalcemia likely related to immobilization  trend phos daily w/ labs

## 2022-03-23 DIAGNOSIS — I10 ESSENTIAL (PRIMARY) HYPERTENSION: ICD-10-CM

## 2022-03-23 LAB
ANION GAP SERPL CALC-SCNC: 13 MMOL/L — SIGNIFICANT CHANGE UP (ref 5–17)
BUN SERPL-MCNC: 20 MG/DL — SIGNIFICANT CHANGE UP (ref 7–23)
CALCIUM SERPL-MCNC: 10.7 MG/DL — HIGH (ref 8.4–10.5)
CHLORIDE SERPL-SCNC: 95 MMOL/L — LOW (ref 96–108)
CO2 SERPL-SCNC: 28 MMOL/L — SIGNIFICANT CHANGE UP (ref 22–31)
CREAT ?TM UR-MCNC: 61 MG/DL — SIGNIFICANT CHANGE UP
CREAT SERPL-MCNC: 1.06 MG/DL — SIGNIFICANT CHANGE UP (ref 0.5–1.3)
EGFR: 66 ML/MIN/1.73M2 — SIGNIFICANT CHANGE UP
GLUCOSE SERPL-MCNC: 125 MG/DL — HIGH (ref 70–99)
HCT VFR BLD CALC: 28.2 % — LOW (ref 34.5–45)
HGB BLD-MCNC: 8.8 G/DL — LOW (ref 11.5–15.5)
MAGNESIUM SERPL-MCNC: 2 MG/DL — SIGNIFICANT CHANGE UP (ref 1.6–2.6)
MCHC RBC-ENTMCNC: 25 PG — LOW (ref 27–34)
MCHC RBC-ENTMCNC: 31.2 GM/DL — LOW (ref 32–36)
MCV RBC AUTO: 80.1 FL — SIGNIFICANT CHANGE UP (ref 80–100)
NRBC # BLD: 0 /100 WBCS — SIGNIFICANT CHANGE UP (ref 0–0)
PHOSPHATE SERPL-MCNC: 2.7 MG/DL — SIGNIFICANT CHANGE UP (ref 2.5–4.5)
PLATELET # BLD AUTO: 337 K/UL — SIGNIFICANT CHANGE UP (ref 150–400)
POTASSIUM SERPL-MCNC: 3.5 MMOL/L — SIGNIFICANT CHANGE UP (ref 3.5–5.3)
POTASSIUM SERPL-SCNC: 3.5 MMOL/L — SIGNIFICANT CHANGE UP (ref 3.5–5.3)
RBC # BLD: 3.52 M/UL — LOW (ref 3.8–5.2)
RBC # FLD: 17.2 % — HIGH (ref 10.3–14.5)
SARS-COV-2 RNA SPEC QL NAA+PROBE: NEGATIVE — SIGNIFICANT CHANGE UP
SARS-COV-2 RNA SPEC QL NAA+PROBE: SIGNIFICANT CHANGE UP
SODIUM SERPL-SCNC: 136 MMOL/L — SIGNIFICANT CHANGE UP (ref 135–145)
SODIUM UR-SCNC: 22 MMOL/L — SIGNIFICANT CHANGE UP
T4 FREE SERPL-MCNC: 1.57 NG/DL — SIGNIFICANT CHANGE UP (ref 0.93–1.7)
WBC # BLD: 11.39 K/UL — HIGH (ref 3.8–10.5)
WBC # FLD AUTO: 11.39 K/UL — HIGH (ref 3.8–10.5)

## 2022-03-23 PROCEDURE — 99233 SBSQ HOSP IP/OBS HIGH 50: CPT | Mod: GC

## 2022-03-23 PROCEDURE — 99232 SBSQ HOSP IP/OBS MODERATE 35: CPT | Mod: GC

## 2022-03-23 RX ORDER — HYDROMORPHONE HYDROCHLORIDE 2 MG/ML
0.5 INJECTION INTRAMUSCULAR; INTRAVENOUS; SUBCUTANEOUS ONCE
Refills: 0 | Status: DISCONTINUED | OUTPATIENT
Start: 2022-03-23 | End: 2022-03-23

## 2022-03-23 RX ADMIN — Medication 4 MILLILITER(S): at 23:37

## 2022-03-23 RX ADMIN — AMLODIPINE BESYLATE 10 MILLIGRAM(S): 2.5 TABLET ORAL at 05:58

## 2022-03-23 RX ADMIN — CHLORHEXIDINE GLUCONATE 1 APPLICATION(S): 213 SOLUTION TOPICAL at 13:18

## 2022-03-23 RX ADMIN — HYDROMORPHONE HYDROCHLORIDE 0.5 MILLIGRAM(S): 2 INJECTION INTRAMUSCULAR; INTRAVENOUS; SUBCUTANEOUS at 18:11

## 2022-03-23 RX ADMIN — Medication 1 DROP(S): at 13:19

## 2022-03-23 RX ADMIN — CARVEDILOL PHOSPHATE 12.5 MILLIGRAM(S): 80 CAPSULE, EXTENDED RELEASE ORAL at 18:12

## 2022-03-23 RX ADMIN — HEPARIN SODIUM 5000 UNIT(S): 5000 INJECTION INTRAVENOUS; SUBCUTANEOUS at 13:22

## 2022-03-23 RX ADMIN — CARVEDILOL PHOSPHATE 12.5 MILLIGRAM(S): 80 CAPSULE, EXTENDED RELEASE ORAL at 05:58

## 2022-03-23 RX ADMIN — CHLORHEXIDINE GLUCONATE 15 MILLILITER(S): 213 SOLUTION TOPICAL at 18:11

## 2022-03-23 RX ADMIN — HEPARIN SODIUM 5000 UNIT(S): 5000 INJECTION INTRAVENOUS; SUBCUTANEOUS at 05:57

## 2022-03-23 RX ADMIN — Medication 4 MILLILITER(S): at 18:11

## 2022-03-23 RX ADMIN — Medication 10 MILLIGRAM(S): at 05:58

## 2022-03-23 RX ADMIN — Medication 10 MILLIGRAM(S): at 23:38

## 2022-03-23 RX ADMIN — CHLORHEXIDINE GLUCONATE 15 MILLILITER(S): 213 SOLUTION TOPICAL at 05:58

## 2022-03-23 RX ADMIN — SENNA PLUS 2 TABLET(S): 8.6 TABLET ORAL at 23:37

## 2022-03-23 RX ADMIN — HEPARIN SODIUM 5000 UNIT(S): 5000 INJECTION INTRAVENOUS; SUBCUTANEOUS at 23:37

## 2022-03-23 RX ADMIN — PIPERACILLIN AND TAZOBACTAM 200 GRAM(S): 4; .5 INJECTION, POWDER, LYOPHILIZED, FOR SOLUTION INTRAVENOUS at 18:10

## 2022-03-23 RX ADMIN — HYDROMORPHONE HYDROCHLORIDE 0.5 MILLIGRAM(S): 2 INJECTION INTRAMUSCULAR; INTRAVENOUS; SUBCUTANEOUS at 18:30

## 2022-03-23 RX ADMIN — Medication 10 MILLIGRAM(S): at 13:22

## 2022-03-23 RX ADMIN — Medication 4 MILLILITER(S): at 13:19

## 2022-03-23 RX ADMIN — Medication 4 MILLILITER(S): at 05:59

## 2022-03-23 RX ADMIN — LEVETIRACETAM 400 MILLIGRAM(S): 250 TABLET, FILM COATED ORAL at 09:16

## 2022-03-23 RX ADMIN — PIPERACILLIN AND TAZOBACTAM 200 GRAM(S): 4; .5 INJECTION, POWDER, LYOPHILIZED, FOR SOLUTION INTRAVENOUS at 05:59

## 2022-03-23 NOTE — PROGRESS NOTE ADULT - SUBJECTIVE AND OBJECTIVE BOX
OVERNIGHT EVENTS:    SUBJECTIVE / INTERVAL HPI: Patient seen and examined at bedside.     VITAL SIGNS:  Vital Signs Last 24 Hrs  T(C): 36.9 (23 Mar 2022 04:58), Max: 37.1 (22 Mar 2022 14:10)  T(F): 98.4 (23 Mar 2022 04:58), Max: 98.7 (22 Mar 2022 14:10)  HR: 111 (23 Mar 2022 05:24) (103 - 114)  BP: 128/88 (23 Mar 2022 04:58) (113/78 - 152/101)  BP(mean): --  RR: 19 (23 Mar 2022 04:58) (14 - 19)  SpO2: 100% (23 Mar 2022 05:24) (100% - 100%)  I&O's Summary    22 Mar 2022 07:01  -  23 Mar 2022 06:51  --------------------------------------------------------  IN: 0 mL / OUT: 1000 mL / NET: -1000 mL        PHYSICAL EXAM:    General: NAD, lying comfortably in bed   HEENT: Anicteric sclera, EOMI, MMM  Cardiovascular: +S1/S2; RRR; No JVD   Respiratory: CTAB, no accessory muscle use  Gastrointestinal: soft, non-distended, no tenderness to palpation  Extremities: WWP; no edema, no erythema, no tenderness to palpation  Vascular: 2+ radial, DP/PT pulses B/L  Neurological: AAOx3; no focal deficits  Skin: No visible rash, skin discoloration    MEDICATIONS:  MEDICATIONS  (STANDING):  acetylcysteine 20%  Inhalation 4 milliLiter(s) Inhalation every 6 hours  amLODIPine   Tablet 10 milliGRAM(s) Oral daily  artificial tears (preservative free) Ophthalmic Solution 1 Drop(s) Both EYES two times a day  baclofen 10 milliGRAM(s) Oral every 8 hours  carvedilol 12.5 milliGRAM(s) Oral every 12 hours  chlorhexidine 0.12% Liquid 15 milliLiter(s) Oral Mucosa every 12 hours  chlorhexidine 2% Cloths 1 Application(s) Topical daily  heparin   Injectable 5000 Unit(s) SubCutaneous every 8 hours  levETIRAcetam  IVPB 500 milliGRAM(s) IV Intermittent every 24 hours  piperacillin/tazobactam IVPB.. 4.5 Gram(s) IV Intermittent every 12 hours  senna 2 Tablet(s) Oral at bedtime    MEDICATIONS  (PRN):      ALLERGIES:  Allergies    No Known Allergies    Intolerances        LABS:                        9.9    11.45 )-----------( 415      ( 22 Mar 2022 06:56 )             33.4     03-22    134<L>  |  95<L>  |  35<H>  ----------------------------<  121<H>  4.1   |  25  |  1.42<H>    Ca    11.2<H>      22 Mar 2022 06:56  Phos  3.5     03-22  Mg     2.4     03-22      PT/INR - ( 21 Mar 2022 07:38 )   PT: 12.2 sec;   INR: 1.02          PTT - ( 21 Mar 2022 07:38 )  PTT:29.1 sec    CAPILLARY BLOOD GLUCOSE          RADIOLOGY & ADDITIONAL TESTS: Reviewed.   OVERNIGHT EVENTS: No acute events overnight. Pt remained afebrile and hemodynamically stable.      SUBJECTIVE / INTERVAL HPI: Patient seen and examined at bedside. No acute distress. Lying comfortably in bed. ROS limited by pt's impaired mental status.     VITAL SIGNS:  Vital Signs Last 24 Hrs  T(C): 36.9 (23 Mar 2022 04:58), Max: 37.1 (22 Mar 2022 14:10)  T(F): 98.4 (23 Mar 2022 04:58), Max: 98.7 (22 Mar 2022 14:10)  HR: 111 (23 Mar 2022 05:24) (103 - 114)  BP: 128/88 (23 Mar 2022 04:58) (113/78 - 152/101)  BP(mean): --  RR: 19 (23 Mar 2022 04:58) (14 - 19)  SpO2: 100% (23 Mar 2022 05:24) (100% - 100%)  I&O's Summary    22 Mar 2022 07:01  -  23 Mar 2022 06:51  --------------------------------------------------------  IN: 0 mL / OUT: 1000 mL / NET: -1000 mL        PHYSICAL EXAM:    General: NAD, lying comfortably in bed   HEENT: Anicteric sclera, EOMI, MMM  Cardiovascular: +S1/S2; RRR; No JVD   Respiratory: CTAB, no accessory muscle use, HD catheter clean/dry on right chest wall   Gastrointestinal: soft, non-distended, no tenderness to palpation, PEG site clean/dry   Extremities: WWP; no edema, no erythema, no tenderness to palpation  Vascular: 2+ radial, DP/PT pulses B/L  Neurological: AAOx3; no focal deficits  Skin: Dry peeling rash on LE consistent w/ vitamin deficiency     MEDICATIONS:  MEDICATIONS  (STANDING):  acetylcysteine 20%  Inhalation 4 milliLiter(s) Inhalation every 6 hours  amLODIPine   Tablet 10 milliGRAM(s) Oral daily  artificial tears (preservative free) Ophthalmic Solution 1 Drop(s) Both EYES two times a day  baclofen 10 milliGRAM(s) Oral every 8 hours  carvedilol 12.5 milliGRAM(s) Oral every 12 hours  chlorhexidine 0.12% Liquid 15 milliLiter(s) Oral Mucosa every 12 hours  chlorhexidine 2% Cloths 1 Application(s) Topical daily  heparin   Injectable 5000 Unit(s) SubCutaneous every 8 hours  levETIRAcetam  IVPB 500 milliGRAM(s) IV Intermittent every 24 hours  piperacillin/tazobactam IVPB.. 4.5 Gram(s) IV Intermittent every 12 hours  senna 2 Tablet(s) Oral at bedtime    MEDICATIONS  (PRN):      ALLERGIES:  Allergies    No Known Allergies    Intolerances        LABS:                        9.9    11.45 )-----------( 415      ( 22 Mar 2022 06:56 )             33.4     03-22    134<L>  |  95<L>  |  35<H>  ----------------------------<  121<H>  4.1   |  25  |  1.42<H>    Ca    11.2<H>      22 Mar 2022 06:56  Phos  3.5     03-22  Mg     2.4     03-22      PT/INR - ( 21 Mar 2022 07:38 )   PT: 12.2 sec;   INR: 1.02          PTT - ( 21 Mar 2022 07:38 )  PTT:29.1 sec    CAPILLARY BLOOD GLUCOSE          RADIOLOGY & ADDITIONAL TESTS: Reviewed.

## 2022-03-23 NOTE — PROGRESS NOTE ADULT - PROBLEM SELECTOR PLAN 2
CT A/P: Intramuscular abscess medial right gluteus valente adjacent to large sacral decubitus ulcer with exposed bone probably underlying osteomyelitis   s/p IR intervention, no collection found, unable to perform drainage  - PEG adapter replaced by GI  - ID: advise against MRI pelvis as will be unlikely to  and will be likely to prolong length of stay    To do:   - C/w Vanc 750 mg on the day of dialysis  - cw Zosyn TID for 5 more days 03/21- 03/26

## 2022-03-23 NOTE — PROGRESS NOTE ADULT - PROBLEM SELECTOR PLAN 1
RESOLVED     Febrile 100.9, tachycardic 105, WBC 17, lactate 2.6 on presentation likely 2/2 gluteal abscess with underlying osteomyelitis vs. probable left lower lobe pneumonia.  - Blood and urine culture negative; , CRP 30.8  - No evidence of increased sputum production or respiratory distress.  - Drainage attempted by IR, but no collection was present   - treat w/ antibiotics, no MRI indicated for assessment of osteomyelitis as per ID     To do:   - C/w Vanc 750 mg on the day of dialysis  - cw Zosyn TID for 5 more days 03/21- 03/26  - no outpatient ID follow up indicated as per ID Dr. Michael

## 2022-03-23 NOTE — PROGRESS NOTE ADULT - PROBLEM SELECTOR PLAN 3
H/o sacral decubitus ulcer s/p debridement by plastic surgery in the past. Found to have stage 4 on right ear, stage 4 on sacrum, stage 4 on rt gluteal fold, stage 3 on left elbow, and unstagable on rt elbow, b/l heels pressure injury        To do:  - C/w dressing changes per wound care  - Apply extra aquacel to sacral and gluteal wounds, change every other day   - Apply Triad to right and left elbows, right ear every other day

## 2022-03-23 NOTE — PROGRESS NOTE ADULT - PROBLEM SELECTOR PLAN 4
2/2 ATN in December 2021. Anuric.  - Renal consulted  - Pt is hypercalcemia 11.1 on 03/21; PTH noted to be 16, Phos 3.1; renal will dialyze on low calcium bath    - C/w HD on T/Th/Sa  - f/u  24 hr urine creatinine as per nephro to assess for kidney recovery   - trend phos daily     # Hypercalcemia   Likely 2/2 renal bone disease vs immobility, MM less likely  - PTH wnl  - f/u vitamin D, urine light chains/SPEP

## 2022-03-23 NOTE — PROGRESS NOTE ADULT - ASSESSMENT
47 F with pmhx of ESRD (TTHS) 2/2 anuric ATN (12/21), ICH, trach ,peg who presents to the hospital from nursing home w/ concern for decubitus ulcer evaluation.    Assessment/Plan:   #ESRD on HD TTHS @[facility]  usual Rx 3h   -HD done 3/22 with 1L UF  -Next HD 3/24 if still here  -24 hour urine collection started to see if pt w/ any kidney recovery    #Access   [facility]     #Anemia  Hb within goal  -Epo w/ HD 7000 units    #Renal Bone Disease  CA improving  Phos at goal  -Pth noted to be 16  -Will dialyze on low calcium bath  -hypercalcemia likely related to immobilization  trend phos daily w/ labs

## 2022-03-23 NOTE — PROGRESS NOTE ADULT - SUBJECTIVE AND OBJECTIVE BOX
--------------------------------------------------------------------------------  Chief Complaint: ESRD/Ongoing hemodialysis requirement    24 hour events/subjective:  Seen this morning at bedside. Remains stable. Tolerated HD yesterday  UF of 1L     PAST HISTORY  --------------------------------------------------------------------------------  No significant changes to PMH, PSH, FHx, SHx, unless otherwise noted    ALLERGIES & MEDICATIONS  --------------------------------------------------------------------------------  Allergies    No Known Allergies    Intolerances      Standing Inpatient Medications  acetylcysteine 20%  Inhalation 4 milliLiter(s) Inhalation every 6 hours  amLODIPine   Tablet 10 milliGRAM(s) Oral daily  artificial tears (preservative free) Ophthalmic Solution 1 Drop(s) Both EYES two times a day  baclofen 10 milliGRAM(s) Oral every 8 hours  carvedilol 12.5 milliGRAM(s) Oral every 12 hours  chlorhexidine 0.12% Liquid 15 milliLiter(s) Oral Mucosa every 12 hours  chlorhexidine 2% Cloths 1 Application(s) Topical daily  heparin   Injectable 5000 Unit(s) SubCutaneous every 8 hours  levETIRAcetam  IVPB 500 milliGRAM(s) IV Intermittent every 24 hours  piperacillin/tazobactam IVPB.. 4.5 Gram(s) IV Intermittent every 12 hours  senna 2 Tablet(s) Oral at bedtime    PRN Inpatient Medications      REVIEW OF SYSTEMS  --------------------------------------------------------------------------------  ROS negative except as per HPI    VITALS/PHYSICAL EXAM  --------------------------------------------------------------------------------  T(C): 36.9 (03-23-22 @ 09:48), Max: 37.1 (03-22-22 @ 14:10)  HR: 104 (03-23-22 @ 09:48) (103 - 114)  BP: 123/87 (03-23-22 @ 09:48) (113/78 - 149/84)  RR: 14 (03-23-22 @ 09:48) (14 - 19)  SpO2: 100% (03-23-22 @ 09:48) (100% - 100%)  Wt(kg): --  Drug Dosing Weight  Height (cm): 156 (16 Mar 2022 13:26)  Weight (kg): 70.9 (16 Mar 2022 23:05)  BMI (kg/m2): 29.1 (16 Mar 2022 23:05)  BSA (m2): 1.71 (16 Mar 2022 23:05)        03-22-22 @ 07:01  -  03-23-22 @ 07:00  --------------------------------------------------------  IN: 0 mL / OUT: 1000 mL / NET: -1000 mL      PHYSICAL EXAM:  GENERAL: NAD  NECK: trach collar  CHEST/LUNG: mechanical breath sounds BL  HEART: normal S1S2, RRR  ABDOMEN: Soft, Nontender, +BS, No flank tenderness bilateral  EXTREMITIES: trace pedal edema BL  ACCESS: R [facility]      LABS/STUDIES  --------------------------------------------------------------------------------              8.8    11.39 >-----------<  337      [03-23-22 @ 08:36]              28.2     136  |  95  |  20  ----------------------------<  125      [03-23-22 @ 08:36]  3.5   |  28  |  1.06        Ca     10.7     [03-23-22 @ 08:36]      Mg     2.0     [03-23-22 @ 08:36]      Phos  2.7     [03-23-22 @ 08:36]            Iron 61, TIBC 173, %sat 35      [03-17-22 @ 12:09]  Ferritin 2188      [03-17-22 @ 12:09]  PTH -- (Ca 10.2)      [03-18-22 @ 04:18]   16  PTH -- (Ca 8.8)      [11-29-21 @ 10:47]   77  Vitamin D (25OH) 16.9      [11-29-21 @ 10:47]  TSH 7.300      [03-20-22 @ 06:41]  Lipid: chol --, , HDL --, LDL --      [11-21-21 @ 04:47]    HBsAb Reactive      [03-17-22 @ 18:44]  HBsAg Nonreact      [03-17-22 @ 18:44]  HBcAb Reactive      [03-17-22 @ 18:44]  HCV 0.06, Nonreact      [03-17-22 @ 18:44]      RADIOLOGY:  --------------------------------------------------------------------------------------

## 2022-03-23 NOTE — PROGRESS NOTE ADULT - PROBLEM SELECTOR PLAN 5
Family complaining that PEG tube has not been changed and is currently not functioning.  - PEG replaced by GI: functioning well without any sign of inflammation and drainage  - s/p GI replacement of PEG adapter

## 2022-03-24 LAB
ANION GAP SERPL CALC-SCNC: 13 MMOL/L — SIGNIFICANT CHANGE UP (ref 5–17)
BASOPHILS # BLD AUTO: 0.04 K/UL — SIGNIFICANT CHANGE UP (ref 0–0.2)
BASOPHILS NFR BLD AUTO: 0.4 % — SIGNIFICANT CHANGE UP (ref 0–2)
BUN SERPL-MCNC: 23 MG/DL — SIGNIFICANT CHANGE UP (ref 7–23)
CALCIUM SERPL-MCNC: 11.2 MG/DL — HIGH (ref 8.4–10.5)
CHLORIDE SERPL-SCNC: 96 MMOL/L — SIGNIFICANT CHANGE UP (ref 96–108)
CO2 SERPL-SCNC: 26 MMOL/L — SIGNIFICANT CHANGE UP (ref 22–31)
CREAT SERPL-MCNC: 1.27 MG/DL — SIGNIFICANT CHANGE UP (ref 0.5–1.3)
EGFR: 53 ML/MIN/1.73M2 — LOW
EOSINOPHIL # BLD AUTO: 0.07 K/UL — SIGNIFICANT CHANGE UP (ref 0–0.5)
EOSINOPHIL NFR BLD AUTO: 0.7 % — SIGNIFICANT CHANGE UP (ref 0–6)
GLUCOSE SERPL-MCNC: 97 MG/DL — SIGNIFICANT CHANGE UP (ref 70–99)
HCT VFR BLD CALC: 29.6 % — LOW (ref 34.5–45)
HGB BLD-MCNC: 9.1 G/DL — LOW (ref 11.5–15.5)
IMM GRANULOCYTES NFR BLD AUTO: 0.3 % — SIGNIFICANT CHANGE UP (ref 0–1.5)
LYMPHOCYTES # BLD AUTO: 3.21 K/UL — SIGNIFICANT CHANGE UP (ref 1–3.3)
LYMPHOCYTES # BLD AUTO: 33 % — SIGNIFICANT CHANGE UP (ref 13–44)
MAGNESIUM SERPL-MCNC: 2.2 MG/DL — SIGNIFICANT CHANGE UP (ref 1.6–2.6)
MCHC RBC-ENTMCNC: 24.7 PG — LOW (ref 27–34)
MCHC RBC-ENTMCNC: 30.7 GM/DL — LOW (ref 32–36)
MCV RBC AUTO: 80.4 FL — SIGNIFICANT CHANGE UP (ref 80–100)
MONOCYTES # BLD AUTO: 0.82 K/UL — SIGNIFICANT CHANGE UP (ref 0–0.9)
MONOCYTES NFR BLD AUTO: 8.4 % — SIGNIFICANT CHANGE UP (ref 2–14)
NEUTROPHILS # BLD AUTO: 5.55 K/UL — SIGNIFICANT CHANGE UP (ref 1.8–7.4)
NEUTROPHILS NFR BLD AUTO: 57.2 % — SIGNIFICANT CHANGE UP (ref 43–77)
NRBC # BLD: 0 /100 WBCS — SIGNIFICANT CHANGE UP (ref 0–0)
PHOSPHATE SERPL-MCNC: 3.6 MG/DL — SIGNIFICANT CHANGE UP (ref 2.5–4.5)
PLATELET # BLD AUTO: 364 K/UL — SIGNIFICANT CHANGE UP (ref 150–400)
POTASSIUM SERPL-MCNC: 3.6 MMOL/L — SIGNIFICANT CHANGE UP (ref 3.5–5.3)
POTASSIUM SERPL-SCNC: 3.6 MMOL/L — SIGNIFICANT CHANGE UP (ref 3.5–5.3)
PROT ?TM UR-MCNC: 107 MG/DL — HIGH (ref 0–12)
RBC # BLD: 3.68 M/UL — LOW (ref 3.8–5.2)
RBC # FLD: 17 % — HIGH (ref 10.3–14.5)
SODIUM SERPL-SCNC: 135 MMOL/L — SIGNIFICANT CHANGE UP (ref 135–145)
VANCOMYCIN TROUGH SERPL-MCNC: 10.4 UG/ML — SIGNIFICANT CHANGE UP (ref 10–20)
WBC # BLD: 9.72 K/UL — SIGNIFICANT CHANGE UP (ref 3.8–10.5)
WBC # FLD AUTO: 9.72 K/UL — SIGNIFICANT CHANGE UP (ref 3.8–10.5)

## 2022-03-24 PROCEDURE — 99233 SBSQ HOSP IP/OBS HIGH 50: CPT

## 2022-03-24 PROCEDURE — 90935 HEMODIALYSIS ONE EVALUATION: CPT

## 2022-03-24 RX ORDER — SODIUM BICARBONATE 1 MEQ/ML
30 SYRINGE (ML) INTRAVENOUS ONCE
Refills: 0 | Status: COMPLETED | OUTPATIENT
Start: 2022-03-24 | End: 2022-03-24

## 2022-03-24 RX ORDER — LIPASE/PROTEASE/AMYLASE 16-48-48K
1 CAPSULE,DELAYED RELEASE (ENTERIC COATED) ORAL ONCE
Refills: 0 | Status: COMPLETED | OUTPATIENT
Start: 2022-03-24 | End: 2022-03-24

## 2022-03-24 RX ORDER — VANCOMYCIN HCL 1 G
750 VIAL (EA) INTRAVENOUS EVERY 12 HOURS
Refills: 0 | Status: COMPLETED | OUTPATIENT
Start: 2022-03-24 | End: 2022-03-24

## 2022-03-24 RX ORDER — LIPASE/PROTEASE/AMYLASE 16-48-48K
1 CAPSULE,DELAYED RELEASE (ENTERIC COATED) ORAL ONCE
Refills: 0 | Status: DISCONTINUED | OUTPATIENT
Start: 2022-03-24 | End: 2022-03-24

## 2022-03-24 RX ORDER — LEVETIRACETAM 250 MG/1
500 TABLET, FILM COATED ORAL EVERY 24 HOURS
Refills: 0 | Status: DISCONTINUED | OUTPATIENT
Start: 2022-03-25 | End: 2022-03-25

## 2022-03-24 RX ORDER — PIPERACILLIN AND TAZOBACTAM 4; .5 G/20ML; G/20ML
4.5 INJECTION, POWDER, LYOPHILIZED, FOR SOLUTION INTRAVENOUS EVERY 12 HOURS
Refills: 0 | Status: DISCONTINUED | OUTPATIENT
Start: 2022-03-24 | End: 2022-03-25

## 2022-03-24 RX ORDER — HYDROMORPHONE HYDROCHLORIDE 2 MG/ML
1 INJECTION INTRAMUSCULAR; INTRAVENOUS; SUBCUTANEOUS ONCE
Refills: 0 | Status: DISCONTINUED | OUTPATIENT
Start: 2022-03-24 | End: 2022-03-24

## 2022-03-24 RX ORDER — SODIUM BICARBONATE 1 MEQ/ML
325 SYRINGE (ML) INTRAVENOUS ONCE
Refills: 0 | Status: DISCONTINUED | OUTPATIENT
Start: 2022-03-24 | End: 2022-03-24

## 2022-03-24 RX ORDER — ERYTHROPOIETIN 10000 [IU]/ML
7000 INJECTION, SOLUTION INTRAVENOUS; SUBCUTANEOUS ONCE
Refills: 0 | Status: COMPLETED | OUTPATIENT
Start: 2022-03-24 | End: 2022-03-24

## 2022-03-24 RX ADMIN — Medication 10 MILLIGRAM(S): at 14:59

## 2022-03-24 RX ADMIN — HEPARIN SODIUM 5000 UNIT(S): 5000 INJECTION INTRAVENOUS; SUBCUTANEOUS at 14:59

## 2022-03-24 RX ADMIN — Medication 250 MILLIGRAM(S): at 19:02

## 2022-03-24 RX ADMIN — Medication 4 MILLILITER(S): at 06:26

## 2022-03-24 RX ADMIN — CHLORHEXIDINE GLUCONATE 1 APPLICATION(S): 213 SOLUTION TOPICAL at 12:39

## 2022-03-24 RX ADMIN — HEPARIN SODIUM 5000 UNIT(S): 5000 INJECTION INTRAVENOUS; SUBCUTANEOUS at 22:40

## 2022-03-24 RX ADMIN — Medication 10 MILLIGRAM(S): at 22:41

## 2022-03-24 RX ADMIN — ERYTHROPOIETIN 7000 UNIT(S): 10000 INJECTION, SOLUTION INTRAVENOUS; SUBCUTANEOUS at 12:49

## 2022-03-24 RX ADMIN — LEVETIRACETAM 400 MILLIGRAM(S): 250 TABLET, FILM COATED ORAL at 09:37

## 2022-03-24 RX ADMIN — SENNA PLUS 2 TABLET(S): 8.6 TABLET ORAL at 22:41

## 2022-03-24 RX ADMIN — CHLORHEXIDINE GLUCONATE 15 MILLILITER(S): 213 SOLUTION TOPICAL at 23:03

## 2022-03-24 RX ADMIN — CARVEDILOL PHOSPHATE 12.5 MILLIGRAM(S): 80 CAPSULE, EXTENDED RELEASE ORAL at 19:02

## 2022-03-24 RX ADMIN — Medication 1 DROP(S): at 12:39

## 2022-03-24 RX ADMIN — Medication 4 MILLILITER(S): at 12:39

## 2022-03-24 RX ADMIN — Medication 1 DROP(S): at 00:26

## 2022-03-24 RX ADMIN — HEPARIN SODIUM 5000 UNIT(S): 5000 INJECTION INTRAVENOUS; SUBCUTANEOUS at 06:26

## 2022-03-24 RX ADMIN — PIPERACILLIN AND TAZOBACTAM 200 GRAM(S): 4; .5 INJECTION, POWDER, LYOPHILIZED, FOR SOLUTION INTRAVENOUS at 14:59

## 2022-03-24 RX ADMIN — Medication 4 MILLILITER(S): at 19:02

## 2022-03-24 RX ADMIN — CHLORHEXIDINE GLUCONATE 15 MILLILITER(S): 213 SOLUTION TOPICAL at 09:37

## 2022-03-24 NOTE — PROGRESS NOTE ADULT - SUBJECTIVE AND OBJECTIVE BOX
OVERNIGHT EVENTS: PEG malfunctioned overnight    SUBJECTIVE / INTERVAL HPI: Patient seen and examined at bedside. No apparent distress.     VITAL SIGNS:  Vital Signs Last 24 Hrs  T(C): 36.7 (24 Mar 2022 11:15), Max: 37.1 (23 Mar 2022 15:40)  T(F): 98 (24 Mar 2022 11:15), Max: 98.7 (23 Mar 2022 15:40)  HR: 102 (24 Mar 2022 11:15) (95 - 114)  BP: 143/94 (24 Mar 2022 11:15) (106/77 - 157/88)  BP(mean): --  RR: 14 (24 Mar 2022 11:15) (14 - 18)  SpO2: 99% (24 Mar 2022 12:30) (98% - 100%)  I&O's Summary    PHYSICAL EXAM:    General: NAD, lying comfortably in bed   HEENT: Anicteric sclera, EOMI, MMM  Cardiovascular: +S1/S2; RRR; No JVD   Respiratory: CTAB, no accessory muscle use, HD catheter clean/dry on right chest wall   Gastrointestinal: soft, non-distended, no tenderness to palpation, PEG site clean/dry   Extremities: WWP; no edema, no erythema, no tenderness to palpation  Vascular: 2+ radial, DP/PT pulses B/L  Neurological: AAOx3; no focal deficits  Skin: Dry peeling rash on LE consistent w/ vitamin deficiency     MEDICATIONS:  MEDICATIONS  (STANDING):  acetylcysteine 20%  Inhalation 4 milliLiter(s) Inhalation every 6 hours  amLODIPine   Tablet 10 milliGRAM(s) Oral daily  artificial tears (preservative free) Ophthalmic Solution 1 Drop(s) Both EYES two times a day  baclofen 10 milliGRAM(s) Oral every 8 hours  carvedilol 12.5 milliGRAM(s) Oral every 12 hours  chlorhexidine 0.12% Liquid 15 milliLiter(s) Oral Mucosa every 12 hours  chlorhexidine 2% Cloths 1 Application(s) Topical daily  heparin   Injectable 5000 Unit(s) SubCutaneous every 8 hours  levETIRAcetam  IVPB 500 milliGRAM(s) IV Intermittent every 24 hours  pancrelipase  (CREON 12,000 Lipase Units) 1 Capsule(s) Oral once  senna 2 Tablet(s) Oral at bedtime  sodium bicarbonate  Injectable 30 milliLiter(s) IV Push once    MEDICATIONS  (PRN):      ALLERGIES:  Allergies    No Known Allergies    Intolerances        LABS:                        9.1    9.72  )-----------( 364      ( 24 Mar 2022 07:30 )             29.6     03-24    135  |  96  |  23  ----------------------------<  97  3.6   |  26  |  1.27    Ca    11.2<H>      24 Mar 2022 07:30  Phos  3.6     03-24  Mg     2.2     03-24          CAPILLARY BLOOD GLUCOSE          RADIOLOGY & ADDITIONAL TESTS: Reviewed.

## 2022-03-24 NOTE — PROGRESS NOTE ADULT - PROBLEM SELECTOR PLAN 5
Family complaining that PEG tube has not been changed and is currently not functioning.  - PEG replaced by GI: functioning well without any sign of inflammation and drainage  - s/p GI replacement of PEG adapter  - PEG malfunctioned overnight, will consult surgical team if clog persists

## 2022-03-24 NOTE — PROGRESS NOTE ADULT - PROBLEM SELECTOR PLAN 10
CT A/P:  Probable developing left lower lobe pneumonia, less likely pulmonary edema. No hypoxia or increased work of breathing. No crackles on lung auscultation.  - Management of sepsis as above
CT A/P:  Probable developing left lower lobe pneumonia, less likely pulmonary edema. No hypoxia or increased work of breathing. No crackles on lung auscultation.  - Management of sepsis as above
CT A/P showing probable underlying OM.  - F/u general surgery and ID recs  - ID: advise against MRI pelvis as will be unlikely to  and will be likely to prolong length of stay
CT A/P showing probable underlying OM.  - F/u general surgery and ID recs  - ID: advise against MRI pelvis as will be unlikely to  and will be likely to prolong length of stay
CT A/P:  Probable developing left lower lobe pneumonia, less likely pulmonary edema. No hypoxia or increased work of breathing. No crackles on lung auscultation.  - Management of sepsis as above
CT A/P:  Probable developing left lower lobe pneumonia, less likely pulmonary edema. No hypoxia or increased work of breathing. No crackles on lung auscultation.  - Management of sepsis as above

## 2022-03-24 NOTE — PROGRESS NOTE ADULT - PROBLEM SELECTOR PROBLEM 8
Nutrition, metabolism, and development symptoms
Hypertension
Nutrition, metabolism, and development symptoms
Hypertension
Nutrition, metabolism, and development symptoms

## 2022-03-24 NOTE — PROGRESS NOTE ADULT - PROBLEM SELECTOR PLAN 11
CT A/P:  Probable developing left lower lobe pneumonia, less likely pulmonary edema. No hypoxia or increased work of breathing. No crackles on lung auscultation.  - Management of sepsis as above
CT A/P:  Probable developing left lower lobe pneumonia, less likely pulmonary edema. No hypoxia or increased work of breathing. No crackles on lung auscultation.  - Management of sepsis as above

## 2022-03-24 NOTE — PROGRESS NOTE ADULT - PROBLEM SELECTOR PROBLEM 10
R/O LLL pneumonia
R/O LLL pneumonia
R/O Acute osteomyelitis
R/O LLL pneumonia
R/O Acute osteomyelitis
R/O LLL pneumonia

## 2022-03-24 NOTE — PROGRESS NOTE ADULT - ASSESSMENT
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

## 2022-03-24 NOTE — PROGRESS NOTE ADULT - PROBLEM SELECTOR PROBLEM 3
ESRD on dialysis
Decubitus ulcer of sacral region, stage 4
ESRD on dialysis
Decubitus ulcer of sacral region, stage 4

## 2022-03-24 NOTE — PROGRESS NOTE ADULT - PROBLEM SELECTOR PROBLEM 5
PEG tube malfunction
SAH (subarachnoid hemorrhage)
PEG tube malfunction
SAH (subarachnoid hemorrhage)
PEG tube malfunction

## 2022-03-24 NOTE — PROGRESS NOTE ADULT - SUBJECTIVE AND OBJECTIVE BOX
--------------------------------------------------------------------------------  Chief Complaint: ESRD/Ongoing hemodialysis requirement    24 hour events/subjective:  Seen this morning, remains stable. Per team to get DC today. Labs stable,  Will do a short HD session today and can have next session on Saturday      PAST HISTORY  --------------------------------------------------------------------------------  No significant changes to PMH, PSH, FHx, SHx, unless otherwise noted    ALLERGIES & MEDICATIONS  --------------------------------------------------------------------------------  Allergies    No Known Allergies    Intolerances      Standing Inpatient Medications  acetylcysteine 20%  Inhalation 4 milliLiter(s) Inhalation every 6 hours  amLODIPine   Tablet 10 milliGRAM(s) Oral daily  artificial tears (preservative free) Ophthalmic Solution 1 Drop(s) Both EYES two times a day  baclofen 10 milliGRAM(s) Oral every 8 hours  carvedilol 12.5 milliGRAM(s) Oral every 12 hours  chlorhexidine 0.12% Liquid 15 milliLiter(s) Oral Mucosa every 12 hours  chlorhexidine 2% Cloths 1 Application(s) Topical daily  epoetin nannette-epbx (RETACRIT) Injectable 7000 Unit(s) IV Push once  heparin   Injectable 5000 Unit(s) SubCutaneous every 8 hours  levETIRAcetam  IVPB 500 milliGRAM(s) IV Intermittent every 24 hours  senna 2 Tablet(s) Oral at bedtime    PRN Inpatient Medications      REVIEW OF SYSTEMS  --------------------------------------------------------------------------------  ROS negative except as per HPI    VITALS/PHYSICAL EXAM  --------------------------------------------------------------------------------  T(C): 36.4 (03-24-22 @ 10:14), Max: 37.1 (03-23-22 @ 15:40)  HR: 100 (03-24-22 @ 10:14) (95 - 114)  BP: 134/95 (03-24-22 @ 10:14) (106/77 - 157/88)  RR: 18 (03-24-22 @ 10:14) (14 - 18)  SpO2: 100% (03-24-22 @ 10:14) (98% - 100%)  Wt(kg): --  Drug Dosing Weight  Height (cm): 156 (16 Mar 2022 13:26)  Weight (kg): 70.9 (16 Mar 2022 23:05)  BMI (kg/m2): 29.1 (16 Mar 2022 23:05)  BSA (m2): 1.71 (16 Mar 2022 23:05)    PHYSICAL EXAM:  GENERAL: NAD  NECK: trach collar  CHEST/LUNG: mechanical breath sounds BL  HEART: normal S1S2, RRR  ABDOMEN: Soft, Nontender, +BS, No flank tenderness bilateral  EXTREMITIES: trace pedal edema BL  ACCESS: R [facility]    LABS/STUDIES  --------------------------------------------------------------------------------              9.1    9.72  >-----------<  364      [03-24-22 @ 07:30]              29.6     135  |  96  |  23  ----------------------------<  97      [03-24-22 @ 07:30]  3.6   |  26  |  1.27        Ca     11.2     [03-24-22 @ 07:30]      Mg     2.2     [03-24-22 @ 07:30]      Phos  3.6     [03-24-22 @ 07:30]      Iron 61, TIBC 173, %sat 35      [03-17-22 @ 12:09]  Ferritin 2188      [03-17-22 @ 12:09]  PTH -- (Ca 10.2)      [03-18-22 @ 04:18]   16  PTH -- (Ca 8.8)      [11-29-21 @ 10:47]   77  Vitamin D (25OH) 16.9      [11-29-21 @ 10:47]  TSH 7.300      [03-20-22 @ 06:41]  Lipid: chol --, , HDL --, LDL --      [11-21-21 @ 04:47]    HBsAb Reactive      [03-17-22 @ 18:44]  HBsAg Nonreact      [03-17-22 @ 18:44]  HBcAb Reactive      [03-17-22 @ 18:44]  HCV 0.06, Nonreact      [03-17-22 @ 18:44]      RADIOLOGY:  --------------------------------------------------------------------------------------

## 2022-03-24 NOTE — PROGRESS NOTE ADULT - PROBLEM SELECTOR PROBLEM 6
Anemia
Encounter for palliative care
Encounter for palliative care
Anemia

## 2022-03-24 NOTE — PROGRESS NOTE ADULT - ASSESSMENT
47 F with pmhx of ESRD (TTHS) 2/2 anuric ATN (), ICH, trach ,peg who presents to the hospital from nursing home w/ concern for decubitus ulcer evaluation.    Assessment/Plan:   #ESRD on HD TTHS @[facility]  usual Rx 3h   -UF of 1L   -Short HD session today  -Labs noted, electrolytes stable K of 3.6    #Access   [facility]     #Anemia  Hb within goal  -Epo w/ HD 7000 units    #Renal Bone Disease  CA more elevated today  Phos at goal  -Pth noted to be 16  -Will dialyze on low calcium bath  -hypercalcemia likely related to immobilization  trend phos daily w/ labs    Hemoglobin: 9.1 g/dL (22 @ 07:30)  Phosphorus Level, Serum: 3.6 mg/dL (22 @ 07:30)  Hemoglobin: 8.8 g/dL (22 @ 08:36)  Phosphorus Level, Serum: 2.7 mg/dL (22 @ 08:36)    Albumin, Serum: 3.4 g/dL (22 @ 10:57)  Hepatitis B Surface Antigen: Nonreact (22 @ 18:44)    epoetin nannette-epbx (RETACRIT) Injectable 7000 Unit(s) IV Push once, 22 @ 08:53, Routine, Stop order after: 1 Doses  epoetin nannette-epbx (RETACRIT) Injectable 7000 Unit(s) IV Push once, 22 @ 07:36, Routine, Stop order after: 1 Doses  epoetin nannette-epbx (RETACRIT) Injectable 7000 Unit(s) IV Push once, 22 @ 08:32, Routine, Stop order after: 1 Doses  epoetin nannette-epbx (RETACRIT) Injectable 7000 Unit(s) IV Push once, 22 @ 09:54, Routine, Stop order after: 1 Doses      Hemodialysis Treatment.:     Schedule: Once, Modality: Hemodialysis, Access: Internal Jugular Central Venous Catheter    Dialyzer: Optiflux [id number], Time: 150 Min    Blood Flow: 400 mL/Min , Dialysate Flow: 500 mL/Min, Dialysate Temp: 36.5, Tubinmm (Adult)    Target Fluid Removal: 2 Liters    Target Dry Weight: 69 kG    Dialysate Electrolytes (mEq/L): Potassium 3, Calcium 2.5, Sodium 138, Bicarbonate 35 (22 @ 10:29) [Active]  Hemodialysis Treatment.:     Schedule: Once, Modality: Hemodialysis, Access: Internal Jugular Central Venous Catheter    Dialyzer: Optiflux [id number], Time: 150 Min    Blood Flow: 400 mL/Min , Dialysate Flow: 500 mL/Min, Dialysate Temp: 36.5, Tubinmm (Adult)    Target Fluid Removal: 2 Liters    Target Dry Weight: 69 kG    Dialysate Electrolytes (mEq/L): Potassium 3, Calcium 2.5, Sodium 138, Bicarbonate 35 (22 @ 09:54) [Discontinued]   47 F with pmhx of ESRD (TTHS) 2/2 anuric ATN (), ICH, trach ,peg who presents to the hospital from nursing home w/ concern for decubitus ulcer evaluation.    Assessment/Plan:   #ESRD on HD TTHS @[facility]  usual Rx 3h   -UF of 1L   -Short HD session today  -Labs noted, electrolytes stable K of 3.6    #Access   [facility]     #Anemia  Hb within goal  -Epo w/ HD 7000 units    #Renal Bone Disease  CA more elevated today  Phos at goal  -Pth noted to be 16  -Will dialyze on low calcium bath  -hypercalcemia likely related to immobilization  trend phos daily w/ labs    Hemoglobin: 9.1 g/dL (22 @ 07:30)  Phosphorus Level, Serum: 3.6 mg/dL (22 @ 07:30)  Hemoglobin: 8.8 g/dL (22 @ 08:36)  Phosphorus Level, Serum: 2.7 mg/dL (22 @ 08:36)    Albumin, Serum: 3.4 g/dL (22 @ 10:57)  Hepatitis B Surface Antigen: Nonreact (22 @ 18:44)    epoetin nannette-epbx (RETACRIT) Injectable 7000 Unit(s) IV Push once, 22 @ 08:53, Routine, Stop order after: 1 Doses  epoetin nannette-epbx (RETACRIT) Injectable 7000 Unit(s) IV Push once, 22 @ 07:36, Routine, Stop order after: 1 Doses  epoetin nannette-epbx (RETACRIT) Injectable 7000 Unit(s) IV Push once, 22 @ 08:32, Routine, Stop order after: 1 Doses  epoetin nannette-epbx (RETACRIT) Injectable 7000 Unit(s) IV Push once, 22 @ 09:54, Routine, Stop order after: 1 Doses      Hemodialysis Treatment.:     Schedule: Once, Modality: Hemodialysis, Access: Internal Jugular Central Venous Catheter    Dialyzer: Optiflux [id number], Time: 150 Min    Blood Flow: 400 mL/Min , Dialysate Flow: 500 mL/Min, Dialysate Temp: 36.5, Tubinmm (Adult)    Target Fluid Removal: 2 Liters    Target Dry Weight: 69 kG    Dialysate Electrolytes (mEq/L): Potassium 3, Calcium 2.5, Sodium 138, Bicarbonate 35 (22 @ 10:29) [Active]  Hemodialysis Treatment.:     Schedule: Once, Modality: Hemodialysis, Access: Internal Jugular Central Venous Catheter    Dialyzer: Optiflux [id number], Time: 150 Min    Blood Flow: 400 mL/Min , Dialysate Flow: 500 mL/Min, Dialysate Temp: 36.5, Tubinmm (Adult)    Target Fluid Removal: 2 Liters    Target Dry Weight: 69 kG    Dialysate Electrolytes (mEq/L): Potassium 3, Calcium 2.5, Sodium 138, Bicarbonate 35 (22 @ 09:54) [Discontinued]    Seen on HD, stable. Prescription outlined as above

## 2022-03-24 NOTE — PROGRESS NOTE ADULT - PROBLEM SELECTOR PLAN 9
CT A/P showing probable underlying OM.  - F/u general surgery and ID recs  - ID: advise against MRI pelvis as will be unlikely to  and will be likely to prolong length of stay
F: none  E: Replete for K<4, Mag<2  N: NPO with TFs  A: heparin sub q  Code status: Full  Dispo: RMF
CT A/P showing probable underlying OM.  - F/u general surgery  - Consider MRI  - C/w antibiotics
Pt unable to perform any ADL, PEG tube required for nutrition
F: none  E: Replete for K<4, Mag<2  N: NPO with TFs  A: heparin sub q  Code status: Full  Dispo: RMF
CT A/P showing probable underlying OM.  - F/u general surgery and ID recs  - ID: advise against MRI pelvis as will be unlikely to  and will be likely to prolong length of stay
CT A/P showing probable underlying OM.  - F/u general surgery and ID recs  - C/w antibiotics and MRI sarcal spine

## 2022-03-24 NOTE — PROGRESS NOTE ADULT - PROBLEM SELECTOR PROBLEM 9
Functional quadriplegia
R/O Acute osteomyelitis
Nutrition, metabolism, and development symptoms
R/O Acute osteomyelitis
Nutrition, metabolism, and development symptoms

## 2022-03-24 NOTE — PROGRESS NOTE ADULT - PROBLEM SELECTOR PROBLEM 1
Severe sepsis
Severe sepsis
Respiratory failure
Severe sepsis
Respiratory failure
Severe sepsis
Severe sepsis

## 2022-03-24 NOTE — PROGRESS NOTE ADULT - PROBLEM SELECTOR PROBLEM 4
Severe sepsis
ESRD on dialysis
ESRD on dialysis
Severe sepsis
ESRD on dialysis

## 2022-03-24 NOTE — PROGRESS NOTE ADULT - PROBLEM SELECTOR PLAN 4
2/2 ATN in December 2021. Anuric.  - Renal consulted  - Pt is hypercalcemia 11.1 on 03/21; PTH noted to be 16, Phos 3.1; renal will dialyze on low calcium bath    - C/w HD on T/Th/Sa,  - Dialysis today     # Hypercalcemia   Likely 2/2 renal bone disease vs immobility, MM less likely  - PTH wnl  - f/u vitamin D, urine light chains/SPEP

## 2022-03-24 NOTE — PROGRESS NOTE ADULT - PROBLEM SELECTOR PROBLEM 2
Functional quadriplegia
Gluteal abscess
Functional quadriplegia
Gluteal abscess
Gluteal abscess

## 2022-03-24 NOTE — PROGRESS NOTE ADULT - PROBLEM SELECTOR PLAN 8
F: S/p 2.5L NS  E: Replete for K<4, Mag<2  N: NPO with TFs  A: heparin sub q  Code status: Full  Dispo: RMF
F: S/p 2.5L NS  E: Replete for K<4, Mag<2  N: NPO with TFs  A: heparin sub q  Code status: Full  Dispo: RMF
On amlodipine 10mg qd, Coreg 12.5 BID, hydralazine 100mg qd, and clonidine       - cw amlodipine 10 mg daily, and coreg 12.5 BID   - clonidine and hydralazine held in the setting of normotension     # Tachycardia   - c/w coreg as stated above   - TSH elevated w/ normal T4, unlikely etiology for tachycardia   - potentially 2/2 pain
On amlodipine 10mg qd, Coreg 12.5 BID,       - cw amlodipine 10 mg daily, and coreg 12.5 BID   - reasess for resumption of clonidine and hydralazine, hold for now in the setting of normotension     # Tachycardia   - c/w coreg as stated above   - f/u TSH
F: S/p 2.5L NS  E: Replete for K<4, Mag<2  N: NPO with TFs  A: heparin sub q  Code status: Full  Dispo: RMF
F: none  E: Replete for K<4, Mag<2  N: NPO with TFs  A: heparin sub q  Code status: Full  Dispo: RMF
F: S/p 2.5L NS  E: Replete for K<4, Mag<2  N: NPO with TFs  A: heparin sub q  Code status: Full  Dispo: RMF
denies pain/discomfort

## 2022-03-25 ENCOUNTER — TRANSCRIPTION ENCOUNTER (OUTPATIENT)
Age: 47
End: 2022-03-25

## 2022-03-25 VITALS — OXYGEN SATURATION: 100 % | HEART RATE: 97 BPM | RESPIRATION RATE: 14 BRPM

## 2022-03-25 LAB
ANION GAP SERPL CALC-SCNC: 15 MMOL/L — SIGNIFICANT CHANGE UP (ref 5–17)
BASOPHILS # BLD AUTO: 0.03 K/UL — SIGNIFICANT CHANGE UP (ref 0–0.2)
BASOPHILS NFR BLD AUTO: 0.4 % — SIGNIFICANT CHANGE UP (ref 0–2)
BUN SERPL-MCNC: 17 MG/DL — SIGNIFICANT CHANGE UP (ref 7–23)
CALCIUM SERPL-MCNC: 10.4 MG/DL — SIGNIFICANT CHANGE UP (ref 8.4–10.5)
CHLORIDE SERPL-SCNC: 94 MMOL/L — LOW (ref 96–108)
CO2 SERPL-SCNC: 26 MMOL/L — SIGNIFICANT CHANGE UP (ref 22–31)
CREAT SERPL-MCNC: 1.14 MG/DL — SIGNIFICANT CHANGE UP (ref 0.5–1.3)
EGFR: 60 ML/MIN/1.73M2 — SIGNIFICANT CHANGE UP
EOSINOPHIL # BLD AUTO: 0.04 K/UL — SIGNIFICANT CHANGE UP (ref 0–0.5)
EOSINOPHIL NFR BLD AUTO: 0.5 % — SIGNIFICANT CHANGE UP (ref 0–6)
GLUCOSE SERPL-MCNC: 119 MG/DL — HIGH (ref 70–99)
HCT VFR BLD CALC: 29.7 % — LOW (ref 34.5–45)
HGB BLD-MCNC: 9.2 G/DL — LOW (ref 11.5–15.5)
IMM GRANULOCYTES NFR BLD AUTO: 0.3 % — SIGNIFICANT CHANGE UP (ref 0–1.5)
LYMPHOCYTES # BLD AUTO: 2.21 K/UL — SIGNIFICANT CHANGE UP (ref 1–3.3)
LYMPHOCYTES # BLD AUTO: 30.2 % — SIGNIFICANT CHANGE UP (ref 13–44)
MAGNESIUM SERPL-MCNC: 1.9 MG/DL — SIGNIFICANT CHANGE UP (ref 1.6–2.6)
MCHC RBC-ENTMCNC: 24.1 PG — LOW (ref 27–34)
MCHC RBC-ENTMCNC: 31 GM/DL — LOW (ref 32–36)
MCV RBC AUTO: 77.7 FL — LOW (ref 80–100)
MONOCYTES # BLD AUTO: 0.68 K/UL — SIGNIFICANT CHANGE UP (ref 0–0.9)
MONOCYTES NFR BLD AUTO: 9.3 % — SIGNIFICANT CHANGE UP (ref 2–14)
NEUTROPHILS # BLD AUTO: 4.33 K/UL — SIGNIFICANT CHANGE UP (ref 1.8–7.4)
NEUTROPHILS NFR BLD AUTO: 59.3 % — SIGNIFICANT CHANGE UP (ref 43–77)
NRBC # BLD: 0 /100 WBCS — SIGNIFICANT CHANGE UP (ref 0–0)
PHOSPHATE SERPL-MCNC: 2.9 MG/DL — SIGNIFICANT CHANGE UP (ref 2.5–4.5)
PLATELET # BLD AUTO: 293 K/UL — SIGNIFICANT CHANGE UP (ref 150–400)
POTASSIUM SERPL-MCNC: 3.1 MMOL/L — LOW (ref 3.5–5.3)
POTASSIUM SERPL-SCNC: 3.1 MMOL/L — LOW (ref 3.5–5.3)
PROT SERPL-MCNC: 7.8 G/DL — SIGNIFICANT CHANGE UP (ref 6–8.3)
PROT SERPL-MCNC: 7.8 G/DL — SIGNIFICANT CHANGE UP (ref 6–8.3)
RBC # BLD: 3.82 M/UL — SIGNIFICANT CHANGE UP (ref 3.8–5.2)
RBC # FLD: 16.8 % — HIGH (ref 10.3–14.5)
SODIUM SERPL-SCNC: 135 MMOL/L — SIGNIFICANT CHANGE UP (ref 135–145)
WBC # BLD: 7.31 K/UL — SIGNIFICANT CHANGE UP (ref 3.8–10.5)
WBC # FLD AUTO: 7.31 K/UL — SIGNIFICANT CHANGE UP (ref 3.8–10.5)

## 2022-03-25 PROCEDURE — 96375 TX/PRO/DX INJ NEW DRUG ADDON: CPT

## 2022-03-25 PROCEDURE — 86709 HEPATITIS A IGM ANTIBODY: CPT

## 2022-03-25 PROCEDURE — 86901 BLOOD TYPING SEROLOGIC RH(D): CPT

## 2022-03-25 PROCEDURE — 84166 PROTEIN E-PHORESIS/URINE/CSF: CPT

## 2022-03-25 PROCEDURE — 83605 ASSAY OF LACTIC ACID: CPT

## 2022-03-25 PROCEDURE — 94002 VENT MGMT INPAT INIT DAY: CPT

## 2022-03-25 PROCEDURE — 94640 AIRWAY INHALATION TREATMENT: CPT

## 2022-03-25 PROCEDURE — 84439 ASSAY OF FREE THYROXINE: CPT

## 2022-03-25 PROCEDURE — 83540 ASSAY OF IRON: CPT

## 2022-03-25 PROCEDURE — 82728 ASSAY OF FERRITIN: CPT

## 2022-03-25 PROCEDURE — 84436 ASSAY OF TOTAL THYROXINE: CPT

## 2022-03-25 PROCEDURE — 84100 ASSAY OF PHOSPHORUS: CPT

## 2022-03-25 PROCEDURE — 82330 ASSAY OF CALCIUM: CPT

## 2022-03-25 PROCEDURE — 82570 ASSAY OF URINE CREATININE: CPT

## 2022-03-25 PROCEDURE — 76942 ECHO GUIDE FOR BIOPSY: CPT

## 2022-03-25 PROCEDURE — U0003: CPT

## 2022-03-25 PROCEDURE — 82310 ASSAY OF CALCIUM: CPT

## 2022-03-25 PROCEDURE — 86803 HEPATITIS C AB TEST: CPT

## 2022-03-25 PROCEDURE — 86705 HEP B CORE ANTIBODY IGM: CPT

## 2022-03-25 PROCEDURE — 85730 THROMBOPLASTIN TIME PARTIAL: CPT

## 2022-03-25 PROCEDURE — 85027 COMPLETE CBC AUTOMATED: CPT

## 2022-03-25 PROCEDURE — 85025 COMPLETE CBC W/AUTO DIFF WBC: CPT

## 2022-03-25 PROCEDURE — 85045 AUTOMATED RETICULOCYTE COUNT: CPT

## 2022-03-25 PROCEDURE — 87086 URINE CULTURE/COLONY COUNT: CPT

## 2022-03-25 PROCEDURE — U0005: CPT

## 2022-03-25 PROCEDURE — 82962 GLUCOSE BLOOD TEST: CPT

## 2022-03-25 PROCEDURE — 85652 RBC SED RATE AUTOMATED: CPT

## 2022-03-25 PROCEDURE — 86481 TB AG RESPONSE T-CELL SUSP: CPT

## 2022-03-25 PROCEDURE — 99232 SBSQ HOSP IP/OBS MODERATE 35: CPT

## 2022-03-25 PROCEDURE — 87340 HEPATITIS B SURFACE AG IA: CPT

## 2022-03-25 PROCEDURE — 74177 CT ABD & PELVIS W/CONTRAST: CPT | Mod: MA

## 2022-03-25 PROCEDURE — 83735 ASSAY OF MAGNESIUM: CPT

## 2022-03-25 PROCEDURE — 86900 BLOOD TYPING SEROLOGIC ABO: CPT

## 2022-03-25 PROCEDURE — 90935 HEMODIALYSIS ONE EVALUATION: CPT

## 2022-03-25 PROCEDURE — 83521 IG LIGHT CHAINS FREE EACH: CPT

## 2022-03-25 PROCEDURE — 99285 EMERGENCY DEPT VISIT HI MDM: CPT | Mod: 25

## 2022-03-25 PROCEDURE — 80048 BASIC METABOLIC PNL TOTAL CA: CPT

## 2022-03-25 PROCEDURE — 94003 VENT MGMT INPAT SUBQ DAY: CPT

## 2022-03-25 PROCEDURE — 84300 ASSAY OF URINE SODIUM: CPT

## 2022-03-25 PROCEDURE — 86704 HEP B CORE ANTIBODY TOTAL: CPT

## 2022-03-25 PROCEDURE — 10160 PNXR ASPIR ABSC HMTMA BULLA: CPT | Mod: 52

## 2022-03-25 PROCEDURE — 87635 SARS-COV-2 COVID-19 AMP PRB: CPT

## 2022-03-25 PROCEDURE — 84443 ASSAY THYROID STIM HORMONE: CPT

## 2022-03-25 PROCEDURE — 80202 ASSAY OF VANCOMYCIN: CPT

## 2022-03-25 PROCEDURE — 93005 ELECTROCARDIOGRAM TRACING: CPT

## 2022-03-25 PROCEDURE — 87040 BLOOD CULTURE FOR BACTERIA: CPT

## 2022-03-25 PROCEDURE — 86850 RBC ANTIBODY SCREEN: CPT

## 2022-03-25 PROCEDURE — 86140 C-REACTIVE PROTEIN: CPT

## 2022-03-25 PROCEDURE — 86706 HEP B SURFACE ANTIBODY: CPT

## 2022-03-25 PROCEDURE — 80053 COMPREHEN METABOLIC PANEL: CPT

## 2022-03-25 PROCEDURE — 84165 PROTEIN E-PHORESIS SERUM: CPT

## 2022-03-25 PROCEDURE — 96374 THER/PROPH/DIAG INJ IV PUSH: CPT

## 2022-03-25 PROCEDURE — 81001 URINALYSIS AUTO W/SCOPE: CPT

## 2022-03-25 PROCEDURE — 83550 IRON BINDING TEST: CPT

## 2022-03-25 PROCEDURE — 36415 COLL VENOUS BLD VENIPUNCTURE: CPT

## 2022-03-25 PROCEDURE — 71045 X-RAY EXAM CHEST 1 VIEW: CPT

## 2022-03-25 PROCEDURE — 83970 ASSAY OF PARATHORMONE: CPT

## 2022-03-25 PROCEDURE — 85610 PROTHROMBIN TIME: CPT

## 2022-03-25 RX ORDER — DIAZEPAM 5 MG
5 TABLET ORAL
Qty: 0 | Refills: 0 | DISCHARGE

## 2022-03-25 RX ORDER — METOPROLOL TARTRATE 50 MG
1 TABLET ORAL
Qty: 0 | Refills: 0 | DISCHARGE

## 2022-03-25 RX ORDER — VANCOMYCIN HCL 1 G
750 VIAL (EA) INTRAVENOUS
Qty: 150 | Refills: 0
Start: 2022-03-25

## 2022-03-25 RX ORDER — PIPERACILLIN AND TAZOBACTAM 4; .5 G/20ML; G/20ML
4.5 INJECTION, POWDER, LYOPHILIZED, FOR SOLUTION INTRAVENOUS
Qty: 0 | Refills: 0 | DISCHARGE

## 2022-03-25 RX ORDER — ACETYLCYSTEINE 200 MG/ML
4 VIAL (ML) MISCELLANEOUS
Qty: 0 | Refills: 0 | DISCHARGE
Start: 2022-03-25

## 2022-03-25 RX ADMIN — HEPARIN SODIUM 5000 UNIT(S): 5000 INJECTION INTRAVENOUS; SUBCUTANEOUS at 06:56

## 2022-03-25 RX ADMIN — Medication 4 MILLILITER(S): at 02:35

## 2022-03-25 RX ADMIN — LEVETIRACETAM 500 MILLIGRAM(S): 250 TABLET, FILM COATED ORAL at 10:19

## 2022-03-25 RX ADMIN — Medication 4 MILLILITER(S): at 12:02

## 2022-03-25 RX ADMIN — CHLORHEXIDINE GLUCONATE 15 MILLILITER(S): 213 SOLUTION TOPICAL at 10:19

## 2022-03-25 RX ADMIN — Medication 10 MILLIGRAM(S): at 06:57

## 2022-03-25 RX ADMIN — PIPERACILLIN AND TAZOBACTAM 200 GRAM(S): 4; .5 INJECTION, POWDER, LYOPHILIZED, FOR SOLUTION INTRAVENOUS at 06:57

## 2022-03-25 RX ADMIN — Medication 4 MILLILITER(S): at 07:59

## 2022-03-25 RX ADMIN — Medication 1 DROP(S): at 00:43

## 2022-03-25 RX ADMIN — CARVEDILOL PHOSPHATE 12.5 MILLIGRAM(S): 80 CAPSULE, EXTENDED RELEASE ORAL at 06:57

## 2022-03-25 RX ADMIN — AMLODIPINE BESYLATE 10 MILLIGRAM(S): 2.5 TABLET ORAL at 06:57

## 2022-03-25 RX ADMIN — CHLORHEXIDINE GLUCONATE 1 APPLICATION(S): 213 SOLUTION TOPICAL at 11:58

## 2022-03-25 NOTE — DISCHARGE NOTE NURSING/CASE MANAGEMENT/SOCIAL WORK - PATIENT PORTAL LINK FT
You can access the FollowMyHealth Patient Portal offered by [facility] by registering at the following website: [email or web address]. By joining [facility]’s FollowMyHealth portal, you will also be able to view your health information using other applications (apps) compatible with our system.

## 2022-03-25 NOTE — PROGRESS NOTE ADULT - TIME BILLING
As above; Coordination of care with primary team, discussed HD planning  This excludes any time spent on separate procedures or teaching.
Management of soft tissue infection
As above; Coordination of care with primary team, discussed Urine, hypercalcemia  This excludes any time spent on separate procedures or teaching.
As above; Coordination of care with primary team, discussed MRI and HD timing  This excludes any time spent on separate procedures or teaching.
As above; Coordination of care with primary team, discussed HD planning, hypercalcemia  This excludes any time spent on separate procedures or teaching.
Medical management, tachycardia

## 2022-03-25 NOTE — PROGRESS NOTE ADULT - ATTENDING COMMENTS
I agree with the fellow's findings and plans as written above with the following additions/amendments:    Seen and examined at bedside, no acute indication for HD today, will continue to monitor closely, next HD tomorrow. Further recs as above
I agree with the fellow's findings and plans as written above with the following additions/amendments:    Seen and examined at bedside. Awaiting 24 hour urine collection, monitoring closely, no acute indications for HD today. FLC ratio normal for kidney disease, unlikely MM in this case. Hypercalcemia most likely 2/2 immobilization. Further recs as above
I agree with the fellow's findings and plans as written above with the following additions/amendments:    Seen and examined at bedside on HD. Tolerating well. Continue HD as above, further recs as above
I agree with the fellow's findings and plans as written above with the following additions/amendments:    Seen and examined at bedside. No acute indications for HD, will dialyze tomorrow. Hypercalcemia noted, likely immobilization however would rule out paraneoplastic given elevated protein gap. Further recs as above
Patient was seen and examined at bedside on 3/19/2022 at 830am. Patient unable to participate in interview due to mental status. Vitals, labwork and pertinent imaging reviewed - improving WBC. Physical exam - NAD, AAO x 0, + trach to vent, supple neck, chest - bibasilar crackles, CV - s1s2, no m/r/g, abd - soft, NTND, + BS, + PEG tube, ext - wwp , psych - calm affect, skin - multiple stage 4 pressure ulcers including sacral    Plan  -ID consulted - rec against further imaging as infection will be incurable, likely 1 - 2 week course for OM  -Will need to have further discussion with family given that infection is incurable and prognosis is extremely guarded  -Palliative care and Ethics consulted
I agree with the fellow's findings and plans as written above with the following additions/amendments:    Seen and examined at bedside on HD. tolerating well, no issues identified. Interestingly, patient with slow uptrend in sCr between HD session, slower than prior, may have some amount of renal recovery. Would pursue 24 hour urine collection to assess. Otherwise will dialyze today, continue HD as above, further recs as above
I agree with the fellow's findings and plans as written above with the following additions/amendments:    Seen and examined at bedside, discussed with primary team MRI with IV contrast timing. Will need HD afterwards, within 24 hours, no HD on sunday so would not allow MRI with IV contrast saturday night, otherwise can accommodate. Further recs as above
I agree with the fellow's findings and plans as written above with the following additions/amendments:    Seen and examined at bedside on HD, tolerating HD well thus far, pending labs fur further dialysate modification. No changes to vent setting or other signs of fluid overload, indeed sCr remarkably low, would monitor strict I/Os for possible renal recovery however unlikely. Will continue HD as above, further recs as above
I agree with the fellow's findings and plans as written above with the following additions/amendments:    Seen and examined at bedside on HD, tolerating well. No issues. Continue HD as above, monitoring closely. Further recs as above
Patient was seen and examined at bedside on 3/18/2022 at 830am. Patient unable to participate in interview due to mental status. Vitals, labwork and pertinent imaging reviewed - improving WBC. Physical exam - NAD, AAO x 0, + trach to vent, supple neck, chest - bibasilar crackles, CV - s1s2, no m/r/g, abd - soft, NTND, + BS, + PEG tube, ext - wwp , psych - calm affect, skin - multiple stage 4 pressure ulcers including sacral    Plan  -ID consulted - rec against further imaging as infection will be incurable, likely 1 - 2 week course for OM  -Will need to have further discussion with family given that infection is incurable and prognosis is extremely guarded  -Palliative care and Ethics consulted
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 left lower lobe pneumonia.    Patient awake, fixed head to the right side, does not move her head or eyes. Does not interact to physical exam.     # Sepsis; due to decubitus ulcer infection vs gluteal abscess vs pneumonia.   # PEG tube malfunction.     - PEG tube evaluated by GI and ready for use immediately.   - Continue vancomycin and Zosyn for now.   - MRI to rule out osteomyelitis   - Surgery to draine the abscess.
Patient was seen and examined at bedside on 3/22/2022 at 830am. Patient unable to participate in interview due to mental status. Vitals, labwork and pertinent imaging reviewed - pt remains tachycardic. Physical exam - NAD, AAO x 0, + trach to vent, supple neck, chest - bibasilar crackles, CV - s1s2, no m/r/g, abd - soft, NTND, + BS, + PEG tube, ext - wwp , psych - calm affect, skin - multiple stage 4 pressure ulcers including sacral and R ear    Plan  -ID consulted - rec against further imaging as infection will be incurable, likely 1 - 2 week course for soft tissue infection  -Coreg restarted - will uptitrate to her home dose  -Restart Baclofen  -Had extended discussion with family (daughter, son, brother) over the phone with palliative care team on 3/21 regarding prognosis and current clinical status; discussed that as per ID in current setting patient's infection is incurable and prognosis is extremely guarded - with possible death in coming weeks to months. Family reports understanding of current clinical state but prefer to continue with all available interventions given their sonia  -Ethics consulted
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     Patient was seen and examined, she received dialysis today. PEG tube fixed, medication and feed resumed.   Sepsis resolved. pt is ready to go back to nursing home, pending authorization.
Patient was seen and examined at bedside on 3/23/2022 at 830am. Patient unable to participate in interview due to mental status. Vitals, labwork and pertinent imaging reviewed - pt remains tachycardic, although improved. Physical exam - NAD, AAO x 0, + trach to vent, supple neck, chest - bibasilar crackles, CV - s1s2, no m/r/g, abd - soft, NTND, + BS, + PEG tube, ext - wwp , psych - calm affect, skin - multiple stage 4 pressure ulcers including sacral and R ear    Plan  -ID consulted - rec against further imaging as infection will be incurable, likely 1 - 2 week course for soft tissue infection  -Coreg back to home dose  -Restarted home Baclofen  -Had extended discussion with family (daughter, son, brother) over the phone with palliative care team on 3/21 regarding prognosis and current clinical status; discussed that as per ID in current setting patient's infection is incurable and prognosis is extremely guarded - with possible death in coming weeks to months. Family reports understanding of current clinical state but prefer to continue with all available interventions given their sonia  -Ethics consulted
Patient was seen and examined at bedside on 3/21/2022 at 830am. Patient unable to participate in interview due to mental status. Vitals, labwork and pertinent imaging reviewed - improving WBC, pt remains tachycardic. Physical exam - NAD, AAO x 0, + trach to vent, supple neck, chest - bibasilar crackles, CV - s1s2, no m/r/g, abd - soft, NTND, + BS, + PEG tube, ext - wwp , psych - calm affect, skin - multiple stage 4 pressure ulcers including sacral and R ear    Plan  -ID consulted - rec against further imaging as infection will be incurable, likely 1 - 2 week course for soft tissue infection  -Coreg restarted  -Had extended discussion with family (daughter, son, brother) over the phone with palliative care team regarding prognosis and current clinical status; discussed that as per ID in current setting patient's infection is incurable and prognosis is extremely guarded - with possible death in coming weeks to months. Family reports understanding of current clinical state but prefer to continue with all available interventions given their sonia  -Ethics consulted

## 2022-03-25 NOTE — PROGRESS NOTE ADULT - REASON FOR ADMISSION
gluteal abscess

## 2022-03-25 NOTE — PROGRESS NOTE ADULT - ATTENDING SUPERVISION STATEMENT
Resident
Fellow
Resident

## 2022-03-25 NOTE — PROGRESS NOTE ADULT - SUBJECTIVE AND OBJECTIVE BOX
Patient is a 47y Female seen and evaluated at bedside.   stable   BP, HR noted   no hemodialysis today   for discharge today?   lytes are acceptable     Meds:    acetylcysteine 20%  Inhalation 4 every 6 hours  amLODIPine   Tablet 10 daily  artificial tears (preservative free) Ophthalmic Solution 1 two times a day  baclofen 10 every 8 hours  carvedilol 12.5 every 12 hours  chlorhexidine 0.12% Liquid 15 every 12 hours  chlorhexidine 2% Cloths 1 daily  heparin   Injectable 5000 every 8 hours  levETIRAcetam  Solution 500 every 24 hours  piperacillin/tazobactam IVPB.. 4.5 every 12 hours  senna 2 at bedtime      T(C): , Max: 37.2 (03-25-22 @ 05:21)  T(F): , Max: 99 (03-25-22 @ 05:21)  HR: 96 (03-25-22 @ 09:04)  BP: 121/86 (03-25-22 @ 09:04)  BP(mean): --  RR: 18 (03-25-22 @ 09:04)  SpO2: 100% (03-25-22 @ 09:04)  Wt(kg): --    03-24 @ 07:01  -  03-25 @ 07:00  --------------------------------------------------------  IN: 0 mL / OUT: 1100 mL / NET: -1100 mL          Review of Systems:  10 point ROS negative except as above     PHYSICAL EXAM:  GENERAL: NAD  NECK: trach collar  CHEST/LUNG: mechanical breath sounds BL  HEART: normal S1S2, RRR  ABDOMEN: Soft, Nontender   EXTREMITIES: trace pedal edema BL  ACCESS: R TDC    LABS:                        9.2    7.31  )-----------( 293      ( 25 Mar 2022 08:40 )             29.7     03-25    135  |  94<L>  |  17  ----------------------------<  119<H>  3.1<L>   |  26  |  1.14    Ca    10.4      25 Mar 2022 08:40  Phos  2.9     03-25  Mg     1.9     03-25            Creatinine, Random Urine: 61 mg/dL (03-23 @ 11:14)  Sodium, Random Urine: 22 mmol/L (03-23 @ 11:14)        RADIOLOGY & ADDITIONAL STUDIES:

## 2022-03-25 NOTE — PROGRESS NOTE ADULT - PROVIDER SPECIALTY LIST ADULT
Infectious Disease
Internal Medicine
Nephrology
Nephrology
Surgery
Surgery
Internal Medicine
Nephrology
Nephrology
Internal Medicine
Nephrology
Surgery
Infectious Disease
Nephrology
Surgery
Palliative Care
Hospitalist
Palliative Care
Internal Medicine
Internal Medicine

## 2022-03-25 NOTE — PROGRESS NOTE ADULT - ASSESSMENT
47 F with pmhx of ESRD (TTHS) 2/2 anuric ATN (12/21), ICH, trach ,peg who presents to the hospital from nursing home w/ concern for decubitus ulcer evaluation.    Assessment/Plan:   #ESRD on HD TTHS @NH  usual Rx 3h   -next HD tomorrow per schedule   -Labs noted, electrolytes noted, K of 3.1, replete     #Access   RIJ TDC     #Anemia  Hb ~9   -Epo w/ HD 7000 units    #Renal Bone Disease  Ca elevated, noted   Phos at goal  -Pth noted to be 16  -Will dialyze on low calcium bath  -hypercalcemia likely related to immobilization  trend phos daily w/ labs     Thank you for the opportunity to participate in the care of your patient. The nephrology service remains available to assist with any questions or concerns. Please feel free to reach us by paging the on-call nephrology fellow for urgent issues or as below.     Linwood Hooper M.D.   PGY-5, Nephrology Fellow   C: [phone]   P: [phone]

## 2022-03-25 NOTE — DISCHARGE NOTE NURSING/CASE MANAGEMENT/SOCIAL WORK - NSDCPEFALRISK_GEN_ALL_CORE
For information on Fall & Injury Prevention, visit: [email or web address] OR  [email or web address] OR  https://www.cdc.gov/steadi/patient.html

## 2022-03-26 LAB
% ALBUMIN: 40.2 % — SIGNIFICANT CHANGE UP
% ALPHA 1: 6 % — SIGNIFICANT CHANGE UP
% ALPHA 2: 16.9 % — SIGNIFICANT CHANGE UP
% BETA: 10.9 % — SIGNIFICANT CHANGE UP
% GAMMA: 26 % — SIGNIFICANT CHANGE UP
ALBUMIN SERPL ELPH-MCNC: 3.1 G/DL — LOW (ref 3.6–5.5)
ALBUMIN/GLOB SERPL ELPH: 0.7 RATIO — SIGNIFICANT CHANGE UP
ALPHA1 GLOB SERPL ELPH-MCNC: 0.5 G/DL — HIGH (ref 0.1–0.4)
ALPHA2 GLOB SERPL ELPH-MCNC: 1.3 G/DL — HIGH (ref 0.5–1)
B-GLOBULIN SERPL ELPH-MCNC: 0.9 G/DL — SIGNIFICANT CHANGE UP (ref 0.5–1)
GAMMA GLOBULIN: 2 G/DL — HIGH (ref 0.6–1.6)
INTERPRETATION SERPL IFE-IMP: SIGNIFICANT CHANGE UP
PROT PATTERN SERPL ELPH-IMP: SIGNIFICANT CHANGE UP

## 2022-03-27 LAB
% GAMMA, URINE: 11 % — SIGNIFICANT CHANGE UP
ALBUMIN 24H MFR UR ELPH: 11.1 % — SIGNIFICANT CHANGE UP
ALPHA1 GLOB 24H MFR UR ELPH: 52 % — SIGNIFICANT CHANGE UP
ALPHA2 GLOB 24H MFR UR ELPH: 12.3 % — SIGNIFICANT CHANGE UP
B-GLOBULIN 24H MFR UR ELPH: 13.6 % — SIGNIFICANT CHANGE UP
INTERPRETATION 24H UR IFE-IMP: SIGNIFICANT CHANGE UP
M PROTEIN 24H UR ELPH-MRATE: SIGNIFICANT CHANGE UP
PROT ?TM UR-MCNC: 107 MG/DL — HIGH (ref 0–12)
PROT PATTERN 24H UR ELPH-IMP: SIGNIFICANT CHANGE UP

## 2022-04-05 DIAGNOSIS — E83.52 HYPERCALCEMIA: ICD-10-CM

## 2022-04-05 DIAGNOSIS — I12.0 HYPERTENSIVE CHRONIC KIDNEY DISEASE WITH STAGE 5 CHRONIC KIDNEY DISEASE OR END STAGE RENAL DISEASE: ICD-10-CM

## 2022-04-05 DIAGNOSIS — L89.623 PRESSURE ULCER OF LEFT HEEL, STAGE 3: ICD-10-CM

## 2022-04-05 DIAGNOSIS — R65.20 SEVERE SEPSIS WITHOUT SEPTIC SHOCK: ICD-10-CM

## 2022-04-05 DIAGNOSIS — L89.154 PRESSURE ULCER OF SACRAL REGION, STAGE 4: ICD-10-CM

## 2022-04-05 DIAGNOSIS — L89.314 PRESSURE ULCER OF RIGHT BUTTOCK, STAGE 4: ICD-10-CM

## 2022-04-05 DIAGNOSIS — I69.098 OTHER SEQUELAE FOLLOWING NONTRAUMATIC SUBARACHNOID HEMORRHAGE: ICD-10-CM

## 2022-04-05 DIAGNOSIS — Z99.2 DEPENDENCE ON RENAL DIALYSIS: ICD-10-CM

## 2022-04-05 DIAGNOSIS — K94.23 GASTROSTOMY MALFUNCTION: ICD-10-CM

## 2022-04-05 DIAGNOSIS — Z99.11 DEPENDENCE ON RESPIRATOR [VENTILATOR] STATUS: ICD-10-CM

## 2022-04-05 DIAGNOSIS — J96.90 RESPIRATORY FAILURE, UNSPECIFIED, UNSPECIFIED WHETHER WITH HYPOXIA OR HYPERCAPNIA: ICD-10-CM

## 2022-04-05 DIAGNOSIS — I69.198 OTHER SEQUELAE OF NONTRAUMATIC INTRACEREBRAL HEMORRHAGE: ICD-10-CM

## 2022-04-05 DIAGNOSIS — R53.2 FUNCTIONAL QUADRIPLEGIA: ICD-10-CM

## 2022-04-05 DIAGNOSIS — D63.1 ANEMIA IN CHRONIC KIDNEY DISEASE: ICD-10-CM

## 2022-04-05 DIAGNOSIS — N18.6 END STAGE RENAL DISEASE: ICD-10-CM

## 2022-04-05 DIAGNOSIS — L89.023 PRESSURE ULCER OF LEFT ELBOW, STAGE 3: ICD-10-CM

## 2022-04-05 DIAGNOSIS — L89.814 PRESSURE ULCER OF HEAD, STAGE 4: ICD-10-CM

## 2022-04-05 DIAGNOSIS — J18.9 PNEUMONIA, UNSPECIFIED ORGANISM: ICD-10-CM

## 2022-04-05 DIAGNOSIS — A41.9 SEPSIS, UNSPECIFIED ORGANISM: ICD-10-CM

## 2022-04-05 DIAGNOSIS — L89.612 PRESSURE ULCER OF RIGHT HEEL, STAGE 2: ICD-10-CM

## 2022-04-05 DIAGNOSIS — M46.28 OSTEOMYELITIS OF VERTEBRA, SACRAL AND SACROCOCCYGEAL REGION: ICD-10-CM

## 2022-04-05 DIAGNOSIS — Z93.0 TRACHEOSTOMY STATUS: ICD-10-CM

## 2022-04-05 DIAGNOSIS — L89.010 PRESSURE ULCER OF RIGHT ELBOW, UNSTAGEABLE: ICD-10-CM

## 2022-04-21 ENCOUNTER — NON-APPOINTMENT (OUTPATIENT)
Age: 47
End: 2022-04-21

## 2022-12-12 NOTE — DIETITIAN INITIAL EVALUATION ADULT. - FACTORS AFF FOOD INTAKE
NPO
Patient instructed to take ramipril with a sip of water on the morning of procedure.   Last cardiology evaluation and EKG in chart

## 2024-01-01 NOTE — PROVIDER CONTACT NOTE (OTHER) - BACKGROUND
Pt admitted for ruptured aneurysm with IPH. s/o EVD placement and Left MCA aneurysm coil embolization on 10/26
brain aneurysm, hemorrhage, and hematoma with midline shift and herniation s/p EVD placement, and coil embolization of left MCA bifurcation aneurysm 10/26/2021.
brain aneurysm, hemorrhage, and hematoma with midline shift and herniation s/p EVD placement, and coil embolization of left MCA bifurcation aneurysm 10/26/2021.
see flowsheet
pt head asymmetrically swollen at baseline r/t no bone L side s/p surgical intervention.
S/p hemicraniectomy, vomiting in CTH scan earlier at 4pm
SEE FLOWSHEET
During the day pt has a 6 sec pause when suctioning as well.
s/p L hemicraniectomy for decompression and evacuation of SDH. Admitted to [facility] for tense flap. EVD, trach placed with complications of b/l pneumothorax that has since resolved.
see h&p
see h&p
Pt been intubated since 10/26/21.
Statement Selected

## 2024-10-29 NOTE — PROGRESS NOTE ADULT - PROBLEM SELECTOR PLAN 7
Continue to Observe 2/2 ruptured left middle cerebral artery aneurysm resulting in functional quadriplegia  - Palliative consult for goals of care discussion  - Social work consult to coordinate placement in new NH as requested by family  - cw Keppra IV daily Discharge

## 2024-11-12 NOTE — DISCHARGE NOTE NURSING/CASE MANAGEMENT/SOCIAL WORK - NSDCPEPTSTROKESIGNS_GEN_ALL_CORE
Sudden numbness or weakness of the face, arm, or leg, especially on one side of the body. Confusion, trouble speaking or understanding. Trouble seeing in one or both eyes. Trouble walking, dizziness, loss of balance or coordination. Severe headache. I personally spent

## 2025-01-14 NOTE — PROGRESS NOTE ADULT - SUBJECTIVE AND OBJECTIVE BOX
The parent/patient was counseled about each component of the vaccine, including possible side effects, risks, and benefits.     Patient is a 46y Female seen and evaluated at bedside.       Meds:    acetylcysteine 20%  Inhalation 4 every 6 hours  amLODIPine   Tablet 10 daily  artificial  tears Solution 1 two times a day  bisacodyl Suppository 10 daily  chlorhexidine 0.12% Liquid 15 every 12 hours  chlorhexidine 2% Cloths 1 <User Schedule>  cloNIDine 0.3 every 8 hours  dextrose 50% Injectable 25 once  heparin   Injectable 5000 every 8 hours  hydrALAZINE 100 every 8 hours  hydrALAZINE Injectable 10 every 2 hours PRN  labetalol 100 every 12 hours  labetalol Injectable 10 every 2 hours PRN  lactulose Syrup 10 daily  levETIRAcetam  Solution 500 every 24 hours  ondansetron Injectable 4 every 6 hours PRN  polyethylene glycol 3350 17 two times a day  senna 2 at bedtime  sodium chloride 2 every 6 hours  sodium chloride 0.9% lock flush 10 every 1 hour PRN      T(C): , Max: 37.2 (11-30-21 @ 17:00)  T(F): , Max: 99 (11-30-21 @ 17:00)  HR: 71 (12-01-21 @ 12:00)  BP: 154/94 (12-01-21 @ 12:00)  BP(mean): 119 (12-01-21 @ 12:00)  RR: 21 (12-01-21 @ 12:00)  SpO2: 100% (12-01-21 @ 12:00)  Wt(kg): --    11-30 @ 07:01  -  12-01 @ 07:00  --------------------------------------------------------  IN: 860 mL / OUT: 3600 mL / NET: -2740 mL    12-01 @ 07:01  -  12-01 @ 12:40  --------------------------------------------------------  IN: 150 mL / OUT: 0 mL / NET: 150 mL            Review of Systems: Unable to participate    PHYSICAL EXAM:  GENERAL: intubated, s/p hemicraniectomy- staples on her scalp  CHEST/LUNG: Coarse bilaterally  HEART: normal S1S2, RRR  EXTREMITIES: +2 b/l UE oedema   ACCESS: RIJ tunneled cath placed 11/26  LABS:                        8.2    12.92 )-----------( 237      ( 01 Dec 2021 04:57 )             27.8     12-01    138  |  98  |  44<H>  ----------------------------<  147<H>  4.2   |  28  |  5.21<H>    Ca    9.5      01 Dec 2021 04:57  Phos  4.1     12-01  Mg     2.2     12-01    TPro  6.9  /  Alb  2.9<L>  /  TBili  0.2  /  DBili  x   /  AST  28  /  ALT  <5<L>  /  AlkPhos  177<H>  11-30                RADIOLOGY & ADDITIONAL STUDIES:           Patient is a 46y Female seen and evaluated at bedside. patients blood pressure better controlled with the addition of nifedipine yesterday; Na at goal; will defer HD today       Meds:    acetylcysteine 20%  Inhalation 4 every 6 hours  amLODIPine   Tablet 10 daily  artificial  tears Solution 1 two times a day  bisacodyl Suppository 10 daily  chlorhexidine 0.12% Liquid 15 every 12 hours  chlorhexidine 2% Cloths 1 <User Schedule>  cloNIDine 0.3 every 8 hours  dextrose 50% Injectable 25 once  heparin   Injectable 5000 every 8 hours  hydrALAZINE 100 every 8 hours  hydrALAZINE Injectable 10 every 2 hours PRN  labetalol 100 every 12 hours  labetalol Injectable 10 every 2 hours PRN  lactulose Syrup 10 daily  levETIRAcetam  Solution 500 every 24 hours  ondansetron Injectable 4 every 6 hours PRN  polyethylene glycol 3350 17 two times a day  senna 2 at bedtime  sodium chloride 2 every 6 hours  sodium chloride 0.9% lock flush 10 every 1 hour PRN      T(C): , Max: 37.2 (11-30-21 @ 17:00)  T(F): , Max: 99 (11-30-21 @ 17:00)  HR: 71 (12-01-21 @ 12:00)  BP: 154/94 (12-01-21 @ 12:00)  BP(mean): 119 (12-01-21 @ 12:00)  RR: 21 (12-01-21 @ 12:00)  SpO2: 100% (12-01-21 @ 12:00)  Wt(kg): --    11-30 @ 07:01  -  12-01 @ 07:00  --------------------------------------------------------  IN: 860 mL / OUT: 3600 mL / NET: -2740 mL    12-01 @ 07:01  -  12-01 @ 12:40  --------------------------------------------------------  IN: 150 mL / OUT: 0 mL / NET: 150 mL            Review of Systems: Unable to participate    PHYSICAL EXAM:  GENERAL: intubated, s/p hemicraniectomy- staples on her scalp  CHEST/LUNG: Coarse bilaterally  HEART: normal S1S2, RRR  EXTREMITIES: +2 b/l UE oedema   ACCESS: RIJ tunneled cath placed 11/26  LABS:                        8.2    12.92 )-----------( 237      ( 01 Dec 2021 04:57 )             27.8     12-01    138  |  98  |  44<H>  ----------------------------<  147<H>  4.2   |  28  |  5.21<H>    Ca    9.5      01 Dec 2021 04:57  Phos  4.1     12-01  Mg     2.2     12-01    TPro  6.9  /  Alb  2.9<L>  /  TBili  0.2  /  DBili  x   /  AST  28  /  ALT  <5<L>  /  AlkPhos  177<H>  11-30                RADIOLOGY & ADDITIONAL STUDIES: